# Patient Record
Sex: FEMALE | Race: WHITE | NOT HISPANIC OR LATINO | Employment: OTHER | URBAN - METROPOLITAN AREA
[De-identification: names, ages, dates, MRNs, and addresses within clinical notes are randomized per-mention and may not be internally consistent; named-entity substitution may affect disease eponyms.]

---

## 2018-06-25 ENCOUNTER — TRANSCRIBE ORDERS (OUTPATIENT)
Dept: ADMINISTRATIVE | Facility: HOSPITAL | Age: 83
End: 2018-06-25

## 2018-06-25 DIAGNOSIS — Z12.39 SCREENING BREAST EXAMINATION: Primary | ICD-10-CM

## 2018-07-05 ENCOUNTER — HOSPITAL ENCOUNTER (OUTPATIENT)
Dept: MAMMOGRAPHY | Facility: HOSPITAL | Age: 83
Discharge: HOME | End: 2018-07-05
Attending: FAMILY MEDICINE

## 2018-07-05 DIAGNOSIS — Z12.39 SCREENING BREAST EXAMINATION: ICD-10-CM

## 2018-12-17 ENCOUNTER — HOSPITAL ENCOUNTER (OUTPATIENT)
Dept: RADIOLOGY | Facility: HOSPITAL | Age: 83
Discharge: HOME | End: 2018-12-17
Payer: MEDICARE

## 2018-12-17 ENCOUNTER — TRANSCRIBE ORDERS (OUTPATIENT)
Dept: ADMINISTRATIVE | Facility: HOSPITAL | Age: 83
End: 2018-12-17

## 2018-12-17 DIAGNOSIS — J45.909 ASTHMATIC BRONCHITIS WITHOUT COMPLICATION, UNSPECIFIED ASTHMA SEVERITY, UNSPECIFIED WHETHER PERSISTENT: Primary | ICD-10-CM

## 2018-12-17 DIAGNOSIS — J45.909 ASTHMATIC BRONCHITIS WITHOUT COMPLICATION, UNSPECIFIED ASTHMA SEVERITY, UNSPECIFIED WHETHER PERSISTENT: ICD-10-CM

## 2018-12-17 PROCEDURE — 71046 X-RAY EXAM CHEST 2 VIEWS: CPT

## 2019-05-17 ENCOUNTER — HOSPITAL ENCOUNTER (INPATIENT)
Facility: HOSPITAL | Age: 84
LOS: 4 days | Discharge: HOME | DRG: 871 | End: 2019-05-21
Attending: EMERGENCY MEDICINE | Admitting: INTERNAL MEDICINE
Payer: MEDICARE

## 2019-05-17 ENCOUNTER — APPOINTMENT (EMERGENCY)
Dept: RADIOLOGY | Facility: HOSPITAL | Age: 84
DRG: 871 | End: 2019-05-17
Payer: MEDICARE

## 2019-05-17 ENCOUNTER — APPOINTMENT (EMERGENCY)
Dept: CT IMAGING | Facility: HOSPITAL | Age: 84
DRG: 871 | End: 2019-05-17
Payer: MEDICARE

## 2019-05-17 DIAGNOSIS — R79.0 LOW MAGNESIUM LEVEL: ICD-10-CM

## 2019-05-17 DIAGNOSIS — E87.1 HYPONATREMIA: ICD-10-CM

## 2019-05-17 DIAGNOSIS — E87.6 HYPOKALEMIA: ICD-10-CM

## 2019-05-17 DIAGNOSIS — I34.0 MITRAL VALVE INSUFFICIENCY: ICD-10-CM

## 2019-05-17 DIAGNOSIS — I47.1 PAROXYSMAL SVT (SUPRAVENTRICULAR TACHYCARDIA) (HCC): ICD-10-CM

## 2019-05-17 DIAGNOSIS — I10 ESSENTIAL HYPERTENSION: ICD-10-CM

## 2019-05-17 DIAGNOSIS — I27.20 PULMONARY HYPERTENSION (HCC): ICD-10-CM

## 2019-05-17 DIAGNOSIS — I21.A1 MYOCARDIAL INFARCTION TYPE 2 (HCC): ICD-10-CM

## 2019-05-17 DIAGNOSIS — I35.0 MODERATE AORTIC STENOSIS: ICD-10-CM

## 2019-05-17 DIAGNOSIS — J18.9 COMMUNITY ACQUIRED PNEUMONIA OF LEFT UPPER LOBE OF LUNG: ICD-10-CM

## 2019-05-17 DIAGNOSIS — I47.1 SUPRAVENTRICULAR TACHYCARDIA (HCC): ICD-10-CM

## 2019-05-17 DIAGNOSIS — J18.9 COMMUNITY ACQUIRED PNEUMONIA: Primary | ICD-10-CM

## 2019-05-17 PROBLEM — E83.42 HYPOMAGNESEMIA: Status: ACTIVE | Noted: 2019-05-17

## 2019-05-17 PROBLEM — R79.89 ELEVATED TROPONIN: Status: ACTIVE | Noted: 2019-05-17

## 2019-05-17 PROBLEM — R77.8 ELEVATED TROPONIN: Status: ACTIVE | Noted: 2019-05-17

## 2019-05-17 LAB
ALBUMIN SERPL BCP-MCNC: 3.2 G/DL (ref 3.5–5)
ALP SERPL-CCNC: 63 U/L (ref 46–116)
ALT SERPL W P-5'-P-CCNC: 25 U/L (ref 12–78)
ANION GAP SERPL CALCULATED.3IONS-SCNC: 10 MMOL/L (ref 4–13)
APTT PPP: 43 SECONDS (ref 26–38)
AST SERPL W P-5'-P-CCNC: 24 U/L (ref 5–45)
BACTERIA UR QL AUTO: ABNORMAL /HPF
BASOPHILS # BLD AUTO: 0.01 THOUSANDS/ΜL (ref 0–0.1)
BASOPHILS NFR BLD AUTO: 0 % (ref 0–1)
BILIRUB SERPL-MCNC: 0.6 MG/DL (ref 0.2–1)
BILIRUB UR QL STRIP: NEGATIVE
BUN SERPL-MCNC: 16 MG/DL (ref 5–25)
CALCIUM SERPL-MCNC: 8.6 MG/DL (ref 8.3–10.1)
CHLORIDE SERPL-SCNC: 88 MMOL/L (ref 100–108)
CLARITY UR: CLEAR
CO2 SERPL-SCNC: 24 MMOL/L (ref 21–32)
COLOR UR: ABNORMAL
CREAT SERPL-MCNC: 0.97 MG/DL (ref 0.6–1.3)
EOSINOPHIL # BLD AUTO: 0 THOUSAND/ΜL (ref 0–0.61)
EOSINOPHIL NFR BLD AUTO: 0 % (ref 0–6)
ERYTHROCYTE [DISTWIDTH] IN BLOOD BY AUTOMATED COUNT: 12.6 % (ref 11.6–15.1)
GFR SERPL CREATININE-BSD FRML MDRD: 54 ML/MIN/1.73SQ M
GLUCOSE SERPL-MCNC: 139 MG/DL (ref 65–140)
GLUCOSE UR STRIP-MCNC: NEGATIVE MG/DL
HCT VFR BLD AUTO: 33.7 % (ref 34.8–46.1)
HGB BLD-MCNC: 11.5 G/DL (ref 11.5–15.4)
HGB UR QL STRIP.AUTO: ABNORMAL
IMM GRANULOCYTES # BLD AUTO: 0.03 THOUSAND/UL (ref 0–0.2)
IMM GRANULOCYTES NFR BLD AUTO: 1 % (ref 0–2)
INR PPP: 1.21 (ref 0.86–1.17)
KETONES UR STRIP-MCNC: NEGATIVE MG/DL
LEUKOCYTE ESTERASE UR QL STRIP: NEGATIVE
LYMPHOCYTES # BLD AUTO: 0.46 THOUSANDS/ΜL (ref 0.6–4.47)
LYMPHOCYTES NFR BLD AUTO: 8 % (ref 14–44)
MAGNESIUM SERPL-MCNC: 1.2 MG/DL (ref 1.6–2.6)
MCH RBC QN AUTO: 31.3 PG (ref 26.8–34.3)
MCHC RBC AUTO-ENTMCNC: 34.1 G/DL (ref 31.4–37.4)
MCV RBC AUTO: 92 FL (ref 82–98)
MONOCYTES # BLD AUTO: 0.3 THOUSAND/ΜL (ref 0.17–1.22)
MONOCYTES NFR BLD AUTO: 5 % (ref 4–12)
NEUTROPHILS # BLD AUTO: 5.08 THOUSANDS/ΜL (ref 1.85–7.62)
NEUTS SEG NFR BLD AUTO: 86 % (ref 43–75)
NITRITE UR QL STRIP: NEGATIVE
NON-SQ EPI CELLS URNS QL MICRO: ABNORMAL /HPF
NRBC BLD AUTO-RTO: 0 /100 WBCS
NT-PROBNP SERPL-MCNC: 5062 PG/ML
PH UR STRIP.AUTO: 7 [PH]
PLATELET # BLD AUTO: 104 THOUSANDS/UL (ref 149–390)
PMV BLD AUTO: 9.4 FL (ref 8.9–12.7)
POTASSIUM SERPL-SCNC: 2.9 MMOL/L (ref 3.5–5.3)
PROT SERPL-MCNC: 6.9 G/DL (ref 6.4–8.2)
PROT UR STRIP-MCNC: NEGATIVE MG/DL
PROTHROMBIN TIME: 14.7 SECONDS (ref 11.8–14.2)
RBC # BLD AUTO: 3.67 MILLION/UL (ref 3.81–5.12)
RBC #/AREA URNS AUTO: ABNORMAL /HPF
SODIUM SERPL-SCNC: 122 MMOL/L (ref 136–145)
SP GR UR STRIP.AUTO: <=1.005 (ref 1–1.03)
TROPONIN I SERPL-MCNC: 0.3 NG/ML
TSH SERPL DL<=0.05 MIU/L-ACNC: 0.84 UIU/ML (ref 0.36–3.74)
UROBILINOGEN UR QL STRIP.AUTO: 0.2 E.U./DL
WBC # BLD AUTO: 5.88 THOUSAND/UL (ref 4.31–10.16)
WBC #/AREA URNS AUTO: ABNORMAL /HPF

## 2019-05-17 PROCEDURE — 99285 EMERGENCY DEPT VISIT HI MDM: CPT

## 2019-05-17 PROCEDURE — 84443 ASSAY THYROID STIM HORMONE: CPT | Performed by: EMERGENCY MEDICINE

## 2019-05-17 PROCEDURE — 99285 EMERGENCY DEPT VISIT HI MDM: CPT | Performed by: EMERGENCY MEDICINE

## 2019-05-17 PROCEDURE — 1123F ACP DISCUSS/DSCN MKR DOCD: CPT | Performed by: INTERNAL MEDICINE

## 2019-05-17 PROCEDURE — 93005 ELECTROCARDIOGRAM TRACING: CPT

## 2019-05-17 PROCEDURE — 96375 TX/PRO/DX INJ NEW DRUG ADDON: CPT

## 2019-05-17 PROCEDURE — 87040 BLOOD CULTURE FOR BACTERIA: CPT | Performed by: EMERGENCY MEDICINE

## 2019-05-17 PROCEDURE — 84484 ASSAY OF TROPONIN QUANT: CPT | Performed by: EMERGENCY MEDICINE

## 2019-05-17 PROCEDURE — 71046 X-RAY EXAM CHEST 2 VIEWS: CPT

## 2019-05-17 PROCEDURE — 96361 HYDRATE IV INFUSION ADD-ON: CPT

## 2019-05-17 PROCEDURE — 74177 CT ABD & PELVIS W/CONTRAST: CPT

## 2019-05-17 PROCEDURE — 85610 PROTHROMBIN TIME: CPT | Performed by: EMERGENCY MEDICINE

## 2019-05-17 PROCEDURE — 81001 URINALYSIS AUTO W/SCOPE: CPT | Performed by: EMERGENCY MEDICINE

## 2019-05-17 PROCEDURE — 36415 COLL VENOUS BLD VENIPUNCTURE: CPT | Performed by: EMERGENCY MEDICINE

## 2019-05-17 PROCEDURE — 83880 ASSAY OF NATRIURETIC PEPTIDE: CPT | Performed by: EMERGENCY MEDICINE

## 2019-05-17 PROCEDURE — 85730 THROMBOPLASTIN TIME PARTIAL: CPT | Performed by: EMERGENCY MEDICINE

## 2019-05-17 PROCEDURE — 83735 ASSAY OF MAGNESIUM: CPT | Performed by: EMERGENCY MEDICINE

## 2019-05-17 PROCEDURE — 96365 THER/PROPH/DIAG IV INF INIT: CPT

## 2019-05-17 PROCEDURE — 71275 CT ANGIOGRAPHY CHEST: CPT

## 2019-05-17 PROCEDURE — 80053 COMPREHEN METABOLIC PANEL: CPT | Performed by: EMERGENCY MEDICINE

## 2019-05-17 PROCEDURE — 85025 COMPLETE CBC W/AUTO DIFF WBC: CPT | Performed by: EMERGENCY MEDICINE

## 2019-05-17 RX ORDER — MAGNESIUM SULFATE HEPTAHYDRATE 40 MG/ML
2 INJECTION, SOLUTION INTRAVENOUS ONCE
Status: COMPLETED | OUTPATIENT
Start: 2019-05-17 | End: 2019-05-17

## 2019-05-17 RX ORDER — ASPIRIN 81 MG/1
81 TABLET ORAL DAILY
COMMUNITY
End: 2020-07-09 | Stop reason: DRUGHIGH

## 2019-05-17 RX ORDER — ATENOLOL AND CHLORTHALIDONE TABLET 100; 25 MG/1; MG/1
1 TABLET ORAL DAILY
COMMUNITY
End: 2019-05-21 | Stop reason: HOSPADM

## 2019-05-17 RX ORDER — CEFPODOXIME PROXETIL 200 MG/1
200 TABLET, FILM COATED ORAL 2 TIMES DAILY
Status: ON HOLD | COMMUNITY
End: 2019-05-21 | Stop reason: SDUPTHER

## 2019-05-17 RX ORDER — ASPIRIN 81 MG/1
324 TABLET, CHEWABLE ORAL ONCE
Status: COMPLETED | OUTPATIENT
Start: 2019-05-17 | End: 2019-05-17

## 2019-05-17 RX ORDER — ALPRAZOLAM 0.25 MG/1
TABLET ORAL 4 TIMES DAILY PRN
Status: ON HOLD | COMMUNITY
End: 2019-05-21 | Stop reason: SDUPTHER

## 2019-05-17 RX ORDER — BENZONATATE 100 MG/1
100 CAPSULE ORAL 3 TIMES DAILY PRN
COMMUNITY
End: 2019-05-21 | Stop reason: HOSPADM

## 2019-05-17 RX ORDER — ACETAMINOPHEN 325 MG/1
650 TABLET ORAL ONCE
Status: DISCONTINUED | OUTPATIENT
Start: 2019-05-17 | End: 2019-05-17

## 2019-05-17 RX ORDER — AMLODIPINE BESYLATE 5 MG/1
5 TABLET ORAL DAILY
COMMUNITY
End: 2019-06-11 | Stop reason: SDUPTHER

## 2019-05-17 RX ORDER — AZITHROMYCIN 250 MG/1
500 TABLET, FILM COATED ORAL ONCE
Status: COMPLETED | OUTPATIENT
Start: 2019-05-17 | End: 2019-05-17

## 2019-05-17 RX ORDER — AMOXICILLIN 500 MG
1200 CAPSULE ORAL 2 TIMES DAILY
COMMUNITY

## 2019-05-17 RX ORDER — POTASSIUM CHLORIDE 20 MEQ/1
60 TABLET, EXTENDED RELEASE ORAL ONCE
Status: COMPLETED | OUTPATIENT
Start: 2019-05-17 | End: 2019-05-17

## 2019-05-17 RX ORDER — POTASSIUM CHLORIDE 1.5 G/1.77G
20 POWDER, FOR SOLUTION ORAL 2 TIMES DAILY
COMMUNITY
End: 2019-05-21 | Stop reason: HOSPADM

## 2019-05-17 RX ORDER — CEFTRIAXONE 1 G/50ML
1000 INJECTION, SOLUTION INTRAVENOUS ONCE
Status: COMPLETED | OUTPATIENT
Start: 2019-05-17 | End: 2019-05-17

## 2019-05-17 RX ADMIN — SODIUM CHLORIDE 1000 ML: 0.9 INJECTION, SOLUTION INTRAVENOUS at 20:10

## 2019-05-17 RX ADMIN — CEFTRIAXONE 1000 MG: 1 INJECTION, SOLUTION INTRAVENOUS at 22:47

## 2019-05-17 RX ADMIN — IOHEXOL 100 ML: 350 INJECTION, SOLUTION INTRAVENOUS at 21:22

## 2019-05-17 RX ADMIN — ASPIRIN 81 MG 324 MG: 81 TABLET ORAL at 21:34

## 2019-05-17 RX ADMIN — POTASSIUM CHLORIDE 60 MEQ: 1500 TABLET, EXTENDED RELEASE ORAL at 21:35

## 2019-05-17 RX ADMIN — AZITHROMYCIN 500 MG: 250 TABLET, FILM COATED ORAL at 22:45

## 2019-05-17 RX ADMIN — MAGNESIUM SULFATE HEPTAHYDRATE 2 G: 40 INJECTION, SOLUTION INTRAVENOUS at 21:38

## 2019-05-18 PROBLEM — I10 ESSENTIAL HYPERTENSION: Status: ACTIVE | Noted: 2019-05-18

## 2019-05-18 LAB
ALBUMIN SERPL BCP-MCNC: 2.7 G/DL (ref 3.5–5)
ALP SERPL-CCNC: 57 U/L (ref 46–116)
ALT SERPL W P-5'-P-CCNC: 23 U/L (ref 12–78)
ANION GAP SERPL CALCULATED.3IONS-SCNC: 10 MMOL/L (ref 4–13)
AST SERPL W P-5'-P-CCNC: 24 U/L (ref 5–45)
BILIRUB SERPL-MCNC: 0.4 MG/DL (ref 0.2–1)
BUN SERPL-MCNC: 13 MG/DL (ref 5–25)
CALCIUM SERPL-MCNC: 8 MG/DL (ref 8.3–10.1)
CHLORIDE SERPL-SCNC: 92 MMOL/L (ref 100–108)
CO2 SERPL-SCNC: 24 MMOL/L (ref 21–32)
CREAT SERPL-MCNC: 0.91 MG/DL (ref 0.6–1.3)
ERYTHROCYTE [DISTWIDTH] IN BLOOD BY AUTOMATED COUNT: 12.8 % (ref 11.6–15.1)
GFR SERPL CREATININE-BSD FRML MDRD: 59 ML/MIN/1.73SQ M
GLUCOSE SERPL-MCNC: 110 MG/DL (ref 65–140)
HCT VFR BLD AUTO: 32.3 % (ref 34.8–46.1)
HGB BLD-MCNC: 11 G/DL (ref 11.5–15.4)
L PNEUMO1 AG UR QL IA.RAPID: NEGATIVE
MAGNESIUM SERPL-MCNC: 1.7 MG/DL (ref 1.6–2.6)
MCH RBC QN AUTO: 31.7 PG (ref 26.8–34.3)
MCHC RBC AUTO-ENTMCNC: 34.1 G/DL (ref 31.4–37.4)
MCV RBC AUTO: 93 FL (ref 82–98)
PHOSPHATE SERPL-MCNC: 2.5 MG/DL (ref 2.3–4.1)
PLATELET # BLD AUTO: 84 THOUSANDS/UL (ref 149–390)
PLATELET # BLD AUTO: 86 THOUSANDS/UL (ref 149–390)
PMV BLD AUTO: 9 FL (ref 8.9–12.7)
PMV BLD AUTO: 9.4 FL (ref 8.9–12.7)
POTASSIUM SERPL-SCNC: 3.3 MMOL/L (ref 3.5–5.3)
PROCALCITONIN SERPL-MCNC: 1.4 NG/ML
PROCALCITONIN SERPL-MCNC: 1.71 NG/ML
PROT SERPL-MCNC: 6.1 G/DL (ref 6.4–8.2)
RBC # BLD AUTO: 3.47 MILLION/UL (ref 3.81–5.12)
S PNEUM AG UR QL: NEGATIVE
SODIUM SERPL-SCNC: 126 MMOL/L (ref 136–145)
TROPONIN I SERPL-MCNC: 0.32 NG/ML
TROPONIN I SERPL-MCNC: 0.34 NG/ML
WBC # BLD AUTO: 4.91 THOUSAND/UL (ref 4.31–10.16)

## 2019-05-18 PROCEDURE — 87449 NOS EACH ORGANISM AG IA: CPT | Performed by: PHYSICIAN ASSISTANT

## 2019-05-18 PROCEDURE — 84484 ASSAY OF TROPONIN QUANT: CPT | Performed by: PHYSICIAN ASSISTANT

## 2019-05-18 PROCEDURE — 84100 ASSAY OF PHOSPHORUS: CPT | Performed by: PHYSICIAN ASSISTANT

## 2019-05-18 PROCEDURE — 85027 COMPLETE CBC AUTOMATED: CPT | Performed by: PHYSICIAN ASSISTANT

## 2019-05-18 PROCEDURE — 99222 1ST HOSP IP/OBS MODERATE 55: CPT | Performed by: INTERNAL MEDICINE

## 2019-05-18 PROCEDURE — 97162 PT EVAL MOD COMPLEX 30 MIN: CPT

## 2019-05-18 PROCEDURE — 97166 OT EVAL MOD COMPLEX 45 MIN: CPT | Performed by: DEVELOPMENTAL THERAPIST

## 2019-05-18 PROCEDURE — G8987 SELF CARE CURRENT STATUS: HCPCS | Performed by: DEVELOPMENTAL THERAPIST

## 2019-05-18 PROCEDURE — 93005 ELECTROCARDIOGRAM TRACING: CPT

## 2019-05-18 PROCEDURE — 80053 COMPREHEN METABOLIC PANEL: CPT | Performed by: PHYSICIAN ASSISTANT

## 2019-05-18 PROCEDURE — 84145 PROCALCITONIN (PCT): CPT | Performed by: PHYSICIAN ASSISTANT

## 2019-05-18 PROCEDURE — G8988 SELF CARE GOAL STATUS: HCPCS | Performed by: DEVELOPMENTAL THERAPIST

## 2019-05-18 PROCEDURE — 36415 COLL VENOUS BLD VENIPUNCTURE: CPT | Performed by: PHYSICIAN ASSISTANT

## 2019-05-18 PROCEDURE — 94760 N-INVAS EAR/PLS OXIMETRY 1: CPT

## 2019-05-18 PROCEDURE — 94664 DEMO&/EVAL PT USE INHALER: CPT

## 2019-05-18 PROCEDURE — 83735 ASSAY OF MAGNESIUM: CPT | Performed by: PHYSICIAN ASSISTANT

## 2019-05-18 PROCEDURE — 85049 AUTOMATED PLATELET COUNT: CPT | Performed by: PHYSICIAN ASSISTANT

## 2019-05-18 PROCEDURE — G8979 MOBILITY GOAL STATUS: HCPCS

## 2019-05-18 PROCEDURE — G8978 MOBILITY CURRENT STATUS: HCPCS

## 2019-05-18 RX ORDER — ASPIRIN 81 MG/1
81 TABLET, CHEWABLE ORAL DAILY
Status: DISCONTINUED | OUTPATIENT
Start: 2019-05-18 | End: 2019-05-21 | Stop reason: HOSPADM

## 2019-05-18 RX ORDER — CALCIUM CARBONATE 500(1250)
1 TABLET ORAL
Status: DISCONTINUED | OUTPATIENT
Start: 2019-05-18 | End: 2019-05-21 | Stop reason: HOSPADM

## 2019-05-18 RX ORDER — POTASSIUM CHLORIDE 20MEQ/15ML
20 LIQUID (ML) ORAL 2 TIMES DAILY
Status: DISCONTINUED | OUTPATIENT
Start: 2019-05-18 | End: 2019-05-21 | Stop reason: HOSPADM

## 2019-05-18 RX ORDER — BENZONATATE 100 MG/1
100 CAPSULE ORAL 3 TIMES DAILY PRN
Status: DISCONTINUED | OUTPATIENT
Start: 2019-05-18 | End: 2019-05-21 | Stop reason: HOSPADM

## 2019-05-18 RX ORDER — AMLODIPINE BESYLATE 5 MG/1
5 TABLET ORAL DAILY
Status: DISCONTINUED | OUTPATIENT
Start: 2019-05-18 | End: 2019-05-21 | Stop reason: HOSPADM

## 2019-05-18 RX ORDER — ONDANSETRON 2 MG/ML
4 INJECTION INTRAMUSCULAR; INTRAVENOUS EVERY 6 HOURS PRN
Status: DISCONTINUED | OUTPATIENT
Start: 2019-05-18 | End: 2019-05-21 | Stop reason: HOSPADM

## 2019-05-18 RX ORDER — ACETAMINOPHEN 325 MG/1
650 TABLET ORAL ONCE
Status: COMPLETED | OUTPATIENT
Start: 2019-05-18 | End: 2019-05-18

## 2019-05-18 RX ORDER — CHLORTHALIDONE 25 MG/1
25 TABLET ORAL DAILY
Status: DISCONTINUED | OUTPATIENT
Start: 2019-05-18 | End: 2019-05-18

## 2019-05-18 RX ORDER — ATENOLOL 50 MG/1
100 TABLET ORAL DAILY
Status: DISCONTINUED | OUTPATIENT
Start: 2019-05-18 | End: 2019-05-21 | Stop reason: HOSPADM

## 2019-05-18 RX ORDER — ASPIRIN 81 MG/1
81 TABLET ORAL DAILY
Status: DISCONTINUED | OUTPATIENT
Start: 2019-05-18 | End: 2019-05-18

## 2019-05-18 RX ORDER — GUAIFENESIN/DEXTROMETHORPHAN 100-10MG/5
10 SYRUP ORAL ONCE
Status: COMPLETED | OUTPATIENT
Start: 2019-05-18 | End: 2019-05-18

## 2019-05-18 RX ORDER — ALPRAZOLAM 0.25 MG/1
0.25 TABLET ORAL 4 TIMES DAILY PRN
Status: DISCONTINUED | OUTPATIENT
Start: 2019-05-18 | End: 2019-05-21 | Stop reason: HOSPADM

## 2019-05-18 RX ORDER — ALBUTEROL SULFATE 2.5 MG/3ML
2.5 SOLUTION RESPIRATORY (INHALATION) EVERY 4 HOURS PRN
Status: DISCONTINUED | OUTPATIENT
Start: 2019-05-18 | End: 2019-05-21 | Stop reason: HOSPADM

## 2019-05-18 RX ORDER — ACETAMINOPHEN 325 MG/1
TABLET ORAL
Status: COMPLETED
Start: 2019-05-18 | End: 2019-05-18

## 2019-05-18 RX ADMIN — CHLORTHALIDONE 25 MG: 25 TABLET ORAL at 09:28

## 2019-05-18 RX ADMIN — ACETAMINOPHEN 650 MG: 325 TABLET ORAL at 09:05

## 2019-05-18 RX ADMIN — ATENOLOL 100 MG: 50 TABLET ORAL at 09:27

## 2019-05-18 RX ADMIN — ACETAMINOPHEN 650 MG: 325 TABLET, FILM COATED ORAL at 09:05

## 2019-05-18 RX ADMIN — Medication 1 TABLET: at 07:30

## 2019-05-18 RX ADMIN — ASPIRIN 81 MG 81 MG: 81 TABLET ORAL at 09:28

## 2019-05-18 RX ADMIN — GUAIFENESIN AND DEXTROMETHORPHAN 10 ML: 100; 10 SYRUP ORAL at 05:06

## 2019-05-18 RX ADMIN — POTASSIUM CHLORIDE 20 MEQ: 20 SOLUTION ORAL at 19:16

## 2019-05-18 RX ADMIN — ALPRAZOLAM 0.25 MG: 0.25 TABLET ORAL at 01:51

## 2019-05-18 RX ADMIN — BENZONATATE 100 MG: 100 CAPSULE ORAL at 19:18

## 2019-05-18 RX ADMIN — PSYLLIUM HUSK 1 PACKET: 3.4 POWDER ORAL at 09:26

## 2019-05-18 RX ADMIN — ENOXAPARIN SODIUM 40 MG: 40 INJECTION SUBCUTANEOUS at 09:01

## 2019-05-18 RX ADMIN — AMLODIPINE BESYLATE 5 MG: 5 TABLET ORAL at 09:03

## 2019-05-18 RX ADMIN — POTASSIUM CHLORIDE 20 MEQ: 20 SOLUTION ORAL at 09:05

## 2019-05-18 RX ADMIN — BENZONATATE 100 MG: 100 CAPSULE ORAL at 01:51

## 2019-05-19 LAB
ALBUMIN SERPL BCP-MCNC: 3 G/DL (ref 3.5–5)
ALP SERPL-CCNC: 76 U/L (ref 46–116)
ALT SERPL W P-5'-P-CCNC: 26 U/L (ref 12–78)
ANION GAP SERPL CALCULATED.3IONS-SCNC: 11 MMOL/L (ref 4–13)
AST SERPL W P-5'-P-CCNC: 31 U/L (ref 5–45)
BASOPHILS # BLD AUTO: 0.01 THOUSANDS/ΜL (ref 0–0.1)
BASOPHILS NFR BLD AUTO: 0 % (ref 0–1)
BILIRUB SERPL-MCNC: 0.4 MG/DL (ref 0.2–1)
BUN SERPL-MCNC: 19 MG/DL (ref 5–25)
CALCIUM SERPL-MCNC: 8.2 MG/DL (ref 8.3–10.1)
CHLORIDE SERPL-SCNC: 90 MMOL/L (ref 100–108)
CO2 SERPL-SCNC: 23 MMOL/L (ref 21–32)
CREAT SERPL-MCNC: 1.12 MG/DL (ref 0.6–1.3)
EOSINOPHIL # BLD AUTO: 0 THOUSAND/ΜL (ref 0–0.61)
EOSINOPHIL NFR BLD AUTO: 0 % (ref 0–6)
ERYTHROCYTE [DISTWIDTH] IN BLOOD BY AUTOMATED COUNT: 12.9 % (ref 11.6–15.1)
GFR SERPL CREATININE-BSD FRML MDRD: 46 ML/MIN/1.73SQ M
GLUCOSE SERPL-MCNC: 105 MG/DL (ref 65–140)
HCT VFR BLD AUTO: 33.9 % (ref 34.8–46.1)
HGB BLD-MCNC: 11.5 G/DL (ref 11.5–15.4)
IMM GRANULOCYTES # BLD AUTO: 0.02 THOUSAND/UL (ref 0–0.2)
IMM GRANULOCYTES NFR BLD AUTO: 0 % (ref 0–2)
INR PPP: 1.09 (ref 0.86–1.17)
LYMPHOCYTES # BLD AUTO: 1.58 THOUSANDS/ΜL (ref 0.6–4.47)
LYMPHOCYTES NFR BLD AUTO: 27 % (ref 14–44)
MAGNESIUM SERPL-MCNC: 1.8 MG/DL (ref 1.6–2.6)
MCH RBC QN AUTO: 31.5 PG (ref 26.8–34.3)
MCHC RBC AUTO-ENTMCNC: 33.9 G/DL (ref 31.4–37.4)
MCV RBC AUTO: 93 FL (ref 82–98)
MONOCYTES # BLD AUTO: 0.38 THOUSAND/ΜL (ref 0.17–1.22)
MONOCYTES NFR BLD AUTO: 7 % (ref 4–12)
NEUTROPHILS # BLD AUTO: 3.78 THOUSANDS/ΜL (ref 1.85–7.62)
NEUTS SEG NFR BLD AUTO: 66 % (ref 43–75)
NRBC BLD AUTO-RTO: 0 /100 WBCS
PHOSPHATE SERPL-MCNC: 2.4 MG/DL (ref 2.3–4.1)
PLATELET # BLD AUTO: 111 THOUSANDS/UL (ref 149–390)
PMV BLD AUTO: 9.6 FL (ref 8.9–12.7)
POTASSIUM SERPL-SCNC: 3.4 MMOL/L (ref 3.5–5.3)
PROCALCITONIN SERPL-MCNC: 2.28 NG/ML
PROT SERPL-MCNC: 6.9 G/DL (ref 6.4–8.2)
PROTHROMBIN TIME: 13.6 SECONDS (ref 11.8–14.2)
RBC # BLD AUTO: 3.65 MILLION/UL (ref 3.81–5.12)
SODIUM SERPL-SCNC: 124 MMOL/L (ref 136–145)
WBC # BLD AUTO: 5.77 THOUSAND/UL (ref 4.31–10.16)

## 2019-05-19 PROCEDURE — 84100 ASSAY OF PHOSPHORUS: CPT | Performed by: INTERNAL MEDICINE

## 2019-05-19 PROCEDURE — 84145 PROCALCITONIN (PCT): CPT | Performed by: INTERNAL MEDICINE

## 2019-05-19 PROCEDURE — 85025 COMPLETE CBC W/AUTO DIFF WBC: CPT | Performed by: INTERNAL MEDICINE

## 2019-05-19 PROCEDURE — 99232 SBSQ HOSP IP/OBS MODERATE 35: CPT | Performed by: INTERNAL MEDICINE

## 2019-05-19 PROCEDURE — 85610 PROTHROMBIN TIME: CPT | Performed by: INTERNAL MEDICINE

## 2019-05-19 PROCEDURE — 80053 COMPREHEN METABOLIC PANEL: CPT | Performed by: INTERNAL MEDICINE

## 2019-05-19 PROCEDURE — 83735 ASSAY OF MAGNESIUM: CPT | Performed by: INTERNAL MEDICINE

## 2019-05-19 RX ORDER — LOPERAMIDE HYDROCHLORIDE 2 MG/1
2 CAPSULE ORAL 4 TIMES DAILY PRN
Status: DISCONTINUED | OUTPATIENT
Start: 2019-05-19 | End: 2019-05-21 | Stop reason: HOSPADM

## 2019-05-19 RX ORDER — CEFTRIAXONE 1 G/50ML
1000 INJECTION, SOLUTION INTRAVENOUS EVERY 24 HOURS
Status: DISCONTINUED | OUTPATIENT
Start: 2019-05-19 | End: 2019-05-21 | Stop reason: HOSPADM

## 2019-05-19 RX ADMIN — POTASSIUM CHLORIDE 20 MEQ: 20 SOLUTION ORAL at 18:35

## 2019-05-19 RX ADMIN — POTASSIUM CHLORIDE 20 MEQ: 20 SOLUTION ORAL at 08:45

## 2019-05-19 RX ADMIN — AZITHROMYCIN MONOHYDRATE 500 MG: 500 INJECTION, POWDER, LYOPHILIZED, FOR SOLUTION INTRAVENOUS at 09:51

## 2019-05-19 RX ADMIN — ASPIRIN 81 MG 81 MG: 81 TABLET ORAL at 08:46

## 2019-05-19 RX ADMIN — Medication 1 TABLET: at 08:48

## 2019-05-19 RX ADMIN — ENOXAPARIN SODIUM 40 MG: 40 INJECTION SUBCUTANEOUS at 08:48

## 2019-05-19 RX ADMIN — ALBUTEROL SULFATE 2.5 MG: 2.5 SOLUTION RESPIRATORY (INHALATION) at 09:54

## 2019-05-19 RX ADMIN — CEFTRIAXONE 1000 MG: 1 INJECTION, SOLUTION INTRAVENOUS at 09:09

## 2019-05-20 ENCOUNTER — APPOINTMENT (INPATIENT)
Dept: NON INVASIVE DIAGNOSTICS | Facility: HOSPITAL | Age: 84
DRG: 871 | End: 2019-05-20
Payer: MEDICARE

## 2019-05-20 LAB
ALBUMIN SERPL BCP-MCNC: 3 G/DL (ref 3.5–5)
ALP SERPL-CCNC: 76 U/L (ref 46–116)
ALT SERPL W P-5'-P-CCNC: 24 U/L (ref 12–78)
ANION GAP SERPL CALCULATED.3IONS-SCNC: 8 MMOL/L (ref 4–13)
AST SERPL W P-5'-P-CCNC: 33 U/L (ref 5–45)
BASOPHILS # BLD AUTO: 0.01 THOUSANDS/ΜL (ref 0–0.1)
BASOPHILS NFR BLD AUTO: 0 % (ref 0–1)
BILIRUB SERPL-MCNC: 0.4 MG/DL (ref 0.2–1)
BUN SERPL-MCNC: 19 MG/DL (ref 5–25)
CALCIUM SERPL-MCNC: 8.7 MG/DL (ref 8.3–10.1)
CHLORIDE SERPL-SCNC: 95 MMOL/L (ref 100–108)
CO2 SERPL-SCNC: 24 MMOL/L (ref 21–32)
CREAT SERPL-MCNC: 0.97 MG/DL (ref 0.6–1.3)
EOSINOPHIL # BLD AUTO: 0.06 THOUSAND/ΜL (ref 0–0.61)
EOSINOPHIL NFR BLD AUTO: 1 % (ref 0–6)
ERYTHROCYTE [DISTWIDTH] IN BLOOD BY AUTOMATED COUNT: 12.7 % (ref 11.6–15.1)
GFR SERPL CREATININE-BSD FRML MDRD: 54 ML/MIN/1.73SQ M
GLUCOSE SERPL-MCNC: 98 MG/DL (ref 65–140)
HCT VFR BLD AUTO: 34.6 % (ref 34.8–46.1)
HGB BLD-MCNC: 11.7 G/DL (ref 11.5–15.4)
IMM GRANULOCYTES # BLD AUTO: 0.01 THOUSAND/UL (ref 0–0.2)
IMM GRANULOCYTES NFR BLD AUTO: 0 % (ref 0–2)
LYMPHOCYTES # BLD AUTO: 1.3 THOUSANDS/ΜL (ref 0.6–4.47)
LYMPHOCYTES NFR BLD AUTO: 28 % (ref 14–44)
MAGNESIUM SERPL-MCNC: 1.7 MG/DL (ref 1.6–2.6)
MCH RBC QN AUTO: 31.1 PG (ref 26.8–34.3)
MCHC RBC AUTO-ENTMCNC: 33.8 G/DL (ref 31.4–37.4)
MCV RBC AUTO: 92 FL (ref 82–98)
MONOCYTES # BLD AUTO: 0.34 THOUSAND/ΜL (ref 0.17–1.22)
MONOCYTES NFR BLD AUTO: 7 % (ref 4–12)
NEUTROPHILS # BLD AUTO: 2.85 THOUSANDS/ΜL (ref 1.85–7.62)
NEUTS SEG NFR BLD AUTO: 64 % (ref 43–75)
NRBC BLD AUTO-RTO: 0 /100 WBCS
PHOSPHATE SERPL-MCNC: 2.4 MG/DL (ref 2.3–4.1)
PLATELET # BLD AUTO: 122 THOUSANDS/UL (ref 149–390)
PMV BLD AUTO: 9.8 FL (ref 8.9–12.7)
POTASSIUM SERPL-SCNC: 3.6 MMOL/L (ref 3.5–5.3)
PROCALCITONIN SERPL-MCNC: 1.27 NG/ML
PROT SERPL-MCNC: 7.1 G/DL (ref 6.4–8.2)
RBC # BLD AUTO: 3.76 MILLION/UL (ref 3.81–5.12)
SODIUM SERPL-SCNC: 127 MMOL/L (ref 136–145)
WBC # BLD AUTO: 4.57 THOUSAND/UL (ref 4.31–10.16)

## 2019-05-20 PROCEDURE — 93306 TTE W/DOPPLER COMPLETE: CPT | Performed by: INTERNAL MEDICINE

## 2019-05-20 PROCEDURE — 99232 SBSQ HOSP IP/OBS MODERATE 35: CPT | Performed by: INTERNAL MEDICINE

## 2019-05-20 PROCEDURE — 93306 TTE W/DOPPLER COMPLETE: CPT

## 2019-05-20 PROCEDURE — 85025 COMPLETE CBC W/AUTO DIFF WBC: CPT | Performed by: INTERNAL MEDICINE

## 2019-05-20 PROCEDURE — 97110 THERAPEUTIC EXERCISES: CPT

## 2019-05-20 PROCEDURE — 97116 GAIT TRAINING THERAPY: CPT

## 2019-05-20 PROCEDURE — 83735 ASSAY OF MAGNESIUM: CPT | Performed by: INTERNAL MEDICINE

## 2019-05-20 PROCEDURE — 84145 PROCALCITONIN (PCT): CPT | Performed by: INTERNAL MEDICINE

## 2019-05-20 PROCEDURE — 84100 ASSAY OF PHOSPHORUS: CPT | Performed by: INTERNAL MEDICINE

## 2019-05-20 PROCEDURE — 80053 COMPREHEN METABOLIC PANEL: CPT | Performed by: INTERNAL MEDICINE

## 2019-05-20 RX ADMIN — POTASSIUM CHLORIDE 20 MEQ: 20 SOLUTION ORAL at 18:09

## 2019-05-20 RX ADMIN — LOPERAMIDE HYDROCHLORIDE 2 MG: 2 CAPSULE ORAL at 04:28

## 2019-05-20 RX ADMIN — AMLODIPINE BESYLATE 5 MG: 5 TABLET ORAL at 10:58

## 2019-05-20 RX ADMIN — AZITHROMYCIN MONOHYDRATE 500 MG: 500 INJECTION, POWDER, LYOPHILIZED, FOR SOLUTION INTRAVENOUS at 11:02

## 2019-05-20 RX ADMIN — ATENOLOL 100 MG: 50 TABLET ORAL at 10:58

## 2019-05-20 RX ADMIN — ENOXAPARIN SODIUM 40 MG: 40 INJECTION SUBCUTANEOUS at 10:59

## 2019-05-20 RX ADMIN — ASPIRIN 81 MG 81 MG: 81 TABLET ORAL at 10:58

## 2019-05-20 RX ADMIN — CEFTRIAXONE 1000 MG: 1 INJECTION, SOLUTION INTRAVENOUS at 13:03

## 2019-05-20 RX ADMIN — POTASSIUM CHLORIDE 20 MEQ: 20 SOLUTION ORAL at 10:59

## 2019-05-20 RX ADMIN — Medication 1 TABLET: at 08:08

## 2019-05-21 VITALS
OXYGEN SATURATION: 98 % | BODY MASS INDEX: 27.86 KG/M2 | TEMPERATURE: 97.8 F | HEART RATE: 79 BPM | HEIGHT: 58 IN | DIASTOLIC BLOOD PRESSURE: 64 MMHG | SYSTOLIC BLOOD PRESSURE: 148 MMHG | RESPIRATION RATE: 18 BRPM | WEIGHT: 132.72 LBS

## 2019-05-21 PROBLEM — K57.90 DIVERTICULOSIS: Status: ACTIVE | Noted: 2019-05-21

## 2019-05-21 PROBLEM — I35.0 AORTIC VALVE STENOSIS: Status: ACTIVE | Noted: 2019-05-21

## 2019-05-21 PROBLEM — I27.20 PULMONARY HYPERTENSION (HCC): Status: ACTIVE | Noted: 2019-05-21

## 2019-05-21 PROBLEM — K44.9 HIATAL HERNIA: Status: ACTIVE | Noted: 2019-05-21

## 2019-05-21 PROBLEM — R59.1 LYMPHADENOPATHY: Status: ACTIVE | Noted: 2019-05-21

## 2019-05-21 PROBLEM — I44.0 FIRST DEGREE AV BLOCK: Status: ACTIVE | Noted: 2019-05-21

## 2019-05-21 PROBLEM — I47.10 SUPRAVENTRICULAR TACHYCARDIA: Status: ACTIVE | Noted: 2019-05-21

## 2019-05-21 PROBLEM — I34.0 MITRAL VALVE INSUFFICIENCY: Status: ACTIVE | Noted: 2019-05-21

## 2019-05-21 PROBLEM — I47.1 SUPRAVENTRICULAR TACHYCARDIA (HCC): Status: ACTIVE | Noted: 2019-05-21

## 2019-05-21 PROBLEM — D69.6 THROMBOCYTOPENIA (HCC): Status: ACTIVE | Noted: 2019-05-21

## 2019-05-21 LAB
ALBUMIN SERPL BCP-MCNC: 2.9 G/DL (ref 3.5–5)
ALP SERPL-CCNC: 63 U/L (ref 46–116)
ALT SERPL W P-5'-P-CCNC: 23 U/L (ref 12–78)
ANION GAP SERPL CALCULATED.3IONS-SCNC: 11 MMOL/L (ref 4–13)
AST SERPL W P-5'-P-CCNC: 33 U/L (ref 5–45)
ATRIAL RATE: 115 BPM
ATRIAL RATE: 312 BPM
ATRIAL RATE: 64 BPM
ATRIAL RATE: 83 BPM
ATRIAL RATE: 89 BPM
BASOPHILS # BLD MANUAL: 0 THOUSAND/UL (ref 0–0.1)
BASOPHILS NFR MAR MANUAL: 0 % (ref 0–1)
BILIRUB SERPL-MCNC: 0.4 MG/DL (ref 0.2–1)
BUN SERPL-MCNC: 18 MG/DL (ref 5–25)
CALCIUM SERPL-MCNC: 8.7 MG/DL (ref 8.3–10.1)
CHLORIDE SERPL-SCNC: 98 MMOL/L (ref 100–108)
CO2 SERPL-SCNC: 23 MMOL/L (ref 21–32)
CREAT SERPL-MCNC: 0.92 MG/DL (ref 0.6–1.3)
EOSINOPHIL # BLD MANUAL: 0 THOUSAND/UL (ref 0–0.4)
EOSINOPHIL NFR BLD MANUAL: 0 % (ref 0–6)
ERYTHROCYTE [DISTWIDTH] IN BLOOD BY AUTOMATED COUNT: 12.8 % (ref 11.6–15.1)
GFR SERPL CREATININE-BSD FRML MDRD: 58 ML/MIN/1.73SQ M
GLUCOSE SERPL-MCNC: 95 MG/DL (ref 65–140)
HCT VFR BLD AUTO: 29.8 % (ref 34.8–46.1)
HGB BLD-MCNC: 9.6 G/DL (ref 11.5–15.4)
INR PPP: 1.1 (ref 0.86–1.17)
LYMPHOCYTES # BLD AUTO: 1.47 THOUSAND/UL (ref 0.6–4.47)
LYMPHOCYTES # BLD AUTO: 34 % (ref 14–44)
MAGNESIUM SERPL-MCNC: 1.7 MG/DL (ref 1.6–2.6)
MCH RBC QN AUTO: 30.3 PG (ref 26.8–34.3)
MCHC RBC AUTO-ENTMCNC: 32.2 G/DL (ref 31.4–37.4)
MCV RBC AUTO: 94 FL (ref 82–98)
MONOCYTES # BLD AUTO: 0.39 THOUSAND/UL (ref 0–1.22)
MONOCYTES NFR BLD: 9 % (ref 4–12)
NEUTROPHILS # BLD MANUAL: 2.29 THOUSAND/UL (ref 1.85–7.62)
NEUTS BAND NFR BLD MANUAL: 1 % (ref 0–8)
NEUTS SEG NFR BLD AUTO: 52 % (ref 43–75)
NRBC BLD AUTO-RTO: 0 /100 WBCS
P AXIS: 74 DEGREES
P AXIS: 74 DEGREES
P AXIS: 80 DEGREES
PHOSPHATE SERPL-MCNC: 3 MG/DL (ref 2.3–4.1)
PLATELET # BLD AUTO: 135 THOUSANDS/UL (ref 149–390)
PLATELET BLD QL SMEAR: ABNORMAL
PMV BLD AUTO: 9.7 FL (ref 8.9–12.7)
POTASSIUM SERPL-SCNC: 3.7 MMOL/L (ref 3.5–5.3)
PR INTERVAL: 178 MS
PR INTERVAL: 258 MS
PR INTERVAL: 266 MS
PROCALCITONIN SERPL-MCNC: 0.73 NG/ML
PROT SERPL-MCNC: 6.5 G/DL (ref 6.4–8.2)
PROTHROMBIN TIME: 13.7 SECONDS (ref 11.8–14.2)
QRS AXIS: -23 DEGREES
QRS AXIS: -41 DEGREES
QRS AXIS: -56 DEGREES
QRS AXIS: -6 DEGREES
QRS AXIS: 1 DEGREES
QRSD INTERVAL: 80 MS
QRSD INTERVAL: 84 MS
QRSD INTERVAL: 90 MS
QT INTERVAL: 328 MS
QT INTERVAL: 354 MS
QT INTERVAL: 374 MS
QT INTERVAL: 404 MS
QT INTERVAL: 462 MS
QTC INTERVAL: 455 MS
QTC INTERVAL: 474 MS
QTC INTERVAL: 476 MS
QTC INTERVAL: 476 MS
QTC INTERVAL: 492 MS
RBC # BLD AUTO: 3.17 MILLION/UL (ref 3.81–5.12)
RBC MORPH BLD: NORMAL
SODIUM SERPL-SCNC: 132 MMOL/L (ref 136–145)
T WAVE AXIS: 158 DEGREES
T WAVE AXIS: 37 DEGREES
T WAVE AXIS: 47 DEGREES
T WAVE AXIS: 73 DEGREES
T WAVE AXIS: 8 DEGREES
TOTAL CELLS COUNTED SPEC: 100
VARIANT LYMPHS # BLD AUTO: 4 %
VENTRICULAR RATE: 109 BPM
VENTRICULAR RATE: 135 BPM
VENTRICULAR RATE: 64 BPM
VENTRICULAR RATE: 83 BPM
VENTRICULAR RATE: 89 BPM
WBC # BLD AUTO: 4.32 THOUSAND/UL (ref 4.31–10.16)

## 2019-05-21 PROCEDURE — 99222 1ST HOSP IP/OBS MODERATE 55: CPT | Performed by: INTERNAL MEDICINE

## 2019-05-21 PROCEDURE — 83735 ASSAY OF MAGNESIUM: CPT | Performed by: INTERNAL MEDICINE

## 2019-05-21 PROCEDURE — 80053 COMPREHEN METABOLIC PANEL: CPT | Performed by: INTERNAL MEDICINE

## 2019-05-21 PROCEDURE — 93010 ELECTROCARDIOGRAM REPORT: CPT | Performed by: INTERNAL MEDICINE

## 2019-05-21 PROCEDURE — 85027 COMPLETE CBC AUTOMATED: CPT | Performed by: INTERNAL MEDICINE

## 2019-05-21 PROCEDURE — 93005 ELECTROCARDIOGRAM TRACING: CPT

## 2019-05-21 PROCEDURE — 85610 PROTHROMBIN TIME: CPT | Performed by: INTERNAL MEDICINE

## 2019-05-21 PROCEDURE — 97116 GAIT TRAINING THERAPY: CPT

## 2019-05-21 PROCEDURE — 84100 ASSAY OF PHOSPHORUS: CPT | Performed by: INTERNAL MEDICINE

## 2019-05-21 PROCEDURE — 99239 HOSP IP/OBS DSCHRG MGMT >30: CPT | Performed by: INTERNAL MEDICINE

## 2019-05-21 PROCEDURE — 84145 PROCALCITONIN (PCT): CPT | Performed by: INTERNAL MEDICINE

## 2019-05-21 PROCEDURE — 85007 BL SMEAR W/DIFF WBC COUNT: CPT | Performed by: INTERNAL MEDICINE

## 2019-05-21 PROCEDURE — 97530 THERAPEUTIC ACTIVITIES: CPT

## 2019-05-21 RX ORDER — AZITHROMYCIN 250 MG/1
250 TABLET, FILM COATED ORAL EVERY 24 HOURS
Qty: 2 TABLET | Refills: 0 | Status: SHIPPED | OUTPATIENT
Start: 2019-05-21 | End: 2019-05-23

## 2019-05-21 RX ORDER — ALPRAZOLAM 0.25 MG/1
0.25 TABLET ORAL 3 TIMES DAILY PRN
Refills: 0
Start: 2019-05-21 | End: 2019-08-14 | Stop reason: SDUPTHER

## 2019-05-21 RX ORDER — GUAIFENESIN/DEXTROMETHORPHAN 100-10MG/5
5 SYRUP ORAL 3 TIMES DAILY PRN
Qty: 118 ML | Refills: 0 | Status: SHIPPED | OUTPATIENT
Start: 2019-05-21 | End: 2019-06-04 | Stop reason: ALTCHOICE

## 2019-05-21 RX ORDER — ATENOLOL 100 MG/1
100 TABLET ORAL DAILY
Qty: 30 TABLET | Refills: 0 | Status: SHIPPED | OUTPATIENT
Start: 2019-05-22 | End: 2019-06-11 | Stop reason: SDUPTHER

## 2019-05-21 RX ORDER — CEFPODOXIME PROXETIL 200 MG/1
200 TABLET, FILM COATED ORAL 2 TIMES DAILY
Qty: 4 TABLET | Refills: 0 | Status: SHIPPED | OUTPATIENT
Start: 2019-05-22 | End: 2019-05-24

## 2019-05-21 RX ADMIN — POTASSIUM CHLORIDE 20 MEQ: 20 SOLUTION ORAL at 09:04

## 2019-05-21 RX ADMIN — ATENOLOL 100 MG: 50 TABLET ORAL at 09:05

## 2019-05-21 RX ADMIN — AZITHROMYCIN MONOHYDRATE 500 MG: 500 INJECTION, POWDER, LYOPHILIZED, FOR SOLUTION INTRAVENOUS at 09:07

## 2019-05-21 RX ADMIN — AMLODIPINE BESYLATE 5 MG: 5 TABLET ORAL at 09:05

## 2019-05-21 RX ADMIN — ASPIRIN 81 MG 81 MG: 81 TABLET ORAL at 09:05

## 2019-05-21 RX ADMIN — PSYLLIUM HUSK 1 PACKET: 3.4 POWDER ORAL at 09:03

## 2019-05-21 RX ADMIN — CEFTRIAXONE 1000 MG: 1 INJECTION, SOLUTION INTRAVENOUS at 09:07

## 2019-05-21 RX ADMIN — Medication 1 TABLET: at 09:05

## 2019-05-21 RX ADMIN — ENOXAPARIN SODIUM 40 MG: 40 INJECTION SUBCUTANEOUS at 09:05

## 2019-05-21 RX ADMIN — MAGNESIUM OXIDE TAB 400 MG (241.3 MG ELEMENTAL MG) 400 MG: 400 (241.3 MG) TAB at 14:12

## 2019-05-23 LAB
BACTERIA BLD CULT: NORMAL
BACTERIA BLD CULT: NORMAL

## 2019-05-24 ENCOUNTER — PATIENT OUTREACH (OUTPATIENT)
Dept: CASE MANAGEMENT | Facility: OTHER | Age: 84
End: 2019-05-24

## 2019-06-04 ENCOUNTER — OFFICE VISIT (OUTPATIENT)
Dept: FAMILY MEDICINE CLINIC | Facility: CLINIC | Age: 84
End: 2019-06-04
Payer: MEDICARE

## 2019-06-04 VITALS
WEIGHT: 133.7 LBS | TEMPERATURE: 99.3 F | SYSTOLIC BLOOD PRESSURE: 130 MMHG | DIASTOLIC BLOOD PRESSURE: 78 MMHG | HEIGHT: 58 IN | BODY MASS INDEX: 28.07 KG/M2

## 2019-06-04 DIAGNOSIS — I34.0 NON-RHEUMATIC MITRAL REGURGITATION: ICD-10-CM

## 2019-06-04 DIAGNOSIS — J18.9 UNRESOLVED PNEUMONIA: Primary | ICD-10-CM

## 2019-06-04 DIAGNOSIS — I35.0 NONRHEUMATIC AORTIC VALVE STENOSIS: ICD-10-CM

## 2019-06-04 DIAGNOSIS — J18.9 COMMUNITY ACQUIRED PNEUMONIA OF LEFT UPPER LOBE OF LUNG: ICD-10-CM

## 2019-06-04 PROBLEM — I36.1 NON-RHEUMATIC TRICUSPID VALVE INSUFFICIENCY: Chronic | Status: ACTIVE | Noted: 2019-06-04

## 2019-06-04 PROCEDURE — 99213 OFFICE O/P EST LOW 20 MIN: CPT | Performed by: FAMILY MEDICINE

## 2019-06-05 ENCOUNTER — HOSPITAL ENCOUNTER (OUTPATIENT)
Dept: RADIOLOGY | Facility: HOSPITAL | Age: 84
Discharge: HOME | End: 2019-06-05
Payer: MEDICARE

## 2019-06-05 ENCOUNTER — OFFICE VISIT (OUTPATIENT)
Dept: CARDIOLOGY CLINIC | Facility: HOSPITAL | Age: 84
End: 2019-06-05
Payer: MEDICARE

## 2019-06-05 VITALS
SYSTOLIC BLOOD PRESSURE: 160 MMHG | HEART RATE: 60 BPM | WEIGHT: 132 LBS | HEIGHT: 58 IN | DIASTOLIC BLOOD PRESSURE: 90 MMHG | BODY MASS INDEX: 27.71 KG/M2

## 2019-06-05 DIAGNOSIS — J18.9 UNRESOLVED PNEUMONIA: ICD-10-CM

## 2019-06-05 DIAGNOSIS — I34.0 MITRAL VALVE INSUFFICIENCY, UNSPECIFIED ETIOLOGY: ICD-10-CM

## 2019-06-05 DIAGNOSIS — I27.20 PULMONARY HYPERTENSION (HCC): ICD-10-CM

## 2019-06-05 DIAGNOSIS — I35.0 AORTIC VALVE STENOSIS, ETIOLOGY OF CARDIAC VALVE DISEASE UNSPECIFIED: Primary | ICD-10-CM

## 2019-06-05 DIAGNOSIS — I10 ESSENTIAL HYPERTENSION: ICD-10-CM

## 2019-06-05 DIAGNOSIS — I47.1 SUPRAVENTRICULAR TACHYCARDIA (HCC): ICD-10-CM

## 2019-06-05 PROCEDURE — 99214 OFFICE O/P EST MOD 30 MIN: CPT | Performed by: PHYSICIAN ASSISTANT

## 2019-06-05 PROCEDURE — 71046 X-RAY EXAM CHEST 2 VIEWS: CPT

## 2019-06-05 PROCEDURE — 93000 ELECTROCARDIOGRAM COMPLETE: CPT | Performed by: PHYSICIAN ASSISTANT

## 2019-06-11 DIAGNOSIS — I10 ESSENTIAL HYPERTENSION: ICD-10-CM

## 2019-06-11 DIAGNOSIS — I47.1 SUPRAVENTRICULAR TACHYCARDIA (HCC): ICD-10-CM

## 2019-06-11 RX ORDER — AMLODIPINE BESYLATE 5 MG/1
5 TABLET ORAL DAILY
Qty: 90 TABLET | Refills: 3 | Status: SHIPPED | OUTPATIENT
Start: 2019-06-11 | End: 2019-09-05 | Stop reason: HOSPADM

## 2019-06-11 RX ORDER — ATENOLOL 100 MG/1
100 TABLET ORAL DAILY
Qty: 30 TABLET | Refills: 0 | Status: SHIPPED | OUTPATIENT
Start: 2019-06-11 | End: 2019-07-12 | Stop reason: SDUPTHER

## 2019-06-13 ENCOUNTER — PATIENT OUTREACH (OUTPATIENT)
Dept: CASE MANAGEMENT | Facility: OTHER | Age: 84
End: 2019-06-13

## 2019-07-12 ENCOUNTER — PATIENT OUTREACH (OUTPATIENT)
Dept: CASE MANAGEMENT | Facility: OTHER | Age: 84
End: 2019-07-12

## 2019-07-12 DIAGNOSIS — I10 ESSENTIAL HYPERTENSION: ICD-10-CM

## 2019-07-12 RX ORDER — ATENOLOL 100 MG/1
TABLET ORAL
Qty: 30 TABLET | Refills: 5 | Status: SHIPPED | OUTPATIENT
Start: 2019-07-12 | End: 2019-09-05 | Stop reason: HOSPADM

## 2019-07-26 ENCOUNTER — TELEPHONE (OUTPATIENT)
Dept: NON INVASIVE DIAGNOSTICS | Facility: HOSPITAL | Age: 84
End: 2019-07-26

## 2019-07-26 NOTE — TELEPHONE ENCOUNTER
Patient called me with concerns over BP readings  Patient has been getting elevated blood pressure readings the past three days - 946-779 systolic  These readings were at least one hour after she took her morning medications  She is asymptomatic otherwise  A month ago her BP readings were averaging 120's-142  I advised her to abide by a strict low sodium diet and to take her blood pressure after she has been relaxing for a period of time  I advised continued monitoring over the weekend to see if BP normalizes on its own  If BP continues to be elevated over the weekend, we discussed increasing amlodipine to 7 5 mg daily  Patient aware that if BP continues to trend upward, especially >180, she should present to the ER for further evaluation as this is concerning for possible stroke  I will call patient on Monday to re-evaluate  Patient verbalized understanding to the above

## 2019-07-29 ENCOUNTER — TELEPHONE (OUTPATIENT)
Dept: CARDIOLOGY CLINIC | Facility: HOSPITAL | Age: 84
End: 2019-07-29

## 2019-07-29 NOTE — TELEPHONE ENCOUNTER
Called patient for updated BP readings  She stated today her BP was 922 systolic and it did not go over 150 this weekend  I advised no changes at this time  She has an appointment with her family physician in approximately one month  Patient aware to call for persistently elevated BP readings or any other concerns

## 2019-08-02 ENCOUNTER — PATIENT OUTREACH (OUTPATIENT)
Dept: CASE MANAGEMENT | Facility: OTHER | Age: 84
End: 2019-08-02

## 2019-08-02 NOTE — PROGRESS NOTES
Kiara Williamson is managing well at home and denies needing additional assistance  She monitors BP and is stable at present  She does sometimes get elevated readings but feels it due to " getting worked up"  If she relaxes her BP goes back to normal   She denies any respiratory difficulties  She is taking all medications as prescribed  Nutritional intake adequate, she is following a low sodium diet  Follow up appointments scheduled  She denies pain  She is managing well at this time  I provided her with my contact information and encouraged to call with any questions or concerns

## 2019-08-14 DIAGNOSIS — J18.9 COMMUNITY ACQUIRED PNEUMONIA OF LEFT UPPER LOBE OF LUNG: ICD-10-CM

## 2019-08-14 RX ORDER — ALPRAZOLAM 0.25 MG/1
TABLET ORAL
Qty: 120 TABLET | Refills: 5 | Status: SHIPPED | OUTPATIENT
Start: 2019-08-14 | End: 2020-04-13

## 2019-08-14 NOTE — PROGRESS NOTES
The [place] prescription drug monitoring program was queried  There were no red flags  Safe to proceed

## 2019-08-19 ENCOUNTER — PATIENT OUTREACH (OUTPATIENT)
Dept: CASE MANAGEMENT | Facility: OTHER | Age: 84
End: 2019-08-19

## 2019-09-05 ENCOUNTER — OFFICE VISIT (OUTPATIENT)
Dept: FAMILY MEDICINE CLINIC | Facility: CLINIC | Age: 84
End: 2019-09-05
Payer: MEDICARE

## 2019-09-05 VITALS
HEART RATE: 63 BPM | WEIGHT: 136.4 LBS | OXYGEN SATURATION: 97 % | DIASTOLIC BLOOD PRESSURE: 78 MMHG | BODY MASS INDEX: 28.63 KG/M2 | HEIGHT: 58 IN | SYSTOLIC BLOOD PRESSURE: 160 MMHG

## 2019-09-05 DIAGNOSIS — D69.6 THROMBOCYTOPENIA (HCC): ICD-10-CM

## 2019-09-05 DIAGNOSIS — F41.1 GENERALIZED ANXIETY DISORDER: ICD-10-CM

## 2019-09-05 DIAGNOSIS — E87.6 HYPOKALEMIA: ICD-10-CM

## 2019-09-05 DIAGNOSIS — Z11.59 ENCOUNTER FOR HEPATITIS C SCREENING TEST FOR LOW RISK PATIENT: ICD-10-CM

## 2019-09-05 DIAGNOSIS — I10 ESSENTIAL HYPERTENSION: Primary | ICD-10-CM

## 2019-09-05 PROCEDURE — 99213 OFFICE O/P EST LOW 20 MIN: CPT | Performed by: FAMILY MEDICINE

## 2019-09-05 PROCEDURE — 1123F ACP DISCUSS/DSCN MKR DOCD: CPT | Performed by: FAMILY MEDICINE

## 2019-09-05 RX ORDER — ATENOLOL AND CHLORTHALIDONE TABLET 100; 25 MG/1; MG/1
1 TABLET ORAL DAILY
Qty: 90 TABLET | Refills: 2 | Status: SHIPPED | OUTPATIENT
Start: 2019-09-05 | End: 2020-08-14

## 2019-09-05 RX ORDER — POTASSIUM CHLORIDE 20 MEQ/1
20 TABLET, EXTENDED RELEASE ORAL DAILY
Qty: 90 TABLET | Refills: 2 | Status: SHIPPED | OUTPATIENT
Start: 2019-09-05 | End: 2019-09-30

## 2019-09-05 RX ORDER — AMLODIPINE BESYLATE 5 MG/1
5 TABLET ORAL DAILY
COMMUNITY
End: 2020-05-28

## 2019-09-05 NOTE — ASSESSMENT & PLAN NOTE
I am going to have the patient discontinue taking atenolol  I will start her back on atenolol-chlorthalidone 100/25 daily  I will restart her on potassium chloride 20 mEq daily  Prior to her hospital admission, I was monitoring her electrolytes regularly and they had always been normal or near normal   I reviewed the patient's blood pressure readings that she had at home  They were somewhat high  They are high today at the office  I will have her go ahead and change her medication in have repeat blood work done few days prior to her follow-up visit with me  I will simply have to monitor her electrolytes closely  If we see any problems, she is going to have to stop taking the chlorthalidone again  Patient will continue amlodipine 5 mg daily in addition to the atenolol-chlorthalidone

## 2019-09-05 NOTE — PROGRESS NOTES
Assessment/Plan:    Essential hypertension  I am going to have the patient discontinue taking atenolol  I will start her back on atenolol-chlorthalidone 100/25 daily  I will restart her on potassium chloride 20 mEq daily  Prior to her hospital admission, I was monitoring her electrolytes regularly and they had always been normal or near normal   I reviewed the patient's blood pressure readings that she had at home  They were somewhat high  They are high today at the office  I will have her go ahead and change her medication in have repeat blood work done few days prior to her follow-up visit with me  I will simply have to monitor her electrolytes closely  If we see any problems, she is going to have to stop taking the chlorthalidone again  Patient will continue amlodipine 5 mg daily in addition to the atenolol-chlorthalidone  Thrombocytopenia ([address] )  I ordered a repeat CBC to follow her platelet count    Generalized anxiety disorder  This is currently stable on alprazolam 0 25 mg  Diagnoses and all orders for this visit:    Essential hypertension  -     atenolol-chlorthalidone (TENORETIC) 100-25 mg per tablet; Take 1 tablet by mouth daily  -     Comprehensive metabolic panel; Future    Hypokalemia  -     potassium chloride (K-DUR,KLOR-CON) 20 mEq tablet; Take 1 tablet (20 mEq total) by mouth daily  -     Comprehensive metabolic panel; Future    Encounter for hepatitis C screening test for low risk patient  -     Hepatitis C antibody; Future    Thrombocytopenia (HCC)  -     CBC and differential; Future    Generalized anxiety disorder          Subjective:      Patient ID: Yanna Peter is a 80 y o  female  This patient is an 63-year-old white female who presents to the office today for her routine checkup    The patient complains that her blood pressure has been running high and she does not like the medication that she was started on at the hospital   She feels that her blood pressure was much better controlled but she took the regiment she was on prior to her admission  I reviewed my records as well as the hospital records  Apparently, chlorthalidone was discontinued because she had hypokalemia and hyponatremia  I had done a blood test on her just 3 months prior to her admission  Her sodium at that time was 1:34 a m  And her potassium was normal       The following portions of the patient's history were reviewed and updated as appropriate: allergies, current medications, past family history, past medical history, past social history, past surgical history and problem list     Review of Systems   Constitutional: Negative for fever  Respiratory: Negative for cough, shortness of breath and wheezing  Cardiovascular: Negative for chest pain, palpitations and leg swelling  Gastrointestinal: Negative for abdominal distention, abdominal pain, blood in stool, constipation, diarrhea, nausea and vomiting  Objective:      /78 (BP Location: Left arm, Patient Position: Sitting, Cuff Size: Adult)   Pulse 63   Ht 4' 10" (1 473 m)   Wt 61 9 kg (136 lb 6 4 oz)   SpO2 97%   BMI 28 51 kg/m²          Physical Exam   Constitutional:   This patient is a pleasant 42-year-old white female appears her stated age  She is pleasant, cooperative, and in no distress  HENT:   Head: Normocephalic and atraumatic  Right Ear: External ear normal    Left Ear: External ear normal    Mouth/Throat: Oropharynx is clear and moist  No oropharyngeal exudate  Eyes: Conjunctivae are normal  No scleral icterus  Neck: Neck supple  No tracheal deviation present  No thyromegaly present  Cardiovascular: Normal rate, regular rhythm and normal heart sounds  Exam reveals no gallop and no friction rub  No murmur heard  Pulmonary/Chest: Effort normal and breath sounds normal  No stridor  No respiratory distress  She has no wheezes  She has no rales  Abdominal: Soft   Bowel sounds are normal  She exhibits no distension and no mass  There is no tenderness  There is no rebound and no guarding  Lymphadenopathy:     She has no cervical adenopathy  Vitals reviewed  Extremities:  Without cyanosis, clubbing, or edema

## 2019-09-23 ENCOUNTER — APPOINTMENT (OUTPATIENT)
Dept: LAB | Facility: HOSPITAL | Age: 84
End: 2019-09-23
Attending: FAMILY MEDICINE
Payer: MEDICARE

## 2019-09-23 DIAGNOSIS — E87.6 HYPOKALEMIA: ICD-10-CM

## 2019-09-23 DIAGNOSIS — I10 ESSENTIAL HYPERTENSION: ICD-10-CM

## 2019-09-23 DIAGNOSIS — Z11.59 ENCOUNTER FOR HEPATITIS C SCREENING TEST FOR LOW RISK PATIENT: ICD-10-CM

## 2019-09-23 DIAGNOSIS — D69.6 THROMBOCYTOPENIA (HCC): ICD-10-CM

## 2019-09-23 LAB
ALBUMIN SERPL BCP-MCNC: 3.8 G/DL (ref 3.5–5)
ALP SERPL-CCNC: 64 U/L (ref 46–116)
ALT SERPL W P-5'-P-CCNC: 17 U/L (ref 12–78)
ANION GAP SERPL CALCULATED.3IONS-SCNC: 10 MMOL/L (ref 4–13)
AST SERPL W P-5'-P-CCNC: 22 U/L (ref 5–45)
BASOPHILS # BLD AUTO: 0.02 THOUSANDS/ΜL (ref 0–0.1)
BASOPHILS NFR BLD AUTO: 1 % (ref 0–1)
BILIRUB SERPL-MCNC: 0.7 MG/DL (ref 0.2–1)
BUN SERPL-MCNC: 22 MG/DL (ref 5–25)
CALCIUM SERPL-MCNC: 9.3 MG/DL (ref 8.3–10.1)
CHLORIDE SERPL-SCNC: 91 MMOL/L (ref 100–108)
CO2 SERPL-SCNC: 26 MMOL/L (ref 21–32)
CREAT SERPL-MCNC: 1.03 MG/DL (ref 0.6–1.3)
EOSINOPHIL # BLD AUTO: 0.08 THOUSAND/ΜL (ref 0–0.61)
EOSINOPHIL NFR BLD AUTO: 2 % (ref 0–6)
ERYTHROCYTE [DISTWIDTH] IN BLOOD BY AUTOMATED COUNT: 12.9 % (ref 11.6–15.1)
GFR SERPL CREATININE-BSD FRML MDRD: 50 ML/MIN/1.73SQ M
GLUCOSE P FAST SERPL-MCNC: 104 MG/DL (ref 65–99)
HCT VFR BLD AUTO: 37.9 % (ref 34.8–46.1)
HCV AB SER QL: NORMAL
HGB BLD-MCNC: 12.7 G/DL (ref 11.5–15.4)
IMM GRANULOCYTES # BLD AUTO: 0.01 THOUSAND/UL (ref 0–0.2)
IMM GRANULOCYTES NFR BLD AUTO: 0 % (ref 0–2)
LYMPHOCYTES # BLD AUTO: 1.63 THOUSANDS/ΜL (ref 0.6–4.47)
LYMPHOCYTES NFR BLD AUTO: 43 % (ref 14–44)
MCH RBC QN AUTO: 31.4 PG (ref 26.8–34.3)
MCHC RBC AUTO-ENTMCNC: 33.5 G/DL (ref 31.4–37.4)
MCV RBC AUTO: 94 FL (ref 82–98)
MONOCYTES # BLD AUTO: 0.51 THOUSAND/ΜL (ref 0.17–1.22)
MONOCYTES NFR BLD AUTO: 13 % (ref 4–12)
NEUTROPHILS # BLD AUTO: 1.58 THOUSANDS/ΜL (ref 1.85–7.62)
NEUTS SEG NFR BLD AUTO: 41 % (ref 43–75)
NRBC BLD AUTO-RTO: 0 /100 WBCS
PLATELET # BLD AUTO: 180 THOUSANDS/UL (ref 149–390)
PMV BLD AUTO: 8.9 FL (ref 8.9–12.7)
POTASSIUM SERPL-SCNC: 3.4 MMOL/L (ref 3.5–5.3)
PROT SERPL-MCNC: 7.4 G/DL (ref 6.4–8.2)
RBC # BLD AUTO: 4.05 MILLION/UL (ref 3.81–5.12)
SODIUM SERPL-SCNC: 127 MMOL/L (ref 136–145)
WBC # BLD AUTO: 3.83 THOUSAND/UL (ref 4.31–10.16)

## 2019-09-23 PROCEDURE — 85025 COMPLETE CBC W/AUTO DIFF WBC: CPT

## 2019-09-23 PROCEDURE — 80053 COMPREHEN METABOLIC PANEL: CPT

## 2019-09-23 PROCEDURE — 86803 HEPATITIS C AB TEST: CPT

## 2019-09-23 PROCEDURE — 36415 COLL VENOUS BLD VENIPUNCTURE: CPT

## 2019-09-30 ENCOUNTER — HOSPITAL ENCOUNTER (OUTPATIENT)
Dept: RADIOLOGY | Facility: HOSPITAL | Age: 84
Discharge: HOME | End: 2019-09-30
Attending: FAMILY MEDICINE
Payer: MEDICARE

## 2019-09-30 ENCOUNTER — OFFICE VISIT (OUTPATIENT)
Dept: FAMILY MEDICINE CLINIC | Facility: CLINIC | Age: 84
End: 2019-09-30
Payer: MEDICARE

## 2019-09-30 VITALS
BODY MASS INDEX: 27.71 KG/M2 | SYSTOLIC BLOOD PRESSURE: 142 MMHG | HEART RATE: 63 BPM | DIASTOLIC BLOOD PRESSURE: 82 MMHG | WEIGHT: 132 LBS | HEIGHT: 58 IN | OXYGEN SATURATION: 100 %

## 2019-09-30 DIAGNOSIS — M54.9 MID BACK PAIN: ICD-10-CM

## 2019-09-30 DIAGNOSIS — Z23 IMMUNIZATION DUE: Primary | ICD-10-CM

## 2019-09-30 DIAGNOSIS — E87.6 HYPOKALEMIA: ICD-10-CM

## 2019-09-30 DIAGNOSIS — E87.1 HYPONATREMIA: ICD-10-CM

## 2019-09-30 DIAGNOSIS — I10 ESSENTIAL HYPERTENSION: ICD-10-CM

## 2019-09-30 PROCEDURE — 72072 X-RAY EXAM THORAC SPINE 3VWS: CPT

## 2019-09-30 PROCEDURE — 99213 OFFICE O/P EST LOW 20 MIN: CPT | Performed by: FAMILY MEDICINE

## 2019-09-30 PROCEDURE — 90662 IIV NO PRSV INCREASED AG IM: CPT | Performed by: FAMILY MEDICINE

## 2019-09-30 PROCEDURE — G0008 ADMIN INFLUENZA VIRUS VAC: HCPCS | Performed by: FAMILY MEDICINE

## 2019-09-30 RX ORDER — POTASSIUM CHLORIDE 20 MEQ/1
20 TABLET, EXTENDED RELEASE ORAL 2 TIMES DAILY
Qty: 90 TABLET | Refills: 2
Start: 2019-09-30 | End: 2020-05-08

## 2019-09-30 NOTE — ASSESSMENT & PLAN NOTE
Blood pressure is improved  She will continue atenolol-chlorthalidone  We will need to continue to monitor her electrolytes

## 2019-09-30 NOTE — PROGRESS NOTES
Assessment/Plan:    Essential hypertension  Blood pressure is improved  She will continue atenolol-chlorthalidone  We will need to continue to monitor her electrolytes  Hyponatremia  I reviewed the BMP  Her sodium went down to 127  I ordered a repeat BMP to be done in 2 weeks    Hypokalemia  Today, I increased the patient's potassium chloride to 20 mEq twice a day  A repeat BMP was ordered for 2 weeks  Mid back pain  I am concerned about the possibility of an osteoporotic compression fracture  I ordered x-rays of the thoracic spine  I will make further recommendations to the patient when I get these results  Diagnoses and all orders for this visit:    Immunization due  -     influenza vaccine, [id number], high-dose, PF 0 5 mL (FLUZONE HIGH-DOSE)    Hypokalemia  -     potassium chloride (K-DUR,KLOR-CON) 20 mEq tablet; Take 1 tablet (20 mEq total) by mouth 2 (two) times a day  -     Basic metabolic panel; Future    Mid back pain  -     XR spine thoracic 3 vw; Future    Essential hypertension    Hyponatremia          Subjective:      Patient ID: Sera Bartlett is a 80 y o  female  This patient is an 22-year-old white female who presents to the office today for her follow-up visit  Patient states that her blood pressures have been much better  Her main complaint today is mid back pain  She reports no trauma  Pain began about 3 weeks ago  She complains that her  will not let her do anything  However, she definitely notes that if she tries to do anything, her back pain increases  The following portions of the patient's history were reviewed and updated as appropriate: allergies, current medications, past family history, past medical history, past social history, past surgical history and problem list     Review of Systems   Respiratory: Negative for cough, shortness of breath and wheezing  Cardiovascular: Negative for chest pain, palpitations and leg swelling     Musculoskeletal: Positive for back pain  Negative for arthralgias and gait problem  Psychiatric/Behavioral: Negative for dysphoric mood  The patient is not nervous/anxious  Objective:      /82 (BP Location: Left arm, Patient Position: Sitting, Cuff Size: Adult)   Pulse 63   Ht 4' 10" (1 473 m)   Wt 59 9 kg (132 lb)   SpO2 100%   BMI 27 59 kg/m²          Physical Exam   Constitutional:   The patient is an 63-year-old white female who appears her stated age  She was somewhat agitated today  She did not appear to be in any distress   HENT:   Head: Normocephalic and atraumatic  Right Ear: External ear normal    Left Ear: External ear normal    Mouth/Throat: Oropharynx is clear and moist  No oropharyngeal exudate  Tympanic membranes are clear   Eyes: Pupils are equal, round, and reactive to light  Conjunctivae are normal  No scleral icterus  Neck: Neck supple  No tracheal deviation present  No thyromegaly present  Cardiovascular: Normal rate, regular rhythm and normal heart sounds  Exam reveals no gallop and no friction rub  No murmur heard  Pulmonary/Chest: Effort normal and breath sounds normal  No stridor  No respiratory distress  She has no wheezes  She has no rales  Abdominal: Soft  Bowel sounds are normal  She exhibits no distension and no mass  There is no tenderness  There is no rebound and no guarding  Musculoskeletal:   There is an increased thoracic kyphosis noted  There is no swelling, erythema, or tenderness noted   Lymphadenopathy:     She has no cervical adenopathy  Vitals reviewed

## 2019-09-30 NOTE — ASSESSMENT & PLAN NOTE
I am concerned about the possibility of an osteoporotic compression fracture  I ordered x-rays of the thoracic spine  I will make further recommendations to the patient when I get these results

## 2019-09-30 NOTE — ASSESSMENT & PLAN NOTE
Today, I increased the patient's potassium chloride to 20 mEq twice a day  A repeat BMP was ordered for 2 weeks

## 2019-09-30 NOTE — PATIENT INSTRUCTIONS
Low-Sodium Diet   AMBULATORY CARE:   A low-sodium diet  limits foods that are high in sodium (salt)  You will need to follow a low-sodium diet if you have high blood pressure, kidney disease, or heart failure  You may also need to follow this diet if you have a condition that is causing your body to retain (hold) extra fluid  You may need to limit the amount of sodium you eat to 1,500 mg  Ask your healthcare provider how much sodium you can have each day  How to use food labels to choose foods that are low in sodium:  Read food labels to find the amount of sodium they contain  The amount of sodium is listed in milligrams (mg)  The % Daily Value (DV) column tells you how much of your daily needs are met by 1 serving of the food for each nutrient listed  Choose foods that have less than 5% of the DV of sodium  These foods are considered low in sodium  Foods that have 20% or more of the DV of sodium are considered high in sodium  Some food labels may also list any of the following terms that tell you about the sodium content in the food:  · Sodium-free:  Less than 5 mg in each serving    · Very low sodium:  35 mg of sodium or less in each serving    · Low sodium:  140 mg of sodium or less in each serving    · Reduced sodium: At least 25% less sodium in each serving than the regular type    · Light in sodium:  50% less sodium in each serving    · Unsalted or no added salt:  No extra salt is added during processing (the food may still contain sodium)  Foods to avoid:  Salty foods are high in sodium   You should avoid the following:  · Processed foods:      ¨ Mixes for cornbread, biscuits, cake, and pudding     ¨ Instant foods, such as potatoes, cereals, noodles, and rice     ¨ Packaged foods, such as bread stuffing, rice and pasta mixes, snack dip mixes, and macaroni and cheese     ¨ Canned foods, such as canned vegetables, soups, broths, sauces, and vegetable or tomato juice    ¨ Snack foods, such as salted chips, popcorn, pretzels, pork rinds, salted crackers, and salted nuts    ¨ Frozen foods, such as dinners, entrees, vegetables with sauces, and breaded meats    ¨ Sauerkraut, pickled vegetables, and other foods prepared in brine    · Meats and cheeses:      ¨ Smoked or cured meat, such as corned beef, henao, ham, hot dogs, and sausage    ¨ Canned meats or spreads, such as potted meats, sardines, anchovies, and imitation seafood    ¨ Deli or lunch meats, such as bologna, ham, turkey, and roast beef    ¨ Processed cheese, such as American cheese and cheese spreads    · Condiments, sauces, and seasonings:      ¨ Salt (¼ teaspoon of salt contains 575 mg of sodium)    ¨ Seasonings made with salt, such as garlic salt, celery salt, onion salt, and seasoned salt    ¨ Regular soy sauce, barbecue sauce, teriyaki sauce, steak sauce, Worcestershire sauce, and most flavored vinegars    ¨ Canned gravy and mixes     ¨ Regular condiments, such as mustard, ketchup, and salad dressings    ¨ Pickles and olives    ¨ Meat tenderizers and monosodium glutamate (MSG)  Foods to include:  Read the food label to find the exact amount of sodium in each serving  · Bread and cereal:  Try to choose breads with less than 80 mg of sodium per serving  ¨ Bread, roll, karina, tortilla, or unsalted crackers  ¨ Ready-to-eat cereals with less than 5% DV of sodium (examples include shredded wheat and puffed rice)    ¨ Pasta    · Vegetables and fruits:      ¨ Unsalted fresh, frozen, or canned vegetables    ¨ Fresh, frozen, or canned fruits    ¨ Fruit juice    · Dairy:  One serving has about 150 mg of sodium  ¨ Milk, all types    ¨ Yogurt    ¨ Hard cheese, such as cheddar, Swiss, [employer], or mozzarella    · Meat and other protein foods:  Some raw meats may have added sodium       ¨ Plain meats, fish, and poultry     ¨ Eggs    · Other foods:      ¨ Homemade pudding    ¨ Unsalted nuts, popcorn, or pretzels    ¨ Unsalted butter or margarine  Ways to decrease sodium:   · Add spices and herbs to foods instead of salt during cooking  Use salt-free seasonings to add flavor to foods  Examples include onion powder, garlic powder, basil, alba powder, paprika, and parsley  Try lemon or lime juice or vinegar to give foods a tart flavor  Use hot peppers, pepper, or cayenne pepper to add a spicy flavor to foods  · Do not keep a salt shaker at your kitchen table  This may help keep you from adding salt to food at the table  It may take time to get used to enjoying the natural flavor of food instead of adding salt  Talk to your healthcare provider before you use salt substitutes  Some salt substitutes have a high amount of potassium and need to be avoided if you have kidney disease  · Choose low-sodium foods at restaurants  Meals from restaurants are often high in sodium  Some restaurants have nutrition information on the menu that tells you the amount of sodium in their foods  If possible, ask for your food to be prepared with less, or no salt  · Shop for unsalted or low-sodium foods and snacks at the grocery store  Examples include unsalted or low-sodium broths, soups, and canned vegetables  Choose fresh or frozen vegetables instead  Choose unsalted nuts or seeds or fresh fruits or vegetables as snacks  Read food labels and choose salt-free, very low-sodium, or low-sodium foods  © 2017 [address] [employer] Information is for End User's use only and may not be sold, redistributed or otherwise used for commercial purposes  All illustrations and images included in CareNotes® are the copyrighted property of A D A M [employer] Inc  or Fabio Govea  The above information is an  only  It is not intended as medical advice for individual conditions or treatments  Talk to your doctor, nurse or pharmacist before following any medical regimen to see if it is safe and effective for you

## 2019-10-14 ENCOUNTER — APPOINTMENT (OUTPATIENT)
Dept: LAB | Facility: HOSPITAL | Age: 84
End: 2019-10-14
Attending: FAMILY MEDICINE
Payer: MEDICARE

## 2019-10-14 DIAGNOSIS — E87.6 HYPOKALEMIA: ICD-10-CM

## 2019-10-14 LAB
ANION GAP SERPL CALCULATED.3IONS-SCNC: 9 MMOL/L (ref 4–13)
BUN SERPL-MCNC: 22 MG/DL (ref 5–25)
CALCIUM SERPL-MCNC: 9 MG/DL (ref 8.3–10.1)
CHLORIDE SERPL-SCNC: 96 MMOL/L (ref 100–108)
CO2 SERPL-SCNC: 25 MMOL/L (ref 21–32)
CREAT SERPL-MCNC: 1.03 MG/DL (ref 0.6–1.3)
GFR SERPL CREATININE-BSD FRML MDRD: 50 ML/MIN/1.73SQ M
GLUCOSE P FAST SERPL-MCNC: 100 MG/DL (ref 65–99)
POTASSIUM SERPL-SCNC: 4 MMOL/L (ref 3.5–5.3)
SODIUM SERPL-SCNC: 130 MMOL/L (ref 136–145)

## 2019-10-14 PROCEDURE — 36415 COLL VENOUS BLD VENIPUNCTURE: CPT

## 2019-10-14 PROCEDURE — 80048 BASIC METABOLIC PNL TOTAL CA: CPT

## 2019-10-31 ENCOUNTER — HOSPITAL ENCOUNTER (OUTPATIENT)
Dept: NON INVASIVE DIAGNOSTICS | Facility: HOSPITAL | Age: 84
Discharge: HOME | End: 2019-10-31
Payer: MEDICARE

## 2019-10-31 DIAGNOSIS — I35.0 AORTIC VALVE STENOSIS, ETIOLOGY OF CARDIAC VALVE DISEASE UNSPECIFIED: ICD-10-CM

## 2019-10-31 DIAGNOSIS — I34.0 MITRAL VALVE INSUFFICIENCY, UNSPECIFIED ETIOLOGY: ICD-10-CM

## 2019-10-31 PROCEDURE — 93306 TTE W/DOPPLER COMPLETE: CPT

## 2019-11-01 PROCEDURE — 93306 TTE W/DOPPLER COMPLETE: CPT | Performed by: INTERNAL MEDICINE

## 2019-12-16 ENCOUNTER — OFFICE VISIT (OUTPATIENT)
Dept: CARDIOLOGY CLINIC | Facility: HOSPITAL | Age: 84
End: 2019-12-16
Payer: MEDICARE

## 2019-12-16 VITALS
WEIGHT: 131.39 LBS | SYSTOLIC BLOOD PRESSURE: 140 MMHG | BODY MASS INDEX: 27.58 KG/M2 | HEIGHT: 58 IN | DIASTOLIC BLOOD PRESSURE: 82 MMHG | HEART RATE: 52 BPM

## 2019-12-16 DIAGNOSIS — I47.1 SUPRAVENTRICULAR TACHYCARDIA (HCC): ICD-10-CM

## 2019-12-16 DIAGNOSIS — I35.0 NONRHEUMATIC AORTIC VALVE STENOSIS: Primary | ICD-10-CM

## 2019-12-16 DIAGNOSIS — I10 ESSENTIAL HYPERTENSION: ICD-10-CM

## 2019-12-16 PROCEDURE — 99214 OFFICE O/P EST MOD 30 MIN: CPT | Performed by: INTERNAL MEDICINE

## 2019-12-16 NOTE — PROGRESS NOTES
Cardiology Follow Up    Lucita Bernal  1935  [id number]  [facility]  [address] [id number] [phone]    No diagnosis found  There are no diagnoses linked to this encounter  I had the pleasure of seeing Lucita Bernal for a follow up visit  INTERVAL HISTORY: none    History of the presenting illness, Discussion/Summary and My Plan are as follows:::    She is a pleasant 70-year-old lady with hypertension, no prior cardiac history, who was diagnosed with moderate aortic stenosis, in the setting of a pneumonia-in May 2019  Initial ECG also showed supraventricular tachycardia +    She remains completely asymptomatic from a cardiac standpoint with no recent change in physical capacity  She does housework and cleaning  Her  also agrees that she has been asymptomatic  Exam demonstrates a murmur of aortic stenosis but no evidence of heart failure  Plan: Aortic stenosis:  Remains moderate-May 2019-October 2019  Almost no change  No symptoms  Recheck an echo in 6 months but otherwise no other testing necessary at this time  She was educated on symptoms to watch out for  Hypertension:  She shows me a blood pressure log from home that shows good control  Supraventricular tachycardia:  Suspected on an initial ECG in the hospital   No further symptoms  Already on a beta-blocker  This occurred in the setting of a pneumonia  No change to management  Follow-up in 6 months  Results for Nesha Bird (MRN [id number]) as of 12/16/2019 11:43   Ref   Range 9/23/2019 07:24   Hemoglobin Latest Ref Range: 11 5 - 15 4 g/dL 12 7   HCT Latest Ref Range: 34 8 - 46 1 % 37 9     Patient Active Problem List   Diagnosis    Hypomagnesemia    Hypokalemia    Elevated troponin    Community acquired pneumonia of left upper lobe of lung ([address] )    Hyponatremia    Essential hypertension    Lymphadenopathy    Hiatal hernia    Thrombocytopenia (HCC)    Aortic valve stenosis    Mitral valve insufficiency    Supraventricular tachycardia (HCC)    Pulmonary hypertension (HCC)    Diverticulosis    First degree AV block    Non-rheumatic tricuspid valve insufficiency    Generalized anxiety disorder    Mid back pain     Past Medical History:   Diagnosis Date    Fatigue     Generalized anxiety disorder     Hyperlipidemia     Hypertension     Impaired fasting glucose     Pneumonia      Social History     Socioeconomic History    Marital status: /Civil Union     Spouse name: Not on file    Number of children: 2    Years of education: Not on file    Highest education level: Not on file   Occupational History    Not on file   Social Needs    Financial resource strain: Not hard at all   [employer] insecurity:     Worry: Never true     Inability: Never true   [employer] needs:     Medical: No     Non-medical: No   Tobacco Use    Smoking status: Never Smoker    Smokeless tobacco: Never Used   Substance and Sexual Activity    Alcohol use: Never     Frequency: Never    Drug use: Never    Sexual activity: Not on file   Lifestyle    Physical activity:     Days per week: Not on file     Minutes per session: Not on file    Stress: Only a little   Relationships    Social connections:     Talks on phone: Twice a week     Gets together:  Three times a week     Attends [employer]oration[employer] service: Never     Active member of club or organization: No     Attends meetings of clubs or organizations: Never     Relationship status:     Intimate partner violence:     Fear of current or ex partner: No     Emotionally abused: No     Physically abused: No     Forced sexual activity: No   Other Topics Concern    Not on file   Social History Narrative    Not on file      Family History   Problem Relation Age of Onset    Hypertension Mother     Asthma Father         's asthma    Alzheimer's disease Sister     Rectal cancer Son     Uterine cancer Daughter     Heart disease Sister     Heart disease Brother      Past Surgical History:   Procedure Laterality Date    KNEE SURGERY      left knee replacement       Current Outpatient Medications:     ALPRAZolam (XANAX) 0 25 mg tablet, TAKE 1 TABLET BY MOUTH EVERY 6 HOURS AS NEEDED FOR ANXIETY, Disp: 120 tablet, Rfl: 5    amLODIPine (NORVASC) 5 mg tablet, Take 5 mg by mouth daily, Disp: , Rfl:     aspirin (ECOTRIN LOW STRENGTH) 81 mg EC tablet, Take 81 mg by mouth daily, Disp: , Rfl:     atenolol-chlorthalidone (TENORETIC) 100-25 mg per tablet, Take 1 tablet by mouth daily, Disp: 90 tablet, Rfl: 2    Calcium Carb-Cholecalciferol (CALTRATE 600+D3 PO), Take 1 tablet by mouth 2 (two) times a day, Disp: , Rfl:     Omega-3 Fatty Acids (FISH OIL) 1200 MG CAPS, Take 1,200 mg by mouth 2 (two) times a day, Disp: , Rfl:     potassium chloride (K-DUR,KLOR-CON) 20 mEq tablet, Take 1 tablet (20 mEq total) by mouth 2 (two) times a day, Disp: 90 tablet, Rfl: 2    Psyllium (METAMUCIL FIBER) 51 7 % PACK, Take 1 Package by mouth daily, Disp: , Rfl: 0  No Known Allergies    Imaging: No results found  Review of Systems:  Review of Systems   Constitutional: Negative  HENT: Negative  Eyes: Negative  Respiratory: Negative  Endocrine: Negative  Musculoskeletal: Negative  Neurological: Negative  Physical Exam:  /82 (BP Location: Left arm, Patient Position: Sitting, Cuff Size: Standard)   Pulse (!) 52   Ht 4' 10" (1 473 m)   Wt 59 6 kg (131 lb 6 3 oz)   BMI 27 46 kg/m²   Physical Exam   Constitutional: She appears well-developed  No distress  HENT:   Head: Normocephalic and atraumatic  Eyes: Pupils are equal, round, and reactive to light  Conjunctivae are normal  Right eye exhibits no discharge  Left eye exhibits no discharge  No scleral icterus  Neck: Normal range of motion  No tracheal deviation present   No thyromegaly present  Cardiovascular: Normal rate and regular rhythm  Murmur heard  Pulmonary/Chest: Effort normal and breath sounds normal  No stridor  No respiratory distress  She has no wheezes  Abdominal: Soft  Bowel sounds are normal  She exhibits no distension  There is no tenderness  Musculoskeletal: Normal range of motion  She exhibits no edema or deformity  Neurological: She is alert  Skin: Skin is warm  She is not diaphoretic

## 2019-12-16 NOTE — PATIENT INSTRUCTIONS
Please call us if you experience any chest pains, increasing shortness of breath with exertion or lightheadedness/dizziness/passing out  These might be signs of your aortic stenosis becoming severe

## 2020-01-02 ENCOUNTER — OFFICE VISIT (OUTPATIENT)
Dept: FAMILY MEDICINE CLINIC | Facility: CLINIC | Age: 85
End: 2020-01-02
Payer: MEDICARE

## 2020-01-02 VITALS
WEIGHT: 131.3 LBS | HEIGHT: 59 IN | HEART RATE: 51 BPM | OXYGEN SATURATION: 99 % | DIASTOLIC BLOOD PRESSURE: 76 MMHG | BODY MASS INDEX: 26.47 KG/M2 | SYSTOLIC BLOOD PRESSURE: 124 MMHG

## 2020-01-02 DIAGNOSIS — I35.0 NONRHEUMATIC AORTIC VALVE STENOSIS: ICD-10-CM

## 2020-01-02 DIAGNOSIS — D69.6 THROMBOCYTOPENIA (HCC): ICD-10-CM

## 2020-01-02 DIAGNOSIS — Z13.820 OSTEOPOROSIS SCREENING: ICD-10-CM

## 2020-01-02 DIAGNOSIS — I10 ESSENTIAL HYPERTENSION: Primary | ICD-10-CM

## 2020-01-02 DIAGNOSIS — Z00.00 ENCOUNTER FOR MEDICARE ANNUAL WELLNESS EXAM: ICD-10-CM

## 2020-01-02 DIAGNOSIS — I47.1 SUPRAVENTRICULAR TACHYCARDIA (HCC): ICD-10-CM

## 2020-01-02 DIAGNOSIS — F41.1 GENERALIZED ANXIETY DISORDER: ICD-10-CM

## 2020-01-02 PROCEDURE — 1123F ACP DISCUSS/DSCN MKR DOCD: CPT | Performed by: FAMILY MEDICINE

## 2020-01-02 PROCEDURE — 99213 OFFICE O/P EST LOW 20 MIN: CPT | Performed by: FAMILY MEDICINE

## 2020-01-02 PROCEDURE — G0438 PPPS, INITIAL VISIT: HCPCS | Performed by: FAMILY MEDICINE

## 2020-01-02 NOTE — ASSESSMENT & PLAN NOTE
I reviewed the patient's recent Cardiology consultation in the patient and I discussed it  Apparently, her older sister had aortic valve replacement  Patient is to have a repeat echocardiogram in 6 months

## 2020-01-02 NOTE — PROGRESS NOTES
Assessment and Plan:     Problem List Items Addressed This Visit     None           Preventive health issues were discussed with patient, and age appropriate screening tests were ordered as noted in patient's After Visit Summary  Personalized health advice and appropriate referrals for health education or preventive services given if needed, as noted in patient's After Visit Summary       History of Present Illness:     Patient presents for Medicare Annual Wellness visit    Patient Care Team:  Caleb Mccauley DO as PCP - General (Family Medicine)     Problem List:     Patient Active Problem List   Diagnosis    Hypomagnesemia    Hypokalemia    Elevated troponin    Community acquired pneumonia of left upper lobe of lung ([address] )    Hyponatremia    Essential hypertension    Lymphadenopathy    Hiatal hernia    Thrombocytopenia (HCC)    Aortic valve stenosis    Mitral valve insufficiency    Supraventricular tachycardia ([address]    Pulmonary hypertension ([address] )    Diverticulosis    First degree AV block    Non-rheumatic tricuspid valve insufficiency    Generalized anxiety disorder    Mid back pain      Past Medical and Surgical History:     Past Medical History:   Diagnosis Date    Fatigue     Generalized anxiety disorder     Hyperlipidemia     Hypertension     Impaired fasting glucose     Pneumonia      Past Surgical History:   Procedure Laterality Date    KNEE SURGERY      left knee replacement      Family History:     Family History   Problem Relation Age of Onset    Hypertension Mother    Rubia Sang Asthma Father         Britton's asthma    Alzheimer's disease Sister     Rectal cancer Son     Uterine cancer Daughter     Heart disease Sister     Heart disease Brother       Social History:     Social History     Socioeconomic History    Marital status: /Civil Union     Spouse name: None    Number of children: 2    Years of education: None    Highest education level: None   Occupational History    None   Social Needs    Financial resource strain: Not hard at all   [employer] insecurity:     Worry: Never true     Inability: Never true   [employer] needs:     Medical: No     Non-medical: No   Tobacco Use    Smoking status: Never Smoker    Smokeless tobacco: Never Used   Substance and Sexual Activity    Alcohol use: Never     Frequency: Never    Drug use: Never    Sexual activity: None   Lifestyle    Physical activity:     Days per week: None     Minutes per session: None    Stress: Only a little   Relationships    Social connections:     Talks on phone: Twice a week     Gets together:  Three times a week     Attends Spiritism service: Never     Active member of club or organization: No     Attends meetings of clubs or organizations: Never     Relationship status:     Intimate partner violence:     Fear of current or ex partner: No     Emotionally abused: No     Physically abused: No     Forced sexual activity: No   Other Topics Concern    None   Social History Narrative    None       Medications and Allergies:     Current Outpatient Medications   Medication Sig Dispense Refill    ALPRAZolam (XANAX) 0 25 mg tablet TAKE 1 TABLET BY MOUTH EVERY 6 HOURS AS NEEDED FOR ANXIETY 120 tablet 5    amLODIPine (NORVASC) 5 mg tablet Take 5 mg by mouth daily      aspirin (ECOTRIN LOW STRENGTH) 81 mg EC tablet Take 81 mg by mouth daily      atenolol-chlorthalidone (TENORETIC) 100-25 mg per tablet Take 1 tablet by mouth daily 90 tablet 2    Calcium Carb-Cholecalciferol (CALTRATE 600+D3 PO) Take 1 tablet by mouth 2 (two) times a day      Omega-3 Fatty Acids (FISH OIL) 1200 MG CAPS Take 1,200 mg by mouth 2 (two) times a day      potassium chloride (K-DUR,KLOR-CON) 20 mEq tablet Take 1 tablet (20 mEq total) by mouth 2 (two) times a day (Patient taking differently: Take 20 mEq by mouth 2 (two) times a day ) 90 tablet 2    Psyllium (METAMUCIL FIBER) 51 7 % PACK Take 1 Package by mouth daily  0     No current facility-administered medications for this visit  No Known Allergies   Immunizations:     Immunization History   Administered Date(s) Administered    Influenza, high dose seasonal 0 5 mL 09/30/2019      Health Maintenance:         Topic Date Due    Hepatitis C Screening  Completed         Topic Date Due    DTaP,Tdap,and Td Vaccines (1 - Tdap) 07/29/1946    Pneumococcal Vaccine: 65+ Years (1 of 2 - PCV13) 07/29/2000      Medicare Health Risk Assessment:     /82 (BP Location: Left arm, Patient Position: Sitting, Cuff Size: Adult)   Ht 4' 11" (1 499 m)   Wt 59 6 kg (131 lb 4 8 oz)   BMI 26 52 kg/m²      Princess Singh is here for her Initial Wellness visit  Health Risk Assessment:   Patient rates overall health as good  Patient feels that their physical health rating is same  Eyesight was rated as slightly worse  Hearing was rated as slightly worse  Patient feels that their emotional and mental health rating is same  Pain experienced in the last 7 days has been none  Patient states that she has experienced no weight loss or gain in last 6 months  Depression Screening:   PHQ-2 Score: 0      Fall Risk Screening: In the past year, patient has experienced: no history of falling in past year      Urinary Incontinence Screening:   Patient has not leaked urine accidently in the last six months  Home Safety:  Patient does not have trouble with stairs inside or outside of their home  Patient has working smoke alarms and has working carbon monoxide detector  Home safety hazards include: none  Nutrition:   Current diet is Regular  Medications:   Patient is not currently taking any over-the-counter supplements  Patient is able to manage medications  Activities of Daily Living (ADLs)/Instrumental Activities of Daily Living (IADLs):   Walk and transfer into and out of bed and chair?: Yes  Dress and groom yourself?: Yes    Bathe or shower yourself?: Yes    Feed yourself?  Yes  Do your laundry/housekeeping?: Yes  Manage your money, pay your bills and track your expenses?: Yes  Make your own meals?: Yes    Do your own shopping?: Yes    Previous Hospitalizations:   Any hospitalizations or ED visits within the last 12 months?: Yes    How many hospitalizations have you had in the last year?: 1-2    Advance Care Planning:   Living will: Yes    Durable POA for healthcare:  Yes    Advanced directive: Yes      Cognitive Screening:   Provider or family/friend/caregiver concerned regarding cognition?: No    PREVENTIVE SCREENINGS      Cardiovascular Screening:    General: Screening Current      Diabetes Screening:     General: Screening Current      Colorectal Cancer Screening:     General: Screening Not Indicated      Breast Cancer Screening:     General: Screening Current      Cervical Cancer Screening:    General: Screening Not Indicated      Osteoporosis Screening:    General: Risks and Benefits Discussed    Due for: DXA Axial      Abdominal Aortic Aneurysm (AAA) Screening:        General: Screening Not Indicated      Lung Cancer Screening:     General: Screening Not Indicated      Hepatitis C Screening:    General: Screening Current      Terence Cardoza DO

## 2020-01-02 NOTE — PROGRESS NOTES
Assessment/Plan:    Essential hypertension  Patient has hypertension  Her blood pressure is well controlled  Patient will continue her current regiment  I asked the patient to continue to monitor her blood pressure at home as well  She brought with her readings of her home blood pressure recordings  There was satisfactory  Generalized anxiety disorder  Patient has generalized anxiety disorder  She will continue alprazolam    Aortic valve stenosis  I reviewed the patient's recent Cardiology consultation in the patient and I discussed it  Apparently, her older sister had aortic valve replacement  Patient is to have a repeat echocardiogram in 6 months  Diagnoses and all orders for this visit:    Osteoporosis screening  -     DXA bone density spine hip and pelvis; Future    Supraventricular tachycardia (HCC)    Thrombocytopenia (HCC)    Essential hypertension    Generalized anxiety disorder    Nonrheumatic aortic valve stenosis          Subjective:      Patient ID: Katharina Byers is a 80 y o  female  This patient is an 24-year-old white female who presents to the office today for her routine checkup  The patient is doing well and has no complaints  She is also here today for her annual Medicare wellness exam   Patient reports that she has been checking her blood pressures regularly at home and finds them to be controlled  She tells me they go up which she gets herself worked up  The following portions of the patient's history were reviewed and updated as appropriate: allergies, current medications, past family history, past medical history, past social history, past surgical history and problem list     Review of Systems   Respiratory: Negative for cough, shortness of breath and wheezing  Cardiovascular: Negative for chest pain, palpitations and leg swelling     Gastrointestinal: Negative for abdominal distention, abdominal pain, blood in stool, constipation, diarrhea, nausea and vomiting  Psychiatric/Behavioral: Negative for sleep disturbance  The patient is nervous/anxious  Objective:      /76   Pulse (!) 51   Ht 4' 11" (1 499 m)   Wt 59 6 kg (131 lb 4 8 oz)   SpO2 99%   BMI 26 52 kg/m²          Physical Exam   Constitutional: She appears well-developed and well-nourished  No distress  HENT:   Head: Normocephalic and atraumatic  Right Ear: External ear normal    Left Ear: External ear normal    Mouth/Throat: Oropharynx is clear and moist  No oropharyngeal exudate  Tympanic membranes are clear   Eyes: Pupils are equal, round, and reactive to light  Conjunctivae are normal  No scleral icterus  Neck: Neck supple  No tracheal deviation present  No thyromegaly present  Cardiovascular: Normal rate and regular rhythm  Exam reveals no gallop and no friction rub  Murmur ( there is a grade 2/6 systolic murmur noted) heard  Carotid upstroke was normal   Pulmonary/Chest: Effort normal and breath sounds normal  No stridor  No respiratory distress  She has no wheezes  She has no rales  Lymphadenopathy:     She has no cervical adenopathy  Psychiatric: She has a normal mood and affect  Her behavior is normal  Judgment and thought content normal    Vitals reviewed      extremities:  Without cyanosis, clubbing, or edema

## 2020-01-02 NOTE — ASSESSMENT & PLAN NOTE
Patient has hypertension  Her blood pressure is well controlled  Patient will continue her current regiment  I asked the patient to continue to monitor her blood pressure at home as well  She brought with her readings of her home blood pressure recordings  There was satisfactory

## 2020-01-02 NOTE — PATIENT INSTRUCTIONS
Medicare Preventive Visit Patient Instructions  Thank you for completing your Welcome to Medicare Visit or Medicare Annual Wellness Visit today  Your next wellness visit will be due in one year (1/2/2021)  The screening/preventive services that you may require over the next 5-10 years are detailed below  Some tests may not apply to you based off risk factors and/or age  Screening tests ordered at today's visit but not completed yet may show as past due  Also, please note that scanned in results may not display below  Preventive Screenings:  Service Recommendations Previous Testing/Comments   Colorectal Cancer Screening  * Colonoscopy    * Fecal Occult Blood Test (FOBT)/Fecal Immunochemical Test (FIT)  * Fecal DNA/Cologuard Test  * Flexible Sigmoidoscopy Age: 54-65 years old   Colonoscopy: every 10 years (may be performed more frequently if at higher risk)  OR  FOBT/FIT: every 1 year  OR  Cologuard: every 3 years  OR  Sigmoidoscopy: every 5 years  Screening may be recommended earlier than age 48 if at higher risk for colorectal cancer  Also, an individualized decision between you and your healthcare provider will decide whether screening between the ages of 74-80 would be appropriate  Colonoscopy: Not on file  FOBT/FIT: Not on file  Cologuard: Not on file  Sigmoidoscopy: Not on file         Breast Cancer Screening Age: 36 years old  Frequency: every 1-2 years  Not required if history of left and right mastectomy Mammogram: 07/05/2018    Screening Current   Cervical Cancer Screening Between the ages of 21-29, pap smear recommended once every 3 years  Between the ages of 33-67, can perform pap smear with HPV co-testing every 5 years     Recommendations may differ for women with a history of total hysterectomy, cervical cancer, or abnormal pap smears in past  Pap Smear: Not on file    Screening Not Indicated   Hepatitis C Screening Once for adults born between 1945 and 1965  More frequently in patients at high risk for Hepatitis C Hep C Antibody: 09/23/2019    Screening Current   Diabetes Screening 1-2 times per year if you're at risk for diabetes or have pre-diabetes Fasting glucose: 100 mg/dL   A1C: No results in last 5 years    Screening Current   Cholesterol Screening Once every 5 years if you don't have a lipid disorder  May order more often based on risk factors  Lipid panel: Not on file         Other Preventive Screenings Covered by Medicare:  1  Abdominal Aortic Aneurysm (AAA) Screening: covered once if your at risk  You're considered to be at risk if you have a family history of AAA  2  Lung Cancer Screening: covers low dose CT scan once per year if you meet all of the following conditions: (1) Age 50-69; (2) No signs or symptoms of lung cancer; (3) Current smoker or have quit smoking within the last 15 years; (4) You have a tobacco smoking history of at least 30 pack years (packs per day multiplied by number of years you smoked); (5) You get a written order from a healthcare provider  3  Glaucoma Screening: covered annually if you're considered high risk: (1) You have diabetes OR (2) Family history of glaucoma OR (3)  aged 48 and older OR (3)  American aged 72 and older  3  Osteoporosis Screening: covered every 2 years if you meet one of the following conditions: (1) You're estrogen deficient and at risk for osteoporosis based off medical history and other findings; (2) Have a vertebral abnormality; (3) On glucocorticoid therapy for more than 3 months; (4) Have primary hyperparathyroidism; (5) On osteoporosis medications and need to assess response to drug therapy  · Last bone density test (DXA Scan): Not on file  5  HIV Screening: covered annually if you're between the age of 12-76  Also covered annually if you are younger than 13 and older than 72 with risk factors for HIV infection  For pregnant patients, it is covered up to 3 times per pregnancy      Immunizations:  Immunization Recommendations   Influenza Vaccine Annual influenza vaccination during flu season is recommended for all persons aged >= 6 months who do not have contraindications   Pneumococcal Vaccine (Prevnar and Pneumovax)  * Prevnar = PCV13  * Pneumovax = PPSV23   Adults 25-60 years old: 1-3 doses may be recommended based on certain risk factors  Adults 72 years old: Prevnar (PCV13) vaccine recommended followed by Pneumovax (PPSV23) vaccine  If already received PPSV23 since turning 65, then PCV13 recommended at least one year after PPSV23 dose  Hepatitis B Vaccine 3 dose series if at intermediate or high risk (ex: diabetes, end stage renal disease, liver disease)   Tetanus (Td) Vaccine - COST NOT COVERED BY MEDICARE PART B Following completion of primary series, a booster dose should be given every 10 years to maintain immunity against tetanus  Td may also be given as tetanus wound prophylaxis  Tdap Vaccine - COST NOT COVERED BY MEDICARE PART B Recommended at least once for all adults  For pregnant patients, recommended with each pregnancy  Shingles Vaccine (Shingrix) - COST NOT COVERED BY MEDICARE PART B  2 shot series recommended in those aged 48 and above     Health Maintenance Due:      Topic Date Due    Hepatitis C Screening  Completed     Immunizations Due:      Topic Date Due    DTaP,Tdap,and Td Vaccines (1 - Tdap) 07/29/1946    Pneumococcal Vaccine: 65+ Years (1 of 2 - PCV13) 07/29/2000     Advance Directives   What are advance directives? Advance directives are legal documents that state your wishes and plans for medical care  These plans are made ahead of time in case you lose your ability to make decisions for yourself  Advance directives can apply to any medical decision, such as the treatments you want, and if you want to donate organs  What are the types of advance directives? There are many types of advance directives, and each state has rules about how to use them   You may choose a combination of any of the following:  · Living will: This is a written record of the treatment you want  You can also choose which treatments you do not want, which to limit, and which to stop at a certain time  This includes surgery, medicine, IV fluid, and tube feedings  · Durable power of  for healthcare [facility]): This is a written record that states who you want to make healthcare choices for you when you are unable to make them for yourself  This person, called a proxy, is usually a family member or a friend  You may choose more than 1 proxy  · Do not resuscitate (DNR) order:  A DNR order is used in case your heart stops beating or you stop breathing  It is a request not to have certain forms of treatment, such as CPR  A DNR order may be included in other types of advance directives  · Medical directive: This covers the care that you want if you are in a coma, near death, or unable to make decisions for yourself  You can list the treatments you want for each condition  Treatment may include pain medicine, surgery, blood transfusions, dialysis, IV or tube feedings, and a ventilator (breathing machine)  · Values history: This document has questions about your views, beliefs, and how you feel and think about life  This information can help others choose the care that you would choose  Why are advance directives important? An advance directive helps you control your care  Although spoken wishes may be used, it is better to have your wishes written down  Spoken wishes can be misunderstood, or not followed  Treatments may be given even if you do not want them  An advance directive may make it easier for your family to make difficult choices about your care  Weight Management   Why it is important to manage your weight:  Being overweight increases your risk of health conditions such as heart disease, high blood pressure, type 2 diabetes, and certain types of cancer   It can also increase your risk for osteoarthritis, sleep apnea, and other respiratory problems  Aim for a slow, steady weight loss  Even a small amount of weight loss can lower your risk of health problems  How to lose weight safely:  A safe and healthy way to lose weight is to eat fewer calories and get regular exercise  You can lose up about 1 pound a week by decreasing the number of calories you eat by 500 calories each day  Healthy meal plan for weight management:  A healthy meal plan includes a variety of foods, contains fewer calories, and helps you stay healthy  A healthy meal plan includes the following:  · Eat whole-grain foods more often  A healthy meal plan should contain fiber  Fiber is the part of grains, fruits, and vegetables that is not broken down by your body  Whole-grain foods are healthy and provide extra fiber in your diet  Some examples of whole-grain foods are whole-wheat breads and pastas, oatmeal, brown rice, and bulgur  · Eat a variety of vegetables every day  Include dark, leafy greens such as spinach, kale, tay greens, and mustard greens  Eat yellow and orange vegetables such as carrots, sweet potatoes, and winter squash  · Eat a variety of fruits every day  Choose fresh or canned fruit (canned in its own juice or light syrup) instead of juice  Fruit juice has very little or no fiber  · Eat low-fat dairy foods  Drink fat-free (skim) milk or 1% milk  Eat fat-free yogurt and low-fat cottage cheese  Try low-fat cheeses such as mozzarella and other reduced-fat cheeses  · Choose meat and other protein foods that are low in fat  Choose beans or other legumes such as split peas or lentils  Choose fish, skinless poultry (chicken or turkey), or lean cuts of red meat (beef or pork)  Before you cook meat or poultry, cut off any visible fat  · Use less fat and oil  Try baking foods instead of frying them  Add less fat, such as margarine, sour cream, regular salad dressing and mayonnaise to foods   Eat fewer high-fat foods  Some examples of high-fat foods include french fries, doughnuts, ice cream, and cakes  · Eat fewer sweets  Limit foods and drinks that are high in sugar  This includes candy, cookies, regular soda, and sweetened drinks  Exercise:  Exercise at least 30 minutes per day on most days of the week  Some examples of exercise include walking, biking, dancing, and swimming  You can also fit in more physical activity by taking the stairs instead of the elevator or parking farther away from stores  Ask your healthcare provider about the best exercise plan for you  © Copyright [employer] 2018 Information is for End User's use only and may not be sold, redistributed or otherwise used for commercial purposes   All illustrations and images included in CareNotes® are the copyrighted property of [employer]  or [address] [place] [address]

## 2020-01-09 ENCOUNTER — HOSPITAL ENCOUNTER (OUTPATIENT)
Dept: BONE DENSITY | Facility: HOSPITAL | Age: 85
Discharge: HOME | End: 2020-01-09
Attending: FAMILY MEDICINE
Payer: MEDICARE

## 2020-01-09 DIAGNOSIS — Z13.820 OSTEOPOROSIS SCREENING: ICD-10-CM

## 2020-01-09 PROCEDURE — 77080 DXA BONE DENSITY AXIAL: CPT

## 2020-04-02 ENCOUNTER — OFFICE VISIT (OUTPATIENT)
Dept: FAMILY MEDICINE CLINIC | Facility: CLINIC | Age: 85
End: 2020-04-02
Payer: MEDICARE

## 2020-04-02 VITALS
DIASTOLIC BLOOD PRESSURE: 82 MMHG | WEIGHT: 129.8 LBS | HEART RATE: 55 BPM | OXYGEN SATURATION: 99 % | BODY MASS INDEX: 26.17 KG/M2 | HEIGHT: 59 IN | SYSTOLIC BLOOD PRESSURE: 138 MMHG | TEMPERATURE: 97 F

## 2020-04-02 DIAGNOSIS — E78.5 DYSLIPIDEMIA: ICD-10-CM

## 2020-04-02 DIAGNOSIS — I35.0 NONRHEUMATIC AORTIC VALVE STENOSIS: ICD-10-CM

## 2020-04-02 DIAGNOSIS — F41.1 GENERALIZED ANXIETY DISORDER: ICD-10-CM

## 2020-04-02 DIAGNOSIS — I34.0 NONRHEUMATIC MITRAL VALVE REGURGITATION: ICD-10-CM

## 2020-04-02 DIAGNOSIS — I36.1 NON-RHEUMATIC TRICUSPID VALVE INSUFFICIENCY: Chronic | ICD-10-CM

## 2020-04-02 DIAGNOSIS — Z79.899 ENCOUNTER FOR LONG-TERM (CURRENT) USE OF MEDICATIONS: ICD-10-CM

## 2020-04-02 DIAGNOSIS — I10 ESSENTIAL HYPERTENSION: Primary | ICD-10-CM

## 2020-04-02 PROCEDURE — 3075F SYST BP GE 130 - 139MM HG: CPT | Performed by: FAMILY MEDICINE

## 2020-04-02 PROCEDURE — 1036F TOBACCO NON-USER: CPT | Performed by: FAMILY MEDICINE

## 2020-04-02 PROCEDURE — 4040F PNEUMOC VAC/ADMIN/RCVD: CPT | Performed by: FAMILY MEDICINE

## 2020-04-02 PROCEDURE — 3008F BODY MASS INDEX DOCD: CPT | Performed by: FAMILY MEDICINE

## 2020-04-02 PROCEDURE — 1160F RVW MEDS BY RX/DR IN RCRD: CPT | Performed by: FAMILY MEDICINE

## 2020-04-02 PROCEDURE — 3079F DIAST BP 80-89 MM HG: CPT | Performed by: FAMILY MEDICINE

## 2020-04-02 PROCEDURE — 99213 OFFICE O/P EST LOW 20 MIN: CPT | Performed by: FAMILY MEDICINE

## 2020-04-11 DIAGNOSIS — J18.9 COMMUNITY ACQUIRED PNEUMONIA OF LEFT UPPER LOBE OF LUNG: ICD-10-CM

## 2020-04-13 RX ORDER — ALPRAZOLAM 0.25 MG/1
TABLET ORAL
Qty: 120 TABLET | Refills: 2 | Status: SHIPPED | OUTPATIENT
Start: 2020-04-13 | End: 2020-08-05

## 2020-04-14 DIAGNOSIS — J18.9 COMMUNITY ACQUIRED PNEUMONIA OF LEFT UPPER LOBE OF LUNG: ICD-10-CM

## 2020-04-14 RX ORDER — ALPRAZOLAM 0.25 MG/1
TABLET ORAL
Qty: 120 TABLET | Refills: 0 | OUTPATIENT
Start: 2020-04-14

## 2020-05-08 DIAGNOSIS — E87.6 HYPOKALEMIA: ICD-10-CM

## 2020-05-08 RX ORDER — POTASSIUM CHLORIDE 20 MEQ/1
20 TABLET, EXTENDED RELEASE ORAL 2 TIMES DAILY
Qty: 180 TABLET | Refills: 1 | Status: SHIPPED | OUTPATIENT
Start: 2020-05-08 | End: 2020-05-10

## 2020-05-09 DIAGNOSIS — E87.6 HYPOKALEMIA: ICD-10-CM

## 2020-05-10 RX ORDER — POTASSIUM CHLORIDE 20 MEQ/1
TABLET, EXTENDED RELEASE ORAL
Qty: 90 TABLET | Refills: 0 | Status: SHIPPED | OUTPATIENT
Start: 2020-05-10 | End: 2020-08-05

## 2020-05-28 DIAGNOSIS — I10 ESSENTIAL HYPERTENSION: Primary | ICD-10-CM

## 2020-05-28 RX ORDER — AMLODIPINE BESYLATE 5 MG/1
TABLET ORAL
Qty: 90 TABLET | Refills: 1 | Status: SHIPPED | OUTPATIENT
Start: 2020-05-28 | End: 2020-11-04

## 2020-06-17 ENCOUNTER — APPOINTMENT (OUTPATIENT)
Dept: LAB | Facility: HOSPITAL | Age: 85
End: 2020-06-17
Attending: FAMILY MEDICINE
Payer: MEDICARE

## 2020-06-17 ENCOUNTER — HOSPITAL ENCOUNTER (OUTPATIENT)
Dept: NON INVASIVE DIAGNOSTICS | Facility: HOSPITAL | Age: 85
Discharge: HOME | End: 2020-06-17
Attending: INTERNAL MEDICINE
Payer: MEDICARE

## 2020-06-17 DIAGNOSIS — I10 ESSENTIAL HYPERTENSION: ICD-10-CM

## 2020-06-17 DIAGNOSIS — I35.0 NONRHEUMATIC AORTIC VALVE STENOSIS: ICD-10-CM

## 2020-06-17 DIAGNOSIS — Z79.899 ENCOUNTER FOR LONG-TERM (CURRENT) USE OF MEDICATIONS: ICD-10-CM

## 2020-06-17 LAB
ALBUMIN SERPL BCP-MCNC: 3.8 G/DL (ref 3.5–5)
ALP SERPL-CCNC: 74 U/L (ref 46–116)
ALT SERPL W P-5'-P-CCNC: 23 U/L (ref 12–78)
ANION GAP SERPL CALCULATED.3IONS-SCNC: 6 MMOL/L (ref 4–13)
AST SERPL W P-5'-P-CCNC: 22 U/L (ref 5–45)
BASOPHILS # BLD AUTO: 0.02 THOUSANDS/ΜL (ref 0–0.1)
BASOPHILS NFR BLD AUTO: 0 % (ref 0–1)
BILIRUB SERPL-MCNC: 0.5 MG/DL (ref 0.2–1)
BUN SERPL-MCNC: 21 MG/DL (ref 5–25)
CALCIUM SERPL-MCNC: 9.5 MG/DL (ref 8.3–10.1)
CHLORIDE SERPL-SCNC: 95 MMOL/L (ref 100–108)
CHOLEST SERPL-MCNC: 173 MG/DL (ref 50–200)
CO2 SERPL-SCNC: 29 MMOL/L (ref 21–32)
CREAT SERPL-MCNC: 0.98 MG/DL (ref 0.6–1.3)
EOSINOPHIL # BLD AUTO: 0.04 THOUSAND/ΜL (ref 0–0.61)
EOSINOPHIL NFR BLD AUTO: 1 % (ref 0–6)
ERYTHROCYTE [DISTWIDTH] IN BLOOD BY AUTOMATED COUNT: 12.1 % (ref 11.6–15.1)
GFR SERPL CREATININE-BSD FRML MDRD: 53 ML/MIN/1.73SQ M
GLUCOSE P FAST SERPL-MCNC: 106 MG/DL (ref 65–99)
HCT VFR BLD AUTO: 37.7 % (ref 34.8–46.1)
HDLC SERPL-MCNC: 68 MG/DL
HGB BLD-MCNC: 12.5 G/DL (ref 11.5–15.4)
IMM GRANULOCYTES # BLD AUTO: 0.01 THOUSAND/UL (ref 0–0.2)
IMM GRANULOCYTES NFR BLD AUTO: 0 % (ref 0–2)
LDLC SERPL CALC-MCNC: 86 MG/DL (ref 0–100)
LYMPHOCYTES # BLD AUTO: 1.41 THOUSANDS/ΜL (ref 0.6–4.47)
LYMPHOCYTES NFR BLD AUTO: 31 % (ref 14–44)
MCH RBC QN AUTO: 32 PG (ref 26.8–34.3)
MCHC RBC AUTO-ENTMCNC: 33.2 G/DL (ref 31.4–37.4)
MCV RBC AUTO: 96 FL (ref 82–98)
MONOCYTES # BLD AUTO: 0.45 THOUSAND/ΜL (ref 0.17–1.22)
MONOCYTES NFR BLD AUTO: 10 % (ref 4–12)
NEUTROPHILS # BLD AUTO: 2.62 THOUSANDS/ΜL (ref 1.85–7.62)
NEUTS SEG NFR BLD AUTO: 58 % (ref 43–75)
NONHDLC SERPL-MCNC: 105 MG/DL
NRBC BLD AUTO-RTO: 0 /100 WBCS
PLATELET # BLD AUTO: 215 THOUSANDS/UL (ref 149–390)
PMV BLD AUTO: 8.3 FL (ref 8.9–12.7)
POTASSIUM SERPL-SCNC: 3.5 MMOL/L (ref 3.5–5.3)
PROT SERPL-MCNC: 7.7 G/DL (ref 6.4–8.2)
RBC # BLD AUTO: 3.91 MILLION/UL (ref 3.81–5.12)
SODIUM SERPL-SCNC: 130 MMOL/L (ref 136–145)
TRIGL SERPL-MCNC: 97 MG/DL
WBC # BLD AUTO: 4.55 THOUSAND/UL (ref 4.31–10.16)

## 2020-06-17 PROCEDURE — 93308 TTE F-UP OR LMTD: CPT

## 2020-06-17 PROCEDURE — 80061 LIPID PANEL: CPT

## 2020-06-17 PROCEDURE — 85025 COMPLETE CBC W/AUTO DIFF WBC: CPT

## 2020-06-17 PROCEDURE — 93306 TTE W/DOPPLER COMPLETE: CPT | Performed by: INTERNAL MEDICINE

## 2020-06-17 PROCEDURE — 80053 COMPREHEN METABOLIC PANEL: CPT

## 2020-06-17 PROCEDURE — 36415 COLL VENOUS BLD VENIPUNCTURE: CPT

## 2020-06-19 ENCOUNTER — OFFICE VISIT (OUTPATIENT)
Dept: CARDIOLOGY CLINIC | Facility: HOSPITAL | Age: 85
End: 2020-06-19
Payer: MEDICARE

## 2020-06-19 VITALS
HEART RATE: 57 BPM | BODY MASS INDEX: 25.4 KG/M2 | HEIGHT: 59 IN | WEIGHT: 126 LBS | SYSTOLIC BLOOD PRESSURE: 142 MMHG | DIASTOLIC BLOOD PRESSURE: 80 MMHG

## 2020-06-19 DIAGNOSIS — I35.0 AORTIC VALVE STENOSIS, ETIOLOGY OF CARDIAC VALVE DISEASE UNSPECIFIED: Primary | ICD-10-CM

## 2020-06-19 DIAGNOSIS — I47.1 SUPRAVENTRICULAR TACHYCARDIA (HCC): ICD-10-CM

## 2020-06-19 DIAGNOSIS — I10 ESSENTIAL HYPERTENSION: ICD-10-CM

## 2020-06-19 DIAGNOSIS — I27.20 PULMONARY HYPERTENSION (HCC): ICD-10-CM

## 2020-06-19 PROCEDURE — 1036F TOBACCO NON-USER: CPT | Performed by: INTERNAL MEDICINE

## 2020-06-19 PROCEDURE — 3077F SYST BP >= 140 MM HG: CPT | Performed by: INTERNAL MEDICINE

## 2020-06-19 PROCEDURE — 4040F PNEUMOC VAC/ADMIN/RCVD: CPT | Performed by: INTERNAL MEDICINE

## 2020-06-19 PROCEDURE — 3079F DIAST BP 80-89 MM HG: CPT | Performed by: INTERNAL MEDICINE

## 2020-06-19 PROCEDURE — 1160F RVW MEDS BY RX/DR IN RCRD: CPT | Performed by: INTERNAL MEDICINE

## 2020-06-19 PROCEDURE — 99214 OFFICE O/P EST MOD 30 MIN: CPT | Performed by: INTERNAL MEDICINE

## 2020-06-19 PROCEDURE — 3008F BODY MASS INDEX DOCD: CPT | Performed by: INTERNAL MEDICINE

## 2020-07-02 RX ORDER — AMMONIUM LACTATE 12 G/100G
LOTION TOPICAL
COMMUNITY
Start: 2020-05-11

## 2020-07-09 ENCOUNTER — OFFICE VISIT (OUTPATIENT)
Dept: CARDIOLOGY CLINIC | Facility: CLINIC | Age: 85
End: 2020-07-09
Payer: MEDICARE

## 2020-07-09 VITALS
HEART RATE: 60 BPM | HEIGHT: 59 IN | WEIGHT: 125 LBS | BODY MASS INDEX: 25.2 KG/M2 | SYSTOLIC BLOOD PRESSURE: 160 MMHG | DIASTOLIC BLOOD PRESSURE: 80 MMHG

## 2020-07-09 DIAGNOSIS — I47.1 SUPRAVENTRICULAR TACHYCARDIA (HCC): Primary | ICD-10-CM

## 2020-07-09 DIAGNOSIS — I10 ESSENTIAL HYPERTENSION: ICD-10-CM

## 2020-07-09 DIAGNOSIS — I35.0 NONRHEUMATIC AORTIC VALVE STENOSIS: ICD-10-CM

## 2020-07-09 PROCEDURE — 93000 ELECTROCARDIOGRAM COMPLETE: CPT | Performed by: INTERNAL MEDICINE

## 2020-07-09 PROCEDURE — 1160F RVW MEDS BY RX/DR IN RCRD: CPT | Performed by: INTERNAL MEDICINE

## 2020-07-09 PROCEDURE — 3008F BODY MASS INDEX DOCD: CPT | Performed by: INTERNAL MEDICINE

## 2020-07-09 PROCEDURE — 3079F DIAST BP 80-89 MM HG: CPT | Performed by: INTERNAL MEDICINE

## 2020-07-09 PROCEDURE — 1036F TOBACCO NON-USER: CPT | Performed by: INTERNAL MEDICINE

## 2020-07-09 PROCEDURE — 99214 OFFICE O/P EST MOD 30 MIN: CPT | Performed by: INTERNAL MEDICINE

## 2020-07-09 PROCEDURE — 4040F PNEUMOC VAC/ADMIN/RCVD: CPT | Performed by: INTERNAL MEDICINE

## 2020-07-09 PROCEDURE — 3077F SYST BP >= 140 MM HG: CPT | Performed by: INTERNAL MEDICINE

## 2020-07-09 NOTE — ASSESSMENT & PLAN NOTE
Blood pressure a bit high today but has been well controlled at home  She is a bit uptight today  Continue home monitoring

## 2020-07-09 NOTE — PROGRESS NOTES
Patient ID: Raphael Davis is a 80 y o  female  Plan: Aortic valve stenosis  This has been read as moderate to severe but sounds mild on exam and by velocity across the valve  I do not believe this is going to be problematic in the next year or 2  Mainly I reassured her in this regard  Supraventricular tachycardia (HCC)  No recurrence clinically  Essential hypertension  Blood pressure a bit high today but has been well controlled at home  She is a bit uptight today  Continue home monitoring  Follow up Plan:  One year EKG, echo, and follow-up visit  HPI:  Patient is seen today to transfer cardiac care  I take care of her brother  He brought her today  The patient has a history of a heart murmur and was felt to have moderate severe aortic stenosis  She has had no recent chest pain or chest pressure  Ambulation is limited by arthralgias and her age  No syncope or near syncope  Results for orders placed or performed in visit on 07/09/20   POCT ECG    Impression    Normal sinus rhythm  APCs  Otherwise Within normal limits  Most recent or relevant cardiac/vascular testing:    Echocardiogram 06/17/2020:  Normal LV function  2 75 m/sec flow across the aortic valve  Past Surgical History:   Procedure Laterality Date    KNEE SURGERY      left knee replacement     CMP:   Lab Results   Component Value Date    K 3 5 06/17/2020    CL 95 (L) 06/17/2020    CO2 29 06/17/2020    BUN 21 06/17/2020    CREATININE 0 98 06/17/2020    EGFR 53 06/17/2020       Lipid Profile:   Lab Results   Component Value Date    TRIG 97 06/17/2020    HDL 68 06/17/2020         Review of Systems   10  point ROS  was otherwise non pertinent or negative except as per HPI or as below  Gait:  Slow  Hard of hearing  Objective:     /80   Pulse 60   Ht 4' 11" (1 499 m)   Wt 56 7 kg (125 lb)   BMI 25 25 kg/m²     PHYSICAL EXAM:    General:  Normal appearance in no distress    Eyes: Anicteric  Oral mucosa:  Moist   Neck:  No JVD  Carotid upstrokes are brisk without bruits  No masses  Chest:  Clear to auscultation and percussion  Cardiac:  Normal PMI  Normal S1 and S2   Grade 2 systolic murmur loudest at the base  Abdomen:  Soft and nontender  No palpable organomegaly or aortic enlargement  Extremities:  No peripheral edema  Musculoskeletal:  Symmetric  Vascular:  Femoral pulses are brisk without bruits  Popliteal pulses are intact bilaterally  Pedal pulses are intact  Neuro:  Grossly symmetric  Psych:  Alert and oriented x3          Current Outpatient Medications:     ALPRAZolam (XANAX) 0 25 mg tablet, TAKE 1 TABLET BY MOUTH EVERY 6 HOURS AS NEEDED FOR ANXIETY, Disp: 120 tablet, Rfl: 2    amLODIPine (NORVASC) 5 mg tablet, Take 1 tablet by mouth once daily, Disp: 90 tablet, Rfl: 1    ammonium lactate (LAC-HYDRIN) 12 % lotion, , Disp: , Rfl:     aspirin (ECOTRIN LOW STRENGTH) 81 mg EC tablet, Take 81 mg by mouth daily, Disp: , Rfl:     atenolol-chlorthalidone (TENORETIC) 100-25 mg per tablet, Take 1 tablet by mouth daily, Disp: 90 tablet, Rfl: 2    Calcium Carb-Cholecalciferol (CALTRATE 600+D3 PO), Take 1 tablet by mouth 2 (two) times a day, Disp: , Rfl:     Omega-3 Fatty Acids (FISH OIL) 1200 MG CAPS, Take 1,200 mg by mouth 2 (two) times a day, Disp: , Rfl:     potassium chloride (K-DUR,KLOR-CON) 20 mEq tablet, Take 1 tablet by mouth once daily, Disp: 90 tablet, Rfl: 0    Psyllium (METAMUCIL FIBER) 51 7 % PACK, Take 1 Package by mouth daily, Disp: , Rfl: 0  No Known Allergies  Past Medical History:   Diagnosis Date    Fatigue     Generalized anxiety disorder     Hyperlipidemia     Hypertension     Impaired fasting glucose     Pneumonia            Social History     Tobacco Use   Smoking Status Never Smoker   Smokeless Tobacco Never Used

## 2020-07-09 NOTE — ASSESSMENT & PLAN NOTE
This has been read as moderate to severe but sounds mild on exam and by velocity across the valve  I do not believe this is going to be problematic in the next year or 2  Mainly I reassured her in this regard

## 2020-07-10 ENCOUNTER — OFFICE VISIT (OUTPATIENT)
Dept: FAMILY MEDICINE CLINIC | Facility: CLINIC | Age: 85
End: 2020-07-10
Payer: MEDICARE

## 2020-07-10 VITALS
DIASTOLIC BLOOD PRESSURE: 70 MMHG | SYSTOLIC BLOOD PRESSURE: 144 MMHG | WEIGHT: 125.7 LBS | BODY MASS INDEX: 29.09 KG/M2 | HEIGHT: 55 IN | HEART RATE: 60 BPM | OXYGEN SATURATION: 98 % | TEMPERATURE: 97.6 F

## 2020-07-10 DIAGNOSIS — I35.0 NONRHEUMATIC AORTIC VALVE STENOSIS: ICD-10-CM

## 2020-07-10 DIAGNOSIS — F41.1 GENERALIZED ANXIETY DISORDER: ICD-10-CM

## 2020-07-10 DIAGNOSIS — I36.1 NON-RHEUMATIC TRICUSPID VALVE INSUFFICIENCY: Chronic | ICD-10-CM

## 2020-07-10 DIAGNOSIS — I10 ESSENTIAL HYPERTENSION: Primary | ICD-10-CM

## 2020-07-10 DIAGNOSIS — I34.0 NONRHEUMATIC MITRAL VALVE REGURGITATION: ICD-10-CM

## 2020-07-10 PROCEDURE — 4040F PNEUMOC VAC/ADMIN/RCVD: CPT | Performed by: FAMILY MEDICINE

## 2020-07-10 PROCEDURE — 1036F TOBACCO NON-USER: CPT | Performed by: FAMILY MEDICINE

## 2020-07-10 PROCEDURE — 1160F RVW MEDS BY RX/DR IN RCRD: CPT | Performed by: FAMILY MEDICINE

## 2020-07-10 PROCEDURE — 99213 OFFICE O/P EST LOW 20 MIN: CPT | Performed by: FAMILY MEDICINE

## 2020-07-10 PROCEDURE — 3078F DIAST BP <80 MM HG: CPT | Performed by: FAMILY MEDICINE

## 2020-07-10 PROCEDURE — 3077F SYST BP >= 140 MM HG: CPT | Performed by: FAMILY MEDICINE

## 2020-07-10 NOTE — PATIENT INSTRUCTIONS
Low-Sodium Diet   AMBULATORY CARE:   A low-sodium diet  limits foods that are high in sodium (salt)  You will need to follow a low-sodium diet if you have high blood pressure, kidney disease, or heart failure  You may also need to follow this diet if you have a condition that is causing your body to retain (hold) extra fluid  You may need to limit the amount of sodium you eat to 1,500 mg  Ask your healthcare provider how much sodium you can have each day  How to use food labels to choose foods that are low in sodium:  Read food labels to find the amount of sodium they contain  The amount of sodium is listed in milligrams (mg)  The % Daily Value (DV) column tells you how much of your daily needs are met by 1 serving of the food for each nutrient listed  Choose foods that have less than 5% of the DV of sodium  These foods are considered low in sodium  Foods that have 20% or more of the DV of sodium are considered high in sodium  Some food labels may also list any of the following terms that tell you about the sodium content in the food:  · Sodium-free:  Less than 5 mg in each serving    · Very low sodium:  35 mg of sodium or less in each serving    · Low sodium:  140 mg of sodium or less in each serving    · Reduced sodium: At least 25% less sodium in each serving than the regular type    · Light in sodium:  50% less sodium in each serving    · Unsalted or no added salt:  No extra salt is added during processing (the food may still contain sodium)  Foods to avoid:  Salty foods are high in sodium   You should avoid the following:  · Processed foods:      ¨ Mixes for cornbread, biscuits, cake, and pudding     ¨ Instant foods, such as potatoes, cereals, noodles, and rice     ¨ Packaged foods, such as bread stuffing, rice and pasta mixes, snack dip mixes, and macaroni and cheese     ¨ Canned foods, such as canned vegetables, soups, broths, sauces, and vegetable or tomato juice    ¨ Snack foods, such as salted chips, popcorn, pretzels, pork rinds, salted crackers, and salted nuts    ¨ Frozen foods, such as dinners, entrees, vegetables with sauces, and breaded meats    ¨ Sauerkraut, pickled vegetables, and other foods prepared in brine    · Meats and cheeses:      ¨ Smoked or cured meat, such as corned beef, henao, ham, hot dogs, and sausage    ¨ Canned meats or spreads, such as potted meats, sardines, anchovies, and imitation seafood    ¨ Deli or lunch meats, such as bologna, ham, turkey, and roast beef    ¨ Processed cheese, such as American cheese and cheese spreads    · Condiments, sauces, and seasonings:      ¨ Salt (¼ teaspoon of salt contains 575 mg of sodium)    ¨ Seasonings made with salt, such as garlic salt, celery salt, onion salt, and seasoned salt    ¨ Regular soy sauce, barbecue sauce, teriyaki sauce, steak sauce, Worcestershire sauce, and most flavored vinegars    ¨ Canned gravy and mixes     ¨ Regular condiments, such as mustard, ketchup, and salad dressings    ¨ Pickles and olives    ¨ Meat tenderizers and monosodium glutamate (MSG)  Foods to include:  Read the food label to find the exact amount of sodium in each serving  · Bread and cereal:  Try to choose breads with less than 80 mg of sodium per serving  ¨ Bread, roll, karina, tortilla, or unsalted crackers  ¨ Ready-to-eat cereals with less than 5% DV of sodium (examples include shredded wheat and puffed rice)    ¨ Pasta    · Vegetables and fruits:      ¨ Unsalted fresh, frozen, or canned vegetables    ¨ Fresh, frozen, or canned fruits    ¨ Fruit juice    · Dairy:  One serving has about 150 mg of sodium  ¨ Milk, all types    ¨ Yogurt    ¨ Hard cheese, such as cheddar, Swiss, [employer], or mozzarella    · Meat and other protein foods:  Some raw meats may have added sodium       ¨ Plain meats, fish, and poultry     ¨ Eggs    · Other foods:      ¨ Homemade pudding    ¨ Unsalted nuts, popcorn, or pretzels    ¨ Unsalted butter or margarine  Ways to decrease sodium:   · Add spices and herbs to foods instead of salt during cooking  Use salt-free seasonings to add flavor to foods  Examples include onion powder, garlic powder, basil, alba powder, paprika, and parsley  Try lemon or lime juice or vinegar to give foods a tart flavor  Use hot peppers, pepper, or cayenne pepper to add a spicy flavor to foods  · Do not keep a salt shaker at your kitchen table  This may help keep you from adding salt to food at the table  It may take time to get used to enjoying the natural flavor of food instead of adding salt  Talk to your healthcare provider before you use salt substitutes  Some salt substitutes have a high amount of potassium and need to be avoided if you have kidney disease  · Choose low-sodium foods at restaurants  Meals from restaurants are often high in sodium  Some restaurants have nutrition information on the menu that tells you the amount of sodium in their foods  If possible, ask for your food to be prepared with less, or no salt  · Shop for unsalted or low-sodium foods and snacks at the grocery store  Examples include unsalted or low-sodium broths, soups, and canned vegetables  Choose fresh or frozen vegetables instead  Choose unsalted nuts or seeds or fresh fruits or vegetables as snacks  Read food labels and choose salt-free, very low-sodium, or low-sodium foods  © 2017 [employer] Information is for End User's use only and may not be sold, redistributed or otherwise used for commercial purposes  All illustrations and images included in CareNotes® are the copyrighted property of A [employer]  or [employer]ove[employer]  The above information is an  only  It is not intended as medical advice for individual conditions or treatments  Talk to your doctor, nurse or pharmacist before following any medical regimen to see if it is safe and effective for you

## 2020-07-10 NOTE — PROGRESS NOTES
Assessment/Plan:    Essential hypertension  Patient has hypertension  This patient has a strong component of white coat hypertension as well  She checks her blood pressures regularly at home  She brought with her a list of her blood pressure readings  I reviewed them with the patient  They are satisfactory  Blood pressures are well controlled at home  Today in the office, her blood pressure was 144/70  I did not make any change with her medications  She will continue amlodipine as well as atenolol-chlorthalidone  I reviewed her labs  Potassium was noted to be normal   Her fasting blood sugar was 106  We will follow this  We discussed her fasting lipid panel which was quite good  Her LDL cholesterol is 86  Her HDL cholesterol is 68  Aortic valve stenosis  Patient has aortic stenosis  I reviewed Dr Marva Kanner consultation  The patient and I discussed it  She has moderate aortic stenosis by echocardiogram   I also reassured the patient that she does not require surgery  Mitral valve insufficiency  Patient has moderate mitral regurgitation as well as moderate tricuspid regurgitation by echocardiogram     Generalized anxiety disorder  Patient has generalized anxiety disorder which is stable  She will continue alprazolam        Diagnoses and all orders for this visit:    Essential hypertension    Nonrheumatic aortic valve stenosis    Nonrheumatic mitral valve regurgitation    Non-rheumatic tricuspid valve insufficiency    Generalized anxiety disorder        BMI Counseling: Body mass index is 38 36 kg/m²  The BMI is above normal  Nutrition recommendations include reducing portion sizes, decreasing overall calorie intake, 3-5 servings of fruits/vegetables daily, reducing fast food intake, consuming healthier snacks, decreasing soda and/or juice intake and moderation in carbohydrate intake  Exercise recommendations include exercising 3-5 times per week       Subjective:      Patient ID: Lori Severs Nirmala Pepe is a 80 y o  female  This is an 61-year-old white female who presents to the office today for a routine checkup  The patient is doing well  She has her blood pressure taken by her  regularly  Her blood pressure, in general, has been well controlled  She tells me she gets worked up very easily  She reports compliance with her medication  She tries to watch her sodium intake, though finds it difficult to do  She saw Dr Lori Burk recently in consultation  She all tells me she liked him very much  The following portions of the patient's history were reviewed and updated as appropriate: allergies, current medications, past family history, past medical history, past social history, past surgical history and problem list     Review of Systems   Respiratory: Negative for cough, shortness of breath and wheezing  Cardiovascular: Negative for chest pain, palpitations and leg swelling  Gastrointestinal: Negative for abdominal distention, abdominal pain, blood in stool, constipation, diarrhea, nausea and vomiting  Musculoskeletal: Positive for arthralgias  Objective:      /70   Pulse 60   Temp 97 6 °F (36 4 °C) (Temporal)   Ht 4' (1 219 m)   Wt 57 kg (125 lb 11 2 oz)   SpO2 98%   BMI 38 36 kg/m²          Physical Exam   Constitutional: She appears well-developed and well-nourished  No distress  HENT:   Head: Normocephalic and atraumatic  Right Ear: External ear normal    Left Ear: External ear normal    Mouth/Throat: Oropharynx is clear and moist  No oropharyngeal exudate  Eyes: Pupils are equal, round, and reactive to light  Conjunctivae are normal  Right eye exhibits no discharge  Left eye exhibits no discharge  No scleral icterus  Neck: Neck supple  No tracheal deviation present  No thyromegaly present  Cardiovascular: Normal rate and regular rhythm  Exam reveals no gallop and no friction rub     Murmur (There is a grade 2/6 systolic murmur noted) heard   Pulmonary/Chest: Effort normal and breath sounds normal  No stridor  No respiratory distress  She has no wheezes  She has no rales  Abdominal: Soft  Bowel sounds are normal  She exhibits no distension and no mass  There is no tenderness  There is no rebound and no guarding  Lymphadenopathy:     She has no cervical adenopathy  Vitals reviewed      extremities:  Without cyanosis, clubbing, or edema

## 2020-07-11 NOTE — ASSESSMENT & PLAN NOTE
Patient has aortic stenosis  I reviewed Dr Prabhakar Handy consultation  The patient and I discussed it  She has moderate aortic stenosis by echocardiogram   I also reassured the patient that she does not require surgery

## 2020-07-11 NOTE — ASSESSMENT & PLAN NOTE
Patient has moderate mitral regurgitation as well as moderate tricuspid regurgitation by echocardiogram

## 2020-07-11 NOTE — ASSESSMENT & PLAN NOTE
Patient has hypertension  This patient has a strong component of white coat hypertension as well  She checks her blood pressures regularly at home  She brought with her a list of her blood pressure readings  I reviewed them with the patient  They are satisfactory  Blood pressures are well controlled at home  Today in the office, her blood pressure was 144/70  I did not make any change with her medications  She will continue amlodipine as well as atenolol-chlorthalidone  I reviewed her labs  Potassium was noted to be normal   Her fasting blood sugar was 106  We will follow this  We discussed her fasting lipid panel which was quite good  Her LDL cholesterol is 86  Her HDL cholesterol is 68

## 2020-08-05 DIAGNOSIS — E87.6 HYPOKALEMIA: ICD-10-CM

## 2020-08-05 DIAGNOSIS — J18.9 COMMUNITY ACQUIRED PNEUMONIA OF LEFT UPPER LOBE OF LUNG: ICD-10-CM

## 2020-08-05 RX ORDER — POTASSIUM CHLORIDE 20 MEQ/1
TABLET, EXTENDED RELEASE ORAL
Qty: 90 TABLET | Refills: 2 | Status: SHIPPED | OUTPATIENT
Start: 2020-08-05 | End: 2021-04-12

## 2020-08-05 RX ORDER — ALPRAZOLAM 0.25 MG/1
TABLET ORAL
Qty: 120 TABLET | Refills: 2 | Status: SHIPPED | OUTPATIENT
Start: 2020-08-05 | End: 2020-12-11

## 2020-08-05 NOTE — PROGRESS NOTES
The [address] [facility] prescription drug monitoring program was queried  There were no red flags  Safe to proceed

## 2020-08-14 DIAGNOSIS — I10 ESSENTIAL HYPERTENSION: ICD-10-CM

## 2020-08-14 RX ORDER — ATENOLOL AND CHLORTHALIDONE TABLET 100; 25 MG/1; MG/1
TABLET ORAL
Qty: 90 TABLET | Refills: 2 | Status: SHIPPED | OUTPATIENT
Start: 2020-08-14 | End: 2021-04-12

## 2020-10-22 ENCOUNTER — OFFICE VISIT (OUTPATIENT)
Dept: FAMILY MEDICINE CLINIC | Facility: CLINIC | Age: 85
End: 2020-10-22
Payer: MEDICARE

## 2020-10-22 VITALS
BODY MASS INDEX: 27.41 KG/M2 | WEIGHT: 130.6 LBS | TEMPERATURE: 97.2 F | OXYGEN SATURATION: 99 % | DIASTOLIC BLOOD PRESSURE: 86 MMHG | HEIGHT: 58 IN | SYSTOLIC BLOOD PRESSURE: 132 MMHG | HEART RATE: 62 BPM

## 2020-10-22 DIAGNOSIS — F41.1 GENERALIZED ANXIETY DISORDER: ICD-10-CM

## 2020-10-22 DIAGNOSIS — M75.51 ACUTE BURSITIS OF RIGHT SHOULDER: ICD-10-CM

## 2020-10-22 DIAGNOSIS — Z79.899 ENCOUNTER FOR LONG-TERM (CURRENT) USE OF MEDICATIONS: ICD-10-CM

## 2020-10-22 DIAGNOSIS — Z23 IMMUNIZATION DUE: ICD-10-CM

## 2020-10-22 DIAGNOSIS — I10 ESSENTIAL HYPERTENSION: Primary | ICD-10-CM

## 2020-10-22 DIAGNOSIS — E87.6 HYPOKALEMIA: ICD-10-CM

## 2020-10-22 PROCEDURE — 99213 OFFICE O/P EST LOW 20 MIN: CPT | Performed by: FAMILY MEDICINE

## 2020-10-22 PROCEDURE — 20610 DRAIN/INJ JOINT/BURSA W/O US: CPT | Performed by: FAMILY MEDICINE

## 2020-10-22 PROCEDURE — 90662 IIV NO PRSV INCREASED AG IM: CPT

## 2020-10-22 PROCEDURE — G0008 ADMIN INFLUENZA VIRUS VAC: HCPCS

## 2020-10-22 RX ADMIN — METHYLPREDNISOLONE ACETATE 1 ML: 80 INJECTION, SUSPENSION INTRA-ARTICULAR; INTRALESIONAL; INTRAMUSCULAR; SOFT TISSUE at 17:51

## 2020-10-22 RX ADMIN — LIDOCAINE HYDROCHLORIDE 1 ML: 20 INJECTION, SOLUTION EPIDURAL; INFILTRATION; INTRACAUDAL; PERINEURAL at 17:51

## 2020-10-31 PROBLEM — Z23 IMMUNIZATION DUE: Status: ACTIVE | Noted: 2020-10-31

## 2020-10-31 PROBLEM — Z79.899 ENCOUNTER FOR LONG-TERM (CURRENT) USE OF MEDICATIONS: Status: ACTIVE | Noted: 2020-10-31

## 2020-10-31 PROBLEM — M75.51 ACUTE BURSITIS OF RIGHT SHOULDER: Status: ACTIVE | Noted: 2020-10-31

## 2020-10-31 RX ORDER — METHYLPREDNISOLONE ACETATE 80 MG/ML
1 INJECTION, SUSPENSION INTRA-ARTICULAR; INTRALESIONAL; INTRAMUSCULAR; SOFT TISSUE
Status: COMPLETED | OUTPATIENT
Start: 2020-10-22 | End: 2020-10-22

## 2020-10-31 RX ORDER — LIDOCAINE HYDROCHLORIDE 20 MG/ML
1 INJECTION, SOLUTION EPIDURAL; INFILTRATION; INTRACAUDAL; PERINEURAL
Status: COMPLETED | OUTPATIENT
Start: 2020-10-22 | End: 2020-10-22

## 2020-11-04 DIAGNOSIS — I10 ESSENTIAL HYPERTENSION: ICD-10-CM

## 2020-11-04 RX ORDER — AMLODIPINE BESYLATE 5 MG/1
TABLET ORAL
Qty: 90 TABLET | Refills: 0 | Status: SHIPPED | OUTPATIENT
Start: 2020-11-04 | End: 2021-01-21

## 2020-12-11 DIAGNOSIS — J18.9 COMMUNITY ACQUIRED PNEUMONIA OF LEFT UPPER LOBE OF LUNG: ICD-10-CM

## 2020-12-11 RX ORDER — ALPRAZOLAM 0.25 MG/1
TABLET ORAL
Qty: 120 TABLET | Refills: 5 | Status: SHIPPED | OUTPATIENT
Start: 2020-12-11 | End: 2021-06-27

## 2021-01-13 ENCOUNTER — APPOINTMENT (OUTPATIENT)
Dept: LAB | Facility: HOSPITAL | Age: 86
End: 2021-01-13
Attending: FAMILY MEDICINE
Payer: MEDICARE

## 2021-01-13 DIAGNOSIS — E87.6 HYPOKALEMIA: ICD-10-CM

## 2021-01-13 DIAGNOSIS — I10 ESSENTIAL HYPERTENSION: ICD-10-CM

## 2021-01-13 DIAGNOSIS — Z79.899 ENCOUNTER FOR LONG-TERM (CURRENT) USE OF MEDICATIONS: ICD-10-CM

## 2021-01-13 LAB
ALBUMIN SERPL BCP-MCNC: 3.8 G/DL (ref 3.5–5)
ALP SERPL-CCNC: 66 U/L (ref 46–116)
ALT SERPL W P-5'-P-CCNC: 24 U/L (ref 12–78)
ANION GAP SERPL CALCULATED.3IONS-SCNC: 11 MMOL/L (ref 4–13)
ANISOCYTOSIS BLD QL SMEAR: PRESENT
AST SERPL W P-5'-P-CCNC: 20 U/L (ref 5–45)
BASOPHILS # BLD MANUAL: 0 THOUSAND/UL (ref 0–0.1)
BASOPHILS NFR MAR MANUAL: 0 % (ref 0–1)
BILIRUB SERPL-MCNC: 0.6 MG/DL (ref 0.2–1)
BUN SERPL-MCNC: 20 MG/DL (ref 5–25)
CALCIUM SERPL-MCNC: 9.4 MG/DL (ref 8.3–10.1)
CHLORIDE SERPL-SCNC: 95 MMOL/L (ref 100–108)
CO2 SERPL-SCNC: 24 MMOL/L (ref 21–32)
CREAT SERPL-MCNC: 0.92 MG/DL (ref 0.6–1.3)
EOSINOPHIL # BLD MANUAL: 0.04 THOUSAND/UL (ref 0–0.4)
EOSINOPHIL NFR BLD MANUAL: 1 % (ref 0–6)
ERYTHROCYTE [DISTWIDTH] IN BLOOD BY AUTOMATED COUNT: 12.4 % (ref 11.6–15.1)
GFR SERPL CREATININE-BSD FRML MDRD: 57 ML/MIN/1.73SQ M
GLUCOSE P FAST SERPL-MCNC: 113 MG/DL (ref 65–99)
HCT VFR BLD AUTO: 38 % (ref 34.8–46.1)
HGB BLD-MCNC: 12.5 G/DL (ref 11.5–15.4)
LYMPHOCYTES # BLD AUTO: 1.89 THOUSAND/UL (ref 0.6–4.47)
LYMPHOCYTES # BLD AUTO: 43 % (ref 14–44)
MCH RBC QN AUTO: 32.2 PG (ref 26.8–34.3)
MCHC RBC AUTO-ENTMCNC: 32.9 G/DL (ref 31.4–37.4)
MCV RBC AUTO: 98 FL (ref 82–98)
MONOCYTES # BLD AUTO: 0.31 THOUSAND/UL (ref 0–1.22)
MONOCYTES NFR BLD: 7 % (ref 4–12)
NEUTROPHILS # BLD MANUAL: 2.15 THOUSAND/UL (ref 1.85–7.62)
NEUTS SEG NFR BLD AUTO: 49 % (ref 43–75)
NRBC BLD AUTO-RTO: 0 /100 WBCS
PLATELET # BLD AUTO: 175 THOUSANDS/UL (ref 149–390)
PLATELET BLD QL SMEAR: ADEQUATE
PMV BLD AUTO: 8.5 FL (ref 8.9–12.7)
POTASSIUM SERPL-SCNC: 3.4 MMOL/L (ref 3.5–5.3)
PROT SERPL-MCNC: 7.6 G/DL (ref 6.4–8.2)
RBC # BLD AUTO: 3.88 MILLION/UL (ref 3.81–5.12)
SODIUM SERPL-SCNC: 130 MMOL/L (ref 136–145)
TOTAL CELLS COUNTED SPEC: 100
WBC # BLD AUTO: 4.39 THOUSAND/UL (ref 4.31–10.16)

## 2021-01-13 PROCEDURE — 36415 COLL VENOUS BLD VENIPUNCTURE: CPT

## 2021-01-13 PROCEDURE — 80053 COMPREHEN METABOLIC PANEL: CPT

## 2021-01-13 PROCEDURE — 85007 BL SMEAR W/DIFF WBC COUNT: CPT

## 2021-01-13 PROCEDURE — 85027 COMPLETE CBC AUTOMATED: CPT

## 2021-01-21 ENCOUNTER — OFFICE VISIT (OUTPATIENT)
Dept: FAMILY MEDICINE CLINIC | Facility: CLINIC | Age: 86
End: 2021-01-21
Payer: MEDICARE

## 2021-01-21 VITALS
DIASTOLIC BLOOD PRESSURE: 84 MMHG | TEMPERATURE: 96.6 F | HEART RATE: 90 BPM | WEIGHT: 129.5 LBS | SYSTOLIC BLOOD PRESSURE: 150 MMHG | HEIGHT: 58 IN | BODY MASS INDEX: 27.18 KG/M2 | OXYGEN SATURATION: 91 %

## 2021-01-21 DIAGNOSIS — Z00.00 ENCOUNTER FOR MEDICARE ANNUAL WELLNESS EXAM: ICD-10-CM

## 2021-01-21 DIAGNOSIS — I27.20 PULMONARY HYPERTENSION (HCC): ICD-10-CM

## 2021-01-21 DIAGNOSIS — R73.01 IMPAIRED FASTING GLUCOSE: ICD-10-CM

## 2021-01-21 DIAGNOSIS — I10 ESSENTIAL HYPERTENSION: Primary | ICD-10-CM

## 2021-01-21 DIAGNOSIS — I10 ESSENTIAL HYPERTENSION: ICD-10-CM

## 2021-01-21 DIAGNOSIS — I47.1 SUPRAVENTRICULAR TACHYCARDIA (HCC): ICD-10-CM

## 2021-01-21 DIAGNOSIS — E87.6 HYPOKALEMIA: ICD-10-CM

## 2021-01-21 DIAGNOSIS — D69.6 THROMBOCYTOPENIA (HCC): ICD-10-CM

## 2021-01-21 DIAGNOSIS — M85.859 OSTEOPENIA OF NECK OF FEMUR, UNSPECIFIED LATERALITY: ICD-10-CM

## 2021-01-21 PROCEDURE — 99214 OFFICE O/P EST MOD 30 MIN: CPT | Performed by: FAMILY MEDICINE

## 2021-01-21 PROCEDURE — 1123F ACP DISCUSS/DSCN MKR DOCD: CPT | Performed by: FAMILY MEDICINE

## 2021-01-21 PROCEDURE — G0439 PPPS, SUBSEQ VISIT: HCPCS | Performed by: FAMILY MEDICINE

## 2021-01-21 RX ORDER — AMLODIPINE BESYLATE 5 MG/1
TABLET ORAL
Qty: 90 TABLET | Refills: 0 | Status: SHIPPED | OUTPATIENT
Start: 2021-01-21 | End: 2021-04-12

## 2021-01-21 NOTE — ASSESSMENT & PLAN NOTE
Patient has osteopenia  We reviewed her bone density  T-score in the lumbar spine was actually only -0 6  T-score in the left femoral neck was-2 2  I recommend she take calcium and vitamin-D supplementation

## 2021-01-21 NOTE — ASSESSMENT & PLAN NOTE
Patient has hypertension  Her blood pressure was up a bit today  I recheck her blood pressure myself and found to be 150/84  I reviewed her medications  She is currently on atenolol-chlorthalidone 100/25 and amlodipine 5 mg daily  I considered increasing the amlodipine to 10 mg per day but decided against it  I reviewed her blood pressure readings from her last several visits  I reviewed her ambulatory blood pressure readings  I elected to continue to follow her blood pressure closely

## 2021-01-21 NOTE — ASSESSMENT & PLAN NOTE
Patient has hypokalemia  Most recent potassium was 3 4  I had called in orders for the patient  We bumped up her potassium dose  She is now following a high potassium diet  I ordered a repeat BMP to be done for her next visit  I reviewed her other labs with her as well

## 2021-01-21 NOTE — PROGRESS NOTES
Assessment/Plan:    Essential hypertension  Patient has hypertension  Her blood pressure was up a bit today  I recheck her blood pressure myself and found to be 150/84  I reviewed her medications  She is currently on atenolol-chlorthalidone 100/25 and amlodipine 5 mg daily  I considered increasing the amlodipine to 10 mg per day but decided against it  I reviewed her blood pressure readings from her last several visits  I reviewed her ambulatory blood pressure readings  I elected to continue to follow her blood pressure closely  Hypokalemia  Patient has hypokalemia  Most recent potassium was 3 4  I had called in orders for the patient  We bumped up her potassium dose  She is now following a high potassium diet  I ordered a repeat BMP to be done for her next visit  I reviewed her other labs with her as well  Impaired fasting glucose  Patient has impaired fasting glucose  This is not the 1st time that her blood sugars been elevated  Her fasting blood sugar was 113  We did discuss diet  At her age, I simply recommended no concentrated sweets diet  Will continue to follow it  Osteopenia of neck of femur  Patient has osteopenia  We reviewed her bone density  T-score in the lumbar spine was actually only -0 6  T-score in the left femoral neck was-2 2  I recommend she take calcium and vitamin-D supplementation  Diagnoses and all orders for this visit:    Essential hypertension    Pulmonary hypertension ([address] )    Supraventricular tachycardia (HCC)    Thrombocytopenia (HCC)    Hypokalemia  -     Basic metabolic panel; Future    Impaired fasting glucose    Osteopenia of neck of femur, unspecified laterality    Encounter for Medicare annual wellness exam          Subjective:      Patient ID: Celsa Lynch is a 80 y o  female      This is an 51-year-old white female who presents to the office today for her routine checkup as well as for her annual Medicare wellness exam   Patient reports that she is doing well but she has had a lot of anxiety recently  She attributes this to watching the news and getting worked up about current events  She tells me she probably should not watch it but she wants to keep up-to-date and keep her mind active  She still cooks and cleans  Her  tells me she is actually a very good cook  The following portions of the patient's history were reviewed and updated as appropriate: allergies, current medications, past family history, past medical history, past social history, past surgical history and problem list     Review of Systems   Constitutional: Negative for activity change, appetite change and unexpected weight change  HENT: Positive for hearing loss  Respiratory: Negative for cough, shortness of breath and wheezing  Cardiovascular: Negative for chest pain, palpitations and leg swelling  Gastrointestinal: Negative for abdominal distention, abdominal pain, blood in stool, constipation, diarrhea and nausea  Psychiatric/Behavioral: Negative for dysphoric mood and sleep disturbance  The patient is nervous/anxious  Objective:      /84   Pulse 90   Temp (!) 96 6 °F (35 9 °C) (Tympanic)   Ht 4' 10" (1 473 m)   Wt 58 7 kg (129 lb 8 oz)   SpO2 91%   BMI 27 07 kg/m²          Physical Exam  Vitals signs reviewed  Constitutional:       Comments: This is an 80-year-old white female who appears her stated age  She is pleasant, cooperative, and in no distress   HENT:      Head: Normocephalic and atraumatic  Right Ear: Tympanic membrane, ear canal and external ear normal  There is no impacted cerumen  Left Ear: Tympanic membrane, ear canal and external ear normal  There is no impacted cerumen  Mouth/Throat:      Mouth: Mucous membranes are moist       Pharynx: Oropharynx is clear  No oropharyngeal exudate or posterior oropharyngeal erythema  Eyes:      General: No scleral icterus  Right eye: No discharge  Left eye: No discharge  Conjunctiva/sclera: Conjunctivae normal       Pupils: Pupils are equal, round, and reactive to light  Neck:      Musculoskeletal: Neck supple  Comments: No thyromegaly was noted  Cardiovascular:      Rate and Rhythm: Normal rate and regular rhythm  Heart sounds: Normal heart sounds  No murmur  No friction rub  No gallop  Pulmonary:      Effort: Pulmonary effort is normal  No respiratory distress  Breath sounds: Normal breath sounds  No stridor  No wheezing, rhonchi or rales  Abdominal:      General: Bowel sounds are normal  There is no distension  Palpations: Abdomen is soft  There is no mass  Tenderness: There is no abdominal tenderness  There is no guarding  Lymphadenopathy:      Cervical: No cervical adenopathy  Psychiatric:         Mood and Affect: Mood normal          Behavior: Behavior normal          Thought Content:  Thought content normal          Judgment: Judgment normal        extremities:  Without cyanosis, clubbing, or edema

## 2021-01-21 NOTE — ASSESSMENT & PLAN NOTE
Patient has impaired fasting glucose  This is not the 1st time that her blood sugars been elevated  Her fasting blood sugar was 113  We did discuss diet  At her age, I simply recommended no concentrated sweets diet  Will continue to follow it

## 2021-01-21 NOTE — PROGRESS NOTES
Assessment and Plan:     Problem List Items Addressed This Visit     None           Preventive health issues were discussed with patient, and age appropriate screening tests were ordered as noted in patient's After Visit Summary  Personalized health advice and appropriate referrals for health education or preventive services given if needed, as noted in patient's After Visit Summary       History of Present Illness:     Patient presents for Medicare Annual Wellness visit    Patient Care Team:  Mara Mora DO as PCP - General (Family Medicine)     Problem List:     Patient Active Problem List   Diagnosis    Hypomagnesemia    Hypokalemia    Elevated troponin    Community acquired pneumonia of left upper lobe of lung    Hyponatremia    Essential hypertension    Lymphadenopathy    Hiatal hernia    Thrombocytopenia ([address]    Aortic valve stenosis    Mitral valve insufficiency    Supraventricular tachycardia ([address]    Pulmonary hypertension ([address]    Diverticulosis    First degree AV block    Non-rheumatic tricuspid valve insufficiency    Generalized anxiety disorder    Mid back pain    Encounter for Medicare annual wellness exam    Encounter for long-term (current) use of medications    Immunization due    Acute bursitis of right shoulder      Past Medical and Surgical History:     Past Medical History:   Diagnosis Date    Fatigue     Full dentures     Generalized anxiety disorder     Hyperlipidemia     Hypertension     Impaired fasting glucose     Pneumonia      Past Surgical History:   Procedure Laterality Date    DENTAL SURGERY Bilateral     ALL TEETH REMOVED    KNEE SURGERY      left knee replacement      Family History:     Family History   Problem Relation Age of Onset    Hypertension Mother     Asthma Father         's asthma    Alzheimer's disease Sister     Rectal cancer Son     Uterine cancer Daughter     Heart disease Sister     Heart disease Brother       Social History:     E-Cigarette/Vaping    E-Cigarette Use Never User      E-Cigarette/Vaping Substances    Nicotine No     THC No     CBD No     Flavoring No     Other No     Unknown No      Social History     Socioeconomic History    Marital status: /Civil Union     Spouse name: None    Number of children: 2    Years of education: None    Highest education level: None   Occupational History    None   Social Needs    Financial resource strain: Not hard at all   [place] insecurity     Worry: Never true     Inability: Never true    Transportation needs     Medical: No     Non-medical: No   Tobacco Use    Smoking status: Never Smoker    Smokeless tobacco: Never Used   Substance and Sexual Activity    Alcohol use: Never     Frequency: Never    Drug use: Never    Sexual activity: None   Lifestyle    Physical activity     Days per week: None     Minutes per session: None    Stress: Only a little   Relationships    Social connections     Talks on phone: Twice a week     Gets together:  Three times a week     Attends Gnosticist service: Never     Active member of club or organization: No     Attends meetings of clubs or organizations: Never     Relationship status:     Intimate partner violence     Fear of current or ex partner: No     Emotionally abused: No     Physically abused: No     Forced sexual activity: No   Other Topics Concern    None   Social History Narrative    None      Medications and Allergies:     Current Outpatient Medications   Medication Sig Dispense Refill    ALPRAZolam (XANAX) 0 25 mg tablet TAKE 1 TABLET BY MOUTH EVERY 6 HOURS AS NEEDED FOR ANXIETY 120 tablet 5    amLODIPine (NORVASC) 5 mg tablet Take 1 tablet by mouth once daily 90 tablet 0    ammonium lactate (LAC-HYDRIN) 12 % lotion       atenolol-chlorthalidone (TENORETIC) 100-25 mg per tablet Take 1 tablet by mouth once daily 90 tablet 2    Calcium Carb-Cholecalciferol (CALTRATE 600+D3 PO) Take 1 tablet by mouth 2 (two) times a day      Omega-3 Fatty Acids (FISH OIL) 1200 MG CAPS Take 1,200 mg by mouth 2 (two) times a day      potassium chloride (K-DUR,KLOR-CON) 20 mEq tablet Take 1 tablet by mouth once daily 90 tablet 2    Psyllium (METAMUCIL FIBER) 51 7 % PACK Take 1 Package by mouth daily  0     No current facility-administered medications for this visit  No Known Allergies   Immunizations:     Immunization History   Administered Date(s) Administered    Influenza, high dose seasonal 0 7 mL 09/30/2019, 10/22/2020    Pneumococcal Conjugate 13-Valent 12/15/2016    Pneumococcal Polysaccharide PPV23 02/01/2018      Health Maintenance:         Topic Date Due    Hepatitis C Screening  Completed         Topic Date Due    DTaP,Tdap,and Td Vaccines (1 - Tdap) 07/29/1956      Medicare Health Risk Assessment:     /90 (BP Location: Left arm, Patient Position: Sitting, Cuff Size: Adult)   Pulse 90   Temp (!) 96 6 °F (35 9 °C) (Tympanic)   Ht 4' 10" (1 473 m)   Wt 58 7 kg (129 lb 8 oz)   SpO2 91%   BMI 27 07 kg/m²      Charito Garduno is here for her Subsequent Wellness visit  Last Medicare Wellness visit information reviewed, patient interviewed and updates made to the record today  Health Risk Assessment:   Patient rates overall health as good  Patient feels that their physical health rating is slightly worse  Eyesight was rated as same  Hearing was rated as slightly worse  Patient feels that their emotional and mental health rating is same  Pain experienced in the last 7 days has been none  Patient states that she has experienced no weight loss or gain in last 6 months  Depression Screening:   PHQ-2 Score: 0      Fall Risk Screening: In the past year, patient has experienced: no history of falling in past year      Urinary Incontinence Screening:   Patient has not leaked urine accidently in the last six months  Home Safety:  Patient does not have trouble with stairs inside or outside of their home  Patient has working smoke alarms and has working carbon monoxide detector  Home safety hazards include: none  Nutrition:   Current diet is Regular and No Added Salt  Medications:   Patient is currently taking over-the-counter supplements  OTC medications include: see medication list  Patient is able to manage medications  Activities of Daily Living (ADLs)/Instrumental Activities of Daily Living (IADLs):   Walk and transfer into and out of bed and chair?: Yes  Dress and groom yourself?: Yes    Bathe or shower yourself?: Yes    Feed yourself? Yes  Do your laundry/housekeeping?: Yes  Manage your money, pay your bills and track your expenses?: Yes  Make your own meals?: Yes    Do your own shopping?: Yes    Previous Hospitalizations:   Any hospitalizations or ED visits within the last 12 months?: No      Advance Care Planning:   Living will: Yes    Durable POA for healthcare:  Yes    Advanced directive: Yes      Cognitive Screening:   Provider or family/friend/caregiver concerned regarding cognition?: No    PREVENTIVE SCREENINGS      Cardiovascular Screening:    General: Screening Current      Diabetes Screening:     General: Screening Current      Colorectal Cancer Screening:     General: Screening Not Indicated      Breast Cancer Screening:     General: Risks and Benefits Discussed and Patient Declines      Cervical Cancer Screening:    General: Screening Not Indicated      Osteoporosis Screening:    General: Screening Current      Abdominal Aortic Aneurysm (AAA) Screening:        General: Screening Not Indicated      Lung Cancer Screening:     General: Screening Not Indicated      Hepatitis C Screening:    General: Screening Current      Jacqueline Jacinto DO

## 2021-01-21 NOTE — PATIENT INSTRUCTIONS
Medicare Preventive Visit Patient Instructions  Thank you for completing your Welcome to Medicare Visit or Medicare Annual Wellness Visit today  Your next wellness visit will be due in one year (1/21/2022)  The screening/preventive services that you may require over the next 5-10 years are detailed below  Some tests may not apply to you based off risk factors and/or age  Screening tests ordered at today's visit but not completed yet may show as past due  Also, please note that scanned in results may not display below  Preventive Screenings:  Service Recommendations Previous Testing/Comments   Colorectal Cancer Screening  * Colonoscopy    * Fecal Occult Blood Test (FOBT)/Fecal Immunochemical Test (FIT)  * Fecal DNA/Cologuard Test  * Flexible Sigmoidoscopy Age: 54-65 years old   Colonoscopy: every 10 years (may be performed more frequently if at higher risk)  OR  FOBT/FIT: every 1 year  OR  Cologuard: every 3 years  OR  Sigmoidoscopy: every 5 years  Screening may be recommended earlier than age 48 if at higher risk for colorectal cancer  Also, an individualized decision between you and your healthcare provider will decide whether screening between the ages of 74-80 would be appropriate  Colonoscopy: Not on file  FOBT/FIT: Not on file  Cologuard: Not on file  Sigmoidoscopy: Not on file    Screening Not Indicated     Breast Cancer Screening Age: 36 years old  Frequency: every 1-2 years  Not required if history of left and right mastectomy Mammogram: 07/05/2018       Cervical Cancer Screening Between the ages of 21-29, pap smear recommended once every 3 years  Between the ages of 33-67, can perform pap smear with HPV co-testing every 5 years     Recommendations may differ for women with a history of total hysterectomy, cervical cancer, or abnormal pap smears in past  Pap Smear: Not on file    Screening Not Indicated   Hepatitis C Screening Once for adults born between 1945 and 1965  More frequently in patients at high risk for Hepatitis C Hep C Antibody: 09/23/2019    Screening Current   Diabetes Screening 1-2 times per year if you're at risk for diabetes or have pre-diabetes Fasting glucose: 113 mg/dL   A1C: No results in last 5 years    Screening Current   Cholesterol Screening Once every 5 years if you don't have a lipid disorder  May order more often based on risk factors  Lipid panel: 06/17/2020    Screening Current     Other Preventive Screenings Covered by Medicare:  1  Abdominal Aortic Aneurysm (AAA) Screening: covered once if your at risk  You're considered to be at risk if you have a family history of AAA  2  Lung Cancer Screening: covers low dose CT scan once per year if you meet all of the following conditions: (1) Age 50-69; (2) No signs or symptoms of lung cancer; (3) Current smoker or have quit smoking within the last 15 years; (4) You have a tobacco smoking history of at least 30 pack years (packs per day multiplied by number of years you smoked); (5) You get a written order from a healthcare provider  3  Glaucoma Screening: covered annually if you're considered high risk: (1) You have diabetes OR (2) Family history of glaucoma OR (3)  aged 48 and older OR (3)  American aged 72 and older  3  Osteoporosis Screening: covered every 2 years if you meet one of the following conditions: (1) You're estrogen deficient and at risk for osteoporosis based off medical history and other findings; (2) Have a vertebral abnormality; (3) On glucocorticoid therapy for more than 3 months; (4) Have primary hyperparathyroidism; (5) On osteoporosis medications and need to assess response to drug therapy  · Last bone density test (DXA Scan): 01/09/2020   5  HIV Screening: covered annually if you're between the age of 15-65  Also covered annually if you are younger than 13 and older than 72 with risk factors for HIV infection   For pregnant patients, it is covered up to 3 times per pregnancy  Immunizations:  Immunization Recommendations   Influenza Vaccine Annual influenza vaccination during flu season is recommended for all persons aged >= 6 months who do not have contraindications   Pneumococcal Vaccine (Prevnar and Pneumovax)  * Prevnar = PCV13  * Pneumovax = PPSV23   Adults 25-60 years old: 1-3 doses may be recommended based on certain risk factors  Adults 72 years old: Prevnar (PCV13) vaccine recommended followed by Pneumovax (PPSV23) vaccine  If already received PPSV23 since turning 65, then PCV13 recommended at least one year after PPSV23 dose  Hepatitis B Vaccine 3 dose series if at intermediate or high risk (ex: diabetes, end stage renal disease, liver disease)   Tetanus (Td) Vaccine - COST NOT COVERED BY MEDICARE PART B Following completion of primary series, a booster dose should be given every 10 years to maintain immunity against tetanus  Td may also be given as tetanus wound prophylaxis  Tdap Vaccine - COST NOT COVERED BY MEDICARE PART B Recommended at least once for all adults  For pregnant patients, recommended with each pregnancy  Shingles Vaccine (Shingrix) - COST NOT COVERED BY MEDICARE PART B  2 shot series recommended in those aged 48 and above     Health Maintenance Due:      Topic Date Due    Hepatitis C Screening  Completed     Immunizations Due:      Topic Date Due    DTaP,Tdap,and Td Vaccines (1 - Tdap) 07/29/1956     Advance Directives   What are advance directives? Advance directives are legal documents that state your wishes and plans for medical care  These plans are made ahead of time in case you lose your ability to make decisions for yourself  Advance directives can apply to any medical decision, such as the treatments you want, and if you want to donate organs  What are the types of advance directives? There are many types of advance directives, and each state has rules about how to use them   You may choose a combination of any of the following:  · Living will: This is a written record of the treatment you want  You can also choose which treatments you do not want, which to limit, and which to stop at a certain time  This includes surgery, medicine, IV fluid, and tube feedings  · Durable power of  for healthcare [facility]): This is a written record that states who you want to make healthcare choices for you when you are unable to make them for yourself  This person, called a proxy, is usually a family member or a friend  You may choose more than 1 proxy  · Do not resuscitate (DNR) order:  A DNR order is used in case your heart stops beating or you stop breathing  It is a request not to have certain forms of treatment, such as CPR  A DNR order may be included in other types of advance directives  · Medical directive: This covers the care that you want if you are in a coma, near death, or unable to make decisions for yourself  You can list the treatments you want for each condition  Treatment may include pain medicine, surgery, blood transfusions, dialysis, IV or tube feedings, and a ventilator (breathing machine)  · Values history: This document has questions about your views, beliefs, and how you feel and think about life  This information can help others choose the care that you would choose  Why are advance directives important? An advance directive helps you control your care  Although spoken wishes may be used, it is better to have your wishes written down  Spoken wishes can be misunderstood, or not followed  Treatments may be given even if you do not want them  An advance directive may make it easier for your family to make difficult choices about your care  Weight Management   Why it is important to manage your weight:  Being overweight increases your risk of health conditions such as heart disease, high blood pressure, type 2 diabetes, and certain types of cancer   It can also increase your risk for osteoarthritis, sleep apnea, and other respiratory problems  Aim for a slow, steady weight loss  Even a small amount of weight loss can lower your risk of health problems  How to lose weight safely:  A safe and healthy way to lose weight is to eat fewer calories and get regular exercise  You can lose up about 1 pound a week by decreasing the number of calories you eat by 500 calories each day  Healthy meal plan for weight management:  A healthy meal plan includes a variety of foods, contains fewer calories, and helps you stay healthy  A healthy meal plan includes the following:  · Eat whole-grain foods more often  A healthy meal plan should contain fiber  Fiber is the part of grains, fruits, and vegetables that is not broken down by your body  Whole-grain foods are healthy and provide extra fiber in your diet  Some examples of whole-grain foods are whole-wheat breads and pastas, oatmeal, brown rice, and bulgur  · Eat a variety of vegetables every day  Include dark, leafy greens such as spinach, kale, tay greens, and mustard greens  Eat yellow and orange vegetables such as carrots, sweet potatoes, and winter squash  · Eat a variety of fruits every day  Choose fresh or canned fruit (canned in its own juice or light syrup) instead of juice  Fruit juice has very little or no fiber  · Eat low-fat dairy foods  Drink fat-free (skim) milk or 1% milk  Eat fat-free yogurt and low-fat cottage cheese  Try low-fat cheeses such as mozzarella and other reduced-fat cheeses  · Choose meat and other protein foods that are low in fat  Choose beans or other legumes such as split peas or lentils  Choose fish, skinless poultry (chicken or turkey), or lean cuts of red meat (beef or pork)  Before you cook meat or poultry, cut off any visible fat  · Use less fat and oil  Try baking foods instead of frying them  Add less fat, such as margarine, sour cream, regular salad dressing and mayonnaise to foods  Eat fewer high-fat foods   Some examples of high-fat foods include french fries, doughnuts, ice cream, and cakes  · Eat fewer sweets  Limit foods and drinks that are high in sugar  This includes candy, cookies, regular soda, and sweetened drinks  Exercise:  Exercise at least 30 minutes per day on most days of the week  Some examples of exercise include walking, biking, dancing, and swimming  You can also fit in more physical activity by taking the stairs instead of the elevator or parking farther away from stores  Ask your healthcare provider about the best exercise plan for you  © Copyright [employer] 2018 Information is for End User's use only and may not be sold, redistributed or otherwise used for commercial purposes   All illustrations and images included in CareNotes® are the copyrighted property of [employer]  or [address] [place], [place] [address]diego clare

## 2021-01-22 ENCOUNTER — IMMUNIZATIONS (OUTPATIENT)
Dept: FAMILY MEDICINE CLINIC | Facility: HOSPITAL | Age: 86
End: 2021-01-22

## 2021-01-22 DIAGNOSIS — Z23 ENCOUNTER FOR IMMUNIZATION: Primary | ICD-10-CM

## 2021-02-19 ENCOUNTER — IMMUNIZATIONS (OUTPATIENT)
Dept: FAMILY MEDICINE CLINIC | Facility: HOSPITAL | Age: 86
End: 2021-02-19

## 2021-02-19 DIAGNOSIS — Z23 ENCOUNTER FOR IMMUNIZATION: Primary | ICD-10-CM

## 2021-04-09 ENCOUNTER — HOSPITAL ENCOUNTER (EMERGENCY)
Facility: HOSPITAL | Age: 86
Discharge: HOME | End: 2021-04-09
Attending: EMERGENCY MEDICINE | Admitting: EMERGENCY MEDICINE
Payer: MEDICARE

## 2021-04-09 VITALS
HEART RATE: 67 BPM | HEIGHT: 58 IN | BODY MASS INDEX: 27.58 KG/M2 | WEIGHT: 131.39 LBS | SYSTOLIC BLOOD PRESSURE: 160 MMHG | RESPIRATION RATE: 16 BRPM | OXYGEN SATURATION: 99 % | TEMPERATURE: 98.2 F | DIASTOLIC BLOOD PRESSURE: 98 MMHG

## 2021-04-09 DIAGNOSIS — I10 HYPERTENSION: Primary | ICD-10-CM

## 2021-04-09 PROCEDURE — 99283 EMERGENCY DEPT VISIT LOW MDM: CPT

## 2021-04-09 PROCEDURE — 99284 EMERGENCY DEPT VISIT MOD MDM: CPT | Performed by: PHYSICIAN ASSISTANT

## 2021-04-09 NOTE — DISCHARGE INSTRUCTIONS
I believe it is a reasonable decision to take the Norvasc 5 mg twice daily over the next 3 days, monitor blood pressure readings at home once or twice a day follow-up with primary care  Return to the emergency department if you note any symptoms of high blood pressure that would include but are not limited to headache blurred vision chest pain shortness of breath

## 2021-04-09 NOTE — ED PROVIDER NOTES
History  Chief Complaint   Patient presents with    Hypertension     patient reports that her blood pressure has been 555'M systolic for several days  she took her bp medications prior to arrival  denies any symptoms     Patient presents to the emergency department today for evaluation of elevated blood pressure readings that have been ongoing for 2 weeks at home  She states she is at times noticing blood pressure readings of 891 mmHg systolic  She denies any readings above this level  She presents with her   She is very well-appearing  She actually has absolutely no physical complaints whatsoever  She specifically upon review of systems questioning denies headache, blurred vision, chest pain, shortness of breath, changes in urine habitus  No vomiting  They did call primary care this morning for advice and they unfortunately directed them here to the emergency department  Patient has no red flag symptoms and is nontoxic appearing, reasonable to increase blood pressure medications over the next few days have the recheck follow-up with primary care  Prior to Admission Medications   Prescriptions Last Dose Informant Patient Reported? Taking?    ALPRAZolam (XANAX) 0 25 mg tablet  Self No No   Sig: TAKE 1 TABLET BY MOUTH EVERY 6 HOURS AS NEEDED FOR ANXIETY   Calcium Carb-Cholecalciferol (CALTRATE 600+D3 PO)  Self Yes No   Sig: Take 1 tablet by mouth 2 (two) times a day   Omega-3 Fatty Acids (FISH OIL) 1200 MG CAPS  Self Yes No   Sig: Take 1,200 mg by mouth 2 (two) times a day   Psyllium (METAMUCIL FIBER) 51 7 % PACK  Self No No   Sig: Take 1 Package by mouth daily   amLODIPine (NORVASC) 5 mg tablet   No No   Sig: Take 1 tablet by mouth once daily   ammonium lactate (LAC-HYDRIN) 12 % lotion  Self Yes No   atenolol-chlorthalidone (TENORETIC) 100-25 mg per tablet  Self No No   Sig: Take 1 tablet by mouth once daily   potassium chloride (K-DUR,KLOR-CON) 20 mEq tablet  Self No No   Sig: Take 1 tablet by mouth once daily      Facility-Administered Medications: None       Past Medical History:   Diagnosis Date    Fatigue     Full dentures     Generalized anxiety disorder     Hyperlipidemia     Hypertension     Impaired fasting glucose     Pneumonia        Past Surgical History:   Procedure Laterality Date    DENTAL SURGERY Bilateral     ALL TEETH REMOVED    KNEE SURGERY      left knee replacement       Family History   Problem Relation Age of Onset    Hypertension Mother     Asthma Father         's asthma    Alzheimer's disease Sister     Rectal cancer Son     Uterine cancer Daughter     Heart disease Sister     Heart disease Brother      I have reviewed and agree with the history as documented  E-Cigarette/Vaping    E-Cigarette Use Never User      E-Cigarette/Vaping Substances    Nicotine No     THC No     CBD No     Flavoring No     Other No     Unknown No      Social History     Tobacco Use    Smoking status: Never Smoker    Smokeless tobacco: Never Used   Substance Use Topics    Alcohol use: Never     Frequency: Never    Drug use: Never       Review of Systems   Constitutional: Negative for chills and fever  HENT: Negative for ear pain and sore throat  Eyes: Negative for pain and visual disturbance  Respiratory: Negative for cough and shortness of breath  Cardiovascular: Negative for chest pain and palpitations  Gastrointestinal: Negative for abdominal pain and vomiting  Genitourinary: Negative for dysuria and hematuria  Musculoskeletal: Negative for arthralgias and back pain  Skin: Negative for color change and rash  Neurological: Negative for dizziness, tremors, seizures, syncope, facial asymmetry, speech difficulty, weakness, light-headedness, numbness and headaches  Hematological: Negative  Psychiatric/Behavioral: Negative  All other systems reviewed and are negative        Physical Exam  Physical Exam  Vitals signs and nursing note reviewed  Constitutional:       General: She is not in acute distress  Appearance: She is well-developed and normal weight  She is not ill-appearing, toxic-appearing or diaphoretic  HENT:      Head: Normocephalic and atraumatic  Eyes:      Conjunctiva/sclera: Conjunctivae normal    Neck:      Musculoskeletal: Neck supple  Cardiovascular:      Rate and Rhythm: Normal rate and regular rhythm  Heart sounds: Murmur present  Pulmonary:      Effort: Pulmonary effort is normal  No respiratory distress  Breath sounds: Normal breath sounds  Abdominal:      Palpations: Abdomen is soft  Tenderness: There is no abdominal tenderness  Musculoskeletal: Normal range of motion  General: No swelling, tenderness, deformity or signs of injury  Right lower leg: No edema  Left lower leg: No edema  Skin:     General: Skin is warm and dry  Capillary Refill: Capillary refill takes less than 2 seconds  Neurological:      General: No focal deficit present  Mental Status: She is alert and oriented to person, place, and time     Psychiatric:         Mood and Affect: Mood normal          Vital Signs  ED Triage Vitals [04/09/21 1017]   Temperature Pulse Respirations Blood Pressure SpO2   98 2 °F (36 8 °C) 67 16 160/98 99 %      Temp Source Heart Rate Source Patient Position - Orthostatic VS BP Location FiO2 (%)   Temporal Monitor Sitting Left arm --      Pain Score       --           Vitals:    04/09/21 1017   BP: 160/98   Pulse: 67   Patient Position - Orthostatic VS: Sitting         Visual Acuity      ED Medications  Medications - No data to display    Diagnostic Studies  Results Reviewed     None                 No orders to display              Procedures  Procedures         ED Course  ED Course as of Apr 09 1036   Fri Apr 09, 2021   1021 Blood Pressure: 160/98   1021 Temperature: 98 2 °F (36 8 °C)   1021 Pulse: 67   1021 Respirations: 16   1021 SpO2: 99 % MDM    Disposition  Final diagnoses:   Hypertension     Time reflects when diagnosis was documented in both MDM as applicable and the Disposition within this note     Time User Action Codes Description Comment    4/9/2021 10:33 AM [place] Jeans Add [address] Hypertension       ED Disposition     ED Disposition Condition Date/Time Comment    Discharge Stable Fri Apr 9, 2021 10:33 AM Adriane Wood discharge to home/self care  Follow-up Information     Follow up With Specialties Details Why DO [facility] Call   [address] [place] [address]  [address]  [address] [address]  [phone]            Patient's Medications   Discharge Prescriptions    No medications on file     No discharge procedures on file      PDMP Review       Value Time User    PDMP Reviewed  Yes 12/11/2020 12:58 PM Irena Borges DO          ED Provider  Electronically Signed by           Florian Rowe PA-C  04/09/21 1037

## 2021-04-12 DIAGNOSIS — E87.6 HYPOKALEMIA: ICD-10-CM

## 2021-04-12 DIAGNOSIS — I10 ESSENTIAL HYPERTENSION: ICD-10-CM

## 2021-04-12 RX ORDER — ATENOLOL AND CHLORTHALIDONE TABLET 100; 25 MG/1; MG/1
TABLET ORAL
Qty: 90 TABLET | Refills: 1 | Status: SHIPPED | OUTPATIENT
Start: 2021-04-12 | End: 2021-06-28

## 2021-04-12 RX ORDER — POTASSIUM CHLORIDE 20 MEQ/1
TABLET, EXTENDED RELEASE ORAL
Qty: 90 TABLET | Refills: 1 | Status: SHIPPED | OUTPATIENT
Start: 2021-04-12 | End: 2021-10-13

## 2021-04-12 RX ORDER — AMLODIPINE BESYLATE 5 MG/1
TABLET ORAL
Qty: 90 TABLET | Refills: 1 | Status: SHIPPED | OUTPATIENT
Start: 2021-04-12 | End: 2021-04-30

## 2021-04-21 ENCOUNTER — APPOINTMENT (OUTPATIENT)
Dept: LAB | Facility: HOSPITAL | Age: 86
End: 2021-04-21
Attending: FAMILY MEDICINE
Payer: MEDICARE

## 2021-04-21 DIAGNOSIS — E87.6 HYPOKALEMIA: ICD-10-CM

## 2021-04-21 LAB
ANION GAP SERPL CALCULATED.3IONS-SCNC: 11 MMOL/L (ref 4–13)
BUN SERPL-MCNC: 17 MG/DL (ref 5–25)
CALCIUM SERPL-MCNC: 9.8 MG/DL (ref 8.3–10.1)
CHLORIDE SERPL-SCNC: 91 MMOL/L (ref 100–108)
CO2 SERPL-SCNC: 25 MMOL/L (ref 21–32)
CREAT SERPL-MCNC: 1 MG/DL (ref 0.6–1.3)
GFR SERPL CREATININE-BSD FRML MDRD: 51 ML/MIN/1.73SQ M
GLUCOSE P FAST SERPL-MCNC: 116 MG/DL (ref 65–99)
POTASSIUM SERPL-SCNC: 3.8 MMOL/L (ref 3.5–5.3)
SODIUM SERPL-SCNC: 127 MMOL/L (ref 136–145)

## 2021-04-21 PROCEDURE — 36415 COLL VENOUS BLD VENIPUNCTURE: CPT

## 2021-04-21 PROCEDURE — 80048 BASIC METABOLIC PNL TOTAL CA: CPT

## 2021-04-30 ENCOUNTER — OFFICE VISIT (OUTPATIENT)
Dept: FAMILY MEDICINE CLINIC | Facility: CLINIC | Age: 86
End: 2021-04-30
Payer: MEDICARE

## 2021-04-30 VITALS
DIASTOLIC BLOOD PRESSURE: 86 MMHG | OXYGEN SATURATION: 99 % | SYSTOLIC BLOOD PRESSURE: 142 MMHG | BODY MASS INDEX: 25.78 KG/M2 | HEART RATE: 59 BPM | HEIGHT: 58 IN | TEMPERATURE: 97.4 F | WEIGHT: 122.8 LBS

## 2021-04-30 DIAGNOSIS — I10 ESSENTIAL HYPERTENSION: Primary | ICD-10-CM

## 2021-04-30 DIAGNOSIS — E87.1 HYPONATREMIA: ICD-10-CM

## 2021-04-30 DIAGNOSIS — G89.29 CHRONIC BILATERAL LOW BACK PAIN WITHOUT SCIATICA: ICD-10-CM

## 2021-04-30 DIAGNOSIS — M54.50 CHRONIC BILATERAL LOW BACK PAIN WITHOUT SCIATICA: ICD-10-CM

## 2021-04-30 DIAGNOSIS — F41.1 GENERALIZED ANXIETY DISORDER: ICD-10-CM

## 2021-04-30 DIAGNOSIS — E87.6 HYPOKALEMIA: ICD-10-CM

## 2021-04-30 PROCEDURE — 99214 OFFICE O/P EST MOD 30 MIN: CPT | Performed by: FAMILY MEDICINE

## 2021-04-30 RX ORDER — AMLODIPINE BESYLATE 5 MG/1
5 TABLET ORAL 2 TIMES DAILY
Qty: 90 TABLET | Refills: 1
Start: 2021-04-30 | End: 2021-06-01 | Stop reason: DRUGHIGH

## 2021-04-30 NOTE — PROGRESS NOTES
Assessment/Plan:    Essential hypertension   Patient has hypertension  Her blood pressure is elevated today  I recommend we increase her amlodipine to 5 mg twice a day and keep it there  I reviewed the notes from the ER  It should be noted that I did not refer the patient to the ER for evaluation of her blood pressure  I would have given the patient recommendations over the phone  They are mistaken  I told the patient's  that if he finds are blood pressures are going too low, he should call me  Perhaps I can discontinue her diuretic then  I did warn him that increased dose of amlodipine can cause lower extremity edema  Chronic bilateral low back pain without sciatica    Patient has chronic lower back pain  She had an acute exacerbation about 3 weeks ago for no apparent reason  I am going to get x-rays of the lumbar and thoracic spines  We need to rule out a compression fracture  I suspect to see a lot of degenerative changes  I discussed treatment options with the patient  I recommend a course of physical therapy for the patient  I told her this would help with her strength and endurance and help her walk better  She notes problems in all of these areas  However, she refuses physical therapy  We talked about pain management  She would prefer to go to pain management but tells me she will not go for an MRI  I did place a referral for her to see Dr Yadiel Power  Hyponatremia    I reviewed the patient's labs  Her serum  potassium is improved at 3 8  she will remain on potassium chloride 20 mEq daily  However,  Serum sodium is low at 127  I plan to continue to follow this  I scheduled her a follow-up visit for 4 weeks  I will get a BMP at that time  Again, if her blood pressure is doing well, perhaps I can discontinue the chlorthalidone component of her medication  Generalized anxiety disorder   Patient will continue alprazolam 0 25 mg 3 times per day    Given her claustrophobia, we are going to avoid MRI  Diagnoses and all orders for this visit:    Essential hypertension  -     amLODIPine (NORVASC) 5 mg tablet; Take 1 tablet (5 mg total) by mouth 2 (two) times a day    Chronic bilateral low back pain without sciatica  -     XR spine lumbar minimum 4 views non injury; Future  -     Ambulatory referral to Pain Management; Future  -     XR spine thoracic 3 vw; Future    Hyponatremia    Hypokalemia    Generalized anxiety disorder          Subjective:      Patient ID: Ulysess Gottron is a 80 y o  female  This is an 26-year-old white female who presents to the office today as a follow-up from the emergency department  Patient had gone to the emergency department when she was frightened, seeing that her systolic blood pressure was running around 160  Her  tells me at 1 point, it was higher than that  She was advised in the ER to increase her amlodipine to 5 mg b i d  for 3 days and then follow up with me  Her  tells me that her blood pressure was much improved after taking the b i d  dose  The patient was having trouble hearing me today  She did not wear her hearing aids sore  a lot of the speaking today  The following portions of the patient's history were reviewed and updated as appropriate: allergies, current medications, past family history, past medical history, past social history, past surgical history and problem list     Review of Systems   Constitutional: Positive for appetite change  Negative for activity change and unexpected weight change  Cardiovascular: Negative for chest pain, palpitations and leg swelling  Gastrointestinal: Negative for abdominal distention, abdominal pain, constipation, diarrhea and nausea  Musculoskeletal: Positive for back pain and gait problem (  Complains of severe low back pain x3 weeks  History of chronic low back pain)  Reports no radiation of her back pain into her legs     Neurological: Negative for dizziness, light-headedness and headaches  Psychiatric/Behavioral: Negative for sleep disturbance  The patient is not nervous/anxious  Patient reports claustrophobia         Objective:      /86 (BP Location: Left arm, Patient Position: Sitting, Cuff Size: Adult)   Pulse 59   Temp (!) 97 4 °F (36 3 °C) (Temporal)   Ht 4' 10" (1 473 m)   Wt 55 7 kg (122 lb 12 8 oz)   SpO2 99%   BMI 25 67 kg/m²          Physical Exam  Vitals signs reviewed  Constitutional:       Comments: This is an 27-year-old white female who appears her stated age  She was seated in the exam room chair  She Appears to be in no distress   HENT:      Head: Normocephalic and atraumatic  Right Ear: Tympanic membrane, ear canal and external ear normal       Left Ear: Tympanic membrane, ear canal and external ear normal       Mouth/Throat:      Mouth: Mucous membranes are moist       Pharynx: Oropharynx is clear  No oropharyngeal exudate or posterior oropharyngeal erythema  Eyes:      General: No scleral icterus  Right eye: No discharge  Left eye: No discharge  Conjunctiva/sclera: Conjunctivae normal       Pupils: Pupils are equal, round, and reactive to light  Neck:      Musculoskeletal: Neck supple  Cardiovascular:      Rate and Rhythm: Normal rate and regular rhythm  Heart sounds: Normal heart sounds  No murmur  No friction rub  No gallop  Pulmonary:      Effort: Pulmonary effort is normal  No respiratory distress  Breath sounds: Normal breath sounds  No stridor  No wheezing, rhonchi or rales  Abdominal:      General: Bowel sounds are normal  There is no distension  Palpations: Abdomen is soft  There is no mass  Tenderness: There is no abdominal tenderness  There is no guarding  Comments: There is no hepatosplenomegaly   Musculoskeletal:      Right lower leg: No edema  Left lower leg: No edema  Comments: There is an increased thoracic kyphosis noted   There is no tenderness or muscle spasm present in the bilateral paraspinal lumbar musculature  There is decreased range of motion  Neurological:      Gait: Gait abnormal ( slow unsteady gait noted, despite using cane)

## 2021-04-30 NOTE — ASSESSMENT & PLAN NOTE
Patient has chronic lower back pain  She had an acute exacerbation about 3 weeks ago for no apparent reason  I am going to get x-rays of the lumbar and thoracic spines  We need to rule out a compression fracture  I suspect to see a lot of degenerative changes  I discussed treatment options with the patient  I recommend a course of physical therapy for the patient  I told her this would help with her strength and endurance and help her walk better  She notes problems in all of these areas  However, she refuses physical therapy  We talked about pain management  She would prefer to go to pain management but tells me she will not go for an MRI  I did place a referral for her to see Dr Brie Steven

## 2021-04-30 NOTE — PATIENT INSTRUCTIONS
Low-Sodium Diet   AMBULATORY CARE:   A low-sodium diet  limits foods that are high in sodium (salt)  You will need to follow a low-sodium diet if you have high blood pressure, kidney disease, or heart failure  You may also need to follow this diet if you have a condition that is causing your body to retain (hold) extra fluid  You may need to limit the amount of sodium you eat in a day to 1,500 to 2,000 mg  Ask your healthcare provider how much sodium you can have each day  How to use food labels to choose foods that are low in sodium:  Read food labels to find the amount of sodium they contain  The amount of sodium is listed in milligrams (mg)  The % Daily Value (DV) column tells you how much of your daily needs are met by 1 serving of the food for each nutrient listed  Choose foods that have less than 5% of the DV of sodium  These foods are considered low in sodium  Foods that have 20% or more of the DV of sodium are considered high in sodium  Some food labels may also list any of the following terms that tell you about the sodium content in the food:  · Sodium-free:  Less than 5 mg in each serving    · Very low sodium:  35 mg of sodium or less in each serving    · Low sodium:  140 mg of sodium or less in each serving    · Reduced sodium: At least 25% less sodium in each serving than the regular type    · Light in sodium:  50% less sodium in each serving    · Unsalted or no added salt:  No extra salt is added during processing (the food may still contain sodium)     Foods to avoid:  Salty foods are high in sodium  You should avoid the following:  · Processed foods:      ? Mixes for cornbread, biscuits, cake, and pudding     ? Instant foods, such as potatoes, cereals, noodles, and rice     ? Packaged foods, such as bread stuffing, rice and pasta mixes, snack dip mixes, and macaroni and cheese     ? Canned foods, such as canned vegetables, soups, broths, sauces, and vegetable or tomato juice    ?  Snack foods, such as salted chips, popcorn, pretzels, pork rinds, salted crackers, and salted nuts    ? Frozen foods, such as dinners, entrees, vegetables with sauces, and breaded meats    ? Sauerkraut, pickled vegetables, and other foods prepared in brine    · Meats and cheeses:      ? Smoked or cured meat, such as corned beef, henao, ham, hot dogs, and sausage    ? Canned meats or spreads, such as potted meats, sardines, anchovies, and imitation seafood    ? Deli or lunch meats, such as bologna, ham, turkey, and roast beef    ? Processed cheese, such as American cheese and cheese spreads    · Condiments, sauces, and seasonings:      ? Salt (¼ teaspoon of salt contains 575 mg of sodium)    ? Seasonings made with salt, such as garlic salt, celery salt, onion salt, and seasoned salt    ? Regular soy sauce, barbecue sauce, teriyaki sauce, steak sauce, Worcestershire sauce, and most flavored vinegars    ? Canned gravy and mixes     ? Regular condiments, such as mustard, ketchup, and salad dressings    ? Pickles and olives    ? Meat tenderizers and monosodium glutamate (MSG)    Foods to include:  Read the food label to find the exact amount of sodium in each serving  · Bread and cereal:  Try to choose breads with less than 80 mg of sodium per serving  ? Bread, roll, karina, tortilla, or unsalted crackers  ? Ready-to-eat cereals with less than 5% DV of sodium (examples include shredded wheat and puffed rice)    ? Pasta    · Vegetables and fruits:      ? Unsalted fresh, frozen, or canned vegetables    ? Fresh, frozen, or canned fruits    ? Fruit juice    · Dairy:  One serving has about 150 mg of sodium  ? Milk, all types    ? Yogurt    ? Hard cheese, such as cheddar, Swiss, [employer], or mozzarella    · Meat and other protein foods:  Some raw meats may have added sodium  ? Plain meats, fish, and poultry     ? Eggs    · Other foods:      ? Homemade pudding    ? Unsalted nuts, popcorn, or pretzels    ?  Unsalted butter or margarine    Ways to decrease sodium:   · Add spices and herbs to foods instead of salt during cooking  Use salt-free seasonings to add flavor to foods  Examples include onion powder, garlic powder, basil, alba powder, paprika, and parsley  Try lemon or lime juice or vinegar to give foods a tart flavor  Use hot peppers, pepper, or cayenne pepper to add a spicy flavor  · Do not keep a salt shaker at your kitchen table  This may help keep you from adding salt to food at the table  A teaspoon of salt has 2,300 mg of sodium  It may take time to get used to enjoying the natural flavor of food instead of adding salt  Talk to your healthcare provider before you use salt substitutes  Some salt substitutes have a high amount of potassium and need to be avoided if you have kidney disease  · Choose low-sodium foods at restaurants  Meals from restaurants are often high in sodium  Some restaurants have nutrition information on the menu that tells you the amount of sodium in their foods  If possible, ask for your food to be prepared with less, or no salt  · Shop for unsalted or low-sodium foods and snacks at the grocery store  Examples include unsalted or low-sodium broths, soups, and canned vegetables  Choose fresh or frozen vegetables instead  Choose unsalted nuts or seeds or fresh fruits or vegetables as snacks  Read food labels and choose salt-free, very low-sodium, or low-sodium foods  © Copyright [address] Information is for End User's use only and may not be sold, redistributed or otherwise used for commercial purposes  All illustrations and images included in CareNotes® are the copyrighted property of [employer]  or [facility]   The above information is an  only  It is not intended as medical advice for individual conditions or treatments  Talk to your doctor, nurse or pharmacist before following any medical regimen to see if it is safe and effective for you

## 2021-04-30 NOTE — ASSESSMENT & PLAN NOTE
Patient has hypertension  Her blood pressure is elevated today  I recommend we increase her amlodipine to 5 mg twice a day and keep it there  I reviewed the notes from the ER  It should be noted that I did not refer the patient to the ER for evaluation of her blood pressure  I would have given the patient recommendations over the phone  They are mistaken  I told the patient's  that if he finds are blood pressures are going too low, he should call me  Perhaps I can discontinue her diuretic then  I did warn him that increased dose of amlodipine can cause lower extremity edema

## 2021-04-30 NOTE — ASSESSMENT & PLAN NOTE
I reviewed the patient's labs  Her serum  potassium is improved at 3 8  she will remain on potassium chloride 20 mEq daily  However,  Serum sodium is low at 127  I plan to continue to follow this  I scheduled her a follow-up visit for 4 weeks  I will get a BMP at that time  Again, if her blood pressure is doing well, perhaps I can discontinue the chlorthalidone component of her medication

## 2021-05-05 ENCOUNTER — PREP FOR PROCEDURE (OUTPATIENT)
Dept: PAIN MEDICINE | Facility: CLINIC | Age: 86
End: 2021-05-05

## 2021-05-05 ENCOUNTER — CONSULT (OUTPATIENT)
Dept: PAIN MEDICINE | Facility: CLINIC | Age: 86
End: 2021-05-05
Payer: MEDICARE

## 2021-05-05 VITALS
SYSTOLIC BLOOD PRESSURE: 171 MMHG | HEIGHT: 58 IN | WEIGHT: 127 LBS | BODY MASS INDEX: 26.66 KG/M2 | DIASTOLIC BLOOD PRESSURE: 63 MMHG | TEMPERATURE: 96.1 F | HEART RATE: 61 BPM

## 2021-05-05 DIAGNOSIS — M53.3 SACROILIAC JOINT DYSFUNCTION OF BOTH SIDES: Primary | ICD-10-CM

## 2021-05-05 PROCEDURE — 99214 OFFICE O/P EST MOD 30 MIN: CPT | Performed by: ANESTHESIOLOGY

## 2021-05-05 RX ORDER — METHYLPREDNISOLONE ACETATE 80 MG/ML
80 INJECTION, SUSPENSION INTRA-ARTICULAR; INTRALESIONAL; INTRAMUSCULAR; PARENTERAL; SOFT TISSUE ONCE
Status: CANCELLED | OUTPATIENT
Start: 2021-05-05 | End: 2021-05-05

## 2021-05-05 RX ORDER — BUPIVACAINE HCL/PF 2.5 MG/ML
10 VIAL (ML) INJECTION ONCE
Status: CANCELLED | OUTPATIENT
Start: 2021-05-05 | End: 2021-05-05

## 2021-05-05 RX ORDER — IBUPROFEN 400 MG/1
400 TABLET ORAL EVERY 8 HOURS PRN
Qty: 90 TABLET | Refills: 0 | Status: SHIPPED | OUTPATIENT
Start: 2021-05-05 | End: 2021-12-23

## 2021-05-05 NOTE — PATIENT INSTRUCTIONS
Sacroiliac Joint Injection   WHAT YOU NEED TO KNOW:   What do I need to know about a sacroiliac joint injection? A sacroiliac (SI) joint injection is done to diagnose or treat pain from sacroiliac joint syndrome  The pain caused by this syndrome may be felt in your lower back, buttocks, groin, and your thigh  How do I prepare for an SI injection? Your healthcare provider will ask you to not take any pain medicine the day of the injection  Ask him if there are any other medicines you should not take  You will need to find someone to drive you home after your procedure  What will happen during the SI injection? You will be awake for your injection  You may be given calming medicine if you are anxious  · You will lie on your stomach with a pillow under your abdomen  Your healthcare provider will give you an injection of medicine to numb the area  He may use an x-ray, ultrasound, or CT scan to find the area to inject  You may also be given an injection of contrast material to help your SI joint show up better  Then, your healthcare provider will inject local anesthesia, antiinflammatory medicine, or both into your SI joint  · Healthcare providers will watch you closely for any problems for up to 30 minutes after your injection  Your healthcare provider will check to see if your pain decreases after the injection  What are the risks of an SI injection? You may have some weakness for a short time after your injection  The SI injection can cause bleeding, infection, and pain  It can also cause temporary weakness in your leg and problems urinating  You may have an allergic reaction to the medicine that is injected into your SI joint  Your pain may return and you may need more treatment  CARE AGREEMENT:   You have the right to help plan your care  Learn about your health condition and how it may be treated  Discuss treatment options with your healthcare providers to decide what care you want to receive   You always have the right to refuse treatment  The above information is an  only  It is not intended as medical advice for individual conditions or treatments  Talk to your doctor, nurse or pharmacist before following any medical regimen to see if it is safe and effective for you  © Copyright [address] Information is for End User's use only and may not be sold, redistributed or otherwise used for commercial purposes  All illustrations and images included in CareNotes® are the copyrighted property of A D A M , [employer]  or [address] [place] [address]    Core Strengthening Exercises   WHAT YOU NEED TO KNOW:   Your core includes the muscles of your lower back, hip, pelvis, and abdomen  Core strengthening exercises help heal and strengthen these muscles  This helps prevent another injury, and keeps your pelvis, spine, and hips in the correct position  DISCHARGE INSTRUCTIONS:   Contact your healthcare provider if:   · You have sharp or worsening pain during exercise or at rest     · You have questions or concerns about your shoulder exercises  Safety tips:  Talk to your healthcare provider before you start an exercise program  A physical therapist can teach you how to do core strengthening exercises safely  · Do the exercises on a mat or firm surface  A firm surface will support your spine and prevent low back pain  Do not do these exercises on a bed  · Move slowly and smoothly  Avoid fast or jerky motions  · Stop if you feel pain  Core exercises should not be painful  Stop if you feel pain  · Breathe normally during core exercises  Do not hold your breath  This may cause an increase in blood pressure and prevent muscle strengthening  Your healthcare provider will tell you when to inhale and exhale during the exercise  · Begin all of your exercises with abdominal bracing  Abdominal bracing helps warm up your core muscles  You can also practice abdominal bracing throughout the day   Deana Toure on your back with your knees bent and feet flat on the floor  Place your arms in a relaxed position beside your body  Tighten your abdominal muscles  Pull your belly button in and up toward your spine  Hold for 5 seconds  Relax your muscles  Repeat 10 times  Core strengthening exercises: Your healthcare provider will tell you how often to do these exercises  The provider will also tell you how many repetitions of each exercise you should do  Hold each exercise for 5 seconds or as directed  As you get stronger, increase your hold to 10 to 15 seconds  You can do some of these exercises on a stability ball, or with a weight  Ask your healthcare provider how to use a stability ball or weight for these exercises:  · Bridging:  Lie on your back with your knees bent and feet flat on the floor  Rest your arms at your side  Tighten your buttocks, and then lift your hips 1 inch off the floor  Hold for 5 seconds  When you can do this exercise without pain for 10 seconds, increase the distance you lift your hips  A good goal is to be able to lift your hips so that your shoulders, hips, and knees are in a straight line  · Dead bug:  Lie on your back with your knees bent and feet flat on the floor  Place your arms in a relaxed position beside your body  Begin with abdominal bracing  Next, raise one leg, keeping your knee bent  Hold for 5 seconds  Repeat with the other leg  When you can do this exercise without pain for 10 to 15 seconds, you may raise one straight leg and hold  Repeat with the other leg  · Quadruped:  Place your hands and knees on the floor  Keep your wrists directly below your shoulders and your knees directly below your hips  Pull your belly button in toward your spine  Do not flatten or arch your back  Tighten your abdominal muscles below your belly button  Hold for 5 seconds  When you can do this exercise without pain for 10 to 15 seconds, you may extend one arm and hold   Repeat on the other side  · Side bridge exercises:      ? Standing side bridge:  Stand next to a wall and extend one arm toward the wall  Place your palm flat on the wall with your fingers pointing upward  Begin with abdominal bracing  Next, without moving your feet, slowly bend your arm to 90 degrees  Hold for 5 seconds  Repeat on the other side  When you can do this exercise without pain for 10 to 15 seconds, you may do the bent leg side bridge on the floor  ? Bent leg side bridge:  Lie on one side with your legs, hips, and shoulders in a straight line  Prop yourself up onto your forearm so your elbow is directly below your shoulder  Bend your knees back to 90 degrees  Begin with abdominal bracing  Next, lift your hips and balance yourself on your forearm and knees  Hold for 5 seconds  Repeat on the other side  When you can do this exercise without pain for 10 to 15 seconds, you may do the straight leg side bridge on the floor  ? Straight leg side bridge:  Lie on one side with your legs, hips, and shoulders in a straight line  Prop yourself up onto your forearm so your elbow is directly below your shoulder  Begin with abdominal bracing  Lift your hips off the floor and balance yourself on your forearm and the outside of your flexed foot  Do not let your ankle bend sideways  Hold for 5 seconds  Repeat on the other side  When you can do this exercise without pain for 10 to 15 seconds, ask your healthcare provider for more advanced exercises  · Superman:  Lie on your stomach  Extend your arms forward on the floor  Tighten your abdominal muscles and lift your right hand and left leg off the floor  Hold this position  Slowly return to the starting position  Tighten your abdominal muscles and lift your left hand and right leg off the floor  Hold this position  Slowly return to the starting position  · Clam:  Lie on your side with your knees bent   Put your bottom arm under your head to keep your neck in line  Put your top hand on your hip to keep your pelvis from moving  Put your heels together, and keep them together during this exercise  Slowly raise your top knee toward the ceiling  Then lower your leg so your knees are together  Repeat this exercise 10 times  Then switch sides and do the exercise 10 times with the other leg  · Curl up:  Lie on your back with your knees bent and feet flat on the floor  Place your hands, palms down, underneath your lower back  Next, with your elbows on the floor, lift your shoulders and chest 2 to 3 inches off the floor  Keep your head in line with your shoulders  Hold this position  Slowly return to the starting position  · Straight leg raises:  Lie on your back with one leg straight  Bend the other knee and place your foot flat on the floor  Tighten your abdominal muscles  Keep your leg straight and slowly lift it straight up 6 to 12 inches off the floor  Hold this position  Lower your leg slowly  Do as many repetitions as directed on this side  Repeat with the other leg  · Plank:  Lie on your stomach  Bend your elbows and place your forearms flat on the floor  Lift your chest, stomach, and knees off the floor  Make sure your elbows are below your shoulders  Your body should be in a straight line  Do not let your hips or lower back sink to the ground  Squeeze your abdominal muscles together and hold for 15 seconds  To make this exercise harder, hold for 30 seconds or lift 1 leg at a time  · Bicycles:  Lie on your back  Bend both knees and bring them toward your chest  Your calves should be parallel to the floor  Place the palms of your hands on the back of your head  Straighten your right leg and keep it lifted 2 inches off the floor  Raise your head and shoulders off the floor and twist towards your left  Keep your head and shoulders lifted  Bend your right knee while you straighten your left leg   Keep your left leg 2 inches off the floor  Twist your head and chest towards the left leg  Continue to straighten 1 leg at a time and twist        Follow up with your healthcare provider as directed:  Write down your questions so you remember to ask them during your visits  © Copyright [address] Information is for End User's use only and may not be sold, redistributed or otherwise used for commercial purposes  All illustrations and images included in CareNotes® are the copyrighted property of [employer] , [employer]  or [address] [place], [place] [address]diego clare   The above information is an  only  It is not intended as medical advice for individual conditions or treatments  Talk to your doctor, nurse or pharmacist before following any medical regimen to see if it is safe and effective for you

## 2021-05-05 NOTE — PROGRESS NOTES
Assessment  1  Sacroiliac joint dysfunction of both sides  -     ibuprofen (MOTRIN) 400 mg tablet; Take 1 tablet (400 mg total) by mouth every 8 (eight) hours as needed for moderate pain  -     Case request operating room: BLOCK / INJECTION SACROILIAC; Standing  -     Case request operating room: BLOCK / INJECTION SACROILIAC    Chronic axial low back over left and right sacroiliac joints  Debilitating pain without radicular features  +BARBARA's, +SIJ loading and TTP over bilateral SIJ; reasonable to proceed with multimodal pain therapy plan  Plan  - bilateral sacroiliac joint injections   -ibuprofen 400mg t i d  Prescribed  Patient educated regarding bleeding risk of taking this medication not taking any other nonsteroidal anti-inflammatory medications while taking this medication  - handouts provided for  Physician directed home physical therapy for  Bilateral sacroiliac joint dysfunction    There are risks associated with opioid medications, including dependence, addiction and tolerance  The patient understands and agrees to use these medications only as prescribed  Potential side effects of the medications include, but are not limited to, constipation, drowsiness, addiction, impaired judgment and risk of fatal overdose if not taken as prescribed  The patient was warned against driving while taking sedation medications  Sharing medications is a felony  At this point in time, the patient is showing no signs of addiction, abuse, diversion or suicidal ideation  [facility] Prescription Drug Monitoring Program report was reviewed and was appropriate     Complete risks and benefits including bleeding, infection, tissue reaction, nerve injury and allergic reaction were discussed  The approach was demonstrated using models and literature was provided  Verbal and written consent was obtained      My impressions and treatment recommendations were discussed in detail with the patient who verbalized understanding and had no further questions  Discharge instructions were provided  I personally saw and examined the patient and I agree with the above discussed plan of care  New Medications Ordered This Visit   Medications    ibuprofen (MOTRIN) 400 mg tablet     Sig: Take 1 tablet (400 mg total) by mouth every 8 (eight) hours as needed for moderate pain     Dispense:  90 tablet     Refill:  0       History of Present Illness    Sanford Lee is a 80 y o  female with a past medical history of HTN presenting with chronic low back pain described primarily as arthritic in nature  She describes 8/10 low back pain that is worse in the mornings and worse at the end of the day  The pain is centered around her sacroiliac joints bilaterally  The pain is characterized by achy, nagging, indolent, crampy, stabbing pain in her axial low back  The patient describes that the pain is worse with standing for long periods of time on hard surfaces as well as with walking  The patient is a very active individual and feels as though this pain compromises her participation with independent activities of daily living  The pain can be debilitating at times and contribute to significant disability, compromising overall activity and independent activities of daily living  She has not been to physical therapy more recently  Medications the patient has tried in the past include  Tylenol  She describes no radicular symptoms and has good strength  She denies any weakness numbness or paresthesias  The patient denies any bowel or bladder dysfunction as well  I have personally reviewed and/or updated the patient's past medical history, past surgical history, family history, social history, current medications, allergies, and vital signs today  Review of Systems   Constitutional: Positive for activity change  HENT: Negative  Eyes: Negative  Respiratory: Negative  Cardiovascular: Negative  Gastrointestinal: Negative  Endocrine: Negative  Genitourinary: Negative  Musculoskeletal: Positive for arthralgias, back pain, gait problem and myalgias  Skin: Negative  Allergic/Immunologic: Negative  Neurological: Negative for weakness and numbness  Hematological: Negative  Psychiatric/Behavioral: Negative  All other systems reviewed and are negative        Patient Active Problem List   Diagnosis    Hypomagnesemia    Hypokalemia    Elevated troponin    Community acquired pneumonia of left upper lobe of lung    Hyponatremia    Essential hypertension    Lymphadenopathy    Hiatal hernia    Thrombocytopenia (HCC)    Aortic valve stenosis    Mitral valve insufficiency    Supraventricular tachycardia (HCC)    Pulmonary hypertension (HCC)    Diverticulosis    First degree AV block    Non-rheumatic tricuspid valve insufficiency    Generalized anxiety disorder    Mid back pain    Encounter for Medicare annual wellness exam    Encounter for long-term (current) use of medications    Immunization due    Acute bursitis of right shoulder    Impaired fasting glucose    Osteopenia of neck of femur    Chronic bilateral low back pain without sciatica       Past Medical History:   Diagnosis Date    Fatigue     Full dentures     Generalized anxiety disorder     Hyperlipidemia     Hypertension     Impaired fasting glucose     Pneumonia        Past Surgical History:   Procedure Laterality Date    DENTAL SURGERY Bilateral     ALL TEETH REMOVED    KNEE SURGERY      left knee replacement       Family History   Problem Relation Age of Onset    Hypertension Mother    Bryant Venersborg Asthma Father         's asthma    Alzheimer's disease Sister     Rectal cancer Son     Uterine cancer Daughter     Heart disease Sister     Heart disease Brother        Social History     Occupational History    Not on file   Tobacco Use    Smoking status: Never Smoker    Smokeless tobacco: Never Used   Substance and Sexual Activity    Alcohol use: Never     Frequency: Never    Drug use: Never    Sexual activity: Not on file       Current Outpatient Medications on File Prior to Visit   Medication Sig    ALPRAZolam (XANAX) 0 25 mg tablet TAKE 1 TABLET BY MOUTH EVERY 6 HOURS AS NEEDED FOR ANXIETY    amLODIPine (NORVASC) 5 mg tablet Take 1 tablet (5 mg total) by mouth 2 (two) times a day    ammonium lactate (LAC-HYDRIN) 12 % lotion     atenolol-chlorthalidone (TENORETIC) 100-25 mg per tablet Take 1 tablet by mouth once daily    Calcium Carb-Cholecalciferol (CALTRATE 600+D3 PO) Take 1 tablet by mouth 2 (two) times a day    Omega-3 Fatty Acids (FISH OIL) 1200 MG CAPS Take 1,200 mg by mouth 2 (two) times a day    potassium chloride (K-DUR,KLOR-CON) 20 mEq tablet Take 1 tablet by mouth once daily    Psyllium (METAMUCIL FIBER) 51 7 % PACK Take 1 Package by mouth daily     No current facility-administered medications on file prior to visit  No Known Allergies      Physical Exam    BP (!) 171/63 (BP Location: Left arm, Patient Position: Sitting, Cuff Size: Standard)   Pulse 61   Temp (!) 96 1 °F (35 6 °C)   Ht 4' 10" (1 473 m)   Wt 57 6 kg (127 lb)   BMI 26 54 kg/m²     Constitutional: normal, well developed, well nourished, alert, in no distress and non-toxic and no overt pain behavior  Eyes: anicteric  HEENT: grossly intact  Neck: supple, symmetric, trachea midline and no masses   Pulmonary:even and unlabored  Cardiovascular:No edema or pitting edema present  Skin:Normal without rashes or lesions and well hydrated  Psychiatric:Mood and affect appropriate  Neurologic:Cranial Nerves II-XII grossly intact Sensation grossly intact; no clonus negative sharpe's  Reflexes 2+ and brisk  SLR negative bilaterally  Musculoskeletal: antalgic gait  5/5 strength with active range of motion movements all extremities bilaterally  mild pain with lumbar facet loading bilaterally and with lateral spine rotation   ttp over lumbar paraspinal muscles  Positive leno's test,  positive gaenslen's  positive SIJ loading bilaterally  Imaging     x-ray lumbar spine and thoracic spine  studies pending;  X-ray thoracic spine from 2019 shows no previous wedge fracture or compression deformity

## 2021-05-08 NOTE — ASSESSMENT & PLAN NOTE
Patient will continue alprazolam 0 25 mg 3 times per day  Given her claustrophobia, we are going to avoid MRI

## 2021-05-26 ENCOUNTER — APPOINTMENT (OUTPATIENT)
Dept: RADIOLOGY | Facility: HOSPITAL | Age: 86
End: 2021-05-26
Payer: MEDICARE

## 2021-05-26 ENCOUNTER — HOSPITAL ENCOUNTER (OUTPATIENT)
Facility: HOSPITAL | Age: 86
Setting detail: OUTPATIENT SURGERY
Discharge: HOME | End: 2021-05-26
Attending: ANESTHESIOLOGY | Admitting: ANESTHESIOLOGY
Payer: MEDICARE

## 2021-05-26 VITALS
TEMPERATURE: 95.9 F | HEART RATE: 63 BPM | DIASTOLIC BLOOD PRESSURE: 65 MMHG | OXYGEN SATURATION: 96 % | SYSTOLIC BLOOD PRESSURE: 134 MMHG | RESPIRATION RATE: 18 BRPM

## 2021-05-26 PROCEDURE — 27096 INJECT SACROILIAC JOINT: CPT | Performed by: ANESTHESIOLOGY

## 2021-05-26 RX ORDER — METHYLPREDNISOLONE ACETATE 80 MG/ML
INJECTION, SUSPENSION INTRA-ARTICULAR; INTRALESIONAL; INTRAMUSCULAR; SOFT TISSUE AS NEEDED
Status: DISCONTINUED | OUTPATIENT
Start: 2021-05-26 | End: 2021-05-26 | Stop reason: HOSPADM

## 2021-05-26 RX ORDER — BUPIVACAINE HYDROCHLORIDE 2.5 MG/ML
INJECTION, SOLUTION EPIDURAL; INFILTRATION; INTRACAUDAL AS NEEDED
Status: DISCONTINUED | OUTPATIENT
Start: 2021-05-26 | End: 2021-05-26 | Stop reason: HOSPADM

## 2021-05-26 RX ORDER — SENNOSIDES 8.6 MG
650 CAPSULE ORAL EVERY 8 HOURS PRN
COMMUNITY
End: 2021-12-23

## 2021-05-26 RX ORDER — LIDOCAINE HYDROCHLORIDE 10 MG/ML
INJECTION, SOLUTION EPIDURAL; INFILTRATION; INTRACAUDAL; PERINEURAL AS NEEDED
Status: DISCONTINUED | OUTPATIENT
Start: 2021-05-26 | End: 2021-05-26 | Stop reason: HOSPADM

## 2021-05-26 NOTE — PROCEDURES
Pre-procedure Diagnosis: Sacroiliitis/Sacroiliac joint dysfunction  Post-procedure Diagnosis: Sacroiliitis/Sacroiliac joint dysfunction  Operation Title(s):  1  [BILATERAL] Sacroiliac joint injection      2  Intraoperative fluoroscopy  Attending Surgeon:   Jena Thomas MD  Anesthesia:   Local    Indications: The patient is a 80y o  year-old female with a diagnosis of sacroiliitis/Sacroiliac joint dysfunction  The patient's history and physical exam were reviewed  The risks, benefits and alternatives to the procedure were discussed, and all questions were answered to the patient's satisfaction  The patient agreed to proceed, and written informed consent was obtained  Procedure in Detail: The patient was brought into the procedure room and placed in the prone position on the fluoroscopy table  The low back and upper buttock were prepped with chloraprep and draped in a sterile manner  AP fluoroscopy was used to visualize the [LEFT] sacroiliac joint  The fluoroscopic beam was then obliqued until the anterior and posterior margins of the joint were aligned  The inferior margin of the joint was identified and marked  The skin and subcutaneous tissues in the area were anesthetized with 1% lidocaine  A 22-gauge, 3½-inch spinal needle was advanced toward the identified point under fluoroscopic guidance  Once the targeted point was reached and the joint space was entered, negative aspiration was confirmed, and 1ml of Omnipaque 240 contrast was injected  The joint space was appropriately outlined  Then, after negative aspiration, a solution consisting of 3 5mL 0 25% bupivacaine and 0 5mL Depo-Medrol (80mg/ml) were easily injected  The needle was removed with a 1% lidocaine flush  (This same procedure was repeated on the opposite side )    The patient's back was cleaned and a bandage was placed over the sites of needle insertion      Disposition: The patient tolerated the procedure well and there were no apparent complications  Vital signs remained stable throughout the procedure  The patient was taken to the recovery area where written discharge instructions for the procedure were given      Estimated Blood Loss: None  Specimens Obtained: N/A

## 2021-05-26 NOTE — H&P
Assessment  1  Sacroiliac joint dysfunction of both sides  -     ibuprofen (MOTRIN) 400 mg tablet; Take 1 tablet (400 mg total) by mouth every 8 (eight) hours as needed for moderate pain  -     Case request operating room: BLOCK / INJECTION SACROILIAC; Standing  -     Case request operating room: BLOCK / INJECTION SACROILIAC    Chronic axial low back over left and right sacroiliac joints  Debilitating pain without radicular features  +BARBARA's, +SIJ loading and TTP over bilateral SIJ; reasonable to proceed with multimodal pain therapy plan  Plan  - bilateral sacroiliac joint injections   -ibuprofen 400mg t i d  Prescribed  Patient educated regarding bleeding risk of taking this medication not taking any other nonsteroidal anti-inflammatory medications while taking this medication  - handouts provided for  Physician directed home physical therapy for  Bilateral sacroiliac joint dysfunction    There are risks associated with opioid medications, including dependence, addiction and tolerance  The patient understands and agrees to use these medications only as prescribed  Potential side effects of the medications include, but are not limited to, constipation, drowsiness, addiction, impaired judgment and risk of fatal overdose if not taken as prescribed  The patient was warned against driving while taking sedation medications  Sharing medications is a felony  At this point in time, the patient is showing no signs of addiction, abuse, diversion or suicidal ideation  [place] Prescription Drug Monitoring Program report was reviewed and was appropriate     Complete risks and benefits including bleeding, infection, tissue reaction, nerve injury and allergic reaction were discussed  The approach was demonstrated using models and literature was provided  Verbal and written consent was obtained      My impressions and treatment recommendations were discussed in detail with the patient who verbalized understanding and had no further questions  Discharge instructions were provided  I personally saw and examined the patient and I agree with the above discussed plan of care  New Medications Ordered This Visit   Medications    acetaminophen (Tylenol 8 Hour Arthritis Pain) 650 mg CR tablet     Sig: Take 650 mg by mouth every 8 (eight) hours as needed for mild pain       History of Present Illness    Zuleyma Carvalho is a 80 y o  female with a past medical history of HTN presenting with chronic low back pain described primarily as arthritic in nature  She describes 8/10 low back pain that is worse in the mornings and worse at the end of the day  The pain is centered around her sacroiliac joints bilaterally  The pain is characterized by achy, nagging, indolent, crampy, stabbing pain in her axial low back  The patient describes that the pain is worse with standing for long periods of time on hard surfaces as well as with walking  The patient is a very active individual and feels as though this pain compromises her participation with independent activities of daily living  The pain can be debilitating at times and contribute to significant disability, compromising overall activity and independent activities of daily living  She has not been to physical therapy more recently  Medications the patient has tried in the past include  Tylenol  She describes no radicular symptoms and has good strength  She denies any weakness numbness or paresthesias  The patient denies any bowel or bladder dysfunction as well  I have personally reviewed and/or updated the patient's past medical history, past surgical history, family history, social history, current medications, allergies, and vital signs today  Review of Systems   Constitutional: Positive for activity change  HENT: Negative  Eyes: Negative  Respiratory: Negative  Cardiovascular: Negative  Gastrointestinal: Negative  Endocrine: Negative      Genitourinary: Negative  Musculoskeletal: Positive for arthralgias, back pain, gait problem and myalgias  Skin: Negative  Allergic/Immunologic: Negative  Neurological: Negative for weakness and numbness  Hematological: Negative  Psychiatric/Behavioral: Negative  All other systems reviewed and are negative        Patient Active Problem List   Diagnosis    Hypomagnesemia    Hypokalemia    Elevated troponin    Community acquired pneumonia of left upper lobe of lung    Hyponatremia    Essential hypertension    Lymphadenopathy    Hiatal hernia    Thrombocytopenia (HCC)    Aortic valve stenosis    Mitral valve insufficiency    Supraventricular tachycardia (HCC)    Pulmonary hypertension ([address] )    Diverticulosis    First degree AV block    Non-rheumatic tricuspid valve insufficiency    Generalized anxiety disorder    Mid back pain    Encounter for Medicare annual wellness exam    Encounter for long-term (current) use of medications    Immunization due    Acute bursitis of right shoulder    Impaired fasting glucose    Osteopenia of neck of femur    Chronic bilateral low back pain without sciatica       Past Medical History:   Diagnosis Date    Fatigue     Full dentures     Generalized anxiety disorder     Hyperlipidemia     Hypertension     Impaired fasting glucose     Pneumonia        Past Surgical History:   Procedure Laterality Date    DENTAL SURGERY Bilateral     ALL TEETH REMOVED    JOINT REPLACEMENT Left     KNEE SURGERY      left knee replacement       Family History   Problem Relation Age of Onset    Hypertension Mother     Asthma Father         Pendleton's asthma    Alzheimer's disease Sister     Rectal cancer Son     Uterine cancer Daughter     Heart disease Sister     Heart disease Brother        Social History     Occupational History    Not on file   Tobacco Use    Smoking status: Never Smoker    Smokeless tobacco: Never Used   Substance and Sexual Activity    Alcohol use: Never     Frequency: Never    Drug use: Never    Sexual activity: Not on file       No current facility-administered medications on file prior to encounter  Current Outpatient Medications on File Prior to Encounter   Medication Sig    acetaminophen (Tylenol 8 Hour Arthritis Pain) 650 mg CR tablet Take 650 mg by mouth every 8 (eight) hours as needed for mild pain    ALPRAZolam (XANAX) 0 25 mg tablet TAKE 1 TABLET BY MOUTH EVERY 6 HOURS AS NEEDED FOR ANXIETY    amLODIPine (NORVASC) 5 mg tablet Take 1 tablet (5 mg total) by mouth 2 (two) times a day    ammonium lactate (LAC-HYDRIN) 12 % lotion     atenolol-chlorthalidone (TENORETIC) 100-25 mg per tablet Take 1 tablet by mouth once daily    Calcium Carb-Cholecalciferol (CALTRATE 600+D3 PO) Take 1 tablet by mouth 2 (two) times a day    Omega-3 Fatty Acids (FISH OIL) 1200 MG CAPS Take 1,200 mg by mouth 2 (two) times a day    potassium chloride (K-DUR,KLOR-CON) 20 mEq tablet Take 1 tablet by mouth once daily    Psyllium (METAMUCIL FIBER) 51 7 % PACK Take 1 Package by mouth daily    ibuprofen (MOTRIN) 400 mg tablet Take 1 tablet (400 mg total) by mouth every 8 (eight) hours as needed for moderate pain       No Known Allergies      Physical Exam    /64   Pulse 64   Temp (!) 95 9 °F (35 5 °C) (Tympanic)   Resp 18   SpO2 100%     Constitutional: normal, well developed, well nourished, alert, in no distress and non-toxic and no overt pain behavior  Eyes: anicteric  HEENT: grossly intact  Neck: supple, symmetric, trachea midline and no masses   Pulmonary:even and unlabored  Cardiovascular:No edema or pitting edema present  Skin:Normal without rashes or lesions and well hydrated  Psychiatric:Mood and affect appropriate  Neurologic:Cranial Nerves II-XII grossly intact Sensation grossly intact; no clonus negative sharpe's  Reflexes 2+ and brisk  SLR negative bilaterally  Musculoskeletal: antalgic gait    5/5 strength with active range of motion movements all extremities bilaterally  mild pain with lumbar facet loading bilaterally and with lateral spine rotation  ttp over lumbar paraspinal muscles  Positive leno's test,  positive gaenslen's  positive SIJ loading bilaterally  Imaging     x-ray lumbar spine and thoracic spine  studies pending;  X-ray thoracic spine from 2019 shows no previous wedge fracture or compression deformity

## 2021-05-26 NOTE — DISCHARGE INSTRUCTIONS
PLEASE SCHEDULE 2 WEEK FOLLOW UP BY CALLING THE [facility] AT [facility]: [phone]      Sacroiliac Joint Injection   WHAT YOU NEED TO KNOW:   A sacroiliac (SI) joint injection is done to diagnose or treat pain from sacroiliac joint syndrome  The pain caused by this syndrome may be felt in your lower back, buttocks, groin, and your thigh  DISCHARGE INSTRUCTIONS:   Seek care immediately if:   · You have a fever  · You have increased redness, or swelling around the injection site  · You have drainage from the injection site  · You are not able to walk or move your leg  Contact your healthcare provider if:   · Your pain does not get better within 5 days  · You have new symptoms  · You have questions or concerns about your condition or care  Self-care:   · Drink liquids as directed  Liquids will help flush the contrast material out of your body  Ask how much liquid to drink and which liquids are best for you  · Apply ice to your injection site  Ice helps decrease swelling and pain  Use an ice pack, or put crushed ice in a plastic bag  Cover it with a towel and place it on your low back for 15 to 20 minutes every hour or as directed  · Do not take a bath or get into a hot tub after your procedure  Take a shower instead  Soaking your puncture site in a bath or hot tub increases your risk for infection  · Limit physical activity that causes pain  Rest as needed  Ask your healthcare provider how long you should limit activity  · Keep a pain diary  Write down when your pain happens, how severe it is, and any other symptoms you have with your pain  A diary will help you keep track of your pain  It may also help your healthcare provider find out what is causing your pain  Follow up with your healthcare provider as directed: You may need more injections or other treatments  Bring your pain diary to your visits   Write down your questions so you remember to ask them during your visits  © Copyright [address] Information is for End User's use only and may not be sold, redistributed or otherwise used for commercial purposes  All illustrations and images included in CareNotes® are the copyrighted property of A D A M [employer] Inc  or [employer]  The above information is an  only  It is not intended as medical advice for individual conditions or treatments  Talk to your doctor, nurse or pharmacist before following any medical regimen to see if it is safe and effective for you

## 2021-05-26 NOTE — OP NOTE
OPERATIVE REPORT  PATIENT NAME: Kike Snider    :  1935  MRN: [id number]  Pt Location: [facility] OR ROOM 01    SURGERY DATE: 2021    Surgeon(s) and Role:      Liliane Martins MD - Primary    Preop Diagnosis:  Sacroiliac joint dysfunction of both sides [M53 3]    Post-Op Diagnosis Codes:     * Sacroiliac joint dysfunction of both sides [M53 3]    Procedure(s) (LRB):  SACROILIAC JOINT INJECTION (Bilateral)    Specimen(s):  * No specimens in log *    Estimated Blood Loss:   Minimal    Drains:  * No LDAs found *    Anesthesia Type:   Local    Operative Indications:  Sacroiliac joint dysfunction of both sides [M53 3]    Operative Findings:  Contrast spread into bilateral Sacroiliac joints    Complications:   None    Procedure and Technique:  Please see detailed procedure note    I was present for the entire procedure    Patient Disposition:  PACU     SIGNATURE: Miguel Ángel Dennis MD  DATE: May 26, 2021  TIME: 8:39 AM

## 2021-06-01 ENCOUNTER — OFFICE VISIT (OUTPATIENT)
Dept: FAMILY MEDICINE CLINIC | Facility: CLINIC | Age: 86
End: 2021-06-01
Payer: MEDICARE

## 2021-06-01 VITALS
BODY MASS INDEX: 25.82 KG/M2 | DIASTOLIC BLOOD PRESSURE: 82 MMHG | TEMPERATURE: 97.6 F | OXYGEN SATURATION: 99 % | SYSTOLIC BLOOD PRESSURE: 144 MMHG | HEART RATE: 66 BPM | WEIGHT: 123 LBS | HEIGHT: 58 IN

## 2021-06-01 DIAGNOSIS — M54.50 CHRONIC BILATERAL LOW BACK PAIN WITHOUT SCIATICA: ICD-10-CM

## 2021-06-01 DIAGNOSIS — I10 ESSENTIAL HYPERTENSION: Primary | ICD-10-CM

## 2021-06-01 DIAGNOSIS — G89.29 CHRONIC BILATERAL LOW BACK PAIN WITHOUT SCIATICA: ICD-10-CM

## 2021-06-01 DIAGNOSIS — R26.9 ABNORMALITY OF GAIT: ICD-10-CM

## 2021-06-01 PROCEDURE — 99213 OFFICE O/P EST LOW 20 MIN: CPT | Performed by: FAMILY MEDICINE

## 2021-06-01 RX ORDER — AMLODIPINE BESYLATE 10 MG/1
10 TABLET ORAL DAILY
Qty: 90 TABLET | Refills: 1 | Status: SHIPPED | OUTPATIENT
Start: 2021-06-01 | End: 2021-11-18

## 2021-06-01 NOTE — PROGRESS NOTES
Assessment/Plan:    Essential hypertension    Patient has hypertension  She is tolerating amlodipine 10 mg daily well  I reviewed her ambulatory blood pressure readings which are better  Today, her blood pressure, when checked by me, was 144/82  I refilled her prescription for amlodipine 10 mg daily  I plan to continue to follow her blood pressures  I stressed to the patient the importance of following a low-salt diet  Chronic bilateral low back pain without sciatica    Patient has lower back pain has resolved  Radicular symptoms have resolved  She does have follow-up appointment to see pain management  Abnormality of gait    I recommended formal physical therapy for the patient to help with her ambulatory dysfunction and her balance  The patient declined  She is going to follow-up with pain management tells me she will discuss it with pain management  Hopefully, pain management will recommend physical therapy and perhaps, help me persuade the patient  Diagnoses and all orders for this visit:    Essential hypertension  -     amLODIPine (NORVASC) 10 mg tablet; Take 1 tablet (10 mg total) by mouth daily    Chronic bilateral low back pain without sciatica    Abnormality of gait          Subjective:      Patient ID: Chino Cordova is a 80 y o  female  This patient is an 51-year-old white female who presents to the office today with her   The patient is here for her follow-up visit  She has been tolerating amlodipine 10 mg daily without any side effects  She specifically denies lower extremity edema and headache  Regarding her lower back pain, she did see pain management  She had an injection in her sacroiliac in his now pain free        The following portions of the patient's history were reviewed and updated as appropriate: allergies, current medications, past family history, past medical history, past social history, past surgical history and problem list     Review of Systems Eyes: Negative for visual disturbance  Cardiovascular: Negative for chest pain, palpitations and leg swelling  Musculoskeletal: Positive for gait problem  Negative for arthralgias and back pain  Neurological: Positive for light-headedness  Negative for headaches  Objective:      /82   Pulse 66   Temp 97 6 °F (36 4 °C) (Temporal)   Ht 4' 10" (1 473 m)   Wt 55 8 kg (123 lb)   SpO2 99%   BMI 25 71 kg/m²          Physical Exam  Vitals signs reviewed  Constitutional:       Comments: This patient is an 80 year old white female who appears her stated age  She was seated on the exam room chair when I entered the room  She was in no apparent distress  HENT:      Head: Normocephalic and atraumatic  Right Ear: Tympanic membrane, ear canal and external ear normal  There is no impacted cerumen  Left Ear: Tympanic membrane, ear canal and external ear normal  There is no impacted cerumen  Mouth/Throat:      Mouth: Mucous membranes are moist       Pharynx: Oropharynx is clear  No oropharyngeal exudate or posterior oropharyngeal erythema  Eyes:      General: No scleral icterus  Right eye: No discharge  Left eye: No discharge  Conjunctiva/sclera: Conjunctivae normal       Pupils: Pupils are equal, round, and reactive to light  Neck:      Musculoskeletal: Neck supple  Cardiovascular:      Rate and Rhythm: Normal rate and regular rhythm  Pulses: Normal pulses  Heart sounds: Normal heart sounds  No murmur  No friction rub  No gallop  Pulmonary:      Effort: Pulmonary effort is normal  No respiratory distress  Breath sounds: Normal breath sounds  No stridor  No wheezing or rales  Musculoskeletal:      Comments:   I observe the patient's gait which was quite unsteady  She used a cane in her right hand  Her  also helped by holding her right arm  She steadied herself by holding on to the wall with her left hand       Lymphadenopathy: Cervical: No cervical adenopathy  Psychiatric:         Mood and Affect: Mood normal          Behavior: Behavior normal          Thought Content:  Thought content normal          Judgment: Judgment normal  Abdominal Pain, N/V/D

## 2021-06-01 NOTE — ASSESSMENT & PLAN NOTE
Patient has hypertension  She is tolerating amlodipine 10 mg daily well  I reviewed her ambulatory blood pressure readings which are better  Today, her blood pressure, when checked by me, was 144/82  I refilled her prescription for amlodipine 10 mg daily  I plan to continue to follow her blood pressures  I stressed to the patient the importance of following a low-salt diet

## 2021-06-01 NOTE — ASSESSMENT & PLAN NOTE
Patient has lower back pain has resolved  Radicular symptoms have resolved  She does have follow-up appointment to see pain management

## 2021-06-01 NOTE — PATIENT INSTRUCTIONS
Low-Sodium Diet   AMBULATORY CARE:   A low-sodium diet  limits foods that are high in sodium (salt)  You will need to follow a low-sodium diet if you have high blood pressure, kidney disease, or heart failure  You may also need to follow this diet if you have a condition that is causing your body to retain (hold) extra fluid  You may need to limit the amount of sodium you eat in a day to 1,500 to 2,000 mg  Ask your healthcare provider how much sodium you can have each day  How to use food labels to choose foods that are low in sodium:  Read food labels to find the amount of sodium they contain  The amount of sodium is listed in milligrams (mg)  The % Daily Value (DV) column tells you how much of your daily needs are met by 1 serving of the food for each nutrient listed  Choose foods that have less than 5% of the DV of sodium  These foods are considered low in sodium  Foods that have 20% or more of the DV of sodium are considered high in sodium  Some food labels may also list any of the following terms that tell you about the sodium content in the food:  · Sodium-free:  Less than 5 mg in each serving    · Very low sodium:  35 mg of sodium or less in each serving    · Low sodium:  140 mg of sodium or less in each serving    · Reduced sodium: At least 25% less sodium in each serving than the regular type    · Light in sodium:  50% less sodium in each serving    · Unsalted or no added salt:  No extra salt is added during processing (the food may still contain sodium)     Foods to avoid:  Salty foods are high in sodium  You should avoid the following:  · Processed foods:      ? Mixes for cornbread, biscuits, cake, and pudding     ? Instant foods, such as potatoes, cereals, noodles, and rice     ? Packaged foods, such as bread stuffing, rice and pasta mixes, snack dip mixes, and macaroni and cheese     ? Canned foods, such as canned vegetables, soups, broths, sauces, and vegetable or tomato juice    ?  Snack foods, such as salted chips, popcorn, pretzels, pork rinds, salted crackers, and salted nuts    ? Frozen foods, such as dinners, entrees, vegetables with sauces, and breaded meats    ? Sauerkraut, pickled vegetables, and other foods prepared in brine    · Meats and cheeses:      ? Smoked or cured meat, such as corned beef, henao, ham, hot dogs, and sausage    ? Canned meats or spreads, such as potted meats, sardines, anchovies, and imitation seafood    ? Deli or lunch meats, such as bologna, ham, turkey, and roast beef    ? Processed cheese, such as American cheese and cheese spreads    · Condiments, sauces, and seasonings:      ? Salt (¼ teaspoon of salt contains 575 mg of sodium)    ? Seasonings made with salt, such as garlic salt, celery salt, onion salt, and seasoned salt    ? Regular soy sauce, barbecue sauce, teriyaki sauce, steak sauce, Worcestershire sauce, and most flavored vinegars    ? Canned gravy and mixes     ? Regular condiments, such as mustard, ketchup, and salad dressings    ? Pickles and olives    ? Meat tenderizers and monosodium glutamate (MSG)    Foods to include:  Read the food label to find the exact amount of sodium in each serving  · Bread and cereal:  Try to choose breads with less than 80 mg of sodium per serving  ? Bread, roll, karina, tortilla, or unsalted crackers  ? Ready-to-eat cereals with less than 5% DV of sodium (examples include shredded wheat and puffed rice)    ? Pasta    · Vegetables and fruits:      ? Unsalted fresh, frozen, or canned vegetables    ? Fresh, frozen, or canned fruits    ? Fruit juice    · Dairy:  One serving has about 150 mg of sodium  ? Milk, all types    ? Yogurt    ? Hard cheese, such as cheddar, Swiss, [employer], or mozzarella    · Meat and other protein foods:  Some raw meats may have added sodium  ? Plain meats, fish, and poultry     ? Eggs    · Other foods:      ? Homemade pudding    ? Unsalted nuts, popcorn, or pretzels    ?  Unsalted butter or margarine    Ways to decrease sodium:   · Add spices and herbs to foods instead of salt during cooking  Use salt-free seasonings to add flavor to foods  Examples include onion powder, garlic powder, basil, alba powder, paprika, and parsley  Try lemon or lime juice or vinegar to give foods a tart flavor  Use hot peppers, pepper, or cayenne pepper to add a spicy flavor  · Do not keep a salt shaker at your kitchen table  This may help keep you from adding salt to food at the table  A teaspoon of salt has 2,300 mg of sodium  It may take time to get used to enjoying the natural flavor of food instead of adding salt  Talk to your healthcare provider before you use salt substitutes  Some salt substitutes have a high amount of potassium and need to be avoided if you have kidney disease  · Choose low-sodium foods at restaurants  Meals from restaurants are often high in sodium  Some restaurants have nutrition information on the menu that tells you the amount of sodium in their foods  If possible, ask for your food to be prepared with less, or no salt  · Shop for unsalted or low-sodium foods and snacks at the grocery store  Examples include unsalted or low-sodium broths, soups, and canned vegetables  Choose fresh or frozen vegetables instead  Choose unsalted nuts or seeds or fresh fruits or vegetables as snacks  Read food labels and choose salt-free, very low-sodium, or low-sodium foods  © Copyright [address] Information is for End User's use only and may not be sold, redistributed or otherwise used for commercial purposes  All illustrations and images included in CareNotes® are the copyrighted property of A D A M  Inc  or [facility] Ammon Snowden   The above information is an  only  It is not intended as medical advice for individual conditions or treatments  Talk to your doctor, nurse or pharmacist before following any medical regimen to see if it is safe and effective for you

## 2021-06-01 NOTE — ASSESSMENT & PLAN NOTE
I recommended formal physical therapy for the patient to help with her ambulatory dysfunction and her balance  The patient declined  She is going to follow-up with pain management tells me she will discuss it with pain management  Hopefully, pain management will recommend physical therapy and perhaps, help me persuade the patient

## 2021-06-02 ENCOUNTER — TELEPHONE (OUTPATIENT)
Dept: PAIN MEDICINE | Facility: CLINIC | Age: 86
End: 2021-06-02

## 2021-06-22 ENCOUNTER — OFFICE VISIT (OUTPATIENT)
Dept: PAIN MEDICINE | Facility: CLINIC | Age: 86
End: 2021-06-22
Payer: MEDICARE

## 2021-06-22 VITALS
HEART RATE: 52 BPM | HEIGHT: 58 IN | BODY MASS INDEX: 26.24 KG/M2 | DIASTOLIC BLOOD PRESSURE: 73 MMHG | WEIGHT: 125 LBS | SYSTOLIC BLOOD PRESSURE: 116 MMHG

## 2021-06-22 DIAGNOSIS — M54.50 CHRONIC BILATERAL LOW BACK PAIN WITHOUT SCIATICA: ICD-10-CM

## 2021-06-22 DIAGNOSIS — G89.4 CHRONIC PAIN SYNDROME: Primary | ICD-10-CM

## 2021-06-22 DIAGNOSIS — M46.1 SACROILIITIS (HCC): ICD-10-CM

## 2021-06-22 DIAGNOSIS — R26.9 GAIT ABNORMALITY: ICD-10-CM

## 2021-06-22 DIAGNOSIS — G89.29 CHRONIC BILATERAL LOW BACK PAIN WITHOUT SCIATICA: ICD-10-CM

## 2021-06-22 PROCEDURE — 99214 OFFICE O/P EST MOD 30 MIN: CPT | Performed by: NURSE PRACTITIONER

## 2021-06-22 NOTE — PROGRESS NOTES
Assessment:  1  Chronic pain syndrome    2  Chronic bilateral low back pain without sciatica    3  Sacroiliitis ([address] )    4  Gait abnormality        Plan:    While the patient was in the office today, I did have a thorough conversation regarding their chronic pain syndrome, medication management, and treatment plan options  Patient is being seen for follow-up visit  She underwent bilateral sacroiliac joint injections on 05/26/2021  She reports that her pain is 100% improved  Her biggest complaint today is gait disturbance  She reports that recently she has been feeling more unsteady on her feet  Her  mention to me that 1 of her blood pressure medicines was recently changed and she has been a little more unsteady since the change  Also, her pulse is low in the office at 52  He tells me that he checks her blood pressure at home and that her pulse at home is running in the high 40s to low 50s         From a pain management standpoint we will follow-up with her if needed she will call us if and when the back pain starts bothering her again  I provided her with a prescription for physical therapy for gait training and lower extremity strengthening  I advised them to follow up with the PCP with regards to the recent change in blood pressure medicine and low pulse  I will also send a message to her PCP to make them aware of the situation  History of Present Illness: The patient is a 80 y o  female who presents for a follow up office visit in regards to Back Pain  The patients current symptoms include   Improved low back pain with sacroiliac joint injections  Patient reports that her low back pain is  100% improved  She is denying any complaints of pain in the office  Current pain medications includes:  None        I have personally reviewed and/or updated the patient's past medical history, past surgical history, family history, social history, current medications, allergies, and vital signs today          Review of Systems  Review of Systems   Constitutional: Negative for fatigue  HENT: Negative for ear pain and hearing loss  Eyes: Negative for redness  Respiratory: Negative for cough  Cardiovascular: Negative for leg swelling  Gastrointestinal: Negative for anal bleeding and rectal pain  Endocrine: Negative for polydipsia  Genitourinary: Negative for hematuria  Musculoskeletal: Positive for arthralgias, back pain and gait problem  Negative for joint swelling  Skin: Negative for rash  Neurological: Positive for dizziness  Negative for headaches  Hematological: Does not bruise/bleed easily  Psychiatric/Behavioral: Positive for confusion (memory loss)  Negative for behavioral problems and hallucinations           Past Medical History:   Diagnosis Date    Fatigue     Full dentures     Generalized anxiety disorder     Hyperlipidemia     Hypertension     Impaired fasting glucose     Pneumonia        Past Surgical History:   Procedure Laterality Date    DENTAL SURGERY Bilateral     ALL TEETH REMOVED    FL GUIDED NEEDLE PLAC BX/ASP/INJ  5/26/2021    JOINT REPLACEMENT Left     KNEE SURGERY      left knee replacement    IN INJECTION,SACROILIAC JOINT Bilateral 5/26/2021    Procedure: SACROILIAC JOINT INJECTION;  Surgeon: Garry Cuba MD;  Location: [facility];  Service: Pain Management        Family History   Problem Relation Age of Onset   [facility] Hypertension Mother     Asthma Father         's asthma    Alzheimer's disease Sister     Rectal cancer Son     Uterine cancer Daughter     Heart disease Sister     Heart disease Brother        Social History     Occupational History    Not on file   Tobacco Use    Smoking status: Never Smoker    Smokeless tobacco: Never Used   Vaping Use    Vaping Use: Never used   Substance and Sexual Activity    Alcohol use: Never    Drug use: Never    Sexual activity: Not on file         Current Outpatient Medications:    acetaminophen (Tylenol 8 Hour Arthritis Pain) 650 mg CR tablet, Take 650 mg by mouth every 8 (eight) hours as needed for mild pain, Disp: , Rfl:     ALPRAZolam (XANAX) 0 25 mg tablet, TAKE 1 TABLET BY MOUTH EVERY 6 HOURS AS NEEDED FOR ANXIETY, Disp: 120 tablet, Rfl: 5    amLODIPine (NORVASC) 10 mg tablet, Take 1 tablet (10 mg total) by mouth daily, Disp: 90 tablet, Rfl: 1    ammonium lactate (LAC-HYDRIN) 12 % lotion, , Disp: , Rfl:     atenolol-chlorthalidone (TENORETIC) 100-25 mg per tablet, Take 1 tablet by mouth once daily, Disp: 90 tablet, Rfl: 1    Calcium Carb-Cholecalciferol (CALTRATE 600+D3 PO), Take 1 tablet by mouth 2 (two) times a day, Disp: , Rfl:     Omega-3 Fatty Acids (FISH OIL) 1200 MG CAPS, Take 1,200 mg by mouth 2 (two) times a day, Disp: , Rfl:     potassium chloride (K-DUR,KLOR-CON) 20 mEq tablet, Take 1 tablet by mouth once daily, Disp: 90 tablet, Rfl: 1    Psyllium (METAMUCIL FIBER) 51 7 % PACK, Take 1 Package by mouth daily, Disp: , Rfl: 0    ibuprofen (MOTRIN) 400 mg tablet, Take 1 tablet (400 mg total) by mouth every 8 (eight) hours as needed for moderate pain (Patient not taking: Reported on 6/1/2021), Disp: 90 tablet, Rfl: 0    No Known Allergies    Physical Exam:    /73 (BP Location: Left arm, Patient Position: Sitting, Cuff Size: Standard)   Pulse (!) 52   Ht 4' 10" (1 473 m)   Wt 56 7 kg (125 lb)   BMI 26 13 kg/m²     Constitutional:normal, well developed, well nourished, alert, in no distress and non-toxic and no overt pain behavior    Eyes:anicteric  HEENT:grossly intact  Neck:supple, symmetric, trachea midline and no masses   Pulmonary:even and unlabored  Cardiovascular:No edema or pitting edema present  Skin:Normal without rashes or lesions and well hydrated  Psychiatric:Mood and affect appropriate  Neurologic:Cranial Nerves II-XII grossly intact  Musculoskeletal:antalgic and ambulates with cane    Imaging  No orders to display       Orders Placed This Encounter Procedures    Ambulatory referral to Physical Therapy

## 2021-06-23 ENCOUNTER — TELEPHONE (OUTPATIENT)
Dept: FAMILY MEDICINE CLINIC | Facility: CLINIC | Age: 86
End: 2021-06-23

## 2021-06-23 DIAGNOSIS — I35.0 NONRHEUMATIC AORTIC VALVE STENOSIS: Primary | ICD-10-CM

## 2021-06-23 NOTE — TELEPHONE ENCOUNTER
PT'S SON IS AWARE OF THE CHANGE IN DOSAGE FOR THE MEDICATION AND ALSO SCHEDULED THE APPT TO CHECK UP ON THIS ISSUE

## 2021-06-23 NOTE — TELEPHONE ENCOUNTER
PT'S SON CALLED STATING HIS MOTHER'S PULSE IS EXTREMELY LOW AT 40-45 BEATS PER MINUTE  STATES SHE IS VERY FATIGUED AND CONFUSED  HE IS VERY CONCERNED ABOUT THIS ISSUE  PLEASE ADVISE

## 2021-06-23 NOTE — TELEPHONE ENCOUNTER
She is going to have to cut the atenolol-chlorthalidone in half  Take just one half tablet daily  Schedule a follow up appointment

## 2021-06-24 ENCOUNTER — HOSPITAL ENCOUNTER (OUTPATIENT)
Dept: NON INVASIVE DIAGNOSTICS | Facility: HOSPITAL | Age: 86
Discharge: HOME | End: 2021-06-24
Payer: MEDICARE

## 2021-06-24 DIAGNOSIS — I35.0 NONRHEUMATIC AORTIC VALVE STENOSIS: ICD-10-CM

## 2021-06-24 PROCEDURE — 93306 TTE W/DOPPLER COMPLETE: CPT

## 2021-06-24 PROCEDURE — 93306 TTE W/DOPPLER COMPLETE: CPT | Performed by: INTERNAL MEDICINE

## 2021-06-26 DIAGNOSIS — J18.9 COMMUNITY ACQUIRED PNEUMONIA OF LEFT UPPER LOBE OF LUNG: ICD-10-CM

## 2021-06-27 RX ORDER — ALPRAZOLAM 0.25 MG/1
TABLET ORAL
Qty: 120 TABLET | Refills: 2 | Status: SHIPPED | OUTPATIENT
Start: 2021-06-27 | End: 2021-10-13

## 2021-06-28 ENCOUNTER — OFFICE VISIT (OUTPATIENT)
Dept: FAMILY MEDICINE CLINIC | Facility: CLINIC | Age: 86
End: 2021-06-28
Payer: MEDICARE

## 2021-06-28 VITALS
HEIGHT: 58 IN | HEART RATE: 61 BPM | TEMPERATURE: 97.4 F | WEIGHT: 125.2 LBS | DIASTOLIC BLOOD PRESSURE: 82 MMHG | SYSTOLIC BLOOD PRESSURE: 140 MMHG | OXYGEN SATURATION: 99 % | BODY MASS INDEX: 26.28 KG/M2

## 2021-06-28 DIAGNOSIS — I35.0 NONRHEUMATIC AORTIC VALVE STENOSIS: ICD-10-CM

## 2021-06-28 DIAGNOSIS — I10 ESSENTIAL HYPERTENSION: Primary | ICD-10-CM

## 2021-06-28 PROCEDURE — 99213 OFFICE O/P EST LOW 20 MIN: CPT | Performed by: FAMILY MEDICINE

## 2021-06-28 RX ORDER — ATENOLOL AND CHLORTHALIDONE TABLET 100; 25 MG/1; MG/1
0.5 TABLET ORAL DAILY
Qty: 90 TABLET | Refills: 1
Start: 2021-06-28 | End: 2021-10-13

## 2021-06-28 NOTE — PATIENT INSTRUCTIONS
Low-Sodium Diet   AMBULATORY CARE:   A low-sodium diet  limits foods that are high in sodium (salt)  You will need to follow a low-sodium diet if you have high blood pressure, kidney disease, or heart failure  You may also need to follow this diet if you have a condition that is causing your body to retain (hold) extra fluid  You may need to limit the amount of sodium you eat in a day to 1,500 to 2,000 mg  Ask your healthcare provider how much sodium you can have each day  How to use food labels to choose foods that are low in sodium:  Read food labels to find the amount of sodium they contain  The amount of sodium is listed in milligrams (mg)  The % Daily Value (DV) column tells you how much of your daily needs are met by 1 serving of the food for each nutrient listed  Choose foods that have less than 5% of the DV of sodium  These foods are considered low in sodium  Foods that have 20% or more of the DV of sodium are considered high in sodium  Some food labels may also list any of the following terms that tell you about the sodium content in the food:  · Sodium-free:  Less than 5 mg in each serving    · Very low sodium:  35 mg of sodium or less in each serving    · Low sodium:  140 mg of sodium or less in each serving    · Reduced sodium: At least 25% less sodium in each serving than the regular type    · Light in sodium:  50% less sodium in each serving    · Unsalted or no added salt:  No extra salt is added during processing (the food may still contain sodium)     Foods to avoid:  Salty foods are high in sodium  You should avoid the following:  · Processed foods:      ? Mixes for cornbread, biscuits, cake, and pudding     ? Instant foods, such as potatoes, cereals, noodles, and rice     ? Packaged foods, such as bread stuffing, rice and pasta mixes, snack dip mixes, and macaroni and cheese     ? Canned foods, such as canned vegetables, soups, broths, sauces, and vegetable or tomato juice    ?  Snack foods, such as salted chips, popcorn, pretzels, pork rinds, salted crackers, and salted nuts    ? Frozen foods, such as dinners, entrees, vegetables with sauces, and breaded meats    ? Sauerkraut, pickled vegetables, and other foods prepared in brine    · Meats and cheeses:      ? Smoked or cured meat, such as corned beef, henao, ham, hot dogs, and sausage    ? Canned meats or spreads, such as potted meats, sardines, anchovies, and imitation seafood    ? Deli or lunch meats, such as bologna, ham, turkey, and roast beef    ? Processed cheese, such as American cheese and cheese spreads    · Condiments, sauces, and seasonings:      ? Salt (¼ teaspoon of salt contains 575 mg of sodium)    ? Seasonings made with salt, such as garlic salt, celery salt, onion salt, and seasoned salt    ? Regular soy sauce, barbecue sauce, teriyaki sauce, steak sauce, Worcestershire sauce, and most flavored vinegars    ? Canned gravy and mixes     ? Regular condiments, such as mustard, ketchup, and salad dressings    ? Pickles and olives    ? Meat tenderizers and monosodium glutamate (MSG)    Foods to include:  Read the food label to find the exact amount of sodium in each serving  · Bread and cereal:  Try to choose breads with less than 80 mg of sodium per serving  ? Bread, roll, karina, tortilla, or unsalted crackers  ? Ready-to-eat cereals with less than 5% DV of sodium (examples include shredded wheat and puffed rice)    ? Pasta    · Vegetables and fruits:      ? Unsalted fresh, frozen, or canned vegetables    ? Fresh, frozen, or canned fruits    ? Fruit juice    · Dairy:  One serving has about 150 mg of sodium  ? Milk, all types    ? Yogurt    ? Hard cheese, such as cheddar, Swiss, [employer], or mozzarella    · Meat and other protein foods:  Some raw meats may have added sodium  ? Plain meats, fish, and poultry     ? Eggs    · Other foods:      ? Homemade pudding    ? Unsalted nuts, popcorn, or pretzels    ?  Unsalted butter or margarine    Ways to decrease sodium:   · Add spices and herbs to foods instead of salt during cooking  Use salt-free seasonings to add flavor to foods  Examples include onion powder, garlic powder, basil, alba powder, paprika, and parsley  Try lemon or lime juice or vinegar to give foods a tart flavor  Use hot peppers, pepper, or cayenne pepper to add a spicy flavor  · Do not keep a salt shaker at your kitchen table  This may help keep you from adding salt to food at the table  A teaspoon of salt has 2,300 mg of sodium  It may take time to get used to enjoying the natural flavor of food instead of adding salt  Talk to your healthcare provider before you use salt substitutes  Some salt substitutes have a high amount of potassium and need to be avoided if you have kidney disease  · Choose low-sodium foods at restaurants  Meals from restaurants are often high in sodium  Some restaurants have nutrition information on the menu that tells you the amount of sodium in their foods  If possible, ask for your food to be prepared with less, or no salt  · Shop for unsalted or low-sodium foods and snacks at the grocery store  Examples include unsalted or low-sodium broths, soups, and canned vegetables  Choose fresh or frozen vegetables instead  Choose unsalted nuts or seeds or fresh fruits or vegetables as snacks  Read food labels and choose salt-free, very low-sodium, or low-sodium foods  © Copyright [address] Information is for End User's use only and may not be sold, redistributed or otherwise used for commercial purposes  All illustrations and images included in CareNotes® are the copyrighted property of [employer] D [employer] M  Inc  or [facility] Ammon Snowden   The above information is an  only  It is not intended as medical advice for individual conditions or treatments  Talk to your doctor, nurse or pharmacist before following any medical regimen to see if it is safe and effective for you

## 2021-06-28 NOTE — PROGRESS NOTES
Assessment/Plan:    Essential hypertension    Patient has hypertension  I am not quite certain what the patient is doing with her medication  She needs to decrease the atenolol-chlorthalidone to 1/2 tablet daily  It appears that she decrease the amlodipine to from 10 mg to 5 mg daily  I do not know if they also cut back on the atenolol - chlorthalidone or not  I gave the patient's son a written copy of her medications  He is going to make sure she is taking the medicines correctly  I reminded the patient to follow a low-sodium diet  Aortic valve stenosis    Patient has aortic stenosis  I reviewed her echo which shows severe aortic stenosis  I also reviewed Dr Lucille Liu consultation from his last office visit  I explained to the patient's son that he believes that the aortic stenosis was overestimated on the echocardiogram   She has an upcoming appointment to see him in July  I look forward to his opinion  It should be noted that the patient is asymptomatic       Diagnoses and all orders for this visit:    Essential hypertension  -     atenolol-chlorthalidone (TENORETIC) 100-25 mg per tablet; Take 0 5 tablets by mouth daily    Nonrheumatic aortic valve stenosis          Subjective:      Patient ID: Maulik Zhou is a 80 y o  female  This is an 70-year-old white female who presents to the office today for follow-up of her hypertension  She was here today with her son  Her  did not come for the visit  Patient was having problems with low pulse rates  Her pulse rates were dipping into the 40s  I advised the patient to decrease her atenolol- chlorthalidone 100/25 to 1/2 tablet daily  Apparently, the patient was having some mild episodes of confusion as well  Her family attributed this to the heart rate  The patient does check her blood pressures at home on her own  She found her blood pressures to be much better than what we get in the office    However, she has written there 10 mg in the line where she dropped the dose to 5 mg  I am concerned that her  may have decreased the amlodipine to 5 mg per day rather than the atenolol, which was but was slowing her heart rate down  The patient explains that her  takes 5 mg and it works well for him  I asked the son who was here today and he is not quite sure what   His parents are doing  The following portions of the patient's history were reviewed and updated as appropriate: allergies, current medications, past family history, past medical history, past social history, past surgical history and problem list     Review of Systems   Cardiovascular: Negative for chest pain, palpitations and leg swelling  Gastrointestinal: Negative for abdominal distention, abdominal pain, blood in stool, constipation, diarrhea, nausea and rectal pain  Musculoskeletal: Positive for back pain and gait problem  Neurological:          Reports poor balance         Objective:      /82 (BP Location: Left arm, Patient Position: Sitting, Cuff Size: Adult)   Pulse 61   Temp (!) 97 4 °F (36 3 °C) (Temporal)   Ht 4' 10" (1 473 m)   Wt 56 8 kg (125 lb 3 2 oz)   SpO2 99%   BMI 26 17 kg/m²          Physical Exam  Vitals reviewed  Constitutional:       Comments: This patient is an 80-year-old white female who appears her stated age  She is cooperative and in no apparent distress   HENT:      Head: Normocephalic and atraumatic  Right Ear: Tympanic membrane, ear canal and external ear normal  There is no impacted cerumen  Left Ear: Tympanic membrane, ear canal and external ear normal  There is no impacted cerumen  Mouth/Throat:      Mouth: Mucous membranes are moist       Pharynx: Oropharynx is clear  No oropharyngeal exudate or posterior oropharyngeal erythema  Eyes:      General: No scleral icterus  Right eye: No discharge  Left eye: No discharge        Conjunctiva/sclera: Conjunctivae normal       Pupils: Pupils are equal, round, and reactive to light  Cardiovascular:      Rate and Rhythm: Normal rate and regular rhythm  Heart sounds: Murmur ( there is a grade 2/6 systolic murmur present in the aortic area  A2  Is normal   carotid upstroke was normal ) heard  Pulmonary:      Effort: Pulmonary effort is normal  No respiratory distress  Breath sounds: Normal breath sounds  No stridor  No wheezing, rhonchi or rales  Musculoskeletal:      Cervical back: Neck supple  Lymphadenopathy:      Cervical: No cervical adenopathy  Psychiatric:         Mood and Affect: Mood normal          Behavior: Behavior normal          Thought Content: Thought content normal          Judgment: Judgment normal         Extremities: Without cyanosis, clubbing, or edema

## 2021-06-28 NOTE — ASSESSMENT & PLAN NOTE
Patient has aortic stenosis  I reviewed her echo which shows severe aortic stenosis  I also reviewed Dr Sabino Galarza consultation from his last office visit  I explained to the patient's son that he believes that the aortic stenosis was overestimated on the echocardiogram   She has an upcoming appointment to see him in July  I look forward to his opinion    It should be noted that the patient is asymptomatic

## 2021-06-28 NOTE — ASSESSMENT & PLAN NOTE
Patient has hypertension  I am not quite certain what the patient is doing with her medication  She needs to decrease the atenolol-chlorthalidone to 1/2 tablet daily  It appears that she decrease the amlodipine to from 10 mg to 5 mg daily  I do not know if they also cut back on the atenolol - chlorthalidone or not  I gave the patient's son a written copy of her medications  He is going to make sure she is taking the medicines correctly  I reminded the patient to follow a low-sodium diet

## 2021-07-01 ENCOUNTER — EVALUATION (OUTPATIENT)
Dept: PHYSICAL THERAPY | Facility: HOME HEALTHCARE | Age: 86
End: 2021-07-01
Payer: MEDICARE

## 2021-07-01 ENCOUNTER — APPOINTMENT (OUTPATIENT)
Dept: PHYSICAL THERAPY | Facility: HOME HEALTHCARE | Age: 86
End: 2021-07-01
Payer: MEDICARE

## 2021-07-01 DIAGNOSIS — G89.4 CHRONIC PAIN SYNDROME: ICD-10-CM

## 2021-07-01 DIAGNOSIS — R26.9 GAIT ABNORMALITY: ICD-10-CM

## 2021-07-01 PROCEDURE — 97112 NEUROMUSCULAR REEDUCATION: CPT | Performed by: PHYSICAL THERAPIST

## 2021-07-01 PROCEDURE — 97163 PT EVAL HIGH COMPLEX 45 MIN: CPT | Performed by: PHYSICAL THERAPIST

## 2021-07-01 NOTE — PROGRESS NOTES
PT Evaluation     Today's date: 2021  Patient name: Carlos Bardales  : 1935  MRN: [id number]  Referring provider: LYNN Krishnamurthy  Dx:   Encounter Diagnosis     ICD-10-CM    1  Chronic pain syndrome  G89 4 Ambulatory referral to Physical Therapy   2  Gait abnormality  R26 9 Ambulatory referral to Physical Therapy                  Assessment  Assessment details: Pt Helen Finn is a 80 y o  who presents to [facility] with gait/balance dysfunction  PT notes that patient continues to demonstrate B LE mobility WFL, however significant strength deficits are evident  She is currently using a SPC for mobility with no reported falls at home  She completes stairs with non-reciprocal gait pattern for safety  Pt notes that she can do light cooking and cleaning at home but her  has been helping with the more strenuous activities  She did score 21/56 on GUILLERMO which is indicative of high falls risk at this time  Pt would benefit from skilled therapy services to address outlined impairments, work towards goals, and restore pts PLOF  Thank you! Impairments: abnormal gait, abnormal or restricted ROM, abnormal movement, activity intolerance, difficulty understanding, impaired balance, impaired physical strength, lacks appropriate home exercise program, pain with function, safety issue, weight-bearing intolerance and poor posture   Understanding of Dx/Px/POC: good   Prognosis: good    Goals  STGs to be achieved in 4 weeks:  -Pt to improve GUILLERMO score > 4 points for improved safety  -TUG score decrease by at least 5" for improved safety   -Pt to demonstrate increased MMT of BLE by at least 1/2 grade in order to improve safety and stability with ADLs and functional mobility       LTGs to be achieved upon discharge:   -Pt will be I with HEP in order to continue to improve quality of life and independence and reduce risk for re-injury    -Pt to demonstrate return to activities of daily living without limiations or restrictions    -Pt will return to ambulation with use of SPC > 30 minutes to help facilitate return to community activities independently   -GUILLERMO improved > 30 points for improved safety dynamics with daily household activities     Plan  Patient would benefit from: skilled physical therapy  Planned modality interventions: thermotherapy: hydrocollator packs  Planned therapy interventions: abdominal trunk stabilization, balance, manual therapy, neuromuscular re-education, patient education, postural training, therapeutic exercise, therapeutic activities, home exercise program, functional ROM exercises and gait training  Frequency: 2x week  Duration in weeks: 4  Plan of Care beginning date: 2021  Plan of Care expiration date: 2021  Treatment plan discussed with: patient and family        Subjective Evaluation    History of Present Illness  Mechanism of injury: Pt reporting that she is not steady walking lately and needs to use her cane  She states that the lower back was also bothering her but recent injection from pain management has helped and she no longer has the back pain  She saw family physician who feels she should work with therapy to improve her gait/balance     Quality of life: fair    Pain  At best pain rating: 3  At worst pain ratin  Quality: dull ache  Relieving factors: rest  Aggravating factors: standing and walking    Social Support  Lives with: spouse    Employment status: not working  Treatments  Current treatment: physical therapy  Patient Goals  Patient goals for therapy: increased strength, independence with ADLs/IADLs, increased motion, improved balance and decreased pain          Objective     Strength/Myotome Testing     Left Hip   Planes of Motion   Flexion: 3+  Abduction: 3+    Right Hip   Planes of Motion   Flexion: 3+  Abduction: 3+    Left Knee   Flexion: 4-  Extension: 4-    Right Knee   Flexion: 4-  Extension: 4-    Left Ankle/Foot   Dorsiflexion: 4-  Plantar flexion: 4-    Right Ankle/Foot   Dorsiflexion: 4-  Plantar flexion: 4-    Ambulation     Ambulation: Stairs   Able to perform: yes  Ascending: step-to  Ascend stairs: independent  Railings: 1  Descending: step-to  Descend stairs: independent  Railings: 1  Neuro Exam:     Transfers Sit to stand: independent     Functional outcomes   5x sit to stand: 23 (seconds)  TU (seconds)  Functional outcome gait comment: Griggs Balance Scale  1  Sitting to standing  3) able to stand independently using hands      2  Standing Unsupported  3) able to stand for 30 seconds     3  Sitting with back unsupported but feet supported on floor or on a stool  4)  able to sit safely and securely for 2 minutes      4  Standing to sitting  2)  uses back of legs against chair to control descent    5  Transfers  3) able to transfer safely definite need of hands    6  Standing unsupported with eyes closed  1) Unable to stand > 3 seconds     7  Standing unsupported with feet together  0) needs help to attain position and unable to hold for 15 seconds    8  Reaching forward with outstretched arm while standing  1) can reach forward, but needs supervision    9   object from the floor from a standing position  1) unable to  and needs supervision while trying    10  Turning to look behind over left and right shoulders while standing  2) turns sideways only but maintains balance     11  Turn 360 degrees  1) needs close supervision or verbal cues    12  Place alternate foot on step or stool while standing unsupported  0) needs assistance to keep from falling/unable to try    13  Standing unsupported one foot in front  0) loses balance while stepping or standing    14    Standing on one leg  0)  Unable to try, needs assist to prevent fall    TOTAL SCORE: 21 of maximum of 56  Score of < 45 = individuals may be at greater risk of falling and < 40 = associated with almost 100% fall risk  41-56 - Independent  21-40 - Walking with assistance               Re-eval Date: 8/1/21    Date 7/1       Visit Count 1/10       FOTO              Precautions: falls risk; chronic pain in L/S and R shoulder        Manuals 7/1                                       Neuro Re-Ed         Romberg  Firm EO   10"        WBOS with cone reaching         Side stepping         Big steps         Stepping over (low)         Obstacle course  Cone neg  - step overs                       Ther Ex        NuStep         LAQ        Hip add seated         Standing marches         Standing hip flex/abd        Step up B        QS        SAQ        SLR                        Ther Activity                        Gait Training        Without use of AD                 Modalities

## 2021-07-07 ENCOUNTER — OFFICE VISIT (OUTPATIENT)
Dept: PHYSICAL THERAPY | Facility: HOME HEALTHCARE | Age: 86
End: 2021-07-07
Payer: MEDICARE

## 2021-07-07 DIAGNOSIS — G89.4 CHRONIC PAIN SYNDROME: Primary | ICD-10-CM

## 2021-07-07 PROCEDURE — 97110 THERAPEUTIC EXERCISES: CPT

## 2021-07-07 PROCEDURE — 97112 NEUROMUSCULAR REEDUCATION: CPT

## 2021-07-07 NOTE — PROGRESS NOTES
Daily Note     Today's date: 2021  Patient name: Cinthya Hallman  : 1935  MRN: [id number]  Referring provider: LYNN Cummings  Dx: No diagnosis found  Start Time: 1050          Subjective: I have trouble with my R sh but my movement is getting better  Objective: See treatment diary below    Assessment: Tolerated treatment well  Pt requires constant S and vc's to remain focused and on task  CG and gait belt used for safety  Pt does report performing HEP daily  Pt required freq rests t/o ex 2* fatigue and did exhibit slight SOB at end of ambulation in clinic  SOB quickly resolved with rest   Patient would benefit from continued PT    Plan: Continue per plan of care          Re-eval Date: 21    Date 21      Visit Count 1/10 2/10      FOTO              Precautions: falls risk; chronic pain in L/S and R shoulder        Manuals 21                                      Neuro Re-Ed   21      Romberg  Firm EO   10"  Firm EO  10'      WBOS with cone reaching         Side stepping   P bars <> x 2      Big steps   P bars <> x2      Stepping over (low)   NV      Obstacle course  Cone neg  - step overs Cone fwd step overs <> x 3                      Ther Ex  21      NuStep   L 1  5'      LAQ  1 x 10      Hip add seated   1 x 10      Standing marches         Standing hip flex/abd        Step up B        QS        SAQ        SLR                        Ther Activity                        Gait Training  21      Without use of AD   1 lap  CG              Modalities

## 2021-07-13 ENCOUNTER — OFFICE VISIT (OUTPATIENT)
Dept: PHYSICAL THERAPY | Facility: HOME HEALTHCARE | Age: 86
End: 2021-07-13
Payer: MEDICARE

## 2021-07-13 DIAGNOSIS — R26.9 GAIT ABNORMALITY: ICD-10-CM

## 2021-07-13 DIAGNOSIS — G89.4 CHRONIC PAIN SYNDROME: Primary | ICD-10-CM

## 2021-07-13 PROCEDURE — 97110 THERAPEUTIC EXERCISES: CPT

## 2021-07-13 PROCEDURE — 97112 NEUROMUSCULAR REEDUCATION: CPT

## 2021-07-13 NOTE — PROGRESS NOTES
Daily Note     Today's date: 2021  Patient name: Jeremy Soto  : 1935  MRN: [id number]  Referring provider: LYNN Schultz  Dx:   Encounter Diagnosis     ICD-10-CM    1  Chronic pain syndrome  G89 4    2  Gait abnormality  R26 9                   Subjective: Pt reports she is doing ok today  Objective: See treatment diary below      Assessment: Tolerated treatment fairly well  Verbal cues and manual cues needed to perform exercises correctly and for upright posture  Progressed to standing marches and standing flexion and abduction today  Increased time on Nustep to 10 minutes and increased reps with some exercises  Fatigue noted with exercise with some seated rest breaks needed  Verbal cues needed for correct gait sequence, posture and safety with gait training without assistive device  Patient would benefit from continued PT      Plan: Continue per plan of care          Re-eval Date: 21    Date 21     Visit Count 1/10 2/10 3/10     FOTO              Precautions: falls risk; chronic pain in L/S and R shoulder        Manuals 21                                     Neuro Re-Ed   21      Romberg  Firm EO   10"  Firm EO  10' Firm EO    20" x 2       WBOS with cone reaching         Side stepping   P bars <> x 2 Pbars <> x 2     Big steps   P bars <> x2 Pbars <> x 2     Stepping over (low)   NV      Obstacle course  Cone neg  - step overs Cone fwd step overs <> x 3 Cone fwd step overs <> x 3                      Ther Ex  21      NuStep   L 1  5' L1  10 min     LAQ  1 x 10 1 x 15 Sarbjit     Hip add seated   1 x 10 1 x 15 5"     Standing marches    1x 15 Sarbjit      Standing hip flex/abd   1 x 10 ea Sarbjit      Step up B        QS        SAQ        SLR                        Ther Activity                        Gait Training  21      Without use of AD   1 lap  CG CG A x 1   1 1/2 lap              Modalities

## 2021-07-15 ENCOUNTER — OFFICE VISIT (OUTPATIENT)
Dept: PHYSICAL THERAPY | Facility: HOME HEALTHCARE | Age: 86
End: 2021-07-15
Payer: MEDICARE

## 2021-07-15 DIAGNOSIS — G89.4 CHRONIC PAIN SYNDROME: Primary | ICD-10-CM

## 2021-07-15 DIAGNOSIS — R26.9 GAIT ABNORMALITY: ICD-10-CM

## 2021-07-15 PROCEDURE — 97112 NEUROMUSCULAR REEDUCATION: CPT

## 2021-07-15 PROCEDURE — 97110 THERAPEUTIC EXERCISES: CPT

## 2021-07-15 NOTE — PROGRESS NOTES
Daily Note     Today's date: 7/15/2021  Patient name: Sera Bartlett  : 1935  MRN: [id number]  Referring provider: LYNN Galaviz  Dx: No diagnosis found  Start Time: 918          Subjective: I am tired today  I think the heat is wearing me out  I have no pain  Objective: See treatment diary below    Assessment: Tolerated treatment well  Added ambulation and balance activities as per flow sheet with good juvencio  Balance deficits noted but able to self correct without incident    Pt requires short rests t/o program 2* fatigue  VC's and examples needed for correct form with ex  Patient would benefit from continued PT    Plan: Continue per plan of care          Re-eval Date: 21    Date 7/1 7-7-21 7/13/21 7-15-21    Visit Count 1/10 2/10 3/10 4/10    FOTO              Precautions: falls risk; chronic pain in L/S and R shoulder        Manuals 7/1 7-7-21 7/13/21 7-15-21                                    Neuro Re-Ed   7-7-21  7-15-21    Romberg  Firm EO   10"  Firm EO  10' Firm EO    20" x 2   Firm EO  20" x 2    WBOS with cone reaching     1' x 2    Side stepping   P bars <> x 2 Pbars <> x 2 Pbars <> x 2    Big steps   P bars <> x2 Pbars <> x 2 NP    Tandem gait on t-band   NV  Pbars <> x 3    Obstacle course  Cone neg  - step overs Cone fwd step overs <> x 3 Cone fwd step overs <> x 3  Cone fwd step overs <> x 3                    Ther Ex  7-7-21  7-15-21    NuStep   L 1  5' L1  10 min L 1  10'    LAQ  1 x 10 1 x 15 Sarbjit 1 x 15 Sarbjit    Hip add seated   1 x 10 1 x 15 5" 1 x 15 5"      Standing marches    1x 15 Sarbjit  Sarbjit 1 x 15    Standing hip flex/abd   1 x 10 ea Sarbjit  1 x 10 ea Sarbjit     Step up B        QS        SAQ        SLR                        Ther Activity                        Gait Training  7-7-21  7-15-21    Without use of AD   1 lap  CG CG A x 1   1 1/2 lap      Amb w/ obstacle course    2 Round and 2 square foam  1/2/lap    CG A x 1    Modalities

## 2021-07-19 ENCOUNTER — OFFICE VISIT (OUTPATIENT)
Dept: PHYSICAL THERAPY | Facility: HOME HEALTHCARE | Age: 86
End: 2021-07-19
Payer: MEDICARE

## 2021-07-19 DIAGNOSIS — R26.9 GAIT ABNORMALITY: ICD-10-CM

## 2021-07-19 DIAGNOSIS — G89.4 CHRONIC PAIN SYNDROME: Primary | ICD-10-CM

## 2021-07-19 PROCEDURE — 97116 GAIT TRAINING THERAPY: CPT

## 2021-07-19 PROCEDURE — 97112 NEUROMUSCULAR REEDUCATION: CPT

## 2021-07-19 PROCEDURE — 97110 THERAPEUTIC EXERCISES: CPT

## 2021-07-19 NOTE — PROGRESS NOTES
Daily Note     Today's date: 2021  Patient name: Blessing Vasquez  : 1935  MRN: [id number]  Referring provider: LYNN Hayward  Dx:   Encounter Diagnosis     ICD-10-CM    1  Chronic pain syndrome  G89 4    2  Gait abnormality  R26 9                   Subjective: Pt reports she is ok today  Objective: See treatment diary below      Assessment: Tolerated treatment fairly well  Verbal cues and manual cues needed t/o exercise to perform correctly and also for upright posture  Fatigue noted with exercise with a few seated rest breaks needed  Pt challenged with gait training with obstacle course with unsteadiness noted stepping onto and off of foam   Verbal cues needed for correct gait sequence, posture and safety with gait training  Patient would benefit from continued PT      Plan: Continue per plan of care          Re-eval Date: 21    Date 7/1 7-7-21 7/13/21 7-15-21 7/19/21   Visit Count 1/10 2/10 310 4/10 5/10   FOTO              Precautions: falls risk; chronic pain in L/S and R shoulder        Manuals 7/1 7-7-21 7/13/21 7-15-21 7/19/21                                   Neuro Re-Ed   7-7-21  7-15-21    Romberg  Firm EO   10"  Firm EO  10' Firm EO    20" x 2   Firm EO  20" x 2 Firm EO  20" x 2   WBOS with cone reaching     1' x 2    Side stepping   P bars <> x 2 Pbars <> x 2 Pbars <> x 2 Pbars <> x 2    Big steps   P bars <> x2 Pbars <> x 2 NP    Tandem gait on t-band   NV  Pbars <> x 3 Pbars <> x 3   Obstacle course  Cone neg  - step overs Cone fwd step overs <> x 3 Cone fwd step overs <> x 3  Cone fwd step overs <> x 3 Cone Fwd step overs <> x 3                    Ther Ex  7-7-21  7-15-21    NuStep   L 1  5' L1  10 min L 1  10' L1  10 min    LAQ  1 x 10 1 x 15 Sarbjit 1 x 15 Sarbjit 1 x 15 Sarbjit   Hip add seated   1 x 10 1 x 15 5" 1 x 15 5"   1 x 15 5"   Standing marches    1x 15 Sarbjit  Sarbjit 1 x 15 Sarbjit 1 x 15      Standing hip flex/abd   1 x 10 ea Sarbjit  1 x 10 ea Sarbjit  1 x 10 ea Sarbjit    Step up B QS        SAQ        SLR                        Ther Activity                        Gait Training  7-7-21  7-15-21    Without use of AD   1 lap  CG CG A x 1   1 1/2 lap      Amb w/ obstacle course    2 Round and 2 square foam  1/2/lap    CG A x 1 2 round and 2 square foam  1 1/2 lap   CG A x 1     Modalities

## 2021-07-20 ENCOUNTER — OFFICE VISIT (OUTPATIENT)
Dept: CARDIOLOGY CLINIC | Facility: CLINIC | Age: 86
End: 2021-07-20
Payer: MEDICARE

## 2021-07-20 VITALS
HEIGHT: 58 IN | DIASTOLIC BLOOD PRESSURE: 75 MMHG | HEART RATE: 59 BPM | WEIGHT: 126 LBS | BODY MASS INDEX: 26.45 KG/M2 | SYSTOLIC BLOOD PRESSURE: 140 MMHG

## 2021-07-20 DIAGNOSIS — I35.0 NONRHEUMATIC AORTIC VALVE STENOSIS: ICD-10-CM

## 2021-07-20 DIAGNOSIS — I47.1 SUPRAVENTRICULAR TACHYCARDIA (HCC): Primary | ICD-10-CM

## 2021-07-20 DIAGNOSIS — I10 ESSENTIAL HYPERTENSION: ICD-10-CM

## 2021-07-20 DIAGNOSIS — R60.0 BILATERAL LEG EDEMA: ICD-10-CM

## 2021-07-20 PROBLEM — R77.8 ELEVATED TROPONIN: Status: RESOLVED | Noted: 2019-05-17 | Resolved: 2021-07-20

## 2021-07-20 PROBLEM — R79.89 ELEVATED TROPONIN: Status: RESOLVED | Noted: 2019-05-17 | Resolved: 2021-07-20

## 2021-07-20 PROCEDURE — 99214 OFFICE O/P EST MOD 30 MIN: CPT | Performed by: INTERNAL MEDICINE

## 2021-07-20 PROCEDURE — 93000 ELECTROCARDIOGRAM COMPLETE: CPT | Performed by: INTERNAL MEDICINE

## 2021-07-20 NOTE — ASSESSMENT & PLAN NOTE
Recent echo was read as severe but peak velocity was less than 3 m/sec  Exam is mild-to-moderate and I believe that is the true degree of aortic stenosis at this point

## 2021-07-20 NOTE — PROGRESS NOTES
Patient ID: Dwayne Ceron is a 80 y o  female  Plan:      Bilateral leg edema  Mild and likely related to amlodipine  Aortic valve stenosis  Recent echo was read as severe but peak velocity was less than 3 m/sec  Exam is mild-to-moderate and I believe that is the true degree of aortic stenosis at this point  Essential hypertension  Adequately controlled  Follow up Plan/Other summary comments:  One year EKG, echo, and follow-up visit  HPI:   Patient is seen in follow-up today regarding the above  Since the last visit she has felt reasonably well  She has mild leg edema  No chest pain or chest pressure  No recent change in exertional capacity  Her hearing remains limited  Results for orders placed or performed in visit on 07/20/21   POCT ECG    Impression    Sinus rhythm with PACs  Nonspecific ST segment changes  Most recent or relevant cardiac/vascular testing:    TTE 06/17/2020:  Normal LV function  2 75 m/sec flow across the aortic valve  TTE 06/24/2021:  Normal LV systolic function  Peak velocity across the aortic valve was 2 85 m/sec c/w at most moderate aortic stenosis  Mild to moderate pulmonary hypertension        Past Surgical History:   Procedure Laterality Date    DENTAL SURGERY Bilateral     ALL TEETH REMOVED    FL GUIDED NEEDLE PLAC BX/ASP/INJ  5/26/2021    JOINT REPLACEMENT Left     KNEE SURGERY      left knee replacement    PA INJECTION,SACROILIAC JOINT Bilateral 5/26/2021    Procedure: SACROILIAC JOINT INJECTION;  Surgeon: Christina Anne MD;  Location: [facility];  Service: Pain Management      CMP:   Lab Results   Component Value Date    K 3 8 04/21/2021    CL 91 (L) 04/21/2021    CO2 25 04/21/2021    BUN 17 04/21/2021    CREATININE 1 00 04/21/2021    EGFR 51 04/21/2021       Lipid Profile:   Lab Results   Component Value Date    TRIG 97 06/17/2020    HDL 68 06/17/2020         Review of Systems   10  point ROS  was otherwise non pertinent or negative except as per HPI or as below  Gait: Normal   Slow        Objective:     /75   Pulse 59   Ht 4' 10" (1 473 m)   Wt 57 2 kg (126 lb)   BMI 26 33 kg/m²     PHYSICAL EXAM:      General:  Normal appearance in no distress  Eyes:  Anicteric  Oral mucosa:  Moist   Neck:  No JVD  Carotid upstrokes are brisk without bruits  No masses  Chest:  Clear to auscultation and percussion  Cardiac:  Normal PMI  Normal S1 and S2   Grade 2 systolic murmur loudest at the base  Abdomen:  Soft and nontender  No palpable organomegaly or aortic enlargement  Extremities:  No peripheral edema  Musculoskeletal:  Symmetric  Vascular:  Femoral pulses are brisk without bruits  Popliteal pulses are intact bilaterally  Pedal pulses are intact  Neuro:  Grossly symmetric  Psych:  Alert and oriented x3            Current Outpatient Medications:     acetaminophen (Tylenol 8 Hour Arthritis Pain) 650 mg CR tablet, Take 650 mg by mouth every 8 (eight) hours as needed for mild pain , Disp: , Rfl:     ALPRAZolam (XANAX) 0 25 mg tablet, TAKE 1 TABLET BY MOUTH EVERY 6 HOURS AS NEEDED FOR ANXIETY, Disp: 120 tablet, Rfl: 2    amLODIPine (NORVASC) 10 mg tablet, Take 1 tablet (10 mg total) by mouth daily, Disp: 90 tablet, Rfl: 1    ammonium lactate (LAC-HYDRIN) 12 % lotion, , Disp: , Rfl:     atenolol-chlorthalidone (TENORETIC) 100-25 mg per tablet, Take 0 5 tablets by mouth daily, Disp: 90 tablet, Rfl: 1    Calcium Carb-Cholecalciferol (CALTRATE 600+D3 PO), Take 1 tablet by mouth 2 (two) times a day, Disp: , Rfl:     Omega-3 Fatty Acids (FISH OIL) 1200 MG CAPS, Take 1,200 mg by mouth 2 (two) times a day, Disp: , Rfl:     potassium chloride (K-DUR,KLOR-CON) 20 mEq tablet, Take 1 tablet by mouth once daily, Disp: 90 tablet, Rfl: 1    Psyllium (METAMUCIL FIBER) 51 7 % PACK, Take 1 Package by mouth daily, Disp: , Rfl: 0    ibuprofen (MOTRIN) 400 mg tablet, Take 1 tablet (400 mg total) by mouth every 8 (eight) hours as needed for moderate pain (Patient not taking: Reported on 6/1/2021), Disp: 90 tablet, Rfl: 0  No Known Allergies  Past Medical History:   Diagnosis Date    Aortic stenosis     Fatigue     Full dentures     Generalized anxiety disorder     Hyperlipidemia     Hypertension     Impaired fasting glucose     Mitral regurgitation     Pneumonia     SVT (supraventricular tachycardia)     Tricuspid regurgitation            Social History     Tobacco Use   Smoking Status Never Smoker   Smokeless Tobacco Never Used

## 2021-07-22 ENCOUNTER — OFFICE VISIT (OUTPATIENT)
Dept: PHYSICAL THERAPY | Facility: HOME HEALTHCARE | Age: 86
End: 2021-07-22
Payer: MEDICARE

## 2021-07-22 DIAGNOSIS — R26.9 GAIT ABNORMALITY: ICD-10-CM

## 2021-07-22 DIAGNOSIS — G89.4 CHRONIC PAIN SYNDROME: Primary | ICD-10-CM

## 2021-07-22 PROCEDURE — 97110 THERAPEUTIC EXERCISES: CPT | Performed by: PHYSICAL THERAPIST

## 2021-07-22 NOTE — PROGRESS NOTES
Daily Note     Today's date: 2021  Patient name: Holly Blair  : 1935  MRN: [id number]  Referring provider: LYNN Haque  Dx:   Encounter Diagnosis     ICD-10-CM    1  Chronic pain syndrome  G89 4    2  Gait abnormality  R26 9        Start Time: 923  Stop Time: 100  Total time in clinic (min): 38 minutes    Subjective: Pt reports that she had pain in her knee and her ankles are swollen  Notes that she would like to do seated exercises today  She did state that she went to her cardiologist yesterday and he felt that the swelling was not related to her heart that it was probably the medication  Objective: See treatment diary below      Assessment: Pt had continued reports of pain in her RLE  As per patient requested focused on seated exercises this visit, which patient was able to perform  When ambulating t/o the clinic pt does reach for external support to maintain balance  If patient continues to reach for external support consider changing AD from [facility] to rolling walker  Plan: Continue per plan of care          Re-eval Date: 21    Date 7/22 7-7-21 7/13/21 7-15-21 7/19/21   Visit Count 6/10 210 3/10 4/10 5/10   FOTO              Precautions: falls risk; chronic pain in L/S and R shoulder, difficulty hearing        Manuals 7/22  7/13/21 7-15-21 7/19/21                                   Neuro Re-Ed     7-15-21    Romberg    Firm EO    20" x 2   Firm EO  20" x 2 Firm EO  20" x 2   WBOS with cone reaching     1' x 2    Side stepping    Pbars <> x 2 Pbars <> x 2 Pbars <> x 2    Big steps    Pbars <> x 2 NP    Tandem gait on t-band     Pbars <> x 3 Pbars <> x 3   Obstacle course    Cone fwd step overs <> x 3  Cone fwd step overs <> x 3 Cone Fwd step overs <> x 3                    Ther Ex    7-15-21    NuStep  L1  10 min  L1  10 min L 1  10' L1  10 min    LAQ 1 x 20 Sarbjit  1 x 15 Sarbjit 1 x 15 Sarbjit 1 x 15 Sarbjit   Hip add seated  1 x 20 5"  1 x 15 5" 1 x 15 5"   1 x 15 5"   Standing marches  Seated x30  1x 15 Sarbjit  Sarbjit 1 x 15 Sarbjit 1 x 15      Standing hip flex/abd   1 x 10 ea Sarbjit  1 x 10 ea Sarbjit  1 x 10 ea Sarbjit    Step up B        QS        SAQ        SLR        Seated clamshells  RTB 5" x20       Seated HR/TR x20 ea       Ther Activity                        Gait Training    7-15-21    Without use of AD    CG A x 1   1 1/2 lap      Amb w/ obstacle course    2 Round and 2 square foam  1/2/lap    CG A x 1 2 round and 2 square foam  1 1/2 lap   CG A x 1     Modalities

## 2021-07-27 ENCOUNTER — OFFICE VISIT (OUTPATIENT)
Dept: PHYSICAL THERAPY | Facility: HOME HEALTHCARE | Age: 86
End: 2021-07-27
Payer: MEDICARE

## 2021-07-27 ENCOUNTER — TELEPHONE (OUTPATIENT)
Dept: FAMILY MEDICINE CLINIC | Facility: CLINIC | Age: 86
End: 2021-07-27

## 2021-07-27 DIAGNOSIS — G89.4 CHRONIC PAIN SYNDROME: Primary | ICD-10-CM

## 2021-07-27 DIAGNOSIS — M75.01 ADHESIVE CAPSULITIS OF RIGHT SHOULDER: Primary | ICD-10-CM

## 2021-07-27 DIAGNOSIS — R26.9 GAIT ABNORMALITY: ICD-10-CM

## 2021-07-27 PROCEDURE — 97110 THERAPEUTIC EXERCISES: CPT

## 2021-07-27 PROCEDURE — 97112 NEUROMUSCULAR REEDUCATION: CPT

## 2021-07-27 NOTE — PROGRESS NOTES
Daily Note     Today's date: 2021  Patient name: Yanna Peter  : 1935  MRN: [id number]  Referring provider: LYNN Raines  Dx:   Encounter Diagnosis     ICD-10-CM    1  Chronic pain syndrome  G89 4    2  Gait abnormality  R26 9                   Subjective: Pt reports she is doing a little better today and doesn't t have the swelling in her ankles like last session  Objective: See treatment diary below      Assessment: Tolerated treatment fairly well  Verbal cues and manual cues needed t/o exercise to perform correctly  Fatigue noted with exercise with a few seated rest breaks needed  Progressed to step ups today  Balance deficits noted with exercises  Patient would benefit from continued PT      Plan: Continue per plan of care        Re-eval Date: 21    Date 7/22 7/27/21 7/13/21 7-15-21 7/19/21   Visit Count 6/10 7/10 3/10 4/10 5/10   FOTO              Precautions: falls risk; chronic pain in L/S and R shoulder, difficulty hearing        Manuals 7/22 7/27/21 7/13/21 7-15-21 7/19/21                                   Neuro Re-Ed     7-15-21    Romberg   Firm EO  20" x 2  Firm EO    20" x 2   Firm EO  20" x 2 Firm EO  20" x 2   WBOS with cone reaching     1' x 2    Side stepping   Pbars <> x 3  Pbars <> x 2 Pbars <> x 2 Pbars <> x 2    Big steps    Pbars <> x 2 NP    Tandem gait on t-band   Pbars <> x 3   Pbars <> x 3 Pbars <> x 3   Obstacle course   Cone fwd step overs <>x 3  Cone fwd step overs <> x 3  Cone fwd step overs <> x 3 Cone Fwd step overs <> x 3                    Ther Ex    7-15-21    NuStep  L1  10 min L1  10 min L1  10 min L 1  10' L1  10 min    LAQ 1 x 20 Sarbjit 1 x 20 Sarbjit 1 x 15 Sarbjit 1 x 15 Sarbjit 1 x 15 Sarbjit   Hip add seated  1 x 20 5" 1 x 20 5" 1 x 15 5" 1 x 15 5"   1 x 15 5"   Standing marches  Seated x30 Seated x 20  1x 15 Sarbjit  Sarbjit 1 x 15 Sarbjit 1 x 15      Standing hip flex/abd  1 x 10 ea Sarbjit 1 x 10 ea Sarbjit  1 x 10 ea Sarbjit  1 x 10 ea Sarbjit    Step up B  B 1 x 10 Sarbjit      QS SAQ        SLR        Seated clamshells  RTB 5" x20 RTB 5" x 20       Seated HR/TR x20 ea X 20 ea       Ther Activity                        Gait Training    7-15-21    Without use of AD    CG A x 1   1 1/2 lap      Amb w/ obstacle course    2 Round and 2 square foam  1/2/lap    CG A x 1 2 round and 2 square foam  1 1/2 lap   CG A x 1     Modalities

## 2021-07-29 ENCOUNTER — OFFICE VISIT (OUTPATIENT)
Dept: PHYSICAL THERAPY | Facility: HOME HEALTHCARE | Age: 86
End: 2021-07-29
Payer: MEDICARE

## 2021-07-29 DIAGNOSIS — R26.9 GAIT ABNORMALITY: ICD-10-CM

## 2021-07-29 DIAGNOSIS — G89.4 CHRONIC PAIN SYNDROME: Primary | ICD-10-CM

## 2021-07-29 PROCEDURE — 97112 NEUROMUSCULAR REEDUCATION: CPT

## 2021-07-29 PROCEDURE — 97110 THERAPEUTIC EXERCISES: CPT

## 2021-08-03 ENCOUNTER — OFFICE VISIT (OUTPATIENT)
Dept: PHYSICAL THERAPY | Facility: HOME HEALTHCARE | Age: 86
End: 2021-08-03
Payer: MEDICARE

## 2021-08-03 DIAGNOSIS — G89.4 CHRONIC PAIN SYNDROME: Primary | ICD-10-CM

## 2021-08-03 PROCEDURE — 97112 NEUROMUSCULAR REEDUCATION: CPT

## 2021-08-03 PROCEDURE — 97110 THERAPEUTIC EXERCISES: CPT

## 2021-08-03 NOTE — PROGRESS NOTES
Daily Note     Today's date: 8/3/2021  Patient name: Blessing Vasquez  : 1935  MRN: [id number]  Referring provider: LYNN Hayward  Dx: No diagnosis found  Start Time: 745          Subjective: I will be getting a brace or wrap for my R ankle  I don't know exactly what it will be or when I will get it  The weather really did a number on me yesterday  Objective: See treatment diary below    Assessment: Tolerated treatment well  Edema noted B ankles R > L  Pt remains unsteady with ambulation and requires use of cane, p-bars and occasional CG for safety  Multiple vc's needed for correct use of arms during sit<>stand  SOB noted t/o session and pt was provided with occasional seated rests  Patient would benefit from continued PT    Plan: Continue per plan of care        Re-eval Date: 21    Date 21-3--21   Visit Count 6/10 710 810 9/10 5/10   FOTO              Precautions: falls risk; chronic pain in L/S and R shoulder, difficulty hearing        Manuals 7/22 7/27/21 7/29/21 8-3-21 7/19/21                                   Neuro Re-Ed     8-3-21    Romberg   Firm EO  20" x 2  Foam EO    20" x 2    Firm EO  20" x 2   WBOS with cone reaching         Side stepping   Pbars <> x 3  Pbars <> x 2 foam  Pbars <> x 3  foam Pbars <> x 2    Big steps     NP    Tandem gait on t-band   Pbars <> x 3  Pbars <> x 3  Foam tandem walk Pbars <> x 3  Foam tandem walk Pbars <> x 3   Obstacle course   Cone fwd step overs <>x 3  Cone fwd step overs <> x 3  Cone fwd step overs <> x 3 Cone Fwd step overs <> x 3                    Ther Ex    8-3-21    NuStep  L1  10 min L1  10 min L1  10 min L 1  10' L1  10 min    LAQ 1 x 20 Sarbjit 1 x 20 Sarbjit 1 x 20  Sarbjit 1 x 20  Sarbjit 1 x 15 Sarbjit   Hip add seated  1 x 20 5" 1 x 20 5" 1 x 20 5" 1 x 15 5"   1 x 15 5"   Standing marches  Seated x30 Seated x 20  1x 20 Sarbjit  Seated  Sarbjit 1 x 15 Sarbjit 1 x 15      Standing hip flex/abd 1 x 10 ea Sarbjit 1 x 10 ea Sarbjit 1 x 20 ea Sarbjit  1 x 10 ea Sarbjit  1 x 10 ea Sarbjit    Step up B  B 1 x 10 Sarbjit B 1 x 10 Sarbjit  B 1 x 10 Sarbjit    QS        SAQ        SLR        Seated clamshells  RTB 5" x20 RTB 5" x 20  RTB 5" x 20  Red  5" x 20    Seated HR/TR x20 ea X 20 ea  X 20 ea      Ther Activity                        Gait Training    8-3-21    Without use of AD         Amb w/ obstacle course     2 round and 2 square foam  1 1/2 lap   CG A x 1     Modalities

## 2021-08-06 ENCOUNTER — OFFICE VISIT (OUTPATIENT)
Dept: PHYSICAL THERAPY | Facility: HOME HEALTHCARE | Age: 86
End: 2021-08-06
Payer: MEDICARE

## 2021-08-06 DIAGNOSIS — G89.4 CHRONIC PAIN SYNDROME: Primary | ICD-10-CM

## 2021-08-06 DIAGNOSIS — R26.9 GAIT ABNORMALITY: ICD-10-CM

## 2021-08-06 PROCEDURE — 97164 PT RE-EVAL EST PLAN CARE: CPT | Performed by: PHYSICAL THERAPIST

## 2021-08-06 PROCEDURE — 97112 NEUROMUSCULAR REEDUCATION: CPT | Performed by: PHYSICAL THERAPIST

## 2021-08-06 NOTE — PROGRESS NOTES
PT Discharge    Today's date: 2021  Patient name: Cinthya Hallman  : 1935  MRN: [id number]  Referring provider: LYNN Cummings  Dx:   Encounter Diagnosis     ICD-10-CM    1  Chronic pain syndrome  G89 4    2  Gait abnormality  R26 9                   Assessment  Assessment details: Pt Micha Jc is a 80 y o  who presents to OPPT with gait/balance dysfunction  PT notes that patient continues to demonstrate B LE mobility WFL, however significant strength deficits are evident  She is currently using a SPC for mobility with no reported falls at home  She completes stairs with non-reciprocal gait pattern for safety  Pt notes that she can do light cooking and cleaning at home but her  has been helping with the more strenuous activities  She did score 21/56 on GUILLERMO which is indicative of high falls risk at this time  Pt would benefit from skilled therapy services to address outlined impairments, work towards goals, and restore pts PLOF  Thank you! UPDATE: Pt has shown good progression throughout therapy  She continues to have limited GUILLERMO balance score which is consistent with falls risk  She did show significant improvement with TUG and STSx5 scores  Pt also reporting that she can see a big difference in her mobility at home, and has been doing more cooking and walk around the house lately  She will be DC from OPPT in order to transition care focus to current R shoulder impairments   Thank you for this referral    Impairments: abnormal gait, abnormal or restricted ROM, activity intolerance, difficulty understanding, impaired balance, impaired physical strength, pain with function, safety issue and poor posture   Understanding of Dx/Px/POC: good   Prognosis: good    Goals  STGs to be achieved in 4 weeks:  -Pt to improve GUILLERMO score > 4 points for improved safety - partially met; improved 3 points   -TUG score decrease by at least 5" for improved safety - MET  -Pt to demonstrate increased MMT of BLE by at least 1/2 grade in order to improve safety and stability with ADLs and functional mobility  - MET    LTGs to be achieved upon discharge:   -Pt will be I with HEP in order to continue to improve quality of life and independence and reduce risk for re-injury  - MET  -Pt to demonstrate return to activities of daily living without limiations or restrictions  - MET  -Pt will return to ambulation with use of SPC > 30 minutes to help facilitate return to community activities independently - MET  -GUILLERMO improved > 30 points for improved safety dynamics with daily household activities - NOT MET     Plan  Plan details: DC from OPPT   Treatment plan discussed with: patient and family        Subjective Evaluation    History of Present Illness  Mechanism of injury: Pt reporting that she is not steady walking lately and needs to use her cane  She states that the lower back was also bothering her but recent injection from pain management has helped and she no longer has the back pain  She saw family physician who feels she should work with therapy to improve her gait/balance  UPDATE: Pt reporting that her back has been feeling better since getting an injection from pain management  She notes that she has not had any falls but does still feel unbalanced at home at times  She notes that she wants to be done with therapy for her back and balance so that she can start therapy for her R shoulder     Quality of life: fair    Pain  At best pain rating: 3  At worst pain ratin  Quality: dull ache  Relieving factors: rest  Aggravating factors: standing and walking    Social Support  Lives with: spouse    Employment status: not working  Treatments  Current treatment: physical therapy  Patient Goals  Patient goals for therapy: increased strength, independence with ADLs/IADLs, increased motion, improved balance and decreased pain          Objective     Strength/Myotome Testing     Left Hip   Planes of Motion   Flexion: 3+  Abduction: 3+    Right Hip   Planes of Motion   Flexion: 3+  Abduction: 3+    Left Knee   Flexion: 4-  Extension: 4-    Right Knee   Flexion: 4-  Extension: 4-    Left Ankle/Foot   Dorsiflexion: 4-  Plantar flexion: 4-    Right Ankle/Foot   Dorsiflexion: 4-  Plantar flexion: 4-    Ambulation     Ambulation: Stairs   Able to perform: yes  Ascending: step-to  Ascend stairs: independent  Railings: 1  Descending: step-to  Descend stairs: independent  Railings: 1  Neuro Exam:     Transfers Sit to stand: independent     Functional outcomes   5x sit to stand: 19 (seconds)  TU (seconds)  Functional outcome gait comment: Griggs Balance Scale  1  Sitting to standing  4) able to stand without use of hands     2  Standing Unsupported  3) able to stand for 30 seconds     3  Sitting with back unsupported but feet supported on floor or on a stool  4)  able to sit safely and securely for 2 minutes      4  Standing to sitting  3) able to control descent with use of hands     5  Transfers  3) able to transfer safely definite need of hands    6  Standing unsupported with eyes closed  1) Unable to stand > 3 seconds     7  Standing unsupported with feet together  1) able to stand > 15 seconds with close supervision     8  Reaching forward with outstretched arm while standing  1) can reach forward, but needs supervision    9   object from the floor from a standing position  1) unable to  and needs supervision while trying    10  Turning to look behind over left and right shoulders while standing  2) turns sideways only but maintains balance     11  Turn 360 degrees  1) needs close supervision or verbal cues    12  Place alternate foot on step or stool while standing unsupported  0) needs assistance to keep from falling/unable to try    13  Standing unsupported one foot in front  0) loses balance while stepping or standing    14    Standing on one leg  0)  Unable to try, needs assist to prevent fall    TOTAL SCORE: 24 of maximum of 56  Score of < 45 = individuals may be at greater risk of falling and < 40 = associated with almost 100% fall risk  41-56 - Independent  21-40 - Walking with assistance

## 2021-08-11 ENCOUNTER — EVALUATION (OUTPATIENT)
Dept: PHYSICAL THERAPY | Facility: HOME HEALTHCARE | Age: 86
End: 2021-08-11
Payer: MEDICARE

## 2021-08-11 DIAGNOSIS — M75.01 ADHESIVE CAPSULITIS OF RIGHT SHOULDER: ICD-10-CM

## 2021-08-11 PROCEDURE — 97162 PT EVAL MOD COMPLEX 30 MIN: CPT | Performed by: PHYSICAL THERAPIST

## 2021-08-11 PROCEDURE — 97110 THERAPEUTIC EXERCISES: CPT | Performed by: PHYSICAL THERAPIST

## 2021-08-11 PROCEDURE — 97140 MANUAL THERAPY 1/> REGIONS: CPT | Performed by: PHYSICAL THERAPIST

## 2021-08-11 NOTE — PROGRESS NOTES
PT Evaluation     Today's date: 2021  Patient name: Sera Bartlett  : 1935  MRN: [id number]  Referring provider: Lizzie Johnson DO  Dx:   Encounter Diagnosis     ICD-10-CM    1  Adhesive capsulitis of right shoulder  M75 01 Ambulatory referral to Physical Therapy                  Assessment  Assessment details: Pt Janette Nolan is a 80 y o  who presents to OPPT with R shoulder mobility impairments with possible RTC involvement  Pt presents with limited R shoulder AROM, decreased R UE strength, postural dysfunction, and impaired soft tissue mobility  Pt reporting difficulty with reaching OH to do her hair but otherwise notes that she still does all her usual household chores with modifications  She denies any pain in the shoulder both actively or passively  PROM is WFL at this time, suggestive that mm weakness is primary limiting factor  Pt would benefit from skilled therapy services to address outlined impairments, work towards goals, and restore pts PLOF  Thank you! Impairments: abnormal or restricted ROM, abnormal movement, activity intolerance, impaired physical strength, lacks appropriate home exercise program, pain with function, scapular dyskinesis, weight-bearing intolerance and poor posture   Understanding of Dx/Px/POC: good   Prognosis: good    Goals  STGs to be achieved in 4 weeks:  -Pt to demonstrate AROM R shoulder improved > 10* in order to maximize joint mobility and function and allow for progression of exercise program and achievement of goals    -Pt to demonstrate increased MMT of RUE by at least 1/2 grade in order to improve safety and stability with ADLs and functional mobility       LTGs to be achieved upon discharge:   -Pt will be I with HEP in order to continue to improve quality of life and independence and reduce risk for re-injury    -Pt to demonstrate return to activities of daily living without limiations or restrictions    -Pt will return to washing/styling her hair without difficulty or limitations   -Pt to demonstrate improved function as noted by achieving or exceeding predicted score on FOTO outcomes assessment tool  Plan  Plan details: PT provided pt with detailed HEP program; pt verbalized understanding of program    Patient would benefit from: skilled physical therapy  Planned modality interventions: thermotherapy: hydrocollator packs  Planned therapy interventions: manual therapy, neuromuscular re-education, patient education, postural training, therapeutic exercise, therapeutic activities, home exercise program, flexibility and functional ROM exercises  Frequency: 2x week  Duration in weeks: 4  Plan of Care beginning date: 8/11/2021  Plan of Care expiration date: 9/10/2021  Treatment plan discussed with: patient        Subjective Evaluation    History of Present Illness  Mechanism of injury: Pt reporting that she has trouble with the R shoulder for the past several months and thinks it was from reaching up too far overhead  She notes that she can't lift the arm up high enough to be able to do her hair; denies any pain with movement  She states that she saw Dr Sherry Ramirez and had a steroid injection in the R shoulder which did not help at all  She requested to come to therapy to address her continued pain  She will go back to see the Dr Lou Hraris on 9/14/21     Quality of life: fair    Pain  No pain reported  Aggravating factors: overhead activity and lifting    Social Support  Lives with: spouse    Employment status: not working  Hand dominance: right    Treatments  Previous treatment: injection treatment  Current treatment: physical therapy  Patient Goals  Patient goals for therapy: increased strength, independence with ADLs/IADLs and increased motion          Objective     Postural Observations  Seated posture: poor  Standing posture: poor    Additional Postural Observation Details  Bilateral rounded shoulders   Forward head posture with increased thoracic kyphosis Tenderness     Additional Tenderness Details  Denies any TTP t/o the R shoulder     Neurological Testing     Sensation     Shoulder   Left Shoulder   Intact: light touch    Right Shoulder   Intact: light touch    Active Range of Motion   Left Shoulder   Normal active range of motion    Right Shoulder   Flexion: 120 degrees   Abduction: 90 degrees   External rotation 0°: 30 degrees   Internal rotation 0°: Right shoulder active internal rotation at 0 degrees: to stomach  Left Elbow   Normal active range of motion    Right Elbow   Normal active range of motion    Passive Range of Motion     Right Shoulder   Flexion: 165 degrees   Abduction: 160 degrees     Strength/Myotome Testing     Left Shoulder     Planes of Motion   Flexion: 4-   Abduction: 4-   External rotation at 90°: 4-   Internal rotation at 90°: 4-     Right Shoulder     Planes of Motion   Flexion: 3-   Abduction: 3-   External rotation at 90°: 3-   Internal rotation at 90°: 3-     Left Elbow   Flexion: 4  Extension: 4    Right Elbow   Flexion: 3+  Extension: 3+    Tests     Right Shoulder   Negative drop arm, empty can and painful arc                  Re-eval Date: 9/11/21 - every 10 visits     Date 8/11       Visit Count 1/10       FOTO              Precautions: falls risks, c/o R ankle pain, cardiac history      Manuals 8/11       R shoulder  Seated   HZ                               Neuro Re-Ed         MTP/LTP        Sarbjit tband ER         Ball on wall         Wall angels                                 Ther Ex        UBE         Pulleys: Flex/abd        Finger ladder        Wall slides         Shrugs  HEP        Scapular retractions  HEP        Cane flex/abd Flex - HEP        Cane ER         Cane ext/IR        SA punches         Supine ABC        S/L Abd/ER        Doorway stretch                         Ther Activity                        Gait Training                        Modalities

## 2021-08-17 ENCOUNTER — OFFICE VISIT (OUTPATIENT)
Dept: PHYSICAL THERAPY | Facility: HOME HEALTHCARE | Age: 86
End: 2021-08-17
Payer: MEDICARE

## 2021-08-17 DIAGNOSIS — G89.4 CHRONIC PAIN SYNDROME: ICD-10-CM

## 2021-08-17 DIAGNOSIS — M75.01 ADHESIVE CAPSULITIS OF RIGHT SHOULDER: Primary | ICD-10-CM

## 2021-08-17 PROCEDURE — 97110 THERAPEUTIC EXERCISES: CPT | Performed by: PHYSICAL THERAPIST

## 2021-08-17 NOTE — PROGRESS NOTES
Daily Note     Today's date: 2021  Patient name: Ney Mckay  : 1935  MRN: [id number]  Referring provider: Miguel Kiser DO  Dx:   Encounter Diagnosis     ICD-10-CM    1  Adhesive capsulitis of right shoulder  M75 01    2  Chronic pain syndrome  G89 4                   Subjective: Pt reporting that she has been working on the exercises at home  Objective: See treatment diary below      Assessment: PT observed pt with ability to reach overhead activity without assistance today  She fatigues very quickly with minimal reps due to marked strength deficits  Plan: Continue per plan of care          Re-eval Date: 21 - every 10 visits     Date       Visit Count 1/10 2/10      FOTO              Precautions: falls risks, c/o R ankle pain, cardiac history      Manuals       R shoulder  Seated   HZ NP - WFL AROM                               Neuro Re-Ed         MTP/LTP        Sarbjit tband ER         Ball on wall         Wall angels                                 Ther Ex        UBE         Pulleys: Flex/abd  2 minutes   flex      Finger ladder        Wall slides   Flex x 10   Abd x 7   CGA       Elbow curls   2# 2 x 10       Shrugs  HEP        Scapular retractions  HEP  1 x 10 with tactile cues       Cane flex/abd Flex - HEP  X 15 ea seated       Seated isometrics   Manually   Flex/ext/abd   5" x 5       Seated FF/Abd  Flex 1# x 10   Abd 1# x 5                                                       Ther Activity                        Gait Training                        Modalities

## 2021-08-19 ENCOUNTER — OFFICE VISIT (OUTPATIENT)
Dept: PHYSICAL THERAPY | Facility: HOME HEALTHCARE | Age: 86
End: 2021-08-19
Payer: MEDICARE

## 2021-08-19 DIAGNOSIS — R26.9 GAIT ABNORMALITY: ICD-10-CM

## 2021-08-19 DIAGNOSIS — G89.4 CHRONIC PAIN SYNDROME: ICD-10-CM

## 2021-08-19 DIAGNOSIS — M75.01 ADHESIVE CAPSULITIS OF RIGHT SHOULDER: Primary | ICD-10-CM

## 2021-08-19 PROCEDURE — 97110 THERAPEUTIC EXERCISES: CPT

## 2021-08-19 NOTE — PROGRESS NOTES
Daily Note     Today's date: 2021  Patient name: Chalo Santana  : 1935  MRN: [id number]  Referring provider: Darrion Paz DO  Dx:   Encounter Diagnosis     ICD-10-CM    1  Adhesive capsulitis of right shoulder  M75 01    2  Chronic pain syndrome  G89 4    3  Gait abnormality  R26 9                   Subjective: Pt reports she is ok this morning  Objective: See treatment diary below      Assessment: Tolerated treatment fair  Verbal cues and manual cues needed t/o exercise to perform correctly  Progressed to MTP/LTP and bilateral theraband ER  Strength deficits noted RUE with exercise  Patient would benefit from continued PT      Plan: Continue per plan of care          Re-eval Date: 21 - every 10 visits     Date 21     Visit Count 1/10 2/10 3/10     FOTO              Precautions: falls risks, c/o R ankle pain, cardiac history      Manuals 21     R shoulder  Seated   HZ NP - WFL AROM  NP                             Neuro Re-Ed         MTP/LTP   Yellow 1 x 10 ea      Sarbjit tband ER    Yellow 1 x 10      Ball on wall         Wall angels                                 Ther Ex        UBE         Pulleys: Flex/abd  2 minutes   flex 2 min flex     Finger ladder        Wall slides   Flex x 10   Abd x 7   CGA  Flex x   abd x   CGA     Elbow curls   2# 2 x 10  2# 2 x 10      Shrugs  HEP   1 x 15     Scapular retractions  HEP  1 x 10 with tactile cues  1x 10 with VC's and MC's     Cane flex/abd Flex - HEP  X 15 ea seated  1 x 15 ea seated      Seated isometrics   Manually   Flex/ext/abd   5" x 5  5" x 5 ea      Seated FF/Abd  Flex 1# x 10   Abd 1# x 5  Flex 1# 1 x 10   abd 1# x                                                      Ther Activity                        Gait Training                        Modalities

## 2021-08-24 ENCOUNTER — OFFICE VISIT (OUTPATIENT)
Dept: PHYSICAL THERAPY | Facility: HOME HEALTHCARE | Age: 86
End: 2021-08-24
Payer: MEDICARE

## 2021-08-24 DIAGNOSIS — M75.01 ADHESIVE CAPSULITIS OF RIGHT SHOULDER: Primary | ICD-10-CM

## 2021-08-24 PROCEDURE — 97110 THERAPEUTIC EXERCISES: CPT

## 2021-08-24 NOTE — PROGRESS NOTES
Daily Note     Today's date: 2021  Patient name: Carlos Alberto Ramirez  : 1935  MRN: [id number]  Referring provider: Khushboo Pak DO  Dx:   Encounter Diagnosis     ICD-10-CM    1  Adhesive capsulitis of right shoulder  M75 01        Start Time: 910          Subjective: I did a lot this morning already  I stripped the bed and I made our salad for lunch  Objective: See treatment diary below    Assessment: Pt progressing slowly with strength and ROM, but continues to require VC, MV and visual cues for correct form and posture with most ex  Pt denied increased pain at end but does report muscle fatigue  Patient would benefit from continued PT    Plan: Continue per plan of care  Re-eval Date: 21 - every 10 visits     Date 21    Visit Count 1/10 2/10 3/10 4/10    FOTO              Precautions: falls risks, c/o R ankle pain, cardiac history      Manuals 21    R shoulder  Seated   HZ NP - WFL AROM  NP NP                            Neuro Re-Ed     21    MTP/LTP   Yellow 1 x 10 ea  Yellow  1 x 10 ea    Sarbjit tband ER    Yellow 1 x 10  Yellow 1 x 10    Ball on wall         Wall angels                                 Ther Ex        UBE     21    Pulleys: Flex/abd  2 minutes   flex 2 min flex 2' flex    Finger ladder        Wall slides   Flex x 10   Abd x 7   CGA  Flex x   abd x   CGA Flex/abd CGA  1 x 10 ea    Elbow curls   2# 2 x 10  2# 2 x 10  2# 2 x 10  Standing CG w/MC's    Shrugs  HEP   1 x 15 1# 1 x 15  Standing CG w/MC    Scapular retractions  HEP  1 x 10 with tactile cues  1x 10 with VC's and MC's 1 x 10    Cane flex/abd Flex - HEP  X 15 ea seated  1 x 15 ea seated  1 x 15 ea  seated    Seated isometrics   Manually   Flex/ext/abd   5" x 5  5" x 5 ea  5" x 5    Seated FF/Abd  Flex 1# x 10   Abd 1# x 5  Flex 1# 1 x 10   abd 1# x  Standing CG  MC Flex 1#  1 x10 ea                                                    Ther Activity Gait Training                        Modalities

## 2021-08-26 ENCOUNTER — OFFICE VISIT (OUTPATIENT)
Dept: PHYSICAL THERAPY | Facility: HOME HEALTHCARE | Age: 86
End: 2021-08-26
Payer: MEDICARE

## 2021-08-26 DIAGNOSIS — M75.01 ADHESIVE CAPSULITIS OF RIGHT SHOULDER: Primary | ICD-10-CM

## 2021-08-26 PROCEDURE — 97110 THERAPEUTIC EXERCISES: CPT

## 2021-08-26 NOTE — PROGRESS NOTES
Daily Note     Today's date: 2021  Patient name: Carlos Bardales  : 1935  MRN: [id number]  Referring provider: Anna Dias DO  Dx: No diagnosis found  Start Time: 913          Subjective: I have been awake since 3AM  Not because of my sh, just because I couldn't sleep  I don't have any pain right now  Objective: See treatment diary below    Assessment: Tolerated treatment well  Pt requires constant S, VC's and MC's as well as CG t/o session for safety and correct form  Pt with SOB more the usual today which requires frequent, short rests  Pt progressing slowly with pain free ROM of R sh  Patient would benefit from continued PT    Plan: Continue per plan of care          Re-eval Date: 21 - every 10 visits     Date 21   Visit Count 1/10 2/10 3/10 4/10 5/10   FOTO              Precautions: falls risks, c/o R ankle pain, cardiac history      Manuals 21   R shoulder  Seated   HZ NP - [place] AROM  NP NP NP                           Neuro Re-Ed     21   MTP/LTP   Yellow 1 x 10 ea  Yellow  1 x 10 ea Yellow  1 x 15 ea   Sarbjit tband ER    Yellow 1 x 10  Yellow 1 x 10 Yellow 1 x 10   Ball on wall         Wall angels                                 Ther Ex    21   UBE         Pulleys: Flex/abd  2 minutes   flex 2 min flex 2' flex 2" flex   Finger ladder        Wall slides   Flex x 10   Abd x 7   CGA  Flex x   abd x   CGA Flex/abd CGA  1 x 10 ea Flex/abd  1 x 10 ea   Elbow curls   2# 2 x 10  2# 2 x 10  2# 2 x 10  Standing CG w/MC's 2# 2 x 10  Standing CG  w/MC   Shrugs  HEP   1 x 15 1# 1 x 15  Standing CG w/MC 2# 1  x15  Standing CG w/MC   Scapular retractions  HEP  1 x 10 with tactile cues  1x 10 with VC's and MC's 1 x 10 2# 1 x10   Cane flex/abd Flex - HEP  X 15 ea seated  1 x 15 ea seated  1 x 15 ea  seated 1 x 15 ea  seated   Seated isometrics   Manually   Flex/ext/abd   5" x 5  5" x 5 ea  5" x 5 5" x 5   Seated FF/Abd  Flex 1# x 10   Abd 1# x 5  Flex 1# 1 x 10   abd 1# x  Standing CG  [facility] Flex 1#  1 x10 ea Standing  CG  Flex  1# 1 x 10 ea                                                   Ther Activity                        Gait Training                        Modalities

## 2021-08-31 ENCOUNTER — OFFICE VISIT (OUTPATIENT)
Dept: PHYSICAL THERAPY | Facility: HOME HEALTHCARE | Age: 86
End: 2021-08-31
Payer: MEDICARE

## 2021-08-31 DIAGNOSIS — G89.4 CHRONIC PAIN SYNDROME: ICD-10-CM

## 2021-08-31 DIAGNOSIS — M75.01 ADHESIVE CAPSULITIS OF RIGHT SHOULDER: Primary | ICD-10-CM

## 2021-08-31 DIAGNOSIS — R26.9 GAIT ABNORMALITY: ICD-10-CM

## 2021-08-31 PROCEDURE — 97110 THERAPEUTIC EXERCISES: CPT

## 2021-08-31 NOTE — PROGRESS NOTES
Daily Note     Today's date: 2021  Patient name: Eleonora Osorio  : 1935  MRN: [id number]  Referring provider: Radha Pimentel DO  Dx:   Encounter Diagnosis     ICD-10-CM    1  Adhesive capsulitis of right shoulder  M75 01    2  Chronic pain syndrome  G89 4    3  Gait abnormality  R26 9               Subjective: Pt reports she has no pain in her R shoulder this morning  Objective: See treatment diary below      Assessment: Tolerated treatment fair  Verbal cues and manual cues needed t/o exercise to perform correctly  Fatigue noted with exercise  Strength deficits still noted R UE  Patient would benefit from continued PT      Plan: Continue per plan of care          Re-eval Date: 21 - every 10 visits     Date 21   Visit Count 6/10 2/10 3/10 4/10 5/10   FOTO              Precautions: falls risks, c/o R ankle pain, cardiac history      Manuals 21   R shoulder  NP NP - [place]  NP NP NP                           Neuro Re-Ed     21   MTP/LTP Yellow   1 x 15 ea   Yellow 1 x 10 ea  Yellow  1 x 10 ea Yellow  1 x 15 ea   Sarbjit tband ER  Yellow 1 x 10   Yellow 1 x 10  Yellow 1 x 10 Yellow 1 x 10   Ball on wall         Wall angels                                 Ther Ex    21   UBE         Pulleys: Flex/abd 2 min flex 2 minutes   flex 2 min flex 2' flex 2" flex   Finger ladder        Wall slides  Flex/abd  1 x 10 ea  Flex x 10   Abd x 7   CGA  Flex x   abd x   CGA Flex/abd CGA  1 x 10 ea Flex/abd  1 x 10 ea   Elbow curls  2# 2 x 10  2# 2 x 10  2# 2 x 10  2# 2 x 10  Standing CG w/MC's 2# 2 x 10  Standing CG  w/MC   Shrugs  2# 1 x 15   1 x 15 1# 1 x 15  Standing CG w/MC 2# 1  x15  Standing CG w/MC   Scapular retractions  2# 1 x 10  1 x 10 with tactile cues  1x 10 with VC's and MC's 1 x 10 2# 1 x10   Cane flex/abd 1 x 15 ea seated  X 15 ea seated  1 x 15 ea seated  1 x 15 ea  seated 1 x 15 ea  seated   Seated isometrics  5" x 5 ea  Manually   Flex/ext/abd   5" x 5  5" x 5 ea  5" x 5 5" x 5   Seated FF/Abd 1#   1 x 10 flex   Flex 1# x 10   Abd 1# x 5  Flex 1# 1 x 10   abd 1# x  Standing CG  [facility] Flex 1#  1 x10 ea Standing  CG  Flex  1# 1 x 10 ea                                                   Ther Activity                        Gait Training                        Modalities

## 2021-09-02 ENCOUNTER — OFFICE VISIT (OUTPATIENT)
Dept: PHYSICAL THERAPY | Facility: HOME HEALTHCARE | Age: 86
End: 2021-09-02
Payer: MEDICARE

## 2021-09-02 DIAGNOSIS — M75.01 ADHESIVE CAPSULITIS OF RIGHT SHOULDER: Primary | ICD-10-CM

## 2021-09-02 PROCEDURE — 97110 THERAPEUTIC EXERCISES: CPT

## 2021-09-02 NOTE — PROGRESS NOTES
Daily Note     Today's date: 2021  Patient name: Erna Ojeda  : 1935  MRN: [id number]  Referring provider: Lee Sanchez DO  Dx: No diagnosis found  Start Time: 915          Subjective:  I am not good at the exercise  Objective: See treatment diary below    Assessment: Tolerated treatment well but did require frequent rests 2* muscle fatigue  She also requires vc's and gentle guidance for correct form with most ex as pt tends to compensate  Pt with strength and ROM deficits evident and would benefit from continued PT    Plan: Continue per plan of care          Re-eval Date: 21 - every 10 visits     Date 21   Visit Count 6/10 7/10 3/10 4/10 5/10   FOTO              Precautions: falls risks, c/o R ankle pain, cardiac history      Manuals 21   R shoulder  NP NP  NP NP NP                           Neuro Re-Ed     21   MTP/LTP Yellow   1 x 15 ea  Yellow  1 x 15 ea Yellow 1 x 10 ea  Yellow  1 x 10 ea Yellow  1 x 15 ea   Sarbjit tband ER  Yellow 1 x 10  Yellow 1 x 10 Yellow 1 x 10  Yellow 1 x 10 Yellow 1 x 10   Ball on wall         Wall angels                                 Ther Ex  21   UBE         Pulleys: Flex/abd 2 min flex 2 minutes   flex 2 min flex 2' flex 2" flex   Finger ladder        Wall slides  Flex/abd  1 x 10 ea  Flex/abd  1 x 10 ea Flex x   abd x   CGA Flex/abd CGA  1 x 10 ea Flex/abd  1 x 10 ea   Elbow curls  2# 2 x 10  2# 2 x 10  2# 2 x 10  2# 2 x 10  Standing CG w/MC's 2# 2 x 10  Standing CG  w/MC   Shrugs  2# 1 x 15  2# 1  x15 1 x 15 1# 1 x 15  Standing CG w/MC 2# 1  x15  Standing CG w/MC   Scapular retractions  2# 1 x 10  2# 1 x 10  1x 10 with VC's and MC's 1 x 10 2# 1 x10   Cane flex/abd 1 x 15 ea seated  X 15 ea seated  1 x 15 ea seated  1 x 15 ea  seated 1 x 15 ea  seated   Seated isometrics  5" x 5 ea  Manually   Flex/ext/abd   5" x 5  5" x 5 ea  5" x 5 5" x 5   Seated FF/Abd 1#   1 x 10 flex   Standing  1# 1 x 10 Flex 1# 1 x 10   abd 1# x  Standing CG  [facility] Flex 1#  1 x10 ea Standing  CG  Flex  1# 1 x 10 ea                                                   Ther Activity                        Gait Training                        Modalities

## 2021-09-09 ENCOUNTER — OFFICE VISIT (OUTPATIENT)
Dept: PHYSICAL THERAPY | Facility: HOME HEALTHCARE | Age: 86
End: 2021-09-09
Payer: MEDICARE

## 2021-09-09 DIAGNOSIS — G89.4 CHRONIC PAIN SYNDROME: ICD-10-CM

## 2021-09-09 DIAGNOSIS — M75.01 ADHESIVE CAPSULITIS OF RIGHT SHOULDER: Primary | ICD-10-CM

## 2021-09-09 DIAGNOSIS — R26.9 GAIT ABNORMALITY: ICD-10-CM

## 2021-09-09 PROCEDURE — 97112 NEUROMUSCULAR REEDUCATION: CPT

## 2021-09-09 PROCEDURE — 97110 THERAPEUTIC EXERCISES: CPT

## 2021-09-09 NOTE — PROGRESS NOTES
Daily Note     Today's date: 2021  Patient name: Sheila Aiken  : 1935  MRN: [id number]  Referring provider: Marta Barrientos DO  Dx:   Encounter Diagnosis     ICD-10-CM    1  Adhesive capsulitis of right shoulder  M75 01    2  Chronic pain syndrome  G89 4    3  Gait abnormality  R26 9                   Subjective: Pt reports that she is "not good today"      Objective: See treatment diary below      Assessment: Tolerated treatment well  Patient is given VC and tactile cues for proper ex technique and pacing for there ex  Pt with pain in shoulder today  Pt with weakness in R shoulder with decreased ABD  Pt to continue with therapy for strengthening for improving functional mobility  Plan: Continue per plan of care          Re-eval Date: 21 - every 10 visits     Date 21   Visit Count 6/10 7/10 8/10 4/10 5/10   FOTO              Precautions: falls risks, c/o R ankle pain, cardiac history      Manuals 21   R shoulder  NP NP  NP NP NP                           Neuro Re-Ed     21   MTP/LTP Yellow   1 x 15 ea  Yellow  1 x 15 ea Yellow  1 x 15 ea Yellow  1 x 10 ea Yellow  1 x 15 ea   Sarbjit tband ER  Yellow 1 x 10  Yellow 1 x 10 Yellow 1 x 10 Yellow 1 x 10 Yellow 1 x 10   Ball on wall         Wall angels                                 Ther Ex  21   UBE         Pulleys: Flex/abd 2 min flex 2 minutes   flex 2' flex 2' flex 2" flex   Finger ladder        Wall slides  Flex/abd  1 x 10 ea  Flex/abd  1 x 10 ea Flex/abd  1 x 10 ea Flex/abd CGA  1 x 10 ea Flex/abd  1 x 10 ea   Elbow curls  2# 2 x 10  2# 2 x 10  2# 2 x 10  2# 2 x 10  Standing CG w/MC's 2# 2 x 10  Standing CG  w/MC   Shrugs  2# 1 x 15  2# 1  x15 2# 2 x 10  1# 1 x 15  Standing CG w/MC 2# 1  x15  Standing CG w/MC   Scapular retractions  2# 1 x 10  2# 1 x 10  2# 2 x 10  1 x 10 2# 1 x10   Cane flex/abd 1 x 15 ea seated  X 15 ea seated  1 x 15 ea  seated 1 x 15 ea  seated 1 x 15 ea  seated   Seated isometrics  5" x 5 ea  Manually   Flex/ext/abd   5" x 5  Manually   Flex/ext/abd   5" x 5  5" x 5 5" x 5   Seated FF/Abd 1#   1 x 10 flex   Standing  1# 1 x 10 Standing  1# 1 x 10 ea Standing CG  [address] Flex 1#  1 x10 ea Standing  CG MC Flex  1# 1 x 10 ea                                                   Ther Activity                        Gait Training                        Modalities

## 2021-09-14 ENCOUNTER — OFFICE VISIT (OUTPATIENT)
Dept: PHYSICAL THERAPY | Facility: HOME HEALTHCARE | Age: 86
End: 2021-09-14
Payer: MEDICARE

## 2021-09-14 ENCOUNTER — LAB (OUTPATIENT)
Dept: LAB | Facility: CLINIC | Age: 86
End: 2021-09-14
Payer: MEDICARE

## 2021-09-14 ENCOUNTER — OFFICE VISIT (OUTPATIENT)
Dept: FAMILY MEDICINE CLINIC | Facility: CLINIC | Age: 86
End: 2021-09-14
Payer: MEDICARE

## 2021-09-14 VITALS
HEIGHT: 58 IN | OXYGEN SATURATION: 99 % | BODY MASS INDEX: 26.85 KG/M2 | DIASTOLIC BLOOD PRESSURE: 64 MMHG | WEIGHT: 127.9 LBS | TEMPERATURE: 97.6 F | SYSTOLIC BLOOD PRESSURE: 118 MMHG | HEART RATE: 64 BPM

## 2021-09-14 DIAGNOSIS — Z23 ENCOUNTER FOR IMMUNIZATION: ICD-10-CM

## 2021-09-14 DIAGNOSIS — M75.01 ADHESIVE CAPSULITIS OF RIGHT SHOULDER: Primary | ICD-10-CM

## 2021-09-14 DIAGNOSIS — Z79.899 ENCOUNTER FOR LONG-TERM (CURRENT) USE OF MEDICATIONS: ICD-10-CM

## 2021-09-14 DIAGNOSIS — I10 ESSENTIAL HYPERTENSION: ICD-10-CM

## 2021-09-14 DIAGNOSIS — M75.01 ADHESIVE CAPSULITIS OF RIGHT SHOULDER: ICD-10-CM

## 2021-09-14 DIAGNOSIS — I10 ESSENTIAL HYPERTENSION: Primary | ICD-10-CM

## 2021-09-14 DIAGNOSIS — M67.40 GANGLION CYST: ICD-10-CM

## 2021-09-14 LAB
ALBUMIN SERPL BCP-MCNC: 3.8 G/DL (ref 3.5–5)
ALP SERPL-CCNC: 61 U/L (ref 46–116)
ALT SERPL W P-5'-P-CCNC: 19 U/L (ref 12–78)
ANION GAP SERPL CALCULATED.3IONS-SCNC: 5 MMOL/L (ref 4–13)
AST SERPL W P-5'-P-CCNC: 19 U/L (ref 5–45)
BASOPHILS # BLD AUTO: 0.01 THOUSANDS/ΜL (ref 0–0.1)
BASOPHILS NFR BLD AUTO: 0 % (ref 0–1)
BILIRUB SERPL-MCNC: 0.61 MG/DL (ref 0.2–1)
BUN SERPL-MCNC: 16 MG/DL (ref 5–25)
CALCIUM SERPL-MCNC: 9.1 MG/DL (ref 8.3–10.1)
CHLORIDE SERPL-SCNC: 97 MMOL/L (ref 100–108)
CO2 SERPL-SCNC: 26 MMOL/L (ref 21–32)
CREAT SERPL-MCNC: 0.97 MG/DL (ref 0.6–1.3)
EOSINOPHIL # BLD AUTO: 0.08 THOUSAND/ΜL (ref 0–0.61)
EOSINOPHIL NFR BLD AUTO: 2 % (ref 0–6)
ERYTHROCYTE [DISTWIDTH] IN BLOOD BY AUTOMATED COUNT: 12.6 % (ref 11.6–15.1)
GFR SERPL CREATININE-BSD FRML MDRD: 53 ML/MIN/1.73SQ M
GLUCOSE SERPL-MCNC: 103 MG/DL (ref 65–140)
HCT VFR BLD AUTO: 37.7 % (ref 34.8–46.1)
HGB BLD-MCNC: 12.4 G/DL (ref 11.5–15.4)
IMM GRANULOCYTES # BLD AUTO: 0.01 THOUSAND/UL (ref 0–0.2)
IMM GRANULOCYTES NFR BLD AUTO: 0 % (ref 0–2)
LYMPHOCYTES # BLD AUTO: 0.95 THOUSANDS/ΜL (ref 0.6–4.47)
LYMPHOCYTES NFR BLD AUTO: 24 % (ref 14–44)
MCH RBC QN AUTO: 32.7 PG (ref 26.8–34.3)
MCHC RBC AUTO-ENTMCNC: 32.9 G/DL (ref 31.4–37.4)
MCV RBC AUTO: 100 FL (ref 82–98)
MONOCYTES # BLD AUTO: 0.59 THOUSAND/ΜL (ref 0.17–1.22)
MONOCYTES NFR BLD AUTO: 15 % (ref 4–12)
NEUTROPHILS # BLD AUTO: 2.38 THOUSANDS/ΜL (ref 1.85–7.62)
NEUTS SEG NFR BLD AUTO: 59 % (ref 43–75)
NRBC BLD AUTO-RTO: 0 /100 WBCS
PLATELET # BLD AUTO: 183 THOUSANDS/UL (ref 149–390)
PMV BLD AUTO: 9 FL (ref 8.9–12.7)
POTASSIUM SERPL-SCNC: 3.9 MMOL/L (ref 3.5–5.3)
PROT SERPL-MCNC: 7.3 G/DL (ref 6.4–8.2)
RBC # BLD AUTO: 3.79 MILLION/UL (ref 3.81–5.12)
SODIUM SERPL-SCNC: 128 MMOL/L (ref 136–145)
WBC # BLD AUTO: 4.02 THOUSAND/UL (ref 4.31–10.16)

## 2021-09-14 PROCEDURE — 97110 THERAPEUTIC EXERCISES: CPT

## 2021-09-14 PROCEDURE — 99213 OFFICE O/P EST LOW 20 MIN: CPT | Performed by: FAMILY MEDICINE

## 2021-09-14 PROCEDURE — 36415 COLL VENOUS BLD VENIPUNCTURE: CPT

## 2021-09-14 PROCEDURE — 90662 IIV NO PRSV INCREASED AG IM: CPT

## 2021-09-14 PROCEDURE — 97112 NEUROMUSCULAR REEDUCATION: CPT

## 2021-09-14 PROCEDURE — G0008 ADMIN INFLUENZA VIRUS VAC: HCPCS

## 2021-09-14 PROCEDURE — 80053 COMPREHEN METABOLIC PANEL: CPT

## 2021-09-14 PROCEDURE — 85025 COMPLETE CBC W/AUTO DIFF WBC: CPT

## 2021-09-14 NOTE — PROGRESS NOTES
Daily Note     Today's date: 2021  Patient name: Sheila Aiken  : 1935  MRN: [id number]  Referring provider: Marta Barrientos DO  Dx: No diagnosis found  Start Time: 1300          Subjective: I got my flu shot and bloodwork done today  Both were done at my [facility]  Objective: See treatment diary below    Assessment: Tolerated treatment well  Pt with slow progression to PT but has shown improvement in strength and pain free ROM since SOC  Increased strength noted today during manual resisted isometrics  VC's needed for correct form and to avoid compensation  Patient would benefit from continued PT    Plan: Continue per plan of care          Re-eval Date: 21 - every 10 visits     Date 21   Visit Count 610 7/10 8/10 9/10 5/10   FOTO              Precautions: falls risks, c/o R ankle pain, cardiac history      Manuals 21   R shoulder  NP NP  NP NP NP                           Neuro Re-Ed     21   MTP/LTP Yellow   1 x 15 ea  Yellow  1 x 15 ea Yellow  1 x 15 ea Yellow  1 x 15 ea Yellow  1 x 15 ea   Sarbjit tband ER  Yellow 1 x 10  Yellow 1 x 10 Yellow 1 x 10 Yellow 1 x 15 Yellow 1 x 10   Ball on wall         Wall angels                                 Ther Ex  21   UBE         Pulleys: Flex/abd 2 min flex 2 minutes   flex 2' flex 2' flex 2" flex   Finger ladder        Wall slides  Flex/abd  1 x 10 ea  Flex/abd  1 x 10 ea Flex/abd  1 x 10 ea Flex/abd CGA  1 x 10 ea Flex/abd  1 x 10 ea   Elbow curls  2# 2 x 10  2# 2 x 10  2# 2 x 10  2# 2 x 10   2# 2 x 10  Standing CG  w/MC   Shrugs  2# 1 x 15  2# 1  x15 2# 2 x 10  2# 2  x10 2# 1  x15  Standing CG w/MC   Scapular retractions  2# 1 x 10  2# 1 x 10  2# 2 x 10  2# 2 x 10 2# 1 x10   Cane flex/abd 1 x 15 ea seated  X 15 ea seated  1 x 15 ea  seated 1 x 15 ea  seated 1 x 15 ea  seated   Seated isometrics  5" x 5 ea  Manually   Flex/ext/abd   5" x 5 Manually   Flex/ext/abd   5" x 5  Manually  Flex/ext/abd   5" x 10 5" x 5   Seated FF/Abd 1#   1 x 10 flex   Standing  1# 1 x 10 Standing  1# 1 x 10 ea Standing   1# 2  x10 Standing  CG MC Flex  1# 1 x 10 ea                                                   Ther Activity                        Gait Training                        Modalities

## 2021-09-14 NOTE — ASSESSMENT & PLAN NOTE
Patient's range of motion has significantly improved  Pain level has decreased  I stressed to the patient the importance of maintaining home exercise program when she is discharged from physical therapy

## 2021-09-14 NOTE — ASSESSMENT & PLAN NOTE
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

## 2021-09-14 NOTE — ASSESSMENT & PLAN NOTE
Blood pressure is now under excellent control on her current regiment  I recheck her blood pressure myself today in the office and found to be as stated in chart  I reviewed her ambulatory blood pressure readings which were also excellent  I am going to continue her on amlodipine 10 mg daily and on a half tablet of the atenolol - chlorthalidone  I did order a CBC and a CMP  She can have that done today before she leaves

## 2021-09-14 NOTE — PROGRESS NOTES
Assessment/Plan:    Essential hypertension   Blood pressure is now under excellent control on her current regiment  I recheck her blood pressure myself today in the office and found to be as stated in chart  I reviewed her ambulatory blood pressure readings which were also excellent  I am going to continue her on amlodipine 10 mg daily and on a half tablet of the atenolol - chlorthalidone  I did order a CBC and a CMP  She can have that done today before she leaves  Ganglion cyst    I explained to the patient that she has a ganglion cyst of the left wrist   I explained what this is  At this time, I would not recommend treatment for her  I think the discomfort she experienced in her wrist is 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  Adhesive capsulitis of right shoulder    Patient's range of motion has significantly improved  Pain level has decreased  I stressed to the patient the importance of maintaining home exercise program when she is discharged from physical therapy  Diagnoses and all orders for this visit:    Essential hypertension  -     Comprehensive metabolic panel; Future    Encounter for long-term (current) use of medications  -     CBC and differential; Future    Ganglion cyst    Adhesive capsulitis of right shoulder    Encounter for immunization  -     influenza vaccine, high-dose, PF 0 7 mL (FLUZONE HIGH-DOSE)          Subjective:      Patient ID: Bryan Carnes is a 80 y o  female  This is an 79-year-old white female who presents to the office today for her routine checkup  She is accompanied to the office today by her   The patient is doing well and has no complaints  She has been checking her blood pressures regularly at home and has found them to be well controlled  She reports no side effects from her medication  She has been attending physical therapy for her shoulder    She tells me that physical therapy is difficult but she believes it is helping  The following portions of the patient's history were reviewed and updated as appropriate: allergies, current medications, past family history, past medical history, past social history, past surgical history and problem list     Review of Systems   Respiratory: Negative for cough and shortness of breath  Cardiovascular: Negative for chest pain, palpitations and leg swelling  Gastrointestinal: Negative for abdominal distention, abdominal pain, blood in stool, constipation, diarrhea and nausea  Musculoskeletal: Positive for arthralgias  Positive for shoulder pain   Neurological:          Denies any falls         Objective:      /64 (BP Location: Left arm, Patient Position: Sitting, Cuff Size: Adult)   Pulse 64   Temp 97 6 °F (36 4 °C) (Tympanic)   Ht 4' 10" (1 473 m)   Wt 58 kg (127 lb 14 4 oz)   SpO2 99%   BMI 26 73 kg/m²          Physical Exam  Vitals reviewed  Constitutional:       Comments: This is a pleasant 51-year-old white female who appears her stated age  She is in no apparent distress   HENT:      Head: Normocephalic and atraumatic  Right Ear: Tympanic membrane, ear canal and external ear normal  There is no impacted cerumen  Left Ear: Tympanic membrane, ear canal and external ear normal  There is no impacted cerumen  Mouth/Throat:      Mouth: Mucous membranes are moist       Pharynx: Oropharynx is clear  No oropharyngeal exudate or posterior oropharyngeal erythema  Eyes:      General: No scleral icterus  Right eye: No discharge  Left eye: No discharge  Conjunctiva/sclera: Conjunctivae normal       Pupils: Pupils are equal, round, and reactive to light  Neck:      Comments:  No thyromegaly noted  Cardiovascular:      Rate and Rhythm: Normal rate and regular rhythm  Heart sounds: Normal heart sounds  No murmur heard  No friction rub  No gallop      Pulmonary:      Effort: Pulmonary effort is normal  No respiratory distress  Breath sounds: Normal breath sounds  No stridor  No wheezing, rhonchi or rales  Abdominal:      General: Bowel sounds are normal  There is no distension  Palpations: Abdomen is soft  There is no mass  Tenderness: There is no abdominal tenderness  There is no guarding  Musculoskeletal:      Cervical back: Neck supple  Comments: There is somewhat decreased range of motion of the right shoulder but overall, range of motion has significantly improved from when I last examined her  Lymphadenopathy:      Cervical: No cervical adenopathy  Psychiatric:         Mood and Affect: Mood normal          Behavior: Behavior normal          Thought Content:  Thought content normal          Judgment: Judgment normal         Extremities: Without cyanosis, clubbing, or edema

## 2021-09-16 ENCOUNTER — OFFICE VISIT (OUTPATIENT)
Dept: PHYSICAL THERAPY | Facility: HOME HEALTHCARE | Age: 86
End: 2021-09-16
Payer: MEDICARE

## 2021-09-16 DIAGNOSIS — G89.4 CHRONIC PAIN SYNDROME: ICD-10-CM

## 2021-09-16 DIAGNOSIS — R26.9 GAIT ABNORMALITY: ICD-10-CM

## 2021-09-16 DIAGNOSIS — M75.01 ADHESIVE CAPSULITIS OF RIGHT SHOULDER: Primary | ICD-10-CM

## 2021-09-16 PROCEDURE — 97112 NEUROMUSCULAR REEDUCATION: CPT

## 2021-09-16 PROCEDURE — 97110 THERAPEUTIC EXERCISES: CPT

## 2021-09-16 NOTE — PROGRESS NOTES
Daily Note     Today's date: 2021  Patient name: Erna Ojeda  : 1935  MRN: [id number]  Referring provider: Lee Sanchez DO  Dx:   Encounter Diagnosis     ICD-10-CM    1  Adhesive capsulitis of right shoulder  M75 01    2  Chronic pain syndrome  G89 4    3  Gait abnormality  R26 9               Subjective: Pt reports she has no pain R shoulder  Objective: See treatment diary below      Assessment: Tolerated treatment fairly well  Verbal cues and manual cues needed t/o exercise to perform correctly  Some fatigue noted with exercise  Strength deficits noted R UE  Patient would benefit from continued PT      Plan: Continue per plan of care  Re-eval next session          Re-eval Date: 21 - every 10 visits     Date 21   Visit Count 6/10 7/10 8/10 9/10 10/10   FOTO              Precautions: falls risks, c/o R ankle pain, cardiac history      Manuals 21   R shoulder  NP NP  NP NP NP                           Neuro Re-Ed     21    MTP/LTP Yellow   1 x 15 ea  Yellow  1 x 15 ea Yellow  1 x 15 ea Yellow  1 x 15 ea Yellow  1 x 15 ea   Sarbjit tband ER  Yellow 1 x 10  Yellow 1 x 10 Yellow 1 x 10 Yellow 1 x 15 Yellow 1 x 15   Ball on wall         Wall angels                                 Ther Ex  21    UBE         Pulleys: Flex/abd 2 min flex 2 minutes   flex 2' flex 2' flex 2" flex   Finger ladder        Wall slides  Flex/abd  1 x 10 ea  Flex/abd  1 x 10 ea Flex/abd  1 x 10 ea Flex/abd CGA  1 x 10 ea Flex/abd  1 x 10 ea   Elbow curls  2# 2 x 10  2# 2 x 10  2# 2 x 10  2# 2 x 10   2# 2 x 10     Shrugs  2# 1 x 15  2# 1  x15 2# 2 x 10  2# 2  x10 2# 2 x 10    Scapular retractions  2# 1 x 10  2# 1 x 10  2# 2 x 10  2# 2 x 10 2# 1 x10   Cane flex/abd 1 x 15 ea seated  X 15 ea seated  1 x 15 ea  seated 1 x 15 ea  seated 1 x 20 ea  seated   Seated isometrics  5" x 5 ea  Manually   Flex/ext/abd   5" x 5  Manually Flex/ext/abd   5" x 5  Manually  Flex/ext/abd   5" x 10 5" x 10 manually Flex/abd/ext   Seated FF/Abd 1#   1 x 10 flex   Standing  1# 1 x 10 Standing  1# 1 x 10 ea Standing   1# 2  x10 Seated   1# 2 x 10 ea                                                   Ther Activity                        Gait Training                        Modalities

## 2021-09-21 ENCOUNTER — APPOINTMENT (OUTPATIENT)
Dept: PHYSICAL THERAPY | Facility: HOME HEALTHCARE | Age: 86
End: 2021-09-21
Payer: MEDICARE

## 2021-09-23 ENCOUNTER — EVALUATION (OUTPATIENT)
Dept: PHYSICAL THERAPY | Facility: HOME HEALTHCARE | Age: 86
End: 2021-09-23
Payer: MEDICARE

## 2021-09-23 DIAGNOSIS — M25.531 RIGHT WRIST PAIN: ICD-10-CM

## 2021-09-23 DIAGNOSIS — M75.01 ADHESIVE CAPSULITIS OF RIGHT SHOULDER: Primary | ICD-10-CM

## 2021-09-23 PROCEDURE — 97110 THERAPEUTIC EXERCISES: CPT

## 2021-09-23 PROCEDURE — 97112 NEUROMUSCULAR REEDUCATION: CPT

## 2021-09-23 NOTE — PROGRESS NOTES
Daily Note     Today's date: 2021  Patient name: Kervin Tierney  : 1935  MRN: [id number]  Referring provider: Karthik Urrutia DO  Dx:   Encounter Diagnosis     ICD-10-CM    1  Adhesive capsulitis of right shoulder  M75 01    2  Right wrist pain  M25 531        Start Time: 915          Subjective: I fell asleep on my couch the other day and woke up with a kink in my sh and LB  I use my heating pad and am feeling better today  I don't really have any pain this morning  I still have a hard time reaching my head to do my hair  Objective: See treatment diary below    Assessment: Tolerated treatment well  VC's and examples need for correct form and to avoid compensation  Pt with strength and ROM deficits in all planes  Pt with muscle fatigue and weakness evident with most ex today  Modified wt and reps accordingly  Patient would benefit from continued PT    Plan: Continue per plan of care    See RE for full findings       Re-eval Date: 21 - every 10 visits     Date 21   Visit Count 11/10 7/10 8/10 9/10 10/10   FOTO              Precautions: falls risks, c/o R ankle pain, cardiac history      Manuals 21   R shoulder  NP NP  NP NP NP                           Neuro Re-Ed  21    MTP/LTP Yellow   1 x 20 ea  Yellow  1 x 15 ea Yellow  1 x 15 ea Yellow  1 x 15 ea Yellow  1 x 15 ea   Sarbjit tband ER  Unable 2*   improper form Yellow 1 x 10 Yellow 1 x 10 Yellow 1 x 15 Yellow 1 x 15   Ball on wall         Wall angels                                 Ther Ex 21    UBE         Pulleys: Flex/abd 2 min flex 2 minutes   flex 2' flex 2' flex 2" flex   Finger ladder        Wall slides  Flex/abd  1 x 10 ea  Flex/abd  1 x 10 ea Flex/abd  1 x 10 ea Flex/abd CGA  1 x 10 ea Flex/abd  1 x 10 ea   Elbow curls  1# 1 x 15 2# 2 x 10  2# 2 x 10  2# 2 x 10   2# 2 x 10     Shrugs  1# 2 x 10  2# 1  x15 2# 2 x 10  2# 2  x10 2# 2 x 10    Scapular retractions  1# 2 x 10  2# 1 x 10  2# 2 x 10  2# 2 x 10 2# 1 x10   Cane flex/abd 1 x 15 ea seated  X 15 ea seated  1 x 15 ea  seated 1 x 15 ea  seated 1 x 20 ea  seated   Seated isometrics  5" x 10 manually  Flex/abd/ext ea  Manually   Flex/ext/abd   5" x 5  Manually   Flex/ext/abd   5" x 5  Manually  Flex/ext/abd   5" x 10 5" x 10 manually Flex/abd/ext   Seated FF/Abd 0#   1 x 10 flex   Standing  1# 1 x 10 Standing  1# 1 x 10 ea Standing   1# 2  x10 Seated   1# 2 x 10 ea                                                   Ther Activity                        Gait Training                        Modalities

## 2021-09-23 NOTE — PROGRESS NOTES
PT Re-Evaluation     Today's date: 2021  Patient name: Claudean Bullock  : 1935  MRN: [id number]  Referring provider: Danielle Kilpatrick DO  Dx:   Encounter Diagnosis     ICD-10-CM    1  Adhesive capsulitis of right shoulder  M75 01    2  Right wrist pain  M25 531        Start Time: 915  Stop Time: 1005  Total time in clinic (min): 50 minutes    Assessment  Assessment details: Pt Tripp Vanegas is a 80 y o  who presents to OPPT with R shoulder mobility impairments with possible RTC involvement  Pt presents with limited R shoulder AROM, decreased R UE strength, postural dysfunction, and impaired soft tissue mobility  Pt reporting difficulty with reaching OH to do her hair but otherwise notes that she still does all her usual household chores with modifications  She denies any pain in the shoulder both actively or passively  PROM is WFL at this time, suggestive that mm weakness is primary limiting factor  Pt would benefit from skilled therapy services to address outlined impairments, work towards goals, and restore pts PLOF  Thank you! UPDATE: Pt has attended 5 weeks of OPPT since [facility]  PT observes pt demonstrating R shoulder AROM WFL at this time, however significant strength deficits remain evident  She notes that she is trying to help with the cooking and laundry at home but still has difficulty reaching [facility]  She also relies on her  to help for lifting/carrying items  She will continue with OPPT with a focus on regaining functional strength in the R shoulder   Thank you for this referral    Impairments: abnormal movement, activity intolerance, impaired physical strength, pain with function, scapular dyskinesis, weight-bearing intolerance and poor posture   Understanding of Dx/Px/POC: good   Prognosis: good    Goals  STGs to be achieved in 4 weeks:  -Pt to demonstrate AROM R shoulder improved > 10* in order to maximize joint mobility and function and allow for progression of exercise program and achievement of goals  - MET  -Pt to demonstrate increased MMT of RUE by at least 1/2 grade in order to improve safety and stability with ADLs and functional mobility  - ONGOING     LTGs to be achieved upon discharge:   -Pt will be I with HEP in order to continue to improve quality of life and independence and reduce risk for re-injury    -Pt to demonstrate return to activities of daily living without limiations or restrictions    -Pt will return to washing/styling her hair without difficulty or limitations   -Pt to demonstrate improved function as noted by achieving or exceeding predicted score on FOTO outcomes assessment tool  Plan  Plan details: PT provided pt with detailed HEP program; pt verbalized understanding of program    Patient would benefit from: skilled physical therapy  Planned modality interventions: thermotherapy: hydrocollator packs  Planned therapy interventions: manual therapy, neuromuscular re-education, patient education, postural training, therapeutic exercise, therapeutic activities, home exercise program, flexibility and functional ROM exercises  Frequency: 2x week  Duration in weeks: 4  Plan of Care beginning date: 9/23/2021  Plan of Care expiration date: 10/22/2021  Treatment plan discussed with: patient        Subjective Evaluation    History of Present Illness  Mechanism of injury: Pt reporting that she has trouble with the R shoulder for the past several months and thinks it was from reaching up too far overhead  She notes that she can't lift the arm up high enough to be able to do her hair; denies any pain with movement  She states that she saw Dr Ghazala Macedo and had a steroid injection in the R shoulder which did not help at all  She requested to come to therapy to address her continued pain  She will go back to see the Dr Virgen Tatum on 9/14/21     Quality of life: fair    Pain  No pain reported  Aggravating factors: overhead activity and lifting    Social Support  Lives with: spouse    Employment status: not working  Hand dominance: right    Treatments  Previous treatment: injection treatment  Current treatment: physical therapy  Patient Goals  Patient goals for therapy: increased strength, independence with ADLs/IADLs and increased motion          Objective     Postural Observations  Seated posture: poor  Standing posture: poor    Additional Postural Observation Details  Bilateral rounded shoulders   Forward head posture with increased thoracic kyphosis     Tenderness     Additional Tenderness Details  Denies any TTP t/o the R shoulder     Neurological Testing     Sensation     Shoulder   Left Shoulder   Intact: light touch    Right Shoulder   Intact: light touch    Active Range of Motion   Left Shoulder   Normal active range of motion    Right Shoulder   Flexion: 160 degrees   Abduction: 150 degrees   External rotation 0°: 30 degrees   Internal rotation 0°: Right shoulder active internal rotation at 0 degrees: to stomach  Left Elbow   Normal active range of motion    Right Elbow   Normal active range of motion    Passive Range of Motion     Right Shoulder   Flexion: 170 degrees   Abduction: 170 degrees     Strength/Myotome Testing     Left Shoulder     Planes of Motion   Flexion: 4-   Abduction: 4-   External rotation at 90°: 4-   Internal rotation at 90°: 4-     Right Shoulder     Planes of Motion   Flexion: 3   Abduction: 3   External rotation at 90°: 3   Internal rotation at 90°: 3     Left Elbow   Flexion: 4  Extension: 4    Right Elbow   Flexion: 3+  Extension: 3+    Tests     Right Shoulder   Negative drop arm, empty can and painful arc

## 2021-09-30 ENCOUNTER — OFFICE VISIT (OUTPATIENT)
Dept: PHYSICAL THERAPY | Facility: HOME HEALTHCARE | Age: 86
End: 2021-09-30
Payer: MEDICARE

## 2021-09-30 DIAGNOSIS — M75.01 ADHESIVE CAPSULITIS OF RIGHT SHOULDER: Primary | ICD-10-CM

## 2021-09-30 PROCEDURE — 97112 NEUROMUSCULAR REEDUCATION: CPT

## 2021-09-30 PROCEDURE — 97110 THERAPEUTIC EXERCISES: CPT

## 2021-09-30 PROCEDURE — 97535 SELF CARE MNGMENT TRAINING: CPT

## 2021-09-30 NOTE — PROGRESS NOTES
Daily Note     Today's date: 2021  Patient name: Nemo Khan  : 1935  MRN: [id number]  Referring provider: Lexii Rosales DO  Dx:   Encounter Diagnosis     ICD-10-CM    1  Adhesive capsulitis of right shoulder  M75 01        Start Time: 915          Subjective: My sh is ok today but I took 2 Tylenol because my back was hurting  Objective: See treatment diary below    Assessment: Tolerated treatment well  Pt continues with poor form and requires consistent vc's, examples and reminders t/o TE session  Provided pt with printout of HEP and reviewed with pt and   Advised pt to contact clinic with any questions/concerns  Patient would benefit from continued PT    Plan: Continue per plan of care            Re-eval Date: 10/11/21 - every 10 visits      Date 21   Visit Count 11/10 12/10 8/10 9/10 10/10   FOTO                   Precautions: falls risks, c/o R ankle pain, cardiac history       Manuals 21   R shoulder  NP NP  NP NP NP                                             Neuro Re-Ed  21     MTP/LTP Yellow   1 x 20 ea  Yellow  1 x 20 ea Yellow  1 x 15 ea Yellow  1 x 15 ea Yellow  1 x 15 ea   Sarbjit tband ER  Unable 2*   improper form Yellow 1 x 15 Yellow 1 x 10 Yellow 1 x 15 Yellow 1 x 15   Ball on wall              Wall angels                                                        Ther Ex 21     UBE              Pulleys: Flex/abd 2 min flex 2'  flex 2' flex 2' flex 2" flex   Finger ladder             Wall slides  Flex/abd  1 x 10 ea  Flex/abd  1 x 10 ea Flex/abd  1 x 10 ea Flex/abd CGA  1 x 10 ea Flex/abd  1 x 10 ea   Elbow curls  1# 1 x 15 1# 2 x 10  2# 2 x 10  2# 2 x 10    2# 2 x 10      Shrugs  1# 2 x 10  1# 2 x 10 2# 2 x 10  2# 2  x10 2# 2 x 10    Scapular retractions  1# 2 x 10  1# 2 x 10  2# 2 x 10  2# 2 x 10 2# 1 x10   Cane flex/abd 1 x 15 ea seated  X 15 ea seated  1 x 15 ea  seated 1 x 15 ea  seated 1 x 20 ea  seated   Seated isometrics  5" x 10 manually  Flex/abd/ext ea  Manually   Flex/ext/abd   5" x 5  Manually   Flex/ext/abd   5" x 5  Manually  Flex/ext/abd   5" x 10 5" x 10 manually Flex/abd/ext   Seated FF/Abd 0#   1 x 10 flex   Standing w/cane  0# 1 x 10 Standing  1# 1 x 10 ea Standing   1# 2  x10 Seated   1# 2 x 10 ea                                                                                       Ther Activity                                         Gait Training                                         Modalities

## 2021-10-05 ENCOUNTER — OFFICE VISIT (OUTPATIENT)
Dept: PHYSICAL THERAPY | Facility: HOME HEALTHCARE | Age: 86
End: 2021-10-05
Payer: MEDICARE

## 2021-10-05 DIAGNOSIS — M75.01 ADHESIVE CAPSULITIS OF RIGHT SHOULDER: Primary | ICD-10-CM

## 2021-10-05 PROCEDURE — 97110 THERAPEUTIC EXERCISES: CPT

## 2021-10-05 PROCEDURE — 97112 NEUROMUSCULAR REEDUCATION: CPT

## 2021-10-07 ENCOUNTER — OFFICE VISIT (OUTPATIENT)
Dept: PHYSICAL THERAPY | Facility: HOME HEALTHCARE | Age: 86
End: 2021-10-07
Payer: MEDICARE

## 2021-10-07 DIAGNOSIS — M75.01 ADHESIVE CAPSULITIS OF RIGHT SHOULDER: Primary | ICD-10-CM

## 2021-10-07 PROCEDURE — 97112 NEUROMUSCULAR REEDUCATION: CPT

## 2021-10-07 PROCEDURE — 97110 THERAPEUTIC EXERCISES: CPT

## 2021-10-12 ENCOUNTER — OFFICE VISIT (OUTPATIENT)
Dept: PHYSICAL THERAPY | Facility: HOME HEALTHCARE | Age: 86
End: 2021-10-12
Payer: MEDICARE

## 2021-10-12 DIAGNOSIS — M75.01 ADHESIVE CAPSULITIS OF RIGHT SHOULDER: Primary | ICD-10-CM

## 2021-10-12 PROCEDURE — 97112 NEUROMUSCULAR REEDUCATION: CPT

## 2021-10-12 PROCEDURE — 97110 THERAPEUTIC EXERCISES: CPT

## 2021-10-13 DIAGNOSIS — I10 ESSENTIAL HYPERTENSION: ICD-10-CM

## 2021-10-13 DIAGNOSIS — J18.9 COMMUNITY ACQUIRED PNEUMONIA OF LEFT UPPER LOBE OF LUNG: ICD-10-CM

## 2021-10-13 DIAGNOSIS — E87.6 HYPOKALEMIA: ICD-10-CM

## 2021-10-13 RX ORDER — ALPRAZOLAM 0.25 MG/1
TABLET ORAL
Qty: 120 TABLET | Refills: 5 | Status: SHIPPED | OUTPATIENT
Start: 2021-10-13 | End: 2022-05-03

## 2021-10-13 RX ORDER — POTASSIUM CHLORIDE 20 MEQ/1
TABLET, EXTENDED RELEASE ORAL
Qty: 90 TABLET | Refills: 1 | Status: SHIPPED | OUTPATIENT
Start: 2021-10-13 | End: 2022-04-11

## 2021-10-13 RX ORDER — ATENOLOL AND CHLORTHALIDONE TABLET 100; 25 MG/1; MG/1
TABLET ORAL
Qty: 90 TABLET | Refills: 1 | Status: SHIPPED | OUTPATIENT
Start: 2021-10-13 | End: 2021-12-23

## 2021-10-14 ENCOUNTER — OFFICE VISIT (OUTPATIENT)
Dept: PHYSICAL THERAPY | Facility: HOME HEALTHCARE | Age: 86
End: 2021-10-14
Payer: MEDICARE

## 2021-10-14 DIAGNOSIS — M75.01 ADHESIVE CAPSULITIS OF RIGHT SHOULDER: Primary | ICD-10-CM

## 2021-10-14 PROCEDURE — 97110 THERAPEUTIC EXERCISES: CPT

## 2021-10-14 PROCEDURE — 97112 NEUROMUSCULAR REEDUCATION: CPT

## 2021-10-19 ENCOUNTER — OFFICE VISIT (OUTPATIENT)
Dept: PHYSICAL THERAPY | Facility: HOME HEALTHCARE | Age: 86
End: 2021-10-19
Payer: MEDICARE

## 2021-10-19 DIAGNOSIS — M75.01 ADHESIVE CAPSULITIS OF RIGHT SHOULDER: Primary | ICD-10-CM

## 2021-10-19 PROCEDURE — 97110 THERAPEUTIC EXERCISES: CPT

## 2021-10-19 PROCEDURE — 97112 NEUROMUSCULAR REEDUCATION: CPT

## 2021-10-21 ENCOUNTER — OFFICE VISIT (OUTPATIENT)
Dept: PHYSICAL THERAPY | Facility: HOME HEALTHCARE | Age: 86
End: 2021-10-21
Payer: MEDICARE

## 2021-10-21 DIAGNOSIS — M75.01 ADHESIVE CAPSULITIS OF RIGHT SHOULDER: Primary | ICD-10-CM

## 2021-10-21 PROCEDURE — 97112 NEUROMUSCULAR REEDUCATION: CPT

## 2021-10-21 PROCEDURE — 97110 THERAPEUTIC EXERCISES: CPT

## 2021-12-17 ENCOUNTER — RA CDI HCC (OUTPATIENT)
Dept: OTHER | Facility: HOSPITAL | Age: 86
End: 2021-12-17

## 2021-12-23 ENCOUNTER — OFFICE VISIT (OUTPATIENT)
Dept: FAMILY MEDICINE CLINIC | Facility: CLINIC | Age: 86
End: 2021-12-23
Payer: MEDICARE

## 2021-12-23 VITALS
BODY MASS INDEX: 24.43 KG/M2 | SYSTOLIC BLOOD PRESSURE: 126 MMHG | TEMPERATURE: 96.9 F | HEIGHT: 58 IN | DIASTOLIC BLOOD PRESSURE: 70 MMHG | OXYGEN SATURATION: 99 % | WEIGHT: 116.4 LBS | HEART RATE: 56 BPM

## 2021-12-23 DIAGNOSIS — M75.01 ADHESIVE CAPSULITIS OF RIGHT SHOULDER: ICD-10-CM

## 2021-12-23 DIAGNOSIS — N18.30 STAGE 3 CHRONIC KIDNEY DISEASE, UNSPECIFIED WHETHER STAGE 3A OR 3B CKD (HCC): ICD-10-CM

## 2021-12-23 DIAGNOSIS — R26.9 GAIT ABNORMALITY: ICD-10-CM

## 2021-12-23 DIAGNOSIS — R53.83 OTHER FATIGUE: ICD-10-CM

## 2021-12-23 DIAGNOSIS — I10 ESSENTIAL HYPERTENSION: Primary | ICD-10-CM

## 2021-12-23 PROCEDURE — 99214 OFFICE O/P EST MOD 30 MIN: CPT | Performed by: FAMILY MEDICINE

## 2021-12-23 RX ORDER — ATENOLOL AND CHLORTHALIDONE TABLET 100; 25 MG/1; MG/1
0.5 TABLET ORAL DAILY
Qty: 90 TABLET | Refills: 1
Start: 2021-12-23 | End: 2022-03-28 | Stop reason: SDUPTHER

## 2022-03-17 ENCOUNTER — RA CDI HCC (OUTPATIENT)
Dept: OTHER | Facility: HOSPITAL | Age: 87
End: 2022-03-17

## 2022-03-17 NOTE — PROGRESS NOTES
[address]  coding opportunities       Chart reviewed, no opportunity found: CHART REVIEWED, NO OPPORTUNITY FOUND        Patients Insurance     Medicare Insurance: Medicare

## 2022-03-22 ENCOUNTER — APPOINTMENT (OUTPATIENT)
Dept: LAB | Facility: HOSPITAL | Age: 87
End: 2022-03-22
Attending: FAMILY MEDICINE
Payer: MEDICARE

## 2022-03-22 DIAGNOSIS — I10 ESSENTIAL HYPERTENSION: ICD-10-CM

## 2022-03-22 DIAGNOSIS — R53.83 OTHER FATIGUE: ICD-10-CM

## 2022-03-22 LAB
ALBUMIN SERPL BCP-MCNC: 3.8 G/DL (ref 3.5–5)
ALP SERPL-CCNC: 59 U/L (ref 46–116)
ALT SERPL W P-5'-P-CCNC: 22 U/L (ref 12–78)
ANION GAP SERPL CALCULATED.3IONS-SCNC: 12 MMOL/L (ref 4–13)
AST SERPL W P-5'-P-CCNC: 23 U/L (ref 5–45)
BASOPHILS # BLD AUTO: 0.01 THOUSANDS/ΜL (ref 0–0.1)
BASOPHILS NFR BLD AUTO: 0 % (ref 0–1)
BILIRUB SERPL-MCNC: 0.63 MG/DL (ref 0.2–1)
BUN SERPL-MCNC: 21 MG/DL (ref 5–25)
CALCIUM SERPL-MCNC: 9.1 MG/DL (ref 8.3–10.1)
CHLORIDE SERPL-SCNC: 97 MMOL/L (ref 100–108)
CO2 SERPL-SCNC: 23 MMOL/L (ref 21–32)
CREAT SERPL-MCNC: 1.1 MG/DL (ref 0.6–1.3)
EOSINOPHIL # BLD AUTO: 0.05 THOUSAND/ΜL (ref 0–0.61)
EOSINOPHIL NFR BLD AUTO: 1 % (ref 0–6)
ERYTHROCYTE [DISTWIDTH] IN BLOOD BY AUTOMATED COUNT: 12.4 % (ref 11.6–15.1)
GFR SERPL CREATININE-BSD FRML MDRD: 45 ML/MIN/1.73SQ M
GLUCOSE P FAST SERPL-MCNC: 106 MG/DL (ref 65–99)
HCT VFR BLD AUTO: 34.9 % (ref 34.8–46.1)
HGB BLD-MCNC: 12.2 G/DL (ref 11.5–15.4)
IMM GRANULOCYTES # BLD AUTO: 0.01 THOUSAND/UL (ref 0–0.2)
IMM GRANULOCYTES NFR BLD AUTO: 0 % (ref 0–2)
LYMPHOCYTES # BLD AUTO: 1.43 THOUSANDS/ΜL (ref 0.6–4.47)
LYMPHOCYTES NFR BLD AUTO: 36 % (ref 14–44)
MCH RBC QN AUTO: 31.7 PG (ref 26.8–34.3)
MCHC RBC AUTO-ENTMCNC: 35 G/DL (ref 31.4–37.4)
MCV RBC AUTO: 91 FL (ref 82–98)
MONOCYTES # BLD AUTO: 0.51 THOUSAND/ΜL (ref 0.17–1.22)
MONOCYTES NFR BLD AUTO: 13 % (ref 4–12)
NEUTROPHILS # BLD AUTO: 1.98 THOUSANDS/ΜL (ref 1.85–7.62)
NEUTS SEG NFR BLD AUTO: 50 % (ref 43–75)
NRBC BLD AUTO-RTO: 0 /100 WBCS
PLATELET # BLD AUTO: 179 THOUSANDS/UL (ref 149–390)
PMV BLD AUTO: 9.1 FL (ref 8.9–12.7)
POTASSIUM SERPL-SCNC: 3.5 MMOL/L (ref 3.5–5.3)
PROT SERPL-MCNC: 7.3 G/DL (ref 6.4–8.2)
RBC # BLD AUTO: 3.85 MILLION/UL (ref 3.81–5.12)
SODIUM SERPL-SCNC: 132 MMOL/L (ref 136–145)
T4 FREE SERPL-MCNC: 1.33 NG/DL (ref 0.76–1.46)
TSH SERPL DL<=0.05 MIU/L-ACNC: 0.12 UIU/ML (ref 0.36–3.74)
WBC # BLD AUTO: 3.99 THOUSAND/UL (ref 4.31–10.16)

## 2022-03-22 PROCEDURE — 85025 COMPLETE CBC W/AUTO DIFF WBC: CPT

## 2022-03-22 PROCEDURE — 84439 ASSAY OF FREE THYROXINE: CPT

## 2022-03-22 PROCEDURE — 36415 COLL VENOUS BLD VENIPUNCTURE: CPT

## 2022-03-22 PROCEDURE — 84443 ASSAY THYROID STIM HORMONE: CPT

## 2022-03-22 PROCEDURE — 80053 COMPREHEN METABOLIC PANEL: CPT

## 2022-03-28 ENCOUNTER — OFFICE VISIT (OUTPATIENT)
Dept: FAMILY MEDICINE CLINIC | Facility: CLINIC | Age: 87
End: 2022-03-28
Payer: MEDICARE

## 2022-03-28 VITALS
DIASTOLIC BLOOD PRESSURE: 82 MMHG | SYSTOLIC BLOOD PRESSURE: 128 MMHG | WEIGHT: 113.7 LBS | TEMPERATURE: 97.1 F | BODY MASS INDEX: 23.87 KG/M2 | OXYGEN SATURATION: 100 % | HEART RATE: 65 BPM | HEIGHT: 58 IN

## 2022-03-28 DIAGNOSIS — I10 ESSENTIAL HYPERTENSION: Primary | ICD-10-CM

## 2022-03-28 DIAGNOSIS — N18.31 STAGE 3A CHRONIC KIDNEY DISEASE (HCC): ICD-10-CM

## 2022-03-28 DIAGNOSIS — E78.5 DYSLIPIDEMIA: ICD-10-CM

## 2022-03-28 DIAGNOSIS — Z78.0 POST-MENOPAUSE: ICD-10-CM

## 2022-03-28 DIAGNOSIS — D69.6 THROMBOCYTOPENIA (HCC): ICD-10-CM

## 2022-03-28 DIAGNOSIS — Z00.00 ENCOUNTER FOR MEDICARE ANNUAL WELLNESS EXAM: ICD-10-CM

## 2022-03-28 DIAGNOSIS — E87.1 HYPONATREMIA: ICD-10-CM

## 2022-03-28 DIAGNOSIS — R73.01 IMPAIRED FASTING GLUCOSE: ICD-10-CM

## 2022-03-28 DIAGNOSIS — I27.20 PULMONARY HYPERTENSION (HCC): ICD-10-CM

## 2022-03-28 DIAGNOSIS — E05.90 SUBCLINICAL HYPERTHYROIDISM: ICD-10-CM

## 2022-03-28 DIAGNOSIS — M46.1 SACROILIITIS (HCC): ICD-10-CM

## 2022-03-28 DIAGNOSIS — I47.1 SUPRAVENTRICULAR TACHYCARDIA (HCC): ICD-10-CM

## 2022-03-28 PROCEDURE — G0439 PPPS, SUBSEQ VISIT: HCPCS | Performed by: FAMILY MEDICINE

## 2022-03-28 PROCEDURE — 99214 OFFICE O/P EST MOD 30 MIN: CPT | Performed by: FAMILY MEDICINE

## 2022-03-28 RX ORDER — ATENOLOL AND CHLORTHALIDONE TABLET 100; 25 MG/1; MG/1
0.5 TABLET ORAL DAILY
Qty: 90 TABLET | Refills: 1 | Status: SHIPPED | OUTPATIENT
Start: 2022-03-28

## 2022-03-28 NOTE — PATIENT INSTRUCTIONS
Medicare Preventive Visit Patient Instructions  Thank you for completing your Welcome to Medicare Visit or Medicare Annual Wellness Visit today  Your next wellness visit will be due in one year (3/29/2023)  The screening/preventive services that you may require over the next 5-10 years are detailed below  Some tests may not apply to you based off risk factors and/or age  Screening tests ordered at today's visit but not completed yet may show as past due  Also, please note that scanned in results may not display below  Preventive Screenings:  Service Recommendations Previous Testing/Comments   Colorectal Cancer Screening  * Colonoscopy    * Fecal Occult Blood Test (FOBT)/Fecal Immunochemical Test (FIT)  * Fecal DNA/Cologuard Test  * Flexible Sigmoidoscopy Age: 54-65 years old   Colonoscopy: every 10 years (may be performed more frequently if at higher risk)  OR  FOBT/FIT: every 1 year  OR  Cologuard: every 3 years  OR  Sigmoidoscopy: every 5 years  Screening may be recommended earlier than age 48 if at higher risk for colorectal cancer  Also, an individualized decision between you and your healthcare provider will decide whether screening between the ages of 74-80 would be appropriate  Colonoscopy: Not on file  FOBT/FIT: Not on file  Cologuard: Not on file  Sigmoidoscopy: Not on file    Screening Not Indicated     Breast Cancer Screening Age: 36 years old  Frequency: every 1-2 years  Not required if history of left and right mastectomy Mammogram: 07/05/2018        Cervical Cancer Screening Between the ages of 21-29, pap smear recommended once every 3 years  Between the ages of 33-67, can perform pap smear with HPV co-testing every 5 years     Recommendations may differ for women with a history of total hysterectomy, cervical cancer, or abnormal pap smears in past  Pap Smear: Not on file    Screening Not Indicated   Hepatitis C Screening Once for adults born between 1945 and 1965  More frequently in patients at high risk for Hepatitis C Hep C Antibody: 09/23/2019    Screening Current   Diabetes Screening 1-2 times per year if you're at risk for diabetes or have pre-diabetes Fasting glucose: 106 mg/dL   A1C: No results in last 5 years    Screening Current   Cholesterol Screening Once every 5 years if you don't have a lipid disorder  May order more often based on risk factors  Lipid panel: 06/17/2020    Screening Current     Other Preventive Screenings Covered by Medicare:  1  Abdominal Aortic Aneurysm (AAA) Screening: covered once if your at risk  You're considered to be at risk if you have a family history of AAA  2  Lung Cancer Screening: covers low dose CT scan once per year if you meet all of the following conditions: (1) Age 50-69; (2) No signs or symptoms of lung cancer; (3) Current smoker or have quit smoking within the last 15 years; (4) You have a tobacco smoking history of at least 30 pack years (packs per day multiplied by number of years you smoked); (5) You get a written order from a healthcare provider  3  Glaucoma Screening: covered annually if you're considered high risk: (1) You have diabetes OR (2) Family history of glaucoma OR (3)  aged 48 and older OR (3)  American aged 72 and older  3  Osteoporosis Screening: covered every 2 years if you meet one of the following conditions: (1) You're estrogen deficient and at risk for osteoporosis based off medical history and other findings; (2) Have a vertebral abnormality; (3) On glucocorticoid therapy for more than 3 months; (4) Have primary hyperparathyroidism; (5) On osteoporosis medications and need to assess response to drug therapy  · Last bone density test (DXA Scan): 01/09/2020   5  HIV Screening: covered annually if you're between the age of 15-65  Also covered annually if you are younger than 13 and older than 72 with risk factors for HIV infection   For pregnant patients, it is covered up to 3 times per pregnancy  Immunizations:  Immunization Recommendations   Influenza Vaccine Annual influenza vaccination during flu season is recommended for all persons aged >= 6 months who do not have contraindications   Pneumococcal Vaccine (Prevnar and Pneumovax)  * Prevnar = PCV13  * Pneumovax = PPSV23   Adults 25-60 years old: 1-3 doses may be recommended based on certain risk factors  Adults 72 years old: Prevnar (PCV13) vaccine recommended followed by Pneumovax (PPSV23) vaccine  If already received PPSV23 since turning 65, then PCV13 recommended at least one year after PPSV23 dose  Hepatitis B Vaccine 3 dose series if at intermediate or high risk (ex: diabetes, end stage renal disease, liver disease)   Tetanus (Td) Vaccine - COST NOT COVERED BY MEDICARE PART B Following completion of primary series, a booster dose should be given every 10 years to maintain immunity against tetanus  Td may also be given as tetanus wound prophylaxis  Tdap Vaccine - COST NOT COVERED BY MEDICARE PART B Recommended at least once for all adults  For pregnant patients, recommended with each pregnancy  Shingles Vaccine (Shingrix) - COST NOT COVERED BY MEDICARE PART B  2 shot series recommended in those aged 48 and above     Health Maintenance Due:      Topic Date Due    Hepatitis C Screening  Completed     Immunizations Due:      Topic Date Due    DTaP,Tdap,and Td Vaccines (1 - Tdap) Never done     Advance Directives   What are advance directives? Advance directives are legal documents that state your wishes and plans for medical care  These plans are made ahead of time in case you lose your ability to make decisions for yourself  Advance directives can apply to any medical decision, such as the treatments you want, and if you want to donate organs  What are the types of advance directives? There are many types of advance directives, and each state has rules about how to use them   You may choose a combination of any of the following:  · Living will: This is a written record of the treatment you want  You can also choose which treatments you do not want, which to limit, and which to stop at a certain time  This includes surgery, medicine, IV fluid, and tube feedings  · Durable power of  for healthcare [facility]): This is a written record that states who you want to make healthcare choices for you when you are unable to make them for yourself  This person, called a proxy, is usually a family member or a friend  You may choose more than 1 proxy  · Do not resuscitate (DNR) order:  A DNR order is used in case your heart stops beating or you stop breathing  It is a request not to have certain forms of treatment, such as CPR  A DNR order may be included in other types of advance directives  · Medical directive: This covers the care that you want if you are in a coma, near death, or unable to make decisions for yourself  You can list the treatments you want for each condition  Treatment may include pain medicine, surgery, blood transfusions, dialysis, IV or tube feedings, and a ventilator (breathing machine)  · Values history: This document has questions about your views, beliefs, and how you feel and think about life  This information can help others choose the care that you would choose  Why are advance directives important? An advance directive helps you control your care  Although spoken wishes may be used, it is better to have your wishes written down  Spoken wishes can be misunderstood, or not followed  Treatments may be given even if you do not want them  An advance directive may make it easier for your family to make difficult choices about your care  Urinary Incontinence   Urinary incontinence (UI)  is when you lose control of your bladder  UI develops because your bladder cannot store or empty urine properly  The 3 most common types of UI are stress incontinence, urge incontinence, or both    Medicines:   · May be given to help strengthen your bladder control  Report any side effects of medication to your healthcare provider  Do pelvic muscle exercises often:  Your pelvic muscles help you stop urinating  Squeeze these muscles tight for 5 seconds, then relax for 5 seconds  Gradually work up to squeezing for 10 seconds  Do 3 sets of 15 repetitions a day, or as directed  This will help strengthen your pelvic muscles and improve bladder control  Train your bladder:  Go to the bathroom at set times, such as every 2 hours, even if you do not feel the urge to go  You can also try to hold your urine when you feel the urge to go  For example, hold your urine for 5 minutes when you feel the urge to go  As that becomes easier, hold your urine for 10 minutes  Self-care:   · Keep a UI record  Write down how often you leak urine and how much you leak  Make a note of what you were doing when you leaked urine  · Drink liquids as directed  You may need to limit the amount of liquid you drink to help control your urine leakage  Do not drink any liquid right before you go to bed  Limit or do not have drinks that contain caffeine or alcohol  · Prevent constipation  Eat a variety of high-fiber foods  Good examples are high-fiber cereals, beans, vegetables, and whole-grain breads  Walking is the best way to trigger your intestines to have a bowel movement  · Exercise regularly and maintain a healthy weight  Weight loss and exercise will decrease pressure on your bladder and help you control your leakage  · Use a catheter as directed  to help empty your bladder  A catheter is a tiny, plastic tube that is put into your bladder to drain your urine  · Go to behavior therapy as directed  Behavior therapy may be used to help you learn to control your urge to urinate  © Copyright [employer] 2018 Information is for End User's use only and may not be sold, redistributed or otherwise used for commercial purposes   All illustrations and images included in CareNotes® are the copyrighted property of A D [employer] M [employer]  or [employer]

## 2022-03-28 NOTE — PROGRESS NOTES
Assessment/Plan:    Essential hypertension  Patient has hypertension  Her blood pressure is currently well controlled on her regiment  I renewed her atenolol-chlorthalidone  She will continue 1/2 tablet daily, in addition to the amlodipine  Subclinical hyperthyroidism  I reviewed the patient's labs  Her TSH was low at 0 122  T4 was normal at 1 33  Patient is asymptomatic  I explained that she has subclinical testing in 3 months  This is likely secondary to the new super sensitive TSH that is being used  Impaired fasting glucose  Patient's fasting blood sugar was noted to be 106  We will continue to follow the patient  I discussed her labs with her  Hyponatremia  The patient's serum sodium was low at 132  I explained that this is secondary to her diuretic  She has been lower in the past   I would not change anything with her medication  It is working well to control her blood pressure  We will simply continue to follow her serum sodiums  Stage 3a chronic kidney disease [facility])  Lab Results   Component Value Date    EGFR 45 03/22/2022    EGFR 53 09/14/2021    EGFR 51 04/21/2021    CREATININE 1 10 03/22/2022    CREATININE 0 97 09/14/2021    CREATININE 1 00 04/21/2021   Current creatinine was noted to be 1 10  Her estimated GFR is 45  Renal function is stable  We will continue to follow her  Diagnoses and all orders for this visit:    Essential hypertension  -     atenolol-chlorthalidone (TENORETIC) 100-25 mg per tablet; Take 0 5 tablets by mouth daily    Post-menopause  -     DXA bone density spine hip and pelvis; Future    Subclinical hyperthyroidism  -     TSH, 3rd generation; Future  -     T4, free; Future    Dyslipidemia  -     Lipid panel;  Future    Sacroiliitis (HCC)    Pulmonary hypertension (HCC)    Supraventricular tachycardia (HCC)    Thrombocytopenia (HCC)    Stage 3a chronic kidney disease (HCC)    Impaired fasting glucose    Hyponatremia    Encounter for Medicare annual wellness exam          Subjective:      Patient ID: Howard Sosa is a 80 y o  female  This is an 63-year-old white female who presents to the office today for her routine checkup  The patient is accompanied today by her   The patient has been checking her blood pressures at home and has found them to be well controlled  She reports compliance with her medication  She did have blood work done in anticipation of the visit  Patient is also here today for her annual Medicare wellness exam       The following portions of the patient's history were reviewed and updated as appropriate: allergies, current medications, past family history, past medical history, past social history, past surgical history and problem list     Review of Systems   Constitutional: Negative for activity change, appetite change and unexpected weight change  Respiratory: Negative for cough and shortness of breath  Cardiovascular: Negative for chest pain, palpitations and leg swelling  Gastrointestinal: Negative for abdominal distention, abdominal pain, blood in stool, constipation, diarrhea and nausea  Objective:      /82 (BP Location: Left arm, Patient Position: Sitting, Cuff Size: Adult)   Pulse 65   Temp (!) 97 1 °F (36 2 °C) (Tympanic)   Ht 4' 10" (1 473 m)   Wt 51 6 kg (113 lb 11 2 oz)   SpO2 100%   BMI 23 76 kg/m²          Physical Exam  Vitals reviewed  Constitutional:       Comments: Patient is an 63-year-old white female who appears her stated age  She is pleasant, cooperative, and in no distress  HENT:      Head: Normocephalic and atraumatic  Right Ear: Tympanic membrane, ear canal and external ear normal  There is no impacted cerumen  Left Ear: Tympanic membrane, ear canal and external ear normal  There is no impacted cerumen  Mouth/Throat:      Mouth: Mucous membranes are moist       Pharynx: Oropharynx is clear  No oropharyngeal exudate     Eyes:      General: No scleral icterus  Right eye: No discharge  Left eye: No discharge  Conjunctiva/sclera: Conjunctivae normal       Pupils: Pupils are equal, round, and reactive to light  Neck:      Vascular: No carotid bruit  Comments: No thyromegaly  Cardiovascular:      Rate and Rhythm: Normal rate and regular rhythm  Heart sounds: Normal heart sounds  No murmur heard  No friction rub  No gallop  Pulmonary:      Effort: Pulmonary effort is normal  No respiratory distress  Breath sounds: Normal breath sounds  No stridor  No wheezing, rhonchi or rales  Abdominal:      General: Bowel sounds are normal  There is no distension  Palpations: Abdomen is soft  There is no mass  Tenderness: There is no abdominal tenderness  There is no guarding  Musculoskeletal:      Cervical back: Neck supple  Lymphadenopathy:      Cervical: No cervical adenopathy  Psychiatric:         Mood and Affect: Mood normal          Behavior: Behavior normal          Thought Content:  Thought content normal          Judgment: Judgment normal        extremities:  Without cyanosis, clubbing, or edema

## 2022-03-28 NOTE — PROGRESS NOTES
Assessment and Plan:     Problem List Items Addressed This Visit     None           Preventive health issues were discussed with patient, and age appropriate screening tests were ordered as noted in patient's After Visit Summary  Personalized health advice and appropriate referrals for health education or preventive services given if needed, as noted in patient's After Visit Summary       History of Present Illness:     Patient presents for Medicare Annual Wellness visit    Patient Care Team:  Daun Spatz, DO as PCP - General (Family Medicine)     Problem List:     Patient Active Problem List   Diagnosis    Hypomagnesemia    Hypokalemia    Community acquired pneumonia of left upper lobe of lung    Hyponatremia    Essential hypertension    Lymphadenopathy    Hiatal hernia    Thrombocytopenia ([address]    Aortic valve stenosis    Mitral valve insufficiency    Supraventricular tachycardia ([address]    Pulmonary hypertension ([address]    Diverticulosis    First degree AV block    Non-rheumatic tricuspid valve insufficiency    Generalized anxiety disorder    Mid back pain    Encounter for Medicare annual wellness exam    Encounter for long-term (current) use of medications    Immunization due    Acute bursitis of right shoulder    Impaired fasting glucose    Osteopenia of neck of femur    Chronic bilateral low back pain without sciatica    Gait abnormality    Chronic pain syndrome    Sacroiliitis (HCC)    Bilateral leg edema    Ganglion cyst    Adhesive capsulitis of right shoulder    Stage 3 chronic kidney disease, unspecified whether stage 3a or 3b CKD ([address] )    Other fatigue      Past Medical and Surgical History:     Past Medical History:   Diagnosis Date    Aortic stenosis     Fatigue     Full dentures     Generalized anxiety disorder     Hyperlipidemia     Hypertension     Impaired fasting glucose     Mitral regurgitation     Pneumonia     SVT (supraventricular tachycardia)     Tricuspid regurgitation      Past Surgical History:   Procedure Laterality Date    DENTAL SURGERY Bilateral     ALL TEETH REMOVED    FL GUIDED NEEDLE PLAC BX/ASP/INJ  5/26/2021    JOINT REPLACEMENT Left     KNEE SURGERY      left knee replacement    DC INJECTION,SACROILIAC JOINT Bilateral 5/26/2021    Procedure: SACROILIAC JOINT INJECTION;  Surgeon: Victoriano Scott MD;  Location: [facility];  Service: Pain Management       Family History:     Family History   Problem Relation Age of Onset   Mary Ro Hypertension Mother    Mary Ro Asthma Father         [place]'s asthma    Alzheimer's disease Sister     Rectal cancer Son     Uterine cancer Daughter     Heart disease Sister     Heart disease Brother       Social History:     Social History     Socioeconomic History    Marital status: /Civil Union     Spouse name: None    Number of children: 2    Years of education: None    Highest education level: None   Occupational History    None   Tobacco Use    Smoking status: Never Smoker    Smokeless tobacco: Never Used   Vaping Use    Vaping Use: Never used   Substance and Sexual Activity    Alcohol use: Never    Drug use: Never    Sexual activity: None   Other Topics Concern    None   Social History Narrative    None     Social Determinants of Health     Financial Resource Strain: Not on file   Food Insecurity: Not on file   Transportation Needs: Not on file   Physical Activity: Not on file   Stress: Not on file   Social Connections: Not on file   Intimate Partner Violence: Not on file   Housing Stability: Not on file      Medications and Allergies:     Current Outpatient Medications   Medication Sig Dispense Refill    ALPRAZolam (XANAX) 0 25 mg tablet TAKE 1 TABLET BY MOUTH EVERY 6 HOURS AS NEEDED FOR ANXIETY 120 tablet 5    amLODIPine (NORVASC) 10 mg tablet Take 1 tablet by mouth once daily 90 tablet 1    ammonium lactate (LAC-HYDRIN) 12 % lotion       atenolol-chlorthalidone (TENORETIC) 100-25 mg per tablet Take 0 5 tablets by mouth daily 90 tablet 1    Calcium Carb-Cholecalciferol (CALTRATE 600+D3 PO) Take 1 tablet by mouth 2 (two) times a day      Omega-3 Fatty Acids (FISH OIL) 1200 MG CAPS Take 1,200 mg by mouth 2 (two) times a day      potassium chloride (K-DUR,KLOR-CON) 20 mEq tablet Take 1 tablet by mouth once daily 90 tablet 1    Psyllium (METAMUCIL FIBER) 51 7 % PACK Take 1 Package by mouth daily  0     No current facility-administered medications for this visit  No Known Allergies   Immunizations:     Immunization History   Administered Date(s) Administered    COVID-19 MODERNA VACC 0 5 ML IM 01/22/2021, 02/19/2021, 11/11/2021    Influenza, high dose seasonal 0 7 mL 09/30/2019, 10/22/2020, 09/14/2021    Pneumococcal Conjugate 13-Valent 12/15/2016    Pneumococcal Polysaccharide PPV23 02/01/2018      Health Maintenance:         Topic Date Due    Hepatitis C Screening  Completed         Topic Date Due    DTaP,Tdap,and Td Vaccines (1 - Tdap) Never done      Medicare Health Risk Assessment:     /82 (BP Location: Left arm, Patient Position: Sitting, Cuff Size: Adult)   Pulse 65   Temp (!) 97 1 °F (36 2 °C) (Tympanic)   Ht 4' 10" (1 473 m)   Wt 51 6 kg (113 lb 11 2 oz)   SpO2 100%   BMI 23 76 kg/m²      Lennox Ellis is here for her Subsequent Wellness visit  Last Medicare Wellness visit information reviewed, patient interviewed and updates made to the record today  Health Risk Assessment:   Patient rates overall health as good  Patient feels that their physical health rating is slightly worse  Patient is dissatisfied with their life  Eyesight was rated as same  Hearing was rated as same  Patient feels that their emotional and mental health rating is same  Patients states they are sometimes angry  Patient states they are never, rarely unusually tired/fatigued  Pain experienced in the last 7 days has been none  Patient states that she has experienced no weight loss or gain in last 6 months  Depression Screening:   PHQ-2 Score: 0      Fall Risk Screening: In the past year, patient has experienced: no history of falling in past year      Urinary Incontinence Screening:   Patient has leaked urine accidently in the last six months  Home Safety:  Patient does not have trouble with stairs inside or outside of their home  Patient has working smoke alarms and has working carbon monoxide detector  Home safety hazards include: none  Nutrition:   Current diet is Regular and No Added Salt  Medications:   Patient is currently taking over-the-counter supplements  OTC medications include: see medication list  Patient is able to manage medications  Activities of Daily Living (ADLs)/Instrumental Activities of Daily Living (IADLs):   Walk and transfer into and out of bed and chair?: Yes  Dress and groom yourself?: Yes    Bathe or shower yourself?: Yes    Feed yourself? Yes  Do your laundry/housekeeping?: Yes  Manage your money, pay your bills and track your expenses?: No  Make your own meals?: Yes    Do your own shopping?: Yes    Previous Hospitalizations:   Any hospitalizations or ED visits within the last 12 months?: No      Advance Care Planning:   Living will: Yes    Durable POA for healthcare:  Yes    Advanced directive: Yes      Cognitive Screening:   Provider or family/friend/caregiver concerned regarding cognition?: No    PREVENTIVE SCREENINGS      Cardiovascular Screening:    General: Screening Current    Due for: Lipid Panel      Diabetes Screening:     General: Screening Current      Colorectal Cancer Screening:     General: Screening Not Indicated      Breast Cancer Screening:     General: Risks and Benefits Discussed and Patient Declines      Cervical Cancer Screening:    General: Screening Not Indicated      Osteoporosis Screening:    General: Risks and Benefits Discussed    Due for: DXA Axial      Abdominal Aortic Aneurysm (AAA) Screening:        General: Screening Not Indicated Lung Cancer Screening:     General: Screening Not Indicated      Hepatitis C Screening:    General: Screening Current    Screening, Brief Intervention, and Referral to Treatment (SBIRT)    Screening  Typical number of drinks in a day: 0  Typical number of drinks in a week: 0  Interpretation: Low risk drinking behavior      AUDIT-C Screenin) How often did you have a drink containing alcohol in the past year? never  2) How many drinks did you have on a typical day when you were drinking in the past year? 0  3) How often did you have 6 or more drinks on one occasion in the past year? never    AUDIT-C Score: 0  Interpretation: Score 0-2 (female): Negative screen for alcohol misuse    Single Item Drug Screening:  How often have you used an illegal drug (including marijuana) or a prescription medication for non-medical reasons in the past year? never    Single Item Drug Screen Score: 0  Interpretation: Negative screen for possible drug use disorder      Santi Bueno, DO

## 2022-03-29 NOTE — ASSESSMENT & PLAN NOTE
Patient has hypertension  Her blood pressure is currently well controlled on her regiment  I renewed her atenolol-chlorthalidone  She will continue 1/2 tablet daily, in addition to the amlodipine

## 2022-03-29 NOTE — ASSESSMENT & PLAN NOTE
I reviewed the patient's labs  Her TSH was low at 0 122  T4 was normal at 1 33  Patient is asymptomatic  I explained that she has subclinical testing in 3 months  This is likely secondary to the new super sensitive TSH that is being used

## 2022-03-29 NOTE — ASSESSMENT & PLAN NOTE
The patient's serum sodium was low at 132  I explained that this is secondary to her diuretic  She has been lower in the past   I would not change anything with her medication  It is working well to control her blood pressure  We will simply continue to follow her serum sodiums

## 2022-03-29 NOTE — ASSESSMENT & PLAN NOTE
Patient's fasting blood sugar was noted to be 106  We will continue to follow the patient  I discussed her labs with her

## 2022-03-29 NOTE — ASSESSMENT & PLAN NOTE
Lab Results   Component Value Date    EGFR 45 03/22/2022    EGFR 53 09/14/2021    EGFR 51 04/21/2021    CREATININE 1 10 03/22/2022    CREATININE 0 97 09/14/2021    CREATININE 1 00 04/21/2021   Current creatinine was noted to be 1 10  Her estimated GFR is 45  Renal function is stable  We will continue to follow her

## 2022-04-06 ENCOUNTER — HOSPITAL ENCOUNTER (OUTPATIENT)
Dept: BONE DENSITY | Facility: HOSPITAL | Age: 87
Discharge: HOME | End: 2022-04-06
Attending: FAMILY MEDICINE
Payer: MEDICARE

## 2022-04-06 DIAGNOSIS — Z78.0 POST-MENOPAUSE: ICD-10-CM

## 2022-04-06 PROCEDURE — 77080 DXA BONE DENSITY AXIAL: CPT

## 2022-04-10 DIAGNOSIS — E87.6 HYPOKALEMIA: ICD-10-CM

## 2022-04-11 RX ORDER — POTASSIUM CHLORIDE 20 MEQ/1
TABLET, EXTENDED RELEASE ORAL
Qty: 90 TABLET | Refills: 0 | Status: SHIPPED | OUTPATIENT
Start: 2022-04-11 | End: 2022-07-05

## 2022-05-02 DIAGNOSIS — J18.9 COMMUNITY ACQUIRED PNEUMONIA OF LEFT UPPER LOBE OF LUNG: ICD-10-CM

## 2022-05-03 RX ORDER — ALPRAZOLAM 0.25 MG/1
TABLET ORAL
Qty: 120 TABLET | Refills: 2 | Status: SHIPPED | OUTPATIENT
Start: 2022-05-03

## 2022-05-23 DIAGNOSIS — I10 ESSENTIAL HYPERTENSION: ICD-10-CM

## 2022-05-23 RX ORDER — AMLODIPINE BESYLATE 10 MG/1
TABLET ORAL
Qty: 42 TABLET | Refills: 0 | Status: SHIPPED | OUTPATIENT
Start: 2022-05-23

## 2022-05-23 RX ORDER — AMLODIPINE BESYLATE 10 MG/1
TABLET ORAL
Qty: 90 TABLET | Refills: 0 | Status: SHIPPED | OUTPATIENT
Start: 2022-05-23 | End: 2022-05-23

## 2022-06-16 ENCOUNTER — RA CDI HCC (OUTPATIENT)
Dept: OTHER | Facility: HOSPITAL | Age: 87
End: 2022-06-16

## 2022-07-05 ENCOUNTER — APPOINTMENT (OUTPATIENT)
Dept: LAB | Facility: HOSPITAL | Age: 87
End: 2022-07-05
Attending: FAMILY MEDICINE
Payer: MEDICARE

## 2022-07-05 DIAGNOSIS — E87.6 HYPOKALEMIA: ICD-10-CM

## 2022-07-05 DIAGNOSIS — E05.90 SUBCLINICAL HYPERTHYROIDISM: ICD-10-CM

## 2022-07-05 DIAGNOSIS — E78.5 DYSLIPIDEMIA: ICD-10-CM

## 2022-07-05 LAB
CHOLEST SERPL-MCNC: 145 MG/DL
HDLC SERPL-MCNC: 74 MG/DL
LDLC SERPL CALC-MCNC: 58 MG/DL (ref 0–100)
NONHDLC SERPL-MCNC: 71 MG/DL
T4 FREE SERPL-MCNC: 1.73 NG/DL (ref 0.76–1.46)
TRIGL SERPL-MCNC: 67 MG/DL
TSH SERPL DL<=0.05 MIU/L-ACNC: <0.007 UIU/ML (ref 0.45–4.5)

## 2022-07-05 PROCEDURE — 80061 LIPID PANEL: CPT

## 2022-07-05 PROCEDURE — 84439 ASSAY OF FREE THYROXINE: CPT

## 2022-07-05 PROCEDURE — 36415 COLL VENOUS BLD VENIPUNCTURE: CPT

## 2022-07-05 PROCEDURE — 84443 ASSAY THYROID STIM HORMONE: CPT

## 2022-07-05 RX ORDER — POTASSIUM CHLORIDE 20 MEQ/1
TABLET, EXTENDED RELEASE ORAL
Qty: 90 TABLET | Refills: 0 | Status: SHIPPED | OUTPATIENT
Start: 2022-07-05 | End: 2022-10-06

## 2022-07-11 ENCOUNTER — OFFICE VISIT (OUTPATIENT)
Dept: FAMILY MEDICINE CLINIC | Facility: CLINIC | Age: 87
End: 2022-07-11
Payer: MEDICARE

## 2022-07-11 VITALS
OXYGEN SATURATION: 99 % | DIASTOLIC BLOOD PRESSURE: 62 MMHG | HEIGHT: 58 IN | BODY MASS INDEX: 24.71 KG/M2 | TEMPERATURE: 97.4 F | HEART RATE: 74 BPM | WEIGHT: 117.7 LBS | SYSTOLIC BLOOD PRESSURE: 130 MMHG

## 2022-07-11 DIAGNOSIS — I35.0 NONRHEUMATIC AORTIC VALVE STENOSIS: ICD-10-CM

## 2022-07-11 DIAGNOSIS — I10 ESSENTIAL HYPERTENSION: Primary | ICD-10-CM

## 2022-07-11 DIAGNOSIS — E05.90 HYPERTHYROIDISM: ICD-10-CM

## 2022-07-11 DIAGNOSIS — M15.0 PRIMARY GENERALIZED (OSTEO)ARTHRITIS: ICD-10-CM

## 2022-07-11 DIAGNOSIS — F41.1 GENERALIZED ANXIETY DISORDER: ICD-10-CM

## 2022-07-11 DIAGNOSIS — E78.5 DYSLIPIDEMIA: ICD-10-CM

## 2022-07-11 PROBLEM — J18.9 COMMUNITY ACQUIRED PNEUMONIA OF LEFT UPPER LOBE OF LUNG: Status: RESOLVED | Noted: 2019-05-17 | Resolved: 2022-07-11

## 2022-07-11 PROCEDURE — 99214 OFFICE O/P EST MOD 30 MIN: CPT | Performed by: FAMILY MEDICINE

## 2022-07-11 NOTE — ASSESSMENT & PLAN NOTE
TSH is less than 0 007  T4 was elevated at 1 73  I explained that patient has hyperthyroidism  She is currently without symptoms  Etiology is to be determined  I am going to order a thyroid uptake scan  I advised the patient she may require referral to endocrinology and may require a medication such as Tapazole

## 2022-07-11 NOTE — ASSESSMENT & PLAN NOTE
Patient has hypertension  Blood pressure is well controlled  I checked the patient's blood pressure myself today and found to be 120/60  I also reviewed the patient's ambulatory blood pressure readings  I did not make any change with her current medication

## 2022-07-11 NOTE — PROGRESS NOTES
Assessment/Plan:    Essential hypertension  Patient has hypertension  Blood pressure is well controlled  I checked the patient's blood pressure myself today and found to be 120/60  I also reviewed the patient's ambulatory blood pressure readings  I did not make any change with her current medication  Hyperthyroidism  TSH is less than 0 007  T4 was elevated at 1 73  I explained that patient has hyperthyroidism  She is currently without symptoms  Etiology is to be determined  I am going to order a thyroid uptake scan  I advised the patient she may require referral to endocrinology and may require a medication such as Tapazole  Dyslipidemia  I reviewed the patient's lipid panel with her  Her lipid panel was completely normal    Aortic valve stenosis  Patient is currently asymptomatic  She is scheduled for a repeat echocardiogram next week    Generalized anxiety disorder  Anxiety disorder is currently stable  I am going to have the patient continue alprazolam 0 25 mg every 6 hours as needed for anxiety         Diagnoses and all orders for this visit:    Essential hypertension    Hyperthyroidism  -     NM thyroid imaging w uptake(s); Future    Dyslipidemia    Nonrheumatic aortic valve stenosis    Generalized anxiety disorder          Subjective:      Patient ID: Darwin Grace is a 80 y o  female  This is an 51-year-old white female who presents to the office today for her routine checkup  The patient's main complaint today is poor balance  She is doing home exercise program as was given to her previously by a physical therapist   She has not fallen  She tells me she does ambulate with a cane  She is not very active physically  She tells me mentally she tries to keep active by following politics        The following portions of the patient's history were reviewed and updated as appropriate: allergies, current medications, past family history, past medical history, past social history, past surgical history and problem list     Review of Systems   Constitutional: Negative for activity change and appetite change  HENT: Positive for hearing loss  Cardiovascular: Negative for chest pain, palpitations and leg swelling  Gastrointestinal: Negative for abdominal distention, abdominal pain, blood in stool, constipation and diarrhea  Musculoskeletal: Positive for gait problem  Neurological: Negative for dizziness, light-headedness and headaches  Reports poor balance         Objective:      /62 (BP Location: Left arm, Patient Position: Sitting, Cuff Size: Adult)   Pulse 74   Temp (!) 97 4 °F (36 3 °C) (Temporal)   Ht 4' 10" (1 473 m)   Wt 53 4 kg (117 lb 11 2 oz)   SpO2 99%   BMI 24 60 kg/m²          Physical Exam  Vitals reviewed  Constitutional:       Comments: This is an 51-year-old white female who appears her stated age  She is pleasant, cooperative, and in no distress   HENT:      Head: Normocephalic and atraumatic  Right Ear: Tympanic membrane, ear canal and external ear normal  There is no impacted cerumen  Left Ear: Tympanic membrane, ear canal and external ear normal  There is no impacted cerumen  Mouth/Throat:      Mouth: Mucous membranes are moist       Pharynx: Oropharynx is clear  No oropharyngeal exudate or posterior oropharyngeal erythema  Eyes:      General: No scleral icterus  Right eye: No discharge  Left eye: No discharge  Conjunctiva/sclera: Conjunctivae normal       Pupils: Pupils are equal, round, and reactive to light  Comments: There is no exophthalmos   Neck:      Comments: There is no thyromegaly  There was no tenderness noted over the thyroid  There was no bruit over the thyroid  No thyroid nodules detected  Cardiovascular:      Rate and Rhythm: Normal rate and regular rhythm  Heart sounds: Normal heart sounds  No murmur heard  No gallop     Pulmonary:      Effort: Pulmonary effort is normal  No respiratory distress  Breath sounds: Normal breath sounds  No stridor  No wheezing, rhonchi or rales  Lymphadenopathy:      Cervical: No cervical adenopathy  Psychiatric:         Mood and Affect: Mood normal          Behavior: Behavior normal          Thought Content: Thought content normal          Judgment: Judgment normal        extremities:  Without cyanosis, clubbing    There is 1/4 lower extremity edema noted bilaterally

## 2022-07-11 NOTE — ASSESSMENT & PLAN NOTE
Anxiety disorder is currently stable    I am going to have the patient continue alprazolam 0 25 mg every 6 hours as needed for anxiety

## 2022-07-19 ENCOUNTER — HOSPITAL ENCOUNTER (OUTPATIENT)
Dept: NON INVASIVE DIAGNOSTICS | Facility: CLINIC | Age: 87
Discharge: HOME | End: 2022-07-19
Payer: MEDICARE

## 2022-07-19 VITALS
WEIGHT: 117 LBS | SYSTOLIC BLOOD PRESSURE: 130 MMHG | BODY MASS INDEX: 24.56 KG/M2 | DIASTOLIC BLOOD PRESSURE: 62 MMHG | HEIGHT: 58 IN | HEART RATE: 99 BPM

## 2022-07-19 DIAGNOSIS — I35.0 NONRHEUMATIC AORTIC VALVE STENOSIS: ICD-10-CM

## 2022-07-19 LAB
AORTIC ROOT: 2.6 CM
AORTIC VALVE MEAN VELOCITY: 22 M/S
APICAL FOUR CHAMBER EJECTION FRACTION: 50 %
ASCENDING AORTA: 2.8 CM
AV AREA BY CONTINUOUS VTI: 0.9 CM2
AV AREA PEAK VELOCITY: 1 CM2
AV LVOT MEAN GRADIENT: 2 MMHG
AV LVOT PEAK GRADIENT: 3 MMHG
AV MEAN GRADIENT: 21 MMHG
AV PEAK GRADIENT: 31 MMHG
AV REGURGITATION PRESSURE HALF TIME: 288 MS
AV VALVE AREA: 0.95 CM2
AV VELOCITY RATIO: 0.33
DOP CALC AO PEAK VEL: 2.79 M/S
DOP CALC AO VTI: 66.52 CM
DOP CALC LVOT AREA: 3.14 CM2
DOP CALC LVOT DIAMETER: 2 CM
DOP CALC LVOT PEAK VEL VTI: 20.03 CM
DOP CALC LVOT PEAK VEL: 0.93 M/S
DOP CALC LVOT STROKE INDEX: 41.4 ML/M2
DOP CALC LVOT STROKE VOLUME: 62.89
FRACTIONAL SHORTENING: 28 (ref 28–44)
INTERVENTRICULAR SEPTUM IN DIASTOLE (PARASTERNAL SHORT AXIS VIEW): 1 CM
INTERVENTRICULAR SEPTUM: 1 CM (ref 0.6–1.1)
LAAS-AP2: 25.3 CM2
LAAS-AP4: 18.7 CM2
LEFT ATRIUM AREA SYSTOLE SINGLE PLANE A4C: 18.6 CM2
LEFT ATRIUM SIZE: 4.5 CM
LEFT INTERNAL DIMENSION IN SYSTOLE: 3.3 CM (ref 2.1–4)
LEFT VENTRICULAR INTERNAL DIMENSION IN DIASTOLE: 4.6 CM (ref 3.5–6)
LEFT VENTRICULAR POSTERIOR WALL IN END DIASTOLE: 1 CM
LEFT VENTRICULAR STROKE VOLUME: 52 ML
LVSV (TEICH): 52 ML
MITRAL REGURGITATION PEAK VELOCITY: 4.61 M/S
MITRAL VALVE MEAN INFLOW VELOCITY: 3.94 M/S
MITRAL VALVE REGURGITANT PEAK GRADIENT: 85 MMHG
PA SYSTOLIC PRESSURE: 75 MMHG
RIGHT ATRIUM AREA SYSTOLE A4C: 13.3 CM2
RIGHT VENTRICLE ID DIMENSION: 3.9 CM
SL CV AV DECELERATION TIME RETROGRADE: 992 MS
SL CV AV PEAK GRADIENT RETROGRADE: 53 MMHG
SL CV DOP CALC MV VTI RETROGRADE: 142.5 CM
SL CV LEFT ATRIUM LENGTH A2C: 5.8 CM
SL CV LV EF: 50
SL CV MV MEAN GRADIENT RETROGRADE: 66 MMHG
SL CV PED ECHO LEFT VENTRICLE DIASTOLIC VOLUME (MOD BIPLANE) 2D: 96 ML
SL CV PED ECHO LEFT VENTRICLE SYSTOLIC VOLUME (MOD BIPLANE) 2D: 45 ML
TR MAX PG: 65 MMHG
TR PEAK VELOCITY: 4.1 M/S
TRICUSPID VALVE PEAK REGURGITATION VELOCITY: 4.05 M/S

## 2022-07-19 PROCEDURE — 93306 TTE W/DOPPLER COMPLETE: CPT

## 2022-07-19 PROCEDURE — 93306 TTE W/DOPPLER COMPLETE: CPT | Performed by: INTERNAL MEDICINE

## 2022-08-02 ENCOUNTER — HOSPITAL ENCOUNTER (EMERGENCY)
Facility: HOSPITAL | Age: 87
Discharge: HOME | End: 2022-08-02
Attending: EMERGENCY MEDICINE | Admitting: EMERGENCY MEDICINE
Payer: MEDICARE

## 2022-08-02 ENCOUNTER — HOSPITAL ENCOUNTER (OUTPATIENT)
Dept: NUCLEAR MEDICINE | Facility: HOSPITAL | Age: 87
Discharge: HOME | End: 2022-08-02
Attending: FAMILY MEDICINE
Payer: MEDICARE

## 2022-08-02 VITALS
WEIGHT: 101.85 LBS | HEART RATE: 63 BPM | TEMPERATURE: 98.3 F | RESPIRATION RATE: 18 BRPM | BODY MASS INDEX: 21.29 KG/M2 | OXYGEN SATURATION: 96 % | DIASTOLIC BLOOD PRESSURE: 81 MMHG | SYSTOLIC BLOOD PRESSURE: 157 MMHG

## 2022-08-02 DIAGNOSIS — E05.90 HYPERTHYROIDISM: ICD-10-CM

## 2022-08-02 DIAGNOSIS — R21 RASH AND NONSPECIFIC SKIN ERUPTION: Primary | ICD-10-CM

## 2022-08-02 PROCEDURE — A9516 IODINE I-123 SOD IODIDE MIC: HCPCS

## 2022-08-02 PROCEDURE — G1004 CDSM NDSC: HCPCS

## 2022-08-02 PROCEDURE — 78014 THYROID IMAGING W/BLOOD FLOW: CPT

## 2022-08-02 PROCEDURE — 99282 EMERGENCY DEPT VISIT SF MDM: CPT

## 2022-08-02 PROCEDURE — 36415 COLL VENOUS BLD VENIPUNCTURE: CPT | Performed by: EMERGENCY MEDICINE

## 2022-08-02 PROCEDURE — 86618 LYME DISEASE ANTIBODY: CPT | Performed by: EMERGENCY MEDICINE

## 2022-08-02 PROCEDURE — 99284 EMERGENCY DEPT VISIT MOD MDM: CPT | Performed by: EMERGENCY MEDICINE

## 2022-08-02 RX ORDER — BETAMETHASONE DIPROPIONATE 0.05 %
OINTMENT (GRAM) TOPICAL 2 TIMES DAILY
Qty: 15 G | Refills: 0 | Status: SHIPPED | OUTPATIENT
Start: 2022-08-02 | End: 2022-08-16

## 2022-08-02 NOTE — ED PROVIDER NOTES
History  Chief Complaint   Patient presents with    Rash     Pt stated she has a rash on the left ankle that she found about 4 days ago  Denies itching  81 y/o woman with noted PMH presents to ED with rash on medial L ankle for past 4d  No pruritus or pain at the site  She became aware of it because she saw the rash about four days ago  It seemed to have increased slightly in size since yesterday  Her  applied ketoconazole cream that he had had from a previous prescription but he feels that the rash seems to have increased in size despite this  Patient does not have rashes anywhere else on her body  She has had no exposure to anyone with similar rash  There have been no insect bites or the local trauma to the area  She has not used any new topical creams lotions or other medications in the area  She wears low cut socks and athletic shoes most of the day with the line of the socks sitting approximately at the level of the rash  No fevers or chills  The rash does not have any characteristic appearance  It may be due to irritation from her socks  It may be due to compression of the area from the sock elastic  Lyme disease possible but no hx of tick bite to support this: check Lyme IgG/IgM regardless  She sees her podiatrist on 8 August: advised that she have the rash re-evaluated at that point  Will Rx topical steroid for likely dermatitis of unclear origin  Advised that she wear socks that do not impinge directly on the rash itself  All questions were answered to patient's satisfaction prior to discharge  She expressed understanding and agreed to plan  History provided by:  Medical records, spouse and patient  Rash  Associated symptoms: no fatigue, no fever and no joint pain        Prior to Admission Medications   Prescriptions Last Dose Informant Patient Reported? Taking?    ALPRAZolam (XANAX) 0 25 mg tablet  Self No No   Sig: TAKE 1 TABLET BY MOUTH EVERY 6 HOURS AS NEEDED FOR ANXIETY Calcium Carb-Cholecalciferol (CALTRATE 600+D3 PO)  Self Yes No   Sig: Take 1 tablet by mouth 2 (two) times a day   Omega-3 Fatty Acids (FISH OIL) 1200 MG CAPS  Self Yes No   Sig: Take 1,200 mg by mouth 2 (two) times a day   Psyllium (METAMUCIL FIBER) 51 7 % PACK  Self No No   Sig: Take 1 Package by mouth daily   amLODIPine (NORVASC) 10 mg tablet  Self No No   Sig: Take 1 tablet by mouth once daily   ammonium lactate (LAC-HYDRIN) 12 % lotion  Self Yes No   atenolol-chlorthalidone (TENORETIC) 100-25 mg per tablet  Self No No   Sig: Take 0 5 tablets by mouth daily   potassium chloride (K-DUR,KLOR-CON) 20 mEq tablet  Self No No   Sig: TAKE 1  BY MOUTH ONCE DAILY      Facility-Administered Medications: None       Past Medical History:   Diagnosis Date    Aortic stenosis     Community acquired pneumonia of left upper lobe of lung 5/17/2019    Fatigue     Full dentures     Generalized anxiety disorder     Hyperlipidemia     Hypertension     Impaired fasting glucose     Mitral regurgitation     Pneumonia     SVT (supraventricular tachycardia)     Tricuspid regurgitation        Past Surgical History:   Procedure Laterality Date    DENTAL SURGERY Bilateral     ALL TEETH REMOVED    FL GUIDED NEEDLE PLAC BX/ASP/INJ  5/26/2021    JOINT REPLACEMENT Left     KNEE SURGERY      left knee replacement    MO INJECTION,SACROILIAC JOINT Bilateral 5/26/2021    Procedure: SACROILIAC JOINT INJECTION;  Surgeon: Inna Christopher MD;  Location: [facility];  Service: Pain Management        Family History   Problem Relation Age of Onset    Hypertension Mother     Asthma Father         [place]'s asthma    Alzheimer's disease Sister     Rectal cancer Son     Uterine cancer Daughter     Heart disease Sister     Heart disease Brother      I have reviewed and agree with the history as documented      E-Cigarette/Vaping    E-Cigarette Use Never User      E-Cigarette/Vaping Substances    Nicotine No     THC No     CBD No  Flavoring No     Other No     Unknown No      Social History     Tobacco Use    Smoking status: Never Smoker    Smokeless tobacco: Never Used   Vaping Use    Vaping Use: Never used   Substance Use Topics    Alcohol use: Never    Drug use: Never       Review of Systems   Constitutional: Negative for chills, fatigue and fever  Cardiovascular: Negative  Negative for leg swelling  Musculoskeletal: Negative for arthralgias, gait problem and joint swelling  Skin: Positive for rash  Negative for color change and pallor  Neurological: Negative for weakness and numbness  Hematological: Negative  Physical Exam  Physical Exam  Vitals and nursing note reviewed  Constitutional:       General: She is awake  She is not in acute distress  Appearance: Normal appearance  She is well-developed and well-groomed  She is not ill-appearing  HENT:      Head: Normocephalic and atraumatic  Right Ear: Hearing and external ear normal       Left Ear: Hearing and external ear normal    Neck:      Trachea: Trachea and phonation normal    Cardiovascular:      Rate and Rhythm: Normal rate and regular rhythm  Pulses: Normal pulses  Popliteal pulses are 2+ on the right side and 2+ on the left side  Dorsalis pedis pulses are 2+ on the right side and 2+ on the left side  Posterior tibial pulses are 2+ on the right side and 2+ on the left side  Pulmonary:      Effort: Pulmonary effort is normal  No tachypnea, accessory muscle usage or respiratory distress  Skin:     General: Skin is warm and dry  Capillary Refill: Capillary refill takes less than 2 seconds  <2s in all digits of both feet     Comments: Medial L leg proximal to ankle: patchy rash approx 9 cm x 7 cm consisting of erythematous macules irregularly bordered and sized between 0 5-1 cm in diameter each  No confluent erythema  No induration  This sits just above the line made by patient's socks    No ttp along the deep venous system and no congestion of the superficial venous system in the LLE  Neurological:      Mental Status: She is alert and oriented to person, place, and time  GCS: GCS eye subscore is 4  GCS verbal subscore is 5  GCS motor subscore is 6  Sensory: Sensation is intact  Motor: Motor function is intact  Comments: Intact LE sensation in legs/feet to light touch  Strength 5/5 in LE bilaterally at hip/knee/ankle         Vital Signs  ED Triage Vitals [08/02/22 1438]   Temperature Pulse Respirations Blood Pressure SpO2   98 3 °F (36 8 °C) 70 18 (!) 171/73 97 %      Temp Source Heart Rate Source Patient Position - Orthostatic VS BP Location FiO2 (%)   Temporal Monitor Sitting Right arm --      Pain Score       --           Vitals:    08/02/22 1438 08/02/22 1500   BP: (!) 171/73 157/81   Pulse: 70 63   Patient Position - Orthostatic VS: Sitting Sitting         Visual Acuity      ED Medications  Medications - No data to display    Diagnostic Studies  Results Reviewed     Procedure Component Value Units Date/Time    Lyme Antibody Profile with reflex to [facility] [[id number]] Collected: 08/02/22 1517    Lab Status: In process Specimen: Blood from Arm, Left Updated: 08/02/22 1520                 No orders to display              Procedures  Procedures         ED Course           MDM    Disposition  Final diagnoses:   Rash and nonspecific skin eruption of left medial ankle     Time reflects when diagnosis was documented in both MDM as applicable and the Disposition within this note     Time User Action Codes Description Comment    8/2/2022  3:08 PM Connor Dixon Add [R21] Rash and nonspecific skin eruption     8/2/2022  3:08 PM Connor Dixon Modify [R21] Rash and nonspecific skin eruption of left medial ankle       ED Disposition     ED Disposition   Discharge    Condition   Stable    Date/Time   Tue Aug 2, 2022  3:08 PM    [address] discharge to home/self care  Follow-up Information    None         Discharge Medication List as of 8/2/2022  3:10 PM      START taking these medications    Details   betamethasone dipropionate (DIPROSONE) 0 05 % ointment Apply topically 2 (two) times a day for 14 days, Starting Tue 8/2/2022, Until Tue 8/16/2022, Normal         CONTINUE these medications which have NOT CHANGED    Details   ALPRAZolam (XANAX) 0 25 mg tablet TAKE 1 TABLET BY MOUTH EVERY 6 HOURS AS NEEDED FOR ANXIETY, Normal      amLODIPine (NORVASC) 10 mg tablet Take 1 tablet by mouth once daily, Normal      ammonium lactate (LAC-HYDRIN) 12 % lotion Starting Mon 5/11/2020, Historical Med      atenolol-chlorthalidone (TENORETIC) 100-25 mg per tablet Take 0 5 tablets by mouth daily, Starting Mon 3/28/2022, Normal      Calcium Carb-Cholecalciferol (CALTRATE 600+D3 PO) Take 1 tablet by mouth 2 (two) times a day, Historical Med      Omega-3 Fatty Acids (FISH OIL) 1200 MG CAPS Take 1,200 mg by mouth 2 (two) times a day, Historical Med      potassium chloride (K-DUR,KLOR-CON) 20 mEq tablet TAKE 1  BY MOUTH ONCE DAILY, Normal      Psyllium (METAMUCIL FIBER) 51 7 % PACK Take 1 Package by mouth daily, Starting Tue 5/21/2019, No Print             No discharge procedures on file      PDMP Review       Value Time User    PDMP Reviewed  Yes 5/3/2022  8:05 AM Daren Valderrama DO          ED Provider  Electronically Signed by           Parth Lovett DO  08/02/22 3410

## 2022-08-02 NOTE — DISCHARGE INSTRUCTIONS
You have been prescribed a topical steroid that you should apply to the area of the rash according to the prescription instructions  Please keep the appointment with your podiatrist next week  Please go to the ER if you develop a fever, lightheadedness, swelling of your entire leg, or any episodes of passing out

## 2022-08-03 ENCOUNTER — HOSPITAL ENCOUNTER (OUTPATIENT)
Dept: NUCLEAR MEDICINE | Facility: HOSPITAL | Age: 87
Discharge: HOME | End: 2022-08-03
Attending: FAMILY MEDICINE
Payer: MEDICARE

## 2022-08-03 LAB — B BURGDOR IGG+IGM SER-ACNC: 0.2 AI

## 2022-08-08 DIAGNOSIS — E05.90 HYPERTHYROIDISM: Primary | ICD-10-CM

## 2022-08-09 ENCOUNTER — TELEPHONE (OUTPATIENT)
Dept: FAMILY MEDICINE CLINIC | Facility: CLINIC | Age: 87
End: 2022-08-09

## 2022-08-09 NOTE — TELEPHONE ENCOUNTER
PATIENT'S  CALLED REQUESTING A WHEELCHAIR FOR HIS WIFE AND FAXED TO [facility]  HE ALSO STATED A NOTE MUST BE INCLUDED TO WHY SHE NEEDS A WHEELCHAIR

## 2022-08-10 NOTE — PROGRESS NOTES
New Patient Progress Note       Chief Complaint   Patient presents with    Hyperthyroidism        History of Present Illness:     Joseph Garrido is a very pleasant 80 y o  female with hyperthyroidism presenting to the office today to establish care  Past medical history significant for impaired fasting glucose, hypertension, stage IIIA chronic kidney disease, and dyslipidemia  Suppressed TSH with elevated free T4 was discovered in July of 2022  Patient completed a nuclear medicine thyroid imaging with uptake on 08/03/2022  Component      Latest Ref Rng & Units 5/17/2019 3/22/2022 7/5/2022           8:07 PM  7:29 AM  7:11 AM   TSH 3RD GENERATON      0 450 - 4 500 uIU/mL 0 839 0 122 (L) <0 007 (L)     Component      Latest Ref Rng & Units 3/22/2022 7/5/2022           7:29 AM  7:11 AM   Free T4      0 76 - 1 46 ng/dL 1 33 1 73 (H)       Onset: 3/2022  Etiology: Unclear; potentially thyroiditis  Symptoms: cold intolerance, fatigue  Denies palpitations, weight loss, heat intolerance, tremor, increased anxiety, and hyper defecation    Prior treatment: None to date  Family history: Denies family history of thyroid dysfunction, thyroid nodules, and thyroid CA    Denies any compressive symptoms  Denies radiation exposure to head or neck  THYROID SCAN AND UPTAKE     INDICATION:  E05 90: Thyrotoxicosis, unspecified without thyrotoxic crisis or storm  Hyperthyroidism       COMPARISON:  None     TECHNIQUE:   The study was performed following the oral administration of 268 uCi I-123      FINDINGS:      The thyroid scan demonstrates fairly homogeneous distribution of the radiopharmaceutical throughout both thyroid lobes without evidence of a discrete cold or hot nodule      Thyroid uptake values are:     6 hours:   6 3% (N =  5 -15%)     24 hours: 14 5% (N =15 - 35%)     IMPRESSION:     1  Six-hour uptake is at the lower limits of normal while the 24 hour uptake is noted to be just below normal range    Findings would suggest a borderline hypofunctioning thyroid gland  If the patient is hyperthyroid, sequelae of thyroiditis would be a   consideration  Chronic subclinical hyperthyroidism can sometimes have these findings as well    Patient Active Problem List   Diagnosis    Hypomagnesemia    Hypokalemia    Hyponatremia    Essential hypertension    Lymphadenopathy    Hiatal hernia    Thrombocytopenia (HCC)    Aortic valve stenosis    Mitral valve insufficiency    Supraventricular tachycardia (HCC)    Pulmonary hypertension (HCC)    Diverticulosis    First degree AV block    Non-rheumatic tricuspid valve insufficiency    Generalized anxiety disorder    Mid back pain    Encounter for Medicare annual wellness exam    Encounter for long-term (current) use of medications    Immunization due    Acute bursitis of right shoulder    Impaired fasting glucose    Osteopenia of neck of femur    Chronic bilateral low back pain without sciatica    Gait abnormality    Chronic pain syndrome    Sacroiliitis (HCC)    Bilateral leg edema    Ganglion cyst    Adhesive capsulitis of right shoulder    Stage 3a chronic kidney disease (HCC)    Other fatigue    Dyslipidemia    Post-menopause    Hyperthyroidism      Past Medical History:   Diagnosis Date    Aortic stenosis     Community acquired pneumonia of left upper lobe of lung 5/17/2019    Fatigue     Full dentures     Generalized anxiety disorder     Hyperlipidemia     Hypertension     Impaired fasting glucose     Mitral regurgitation     Pneumonia     SVT (supraventricular tachycardia)     Tricuspid regurgitation       Past Surgical History:   Procedure Laterality Date    DENTAL SURGERY Bilateral     ALL TEETH REMOVED    FL GUIDED NEEDLE PLAC BX/ASP/INJ  5/26/2021    JOINT REPLACEMENT Left     KNEE SURGERY      left knee replacement    UT INJECTION,SACROILIAC JOINT Bilateral 5/26/2021    Procedure: SACROILIAC JOINT INJECTION;  Surgeon: Gautam Peters Omayra Guillaume MD;  Location: [facility];  Service: Pain Management       Family History   Problem Relation Age of Onset    Hypertension Mother    Erwin Moustapha Asthma Father         's asthma    Alzheimer's disease Sister     Rectal cancer Son     Uterine cancer Daughter     Heart disease Sister     Heart disease Brother      Social History     Tobacco Use    Smoking status: Never Smoker    Smokeless tobacco: Never Used   Substance Use Topics    Alcohol use: Never     No Known Allergies    Current Outpatient Medications:     ALPRAZolam (XANAX) 0 25 mg tablet, TAKE 1 TABLET BY MOUTH EVERY 6 HOURS AS NEEDED FOR ANXIETY, Disp: 120 tablet, Rfl: 2    amLODIPine (NORVASC) 10 mg tablet, Take 1 tablet by mouth once daily, Disp: 42 tablet, Rfl: 0    ammonium lactate (LAC-HYDRIN) 12 % lotion, , Disp: , Rfl:     atenolol-chlorthalidone (TENORETIC) 100-25 mg per tablet, Take 0 5 tablets by mouth daily, Disp: 90 tablet, Rfl: 1    betamethasone dipropionate (DIPROSONE) 0 05 % ointment, Apply topically 2 (two) times a day for 14 days, Disp: 15 g, Rfl: 0    Calcium Carb-Cholecalciferol (CALTRATE 600+D3 PO), Take 1 tablet by mouth 2 (two) times a day, Disp: , Rfl:     Omega-3 Fatty Acids (FISH OIL) 1200 MG CAPS, Take 1,200 mg by mouth 2 (two) times a day, Disp: , Rfl:     potassium chloride (K-DUR,KLOR-CON) 20 mEq tablet, TAKE 1  BY MOUTH ONCE DAILY, Disp: 90 tablet, Rfl: 0    Psyllium (METAMUCIL FIBER) 51 7 % PACK, Take 1 Package by mouth daily, Disp: , Rfl: 0    Review of Systems   Constitutional: Positive for fatigue  Negative for activity change, appetite change and unexpected weight change  HENT: Negative for dental problem, sore throat, trouble swallowing and voice change  Eyes: Negative for visual disturbance  Respiratory: Negative for cough, chest tightness and shortness of breath  Cardiovascular: Negative for chest pain, palpitations and leg swelling     Gastrointestinal: Negative for constipation, diarrhea, nausea and vomiting  Endocrine: Positive for cold intolerance  Negative for heat intolerance  Genitourinary: Negative for frequency  Musculoskeletal: Positive for arthralgias and gait problem  Negative for back pain, joint swelling and myalgias  Skin: Negative for wound  Allergic/Immunologic: Negative for environmental allergies and food allergies  Neurological: Negative for dizziness, tremors, weakness, light-headedness, numbness and headaches  Hematological: Does not bruise/bleed easily  Psychiatric/Behavioral: Positive for decreased concentration  Negative for dysphoric mood and sleep disturbance  The patient is not nervous/anxious  Physical Exam:  Body mass index is 21 11 kg/m²  /88   Pulse 64   Ht 4' 10" (1 473 m)   Wt 45 8 kg (101 lb)   SpO2 96%   BMI 21 11 kg/m²    Wt Readings from Last 3 Encounters:   08/12/22 45 8 kg (101 lb)   08/02/22 46 2 kg (101 lb 13 6 oz)   07/19/22 53 1 kg (117 lb)       Physical Exam  Vitals reviewed  Constitutional:       General: She is not in acute distress  Appearance: She is well-developed  She is not ill-appearing  Comments: Hard of hearing   HENT:      Head: Normocephalic and atraumatic  Eyes:      Pupils: Pupils are equal, round, and reactive to light  Neck:      Thyroid: No thyromegaly  Cardiovascular:      Rate and Rhythm: Normal rate and regular rhythm  Pulses: Normal pulses  Heart sounds: Normal heart sounds  Pulmonary:      Effort: Pulmonary effort is normal       Breath sounds: Normal breath sounds  Abdominal:      General: Bowel sounds are normal  There is no distension  Palpations: Abdomen is soft  Tenderness: There is no abdominal tenderness  Musculoskeletal:      Cervical back: Normal range of motion and neck supple  Right lower leg: No edema  Left lower leg: No edema  Lymphadenopathy:      Cervical: No cervical adenopathy  Skin:     General: Skin is warm and dry  Capillary Refill: Capillary refill takes less than 2 seconds  Neurological:      Mental Status: She is alert and oriented to person, place, and time  Gait: Gait (Patient in wheelchair) normal    Psychiatric:         Mood and Affect: Mood normal          Behavior: Behavior normal          Labs:       Lab Results   Component Value Date    CREATININE 1 10 03/22/2022    CREATININE 0 97 09/14/2021    CREATININE 1 00 04/21/2021    BUN 21 03/22/2022    K 3 5 03/22/2022    CL 97 (L) 03/22/2022    CO2 23 03/22/2022     eGFR   Date Value Ref Range Status   03/22/2022 45 ml/min/1 73sq m Final       Lab Results   Component Value Date    HDL 74 07/05/2022    TRIG 67 07/05/2022       Lab Results   Component Value Date    ALT 22 03/22/2022    AST 23 03/22/2022    ALKPHOS 59 03/22/2022       Lab Results   Component Value Date    FREET4 1 73 (H) 07/05/2022         Impression & Plan:    Problem List Items Addressed This Visit        Endocrine    Hyperthyroidism - Primary     Counseled on basic pathophysiology of hyperthyroidism  Repeat TSH, free T4  Check T3 and TrAb  Counseled on treatment options for hyperthyroidism including methimazole, radioactive iodine ablation, and thyroidectomy  Patient would prefer to pursue methimazole therapy if required  Counseled on mechanism of action as well as potential side effects, namely GI upset, rash, paresthesias, and elevated liver enzymes  Counseled on the need for frequent monitoring  Patient and her spouse know to contact me with any questions or concerns  Will be in touch with them after labs have resulted  Relevant Orders    TSH, 3rd generation Lab Collect    T4, free Lab Collect    T3 Lab Collect    TRAb (TSH Receptor Binding Antibody)          Orders Placed This Encounter   Procedures    TSH, 3rd generation Lab Collect     This is a patient instruction: This test is non-fasting  Please drink two glasses of water morning of bloodwork          Standing Status: Future     Standing Expiration Date:   8/10/2023    T4, free Lab Collect     Standing Status:   Future     Standing Expiration Date:   8/10/2023    T3 Lab Collect     Standing Status:   Future     Standing Expiration Date:   8/10/2023    TRAb (TSH Receptor Binding Antibody)     Standing Status:   Future     Standing Expiration Date:   8/10/2023       There are no Patient Instructions on file for this visit  Discussed with the patient and all questioned fully answered  She will call me if any problems arise  Follow-up appointment in 6 months       Counseled patient on diagnostic results, prognosis, risk and benefit of treatment options, instruction for management, importance of treatment compliance, Risk  factor reduction and impressions      LYNN Abarca

## 2022-08-12 ENCOUNTER — OFFICE VISIT (OUTPATIENT)
Dept: ENDOCRINOLOGY | Facility: CLINIC | Age: 87
End: 2022-08-12
Payer: MEDICARE

## 2022-08-12 VITALS
DIASTOLIC BLOOD PRESSURE: 88 MMHG | HEART RATE: 64 BPM | HEIGHT: 58 IN | WEIGHT: 101 LBS | OXYGEN SATURATION: 96 % | SYSTOLIC BLOOD PRESSURE: 140 MMHG | BODY MASS INDEX: 21.2 KG/M2

## 2022-08-12 DIAGNOSIS — E05.90 HYPERTHYROIDISM: Primary | ICD-10-CM

## 2022-08-12 PROCEDURE — 99203 OFFICE O/P NEW LOW 30 MIN: CPT | Performed by: NURSE PRACTITIONER

## 2022-08-12 NOTE — ASSESSMENT & PLAN NOTE
Counseled on basic pathophysiology of hyperthyroidism  Repeat TSH, free T4  Check T3 and TrAb  Counseled on treatment options for hyperthyroidism including methimazole, radioactive iodine ablation, and thyroidectomy  Patient would prefer to pursue methimazole therapy if required  Counseled on mechanism of action as well as potential side effects, namely GI upset, rash, paresthesias, and elevated liver enzymes  Counseled on the need for frequent monitoring  Patient and her spouse know to contact me with any questions or concerns  Will be in touch with them after labs have resulted

## 2022-08-22 ENCOUNTER — APPOINTMENT (OUTPATIENT)
Dept: LAB | Facility: HOSPITAL | Age: 87
End: 2022-08-22
Payer: MEDICARE

## 2022-08-22 DIAGNOSIS — E05.90 HYPERTHYROIDISM: ICD-10-CM

## 2022-08-22 DIAGNOSIS — J18.9 COMMUNITY ACQUIRED PNEUMONIA OF LEFT UPPER LOBE OF LUNG: ICD-10-CM

## 2022-08-22 LAB
T3 SERPL-MCNC: 0.9 NG/ML (ref 0.6–1.8)
T4 FREE SERPL-MCNC: 1.35 NG/DL (ref 0.76–1.46)
TSH SERPL DL<=0.05 MIU/L-ACNC: <0.007 UIU/ML (ref 0.45–4.5)

## 2022-08-22 PROCEDURE — 83520 IMMUNOASSAY QUANT NOS NONAB: CPT

## 2022-08-22 PROCEDURE — 36415 COLL VENOUS BLD VENIPUNCTURE: CPT

## 2022-08-22 PROCEDURE — 84480 ASSAY TRIIODOTHYRONINE (T3): CPT

## 2022-08-22 PROCEDURE — 84443 ASSAY THYROID STIM HORMONE: CPT

## 2022-08-22 PROCEDURE — 84439 ASSAY OF FREE THYROXINE: CPT

## 2022-08-22 RX ORDER — ALPRAZOLAM 0.25 MG/1
0.25 TABLET ORAL EVERY 6 HOURS PRN
Qty: 120 TABLET | Refills: 2 | Status: SHIPPED | OUTPATIENT
Start: 2022-08-22

## 2022-08-23 ENCOUNTER — TELEPHONE (OUTPATIENT)
Dept: FAMILY MEDICINE CLINIC | Facility: CLINIC | Age: 87
End: 2022-08-23

## 2022-08-23 LAB — TSH RECEP AB SER-ACNC: 3.7 IU/L (ref 0–1.75)

## 2022-08-23 NOTE — TELEPHONE ENCOUNTER
KATELYNN FROM [facility] STATING THEY NEED AN ADDENDUM TO YOUR LAST OFFICE NOTE IF YOU ARE PRESCRIBING A WHEELCHAIR FOR THE PATIENT  ALL OTHER INFORMATION WAS FAXED TO [facility]

## 2022-08-24 DIAGNOSIS — E05.90 HYPERTHYROIDISM: Primary | ICD-10-CM

## 2022-08-24 RX ORDER — METHIMAZOLE 5 MG/1
TABLET ORAL
Qty: 30 TABLET | Refills: 1 | Status: SHIPPED | OUTPATIENT
Start: 2022-08-24 | End: 2022-10-11

## 2022-08-26 ENCOUNTER — TELEPHONE (OUTPATIENT)
Dept: FAMILY MEDICINE CLINIC | Facility: CLINIC | Age: 87
End: 2022-08-26

## 2022-08-26 NOTE — TELEPHONE ENCOUNTER
[facility] STATING THEY CAN NOT PROCESS THE WHEELCHAIR REQUEST WITHOUT A CHART NOTE STATING AS TO WHY SHE NEEDS THE WHEELCHAIR   THEY HAVE REQUESTED AN ADDENDUM TO THE LAST OFFICE NOTE TO BE FAXED TO THEM SO THEY CAN PROCESS THE REQUEST FOR THE PT

## 2022-09-06 ENCOUNTER — OFFICE VISIT (OUTPATIENT)
Dept: CARDIOLOGY CLINIC | Facility: CLINIC | Age: 87
End: 2022-09-06
Payer: MEDICARE

## 2022-09-06 VITALS
WEIGHT: 109 LBS | SYSTOLIC BLOOD PRESSURE: 126 MMHG | DIASTOLIC BLOOD PRESSURE: 60 MMHG | BODY MASS INDEX: 22.88 KG/M2 | HEART RATE: 56 BPM | HEIGHT: 58 IN

## 2022-09-06 DIAGNOSIS — I27.20 PULMONARY HYPERTENSION (HCC): Chronic | ICD-10-CM

## 2022-09-06 DIAGNOSIS — I35.0 NONRHEUMATIC AORTIC VALVE STENOSIS: ICD-10-CM

## 2022-09-06 DIAGNOSIS — I10 ESSENTIAL HYPERTENSION: Primary | ICD-10-CM

## 2022-09-06 DIAGNOSIS — R60.0 BILATERAL LEG EDEMA: Chronic | ICD-10-CM

## 2022-09-06 PROCEDURE — 99214 OFFICE O/P EST MOD 30 MIN: CPT | Performed by: INTERNAL MEDICINE

## 2022-09-06 PROCEDURE — 93000 ELECTROCARDIOGRAM COMPLETE: CPT | Performed by: INTERNAL MEDICINE

## 2022-09-06 NOTE — PROGRESS NOTES
Patient ID: Anna Whaley is a 80 y o  female  Plan: Aortic valve stenosis  Moderate by velocity and exam despite report of worse  Will reassess in 1 year  Bilateral leg edema  Quite minimal at present  Pulmonary hypertension (HCC)  No specific symptoms at this point  Follow up Plan/Other summary comments:  Return in about 1 year (around 9/6/2023)  Echo just prior to return  HPI:  Patient is seen in follow-up today regarding the above  Since the last visit she has done reasonably okay  Walking is difficult  No chest pain, syncope, near syncope, or worsening dyspnea  Results for orders placed or performed in visit on 09/06/22   POCT ECG    Impression    NSR  First Degree AV block  Left axis  NSSTs  Most recent or relevant cardiac/vascular testing:    TTE 06/24/2021:  Normal LV systolic function  Peak velocity across the aortic valve was 2 85 m/sec c/w at most moderate aortic stenosis  Mild to moderate pulmonary hypertension  TTE 07/19/2022:  LVEF 50%  2 85 m/sec flow across the aortic valve  PA pressure 75  Past Surgical History:   Procedure Laterality Date    DENTAL SURGERY Bilateral     ALL TEETH REMOVED    FL GUIDED NEEDLE PLAC BX/ASP/INJ  5/26/2021    JOINT REPLACEMENT Left     KNEE SURGERY      left knee replacement    AR INJECTION,SACROILIAC JOINT Bilateral 5/26/2021    Procedure: SACROILIAC JOINT INJECTION;  Surgeon: Leticia Mcduffie MD;  Location: [facility];  Service: Pain Management      CMP:   Lab Results   Component Value Date    K 3 5 03/22/2022    CL 97 (L) 03/22/2022    CO2 23 03/22/2022    BUN 21 03/22/2022    CREATININE 1 10 03/22/2022    EGFR 45 03/22/2022       Lipid Profile:   Lab Results   Component Value Date    TRIG 67 07/05/2022    HDL 74 07/05/2022         Review of Systems   10  point ROS  was otherwise non pertinent or negative except as per HPI or as below  Gait:  Has a cane          Objective:     /60   Pulse 56    4' 10" (1 473 m)   Wt 49 4 kg (109 lb)   BMI 22 78 kg/m²     PHYSICAL EXAM:    General:  Normal appearance in no distress  Eyes:  Anicteric  Oral mucosa:  Moist   Neck:  No JVD  Carotid upstrokes are brisk without bruits  No masses  Chest:  Clear to auscultation  Cardiac:  No palpable PMI  Normal S1 and S2   Grade 2-3 systolic murmur loudest at the base radiating towards the neck  No gallop or rub  Abdomen:  Soft and nontender  No palpable organomegaly or aortic enlargement  Extremities:  No peripheral edema  Musculoskeletal:  Symmetric  Vascular:  Femoral pulses are brisk without bruits  Popliteal pulses are intact bilaterally  Pedal pulses are intact  Neuro:  Grossly symmetric  Psych:  Alert and oriented x3  Current Outpatient Medications:     ALPRAZolam (XANAX) 0 25 mg tablet, Take 1 tablet (0 25 mg total) by mouth every 6 (six) hours as needed for anxiety, Disp: 120 tablet, Rfl: 2    amLODIPine (NORVASC) 10 mg tablet, Take 1 tablet by mouth once daily, Disp: 42 tablet, Rfl: 0    ammonium lactate (LAC-HYDRIN) 12 % lotion, , Disp: , Rfl:     atenolol-chlorthalidone (TENORETIC) 100-25 mg per tablet, Take 0 5 tablets by mouth daily, Disp: 90 tablet, Rfl: 1    Calcium Carb-Cholecalciferol (CALTRATE 600+D3 PO), Take 1 tablet by mouth 2 (two) times a day, Disp: , Rfl:     methimazole (TAPAZOLE) 5 mg tablet, Take 1 tablet (5mg) daily  , Disp: 30 tablet, Rfl: 1    Omega-3 Fatty Acids (FISH OIL) 1200 MG CAPS, Take 1,200 mg by mouth 2 (two) times a day, Disp: , Rfl:     potassium chloride (K-DUR,KLOR-CON) 20 mEq tablet, TAKE 1  BY MOUTH ONCE DAILY, Disp: 90 tablet, Rfl: 0    betamethasone dipropionate (DIPROSONE) 0 05 % ointment, Apply topically 2 (two) times a day for 14 days, Disp: 15 g, Rfl: 0    Psyllium (METAMUCIL FIBER) 51 7 % PACK, Take 1 Package by mouth daily, Disp: , Rfl: 0  No Known Allergies  Past Medical History:   Diagnosis Date    [facility]    [facility] left upper lobe of lung 5/17/2019    Fatigue     Full dentures     Generalized anxiety disorder     Hyperlipidemia     Hypertension     Impaired fasting glucose     Mitral regurgitation     Pneumonia     SVT (supraventricular tachycardia)     Tricuspid regurgitation            Social History     Tobacco Use   Smoking Status Never Smoker   Smokeless Tobacco Never Used

## 2022-09-07 ENCOUNTER — TELEPHONE (OUTPATIENT)
Dept: FAMILY MEDICINE CLINIC | Facility: CLINIC | Age: 87
End: 2022-09-07

## 2022-09-07 PROBLEM — M15.0 PRIMARY GENERALIZED (OSTEO)ARTHRITIS: Status: ACTIVE | Noted: 2022-09-07

## 2022-09-07 NOTE — ASSESSMENT & PLAN NOTE
Patient has osteoarthritis with poor balance and ambulatory dysfunction  I ordered a wheelchair for the patient

## 2022-09-07 NOTE — TELEPHONE ENCOUNTER
Yaz Muñoz called from [facility] they are waiting for the ov note addendum  for the wheelchair you had ordered for the patient  please advise?

## 2022-09-08 NOTE — TELEPHONE ENCOUNTER
Notes faxed to [facility] AT [facility]3]

## 2022-09-21 ENCOUNTER — APPOINTMENT (OUTPATIENT)
Dept: LAB | Facility: HOSPITAL | Age: 87
End: 2022-09-21
Payer: MEDICARE

## 2022-09-21 DIAGNOSIS — E05.90 HYPERTHYROIDISM: ICD-10-CM

## 2022-09-21 DIAGNOSIS — R74.8 ELEVATED LIVER ENZYMES: Primary | ICD-10-CM

## 2022-09-21 LAB
ALBUMIN SERPL BCP-MCNC: 3.6 G/DL (ref 3.5–5)
ALP SERPL-CCNC: 192 U/L (ref 46–116)
ALT SERPL W P-5'-P-CCNC: 111 U/L (ref 12–78)
AST SERPL W P-5'-P-CCNC: 51 U/L (ref 5–45)
BILIRUB DIRECT SERPL-MCNC: 0.18 MG/DL (ref 0–0.2)
BILIRUB SERPL-MCNC: 0.51 MG/DL (ref 0.2–1)
PROT SERPL-MCNC: 7 G/DL (ref 6.4–8.4)
T4 FREE SERPL-MCNC: 1.02 NG/DL (ref 0.76–1.46)
TSH SERPL DL<=0.05 MIU/L-ACNC: 0.01 UIU/ML (ref 0.45–4.5)

## 2022-09-21 PROCEDURE — 84439 ASSAY OF FREE THYROXINE: CPT

## 2022-09-21 PROCEDURE — 36415 COLL VENOUS BLD VENIPUNCTURE: CPT

## 2022-09-21 PROCEDURE — 84443 ASSAY THYROID STIM HORMONE: CPT

## 2022-09-21 PROCEDURE — 80076 HEPATIC FUNCTION PANEL: CPT

## 2022-09-28 DIAGNOSIS — I10 ESSENTIAL HYPERTENSION: ICD-10-CM

## 2022-09-28 RX ORDER — AMLODIPINE BESYLATE 10 MG/1
TABLET ORAL
Qty: 42 TABLET | Refills: 0 | Status: SHIPPED | OUTPATIENT
Start: 2022-09-28

## 2022-10-04 ENCOUNTER — RA CDI HCC (OUTPATIENT)
Dept: OTHER | Facility: HOSPITAL | Age: 87
End: 2022-10-04

## 2022-10-05 ENCOUNTER — APPOINTMENT (OUTPATIENT)
Dept: LAB | Facility: HOSPITAL | Age: 87
End: 2022-10-05
Payer: MEDICARE

## 2022-10-05 DIAGNOSIS — R74.8 ELEVATED LIVER ENZYMES: ICD-10-CM

## 2022-10-05 LAB
ALBUMIN SERPL BCP-MCNC: 3.5 G/DL (ref 3.5–5)
ALP SERPL-CCNC: 107 U/L (ref 46–116)
ALT SERPL W P-5'-P-CCNC: 29 U/L (ref 12–78)
ANION GAP SERPL CALCULATED.3IONS-SCNC: 9 MMOL/L (ref 4–13)
AST SERPL W P-5'-P-CCNC: 22 U/L (ref 5–45)
BILIRUB SERPL-MCNC: 0.5 MG/DL (ref 0.2–1)
BUN SERPL-MCNC: 28 MG/DL (ref 5–25)
CALCIUM SERPL-MCNC: 9.4 MG/DL (ref 8.3–10.1)
CHLORIDE SERPL-SCNC: 96 MMOL/L (ref 96–108)
CO2 SERPL-SCNC: 25 MMOL/L (ref 21–32)
CREAT SERPL-MCNC: 1.16 MG/DL (ref 0.6–1.3)
GFR SERPL CREATININE-BSD FRML MDRD: 42 ML/MIN/1.73SQ M
GLUCOSE P FAST SERPL-MCNC: 98 MG/DL (ref 65–99)
POTASSIUM SERPL-SCNC: 3.5 MMOL/L (ref 3.5–5.3)
PROT SERPL-MCNC: 7 G/DL (ref 6.4–8.4)
SODIUM SERPL-SCNC: 130 MMOL/L (ref 135–147)

## 2022-10-05 PROCEDURE — 80053 COMPREHEN METABOLIC PANEL: CPT

## 2022-10-05 PROCEDURE — 36415 COLL VENOUS BLD VENIPUNCTURE: CPT

## 2022-10-06 DIAGNOSIS — E87.6 HYPOKALEMIA: ICD-10-CM

## 2022-10-06 RX ORDER — POTASSIUM CHLORIDE 20 MEQ/1
TABLET, EXTENDED RELEASE ORAL
Qty: 90 TABLET | Refills: 0 | Status: SHIPPED | OUTPATIENT
Start: 2022-10-06

## 2022-10-08 DIAGNOSIS — E05.90 HYPERTHYROIDISM: ICD-10-CM

## 2022-10-11 ENCOUNTER — OFFICE VISIT (OUTPATIENT)
Dept: FAMILY MEDICINE CLINIC | Facility: CLINIC | Age: 87
End: 2022-10-11
Payer: MEDICARE

## 2022-10-11 VITALS
OXYGEN SATURATION: 100 % | HEART RATE: 57 BPM | DIASTOLIC BLOOD PRESSURE: 64 MMHG | TEMPERATURE: 97.9 F | WEIGHT: 109.5 LBS | SYSTOLIC BLOOD PRESSURE: 130 MMHG | HEIGHT: 58 IN | BODY MASS INDEX: 22.99 KG/M2

## 2022-10-11 DIAGNOSIS — Z23 ENCOUNTER FOR IMMUNIZATION: ICD-10-CM

## 2022-10-11 DIAGNOSIS — E05.90 HYPERTHYROIDISM: ICD-10-CM

## 2022-10-11 DIAGNOSIS — I10 ESSENTIAL HYPERTENSION: Primary | ICD-10-CM

## 2022-10-11 DIAGNOSIS — I35.0 NONRHEUMATIC AORTIC VALVE STENOSIS: ICD-10-CM

## 2022-10-11 PROCEDURE — 99214 OFFICE O/P EST MOD 30 MIN: CPT | Performed by: FAMILY MEDICINE

## 2022-10-11 PROCEDURE — 90662 IIV NO PRSV INCREASED AG IM: CPT | Performed by: FAMILY MEDICINE

## 2022-10-11 PROCEDURE — G0008 ADMIN INFLUENZA VIRUS VAC: HCPCS | Performed by: FAMILY MEDICINE

## 2022-10-11 RX ORDER — METHIMAZOLE 5 MG/1
TABLET ORAL
Qty: 30 TABLET | Refills: 0 | Status: SHIPPED | OUTPATIENT
Start: 2022-10-11 | End: 2022-10-12 | Stop reason: SDUPTHER

## 2022-10-11 NOTE — PROGRESS NOTES
Name: Paloma Garcia      : 1935      MRN: [id number]  Encounter Provider: Karl Prieto DO  Encounter Date: 10/11/2022   Encounter department: [facility]    Assessment & Plan     1  Essential hypertension  Assessment & Plan:  Patient has hypertension  I reviewed her ambulatory blood pressure readings which were quite good  I also check her blood pressure in this office myself today  Blood pressure is under satisfactory control  We will continue her on her current regiment  2  Encounter for immunization  -     influenza vaccine, high-dose, PF 0 7 mL (FLUZONE HIGH-DOSE)    3  Nonrheumatic aortic valve stenosis  Assessment & Plan:  Patient has aortic stenosis  Since I last saw her, she did have echocardiogram done  Echocardiogram suggested severe aortic stenosis  I reviewed Dr Bowling Faster consultation, as I felt that her aortic stenosis was likely more moderate  He apparently feels the same  Patient remains asymptomatic  We will continue to monitor the patient  4  Hyperthyroidism  Assessment & Plan:  I reviewed endocrinology consultation  I also reviewed the patient's last thyroid function tests as well as her thyroid uptake scan  There was no comment by Endocrinology regarding the uptake scan  Patient will continue routine follow-up with endocrinology  She reports that she is tolerating the Tapazole well without any side effects  Subjective      This is an 15-year-old white female who presents to the office today accompanied by her   The patient is doing well and has no complaints  She has been checking her blood pressures regularly at home  Her blood pressures have been under good control on her current regiment  She is following with endocrinology now for her hyperthyroidism  She reports having been started on Tapazole 5 mg daily  Review of Systems   Constitutional: Negative for activity change and appetite change     Respiratory: Negative for cough and shortness of breath  Cardiovascular: Negative for chest pain, palpitations and leg swelling  Gastrointestinal: Negative for abdominal distention, abdominal pain, blood in stool, constipation, diarrhea and nausea  Endocrine: Positive for cold intolerance  Negative for heat intolerance  Skin:        Denies hair loss   Neurological: Positive for tremors (She denied tremors but her  notices them as do I)  Psychiatric/Behavioral: Negative for dysphoric mood and sleep disturbance  The patient is nervous/anxious  Current Outpatient Medications on File Prior to Visit   Medication Sig   • ALPRAZolam (XANAX) 0 25 mg tablet Take 1 tablet (0 25 mg total) by mouth every 6 (six) hours as needed for anxiety   • amLODIPine (NORVASC) 10 mg tablet Take 1 tablet by mouth once daily   • ammonium lactate (LAC-HYDRIN) 12 % lotion    • atenolol-chlorthalidone (TENORETIC) 100-25 mg per tablet Take 0 5 tablets by mouth daily   • Calcium Carb-Cholecalciferol (CALTRATE 600+D3 PO) Take 1 tablet by mouth 2 (two) times a day   • Omega-3 Fatty Acids (FISH OIL) 1200 MG CAPS Take 1,200 mg by mouth 2 (two) times a day   • potassium chloride (K-DUR,KLOR-CON) 20 mEq tablet Take 1 tablet by mouth once daily   • Psyllium (METAMUCIL FIBER) 51 7 % PACK Take 1 Package by mouth daily   • [DISCONTINUED] methimazole (TAPAZOLE) 5 mg tablet Take 1 tablet (5mg) daily  • betamethasone dipropionate (DIPROSONE) 0 05 % ointment Apply topically 2 (two) times a day for 14 days   • methimazole (TAPAZOLE) 5 mg tablet Take 1 tablet by mouth once daily       Objective     /64   Pulse 57   Temp 97 9 °F (36 6 °C) (Temporal)   Ht 4' 10" (1 473 m)   Wt 49 7 kg (109 lb 8 oz)   SpO2 100%   BMI 22 89 kg/m²     Physical Exam  Vitals reviewed  Constitutional:       Comments: This is an 71-year-old white female who appears her stated age    The patient is pleasant, cooperative, and in no distress   HENT:      Head: Normocephalic and atraumatic  Right Ear: Tympanic membrane, ear canal and external ear normal  There is no impacted cerumen  Left Ear: Tympanic membrane, ear canal and external ear normal  There is no impacted cerumen  Mouth/Throat:      Mouth: Mucous membranes are moist       Pharynx: Oropharynx is clear  No oropharyngeal exudate or posterior oropharyngeal erythema  Eyes:      General: No scleral icterus  Right eye: No discharge  Left eye: No discharge  Conjunctiva/sclera: Conjunctivae normal       Pupils: Pupils are equal, round, and reactive to light  Cardiovascular:      Rate and Rhythm: Normal rate and regular rhythm  Heart sounds: Murmur (Was a grade 2/6 systolic murmur noted) heard  No friction rub  No gallop  Pulmonary:      Effort: No respiratory distress  Breath sounds: Normal breath sounds  No stridor  No wheezing, rhonchi or rales  Abdominal:      General: Bowel sounds are normal  There is no distension  Palpations: Abdomen is soft  There is no mass  Tenderness: There is no abdominal tenderness  There is no guarding  Hernia: No hernia is present  Musculoskeletal:      Cervical back: Neck supple  Lymphadenopathy:      Cervical: No cervical adenopathy       Extremities:  Without cyanosis, clubbing, or edema    Beck Harris, DO

## 2022-10-12 ENCOUNTER — TELEPHONE (OUTPATIENT)
Dept: ENDOCRINOLOGY | Facility: CLINIC | Age: 87
End: 2022-10-12

## 2022-10-12 DIAGNOSIS — E05.90 HYPERTHYROIDISM: ICD-10-CM

## 2022-10-12 RX ORDER — METHIMAZOLE 5 MG/1
TABLET ORAL
Qty: 30 TABLET | Refills: 0 | Status: SHIPPED | OUTPATIENT
Start: 2022-10-12 | End: 2022-10-13

## 2022-10-12 NOTE — ASSESSMENT & PLAN NOTE
I reviewed endocrinology consultation  I also reviewed the patient's last thyroid function tests as well as her thyroid uptake scan  There was no comment by Endocrinology regarding the uptake scan  Patient will continue routine follow-up with endocrinology  She reports that she is tolerating the Tapazole well without any side effects

## 2022-10-12 NOTE — ASSESSMENT & PLAN NOTE
Patient has aortic stenosis  Since I last saw her, she did have echocardiogram done  Echocardiogram suggested severe aortic stenosis  I reviewed Dr Leatha Prather consultation, as I felt that her aortic stenosis was likely more moderate  He apparently feels the same  Patient remains asymptomatic  We will continue to monitor the patient

## 2022-10-12 NOTE — ASSESSMENT & PLAN NOTE
Patient has hypertension  I reviewed her ambulatory blood pressure readings which were quite good  I also check her blood pressure in this office myself today  Blood pressure is under satisfactory control  We will continue her on her current regiment

## 2022-10-13 RX ORDER — METHIMAZOLE 5 MG/1
TABLET ORAL
Qty: 30 TABLET | Refills: 0 | Status: SHIPPED | OUTPATIENT
Start: 2022-10-13

## 2022-11-07 DIAGNOSIS — I10 ESSENTIAL HYPERTENSION: ICD-10-CM

## 2022-11-07 RX ORDER — AMLODIPINE BESYLATE 10 MG/1
TABLET ORAL
Qty: 42 TABLET | Refills: 0 | Status: SHIPPED | OUTPATIENT
Start: 2022-11-07

## 2022-12-04 DIAGNOSIS — E05.90 HYPERTHYROIDISM: ICD-10-CM

## 2022-12-05 RX ORDER — METHIMAZOLE 5 MG/1
TABLET ORAL
Qty: 30 TABLET | Refills: 0 | Status: SHIPPED | OUTPATIENT
Start: 2022-12-05

## 2022-12-18 DIAGNOSIS — I10 ESSENTIAL HYPERTENSION: ICD-10-CM

## 2022-12-18 RX ORDER — AMLODIPINE BESYLATE 10 MG/1
TABLET ORAL
Qty: 42 TABLET | Refills: 0 | Status: SHIPPED | OUTPATIENT
Start: 2022-12-18 | End: 2022-12-25

## 2022-12-20 ENCOUNTER — APPOINTMENT (EMERGENCY)
Dept: CT IMAGING | Facility: HOSPITAL | Age: 87
End: 2022-12-20

## 2022-12-20 ENCOUNTER — APPOINTMENT (INPATIENT)
Dept: INTERVENTIONAL RADIOLOGY/VASCULAR | Facility: HOSPITAL | Age: 87
End: 2022-12-20

## 2022-12-20 ENCOUNTER — HOSPITAL ENCOUNTER (INPATIENT)
Facility: HOSPITAL | Age: 87
LOS: 1 days | End: 2022-12-21
Attending: EMERGENCY MEDICINE | Admitting: INTERNAL MEDICINE

## 2022-12-20 ENCOUNTER — APPOINTMENT (EMERGENCY)
Dept: RADIOLOGY | Facility: HOSPITAL | Age: 87
End: 2022-12-20

## 2022-12-20 DIAGNOSIS — R09.02 HYPOXIA: ICD-10-CM

## 2022-12-20 DIAGNOSIS — J90 PLEURAL EFFUSION: ICD-10-CM

## 2022-12-20 DIAGNOSIS — E87.1 HYPONATREMIA: Primary | ICD-10-CM

## 2022-12-20 DIAGNOSIS — I50.33 ACUTE ON CHRONIC DIASTOLIC CHF (CONGESTIVE HEART FAILURE) (HCC): ICD-10-CM

## 2022-12-20 DIAGNOSIS — E05.90 HYPERTHYROIDISM: ICD-10-CM

## 2022-12-20 PROBLEM — I36.1 NON-RHEUMATIC TRICUSPID VALVE INSUFFICIENCY: Status: ACTIVE | Noted: 2019-06-04

## 2022-12-20 LAB
ANION GAP SERPL CALCULATED.3IONS-SCNC: 11 MMOL/L (ref 4–13)
APPEARANCE FLD: ABNORMAL
BASOPHILS # BLD AUTO: 0.01 THOUSANDS/ÂΜL (ref 0–0.1)
BASOPHILS NFR BLD AUTO: 0 % (ref 0–1)
BILIRUB UR QL STRIP: NEGATIVE
BUN SERPL-MCNC: 27 MG/DL (ref 5–25)
CALCIUM SERPL-MCNC: 9 MG/DL (ref 8.3–10.1)
CARDIAC TROPONIN I PNL SERPL HS: 12 NG/L (ref 8–18)
CHLORIDE SERPL-SCNC: 94 MMOL/L (ref 96–108)
CLARITY UR: CLEAR
CO2 SERPL-SCNC: 23 MMOL/L (ref 21–32)
COLOR FLD: YELLOW
COLOR UR: YELLOW
CREAT SERPL-MCNC: 1.1 MG/DL (ref 0.6–1.3)
EOSINOPHIL # BLD AUTO: 0.06 THOUSAND/ÂΜL (ref 0–0.61)
EOSINOPHIL NFR BLD AUTO: 1 % (ref 0–6)
EOSINOPHIL NFR FLD MANUAL: 25 %
ERYTHROCYTE [DISTWIDTH] IN BLOOD BY AUTOMATED COUNT: 13.9 % (ref 11.6–15.1)
FLUAV RNA RESP QL NAA+PROBE: NEGATIVE
FLUBV RNA RESP QL NAA+PROBE: NEGATIVE
GFR SERPL CREATININE-BSD FRML MDRD: 45 ML/MIN/1.73SQ M
GLUCOSE FLD-MCNC: 123 MG/DL
GLUCOSE SERPL-MCNC: 132 MG/DL (ref 65–140)
GLUCOSE UR STRIP-MCNC: NEGATIVE MG/DL
HCT VFR BLD AUTO: 35.1 % (ref 34.8–46.1)
HGB BLD-MCNC: 11.8 G/DL (ref 11.5–15.4)
HGB UR QL STRIP.AUTO: NEGATIVE
IMM GRANULOCYTES # BLD AUTO: 0.01 THOUSAND/UL (ref 0–0.2)
IMM GRANULOCYTES NFR BLD AUTO: 0 % (ref 0–2)
KETONES UR STRIP-MCNC: NEGATIVE MG/DL
LDH FLD L TO P-CCNC: 107 U/L
LEUKOCYTE ESTERASE UR QL STRIP: NEGATIVE
LYMPHOCYTES # BLD AUTO: 0.71 THOUSANDS/ÂΜL (ref 0.6–4.47)
LYMPHOCYTES NFR BLD AUTO: 12 % (ref 14–44)
LYMPHOCYTES NFR BLD AUTO: 5 %
MCH RBC QN AUTO: 32.2 PG (ref 26.8–34.3)
MCHC RBC AUTO-ENTMCNC: 33.6 G/DL (ref 31.4–37.4)
MCV RBC AUTO: 96 FL (ref 82–98)
MONO+MESO NFR FLD MANUAL: 22 %
MONOCYTES # BLD AUTO: 0.56 THOUSAND/ÂΜL (ref 0.17–1.22)
MONOCYTES NFR BLD AUTO: 19 %
MONOCYTES NFR BLD AUTO: 9 % (ref 4–12)
NEUTROPHILS # BLD AUTO: 4.61 THOUSANDS/ÂΜL (ref 1.85–7.62)
NEUTS SEG NFR BLD AUTO: 29 %
NEUTS SEG NFR BLD AUTO: 78 % (ref 43–75)
NITRITE UR QL STRIP: NEGATIVE
NRBC BLD AUTO-RTO: 0 /100 WBCS
NT-PROBNP SERPL-MCNC: 6278 PG/ML
PH BODY FLUID: 7.6
PH UR STRIP.AUTO: 6 [PH]
PLATELET # BLD AUTO: 150 THOUSANDS/UL (ref 149–390)
PMV BLD AUTO: 8.4 FL (ref 8.9–12.7)
POTASSIUM SERPL-SCNC: 3.6 MMOL/L (ref 3.5–5.3)
PROT FLD-MCNC: 3.5 G/DL
PROT UR STRIP-MCNC: NEGATIVE MG/DL
RBC # BLD AUTO: 3.67 MILLION/UL (ref 3.81–5.12)
RSV RNA RESP QL NAA+PROBE: NEGATIVE
SARS-COV-2 RNA RESP QL NAA+PROBE: NEGATIVE
SITE: ABNORMAL
SODIUM SERPL-SCNC: 128 MMOL/L (ref 135–147)
SP GR UR STRIP.AUTO: 1.01 (ref 1–1.03)
TOTAL CELLS COUNTED SPEC: 100
TSH SERPL DL<=0.05 MIU/L-ACNC: 10.1 UIU/ML (ref 0.45–4.5)
UROBILINOGEN UR QL STRIP.AUTO: 0.2 E.U./DL
WBC # BLD AUTO: 5.96 THOUSAND/UL (ref 4.31–10.16)
WBC # FLD MANUAL: 1564 /UL

## 2022-12-20 PROCEDURE — 0W993ZZ DRAINAGE OF RIGHT PLEURAL CAVITY, PERCUTANEOUS APPROACH: ICD-10-PCS | Performed by: RADIOLOGY

## 2022-12-20 RX ORDER — CHLORTHALIDONE 25 MG/1
12.5 TABLET ORAL DAILY
Status: DISCONTINUED | OUTPATIENT
Start: 2022-12-21 | End: 2022-12-21

## 2022-12-20 RX ORDER — ATENOLOL 50 MG/1
50 TABLET ORAL DAILY
Status: DISCONTINUED | OUTPATIENT
Start: 2022-12-21 | End: 2022-12-21

## 2022-12-20 RX ORDER — ACETAMINOPHEN 325 MG/1
650 TABLET ORAL EVERY 4 HOURS PRN
Status: DISCONTINUED | OUTPATIENT
Start: 2022-12-20 | End: 2022-12-21 | Stop reason: HOSPADM

## 2022-12-20 RX ORDER — FUROSEMIDE 10 MG/ML
40 INJECTION INTRAMUSCULAR; INTRAVENOUS
Status: DISCONTINUED | OUTPATIENT
Start: 2022-12-21 | End: 2022-12-21 | Stop reason: HOSPADM

## 2022-12-20 RX ORDER — ALBUTEROL SULFATE 2.5 MG/3ML
2.5 SOLUTION RESPIRATORY (INHALATION) EVERY 6 HOURS PRN
Status: DISCONTINUED | OUTPATIENT
Start: 2022-12-20 | End: 2022-12-21 | Stop reason: HOSPADM

## 2022-12-20 RX ORDER — ENOXAPARIN SODIUM 100 MG/ML
40 INJECTION SUBCUTANEOUS DAILY
Status: DISCONTINUED | OUTPATIENT
Start: 2022-12-21 | End: 2022-12-21

## 2022-12-20 RX ORDER — METHIMAZOLE 5 MG/1
5 TABLET ORAL DAILY
Status: DISCONTINUED | OUTPATIENT
Start: 2022-12-21 | End: 2022-12-21 | Stop reason: HOSPADM

## 2022-12-20 RX ORDER — LIDOCAINE HYDROCHLORIDE 10 MG/ML
INJECTION, SOLUTION EPIDURAL; INFILTRATION; INTRACAUDAL; PERINEURAL AS NEEDED
Status: COMPLETED | OUTPATIENT
Start: 2022-12-20 | End: 2022-12-20

## 2022-12-20 RX ORDER — ALPRAZOLAM 0.25 MG/1
0.25 TABLET ORAL EVERY 6 HOURS PRN
Status: DISCONTINUED | OUTPATIENT
Start: 2022-12-20 | End: 2022-12-21 | Stop reason: HOSPADM

## 2022-12-20 RX ADMIN — LIDOCAINE HYDROCHLORIDE 10 ML: 10 INJECTION, SOLUTION EPIDURAL; INFILTRATION; INTRACAUDAL; PERINEURAL at 14:30

## 2022-12-20 NOTE — ED NOTES
Patient's oxygen dropped to 87% on room air, placed patient on 2L of oxygen at this time        Yaya Webster RN  12/20/22 [id number]

## 2022-12-20 NOTE — ASSESSMENT & PLAN NOTE
Wt Readings from Last 3 Encounters:   12/20/22 54 7 kg (120 lb 9 5 oz)   11/02/22 49 4 kg (109 lb)   10/11/22 49 7 kg (109 lb 8 oz)       Patient's weight appears to be significant elevated, significant lower extremity edema bilaterally, chest x-ray with bilateral pleural effusions, status postthoracentesis performed today by IR  Start Lasix 40 mg IV twice daily in a m , post thoracentesis, patient examines volume overloaded with lower extremity edema, crackles bilaterally on chest exam     Cardiology consult requested, previous echo reviewed, will defer decision on need for repeat echo to cardiology team    Acute respiratory failure with hypoxia was present on admission, as evidenced by pulse ox of 85% on room air at rest, pulse ox improved with 2 L of nasal cannula oxygen

## 2022-12-20 NOTE — RESPIRATORY THERAPY NOTE
RT Protocol Note  Mansiha Fox 80 y o  female MRN: [id number]  Unit/Bed#: 401-01 Encounter: [id number]    Assessment    Principal Problem:    Acute on chronic diastolic CHF (congestive heart failure) ([facility] [address] )  Active Problems:    Hyponatremia    Essential hypertension    Aortic valve stenosis    Pulmonary hypertension (HCC)    Generalized anxiety disorder    Stage 3a chronic kidney disease (HCC)    Hyperthyroidism      Home Pulmonary Medications:  none  Home Devices/Therapy:  (NONE)    Past Medical History:   Diagnosis Date   • Aortic stenosis    • Community acquired pneumonia of left upper lobe of lung 5/17/2019   • Fatigue    • Full dentures    • Generalized anxiety disorder    • Hyperlipidemia    • Hypertension    • Impaired fasting glucose    • Mitral regurgitation    • Pneumonia    • SVT (supraventricular tachycardia)    • Tricuspid regurgitation      Social History     Socioeconomic History   • Marital status: /Civil Union     Spouse name: None   • Number of children: 2   • Years of education: None   • Highest education level: None   Occupational History   • None   Tobacco Use   • Smoking status: Never   • Smokeless tobacco: Never   Vaping Use   • Vaping Use: Never used   Substance and Sexual Activity   • Alcohol use: Never   • Drug use: Never   • Sexual activity: None   Other Topics Concern   • None   Social History Narrative   • None     Social Determinants of Health     Financial Resource Strain: Not on file   Food Insecurity: Not on file   Transportation Needs: Not on file   Physical Activity: Not on file   Stress: Not on file   Social Connections: Not on file   Intimate Partner Violence: Not on file   Housing Stability: Not on file       Subjective         Objective    Physical Exam:   Assessment Type: Assess only  General Appearance: Alert, Awake  Respiratory Pattern: Tachypneic, Symmetrical, Spontaneous, Labored  Chest Assessment: Chest expansion symmetrical, Trachea midline  Bilateral Breath Sounds: Diminished, Rales (INSPIRATORY RALES IN BASES)  Cough: None  O2 Device: 4 l/m humidified    Vitals:  Blood pressure 135/68, pulse (!) 114, temperature (!) 96 8 °F (36 °C), resp  rate (!) 24, height 4' 10" (1 473 m), weight 54 7 kg (120 lb 9 5 oz), SpO2 91 %  Imaging and other studies: I have personally reviewed pertinent reports  O2 Device: 4 l/m humidified     Plan  Bronchodilator therapy with Albuterol 0 083%  Q6prn for SOB and wheezing, ooxygen therapy 4 l/m nc with titration to room air  As tolerated           Resp Comments: pt pulse ox on 3 l/m 89% increased to 4 l/m humidified oxygen, pt does not use oxygen at home, no nebs or inhalers   nurses informed at increase of 4 l/m

## 2022-12-20 NOTE — SEDATION DOCUMENTATION
Patient had a right sided thoracentesis performed by Dr Cas Chatterjee without complications  A total of 900ml of clear yellowish fluid was removed, labs sent  Patient offers no complaints at this time

## 2022-12-20 NOTE — H&P
[address] [facility] [address] [place]  H&P- Sawant Austin 1935, 80 y o  female MRN: [id number]  Unit/Bed#: 401-01 Encounter: [id number]  Primary Care Provider: Marybel Kidd DO   Date and time admitted to hospital: 12/20/2022  9:44 AM    * Acute on chronic diastolic CHF (congestive heart failure) ([address] )  Assessment & Plan  Wt Readings from Last 3 Encounters:   12/20/22 54 7 kg (120 lb 9 5 oz)   11/02/22 49 4 kg (109 lb)   10/11/22 49 7 kg (109 lb 8 oz)       Patient's weight appears to be significant elevated, significant lower extremity edema bilaterally, chest x-ray with bilateral pleural effusions, status postthoracentesis performed today by IR  Start Lasix 40 mg IV twice daily in a m , post thoracentesis, patient examines volume overloaded with lower extremity edema, crackles bilaterally on chest exam     Cardiology consult requested, previous echo reviewed, will defer decision on need for repeat echo to cardiology team    Acute respiratory failure with hypoxia was present on admission, as evidenced by pulse ox of 85% on room air at rest, pulse ox improved with 2 L of nasal cannula oxygen  Essential hypertension  Assessment & Plan  Continue atenolol, chlorthalidone    Will hold Norvasc and monitor BP    Hyperthyroidism  Assessment & Plan  History of hyperthyroidism, maintained on methimazole 5 mg daily, outpatient endocrine notes reviewed        TSH elevated, consult endocrine      Aortic valve stenosis  Assessment & Plan  Severe aortic stenosis, likely contributing to current episode of CHF    Pulmonary hypertension (HCC)  Assessment & Plan  History of pulmonary hypertension    Hyponatremia  Assessment & Plan  Suspected hypervolemic hyponatremia, trend daily    Stage 3a chronic kidney disease [facility])  Assessment & Plan  Lab Results   Component Value Date    EGFR 45 12/20/2022    EGFR 42 10/05/2022    EGFR 45 03/22/2022    CREATININE 1 10 12/20/2022    CREATININE 1 16 10/05/2022    CREATININE 1 10 03/22/2022   Renal function at baseline, avoid nephrotoxins    Generalized anxiety disorder  Assessment & Plan  Continue as needed Xanax    VTE Pharmacologic Prophylaxis: VTE Score: 7 High Risk (Score >/= 5) - Pharmacological DVT Prophylaxis Ordered: enoxaparin (Lovenox)  Sequential Compression Devices Ordered  Code Status: Level 1 - Full Code   Discussion with family: Updated  () at bedside  Anticipated Length of Stay: Patient will be admitted on an inpatient basis with an anticipated length of stay of greater than 2 midnights secondary to Acute on chronic diastolic CHF  Total Time for Visit, including Counseling / Coordination of Care: 90 minutes Greater than 50% of this total time spent on direct patient counseling and coordination of care  Chief Complaint: Dyspnea, hypoxia noted at home with home pulse ox    History of Present Illness:  Kaykay Herrera is a 80 y o  female with a PMH of diastolic CHF, mildly reduced EF, significant valvular heart disease who presents with intermittent hypoxia per home O2 monitor, also with reported dyspnea, worse with activity  Patient and her  report that patient is active at baseline, patient has not experienced any chest pain, no fever no chills  Shortness of breath/dyspnea began intermittently about 2 weeks ago, acutely worsening for the last 24 to 48 hours, pulse ox at home noted to be 85% on room air, patient is not on chronic home O2 therapy      Review of Systems:  Review of Systems    Past Medical and Surgical History:   Past Medical History:   Diagnosis Date   • Aortic stenosis    • Community acquired pneumonia of left upper lobe of lung 5/17/2019   • Fatigue    • Full dentures    • Generalized anxiety disorder    • Hyperlipidemia    • Hypertension    • Impaired fasting glucose    • Mitral regurgitation    • Pneumonia    • SVT (supraventricular tachycardia)    • Tricuspid regurgitation        Past Surgical History:   Procedure Laterality Date   • DENTAL SURGERY Bilateral     ALL TEETH REMOVED   • FL GUIDED NEEDLE PLAC BX/ASP/INJ  5/26/2021   • IR THORACENTESIS  12/20/2022   • JOINT REPLACEMENT Left    • KNEE SURGERY      left knee replacement   • NM INJECTION,SACROILIAC JOINT Bilateral 5/26/2021    Procedure: SACROILIAC JOINT INJECTION;  Surgeon: Jo Ann Ochoa MD;  Location: [facility];  Service: Pain Management        Meds/Allergies:  Prior to Admission medications    Medication Sig Start Date End Date Taking? Authorizing Provider   ALPRAZolam Anthony Guthrie) 0 25 mg tablet Take 1 tablet (0 25 mg total) by mouth every 6 (six) hours as needed for anxiety 8/22/22  Yes Noah Schaumann, DO   amLODIPine (NORVASC) 10 mg tablet Take 1 tablet by mouth once daily 12/18/22  Yes Noah Schaumann, DO   ammonium lactate (LAC-HYDRIN) 12 % lotion  5/11/20  Yes Historical Provider, MD   atenolol-chlorthalidone (TENORETIC) 100-25 mg per tablet Take 0 5 tablets by mouth daily 3/28/22  Yes Papa Zabala,    Calcium Carb-Cholecalciferol (CALTRATE 600+D3 PO) Take 1 tablet by mouth 2 (two) times a day   Yes Historical Provider, MD   methimazole (TAPAZOLE) 5 mg tablet Take 1 tablet by mouth once daily 12/5/22  Yes LYNN Thibodeaux   Omega-3 Fatty Acids (FISH OIL) 1200 MG CAPS Take 1,200 mg by mouth 2 (two) times a day   Yes Historical Provider, MD   potassium chloride (K-DUR,KLOR-CON) 20 mEq tablet Take 1 tablet by mouth once daily 10/6/22  Yes Noah Schaumann, DO   Psyllium (METAMUCIL FIBER) 51 7 % PACK Take 1 Package by mouth daily 5/21/19  Yes Velvet Bee Kaela, DO   betamethasone dipropionate (DIPROSONE) 0 05 % ointment Apply topically 2 (two) times a day for 14 days 8/2/22 8/16/22  Hobert Fillers, DO     I have reviewed home medications with patient personally      Allergies: No Known Allergies    Social History:  Marital Status: /Civil Union   Substance Use History:   Social History     Substance and Sexual Activity   Alcohol Use Never     Social History Tobacco Use   Smoking Status Never   Smokeless Tobacco Never     Social History     Substance and Sexual Activity   Drug Use Never       Family History:  Family History   Problem Relation Age of Onset   • Hypertension Mother    • Asthma Father         Gregory's asthma   • Alzheimer's disease Sister    • Rectal cancer Son    • Uterine cancer Daughter    • Heart disease Sister    • Heart disease Brother        Physical Exam:     Vitals:   Blood Pressure: 135/68 (12/20/22 1508)  Pulse: 74 (12/20/22 1508)  Temperature: (!) 96 8 °F (36 °C) (12/20/22 1508)  Temp Source: Temporal (12/20/22 1330)  Respirations: 17 (12/20/22 1508)  Height: 4' 10" (147 3 cm) (12/20/22 1508)  Weight - Scale: 54 7 kg (120 lb 9 5 oz) (12/20/22 1508)  SpO2: 93 % (12/20/22 1508)    Physical Exam     Additional Data:     Lab Results:  Results from last 7 days   Lab Units 12/20/22  1022   WBC Thousand/uL 5 96   HEMOGLOBIN g/dL 11 8   HEMATOCRIT % 35 1   PLATELETS Thousands/uL 150   NEUTROS PCT % 78*   LYMPHS PCT % 12*   MONOS PCT % 9   EOS PCT % 1     Results from last 7 days   Lab Units 12/20/22  1022   SODIUM mmol/L 128*   POTASSIUM mmol/L 3 6   CHLORIDE mmol/L 94*   CO2 mmol/L 23   BUN mg/dL 27*   CREATININE mg/dL 1 10   ANION GAP mmol/L 11   CALCIUM mg/dL 9 0   GLUCOSE RANDOM mg/dL 132                       Lines/Drains:  Invasive Devices     Peripheral Intravenous Line  Duration           Peripheral IV 12/20/22 Left;Proximal;Ventral (anterior) Forearm <1 day                    Imaging: Reviewed radiology reports from this admission including: chest CT scan  IR Thoracentesis   Final Result by Vicky Campos MD (12/20 1617)   Technically successful ultrasound-guided thoracentesis  This is the end of the clinically relevant portion of this report  Subsequent information is for compliance, documentation, and coding purposes        In the process of informed consent, information was communicated, verbally, to the patient regarding the procedure  The patient was informed of; the name of the procedure, nature of the procedure, nature of the condition, risks, benefits, alternatives,    and potential complications, as well as the consequences of not having the procedure  All the patient's questions were answered  Informed consent was signed  Quoted risks include bleeding, and pneumothorax, including axial vacuo pneumothorax  Chlorhexidine and alcohol was used for cleansing and sterile preparation of the skin  This was allowed to dry prior to the application of sterile draping  Timeout was performed, with all team members present, and in agreement  Confirmation of patient, procedure, laterally, allergies, and all needed equipment was performed  PROCEDURE: Thoracentesis   PREPROCEDURE DIAGNOSIS: Please see "history ", above   POSTPROCEDURE DIAGNOSIS: Same   ANTIBIOTICS: None   SPECIMEN: Pleural fluid   ESTIMATED BLOOD LOSS: None   ANESTHESIA: Local   FINAL DISPOSITION OF CATHETER: Out of patient's body      Ultrasound was used to evaluate the pleural fluid as a potential access site  Static and real-time images of needle entry were obtained, and archived  Workstation performed: [id number]         CT chest without contrast   Final Result by Marquise Candelaria MD (12/20 6069)   1  Moderate right and small left-sided pleural effusions with associated compressive atelectasis  Subsegmental lingular and right middle lobe atelectasis  2  Cardiomegaly  3  Calcified mediastinal and bilateral hilar nodes are again noted  Workstation performed: [id number]         XR chest 1 view portable   Final Result by Brian Yuan MD (12/20 1866)      Interval development of moderate-sized right pleural effusion with consolidation at the right lung base  Findings could represent pneumonia, atelectasis or mass  Interval follow recommended  Up      The study was marked in EPIC for immediate notification  Workstation performed: [id number]             EKG and Other Studies Reviewed on Admission:   · EKG: Accelerated junctional rhythm at 109 bpm     ** Please Note: This note has been constructed using a voice recognition system   **

## 2022-12-20 NOTE — ED NOTES
Patient assisted to bathroom at this time via wheelchair       Doni Florentino, ANDREE  12/20/22 [id number]

## 2022-12-20 NOTE — ASSESSMENT & PLAN NOTE
History of hyperthyroidism, maintained on methimazole 5 mg daily, outpatient endocrine notes reviewed        TSH elevated, consult endocrine

## 2022-12-20 NOTE — ASSESSMENT & PLAN NOTE
Lab Results   Component Value Date    EGFR 45 12/20/2022    EGFR 42 10/05/2022    EGFR 45 03/22/2022    CREATININE 1 10 12/20/2022    CREATININE 1 16 10/05/2022    CREATININE 1 10 03/22/2022   Renal function at baseline, avoid nephrotoxins

## 2022-12-20 NOTE — PROCEDURES
Interventional Radiology Procedure Note    PATIENT NAME: Sebas Parks  : 1935  MRN: [id number]     Pre-op Diagnosis:   1  Hyponatremia    2  Pleural effusion    3  Hypoxia      2  Dyspnea    Post-op Diagnosis:   1  Hyponatremia    2  Pleural effusion    3  Hypoxia      2  Same    Procedure: Thoracentesis on the right    Surgeon:   Kalie Banuelos MD  Assistants:     No qualified resident was available, Resident is only observing    Estimated Blood Loss: Minimal    Findings: None 100 cc of clear yellow fluid removed  Patient complained of some chest pain  Procedure was halted  There is still probably 3 to 400 cc in there      Specimens: None    Complications:  None     Anesthesia: local    Kalie Banuelos MD     Date: 2022  Time: 2:40 PM  I

## 2022-12-20 NOTE — DISCHARGE INSTRUCTIONS
[address]  Interventional Radiology  [phone]         Thoracentesis   WHAT YOU NEED TO KNOW:   A thoracentesis is a procedure to remove extra fluid or air from between your lungs and your inner chest wall  Air or fluid buildup may make it hard for you to breathe  A thoracentesis allows your lungs to expand fully so you can breathe more easily  DISCHARGE INSTRUCTIONS:     Small amount of shoulder pain and bloody sputum is normal after a Thoracentesis  Rest:  Rest when you feel it is needed  Slowly start to do more each day  Return to your daily activities as directed  Resume your normal diet  Small sips of flat soda will help mild nausea  Do not smoke: If you smoke, it is never too late to quit  Ask for information about how to stop smoking if you need help  Contact Interventional Radiology at [phone] Gerhard PATIENTS: Contact Interventional Radiology at [phone]) Jeff Byrd PATIENTS: Contact Interventional Radiology at [phone]) if:   You have a fever  Your puncture site is red, warm, swollen, or draining pus  You have questions or concerns about your procedure, medicine, or care  Seek care immediately or call 911 if:   Severe chest pain with inspiration and shortness of breath    Large amounts of blood in your sputum    Follow up with your healthcare provider as directed

## 2022-12-20 NOTE — ED PROVIDER NOTES
History  Chief Complaint   Patient presents with   • Medical Problem     Pt brought in by EMS, stating she took her oxygen level at home and it was 85 on RA  Patient presents to the emergency department today with her   Patient brought via EMS  Patient overall states she is feeling well however she does feel tired  She was found to have oxygen saturations according to the  at 85% on a home pulse ox  Here her oximetry was difficult to obtain secondary to poor waveform however once waveform was established she was approximately 95% on room air  She is in no respiratory distress she does admit to an occasional cough but just generally weak  No other subjective symptoms she denies any pain  Prior to Admission Medications   Prescriptions Last Dose Informant Patient Reported? Taking?    ALPRAZolam (XANAX) 0 25 mg tablet   No No   Sig: Take 1 tablet (0 25 mg total) by mouth every 6 (six) hours as needed for anxiety   Calcium Carb-Cholecalciferol (CALTRATE 600+D3 PO)   Yes No   Sig: Take 1 tablet by mouth 2 (two) times a day   Omega-3 Fatty Acids (FISH OIL) 1200 MG CAPS   Yes No   Sig: Take 1,200 mg by mouth 2 (two) times a day   Psyllium (METAMUCIL FIBER) 51 7 % PACK   No No   Sig: Take 1 Package by mouth daily   amLODIPine (NORVASC) 10 mg tablet   No No   Sig: Take 1 tablet by mouth once daily   ammonium lactate (LAC-HYDRIN) 12 % lotion   Yes No   atenolol-chlorthalidone (TENORETIC) 100-25 mg per tablet   No No   Sig: Take 0 5 tablets by mouth daily   betamethasone dipropionate (DIPROSONE) 0 05 % ointment   No No   Sig: Apply topically 2 (two) times a day for 14 days   methimazole (TAPAZOLE) 5 mg tablet   No No   Sig: Take 1 tablet by mouth once daily   potassium chloride (K-DUR,KLOR-CON) 20 mEq tablet   No No   Sig: Take 1 tablet by mouth once daily      Facility-Administered Medications: None       Past Medical History:   Diagnosis Date   • Aortic stenosis    • Community acquired pneumonia of left upper lobe of lung 5/17/2019   • Fatigue    • Full dentures    • Generalized anxiety disorder    • Hyperlipidemia    • Hypertension    • Impaired fasting glucose    • Mitral regurgitation    • Pneumonia    • SVT (supraventricular tachycardia)    • Tricuspid regurgitation        Past Surgical History:   Procedure Laterality Date   • DENTAL SURGERY Bilateral     ALL TEETH REMOVED   • FL GUIDED NEEDLE PLAC BX/ASP/INJ  5/26/2021   • JOINT REPLACEMENT Left    • KNEE SURGERY      left knee replacement   • TX INJECTION,SACROILIAC JOINT Bilateral 5/26/2021    Procedure: SACROILIAC JOINT INJECTION;  Surgeon: Ag Mccain MD;  Location: [facility];  Service: Pain Management        Family History   Problem Relation Age of Onset   • Hypertension Mother    • Asthma Father         's asthma   • Alzheimer's disease Sister    • Rectal cancer Son    • Uterine cancer Daughter    • Heart disease Sister    • Heart disease Brother      I have reviewed and agree with the history as documented  E-Cigarette/Vaping   • E-Cigarette Use Never User      E-Cigarette/Vaping Substances   • Nicotine No    • THC No    • CBD No    • Flavoring No    • Other No    • Unknown No      Social History     Tobacco Use   • Smoking status: Never   • Smokeless tobacco: Never   Vaping Use   • Vaping Use: Never used   Substance Use Topics   • Alcohol use: Never   • Drug use: Never       Review of Systems   Constitutional: Positive for fatigue  Negative for chills and fever  HENT: Negative for ear pain and sore throat  Eyes: Negative for pain and visual disturbance  Respiratory: Positive for cough  Negative for shortness of breath  Cardiovascular: Negative for chest pain and palpitations  Gastrointestinal: Negative for abdominal pain and vomiting  Genitourinary: Negative for dysuria and hematuria  Musculoskeletal: Negative for arthralgias and back pain  Skin: Negative for color change and rash     Neurological: Positive for weakness  Negative for seizures and syncope  All other systems reviewed and are negative  Physical Exam  Physical Exam  Vitals reviewed  Constitutional:       General: She is not in acute distress  Appearance: She is well-developed  She is not ill-appearing, toxic-appearing or diaphoretic  HENT:      Right Ear: External ear normal  No swelling  Tympanic membrane is not bulging  Left Ear: External ear normal  No swelling  Tympanic membrane is not bulging  Nose: Nose normal       Mouth/Throat:      Pharynx: No oropharyngeal exudate  Eyes:      General: Lids are normal       Conjunctiva/sclera: Conjunctivae normal       Pupils: Pupils are equal, round, and reactive to light  Neck:      Thyroid: No thyromegaly  Vascular: No JVD  Trachea: No tracheal deviation  Cardiovascular:      Rate and Rhythm: Normal rate and regular rhythm  Pulses: Normal pulses  Heart sounds: Normal heart sounds  No murmur heard  No friction rub  No gallop  Pulmonary:      Effort: Pulmonary effort is normal  No respiratory distress  Breath sounds: Normal breath sounds  No stridor  No wheezing, rhonchi or rales  Chest:      Chest wall: No tenderness  Abdominal:      General: Bowel sounds are normal  There is no distension  Palpations: Abdomen is soft  There is no mass  Tenderness: There is no abdominal tenderness  There is no guarding or rebound  Negative signs include Osborne's sign  Hernia: No hernia is present  Musculoskeletal:         General: No tenderness  Normal range of motion  Cervical back: Normal range of motion and neck supple  No edema  Normal range of motion  Lymphadenopathy:      Cervical: No cervical adenopathy  Skin:     General: Skin is warm and dry  Capillary Refill: Capillary refill takes less than 2 seconds  Coloration: Skin is not pale  Findings: No erythema or rash     Neurological:      Mental Status: She is alert and oriented to person, place, and time  GCS: GCS eye subscore is 4  GCS verbal subscore is 5  GCS motor subscore is 6  Cranial Nerves: No cranial nerve deficit  Sensory: No sensory deficit  Deep Tendon Reflexes: Reflexes are normal and symmetric  Psychiatric:         Mood and Affect: Mood normal          Speech: Speech normal          Behavior: Behavior normal          Thought Content:  Thought content normal          Judgment: Judgment normal          Vital Signs  ED Triage Vitals   Temperature Pulse Respirations Blood Pressure SpO2   12/20/22 0952 12/20/22 0950 12/20/22 0950 12/20/22 0950 12/20/22 0952   (!) 96 8 °F (36 °C) (!) 111 20 115/65 91 %      Temp Source Heart Rate Source Patient Position - Orthostatic VS BP Location FiO2 (%)   12/20/22 0952 12/20/22 0950 12/20/22 0950 12/20/22 0950 --   Temporal Monitor Sitting Right arm       Pain Score       12/20/22 0950       No Pain           Vitals:    12/20/22 0950 12/20/22 1130   BP: 115/65 124/60   Pulse: (!) 111 69   Patient Position - Orthostatic VS: Sitting          Visual Acuity      ED Medications  Medications - No data to display    Diagnostic Studies  Results Reviewed     Procedure Component Value Units Date/Time    UA (URINE) with reflex to Scope [[id number]] Collected: 12/20/22 1150    Lab Status: Final result Specimen: Urine, Clean Catch Updated: 12/20/22 1205     Color, UA Yellow     Clarity, UA Clear     Specific Gravity, UA 1 010     pH, UA 6 0     Leukocytes, UA Negative     Nitrite, UA Negative     Protein, UA Negative mg/dl      Glucose, UA Negative mg/dl      Ketones, UA Negative mg/dl      Urobilinogen, UA 0 2 E U /dl      Bilirubin, UA Negative     Occult Blood, UA Negative    FLU/RSV/COVID - if FLU/RSV clinically relevant [[id number]]  (Normal) Collected: 12/20/22 1022    Lab Status: Final result Specimen: Nares from Nose Updated: 12/20/22 1105     SARS-CoV-2 Negative     INFLUENZA A PCR Negative     INFLUENZA B PCR Negative     RSV PCR Negative    Narrative:      FOR PEDIATRIC PATIENTS - copy/paste COVID Guidelines URL to browser: [email or web address]    SARS-CoV-2 assay is a Nucleic Acid Amplification assay intended for the  qualitative detection of nucleic acid from SARS-CoV-2 in nasopharyngeal  swabs  Results are for the presumptive identification of SARS-CoV-2 RNA  Positive results are indicative of infection with SARS-CoV-2, the virus  causing COVID-19, but do not rule out bacterial infection or co-infection  with other viruses  Laboratories within the [place] and its  territories are required to report all positive results to the appropriate  public health authorities  Negative results do not preclude SARS-CoV-2  infection and should not be used as the sole basis for treatment or other  patient management decisions  Negative results must be combined with  clinical observations, patient history, and epidemiological information  This test has not been FDA cleared or approved  This test has been authorized by FDA under an Emergency Use Authorization  (EUA)  This test is only authorized for the duration of time the  declaration that circumstances exist justifying the authorization of the  emergency use of an in vitro diagnostic tests for detection of SARS-CoV-2  virus and/or diagnosis of COVID-19 infection under section 564(b)(1) of  the Act, 21 U  S C  313SXM-7(P)(4), unless the authorization is terminated  or revoked sooner  The test has been validated but independent review by FDA  and CLIA is pending  Test performed using [employer] Gene[employer]pert: This RT-PCR assay targets N2,  a region unique to SARS-CoV-2  A conserved region in the E-gene was chosen  for pan-Sarbecovirus detection which includes SARS-CoV-2  According to CMS-2020-01-R, this platform meets the definition of high-PE INTERNATIONAL technology      Basic metabolic panel [[id number]]  (Abnormal) Collected: 12/20/22 1022    Lab Status: Final result Specimen: Blood from Arm, Right Updated: 12/20/22 1058     Sodium 128 mmol/L      Potassium 3 6 mmol/L      Chloride 94 mmol/L      CO2 23 mmol/L      ANION GAP 11 mmol/L      BUN 27 mg/dL      Creatinine 1 10 mg/dL      Glucose 132 mg/dL      Calcium 9 0 mg/dL      eGFR 45 ml/min/1 73sq m     Narrative:      [place] guidelines for Chronic Kidney Disease (CKD):   •  Stage 1 with normal or high GFR (GFR > 90 mL/min/1 73 square meters)  •  Stage 2 Mild CKD (GFR = 60-89 mL/min/1 73 square meters)  •  Stage 3A Moderate CKD (GFR = 45-59 mL/min/1 73 square meters)  •  Stage 3B Moderate CKD (GFR = 30-44 mL/min/1 73 square meters)  •  Stage 4 Severe CKD (GFR = 15-29 mL/min/1 73 square meters)  •  Stage 5 End Stage CKD (GFR <15 mL/min/1 73 square meters)  Note: GFR calculation is accurate only with a steady state creatinine    TSH [[id number]]  (Abnormal) Collected: 12/20/22 1022    Lab Status: Final result Specimen: Blood from Arm, Right Updated: 12/20/22 1058     TSH 3RD GENERATON 10 095 uIU/mL     Narrative:      Patients undergoing fluorescein dye angiography may retain small amounts of fluorescein in the body for 48-72 hours post procedure  Samples containing fluorescein can produce falsely depressed TSH values  If the patient had this procedure,a specimen should be resubmitted post fluorescein clearance        NT-BNP PRO [[id number]]  (Abnormal) Collected: 12/20/22 1022    Lab Status: Final result Specimen: Blood from Arm, Right Updated: 12/20/22 1058     NT-proBNP 6,278 pg/mL     High Sensitivity Troponin I Random [[id number]]  (Normal) Collected: 12/20/22 1022    Lab Status: Final result Specimen: Blood from Arm, Right Updated: 12/20/22 1049     HS TnI random 12 ng/L     CBC and differential [[id number]]  (Abnormal) Collected: 12/20/22 1022    Lab Status: Final result Specimen: Blood from Arm, Right Updated: 12/20/22 1027     WBC 5 96 Thousand/uL      RBC 3 67 Million/uL      Hemoglobin 11 8 g/dL      Hematocrit 35 1 %      MCV 96 fL      MCH 32 2 pg      MCHC 33 6 g/dL      RDW 13 9 %      MPV 8 4 fL      Platelets 710 Thousands/uL      nRBC 0 /100 WBCs      Neutrophils Relative 78 %      Immat GRANS % 0 %      Lymphocytes Relative 12 %      Monocytes Relative 9 %      Eosinophils Relative 1 %      Basophils Relative 0 %      Neutrophils Absolute 4 61 Thousands/µL      Immature Grans Absolute 0 01 Thousand/uL      Lymphocytes Absolute 0 71 Thousands/µL      Monocytes Absolute 0 56 Thousand/µL      Eosinophils Absolute 0 06 Thousand/µL      Basophils Absolute 0 01 Thousands/µL                  CT chest without contrast   Final Result by Richard Anthony MD (12/20 1149)   1  Moderate right and small left-sided pleural effusions with associated compressive atelectasis  Subsegmental lingular and right middle lobe atelectasis  2  Cardiomegaly  3  Calcified mediastinal and bilateral hilar nodes are again noted        Workstation performed: [id number]         XR chest 1 view portable    (Results Pending)   IR Thoracentesis    (Results Pending)              Procedures  ECG 12 Lead Documentation Only    Date/Time: 12/20/2022 10:36 AM  Performed by: Shila El PA-C  Authorized by: Shila El PA-C     Indications / Diagnosis:  Weakness/sob  ECG reviewed by me, the ED Provider: yes    Patient location:  ED  Previous ECG:     Comparison to cardiac monitor: Yes    Interpretation:     Interpretation: normal    Rate:     ECG rate:  109    ECG rate assessment: tachycardic    Rhythm:     Rhythm: sinus tachycardia    Ectopy:     Ectopy: none    QRS:     QRS axis:  Normal    QRS intervals:  Normal  Conduction:     Conduction: normal    ST segments:     ST segments:  Normal  T waves:     T waves: normal               ED Course  ED Course as of 12/20/22 1216   Tue Dec 20, 2022   1010 Blood Pressure: 115/65   1010 Temperature(!): 96 8 °F (36 °C)   1010 Pulse(!): 111   1010 Respirations: 20   1010 SpO2: 91 %   1011 As patient was being changed, she did desaturate into the mid 80s became slightly tachypneic however tachypnea resolved after 2 L nasal cannula oxygenation was applied  1038 WBC: 5 96   1038 Hemoglobin: 11 8   1038 Platelet Count: 282   1059 NT-proBNP(!): 6,278   1059 Sodium(!): 128   1059 eGFR: 45   1123 Some hyponatremia slightly underactive thyroid function, abnormal CT of the chest which appears to have a large effusion secondary to unknown issue at this point however awaiting official interpretation   1154 IMPRESSION:  1  Moderate right and small left-sided pleural effusions with associated compressive atelectasis  Subsegmental lingular and right middle lobe atelectasis  2  Cardiomegaly  3  Calcified mediastinal and bilateral hilar nodes are again noted       1155 Patient has had oxygen desaturation with ambulation down to the low 80s               HEART Risk Score    Flowsheet Row Most Recent Value   Heart Score Risk Calculator    History 0 Filed at: 12/20/2022 1037   ECG 0 Filed at: 12/20/2022 1037   Age 2 Filed at: 12/20/2022 1037   Risk Factors 1 Filed at: 12/20/2022 1037   Troponin 0 Filed at: 12/20/2022 1037   HEART Score 3 Filed at: 12/20/2022 1037                                      MDM    Disposition  Final diagnoses:   Hyponatremia   Pleural effusion   Hypoxia     Time reflects when diagnosis was documented in both MDM as applicable and the Disposition within this note     Time User Action Codes Description Comment    12/20/2022 11:12 AM Clara Wray Add [E87 1] Hyponatremia     12/20/2022 11:55 AM Abdirizak KOVACS Add [J90] Pleural effusion     12/20/2022 11:55 AM Abdirizak KOVACS Add [R09 02] Hypoxia       ED Disposition     ED Disposition   Admit    Condition   Stable    Date/Time   Tue Dec 20, 2022 12:15 PM    Comment   Case was discussed with Dr Shilo Maurice and the patient's admission status was agreed to be Admission Status: inpatient status to the service of Dr Nabeel Fu   Follow-up Information    None         Patient's Medications   Discharge Prescriptions    No medications on file       No discharge procedures on file      PDMP Review       Value Time User    PDMP Reviewed  Yes 8/22/2022  9:04 AM Frannie Daniel DO          ED Provider  Electronically Signed by           Jarrod Laurent PA-C  12/20/22 8240

## 2022-12-21 ENCOUNTER — TRANSITIONAL CARE MANAGEMENT (OUTPATIENT)
Dept: FAMILY MEDICINE CLINIC | Facility: CLINIC | Age: 87
End: 2022-12-21

## 2022-12-21 ENCOUNTER — HOSPITAL ENCOUNTER (INPATIENT)
Facility: HOSPITAL | Age: 87
LOS: 4 days | Discharge: HOME HEALTH | End: 2022-12-25
Attending: INTERNAL MEDICINE | Admitting: INTERNAL MEDICINE

## 2022-12-21 ENCOUNTER — HOME HEALTH ADMISSION (OUTPATIENT)
Dept: HOME HEALTH SERVICES | Facility: HOME HEALTHCARE | Age: 87
End: 2022-12-21

## 2022-12-21 ENCOUNTER — EPISODE CHANGES (OUTPATIENT)
Dept: CASE MANAGEMENT | Facility: OTHER | Age: 87
End: 2022-12-21

## 2022-12-21 VITALS
HEART RATE: 122 BPM | DIASTOLIC BLOOD PRESSURE: 77 MMHG | HEIGHT: 58 IN | RESPIRATION RATE: 18 BRPM | OXYGEN SATURATION: 97 % | WEIGHT: 119.93 LBS | SYSTOLIC BLOOD PRESSURE: 120 MMHG | TEMPERATURE: 97.1 F | BODY MASS INDEX: 25.17 KG/M2

## 2022-12-21 DIAGNOSIS — I35.0 SEVERE AORTIC STENOSIS: Primary | ICD-10-CM

## 2022-12-21 DIAGNOSIS — E05.90 HYPERTHYROIDISM: ICD-10-CM

## 2022-12-21 DIAGNOSIS — Z01.818 PRE-OP EXAM: ICD-10-CM

## 2022-12-21 DIAGNOSIS — I47.1 ATRIAL TACHYCARDIA (HCC): ICD-10-CM

## 2022-12-21 DIAGNOSIS — I50.33 ACUTE ON CHRONIC DIASTOLIC CHF (CONGESTIVE HEART FAILURE) (HCC): ICD-10-CM

## 2022-12-21 LAB
ALBUMIN SERPL BCP-MCNC: 3.2 G/DL (ref 3.5–5)
ALP SERPL-CCNC: 73 U/L (ref 46–116)
ALT SERPL W P-5'-P-CCNC: 28 U/L (ref 12–78)
ANION GAP SERPL CALCULATED.3IONS-SCNC: 9 MMOL/L (ref 4–13)
AST SERPL W P-5'-P-CCNC: 31 U/L (ref 5–45)
ATRIAL RATE: 105 BPM
BASOPHILS # BLD AUTO: 0.01 THOUSANDS/ÂΜL (ref 0–0.1)
BASOPHILS NFR BLD AUTO: 0 % (ref 0–1)
BILIRUB SERPL-MCNC: 0.78 MG/DL (ref 0.2–1)
BUN SERPL-MCNC: 19 MG/DL (ref 5–25)
CALCIUM ALBUM COR SERPL-MCNC: 9.7 MG/DL (ref 8.3–10.1)
CALCIUM SERPL-MCNC: 9.1 MG/DL (ref 8.3–10.1)
CHLORIDE SERPL-SCNC: 94 MMOL/L (ref 96–108)
CHOLEST SERPL-MCNC: 164 MG/DL
CO2 SERPL-SCNC: 24 MMOL/L (ref 21–32)
CREAT SERPL-MCNC: 1.03 MG/DL (ref 0.6–1.3)
EOSINOPHIL # BLD AUTO: 0.14 THOUSAND/ÂΜL (ref 0–0.61)
EOSINOPHIL NFR BLD AUTO: 2 % (ref 0–6)
ERYTHROCYTE [DISTWIDTH] IN BLOOD BY AUTOMATED COUNT: 13.9 % (ref 11.6–15.1)
GFR SERPL CREATININE-BSD FRML MDRD: 48 ML/MIN/1.73SQ M
GLUCOSE SERPL-MCNC: 107 MG/DL (ref 65–140)
HCT VFR BLD AUTO: 35.6 % (ref 34.8–46.1)
HDLC SERPL-MCNC: 94 MG/DL
HGB BLD-MCNC: 11.9 G/DL (ref 11.5–15.4)
IMM GRANULOCYTES # BLD AUTO: 0.01 THOUSAND/UL (ref 0–0.2)
IMM GRANULOCYTES NFR BLD AUTO: 0 % (ref 0–2)
LDLC SERPL CALC-MCNC: 60 MG/DL (ref 0–100)
LYMPHOCYTES # BLD AUTO: 1.07 THOUSANDS/ÂΜL (ref 0.6–4.47)
LYMPHOCYTES NFR BLD AUTO: 18 % (ref 14–44)
MAGNESIUM SERPL-MCNC: 1.3 MG/DL (ref 1.6–2.6)
MCH RBC QN AUTO: 31.8 PG (ref 26.8–34.3)
MCHC RBC AUTO-ENTMCNC: 33.4 G/DL (ref 31.4–37.4)
MCV RBC AUTO: 95 FL (ref 82–98)
MONOCYTES # BLD AUTO: 0.63 THOUSAND/ÂΜL (ref 0.17–1.22)
MONOCYTES NFR BLD AUTO: 11 % (ref 4–12)
NEUTROPHILS # BLD AUTO: 4.14 THOUSANDS/ÂΜL (ref 1.85–7.62)
NEUTS SEG NFR BLD AUTO: 69 % (ref 43–75)
NONHDLC SERPL-MCNC: 70 MG/DL
NRBC BLD AUTO-RTO: 0 /100 WBCS
PLATELET # BLD AUTO: 155 THOUSANDS/UL (ref 149–390)
PMV BLD AUTO: 8.6 FL (ref 8.9–12.7)
POTASSIUM SERPL-SCNC: 3.6 MMOL/L (ref 3.5–5.3)
PROT SERPL-MCNC: 6.6 G/DL (ref 6.4–8.4)
QRS AXIS: 269 DEGREES
QRSD INTERVAL: 90 MS
QT INTERVAL: 356 MS
QTC INTERVAL: 479 MS
RBC # BLD AUTO: 3.74 MILLION/UL (ref 3.81–5.12)
SODIUM SERPL-SCNC: 127 MMOL/L (ref 135–147)
T WAVE AXIS: -5 DEGREES
T4 FREE SERPL-MCNC: 0.94 NG/DL (ref 0.76–1.46)
TRIGL SERPL-MCNC: 50 MG/DL
VENTRICULAR RATE: 109 BPM
WBC # BLD AUTO: 6 THOUSAND/UL (ref 4.31–10.16)

## 2022-12-21 RX ORDER — ENOXAPARIN SODIUM 100 MG/ML
30 INJECTION SUBCUTANEOUS DAILY
Status: DISCONTINUED | OUTPATIENT
Start: 2022-12-22 | End: 2022-12-21 | Stop reason: HOSPADM

## 2022-12-21 RX ORDER — ACETAMINOPHEN 325 MG/1
650 TABLET ORAL EVERY 4 HOURS PRN
Status: CANCELLED | OUTPATIENT
Start: 2022-12-21

## 2022-12-21 RX ORDER — ALPRAZOLAM 0.25 MG/1
0.25 TABLET ORAL EVERY 6 HOURS PRN
Status: DISCONTINUED | OUTPATIENT
Start: 2022-12-21 | End: 2022-12-25 | Stop reason: HOSPADM

## 2022-12-21 RX ORDER — ALBUTEROL SULFATE 2.5 MG/3ML
2.5 SOLUTION RESPIRATORY (INHALATION) EVERY 6 HOURS PRN
Status: DISCONTINUED | OUTPATIENT
Start: 2022-12-21 | End: 2022-12-21

## 2022-12-21 RX ORDER — POTASSIUM CHLORIDE 20 MEQ/1
40 TABLET, EXTENDED RELEASE ORAL DAILY
Status: DISCONTINUED | OUTPATIENT
Start: 2022-12-22 | End: 2022-12-24

## 2022-12-21 RX ORDER — MAGNESIUM SULFATE HEPTAHYDRATE 40 MG/ML
2 INJECTION, SOLUTION INTRAVENOUS ONCE
Status: COMPLETED | OUTPATIENT
Start: 2022-12-21 | End: 2022-12-21

## 2022-12-21 RX ORDER — ENOXAPARIN SODIUM 100 MG/ML
30 INJECTION SUBCUTANEOUS DAILY
Status: CANCELLED | OUTPATIENT
Start: 2022-12-22

## 2022-12-21 RX ORDER — FUROSEMIDE 10 MG/ML
40 INJECTION INTRAMUSCULAR; INTRAVENOUS
Status: CANCELLED | OUTPATIENT
Start: 2022-12-21

## 2022-12-21 RX ORDER — POTASSIUM CHLORIDE 20 MEQ/1
40 TABLET, EXTENDED RELEASE ORAL DAILY
Status: CANCELLED | OUTPATIENT
Start: 2022-12-22

## 2022-12-21 RX ORDER — METHIMAZOLE 5 MG/1
5 TABLET ORAL DAILY
Status: DISCONTINUED | OUTPATIENT
Start: 2022-12-22 | End: 2022-12-22

## 2022-12-21 RX ORDER — POTASSIUM CHLORIDE 20 MEQ/1
40 TABLET, EXTENDED RELEASE ORAL DAILY
Status: DISCONTINUED | OUTPATIENT
Start: 2022-12-21 | End: 2022-12-21 | Stop reason: HOSPADM

## 2022-12-21 RX ORDER — ALPRAZOLAM 0.25 MG/1
0.25 TABLET ORAL EVERY 6 HOURS PRN
Status: CANCELLED | OUTPATIENT
Start: 2022-12-21

## 2022-12-21 RX ORDER — ENOXAPARIN SODIUM 100 MG/ML
30 INJECTION SUBCUTANEOUS DAILY
Status: DISCONTINUED | OUTPATIENT
Start: 2022-12-22 | End: 2022-12-23

## 2022-12-21 RX ORDER — ALBUTEROL SULFATE 2.5 MG/3ML
2.5 SOLUTION RESPIRATORY (INHALATION) EVERY 6 HOURS PRN
Status: CANCELLED | OUTPATIENT
Start: 2022-12-21

## 2022-12-21 RX ORDER — METHIMAZOLE 5 MG/1
5 TABLET ORAL DAILY
Status: CANCELLED | OUTPATIENT
Start: 2022-12-22

## 2022-12-21 RX ORDER — FUROSEMIDE 10 MG/ML
40 INJECTION INTRAMUSCULAR; INTRAVENOUS
Status: DISCONTINUED | OUTPATIENT
Start: 2022-12-22 | End: 2022-12-25

## 2022-12-21 RX ORDER — ACETAMINOPHEN 325 MG/1
650 TABLET ORAL EVERY 4 HOURS PRN
Status: DISCONTINUED | OUTPATIENT
Start: 2022-12-21 | End: 2022-12-25 | Stop reason: HOSPADM

## 2022-12-21 RX ADMIN — POTASSIUM CHLORIDE 40 MEQ: 1500 TABLET, EXTENDED RELEASE ORAL at 08:29

## 2022-12-21 RX ADMIN — ATENOLOL 50 MG: 50 TABLET ORAL at 08:29

## 2022-12-21 RX ADMIN — ENOXAPARIN SODIUM 40 MG: 40 INJECTION SUBCUTANEOUS at 08:28

## 2022-12-21 RX ADMIN — METOPROLOL TARTRATE 25 MG: 25 TABLET, FILM COATED ORAL at 21:17

## 2022-12-21 RX ADMIN — METHIMAZOLE 5 MG: 5 TABLET ORAL at 08:29

## 2022-12-21 RX ADMIN — FUROSEMIDE 40 MG: 10 INJECTION, SOLUTION INTRAMUSCULAR; INTRAVENOUS at 08:28

## 2022-12-21 RX ADMIN — MAGNESIUM SULFATE HEPTAHYDRATE 2 G: 40 INJECTION, SOLUTION INTRAVENOUS at 08:36

## 2022-12-21 RX ADMIN — CHLORTHALIDONE 12.5 MG: 25 TABLET ORAL at 08:34

## 2022-12-21 NOTE — PROGRESS NOTES
I spoke to the patient along with her  and son  I explained to them that she may be a candidate for TAVR and that the prognosis without TAVR is not good in the next 1 - 2 years without AVR  I explained that she would need testing / evaluation prior to decide whether she is a candidate for TAVR  She wishes to proceed  Transfer to [facility] under [facility] with cardiology consult

## 2022-12-21 NOTE — ASSESSMENT & PLAN NOTE
Blood pressure controlled, hold atenolol and chlorthalidone  Added Lopressor 25 mg every 8 for tachycardia

## 2022-12-21 NOTE — ASSESSMENT & PLAN NOTE
Suspected hypervolemic hyponatremia  Continue monitor while patient be diuresed  Chlorthalidone discontinued

## 2022-12-21 NOTE — CASE MANAGEMENT
Case Management Assessment & Discharge Planning Note    Patient name Garfield Bishop  Location /071-51 MRN [id number]  : 1935 Date 2022       Current Admission Date: 2022  Current Admission Diagnosis:Acute on chronic diastolic CHF (congestive heart failure) [facility])   Patient Active Problem List    Diagnosis Date Noted   • Acute on chronic diastolic CHF (congestive heart failure) ([address] ) 2022   • Primary generalized (osteo)arthritis 2022   • Hyperthyroidism 2022   • Dyslipidemia 2022   • Post-menopause 2022   • Stage 3a chronic kidney disease ([facility] ) 2021   • Other fatigue 2021   • Ganglion cyst 2021   • Adhesive capsulitis of right shoulder 2021   • Bilateral leg edema 2021   • Chronic pain syndrome 2021   • Sacroiliitis ([facility] ) 2021   • Gait abnormality 2021   • Chronic bilateral low back pain without sciatica 2021   • Osteopenia of neck of femur 2021   • Impaired fasting glucose    • Encounter for long-term (current) use of medications 10/31/2020   • Immunization due 10/31/2020   • Acute bursitis of right shoulder 10/31/2020   • Encounter for Medicare annual wellness exam 2020   • Mid back pain 2019   • Generalized anxiety disorder 2019   • Non-rheumatic tricuspid valve insufficiency 2019   • Lymphadenopathy 2019   • Hiatal hernia 2019   • Thrombocytopenia ([address] ) 2019   • Aortic valve stenosis 2019   • Mitral valve insufficiency 2019   • Supraventricular tachycardia ([address] ) 2019   • Pulmonary hypertension ([address] ) 2019   • Diverticulosis 2019   • First degree AV block 2019   • Essential hypertension 2019   • Hypomagnesemia 2019   • Hypokalemia 2019   • Hyponatremia 2019      LOS (days): 1  Geometric Mean LOS (GMLOS) (days): 3 90  Days to GMLOS:3     OBJECTIVE:    Risk of Unplanned Readmission Score: 11 14 Current admission status: Inpatient       Preferred Pharmacy:   [facility] [address], [address] [address] [address] [address]  Phone: [phone] Fax: [phone]    Primary Care Provider: Brian Person DO    Primary Insurance: MEDICARE  Secondary Insurance: BLUE CROSS    ASSESSMENT:  [address]isha [address] Proxies    There are no active Health Care Proxies on file  Advance Directives  Does patient have a [address]?: No  Was patient offered paperwork?: Yes (declines)  Does patient currently have a Health Care decision maker?: Yes, please see Health Care Proxy section  Does patient have Advance Directives?: Yes  Advance Directives: Living will  Primary Contact: Regina Santiago (Spouse)   [address] [place] [address] [phone] (H)   [phone] (M)         Readmission Root Cause  30 Day Readmission: No    Patient Information  Admitted from[de-identified] Home  Mental Status: Alert  During Assessment patient was accompanied by: Not accompanied during assessment  Assessment information provided by[de-identified] Patient  Primary Caregiver: Self  Support Systems: Spouse/significant other, Son  [place] of Residence: [facility] [address] do you live in?: [employer] entry access options   Select all that apply : Stairs  Number of steps to enter home : 1  Type of Current Residence: [address] home (1/2 double)  Upon entering residence, is there a bedroom on the main floor (no further steps)?: No  A bedroom is located on the following floor levels of residence (select all that apply):: 2nd Floor  Upon entering residence, is there a bathroom on the main floor (no further steps)?: No  Indicate which floors of current residence have a bathroom (select all the apply):: 2nd Floor  Number of steps to 2nd floor from main floor: One Flight (11)  In the last 12 months, was there a time when you were not able to pay the mortgage or rent on time?: No  In the last 12 months, how many places have you lived?: 1  In the last 12 months, was there a time when you did not have a steady place to sleep or slept in a shelter (including now)?: No  Homeless/housing insecurity resource given?: N/A  Living Arrangements: Lives w/ Spouse/significant other  Is patient a ?: No    Activities of Daily Living Prior to Admission  Functional Status: Independent  Completes ADLs independently?: Yes  Ambulates independently?: Yes  Does patient use assisted devices?: Yes  Assisted Devices (DME) used: Straight Cane  Does patient currently own DME?: Yes  What DME does the patient currently own?: Denisha Moreno Wheelchair  Does patient have a history of Outpatient Therapy (PT/OT)?: Yes (hx OP PT Chris)  Does the patient have a history of Short-Term Rehab?: No  Does patient have a history of HHC?: No  Does patient currently have [facility]?: No         Patient Information Continued  Income Source: Pension/snf  Does patient have prescription coverage?: Yes  Within the past 12 months, you worried that your food would run out before you got the money to buy more : Never true  Within the past 12 months, the food you bought just didn't last and you didn't have money to get more : Never true  Food insecurity resource given?: N/A  Does patient receive dialysis treatments?: No  Does patient have a history of substance abuse?: No  Does patient have a history of Mental Health Diagnosis?: Yes (anxiety)  Is patient receiving treatment for mental health?: No  Patient declined treatment information    Has patient received inpatient treatment related to mental health in the last 2 years?: No         Means of Transportation  Means of Transport to Appts[de-identified] Family transport  In the past 12 months, has lack of transportation kept you from medical appointments or from getting medications?: No  In the past 12 months, has lack of transportation kept you from meetings, work, or from getting things needed for daily living?: No  Was application for public transport provided?: N/A        DISCHARGE DETAILS:    Discharge planning discussed with[de-identified] patient        CM contacted family/caregiver?: No- see comments (declines)  Were Treatment Team discharge recommendations reviewed with patient/caregiver?: Yes  Did patient/caregiver verbalize understanding of patient care needs?: Yes  Were patient/caregiver advised of the risks associated with not following Treatment Team discharge recommendations?: Yes         [address]         Is the patient interested in [facility] at discharge?: Yes  [address] requested[de-identified] Nursing, Occupational Therapy, Physical [address] Name[de-identified] Other  [facility] Provider[de-identified] PCP  Home Health Services Needed[de-identified] Heart Failure Management, Gait/ADL Training, Evaluate Functional Status and Safety, Strengthening/Theraputic Exercises to Improve Function  Homebound Criteria Met[de-identified] Uses an Assist Device (i e  cane, walker, etc)  Supporting Clincal Findings[de-identified] Fatigues Easliy in Short Distances    DME Referral Provided  Referral made for DME?: No         Would you like to participate in our [address] service program?  : No - Declined       Discharge Destination Plan[de-identified] Home with [place] at Discharge : Family      Plans at this time are home on dc with OP follow up and [facility] arranged  Referral [facility]:pt choice, waiting to hear back if they can accept pt

## 2022-12-21 NOTE — CONSULTS
Consultation - Cardiothoracic Surgery   Del Cunningham 80 y o  female MRN: [id number]  Unit/Bed#: [facility] 402-01 Encounter: [id number]    Physician Requesting Consult: Kristin Orozco MD    Reason for Consult / Principal Problem: Severe AS    Inpatient consult to Cardiothoracic Surgery  Consult performed by: Anthony Euceda PA-C  Consult ordered by: Krsitin Orozco MD        History of Present Illness: Del Cunningham is a 80y o  year old female with a known history of aortic stenosis followed by Dr Uri Mario as an outpatient  Her last echo was on 22  She presented to [facility] on 22 due to shortness of breath  Per chart review, the patient had been experiencing shortness of breath for the past 2 weeks, which became worse over the last 48 hrs prior to presentation  Her pulse oximetry at home was reportedly 85%, so her  brought her to the hospital  She was found to be volume overloaded  She was treated with IV diuretic therapy, underwent a right thoracentesis with 900ml fluid removed  She was transferred to [facility] for cardiac surgical evaluation  She denies chest pain, syncope, LE edema, PND, orthopnea, fevers, weakness or lightheadedness  She reports she has had a known murmur for approximately 1 year  She has a PMH of chronic diastolic CHF, known severe AS (felt to be only moderate per Cardiology outpatient documentation), CKD 3, hyperparathyroidism, HTN, HLD, pulmonary HTN, MINNIE, chronic hyponatremia and accelerated junctional rhythm on EKG  She denies tobacco, alcohol or drug use  She lives with her  and reports she was active until about 6 months ago, when she stumbled  She has full dentures  She reports her brother is 80 and is going to be having a valve procedure in the future  Her sister  in her [de-identified] from valvular disease and her daughter  (patient unable to recall at what age or why her daughter passed away)    The patient is a poor historian       Past Medical History:  Past Medical History:   Diagnosis Date   • Aortic stenosis    • Community acquired pneumonia of left upper lobe of lung 5/17/2019   • Fatigue    • Full dentures    • Generalized anxiety disorder    • Hyperlipidemia    • Hypertension    • Impaired fasting glucose    • Mitral regurgitation    • Pneumonia    • SVT (supraventricular tachycardia)    • Tricuspid regurgitation          Past Surgical History:   Past Surgical History:   Procedure Laterality Date   • DENTAL SURGERY Bilateral     ALL TEETH REMOVED   • FL GUIDED NEEDLE PLAC BX/ASP/INJ  5/26/2021   • IR THORACENTESIS  12/20/2022   • JOINT REPLACEMENT Left    • KNEE SURGERY      left knee replacement   • SD INJECTION,SACROILIAC JOINT Bilateral 5/26/2021    Procedure: SACROILIAC JOINT INJECTION;  Surgeon: Amanda High MD;  Location: [facility];  Service: Pain Management          Family History:  Family History   Problem Relation Age of Onset   • Hypertension Mother    • Asthma Father         Sutter's asthma   • Alzheimer's disease Sister    • Rectal cancer Son    • Uterine cancer Daughter    • Heart disease Sister    • Heart disease Brother          Social History:  Social History     Substance and Sexual Activity   Alcohol Use Never     Social History     Substance and Sexual Activity   Drug Use Never     Social History     Tobacco Use   Smoking Status Never   Smokeless Tobacco Never     Marital Status: /Civil Union      Home Medications:   Prior to Admission medications    Medication Sig Start Date End Date Taking?  Authorizing Provider   ALPRAZolam Isabel ) 0 25 mg tablet Take 1 tablet (0 25 mg total) by mouth every 6 (six) hours as needed for anxiety 8/22/22   Marybel Kidd,    amLODIPine (NORVASC) 10 mg tablet Take 1 tablet by mouth once daily 12/18/22   Marybel Kidd, DO   ammonium lactate (LAC-HYDRIN) 12 % lotion  5/11/20   Historical Provider, MD   atenolol-chlorthalidone (TENORETIC) 100-25 mg per tablet Take 0 5 tablets by mouth daily 3/28/22 Ailyn Foote DO   betamethasone dipropionate (DIPROSONE) 0 05 % ointment Apply topically 2 (two) times a day for 14 days 8/2/22 8/16/22  Neeraj Gonzalez DO   Calcium Carb-Cholecalciferol (CALTRATE 600+D3 PO) Take 1 tablet by mouth 2 (two) times a day    Historical Provider, MD   methimazole (TAPAZOLE) 5 mg tablet Take 1 tablet by mouth once daily 12/5/22   Sung All, CRNP   Omega-3 Fatty Acids (FISH OIL) 1200 MG CAPS Take 1,200 mg by mouth 2 (two) times a day    Historical Provider, MD   potassium chloride (K-DUR,KLOR-CON) 20 mEq tablet Take 1 tablet by mouth once daily 10/6/22   Ailyn Foote DO   Psyllium (METAMUCIL FIBER) 51 7 % PACK Take 1 Package by mouth daily 5/21/19   Sabino Ruelas DO       Inpatient Medications:  Scheduled Meds:   Current Facility-Administered Medications   Medication Dose Route Frequency Provider Last Rate   • acetaminophen  650 mg Oral Q4H PRN Gloria Jordan MD     • albuterol  2 5 mg Nebulization Q6H PRN Gloria Jordan MD     • ALPRAZolam  0 25 mg Oral Q6H PRN Gloria Jordan MD     • [START ON 12/22/2022] enoxaparin  30 mg Subcutaneous Daily Gloria Jordan MD     • [START ON 12/22/2022] furosemide  40 mg Intravenous BID (diuretic) Gloria Jordan MD     • [START ON 12/22/2022] methimazole  5 mg Oral Daily Gloria Jordan MD     • metoprolol tartrate  25 mg Oral [facility] Gloria Jordan MD     • [START ON 12/22/2022] potassium chloride  40 mEq Oral Daily Gloria Jordan MD       Continuous Infusions:    PRN Meds:  acetaminophen, 650 mg, Q4H PRN  albuterol, 2 5 mg, Q6H PRN  ALPRAZolam, 0 25 mg, Q6H PRN        Allergies:  No Known Allergies    Review of Systems:  Review of Systems   Constitutional: Positive for fatigue  HENT: Negative for dental problem, facial swelling, sinus pressure, sneezing and trouble swallowing  Eyes: Negative  Respiratory: Positive for shortness of breath  Negative for chest tightness  Cardiovascular: Negative    Negative for chest pain, palpitations and leg swelling  Gastrointestinal: Negative  Negative for abdominal pain  Endocrine: Negative  Genitourinary: Negative  Musculoskeletal: Negative  Allergic/Immunologic: Negative  Neurological: Negative  Negative for syncope  Hematological: Negative for adenopathy  Does not bruise/bleed easily  Psychiatric/Behavioral: Negative  All other systems reviewed and are negative  Vital Signs:     Vitals:    12/21/22 1548   BP: 141/69   Pulse: 60   SpO2: 95%     Invasive Devices     Peripheral Intravenous Line  Duration           Peripheral IV 12/21/22 Dorsal (posterior); Right Hand <1 day                Physical Exam:  Physical Exam  Vitals and nursing note reviewed  Constitutional:       General: She is awake  She is not in acute distress  Appearance: Normal appearance  She is well-developed  She is not diaphoretic  HENT:      Head: Normocephalic and atraumatic  Right Ear: External ear normal       Left Ear: External ear normal       Nose: Nose normal       Mouth/Throat:      Pharynx: No oropharyngeal exudate  Eyes:      General: No scleral icterus  Right eye: No discharge  Left eye: No discharge  Conjunctiva/sclera: Conjunctivae normal       Pupils: Pupils are equal, round, and reactive to light  Neck:      Vascular: No JVD  Trachea: No tracheal deviation  Cardiovascular:      Rate and Rhythm: Normal rate and regular rhythm  Heart sounds: Murmur heard  Systolic murmur is present with a grade of 2/6  No friction rub  No gallop  Pulmonary:      Effort: Pulmonary effort is normal  No respiratory distress  Breath sounds: Normal breath sounds  No stridor  No wheezing or rales  Abdominal:      General: Bowel sounds are normal  There is no distension  Palpations: Abdomen is soft  Tenderness: There is no abdominal tenderness  There is no guarding or rebound     Musculoskeletal:         General: No tenderness or deformity  Normal range of motion  Cervical back: Normal range of motion and neck supple  Right lower leg: No edema  Left lower leg: No edema  Skin:     General: Skin is warm and dry  Coloration: Skin is not pale  Findings: No erythema or rash  Neurological:      Mental Status: She is alert  Cranial Nerves: No cranial nerve deficit  Sensory: No sensory deficit  Motor: No abnormal muscle tone  Coordination: Coordination normal       Deep Tendon Reflexes: Reflexes normal    Psychiatric:         Behavior: Behavior normal  Behavior is cooperative  Thought Content: Thought content normal          Cognition and Memory: Memory is impaired  Judgment: Judgment normal          Lab Results:     Results from last 7 days   Lab Units 12/21/22  0451 12/20/22  1022   WBC Thousand/uL 6 00 5 96   HEMOGLOBIN g/dL 11 9 11 8   HEMATOCRIT % 35 6 35 1   PLATELETS Thousands/uL 155 150     Results from last 7 days   Lab Units 12/21/22  0451 12/20/22  1022   POTASSIUM mmol/L 3 6 3 6   CHLORIDE mmol/L 94* 94*   CO2 mmol/L 24 23   BUN mg/dL 19 27*   CREATININE mg/dL 1 03 1 10   CALCIUM mg/dL 9 1 9 0         No results found for: HGBA1C  Lab Results   Component Value Date    TROPONINI 0 34 ([facility]) 05/18/2019       Imaging Studies:     Echocardiogram 7/19/22:   Left Ventricle Left ventricular cavity size is normal  Wall thickness is increased  The left ventricular ejection fraction is 50%  Systolic function is low normal  Global longitudinal strain is reduced -13 3% with apical sparing  Consider possible infiltrative process such as cardiac amyloid in proper clinical context  Although no diagnostic regional wall motion abnormality was identified, this possibility cannot be completely excluded on the basis of this study  Diastolic function is abnormal    Right Ventricle Right ventricular cavity size is mildly dilated  Systolic function is mildly reduced   Abnormal tricuspid annular plane systolic excursion (TAPSE) < 1 7 cm  Left Atrium The atrium is dilated  Right Atrium The atrium is dilated  Atrial Septum There is a small patent foramen ovale  The septum bows into the right atrium, suggesting increased left atrial pressure  Aortic Valve The aortic valve probable trileaflet  The leaflets are thickened  The leaflets are calcified  There is reduced mobility  There is mild to moderate regurgitation  There is severe stenosis  Mitral Valve There is annular calcification  There is moderate regurgitation  There is no evidence of stenosis  Tricuspid Valve Tricuspid valve structure is normal  There is moderate regurgitation  There is no evidence of stenosis  Pulmonic Valve There is mild to moderate regurgitation  There is no evidence of stenosis  Ascending Aorta The aortic root is normal in size  Pulmonary Artery The estimated pulmonary artery systolic pressure is 03 9 mmHg  The pulmonary artery systolic pressure is severely increased       Left Ventricle Measurements    Function/Volumes   A4C EF 50 %         LVOT stroke volume 62 89          LVOT stroke volume index 41 4 ml/m2         Dimensions   LVIDd 4 6 cm         LVIDS 3 3 cm         IVSd 1 cm         LVPWd 1 cm         LVOT area 3 14 cm2         FS 28           Report Measurements   AV LVOT peak gradient 3 mmHg              Interventricular Septum Measurements    Shunt Ratio   LVOT peak VTI 20 03 cm         LVOT peak rach 0 93 m/s              Right Ventricle Measurements    Dimensions   RVID d 3 9 cm               Left Atrium Measurements    Dimensions   LA size 4 5 cm         LA length (A2C) 5 8 cm         NEERAJ A4C 18 6 cm2               Right Atrium Measurements    Dimensions   RAA A4C 13 3 cm2               Atrial Septum Measurements    Shunt Ratio   LVOT peak VTI 20 03 cm         LVOT peak rach 0 93 m/s               Aortic Valve Measurements    Stenosis   Aortic valve peak velocity 2 79 m/s         LVOT peak alphonso 0 93 m/s         Ao VTI 66 52 cm         LVOT peak VTI 20 03 cm         AV mean gradient 21 mmHg         LVOT mn grad 2 mmHg         AV peak gradient 31 mmHg         AV LVOT peak gradient 3 mmHg         Dimensionless velociy index 0 33          Regurgitation   AV peak gradient 53 mmHg         AV Deceleration Time 992 ms         AV regurgitation pressure 1/2 time 288 ms         Area/Dimensions   AV valve area 0 95 cm2         AV area by cont VTI 0 9 cm2         AV area peak alphonso 1 cm2         LVOT diameter 2 cm         LVOT area 3 14 cm2               Mitral Valve Measurements    Stenosis   MV VTI RETROGRADE 142 5 cm         MV mean gradient retrograde 66 mmHg         MR PG 85 mmHg               Tricuspid Valve Measurements    RVSP Parameters   TR Peak Alphonso 4 1 m/s         Triscuspid Valve Regurgitation Peak Gradient 65 mmHg               Aorta Measurements    Aortic Dimensions   Ao root 2 6 cm         Asc Ao 2 8 cm               Pulmonary Artery Measurements    PA Pressure   PASP 75 mmHg                     CT chest: 1  Moderate right and small left-sided pleural effusions with associated compressive atelectasis  Subsegmental lingular and right middle lobe atelectasis  2  Cardiomegaly  3  Calcified mediastinal and bilateral hilar nodes are again noted  I have personally reviewed pertinent reports  and I have personally reviewed pertinent films in PACS    Assessment:  Active Problems:    * No active hospital problems  *    Severe aortic stenosis; Ongoing TAVR workup    Plan:  80year old female with a history of aortic stenosis  We will repeat an echocardiogram this admission for better evaluation of her aortic valve disease  Further recommendations to be provided once echo is obtained  Vania Coffey was comfortable with our recommendations, and their questions were answered to their satisfaction    We will continue to evaluate the patient daily with further recommendations as work up is completed  Thank you for allowing us to participate in the care of this patient  SIGNATURE: Jos Colon [place]  DATE: December 21, 2022  TIME: 4:12 PM    * This note was completed in part utilizing m-modal fluency direct voice recognition software  Grammatical errors, random word insertion, spelling mistakes, and incomplete sentences may be an occasional consequence of the system secondary to software limitations, ambient noise and hardware issues  At the time of dictation, efforts were made to edit, clarify and /or correct errors  Please read the chart carefully and recognize, using context, where substitutions have occurred  If you have any questions or concerns about the context, text or information contained within the body of this dictation, please contact myself, the provider, for further clarification

## 2022-12-21 NOTE — ASSESSMENT & PLAN NOTE
Severe aortic stenosis, likely contributing to current episode of CHF  Discussed with cardiology, plan for transfer to [facility] for TAVR evaluation

## 2022-12-21 NOTE — RESPIRATORY THERAPY NOTE
RT Protocol Note  Karley Freed 80 y o  female MRN: [id number]  Unit/Bed#: [facility] 402-01 Encounter: [id number]    Assessment    Active Problems:    * No active hospital problems  *      Home Pulmonary Medications:         Past Medical History:   Diagnosis Date    Aortic stenosis     Community acquired pneumonia of left upper lobe of lung 5/17/2019    Fatigue     Full dentures     Generalized anxiety disorder     Hyperlipidemia     Hypertension     Impaired fasting glucose     Mitral regurgitation     Pneumonia     SVT (supraventricular tachycardia)     Tricuspid regurgitation      Social History     Socioeconomic History    Marital status: /Civil Union     Spouse name: None    Number of children: 2    Years of education: None    Highest education level: None   Occupational History    None   Tobacco Use    Smoking status: Never    Smokeless tobacco: Never   Vaping Use    Vaping Use: Never used   Substance and Sexual Activity    Alcohol use: Never    Drug use: Never    Sexual activity: None   Other Topics Concern    None   Social History Narrative    None     Social Determinants of Health     Financial Resource Strain: Not on file   Food Insecurity: No Food Insecurity    Worried About Running Out of Food in the Last Year: Never true    Ran Out of Food in the Last Year: Never true   Transportation Needs: No Transportation Needs    Lack of Transportation (Medical): No    Lack of Transportation (Non-Medical):  No   Physical Activity: Not on file   Stress: Not on file   Social Connections: Not on file   Intimate Partner Violence: Not on file   Housing Stability: Low Risk     Unable to Pay for Housing in the Last Year: No    Number of Places Lived in the Last Year: 1    Unstable Housing in the Last Year: No       Subjective         Objective    Physical Exam:   Assessment Type: Assess only  General Appearance: Alert, Awake  Respiratory Pattern: Tachypneic  Chest Assessment: Chest expansion symmetrical  Bilateral Breath Sounds: Clear  Cough: None    Vitals:  Blood pressure 141/69, pulse 60, SpO2 95 %  Imaging and other studies: I have personally reviewed pertinent reports  Plan    Respiratory Plan: Discontinue Protocol        Resp Comments: (P) pt admitted wiht CHF no pulmonary Hx or home resp meds   BS clear, SpO2 95 on 2l NC resp protocol as well as albuterol PRN will be D/C'd per resp protocol

## 2022-12-21 NOTE — OCCUPATIONAL THERAPY NOTE
Occupational Therapy         Patient Name: Angelique Araya  ICZYM'I Date: 12/21/2022 12/21/22 1412   OT Last Visit   OT Visit Date 12/21/22   Note Type   Note type Cancelled Session   Cancel Reasons Other   Additional Comments pt to be transferred to [address] [place] [address]

## 2022-12-21 NOTE — ASSESSMENT & PLAN NOTE
Lab Results   Component Value Date    EGFR 48 12/21/2022    EGFR 45 12/20/2022    EGFR 42 10/05/2022    CREATININE 1 03 12/21/2022    CREATININE 1 10 12/20/2022    CREATININE 1 16 10/05/2022   Renal function at baseline, monitor while patient being diuresed

## 2022-12-21 NOTE — ASSESSMENT & PLAN NOTE
Wt Readings from Last 3 Encounters:   12/21/22 54 4 kg (119 lb 14 9 oz)   11/02/22 49 4 kg (109 lb)   10/11/22 49 7 kg (109 lb 8 oz)     Patient presented with volume overload  X-ray imaging showing bilateral pleural effusion  Improving with IV Lasix 40 twice daily  Status post IR thoracentesis 900 cc removed on 12/20  Continue IV Lasix 40 mg twice daily  Cardiology consulted, suspect likely severe aortic stenosis contributing  Plan for transfer to [facility] for TAVR evaluation

## 2022-12-21 NOTE — CONSULTS
Consultation - Cardiology   Altagracia Leung 80 y o  female MRN: [id number]  Unit/Bed#: 401-01 Encounter: [id number]  Physician Requesting Consult: Emely Montero MD  Reason for Consult / Principal Problem: CHF, Severe AS, Cardiomyopathy    Assessment:  Principal Problem:    Acute on chronic diastolic CHF (congestive heart failure) ([facility] )  Active Problems:    Hyponatremia    Essential hypertension    Aortic valve stenosis    Pulmonary hypertension ([place] [address] )    Generalized anxiety disorder    Stage 3a chronic kidney disease ([place] [address] )    Hyperthyroidism      Plan:  She has severe symptomatic Aortic Stenosis with pulmonary congestion, LE edema, pleural effusions  I reviewed her echos from 2021 and 7/2022  EF has dropped from normal to around 50%  PASP in now up to 75 mmHg  She needs diuresis to relieve the congestion  I agree with thoracentesis  D/C Tenormin / Chlorthalidone  Agree with Lasix 40 mg IV BID  Replace Mag - done    She needs TAVR evaluation ASAP if she is agreeable  I will discuss with her  History of Present Illness     HPI: Altagracia Leung is a 80y o  year old female  She was admitted with hypoxia, SOB, LE edema, weakness  She was found yo have b/l pleural effusion R > L  She has known Aortic Stenosis with her last ECHO in 7/2022 continuing to show severe AS but EF had dropped to 50 % from normal  PASP 75 mmHg  She has chronic hyponatremia  BNP 6278    Troponin negative    Magnesium 1 3  Na+ 127  K+ 3 6  BUN 19    She was not taking a Lasix at home        Creatinine 1 03    /76   regular, ECG junctional tachycardia          Review of Systems:    Alert awake oriented, comfortable, denies any complaints  No fevers chills nausea vomiting  No weakness, dizziness, seizures  positive for - shortness of breath  Denies any palpitations, chest pain, diaphoresis  Denies leg edema, pain or paresthesias  Denies any skin rashes  Denies abdominal pain, bloody stools, masses  Denies any depression or suicidal ideations      Historical Information   Past Medical History:   Diagnosis Date   • Aortic stenosis    • Community acquired pneumonia of left upper lobe of lung 5/17/2019   • Fatigue    • Full dentures    • Generalized anxiety disorder    • Hyperlipidemia    • Hypertension    • Impaired fasting glucose    • Mitral regurgitation    • Pneumonia    • SVT (supraventricular tachycardia)    • Tricuspid regurgitation      Past Surgical History:   Procedure Laterality Date   • DENTAL SURGERY Bilateral     ALL TEETH REMOVED   • FL GUIDED NEEDLE PLAC BX/ASP/INJ  5/26/2021   • IR THORACENTESIS  12/20/2022   • JOINT REPLACEMENT Left    • KNEE SURGERY      left knee replacement   • MD INJECTION,SACROILIAC JOINT Bilateral 5/26/2021    Procedure: SACROILIAC JOINT INJECTION;  Surgeon: Ag Mccain MD;  Location: [facility];  Service: Pain Management      Social History     Substance and Sexual Activity   Alcohol Use Never     Social History     Substance and Sexual Activity   Drug Use Never     Social History     Tobacco Use   Smoking Status Never   Smokeless Tobacco Never     Family History: non-contributory    Meds/Allergies   all current active meds have been reviewed  No Known Allergies    Objective   Vitals: Blood pressure 120/77, pulse (!) 122, temperature (!) 97 1 °F (36 2 °C), resp  rate 18, height 4' 10" (1 473 m), weight 54 4 kg (119 lb 14 9 oz), SpO2 97 %  , Body mass index is 25 07 kg/m² ,   Orthostatic Blood Pressures    Flowsheet Row Most Recent Value   Blood Pressure 120/77 filed at 12/21/2022 9618   Patient Position - Orthostatic VS Sitting filed at 12/20/2022 0950            Intake/Output Summary (Last 24 hours) at 12/21/2022 0946  Last data filed at 12/21/2022 2276  Gross per 24 hour   Intake 180 ml   Output --   Net 180 ml               Physical Exam:  GEN: Vania Coffey appears well, alert and oriented x 3, pleasant and cooperative   HEENT: pupils equal, round, and reactive to light; extraocular muscles intact  NECK: supple, no carotid bruits   HEART: Tachycardic, normal S1 and S2, 5/6 systolic murmur, clicks, gallops or rubs   LUNGS: clear to auscultation bilaterally; no wheezes, rales, or rhonchi   ABDOMEN: normal bowel sounds, soft, no tenderness, no distention  EXTREMITIES: peripheral pulses normal; no clubbing, cyanosis, 1+ b/l LE edema  NEURO: no focal findings   SKIN: normal without suspicious lesions on exposed skin    Lab Results:   Admission on 12/20/2022   Component Date Value Ref Range Status   • WBC 12/20/2022 5 96  4 31 - 10 16 Thousand/uL Final   • RBC 12/20/2022 3 67 (L)  3 81 - 5 12 Million/uL Final   • Hemoglobin 12/20/2022 11 8  11 5 - 15 4 g/dL Final   • Hematocrit 12/20/2022 35 1  34 8 - 46 1 % Final   • MCV 12/20/2022 96  82 - 98 fL Final   • MCH 12/20/2022 32 2  26 8 - 34 3 pg Final   • MCHC 12/20/2022 33 6  31 4 - 37 4 g/dL Final   • RDW 12/20/2022 13 9  11 6 - 15 1 % Final   • MPV 12/20/2022 8 4 (L)  8 9 - 12 7 fL Final   • Platelets 12/72/9629 150  149 - 390 Thousands/uL Final   • nRBC 12/20/2022 0  /100 WBCs Final   • Neutrophils Relative 12/20/2022 78 (H)  43 - 75 % Final   • Immat GRANS % 12/20/2022 0  0 - 2 % Final   • Lymphocytes Relative 12/20/2022 12 (L)  14 - 44 % Final   • Monocytes Relative 12/20/2022 9  4 - 12 % Final   • Eosinophils Relative 12/20/2022 1  0 - 6 % Final   • Basophils Relative 12/20/2022 0  0 - 1 % Final   • Neutrophils Absolute 12/20/2022 4 61  1 85 - 7 62 Thousands/µL Final   • Immature Grans Absolute 12/20/2022 0 01  0 00 - 0 20 Thousand/uL Final   • Lymphocytes Absolute 12/20/2022 0 71  0 60 - 4 47 Thousands/µL Final   • Monocytes Absolute 12/20/2022 0 56  0 17 - 1 22 Thousand/µL Final   • Eosinophils Absolute 12/20/2022 0 06  0 00 - 0 61 Thousand/µL Final   • Basophils Absolute 12/20/2022 0 01  0 00 - 0 10 Thousands/µL Final   • Sodium 12/20/2022 128 (L)  135 - 147 mmol/L Final   • Potassium 12/20/2022 3 6  3 5 - 5 3 mmol/L Final   • Chloride 12/20/2022 94 (L)  96 - 108 mmol/L Final   • CO2 12/20/2022 23  21 - 32 mmol/L Final   • ANION GAP 12/20/2022 11  4 - 13 mmol/L Final   • BUN 12/20/2022 27 (H)  5 - 25 mg/dL Final   • Creatinine 12/20/2022 1 10  0 60 - 1 30 mg/dL Final    Standardized to IDMS reference method   • Glucose 12/20/2022 132  65 - 140 mg/dL Final    If the patient is fasting, the ADA then defines impaired fasting glucose as > 100 mg/dL and diabetes as > or equal to 123 mg/dL  Specimen collection should occur prior to Sulfasalazine administration due to the potential for falsely depressed results  Specimen collection should occur prior to Sulfapyridine administration due to the potential for falsely elevated results  • Calcium 12/20/2022 9 0  8 3 - 10 1 mg/dL Final   • eGFR 12/20/2022 45  ml/min/1 73sq m Final   • SARS-CoV-2 12/20/2022 Negative  Negative Final   • INFLUENZA A PCR 12/20/2022 Negative  Negative Final   • INFLUENZA B PCR 12/20/2022 Negative  Negative Final   • RSV PCR 12/20/2022 Negative  Negative Final   • TSH 3RD GENERATON 12/20/2022 10 095 (H)  0 450 - 4 500 uIU/mL Final    The recommended reference ranges for TSH during pregnancy are as follows:   First trimester 0 1 to 2 5 uIU/mL   Second trimester  0 2 to 3 0 uIU/mL   Third trimester 0 3 to 3 0 uIU/m    Note: Normal ranges may not apply to patients who are transgender, non-binary, or whose legal sex, sex at birth, and gender identity differ  Adult TSH (3rd generation) reference range follows the recommended guidelines of the [employer] Association, January, 2020     • Color, UA 12/20/2022 Yellow   Final   • Clarity, UA 12/20/2022 Clear   Final   • Specific Gravity, UA 12/20/2022 1 010  1 003 - 1 030 Final   • pH, UA 12/20/2022 6 0  4 5, 5 0, 5 5, 6 0, 6 5, 7 0, 7 5, 8 0 Final   • Leukocytes, UA 12/20/2022 Negative  Negative Final   • Nitrite, UA 12/20/2022 Negative  Negative Final   • Protein, UA 12/20/2022 Negative  Negative mg/dl Final   • Glucose, UA 12/20/2022 Negative  Negative mg/dl Final   • Ketones, UA 12/20/2022 Negative  Negative mg/dl Final   • Urobilinogen, UA 12/20/2022 0 2  0 2, 1 0 E U /dl E U /dl Final   • Bilirubin, UA 12/20/2022 Negative  Negative Final   • Occult Blood, UA 12/20/2022 Negative  Negative Final   • HS TnI random 12/20/2022 12  8 - 18 ng/L Final    Comment:                                              Initial (time 0) result  If >=50 ng/L, Myocardial injury suggested ;  Type of myocardial injury and treatment strategy  to be determined  If 5-49 ng/L, a delta result at 2 hours and or 4 hours will be needed to further evaluate  If <4 ng/L, and chest pain has been >3 hours since onset, patient may qualify for discharge based on the HEART score in the ED  If <5 ng/L and <3hours since onset of chest pain, a delta result at 2 hours will be needed to further evaluate  HS Troponin 99th Percentile URL of a Health Population=12 ng/L with a 95% Confidence Interval of 8-18 ng/L  Second Troponin (time 2 hours)  If calculated delta >= 20 ng/L,  Myocardial injury suggested ; Type of myocardial injury and treatment strategy to be determined  If 5-49 ng/L and the calculated delta is 5-19 ng/L, consult medical service for evaluation  Continue evaluation for ischemia on ecg and other possible etiology and repeat hs troponin at 4 hours  If delta                            is <5 ng/L at 2 hours, consider discharge based on risk stratification via the HEART score (if in ED), or ALBERTO risk score in IP/Observation      HS Troponin 99th Percentile URL of a Health Population=12 ng/L with a 95% Confidence Interval of 8-18 ng/L    • NT-proBNP 12/20/2022 6,278 (H)  <450 pg/mL Final   • Ventricular Rate 12/20/2022 109  BPM Final   • Atrial Rate 12/20/2022 105  BPM Final   • QRSD Interval 12/20/2022 90  ms Final   • QT Interval 12/20/2022 356  ms Final   • QTC Interval 12/20/2022 479  ms Final   • QRS Axis 12/20/2022 269  degrees Final   • T Wave Axis 12/20/2022 -5  degrees Final   • Site 12/20/2022 pleural fluid, right   Final   • Color, Fluid 12/20/2022 Yellow  Clear, Colorless,Yellow Final   • Clarity, Fluid 12/20/2022 Hazy (A)  Clear Final   • WBC, Fluid 12/20/2022 1,564  /ul Final    The reference range and other method performance specifications have not been established for this body fluid  The test result must be integrated into the clinical context for interpretation  • Glucose, Fluid 12/20/2022 123  mg/dL Final    The reference range and other method performance specifications have not been established for this body fluid  The test result must be integrated into the clinical context for interpretation  • LD, Fluid 12/20/2022 107  U/L Final    The reference range and other method performance specifications have not been established for this body fluid  The test result must be integrated into the clinical context for interpretation  • [facility] BODY FLUID 12/20/2022 7 6   Final    The reference range and other method performance specifications have not been established for this body fluid  The test result must be integrated into the clinical context for interpretation  • Protein, Fluid 12/20/2022 3 5  g/dL Final    The reference range and other method performance specifications have not been established for this body fluid  The test result must be integrated into the clinical context for interpretation     • Total Counted 12/20/2022 100   Final   • Neutrophils % (Fluid) 12/20/2022 29  % Final   • Lymphs % (Fluid) 12/20/2022 5  % Final   • Eosinophils % (Fluid) 12/20/2022 25  % Final   • Mesothelial % (Fluid) 12/20/2022 22  % Final   • Monocytes % (Fluid) 12/20/2022 19  % Final   • Sodium 12/21/2022 127 (L)  135 - 147 mmol/L Final   • Potassium 12/21/2022 3 6  3 5 - 5 3 mmol/L Final   • Chloride 12/21/2022 94 (L)  96 - 108 mmol/L Final   • CO2 12/21/2022 24  21 - 32 mmol/L Final   • ANION GAP 12/21/2022 9  4 - 13 mmol/L Final   • BUN 12/21/2022 19  5 - 25 mg/dL Final   • Creatinine 12/21/2022 1 03  0 60 - 1 30 mg/dL Final    Standardized to IDMS reference method   • Glucose 12/21/2022 107  65 - 140 mg/dL Final    If the patient is fasting, the ADA then defines impaired fasting glucose as > 100 mg/dL and diabetes as > or equal to 123 mg/dL  Specimen collection should occur prior to Sulfasalazine administration due to the potential for falsely depressed results  Specimen collection should occur prior to Sulfapyridine administration due to the potential for falsely elevated results  • Calcium 12/21/2022 9 1  8 3 - 10 1 mg/dL Final   • Corrected Calcium 12/21/2022 9 7  8 3 - 10 1 mg/dL Final   • AST 12/21/2022 31  5 - 45 U/L Final    Specimen collection should occur prior to Sulfasalazine administration due to the potential for falsely depressed results  • ALT 12/21/2022 28  12 - 78 U/L Final    Specimen collection should occur prior to Sulfasalazine administration due to the potential for falsely depressed results  • Alkaline Phosphatase 12/21/2022 73  46 - 116 U/L Final   • Total Protein 12/21/2022 6 6  6 4 - 8 4 g/dL Final   • Albumin 12/21/2022 3 2 (L)  3 5 - 5 0 g/dL Final   • Total Bilirubin 12/21/2022 0 78  0 20 - 1 00 mg/dL Final    Use of this assay is not recommended for patients undergoing treatment with eltrombopag due to the potential for falsely elevated results     • eGFR 12/21/2022 48  ml/min/1 73sq m Final   • WBC 12/21/2022 6 00  4 31 - 10 16 Thousand/uL Final   • RBC 12/21/2022 3 74 (L)  3 81 - 5 12 Million/uL Final   • Hemoglobin 12/21/2022 11 9  11 5 - 15 4 g/dL Final   • Hematocrit 12/21/2022 35 6  34 8 - 46 1 % Final   • MCV 12/21/2022 95  82 - 98 fL Final   • MCH 12/21/2022 31 8  26 8 - 34 3 pg Final   • MCHC 12/21/2022 33 4  31 4 - 37 4 g/dL Final   • RDW 12/21/2022 13 9  11 6 - 15 1 % Final   • MPV 12/21/2022 8 6 (L)  8 9 - 12 7 fL Final   • Platelets 95/67/3749 155  149 - 390 Thousands/uL Final   • nRBC 12/21/2022 0  /100 WBCs Final   • Neutrophils Relative 12/21/2022 69  43 - 75 % Final   • Immat GRANS % 12/21/2022 0  0 - 2 % Final   • Lymphocytes Relative 12/21/2022 18  14 - 44 % Final   • Monocytes Relative 12/21/2022 11  4 - 12 % Final   • Eosinophils Relative 12/21/2022 2  0 - 6 % Final   • Basophils Relative 12/21/2022 0  0 - 1 % Final   • Neutrophils Absolute 12/21/2022 4 14  1 85 - 7 62 Thousands/µL Final   • Immature Grans Absolute 12/21/2022 0 01  0 00 - 0 20 Thousand/uL Final   • Lymphocytes Absolute 12/21/2022 1 07  0 60 - 4 47 Thousands/µL Final   • Monocytes Absolute 12/21/2022 0 63  0 17 - 1 22 Thousand/µL Final   • Eosinophils Absolute 12/21/2022 0 14  0 00 - 0 61 Thousand/µL Final   • Basophils Absolute 12/21/2022 0 01  0 00 - 0 10 Thousands/µL Final   • Cholesterol 12/21/2022 164  See Comment mg/dL Final    Cholesterol:         Pediatric <18 Years        Desirable          <170 mg/dL      Borderline High    170-199 mg/dL      High               >=200 mg/dL        Adult >=18 Years            Desirable         <200 mg/dL      Borderline High   200-239 mg/dL      High              >239 mg/dL     • Triglycerides 12/21/2022 50  See Comment mg/dL Final    Triglyceride:     0-9Y            <75mg/dL     10Y-17Y         <90 mg/dL       >=18Y     Normal          <150 mg/dL     Borderline High 150-199 mg/dL     High            200-499 mg/dL        Very High       >499 mg/dL    Specimen collection should occur prior to N-Acetylcysteine or Metamizole administration due to the potential for falsely depressed results  • HDL, Direct 12/21/2022 94  >=50 mg/dL Final    Specimen collection should occur prior to Metamizole administration due to the potential for falsley depressed results     • LDL Calculated 12/21/2022 60  0 - 100 mg/dL Final    LDL Cholesterol:     Optimal           <100 mg/dl     Near Optimal      100-129 mg/dl     Above Optimal       Borderline High 130-159 mg/dl       High            160-189 mg/dl       Very High       >189 mg/dl         This screening LDL is a calculated result  It does not have the accuracy of the Direct Measured LDL in the monitoring of patients with hyperlipidemia and/or statin therapy  Direct Measure LDL (USY406) must be ordered separately in these patients  • Non-HDL-Chol (CHOL-HDL) 12/21/2022 70  mg/dl Final   • Magnesium 12/21/2022 1 3 (L)  1 6 - 2 6 mg/dL Final           Results from last 7 days   Lab Units 12/21/22  0451 12/20/22  1022   WBC Thousand/uL 6 00 5 96   HEMOGLOBIN g/dL 11 9 11 8   HEMATOCRIT % 35 6 35 1   PLATELETS Thousands/uL 155 150         Results from last 7 days   Lab Units 12/21/22  0451 12/20/22  1022   POTASSIUM mmol/L 3 6 3 6   CHLORIDE mmol/L 94* 94*   CO2 mmol/L 24 23   BUN mg/dL 19 27*   CREATININE mg/dL 1 03 1 10   CALCIUM mg/dL 9 1 9 0   ALK PHOS U/L 73  --    ALT U/L 28  --    AST U/L 31  --              Imaging: I have personally reviewed pertinent reports  Counseling / Coordination of Care  Total floor / unit time spent today 60 minutes  Greater than 50% of total time was spent with the patient and / or family counseling and / or coordination of care

## 2022-12-21 NOTE — DISCHARGE SUMMARY
[address] [facility] [address]  Discharge- [place] 1935, 80 y o  female MRN: [id number]  Unit/Bed#: 401-01 Encounter: [id number]  Primary Care Provider: Frannie Daniel DO   Date and time admitted to hospital: 12/20/2022  9:44 AM    * Acute on chronic diastolic CHF (congestive heart failure) ([place] [address] )  Assessment & Plan  Wt Readings from Last 3 Encounters:   12/21/22 54 4 kg (119 lb 14 9 oz)   11/02/22 49 4 kg (109 lb)   10/11/22 49 7 kg (109 lb 8 oz)     Patient presented with volume overload  X-ray imaging showing bilateral pleural effusion  Improving with IV Lasix 40 twice daily  Status post IR thoracentesis 900 cc removed on 12/20  Continue IV Lasix 40 mg twice daily  Cardiology consulted, suspect likely severe aortic stenosis contributing  Plan for transfer to [facility] for TAVR evaluation          Severe aortic stenosis  Assessment & Plan  Severe aortic stenosis, likely contributing to current episode of CHF  Discussed with cardiology, plan for transfer to [facility] for TAVR evaluation    Hyperthyroidism  Assessment & Plan  History of hyperthyroidism, maintained on methimazole 5 mg daily  Check T4  Endocrine consulted    Stage 3a chronic kidney disease ([place] [address] )  Assessment & Plan  Lab Results   Component Value Date    EGFR 48 12/21/2022    EGFR 45 12/20/2022    EGFR 42 10/05/2022    CREATININE 1 03 12/21/2022    CREATININE 1 10 12/20/2022    CREATININE 1 16 10/05/2022   Renal function at baseline, monitor while patient being diuresed    Generalized anxiety disorder  Assessment & Plan  Continue as needed Xanax    Pulmonary hypertension ([place] [address] )  Assessment & Plan  History of pulmonary hypertension    Essential hypertension  Assessment & Plan  Blood pressure controlled, hold atenolol and chlorthalidone  Added Lopressor 25 mg every 8 for tachycardia    Hyponatremia  Assessment & Plan  Suspected hypervolemic hyponatremia  Continue monitor while patient be diuresed  Chlorthalidone discontinued        Discharging Physician / Practitioner: Stacey Sears MD  PCP: Devorah Ochoa DO  Admission Date:   Admission Orders (From admission, onward)     Ordered        12/20/22 1215  [facility] [facility] [place]  Once                      Discharge Date: 12/21/22    Medical Problems     Resolved Problems  Date Reviewed: 12/21/2022   None         Consultations During Hospital Stay:  · Cardiology    Procedures Performed:   · none    Significant Findings / Test Results:   XR chest 1 view portable    Result Date: 12/20/2022  Impression: Interval development of moderate-sized right pleural effusion with consolidation at the right lung base  Findings could represent pneumonia, atelectasis or mass  Interval follow recommended  Up The study was marked in EPIC for immediate notification  Workstation performed: [id number]     CT chest without contrast    Result Date: 12/20/2022  Impression: 1  Moderate right and small left-sided pleural effusions with associated compressive atelectasis  Subsegmental lingular and right middle lobe atelectasis  2  Cardiomegaly  3  Calcified mediastinal and bilateral hilar nodes are again noted  Workstation performed: [id number]     IR Thoracentesis    Result Date: 12/20/2022  · Impression: Technically successful ultrasound-guided thoracentesis  This is the end of the clinically relevant portion of this report  Subsequent information is for compliance, documentation, and coding purposes  In the process of informed consent, information was communicated, verbally, to the patient regarding the procedure  The patient was informed of; the name of the procedure, nature of the procedure, nature of the condition, risks, benefits, alternatives, and potential complications, as well as the consequences of not having the procedure  All the patient's questions were answered  Informed consent was signed  Quoted risks include bleeding, and pneumothorax, including axial vacuo pneumothorax   Chlorhexidine and alcohol was used for cleansing and sterile preparation of the skin  This was allowed to dry prior to the application of sterile draping  Timeout was performed, with all team members present, and in agreement  Confirmation of patient, procedure, laterally, allergies, and all needed equipment was performed  PROCEDURE: Thoracentesis PREPROCEDURE DIAGNOSIS: Please see "history ", above POSTPROCEDURE DIAGNOSIS: Same ANTIBIOTICS: None SPECIMEN: Pleural fluid ESTIMATED BLOOD LOSS: None ANESTHESIA: Local FINAL DISPOSITION OF CATHETER: Out of patient's body Ultrasound was used to evaluate the pleural fluid as a potential access site  Static and real-time images of needle entry were obtained, and archived  Workstation performed: [id number]   ·     Incidental Findings:   · none     Test Results Pending at Discharge (will require follow up):   · none     Outpatient Tests Requested:  · none    Complications:  none    Reason for Admission: SOB    Hospital Course:     Garfield Bishop is a 80 y o  female patient who originally presented to the hospital on 12/20/2022 due to shortness of breath  Patient was found to be volume overloaded, treated with IV diuretics  X-ray imaging shows pleural effusions, IR with thoracentesis status post 900 cc removed  Cardiology evaluated, given history of severe aortic stenosis  Cardiology suspected that aortic stenosis may be contributing to patient's heart failure  Recommended that patient is transferred to [facility]  Patient did tolerate diuretics well, with improvement in her swelling  She is tachycardic, discontinue atenolol and started on Lopressor 25 mg every 8 as per cardiology recommendations  Patient was excepted at [facility], to be transferred today  Will need cardiology consultation  Recommend monitoring sodium levels, consider nephrology input if sodium trending down      Please see above list of diagnoses and related plan for additional information  Condition at Discharge: fair      Discharge Day Visit / Exam:     Subjective: Patient symptomatically improving in terms of breathing  She is currently tachycardic, but denies any shortness of breath  Vitals: Blood Pressure: 120/77 (12/21/22 0721)  Pulse: (!) 122 (12/21/22 0721)  Temperature: (!) 97 1 °F (36 2 °C) (12/21/22 0721)  Temp Source: Temporal (12/20/22 1330)  Respirations: 18 (12/21/22 0721)  Height: 4' 10" (147 3 cm) (12/20/22 1508)  Weight - Scale: 54 4 kg (119 lb 14 9 oz) (12/21/22 0600)  SpO2: 97 % (12/21/22 0830)  Exam:   Physical Exam  Vitals and nursing note reviewed  Constitutional:       Appearance: Normal appearance  She is ill-appearing  HENT:      Head: Normocephalic and atraumatic  Eyes:      Conjunctiva/sclera: Conjunctivae normal    Cardiovascular:      Rate and Rhythm: Regular rhythm  Tachycardia present  Heart sounds: Normal heart sounds  Pulmonary:      Effort: Tachypnea present  Breath sounds: Rales present  No wheezing or rhonchi  Abdominal:      General: Bowel sounds are normal  There is no distension  Palpations: Abdomen is soft  Tenderness: There is no abdominal tenderness  Musculoskeletal:         General: No swelling  Right lower leg: No edema  Left lower leg: No edema  Skin:     General: Skin is warm and dry  Neurological:      General: No focal deficit present  Mental Status: She is alert  Mental status is at baseline  Psychiatric:         Mood and Affect: Mood normal            Discussion with Family: none    Discharge instructions/Information to patient and family:   See after visit summary for information provided to patient and family  Provisions for Follow-Up Care:  See after visit summary for information related to follow-up care and any pertinent home health orders        Disposition:     Home    Planned Readmission: yes at [facility] on 12/21     Discharge Statement:  I spent 35 minutes discharging the patient  This time was spent on the day of discharge  I had direct contact with the patient on the day of discharge  Greater than 50% of the total time was spent examining patient, answering all patient questions, arranging and discussing plan of care with patient as well as directly providing post-discharge instructions  Additional time then spent on discharge activities  Discharge Medications:  See after visit summary for reconciled discharge medications provided to patient and family        ** Please Note: This note has been constructed using a voice recognition system **

## 2022-12-21 NOTE — CASE MANAGEMENT
Case Management Discharge Planning Note    Patient name Maritza Hawkins  Location [address] [id number] MRN [id number]  : 1935 Date 2022       Current Admission Date: 2022  Current Admission Diagnosis:Acute on chronic diastolic CHF (congestive heart failure) [facility])   Patient Active Problem List    Diagnosis Date Noted   • Acute on chronic diastolic CHF (congestive heart failure) ([address] ) 2022   • Primary generalized (osteo)arthritis 2022   • Hyperthyroidism 2022   • Dyslipidemia 2022   • Post-menopause 2022   • Stage 3a chronic kidney disease ([facility] [address] ) 2021   • Other fatigue 2021   • Ganglion cyst 2021   • Adhesive capsulitis of right shoulder 2021   • Bilateral leg edema 2021   • Chronic pain syndrome 2021   • Sacroiliitis ([facility] [address] ) 2021   • Gait abnormality 2021   • Chronic bilateral low back pain without sciatica 2021   • Osteopenia of neck of femur 2021   • Impaired fasting glucose    • Encounter for long-term (current) use of medications 10/31/2020   • Immunization due 10/31/2020   • Acute bursitis of right shoulder 10/31/2020   • Encounter for Medicare annual wellness exam 2020   • Mid back pain 2019   • Generalized anxiety disorder 2019   • Non-rheumatic tricuspid valve insufficiency 2019   • Lymphadenopathy 2019   • Hiatal hernia 2019   • Thrombocytopenia ([address] ) 2019   • Aortic valve stenosis 2019   • Mitral valve insufficiency 2019   • Supraventricular tachycardia ([address] ) 2019   • Pulmonary hypertension ([facility] ) 2019   • Diverticulosis 2019   • First degree AV block 2019   • Essential hypertension 2019   • Hypomagnesemia 2019   • Hypokalemia 2019   • Hyponatremia 2019      LOS (days): 1  Geometric Mean LOS (GMLOS) (days): 3 90  Days to GMLOS:2 9     OBJECTIVE:  Risk of Unplanned Readmission Score: 10 93         Current admission status: Inpatient   Preferred Pharmacy:   [facility] [address] DONYA SOTELO [address] [place] [address]  Phone: [phone] Fax: [phone]    Primary Care Provider: Ge Li DO    Primary Insurance: MEDICARE  Secondary Insurance: [employer] De La Torre DETAILS:Pt to be transferred to [facility]

## 2022-12-21 NOTE — PLAN OF CARE
Problem: Nutrition/Hydration-ADULT  Goal: Nutrient/Hydration intake appropriate for improving, restoring or maintaining nutritional needs  Description: Monitor and assess patient's nutrition/hydration status for malnutrition  Collaborate with interdisciplinary team and initiate plan and interventions as ordered  Monitor patient's weight and dietary intake as ordered or per policy  Utilize nutrition screening tool and intervene as necessary  Determine patient's food preferences and provide high-protein, high-caloric foods as appropriate       INTERVENTIONS:  - Monitor oral intake, urinary output, labs, and treatment plans  - Assess nutrition and hydration status and recommend course of action  - Evaluate amount of meals eaten  - Assist patient with eating if necessary   - Allow adequate time for meals  - Recommend/ encourage appropriate diets, oral nutritional supplements, and vitamin/mineral supplements  - Order, calculate, and assess calorie counts as needed  - Recommend, monitor, and adjust tube feedings and TPN/PPN based on assessed needs  - Assess need for intravenous fluids  - Provide specific nutrition/hydration education as appropriate  - Include patient/family/caregiver in decisions related to nutrition  Outcome: Progressing     Problem: Potential for Falls  Goal: Patient will remain free of falls  Description: INTERVENTIONS:  - Educate patient/family on patient safety including physical limitations  - Instruct patient to call for assistance with activity   - Consult OT/PT to assist with strengthening/mobility   - Keep Call bell within reach  - Keep bed low and locked with side rails adjusted as appropriate  - Keep care items and personal belongings within reach  - Initiate and maintain comfort rounds  - Make Fall Risk Sign visible to staff  - Offer Toileting every 2 Hours, in advance of need  - Initiate/Maintain bed/chair alarm  - Obtain necessary fall risk management equipment: fall risk bracelet, fall cushion, fall risk sign visible   - Apply yellow socks and bracelet for high fall risk patients  - Consider moving patient to room near nurses station  Outcome: Progressing     Problem: MOBILITY - ADULT  Goal: Maintain or return to baseline ADL function  Description: INTERVENTIONS:  -  Assess patient's ability to carry out ADLs; assess patient's baseline for ADL function and identify physical deficits which impact ability to perform ADLs (bathing, care of mouth/teeth, toileting, grooming, dressing, etc )  - Assess/evaluate cause of self-care deficits   - Assess range of motion  - Assess patient's mobility; develop plan if impaired  - Assess patient's need for assistive devices and provide as appropriate  - Encourage maximum independence but intervene and supervise when necessary  - Involve family in performance of ADLs  - Assess for home care needs following discharge   - Consider OT consult to assist with ADL evaluation and planning for discharge  - Provide patient education as appropriate  Outcome: Progressing  Goal: Maintains/Returns to pre admission functional level  Description: INTERVENTIONS:  - Perform BMAT or MOVE assessment daily    - Set and communicate daily mobility goal to care team and patient/family/caregiver  - Collaborate with rehabilitation services on mobility goals if consulted  - Perform Range of Motion 3 times a day  - Reposition patient every 2 hours    - Dangle patient 3 times a day  - Stand patient 3 times a day  - Ambulate patient 3 times a day  - Out of bed to chair 3 times a day   - Out of bed for meals 3 times a day  - Out of bed for toileting  - Record patient progress and toleration of activity level   Outcome: Progressing

## 2022-12-22 ENCOUNTER — APPOINTMENT (OUTPATIENT)
Dept: RADIOLOGY | Facility: HOSPITAL | Age: 87
End: 2022-12-22

## 2022-12-22 ENCOUNTER — APPOINTMENT (INPATIENT)
Dept: NON INVASIVE DIAGNOSTICS | Facility: HOSPITAL | Age: 87
End: 2022-12-22

## 2022-12-22 PROBLEM — J96.01 ACUTE RESPIRATORY FAILURE WITH HYPOXIA (HCC): Status: ACTIVE | Noted: 2022-12-22

## 2022-12-22 PROBLEM — J90 PLEURAL EFFUSION: Status: ACTIVE | Noted: 2022-12-22

## 2022-12-22 LAB
ANION GAP SERPL CALCULATED.3IONS-SCNC: 9 MMOL/L (ref 4–13)
AORTIC ROOT: 2.3 CM
AORTIC VALVE MEAN VELOCITY: 23.3 M/S
APICAL FOUR CHAMBER EJECTION FRACTION: 44 %
ATRIAL RATE: 81 BPM
AV AREA BY CONTINUOUS VTI: 0.6 CM2
AV AREA PEAK VELOCITY: 0.6 CM2
AV LVOT MEAN GRADIENT: 1 MMHG
AV LVOT PEAK GRADIENT: 2 MMHG
AV MEAN GRADIENT: 24 MMHG
AV PEAK GRADIENT: 35 MMHG
AV VALVE AREA: 0.5 CM2
AV VELOCITY RATIO: 0.22
BUN SERPL-MCNC: 19 MG/DL (ref 5–25)
CALCIUM SERPL-MCNC: 9 MG/DL (ref 8.3–10.1)
CHLORIDE SERPL-SCNC: 98 MMOL/L (ref 96–108)
CO2 SERPL-SCNC: 22 MMOL/L (ref 21–32)
CREAT SERPL-MCNC: 0.92 MG/DL (ref 0.6–1.3)
DOP CALC AO PEAK VEL: 2.94 M/S
DOP CALC AO VTI: 72.95 CM
DOP CALC LVOT AREA: 2.54 CM2
DOP CALC LVOT DIAMETER: 1.8 CM
DOP CALC LVOT PEAK VEL VTI: 14.4 CM
DOP CALC LVOT PEAK VEL: 0.65 M/S
DOP CALC LVOT STROKE INDEX: 28 ML/M2
DOP CALC LVOT STROKE VOLUME: 36.62 CM3
E WAVE DECELERATION TIME: 119 MS
FRACTIONAL SHORTENING: 20 (ref 28–44)
GFR SERPL CREATININE-BSD FRML MDRD: 56 ML/MIN/1.73SQ M
GLUCOSE SERPL-MCNC: 95 MG/DL (ref 65–140)
INTERVENTRICULAR SEPTUM IN DIASTOLE (PARASTERNAL SHORT AXIS VIEW): 0.9 CM
INTERVENTRICULAR SEPTUM: 0.9 CM (ref 0.6–1.1)
LAAS-AP2: 21.1 CM2
LAAS-AP4: 31.3 CM2
LEFT ATRIUM AREA SYSTOLE SINGLE PLANE A4C: 28.4 CM2
LEFT ATRIUM SIZE: 4.3 CM
LEFT INTERNAL DIMENSION IN SYSTOLE: 3.5 CM (ref 2.1–4)
LEFT VENTRICLE DIASTOLIC VOLUME (MOD BIPLANE): 74 ML
LEFT VENTRICLE SYSTOLIC VOLUME (MOD BIPLANE): 42 ML
LEFT VENTRICULAR INTERNAL DIMENSION IN DIASTOLE: 4.4 CM (ref 3.5–6)
LEFT VENTRICULAR POSTERIOR WALL IN END DIASTOLE: 1.1 CM
LEFT VENTRICULAR STROKE VOLUME: 38 ML
LV EF: 43 %
LVSV (TEICH): 38 ML
MAGNESIUM SERPL-MCNC: 1.9 MG/DL (ref 1.6–2.6)
MV PEAK A VEL: 0.65 M/S
MV PEAK E VEL: 119 CM/S
MV STENOSIS PRESSURE HALF TIME: 35 MS
MV VALVE AREA P 1/2 METHOD: 6.29
P AXIS: 83 DEGREES
POTASSIUM SERPL-SCNC: 3 MMOL/L (ref 3.5–5.3)
PR INTERVAL: 248 MS
QRS AXIS: 114 DEGREES
QRSD INTERVAL: 84 MS
QT INTERVAL: 298 MS
QTC INTERVAL: 346 MS
RA PRESSURE ESTIMATED: 8 MMHG
RIGHT ATRIUM AREA SYSTOLE A4C: 18 CM2
RIGHT VENTRICLE ID DIMENSION: 4.5 CM
RV PSP: 81 MMHG
SL CV LEFT ATRIUM LENGTH A2C: 5.4 CM
SL CV LV EF: 50
SL CV PED ECHO LEFT VENTRICLE DIASTOLIC VOLUME (MOD BIPLANE) 2D: 88 ML
SL CV PED ECHO LEFT VENTRICLE SYSTOLIC VOLUME (MOD BIPLANE) 2D: 50 ML
SODIUM SERPL-SCNC: 129 MMOL/L (ref 135–147)
T WAVE AXIS: 243 DEGREES
TR MAX PG: 73 MMHG
TR PEAK VELOCITY: 4.3 M/S
TRICUSPID VALVE PEAK REGURGITATION VELOCITY: 4.26 M/S
VENTRICULAR RATE: 81 BPM

## 2022-12-22 RX ORDER — POTASSIUM CHLORIDE 14.9 MG/ML
20 INJECTION INTRAVENOUS ONCE
Status: COMPLETED | OUTPATIENT
Start: 2022-12-22 | End: 2022-12-22

## 2022-12-22 RX ORDER — METOPROLOL TARTRATE 50 MG/1
50 TABLET, FILM COATED ORAL ONCE
Status: COMPLETED | OUTPATIENT
Start: 2022-12-22 | End: 2022-12-22

## 2022-12-22 RX ORDER — METOPROLOL TARTRATE 50 MG/1
50 TABLET, FILM COATED ORAL EVERY 12 HOURS SCHEDULED
Status: DISCONTINUED | OUTPATIENT
Start: 2022-12-22 | End: 2022-12-24

## 2022-12-22 RX ADMIN — METOPROLOL TARTRATE 50 MG: 50 TABLET ORAL at 23:42

## 2022-12-22 RX ADMIN — POTASSIUM CHLORIDE 20 MEQ: 14.9 INJECTION, SOLUTION INTRAVENOUS at 16:31

## 2022-12-22 RX ADMIN — POTASSIUM CHLORIDE 40 MEQ: 1500 TABLET, EXTENDED RELEASE ORAL at 08:30

## 2022-12-22 RX ADMIN — METOPROLOL TARTRATE 50 MG: 50 TABLET, FILM COATED ORAL at 17:43

## 2022-12-22 RX ADMIN — METHIMAZOLE 5 MG: 5 TABLET ORAL at 08:29

## 2022-12-22 RX ADMIN — FUROSEMIDE 40 MG: 10 INJECTION, SOLUTION INTRAMUSCULAR; INTRAVENOUS at 16:32

## 2022-12-22 RX ADMIN — METOPROLOL TARTRATE 25 MG: 25 TABLET, FILM COATED ORAL at 06:31

## 2022-12-22 RX ADMIN — ALPRAZOLAM 0.25 MG: 0.25 TABLET ORAL at 23:43

## 2022-12-22 RX ADMIN — METOPROLOL TARTRATE 25 MG: 25 TABLET, FILM COATED ORAL at 13:32

## 2022-12-22 RX ADMIN — ENOXAPARIN SODIUM 30 MG: 30 INJECTION SUBCUTANEOUS at 08:30

## 2022-12-22 RX ADMIN — FUROSEMIDE 40 MG: 10 INJECTION, SOLUTION INTRAMUSCULAR; INTRAVENOUS at 08:30

## 2022-12-22 NOTE — RESTORATIVE TECHNICIAN NOTE
Restorative Technician Note      Patient Name: Agata Lau     Restorative Tech Visit Date: 12/22/22  Note Type: Mobility  Patient Position Upon Consult: Other (Comment) (in the BR)  Activity Performed: Ambulated  Assistive Device: Roller walker  Patient Position at End of Consult: All needs within reach;  Bedside chair; Bed/Chair alarm activated

## 2022-12-22 NOTE — ASSESSMENT & PLAN NOTE
· Home medications - Atenolol/Chlorthalidone (100/25 mg) 0 5 tablet daily, Amlodipine 10 mg daily - held  · BP acceptable on Metoprolol tartrate 25 mg every 8 hours, Furosemide 40 mg IV twice daily  · Monitor BP

## 2022-12-22 NOTE — ASSESSMENT & PLAN NOTE
· CT chest (12/20/22) - Moderate right and small left-sided pleural effusions with associated compressive atelectasis    · In the setting of acute on chronic diastolic heart failure  · S/p 900 cc thoracentesis by IR on 12/20  · Diuresis as above

## 2022-12-22 NOTE — ASSESSMENT & PLAN NOTE
· K+ 3 0  · Likely in setting of IV diuresis, currently on KDUR 40 mEq daily supplementation   · Place additional dose of IV potassium x 1 at 20 mEq  · Recheck BMP in the AM

## 2022-12-22 NOTE — ASSESSMENT & PLAN NOTE
Lab Results   Component Value Date    EGFR 48 12/21/2022    EGFR 45 12/20/2022    EGFR 42 10/05/2022    CREATININE 1 03 12/21/2022    CREATININE 1 10 12/20/2022    CREATININE 1 16 10/05/2022   Baseline creatinine 1-1 1  Creatinine at baseline  Monitor

## 2022-12-22 NOTE — ASSESSMENT & PLAN NOTE
· POA, likely multifactorial in setting of acute CHF, pleural effusions, severe AS  · Secondary to patient with hypoxia noted outpatient with spO2 in the 80s, requiring supplemental O2 at 2 L NC saturating low 90s   · Continue IV diuresis with lasix 40 mg BID per cardiology   · Ongoing CT surgery recommendations for TAVR  · S/p IR thoracentesis on 12/20  · Pre-crenshaw cultures negative thus far

## 2022-12-22 NOTE — ASSESSMENT & PLAN NOTE
Wt Readings from Last 3 Encounters:   12/22/22 50 8 kg (112 lb)   12/21/22 54 4 kg (119 lb 14 9 oz)   11/02/22 49 4 kg (109 lb)     · Presented to [facility] on 12/20/22 with shortness of breath on exertion and hypoxia noted at home      · She was noted to be in acute on chronic diastolic heart failure and was begun on IV Lasix 40 mg twice daily  · Echo (7/19/22) - EF 50%(drop from 55% on 6/24/21), abnormal diastolic function, severe aortic stenosis, mild to moderate aortic and pulmonary regurgitation, moderate mitral and tricuspid regurgitation, severely increased pulmonary artery systolic pressure at 75 mmHg  · She was transferred to [facility] for cardiac surgery evaluation to see if she would be a candidate for TAVR  · Repeat ECHO performed here with severe AS with paradoxical low flow low gradient   · Continue Lasix 40 mg IV BID  · Monitor I/O, daily weights  · Cardiology/cardiac surgery following, appreciate ongoing recommendations

## 2022-12-22 NOTE — ASSESSMENT & PLAN NOTE
· CT chest (12/20/22) - Moderate right and small left-sided pleural effusions with associated compressive atelectasis    · In the setting of acute on chronic diastolic heart failure  · S/p 900 cc thoracentesis by IR on 12/20  · IV diuresis as above  · Respiratory status improving

## 2022-12-22 NOTE — PHYSICAL THERAPY NOTE
Physical Therapy Evaluation    Patient's Name: Marques Urias    Admitting Diagnosis  Acute on chronic diastolic CHF (congestive heart failure) ([facility] ) [I50 33]    Problem List  Patient Active Problem List   Diagnosis    Hypomagnesemia    Hypokalemia    Hyponatremia    Essential hypertension    Lymphadenopathy    Hiatal hernia    Thrombocytopenia (HCC)    Severe aortic stenosis    Mitral valve insufficiency    Supraventricular tachycardia ([facility] )    Pulmonary hypertension ([facility] )    Diverticulosis    First degree AV block    Non-rheumatic tricuspid valve insufficiency    Generalized anxiety disorder    Mid back pain    Encounter for Medicare annual wellness exam    Encounter for long-term (current) use of medications    Immunization due    Acute bursitis of right shoulder    Impaired fasting glucose    Osteopenia of neck of femur    Chronic bilateral low back pain without sciatica    Gait abnormality    Chronic pain syndrome    Sacroiliitis (HCC)    Bilateral leg edema    Ganglion cyst    Adhesive capsulitis of right shoulder    Stage 3a chronic kidney disease ([facility] )    Other fatigue    Dyslipidemia    Post-menopause    Hyperthyroidism    Primary generalized (osteo)arthritis    Acute on chronic diastolic CHF (congestive heart failure) (HCC)    Pleural effusion       Past Medical History  Past Medical History:   Diagnosis Date    Aortic stenosis     Community acquired pneumonia of left upper lobe of lung 5/17/2019    Fatigue     Full dentures     Generalized anxiety disorder     Hyperlipidemia     Hypertension     Impaired fasting glucose     Mitral regurgitation     Pneumonia     SVT (supraventricular tachycardia)     Tricuspid regurgitation        Past Surgical History  Past Surgical History:   Procedure Laterality Date    DENTAL SURGERY Bilateral     ALL TEETH REMOVED    FL GUIDED NEEDLE PLAC BX/ASP/INJ  5/26/2021    IR THORACENTESIS  12/20/2022    JOINT REPLACEMENT Left     KNEE SURGERY      left knee replacement AK INJECTION,SACROILIAC JOINT Bilateral 5/26/2021    Procedure: SACROILIAC JOINT INJECTION;  Surgeon: Robert Castillo MD;  Location: [facility];  Service: Pain Management         12/22/22 0859   PT Last Visit   PT Visit Date 12/22/22   Note Type   Note type Evaluation   Pain Assessment   Pain Assessment Tool 0-10   Pain Score No Pain   Restrictions/Precautions   Weight Bearing Precautions Per Order No   Other Precautions Chair Alarm; Bed Alarm;O2;Telemetry;Multiple lines  (2L O2)   Home Living   Type of Home House   Home Layout Two level;Bed/bath upstairs;1/2 bath on main level  (1 CASANDRA)   Home Equipment Walker;Cane   Additional Comments pt was ambulating with SPC at baseline   Prior Function   Level of [place] Independent with ADLs; Independent with IADLS; Independent with functional mobility   Lives With Spouse   Receives Help From [facility] in the last 6 months 0   Vocational Retired   Cognition   Overall Cognitive Status WFL   Attention Within functional limits   Orientation Level Oriented to person;Oriented to place   Following Commands Follows one step commands with increased time or repetition   Comments pt is very pleasant and cooperative   RLE Assessment   RLE Assessment WFL   LLE Assessment   LLE Assessment WFL   Bed Mobility   Additional Comments pt seated OOB upon arrival   Transfers   Sit to Stand 5  Supervision   Additional items Increased time required;Verbal cues;Armrests   Stand to Sit 5  Supervision   Additional items Armrests; Increased time required;Verbal cues   Additional Comments transfers with RW, VCs for proper hand placement and safety   Ambulation/Elevation   Gait pattern Excessively slow; Short stride; Foward flexed   Gait Assistance 5  Supervision   Assistive Device Rolling walker   Distance 200ft   Balance   Static Sitting Fair +   Dynamic Sitting Fair +   Static Standing Fair   Dynamic Standing Fair   Ambulatory Fair   Activity Tolerance   Activity Tolerance Patient tolerated treatment well   Medical Staff Made Aware Seen with OT 2* pt's medical complexity and multiple comorbidities  PT focus on functional transfers, gait, and endurance   Nurse Made Aware ok to see per RN   Assessment   Prognosis Good   Assessment Pt is a 80 y o  female seen for PT evaluation s/p admit to [facility] on 12/21/2022  Pt was admitted with a primary dx of: aortic stenosis  Pt undergoing workup for TAVR  PT now consulted for assessment of mobility and d/c needs  Pt with Up as tolerated, PT evaluation orders  Pts current comorbidities effecting treatment include: fatigue, generalized anxiety disorder, HTN, mitral regurgitation, tricuspid regurgitation  Pts current clinical presentation is Evolving (medium complexity) due to Ongoing medical management for primary dx, Increased reliance on more restrictive AD compared to baseline, Ongoing telemetry monitoring, Trending lab values, Diagnostic imaging pending  Prior to admission, pt was living with her  in a [facility] with 1 CASANDRA  Pt is normally I with ADLs and ambulates with SPC  Upon evaluation, pt currently is requiring supervision for transfers and supervision for ambulation 200 ft w/ RW  Pt presents at PT eval functioning near her baseline mobility level  Pt with no acute PT needs, PT signing off  Pt would benefit from continued ambulation with nursing and restorative staff as able  At conclusion of PT session pt returned back in chair with phone and call bell within reach  Pt denies any further questions at this time  The patient's AM-PAC Basic Mobility Inpatient Short Form Raw Score is 18  A Raw score of greater than 16 suggests the patient may benefit from discharge to home  Please also refer to the recommendation of the Physical Therapist for safe discharge planning  Recommend home with HHPT upon hospital D/C     Goals   Patient Goals to go home   Plan   PT Frequency Other (Comment)  (eval only, D/C PT)   Recommendation   PT Discharge Recommendation Home with home health rehabilitation   [address]  (pt already owns)   Micha 8 in Bed Without Bedrails 3   Lying on Back to Sitting on Edge of Flat Bed 3   Moving Bed to Chair 3   Standing Up From Chair 3   Walk in Room 3   Climb 3-5 Stairs 3   Basic Mobility Inpatient Raw Score 18   Basic Mobility Standardized Score 41 05   Highest Level Of Mobility   JH-HLM Goal 6: Walk 10 steps or more   JH-HLM Achieved 7: Walk 25 feet or more   Modified [place] Scale   Modified [place] Scale 3   End of Consult   Patient Position at End of Consult Bedside chair; All needs within reach         Purvi Miner, PT, DPT

## 2022-12-22 NOTE — ASSESSMENT & PLAN NOTE
· EKG 12/22 with sinus rhythm, marked sinus arrhythmia with 1st degree AV block with PVCs   · Continue to monitor telemetry   · Cardiology following,  · BB uptitrated to lopressor 50 mg BID, monitor for response

## 2022-12-22 NOTE — PROGRESS NOTES
Progress Note - Cardiology Team 1  Natalie Isaacs 80 y o  female MRN: [id number]  Unit/Bed#: [facility] 402-01 Encounter: [id number]        Active Problems:    Hyponatremia    Essential hypertension    Severe aortic stenosis    Generalized anxiety disorder    Stage 3a chronic kidney disease (HCC)    Hyperthyroidism    Acute on chronic diastolic CHF (congestive heart failure) (HCC)    Pleural effusion      Assessment    1  Aortic stenosis  Transferred from [facility] for CT surgery evaluation for TAVR   She is followed by Dr Mihir Murphy- last seen 9/2022  No symptoms reported - reassess in 1 year  TTE 7/2022-LVEF 50%  Severe AS  Peak velocity 2 79 m/s    MG 21 mmHg, PG 31 mmHg  ANAM 0 95 cm²  Felt moderate by velocity and exam    BB-Lopressor 25 mg every 8 hours  On IV diuretics- 40mg IV lasix BID  S/P Rt thoracentesis  Patient is frail  Poor understanding of medical condition  CT surgical evaluation-based on TTE July 2020 she does not meet indication for surgery with moderate AS  Recommend repeat TTE to determine if there was progression of disease    2  S/P thoracentesis  CT of the chest 12/20 and moderate right and small left pleural effusion with associated compressive atelectasis  900cc Rt transudative fluid   Remains on 2 liters n/c    3  Acute on chronic diastolic heart failure  IV Lasix 40 mg twice daily  I/O appears incomplete  Remains on 2 L  Symptomatic improvement  PTA-chlorthalidone    4  CKD 3-creatinine at baseline 1 1  Stable with diuresis    5  Severe pulmonary hypertension    6  Moderate MR    7  Chronic hyponatremia- Na 129    8  Narrow complex tachycardia-asymptomatic  Pt presented with accelerated junctional rhythm ? Plan  1  Repeat CXR today  2  Continue IV lasix today  3  Strict I/O , daily weights  4  F/U with TTE  5  Continue telemetry  6  EKG with tachycardia  7   Increase lopressor 50mg every 12 hours      Subjective/Objective   Chief Complaint/subjective  Patient still with BARTHOLOMEW but improved  Remains on 2 L  No chest pain or pressure no lightheadedness dizziness  Vitals: /79 (BP Location: Right arm)   Pulse 72   Temp 97 7 °F (36 5 °C) (Oral)   Resp 16   Wt 51 2 kg (112 lb 14 4 oz)   SpO2 94%   BMI 23 60 kg/m²     Vitals:    12/21/22 1545 12/22/22 0600   Weight: 52 3 kg (115 lb 6 4 oz) 51 2 kg (112 lb 14 4 oz)     Orthostatic Blood Pressures    Flowsheet Row Most Recent Value   Blood Pressure 140/79 filed at 12/22/2022 3894   Patient Position - Orthostatic VS Sitting filed at 12/22/2022 0721            Intake/Output Summary (Last 24 hours) at 12/22/2022 0857  Last data filed at 12/22/2022 7518  Gross per 24 hour   Intake 200 ml   Output 450 ml   Net -250 ml       Invasive Devices     Peripheral Intravenous Line  Duration           Peripheral IV 12/21/22 Dorsal (posterior); Right Hand <1 day                Current Facility-Administered Medications   Medication Dose Route Frequency   • acetaminophen (TYLENOL) tablet 650 mg  650 mg Oral Q4H PRN   • ALPRAZolam (XANAX) tablet 0 25 mg  0 25 mg Oral Q6H PRN   • enoxaparin (LOVENOX) subcutaneous injection 30 mg  30 mg Subcutaneous Daily   • furosemide (LASIX) injection 40 mg  40 mg Intravenous BID (diuretic)   • methimazole (TAPAZOLE) tablet 5 mg  5 mg Oral Daily   • metoprolol tartrate (LOPRESSOR) tablet 25 mg  25 mg Oral Q8H [facility]   • potassium chloride (K-DUR,KLOR-CON) CR tablet 40 mEq  40 mEq Oral Daily         Physical Exam: /79 (BP Location: Right arm)   Pulse 72   Temp 97 7 °F (36 5 °C) (Oral)   Resp 16   Wt 51 2 kg (112 lb 14 4 oz)   SpO2 94%   BMI 23 60 kg/m²     General Appearance:    Alert, cooperative, no distress, appears stated age   Head:    Normocephalic, no scleral icterus   Eyes:    PERRL   Nose:   Nares normal, septum midline, no drainage    Throat:   Lips, mucosa, and tongue normal   Neck:   Supple, symmetrical, trachea midline,          Back:     Symmetric, no CVA tenderness   Lungs:     Clear to auscultation bilaterally, respirations unlabored   Chest Wall:    No tenderness or deformity    Heart:    Regular rate and rhythm, S1 and S2 normal, KERLINE   Abdomen:     Soft, non-tender, bowel sounds active all four quadrants,     no masses, no organomegaly   Extremities:   Extremities normal, atraumatic, mild edema   Pulses:   2+ and symmetric all extremities   Skin:   Skin color, texture, turgor normal, no rashes or lesions   Neurologic:   Alert and oriented to person place and time, no focal deficits                 Lab Results:   Recent Results (from the past 72 hour(s))   ECG 12 lead    Collection Time: 12/20/22 10:18 AM   Result Value Ref Range    Ventricular Rate 109 BPM    Atrial Rate 105 BPM    MI Interval  ms    QRSD Interval 90 ms    QT Interval 356 ms    QTC Interval 479 ms    P Axis  degrees    QRS Axis 269 degrees    T Wave Axis -5 degrees   CBC and differential    Collection Time: 12/20/22 10:22 AM   Result Value Ref Range    WBC 5 96 4 31 - 10 16 Thousand/uL    RBC 3 67 (L) 3 81 - 5 12 Million/uL    Hemoglobin 11 8 11 5 - 15 4 g/dL    Hematocrit 35 1 34 8 - 46 1 %    MCV 96 82 - 98 fL    MCH 32 2 26 8 - 34 3 pg    MCHC 33 6 31 4 - 37 4 g/dL    RDW 13 9 11 6 - 15 1 %    MPV 8 4 (L) 8 9 - 12 7 fL    Platelets 368 805 - 836 Thousands/uL    nRBC 0 /100 WBCs    Neutrophils Relative 78 (H) 43 - 75 %    Immat GRANS % 0 0 - 2 %    Lymphocytes Relative 12 (L) 14 - 44 %    Monocytes Relative 9 4 - 12 %    Eosinophils Relative 1 0 - 6 %    Basophils Relative 0 0 - 1 %    Neutrophils Absolute 4 61 1 85 - 7 62 Thousands/µL    Immature Grans Absolute 0 01 0 00 - 0 20 Thousand/uL    Lymphocytes Absolute 0 71 0 60 - 4 47 Thousands/µL    Monocytes Absolute 0 56 0 17 - 1 22 Thousand/µL    Eosinophils Absolute 0 06 0 00 - 0 61 Thousand/µL    Basophils Absolute 0 01 0 00 - 0 10 Thousands/µL   Basic metabolic panel    Collection Time: 12/20/22 10:22 AM   Result Value Ref Range    Sodium 128 (L) 135 - 147 mmol/L Potassium 3 6 3 5 - 5 3 mmol/L    Chloride 94 (L) 96 - 108 mmol/L    CO2 23 21 - 32 mmol/L    ANION GAP 11 4 - 13 mmol/L    BUN 27 (H) 5 - 25 mg/dL    Creatinine 1 10 0 60 - 1 30 mg/dL    Glucose 132 65 - 140 mg/dL    Calcium 9 0 8 3 - 10 1 mg/dL    eGFR 45 ml/min/1 73sq m   FLU/RSV/COVID - if FLU/RSV clinically relevant    Collection Time: 12/20/22 10:22 AM    Specimen: Nose; Nares   Result Value Ref Range    SARS-CoV-2 Negative Negative    INFLUENZA A PCR Negative Negative    INFLUENZA B PCR Negative Negative    RSV PCR Negative Negative   TSH    Collection Time: 12/20/22 10:22 AM   Result Value Ref Range    TSH 3RD GENERATON 10 095 (H) 0 450 - 4 500 uIU/mL   High Sensitivity Troponin I Random    Collection Time: 12/20/22 10:22 AM   Result Value Ref Range    HS TnI random 12 8 - 18 ng/L   NT-BNP PRO    Collection Time: 12/20/22 10:22 AM   Result Value Ref Range    NT-proBNP 6,278 (H) <450 pg/mL   UA (URINE) with reflex to Scope    Collection Time: 12/20/22 11:50 AM   Result Value Ref Range    Color, UA Yellow     Clarity, UA Clear     Specific [place], UA 1 010 1 003 - 1 030    pH, UA 6 0 4 5, 5 0, 5 5, 6 0, 6 5, 7 0, 7 5, 8 0    Leukocytes, UA Negative Negative    Nitrite, UA Negative Negative    Protein, UA Negative Negative mg/dl    Glucose, UA Negative Negative mg/dl    Ketones, UA Negative Negative mg/dl    Urobilinogen, UA 0 2 0 2, 1 0 E U /dl E U /dl    Bilirubin, UA Negative Negative    Occult Blood, UA Negative Negative   Body fluid white cell count with differential    Collection Time: 12/20/22  2:38 PM   Result Value Ref Range    Site pleural fluid, right     Color, Fluid Yellow Clear, Colorless,Yellow    Clarity, Fluid Hazy (A) Clear    WBC, Fluid 1,564 /ul   Body fluid culture (Pleural Fluid Culture) and Gram stain    Collection Time: 12/20/22  2:38 PM    Specimen: Pleural, Right;  Body Fluid   Result Value Ref Range    Body Fluid Culture, Sterile No growth     Gram Stain Result No Polys or Bacteria seen    Glucose, body fluid    Collection Time: 12/20/22  2:38 PM   Result Value Ref Range    Glucose, Fluid 123 mg/dL   LD (LDH), Body Fluid    Collection Time: 12/20/22  2:38 PM   Result Value Ref Range    LD, Fluid 107 U/L   pH, body fluid    Collection Time: 12/20/22  2:38 PM   Result Value Ref Range    PH BODY FLUID 7 6    Total Protein, body fluid    Collection Time: 12/20/22  2:38 PM   Result Value Ref Range    Protein, Fluid 3 5 g/dL   Body Fluid Diff    Collection Time: 12/20/22  2:38 PM   Result Value Ref Range    Total Counted 100     Neutrophils % (Fluid) 29 %    Lymphs % (Fluid) 5 %    Eosinophils % (Fluid) 25 %    Mesothelial % (Fluid) 22 %    Monocytes % (Fluid) 19 %   Comprehensive metabolic panel    Collection Time: 12/21/22  4:51 AM   Result Value Ref Range    Sodium 127 (L) 135 - 147 mmol/L    Potassium 3 6 3 5 - 5 3 mmol/L    Chloride 94 (L) 96 - 108 mmol/L    CO2 24 21 - 32 mmol/L    ANION GAP 9 4 - 13 mmol/L    BUN 19 5 - 25 mg/dL    Creatinine 1 03 0 60 - 1 30 mg/dL    Glucose 107 65 - 140 mg/dL    Calcium 9 1 8 3 - 10 1 mg/dL    Corrected Calcium 9 7 8 3 - 10 1 mg/dL    AST 31 5 - 45 U/L    ALT 28 12 - 78 U/L    Alkaline Phosphatase 73 46 - 116 U/L    Total Protein 6 6 6 4 - 8 4 g/dL    Albumin 3 2 (L) 3 5 - 5 0 g/dL    Total Bilirubin 0 78 0 20 - 1 00 mg/dL    eGFR 48 ml/min/1 73sq m   CBC and differential    Collection Time: 12/21/22  4:51 AM   Result Value Ref Range    WBC 6 00 4 31 - 10 16 Thousand/uL    RBC 3 74 (L) 3 81 - 5 12 Million/uL    Hemoglobin 11 9 11 5 - 15 4 g/dL    Hematocrit 35 6 34 8 - 46 1 %    MCV 95 82 - 98 fL    MCH 31 8 26 8 - 34 3 pg    MCHC 33 4 31 4 - 37 4 g/dL    RDW 13 9 11 6 - 15 1 %    MPV 8 6 (L) 8 9 - 12 7 fL    Platelets 170 653 - 774 Thousands/uL    nRBC 0 /100 WBCs    Neutrophils Relative 69 43 - 75 %    Immat GRANS % 0 0 - 2 %    Lymphocytes Relative 18 14 - 44 %    Monocytes Relative 11 4 - 12 %    Eosinophils Relative 2 0 - 6 %    Basophils Relative 0 0 - 1 %    Neutrophils Absolute 4 14 1 85 - 7 62 Thousands/µL    Immature Grans Absolute 0 01 0 00 - 0 20 Thousand/uL    Lymphocytes Absolute 1 07 0 60 - 4 47 Thousands/µL    Monocytes Absolute 0 63 0 17 - 1 22 Thousand/µL    Eosinophils Absolute 0 14 0 00 - 0 61 Thousand/µL    Basophils Absolute 0 01 0 00 - 0 10 Thousands/µL   Lipid panel    Collection Time: 12/21/22  4:51 AM   Result Value Ref Range    Cholesterol 164 See Comment mg/dL    Triglycerides 50 See Comment mg/dL    HDL, Direct 94 >=50 mg/dL    LDL Calculated 60 0 - 100 mg/dL    Non-HDL-Chol (CHOL-HDL) 70 mg/dl   Magnesium    Collection Time: 12/21/22  4:51 AM   Result Value Ref Range    Magnesium 1 3 (L) 1 6 - 2 6 mg/dL   T4, free    Collection Time: 12/21/22  4:51 AM   Result Value Ref Range    Free T4 0 94 0 76 - 1 46 ng/dL   Basic metabolic panel    Collection Time: 12/22/22  4:41 AM   Result Value Ref Range    Sodium 129 (L) 135 - 147 mmol/L    Potassium 3 0 (L) 3 5 - 5 3 mmol/L    Chloride 98 96 - 108 mmol/L    CO2 22 21 - 32 mmol/L    ANION GAP 9 4 - 13 mmol/L    BUN 19 5 - 25 mg/dL    Creatinine 0 92 0 60 - 1 30 mg/dL    Glucose 95 65 - 140 mg/dL    Calcium 9 0 8 3 - 10 1 mg/dL    eGFR 56 ml/min/1 73sq m   Magnesium    Collection Time: 12/22/22  4:41 AM   Result Value Ref Range    Magnesium 1 9 1 6 - 2 6 mg/dL     Imaging: I have personally reviewed pertinent reports  EKG: accelerated junctional      Counseling / Coordination of Care  Total time spent today 45 minutes  Greater than 50% of total time was spent with the patient and / or family counseling and / or coordination of care

## 2022-12-22 NOTE — ASSESSMENT & PLAN NOTE
· Pt initially presented to [address] [facility] [facility] for volume overload, shortness of breath  · Transferred to [facility] for evaluation for TAVR,  · ECHO 12/22 with severely reduced mobility, aortic valve has paradoxical low flow low gradient severe AS  · Appreciate CT surgery recommendations on possible TAVR while inpatient   · Additional recommendations as noted below

## 2022-12-22 NOTE — ASSESSMENT & PLAN NOTE
· Hx of hyperthyroidism maintained on Methimazole 5 mg daily as an outpatient   · TSH 10 on admission, free T4 WNL  · Continue Methimazole 5 mg daily for now   · Appreciate endocrinology consultation

## 2022-12-22 NOTE — H&P
[address] [facility]  H&P- Iliana Dinero 1935, 80 y o  female MRN: [id number]  Unit/Bed#: [facility] 402-01 Encounter: [id number]  Primary Care Provider: Kannan Modi DO   Date and time admitted to hospital: 12/21/2022  3:25 PM    Acute on chronic diastolic CHF (congestive heart failure) [facility])  Assessment & Plan  Wt Readings from Last 3 Encounters:   12/21/22 52 3 kg (115 lb 6 4 oz)   12/21/22 54 4 kg (119 lb 14 9 oz)   11/02/22 49 4 kg (109 lb)     · Presented to  [facility] on 12/20/22 with shortness of breath with exertion and hypoxia noted at home  Evaluation in the ED revealed no hypoxia  · She was noted to be in acute on chronic diastolic heart failure and was begun on IV Lasix 40 mg twice daily  · Echo (7/19/22) - EF 50%(drop from 55% on 6/24/21), abnormal diastolic function, severe aortic stenosis, mild to moderate aortic and pulmonary regurgitation, moderate mitral and tricuspid regurgitation, severely increased pulmonary artery systolic pressure at 75 mmHg  · She was transferred to [facility] for cardiac surgery evaluation to see if she would be a candidate for TAVR  · Continue Lasix 40 mg IV twice daily  · Monitor I/O, daily weights  · Cardiology/cardiac surgery consulted   · Discussed with cardiac surgery - repeat echo ordered to evaluate aortic valve disease      Severe aortic stenosis  Assessment & Plan  · Plan as above    Pleural effusion  Assessment & Plan  · CT chest (12/20/22) - Moderate right and small left-sided pleural effusions with associated compressive atelectasis    · In the setting of acute on chronic diastolic heart failure  · S/p 900 cc thoracentesis by IR on 12/20  · Diuresis as above    Hyperthyroidism  Assessment & Plan  · Continue Methimazole 5 mg daily    Essential hypertension  Assessment & Plan  · Home medications - Atenolol/Chlorthalidone (100/25 mg) 0 5 tablet daily, Amlodipine 10 mg daily - held  · BP acceptable on Metoprolol tartrate 25 mg every 8 hours, Furosemide 40 mg IV twice daily  · Monitor BP    Hyponatremia  Assessment & Plan  · Chronic with sodium levels of 127-130 as an outpatient  · Currently with acute on chronic diastolic heart failure  · Monitor with diuresis    Stage 3a chronic kidney disease [facility])  Assessment & Plan  Lab Results   Component Value Date    EGFR 48 12/21/2022    EGFR 45 12/20/2022    EGFR 42 10/05/2022    CREATININE 1 03 12/21/2022    CREATININE 1 10 12/20/2022    CREATININE 1 16 10/05/2022   Baseline creatinine 1-1 1  Creatinine at baseline  Monitor    Generalized anxiety disorder  Assessment & Plan  · Continue home medication Alprazolam 0 25 mg as needed    VTE Pharmacologic Prophylaxis: VTE Score: 4 Moderate Risk (Score 3-4) - Pharmacological DVT Prophylaxis Ordered: enoxaparin (Lovenox)  Code Status: Level 1 - Full Code   Discussion with family: Patient declined call to   Anticipated Length of Stay: Patient will be admitted on an inpatient basis with an anticipated length of stay of greater than 2 midnights secondary to Acute on chronic diastolic heart failure  Total Time for Visit, including Counseling / Coordination of Care: 60 minutes Greater than 50% of this total time spent on direct patient counseling and coordination of care  Chief Complaint: Shortness of breath on exertion    History of Present Illness:  Alessia Mi is a 80 y o  female with a PMH of chronic diastolic heart failure, severe aortic stenosis, hypothyroidism, hypertension, pulmonary hypertension, hyponatremia, CKD 3 who presents as a transfer from [address] [place] [address] for cardiac surgery evaluation for TAVR  She presented there on 12/20/2022 with shortness of breath with exertion  Her  had noted hypoxia at home  No hypoxia was noted in the ED  No chest pain  No cough, fever  She had lower extremity edema  Review of Systems:  Review of Systems   Respiratory: Positive for shortness of breath      Cardiovascular: Positive for leg swelling  All other systems reviewed and are negative  Past Medical and Surgical History:   Past Medical History:   Diagnosis Date   • Aortic stenosis    • Community acquired pneumonia of left upper lobe of lung 5/17/2019   • Fatigue    • Full dentures    • Generalized anxiety disorder    • Hyperlipidemia    • Hypertension    • Impaired fasting glucose    • Mitral regurgitation    • Pneumonia    • SVT (supraventricular tachycardia)    • Tricuspid regurgitation        Past Surgical History:   Procedure Laterality Date   • DENTAL SURGERY Bilateral     ALL TEETH REMOVED   • FL GUIDED NEEDLE PLAC BX/ASP/INJ  5/26/2021   • IR THORACENTESIS  12/20/2022   • JOINT REPLACEMENT Left    • KNEE SURGERY      left knee replacement   • NE INJECTION,SACROILIAC JOINT Bilateral 5/26/2021    Procedure: SACROILIAC JOINT INJECTION;  Surgeon: Dontrell Manriquez MD;  Location: [facility];  Service: Pain Management        Meds/Allergies:  Prior to Admission medications    Medication Sig Start Date End Date Taking?  Authorizing Provider   ALPRAZolam Loman Rai) 0 25 mg tablet Take 1 tablet (0 25 mg total) by mouth every 6 (six) hours as needed for anxiety 8/22/22   Deangelo Wallace DO   amLODIPine (NORVASC) 10 mg tablet Take 1 tablet by mouth once daily 12/18/22   Deangelo Wallace DO   ammonium lactate (LAC-HYDRIN) 12 % lotion  5/11/20   Historical Provider, MD   atenolol-chlorthalidone (TENORETIC) 100-25 mg per tablet Take 0 5 tablets by mouth daily 3/28/22   Deangelo Wallace DO   betamethasone dipropionate (DIPROSONE) 0 05 % ointment Apply topically 2 (two) times a day for 14 days 8/2/22 8/16/22  Dangelo Gurrola DO   Calcium Carb-Cholecalciferol (CALTRATE 600+D3 PO) Take 1 tablet by mouth 2 (two) times a day    Historical Provider, MD   methimazole (TAPAZOLE) 5 mg tablet Take 1 tablet by mouth once daily 12/5/22   LYNN Longoria   Omega-3 Fatty Acids (FISH OIL) 1200 MG CAPS Take 1,200 mg by mouth 2 (two) times a day Historical Provider, MD   potassium chloride (K-DUR,KLOR-CON) 20 mEq tablet Take 1 tablet by mouth once daily 10/6/22   Anna He DO   Psyllium (METAMUCIL FIBER) 51 7 % PACK Take 1 Package by mouth daily 5/21/19   Emory Solano DO     I have reviewed home medications with patient personally  Allergies: No Known Allergies    Social History:  Marital Status: /Civil Union   Substance Use History:   Social History     Substance and Sexual Activity   Alcohol Use Never     Social History     Tobacco Use   Smoking Status Never   Smokeless Tobacco Never     Social History     Substance and Sexual Activity   Drug Use Never       Family History:  Family History   Problem Relation Age of Onset   • Hypertension Mother    • Asthma Father         's asthma   • Alzheimer's disease Sister    • Rectal cancer Son    • Uterine cancer Daughter    • Heart disease Sister    • Heart disease Brother        Physical Exam:     Vitals:   Blood Pressure: 126/70 (12/21/22 2248)  Pulse: 72 (12/21/22 2248)  Temperature: 99 2 °F (37 3 °C) (12/21/22 2248)  Temp Source: Oral (12/21/22 2248)  Respirations: 18 (12/21/22 2248)  Weight - Scale: 52 3 kg (115 lb 6 4 oz) (12/21/22 1545)  SpO2: 91 % (12/21/22 2248)    Physical Exam  Vitals reviewed  HENT:      Head: Normocephalic  Nose: Nose normal    Eyes:      Extraocular Movements: Extraocular movements intact  Cardiovascular:      Rate and Rhythm: Normal rate and regular rhythm  Pulmonary:      Effort: Pulmonary effort is normal       Comments: Decreased breath sounds at the bases  Abdominal:      General: Bowel sounds are normal  There is no distension  Palpations: Abdomen is soft  Tenderness: There is no abdominal tenderness  Musculoskeletal:         General: No swelling  Cervical back: Neck supple  Skin:     General: Skin is warm and dry  Neurological:      General: No focal deficit present        Mental Status: She is alert and oriented to person, place, and time  Psychiatric:         Mood and Affect: Mood normal          Behavior: Behavior normal          Additional Data:     Lab Results:  Results from last 7 days   Lab Units 12/21/22  0451   WBC Thousand/uL 6 00   HEMOGLOBIN g/dL 11 9   HEMATOCRIT % 35 6   PLATELETS Thousands/uL 155   NEUTROS PCT % 69   LYMPHS PCT % 18   MONOS PCT % 11   EOS PCT % 2     Results from last 7 days   Lab Units 12/21/22  0451   SODIUM mmol/L 127*   POTASSIUM mmol/L 3 6   CHLORIDE mmol/L 94*   CO2 mmol/L 24   BUN mg/dL 19   CREATININE mg/dL 1 03   ANION GAP mmol/L 9   CALCIUM mg/dL 9 1   ALBUMIN g/dL 3 2*   TOTAL BILIRUBIN mg/dL 0 78   ALK PHOS U/L 73   ALT U/L 28   AST U/L 31   GLUCOSE RANDOM mg/dL 107                       Lines/Drains:  Invasive Devices     Peripheral Intravenous Line  Duration           Peripheral IV 12/21/22 Dorsal (posterior); Right Hand <1 day                ** Please Note: This note has been constructed using a voice recognition system   **

## 2022-12-22 NOTE — ASSESSMENT & PLAN NOTE
· Chronic with sodium levels of 127-130 as an outpatient  · Currently with acute on chronic diastolic heart failure on IV diuresis, continue   · Sodium 129 today, improving, continue to monitor   · If worsening or no additional improvement consider nephrology consultation   · Trend BMP

## 2022-12-22 NOTE — ASSESSMENT & PLAN NOTE
· BP currently stable   · PTA regimen of Atenolol/Chlorthalidone (100/25 mg) 0 5 tablet daily, Amlodipine 10 mg daily  · Home medications on hold   · Currently on IV lasix 40 mg BID and Lopressor increased to 50 mg BID per cardiology   · Monitor BP

## 2022-12-22 NOTE — CONSULTS
Consultation - Alejandra Kwan 80 y o  female MRN: [id number]    Unit/Bed#: [facility] 402-01 Encounter: [id number]      Assessment/Plan     Assessment: This is a 80y o -year-old female with hyperthyroidism secondary to Graves' disease admitted with acute CHF exacerbation, aortic stenosis awaiting TAVR evaluation  Plan:  #Elevated TSH  #History of hyperthyroidism secondary to Graves' disease  Recommend to hold off methimazole  Recheck TFTs- TSH, free T4 and T3 on 12/24/2022  Cautious iodine exposure (contrast exposure) given history of Grave's disease  Inpatient consult to Endocrinology  Consult performed by: Sergio Ernandez MD  Consult ordered by: Amisha Martinez MD          CC: Elevated TSH    History of Present Illness     HPI: Alejandra Kwan is a 80y o  year old female with hyperthyroidism secondary to [place] [address] disease admitted with acute CHF exacerbation, severe aortic stenosis awaiting TAVR evaluation  Bloodwork on admission showed elevated TSH 10, free T4 0 8  Endocrinology service consulted for evaluation of elevated TSH on Methimazole  Patient follows with [place] [address] endocrinology, was diagnosed with hyperthyroidism secondary to Graves' disease in August 2022 (suppressed TSH, elevated free T4, TrAb positive) after which she was started on methimazole 5 mg daily and beta blocker  Repeat blood work done a month after starting treatment showed improvement in thyroid function  Patient was met with  and her son at bedside today   reports recent onset of fatigue, daytime naps of 2 weeks duration which is unusual   Denies any constipation, weight changes  ROS  Constitutional: Negative for appetite change  Negative for activity change  Respiratory: Negative for shortness of breath, wheezing, cough  Cardiovascular: Negative for chest pain and palpitations  Gastrointestinal: Negative for abdominal pain, nausea and vomiting     Musculoskeletal: Negative for arthralgias  Neurological: Negative for dizziness, light-headedness and headaches     All other ROS reviewed and negative    Historical Information   Past Medical History:   Diagnosis Date   • Aortic stenosis    • Community acquired pneumonia of left upper lobe of lung 5/17/2019   • Fatigue    • Full dentures    • Generalized anxiety disorder    • Hyperlipidemia    • Hypertension    • Impaired fasting glucose    • Mitral regurgitation    • Pneumonia    • SVT (supraventricular tachycardia)    • Tricuspid regurgitation      Past Surgical History:   Procedure Laterality Date   • DENTAL SURGERY Bilateral     ALL TEETH REMOVED   • FL GUIDED NEEDLE PLAC BX/ASP/INJ  5/26/2021   • IR THORACENTESIS  12/20/2022   • JOINT REPLACEMENT Left    • KNEE SURGERY      left knee replacement   • [place] INJECTION,SACROILIAC JOINT Bilateral 5/26/2021    Procedure: SACROILIAC JOINT INJECTION;  Surgeon: Anton Rosales MD;  Location: [facility];  Service: Pain Management      Social History   Social History     Substance and Sexual Activity   Alcohol Use Never     Social History     Substance and Sexual Activity   Drug Use Never     Social History     Tobacco Use   Smoking Status Never   Smokeless Tobacco Never     Family History:   Family History   Problem Relation Age of Onset   • Hypertension Mother    • Asthma Father         [place]'s asthma   • Alzheimer's disease Sister    • Rectal cancer Son    • Uterine cancer Daughter    • Heart disease Sister    • Heart disease Brother        Meds/Allergies   Current Facility-Administered Medications   Medication Dose Route Frequency Provider Last Rate Last Admin   • acetaminophen (TYLENOL) tablet 650 mg  650 mg Oral Q4H PRN Vin Farmer MD       • ALPRAZolam Kaitlyn Kacey) tablet 0 25 mg  0 25 mg Oral Q6H PRN Vin Farmer MD       • enoxaparin (LOVENOX) subcutaneous injection 30 mg  30 mg Subcutaneous Daily Vin Farmer MD   30 mg at 12/22/22 0830   • furosemide (LASIX) injection 40 mg  40 mg Intravenous BID (diuretic) Steffanie Wells MD   40 mg at 12/22/22 0830   • methimazole (TAPAZOLE) tablet 5 mg  5 mg Oral Daily Steffanie Wells MD   5 mg at 12/22/22 5893   • metoprolol tartrate (LOPRESSOR) tablet 25 mg  25 mg Oral [facility] Steffanie Wells MD   25 mg at 12/22/22 0631   • potassium chloride (K-DUR,KLOR-CON) CR tablet 40 mEq  40 mEq Oral Daily Steffanie Wells MD   40 mEq at 12/22/22 0830     No Known Allergies    Objective   Vitals: Blood pressure 140/79, pulse 82, temperature 98 3 °F (36 8 °C), resp  rate 16, height 4' 10" (1 473 m), weight 50 8 kg (112 lb), SpO2 90 %  Intake/Output Summary (Last 24 hours) at 12/22/2022 1209  Last data filed at 12/22/2022 1200  Gross per 24 hour   Intake 560 ml   Output 450 ml   Net 110 ml     Invasive Devices     Peripheral Intravenous Line  Duration           Peripheral IV 12/21/22 Dorsal (posterior); Right Hand 1 day                Physical Exam   Physical exam:   Constitutional:Oriented to person, place, and time  Appears well-developed and well-nourished  Not in any acute distress  HENT:   Head: Normocephalic and atraumatic  Neck: Normal range of motion  Supple, No thyromegaly  Pulmonary/Chest: Effort normal/ breathing comfortably on room air  CTAB   CVS:  Regular rate and rhythm, S1-S2 +  Abdomen: soft, nondistended, nontender  Musculoskeletal: Normal range of motion  Skin:  Warm, no rash  Extremities:  No pedal edema  Psychiatric: Normal mood and affect  Behavior is normal        The history was obtained from the review of the chart, patient and family       Lab Results:       Lab Results   Component Value Date    WBC 6 00 12/21/2022    HGB 11 9 12/21/2022    HCT 35 6 12/21/2022    MCV 95 12/21/2022     12/21/2022     Lab Results   Component Value Date/Time    BUN 19 12/22/2022 04:41 AM    K 3 0 (L) 12/22/2022 04:41 AM    CL 98 12/22/2022 04:41 AM    CO2 22 12/22/2022 04:41 AM    CREATININE 0 92 12/22/2022 04:41 AM    AST 31 12/21/2022 04:51 AM    ALT 28 12/21/2022 04:51 AM    ALB 3 2 (L) 12/21/2022 04:51 AM     Recent Labs     12/21/22  0451   HDL 94   TRIG 50     No results found for: Jorge Delacruz  No results found for: POCGLU    Imaging Studies: I have personally reviewed pertinent reports  Portions of the record may have been created with voice recognition software

## 2022-12-22 NOTE — OCCUPATIONAL THERAPY NOTE
Occupational Therapy Evaluation     Patient Name: Jennie Bermudez  PXTVP'G Date: 12/22/2022  Problem List  Active Problems:    Hyponatremia    Essential hypertension    Severe aortic stenosis    Generalized anxiety disorder    Stage 3a chronic kidney disease (HCC)    Hyperthyroidism    Acute on chronic diastolic CHF (congestive heart failure) (HCC)    Pleural effusion    Past Medical History  Past Medical History:   Diagnosis Date    Aortic stenosis     Community acquired pneumonia of left upper lobe of lung 5/17/2019    Fatigue     Full dentures     Generalized anxiety disorder     Hyperlipidemia     Hypertension     Impaired fasting glucose     Mitral regurgitation     Pneumonia     SVT (supraventricular tachycardia)     Tricuspid regurgitation      Past Surgical History  Past Surgical History:   Procedure Laterality Date    DENTAL SURGERY Bilateral     ALL TEETH REMOVED    FL GUIDED NEEDLE PLAC BX/ASP/INJ  5/26/2021    IR THORACENTESIS  12/20/2022    JOINT REPLACEMENT Left     KNEE SURGERY      left knee replacement    IN INJECTION,SACROILIAC JOINT Bilateral 5/26/2021    Procedure: SACROILIAC JOINT INJECTION;  Surgeon: Robert Castillo MD;  Location: [facility];  Service: Pain Management            12/22/22 0858   OT Last Visit   OT Visit Date 12/22/22   Note Type   Note type Evaluation   Pain Assessment   Pain Assessment Tool 0-10   Pain Score No Pain   Restrictions/Precautions   Weight Bearing Precautions Per Order No   Other Precautions Chair Alarm; Bed Alarm;O2;Fall Risk  (2L O2)   Home Living   Type of Home House   Home Layout Two level;Bed/bath upstairs;1/2 bath on main level  (1 [address]A, FF of steps inside)   Bathroom Shower/Tub Tub/shower unit   Bathroom Toilet Standard   Bathroom Equipment Grab bars in shower   [address] [place];Cane  (uses cane at baseline)   Additional Comments Pt lives in a [facility] with 1 [address]A and FF of steps inside  Pt uses a cane at baseline  Prior Function   Level of [place] Independent with ADLs; Independent with functional mobility; Independent with IADLS  (uses SPC at baseline)   Lives With Spouse   Receives Help From Family; Neighbor   IADLs Independent with meal prep; Independent with medication management   Falls in the last 6 months 0   Vocational Retired   Lifestyle   Autonomy Pt states she was I with ADLs, IADLs, and uses a SPC at baseline  (-) drive  Reciprocal Relationships Pt lives with her  and states she has neighors who can A if needed  Service to Others Pt is retired  Intrinsic Gratification Pt enjoys relaxing  General   Family/Caregiver Present No   Subjective   Subjective "I want to go home"   ADL   Where Assessed Chair   Eating Assistance 5  Supervision/Setup   Grooming Assistance 5  Supervision/Setup   UB Bathing Assistance 5  Supervision/Setup   LB Bathing Assistance 4  Minimal Assistance   [address] 5  Supervision/Setup   LB Dressing Assistance 4  [address]   5  [address] [facility] 5  Supervision/Setup   Bed Mobility   Additional Comments Pt was seated in chair at the start of session  Transfers   Sit to Stand 5  Supervision   Additional items Increased time required;Verbal cues   Stand to Sit 5  Supervision   Additional items Increased time required;Verbal cues   Additional Comments Pt used RW for transfers  Functional Mobility   Functional Mobility 5  Supervision   Additional Comments Pt was able to demonstrate household mobility with RW  Balance   Static Sitting Fair +   Dynamic Sitting Fair +   Static Standing Fair   Dynamic Standing Fair   Ambulatory Fair   Activity Tolerance   Activity Tolerance Patient tolerated treatment well   Medical Staff Made Aware PTMarly   Nurse Made Aware RN made aware   Cognition   Overall Cognitive Status [facility]   Arousal/Participation Alert; Responsive;Arousable; Cooperative   Attention Within functional limits   Orientation Level Oriented to person;Oriented to situation;Disoriented to time   Memory Decreased recall of recent events   Following Commands Follows one step commands with increased time or repetition   Comments Pt was pleasant and cooperative t/o session  Assessment   Assessment Pt is 80 y o  female admitted [facility] on [facility]22 w/ acute on chronic diastolic CHF  Pt received a thoracentesis on 12/202/22  Pt  has a past medical history of Aortic stenosis, Community acquired pneumonia of left upper lobe of lung (5/17/2019), Fatigue, Full dentures, Generalized anxiety disorder, Hyperlipidemia, Hypertension, Impaired fasting glucose, Mitral regurgitation, Pneumonia, SVT (supraventricular tachycardia), and Tricuspid regurgitation  Pt with active OT orders and activity orders  Pt seen with PT 2* medical complexity  Pt resides in a 2 story Home, [address] [facility], with spouse  Pt was I w  ADLs, IADLs, (-) drove  Pt used a SPC at baseline  Pt is currently functioning at S for transfers, ambulation, and UB ADLs  Pt is min A for LB ADLs  Based off of an OT evaulation, assessment, and performance functioning, pt is identified as a moderate complexity  OT recommendation for d/c includes home with increased social support  The patient's raw score on the AM-PAC Daily Activity inpatient short form is 23, standardized score is 51 12, greater than 39 4  Patients at this level are likely to benefit from discharge to home with increased social support  Please refer to the recommendation of the Occupational Therapist for safe discharge planning  Pt would benefit from continued participation in ADLs and functional mobility with staff until d/c    Plan   OT Frequency Eval only   Recommendation   OT Discharge Recommendation No rehabilitation needs  (home with increased social support)   AM-PAC Daily Activity Inpatient   Lower Body Dressing 3   Bathing 4   Toileting 4   Upper Body Dressing 4   Grooming 4   Eating 4   Daily Activity Raw Score 23   Daily Activity Standardized Score (Calc for Raw Score >=11) 51 12   AM-PAC Applied Cognition Inpatient   Following a Speech/Presentation 3   Understanding Ordinary Conversation 4   Taking Medications 4   Remembering Where Things Are Placed or Put Away 3   Remembering List of 4-5 Errands 3   Taking Care of Complicated Tasks 3   Applied Cognition Raw Score 20   Applied Cognition Standardized Score 41 76   Modified Michael Scale   Modified Dickey Scale 3        Cuca Buitrago, OTR/L

## 2022-12-22 NOTE — ASSESSMENT & PLAN NOTE
· Chronic with sodium levels of 127-130 as an outpatient  · Currently with acute on chronic diastolic heart failure  · Monitor with diuresis

## 2022-12-22 NOTE — PROGRESS NOTES
[address] [facility]  Progress Note - Altagracia Leung 1935, 80 y o  female MRN: [id number]  Unit/Bed#: [facility] 402-01 Encounter: [id number]  Primary Care Provider: Lashawn Georges DO   Date and time admitted to hospital: 12/21/2022  3:25 PM      DOS: 12/22/2022  * Severe aortic stenosis  Assessment & Plan  · Pt initially presented to [address] [place] [address],[address] for volume overload, shortness of breath  · Transferred to [facility] for evaluation for TAVR,  · ECHO 12/22 with severely reduced mobility, aortic valve has paradoxical low flow low gradient severe AS  · Appreciate CT surgery recommendations on possible TAVR while inpatient   · Additional recommendations as noted below     Acute respiratory failure with hypoxia (HCC)  Assessment & Plan  · POA, likely multifactorial in setting of acute CHF, pleural effusions, severe AS  · Secondary to patient with hypoxia noted outpatient with spO2 in the 80s, requiring supplemental O2 at 2 L NC saturating low 90s   · Continue IV diuresis with lasix 40 mg BID per cardiology   · Ongoing CT surgery recommendations for TAVR  · S/p IR thoracentesis on 12/20  · Pre-crenshaw cultures negative thus far    Acute on chronic diastolic CHF (congestive heart failure) ([facility] [address] )  Assessment & Plan  Wt Readings from Last 3 Encounters:   12/22/22 50 8 kg (112 lb)   12/21/22 54 4 kg (119 lb 14 9 oz)   11/02/22 49 4 kg (109 lb)     · Presented to [email or web address] on 12/20/22 with shortness of breath on exertion and hypoxia noted at home      · She was noted to be in acute on chronic diastolic heart failure and was begun on IV Lasix 40 mg twice daily  · Echo (7/19/22) - EF 50%(drop from 55% on 6/24/21), abnormal diastolic function, severe aortic stenosis, mild to moderate aortic and pulmonary regurgitation, moderate mitral and tricuspid regurgitation, severely increased pulmonary artery systolic pressure at 75 mmHg  · She was transferred to [facility] for cardiac surgery evaluation to see if she would be a candidate for TAVR  · Repeat ECHO performed here with severe AS with paradoxical low flow low gradient   · Continue Lasix 40 mg IV BID  · Monitor I/O, daily weights  · Cardiology/cardiac surgery following, appreciate ongoing recommendations      Pleural effusion  Assessment & Plan  · CT chest (12/20/22) - Moderate right and small left-sided pleural effusions with associated compressive atelectasis    · In the setting of acute on chronic diastolic heart failure  · S/p 900 cc thoracentesis by IR on 12/20  · IV diuresis as above  · Respiratory status improving    Hyperthyroidism  Assessment & Plan  · Hx of hyperthyroidism maintained on Methimazole 5 mg daily as an outpatient   · TSH 10 on admission, free T4 WNL  · Continue Methimazole 5 mg daily for now   · Appreciate endocrinology consultation     Stage 3a chronic kidney disease [facility])  Assessment & Plan  Lab Results   Component Value Date    EGFR 56 12/22/2022    EGFR 48 12/21/2022    EGFR 45 12/20/2022    CREATININE 0 92 12/22/2022    CREATININE 1 03 12/21/2022    CREATININE 1 10 12/20/2022   · Baseline creatinine 1-1 1  · Creatinine at baseline  · Monitor    Generalized anxiety disorder  Assessment & Plan  · Continue PRN Alprazolam 0 25 mg Q6H  · Mentation stable     First degree AV block  Assessment & Plan  · EKG 12/22 with sinus rhythm, marked sinus arrhythmia with 1st degree AV block with PVCs   · Continue to monitor telemetry   · Cardiology following,  · BB uptitrated to lopressor 50 mg BID, monitor for response     Essential hypertension  Assessment & Plan  · BP currently stable   · PTA regimen of Atenolol/Chlorthalidone (100/25 mg) 0 5 tablet daily, Amlodipine 10 mg daily  · Home medications on hold   · Currently on IV lasix 40 mg BID and Lopressor increased to 50 mg BID per cardiology   · Monitor BP    Hyponatremia  Assessment & Plan  · Chronic with sodium levels of 127-130 as an outpatient  · Currently with acute on chronic diastolic heart failure on IV diuresis, continue   · Sodium 129 today, improving, continue to monitor   · If worsening or no additional improvement consider nephrology consultation   · Trend BMP    Hypokalemia  Assessment & Plan  · K+ 3 0  · Likely in setting of IV diuresis, currently on KDUR 40 mEq daily supplementation   · Place additional dose of IV potassium x 1 at 20 mEq  · Recheck BMP in the AM    VTE Pharmacologic Prophylaxis: VTE Score: 4 Moderate Risk (Score 3-4) - Pharmacological DVT Prophylaxis Ordered: enoxaparin (Lovenox)  Patient Centered Rounds: I evaluated the patient without nursing staff present due to speaking to nurse outside patient's room   Discussions with Specialists or Other Care Team Provider: Discussed with cardiology, RN, CM and reviewed previous notes     Education and Discussions with Family / Patient: Updated  ( and family member) at bedside  Time Spent for Care: 30 minutes  More than 50% of total time spent on counseling and coordination of care as described above  Current Length of Stay: 1 day(s)  Current Patient Status: Inpatient   Certification Statement: The patient will continue to require additional inpatient hospital stay due to IV diuresis, acute CHF exacerbation, ongoing CT surgery work up  Discharge Plan: Anticipate discharge in >72 hrs to discharge location to be determined pending rehab evaluations  Code Status: Level 1 - Full Code    Subjective:   Pt reports that she feels okay today, does have some shortness of breath but improving  Is not typically maintained on oxygen at home  Appetite is stable but states she typically does not eat much at baseline  Hard of hearing, reports she does not have her hearing aide      Objective:     Vitals:   Temp (24hrs), Av 3 °F (36 8 °C), Min:97 7 °F (36 5 °C), Max:99 2 °F (37 3 °C)    Temp:  [97 7 °F (36 5 °C)-99 2 °F (37 3 °C)] 98 3 °F (36 8 °C)  HR:  [] 82  Resp:  [16-20] 16  BP: (114-141)/(69-79) 140/79  SpO2:  [90 %-96 %] 90 %  Body mass index is 23 41 kg/m²  Input and Output Summary (last 24 hours): Intake/Output Summary (Last 24 hours) at 12/22/2022 1538  Last data filed at 12/22/2022 1508  Gross per 24 hour   Intake 560 ml   Output 750 ml   Net -190 ml       Physical Exam:   Physical Exam  Vitals reviewed  Constitutional:       General: She is not in acute distress  Appearance: She is not toxic-appearing  Comments: Pt is in no acute distress lying in her hospital chair resting comfortably  On 2 L NC saturating mid 90s   Hard of hearing   HENT:      Head: Normocephalic and atraumatic  Cardiovascular:      Rate and Rhythm: Normal rate and regular rhythm  Pulmonary:      Effort: Pulmonary effort is normal  No respiratory distress  Breath sounds: No wheezing  Abdominal:      General: There is no distension  Palpations: Abdomen is soft  Musculoskeletal:      Right lower leg: No edema  Left lower leg: No edema  Skin:     General: Skin is warm and dry  Findings: No erythema  Neurological:      Mental Status: She is alert     Psychiatric:         Mood and Affect: Mood normal           Additional Data:     Labs:  Results from last 7 days   Lab Units 12/21/22  0451   WBC Thousand/uL 6 00   HEMOGLOBIN g/dL 11 9   HEMATOCRIT % 35 6   PLATELETS Thousands/uL 155   NEUTROS PCT % 69   LYMPHS PCT % 18   MONOS PCT % 11   EOS PCT % 2     Results from last 7 days   Lab Units 12/22/22  0441 12/21/22  0451   SODIUM mmol/L 129* 127*   POTASSIUM mmol/L 3 0* 3 6   CHLORIDE mmol/L 98 94*   CO2 mmol/L 22 24   BUN mg/dL 19 19   CREATININE mg/dL 0 92 1 03   ANION GAP mmol/L 9 9   CALCIUM mg/dL 9 0 9 1   ALBUMIN g/dL  --  3 2*   TOTAL BILIRUBIN mg/dL  --  0 78   ALK PHOS U/L  --  73   ALT U/L  --  28   AST U/L  --  31   GLUCOSE RANDOM mg/dL 95 107                       Lines/Drains:  Invasive Devices     Peripheral Intravenous Line  Duration           Peripheral IV 12/21/22 Dorsal (posterior); Right Hand 1 day          Drain  Duration           External Urinary Catheter <1 day                  Telemetry:  Telemetry Orders (From admission, onward)             48 Hour Telemetry Monitoring  Continuous x 48 hours        References:    Telemetry Guidelines   Question:  Reason for 48 Hour Telemetry  Answer:  Acute Decompensated CHF (continuous diuretic infusion or total diuretic dose > 200 mg daily, associated electrolyte derangement, ionotropic drip, history of ventricular arrhythmia, or new EF <35%)                 Telemetry Reviewed: Normal Sinus Rhythm, intermittent episode of tachycardia  Indication for Continued Telemetry Use: Arrthymias requiring medical therapy             Imaging: Reviewed radiology reports from this admission including: ECHO    Recent Cultures (last 7 days):   Results from last 7 days   Lab Units 12/20/22  1438   GRAM STAIN RESULT  No Polys or Bacteria seen   BODY FLUID CULTURE, STERILE  No growth       Last 24 Hours Medication List:   Current Facility-Administered Medications   Medication Dose Route Frequency Provider Last Rate   • acetaminophen  650 mg Oral Q4H PRN Nayeli Gurrola MD     • ALPRAZolam  0 25 mg Oral Q6H PRN Nayeli Gurrola MD     • enoxaparin  30 mg Subcutaneous Daily Nayeli Gurrola MD     • furosemide  40 mg Intravenous BID (diuretic) Nayeli Gurrola MD     • methimazole  5 mg Oral Daily Nayeli Gurrola MD     • metoprolol tartrate  50 mg Oral Q12H [address] [place]     • potassium chloride  40 mEq Oral Daily Nayeli Gurrola MD     • potassium chloride  20 mEq Intravenous Once Krishna Esqueda PA-C          Today, Patient Was Seen By: Wendi Goldsmith PA-C    **Please Note: This note may have been constructed using a voice recognition system  **

## 2022-12-22 NOTE — CASE MANAGEMENT
Case Management Assessment & Discharge Planning Note    Patient name Altagracia Leung  Location [address] [place] [address] [facility] [id number]  : 1935 Date 2022       Current Admission Date: 2022  Current Admission Diagnosis:Acute on chronic diastolic CHF (congestive heart failure) [facility])   Patient Active Problem List    Diagnosis Date Noted   • Pleural effusion 2022   • Acute on chronic diastolic CHF (congestive heart failure) ([address] ) 2022   • Primary generalized (osteo)arthritis 2022   • Hyperthyroidism 2022   • Dyslipidemia 2022   • Post-menopause 2022   • Stage 3a chronic kidney disease ([address] ) 2021   • Other fatigue 2021   • Ganglion cyst 2021   • Adhesive capsulitis of right shoulder 2021   • Bilateral leg edema 2021   • Chronic pain syndrome 2021   • Sacroiliitis ([address] ) 2021   • Gait abnormality 2021   • Chronic bilateral low back pain without sciatica 2021   • Osteopenia of neck of femur 2021   • Impaired fasting glucose    • Encounter for long-term (current) use of medications 10/31/2020   • Immunization due 10/31/2020   • Acute bursitis of right shoulder 10/31/2020   • Encounter for Medicare annual wellness exam 2020   • Mid back pain 2019   • Generalized anxiety disorder 2019   • Non-rheumatic tricuspid valve insufficiency 2019   • Lymphadenopathy 2019   • Hiatal hernia 2019   • Thrombocytopenia ([address] ) 2019   • Severe aortic stenosis 2019   • Mitral valve insufficiency 2019   • Supraventricular tachycardia ([address] ) 2019   • Pulmonary hypertension ([address] ) 2019   • Diverticulosis 2019   • First degree AV block 2019   • Essential hypertension 2019   • Hypomagnesemia 2019   • Hypokalemia 2019   • Hyponatremia 2019      LOS (days): 1  Geometric Mean LOS (GMLOS) (days): 3 90  Days to GMLOS:3     OBJECTIVE:    Risk of Unplanned Readmission Score: 11 66         Current admission status: Inpatient       Preferred Pharmacy:   [facility] [address] [place] [address] [address] [address]  Phone: [phone] Fax: [phone]    Primary Care Provider: Aba Thomas DO    Primary Insurance: MEDICARE  Secondary Insurance: BLUE CROSS    ASSESSMENT:  Latisha 26 Proxies    There are no active Health Care Proxies on file  Advance Directives  Does patient have a [address]?: Yes  Does patient currently have a [facility] decision maker?: Yes, please see Health Care Proxy section  Does patient have Advance Directives?: Yes  Advance Directives: Living will, Power of  for health care  Primary Contact:  Natanael Birght         Readmission Root Cause  30 Day Readmission: No    Patient Information  Admitted from[de-identified] Home  Mental Status: Alert  During Assessment patient was accompanied by: Not accompanied during assessment  Assessment information provided by[de-identified] Patient  Primary Caregiver: Self  Support Systems: Spouse/significant other, Son  [place] of Residence: [address] do you live in?: [address] [facility] entry access options  Select all that apply : Stairs  Number of steps to enter home  : 1  Do the steps have railings?: Yes  Type of Current Residence: [address] [place] home  Upon entering residence, is there a bedroom on the main floor (no further steps)?: No  A bedroom is located on the following floor levels of residence (select all that apply):: 2nd Floor  Upon entering residence, is there a bathroom on the main floor (no further steps)?: No (has BSC on first floor)  Indicate which floors of current residence have a bathroom (select all the apply):: 2nd Floor  Number of steps to 2nd floor from main floor: One Flight  In the last 12 months, was there a time when you were not able to pay the mortgage or rent on time?: No  In the last 12 months, how many places have you lived?: 1  In the last 12 months, was there a time when you did not have a steady place to sleep or slept in a shelter (including now)?: No  Homeless/housing insecurity resource given?: N/A  Living Arrangements: Lives w/ Spouse/significant other  Is patient a ?: No    Activities of Daily Living Prior to Admission  Functional Status: Independent  Completes ADLs independently?: Yes  Ambulates independently?: Yes  Does patient use assisted devices?: Yes  Assisted Devices (DME) used: Straight Si Ousmane  Does patient currently own DME?: Yes  What DME does the patient currently own?: Usama Reeder  Does patient have a history of Outpatient Therapy (PT/OT)?: No  Does the patient have a history of Short-Term Rehab?: No  Does patient have a history of HHC?: No  Does patient currently have [facility]?: No         Patient Information Continued  Income Source: Pension/assisted  Does patient have prescription coverage?: Yes  Within the past 12 months, you worried that your food would run out before you got the money to buy more : Never true  Within the past 12 months, the food you bought just didn't last and you didn't have money to get more : Never true  Food insecurity resource given?: N/A  Does patient receive dialysis treatments?: No  Does patient have a history of substance abuse?: No  Does patient have a history of Mental Health Diagnosis?: No  Has patient received inpatient treatment related to mental health in the last 2 years?: No         Means of Transportation  Means of Transport to Appts[de-identified] Family transport  In the past 12 months, has lack of transportation kept you from medical appointments or from getting medications?: No  In the past 12 months, has lack of transportation kept you from meetings, work, or from getting things needed for daily living?: No  Was application for public transport provided?: N/A        DISCHARGE DETAILS:    Discharge planning discussed with[de-identified] sent another Jonhna Millan referral for post OHS to [facility], they had accepted at [facility] of Choice: Yes  Comments - Freedom of Choice: Pt was accepted at [facility] for [facility], resubmitted for [facility] postop care  CM contacted family/caregiver?: No- see comments (pt alert)             Contacts  Patient Contacts:  Santos Calderón  Relationship to Patient[de-identified] Family  Contact Method: Phone  Phone Number: [phone] or cell [phone]  Reason/Outcome: Continuity of Care, Emergency Contact, Discharge [address]         Is the patient interested in Norma Blas at discharge?: Yes  [address] requested[de-identified] Nursing, Physical [address] Name[de-identified] [facility] Provider[de-identified] PCP  Home Health Services Needed[de-identified] Heart Failure Management, Evaluate Functional Status and Safety, Gait/ADL Training, Strengthening/Theraputic Exercises to Improve Function, Post-Op Care and Assessment  Homebound Criteria Met[de-identified] Requires the Assistance of Another Person for Safe Ambulation or to Leave the Home, Uses an Assist Device (i e  cane, walker, etc)  Supporting Clincal Findings[de-identified] Limited Endurance, Fatigues Easliy in [employer]    DME Referral Provided  Referral made for DME?: No (has cane and RW)    Other Referral/Resources/Interventions Provided:  Interventions: HHC  Referral Comments: Referral to [facility] as pt wants  Programs[de-identified] CHF               Transport at Discharge : Family                                      Additional Comments: 79[facility] stenosis and mitral regurg  Seen by CTS and is being evaluated for surgery  Reports she is independent, uses cane or RW, lives with  and has son nearby  Requested referral to [facility], was accepted in referral from [facility]

## 2022-12-22 NOTE — ASSESSMENT & PLAN NOTE
Lab Results   Component Value Date    EGFR 56 12/22/2022    EGFR 48 12/21/2022    EGFR 45 12/20/2022    CREATININE 0 92 12/22/2022    CREATININE 1 03 12/21/2022    CREATININE 1 10 12/20/2022   · Baseline creatinine 1-1 1  · Creatinine at baseline  · Monitor

## 2022-12-22 NOTE — ASSESSMENT & PLAN NOTE
Wt Readings from Last 3 Encounters:   12/21/22 52 3 kg (115 lb 6 4 oz)   12/21/22 54 4 kg (119 lb 14 9 oz)   11/02/22 49 4 kg (109 lb)     · Presented to [facility] on 12/20/22 with shortness of breath with exertion and hypoxia noted at home    Evaluation in the ED revealed no hypoxia  · She was noted to be in acute on chronic diastolic heart failure and was begun on IV Lasix 40 mg twice daily  · Echo (7/19/22) - EF 50%(drop from 55% on 6/24/21), abnormal diastolic function, severe aortic stenosis, mild to moderate aortic and pulmonary regurgitation, moderate mitral and tricuspid regurgitation, severely increased pulmonary artery systolic pressure at 75 mmHg  · She was transferred to \[facility]\"" for cardiac surgery evaluation to see if she would be a candidate for TAVR  · Continue Lasix 40 mg IV twice daily  · Monitor I/O, daily weights  · Cardiology/cardiac surgery consulted   · Discussed with cardiac surgery - repeat echo ordered to evaluate aortic valve disease

## 2022-12-23 ENCOUNTER — APPOINTMENT (INPATIENT)
Dept: RADIOLOGY | Facility: HOSPITAL | Age: 87
End: 2022-12-23

## 2022-12-23 PROBLEM — I47.19 ATRIAL TACHYCARDIA: Status: ACTIVE | Noted: 2019-05-21

## 2022-12-23 PROBLEM — I47.1 ATRIAL TACHYCARDIA (HCC): Status: ACTIVE | Noted: 2019-05-21

## 2022-12-23 LAB
ANION GAP SERPL CALCULATED.3IONS-SCNC: 11 MMOL/L (ref 4–13)
ATRIAL RATE: 227 BPM
BACTERIA SPEC BFLD CULT: NO GROWTH
BUN SERPL-MCNC: 22 MG/DL (ref 5–25)
CALCIUM SERPL-MCNC: 9.2 MG/DL (ref 8.3–10.1)
CHLORIDE SERPL-SCNC: 93 MMOL/L (ref 96–108)
CO2 SERPL-SCNC: 25 MMOL/L (ref 21–32)
CREAT SERPL-MCNC: 1.02 MG/DL (ref 0.6–1.3)
ERYTHROCYTE [DISTWIDTH] IN BLOOD BY AUTOMATED COUNT: 14 % (ref 11.6–15.1)
GFR SERPL CREATININE-BSD FRML MDRD: 49 ML/MIN/1.73SQ M
GLUCOSE SERPL-MCNC: 103 MG/DL (ref 65–140)
GRAM STN SPEC: NORMAL
HCT VFR BLD AUTO: 38.1 % (ref 34.8–46.1)
HGB BLD-MCNC: 12.9 G/DL (ref 11.5–15.4)
MCH RBC QN AUTO: 32.7 PG (ref 26.8–34.3)
MCHC RBC AUTO-ENTMCNC: 33.9 G/DL (ref 31.4–37.4)
MCV RBC AUTO: 97 FL (ref 82–98)
PLATELET # BLD AUTO: 179 THOUSANDS/UL (ref 149–390)
PMV BLD AUTO: 8.6 FL (ref 8.9–12.7)
POTASSIUM SERPL-SCNC: 2.8 MMOL/L (ref 3.5–5.3)
POTASSIUM SERPL-SCNC: 3.8 MMOL/L (ref 3.5–5.3)
QRS AXIS: -47 DEGREES
QRSD INTERVAL: 92 MS
QT INTERVAL: 392 MS
QTC INTERVAL: 505 MS
RBC # BLD AUTO: 3.94 MILLION/UL (ref 3.81–5.12)
SODIUM SERPL-SCNC: 129 MMOL/L (ref 135–147)
T WAVE AXIS: 108 DEGREES
VENTRICULAR RATE: 100 BPM
WBC # BLD AUTO: 7.02 THOUSAND/UL (ref 4.31–10.16)

## 2022-12-23 RX ORDER — POTASSIUM CHLORIDE 20 MEQ/1
20 TABLET, EXTENDED RELEASE ORAL ONCE
Status: COMPLETED | OUTPATIENT
Start: 2022-12-23 | End: 2022-12-23

## 2022-12-23 RX ORDER — IODIXANOL 320 MG/ML
100 INJECTION, SOLUTION INTRAVASCULAR
Status: COMPLETED | OUTPATIENT
Start: 2022-12-23 | End: 2022-12-23

## 2022-12-23 RX ORDER — POTASSIUM CHLORIDE 14.9 MG/ML
20 INJECTION INTRAVENOUS ONCE
Status: DISCONTINUED | OUTPATIENT
Start: 2022-12-23 | End: 2022-12-23

## 2022-12-23 RX ORDER — POTASSIUM CHLORIDE 20 MEQ/1
40 TABLET, EXTENDED RELEASE ORAL ONCE
Status: COMPLETED | OUTPATIENT
Start: 2022-12-23 | End: 2022-12-23

## 2022-12-23 RX ADMIN — ACETAMINOPHEN 650 MG: 325 TABLET, FILM COATED ORAL at 20:29

## 2022-12-23 RX ADMIN — IODIXANOL 100 ML: 320 INJECTION, SOLUTION INTRAVASCULAR at 13:22

## 2022-12-23 RX ADMIN — POTASSIUM CHLORIDE 20 MEQ: 1500 TABLET, EXTENDED RELEASE ORAL at 11:36

## 2022-12-23 RX ADMIN — METOPROLOL TARTRATE 50 MG: 50 TABLET, FILM COATED ORAL at 08:38

## 2022-12-23 RX ADMIN — POTASSIUM CHLORIDE 40 MEQ: 1500 TABLET, EXTENDED RELEASE ORAL at 09:00

## 2022-12-23 RX ADMIN — ENOXAPARIN SODIUM 30 MG: 30 INJECTION SUBCUTANEOUS at 08:38

## 2022-12-23 RX ADMIN — METOPROLOL TARTRATE 50 MG: 50 TABLET, FILM COATED ORAL at 20:29

## 2022-12-23 RX ADMIN — POTASSIUM CHLORIDE 20 MEQ: 14.9 INJECTION, SOLUTION INTRAVENOUS at 09:00

## 2022-12-23 RX ADMIN — POTASSIUM CHLORIDE 40 MEQ: 1500 TABLET, EXTENDED RELEASE ORAL at 08:38

## 2022-12-23 RX ADMIN — FUROSEMIDE 40 MG: 10 INJECTION, SOLUTION INTRAMUSCULAR; INTRAVENOUS at 08:35

## 2022-12-23 RX ADMIN — ALPRAZOLAM 0.25 MG: 0.25 TABLET ORAL at 20:29

## 2022-12-23 RX ADMIN — APIXABAN 2.5 MG: 2.5 TABLET, FILM COATED ORAL at 18:07

## 2022-12-23 RX ADMIN — FUROSEMIDE 40 MG: 10 INJECTION, SOLUTION INTRAMUSCULAR; INTRAVENOUS at 18:09

## 2022-12-23 RX ADMIN — ALPRAZOLAM 0.25 MG: 0.25 TABLET ORAL at 08:38

## 2022-12-23 NOTE — ASSESSMENT & PLAN NOTE
Lab Results   Component Value Date    EGFR 49 12/23/2022    EGFR 56 12/22/2022    EGFR 48 12/21/2022    CREATININE 1 02 12/23/2022    CREATININE 0 92 12/22/2022    CREATININE 1 03 12/21/2022   · Baseline creatinine 1-1 1  · Creatinine at baseline  · Monitor

## 2022-12-23 NOTE — ASSESSMENT & PLAN NOTE
· EKG 12/22 with sinus rhythm, marked sinus arrhythmia with 1st degree AV block with PVCs   · Continue to monitor telemetry, some episodes of possible atrial tachycardia   · Heart rhythm irregular this AM, possibility of paroxysmal a  Fib/flutter    · Cardiology following,  · BB uptitrated to lopressor 50 mg BID on 12/22, appreciate ongoing cardiology recommendations  · Replete electrolytes

## 2022-12-23 NOTE — ASSESSMENT & PLAN NOTE
· CT chest (12/20/22) - Moderate right and small left-sided pleural effusions with associated compressive atelectasis    · In the setting of acute on chronic diastolic heart failure  · S/p 900 cc thoracentesis by IR on 12/20  · IV diuresis as above  · Respiratory status stable

## 2022-12-23 NOTE — ASSESSMENT & PLAN NOTE
· POA, likely multifactorial in setting of acute CHF, pleural effusions, severe AS  · Secondary to patient with hypoxia noted outpatient with spO2 in the 80s, requiring supplemental O2 at 2 L NC saturating mid 90s   · Continue IV diuresis with lasix 40 mg BID per cardiology   · Ongoing CT surgery recommendations for TAVR  · S/p IR thoracentesis on 12/20  · Pre-crenshaw cultures negative thus far  · Continue to wean supplemental O2 as able

## 2022-12-23 NOTE — ASSESSMENT & PLAN NOTE
· Chronic with sodium levels of 127-130 as an outpatient  · Currently with acute on chronic diastolic heart failure on IV diuresis, continue   · Sodium 129 today, improving and stable, continue to monitor   · If worsening or no additional improvement consider nephrology consultation   · Trend BMP

## 2022-12-23 NOTE — ASSESSMENT & PLAN NOTE
· Pt initially presented to [address] [facility] [facility] for volume overload, shortness of breath  · Transferred to [facility] for evaluation for TAVR,  · ECHO 12/22 with severely reduced mobility, aortic valve has paradoxical low flow low gradient severe AS  · CT surg following,  · Pre-operative work up ongoing, carotid US with < 50% stenosis bilaterally  · Obtain CTA c/a/p  · Will likely need ongoing TAVR workup with cardiac cath outpatient once medically optimized   · Additional recommendations as noted below

## 2022-12-23 NOTE — ASSESSMENT & PLAN NOTE
· K+ 2 8 today   · Likely in setting of IV diuresis, currently on KDUR 40 mEq daily supplementation   · Give additional total 60 mEq PO KDUR today, pt unable to tolerate IV due to burning sensation  · Check K+ later this afternoon  · Telemetry monitoring  · Recheck BMP and mag in the AM

## 2022-12-23 NOTE — ASSESSMENT & PLAN NOTE
Wt Readings from Last 3 Encounters:   12/23/22 50 kg (110 lb 3 7 oz)   12/21/22 54 4 kg (119 lb 14 9 oz)   11/02/22 49 4 kg (109 lb)     · Presented to [facility] on 12/20/22 with shortness of breath on exertion and hypoxia noted at home      · She was noted to be in acute on chronic diastolic heart failure and was begun on IV Lasix 40 mg twice daily  · Echo (7/19/22) - EF 50%(drop from 55% on 6/24/21), abnormal diastolic function, severe aortic stenosis, mild to moderate aortic and pulmonary regurgitation, moderate mitral and tricuspid regurgitation, severely increased pulmonary artery systolic pressure at 75 mmHg  · She was transferred to [facility] for cardiac surgery evaluation to see if she would be a candidate for TAVR  · Repeat ECHO performed here with severe AS with paradoxical low flow low gradient   · Cardiology following,  · Continue Lasix 40 mg IV BID  · Monitor I/O, daily weights (down 2 lbs today)  · Cardiology/cardiac surgery following, appreciate ongoing recommendations

## 2022-12-23 NOTE — PROGRESS NOTES
[facility]  Progress Note - Marni Torrez 1935, 80 y o  female MRN: [id number]  Unit/Bed#: [facility] 402-01 Encounter: [id number]  Primary Care Provider: Kareem Sanchez DO   Date and time admitted to hospital: 12/21/2022  3:25 PM      DOS: 12/23/2022  * Severe aortic stenosis  Assessment & Plan  · Pt initially presented to [address] [facility] for volume overload, shortness of breath  · Transferred to [facility] for evaluation for TAVR,  · ECHO 12/22 with severely reduced mobility, aortic valve has paradoxical low flow low gradient severe AS  · CT surg following,  · Pre-operative work up ongoing, carotid US with < 50% stenosis bilaterally  · Obtain CTA c/a/p  · Will likely need ongoing TAVR workup with cardiac cath outpatient once medically optimized   · Additional recommendations as noted below     Acute respiratory failure with hypoxia ([facility] [address] )  Assessment & Plan  · POA, likely multifactorial in setting of acute CHF, pleural effusions, severe AS  · Secondary to patient with hypoxia noted outpatient with spO2 in the 80s, requiring supplemental O2 at 2 L NC saturating mid 90s   · Continue IV diuresis with lasix 40 mg BID per cardiology   · Ongoing CT surgery recommendations for TAVR  · S/p IR thoracentesis on 12/20  · Pre-crenshaw cultures negative thus far  · Continue to wean supplemental O2 as able    Acute on chronic diastolic CHF (congestive heart failure) ([facility] [address] )  Assessment & Plan  Wt Readings from Last 3 Encounters:   12/23/22 50 kg (110 lb 3 7 oz)   12/21/22 54 4 kg (119 lb 14 9 oz)   11/02/22 49 4 kg (109 lb)     · Presented to [facility] on 12/20/22 with shortness of breath on exertion and hypoxia noted at home      · She was noted to be in acute on chronic diastolic heart failure and was begun on IV Lasix 40 mg twice daily  · Echo (7/19/22) - EF 50%(drop from 55% on 6/24/21), abnormal diastolic function, severe aortic stenosis, mild to moderate aortic and pulmonary regurgitation, moderate mitral and tricuspid regurgitation, severely increased pulmonary artery systolic pressure at 75 mmHg  · She was transferred to [facility] for cardiac surgery evaluation to see if she would be a candidate for TAVR  · Repeat ECHO performed here with severe AS with paradoxical low flow low gradient   · Cardiology following,  · Continue Lasix 40 mg IV BID  · Monitor I/O, daily weights (down 2 lbs today)  · Cardiology/cardiac surgery following, appreciate ongoing recommendations      Pleural effusion  Assessment & Plan  · CT chest (12/20/22) - Moderate right and small left-sided pleural effusions with associated compressive atelectasis    · In the setting of acute on chronic diastolic heart failure  · S/p 900 cc thoracentesis by IR on 12/20  · IV diuresis as above  · Respiratory status stable    Hyperthyroidism  Assessment & Plan  · Hx of hyperthyroidism maintained on Methimazole 5 mg daily as an outpatient   · TSH 10 on admission, free T4 WNL  · Endocrinology following,  · Plan to hold methimazole and repeat TFTs in 2 days  · PTA regimen of Methimazole 5 mg daily as an outpatient     Stage 3a chronic kidney disease [facility])  Assessment & Plan  Lab Results   Component Value Date    EGFR 49 12/23/2022    EGFR 56 12/22/2022    EGFR 48 12/21/2022    CREATININE 1 02 12/23/2022    CREATININE 0 92 12/22/2022    CREATININE 1 03 12/21/2022   · Baseline creatinine 1-1 1  · Creatinine at baseline  · Monitor    Generalized anxiety disorder  Assessment & Plan  · Continue PRN Alprazolam 0 25 mg Q6H, confirmed on PDMP  · Mentation stable     Atrial tachycardia (HCC)  Assessment & Plan  · EKG 12/22 with sinus rhythm, marked sinus arrhythmia with 1st degree AV block with PVCs   · Continue to monitor telemetry, some episodes of possible atrial tachycardia   · Heart rhythm irregular this AM, possibility of paroxysmal a  Fib/flutter    · Cardiology following,  · BB uptitrated to lopressor 50 mg BID on 12/22, appreciate ongoing cardiology recommendations  · Replete electrolytes     Essential hypertension  Assessment & Plan  · BP currently stable   · PTA regimen of Atenolol/Chlorthalidone (100/25 mg) 0 5 tablet daily, Amlodipine 10 mg daily  · Home medications on hold   · Currently on IV lasix 40 mg BID and Lopressor 50 mg BID per cardiology   · Monitor BP    Hyponatremia  Assessment & Plan  · Chronic with sodium levels of 127-130 as an outpatient  · Currently with acute on chronic diastolic heart failure on IV diuresis, continue   · Sodium 129 today, improving and stable, continue to monitor   · If worsening or no additional improvement consider nephrology consultation   · Trend BMP    Hypokalemia  Assessment & Plan  · K+ 2 8 today   · Likely in setting of IV diuresis, currently on KDUR 40 mEq daily supplementation   · Give additional total 60 mEq PO KDUR today, pt unable to tolerate IV due to burning sensation  · Check K+ later this afternoon  · Telemetry monitoring  · Recheck BMP and mag in the AM    VTE Pharmacologic Prophylaxis: VTE Score: 4 Moderate Risk (Score 3-4) - Pharmacological DVT Prophylaxis Ordered: enoxaparin (Lovenox)  Patient Centered Rounds: I evaluated the patient without nursing staff present due to speaking to nurse outside patient's room   Discussions with Specialists or Other Care Team Provider: Discussed with RN, CM, cardiology and reviewed previous notes     Education and Discussions with Family / Patient: Will update family members per patient request       Time Spent for Care: 30 minutes  More than 50% of total time spent on counseling and coordination of care as described above      Current Length of Stay: 2 day(s)  Current Patient Status: Inpatient   Certification Statement: The patient will continue to require additional inpatient hospital stay due to electrolyte disturbances, IV diuretics in setting of acute CHF, arrythmia cardiac  Discharge Plan: Anticipate discharge in 48-72 hrs to home with home services  Code Status: Level 1 - Full Code    Subjective:   Pt reports that she is having burning pain with the IV potassium and was wondering if it could be stopped  Reports it is making her stomach upset  Denies any chest pain or shortness of breath  Objective:     Vitals:   Temp (24hrs), Av 5 °F (36 9 °C), Min:97 7 °F (36 5 °C), Max:98 8 °F (37 1 °C)    Temp:  [97 7 °F (36 5 °C)-98 8 °F (37 1 °C)] 97 7 °F (36 5 °C)  HR:  [] 80  Resp:  [16-18] 16  BP: ()/(64-93) 115/64  SpO2:  [92 %-97 %] 97 %  Body mass index is 23 04 kg/m²  Input and Output Summary (last 24 hours): Intake/Output Summary (Last 24 hours) at 2022 1110  Last data filed at 2022 0801  Gross per 24 hour   Intake 1340 ml   Output 1800 ml   Net -460 ml       Physical Exam:   Physical Exam  Vitals reviewed  Constitutional:       General: She is not in acute distress  Appearance: She is not toxic-appearing  Comments: Pt is in no acute distress lying in her hospital chair resting comfortably  On 2 L NC saturating mid 90s    HENT:      Head: Normocephalic and atraumatic  Cardiovascular:      Rate and Rhythm: Normal rate  Rhythm irregular  Pulmonary:      Effort: Pulmonary effort is normal  No respiratory distress  Abdominal:      General: Bowel sounds are normal  There is no distension  Palpations: Abdomen is soft  Skin:     General: Skin is warm and dry  Findings: No erythema  Neurological:      Mental Status: She is alert            Additional Data:     Labs:  Results from last 7 days   Lab Units 22  04522  0451   WBC Thousand/uL 7 02 6 00   HEMOGLOBIN g/dL 12 9 11 9   HEMATOCRIT % 38 1 35 6   PLATELETS Thousands/uL 179 155   NEUTROS PCT %  --  69   LYMPHS PCT %  --  18   MONOS PCT %  --  11   EOS PCT %  --  2     Results from last 7 days   Lab Units 22  0451 22  0441 22  0451   SODIUM mmol/L 129*   < > 127*   POTASSIUM mmol/L 2 8*   < > 3 6   CHLORIDE mmol/L 93*   < > 94*   CO2 mmol/L 25   < > 24   BUN mg/dL 22   < > 19   CREATININE mg/dL 1 02   < > 1 03   ANION GAP mmol/L 11   < > 9   CALCIUM mg/dL 9 2   < > 9 1   ALBUMIN g/dL  --   --  3 2*   TOTAL BILIRUBIN mg/dL  --   --  0 78   ALK PHOS U/L  --   --  73   ALT U/L  --   --  28   AST U/L  --   --  31   GLUCOSE RANDOM mg/dL 103   < > 107    < > = values in this interval not displayed  Lines/Drains:  Invasive Devices     Peripheral Intravenous Line  Duration           Peripheral IV 12/21/22 Dorsal (posterior); Right Hand 2 days    Peripheral IV 12/23/22 Left;Proximal;Ventral (anterior) Forearm <1 day          Drain  Duration           External Urinary Catheter <1 day                  Telemetry:  Telemetry Orders (From admission, onward)             48 Hour Telemetry Monitoring  Continuous x 48 hours        References:    Telemetry Guidelines   Question:  Reason for 48 Hour Telemetry  Answer:  Arrhythmias Requiring Medical Therapy (eg  SVT, Vtach/fib, Bradycardia, Uncontrolled A-fib)                 Telemetry Reviewed: NSR with paroxysmal evidence of possible a  fib/flutter this AM  Indication for Continued Telemetry Use: Arrthymias requiring medical therapy             Imaging: Reviewed radiology reports from this admission including: carotid US    Recent Cultures (last 7 days):   Results from last 7 days   Lab Units 12/20/22  1438   GRAM STAIN RESULT  No Polys or Bacteria seen   BODY FLUID CULTURE, STERILE  No growth       Last 24 Hours Medication List:   Current Facility-Administered Medications   Medication Dose Route Frequency Provider Last Rate   • acetaminophen  650 mg Oral Q4H PRN Alvina Santa MD     • ALPRAZolam  0 25 mg Oral Q6H PRN Alvina Santa MD     • enoxaparin  30 mg Subcutaneous Daily Alvina Santa MD     • furosemide  40 mg Intravenous BID (diuretic) Alvina Santa MD     • metoprolol tartrate  50 mg Oral Q12H [address], CRNP     • potassium chloride  20 mEq Oral Once Kaila Rodarte PA-C     • potassium chloride  40 mEq Oral Daily Amisha Martinez MD          Today, Patient Was Seen By: Crow Isidro PA-C    **Please Note: This note may have been constructed using a voice recognition system  **

## 2022-12-23 NOTE — ASSESSMENT & PLAN NOTE
· Hx of hyperthyroidism maintained on Methimazole 5 mg daily as an outpatient   · TSH 10 on admission, free T4 WNL  · Endocrinology following,  · Plan to hold methimazole and repeat TFTs in 2 days  · PTA regimen of Methimazole 5 mg daily as an outpatient

## 2022-12-23 NOTE — PROGRESS NOTES
Progress Note - Cardiology   Garfield Bishop 80 y o  female MRN: [id number]  Unit/Bed#: [facility] 402-01 Encounter: [id number]    Assessment and Plan:    #Severe Aortic stenosis - Paradoxical low flow low gradient  #Acute hypoxic respiratory failure on 2 L nasal cannula  #Acute on chronic HFpEF  #Atrial fibrillation/flutter  #CKD 3  #Severe pulmonary HTN  #Moderate mitral regurgitation  #Hypokalemia  This is a frail 80year old F presenting with decompensated diastolic HF in the setting of paradoxical severe LFLG aortic stenosis by ECHO today  Admitted for TAVR evaluation, with on-going TAVR workup pending  Plan for outpatient follow up with CT surgery, at which time a cardiac catheterization will also be scheduled  Also noted to have atrial fibrillation and flutter with variable block noted on telemetry overnight into today, although poor rhythm baseline makes assessment difficult by telemetry  An EKG has been ordered and is pending to document this rhythm  RN was notified regarding the need for an EKG now  In this setting, I recommend initiating dose reduced Eliquis 2 5mg BID for primary stroke prevention  Hypokalemia, potassium repletion is ongoing  She remains on 2 L nasal cannula, and is not on oxygen at home  Recommendations:  - Needs continued IV diuresis, although I recommend holding one dose to allow for potassium supplementation   - BID BMP until her potassium is repleted  Goal potassium >4   - Start Eliquis 2 5mg BID for primary stroke prevention in the setting of atrial fibrillation/flutter noted on telemetry  - On-going TAVR workup per CT-surgery  Outpatient cardiac cath  Subjective: Patient seen and examined  No acute cardiopulmonary complaints, denies chest pain, denies shortness of breath  States that her breathing is improved today  Her main complaint at the time of my evaluation is upset stomach from oral potassium  Remains on supplemental O2       Objective:     Vitals: /66   Pulse 79   Temp 97 5 °F (36 4 °C)   Resp 15   Ht 4' 10" (1 473 m)   Wt 50 kg (110 lb 3 7 oz)   SpO2 97%   BMI 23 04 kg/m²   Vitals:    12/22/22 1100 12/23/22 0537   Weight: 50 8 kg (112 lb) 50 kg (110 lb 3 7 oz)     Orthostatic Blood Pressures    Flowsheet Row Most Recent Value   Blood Pressure 110/66 filed at 12/23/2022 1121   Patient Position - Orthostatic VS Lying filed at 12/23/2022 0232            Intake/Output Summary (Last 24 hours) at 12/23/2022 1222  Last data filed at 12/23/2022 0801  Gross per 24 hour   Intake 1160 ml   Output 1750 ml   Net -590 ml       Invasive Devices     Peripheral Intravenous Line  Duration           Peripheral IV 12/21/22 Dorsal (posterior); Right Hand 2 days    Peripheral IV 12/23/22 Left;Proximal;Ventral (anterior) Forearm <1 day          Drain  Duration           External Urinary Catheter <1 day              Physical Examination:  Gen: A&Ox3  Very hard of hearing  HEENT: NC/AT,  no JVD  CVS: Irregularly irregular, mild tachycardia  S1 normal   S2 soft  Soft systolic murmur with no distinct radiation, 2/6  No audible peak  Resp: Bilateral soft rales in the bases  No wheezing  Abd: Soft, non-tender, non-distended  MSK: Trace edema  Lab Results: I have personally reviewed pertinent lab results  Imaging: I have personally reviewed pertinent reports  Telemetry: Atrial flutter with variable block      Hakeem Hoffman MD

## 2022-12-23 NOTE — PROGRESS NOTES
Progress Note - Cardiothoracic Surgery   Екатерина Sotelo 80 y o  female MRN: [id number]  Unit/Bed#: [facility] 402-01 Encounter: [id number]      24 Hour Events: ECHO done yesterday shows low flow low gradient AS  Patient remains on NC  No complaints    Medications:   Scheduled Meds:  Current Facility-Administered Medications   Medication Dose Route Frequency Provider Last Rate   • acetaminophen  650 mg Oral Q4H PRN Lizzy Painter MD     • ALPRAZolam  0 25 mg Oral Q6H PRN Lizzy Painter MD     • enoxaparin  30 mg Subcutaneous Daily Lizzy Painter MD     • furosemide  40 mg Intravenous BID (diuretic) Lizzy Painter MD     • metoprolol tartrate  50 mg Oral Q12H [address] LYNN Meza     • potassium chloride  40 mEq Oral Daily Lizzy Painter MD       Continuous Infusions:     Results:   Results from last 7 days   Lab Units 12/23/22  0451 12/21/22  0451 12/20/22  1022   WBC Thousand/uL 7 02 6 00 5 96   HEMOGLOBIN g/dL 12 9 11 9 11 8   HEMATOCRIT % 38 1 35 6 35 1   PLATELETS Thousands/uL 179 155 150     Results from last 7 days   Lab Units 12/23/22  0451 12/22/22  0441 12/21/22  0451   POTASSIUM mmol/L 2 8* 3 0* 3 6   CHLORIDE mmol/L 93* 98 94*   CO2 mmol/L 25 22 24   BUN mg/dL 22 19 19   CREATININE mg/dL 1 02 0 92 1 03   CALCIUM mg/dL 9 2 9 0 9 1           Studies:     Cardiac Catheterization: will need    Echocardiogram 12/22:   •  Left Ventricle: Left ventricular cavity size is normal  Wall thickness is mildly increased  There is mild concentric hypertrophy  LVMI (LV Mass Indexed to Body Surface Area) = 104 11 g/m2 The left ventricular ejection fraction is 50%  Systolic function is low normal  Wall motion is normal   •  Right Ventricle: Right ventricular cavity size is mildly dilated  Systolic function is mildly reduced  •  Left Atrium: The atrium is severely dilated (>48 mL/m2)  •  Right Atrium: The atrium is mildly dilated    •  Atrial Septum: There is a small patent foramen ovale confirmed at rest with predominant left to right shunting using color Doppler  The septum bows into the right atrium, suggesting increased left atrial pressure  •  Aortic Valve: The aortic valve is trileaflet  The leaflets are moderately thickened  The leaflets are moderately calcified  There is severely reduced mobility  There is mild regurgitation  The pressure half time is 520 when averaged over 3 beats  The aortic valve peak velocity is 2 94 m/s  The aortic valve mean gradient is 24 mmHg  The dimensionless velocity index is 0 22  The aortic valve area is 0 50 cm2  The aortic valve has paradoxical low flow low gradient severe AS  •  Mitral Valve: There is mild calcification with moderate chordal involvement  There is moderate annular calcification  There is moderate regurgitation  •  Tricuspid Valve: There is moderate regurgitation  The right ventricular systolic pressure is severely elevated  The estimated right ventricular systolic pressure is 05 82 mmHg  •  Pulmonic Valve: There is mild regurgitation  •  Pericardium: There is a moderately sized left pleural effusion        CT chest: 1  Moderate right and small left-sided pleural effusions with associated compressive atelectasis   Subsegmental lingular and right middle lobe atelectasis  2  Cardiomegaly  3  Calcified mediastinal and bilateral hilar nodes are again noted  Carotids:  <50% b/l, Vertebral artery flow is antegrade  There is no significant subclavian artery  disease    Vitals:   Vitals:    12/22/22 2209 12/22/22 2209 12/23/22 0232 12/23/22 0537   BP: 115/81 115/81 136/74    BP Location:  Right arm Right arm    Pulse: (!) 130 (!) 129 82    Resp:  18 16    Temp: 98 5 °F (36 9 °C) 98 5 °F (36 9 °C) 97 7 °F (36 5 °C)    TempSrc:  Oral Oral    SpO2: 92% 92% 95%    Weight:    50 kg (110 lb 3 7 oz)   Height:           Physical Exam:    HEENT/NECK:  Normocephalic  Atraumatic   no jugular venous distention      Cardiac: Regular rate and rhythm  Pulmonary:  Breath sounds clear bilaterally and No rales/rhonchi/wheezes  Abdomen:  Non-tender, Non-distended and Normal bowel sounds  Extremities: Extremities warm/dry and No edema B/L  Neuro: Alert and oriented X 3, Sensation is grossly intact and No focal deficits  Skin: Warm/Dry, without rashes or lesions  Assessment:    Severe aortic stenosis; Ongoing TAVR workup    Plan:  ECHO shows low flow low gradient severe AS  Patient agreeable to proceed with surgical workup, carotids ok, will obtain CTA today  Will continue remaning TAVR workup (including cardiac cath) as outpatient once medically optimized      SIGNATURE: Jesica Seth PA-C  DATE: December 23, 2022  TIME: 7:44 AM    * This note was completed in part utilizing [employer] direct voice recognition software  Grammatical errors, random word insertion, spelling mistakes, and incomplete sentences may be an occasional consequence of the system secondary to software limitations, ambient noise and hardware issues  At the time of dictation, efforts were made to edit, clarify and /or correct errors  Please read the chart carefully and recognize, using context, where substitutions have occurred  If you have any questions or concerns about the context, text or information contained within the body of this dictation, please contact myself, the provider, for further clarification

## 2022-12-23 NOTE — ASSESSMENT & PLAN NOTE
· BP currently stable   · PTA regimen of Atenolol/Chlorthalidone (100/25 mg) 0 5 tablet daily, Amlodipine 10 mg daily  · Home medications on hold   · Currently on IV lasix 40 mg BID and Lopressor 50 mg BID per cardiology   · Monitor BP

## 2022-12-24 LAB
ANION GAP SERPL CALCULATED.3IONS-SCNC: 12 MMOL/L (ref 4–13)
BUN SERPL-MCNC: 26 MG/DL (ref 5–25)
CALCIUM SERPL-MCNC: 9 MG/DL (ref 8.3–10.1)
CHLORIDE SERPL-SCNC: 94 MMOL/L (ref 96–108)
CO2 SERPL-SCNC: 27 MMOL/L (ref 21–32)
CREAT SERPL-MCNC: 1.18 MG/DL (ref 0.6–1.3)
GFR SERPL CREATININE-BSD FRML MDRD: 41 ML/MIN/1.73SQ M
GLUCOSE SERPL-MCNC: 98 MG/DL (ref 65–140)
MAGNESIUM SERPL-MCNC: 1.5 MG/DL (ref 1.6–2.6)
POTASSIUM SERPL-SCNC: 2.9 MMOL/L (ref 3.5–5.3)
SODIUM SERPL-SCNC: 133 MMOL/L (ref 135–147)
T3 SERPL-MCNC: 0.7 NG/ML (ref 0.6–1.8)
T4 FREE SERPL-MCNC: 1.05 NG/DL (ref 0.76–1.46)
TSH SERPL DL<=0.05 MIU/L-ACNC: 7.51 UIU/ML (ref 0.45–4.5)

## 2022-12-24 RX ORDER — MAGNESIUM SULFATE HEPTAHYDRATE 40 MG/ML
2 INJECTION, SOLUTION INTRAVENOUS ONCE
Status: COMPLETED | OUTPATIENT
Start: 2022-12-24 | End: 2022-12-24

## 2022-12-24 RX ORDER — POTASSIUM CHLORIDE 20 MEQ/1
40 TABLET, EXTENDED RELEASE ORAL 2 TIMES DAILY
Status: DISCONTINUED | OUTPATIENT
Start: 2022-12-24 | End: 2022-12-24

## 2022-12-24 RX ORDER — POTASSIUM CHLORIDE 20 MEQ/1
40 TABLET, EXTENDED RELEASE ORAL
Status: COMPLETED | OUTPATIENT
Start: 2022-12-24 | End: 2022-12-24

## 2022-12-24 RX ORDER — POTASSIUM CHLORIDE 14.9 MG/ML
20 INJECTION INTRAVENOUS
Status: DISCONTINUED | OUTPATIENT
Start: 2022-12-24 | End: 2022-12-24

## 2022-12-24 RX ORDER — SPIRONOLACTONE 25 MG/1
25 TABLET ORAL DAILY
Status: DISCONTINUED | OUTPATIENT
Start: 2022-12-24 | End: 2022-12-25 | Stop reason: HOSPADM

## 2022-12-24 RX ADMIN — METOPROLOL TARTRATE 75 MG: 50 TABLET, FILM COATED ORAL at 20:28

## 2022-12-24 RX ADMIN — METOPROLOL TARTRATE 50 MG: 50 TABLET, FILM COATED ORAL at 08:56

## 2022-12-24 RX ADMIN — FUROSEMIDE 40 MG: 10 INJECTION, SOLUTION INTRAMUSCULAR; INTRAVENOUS at 08:56

## 2022-12-24 RX ADMIN — ALPRAZOLAM 0.25 MG: 0.25 TABLET ORAL at 20:28

## 2022-12-24 RX ADMIN — APIXABAN 2.5 MG: 2.5 TABLET, FILM COATED ORAL at 17:42

## 2022-12-24 RX ADMIN — APIXABAN 2.5 MG: 2.5 TABLET, FILM COATED ORAL at 08:56

## 2022-12-24 RX ADMIN — POTASSIUM CHLORIDE 40 MEQ: 1500 TABLET, EXTENDED RELEASE ORAL at 12:15

## 2022-12-24 RX ADMIN — FUROSEMIDE 40 MG: 10 INJECTION, SOLUTION INTRAMUSCULAR; INTRAVENOUS at 17:41

## 2022-12-24 RX ADMIN — SPIRONOLACTONE 25 MG: 25 TABLET ORAL at 12:15

## 2022-12-24 RX ADMIN — POTASSIUM CHLORIDE 40 MEQ: 1500 TABLET, EXTENDED RELEASE ORAL at 08:56

## 2022-12-24 RX ADMIN — ACETAMINOPHEN 650 MG: 325 TABLET, FILM COATED ORAL at 20:28

## 2022-12-24 RX ADMIN — POTASSIUM CHLORIDE 40 MEQ: 1500 TABLET, EXTENDED RELEASE ORAL at 17:41

## 2022-12-24 RX ADMIN — MAGNESIUM SULFATE HEPTAHYDRATE 2 G: 40 INJECTION, SOLUTION INTRAVENOUS at 08:56

## 2022-12-24 NOTE — ASSESSMENT & PLAN NOTE
· K+ 2 9 today   · Likely in setting of IV diuresis,  · pt unable to tolerate IV due to burning sensation  · K supplementation po, increase to TID today  · Keep Mg close to 2   · Spironolactone added    · Telemetry monitoring  · Recheck BMP and mag in the AM

## 2022-12-24 NOTE — QUICK NOTE
Chart reviewed  Repeat TFTs noted  TSH improved to 7 51 (from 10 prior) with free T4 1 05  Plan: Continue to hold methimazole, with close monitoring of TSH and free T4 levels

## 2022-12-24 NOTE — ASSESSMENT & PLAN NOTE
Lab Results   Component Value Date    EGFR 41 12/24/2022    EGFR 49 12/23/2022    EGFR 56 12/22/2022    CREATININE 1 18 12/24/2022    CREATININE 1 02 12/23/2022    CREATININE 0 92 12/22/2022   · Baseline creatinine 1-1 1  · Creatinine at baseline  · Monitor

## 2022-12-24 NOTE — ASSESSMENT & PLAN NOTE
· EKG 12/22 with sinus rhythm, marked sinus arrhythmia with 1st degree AV block with PVCs   · Continue to monitor telemetry, some episodes of possible atrial tachycardia   · Heart rhythm irregular this AM, possibility of paroxysmal a  Fib/flutter    · Cardiology following,  · BB uptitrated to lopressor 75 mg BID on 12/24, appreciate ongoing cardiology recommendations  · Replete electrolytes

## 2022-12-24 NOTE — PROGRESS NOTES
[facility]  Progress Note - Jese Rayo 1935, 80 y o  female MRN: [id number]  Unit/Bed#: [facility] 402-01 Encounter: [id number]  Primary Care Provider: Marybel Kidd DO   Date and time admitted to hospital: 12/21/2022  3:25 PM    Acute on chronic diastolic CHF (congestive heart failure) [facility])  Assessment & Plan  Wt Readings from Last 3 Encounters:   12/24/22 47 1 kg (103 lb 13 4 oz)   12/21/22 54 4 kg (119 lb 14 9 oz)   11/02/22 49 4 kg (109 lb)     · Presented to [address] [place] [address] on 12/20/22 with shortness of breath on exertion and hypoxia noted at home  · She was noted to be in acute on chronic diastolic heart failure and was begun on IV Lasix 40 mg twice daily  · Echo (7/19/22) - EF 50%(drop from 55% on 6/24/21), abnormal diastolic function, severe aortic stenosis, mild to moderate aortic and pulmonary regurgitation, moderate mitral and tricuspid regurgitation, severely increased pulmonary artery systolic pressure at 75 mmHg  · She was transferred to [facility] for cardiac surgery evaluation to see if she would be a candidate for TAVR  · Repeat ECHO performed here with severe AS with paradoxical low flow low gradient   · Cardiology following,  · Continue Lasix 40 mg IV BID, helpfully will transition to PO tomorrow  · Monitor I/O, daily weights (down 7 lbs), negative more than 1 L  · Spironolactone added by cardiology on 12/24  · Keep K close to 4 and Mg close to 2  · Cardiology/cardiac surgery following, appreciate ongoing recommendations      Hypokalemia  Assessment & Plan  · K+ 2 9 today   · Likely in setting of IV diuresis,  · pt unable to tolerate IV due to burning sensation  · K supplementation po, increase to TID today  · Keep Mg close to 2   · Spironolactone added    · Telemetry monitoring  · Recheck BMP and mag in the AM    * Severe aortic stenosis  Assessment & Plan  · Pt initially presented to [address] [facility] for volume overload, shortness of breath  · Transferred to hospitals for evaluation for TAVR,  · ECHO 12/22 with severely reduced mobility, aortic valve has paradoxical low flow low gradient severe AS  · CT surg following,  · Pre-operative work up ongoing, carotid US with < 50% stenosis bilaterally  · Obtain CTA c/a/p  · Will likely need ongoing TAVR workup with cardiac cath outpatient once medically optimized       Acute respiratory failure with hypoxia ([address] )  Assessment & Plan  · POA, likely multifactorial in setting of acute CHF, pleural effusions, severe AS  · Secondary to patient with hypoxia noted outpatient with spO2 in the 80s, requiring supplemental O2 at 2 L NC saturating mid 90s   · Continue IV diuresis with lasix 40 mg BID per cardiology   · Ongoing CT surgery recommendations for TAVR  · S/p IR thoracentesis on 12/20  · Pre-crenshaw cultures negative thus far  · Continue to wean supplemental O2 as able, will check pulse ox while ambulating  Might need to check for home O2 eval prior to dc  Pleural effusion  Assessment & Plan  · CT chest (12/20/22) - Moderate right and small left-sided pleural effusions with associated compressive atelectasis    · In the setting of acute on chronic diastolic heart failure  · S/p 900 cc thoracentesis by IR on 12/20  · IV diuresis as above  · Respiratory status stable    Hyperthyroidism  Assessment & Plan  · Hx of hyperthyroidism maintained on Methimazole 5 mg daily as an outpatient   · TSH 10 on admission, free T4 WNL  · Endocrinology following,  · Plan to hold methimazole and repeat TFTs in 2 days  · PTA regimen of Methimazole 5 mg daily as an outpatient     Stage 3a chronic kidney disease [facility])  Assessment & Plan  Lab Results   Component Value Date    EGFR 41 12/24/2022    EGFR 49 12/23/2022    EGFR 56 12/22/2022    CREATININE 1 18 12/24/2022    CREATININE 1 02 12/23/2022    CREATININE 0 92 12/22/2022   · Baseline creatinine 1-1 1  · Creatinine at baseline  · Monitor    Generalized anxiety disorder  Assessment & Plan  · Continue PRN Alprazolam 0 25 mg Q6H, confirmed on PDMP  · Mentation stable     Atrial tachycardia (HCC)  Assessment & Plan  · EKG 12/22 with sinus rhythm, marked sinus arrhythmia with 1st degree AV block with PVCs   · Continue to monitor telemetry, some episodes of possible atrial tachycardia   · Heart rhythm irregular this AM, possibility of paroxysmal a  Fib/flutter    · Cardiology following,  · BB uptitrated to lopressor 75 mg BID on 12/24, appreciate ongoing cardiology recommendations  · Replete electrolytes     Essential hypertension  Assessment & Plan  · BP currently stable   · PTA regimen of Atenolol/Chlorthalidone (100/25 mg) 0 5 tablet daily, Amlodipine 10 mg daily  · Home medications on hold   · Currently on IV lasix 40 mg BID and Lopressor 75 mg BID per cardiology  Aldactone added per cardiology  · Monitor BP    Hyponatremia  Assessment & Plan  · Chronic with sodium levels of 127-130 as an outpatient  · Currently with acute on chronic diastolic heart failure on IV diuresis, continue   · Sodium 133 today, improving and stable, continue to monitor   · Trend BMP        VTE Pharmacologic Prophylaxis:   Pharmacologic: Apixaban (Eliquis)  Mechanical VTE Prophylaxis in Place: Yes    Patient Centered Rounds: I have performed bedside rounds with nursing staff today  Discussions with Specialists or Other Care Team Provider: cardiology    Education and Discussions with Family / Patient: plan of crae, patient and family at bedside  Time Spent for Care: 30 minutes  More than 50% of total time spent on counseling and coordination of care as described above      Current Length of Stay: 3 day(s)    Current Patient Status: Inpatient   Certification Statement: The patient will continue to require additional inpatient hospital stay due to not medically stable given need for iv diuresis    Discharge Plan: likely dc home in next 24-48 hours    Code Status: Level 1 - Full Code      Subjective:   No overngiht events  Remains on 2 L O2  Shortness of breath is improving  No chest pain  Objective:     Vitals:   Temp (24hrs), Av °F (36 7 °C), Min:97 3 °F (36 3 °C), Max:99 2 °F (37 3 °C)    Temp:  [97 3 °F (36 3 °C)-99 2 °F (37 3 °C)] 97 5 °F (36 4 °C)  HR:  [] 108  Resp:  [16-20] 17  BP: (101-122)/(69-93) 122/93  SpO2:  [93 %-97 %] 93 %  Body mass index is 21 7 kg/m²  Input and Output Summary (last 24 hours): Intake/Output Summary (Last 24 hours) at 2022 1546  Last data filed at 2022 1117  Gross per 24 hour   Intake 582 ml   Output 1900 ml   Net -1318 ml       Physical Exam:     Physical Exam  Constitutional:       General: She is not in acute distress  Cardiovascular:      Rate and Rhythm: Normal rate and regular rhythm  Heart sounds: Murmur heard  Pulmonary:      Effort: No respiratory distress  Breath sounds: Normal breath sounds  No wheezing or rales  Abdominal:      General: Bowel sounds are normal  There is no distension  Palpations: Abdomen is soft  Tenderness: There is no abdominal tenderness  Skin:     General: Skin is warm  Neurological:      Mental Status: She is alert        Comments: Awake alert and communicative           Additional Data:     Labs:    Results from last 7 days   Lab Units 22  0451 22  0451   WBC Thousand/uL 7 02 6 00   HEMOGLOBIN g/dL 12 9 11 9   HEMATOCRIT % 38 1 35 6   PLATELETS Thousands/uL 179 155   NEUTROS PCT %  --  69   LYMPHS PCT %  --  18   MONOS PCT %  --  11   EOS PCT %  --  2     Results from last 7 days   Lab Units 22  0457 22  0441 22  0451   SODIUM mmol/L 133*   < > 127*   POTASSIUM mmol/L 2 9*   < > 3 6   CHLORIDE mmol/L 94*   < > 94*   CO2 mmol/L 27   < > 24   BUN mg/dL 26*   < > 19   CREATININE mg/dL 1 18   < > 1 03   ANION GAP mmol/L 12   < > 9   CALCIUM mg/dL 9 0   < > 9 1   ALBUMIN g/dL  --   --  3 2*   TOTAL BILIRUBIN mg/dL  --   --  0 78   ALK PHOS U/L  --   --  73   ALT U/L  -- --  28   AST U/L  --   --  31   GLUCOSE RANDOM mg/dL 98   < > 107    < > = values in this interval not displayed  * I Have Reviewed All Lab Data Listed Above  * Additional Pertinent Lab Tests Reviewed: All Labs Within Last 24 Hours Reviewed    Imaging:    VAS carotid complete study   Final Result by Franklin Griffin DO (12/22 1613)      XR chest portable   Final Result by Leona Skelton MD (12/22 1546)      Slightly diminished right pleural effusion  Stable left pleural effusion  Probable bibasilar atelectasis  Workstation performed: [id number]         CTA chest abdomen pelvis w wo contrast TAVR    (Results Pending)       Recent Cultures (last 7 days):     Results from last 7 days   Lab Units 12/20/22  1438   GRAM STAIN RESULT  No Polys or Bacteria seen   BODY FLUID CULTURE, STERILE  No growth       Last 24 Hours Medication List:   Current Facility-Administered Medications   Medication Dose Route Frequency Provider Last Rate   • acetaminophen  650 mg Oral Q4H PRN Bhargavi Lares MD     • ALPRAZolam  0 25 mg Oral Q6H PRN Bhargavi Lares MD     • apixaban  2 5 mg Oral BID Perla Alston PA-C     • furosemide  40 mg Intravenous BID (diuretic) Bhargavi Lares MD     • metoprolol tartrate  75 mg Oral Q12H Brenton Cortez MD     • potassium chloride  40 mEq Oral TID With Meals Ryne Toney MD     • spironolactone  25 mg Oral Daily Ryne Toney MD          Today, Patient Was Seen By: Moose Diaz MD    ** Please Note: Dictation voice to text software may have been used in the creation of this document   **

## 2022-12-24 NOTE — ASSESSMENT & PLAN NOTE
· Chronic with sodium levels of 127-130 as an outpatient  · Currently with acute on chronic diastolic heart failure on IV diuresis, continue   · Sodium 133 today, improving and stable, continue to monitor   · Trend BMP

## 2022-12-24 NOTE — ASSESSMENT & PLAN NOTE
Wt Readings from Last 3 Encounters:   12/24/22 47 1 kg (103 lb 13 4 oz)   12/21/22 54 4 kg (119 lb 14 9 oz)   11/02/22 49 4 kg (109 lb)     · Presented to [facility] on 12/20/22 with shortness of breath on exertion and hypoxia noted at home  · She was noted to be in acute on chronic diastolic heart failure and was begun on IV Lasix 40 mg twice daily  · Echo (7/19/22) - EF 50%(drop from 55% on 6/24/21), abnormal diastolic function, severe aortic stenosis, mild to moderate aortic and pulmonary regurgitation, moderate mitral and tricuspid regurgitation, severely increased pulmonary artery systolic pressure at 75 mmHg  · She was transferred to [facility] for cardiac surgery evaluation to see if she would be a candidate for TAVR  · Repeat ECHO performed here with severe AS with paradoxical low flow low gradient   · Cardiology following,  · Continue Lasix 40 mg IV BID, helpfully will transition to PO tomorrow  · Monitor I/O, daily weights (down 7 lbs), negative more than 1 L  · Spironolactone added by cardiology on 12/24    · Keep K close to 4 and Mg close to 2  · Cardiology/cardiac surgery following, appreciate ongoing recommendations

## 2022-12-24 NOTE — ASSESSMENT & PLAN NOTE
· BP currently stable   · PTA regimen of Atenolol/Chlorthalidone (100/25 mg) 0 5 tablet daily, Amlodipine 10 mg daily  · Home medications on hold   · Currently on IV lasix 40 mg BID and Lopressor 75 mg BID per cardiology  Aldactone added per cardiology    · Monitor BP

## 2022-12-24 NOTE — PROGRESS NOTES
[facility]    Cardiology Progress Note  Yomaira Chris 80 y o  female   YOB: 1935 MRN: [id number]  Unit/Bed#: [facility] 402-01 Encounter: [id number]      Subjective:   No significant events overnight  She feels overall much better, but continues to be mildly short of breath    Assessments  Principal Problem:    Severe aortic stenosis  Active Problems:    Hypokalemia    Hyponatremia    Essential hypertension    Atrial tachycardia (HCC)    Generalized anxiety disorder    Stage 3a chronic kidney disease (HCC)    Hyperthyroidism    Acute on chronic diastolic CHF (congestive heart failure) (HCC)    Pleural effusion    Acute respiratory failure with hypoxia (HCC)        Plan  Acute on chronic diastolic heart failure   · Likely related to severe aortic stenosis + persistent atrial flutter  · Personally checked oxygen on room air, SPO2 88 to 92% at rest  · Mildly volume overloaded  · Continue IV Lasix 40 mg twice daily for today  · Potassium 2 9, and she has been intolerant to IV potassium  · Add spironolactone 25 mg daily  · Hopefully transition to p o  diuresis tomorrow  · Ambulate and reevaluate symptoms tomorrow morning to determine need for home oxygen    Persistent atrial flutter with RVR  · Heart rate remains reasonably controlled, but continues to have multiple episodes of elevated rates in the 130s  · Will increase metoprolol to 75 mg twice daily  · Continue Eliquis 2 5 mg    Severe aortic stenosis  · Noted to have severe low-flow, low-gradient aortic stenosis this admission  · EF 45-50%, ANAM 0 5, SVI 28 ml/m2  · Will need outpatient follow up for TAVR evaluation    Review of Systems   All other systems reviewed and are negative  Telemetry Review: Atrial flutter - HR   Objective:   Vitals: Blood pressure 117/69, pulse 88, temperature 97 5 °F (36 4 °C), temperature source Oral, resp  rate 20, height 4' 10" (1 473 m), weight 47 1 kg (103 lb 13 4 oz), SpO2 97 %  , Body mass index is 21 7 kg/m² ,   Orthostatic Blood Pressures    Flowsheet Row Most Recent Value   Blood Pressure 117/69 filed at 12/24/2022 7463   Patient Position - Orthostatic VS Lying filed at 12/24/2022 9754         Systolic (37NQQ), SZK:931 , Min:101 , OJS:704     Diastolic (03DDD), VMX:87, Min:66, Max:74    Wt Readings from Last 5 Encounters:   12/24/22 47 1 kg (103 lb 13 4 oz)   12/21/22 54 4 kg (119 lb 14 9 oz)   11/02/22 49 4 kg (109 lb)   10/11/22 49 7 kg (109 lb 8 oz)   09/06/22 49 4 kg (109 lb)     I/O       12/22 0701  12/23 0700 12/23 0701  12/24 0700 12/24 0701  12/25 0700    P  O  1300 560 180    Total Intake(mL/kg) 1300 (26) 560 (11 9) 180 (3 8)    Urine (mL/kg/hr) 1500 (1 3) 1600 (1 4)     Stool 0 0     Total Output 1500 1600     Net -200 -1040 +180           Unmeasured Urine Occurrence 4 x      Unmeasured Stool Occurrence 2 x 1 x               Physical Exam  Vitals and nursing note reviewed  Constitutional:       General: She is not in acute distress  Appearance: Normal appearance  She is well-developed  She is not ill-appearing or diaphoretic  HENT:      Head: Normocephalic and atraumatic  Nose: No congestion  Eyes:      General: No scleral icterus  Conjunctiva/sclera: Conjunctivae normal    Neck:      Vascular: No carotid bruit or JVD  Cardiovascular:      Rate and Rhythm: Normal rate and regular rhythm  Pulses: Normal pulses  Heart sounds: Murmur heard  No friction rub  No gallop  Pulmonary:      Effort: Pulmonary effort is normal  No respiratory distress  Breath sounds: Normal breath sounds  No rales  Abdominal:      General: There is no distension  Palpations: Abdomen is soft  Tenderness: There is no abdominal tenderness  Musculoskeletal:         General: No swelling or tenderness  Cervical back: Neck supple  Right lower leg: Edema present  Left lower leg: Edema present  Skin:     General: Skin is warm     Neurological: General: No focal deficit present  Mental Status: She is alert and oriented to person, place, and time  Mental status is at baseline  Psychiatric:         Mood and Affect: Mood normal          Behavior: Behavior normal          Thought Content: Thought content normal            Laboratory Results: personally reviewed        CBC with diff:   Results from last 7 days   Lab Units 12/23/22  0451 12/21/22  0451 12/20/22  1022   WBC Thousand/uL 7 02 6 00 5 96   HEMOGLOBIN g/dL 12 9 11 9 11 8   HEMATOCRIT % 38 1 35 6 35 1   MCV fL 97 95 96   PLATELETS Thousands/uL 179 155 150   MCH pg 32 7 31 8 32 2   MCHC g/dL 33 9 33 4 33 6   RDW % 14 0 13 9 13 9   MPV fL 8 6* 8 6* 8 4*   NRBC AUTO /100 WBCs  --  0 0         CMP:  Results from last 7 days   Lab Units 12/24/22 0457 12/23/22  1452 12/23/22  0451 12/22/22 0441 12/21/22 0451 12/20/22  1022   POTASSIUM mmol/L 2 9* 3 8 2 8* 3 0* 3 6 3 6   CHLORIDE mmol/L 94*  --  93* 98 94* 94*   CO2 mmol/L 27  --  25 22 24 23   BUN mg/dL 26*  --  22 19 19 27*   CREATININE mg/dL 1 18  --  1 02 0 92 1 03 1 10   CALCIUM mg/dL 9 0  --  9 2 9 0 9 1 9 0   AST U/L  --   --   --   --  31  --    ALT U/L  --   --   --   --  28  --    ALK PHOS U/L  --   --   --   --  73  --    EGFR ml/min/1 73sq m 41  --  49 56 48 45         BMP:  Results from last 7 days   Lab Units 12/24/22  0457 12/23/22  1452 12/23/22  0451 12/22/22  0441 12/21/22  0451 12/20/22  1022   POTASSIUM mmol/L 2 9* 3 8 2 8* 3 0* 3 6 3 6   CHLORIDE mmol/L 94*  --  93* 98 94* 94*   CO2 mmol/L 27  --  25 22 24 23   BUN mg/dL 26*  --  22 19 19 27*   CREATININE mg/dL 1 18  --  1 02 0 92 1 03 1 10   CALCIUM mg/dL 9 0  --  9 2 9 0 9 1 9 0       BNP: No results for input(s): BNP in the last 72 hours      Magnesium:   Results from last 7 days   Lab Units 12/24/22  0457 12/22/22  0441 12/21/22  0451   MAGNESIUM mg/dL 1 5* 1 9 1 3*       Coags:       TSH:        Hemoglobin A1C       Lipid Profile:   Results from last 7 days   Lab Units 22  0451   TRIGLYCERIDES mg/dL 50   HDL mg/dL 94       Meds/Allergies   all current active meds have been reviewed and current meds:   Current Facility-Administered Medications   Medication Dose Route Frequency   • acetaminophen (TYLENOL) tablet 650 mg  650 mg Oral Q4H PRN   • ALPRAZolam (XANAX) tablet 0 25 mg  0 25 mg Oral Q6H PRN   • apixaban (ELIQUIS) tablet 2 5 mg  2 5 mg Oral BID   • furosemide (LASIX) injection 40 mg  40 mg Intravenous BID (diuretic)   • metoprolol tartrate (LOPRESSOR) tablet 50 mg  50 mg Oral Q12H NII   • potassium chloride (K-DUR,KLOR-CON) CR tablet 40 mEq  40 mEq Oral TID With Meals     Medications Prior to Admission   Medication   • ALPRAZolam (XANAX) 0 25 mg tablet   • amLODIPine (NORVASC) 10 mg tablet   • ammonium lactate (LAC-HYDRIN) 12 % lotion   • atenolol-chlorthalidone (TENORETIC) 100-25 mg per tablet   • betamethasone dipropionate (DIPROSONE) 0 05 % ointment   • Calcium Carb-Cholecalciferol (CALTRATE 600+D3 PO)   • methimazole (TAPAZOLE) 5 mg tablet   • Omega-3 Fatty Acids (FISH OIL) 1200 MG CAPS   • potassium chloride (K-DUR,KLOR-CON) 20 mEq tablet   • Psyllium (METAMUCIL FIBER) 51 7 % PACK            Cardiac testing: reviewed  Results for orders placed during the hospital encounter of 21    Echo complete with contrast if indicated    Narrative  [address] [facility] [address] [facility] [address], [address] [facility] [id number] [phone]    Transthoracic Echocardiogram  2D, M-mode, Doppler, and Color Doppler    Study date:  2021    Patient: Mini Oden  MR number: [id number]  Account number: [de-identified]  : 1935  Age: 80 years  Gender: Female  Status: Outpatient  Location: Echo lab  Height: 58 in  Weight: 124 7 lb  BP: 152/ 70 mmHg    Indications: aortic stenosis    Sonographer:  Patience Whittington RDCS  Primary Physician:  Raleigh Webber DO  Referring Physician:  Kvng Hagne MD  Group:  [facility]  Interpreting Physician: Harrison Sands, DO FREED    LEFT VENTRICLE:  Systolic function was normal  Ejection fraction was estimated to be 55 %  There were no regional wall motion abnormalities  Wall thickness was mildly increased  Features were consistent with a pseudonormal left ventricular filling pattern, with concomitant abnormal relaxation and increased filling pressure (grade 2 diastolic dysfunction)  LEFT ATRIUM:  The atrium was moderately dilated  MITRAL VALVE:  There was moderate annular calcification  There was mild to moderate regurgitation  AORTIC VALVE:  The valve was trileaflet  Leaflets exhibited moderately to markedly increased thickness and moderately reduced cuspal separation  There was severe stenosis  There was mild regurgitation  Valve mean gradient was 19 mmHg  The aortic valve obstructive index (by VTI) was 0 25  Estimated aortic valve area (by VTI) was 0 71 cmï¾²  TRICUSPID VALVE:  There was moderate regurgitation  Estimated peak PA pressure was 53 mmHg  PULMONIC VALVE:  There was mild regurgitation  HISTORY: PRIOR HISTORY: hypertension, history of pulmonary hypertension, first degree AV block, supraventricular tachycardia    PROCEDURE: The procedure was performed in the echo lab  This was a routine study  The transthoracic approach was used  The study included complete 2D imaging, M-mode, complete spectral Doppler, and color Doppler  Images were obtained from  the parasternal, apical, subcostal, and suprasternal notch acoustic windows  Image quality was adequate  LEFT VENTRICLE: Size was normal  Systolic function was normal  Ejection fraction was estimated to be 55 %  There were no regional wall motion abnormalities  Wall thickness was mildly increased  DOPPLER: Features were consistent with a  pseudonormal left ventricular filling pattern, with concomitant abnormal relaxation and increased filling pressure (grade 2 diastolic dysfunction)      RIGHT VENTRICLE: The size was normal  Systolic function was normal  Wall thickness was normal     LEFT ATRIUM: The atrium was moderately dilated  RIGHT ATRIUM: Size was normal     MITRAL VALVE: There was moderate annular calcification  Valve structure was normal  There was normal leaflet separation  DOPPLER: The transmitral velocity was within the normal range  There was no evidence for stenosis  There was mild to  moderate regurgitation  AORTIC VALVE: The valve was trileaflet  Leaflets exhibited moderately to markedly increased thickness and moderately reduced cuspal separation  DOPPLER: Transaortic velocity was within the normal range  There was severe stenosis  There was  mild regurgitation  TRICUSPID VALVE: The valve structure was normal  There was normal leaflet separation  DOPPLER: The transtricuspid velocity was within the normal range  There was no evidence for stenosis  There was moderate regurgitation  Estimated peak PA  pressure was 53 mmHg  PULMONIC VALVE: Leaflets exhibited normal thickness, no calcification, and normal cuspal separation  DOPPLER: The transpulmonic velocity was within the normal range  There was mild regurgitation  PERICARDIUM: There was no pericardial effusion  The pericardium was normal in appearance  AORTA: The root exhibited normal size  SYSTEMIC VEINS: IVC: The inferior vena cava was normal in size and course   Respirophasic changes were normal     MEASUREMENT TABLES    2D MEASUREMENTS  LVOT   (Reference normals)  Diam   19 mm   (--)    DOPPLER MEASUREMENTS  LVOT   (Reference normals)  Peak rach   75 cm/s   (--)  VTI   21 cm   (--)  Stroke vol   59 54 ml   (--)  Aortic valve   (Reference normals)  Peak rach   289 cm/s   (--)  VTI   84 cm   (--)  Mean gradient   19 mmHg   (--)  Obstr index, VTI   0 25    (--)  Valve area, VTI   0 71 cmï¾²   (--)  Obstr index, Vmax   0 26    (--)  Valve area, Vmax   0 74 cmï¾²   (--)    SYSTEM MEASUREMENT TABLES    2D  %FS: 40 62 %  Ao Diam: 2 38 cm  EDV(Teich): 100 87 ml  EF(Teich): 71 38 %  ESV(Teich): 28 87 ml  IVSd: 0 88 cm  LA Area: 31 08 cm2  LA Diam: 4 94 cm  LVEDV MOD A4C: 82 12 ml  LVEF MOD A4C: 60 06 %  LVESV MOD A4C: 32 8 ml  LVIDd: 4 67 cm  LVIDs: 2 77 cm  LVLd A4C: 6 15 cm  LVLs A4C: 4 66 cm  LVOT Diam: 1 58 cm  LVPWd: 0 7 cm  RA Area: 13 58 cm2  RVIDd: 3 81 cm  RWT: 0 3  SV MOD A4C: 49 32 ml  SV(Teich): 72 01 ml    CW  AR Dec Stonewall: 2 01 m/s2  AR Dec Time: 2169 3 ms  AR PHT: 629 1 ms  AR Vmax: 4 3 m/s  AR maxP 06 mmHg  AV Env  Ti: 403 43 ms  AV VTI: 81 17 cm  AV Vmax: 2 85 m/s  AV Vmean: 2 01 m/s  AV maxP 44 mmHg  AV meanP 53 mmHg  MR Vmax: 5 38 m/s  MR maxP 9 mmHg  PV Vmax: 1 64 m/s  PV maxPG: 10 72 mmHg  TR Vmax: 3 28 m/s  TR maxP 03 mmHg    MM  TAPSE: 1 84 cm    PW  ANAM (VTI): 0 52 cm2  ANAM Vmax: 0 5 cm2  ANAM Vmax, Pt: 0 52 cm2  AVAI (VTI): 0 cm2/m2  AVAI Vmax: 0 cm2/m2  AVAI Vmax, Pt: 0 cm2/m2  E' Lat: 0 08 m/s  E' Sept: 0 06 m/s  E/E' Lat: 13 93  E/E' Sept: 19 08  LVOT Env  Ti: 407 23 ms  LVOT VTI: 21 56 cm  LVOT Vmax: 0 74 m/s  LVOT Vmean: 0 53 m/s  LVOT maxP 2 mmHg  LVOT meanP 22 mmHg  LVSI Dopp: 28 34 ml/m2  LVSV Dopp: 42 23 ml  MV A Alphonso: 0 75 m/s  MV Dec Stonewall: 5 44 m/s2  MV DecT: 203 54 ms  MV E Alphonso: 1 11 m/s  MV E/A Ratio: 1 48  RVOT Vmax: 0 43 m/s  RVOT maxP 74 mmHg    [employer] Accredited Echocardiography Laboratory    Prepared and electronically signed by    Giovana Gomez DO  Signed 2021 16:14:39    No results found for this or any previous visit  No results found for this or any previous visit  No results found for this or any previous visit

## 2022-12-24 NOTE — ASSESSMENT & PLAN NOTE
· Pt initially presented to [address] [facility] for volume overload, shortness of breath  · Transferred to [facility] for evaluation for TAVR,  · ECHO 12/22 with severely reduced mobility, aortic valve has paradoxical low flow low gradient severe AS  · CT surg following,  · Pre-operative work up ongoing, carotid US with < 50% stenosis bilaterally  · Obtain CTA c/a/p  · Will likely need ongoing TAVR workup with cardiac cath outpatient once medically optimized

## 2022-12-24 NOTE — ASSESSMENT & PLAN NOTE
· POA, likely multifactorial in setting of acute CHF, pleural effusions, severe AS  · Secondary to patient with hypoxia noted outpatient with spO2 in the 80s, requiring supplemental O2 at 2 L NC saturating mid 90s   · Continue IV diuresis with lasix 40 mg BID per cardiology   · Ongoing CT surgery recommendations for TAVR  · S/p IR thoracentesis on 12/20  · Pre-crenshaw cultures negative thus far  · Continue to wean supplemental O2 as able, will check pulse ox while ambulating  Might need to check for home O2 eval prior to dc

## 2022-12-25 VITALS
WEIGHT: 104.5 LBS | HEART RATE: 71 BPM | DIASTOLIC BLOOD PRESSURE: 57 MMHG | SYSTOLIC BLOOD PRESSURE: 109 MMHG | TEMPERATURE: 98.8 F | BODY MASS INDEX: 21.94 KG/M2 | RESPIRATION RATE: 18 BRPM | OXYGEN SATURATION: 95 % | HEIGHT: 58 IN

## 2022-12-25 PROBLEM — E87.6 HYPOKALEMIA: Status: RESOLVED | Noted: 2019-05-17 | Resolved: 2022-12-25

## 2022-12-25 LAB
ANION GAP SERPL CALCULATED.3IONS-SCNC: 8 MMOL/L (ref 4–13)
ATRIAL RATE: 117 BPM
BUN SERPL-MCNC: 34 MG/DL (ref 5–25)
CALCIUM SERPL-MCNC: 9.3 MG/DL (ref 8.3–10.1)
CHLORIDE SERPL-SCNC: 98 MMOL/L (ref 96–108)
CO2 SERPL-SCNC: 28 MMOL/L (ref 21–32)
CREAT SERPL-MCNC: 1.31 MG/DL (ref 0.6–1.3)
DME PARACHUTE DELIVERY DATE ACTUAL: NORMAL
DME PARACHUTE DELIVERY DATE EXPECTED: NORMAL
DME PARACHUTE DELIVERY DATE REQUESTED: NORMAL
DME PARACHUTE ITEM DESCRIPTION: NORMAL
DME PARACHUTE ORDER STATUS: NORMAL
DME PARACHUTE SUPPLIER NAME: NORMAL
DME PARACHUTE SUPPLIER PHONE: NORMAL
GFR SERPL CREATININE-BSD FRML MDRD: 36 ML/MIN/1.73SQ M
GLUCOSE SERPL-MCNC: 97 MG/DL (ref 65–140)
MAGNESIUM SERPL-MCNC: 2.4 MG/DL (ref 1.6–2.6)
POTASSIUM SERPL-SCNC: 4 MMOL/L (ref 3.5–5.3)
QRS AXIS: -79 DEGREES
QRSD INTERVAL: 118 MS
QT INTERVAL: 362 MS
QTC INTERVAL: 526 MS
SODIUM SERPL-SCNC: 134 MMOL/L (ref 135–147)
T WAVE AXIS: 184 DEGREES
VENTRICULAR RATE: 127 BPM

## 2022-12-25 RX ORDER — METOPROLOL TARTRATE 75 MG/1
75 TABLET, FILM COATED ORAL EVERY 12 HOURS SCHEDULED
Qty: 60 TABLET | Refills: 1 | Status: SHIPPED | OUTPATIENT
Start: 2022-12-25 | End: 2022-12-29

## 2022-12-25 RX ORDER — FUROSEMIDE 40 MG/1
40 TABLET ORAL DAILY
Qty: 30 TABLET | Refills: 1 | Status: SHIPPED | OUTPATIENT
Start: 2022-12-26

## 2022-12-25 RX ORDER — DILTIAZEM HYDROCHLORIDE 120 MG/1
120 CAPSULE, COATED, EXTENDED RELEASE ORAL DAILY
Qty: 30 CAPSULE | Refills: 1 | Status: SHIPPED | OUTPATIENT
Start: 2022-12-26

## 2022-12-25 RX ORDER — FUROSEMIDE 40 MG/1
40 TABLET ORAL DAILY
Status: DISCONTINUED | OUTPATIENT
Start: 2022-12-26 | End: 2022-12-25 | Stop reason: HOSPADM

## 2022-12-25 RX ORDER — SPIRONOLACTONE 25 MG/1
25 TABLET ORAL DAILY
Qty: 30 TABLET | Refills: 1 | Status: SHIPPED | OUTPATIENT
Start: 2022-12-26

## 2022-12-25 RX ORDER — DILTIAZEM HYDROCHLORIDE 120 MG/1
120 CAPSULE, COATED, EXTENDED RELEASE ORAL DAILY
Status: DISCONTINUED | OUTPATIENT
Start: 2022-12-25 | End: 2022-12-25 | Stop reason: HOSPADM

## 2022-12-25 RX ADMIN — SPIRONOLACTONE 25 MG: 25 TABLET ORAL at 08:53

## 2022-12-25 RX ADMIN — APIXABAN 2.5 MG: 2.5 TABLET, FILM COATED ORAL at 08:53

## 2022-12-25 RX ADMIN — METOPROLOL TARTRATE 75 MG: 50 TABLET, FILM COATED ORAL at 08:53

## 2022-12-25 RX ADMIN — FUROSEMIDE 40 MG: 10 INJECTION, SOLUTION INTRAMUSCULAR; INTRAVENOUS at 08:53

## 2022-12-25 RX ADMIN — DILTIAZEM HYDROCHLORIDE 120 MG: 120 CAPSULE, COATED, EXTENDED RELEASE ORAL at 09:42

## 2022-12-25 RX ADMIN — METOPROLOL TARTRATE 75 MG: 50 TABLET, FILM COATED ORAL at 17:08

## 2022-12-25 RX ADMIN — ALPRAZOLAM 0.25 MG: 0.25 TABLET ORAL at 17:14

## 2022-12-25 RX ADMIN — APIXABAN 2.5 MG: 2.5 TABLET, FILM COATED ORAL at 17:08

## 2022-12-25 NOTE — ASSESSMENT & PLAN NOTE
· Pt initially presented to [address] [facility] for volume overload, shortness of breath  · Transferred to [facility] for evaluation for TAVR,  · ECHO 12/22 with severely reduced mobility, aortic valve has paradoxical low flow low gradient severe AS  · CT surg following,  · Pre-operative work up ongoing, carotid US with < 50% stenosis bilaterally  · Obtain CTA c/a/p>> done, results pending  OP follow up with CTSx    · Will likely need ongoing TAVR workup with cardiac cath outpatient once medically optimized

## 2022-12-25 NOTE — ASSESSMENT & PLAN NOTE
Wt Readings from Last 3 Encounters:   12/25/22 47 4 kg (104 lb 8 oz)   12/21/22 54 4 kg (119 lb 14 9 oz)   11/02/22 49 4 kg (109 lb)     · Presented to [facility] on 12/20/22 with shortness of breath on exertion and hypoxia noted at home  · She was noted to be in acute on chronic diastolic heart failure and was begun on IV Lasix 40 mg twice daily  · Echo (7/19/22) - EF 50%(drop from 55% on 6/24/21), abnormal diastolic function, severe aortic stenosis, mild to moderate aortic and pulmonary regurgitation, moderate mitral and tricuspid regurgitation, severely increased pulmonary artery systolic pressure at 75 mmHg  · She was transferred to [facility] for cardiac surgery evaluation to see if she would be a candidate for TAVR  · Repeat ECHO performed here with severe AS with paradoxical low flow low gradient   · Cardiology following,  · received iv diuresis  · start po lasix furosemide 40 mg daily from tomorrow  · Monitor I/O, daily weights   · Weight down by 11 pounds since admission   Weight on discharge is 104 pounds  · Creatinine slightly up, potassium normalized  ·  spironolactone added by cardiology on 12/24  · Beta Blocker per cardiology    · Keep K close to 4 and Mg close to 2  · Cardiology/cardiac surgery following, appreciate ongoing recommendations

## 2022-12-25 NOTE — PROGRESS NOTES
[facility]    Cardiology Progress Note  Angelique Araya 80 y o  female   YOB: 1935 MRN: [id number]  Unit/Bed#: [facility] 402-01 Encounter: [id number]      Subjective:   She continues to feel better  no acute complaints of chest pain or shortness of breath  Assessments  Principal Problem:    Severe aortic stenosis  Active Problems:    Hypokalemia    Hyponatremia    Essential hypertension    Atrial tachycardia (HCC)    Generalized anxiety disorder    Stage 3a chronic kidney disease (HCC)    Hyperthyroidism    Acute on chronic diastolic CHF (congestive heart failure) ([facility])    Pleural effusion    Acute respiratory failure with hypoxia [facility])    Outpatient cardiologist is Dr Gissell Shepherd on chronic diastolic heart failure   · Likely related to severe aortic stenosis + persistent atrial flutter  · Diuresed much better with IV Lasix + Aldactone yesterday  · Creatinine slightly up from 1 18->1 31 today  · Potassium now normalized to 4   · SpO2 % on 2 L --> check ambulatory oxygen  · Personally check SpO2 on room air at rest - 94% today  · Already received this morning's dose of IV Lasix 40 --> hold off further IV diuresis  · Will transition to p o   Lasix 40 mg daily from tomorrow  · Continue Xarelto 20 mg daily  · Check for ambulatory oxygen needs  · If no hypoxia with ambulation, then stable for discharge from cardiac standpoint with outpatient BMP in 5 days and follow-up in the office in 1 week    Persistent atrial flutter with RVR  · Heart rate better controlled, but continues to be intermittently elevated to 1 10-1 20, and remains in atrial flutter  · Continue metoprolol 75 mg twice daily  · Add Cardizem  mg daily   · Continue Eliquis 2 5 mg    Severe aortic stenosis  · Noted to have severe low-flow, low-gradient aortic stenosis this admission  · EF 45-50%, ANAM 0 5, SVI 28 ml/m2  · Will need outpatient follow up for TAVR evaluation    Discussed with primary medical service Kirk    Review of Systems   All other systems reviewed and are negative  Telemetry Review: Atrial flutter - HR  most of the time, still jumps up to 110-120 periodically  Objective:   Vitals: Blood pressure 115/64, pulse 65, temperature (!) 97 4 °F (36 3 °C), resp  rate 16, height 4' 10" (1 473 m), weight 47 4 kg (104 lb 8 oz), SpO2 99 %  , Body mass index is 21 84 kg/m² ,   Orthostatic Blood Pressures    Flowsheet Row Most Recent Value   Blood Pressure 115/64 filed at 12/25/2022 0758   Patient Position - Orthostatic VS Lying filed at 12/25/2022 7700         Systolic (50RYR), VDU:031 , Min:110 , FQA:172     Diastolic (74PPC), NZZ:44, Min:64, Max:93    Wt Readings from Last 5 Encounters:   12/25/22 47 4 kg (104 lb 8 oz)   12/21/22 54 4 kg (119 lb 14 9 oz)   11/02/22 49 4 kg (109 lb)   10/11/22 49 7 kg (109 lb 8 oz)   09/06/22 49 4 kg (109 lb)     I/O       12/22 0701  12/23 0700 12/23 0701  12/24 0700 12/24 0701  12/25 0700    P  O  1300 560 180    Total Intake(mL/kg) 1300 (26) 560 (11 9) 180 (3 8)    Urine (mL/kg/hr) 1500 (1 3) 1600 (1 4)     Stool 0 0     Total Output 1500 1600     Net -200 -1040 +180           Unmeasured Urine Occurrence 4 x      Unmeasured Stool Occurrence 2 x 1 x               Physical Exam  Vitals and nursing note reviewed  Constitutional:       General: She is not in acute distress  Appearance: Normal appearance  She is well-developed  She is not ill-appearing or diaphoretic  HENT:      Head: Normocephalic and atraumatic  Nose: No congestion  Eyes:      General: No scleral icterus  Conjunctiva/sclera: Conjunctivae normal    Neck:      Vascular: No carotid bruit or JVD  Cardiovascular:      Rate and Rhythm: Normal rate  Rhythm irregular  Pulses: Normal pulses  Heart sounds: Murmur heard  No friction rub  No gallop  Pulmonary:      Effort: Pulmonary effort is normal  No respiratory distress        Breath sounds: Normal breath sounds  No rales  Abdominal:      General: There is no distension  Palpations: Abdomen is soft  Tenderness: There is no abdominal tenderness  Musculoskeletal:         General: No swelling or tenderness  Cervical back: Neck supple  Right lower leg: No edema  Left lower leg: No edema  Skin:     General: Skin is warm  Neurological:      General: No focal deficit present  Mental Status: She is alert and oriented to person, place, and time  Mental status is at baseline  Psychiatric:         Mood and Affect: Mood normal          Behavior: Behavior normal          Thought Content:  Thought content normal            Laboratory Results: personally reviewed        CBC with diff:   Results from last 7 days   Lab Units 12/23/22  0451 12/21/22  0451 12/20/22  1022   WBC Thousand/uL 7 02 6 00 5 96   HEMOGLOBIN g/dL 12 9 11 9 11 8   HEMATOCRIT % 38 1 35 6 35 1   MCV fL 97 95 96   PLATELETS Thousands/uL 179 155 150   MCH pg 32 7 31 8 32 2   MCHC g/dL 33 9 33 4 33 6   RDW % 14 0 13 9 13 9   MPV fL 8 6* 8 6* 8 4*   NRBC AUTO /100 WBCs  --  0 0         CMP:  Results from last 7 days   Lab Units 12/25/22  0508 12/24/22  0457 12/23/22  1452 12/23/22  0451 12/22/22 0441 12/21/22 0451 12/20/22  1022   POTASSIUM mmol/L 4 0 2 9* 3 8 2 8* 3 0* 3 6 3 6   CHLORIDE mmol/L 98 94*  --  93* 98 94* 94*   CO2 mmol/L 28 27  --  25 22 24 23   BUN mg/dL 34* 26*  --  22 19 19 27*   CREATININE mg/dL 1 31* 1 18  --  1 02 0 92 1 03 1 10   CALCIUM mg/dL 9 3 9 0  --  9 2 9 0 9 1 9 0   AST U/L  --   --   --   --   --  31  --    ALT U/L  --   --   --   --   --  28  --    ALK PHOS U/L  --   --   --   --   --  73  --    EGFR ml/min/1 73sq m 36 41  --  49 56 48 45         BMP:  Results from last 7 days   Lab Units 12/25/22  0508 12/24/22  0457 12/23/22  1452 12/23/22  0451 12/22/22  0441 12/21/22  0451 12/20/22  1022   POTASSIUM mmol/L 4 0 2 9* 3 8 2 8* 3 0* 3 6 3 6   CHLORIDE mmol/L 98 94*  --  93* 98 94* 94*   CO2 mmol/L 28 27  --  25 22 24 23   BUN mg/dL 34* 26*  --  22 19 19 27*   CREATININE mg/dL 1 31* 1 18  --  1 02 0 92 1 03 1 10   CALCIUM mg/dL 9 3 9 0  --  9 2 9 0 9 1 9 0       BNP: No results for input(s): BNP in the last 72 hours      Magnesium:   Results from last 7 days   Lab Units 12/25/22  0508 12/24/22  0457 12/22/22  0441 12/21/22  0451   MAGNESIUM mg/dL 2 4 1 5* 1 9 1 3*       Coags:       TSH:   Results from last 7 days   Lab Units 12/24/22  0457   T3 TOTAL ng/mL 0 70        Hemoglobin A1C       Lipid Profile:   Results from last 7 days   Lab Units 12/21/22  0451   TRIGLYCERIDES mg/dL 50   HDL mg/dL 94       Meds/Allergies   all current active meds have been reviewed and current meds:   Current Facility-Administered Medications   Medication Dose Route Frequency   • acetaminophen (TYLENOL) tablet 650 mg  650 mg Oral Q4H PRN   • ALPRAZolam (XANAX) tablet 0 25 mg  0 25 mg Oral Q6H PRN   • apixaban (ELIQUIS) tablet 2 5 mg  2 5 mg Oral BID   • furosemide (LASIX) injection 40 mg  40 mg Intravenous BID (diuretic)   • metoprolol tartrate (LOPRESSOR) tablet 75 mg  75 mg Oral Q12H [facility] HOME   • spironolactone (ALDACTONE) tablet 25 mg  25 mg Oral Daily     Medications Prior to Admission   Medication   • ALPRAZolam (XANAX) 0 25 mg tablet   • amLODIPine (NORVASC) 10 mg tablet   • ammonium lactate (LAC-HYDRIN) 12 % lotion   • atenolol-chlorthalidone (TENORETIC) 100-25 mg per tablet   • betamethasone dipropionate (DIPROSONE) 0 05 % ointment   • Calcium Carb-Cholecalciferol (CALTRATE 600+D3 PO)   • methimazole (TAPAZOLE) 5 mg tablet   • Omega-3 Fatty Acids (FISH OIL) 1200 MG CAPS   • potassium chloride (K-DUR,KLOR-CON) 20 mEq tablet   • Psyllium (METAMUCIL FIBER) 51 7 % PACK            Cardiac testing: reviewed  Results for orders placed during the hospital encounter of 06/24/21    Echo complete with contrast if indicated    Narrative  [address] [address]  [phone]    Transthoracic Echocardiogram  2D, M-mode, Doppler, and Color Doppler    Study date:  2021    Patient: Pelon Hanson  MR number: [id number]  Account number: [de-identified]  : 1935  Age: 80 years  Gender: Female  Status: Outpatient  Location: Echo lab  Height: 58 in  Weight: 124 7 lb  BP: 152/ 70 mmHg    Indications: aortic stenosis    Sonographer:  Bethany Paul RDCS  Primary Physician:  Maria Elena Kramer DO  Referring Physician:  Denise Zhang MD  Group:  [facility]  Interpreting Physician:  Paul Agarwal DO    SUMMARY    LEFT VENTRICLE:  Systolic function was normal  Ejection fraction was estimated to be 55 %  There were no regional wall motion abnormalities  Wall thickness was mildly increased  Features were consistent with a pseudonormal left ventricular filling pattern, with concomitant abnormal relaxation and increased filling pressure (grade 2 diastolic dysfunction)  LEFT ATRIUM:  The atrium was moderately dilated  MITRAL VALVE:  There was moderate annular calcification  There was mild to moderate regurgitation  AORTIC VALVE:  The valve was trileaflet  Leaflets exhibited moderately to markedly increased thickness and moderately reduced cuspal separation  There was severe stenosis  There was mild regurgitation  Valve mean gradient was 19 mmHg  The aortic valve obstructive index (by VTI) was 0 25  Estimated aortic valve area (by VTI) was 0 71 cmï¾²  TRICUSPID VALVE:  There was moderate regurgitation  Estimated peak PA pressure was 53 mmHg  PULMONIC VALVE:  There was mild regurgitation  HISTORY: PRIOR HISTORY: hypertension, history of pulmonary hypertension, first degree AV block, supraventricular tachycardia    PROCEDURE: The procedure was performed in the echo lab  This was a routine study  The transthoracic approach was used   The study included complete 2D imaging, M-mode, complete spectral Doppler, and color Doppler  Images were obtained from  the parasternal, apical, subcostal, and suprasternal notch acoustic windows  Image quality was adequate  LEFT VENTRICLE: Size was normal  Systolic function was normal  Ejection fraction was estimated to be 55 %  There were no regional wall motion abnormalities  Wall thickness was mildly increased  DOPPLER: Features were consistent with a  pseudonormal left ventricular filling pattern, with concomitant abnormal relaxation and increased filling pressure (grade 2 diastolic dysfunction)  RIGHT VENTRICLE: The size was normal  Systolic function was normal  Wall thickness was normal     LEFT ATRIUM: The atrium was moderately dilated  RIGHT ATRIUM: Size was normal     MITRAL VALVE: There was moderate annular calcification  Valve structure was normal  There was normal leaflet separation  DOPPLER: The transmitral velocity was within the normal range  There was no evidence for stenosis  There was mild to  moderate regurgitation  AORTIC VALVE: The valve was trileaflet  Leaflets exhibited moderately to markedly increased thickness and moderately reduced cuspal separation  DOPPLER: Transaortic velocity was within the normal range  There was severe stenosis  There was  mild regurgitation  TRICUSPID VALVE: The valve structure was normal  There was normal leaflet separation  DOPPLER: The transtricuspid velocity was within the normal range  There was no evidence for stenosis  There was moderate regurgitation  Estimated peak PA  pressure was 53 mmHg  PULMONIC VALVE: Leaflets exhibited normal thickness, no calcification, and normal cuspal separation  DOPPLER: The transpulmonic velocity was within the normal range  There was mild regurgitation  PERICARDIUM: There was no pericardial effusion  The pericardium was normal in appearance  AORTA: The root exhibited normal size  SYSTEMIC VEINS: IVC: The inferior vena cava was normal in size and course   Respirophasic changes were normal     MEASUREMENT TABLES    2D MEASUREMENTS  LVOT   (Reference normals)  Diam   19 mm   (--)    DOPPLER MEASUREMENTS  LVOT   (Reference normals)  Peak alphonso   75 cm/s   (--)  VTI   21 cm   (--)  Stroke vol   59 54 ml   (--)  Aortic valve   (Reference normals)  Peak alphonso   289 cm/s   (--)  VTI   84 cm   (--)  Mean gradient   19 mmHg   (--)  Obstr index, VTI   0 25    (--)  Valve area, VTI   0 71 cmï¾²   (--)  Obstr index, Vmax   0 26    (--)  Valve area, Vmax   0 74 cmï¾²   (--)    SYSTEM MEASUREMENT TABLES    2D  %FS: 40 62 %  Ao Diam: 2 38 cm  EDV(Teich): 100 87 ml  EF(Teich): 71 38 %  ESV(Teich): 28 87 ml  IVSd: 0 88 cm  LA Area: 31 08 cm2  LA Diam: 4 94 cm  LVEDV MOD A4C: 82 12 ml  LVEF MOD A4C: 60 06 %  LVESV MOD A4C: 32 8 ml  LVIDd: 4 67 cm  LVIDs: 2 77 cm  LVLd A4C: 6 15 cm  LVLs A4C: 4 66 cm  LVOT Diam: 1 58 cm  LVPWd: 0 7 cm  RA Area: 13 58 cm2  RVIDd: 3 81 cm  RWT: 0 3  SV MOD A4C: 49 32 ml  SV(Teich): 72 01 ml    CW  AR Dec Walton: 2 01 m/s2  AR Dec Time: 2169 3 ms  AR PHT: 629 1 ms  AR Vmax: 4 3 m/s  AR maxP 06 mmHg  AV Env  Ti: 403 43 ms  AV VTI: 81 17 cm  AV Vmax: 2 85 m/s  AV Vmean: 2 01 m/s  AV maxP 44 mmHg  AV meanP 53 mmHg  MR Vmax: 5 38 m/s  MR maxP 9 mmHg  PV Vmax: 1 64 m/s  PV maxPG: 10 72 mmHg  TR Vmax: 3 28 m/s  TR maxP 03 mmHg    MM  TAPSE: 1 84 cm    PW  ANAM (VTI): 0 52 cm2  ANAM Vmax: 0 5 cm2  ANAM Vmax, Pt: 0 52 cm2  AVAI (VTI): 0 cm2/m2  AVAI Vmax: 0 cm2/m2  AVAI Vmax, Pt: 0 cm2/m2  E' Lat: 0 08 m/s  E' Sept: 0 06 m/s  E/E' Lat: 13 93  E/E' Sept: 19 08  LVOT Env  Ti: 407 23 ms  LVOT VTI: 21 56 cm  LVOT Vmax: 0 74 m/s  LVOT Vmean: 0 53 m/s  LVOT maxP 2 mmHg  LVOT meanP 22 mmHg  LVSI Dopp: 28 34 ml/m2  LVSV Dopp: 42 23 ml  MV A Alphonso: 0 75 m/s  MV Dec Walton: 5 44 m/s2  MV DecT: 203 54 ms  MV E Alphonso: 1 11 m/s  MV E/A Ratio: 1 48  RVOT Vmax: 0 43 m/s  RVOT maxP 74 mmHg    [facility] Accredited Echocardiography Laboratory    Prepared and electronically signed by    Kalpana Melgoza DO  Signed 24-Jun-2021 16:14:39    No results found for this or any previous visit  No results found for this or any previous visit  No results found for this or any previous visit

## 2022-12-25 NOTE — ASSESSMENT & PLAN NOTE
· Chronic with sodium levels of 127-130 as an outpatient  · Currently with acute on chronic diastolic heart failure on IV diuresis, continue   · Sodium 134 today, improving and stable, continue to monitor

## 2022-12-25 NOTE — ASSESSMENT & PLAN NOTE
· POA, likely multifactorial in setting of acute CHF, pleural effusions, severe AS  · Secondary to patient with hypoxia noted outpatient with spO2 in the 80s, requiring supplemental O2 at 2 L NC saturating mid 90s   · Continue IV diuresis with lasix 40 mg BID per cardiology   · Ongoing CT surgery recommendations for TAVR  · S/p IR thoracentesis on 12/20  · Pre-crenshaw cultures negative thus far  · Home O2 eval done, patient qualified for home oxygen ambulation   arranged for this

## 2022-12-25 NOTE — ASSESSMENT & PLAN NOTE
· EKG 12/22 with sinus rhythm, marked sinus arrhythmia with 1st degree AV block with PVCs   · Continue to monitor telemetry, some episodes of possible atrial tachycardia   · Cardiology following,  · BB uptitrated to lopressor 75 mg BID on 12/24, appreciate ongoing cardiology recommendations  · Disseminated per cardiology prior to discharge  · Eliquis 2 5 mg twice daily for anticoagulation  30-day coupon provided to patient discharge  We could not price check pharmacy is closed on Christmas)  if Eliquis expensive for patient, told her to reach out to her cardiologist office discuss regarding switch of anticoagulation 30 days    Discussed this with cardiologist   · Replete electrolytes

## 2022-12-25 NOTE — ASSESSMENT & PLAN NOTE
· Hx of hyperthyroidism maintained on Methimazole 5 mg daily as an outpatient   · TSH 10 on admission, free T4 WNL  · Endocrinology following,  · Plan to hold methimazole and repeat TFTs in 2 days  · Continue to hold methimazole on discharge per endocrinology  · OutPatient follow-up with endocrinology

## 2022-12-25 NOTE — CASE MANAGEMENT
Case Management Discharge Planning Note    Patient name Garfield Bishop  Location [address] [place] [address] [address] 529[address]22 MRN [id number]  : 1935 Date 2022       Current Admission Date: 2022  Current Admission Diagnosis:Severe aortic stenosis   Patient Active Problem List    Diagnosis Date Noted   • Pleural effusion 2022   • Acute respiratory failure with hypoxia ([address] ) 2022   • Acute on chronic diastolic CHF (congestive heart failure) ([address] ) 2022   • Primary generalized (osteo)arthritis 2022   • Hyperthyroidism 2022   • Dyslipidemia 2022   • Post-menopause 2022   • Stage 3a chronic kidney disease ([address] ) 2021   • Other fatigue 2021   • Ganglion cyst 2021   • Adhesive capsulitis of right shoulder 2021   • Bilateral leg edema 2021   • Chronic pain syndrome 2021   • Sacroiliitis ([address] ) 2021   • Gait abnormality 2021   • Chronic bilateral low back pain without sciatica 2021   • Osteopenia of neck of femur 2021   • Impaired fasting glucose    • Encounter for long-term (current) use of medications 10/31/2020   • Immunization due 10/31/2020   • Acute bursitis of right shoulder 10/31/2020   • Encounter for Medicare annual wellness exam 2020   • Mid back pain 2019   • Generalized anxiety disorder 2019   • Non-rheumatic tricuspid valve insufficiency 2019   • Lymphadenopathy 2019   • Hiatal hernia 2019   • Thrombocytopenia ([address] ) 2019   • Severe aortic stenosis 2019   • Mitral valve insufficiency 2019   • Supraventricular tachycardia ([address] ) 2019   • Pulmonary hypertension ([address] ) 2019   • Diverticulosis 2019   • Atrial tachycardia ([address] ) 2019   • Essential hypertension 2019   • Hypomagnesemia 2019   • Hypokalemia 2019   • Hyponatremia 2019      LOS (days): 4  Geometric Mean LOS (GMLOS) (days): 3 90  Days to GMLOS:-0 1 OBJECTIVE:  Risk of Unplanned Readmission Score: 14 79         Current admission status: Inpatient   Preferred Pharmacy:   [facility] [address]OTELO [address] [address]  Phone: [phone] Fax: [phone]    Primary Care Provider: Aba Thomas DO    Primary Insurance: MEDICARE  Secondary Insurance: BLUE CROSS    DISCHARGE DETAILS:                                     DME Referral Provided  Referral made for DME?: Yes  DME referral completed for the following items[de-identified] Home Oxygen concentrator  DME Supplier Name[de-identified] [employer]                                                           Additional Comments: CM placed home O2 order via [employer]  Spouse states he will await a call from the supplier re: the delivery -- Spouse can be reached at [phone], or [phone]  [facility] [employer] advised of same  Spouse states he resides 1 5 hours from the hospital                  Addendum 4pm:    Confirmation received that son will be at pt's home to accept the O2 delivery this evening  Spouse calling [employer] now to schedule the delivery -- Per [facility] representative via [place], the delivery will definitely be "this evening " Pt may subsequently discharge home with spouse  Portable tank given at bedside, as well as a 30-day free trial (coupon) for Eliquis (CM unable to pricecheck rx due to holiday closures)  No further CM needs identified  CM will continue to follow

## 2022-12-25 NOTE — ASSESSMENT & PLAN NOTE
· BP currently stable   · PTA regimen of Atenolol/Chlorthalidone (100/25 mg) 0 5 tablet daily, Amlodipine 10 mg daily  · Home medications on hold >> continue to hold on discharge  · Now on Lasix, Aldactone, Cardizem and lopressor    · Monitor BP

## 2022-12-25 NOTE — DISCHARGE SUMMARY
[facility]  Discharge- Екатерина Sotelo 1935, 80 y o  female MRN: [id number]  Unit/Bed#: [facility] 402-01 Encounter: [id number]  Primary Care Provider: Jessica Brown,    Date and time admitted to hospital: 12/21/2022  3:25 PM    Acute on chronic diastolic CHF (congestive heart failure) [facility])  Assessment & Plan  Wt Readings from Last 3 Encounters:   12/25/22 47 4 kg (104 lb 8 oz)   12/21/22 54 4 kg (119 lb 14 9 oz)   11/02/22 49 4 kg (109 lb)     · Presented to [facility] on 12/20/22 with shortness of breath on exertion and hypoxia noted at home  · She was noted to be in acute on chronic diastolic heart failure and was begun on IV Lasix 40 mg twice daily  · Echo (7/19/22) - EF 50%(drop from 55% on 6/24/21), abnormal diastolic function, severe aortic stenosis, mild to moderate aortic and pulmonary regurgitation, moderate mitral and tricuspid regurgitation, severely increased pulmonary artery systolic pressure at 75 mmHg  · She was transferred to [facility] for cardiac surgery evaluation to see if she would be a candidate for TAVR  · Repeat ECHO performed here with severe AS with paradoxical low flow low gradient   · Cardiology following,  · received iv diuresis  · start po lasix furosemide 40 mg daily from tomorrow  · Monitor I/O, daily weights   · Weight down by 11 pounds since admission   Weight on discharge is 104 pounds  · Creatinine slightly up, potassium normalized  ·  spironolactone added by cardiology on 12/24  · Beta Blocker per cardiology    · Keep K close to 4 and Mg close to 2  · Cardiology/cardiac surgery following, appreciate ongoing recommendations      * Severe aortic stenosis  Assessment & Plan  · Pt initially presented to [address] for volume overload, shortness of breath  · Transferred to [facility] for evaluation for TAVR,  · ECHO 12/22 with severely reduced mobility, aortic valve has paradoxical low flow low gradient severe AS  · CT surg following,  · Pre-operative work up ongoing, carotid US with < 50% stenosis bilaterally  · Obtain CTA c/a/p>> done, results pending  OP follow up with CTSx  · Will likely need ongoing TAVR workup with cardiac cath outpatient once medically optimized       Acute respiratory failure with hypoxia ([address] )  Assessment & Plan  · POA, likely multifactorial in setting of acute CHF, pleural effusions, severe AS  · Secondary to patient with hypoxia noted outpatient with spO2 in the 80s, requiring supplemental O2 at 2 L NC saturating mid 90s   · Continue IV diuresis with lasix 40 mg BID per cardiology   · Ongoing CT surgery recommendations for TAVR  · S/p IR thoracentesis on 12/20  · Pre-crenshaw cultures negative thus far  · Home O2 eval done, patient qualified for home oxygen ambulation   arranged for this  Pleural effusion  Assessment & Plan  · CT chest (12/20/22) - Moderate right and small left-sided pleural effusions with associated compressive atelectasis  · In the setting of acute on chronic diastolic heart failure  · S/p 900 cc thoracentesis by IR on 12/20  · IV diuresis as above  · Respiratory status stable    Hyperthyroidism  Assessment & Plan  · Hx of hyperthyroidism maintained on Methimazole 5 mg daily as an outpatient   · TSH 10 on admission, free T4 WNL  · Endocrinology following,  · Plan to hold methimazole and repeat TFTs in 2 days  · Continue to hold methimazole on discharge per endocrinology  · OutPatient follow-up with endocrinology  Stage 3a chronic kidney disease [facility])  Assessment & Plan  Lab Results   Component Value Date    EGFR 36 12/25/2022    EGFR 41 12/24/2022    EGFR 49 12/23/2022    CREATININE 1 31 (H) 12/25/2022    CREATININE 1 18 12/24/2022    CREATININE 1 02 12/23/2022   · Baseline creatinine 1-1 1  · Mild elevation noted  · Consider repeat BMP in 1 week of dc   · On furosemide and spironolactone on dc      Generalized anxiety disorder  Assessment & Plan  · Continue PRN Alprazolam 0 25 mg Q6H, confirmed on PDMP  · Mentation stable     Atrial tachycardia (HCC)  Assessment & Plan  · EKG 12/22 with sinus rhythm, marked sinus arrhythmia with 1st degree AV block with PVCs   · Continue to monitor telemetry, some episodes of possible atrial tachycardia   · Cardiology following,  · BB uptitrated to lopressor 75 mg BID on 12/24, appreciate ongoing cardiology recommendations  · Disseminated per cardiology prior to discharge  · Eliquis 2 5 mg twice daily for anticoagulation  30-day coupon provided to patient discharge  We could not price check pharmacy is closed on Christmas)  if Eliquis expensive for patient, told her to reach out to her cardiologist office discuss regarding switch of anticoagulation 30 days  Discussed this with cardiologist   · Replete electrolytes     Essential hypertension  Assessment & Plan  · BP currently stable   · PTA regimen of Atenolol/Chlorthalidone (100/25 mg) 0 5 tablet daily, Amlodipine 10 mg daily  · Home medications on hold >> continue to hold on discharge  · Now on Lasix, Aldactone, Cardizem and lopressor    · Monitor BP    Hyponatremia  Assessment & Plan  · Chronic with sodium levels of 127-130 as an outpatient  · Currently with acute on chronic diastolic heart failure on IV diuresis, continue   · Sodium 134 today, improving and stable, continue to monitor       Discharge Summary - [facility]    Patient Information: Marques Urias 80 y o  female MRN: [id number]  Unit/Bed#: [address] 402-01 Encounter: [id number]    Discharging Physician / Practitioner: Rodrick Guerra MD  PCP: Man Mojica DO  Admission Date: 12/21/2022  Discharge Date: 12/25/22    Reason for Admission: Shortness of breath    Discharge Diagnoses:     Principal Problem:    Severe aortic stenosis  Active Problems:    Acute on chronic diastolic CHF (congestive heart failure) (HCC)    Hyponatremia    Essential hypertension    Atrial tachycardia ([address] )    Generalized anxiety disorder    Stage 3a chronic kidney disease (HCC)    Hyperthyroidism    Pleural effusion    Acute respiratory failure with hypoxia (HCC)  Resolved Problems:    Hypokalemia      Consultations During Hospital Stay:  · Cardiology  · CT surgery  · Endocrinology    Significant Findings / Test Results:     VAS carotid complete study   Final Result by Mary Skinner DO (12/22 1613)      XR chest portable   Final Result by Antonia West MD (12/22 9536)      Slightly diminished right pleural effusion  Stable left pleural effusion  Probable bibasilar atelectasis  Workstation performed: [id number]         CTA chest abdomen pelvis w wo contrast TAVR    (Results Pending)         Hospital Course:     Marques Urias is a 80 y o  female patient who originally presented to the hospital on 12/21/2022 due to shortness of breath on exertion  She was noted to have acute on chronic diastolic heart failure  Aortic stenosis  She was evaluated by cardiology and CT surgery  She received IV diuresis  Cardiology and CT surgery will continue to follow outpatient regarding diastolic heart failure and severe aortic stenosis  Also noted to have atrial tachycardia for which she was started on anticoagulation and rate control agents  She is now on metoprolol, furosemide, Cardizem, spironolactone and Eliquis  Home care was arranged by   She qualified for home O2 on ambulation which was arranged by   Please refer to detailed assessment and plan above for further details  Condition at Discharge: stable     Discharge Day Visit / Exam:     Subjective:  No overnight events       Vitals: Blood Pressure: 109/57 (12/25/22 1439)  Pulse: 71 (12/25/22 1439)  Temperature: 98 8 °F (37 1 °C) (12/25/22 1439)  Temp Source: Oral (12/25/22 0325)  Respirations: 18 (12/25/22 1439)  Height: 4' 10" (147 3 cm) (12/22/22 1100)  Weight - Scale: 47 4 kg (104 lb 8 oz) (12/25/22 0600)  SpO2: 95 % (12/25/22 1439)  Exam:   Physical Exam  Constitutional:       General: She is not in acute distress  Cardiovascular:      Rate and Rhythm: Normal rate and regular rhythm  Heart sounds: Murmur heard  Pulmonary:      Effort: No respiratory distress  Breath sounds: Normal breath sounds  No wheezing or rales  Abdominal:      General: Bowel sounds are normal  There is no distension  Palpations: Abdomen is soft  Tenderness: There is no abdominal tenderness  Skin:     General: Skin is warm  Neurological:      Mental Status: She is alert  Comments: Awake alert and communicative       Discussion with Family: d/w  at bedside  Discharge instructions/Information to patient and family:   See after visit summary for information provided to patient and family  Provisions for Follow-Up Care:  See after visit summary for information related to follow-up care and any pertinent home health orders  Disposition:     Home with VNA Services (Reminder: Complete face to face encounter)      Discharge Statement:  I spent 45 minutes discharging the patient  This time was spent on the day of discharge  I had direct contact with the patient on the day of discharge  Greater than 50% of the total time was spent examining patient, answering all patient questions, arranging and discussing plan of care with patient as well as directly providing post-discharge instructions  Additional time then spent on discharge activities  Discharge Medications:  See after visit summary for reconciled discharge medications provided to patient and family        ** Please Note: This note has been constructed using a voice recognition system **

## 2022-12-25 NOTE — RESPIRATORY THERAPY NOTE
Home Oxygen Qualifying Test     Patient name: Natalya Eaton        : 1935   Date of Test:  2022  Diagnosis:    Home Oxygen Test:    **Medicare Guidelines require item(s) 1-5 on all ambulatory patients or 1 and 2 on non-ambulatory patients  1  Baseline SPO2 on Room Air at rest 95 %   a  If <= 88% on Room Air add O2 via NC to obtain SpO2 >=88%  If LPM needed, document 0 LPM  needed to reach =>88%    2  SPO2 during exertion on Room Air 87  %  a  During exertion monitor SPO2  If SPO2 increases >=89%, do not add supplemental oxygen    3  SPO2 on Oxygen at Rest 95 % at 0 LPM    4  SPO2 during exertion on Oxygen 92 % at 4 LPM    5  Test performed during exertion activity  [x]  Supplemental Home Oxygen is indicated  []  Client does not qualify for home oxygen  Respiratory Additional Notes- Pt ambulated with walker for 6 minutes without incident  Pt does not require oxygen at rest  SpO2 dropped to 87% during exertion on room air and required 4L      Tee Barillas, RT

## 2022-12-25 NOTE — ASSESSMENT & PLAN NOTE
Lab Results   Component Value Date    EGFR 36 12/25/2022    EGFR 41 12/24/2022    EGFR 49 12/23/2022    CREATININE 1 31 (H) 12/25/2022    CREATININE 1 18 12/24/2022    CREATININE 1 02 12/23/2022   · Baseline creatinine 1-1 1  · Mild elevation noted  · Consider repeat BMP in 1 week of dc   · On furosemide and spironolactone on dc

## 2022-12-27 ENCOUNTER — TRANSITIONAL CARE MANAGEMENT (OUTPATIENT)
Dept: FAMILY MEDICINE CLINIC | Facility: CLINIC | Age: 87
End: 2022-12-27

## 2022-12-27 ENCOUNTER — PATIENT OUTREACH (OUTPATIENT)
Dept: FAMILY MEDICINE CLINIC | Facility: CLINIC | Age: 87
End: 2022-12-27

## 2022-12-27 ENCOUNTER — HOME CARE VISIT (OUTPATIENT)
Dept: HOME HEALTH SERVICES | Facility: HOME HEALTHCARE | Age: 87
End: 2022-12-27

## 2022-12-27 NOTE — PROGRESS NOTES
Outpatient Care Management Note:  In basket BPCI referral received, -CHF  Chart review completed  Outreach call scheduled

## 2022-12-27 NOTE — CASE COMMUNICATION
Spoke with  who confirmed the following information  Agreeable to [facility] services and no other SN agencies in home: xx    Communication to the physician regarding delay: na    Insurance, copays, HB status reviewed: xx    Verified address and phone number: xx     no pets    Requested that patient secure pets: xx    Medication bottles and DC instructions in home: xx    Wound care/IV supplies in home: na    CG present to learn: na     Other concerns patient/family would like to address at visit:

## 2022-12-28 ENCOUNTER — HOME CARE VISIT (OUTPATIENT)
Dept: HOME HEALTH SERVICES | Facility: HOME HEALTHCARE | Age: 87
End: 2022-12-28

## 2022-12-28 ENCOUNTER — PATIENT OUTREACH (OUTPATIENT)
Dept: FAMILY MEDICINE CLINIC | Facility: CLINIC | Age: 87
End: 2022-12-28

## 2022-12-28 VITALS
DIASTOLIC BLOOD PRESSURE: 60 MMHG | BODY MASS INDEX: 22.67 KG/M2 | HEART RATE: 54 BPM | WEIGHT: 108 LBS | TEMPERATURE: 97.7 F | RESPIRATION RATE: 16 BRPM | HEIGHT: 58 IN | OXYGEN SATURATION: 100 % | SYSTOLIC BLOOD PRESSURE: 114 MMHG

## 2022-12-28 NOTE — PROGRESS NOTES
Outpatient Care Management Note:  Outreach call attempted to Mrs Lonnie Acevedo  Message left for patient to please return call  Contact information left on message  WILLIE is scheduled to begin services today

## 2022-12-28 NOTE — PROGRESS NOTES
Outpatient Care Management Note:  Received return call from [address] , Marguerite Jon  Introduced myself and my role  They are agreeable to outreach  Jean Ho has been doing well since discharge  She has not been weighing herself daily but plans to start tomorrow  Mr Mendy Bashir is getting new batteries for the scale  KRYSTALMARU began services today  At that visit her weight was stable and her oxygen saturation level was 95%  Jean Ho has no utilized oxygen since yesterday  She was not utilizing prior to her admission  Jean Ho is normally independent in her ADL's  Marguerite Webb is assisting as needed currently  They do not feel any additional assistance is needed at this time  Follow up appointments have been scheduled  All medications have been obtained  No issue with cost   No questions regarding administration  Denies any needs at this time  Advised Mr Mendy Bashir he may call with any questions or concerns

## 2022-12-29 ENCOUNTER — OFFICE VISIT (OUTPATIENT)
Dept: CARDIOLOGY CLINIC | Facility: CLINIC | Age: 87
End: 2022-12-29

## 2022-12-29 VITALS
DIASTOLIC BLOOD PRESSURE: 90 MMHG | OXYGEN SATURATION: 100 % | BODY MASS INDEX: 22.25 KG/M2 | WEIGHT: 106 LBS | SYSTOLIC BLOOD PRESSURE: 120 MMHG | HEART RATE: 67 BPM | HEIGHT: 58 IN

## 2022-12-29 DIAGNOSIS — I50.42 CHRONIC COMBINED SYSTOLIC AND DIASTOLIC CONGESTIVE HEART FAILURE (HCC): ICD-10-CM

## 2022-12-29 DIAGNOSIS — I35.0 SEVERE AORTIC STENOSIS: Primary | ICD-10-CM

## 2022-12-29 DIAGNOSIS — I47.1 ATRIAL TACHYCARDIA (HCC): ICD-10-CM

## 2022-12-29 PROBLEM — I50.32 CHRONIC DIASTOLIC CONGESTIVE HEART FAILURE (HCC): Status: ACTIVE | Noted: 2022-12-20

## 2022-12-29 RX ORDER — METOPROLOL SUCCINATE 50 MG/1
75 TABLET, EXTENDED RELEASE ORAL DAILY
Qty: 135 TABLET | Refills: 3 | Status: SHIPPED | OUTPATIENT
Start: 2022-12-29

## 2022-12-29 NOTE — H&P (VIEW-ONLY)
[facility]   Outpatient Note  Gary Arellano  1935  [id number]  [facility] [place]  [address] [place] [id number] [phone]    Gary Arellano is a 80 y o  female    Assessment and Plan:   Atrial tachycardia ([address] )  Afib/aflutter   Now controlled on metoprolol 75 mg daily and diltiazem 120 mg daily   On Eliquis 5 mg BID for stoke risk prevention         Mitral valve insufficiency  Moderate MR and Moderate MAC per echo 12/22/22  Asymptomatic     Non-rheumatic tricuspid valve insufficiency  Moderate TR per echo 12/22/22 PASP 81 mmHg  Asymptomatic     Severe aortic stenosis  Noted on Echo 12/22/22 with paradoxical low-flow low-gradient severe AS  EF 45-50% ANAM 0 5 cm2,  Peak velocity 2 94 m/s, velocity index 0 22  Cardiac cath will be ordered  Patient will need evaluation by the TAVR clinic  Chronic combined systolic and diastolic congestive heart failure (HCC)  Wt Readings from Last 3 Encounters:   12/29/22 48 1 kg (106 lb)   12/28/22 49 kg (108 lb)   12/25/22 47 4 kg (104 lb 8 oz)     Volume stable   EF 45-50% 12/22/22  Continue current medical management  On lasix and metoprolol            Additional Plan:   Medications as detailed above  Pertinent testing orders as outlined  Available lab and test results are reviewed with the patient and any additional required labs are ordered as noted  Return visit will be in three months or earlier if there are problems  The patient is encouraged to call in the meantime if there are questions or concerns  Subjective: The patient was recently hospitalized for respiratory failure with rapid atrial flutter with subsequent HF  She was found to have severe AS with paradoxical low-flow, low gradient and was recommended for catheterization and TAVR evaluation as an OP  She has MR, TR, severe AS, AF, and HFrEF  She is in the office for follow up   She is doing well without any symptoms  Unfortunately she is not able to get metoprolol 75 mg from the pharmacy as this is not covered by her insurance  , she will have to get 50 mg and take 1 5 tabs  Otherwise , From a cardiac perspective, she is without complaints of chest pain or exertional dyspnea  She has no palpitations syncope or near syncope, and denies edema orthopnea or PND  She does not complain of TIA or CVA symptoms  She denies any exertional neck, jaw, arm or back pain           Social History  Social History     Tobacco Use   Smoking Status Never   Smokeless Tobacco Never   ,   Social History     Substance and Sexual Activity   Alcohol Use Never   ,   Social History     Substance and Sexual Activity   Drug Use Never     Family History   Problem Relation Age of Onset   • Hypertension Mother    • Asthma Father         Clarendon's asthma   • Alzheimer's disease Sister    • Rectal cancer Son    • Uterine cancer Daughter    • Heart disease Sister    • Heart disease Brother        Medical and Surgical History  Past Medical History:   Diagnosis Date   • Aortic stenosis    • Community acquired pneumonia of left upper lobe of lung 5/17/2019   • Fatigue    • Full dentures    • Generalized anxiety disorder    • Hyperlipidemia    • Hypertension    • Impaired fasting glucose    • Mitral regurgitation    • Pneumonia    • SVT (supraventricular tachycardia)    • Tricuspid regurgitation      Past Surgical History:   Procedure Laterality Date   • DENTAL SURGERY Bilateral     ALL TEETH REMOVED   • FL GUIDED NEEDLE PLAC BX/ASP/INJ  5/26/2021   • IR THORACENTESIS  12/20/2022   • JOINT REPLACEMENT Left    • KNEE SURGERY      left knee replacement   • VA INJECT SI JOINT ARTHRGRPHY&/ANES/STEROID W/JOHNATHON Bilateral 5/26/2021    Procedure: SACROILIAC JOINT INJECTION;  Surgeon: Leticia Mcduffie MD;  Location: [facility];  Service: Pain Management          Current Outpatient Medications:   •  apixaban (ELIQUIS) 2 5 mg, Take 1 tablet (2 5 mg total) by mouth 2 (two) times a day, Disp: 60 tablet, Rfl: 1  •  Calcium Carb-Cholecalciferol (CALTRATE 600+D3 PO), Take 1 tablet by mouth 2 (two) times a day TAKES ONCE DAILY, Disp: , Rfl:   •  diltiazem (CARDIZEM CD) 120 mg 24 hr capsule, Take 1 capsule (120 mg total) by mouth daily Do not start before December 26, 2022 , Disp: 30 capsule, Rfl: 1  •  furosemide (LASIX) 40 mg tablet, Take 1 tablet (40 mg total) by mouth daily Do not start before December 26, 2022 , Disp: 30 tablet, Rfl: 1  •  metoprolol succinate (TOPROL-XL) 50 mg 24 hr tablet, Take 1 5 tablets (75 mg total) by mouth daily, Disp: 135 tablet, Rfl: 3  •  Omega-3 Fatty Acids (FISH OIL) 1200 MG CAPS, Take 1,200 mg by mouth daily, Disp: , Rfl:   •  oxygen gas, Inhale 2 L/min daily as needed (low oxygen levels)  Indications: Oxygen Desaturation, Disp: , Rfl:   •  Psyllium (METAMUCIL FIBER) 51 7 % PACK, Take 1 Package by mouth daily, Disp: , Rfl: 0  •  spironolactone (ALDACTONE) 25 mg tablet, Take 1 tablet (25 mg total) by mouth daily Do not start before December 26, 2022 , Disp: 30 tablet, Rfl: 1  No Known Allergies    Review of Systems   Constitutional: Negative  HENT: Negative  Eyes: Negative  Cardiovascular: Negative  Negative for chest pain, claudication, cyanosis, dyspnea on exertion, irregular heartbeat, leg swelling, near-syncope, orthopnea, palpitations, paroxysmal nocturnal dyspnea and syncope  Respiratory: Negative  Negative for cough, hemoptysis, shortness of breath, sleep disturbances due to breathing, snoring, sputum production and wheezing  Endocrine: Negative  Hematologic/Lymphatic: Negative  Skin: Negative  Musculoskeletal: Negative  Uses cane to ambulate   Gastrointestinal: Negative  Genitourinary: Negative  Neurological: Negative  Psychiatric/Behavioral: Negative  Allergic/Immunologic: Negative          Objective:   /90   Pulse 67   Ht 4' 10" (1 473 m)   Wt 48 1 kg (106 lb)   SpO2 100%   BMI 22 15 kg/m²   Physical Exam  Vitals and nursing note reviewed  Constitutional:       Appearance: She is underweight  HENT:      Head: Normocephalic and atraumatic  Mouth/Throat:      Mouth: Mucous membranes are moist    Eyes:      General: No scleral icterus  Conjunctiva/sclera: Conjunctivae normal    Neck:      Vascular: No JVD  Trachea: No tracheal deviation  Cardiovascular:      Rate and Rhythm: Normal rate  Rhythm irregularly irregular  Pulses: Intact distal pulses  Heart sounds: S1 normal and S2 normal  Murmur heard  Systolic murmur is present with a grade of 3/6  No friction rub  No gallop  Pulmonary:      Effort: Pulmonary effort is normal  No respiratory distress  Breath sounds: Normal breath sounds  No wheezing or rales  Chest:      Chest wall: No tenderness  Abdominal:      General: Bowel sounds are normal  There is no distension  Palpations: Abdomen is soft  Tenderness: There is no abdominal tenderness  Comments: Aorta not palpable    Musculoskeletal:         General: No tenderness  Normal range of motion  Cervical back: Normal range of motion and neck supple  Right lower leg: No edema  Left lower leg: No edema  Comments: Uses the assistance of a cane to ambulate   Skin:     General: Skin is warm and dry  Coloration: Skin is not pale  Findings: No erythema or rash  Neurological:      General: No focal deficit present  Mental Status: She is alert and oriented to person, place, and time     Psychiatric:         Mood and Affect: Mood normal          Behavior: Behavior normal          Judgment: Judgment normal          Lab Review:   No results found for: CHOL  Lab Results   Component Value Date    HDL 94 12/21/2022     Lab Results   Component Value Date    LDLCALC 60 12/21/2022     Lab Results   Component Value Date    TRIG 50 12/21/2022     Results Reviewed     None        Results Reviewed     None Results Reviewed     None          Recent Cardiovascular Testing:   Echo 12/22/22: Left Ventricle: Left ventricular cavity size is normal  Wall thickness is mildly increased  There is mild concentric hypertrophy  LVMI (LV Mass Indexed to Body Surface Area) = 104 11 g/m2 The left ventricular ejection fraction is 50%  Systolic function is low normal  Wall motion is normal   •  Right Ventricle: Right ventricular cavity size is mildly dilated  Systolic function is mildly reduced  •  Left Atrium: The atrium is severely dilated (>48 mL/m2)  •  Right Atrium: The atrium is mildly dilated  •  Atrial Septum: There is a small patent foramen ovale confirmed at rest with predominant left to right shunting using color Doppler  The septum bows into the right atrium, suggesting increased left atrial pressure  •  Aortic Valve: The aortic valve is trileaflet  The leaflets are moderately thickened  The leaflets are moderately calcified  There is severely reduced mobility  There is mild regurgitation  The pressure half time is 520 when averaged over 3 beats  The aortic valve peak velocity is 2 94 m/s  The aortic valve mean gradient is 24 mmHg  The dimensionless velocity index is 0 22  The aortic valve area is 0 50 cm2  The aortic valve has paradoxical low flow low gradient severe AS  •  Mitral Valve: There is mild calcification with moderate chordal involvement  There is moderate annular calcification  There is moderate regurgitation  •  Tricuspid Valve: There is moderate regurgitation  The right ventricular systolic pressure is severely elevated  The estimated right ventricular systolic pressure is 05 74 mmHg  •  Pulmonic Valve: There is mild regurgitation  •  Pericardium: There is a moderately sized left pleural effusion        ECG Review:   12/23/22:Atrial flutter Aberrant conduction  Left axis deviation  Septal infarct (cited on or before 20-DEC-2022)

## 2022-12-29 NOTE — ASSESSMENT & PLAN NOTE
Noted on Echo 12/22/22 with paradoxical low-flow low-gradient severe AS  EF 45-50% ANAM 0 5 cm2,  Peak velocity 2 94 m/s, velocity index 0 22  Cardiac cath will be ordered  Patient will need evaluation by the TAVR clinic

## 2022-12-29 NOTE — ASSESSMENT & PLAN NOTE
Afib/aflutter   Now controlled on metoprolol 75 mg daily and diltiazem 120 mg daily   On Eliquis 5 mg BID for stoke risk prevention

## 2022-12-29 NOTE — ASSESSMENT & PLAN NOTE
Wt Readings from Last 3 Encounters:   12/29/22 48 1 kg (106 lb)   12/28/22 49 kg (108 lb)   12/25/22 47 4 kg (104 lb 8 oz)     Volume stable   EF 45-50% 12/22/22  Continue current medical management  On lasix and metoprolol

## 2022-12-29 NOTE — PROGRESS NOTES
[facility]   Outpatient Note  Sebas Parks  1935  [id number]  [facility] [address] [address] [phone]    Sebas Parks is a 80 y o  female    Assessment and Plan:   Atrial tachycardia ([address] )  Afib/aflutter   Now controlled on metoprolol 75 mg daily and diltiazem 120 mg daily   On Eliquis 5 mg BID for stoke risk prevention         Mitral valve insufficiency  Moderate MR and Moderate MAC per echo 12/22/22  Asymptomatic     Non-rheumatic tricuspid valve insufficiency  Moderate TR per echo 12/22/22 PASP 81 mmHg  Asymptomatic     Severe aortic stenosis  Noted on Echo 12/22/22 with paradoxical low-flow low-gradient severe AS  EF 45-50% ANAM 0 5 cm2,  Peak velocity 2 94 m/s, velocity index 0 22  Cardiac cath will be ordered  Patient will need evaluation by the TAVR clinic  Chronic combined systolic and diastolic congestive heart failure (HCC)  Wt Readings from Last 3 Encounters:   12/29/22 48 1 kg (106 lb)   12/28/22 49 kg (108 lb)   12/25/22 47 4 kg (104 lb 8 oz)     Volume stable   EF 45-50% 12/22/22  Continue current medical management  On lasix and metoprolol            Additional Plan:   Medications as detailed above  Pertinent testing orders as outlined  Available lab and test results are reviewed with the patient and any additional required labs are ordered as noted  Return visit will be in three months or earlier if there are problems  The patient is encouraged to call in the meantime if there are questions or concerns  Subjective: The patient was recently hospitalized for respiratory failure with rapid atrial flutter with subsequent HF  She was found to have severe AS with paradoxical low-flow, low gradient and was recommended for catheterization and TAVR evaluation as an OP  She has MR, TR, severe AS, AF, and HFrEF  She is in the office for follow up   She is doing well without any symptoms  Unfortunately she is not able to get metoprolol 75 mg from the pharmacy as this is not covered by her insurance  , she will have to get 50 mg and take 1 5 tabs  Otherwise , From a cardiac perspective, she is without complaints of chest pain or exertional dyspnea  She has no palpitations syncope or near syncope, and denies edema orthopnea or PND  She does not complain of TIA or CVA symptoms  She denies any exertional neck, jaw, arm or back pain           Social History  Social History     Tobacco Use   Smoking Status Never   Smokeless Tobacco Never   ,   Social History     Substance and Sexual Activity   Alcohol Use Never   ,   Social History     Substance and Sexual Activity   Drug Use Never     Family History   Problem Relation Age of Onset   • Hypertension Mother    • Asthma Father         's asthma   • Alzheimer's disease Sister    • Rectal cancer Son    • Uterine cancer Daughter    • Heart disease Sister    • Heart disease Brother        Medical and Surgical History  Past Medical History:   Diagnosis Date   • Aortic stenosis    • Community acquired pneumonia of left upper lobe of lung 5/17/2019   • Fatigue    • Full dentures    • Generalized anxiety disorder    • Hyperlipidemia    • Hypertension    • Impaired fasting glucose    • Mitral regurgitation    • Pneumonia    • SVT (supraventricular tachycardia)    • Tricuspid regurgitation      Past Surgical History:   Procedure Laterality Date   • DENTAL SURGERY Bilateral     ALL TEETH REMOVED   • FL GUIDED NEEDLE PLAC BX/ASP/INJ  5/26/2021   • IR THORACENTESIS  12/20/2022   • JOINT REPLACEMENT Left    • KNEE SURGERY      left knee replacement   • MT INJECT SI JOINT ARTHRGRPHY&/ANES/STEROID W/JOHNATHON Bilateral 5/26/2021    Procedure: SACROILIAC JOINT INJECTION;  Surgeon: Kayla Tierney MD;  Location: [facility];  Service: Pain Management          Current Outpatient Medications:   •  apixaban (ELIQUIS) 2 5 mg, Take 1 tablet (2 5 mg total) by mouth 2 (two) times a day, Disp: 60 tablet, Rfl: 1  •  Calcium Carb-Cholecalciferol (CALTRATE 600+D3 PO), Take 1 tablet by mouth 2 (two) times a day TAKES ONCE DAILY, Disp: , Rfl:   •  diltiazem (CARDIZEM CD) 120 mg 24 hr capsule, Take 1 capsule (120 mg total) by mouth daily Do not start before December 26, 2022 , Disp: 30 capsule, Rfl: 1  •  furosemide (LASIX) 40 mg tablet, Take 1 tablet (40 mg total) by mouth daily Do not start before December 26, 2022 , Disp: 30 tablet, Rfl: 1  •  metoprolol succinate (TOPROL-XL) 50 mg 24 hr tablet, Take 1 5 tablets (75 mg total) by mouth daily, Disp: 135 tablet, Rfl: 3  •  Omega-3 Fatty Acids (FISH OIL) 1200 MG CAPS, Take 1,200 mg by mouth daily, Disp: , Rfl:   •  oxygen gas, Inhale 2 L/min daily as needed (low oxygen levels)  Indications: Oxygen Desaturation, Disp: , Rfl:   •  Psyllium (METAMUCIL FIBER) 51 7 % PACK, Take 1 Package by mouth daily, Disp: , Rfl: 0  •  spironolactone (ALDACTONE) 25 mg tablet, Take 1 tablet (25 mg total) by mouth daily Do not start before December 26, 2022 , Disp: 30 tablet, Rfl: 1  No Known Allergies    Review of Systems   Constitutional: Negative  HENT: Negative  Eyes: Negative  Cardiovascular: Negative  Negative for chest pain, claudication, cyanosis, dyspnea on exertion, irregular heartbeat, leg swelling, near-syncope, orthopnea, palpitations, paroxysmal nocturnal dyspnea and syncope  Respiratory: Negative  Negative for cough, hemoptysis, shortness of breath, sleep disturbances due to breathing, snoring, sputum production and wheezing  Endocrine: Negative  Hematologic/Lymphatic: Negative  Skin: Negative  Musculoskeletal: Negative  Uses cane to ambulate   Gastrointestinal: Negative  Genitourinary: Negative  Neurological: Negative  Psychiatric/Behavioral: Negative  Allergic/Immunologic: Negative          Objective:   /90   Pulse 67   Ht 4' 10" (1 473 m)   Wt 48 1 kg (106 lb)   SpO2 100%   BMI 22 15 kg/m²   Physical Exam  Vitals and nursing note reviewed  Constitutional:       Appearance: She is underweight  HENT:      Head: Normocephalic and atraumatic  Mouth/Throat:      Mouth: Mucous membranes are moist    Eyes:      General: No scleral icterus  Conjunctiva/sclera: Conjunctivae normal    Neck:      Vascular: No JVD  Trachea: No tracheal deviation  Cardiovascular:      Rate and Rhythm: Normal rate  Rhythm irregularly irregular  Pulses: Intact distal pulses  Heart sounds: S1 normal and S2 normal  Murmur heard  Systolic murmur is present with a grade of 3/6  No friction rub  No gallop  Pulmonary:      Effort: Pulmonary effort is normal  No respiratory distress  Breath sounds: Normal breath sounds  No wheezing or rales  Chest:      Chest wall: No tenderness  Abdominal:      General: Bowel sounds are normal  There is no distension  Palpations: Abdomen is soft  Tenderness: There is no abdominal tenderness  Comments: Aorta not palpable    Musculoskeletal:         General: No tenderness  Normal range of motion  Cervical back: Normal range of motion and neck supple  Right lower leg: No edema  Left lower leg: No edema  Comments: Uses the assistance of a cane to ambulate   Skin:     General: Skin is warm and dry  Coloration: Skin is not pale  Findings: No erythema or rash  Neurological:      General: No focal deficit present  Mental Status: She is alert and oriented to person, place, and time     Psychiatric:         Mood and Affect: Mood normal          Behavior: Behavior normal          Judgment: Judgment normal          Lab Review:   No results found for: CHOL  Lab Results   Component Value Date    HDL 94 12/21/2022     Lab Results   Component Value Date    LDLCALC 60 12/21/2022     Lab Results   Component Value Date    TRIG 50 12/21/2022     Results Reviewed     None        Results Reviewed     None Results Reviewed     None          Recent Cardiovascular Testing:   Echo 12/22/22: Left Ventricle: Left ventricular cavity size is normal  Wall thickness is mildly increased  There is mild concentric hypertrophy  LVMI (LV Mass Indexed to Body Surface Area) = 104 11 g/m2 The left ventricular ejection fraction is 50%  Systolic function is low normal  Wall motion is normal   •  Right Ventricle: Right ventricular cavity size is mildly dilated  Systolic function is mildly reduced  •  Left Atrium: The atrium is severely dilated (>48 mL/m2)  •  Right Atrium: The atrium is mildly dilated  •  Atrial Septum: There is a small patent foramen ovale confirmed at rest with predominant left to right shunting using color Doppler  The septum bows into the right atrium, suggesting increased left atrial pressure  •  Aortic Valve: The aortic valve is trileaflet  The leaflets are moderately thickened  The leaflets are moderately calcified  There is severely reduced mobility  There is mild regurgitation  The pressure half time is 520 when averaged over 3 beats  The aortic valve peak velocity is 2 94 m/s  The aortic valve mean gradient is 24 mmHg  The dimensionless velocity index is 0 22  The aortic valve area is 0 50 cm2  The aortic valve has paradoxical low flow low gradient severe AS  •  Mitral Valve: There is mild calcification with moderate chordal involvement  There is moderate annular calcification  There is moderate regurgitation  •  Tricuspid Valve: There is moderate regurgitation  The right ventricular systolic pressure is severely elevated  The estimated right ventricular systolic pressure is 37 69 mmHg  •  Pulmonic Valve: There is mild regurgitation  •  Pericardium: There is a moderately sized left pleural effusion        ECG Review:   12/23/22:Atrial flutter Aberrant conduction  Left axis deviation  Septal infarct (cited on or before 20-DEC-2022)

## 2022-12-29 NOTE — PATIENT INSTRUCTIONS
No changes in medication at this time    Will set up cardiac catheterization to assess the tight valve   And set up appointment with the valve clinic  They will contact you to do so    Return visit will be in three months    Call in the meantime if there are any concerns

## 2022-12-30 ENCOUNTER — HOME CARE VISIT (OUTPATIENT)
Dept: HOME HEALTH SERVICES | Facility: HOME HEALTHCARE | Age: 87
End: 2022-12-30

## 2022-12-30 ENCOUNTER — TELEPHONE (OUTPATIENT)
Dept: CARDIOLOGY CLINIC | Facility: CLINIC | Age: 87
End: 2022-12-30

## 2022-12-30 VITALS — DIASTOLIC BLOOD PRESSURE: 82 MMHG | SYSTOLIC BLOOD PRESSURE: 140 MMHG | OXYGEN SATURATION: 99 % | HEART RATE: 56 BPM

## 2022-12-30 NOTE — TELEPHONE ENCOUNTER
Patient scheduled for LHC at [facility] on 01/11/2023 with  Dr Jose Antonio Winchester  Patient aware of general instructions and labs required    Eliquis to hold 2 days prior  Lasix and Spironolactone to hold the AM of    Can we please have insurance check for service

## 2022-12-31 NOTE — CASE COMMUNICATION
PT eval on 12/30/22  PT POC for 1wk1, 2wk3  for gait/transfers/balance training, LE ther ex, HEP  edu       Thank you, Mine Fulton Arm PT

## 2023-01-03 ENCOUNTER — OFFICE VISIT (OUTPATIENT)
Dept: FAMILY MEDICINE CLINIC | Facility: CLINIC | Age: 88
End: 2023-01-03

## 2023-01-03 ENCOUNTER — HOME CARE VISIT (OUTPATIENT)
Dept: HOME HEALTH SERVICES | Facility: HOME HEALTHCARE | Age: 88
End: 2023-01-03

## 2023-01-03 VITALS
SYSTOLIC BLOOD PRESSURE: 128 MMHG | OXYGEN SATURATION: 98 % | HEART RATE: 60 BPM | TEMPERATURE: 97.1 F | DIASTOLIC BLOOD PRESSURE: 66 MMHG

## 2023-01-03 VITALS
BODY MASS INDEX: 22.56 KG/M2 | WEIGHT: 107.5 LBS | DIASTOLIC BLOOD PRESSURE: 76 MMHG | TEMPERATURE: 97 F | OXYGEN SATURATION: 98 % | HEIGHT: 58 IN | SYSTOLIC BLOOD PRESSURE: 122 MMHG | HEART RATE: 71 BPM

## 2023-01-03 DIAGNOSIS — K59.00 CONSTIPATION, UNSPECIFIED CONSTIPATION TYPE: ICD-10-CM

## 2023-01-03 DIAGNOSIS — E05.90 HYPERTHYROIDISM: ICD-10-CM

## 2023-01-03 DIAGNOSIS — I50.42 CHRONIC COMBINED SYSTOLIC AND DIASTOLIC CONGESTIVE HEART FAILURE (HCC): Primary | ICD-10-CM

## 2023-01-03 DIAGNOSIS — F41.1 GENERALIZED ANXIETY DISORDER: ICD-10-CM

## 2023-01-03 DIAGNOSIS — I47.1 ATRIAL TACHYCARDIA (HCC): ICD-10-CM

## 2023-01-03 DIAGNOSIS — J90 PLEURAL EFFUSION: ICD-10-CM

## 2023-01-03 DIAGNOSIS — I10 ESSENTIAL HYPERTENSION: ICD-10-CM

## 2023-01-03 DIAGNOSIS — I35.0 SEVERE AORTIC STENOSIS: ICD-10-CM

## 2023-01-03 NOTE — PROGRESS NOTES
Assessment & Plan     1  Chronic combined systolic and diastolic congestive heart failure [facility])  Assessment & Plan:  Wt Readings from Last 3 Encounters:   01/11/23 47 2 kg (104 lb)   01/03/23 48 8 kg (107 lb 8 oz)   12/29/22 48 1 kg (106 lb)       Patient examines to be euvolemic  I was able to review her records from her hospitalization at [address] [place] [address] as well as her records from her admission at [facility]  This included discharge summary, labs, and diagnostic studies  2  Severe aortic stenosis  Assessment & Plan:  Echocardiogram shows severe aortic stenosis  Her cardiologist, Dr Pancho Mcdonald, feels she has moderate aortic stenosis  She will undergo catheterization and consideration for TAVR  3  Hyperthyroidism  Assessment & Plan:  Patient's TSH was noted to be 10 during her hospital stay  Her Tapazole was held  She will need repeat thyroid function testing in 6 to 8 weeks  The patient is followed by endocrinology  4  Essential hypertension  Assessment & Plan:  Blood pressures currently under excellent control  I recommended no changes      5  Atrial tachycardia [facility])  Assessment & Plan:  Patient had episode of atrial tachycardia during her hospital stay  Clinically today she is in a sinus rhythm  She will continue diltiazem      6  Generalized anxiety disorder  Assessment & Plan:  Patient has generalized anxiety disorder  The patient may continue alprazolam 0 25 mg as needed for anxiety  She has taken this medication many years  It was not on her discharge list of medications  I added it to her chart  7  Pleural effusion  Assessment & Plan:  Patient underwent thoracentesis with 900 mL of pleural fluid evacuated  This was felt to be secondary to her congestive heart failure  On exam today, there was no pleural effusion  Her pulse ox was noted to be 98% on room air      8  Constipation, unspecified constipation type  Assessment & Plan:  Patient has constipation    I recommended she use Senokot-S as needed for her constipation         Subjective     Transitional Care Management Review:   Howard Sosa is a 80 y o  female here for TCM follow up  During the TCM phone call patient stated:  TCM Call     Date and time call was made  12/27/2022  2:31 PM    Hospital care reviewed  Records reviewed    Patient was hospitialized at  [facility]    Date of Admission  12/21/22    Date of discharge  12/25/22    Diagnosis  SEVERE AORTIC STENOSIS    Disposition  Home; Home health services    Were the patients medications reviewed and updated  No    Current Symptoms  None      TCM Call     Post hospital issues  Reduced activity    Should patient be enrolled in anticoag monitoring? No    Scheduled for follow up? Yes    Patients specialists  Cardiologist    Did you obtain your prescribed medications  Yes    Do you need help managing your prescriptions or medications  No    Is transportation to your appointment needed  Yes    Specify why  PATIENT DOES NOT DRIVE    I have advised the patient to call PCP with any new or worsening symptoms  [facility] SENIOR  This is an 27-year-old white female who presents to the office today for a transition of care management evaluation  The patient was admitted to [place] [address] on December 20 with acute on chronic congestive heart failure  Patient apparently was in her normal state of health  Her  checks his own pulse oximetry regularly  Although the patient was not having any symptoms, he checked his wife's pulse ox and it was only 85%  This prompted the visit to the ER  The patient was in congestive heart failure  The patient was then transferred to [facility] where she was admitted from December 21 to December 25  She was found to have respiratory failure with hypoxia    She qualified for home oxygen but tells me her oxygen has been normal   She underwent a thoracentesis which produced 900 mL of pleural fluid   She was also noted to have atrial tachycardia  Echocardiogram did show severe aortic stenosis  This was previously thought to be moderate  The patient is now scheduled for cardiac catheterization  She tells me she is feeling well now  She denies any shortness of breath  She denies orthopnea  She denies palpitations  She denies paroxysmal nocturnal dyspnea  Denies lower extremity edema    Review of Systems   Constitutional: Negative for activity change and appetite change  Respiratory: Negative for cough and shortness of breath  Cardiovascular: Negative for chest pain, palpitations and leg swelling  Negative for orthopnea and paroxysmal nocturnal dyspnea   Gastrointestinal: Positive for constipation  Negative for abdominal distention, abdominal pain, blood in stool, diarrhea and nausea  Genitourinary: Negative for dysuria and frequency  Psychiatric/Behavioral: Negative for sleep disturbance  The patient is nervous/anxious  Objective     /76   Pulse 71   Temp (!) 97 °F (36 1 °C) (Tympanic)   Ht 4' 10" (1 473 m)   Wt 48 8 kg (107 lb 8 oz)   SpO2 98%   BMI 22 47 kg/m²      Physical Exam  Vitals reviewed  Constitutional:       Comments: This is a frail-appearing 44-year-old white female  The patient appears her stated age  The patient is nonseptic looking and in no distress   HENT:      Head: Normocephalic and atraumatic  Right Ear: Tympanic membrane, ear canal and external ear normal  There is no impacted cerumen  Left Ear: Tympanic membrane, ear canal and external ear normal  There is no impacted cerumen  Mouth/Throat:      Mouth: Mucous membranes are moist       Pharynx: Oropharynx is clear  No oropharyngeal exudate or posterior oropharyngeal erythema  Eyes:      General: No scleral icterus  Right eye: No discharge  Left eye: No discharge        Conjunctiva/sclera: Conjunctivae normal       Pupils: Pupils are equal, round, and reactive to light  Neck:      Comments: No thyromegaly  Cardiovascular:      Rate and Rhythm: Normal rate and regular rhythm  Heart sounds: Murmur heard  No friction rub  No gallop  Comments: There is a grade 2/6 systolic murmur noted  Pulmonary:      Effort: Pulmonary effort is normal  No respiratory distress  Breath sounds: Normal breath sounds  No stridor  No wheezing, rhonchi or rales  Comments: Lungs were resonant to percussion  Abdominal:      General: Bowel sounds are normal  There is no distension  Palpations: Abdomen is soft  There is no mass  Tenderness: There is no abdominal tenderness  There is no guarding  Musculoskeletal:      Cervical back: Neck supple  Lymphadenopathy:      Cervical: No cervical adenopathy  Psychiatric:         Mood and Affect: Mood normal          Behavior: Behavior normal          Thought Content:  Thought content normal          Judgment: Judgment normal      Extremities: Without cyanosis, clubbing, or edema    Medications have been reviewed by provider in current encounter    Benitez Chaves DO

## 2023-01-04 ENCOUNTER — HOME CARE VISIT (OUTPATIENT)
Dept: HOME HEALTH SERVICES | Facility: HOME HEALTHCARE | Age: 88
End: 2023-01-04

## 2023-01-04 VITALS
SYSTOLIC BLOOD PRESSURE: 118 MMHG | DIASTOLIC BLOOD PRESSURE: 64 MMHG | OXYGEN SATURATION: 97 % | TEMPERATURE: 97.7 F | RESPIRATION RATE: 16 BRPM | HEART RATE: 58 BPM

## 2023-01-06 ENCOUNTER — HOME CARE VISIT (OUTPATIENT)
Dept: HOME HEALTH SERVICES | Facility: HOME HEALTHCARE | Age: 88
End: 2023-01-06

## 2023-01-06 VITALS — HEART RATE: 60 BPM | DIASTOLIC BLOOD PRESSURE: 78 MMHG | SYSTOLIC BLOOD PRESSURE: 140 MMHG | OXYGEN SATURATION: 97 %

## 2023-01-09 ENCOUNTER — HOME CARE VISIT (OUTPATIENT)
Dept: HOME HEALTH SERVICES | Facility: HOME HEALTHCARE | Age: 88
End: 2023-01-09

## 2023-01-09 VITALS
TEMPERATURE: 98.4 F | RESPIRATION RATE: 18 BRPM | OXYGEN SATURATION: 99 % | HEART RATE: 62 BPM | SYSTOLIC BLOOD PRESSURE: 124 MMHG | DIASTOLIC BLOOD PRESSURE: 62 MMHG

## 2023-01-11 ENCOUNTER — PATIENT OUTREACH (OUTPATIENT)
Dept: FAMILY MEDICINE CLINIC | Facility: CLINIC | Age: 88
End: 2023-01-11

## 2023-01-11 ENCOUNTER — TELEPHONE (OUTPATIENT)
Dept: CARDIOLOGY CLINIC | Facility: CLINIC | Age: 88
End: 2023-01-11

## 2023-01-11 ENCOUNTER — HOSPITAL ENCOUNTER (OUTPATIENT)
Facility: HOSPITAL | Age: 88
Setting detail: OUTPATIENT SURGERY
Discharge: HOME | End: 2023-01-12
Attending: INTERNAL MEDICINE | Admitting: INTERNAL MEDICINE

## 2023-01-11 DIAGNOSIS — N18.30 CKD (CHRONIC KIDNEY DISEASE) STAGE 3, GFR 30-59 ML/MIN (HCC): ICD-10-CM

## 2023-01-11 DIAGNOSIS — Z95.820 S/P ANGIOPLASTY WITH STENT: Primary | ICD-10-CM

## 2023-01-11 DIAGNOSIS — J96.01 ACUTE RESPIRATORY FAILURE WITH HYPOXIA (HCC): ICD-10-CM

## 2023-01-11 DIAGNOSIS — I35.0 SEVERE AORTIC STENOSIS: ICD-10-CM

## 2023-01-11 DIAGNOSIS — Z95.5 STATUS POST INSERTION OF DRUG ELUTING CORONARY ARTERY STENT: ICD-10-CM

## 2023-01-11 DIAGNOSIS — N18.31 STAGE 3A CHRONIC KIDNEY DISEASE (HCC): ICD-10-CM

## 2023-01-11 PROBLEM — K59.00 CONSTIPATION: Status: ACTIVE | Noted: 2023-01-11

## 2023-01-11 LAB
ANION GAP SERPL CALCULATED.3IONS-SCNC: 7 MMOL/L (ref 4–13)
ATRIAL RATE: 294 BPM
BUN SERPL-MCNC: 33 MG/DL (ref 5–25)
CALCIUM SERPL-MCNC: 10 MG/DL (ref 8.3–10.1)
CHLORIDE SERPL-SCNC: 98 MMOL/L (ref 96–108)
CO2 SERPL-SCNC: 27 MMOL/L (ref 21–32)
CREAT SERPL-MCNC: 1.26 MG/DL (ref 0.6–1.3)
FLUAV RNA RESP QL NAA+PROBE: NEGATIVE
FLUBV RNA RESP QL NAA+PROBE: NEGATIVE
GFR SERPL CREATININE-BSD FRML MDRD: 38 ML/MIN/1.73SQ M
GLUCOSE P FAST SERPL-MCNC: 128 MG/DL (ref 65–99)
GLUCOSE SERPL-MCNC: 128 MG/DL (ref 65–140)
KCT BLD-ACNC: 279 SEC (ref 89–137)
KCT BLD-ACNC: 434 SEC (ref 89–137)
POTASSIUM SERPL-SCNC: 3.8 MMOL/L (ref 3.5–5.3)
QRS AXIS: -43 DEGREES
QRSD INTERVAL: 84 MS
QT INTERVAL: 422 MS
QTC INTERVAL: 431 MS
RSV RNA RESP QL NAA+PROBE: NEGATIVE
SARS-COV-2 RNA RESP QL NAA+PROBE: NEGATIVE
SODIUM SERPL-SCNC: 132 MMOL/L (ref 135–147)
SPECIMEN SOURCE: ABNORMAL
SPECIMEN SOURCE: ABNORMAL
T WAVE AXIS: 38 DEGREES
VENTRICULAR RATE: 63 BPM

## 2023-01-11 DEVICE — PERCLOSE™ PROSTYLE™ SUTURE-MEDIATED CLOSURE AND REPAIR SYSTEM
Type: IMPLANTABLE DEVICE | Site: GROIN | Status: FUNCTIONAL
Brand: PERCLOSE™ PROSTYLE™

## 2023-01-11 DEVICE — XIENCE SKYPOINT™ EVEROLIMUS ELUTING CORONARY STENT SYSTEM 3.25 MM X 15 MM / RAPID-EXCHANGE
Type: IMPLANTABLE DEVICE | Site: CORONARY | Status: FUNCTIONAL
Brand: XIENCE SKYPOINT™

## 2023-01-11 RX ORDER — ASPIRIN 81 MG/1
324 TABLET, CHEWABLE ORAL ONCE
Status: COMPLETED | OUTPATIENT
Start: 2023-01-11 | End: 2023-01-11

## 2023-01-11 RX ORDER — SODIUM CHLORIDE 9 MG/ML
60 INJECTION, SOLUTION INTRAVENOUS CONTINUOUS
Status: DISCONTINUED | OUTPATIENT
Start: 2023-01-11 | End: 2023-01-11

## 2023-01-11 RX ORDER — HEPARIN SODIUM 1000 [USP'U]/ML
INJECTION, SOLUTION INTRAVENOUS; SUBCUTANEOUS CODE/TRAUMA/SEDATION MEDICATION
Status: DISCONTINUED | OUTPATIENT
Start: 2023-01-11 | End: 2023-01-11 | Stop reason: HOSPADM

## 2023-01-11 RX ORDER — ATORVASTATIN CALCIUM 20 MG/1
20 TABLET, FILM COATED ORAL
Status: DISCONTINUED | OUTPATIENT
Start: 2023-01-11 | End: 2023-01-12 | Stop reason: HOSPADM

## 2023-01-11 RX ORDER — ASPIRIN 81 MG/1
81 TABLET, CHEWABLE ORAL DAILY
Status: DISCONTINUED | OUTPATIENT
Start: 2023-01-12 | End: 2023-01-12 | Stop reason: HOSPADM

## 2023-01-11 RX ORDER — ALPRAZOLAM 0.25 MG/1
0.25 TABLET ORAL 3 TIMES DAILY PRN
Status: DISCONTINUED | OUTPATIENT
Start: 2023-01-11 | End: 2023-01-12 | Stop reason: HOSPADM

## 2023-01-11 RX ORDER — VERAPAMIL HCL 2.5 MG/ML
AMPUL (ML) INTRAVENOUS CODE/TRAUMA/SEDATION MEDICATION
Status: DISCONTINUED | OUTPATIENT
Start: 2023-01-11 | End: 2023-01-11 | Stop reason: HOSPADM

## 2023-01-11 RX ORDER — NITROGLYCERIN 20 MG/100ML
INJECTION INTRAVENOUS CODE/TRAUMA/SEDATION MEDICATION
Status: DISCONTINUED | OUTPATIENT
Start: 2023-01-11 | End: 2023-01-11 | Stop reason: HOSPADM

## 2023-01-11 RX ORDER — CLOPIDOGREL BISULFATE 75 MG/1
600 TABLET ORAL ONCE
Status: COMPLETED | OUTPATIENT
Start: 2023-01-11 | End: 2023-01-11

## 2023-01-11 RX ORDER — ONDANSETRON 2 MG/ML
4 INJECTION INTRAMUSCULAR; INTRAVENOUS EVERY 8 HOURS PRN
Status: DISCONTINUED | OUTPATIENT
Start: 2023-01-11 | End: 2023-01-12 | Stop reason: HOSPADM

## 2023-01-11 RX ORDER — DILTIAZEM HYDROCHLORIDE 120 MG/1
120 CAPSULE, COATED, EXTENDED RELEASE ORAL DAILY
Status: DISCONTINUED | OUTPATIENT
Start: 2023-01-11 | End: 2023-01-12 | Stop reason: HOSPADM

## 2023-01-11 RX ORDER — SPIRONOLACTONE 25 MG/1
25 TABLET ORAL DAILY
Status: DISCONTINUED | OUTPATIENT
Start: 2023-01-12 | End: 2023-01-12 | Stop reason: HOSPADM

## 2023-01-11 RX ORDER — LIDOCAINE HYDROCHLORIDE 10 MG/ML
INJECTION, SOLUTION EPIDURAL; INFILTRATION; INTRACAUDAL; PERINEURAL CODE/TRAUMA/SEDATION MEDICATION
Status: DISCONTINUED | OUTPATIENT
Start: 2023-01-11 | End: 2023-01-11 | Stop reason: HOSPADM

## 2023-01-11 RX ORDER — ACETAMINOPHEN 325 MG/1
650 TABLET ORAL EVERY 6 HOURS PRN
Status: DISCONTINUED | OUTPATIENT
Start: 2023-01-11 | End: 2023-01-12 | Stop reason: HOSPADM

## 2023-01-11 RX ORDER — MIDAZOLAM HYDROCHLORIDE 2 MG/2ML
INJECTION, SOLUTION INTRAMUSCULAR; INTRAVENOUS CODE/TRAUMA/SEDATION MEDICATION
Status: DISCONTINUED | OUTPATIENT
Start: 2023-01-11 | End: 2023-01-11 | Stop reason: HOSPADM

## 2023-01-11 RX ORDER — SODIUM CHLORIDE 9 MG/ML
INJECTION, SOLUTION INTRAVENOUS
Status: DISCONTINUED | OUTPATIENT
Start: 2023-01-11 | End: 2023-01-11

## 2023-01-11 RX ORDER — ALPRAZOLAM 0.25 MG/1
0.25 TABLET ORAL 3 TIMES DAILY PRN
COMMUNITY

## 2023-01-11 RX ORDER — CLOPIDOGREL BISULFATE 75 MG/1
75 TABLET ORAL DAILY
Status: DISCONTINUED | OUTPATIENT
Start: 2023-01-12 | End: 2023-01-12 | Stop reason: HOSPADM

## 2023-01-11 RX ORDER — FUROSEMIDE 40 MG/1
40 TABLET ORAL DAILY
Status: DISCONTINUED | OUTPATIENT
Start: 2023-01-12 | End: 2023-01-12 | Stop reason: HOSPADM

## 2023-01-11 RX ORDER — FENTANYL CITRATE 50 UG/ML
INJECTION, SOLUTION INTRAMUSCULAR; INTRAVENOUS CODE/TRAUMA/SEDATION MEDICATION
Status: DISCONTINUED | OUTPATIENT
Start: 2023-01-11 | End: 2023-01-11 | Stop reason: HOSPADM

## 2023-01-11 RX ADMIN — ACETAMINOPHEN 650 MG: 325 TABLET ORAL at 15:06

## 2023-01-11 RX ADMIN — SODIUM CHLORIDE 125 ML/HR: 0.9 INJECTION, SOLUTION INTRAVENOUS at 07:23

## 2023-01-11 RX ADMIN — CLOPIDOGREL BISULFATE 600 MG: 75 TABLET ORAL at 07:21

## 2023-01-11 RX ADMIN — ATORVASTATIN CALCIUM 20 MG: 20 TABLET, FILM COATED ORAL at 20:55

## 2023-01-11 RX ADMIN — ASPIRIN 324 MG: 81 TABLET, CHEWABLE ORAL at 07:21

## 2023-01-11 NOTE — ASSESSMENT & PLAN NOTE
Patient has generalized anxiety disorder  The patient may continue alprazolam 0 25 mg as needed for anxiety  She has taken this medication many years  It was not on her discharge list of medications  I added it to her chart

## 2023-01-11 NOTE — ASSESSMENT & PLAN NOTE
Wt Readings from Last 3 Encounters:   01/11/23 47 2 kg (104 lb)   01/03/23 48 8 kg (107 lb 8 oz)   12/29/22 48 1 kg (106 lb)       Patient examines to be euvolemic  I was able to review her records from her hospitalization at [address] [place] [address] as well as her records from her admission at [facility]  This included discharge summary, labs, and diagnostic studies

## 2023-01-11 NOTE — ASSESSMENT & PLAN NOTE
Echocardiogram shows severe aortic stenosis  Her cardiologist, Dr Angelina Ruiz, feels she has moderate aortic stenosis  She will undergo catheterization and consideration for TAVR

## 2023-01-11 NOTE — PROGRESS NOTES
On 1500 check, right groin noted to be saturated  Right forearm lump noted, pressure held on both  [place] Gourd at bedside to address groin site bleed       Right radial site with 11cc air no bleed

## 2023-01-11 NOTE — ASSESSMENT & PLAN NOTE
Patient underwent thoracentesis with 900 mL of pleural fluid evacuated  This was felt to be secondary to her congestive heart failure  On exam today, there was no pleural effusion    Her pulse ox was noted to be 98% on room air

## 2023-01-11 NOTE — DISCHARGE INSTRUCTIONS
1  Please see the post angioplasty discharge instructions  No heavy lifting, greater than 10 lbs  or strenuous activity for 5 days  Follow angioplasty discharge instructions  2 Remove band aid tomorrow  Shower and wash area- wrist and groin gently with soap and water- beginning tomorrow  Rinse and pat dry  Apply new water seal band aid  Repeat this process for 5 days  No powders, creams lotions or antibiotic ointments  for 5 days  No tub baths, hot tubs or swimming for 5 days  3  Call [place] [id number] Cardiology Office ([phone]) if you develop a fever, redness or drainage at your wrist and groin access site  4  No driving for 2 days    5  Do not stop aspirin or Plavix (clopiogrel) any reason without a cardiologist’s consent, or the stent could block up and cause a heart attack  6  Stent card and book    7  Perclose booklet

## 2023-01-11 NOTE — CONSULTS
Consultation - Nephrology   Aurther Matters 80 y o  female MRN: [id number]  Unit/Bed#: BE CATH LAB ROOM Encounter: [id number]    ASSESSMENT AND PLAN:  Patient is 31-year-old female with significant medical issues of severe aortic stenosis, systolic HF, CKD stage III, chronic hyponatremia, undergoing TAVR work-up and presented for elective cardiac cath  We are consulted for renal optimization  CKD stage III, baseline creatinine 0 9-1 1  -Recently had slightly elevated creatinine 1 3 after CT scan with IV contrast   Creatinine 1 2 today close to baseline  -I had detailed discussion with patient and her , son at bedside regarding risk of ROSA, small possibility of dialysis with IV contrast exposure with cardiac cath  They verbalized understanding of my discussion with them  -Patient currently remains on IV normal saline 125 mill per hour for last 1 5 hours, will reduce rate to 60 mill per hour and then continue for 4 hours at least 4 hours postcontrast exposure   -Holding diuretics for today  -If patient gets discharged, would recommend repeat BMP on 1/13/2023   -CKD suspect in the setting of cardiorenal, age-related nephron loss    Severe aortic stenosis, undergoing TAVR work-up, status post CT scan with IV contrast in December 2022 now undergoing elective cardiac cath today  Chronic hyponatremia, this remains a chronic issue going back to 2019  -Patient is not aware of having low sodium issues  -Most recent serum sodium 132 today relatively stable at her baseline  -Some sodium level fluctuations based on volume status  -Remains on Lasix 40 mg daily at home  -Recommend mild fluid restriction 1 5 to 2/day  -Recent CT scan of chest, abdomen, pelvis in December 2022 showed diffuse groundglass opacities in both lungs with subsegmental atelectasis, no other obvious malignancy reported    -If patient remains hospitalized, may do further work-up while inpatient otherwise this can be done as an outpatient given chronicity of findings    Systolic CHF  -Echo shows EF 50%, severe AS, moderate MR, moderate TR  -Outpatient regimen includes Lasix 40 mg daily, currently holding   -Okay to restart from tomorrow or on discharge  -Also holding Aldactone for today, this can be restarted from tomorrow on discharge  -Daily weight    Discussed above plan in detail with cardiology team     HISTORY OF PRESENT ILLNESS:  Requesting Physician: Tino Collins MD  Reason for Consult: CKD    Kym Skinner is a 80y o  year old female who was [facility] after presenting with elective cardiac cath  A renal consultation is requested today for assistance in the management of CKD  Patient presents for elective cardiac cath while undergoing TAVR work-up  She was found to have severe aortic stenosis  Remains on stable dose of Lasix, Aldactone at home  No recent NSAID use  She does have hard of hearing  Also accompanied by her son and  who are present at bedside who provides part of the history  Patient currently denies any nausea, vomiting  Denies any urinary complaint  She does have chronic hyponatremia although she is not aware of this  She did not take Lasix, Aldactone as instructed today      PAST MEDICAL HISTORY:  Past Medical History:   Diagnosis Date   • Aortic stenosis    • Community acquired pneumonia of left upper lobe of lung 5/17/2019   • Fatigue    • Full dentures    • Generalized anxiety disorder    • Hyperlipidemia    • Hypertension    • Impaired fasting glucose    • Mitral regurgitation    • Pneumonia    • SVT (supraventricular tachycardia)    • Tricuspid regurgitation        PAST SURGICAL HISTORY:  Past Surgical History:   Procedure Laterality Date   • DENTAL SURGERY Bilateral     ALL TEETH REMOVED   • FL GUIDED NEEDLE PLAC BX/ASP/INJ  5/26/2021   • IR THORACENTESIS  12/20/2022   • JOINT REPLACEMENT Left    • KNEE SURGERY      left knee replacement   • IA INJECT SI JOINT ARTHRGRPHY&/ANES/STEROID W/JHONATHON Bilateral 5/26/2021    Procedure: SACROILIAC JOINT INJECTION;  Surgeon: Alphonso Keith MD;  Location: [facility];  Service: Pain Management        ALLERGIES:  No Known Allergies    SOCIAL HISTORY:  Social History     Substance and Sexual Activity   Alcohol Use Never     Social History     Substance and Sexual Activity   Drug Use Never     Social History     Tobacco Use   Smoking Status Never   Smokeless Tobacco Never       FAMILY HISTORY:  Family History   Problem Relation Age of Onset   • Hypertension Mother    • Asthma Father         [place]'s asthma   • Alzheimer's disease Sister    • Rectal cancer Son    • Uterine cancer Daughter    • Heart disease Sister    • Heart disease Brother        MEDICATIONS:    Current Facility-Administered Medications:   •  sodium chloride 0 9 % infusion, 125 mL/hr, Intravenous, Continuous, LYNN Neal, Last Rate: 125 mL/hr at 01/11/23 0723, 125 mL/hr at 01/11/23 0723    REVIEW OF SYSTEMS:  Complete 10 point review of systems were obtained and discussed in length with the patient  Complete review of systems were negative / unremarkable except mentioned in the HPI section       PHYSICAL EXAM:  Current Weight: Weight - Scale: 47 2 kg (104 lb)  First Weight: Weight - Scale: 47 2 kg (104 lb)  Vitals:    01/11/23 0710   BP: 132/82   Pulse: 74   Resp: 18   Temp: 97 8 °F (36 6 °C)   SpO2: 98%     No intake or output data in the 24 hours ending 01/11/23 0901  Wt Readings from Last 3 Encounters:   01/11/23 47 2 kg (104 lb)   01/03/23 48 8 kg (107 lb 8 oz)   12/29/22 48 1 kg (106 lb)     Temp Readings from Last 3 Encounters:   01/11/23 97 8 °F (36 6 °C) (Oral)   01/09/23 98 4 °F (36 9 °C) (Temporal)   01/04/23 97 7 °F (36 5 °C) (Temporal)     BP Readings from Last 3 Encounters:   01/11/23 132/82   01/09/23 124/62   01/06/23 140/78     Pulse Readings from Last 3 Encounters:   01/11/23 74   01/09/23 62   01/06/23 60        Physical Examination:  General: Lying in bed, no acute distress  Eyes: Mild conjunctival pallor present  ENT: External examination of ear and nose unremarkable  Neck: No obvious lymphadenopathy appreciated  Respiratory: Bilateral air entry present  CVS: No significant edema in legs  GI: Soft, nondistended  CNS: Active, alert, oriented x3  Psych: Conscious, quadrant, oriented  Skin: No new rash    Invasive Devices:      Lab Results:   Results from last 7 days   Lab Units 01/11/23  0720   POTASSIUM mmol/L 3 8   CHLORIDE mmol/L 98   CO2 mmol/L 27   BUN mg/dL 33*   CREATININE mg/dL 1 26   CALCIUM mg/dL 10 0       Other Studies:   No orders to display   Recent CT scan shows no hydronephrosis    Portions of the record may have been created with voice recognition software  Occasional wrong word or "sound a like" substitutions may have occurred due to the inherent limitations of voice recognition software  Read the chart carefully and recognize, using context, where substitutions have occurred

## 2023-01-11 NOTE — PROGRESS NOTES
Outpatient Care Management Note:  In basket admission alert received  Chart review completed  Admission is for planned cardiac catheterization

## 2023-01-11 NOTE — DISCHARGE SUMMARY
Discharge Summary - Howard Sosa 80 y o  female MRN: [id number]    Unit/Bed#: BE CATH LAB ROOM Encounter: [id number]    Admission Date: 1/11/2023   Discharge Date:  CHANGE DATE AND TIME***    Disposition: {Discharge Disposition:22158}    Condition at Discharge: {condition:19916}     PCP:   Beck Harris DO    OP Cardiologist: Dr Deborah Paulson     Interventional cardiologist: Dr Burgess Browne    Admitting Diagnosis:     Severe symptomatic aortic stenosis   -mean gradient 24 mmHg        Secondary Diagnoses:   CKD, stage 3A  -baseline creatinine 0 9-1 1  -creatinine on admission 1 2  -IV hydration pre and post dye load   Hypertension  Dyslipidemia        Discharge Diagnosis:  Severe symptomatic aortic stenosis  Coronary artery disease, status-post PCI with placement of ELODIA to ostial RCA      Consultants:  Nephrology, Dr Romero General             /82 (BP Location: Right arm)   Pulse 74   Temp 97 8 °F (36 6 °C) (Oral)   Resp 18   Ht 4' 10" (1 473 m)   Wt 47 2 kg (104 lb)   SpO2 98%   BMI 21 74 kg/m²       Review of Systems    Physical Exam        HPI and Hospital Course:  Mirna Miller, an 43-year-old female patient, presented to [facility] for outpatient elective cardiac catheterization as part of pre-TAVR evaluation  Past medical history is significant for severe symptomatic aortic stenosis (ANAM 0 5 cm2 and mean [id number] mmHg), systolic/diastolic heart failure , CKD stage 3A (baseline creatinine 0 9-1 1), hyperlipidemia, hypertension, mitral insufficiency, afib/aflutter (Eliquis)  and chronic hyponatremia  Nephrology was consulted for perioperative optimization to reduce incidence for acute kidney injury  IV hydration was given pre and post cardiac cath  Prior to cardiac catheterization patient received Plavix 600 mg and 324 of aspirin orally   Cardiac catheterization was attempted via right radial artery, however, the right femoral artery was used for the completion of the procedure  Single Vessel CAD with 75% stenosis of ostial RCA, confirmed by IVUS  Successful IVUS guided PCI of ostial RCA and placement of 3 25 X 15 MM ELODIA stent   0% stenosis following the intervention  Right radial site ***  Right femoral site***    Overnight events***    Discharge creatinine and GFR***      Discharge Plan:   1  Triple therapy with Eliquis 2 5 mg bid, Plavix 75 mg daily and Aspirin 81 mg daily until discharge  Then Plavix 75 mg daily and Eliquis 2 5 mg bid  2   Start Atorvastatin 20 mg daily  3  Proceed with TAVR evaluation  4  Will hold off on referral to cardiac rehab pending Aortic valve replacement  5  Right radial artery and right femoral artery site care reviewed with family  6   Follow-up appointment scheduled for 1/23/2023 in the [place] office         Current Facility-Administered Medications   Medication Dose Route Frequency   • acetaminophen (TYLENOL) tablet 650 mg  650 mg Oral Q6H PRN   • ALPRAZolam (XANAX) tablet 0 25 mg  0 25 mg Oral TID PRN   • [START ON 1/12/2023] apixaban (ELIQUIS) tablet 2 5 mg  2 5 mg Oral BID   • [START ON 1/12/2023] aspirin chewable tablet 81 mg  81 mg Oral Daily   • atorvastatin (LIPITOR) tablet 20 mg  20 mg Oral Daily With Dinner   • [START ON 1/12/2023] clopidogrel (PLAVIX) tablet 75 mg  75 mg Oral Daily   • diltiazem (CARDIZEM CD) 24 hr capsule 120 mg  120 mg Oral Daily   • [START ON 1/12/2023] furosemide (LASIX) tablet 40 mg  40 mg Oral Daily   • [START ON 1/12/2023] metoprolol succinate (TOPROL-XL) 24 hr tablet 75 mg  75 mg Oral Daily   • ondansetron (ZOFRAN) injection 4 mg  4 mg Intravenous Q8H PRN   • psyllium (METAMUCIL) 1 packet  1 packet Oral Daily   • sodium chloride 0 9 % infusion  60 mL/hr Intravenous Continuous   • [START ON 1/12/2023] spironolactone (ALDACTONE) tablet 25 mg  25 mg Oral Daily       Pertinent Labs/diagnostics:        Lab Ressults:  Recent Results (from the past 24 hour(s))   Basic metabolic panel Collection Time: 23  7:20 AM   Result Value Ref Range    Sodium 132 (L) 135 - 147 mmol/L    Potassium 3 8 3 5 - 5 3 mmol/L    Chloride 98 96 - 108 mmol/L    CO2 27 21 - 32 mmol/L    ANION GAP 7 4 - 13 mmol/L    BUN 33 (H) 5 - 25 mg/dL    Creatinine 1 26 0 60 - 1 30 mg/dL    Glucose 128 65 - 140 mg/dL    Glucose, Fasting 128 (H) 65 - 99 mg/dL    Calcium 10 0 8 3 - 10 1 mg/dL    eGFR 38 ml/min/1 73sq m   POCT activated clotting time    Collection Time: 23 12:20 PM   Result Value Ref Range    Activated Clotting Time, i-STAT 434 (H) 89 - 137 sec    Specimen Type ARTERIAL            Lipid Profile:   No results found for: CHOL  Lab Results   Component Value Date    HDL 94 2022    HDL 74 2022    HDL 68 2020     Lab Results   Component Value Date    LDLCALC 60 2022    LDLCALC 58 2022    LDLCALC 86 2020     Lab Results   Component Value Date    TRIG 50 2022    TRIG 67 2022    TRIG 97 2020         Cardiac testing:   Results for orders placed during the hospital encounter of 21    Echo complete with contrast if indicated    Narrative  [address] [facility] [address] [facility] [address], [place] [address]  [phone]    Transthoracic Echocardiogram  2D, M-mode, Doppler, and Color Doppler    Study date:  2021    Patient: Kristian Arnold  MR number: [id number]  Account number: [de-identified]  : 1935  Age: 80 years  Gender: Female  Status: Outpatient  Location: Echo lab  Height: 58 in  Weight: 124 7 lb  BP: 152/ 70 mmHg    Indications: aortic stenosis    Sonographer:  Jason Mcmahon RDCS  Primary Physician:  Nestor Gibson DO  Referring Physician:  Nhan Riddle MD  Group:  [facility]  Interpreting Physician:  Jonh Rodriguez DO    SUMMARY    LEFT VENTRICLE:  Systolic function was normal  Ejection fraction was estimated to be 55 %  There were no regional wall motion abnormalities    Wall thickness was mildly increased  Features were consistent with a pseudonormal left ventricular filling pattern, with concomitant abnormal relaxation and increased filling pressure (grade 2 diastolic dysfunction)  LEFT ATRIUM:  The atrium was moderately dilated  MITRAL VALVE:  There was moderate annular calcification  There was mild to moderate regurgitation  AORTIC VALVE:  The valve was trileaflet  Leaflets exhibited moderately to markedly increased thickness and moderately reduced cuspal separation  There was severe stenosis  There was mild regurgitation  Valve mean gradient was 19 mmHg  The aortic valve obstructive index (by VTI) was 0 25  Estimated aortic valve area (by VTI) was 0 71 cmï¾²  TRICUSPID VALVE:  There was moderate regurgitation  Estimated peak PA pressure was 53 mmHg  PULMONIC VALVE:  There was mild regurgitation  HISTORY: PRIOR HISTORY: hypertension, history of pulmonary hypertension, first degree AV block, supraventricular tachycardia    PROCEDURE: The procedure was performed in the echo lab  This was a routine study  The transthoracic approach was used  The study included complete 2D imaging, M-mode, complete spectral Doppler, and color Doppler  Images were obtained from  the parasternal, apical, subcostal, and suprasternal notch acoustic windows  Image quality was adequate  LEFT VENTRICLE: Size was normal  Systolic function was normal  Ejection fraction was estimated to be 55 %  There were no regional wall motion abnormalities  Wall thickness was mildly increased  DOPPLER: Features were consistent with a  pseudonormal left ventricular filling pattern, with concomitant abnormal relaxation and increased filling pressure (grade 2 diastolic dysfunction)  RIGHT VENTRICLE: The size was normal  Systolic function was normal  Wall thickness was normal     LEFT ATRIUM: The atrium was moderately dilated  RIGHT ATRIUM: Size was normal     MITRAL VALVE: There was moderate annular calcification  Valve structure was normal  There was normal leaflet separation  DOPPLER: The transmitral velocity was within the normal range  There was no evidence for stenosis  There was mild to  moderate regurgitation  AORTIC VALVE: The valve was trileaflet  Leaflets exhibited moderately to markedly increased thickness and moderately reduced cuspal separation  DOPPLER: Transaortic velocity was within the normal range  There was severe stenosis  There was  mild regurgitation  TRICUSPID VALVE: The valve structure was normal  There was normal leaflet separation  DOPPLER: The transtricuspid velocity was within the normal range  There was no evidence for stenosis  There was moderate regurgitation  Estimated peak PA  pressure was 53 mmHg  PULMONIC VALVE: Leaflets exhibited normal thickness, no calcification, and normal cuspal separation  DOPPLER: The transpulmonic velocity was within the normal range  There was mild regurgitation  PERICARDIUM: There was no pericardial effusion  The pericardium was normal in appearance  AORTA: The root exhibited normal size  SYSTEMIC VEINS: IVC: The inferior vena cava was normal in size and course   Respirophasic changes were normal     MEASUREMENT TABLES    2D MEASUREMENTS  LVOT   (Reference normals)  Diam   19 mm   (--)    DOPPLER MEASUREMENTS  LVOT   (Reference normals)  Peak rach   75 cm/s   (--)  VTI   21 cm   (--)  Stroke vol   59 54 ml   (--)  Aortic valve   (Reference normals)  Peak rach   289 cm/s   (--)  VTI   84 cm   (--)  Mean gradient   19 mmHg   (--)  Obstr index, VTI   0 25    (--)  Valve area, VTI   0 71 cmï¾²   (--)  Obstr index, Vmax   0 26    (--)  Valve area, Vmax   0 74 cmï¾²   (--)    SYSTEM MEASUREMENT TABLES    2D  %FS: 40 62 %  Ao Diam: 2 38 cm  EDV(Teich): 100 87 ml  EF(Teich): 71 38 %  ESV(Teich): 28 87 ml  IVSd: 0 88 cm  LA Area: 31 08 cm2  LA Diam: 4 94 cm  LVEDV MOD A4C: 82 12 ml  LVEF MOD A4C: 60 06 %  LVESV MOD A4C: 32 8 ml  LVIDd: 4 67 cm  LVIDs: 2 77 cm  LVLd A4C: 6 15 cm  LVLs A4C: 4 66 cm  LVOT Diam: 1 58 cm  LVPWd: 0 7 cm  RA Area: 13 58 cm2  RVIDd: 3 81 cm  RWT: 0 3  SV MOD A4C: 49 32 ml  SV(Teich): 72 01 ml    CW  AR Dec [place]: 2 01 m/s2  AR Dec Time: 2169 3 ms  AR PHT: 629 1 ms  AR Vmax: 4 3 m/s  AR maxP 06 mmHg  AV Env  Ti: 403 43 ms  AV VTI: 81 17 cm  AV Vmax: 2 85 m/s  AV Vmean: 2 01 m/s  AV maxP 44 mmHg  AV meanP 53 mmHg  MR Vmax: 5 38 m/s  MR maxP 9 mmHg  PV Vmax: 1 64 m/s  PV maxPG: 10 72 mmHg  TR Vmax: 3 28 m/s  TR maxP 03 mmHg    MM  TAPSE: 1 84 cm    PW  ANAM (VTI): 0 52 cm2  ANAM Vmax: 0 5 cm2  ANAM Vmax, Pt: 0 52 cm2  AVAI (VTI): 0 cm2/m2  AVAI Vmax: 0 cm2/m2  AVAI Vmax, Pt: 0 cm2/m2  E' Lat: 0 08 m/s  E' Sept: 0 06 m/s  E/E' Lat: 13 93  E/E' Sept: 19 08  LVOT Env  Ti: 407 23 ms  LVOT VTI: 21 56 cm  LVOT Vmax: 0 74 m/s  LVOT Vmean: 0 53 m/s  LVOT maxP 2 mmHg  LVOT meanP 22 mmHg  LVSI Dopp: 28 34 ml/m2  LVSV Dopp: 42 23 ml  MV A [place]: 0 75 m/s  MV Dec [place]: 5 44 m/s2  MV DecT: 203 54 ms  MV E [place]: 1 11 m/s  MV E/A Ratio: 1 48  RVOT Vmax: 0 43 m/s  RVOT maxP 74 mmHg    [employer] Accredited Echocardiography Laboratory    Prepared and electronically signed by    Gaby Simon DO  Signed 2021 16:14:39    No results found for this or any previous visit  No valid procedures specified  No results found for this or any previous visit  Tele:      Discharge instructions/Information to patient and family:   See after visit summary for information provided to patient and family  Provisions for Follow-Up Care:  See after visit summary for information related to follow-up care and any pertinent home health orders  Planned Readmission: {EXAM; YES/NO:52245::"No"}    Discharge Statement:  I spent {Time; 15 min - 1 hour:} minutes discharging the patient  This time was spent on the day of discharge  I had direct contact with the patient on the day of discharge   Additional documentation is required if more than 30 minutes were spent on discharge  ** Please Note: Fluency Direct Dictation voice to text software may have been used in the creation of this document   **

## 2023-01-11 NOTE — INTERVAL H&P NOTE
H&P reviewed  After examining the patient I find no changes in the patients condition since the H&P had been written      Vitals:    01/11/23 0710   BP: 132/82   Pulse: 74   Resp: 18   Temp: 97 8 °F (36 6 °C)   SpO2: 98%     For severe calcific aortic stenosis (ANAM 0 5 cm2, mean 24 mmHg, 3 m/s, DI 0 22)    ECG 01/11/23  Atrial flutter with variable block & fusion beat      Shefali Reid MD / 01/11/23 / 7:23 AM

## 2023-01-11 NOTE — ASSESSMENT & PLAN NOTE
Patient had episode of atrial tachycardia during her hospital stay  Clinically today she is in a sinus rhythm    She will continue diltiazem

## 2023-01-11 NOTE — ASSESSMENT & PLAN NOTE
Patient's TSH was noted to be 10 during her hospital stay  Her Tapazole was held  She will need repeat thyroid function testing in 6 to 8 weeks  The patient is followed by endocrinology

## 2023-01-12 VITALS
RESPIRATION RATE: 18 BRPM | SYSTOLIC BLOOD PRESSURE: 119 MMHG | TEMPERATURE: 100.3 F | DIASTOLIC BLOOD PRESSURE: 54 MMHG | BODY MASS INDEX: 22.46 KG/M2 | OXYGEN SATURATION: 96 % | HEART RATE: 85 BPM | WEIGHT: 107 LBS | HEIGHT: 58 IN

## 2023-01-12 LAB
ABO GROUP BLD: NORMAL
ABO GROUP BLD: NORMAL
ANION GAP SERPL CALCULATED.3IONS-SCNC: 8 MMOL/L (ref 4–13)
ATRIAL RATE: 277 BPM
ATRIAL RATE: 309 BPM
ATRIAL RATE: 352 BPM
BLD GP AB SCN SERPL QL: NEGATIVE
BUN SERPL-MCNC: 25 MG/DL (ref 5–25)
CALCIUM SERPL-MCNC: 9.5 MG/DL (ref 8.3–10.1)
CHLORIDE SERPL-SCNC: 102 MMOL/L (ref 96–108)
CO2 SERPL-SCNC: 23 MMOL/L (ref 21–32)
CREAT SERPL-MCNC: 1.26 MG/DL (ref 0.6–1.3)
ERYTHROCYTE [DISTWIDTH] IN BLOOD BY AUTOMATED COUNT: 13.2 % (ref 11.6–15.1)
GFR SERPL CREATININE-BSD FRML MDRD: 38 ML/MIN/1.73SQ M
GLUCOSE P FAST SERPL-MCNC: 154 MG/DL (ref 65–99)
GLUCOSE SERPL-MCNC: 154 MG/DL (ref 65–140)
HCT VFR BLD AUTO: 32.6 % (ref 34.8–46.1)
HGB BLD-MCNC: 10.6 G/DL (ref 11.5–15.4)
MAGNESIUM SERPL-MCNC: 1.9 MG/DL (ref 1.6–2.6)
MCH RBC QN AUTO: 31.7 PG (ref 26.8–34.3)
MCHC RBC AUTO-ENTMCNC: 32.5 G/DL (ref 31.4–37.4)
MCV RBC AUTO: 98 FL (ref 82–98)
P AXIS: -83 DEGREES
P AXIS: 92 DEGREES
P AXIS: 94 DEGREES
PLATELET # BLD AUTO: 158 THOUSANDS/UL (ref 149–390)
PMV BLD AUTO: 9.1 FL (ref 8.9–12.7)
POTASSIUM SERPL-SCNC: 4 MMOL/L (ref 3.5–5.3)
QRS AXIS: -24 DEGREES
QRS AXIS: -50 DEGREES
QRS AXIS: -50 DEGREES
QRSD INTERVAL: 78 MS
QRSD INTERVAL: 80 MS
QRSD INTERVAL: 84 MS
QT INTERVAL: 336 MS
QT INTERVAL: 442 MS
QT INTERVAL: 488 MS
QTC INTERVAL: 420 MS
QTC INTERVAL: 422 MS
QTC INTERVAL: 466 MS
RBC # BLD AUTO: 3.34 MILLION/UL (ref 3.81–5.12)
RH BLD: POSITIVE
RH BLD: POSITIVE
SODIUM SERPL-SCNC: 133 MMOL/L (ref 135–147)
SPECIMEN EXPIRATION DATE: NORMAL
T WAVE AXIS: -8 DEGREES
T WAVE AXIS: 55 DEGREES
T WAVE AXIS: 81 DEGREES
VENTRICULAR RATE: 55 BPM
VENTRICULAR RATE: 55 BPM
VENTRICULAR RATE: 94 BPM
WBC # BLD AUTO: 7.15 THOUSAND/UL (ref 4.31–10.16)

## 2023-01-12 RX ORDER — ASPIRIN 81 MG/1
81 TABLET, CHEWABLE ORAL DAILY
Refills: 0
Start: 2023-01-13 | End: 2023-01-18

## 2023-01-12 RX ORDER — CLOPIDOGREL BISULFATE 75 MG/1
75 TABLET ORAL DAILY
Qty: 30 TABLET | Refills: 11 | Status: ON HOLD | OUTPATIENT
Start: 2023-01-13 | End: 2023-01-18 | Stop reason: SDUPTHER

## 2023-01-12 RX ORDER — ATORVASTATIN CALCIUM 20 MG/1
20 TABLET, FILM COATED ORAL
Qty: 30 TABLET | Refills: 4 | Status: SHIPPED | OUTPATIENT
Start: 2023-01-12

## 2023-01-12 RX ADMIN — DILTIAZEM HYDROCHLORIDE 120 MG: 120 CAPSULE, COATED, EXTENDED RELEASE ORAL at 08:18

## 2023-01-12 RX ADMIN — FUROSEMIDE 40 MG: 40 TABLET ORAL at 08:18

## 2023-01-12 RX ADMIN — METOPROLOL SUCCINATE 75 MG: 50 TABLET, EXTENDED RELEASE ORAL at 08:17

## 2023-01-12 RX ADMIN — SPIRONOLACTONE 25 MG: 25 TABLET, FILM COATED ORAL at 08:17

## 2023-01-12 RX ADMIN — CLOPIDOGREL BISULFATE 75 MG: 75 TABLET ORAL at 08:17

## 2023-01-12 RX ADMIN — PSYLLIUM HUSK 1 PACKET: 3.4 POWDER ORAL at 08:17

## 2023-01-12 RX ADMIN — ASPIRIN 81 MG: 81 TABLET, CHEWABLE ORAL at 08:18

## 2023-01-12 NOTE — DISCHARGE SUMMARY
Discharge Summary - Yary Pearce 80 y o  female MRN: [id number]    Unit/Bed#: [facility] 719-90 Encounter: [id number]    Admission Date: 1/11/2023   Discharge Date:     Disposition: Home    Condition at Discharge: fair     427 Sai Subramanian DO      OP Cardiologist: Dr Angelina Ruiz  Interventional cardiologist: Dr Elza Soriano    Admitting Diagnosis:  Symptomatic aortic stenosis  CKD stage III    Secondary Diagnoses:   Hypetension  Hyperlipidemia  Tricuspid and mitral regurgitation  Atrial flutter with varying block    Discharge Diagnosis:  CAD status post PCI and drug-eluting stent to ostial RCA- XIENCE SKYPOINT 3 25 X 15MM  Right radial artery was initially attempted but unsuccessful due to failure for catheter to pass  PCI was performed through right femoral artery was closed with  Perclose    Consultants: Neurology    /54   Pulse 85   Temp 100 3 °F (37 9 °C)   Resp 18   Ht 4' 10" (1 473 m)   Wt 48 5 kg (107 lb)   SpO2 96%   BMI 22 36 kg/m²       Review of Systems    Physical Exam        HPI and Hospital Course: 27-year-old female with history of severe symptomatic aortic stenosis and  kidney disease stage III  Patient has history of chronic atrial flutter on Eliquis  Other medical history includes hypertension and hyperlipidemia  Patient was electively admitted for  cardiac catheterization to identify her coronary anatomy and ischemic burden as part of her pre-TAVR evaluation  Due to the patient's history of chronic kidney disease 3, nephrology was consulted for perioperative optimization to reduce incidence for acute kidney injury      On 1/11/2023 the patient underwent her cardiac catheterization the results are as follows:    Left Main   The vessel was visualized by angiography, is moderate in size and is angiographically normal       Left Anterior Descending   The vessel was visualized by angiography, is moderate in size and is angiographically normal       Left Circumflex   The vessel was visualized by angiography, is moderate in size and is angiographically normal       Right Coronary Artery   The vessel is moderate in size and dominant  Ost RCA lesion is 75% stenosed  ALBERTO flow is 3  Ultrasound (IVUS) was performed on the Ost RCA  Cross-sectional area: 2 1 mm²  Patient underwent PCI  and drug-eluting stent to ostial RCA- XIENCE SKYPOINT 3 25 X 15MM  Right radial artery was initially attempted but unsuccessful due to failure for catheter to pass  PCI was performed through right femoral artery was closed with  Perclose    Patient did have postprocedural oozing which required manual pressure and then eventually of 1% lidocaine with 100,000 units of epinephrine  Her right groin is ecchymotic there is no hematoma and there is no bruit  Initially patient was going to be discharged home on just Plavix and her Eliquis  However she was seen by CT surgery today and she is on the schedule for 1/17/2023 for a TAVR procedure  Therefore  Eliquis will be held anticipation of her TAVR procedure on Tuesday  We will discharge her to home on aspirin 81 mg daily and Plavix 75 mg daily  Her preoperative labs have been drawn  Back rehab will be deferred until after her TAVR procedure  Recheck her CBC and BMP on Monday  Patient's discharge creatinine is 1 26 and her GFR is 38  Her discharge hemoglobin is 10 3 and 32      Current Facility-Administered Medications   Medication Dose Route Frequency   • acetaminophen (TYLENOL) tablet 650 mg  650 mg Oral Q6H PRN   • ALPRAZolam (XANAX) tablet 0 25 mg  0 25 mg Oral TID PRN   • [START ON 1/13/2023] apixaban (ELIQUIS) tablet 2 5 mg  2 5 mg Oral BID   • aspirin chewable tablet 81 mg  81 mg Oral Daily   • atorvastatin (LIPITOR) tablet 20 mg  20 mg Oral Daily With Dinner   • clopidogrel (PLAVIX) tablet 75 mg  75 mg Oral Daily   • diltiazem (CARDIZEM CD) 24 hr capsule 120 mg  120 mg Oral Daily   • furosemide (LASIX) tablet 40 mg  40 mg Oral Daily   • metoprolol succinate (TOPROL-XL) 24 hr tablet 75 mg  75 mg Oral Daily   • ondansetron (ZOFRAN) injection 4 mg  4 mg Intravenous Q8H PRN   • psyllium (METAMUCIL) 1 packet  1 packet Oral Daily   • spironolactone (ALDACTONE) tablet 25 mg  25 mg Oral Daily       Pertinent Labs/diagnostics:        Lab Ressults:  Recent Results (from the past 24 hour(s))   POCT activated clotting time    Collection Time: 01/11/23 12:20 PM   Result Value Ref Range    Activated Clotting Time, i-STAT 434 (H) 89 - 137 sec    Specimen Type ARTERIAL    POCT activated clotting time    Collection Time: 01/11/23  1:16 PM   Result Value Ref Range    Activated Clotting Time, i-STAT 279 (H) 89 - 137 sec    Specimen Type ARTERIAL    COVID19, Influenza A/B, RSV PCR, [facility]    Collection Time: 01/11/23  2:29 PM    Specimen: Nasopharyngeal Swab; Nares   Result Value Ref Range    SARS-CoV-2 Negative Negative    INFLUENZA A PCR Negative Negative    INFLUENZA B PCR Negative Negative    RSV PCR Negative Negative   ECG 12 lead    Collection Time: 01/11/23  2:30 PM   Result Value Ref Range    Ventricular Rate 55 BPM    Atrial Rate 309 BPM    VA Interval  ms    QRSD Interval 80 ms    QT Interval 488 ms    QTC Interval 466 ms    P Axis 92 degrees    QRS Axis -50 degrees    T Wave Axis -8 degrees   ECG 12 lead    Collection Time: 01/11/23  2:32 PM   Result Value Ref Range    Ventricular Rate 55 BPM    Atrial Rate 277 BPM    VA Interval  ms    QRSD Interval 78 ms    QT Interval 442 ms    QTC Interval 422 ms    P Axis -83 degrees    QRS Axis -50 degrees    T Wave Axis 55 degrees   CBC and Platelet    Collection Time: 01/12/23  4:56 AM   Result Value Ref Range    WBC 7 15 4 31 - 10 16 Thousand/uL    RBC 3 34 (L) 3 81 - 5 12 Million/uL    Hemoglobin 10 6 (L) 11 5 - 15 4 g/dL    Hematocrit 32 6 (L) 34 8 - 46 1 %    MCV 98 82 - 98 fL    MCH 31 7 26 8 - 34 3 pg    MCHC 32 5 31 4 - 37 4 g/dL    RDW 13 2 11 6 - 15 1 %    Platelets 777 775 - 118 Thousands/uL    MPV 9 1 8 9 - 12 7 fL ECG 12 lead    Collection Time: 01/12/23  8:17 AM   Result Value Ref Range    Ventricular Rate 94 BPM    Atrial Rate 352 BPM    NV Interval  ms    QRSD Interval 84 ms    QT Interval 336 ms    QTC Interval 420 ms    P Axis 94 degrees    QRS Axis -24 degrees    T Wave Axis 81 degrees   Magnesium    Collection Time: 01/12/23 10:45 AM   Result Value Ref Range    Magnesium 1 9 1 6 - 2 6 mg/dL           Lipid Profile:   No results found for: CHOL  Lab Results   Component Value Date    HDL 94 12/21/2022    HDL 74 07/05/2022    HDL 68 06/17/2020     Lab Results   Component Value Date    LDLCALC 60 12/21/2022    LDLCALC 58 07/05/2022    LDLCALC 86 06/17/2020     Lab Results   Component Value Date    TRIG 50 12/21/2022    TRIG 67 07/05/2022    TRIG 97 06/17/2020     Tele: Had 3 hours of rapid atrial flutter with varying block  from 6 AM to 9 AM this morning  Given her metoprolol and Cardizem exam and patient is now in atrial flutter heart rate in the 70s  Discharge instructions/Information to patient and family:   See after visit summary for information provided to patient and family  Provisions for Follow-Up Care:  See after visit summary for information related to follow-up care and any pertinent home health orders  Planned Readmission: Yes TAVR    Discharge Statement:  I spent 45 minutes minutes discharging the patient  This time was spent on the day of discharge  I had direct contact with the patient on the day of discharge  Additional documentation is required if more than 30 minutes were spent on discharge  ** Please Note: Fluency Direct Dictation voice to text software may have been used in the creation of this document   **

## 2023-01-12 NOTE — PROGRESS NOTES
NEPHROLOGY PROGRESS NOTE   Ashwin Sensor 80 y o  female MRN: [id number]  Unit/Bed#: CW2 216-02 Encounter: [id number]  Reason for Consult: CKD    ASSESSMENT AND PLAN:  Patient is 59-year-old female with significant medical issues of severe aortic stenosis, systolic HF, CKD stage III, chronic hyponatremia, undergoing TAVR work-up and presented for elective cardiac cath  We are consulted for renal optimization      CKD stage III, baseline creatinine 0 9-1 1  -Creatinine 1 2 yesterday  BMP results to follow today  -S/p cardiac cath yesterday, closely monitor for ABDIRASHID  -Home dose Lasix and spironolactone are restarted by cardiology  -CKD suspect in the setting of cardiorenal, age-related nephron loss     Severe aortic stenosis, undergoing TAVR work-up, status post CT scan with IV contrast in December 2022, status post elective cardiac cath yesterday    -Status post PCI, stent placement     Chronic hyponatremia, this remains a chronic issue going back to 2019  -Patient is not aware of having low sodium issues  -Most recent serum sodium 132 yesterday, BMP results to follow today   -Some sodium level fluctuations based on volume status  -Now restarted back on Lasix 40 mg daily  -Recommend mild fluid restriction 1 5L/day  -Recent CT scan of chest, abdomen, pelvis in December 2022 showed diffuse groundglass opacities in both lungs with subsegmental atelectasis, no other obvious malignancy reported  -Patient likely being discharged today as per discussion with cardiology, if she remains hospitalized, will do further work-up      Systolic CHF  -Echo shows EF 50%, severe AS, moderate MR, moderate TR  -Restarted home dose Lasix 40 mg daily, now restarted back on spironolactone   -Daily weight     Discussed above plan in detail with cardiology team   Will need outpatient nephrology follow-up for further evaluation of hyponatremia and ongoing CKD management  SUBJECTIVE:  Patient seen and examined at bedside    Denies chest pain, nausea, vomiting    OBJECTIVE:  Current Weight: Weight - Scale: 48 5 kg (107 lb)  Vitals:    01/12/23 0725   BP: 119/54   Pulse: 85   Resp:    Temp: 100 3 °F (37 9 °C)   SpO2: 96%       Intake/Output Summary (Last 24 hours) at 1/12/2023 0848  Last data filed at 1/12/2023 0449  Gross per 24 hour   Intake 326 22 ml   Output 167 ml   Net 159 22 ml     Wt Readings from Last 3 Encounters:   01/12/23 48 5 kg (107 lb)   01/03/23 48 8 kg (107 lb 8 oz)   12/29/22 48 1 kg (106 lb)     Temp Readings from Last 3 Encounters:   01/12/23 100 3 °F (37 9 °C)   01/09/23 98 4 °F (36 9 °C) (Temporal)   01/04/23 97 7 °F (36 5 °C) (Temporal)     BP Readings from Last 3 Encounters:   01/12/23 119/54   01/09/23 124/62   01/06/23 140/78     Pulse Readings from Last 3 Encounters:   01/12/23 85   01/09/23 62   01/06/23 60        Physical Examination:  General: Lying in bed, no acute distress   Eyes: Mild conjunctival pallor present  ENT: External examination of ear and nose unremarkable  Neck: No obvious lymphadenopathy appreciated  Respiratory: Bilateral air entry present  CVS: S1, S2 present  GI:, Nondistended  CNS: Active alert oriented  Skin: No new rash  Musculoskeletal: No obvious new gross deformity noted    Medications:    Current Facility-Administered Medications:   •  acetaminophen (TYLENOL) tablet 650 mg, 650 mg, Oral, Q6H PRN, LYNN Alcazar, 650 mg at 01/11/23 1506  •  ALPRAZolam (XANAX) tablet 0 25 mg, 0 25 mg, Oral, TID PRN, LYNN Alcazar  •  [START ON 1/13/2023] apixaban (ELIQUIS) tablet 2 5 mg, 2 5 mg, Oral, BID, LYNN Aldana  •  aspirin chewable tablet 81 mg, 81 mg, Oral, Daily, LYNN Aldana, 81 mg at 01/12/23 0818  •  atorvastatin (LIPITOR) tablet 20 mg, 20 mg, Oral, Daily With Dinner, LYNN Alcazar, 20 mg at 01/11/23 2055  •  clopidogrel (PLAVIX) tablet 75 mg, 75 mg, Oral, Daily, LYNN Aldana, 75 mg at 01/12/23 0817  •  diltiazem (CARDIZEM CD) 24 hr capsule 120 mg, 120 mg, Oral, Daily, Alyx Kellycsek, CRNP, 120 mg at 01/12/23 0818  •  furosemide (LASIX) tablet 40 mg, 40 mg, Oral, Daily, Alyx Grecsek, CRNP, 40 mg at 01/12/23 0818  •  metoprolol succinate (TOPROL-XL) 24 hr tablet 75 mg, 75 mg, Oral, Daily, Alyx Grecsek, CRNP, 75 mg at 01/12/23 0817  •  ondansetron (ZOFRAN) injection 4 mg, 4 mg, Intravenous, Q8H PRN, LYNN Alcazar  •  psyllium (METAMUCIL) 1 packet, 1 packet, Oral, Daily, LYNN Alcazar, 1 packet at 01/12/23 0034  •  spironolactone (ALDACTONE) tablet 25 mg, 25 mg, Oral, Daily, Alyx Grecsek, CRNP, 25 mg at 01/12/23 1355    Laboratory Results:  Results from last 7 days   Lab Units 01/12/23  0456 01/11/23  0720   WBC Thousand/uL 7 15  --    HEMOGLOBIN g/dL 10 6*  --    HEMATOCRIT % 32 6*  --    PLATELETS Thousands/uL 158  --    SODIUM mmol/L  --  132*   POTASSIUM mmol/L  --  3 8   CHLORIDE mmol/L  --  98   CO2 mmol/L  --  27   BUN mg/dL  --  33*   CREATININE mg/dL  --  1 26   CALCIUM mg/dL  --  10 0       No orders to display       Portions of the record may have been created with voice recognition software  Occasional wrong word or "sound a like" substitutions may have occurred due to the inherent limitations of voice recognition software  Read the chart carefully and recognize, using context, where substitutions have occurred

## 2023-01-12 NOTE — CASE MANAGEMENT
Case Management Discharge Planning Note    Patient name Chino Cordova  Location [facility] 671-42 MRN [id number]  : 1935 Date 2023       Current Admission Date: 2023  Current Admission Diagnosis:Status post insertion of drug eluting coronary artery stent   Patient Active Problem List    Diagnosis Date Noted   • Constipation 2023   • Status post insertion of drug eluting coronary artery stent 2023   • Pleural effusion 2022   • Acute respiratory failure with hypoxia ([address] ) 2022   • Chronic combined systolic and diastolic congestive heart failure ([address] ) 2022   • Primary generalized (osteo)arthritis 2022   • Hyperthyroidism 2022   • Dyslipidemia 2022   • Post-menopause 2022   • Stage 3a chronic kidney disease ([address] ) 2021   • Other fatigue 2021   • Ganglion cyst 2021   • Adhesive capsulitis of right shoulder 2021   • Bilateral leg edema 2021   • Chronic pain syndrome 2021   • Sacroiliitis ([address] ) 2021   • Gait abnormality 2021   • Chronic bilateral low back pain without sciatica 2021   • Osteopenia of neck of femur 2021   • Impaired fasting glucose    • Encounter for long-term (current) use of medications 10/31/2020   • Immunization due 10/31/2020   • Acute bursitis of right shoulder 10/31/2020   • Encounter for Medicare annual wellness exam 2020   • Mid back pain 2019   • Generalized anxiety disorder 2019   • Non-rheumatic tricuspid valve insufficiency 2019   • Lymphadenopathy 2019   • Hiatal hernia 2019   • Thrombocytopenia ([address] ) 2019   • Severe aortic stenosis 2019   • Mitral valve insufficiency 2019   • Supraventricular tachycardia ([address] ) 2019   • Pulmonary hypertension ([address] ) 2019   • Diverticulosis 2019   • Atrial tachycardia ([address] ) 2019   • Essential hypertension 2019   • Hypomagnesemia 2019   • Hyponatremia 05/17/2019      LOS (days): 0  Geometric Mean LOS (GMLOS) (days):   Days to GMLOS:     OBJECTIVE:            Current admission status: Outpatient Surgery   Preferred Pharmacy:   [facility] [id number], [address] [address] [address] [address]  Phone: [phone] Fax: [phone]    Primary Care Provider: Beck Harris DO    Primary Insurance: MEDICARE  Secondary Insurance: BLUE CROSS    DISCHARGE DETAILS:                                [address]         Is the patient interested in [place] [facility] at discharge?: Yes  [address] requested[de-identified] Nursing, Physical [address] Name[de-identified] [facility] Provider[de-identified] PCP  Home Health Services Needed[de-identified] Heart Failure Management, Strengthening/Theraputic Exercises to Improve Function, Evaluate Functional Status and Safety  Homebound Criteria Met[de-identified] Requires the Assistance of Another Person for Safe Ambulation or to Leave the Home, Uses an Assist Device (i e  cane, walker, etc)  Supporting Clincal Findings[de-identified] Limited Endurance, Fatigues Easliy in Short Distances                   Treatment Team Recommendation: Home with [address]  Discharge Destination Plan[de-identified] Home with Maude at Discharge : Family                                      Additional Comments: Patient cleared for d/c today (outpatient surgery admit) and will return for TAVR next week  Return referral placed to [facility] to provide d/c transportation

## 2023-01-12 NOTE — H&P (VIEW-ONLY)
Consultation - Cardiothoracic Surgery   Alfredo Segura 80 y o  female MRN: [id number]      Reason for Consult / Principal Problem: Aortic stenosis, Non-Rheumatic    History of Present Illness: Alfredo Segura is a 80y o  year old female who was previously evaluated in our office by DALE Ayala  for transcatheter aortic valve replacement  During this initial consultation, arrangements were made for the following studies to be completed: gated CTA of the chest, abdomen, and pelvis and carotid ultrasound  She had her cardiac catheterization yesterday is an overnight stay for her hx of CKD3  She has known aortic stenosis followed by Dr Frank Cardenas outpatient  She had presented to [facility] on 12/20/22 for SOB  She was volume overloaded, had IV diuretic therapy and thoracentesis on the right  She had workup for aortic stenosis over that time  She has now completed her testing  She denies tobacco use, alcohol or drug use  She lives with her  and was active up until this past summer  She has full dentures       Past Medical History:  Past Medical History:   Diagnosis Date   • Aortic stenosis    • Community acquired pneumonia of left upper lobe of lung 5/17/2019   • Fatigue    • Full dentures    • Generalized anxiety disorder    • Hyperlipidemia    • Hypertension    • Impaired fasting glucose    • Mitral regurgitation    • Pneumonia    • SVT (supraventricular tachycardia)    • Tricuspid regurgitation      Past Surgical History:   Past Surgical History:   Procedure Laterality Date   • DENTAL SURGERY Bilateral     ALL TEETH REMOVED   • FL GUIDED NEEDLE PLAC BX/ASP/INJ  5/26/2021   • IR THORACENTESIS  12/20/2022   • JOINT REPLACEMENT Left    • KNEE SURGERY      left knee replacement   • ME INJECT SI JOINT ARTHRGRPHY&/ANES/STEROID W/JOHNATHON Bilateral 5/26/2021    Procedure: SACROILIAC JOINT INJECTION;  Surgeon: Celestine Chu MD;  Location: [facility];  Service: Pain Management      Family History:  Family History   Problem Relation Age of Onset   • Hypertension Mother    • Asthma Father         Escanaba's asthma   • Alzheimer's disease Sister    • Rectal cancer Son    • Uterine cancer Daughter    • Heart disease Sister    • Heart disease Brother      Social History:    Social History     Substance and Sexual Activity   Alcohol Use Never     Social History     Substance and Sexual Activity   Drug Use Never     Social History     Tobacco Use   Smoking Status Never   Smokeless Tobacco Never       Home Medications:   Prior to Admission medications    Medication Sig Start Date End Date Taking? Authorizing Provider   apixaban (ELIQUIS) 2 5 mg Take 1 tablet (2 5 mg total) by mouth 2 (two) times a day 12/25/22  Yes Junie Lopez MD   Calcium Carb-Cholecalciferol (CALTRATE 600+D3 PO) Take 1 tablet by mouth 2 (two) times a day TAKES ONCE DAILY   Yes Historical Provider, MD   diltiazem (CARDIZEM CD) 120 mg 24 hr capsule Take 1 capsule (120 mg total) by mouth daily Do not start before December 26, 2022 12/26/22  Yes Junie Lopez MD   furosemide (LASIX) 40 mg tablet Take 1 tablet (40 mg total) by mouth daily Do not start before December 26, 2022 12/26/22  Yes Junie Lopez MD   metoprolol succinate (TOPROL-XL) 50 mg 24 hr tablet Take 1 5 tablets (75 mg total) by mouth daily 12/29/22  Yes Antonio Alejandre PA-C   Omega-3 Fatty Acids (FISH OIL) 1200 MG CAPS Take 1,200 mg by mouth daily   Yes Historical Provider, MD   oxygen gas Inhale 2 L/min daily as needed (low oxygen levels)   Indications: Oxygen Desaturation   Yes Junie Lopez MD   Psyllium (METAMUCIL FIBER) 51 7 % PACK Take 1 Package by mouth daily 5/21/19  Yes Elsie Sherwood DO   spironolactone (ALDACTONE) 25 mg tablet Take 1 tablet (25 mg total) by mouth daily Do not start before December 26, 2022 12/26/22  Yes Junie Lopez MD   ALPRAZolam Mickeal Beards) 0 25 mg tablet Take 0 25 mg by mouth 3 (three) times a day as needed    Historical Provider, MD Allergies:  No Known Allergies    Review of Systems:     Review of Systems   Constitutional: Positive for activity change and fatigue  HENT: Negative  Eyes: Negative  Respiratory: Positive for shortness of breath  Cardiovascular: Positive for palpitations and leg swelling  Gastrointestinal: Negative  Endocrine: Negative  Genitourinary: Negative  Musculoskeletal: Negative  Skin: Negative  Allergic/Immunologic: Negative  Neurological: Negative  Hematological: Negative  Psychiatric/Behavioral: Negative  Vital Signs:     Vitals:    01/11/23 2331 01/12/23 0417 01/12/23 0600 01/12/23 0725   BP: 122/54 119/53  119/54   BP Location: Right arm      Pulse: 77 90  85   Resp: 18 18     Temp: 99 4 °F (37 4 °C) 98 7 °F (37 1 °C)  100 3 °F (37 9 °C)   TempSrc: Oral      SpO2: 97% 97%  96%   Weight:   48 5 kg (107 lb)    Height:           Physical Exam:     Physical Exam  Constitutional:       General: She is not in acute distress  Appearance: She is normal weight  She is not toxic-appearing  HENT:      Head: Normocephalic and atraumatic  Right Ear: External ear normal       Left Ear: External ear normal       Nose: Nose normal       Mouth/Throat:      Mouth: Mucous membranes are moist       Pharynx: Oropharynx is clear  Eyes:      General: No scleral icterus  Right eye: No discharge  Left eye: No discharge  Extraocular Movements: Extraocular movements intact  Conjunctiva/sclera: Conjunctivae normal       Pupils: Pupils are equal, round, and reactive to light  Neck:      Vascular: Carotid bruit present  Cardiovascular:      Rate and Rhythm: Rhythm irregular  Heart sounds: Murmur heard  Pulmonary:      Effort: Pulmonary effort is normal  No respiratory distress  Breath sounds: Normal breath sounds  No wheezing  Abdominal:      General: Abdomen is flat  Bowel sounds are normal  There is no distension        Palpations: Abdomen is soft       Tenderness: There is no abdominal tenderness  There is no guarding  Musculoskeletal:         General: Normal range of motion  Cervical back: Normal range of motion  Right lower leg: No edema  Left lower leg: No edema  Skin:     General: Skin is warm and dry  Coloration: Skin is not pale  Findings: No erythema or rash  Neurological:      General: No focal deficit present  Mental Status: She is alert and oriented to person, place, and time  Cranial Nerves: No cranial nerve deficit  Sensory: No sensory deficit  Psychiatric:         Mood and Affect: Mood normal          Behavior: Behavior normal          Thought Content: Thought content normal          Judgment: Judgment normal          Lab Results:     Results from last 7 days   Lab Units 01/12/23  0456   WBC Thousand/uL 7 15   HEMOGLOBIN g/dL 10 6*   HEMATOCRIT % 32 6*   PLATELETS Thousands/uL 158     Results from last 7 days   Lab Units 01/11/23  0720   POTASSIUM mmol/L 3 8   CHLORIDE mmol/L 98   CO2 mmol/L 27   BUN mg/dL 33*   CREATININE mg/dL 1 26   CALCIUM mg/dL 10 0         No results found for: HGBA1C  Lab Results   Component Value Date    TROPONINI 0 34 ([facility]) 05/18/2019       Imaging Studies:     Echocardiogram:   Findings    Left Ventricle Left ventricular cavity size is normal  Wall thickness is mildly increased  There is mild concentric hypertrophy  LVMI (LV Mass Indexed to Body Surface Area) = 104 11 g/m2 The left ventricular ejection fraction is 50%  Systolic function is low normal   Wall motion is normal  Unable to assess diastolic function due to the absense of normal sinus rhythm  Right Ventricle Right ventricular cavity size is mildly dilated  Systolic function is mildly reduced  Wall thickness is normal    Left Atrium The atrium is severely dilated (>48 mL/m2)  Right Atrium The atrium is mildly dilated     Atrial Septum There is a small patent foramen ovale confirmed at rest with predominant left to right shunting using color Doppler  The septum bows into the right atrium, suggesting increased left atrial pressure  Aortic Valve The aortic valve is trileaflet  The leaflets are moderately thickened  The leaflets are moderately calcified  There is severely reduced mobility  There is mild regurgitation  The pressure half time is 520 when averaged over 3 beats  The aortic valve peak velocity is 2 94 m/s  The aortic valve mean gradient is 24 mmHg  The dimensionless velocity index is 0 22  The aortic valve area is 0 50 cm2  The aortic valve has paradoxical low flow low gradient severe AS  Mitral Valve There is mild calcification with moderate chordal involvement  There is moderate annular calcification  There is moderate regurgitation  There is no evidence of stenosis  Tricuspid Valve Tricuspid valve structure is normal  There is moderate regurgitation  There is no evidence of stenosis  The right ventricular systolic pressure is severely elevated  The estimated right ventricular systolic pressure is 15 60 mmHg  Pulmonic Valve Pulmonic valve structure is normal  There is mild regurgitation  There is no evidence of stenosis  Ascending Aorta The aortic root is normal in size  IVC/SVC The right atrial pressure is estimated at 8 0 mmHg  The inferior vena cava is not well visualized  Pericardium There is no pericardial effusion  There is a moderately sized left pleural effusion  Gated CTA of the chest/abdomen/pelvis:   FINDINGS:     VASCULAR STRUCTURES:  Unable to measure valve plane on gated images due to gating misregistration on all cardiac phases  Measurements obtained from non-gated series        Annulus: diameter 28 6 x 18 3 mm      area: 419 5 sq mm    Annulus to LCA: 15 7 mm    Annulus to RCA: 16 1 mm    Minimal diameter right iliofemoral segment: 6 6 mm    Minimal diameter left iliofemoral segment: 6 5 mm     Cardiac findings:   There is mild calcification of the aortic valve leaflets  There are asymmetric calcifications of the mitral valve annulus    No prior valvular surgery  No prior bypass surgery  No pericardial effusion  No cardiac mass or thrombus identified      The ascending aorta is nonaneurysmal measuring 29 mm with mild atherosclerosis  There is a type 3 aortic arch with classic branching anatomy and no stenosis in the visualized great vessels  The aortic arch is nonaneurysmal with mild  atherosclerosis  The   descending thoracic aorta is nonaneurysmal with mild  atherosclerosis      The abdominal aorta is normal in caliber with moderate diffuse atherosclerotic calcification  There is no significant aortoiliac occlusive disease  No significant visceral artery stenosis is identified         OTHER FINDINGS:      CHEST:      LUNGS:  Diffuse groundglass opacity in both lungs with areas of subsegmental atelectasis and compressive atelectasis in the bases      PLEURA: Moderate right and small left pleural effusions      MEDIASTINUM AND KEIRY:  No mass or significant lymphadenopathy      CHEST WALL AND LOWER NECK: Unremarkable      ABDOMEN     LIVER/BILIARY TREE:  Unremarkable      GALLBLADDER:  No calcified gallstones  No pericholecystic inflammatory change      SPLEEN:  Unremarkable  Normal size      PANCREAS:  Unremarkable      ADRENAL GLANDS:  Unremarkable      KIDNEYS/URETERS:  No solid renal mass  No hydronephrosis  No urinary tract calculi      PELVIS     REPRODUCTIVE ORGANS:  Unremarkable for patient's age      URINARY BLADDER:  Unremarkable         ADDITIONAL ABDOMINAL AND PELVIC STRUCTURES:      STOMACH AND BOWEL:  Unremarkable      ABDOMINOPELVIC CAVITY:  No pathologically enlarged mesenteric or retroperitoneal lymph nodes  No ascites or free intraperitoneal air      ABDOMINAL WALL/INGUINAL REGIONS: Unremarkable      OSSEOUS STRUCTURES:  No acute fracture or destructive osseous lesion      IMPRESSION:        1    TAVR measurements:  Unable to measure valve plane on gated images due to gating misregistration on all cardiac phases  Measurements obtained from non-gated series        Annulus: diameter 28 6 x 18 3 mm      area: 419 5 sq mm    Annulus to LCA: 15 7 mm    Annulus to RCA: 16 1 mm    Minimal diameter right iliofemoral segment: 6 6 mm    Minimal diameter left iliofemoral segment: 6 5 mm  2  No significant aortoiliac occlusive disease  3  Moderate right and small left pleural effusions with interstitial edema    cardiac catheterization: summary  •  Ost RCA lesion is 75% stenosed      Single vessel CAD, with a 75% RCA ostial stenosis, confirmed by IVUS  Successful IVUS-guided PCI, ostial RCA, with a reduction in stenosis from 75% to 0% following placement of a 3 [id number] ELODIA and post-dilation with a 3 5mm NC balloon  Plan: clopidogrel and DOAC after discharge, statin; TAVR evaluation  Carotid artery ultrasound:   CONCLUSION:  Impression  RIGHT:  There is <50% stenosis noted in the internal carotid artery  Plaque is  heterogenous and irregular  Vertebral artery flow is antegrade  There is no significant subclavian artery  disease  LEFT:  There is <50% stenosis noted in the internal carotid artery  Plaque is  heterogenous and irregular  Vertebral artery flow is antegrade  There is no significant subclavian artery  disease  I have personally reviewed pertinent reports        TAVR evaluation Assessment:     5 Meter Walk Test:      Attempt 1: 8   Attempt 2: 9   Attempt 3: 8    STS Risk Score: 11 %, mortality risk    Aortic Stenosis Stage: D1    [facility]: III    [facility]Q-12 was completed    Assessment:  Patient Active Problem List    Diagnosis Date Noted   • Constipation 01/11/2023   • Status post insertion of drug eluting coronary artery stent 01/11/2023   • Pleural effusion 12/22/2022   • Acute respiratory failure with hypoxia ([address] ) 12/22/2022   • Chronic combined systolic and diastolic congestive heart failure ([address] ) 12/20/2022   • Primary generalized (osteo)arthritis 09/07/2022 • Hyperthyroidism 07/11/2022   • Dyslipidemia 03/28/2022   • Post-menopause 03/28/2022   • Stage 3a chronic kidney disease ([address] ) 12/23/2021   • Other fatigue 12/23/2021   • Ganglion cyst 09/14/2021   • Adhesive capsulitis of right shoulder 09/14/2021   • Bilateral leg edema 07/20/2021   • Chronic pain syndrome 06/22/2021   • Sacroiliitis ([address] ) 06/22/2021   • Gait abnormality 06/01/2021   • Chronic bilateral low back pain without sciatica 04/30/2021   • Osteopenia of neck of femur 01/21/2021   • Impaired fasting glucose    • Encounter for long-term (current) use of medications 10/31/2020   • Immunization due 10/31/2020   • Acute bursitis of right shoulder 10/31/2020   • Encounter for Medicare annual wellness exam 01/02/2020   • Mid back pain 09/30/2019   • Generalized anxiety disorder 09/05/2019   • Non-rheumatic tricuspid valve insufficiency 06/04/2019   • Lymphadenopathy 05/21/2019   • Hiatal hernia 05/21/2019   • Thrombocytopenia ([address] ) 05/21/2019   • Severe aortic stenosis 05/21/2019   • Mitral valve insufficiency 05/21/2019   • Supraventricular tachycardia ([address] ) 05/21/2019   • Pulmonary hypertension ([address] ) 05/21/2019   • Diverticulosis 05/21/2019   • Atrial tachycardia ([facility] [facility] ) 05/21/2019   • Essential hypertension 05/18/2019   • Hypomagnesemia 05/17/2019   • Hyponatremia 05/17/2019     Severe aortic stenosis; Ongoing TAVR workup    Plan:    Alessia Mi has severe symptomatic aortic stenosis  Based on their STS risk assessment, they have undergone testing for transcatheter aortic valve replacement  The results of these studies have been interpreted by FERMÍN Conde  who has determined the patient to be an appropriate candidate for transcatheter aortic valve replacement  The risks, benefits, and alternatives to TAVR were discussed in detail with the patient today  They understand and wish to proceed with transfemoral transcatheter aortic valve replacement    Informed consent was obtained and a date for the intervention has been set  Karley Freed was comfortable with our recommendations, and their questions were answered to their satisfaction  Shared decision-making encounter occurred during this visit  Additionally, heart team evaluation of suitability for surgical replacement has been completed  The following preoperative instructions were provided at the conclusion of their consultation:     1  You will receive a phone call from the hospital between 2:00 PM and 8:00 PM the day prior to surgery to confirm arrival time and location  For surgery on Mondays, you will receive a call on Friday  2  Do not drink or eat anything after midnight the night before surgery  That includes no water, candy, gum, lozenges, Lifesavers, etc  We recommend you not eat any "junk" food, consume alcohol or smoke the night before surgery  3  Continue taking aspirin but only 81 mg daily  4  If you take Coumadin and/or Plavix, discontinue it 5 days before surgery  5  If you are diabetic, do not take any of your diabetic pills the morning of surgery  If you take Lantus insulin, you may take it at your regularly scheduled time the day before surgery  Do not take any other insulins the morning of surgery  6  The 2 nights before surgery, take a shower each night using the special antiseptic soap or soap sponges you received from the office or [facility] your hair with regular shampoo and rinse completely before using the antiseptic sponges  Use the sponge to wash from your neck down, with special attention to the armpits and groin area  Do not use any other soap or cleanser on your skin  Do not use lotions, powder, deodorant or perfume of any kind on your skin after you shower  Use clean bed linens and wear clean pajamas after your shower  7  You will be prescribed Mupirocin nasal ointment  Apply to both nostrils twice a day for 5 days prior to surgery    8  Do not take a shower the morning of her surgery; you'll be given a special" bath" at the hospital   9  Notify the CT surgery office if you develop a cold, sore throat, cough, fever or other health issues before your surgery  10  Other medication changes included the following: Hold Eliquis and Omega 3 now, Continue Plavix  Ordered her bactroban to  and start today  SIGNATURE: Fco [place], [place]  DATE: January 12, 2023  TIME: 9:15 AM    * This note was completed in part utilizing m-modal fluency direct voice recognition software  Grammatical errors, random word insertion, spelling mistakes, and incomplete sentences may be an occasional consequence of the system secondary to software limitations, ambient noise and hardware issues  At the time of dictation, efforts were made to edit, clarify and /or correct errors  Please read the chart carefully and recognize, using context, where substitutions have occurred  If you have any questions or concerns about the context, text or information contained within the body of this dictation, please contact myself, the provider, for further clarification

## 2023-01-12 NOTE — CONSULTS
Consultation - Cardiothoracic Surgery   Martha Rodriguez 80 y o  female MRN: [id number]      Reason for Consult / Principal Problem: Aortic stenosis, Non-Rheumatic    History of Present Illness: Martha Rodriguez is a 80y o  year old female who was previously evaluated in our office by DALE Timmons  for transcatheter aortic valve replacement  During this initial consultation, arrangements were made for the following studies to be completed: gated CTA of the chest, abdomen, and pelvis and carotid ultrasound  She had her cardiac catheterization yesterday is an overnight stay for her hx of CKD3  She has known aortic stenosis followed by Dr Iesha Thurston outpatient  She had presented to [facility] on 12/20/22 for SOB  She was volume overloaded, had IV diuretic therapy and thoracentesis on the right  She had workup for aortic stenosis over that time  She has now completed her testing  She denies tobacco use, alcohol or drug use  She lives with her  and was active up until this past summer  She has full dentures       Past Medical History:  Past Medical History:   Diagnosis Date   • Aortic stenosis    • Community acquired pneumonia of left upper lobe of lung 5/17/2019   • Fatigue    • Full dentures    • Generalized anxiety disorder    • Hyperlipidemia    • Hypertension    • Impaired fasting glucose    • Mitral regurgitation    • Pneumonia    • SVT (supraventricular tachycardia)    • Tricuspid regurgitation      Past Surgical History:   Past Surgical History:   Procedure Laterality Date   • DENTAL SURGERY Bilateral     ALL TEETH REMOVED   • FL GUIDED NEEDLE PLAC BX/ASP/INJ  5/26/2021   • IR THORACENTESIS  12/20/2022   • JOINT REPLACEMENT Left    • KNEE SURGERY      left knee replacement   • VA INJECT SI JOINT ARTHRGRPHY&/ANES/STEROID W/JOHNATHON Bilateral 5/26/2021    Procedure: SACROILIAC JOINT INJECTION;  Surgeon: Huntley Boas, MD;  Location: [facility];  Service: Pain Management      Family History:  Family History   Problem Relation Age of Onset   • Hypertension Mother    • Asthma Father         [place]'s asthma   • Alzheimer's disease Sister    • Rectal cancer Son    • Uterine cancer Daughter    • Heart disease Sister    • Heart disease Brother      Social History:    Social History     Substance and Sexual Activity   Alcohol Use Never     Social History     Substance and Sexual Activity   Drug Use Never     Social History     Tobacco Use   Smoking Status Never   Smokeless Tobacco Never       Home Medications:   Prior to Admission medications    Medication Sig Start Date End Date Taking? Authorizing Provider   apixaban (ELIQUIS) 2 5 mg Take 1 tablet (2 5 mg total) by mouth 2 (two) times a day 12/25/22  Yes Antonio Flores MD   Calcium Carb-Cholecalciferol (CALTRATE 600+D3 PO) Take 1 tablet by mouth 2 (two) times a day TAKES ONCE DAILY   Yes Historical Provider, MD   diltiazem (CARDIZEM CD) 120 mg 24 hr capsule Take 1 capsule (120 mg total) by mouth daily Do not start before December 26, 2022 12/26/22  Yes Antonio Flores MD   furosemide (LASIX) 40 mg tablet Take 1 tablet (40 mg total) by mouth daily Do not start before December 26, 2022 12/26/22  Yes Antonio Flores MD   metoprolol succinate (TOPROL-XL) 50 mg 24 hr tablet Take 1 5 tablets (75 mg total) by mouth daily 12/29/22  Yes Sudha Galeano PA-C   Omega-3 Fatty Acids (FISH OIL) 1200 MG CAPS Take 1,200 mg by mouth daily   Yes Historical Provider, MD   oxygen gas Inhale 2 L/min daily as needed (low oxygen levels)   Indications: Oxygen Desaturation   Yes Antonio Flores MD   Psyllium (METAMUCIL FIBER) 51 7 % PACK Take 1 Package by mouth daily 5/21/19  Yes Chaparrita Sherwood DO   spironolactone (ALDACTONE) 25 mg tablet Take 1 tablet (25 mg total) by mouth daily Do not start before December 26, 2022 12/26/22  Yes Antonio Flores MD   ALPRAZolam Gaurang Distance) 0 25 mg tablet Take 0 25 mg by mouth 3 (three) times a day as needed    Historical Provider, MD Allergies:  No Known Allergies    Review of Systems:     Review of Systems   Constitutional: Positive for activity change and fatigue  HENT: Negative  Eyes: Negative  Respiratory: Positive for shortness of breath  Cardiovascular: Positive for palpitations and leg swelling  Gastrointestinal: Negative  Endocrine: Negative  Genitourinary: Negative  Musculoskeletal: Negative  Skin: Negative  Allergic/Immunologic: Negative  Neurological: Negative  Hematological: Negative  Psychiatric/Behavioral: Negative  Vital Signs:     Vitals:    01/11/23 2331 01/12/23 0417 01/12/23 0600 01/12/23 0725   BP: 122/54 119/53  119/54   BP Location: Right arm      Pulse: 77 90  85   Resp: 18 18     Temp: 99 4 °F (37 4 °C) 98 7 °F (37 1 °C)  100 3 °F (37 9 °C)   TempSrc: Oral      SpO2: 97% 97%  96%   Weight:   48 5 kg (107 lb)    Height:           Physical Exam:     Physical Exam  Constitutional:       General: She is not in acute distress  Appearance: She is normal weight  She is not toxic-appearing  HENT:      Head: Normocephalic and atraumatic  Right Ear: External ear normal       Left Ear: External ear normal       Nose: Nose normal       Mouth/Throat:      Mouth: Mucous membranes are moist       Pharynx: Oropharynx is clear  Eyes:      General: No scleral icterus  Right eye: No discharge  Left eye: No discharge  Extraocular Movements: Extraocular movements intact  Conjunctiva/sclera: Conjunctivae normal       Pupils: Pupils are equal, round, and reactive to light  Neck:      Vascular: Carotid bruit present  Cardiovascular:      Rate and Rhythm: Rhythm irregular  Heart sounds: Murmur heard  Pulmonary:      Effort: Pulmonary effort is normal  No respiratory distress  Breath sounds: Normal breath sounds  No wheezing  Abdominal:      General: Abdomen is flat  Bowel sounds are normal  There is no distension        Palpations: Abdomen is soft       Tenderness: There is no abdominal tenderness  There is no guarding  Musculoskeletal:         General: Normal range of motion  Cervical back: Normal range of motion  Right lower leg: No edema  Left lower leg: No edema  Skin:     General: Skin is warm and dry  Coloration: Skin is not pale  Findings: No erythema or rash  Neurological:      General: No focal deficit present  Mental Status: She is alert and oriented to person, place, and time  Cranial Nerves: No cranial nerve deficit  Sensory: No sensory deficit  Psychiatric:         Mood and Affect: Mood normal          Behavior: Behavior normal          Thought Content: Thought content normal          Judgment: Judgment normal          Lab Results:     Results from last 7 days   Lab Units 01/12/23  0456   WBC Thousand/uL 7 15   HEMOGLOBIN g/dL 10 6*   HEMATOCRIT % 32 6*   PLATELETS Thousands/uL 158     Results from last 7 days   Lab Units 01/11/23  0720   POTASSIUM mmol/L 3 8   CHLORIDE mmol/L 98   CO2 mmol/L 27   BUN mg/dL 33*   CREATININE mg/dL 1 26   CALCIUM mg/dL 10 0         No results found for: HGBA1C  Lab Results   Component Value Date    TROPONINI 0 34 ([facility]) 05/18/2019       Imaging Studies:     Echocardiogram:   Findings    Left Ventricle Left ventricular cavity size is normal  Wall thickness is mildly increased  There is mild concentric hypertrophy  LVMI (LV Mass Indexed to Body Surface Area) = 104 11 g/m2 The left ventricular ejection fraction is 50%  Systolic function is low normal   Wall motion is normal  Unable to assess diastolic function due to the absense of normal sinus rhythm  Right Ventricle Right ventricular cavity size is mildly dilated  Systolic function is mildly reduced  Wall thickness is normal    Left Atrium The atrium is severely dilated (>48 mL/m2)  Right Atrium The atrium is mildly dilated     Atrial Septum There is a small patent foramen ovale confirmed at rest with predominant left to right shunting using color Doppler  The septum bows into the right atrium, suggesting increased left atrial pressure  Aortic Valve The aortic valve is trileaflet  The leaflets are moderately thickened  The leaflets are moderately calcified  There is severely reduced mobility  There is mild regurgitation  The pressure half time is 520 when averaged over 3 beats  The aortic valve peak velocity is 2 94 m/s  The aortic valve mean gradient is 24 mmHg  The dimensionless velocity index is 0 22  The aortic valve area is 0 50 cm2  The aortic valve has paradoxical low flow low gradient severe AS  Mitral Valve There is mild calcification with moderate chordal involvement  There is moderate annular calcification  There is moderate regurgitation  There is no evidence of stenosis  Tricuspid Valve Tricuspid valve structure is normal  There is moderate regurgitation  There is no evidence of stenosis  The right ventricular systolic pressure is severely elevated  The estimated right ventricular systolic pressure is 05 71 mmHg  Pulmonic Valve Pulmonic valve structure is normal  There is mild regurgitation  There is no evidence of stenosis  Ascending Aorta The aortic root is normal in size  IVC/SVC The right atrial pressure is estimated at 8 0 mmHg  The inferior vena cava is not well visualized  Pericardium There is no pericardial effusion  There is a moderately sized left pleural effusion  Gated CTA of the chest/abdomen/pelvis:   FINDINGS:     VASCULAR STRUCTURES:  Unable to measure valve plane on gated images due to gating misregistration on all cardiac phases  Measurements obtained from non-gated series        Annulus: diameter 28 6 x 18 3 mm      area: 419 5 sq mm    Annulus to LCA: 15 7 mm    Annulus to RCA: 16 1 mm    Minimal diameter right iliofemoral segment: 6 6 mm    Minimal diameter left iliofemoral segment: 6 5 mm     Cardiac findings:   There is mild calcification of the aortic valve leaflets  There are asymmetric calcifications of the mitral valve annulus    No prior valvular surgery  No prior bypass surgery  No pericardial effusion  No cardiac mass or thrombus identified      The ascending aorta is nonaneurysmal measuring 29 mm with mild atherosclerosis  There is a type 3 aortic arch with classic branching anatomy and no stenosis in the visualized great vessels  The aortic arch is nonaneurysmal with mild  atherosclerosis  The   descending thoracic aorta is nonaneurysmal with mild  atherosclerosis      The abdominal aorta is normal in caliber with moderate diffuse atherosclerotic calcification  There is no significant aortoiliac occlusive disease  No significant visceral artery stenosis is identified         OTHER FINDINGS:      CHEST:      LUNGS:  Diffuse groundglass opacity in both lungs with areas of subsegmental atelectasis and compressive atelectasis in the bases      PLEURA: Moderate right and small left pleural effusions      MEDIASTINUM AND KEIRY:  No mass or significant lymphadenopathy      CHEST WALL AND LOWER NECK: Unremarkable      ABDOMEN     LIVER/BILIARY TREE:  Unremarkable      GALLBLADDER:  No calcified gallstones  No pericholecystic inflammatory change      SPLEEN:  Unremarkable  Normal size      PANCREAS:  Unremarkable      ADRENAL GLANDS:  Unremarkable      KIDNEYS/URETERS:  No solid renal mass  No hydronephrosis  No urinary tract calculi      PELVIS     REPRODUCTIVE ORGANS:  Unremarkable for patient's age      URINARY BLADDER:  Unremarkable         ADDITIONAL ABDOMINAL AND PELVIC STRUCTURES:      STOMACH AND BOWEL:  Unremarkable      ABDOMINOPELVIC CAVITY:  No pathologically enlarged mesenteric or retroperitoneal lymph nodes  No ascites or free intraperitoneal air      ABDOMINAL WALL/INGUINAL REGIONS: Unremarkable      OSSEOUS STRUCTURES:  No acute fracture or destructive osseous lesion      IMPRESSION:        1    TAVR measurements:  Unable to measure valve plane on gated images due to gating misregistration on all cardiac phases  Measurements obtained from non-gated series        Annulus: diameter 28 6 x 18 3 mm      area: 419 5 sq mm    Annulus to LCA: 15 7 mm    Annulus to RCA: 16 1 mm    Minimal diameter right iliofemoral segment: 6 6 mm    Minimal diameter left iliofemoral segment: 6 5 mm  2  No significant aortoiliac occlusive disease  3  Moderate right and small left pleural effusions with interstitial edema    cardiac catheterization: summary  •  Ost RCA lesion is 75% stenosed      Single vessel CAD, with a 75% RCA ostial stenosis, confirmed by IVUS  Successful IVUS-guided PCI, ostial RCA, with a reduction in stenosis from 75% to 0% following placement of a 3 [id number] ELODIA and post-dilation with a 3 5mm NC balloon  Plan: clopidogrel and DOAC after discharge, statin; TAVR evaluation  Carotid artery ultrasound:   CONCLUSION:  Impression  RIGHT:  There is <50% stenosis noted in the internal carotid artery  Plaque is  heterogenous and irregular  Vertebral artery flow is antegrade  There is no significant subclavian artery  disease  LEFT:  There is <50% stenosis noted in the internal carotid artery  Plaque is  heterogenous and irregular  Vertebral artery flow is antegrade  There is no significant subclavian artery  disease  I have personally reviewed pertinent reports        TAVR evaluation Assessment:     5 Meter Walk Test:      Attempt 1: 8   Attempt 2: 9   Attempt 3: 8    STS Risk Score: 11 %, mortality risk    Aortic Stenosis Stage: D1    [facility]: III    KCCQ-12 was completed    Assessment:  Patient Active Problem List    Diagnosis Date Noted   • Constipation 01/11/2023   • Status post insertion of drug eluting coronary artery stent 01/11/2023   • Pleural effusion 12/22/2022   • Acute respiratory failure with hypoxia ([address] ) 12/22/2022   • Chronic combined systolic and diastolic congestive heart failure ([address] ) 12/20/2022   • Primary generalized (osteo)arthritis 09/07/2022 • Hyperthyroidism 07/11/2022   • Dyslipidemia 03/28/2022   • Post-menopause 03/28/2022   • Stage 3a chronic kidney disease ([address] ) 12/23/2021   • Other fatigue 12/23/2021   • Ganglion cyst 09/14/2021   • Adhesive capsulitis of right shoulder 09/14/2021   • Bilateral leg edema 07/20/2021   • Chronic pain syndrome 06/22/2021   • Sacroiliitis ([address] ) 06/22/2021   • Gait abnormality 06/01/2021   • Chronic bilateral low back pain without sciatica 04/30/2021   • Osteopenia of neck of femur 01/21/2021   • Impaired fasting glucose    • Encounter for long-term (current) use of medications 10/31/2020   • Immunization due 10/31/2020   • Acute bursitis of right shoulder 10/31/2020   • Encounter for Medicare annual wellness exam 01/02/2020   • Mid back pain 09/30/2019   • Generalized anxiety disorder 09/05/2019   • Non-rheumatic tricuspid valve insufficiency 06/04/2019   • Lymphadenopathy 05/21/2019   • Hiatal hernia 05/21/2019   • Thrombocytopenia ([address] ) 05/21/2019   • Severe aortic stenosis 05/21/2019   • Mitral valve insufficiency 05/21/2019   • Supraventricular tachycardia ([address] ) 05/21/2019   • Pulmonary hypertension ([address] ) 05/21/2019   • Diverticulosis 05/21/2019   • Atrial tachycardia ([facility] [address] ) 05/21/2019   • Essential hypertension 05/18/2019   • Hypomagnesemia 05/17/2019   • Hyponatremia 05/17/2019     Severe aortic stenosis; Ongoing TAVR workup    Plan:    Marge Chavira has severe symptomatic aortic stenosis  Based on their STS risk assessment, they have undergone testing for transcatheter aortic valve replacement  The results of these studies have been interpreted by FERMÍN Delvalle  who has determined the patient to be an appropriate candidate for transcatheter aortic valve replacement  The risks, benefits, and alternatives to TAVR were discussed in detail with the patient today  They understand and wish to proceed with transfemoral transcatheter aortic valve replacement    Informed consent was obtained and a date for the intervention has been set  Colin Cm was comfortable with our recommendations, and their questions were answered to their satisfaction  Shared decision-making encounter occurred during this visit  Additionally, heart team evaluation of suitability for surgical replacement has been completed  The following preoperative instructions were provided at the conclusion of their consultation:     1  You will receive a phone call from the hospital between 2:00 PM and 8:00 PM the day prior to surgery to confirm arrival time and location  For surgery on Mondays, you will receive a call on Friday  2  Do not drink or eat anything after midnight the night before surgery  That includes no water, candy, gum, lozenges, Lifesavers, etc  We recommend you not eat any "junk" food, consume alcohol or smoke the night before surgery  3  Continue taking aspirin but only 81 mg daily  4  If you take Coumadin and/or Plavix, discontinue it 5 days before surgery  5  If you are diabetic, do not take any of your diabetic pills the morning of surgery  If you take Lantus insulin, you may take it at your regularly scheduled time the day before surgery  Do not take any other insulins the morning of surgery  6  The 2 nights before surgery, take a shower each night using the special antiseptic soap or soap sponges you received from the office or [facility] Sera your hair with regular shampoo and rinse completely before using the antiseptic sponges  Use the sponge to wash from your neck down, with special attention to the armpits and groin area  Do not use any other soap or cleanser on your skin  Do not use lotions, powder, deodorant or perfume of any kind on your skin after you shower  Use clean bed linens and wear clean pajamas after your shower  7  You will be prescribed Mupirocin nasal ointment  Apply to both nostrils twice a day for 5 days prior to surgery    8  Do not take a shower the morning of her surgery; you'll be given a special" bath" at the hospital   9  Notify the CT surgery office if you develop a cold, sore throat, cough, fever or other health issues before your surgery  10  Other medication changes included the following: Hold Eliquis and Omega 3 now, Continue Plavix  Ordered her bactroban to  and start today  SIGNATURE: Cipriano Saravia [place]  DATE: January 12, 2023  TIME: 9:15 AM    * This note was completed in part utilizing m-modal fluency direct voice recognition software  Grammatical errors, random word insertion, spelling mistakes, and incomplete sentences may be an occasional consequence of the system secondary to software limitations, ambient noise and hardware issues  At the time of dictation, efforts were made to edit, clarify and /or correct errors  Please read the chart carefully and recognize, using context, where substitutions have occurred  If you have any questions or concerns about the context, text or information contained within the body of this dictation, please contact myself, the provider, for further clarification

## 2023-01-12 NOTE — PROGRESS NOTES
Called nursing staff in atrial flutter with a rate of 100-120  Patient's vital signs stable  Stat EKG done in atrial flutter with a variable A-V block rate of 94  Patient missed her a m  meds yesterday  We will give her Cardizem and Toprol along orally now and reevaluate

## 2023-01-13 ENCOUNTER — HOME CARE VISIT (OUTPATIENT)
Dept: HOME HEALTH SERVICES | Facility: HOME HEALTHCARE | Age: 88
End: 2023-01-13

## 2023-01-13 ENCOUNTER — LAB REQUISITION (OUTPATIENT)
Dept: LAB | Facility: HOSPITAL | Age: 88
End: 2023-01-13

## 2023-01-13 ENCOUNTER — TELEPHONE (OUTPATIENT)
Dept: NEPHROLOGY | Facility: CLINIC | Age: 88
End: 2023-01-13

## 2023-01-13 ENCOUNTER — PATIENT OUTREACH (OUTPATIENT)
Dept: FAMILY MEDICINE CLINIC | Facility: CLINIC | Age: 88
End: 2023-01-13

## 2023-01-13 VITALS
RESPIRATION RATE: 16 BRPM | DIASTOLIC BLOOD PRESSURE: 62 MMHG | SYSTOLIC BLOOD PRESSURE: 106 MMHG | OXYGEN SATURATION: 97 % | TEMPERATURE: 97.6 F | HEART RATE: 54 BPM

## 2023-01-13 DIAGNOSIS — Z95.820 PERIPHERAL VASCULAR ANGIOPLASTY STATUS WITH IMPLANTS AND GRAFTS: ICD-10-CM

## 2023-01-13 DIAGNOSIS — N18.30 CHRONIC KIDNEY DISEASE, STAGE 3 UNSPECIFIED (HCC): ICD-10-CM

## 2023-01-13 LAB
ANION GAP SERPL CALCULATED.3IONS-SCNC: 12 MMOL/L (ref 4–13)
BASOPHILS # BLD AUTO: 0.01 THOUSANDS/ÂΜL (ref 0–0.1)
BASOPHILS NFR BLD AUTO: 0 % (ref 0–1)
BUN SERPL-MCNC: 33 MG/DL (ref 5–25)
CALCIUM SERPL-MCNC: 9.2 MG/DL (ref 8.3–10.1)
CHLORIDE SERPL-SCNC: 95 MMOL/L (ref 96–108)
CO2 SERPL-SCNC: 23 MMOL/L (ref 21–32)
CREAT SERPL-MCNC: 1.32 MG/DL (ref 0.6–1.3)
EOSINOPHIL # BLD AUTO: 0.11 THOUSAND/ÂΜL (ref 0–0.61)
EOSINOPHIL NFR BLD AUTO: 2 % (ref 0–6)
ERYTHROCYTE [DISTWIDTH] IN BLOOD BY AUTOMATED COUNT: 13.2 % (ref 11.6–15.1)
GFR SERPL CREATININE-BSD FRML MDRD: 36 ML/MIN/1.73SQ M
GLUCOSE SERPL-MCNC: 107 MG/DL (ref 65–140)
HCT VFR BLD AUTO: 30.7 % (ref 34.8–46.1)
HGB BLD-MCNC: 10.2 G/DL (ref 11.5–15.4)
IMM GRANULOCYTES # BLD AUTO: 0.02 THOUSAND/UL (ref 0–0.2)
IMM GRANULOCYTES NFR BLD AUTO: 0 % (ref 0–2)
LYMPHOCYTES # BLD AUTO: 0.54 THOUSANDS/ÂΜL (ref 0.6–4.47)
LYMPHOCYTES NFR BLD AUTO: 10 % (ref 14–44)
MCH RBC QN AUTO: 32.7 PG (ref 26.8–34.3)
MCHC RBC AUTO-ENTMCNC: 33.2 G/DL (ref 31.4–37.4)
MCV RBC AUTO: 98 FL (ref 82–98)
MONOCYTES # BLD AUTO: 0.69 THOUSAND/ÂΜL (ref 0.17–1.22)
MONOCYTES NFR BLD AUTO: 13 % (ref 4–12)
MRSA NOSE QL CULT: NORMAL
NEUTROPHILS # BLD AUTO: 4.12 THOUSANDS/ÂΜL (ref 1.85–7.62)
NEUTS SEG NFR BLD AUTO: 75 % (ref 43–75)
NRBC BLD AUTO-RTO: 0 /100 WBCS
PLATELET # BLD AUTO: 151 THOUSANDS/UL (ref 149–390)
PMV BLD AUTO: 9.1 FL (ref 8.9–12.7)
POTASSIUM SERPL-SCNC: 4.3 MMOL/L (ref 3.5–5.3)
RBC # BLD AUTO: 3.12 MILLION/UL (ref 3.81–5.12)
SODIUM SERPL-SCNC: 130 MMOL/L (ref 135–147)
WBC # BLD AUTO: 5.49 THOUSAND/UL (ref 4.31–10.16)

## 2023-01-13 NOTE — TELEPHONE ENCOUNTER
----- Message from Brenda Trimble MD sent at 1/13/2023  7:36 AM EST -----  She got discharged from [address] [place] [address] yesterday    She needs repeat BMP early next week and routine office follow-up in 3-4 months with AP

## 2023-01-13 NOTE — PROGRESS NOTES
Outpatient Care Management Note:  Follow up call attempted to [facility] after recent cardiac catheterization  Message left for patient to please return call  Contact information left on message

## 2023-01-13 NOTE — CASE COMMUNICATION
Pt declined visit for 1/13 due to recent cardiac cath  Change in PT POC for wk of 1/9 to 1 x       Thank you, Charis Turpin PT

## 2023-01-13 NOTE — PROGRESS NOTES
Outpatient Care Management Note:  Return call received from [id number] , [facility]  Vernon Aguilar is doing well post cardiac cath  No bleeding from insertion site  Denies any chest pain or shortness of breath  Home health was in today and found no issues  Vernon Aguilar is scheduled for a TAVR on 1/17/2023  No needs at this time  Encouraged to call with any questions or concerns

## 2023-01-16 ENCOUNTER — HOME CARE VISIT (OUTPATIENT)
Dept: HOME HEALTH SERVICES | Facility: HOME HEALTHCARE | Age: 88
End: 2023-01-16

## 2023-01-16 ENCOUNTER — ANESTHESIA EVENT (OUTPATIENT)
Dept: PERIOP | Facility: HOSPITAL | Age: 88
End: 2023-01-16

## 2023-01-16 VITALS
OXYGEN SATURATION: 99 % | TEMPERATURE: 98.5 F | HEART RATE: 52 BPM | DIASTOLIC BLOOD PRESSURE: 64 MMHG | RESPIRATION RATE: 18 BRPM | SYSTOLIC BLOOD PRESSURE: 126 MMHG

## 2023-01-16 RX ORDER — HEPARIN SODIUM 1000 [USP'U]/ML
400 INJECTION, SOLUTION INTRAVENOUS; SUBCUTANEOUS ONCE
Status: CANCELLED | OUTPATIENT
Start: 2023-01-17

## 2023-01-17 ENCOUNTER — HOME CARE VISIT (OUTPATIENT)
Dept: HOME HEALTH SERVICES | Facility: HOME HEALTHCARE | Age: 88
End: 2023-01-17

## 2023-01-17 ENCOUNTER — APPOINTMENT (OUTPATIENT)
Dept: NON INVASIVE DIAGNOSTICS | Facility: HOSPITAL | Age: 88
End: 2023-01-17
Attending: THORACIC SURGERY (CARDIOTHORACIC VASCULAR SURGERY)

## 2023-01-17 ENCOUNTER — HOSPITAL ENCOUNTER (INPATIENT)
Facility: HOSPITAL | Age: 88
LOS: 1 days | Discharge: HOME HEALTH | End: 2023-01-18
Attending: THORACIC SURGERY (CARDIOTHORACIC VASCULAR SURGERY) | Admitting: THORACIC SURGERY (CARDIOTHORACIC VASCULAR SURGERY)

## 2023-01-17 ENCOUNTER — APPOINTMENT (OUTPATIENT)
Dept: RADIOLOGY | Facility: HOSPITAL | Age: 88
End: 2023-01-17

## 2023-01-17 ENCOUNTER — ANESTHESIA (OUTPATIENT)
Dept: PERIOP | Facility: HOSPITAL | Age: 88
End: 2023-01-17

## 2023-01-17 ENCOUNTER — PATIENT OUTREACH (OUTPATIENT)
Dept: FAMILY MEDICINE CLINIC | Facility: CLINIC | Age: 88
End: 2023-01-17

## 2023-01-17 DIAGNOSIS — I35.9 NONRHEUMATIC AORTIC VALVE DISORDER: Primary | ICD-10-CM

## 2023-01-17 DIAGNOSIS — I48.92 ATRIAL FLUTTER, UNSPECIFIED TYPE (HCC): ICD-10-CM

## 2023-01-17 DIAGNOSIS — I35.0 SEVERE AORTIC STENOSIS: Primary | ICD-10-CM

## 2023-01-17 DIAGNOSIS — I35.0 SEVERE AORTIC STENOSIS: ICD-10-CM

## 2023-01-17 DIAGNOSIS — Z95.2 S/P TAVR (TRANSCATHETER AORTIC VALVE REPLACEMENT): ICD-10-CM

## 2023-01-17 DIAGNOSIS — I47.1 ATRIAL TACHYCARDIA (HCC): ICD-10-CM

## 2023-01-17 DIAGNOSIS — Z95.820 S/P ANGIOPLASTY WITH STENT: ICD-10-CM

## 2023-01-17 PROBLEM — I45.10 INCOMPLETE RIGHT BUNDLE BRANCH BLOCK: Status: ACTIVE | Noted: 2023-01-17

## 2023-01-17 LAB
ABO GROUP BLD BPU: NORMAL
ABO GROUP BLD: NORMAL
ANION GAP SERPL CALCULATED.3IONS-SCNC: 7 MMOL/L (ref 4–13)
AORTIC VALVE MEAN VELOCITY: 19.4 M/S
ATRIAL RATE: 51 BPM
ATRIAL RATE: 64 BPM
AV AREA BY CONTINUOUS VTI: 0.3 CM2
AV AREA PEAK VELOCITY: 0.2 CM2
AV LVOT MEAN GRADIENT: 0 MMHG
AV LVOT PEAK GRADIENT: 1 MMHG
AV MEAN GRADIENT: 18 MMHG
AV PEAK GRADIENT: 34 MMHG
BASE EXCESS BLDA CALC-SCNC: -1 MMOL/L (ref -2–3)
BASE EXCESS BLDA CALC-SCNC: -2 MMOL/L (ref -2–3)
BASE EXCESS BLDA CALC-SCNC: -4 MMOL/L (ref -2–3)
BLD GP AB SCN SERPL QL: NEGATIVE
BPU ID: NORMAL
BUN SERPL-MCNC: 35 MG/DL (ref 5–25)
CA-I BLD-SCNC: 1.19 MMOL/L (ref 1.12–1.32)
CA-I BLD-SCNC: 1.21 MMOL/L (ref 1.12–1.32)
CA-I BLD-SCNC: 1.27 MMOL/L (ref 1.12–1.32)
CALCIUM SERPL-MCNC: 8.7 MG/DL (ref 8.3–10.1)
CHLORIDE SERPL-SCNC: 105 MMOL/L (ref 96–108)
CO2 SERPL-SCNC: 22 MMOL/L (ref 21–32)
CREAT SERPL-MCNC: 1.23 MG/DL (ref 0.6–1.3)
CROSSMATCH: NORMAL
DOP CALC AO VTI: 65.7 CM
DOP CALC LVOT AREA: 2 CM2
DOP CALC LVOT DIAMETER: 1.6 CM
DOP CALC LVOT PEAK VEL VTI: 8.73 CM
DOP CALC LVOT PEAK VEL: 0.36 M/S
DOP CALC LVOT STROKE INDEX: 13.1 ML/M2
DOP CALC LVOT STROKE VOLUME: 18 CM3
GFR SERPL CREATININE-BSD FRML MDRD: 39 ML/MIN/1.73SQ M
GLUCOSE SERPL-MCNC: 101 MG/DL (ref 65–140)
GLUCOSE SERPL-MCNC: 107 MG/DL (ref 65–140)
GLUCOSE SERPL-MCNC: 112 MG/DL (ref 65–140)
GLUCOSE SERPL-MCNC: 118 MG/DL (ref 65–140)
GLUCOSE SERPL-MCNC: 137 MG/DL (ref 65–140)
GLUCOSE SERPL-MCNC: 180 MG/DL (ref 65–140)
GLUCOSE SERPL-MCNC: 99 MG/DL (ref 65–140)
HCO3 BLDA-SCNC: 21.1 MMOL/L (ref 22–28)
HCO3 BLDA-SCNC: 23.5 MMOL/L (ref 22–28)
HCO3 BLDA-SCNC: 24.5 MMOL/L (ref 22–28)
HCT VFR BLD AUTO: 30.9 % (ref 34.8–46.1)
HCT VFR BLD CALC: 25 % (ref 34.8–46.1)
HCT VFR BLD CALC: 27 % (ref 34.8–46.1)
HCT VFR BLD CALC: 28 % (ref 34.8–46.1)
HGB BLD-MCNC: 10.3 G/DL (ref 11.5–15.4)
HGB BLDA-MCNC: 8.5 G/DL (ref 11.5–15.4)
HGB BLDA-MCNC: 9.2 G/DL (ref 11.5–15.4)
HGB BLDA-MCNC: 9.5 G/DL (ref 11.5–15.4)
KCT BLD-ACNC: 138 SEC (ref 89–137)
KCT BLD-ACNC: 139 SEC (ref 89–137)
KCT BLD-ACNC: 292 SEC (ref 89–137)
P AXIS: -76 DEGREES
P AXIS: 87 DEGREES
PCO2 BLD: 22 MMOL/L (ref 21–32)
PCO2 BLD: 25 MMOL/L (ref 21–32)
PCO2 BLD: 26 MMOL/L (ref 21–32)
PCO2 BLD: 37.7 MM HG (ref 36–44)
PCO2 BLD: 41 MM HG (ref 36–44)
PCO2 BLD: 44.4 MM HG (ref 36–44)
PH BLD: 7.33 [PH] (ref 7.35–7.45)
PH BLD: 7.36 [PH] (ref 7.35–7.45)
PH BLD: 7.38 [PH] (ref 7.35–7.45)
PLATELET # BLD AUTO: 139 THOUSANDS/UL (ref 149–390)
PMV BLD AUTO: 8.6 FL (ref 8.9–12.7)
PO2 BLD: 240 MM HG (ref 75–129)
PO2 BLD: 301 MM HG (ref 75–129)
PO2 BLD: >400 MM HG (ref 75–129)
POTASSIUM BLD-SCNC: 3.2 MMOL/L (ref 3.5–5.3)
POTASSIUM BLD-SCNC: 3.4 MMOL/L (ref 3.5–5.3)
POTASSIUM BLD-SCNC: 3.6 MMOL/L (ref 3.5–5.3)
POTASSIUM SERPL-SCNC: 3.7 MMOL/L (ref 3.5–5.3)
PR INTERVAL: 183 MS
PR INTERVAL: 229 MS
QRS AXIS: -66 DEGREES
QRS AXIS: -72 DEGREES
QRSD INTERVAL: 83 MS
QRSD INTERVAL: 88 MS
QT INTERVAL: 413 MS
QT INTERVAL: 575 MS
QTC INTERVAL: 427 MS
QTC INTERVAL: 530 MS
RH BLD: POSITIVE
SAO2 % BLD FROM PO2: 100 % (ref 60–85)
SAO2 % BLD FROM PO2: 100 % (ref 60–85)
SODIUM BLD-SCNC: 134 MMOL/L (ref 136–145)
SODIUM BLD-SCNC: 136 MMOL/L (ref 136–145)
SODIUM BLD-SCNC: 139 MMOL/L (ref 136–145)
SODIUM SERPL-SCNC: 134 MMOL/L (ref 135–147)
SPECIMEN EXPIRATION DATE: NORMAL
SPECIMEN SOURCE: ABNORMAL
T WAVE AXIS: 191 DEGREES
T WAVE AXIS: 270 DEGREES
UNIT DISPENSE STATUS: NORMAL
UNIT PRODUCT CODE: NORMAL
UNIT PRODUCT VOLUME: 350 ML
UNIT RH: NORMAL
VENTRICULAR RATE: 51 BPM
VENTRICULAR RATE: 64 BPM

## 2023-01-17 PROCEDURE — 02HV33Z INSERTION OF INFUSION DEVICE INTO SUPERIOR VENA CAVA, PERCUTANEOUS APPROACH: ICD-10-PCS | Performed by: ANESTHESIOLOGY

## 2023-01-17 PROCEDURE — 02RF38Z REPLACEMENT OF AORTIC VALVE WITH ZOOPLASTIC TISSUE, PERCUTANEOUS APPROACH: ICD-10-PCS | Performed by: THORACIC SURGERY (CARDIOTHORACIC VASCULAR SURGERY)

## 2023-01-17 DEVICE — VALVE TAVR SAPIEN 3 ULTRA W/ CMNDR DLV SYS 23MM: Type: IMPLANTABLE DEVICE | Site: HEART | Status: FUNCTIONAL

## 2023-01-17 DEVICE — PERCLOSE™ PROSTYLE™ SUTURE-MEDIATED CLOSURE AND REPAIR SYSTEM
Type: IMPLANTABLE DEVICE | Site: GROIN | Status: FUNCTIONAL
Brand: PERCLOSE™ PROSTYLE™

## 2023-01-17 RX ORDER — LIDOCAINE HYDROCHLORIDE 20 MG/ML
INJECTION, SOLUTION EPIDURAL; INFILTRATION; INTRACAUDAL; PERINEURAL
Status: DISPENSED
Start: 2023-01-17 | End: 2023-01-17

## 2023-01-17 RX ORDER — CEFAZOLIN SODIUM 2 G/50ML
SOLUTION INTRAVENOUS AS NEEDED
Status: DISCONTINUED | OUTPATIENT
Start: 2023-01-17 | End: 2023-01-17

## 2023-01-17 RX ORDER — PROMETHAZINE HYDROCHLORIDE 25 MG/ML
12.5 INJECTION, SOLUTION INTRAMUSCULAR; INTRAVENOUS ONCE AS NEEDED
Status: DISCONTINUED | OUTPATIENT
Start: 2023-01-17 | End: 2023-01-17 | Stop reason: HOSPADM

## 2023-01-17 RX ORDER — ALBUTEROL SULFATE 2.5 MG/3ML
2.5 SOLUTION RESPIRATORY (INHALATION) ONCE AS NEEDED
Status: DISCONTINUED | OUTPATIENT
Start: 2023-01-17 | End: 2023-01-17 | Stop reason: HOSPADM

## 2023-01-17 RX ORDER — FUROSEMIDE 40 MG/1
40 TABLET ORAL DAILY
Status: DISCONTINUED | OUTPATIENT
Start: 2023-01-17 | End: 2023-01-18

## 2023-01-17 RX ORDER — SODIUM CHLORIDE, SODIUM LACTATE, POTASSIUM CHLORIDE, CALCIUM CHLORIDE 600; 310; 30; 20 MG/100ML; MG/100ML; MG/100ML; MG/100ML
INJECTION, SOLUTION INTRAVENOUS CONTINUOUS PRN
Status: DISCONTINUED | OUTPATIENT
Start: 2023-01-17 | End: 2023-01-17

## 2023-01-17 RX ORDER — CHLORHEXIDINE GLUCONATE 0.12 MG/ML
15 RINSE ORAL ONCE
Status: COMPLETED | OUTPATIENT
Start: 2023-01-17 | End: 2023-01-17

## 2023-01-17 RX ORDER — HEPARIN SODIUM 1000 [USP'U]/ML
INJECTION, SOLUTION INTRAVENOUS; SUBCUTANEOUS AS NEEDED
Status: DISCONTINUED | OUTPATIENT
Start: 2023-01-17 | End: 2023-01-17

## 2023-01-17 RX ORDER — PANTOPRAZOLE SODIUM 40 MG/1
40 TABLET, DELAYED RELEASE ORAL
Status: DISCONTINUED | OUTPATIENT
Start: 2023-01-18 | End: 2023-01-18 | Stop reason: HOSPADM

## 2023-01-17 RX ORDER — METOCLOPRAMIDE HYDROCHLORIDE 5 MG/ML
10 INJECTION INTRAMUSCULAR; INTRAVENOUS ONCE AS NEEDED
Status: DISCONTINUED | OUTPATIENT
Start: 2023-01-17 | End: 2023-01-17 | Stop reason: HOSPADM

## 2023-01-17 RX ORDER — SODIUM CHLORIDE, SODIUM LACTATE, POTASSIUM CHLORIDE, CALCIUM CHLORIDE 600; 310; 30; 20 MG/100ML; MG/100ML; MG/100ML; MG/100ML
125 INJECTION, SOLUTION INTRAVENOUS CONTINUOUS
Status: DISCONTINUED | OUTPATIENT
Start: 2023-01-17 | End: 2023-01-18

## 2023-01-17 RX ORDER — ONDANSETRON 2 MG/ML
4 INJECTION INTRAMUSCULAR; INTRAVENOUS EVERY 6 HOURS PRN
Status: DISCONTINUED | OUTPATIENT
Start: 2023-01-17 | End: 2023-01-18 | Stop reason: HOSPADM

## 2023-01-17 RX ORDER — SODIUM CHLORIDE, SODIUM GLUCONATE, SODIUM ACETATE, POTASSIUM CHLORIDE, MAGNESIUM CHLORIDE, SODIUM PHOSPHATE, DIBASIC, AND POTASSIUM PHOSPHATE .53; .5; .37; .037; .03; .012; .00082 G/100ML; G/100ML; G/100ML; G/100ML; G/100ML; G/100ML; G/100ML
50 INJECTION, SOLUTION INTRAVENOUS CONTINUOUS
Status: DISPENSED | OUTPATIENT
Start: 2023-01-17 | End: 2023-01-18

## 2023-01-17 RX ORDER — LABETALOL HYDROCHLORIDE 5 MG/ML
10 INJECTION, SOLUTION INTRAVENOUS
Status: DISCONTINUED | OUTPATIENT
Start: 2023-01-17 | End: 2023-01-17 | Stop reason: HOSPADM

## 2023-01-17 RX ORDER — CHLORHEXIDINE GLUCONATE 0.12 MG/ML
15 RINSE ORAL 2 TIMES DAILY
Status: DISCONTINUED | OUTPATIENT
Start: 2023-01-17 | End: 2023-01-18 | Stop reason: HOSPADM

## 2023-01-17 RX ORDER — EPHEDRINE SULFATE 50 MG/ML
INJECTION INTRAVENOUS AS NEEDED
Status: DISCONTINUED | OUTPATIENT
Start: 2023-01-17 | End: 2023-01-17

## 2023-01-17 RX ORDER — DEXAMETHASONE SODIUM PHOSPHATE 10 MG/ML
INJECTION, SOLUTION INTRAMUSCULAR; INTRAVENOUS AS NEEDED
Status: DISCONTINUED | OUTPATIENT
Start: 2023-01-17 | End: 2023-01-17

## 2023-01-17 RX ORDER — DILTIAZEM HYDROCHLORIDE 120 MG/1
120 CAPSULE, COATED, EXTENDED RELEASE ORAL DAILY
Status: DISCONTINUED | OUTPATIENT
Start: 2023-01-17 | End: 2023-01-18 | Stop reason: HOSPADM

## 2023-01-17 RX ORDER — ATORVASTATIN CALCIUM 20 MG/1
20 TABLET, FILM COATED ORAL
Status: DISCONTINUED | OUTPATIENT
Start: 2023-01-17 | End: 2023-01-18 | Stop reason: HOSPADM

## 2023-01-17 RX ORDER — POLYETHYLENE GLYCOL 3350 17 G/17G
17 POWDER, FOR SOLUTION ORAL DAILY
Status: DISCONTINUED | OUTPATIENT
Start: 2023-01-17 | End: 2023-01-18 | Stop reason: HOSPADM

## 2023-01-17 RX ORDER — ONDANSETRON 2 MG/ML
4 INJECTION INTRAMUSCULAR; INTRAVENOUS ONCE AS NEEDED
Status: DISCONTINUED | OUTPATIENT
Start: 2023-01-17 | End: 2023-01-17 | Stop reason: HOSPADM

## 2023-01-17 RX ORDER — ASPIRIN 81 MG/1
81 TABLET, CHEWABLE ORAL DAILY
Status: DISCONTINUED | OUTPATIENT
Start: 2023-01-17 | End: 2023-01-17

## 2023-01-17 RX ORDER — FENTANYL CITRATE/PF 50 MCG/ML
25 SYRINGE (ML) INJECTION
Status: DISCONTINUED | OUTPATIENT
Start: 2023-01-17 | End: 2023-01-17 | Stop reason: HOSPADM

## 2023-01-17 RX ORDER — LIDOCAINE HYDROCHLORIDE 10 MG/ML
INJECTION, SOLUTION INFILTRATION; PERINEURAL AS NEEDED
Status: DISCONTINUED | OUTPATIENT
Start: 2023-01-17 | End: 2023-01-17

## 2023-01-17 RX ORDER — PROTAMINE SULFATE 10 MG/ML
INJECTION, SOLUTION INTRAVENOUS AS NEEDED
Status: DISCONTINUED | OUTPATIENT
Start: 2023-01-17 | End: 2023-01-17

## 2023-01-17 RX ORDER — SPIRONOLACTONE 25 MG/1
25 TABLET ORAL DAILY
Status: DISCONTINUED | OUTPATIENT
Start: 2023-01-17 | End: 2023-01-18 | Stop reason: HOSPADM

## 2023-01-17 RX ORDER — HEPARIN SODIUM 5000 [USP'U]/ML
5000 INJECTION, SOLUTION INTRAVENOUS; SUBCUTANEOUS EVERY 8 HOURS SCHEDULED
Status: DISCONTINUED | OUTPATIENT
Start: 2023-01-18 | End: 2023-01-17

## 2023-01-17 RX ORDER — LIDOCAINE HYDROCHLORIDE 10 MG/ML
INJECTION, SOLUTION EPIDURAL; INFILTRATION; INTRACAUDAL; PERINEURAL
Status: DISPENSED
Start: 2023-01-17 | End: 2023-01-17

## 2023-01-17 RX ORDER — HYDRALAZINE HYDROCHLORIDE 20 MG/ML
5 INJECTION INTRAMUSCULAR; INTRAVENOUS
Status: DISCONTINUED | OUTPATIENT
Start: 2023-01-17 | End: 2023-01-17 | Stop reason: HOSPADM

## 2023-01-17 RX ORDER — INSULIN LISPRO 100 [IU]/ML
1-5 INJECTION, SOLUTION INTRAVENOUS; SUBCUTANEOUS
Status: DISCONTINUED | OUTPATIENT
Start: 2023-01-17 | End: 2023-01-18 | Stop reason: HOSPADM

## 2023-01-17 RX ORDER — CLOPIDOGREL BISULFATE 75 MG/1
75 TABLET ORAL DAILY
Status: DISCONTINUED | OUTPATIENT
Start: 2023-01-17 | End: 2023-01-18 | Stop reason: HOSPADM

## 2023-01-17 RX ORDER — ONDANSETRON 2 MG/ML
INJECTION INTRAMUSCULAR; INTRAVENOUS AS NEEDED
Status: DISCONTINUED | OUTPATIENT
Start: 2023-01-17 | End: 2023-01-17

## 2023-01-17 RX ORDER — CEFAZOLIN SODIUM 2 G/50ML
2000 SOLUTION INTRAVENOUS EVERY 8 HOURS
Status: COMPLETED | OUTPATIENT
Start: 2023-01-17 | End: 2023-01-18

## 2023-01-17 RX ORDER — HYDROMORPHONE HCL IN WATER/PF 6 MG/30 ML
0.2 PATIENT CONTROLLED ANALGESIA SYRINGE INTRAVENOUS
Status: DISCONTINUED | OUTPATIENT
Start: 2023-01-17 | End: 2023-01-17 | Stop reason: HOSPADM

## 2023-01-17 RX ORDER — CEFAZOLIN SODIUM 2 G/50ML
2000 SOLUTION INTRAVENOUS ONCE
Status: DISCONTINUED | OUTPATIENT
Start: 2023-01-17 | End: 2023-01-17

## 2023-01-17 RX ORDER — FENTANYL CITRATE 50 UG/ML
INJECTION, SOLUTION INTRAMUSCULAR; INTRAVENOUS AS NEEDED
Status: DISCONTINUED | OUTPATIENT
Start: 2023-01-17 | End: 2023-01-17

## 2023-01-17 RX ORDER — BISACODYL 10 MG
10 SUPPOSITORY, RECTAL RECTAL DAILY PRN
Status: DISCONTINUED | OUTPATIENT
Start: 2023-01-17 | End: 2023-01-18 | Stop reason: HOSPADM

## 2023-01-17 RX ORDER — SODIUM CHLORIDE 9 MG/ML
INJECTION, SOLUTION INTRAVENOUS CONTINUOUS PRN
Status: DISCONTINUED | OUTPATIENT
Start: 2023-01-17 | End: 2023-01-17

## 2023-01-17 RX ORDER — ACETAMINOPHEN 325 MG/1
650 TABLET ORAL EVERY 4 HOURS PRN
Status: DISCONTINUED | OUTPATIENT
Start: 2023-01-17 | End: 2023-01-18 | Stop reason: HOSPADM

## 2023-01-17 RX ORDER — HYDROMORPHONE HCL/PF 1 MG/ML
0.5 SYRINGE (ML) INJECTION
Status: DISCONTINUED | OUTPATIENT
Start: 2023-01-17 | End: 2023-01-17 | Stop reason: HOSPADM

## 2023-01-17 RX ORDER — ROCURONIUM BROMIDE 10 MG/ML
INJECTION, SOLUTION INTRAVENOUS AS NEEDED
Status: DISCONTINUED | OUTPATIENT
Start: 2023-01-17 | End: 2023-01-17

## 2023-01-17 RX ORDER — ALPRAZOLAM 0.25 MG/1
0.25 TABLET ORAL 3 TIMES DAILY PRN
Status: DISCONTINUED | OUTPATIENT
Start: 2023-01-17 | End: 2023-01-18 | Stop reason: HOSPADM

## 2023-01-17 RX ADMIN — NICARDIPINE HYDROCHLORIDE 5 MG/HR: 2.5 INJECTION, SOLUTION INTRAVENOUS at 12:02

## 2023-01-17 RX ADMIN — FUROSEMIDE 40 MG: 40 TABLET ORAL at 13:15

## 2023-01-17 RX ADMIN — ATORVASTATIN CALCIUM 20 MG: 20 TABLET, FILM COATED ORAL at 17:28

## 2023-01-17 RX ADMIN — CEFAZOLIN SODIUM 2000 MG: 2 SOLUTION INTRAVENOUS at 18:08

## 2023-01-17 RX ADMIN — CEFAZOLIN SODIUM 2000 MG: 2 SOLUTION INTRAVENOUS at 11:03

## 2023-01-17 RX ADMIN — LIDOCAINE HYDROCHLORIDE 5 ML: 10 INJECTION, SOLUTION INFILTRATION; PERINEURAL at 11:00

## 2023-01-17 RX ADMIN — MUPIROCIN 1 APPLICATION: 20 OINTMENT TOPICAL at 13:15

## 2023-01-17 RX ADMIN — MUPIROCIN 1 APPLICATION: 20 OINTMENT TOPICAL at 09:08

## 2023-01-17 RX ADMIN — SUGAMMADEX 200 MG: 100 INJECTION, SOLUTION INTRAVENOUS at 11:59

## 2023-01-17 RX ADMIN — METOPROLOL SUCCINATE 75 MG: 50 TABLET, EXTENDED RELEASE ORAL at 13:15

## 2023-01-17 RX ADMIN — DILTIAZEM HYDROCHLORIDE 120 MG: 120 CAPSULE, COATED, EXTENDED RELEASE ORAL at 13:15

## 2023-01-17 RX ADMIN — HEPARIN SODIUM 7000 UNITS: 1000 INJECTION INTRAVENOUS; SUBCUTANEOUS at 11:39

## 2023-01-17 RX ADMIN — PROTAMINE SULFATE 50 MG: 10 INJECTION, SOLUTION INTRAVENOUS at 11:51

## 2023-01-17 RX ADMIN — CLOPIDOGREL BISULFATE 75 MG: 75 TABLET ORAL at 13:15

## 2023-01-17 RX ADMIN — ROCURONIUM BROMIDE 50 MG: 50 INJECTION INTRAVENOUS at 11:00

## 2023-01-17 RX ADMIN — SODIUM CHLORIDE: 9 INJECTION, SOLUTION INTRAVENOUS at 11:15

## 2023-01-17 RX ADMIN — MUPIROCIN 1 APPLICATION: 20 OINTMENT TOPICAL at 21:34

## 2023-01-17 RX ADMIN — EPHEDRINE SULFATE 70 MG: 50 INJECTION INTRAVENOUS at 11:00

## 2023-01-17 RX ADMIN — ONDANSETRON 4 MG: 2 INJECTION INTRAMUSCULAR; INTRAVENOUS at 11:38

## 2023-01-17 RX ADMIN — NICARDIPINE HYDROCHLORIDE 10 MG/HR: 2.5 INJECTION, SOLUTION INTRAVENOUS at 11:55

## 2023-01-17 RX ADMIN — FENTANYL CITRATE 100 MCG: 50 INJECTION INTRAMUSCULAR; INTRAVENOUS at 11:00

## 2023-01-17 RX ADMIN — CHLORHEXIDINE GLUCONATE 15 ML: 1.2 SOLUTION ORAL at 09:08

## 2023-01-17 RX ADMIN — POTASSIUM CHLORIDE 30 MEQ: 2 INJECTION, SOLUTION, CONCENTRATE INTRAVENOUS at 15:50

## 2023-01-17 RX ADMIN — INSULIN LISPRO 1 UNITS: 100 INJECTION, SOLUTION INTRAVENOUS; SUBCUTANEOUS at 21:35

## 2023-01-17 RX ADMIN — SODIUM CHLORIDE, SODIUM LACTATE, POTASSIUM CHLORIDE, AND CALCIUM CHLORIDE: .6; .31; .03; .02 INJECTION, SOLUTION INTRAVENOUS at 10:30

## 2023-01-17 RX ADMIN — DEXAMETHASONE SODIUM PHOSPHATE 10 MG: 10 INJECTION, SOLUTION INTRAMUSCULAR; INTRAVENOUS at 11:38

## 2023-01-17 RX ADMIN — SPIRONOLACTONE 25 MG: 25 TABLET, FILM COATED ORAL at 13:15

## 2023-01-17 RX ADMIN — APIXABAN 2.5 MG: 2.5 TABLET, FILM COATED ORAL at 18:08

## 2023-01-17 RX ADMIN — PHENYLEPHRINE HYDROCHLORIDE 20 MCG/MIN: 10 INJECTION INTRAVENOUS at 11:30

## 2023-01-17 RX ADMIN — SODIUM CHLORIDE, SODIUM GLUCONATE, SODIUM ACETATE, POTASSIUM CHLORIDE, MAGNESIUM CHLORIDE, SODIUM PHOSPHATE, DIBASIC, AND POTASSIUM PHOSPHATE 50 ML/HR: .53; .5; .37; .037; .03; .012; .00082 INJECTION, SOLUTION INTRAVENOUS at 12:15

## 2023-01-17 NOTE — OR NURSING
Pt's bilateral hearing aids take out by Anesthesia  Put those into denture cup with pt's label on and secure into pt's chart  Pass on to PACU nurse after the procedure

## 2023-01-17 NOTE — ANESTHESIA POSTPROCEDURE EVALUATION
Post-Op Assessment Note    CV Status:  Stable  Pain Score: 0    Pain management: adequate     Mental Status:  Alert and awake   Hydration Status:  Euvolemic   PONV Controlled:  Controlled   Airway Patency:  Patent      Post Op Vitals Reviewed: Yes      Staff: CRNA, Anesthesiologist   Comments: Cedricard gtt at 8, VSS        No notable events documented      BP   141/54   Temp (!) 97 °F (36 1 °C) (01/17/23 1208)    Pulse  62   Resp   12   SpO2   100

## 2023-01-17 NOTE — ANESTHESIA PROCEDURE NOTES
Arterial Line Insertion  Performed by: Rylie Humphrey MD  Authorized by: Rylie Humphrey MD   Consent: Verbal consent obtained  Written consent obtained  Risks and benefits: risks, benefits and alternatives were discussed  Consent given by: patient  Patient understanding: patient states understanding of the procedure being performed  Patient consent: the patient's understanding of the procedure matches consent given  Required items: required blood products, implants, devices, and special equipment available  Patient identity confirmed: arm band and verbally with patient  Time out: Immediately prior to procedure a "time out" was called to verify the correct patient, procedure, equipment, support staff and site/side marked as required  Preparation: Patient was prepped and draped in the usual sterile fashion    Indications: multiple ABGs and hemodynamic monitoring  Orientation:  Right  Location: radial artery  Sedation:  Patient sedated: no    Procedure Details:  Needle gauge: 20  Seldinger technique: Seldinger technique used  Number of attempts: 1    Post-procedure:  Post-procedure: dressing applied  Waveform: good waveform  Patient tolerance: Patient tolerated the procedure well with no immediate complications

## 2023-01-17 NOTE — PROGRESS NOTES
Outpatient Care Management Note:  In basket admission alert received  Chart review completed  Admistted for planned TAVR

## 2023-01-17 NOTE — CONSULTS
Consultation - Geriatrics   Karley Freed 80 y o  female MRN: [id number]  Unit/Bed#: [facility] 409-01 Encounter: [id number]      Assessment/Plan  Aortic stenosis  S/p TAVR  CT surgery managing    Anxiety  Chronic use of xanax 0 5 mg po TID  Although benzodiazepines not recommended in the older adult, abrupt withdrawal not recommended, agree with continuation of xanax to prevent withdrawal  Follow up with PCP as outpatient for alternative medications and taper of xanax    Frailty   Clinical Frail Scale: 5- Mildly Frail  PT/OT  Albumin 3 2, recommend protein supplementation  Keep physically, mentally and socially active    Cognitive screening  At risk secondary to hearing loss, anxiety  TSH 7 510 (12/24/22), follows with endocrine as outpatient  Recommend check Vitamin B12  maintain neuro protective lifestyle :   Keep physically, mentally and socially active   Lower BMI   Increase physical exercise   Recommend Mediterranean diet   Monitor good control of blood pressure, blood sugar and cholestrerol      Fall prevention  At risk of falls secondary to age, medications, gait instability  Fall precautions  PT/OT  Cardiac rehab post hospitalization  Recommend fall prevention/ balance training such as Matter of Balance or [place] [employer] or yoga    Insomnia  Chronic  First line is behavioral therapy  Avoid sedative hypnotics such as benzodiazepines and benadryl  Encourage staying awake during the day  Encourage daytime activity, morning exercise  Decrease or eliminate day time naps  Avoid caffiene, alcohol especially during late afternoon and evening hours  Establish a night time routine    Delirium precautions  Alert and oriented x 3  Avoid deliriogenic medications such as tramadol, benzodiazepines, anticholinergics,  Benadryl  Treat pain, See geriatric pain protocol  Monitor for constipation and urinary retention  Encourage early and frequent moblization, OOB  Encourage Hydration/ Nutrition  Implement sleep hygiene, limit night time interuptions, group activities    Advanced care planning  Full code           12/02/2022 08/22/2022   1  Alprazolam 0 25 Mg Tablet 120 00  30  Mary Cline  [id number]   Wal ([id number])   2 00 E  [place]     10/18/2022  08/22/2022   1  Alprazolam 0 25 Mg Tablet 120 00  30  Mary Cline  [id number]   Wal ([id number])   2 00 E  [place]              History of Present Illness   Physician Requesting Consult: Marin Mello DO  Reason for Consult / Principal Problem: aortic stenosis  Hx and PE limited by: NA  HPI: Iliana Dinero is a 80y o  year old female who presents with aortic stenosis  She is admitted for transcatheter aortic valve replacement  She has HTN, hyperlipidemia, CKD3  Heart failure, hyperthyroidism    Prior to arrival she lives at home with her   She is independent with IADLs and ADLs She denies prior falls  She ambulates with a roller walker  She uses a wheelchair for longer distances    She is hard of hearing  She has bilateral hearing aids  She has full upper and lower dentures  She wears depends for urinary incontinence  She reports she likes to go shopping at [employer] in [place]  She also likes to follow politic and foot ball on TV  Upon exam pt is oob in recliner chair  She is alert and oriented x 3  Inpatient consult to Gerontology  Consult performed by: LYNN Avila  Consult ordered by: Arias Quintero PA-C          Review of Systems   Constitutional: Negative for unexpected weight change  HENT: Positive for hearing loss  Eyes: Negative for visual disturbance  Respiratory: Negative for cough  Cardiovascular: Negative for chest pain  Gastrointestinal: Negative for constipation  Genitourinary: Negative for difficulty urinating  Musculoskeletal: Positive for gait problem  Neurological: Negative for weakness  Psychiatric/Behavioral: Positive for sleep disturbance         Historical Information   Past Medical History:   Diagnosis Date   • Aortic stenosis    • Community acquired pneumonia of left upper lobe of lung 5/17/2019   • Fatigue    • Full dentures    • Generalized anxiety disorder    • Hyperlipidemia    • Hypertension    • Impaired fasting glucose    • Mitral regurgitation    • Pneumonia    • SVT (supraventricular tachycardia)    • Tricuspid regurgitation      Past Surgical History:   Procedure Laterality Date   • CARDIAC CATHETERIZATION N/A 1/11/2023    Procedure: LHC-Cardiac catheterization;  Surgeon: Onesimo Dent MD;  Location: [facility]; Service: Cardiology   • CARDIAC CATHETERIZATION N/A 1/11/2023    Procedure: Cardiac pci;  Surgeon: Onesimo Dent MD;  Location: [facility];   Service: Cardiology   • DENTAL SURGERY Bilateral     ALL TEETH REMOVED   • FL GUIDED NEEDLE PLAC BX/ASP/INJ  5/26/2021   • IR THORACENTESIS  12/20/2022   • JOINT REPLACEMENT Left    • KNEE SURGERY      left knee replacement   • HI INJECT SI JOINT ARTHRGRPHY&/ANES/STEROID W/JOHNATHON Bilateral 5/26/2021    Procedure: SACROILIAC JOINT INJECTION;  Surgeon: Cuco Rosado MD;  Location: [facility];  Service: Pain Management      Social History   Social History     Substance and Sexual Activity   Alcohol Use Never     Social History     Substance and Sexual Activity   Drug Use Never     Social History     Tobacco Use   Smoking Status Never   Smokeless Tobacco Never         Family History:   Family History   Problem Relation Age of Onset   • Hypertension Mother    • Asthma Father         's asthma   • Alzheimer's disease Sister    • Rectal cancer Son    • Uterine cancer Daughter    • Heart disease Sister    • Heart disease Brother        Meds/Allergies   Current meds:   Current Facility-Administered Medications   Medication Dose Route Frequency   • acetaminophen (TYLENOL) rectal suppository 650 mg  650 mg Rectal Q4H PRN   • acetaminophen (TYLENOL) tablet 650 mg  650 mg Oral Q4H PRN   • ALPRAZolam (XANAX) tablet 0 25 mg  0 25 mg Oral TID PRN   • apixaban (ELIQUIS) tablet 2 5 mg  2 5 mg Oral BID   • atorvastatin (LIPITOR) tablet 20 mg  20 mg Oral Daily With Dinner   • bisacodyl (DULCOLAX) rectal suppository 10 mg  10 mg Rectal Daily PRN   • ceFAZolin (ANCEF) IVPB (premix in dextrose) 2,000 mg 50 mL  2,000 mg Intravenous Q8H   • chlorhexidine (PERIDEX) 0 12 % oral rinse 15 mL  15 mL Mouth/Throat BID   • clopidogrel (PLAVIX) tablet 75 mg  75 mg Oral Daily   • diltiazem (CARDIZEM CD) 24 hr capsule 120 mg  120 mg Oral Daily   • furosemide (LASIX) tablet 40 mg  40 mg Oral Daily   • insulin lispro (HumaLOG) 100 units/mL subcutaneous injection 1-5 Units  1-5 Units Subcutaneous TID AC   • insulin lispro (HumaLOG) 100 units/mL subcutaneous injection 1-5 Units  1-5 Units Subcutaneous HS   • lactated ringers infusion  125 mL/hr Intravenous Continuous   • lidocaine (PF) (XYLOCAINE-MPF) 1 % injection **ADS Override Pull**       • lidocaine (PF) (XYLOCAINE-MPF) 2 % injection **ADS Override Pull**       • metoprolol succinate (TOPROL-XL) 24 hr tablet 75 mg  75 mg Oral Daily   • multi-electrolyte (PLASMALYTE-A/ISOLYTE-S PH 7 4) IV solution  50 mL/hr Intravenous Continuous   • mupirocin (BACTROBAN) 2 % nasal ointment 1 application  1 application Nasal [address]   • niCARdipine (CARDENE) 25 mg (STANDARD CONCENTRATION) in sodium chloride 0 9% 250 mL  1-15 mg/hr Intravenous Titrated   • ondansetron (ZOFRAN) injection 4 mg  4 mg Intravenous Q6H PRN   • oxygen gas  2 L/min Inhalation Daily PRN   • [START ON 1/18/2023] pantoprazole (PROTONIX) EC tablet 40 mg  40 mg Oral Early Morning   • polyethylene glycol (MIRALAX) packet 17 g  17 g Oral Daily   • potassium chloride 30 mEq in sodium chloride 0 9 % 100 mL IVPB  30 mEq Intravenous Once   • spironolactone (ALDACTONE) tablet 25 mg  25 mg Oral Daily      Current PTA meds:  Medications Prior to Admission   Medication   • ALPRAZolam (XANAX) 0 25 mg tablet   • atorvastatin (LIPITOR) 20 mg tablet   • diltiazem (CARDIZEM CD) 120 mg 24 hr capsule • furosemide (LASIX) 40 mg tablet   • metoprolol succinate (TOPROL-XL) 50 mg 24 hr tablet   • mupirocin (BACTROBAN) 2 % nasal ointment   • spironolactone (ALDACTONE) 25 mg tablet   • aspirin 81 mg chewable tablet   • clopidogrel (PLAVIX) 75 mg tablet   • oxygen gas   • Psyllium (METAMUCIL FIBER) 51 7 % PACK        No Known Allergies    Objective   Vitals: Blood pressure 118/59, pulse 74, temperature 97 9 °F (36 6 °C), resp  rate 19, height 4' 10" (1 473 m), weight 46 7 kg (103 lb), SpO2 97 %  ,Body mass index is 21 53 kg/m²  Physical Exam  Vitals and nursing note reviewed  HENT:      Head: Normocephalic  Mouth/Throat:      Mouth: Mucous membranes are moist    Eyes:      General:         Right eye: No discharge  Left eye: No discharge  Cardiovascular:      Rate and Rhythm: Normal rate and regular rhythm  Pulses: Normal pulses  Pulmonary:      Effort: Pulmonary effort is normal       Breath sounds: Normal breath sounds  Abdominal:      General: Bowel sounds are normal       Palpations: Abdomen is soft  Musculoskeletal:         General: Normal range of motion  Cervical back: Normal range of motion  Skin:     General: Skin is warm and dry  Neurological:      Mental Status: She is alert and oriented to person, place, and time  Mental status is at baseline  Psychiatric:         Mood and Affect: Mood normal          Lab Results:   Results from last 7 days   Lab Units 01/17/23  1223 01/17/23  1123 01/13/23  0900   WBC Thousand/uL  --   --  5 49   HEMOGLOBIN g/dL 10 3*  --  10 2*   I STAT HEMOGLOBIN   --    < >  --    HEMATOCRIT % 30 9*  --  30 7*   HEMATOCRIT, ISTAT   --    < >  --    PLATELETS Thousands/uL 139*  --  151    < > = values in this interval not displayed          Results from last 7 days   Lab Units 01/17/23  1223 01/17/23  1204   POTASSIUM mmol/L 3 7  --    CHLORIDE mmol/L 105  --    CO2 mmol/L 22  --    CO2, I-STAT mmol/L  --  25   BUN mg/dL 35*  --    CREATININE mg/dL 1 23  --    CALCIUM mg/dL 8 7  --    GLUCOSE, ISTAT mg/dl  --  107       Imaging Studies: I have personally reviewed pertinent reports  EKG, Pathology, and Other Studies: I have personally reviewed pertinent reports      VTE Prophylaxis: Sequential compression device (Venodyne)     Code Status: Level 1 - Full Code

## 2023-01-17 NOTE — RESPIRATORY THERAPY NOTE
RT Protocol Note  Garfield Bishop 80 y o  female MRN: [id number]  Unit/Bed#: [facility] 409-01 Encounter: [id number]    Assessment    Principal Problem:    S/P TAVR (transcatheter aortic valve replacement)  Active Problems:    Hypomagnesemia    Essential hypertension    Hiatal hernia    Thrombocytopenia (HCC)    Severe aortic stenosis    Mitral valve insufficiency    Supraventricular tachycardia (HCC)    Pulmonary hypertension (HCC)    Diverticulosis    Atrial tachycardia (HCC)    Generalized anxiety disorder    Chronic bilateral low back pain without sciatica    Gait abnormality    Chronic pain syndrome    Sacroiliitis (HCC)    Bilateral leg edema    Stage 3a chronic kidney disease (HCC)    Dyslipidemia    Hyperthyroidism    Primary generalized (osteo)arthritis    Chronic combined systolic and diastolic congestive heart failure (HCC)    Status post insertion of drug eluting coronary artery stent    Atrial flutter (HCC)    Incomplete right bundle branch block      Home Pulmonary Medications:  none       Past Medical History:   Diagnosis Date    Aortic stenosis     Community acquired pneumonia of left upper lobe of lung 5/17/2019    Fatigue     Full dentures     Generalized anxiety disorder     Hyperlipidemia     Hypertension     Impaired fasting glucose     Mitral regurgitation     Pneumonia     SVT (supraventricular tachycardia)     Tricuspid regurgitation      Social History     Socioeconomic History    Marital status: /Civil Union     Spouse name: None    Number of children: 2    Years of education: None    Highest education level: None   Occupational History    None   Tobacco Use    Smoking status: Never    Smokeless tobacco: Never   Vaping Use    Vaping Use: Never used   Substance and Sexual Activity    Alcohol use: Never    Drug use: Never    Sexual activity: None   Other Topics Concern    None   Social History Narrative    None     Social Determinants of Health     Financial Resource Strain: Not on file Food Insecurity: No Food Insecurity    Worried About Running Out of Food in the Last Year: Never true    Ran Out of Food in the Last Year: Never true   Transportation Needs: No Transportation Needs    Lack of Transportation (Medical): No    Lack of Transportation (Non-Medical): No   Physical Activity: Not on file   Stress: Not on file   Social Connections: Not on file   Intimate Partner Violence: Not on file   Housing Stability: Low Risk     Unable to Pay for Housing in the Last Year: No    Number of Places Lived in the Last Year: 1    Unstable Housing in the Last Year: No       Subjective         Objective    Physical Exam:   Assessment Type: Assess only  General Appearance: Alert, Awake  Respiratory Pattern: Normal  Chest Assessment: Chest expansion symmetrical  Bilateral Breath Sounds: Clear, Diminished    Vitals:  Blood pressure 118/59, pulse 74, temperature 97 9 °F (36 6 °C), resp  rate 19, height 4' 10" (1 473 m), weight 46 7 kg (103 lb), SpO2 97 %  Imaging and other studies: I have personally reviewed pertinent reports  Plan    Respiratory Plan: Discontinue Protocol, No distress/Pulmonary history  Airway Clearance Plan: Incentive Spirometer     Resp Comments: pt assessed at this time for respiratory and airway clearance protocols  pt s/p TAVR  pt has no pulmonary history or medications listed  pt does not appear to be in respiratory distress at this time  pt instructed on use of IS   pt is able to properly demonstrate use and is aware to continue use 10x per hour while awake  will dc respiratory protocol at this time

## 2023-01-17 NOTE — CASE COMMUNICATION
Change in PT POC for 1x this wk due to pt in  surgery this date  will determine admission status to continue with home phys ther       Krissy Vogt Officer PT

## 2023-01-17 NOTE — OP NOTE
OPERATIVE REPORT  PATIENT NAME: Del Cunningham    :  1935  MRN: [id number]  Pt Location: [facility]BR[facility] OR ROOM 02    SURGERY DATE: 2023    SURGEON: Dash Terrell DO    CO-SURGEON: Masha Simons DO    ASSISTANT: Rosalia Hutchison PA-C    ADDITIONAL ASSISTANT: N/A    PREOPERATIVE DIAGNOSIS: Symptomatic severe aortic stenosis  POSTOPERATIVE DIAGNOSIS: Symptomatic severe aortic stenosis  NYHA Class: 3  CCS Class: 3    PROCEDURE:   Transcatheter aortic valve replacement with a 23 mm Conteh SHAVONNE 3 ULTRA bioprosthetic valve via left transfemoral approach  CARDIOPULMONARY BYPASS TIME: 0 minutes  ANESTHESIA Dr Aaron Kramer, general endotracheal anesthesia with transesophageal echocardiogram guidance  INDICATIONS:  The patient is a 80y o  year-old female with clinical and echocardiographic findings consistent with severe aortic stenosis  FINDINGS:  1  Intraoperative transesophageal echocardiogram revealed severe aortic stenosis  2  Final transesophageal echocardiogram demonstrated prosthetic valve with normal function trace perivalvular leak  OPERATIVE TECHNIQUE:    The patient was taken to the operating room and placed supine on the operating table  Following the satisfactory induction of general anesthesia and placement of monitoring lines, the patient was prepped and draped in the usual sterile fashion  A time-out procedure was performed  The left common femoral artery and left common femoral vein were accessed percutaneously using Seldinger technique and fluoroscopy  A pigtail catheter was inserted and advanced to the right coronary cusp, an aortogram was performed to determine proper angle and orientation for valve deployment   Through the left common femoral vein sheath, a balloon-tip temporary pacing catheter was inserted and advanced into the right ventricle and its capture was confirmed       The left common femoral artery was accessed percutaneously using Seldinger technique and fluoroscopy  Two (2) Perclose sutures were deployed in the standard fashion  The patient was systemically heparinized  The left common femoral artery was then accessed, a Here On BizerEnel OGK-5 extra-stiff wire was positioned in the ascending aorta over an exchange catheter  The sheath for the delivery system was inserted through the left common femoral artery and advanced into the aorta  A 6 [place] Josh right 4 coronary catheter was advanced through the delivery sheath to the aortic valve  The aortic valve was crossed with a 0 035 straight-tip wire, the right coronary catheter was advanced into the left ventricular cavity, this was then exchanged for a curved tip Amplatzer extra stiff wire  A 23 mm SHAVONNE 3 ULTRA valve delivery system was advanced through the delivery sheath into the aorta, the delivery system was configured for deployment  The valve on the delivery system was then advanced and the aortic valve was crossed  At this point, the catheter was desheathed in the standard fashion  The valve was positioned appropriately using a combination of fluoroscopy and transesophageal echocardiogram guidance  During an episode of rapid pacing, balloon deployment of the valve was performed  Following deployment, the position of the valve was confirmed by fluoroscopy and echocardiography and its position appeared appropriate with trace perivalvular leak  The valve delivery system was subsequently removed followed by removal of the sheath while the Perclose sutures were secured and direct pressure was held  Protamine was administered with normalization of the ACT  Pressure was released, without evidence of active bleeding  The left common femoral artery sheath was removed followed by deployment of a closure device  The left common femoral vein sheath was removed and pressure was held  COMPLICATIONS: None    PACKS/TUBES/DRAINS: None  EBL: Minimal     TRANSFUSION: None       SPECIMENS: None      As the attending surgeon, I was present and scrubbed for all critical portions of this procedure  There was no qualified surgical resident available  Sponge, needle and instrument counts were reported as correct by the nursing staff       SIGNATURE: Edel Boyle DO  DATE: January 17, 2023  TIME: 12:34 PM

## 2023-01-17 NOTE — ANESTHESIA PROCEDURE NOTES
Central Line Insertion  Performed by: José Miguel Boyer MD  Authorized by: José Miguel Boyer MD     Date/Time: 1/17/2023 11:15 AM  Catheter Type:  triple lumen  Consent: Verbal consent obtained  Written consent obtained  Risks and benefits: risks, benefits and alternatives were discussed  Consent given by: patient  Patient understanding: patient states understanding of the procedure being performed  Patient consent: the patient's understanding of the procedure matches consent given  Required items: required blood products, implants, devices, and special equipment available  Patient identity confirmed: arm band, provided demographic data and hospital-assigned identification number  Time out: Immediately prior to procedure a "time out" was called to verify the correct patient, procedure, equipment, support staff and site/side marked as required    Indications: vascular access and central pressure monitoring  Catheter size: 7 Fr  Patient position: Trendelenburg  Assessment: blood return through all ports and free fluid flow  Preparation: skin prepped with 2% chlorhexidine  Skin prep agent dried: skin prep agent completely dried prior to procedure  Sterile barriers: all five maximum sterile barriers used - cap, mask, sterile gown, sterile gloves, and large sterile sheet  Hand hygiene: hand hygiene performed prior to central venous catheter insertion  sterile gel and probe cover used in ultrasound-guided central venous catheter insertionultrasound permanent image saved  Vessel of Catheter Tip End: svc  Number of attempts: 1  Successful placement: yes  Post-procedure: line sutured, dressing applied and chlorhexidine patch applied  Patient tolerance: Patient tolerated the procedure well with no immediate complications and patient tolerated the procedure well with no immediate complications

## 2023-01-17 NOTE — ANESTHESIA PREPROCEDURE EVALUATION
Procedure:  REPLACEMENT AORTIC VALVE TRANSCATHETER (TAVR) TRANSFEMORAL W/ 26MM CALVERT SHAVONNE S3 ULTRA VALVE(ACCESS ON LEFT) ALENA (Chest)  CARDIAC TAVR (Chest)    Relevant Problems   CARDIO   (+) Atrial tachycardia (HCC)   (+) Chronic combined systolic and diastolic congestive heart failure (HCC)   (+) Essential hypertension   (+) Mitral valve insufficiency   (+) Pulmonary hypertension (HCC)   (+) Severe aortic stenosis   (+) Supraventricular tachycardia (HCC)      ENDO   (+) Hyperthyroidism      GI/HEPATIC   (+) Hiatal hernia      /RENAL   (+) Stage 3a chronic kidney disease (HCC)      HEMATOLOGY   (+) Thrombocytopenia (HCC)      MUSCULOSKELETAL   (+) Adhesive capsulitis of right shoulder   (+) Chronic bilateral low back pain without sciatica   (+) Mid back pain   (+) Primary generalized (osteo)arthritis   (+) Sacroiliitis (HCC)      NEURO/PSYCH   (+) Chronic bilateral low back pain without sciatica   (+) Chronic pain syndrome   (+) Generalized anxiety disorder      PULMONARY   (+) Pleural effusion      Other   (+) Status post insertion of drug eluting coronary artery stent      Imaging Studies:      Echocardiogram:   Findings     Left Ventricle Left ventricular cavity size is normal  Wall thickness is mildly increased  There is mild concentric hypertrophy   LVMI (LV Mass Indexed to Body Surface Area) = 104 11 g/m2 The left ventricular ejection fraction is 50%  Systolic function is low normal   Wall motion is normal  Unable to assess diastolic function due to the absense of normal sinus rhythm  Right Ventricle Right ventricular cavity size is mildly dilated  Systolic function is mildly reduced  Wall thickness is normal    Left Atrium The atrium is severely dilated (>48 mL/m2)  Right Atrium The atrium is mildly dilated  Atrial Septum There is a small patent foramen ovale confirmed at rest with predominant left to right shunting using color Doppler   The septum bows into the right atrium, suggesting increased left atrial pressure  Aortic Valve The aortic valve is trileaflet  The leaflets are moderately thickened  The leaflets are moderately calcified  There is severely reduced mobility  There is mild regurgitation  The pressure half time is 520 when averaged over 3 beats  The aortic valve peak velocity is 2 94 m/s  The aortic valve mean gradient is 24 mmHg  The dimensionless velocity index is 0 22  The aortic valve area is 0 50 cm2  The aortic valve has paradoxical low flow low gradient severe AS  Mitral Valve There is mild calcification with moderate chordal involvement  There is moderate annular calcification  There is moderate regurgitation  There is no evidence of stenosis  Tricuspid Valve Tricuspid valve structure is normal  There is moderate regurgitation  There is no evidence of stenosis  The right ventricular systolic pressure is severely elevated  The estimated right ventricular systolic pressure is 26 07 mmHg  Pulmonic Valve Pulmonic valve structure is normal  There is mild regurgitation  There is no evidence of stenosis  Ascending Aorta The aortic root is normal in size  IVC/SVC The right atrial pressure is estimated at 8 0 mmHg  The inferior vena cava is not well visualized  Pericardium There is no pericardial effusion  There is a moderately sized left pleural effusion  Physical Exam    Airway    Mallampati score: II  TM Distance: >3 FB  Neck ROM: full     Dental   upper dentures and lower dentures,     Cardiovascular  Rhythm: regular, Rate: normal, Murmur,     Pulmonary  Pulmonary exam normal Breath sounds clear to auscultation,     Other Findings        Anesthesia Plan  ASA Score- 4     Anesthesia Type- general with ASA Monitors  Additional Monitors: arterial line and central venous line  Airway Plan: ETT  Comment: ALENA  Plan Factors-Exercise tolerance (METS): <4 METS  Chart reviewed  EKG reviewed  Existing labs reviewed  Patient summary reviewed      Patient is not a current smoker  Induction- intravenous  Postoperative Plan- Plan for postoperative opioid use  Planned trial extubation    Informed Consent- Anesthetic plan and risks discussed with patient  I personally reviewed this patient with the CRNA  Discussed and agreed on the Anesthesia Plan with the VILMA Rene

## 2023-01-17 NOTE — ANESTHESIA PROCEDURE NOTES
Procedure Performed: ALENA Anesthesia  Start Time:  1/17/2023 11:21 AM        Preanesthesia Checklist    Patient identified, IV assessed, risks and benefits discussed, monitors and equipment assessed, procedure being performed at surgeon's request and anesthesia consent obtained  Procedure    Diagnostic Indications for ALENA:  assessment of ascending aorta, assessment of surgical repair and defect repair evaluation  Type of ALENA: interventional ALENA with real time guidance of intracardiac procedure  Images Saved: ultrasound permanent image saved  Physician Requesting Echo: Charles Khan DO  Location performed: OR  Intubated  Heart visualized  Insertion of ALENA Probe:  Easy  Probe Type:  Epiaortic and multiplane  Modalities:  Color flow mapping, 3D, continuous wave Doppler and pulse wave Doppler  Echocardiographic and Doppler Measurements    PREPROCEDURE    LEFT VENTRICLE:  Systolic Function: normal  Ejection Fraction: 50-55%  Cavity size: normal    Regional Wall Motion Abnormalities: none  RIGHT VENTRICLE:  Systolic Function: normal   Cavity size normal  Hypertrophy (LVH) present  AORTIC VALVE:  Leaflets: trileaflet  Leaflet motions restricted  Stenosis: severe  Mean Gradient: 18 mmHg  Peak Gradient: 34 mmHg  Area: 0 27 cm²  Regurgitation: mild  MITRAL VALVE:  Leaflets: normal  Leaflet Motions: normal  Regurgitation: mild  Stenosis: none  TRICUSPID VALVE:  Leaflets: normal  Leaflet Motions: normal  Stenosis: none  Regurgitation: moderate  PULMONIC VALVE:  Leaflets: normal  Regurgitation: mild  Stenosis: none  ASCENDING AORTA:  Size:  normal   Dissection not present  AORTIC ARCH:  Size:  normal   dissection not present  Grade 3: atheroma protruding < 0 5 cm into lumen  DESCENDING AORTA:  Size: normal   Dissection not present  Grade 3: atheroma protruding < 0 5 cm into lumen          RIGHT ATRIUM:  Size:  normal  No spontaneous echo contrast     LEFT ATRIUM:  Size: dilated  No spontaneous echo contrast     LEFT ATRIAL APPENDAGE:  Size: dilated  No spontaneous echo contrast         ATRIAL SEPTUM:  Intra-atrial septal morphology: normal           VENTRICULAR SEPTUM:  Intra-ventricular septum morphology: normal              OTHER FINDINGS:  Pericardium:  pericardial effusion and Small  Pleural Effusion:  none  POSTPROCEDURE    LEFT VENTRICLE: Unchanged   RIGHT VENTRICLE: Unchanged   AORTIC VALVE:   Leaflets: bioprosthetic  Mean Gradient: 2 mmHg  Regurgitation: 2 trace PVLs seen and trace  Valve Size: 23 mm  MITRAL VALVE: Unchanged   TRICUSPID VALVE: Unchanged   PULMONIC VALVE: Unchanged             ATRIA: Unchanged   AORTA: Unchanged   REMOVAL:  Probe Removal: atraumatic

## 2023-01-17 NOTE — INTERVAL H&P NOTE
H&P reviewed  After examining the patient I find no changes in the patients condition since the H&P had been written  Vitals:    01/17/23 0858   BP: 155/85   Pulse: 85   Resp: 16   Temp: (!) 96 4 °F (35 8 °C)   SpO2: 99%   Anticipated Length of Stay:  Patient will be admitted on an Inpatient basis with an anticipated length of stay of  greater than 2 midnights  Justification for Hospital Stay:  Post surgical recovery following open heart surgery

## 2023-01-18 ENCOUNTER — HOME CARE VISIT (OUTPATIENT)
Dept: HOME HEALTH SERVICES | Facility: HOME HEALTHCARE | Age: 88
End: 2023-01-18

## 2023-01-18 ENCOUNTER — APPOINTMENT (INPATIENT)
Dept: NON INVASIVE DIAGNOSTICS | Facility: HOSPITAL | Age: 88
End: 2023-01-18

## 2023-01-18 ENCOUNTER — APPOINTMENT (OUTPATIENT)
Dept: RADIOLOGY | Facility: HOSPITAL | Age: 88
End: 2023-01-18

## 2023-01-18 VITALS
SYSTOLIC BLOOD PRESSURE: 135 MMHG | HEART RATE: 55 BPM | DIASTOLIC BLOOD PRESSURE: 68 MMHG | OXYGEN SATURATION: 100 % | TEMPERATURE: 98 F | WEIGHT: 102 LBS | HEIGHT: 58 IN | BODY MASS INDEX: 21.41 KG/M2 | RESPIRATION RATE: 23 BRPM

## 2023-01-18 LAB
ANION GAP SERPL CALCULATED.3IONS-SCNC: 9 MMOL/L (ref 4–13)
AORTIC ROOT: 2.3 CM
AORTIC VALVE MEAN VELOCITY: 17.4 M/S
APICAL FOUR CHAMBER EJECTION FRACTION: 58 %
ASCENDING AORTA: 2.7 CM
ATRIAL RATE: 300 BPM
ATRIAL RATE: 64 BPM
AV LVOT MEAN GRADIENT: 1 MMHG
AV LVOT PEAK GRADIENT: 2 MMHG
AV MEAN GRADIENT: 14 MMHG
AV PEAK GRADIENT: 26 MMHG
AV VALVE AREA: 1.14 CM2
AV VELOCITY RATIO: 0.31
BUN SERPL-MCNC: 36 MG/DL (ref 5–25)
CALCIUM SERPL-MCNC: 8.7 MG/DL (ref 8.3–10.1)
CHLORIDE SERPL-SCNC: 99 MMOL/L (ref 96–108)
CO2 SERPL-SCNC: 23 MMOL/L (ref 21–32)
CREAT SERPL-MCNC: 1.28 MG/DL (ref 0.6–1.3)
DOP CALC AO PEAK VEL: 2.6 M/S
DOP CALC AO VTI: 55 CM
DOP CALC LVOT AREA: 3.14 CM2
DOP CALC LVOT DIAMETER: 2 CM
DOP CALC LVOT PEAK VEL VTI: 20 CM
DOP CALC LVOT PEAK VEL: 0.8 M/S
DOP CALC LVOT STROKE VOLUME: 62.8 CM3
E WAVE DECELERATION TIME: 222 MS
ERYTHROCYTE [DISTWIDTH] IN BLOOD BY AUTOMATED COUNT: 13.1 % (ref 11.6–15.1)
FRACTIONAL SHORTENING: 34 (ref 28–44)
GFR SERPL CREATININE-BSD FRML MDRD: 37 ML/MIN/1.73SQ M
GLUCOSE SERPL-MCNC: 114 MG/DL (ref 65–140)
GLUCOSE SERPL-MCNC: 147 MG/DL (ref 65–140)
GLUCOSE SERPL-MCNC: 298 MG/DL (ref 65–140)
HCT VFR BLD AUTO: 30.7 % (ref 34.8–46.1)
HGB BLD-MCNC: 10.4 G/DL (ref 11.5–15.4)
INTERVENTRICULAR SEPTUM IN DIASTOLE (PARASTERNAL SHORT AXIS VIEW): 0.8 CM
INTERVENTRICULAR SEPTUM: 0.8 CM (ref 0.6–1.1)
LAAS-AP2: 27.7 CM2
LAAS-AP4: 22.8 CM2
LEFT ATRIUM SIZE: 4.4 CM
LEFT INTERNAL DIMENSION IN SYSTOLE: 3.1 CM (ref 2.1–4)
LEFT VENTRICULAR INTERNAL DIMENSION IN DIASTOLE: 4.7 CM (ref 3.5–6)
LEFT VENTRICULAR POSTERIOR WALL IN END DIASTOLE: 0.9 CM
LEFT VENTRICULAR STROKE VOLUME: 65 ML
LVSV (TEICH): 65 ML
MAGNESIUM SERPL-MCNC: 1.7 MG/DL (ref 1.6–2.6)
MCH RBC QN AUTO: 31.8 PG (ref 26.8–34.3)
MCHC RBC AUTO-ENTMCNC: 33.9 G/DL (ref 31.4–37.4)
MCV RBC AUTO: 94 FL (ref 82–98)
MV E'TISSUE VEL-SEP: 10 CM/S
MV PEAK A VEL: 0.3 M/S
MV PEAK E VEL: 100 CM/S
MV STENOSIS PRESSURE HALF TIME: 64 MS
MV VALVE AREA P 1/2 METHOD: 3.44
P AXIS: 72 DEGREES
P AXIS: 84 DEGREES
PLATELET # BLD AUTO: 156 THOUSANDS/UL (ref 149–390)
PMV BLD AUTO: 8.8 FL (ref 8.9–12.7)
POTASSIUM SERPL-SCNC: 4 MMOL/L (ref 3.5–5.3)
PR INTERVAL: 214 MS
QRS AXIS: -39 DEGREES
QRS AXIS: 22 DEGREES
QRSD INTERVAL: 118 MS
QRSD INTERVAL: 84 MS
QT INTERVAL: 456 MS
QT INTERVAL: 498 MS
QTC INTERVAL: 462 MS
QTC INTERVAL: 513 MS
RBC # BLD AUTO: 3.27 MILLION/UL (ref 3.81–5.12)
RIGHT ATRIUM AREA SYSTOLE A4C: 16.3 CM2
RIGHT VENTRICLE ID DIMENSION: 4.2 CM
SL CV LEFT ATRIUM LENGTH A2C: 6.8 CM
SL CV LV EF: 60
SL CV PED ECHO LEFT VENTRICLE DIASTOLIC VOLUME (MOD BIPLANE) 2D: 103 ML
SL CV PED ECHO LEFT VENTRICLE SYSTOLIC VOLUME (MOD BIPLANE) 2D: 38 ML
SODIUM SERPL-SCNC: 131 MMOL/L (ref 135–147)
T WAVE AXIS: 114 DEGREES
T WAVE AXIS: 74 DEGREES
TR MAX PG: 49 MMHG
TR PEAK VELOCITY: 3.5 M/S
TRICUSPID ANNULAR PLANE SYSTOLIC EXCURSION: 1.8 CM
TRICUSPID VALVE PEAK REGURGITATION VELOCITY: 3.5 M/S
VENTRICULAR RATE: 62 BPM
VENTRICULAR RATE: 64 BPM
WBC # BLD AUTO: 5.85 THOUSAND/UL (ref 4.31–10.16)

## 2023-01-18 RX ORDER — PANTOPRAZOLE SODIUM 40 MG/1
40 TABLET, DELAYED RELEASE ORAL
Qty: 30 TABLET | Refills: 0 | Status: SHIPPED | OUTPATIENT
Start: 2023-01-19 | End: 2023-02-18

## 2023-01-18 RX ORDER — POTASSIUM CHLORIDE 20 MEQ/1
20 TABLET, EXTENDED RELEASE ORAL DAILY
Qty: 30 TABLET | Refills: 1 | Status: SHIPPED | OUTPATIENT
Start: 2023-01-19

## 2023-01-18 RX ORDER — POTASSIUM CHLORIDE 20 MEQ/1
20 TABLET, EXTENDED RELEASE ORAL DAILY
Status: DISCONTINUED | OUTPATIENT
Start: 2023-01-18 | End: 2023-01-18 | Stop reason: HOSPADM

## 2023-01-18 RX ORDER — DOCUSATE SODIUM 100 MG/1
100 CAPSULE, LIQUID FILLED ORAL 2 TIMES DAILY
Qty: 60 CAPSULE | Refills: 0 | Status: SHIPPED | OUTPATIENT
Start: 2023-01-18 | End: 2023-02-17

## 2023-01-18 RX ORDER — FUROSEMIDE 40 MG/1
40 TABLET ORAL DAILY
Status: DISCONTINUED | OUTPATIENT
Start: 2023-01-18 | End: 2023-01-18 | Stop reason: HOSPADM

## 2023-01-18 RX ORDER — DOCUSATE SODIUM 100 MG/1
100 CAPSULE, LIQUID FILLED ORAL 2 TIMES DAILY
Status: DISCONTINUED | OUTPATIENT
Start: 2023-01-18 | End: 2023-01-18 | Stop reason: HOSPADM

## 2023-01-18 RX ORDER — ACETAMINOPHEN 325 MG/1
650 TABLET ORAL EVERY 6 HOURS PRN
Qty: 30 TABLET | Refills: 0 | COMMUNITY
Start: 2023-01-18 | End: 2023-01-26

## 2023-01-18 RX ORDER — CLOPIDOGREL BISULFATE 75 MG/1
75 TABLET ORAL DAILY
Qty: 90 TABLET | Refills: 0 | Status: SHIPPED | OUTPATIENT
Start: 2023-01-18

## 2023-01-18 RX ADMIN — CEFAZOLIN SODIUM 2000 MG: 2 SOLUTION INTRAVENOUS at 04:14

## 2023-01-18 RX ADMIN — CEFAZOLIN SODIUM 2000 MG: 2 SOLUTION INTRAVENOUS at 11:44

## 2023-01-18 RX ADMIN — INSULIN LISPRO 2 UNITS: 100 INJECTION, SOLUTION INTRAVENOUS; SUBCUTANEOUS at 11:43

## 2023-01-18 RX ADMIN — POTASSIUM CHLORIDE 20 MEQ: 1500 TABLET, EXTENDED RELEASE ORAL at 09:33

## 2023-01-18 RX ADMIN — CHLORHEXIDINE GLUCONATE 15 ML: 1.2 SOLUTION ORAL at 08:45

## 2023-01-18 RX ADMIN — MUPIROCIN 1 APPLICATION: 20 OINTMENT TOPICAL at 08:50

## 2023-01-18 RX ADMIN — DILTIAZEM HYDROCHLORIDE 120 MG: 120 CAPSULE, COATED, EXTENDED RELEASE ORAL at 08:47

## 2023-01-18 RX ADMIN — FUROSEMIDE 40 MG: 40 TABLET ORAL at 08:47

## 2023-01-18 RX ADMIN — APIXABAN 2.5 MG: 2.5 TABLET, FILM COATED ORAL at 08:45

## 2023-01-18 RX ADMIN — CLOPIDOGREL BISULFATE 75 MG: 75 TABLET ORAL at 08:47

## 2023-01-18 RX ADMIN — SPIRONOLACTONE 25 MG: 25 TABLET, FILM COATED ORAL at 08:48

## 2023-01-18 RX ADMIN — PANTOPRAZOLE SODIUM 40 MG: 40 TABLET, DELAYED RELEASE ORAL at 05:42

## 2023-01-18 RX ADMIN — METOPROLOL SUCCINATE 75 MG: 50 TABLET, EXTENDED RELEASE ORAL at 08:46

## 2023-01-18 NOTE — RESTORATIVE TECHNICIAN NOTE
Restorative Technician Note      Patient Name: Iliana Dinero     Restorative Tech Visit Date: 01/18/23  Note Type: Mobility  Patient Position Upon Consult: Supine  Activity Performed: Ambulated  Assistive Device: Roller walker  Patient Position at End of Consult: All needs within reach;  Bedside chair

## 2023-01-18 NOTE — CONSULTS
Cardiology   Karley Freed 80 y o  female MRN: [id number]  Unit/Bed#: [facility] 409-01 Encounter: [id number]      Reason for Consult / Principal Problem: Postop management (TAVR)    Physician Requesting Consult:  Ankita Hudson DO    Outpatient Cardiologist: Dr Joy Michael  1  Symptomatic severe aortic stenosis post TF TAVR POD # 1  -Doing well postoperatively   -On DAPT with aspirin/clopidogrel, statin and BB  2, Chronic diastolic CHF  -Compensated  -TTE 1/18/2023 -LVEF 60%, wall motion normal, LA severely dilated/RA moderately dilated, bioprosthetic aortic valve well-seated functioning normally, trace AI/trace perivalvular regurgitation, to moderate MR, moderate TR, and mild to moderate PVI  -On oral furosemide 40 mg daily + spironolactone 25 mg daily  3  CAD s/p PCI / ELODIA x 1 ostial RCA - 1/11/2023  -LHC 1/11/2023 - single vessel CAD w/ a 75% ostial RCA stenosis  -On DAPT w/asa/clopidogrel,  statin, and BB  4  Paroxysmal atrial fibrillation/flutter   -Postop 12-lead ECG 1/17-NSR, first-degree AVB, LVH with QRS widening, nonspecific ST-T wave abnormalities  -ECG 1/18/2023 - atrial flutter with variable AV block, left axis deviation   -Telemetry review -atrial flutter, heart rates predominantly in the upper 50s to 60s  -Eliquis 2 5 mg twice daily   -On Cardizem  mg daily  5  Hypertension  -BP last recorded 135/68   -On Cardizem  mg daily, furosemide 40 mg daily, and spironolactone 25 mg daily  Plan  -Patient is doing well postop   -Continue DAPT with aspirin/clopidogrel, statin and BB   -Continue oral furosemide 40 mg daily plus Aldactone 25 mg daily   -Telemetry reviewed -in atrial flutter with variable AVB this a m , heart rates predominately in the upper 50s to 60s  Continue Cardizem  mg daily   -Continue Eliquis 2 5 mg twice daily for systemic AC  Dose is therapeutic for age/renal function   -Stable for DC from a cardiac perspective  Follow-up arranged        HPI: Dallas Level 80y o  year old female with a medical history of symptomatic severe aortic stenosis, chronic diastolic CHF, hypertension, dyslipidemia, CKD stage III  Non-smoker denies excessive alcohol or recreational drug use  Follows routinely with Dr Td arellano/[facility]  She was recently referred to the  CT surgery service in reference to her severe aortic valve stenosis and progressive symptomatology  She presented to the [facility] campus on 1/17/2023 for an elective TAVR procedure performed by Dr Naresh Mata  Allergy was consulted in the postoperative setting for further treatment recommendations/management  Family History:   Family History   Problem Relation Age of Onset   • Hypertension Mother    • Asthma Father         [place]'s asthma   • Alzheimer's disease Sister    • Rectal cancer Son    • Uterine cancer Daughter    • Heart disease Sister    • Heart disease Brother      Historical Information   Past Medical History:   Diagnosis Date   • Aortic stenosis    • Community acquired pneumonia of left upper lobe of lung 5/17/2019   • Fatigue    • Full dentures    • Generalized anxiety disorder    • Hyperlipidemia    • Hypertension    • Impaired fasting glucose    • Mitral regurgitation    • Pneumonia    • SVT (supraventricular tachycardia)    • Tricuspid regurgitation      Past Surgical History:   Procedure Laterality Date   • CARDIAC CATHETERIZATION N/A 1/11/2023    Procedure: LHC-Cardiac catheterization;  Surgeon: Mal Cardona MD;  [facility]: [facility]; Service: Cardiology   • CARDIAC CATHETERIZATION N/A 1/11/2023    Procedure: Cardiac pci;  Surgeon: Mal Cardona MD;  [facility];   Service: Cardiology   • CARDIAC CATHETERIZATION N/A 1/17/2023    Procedure: CARDIAC TAVR;  Surgeon: Marquis Thurston DO;  Location: [facility] MA[facility];  Service: Cardiology   • DENTAL SURGERY Bilateral     ALL TEETH REMOVED   • FL GUIDED NEEDLE PLAC BX/ASP/INJ  5/26/2021   • IR THORACENTESIS 12/20/2022   • JOINT REPLACEMENT Left    • KNEE SURGERY      left knee replacement   • GA INJECT SI JOINT ARTHRGRPHY&/ANES/STEROID W/JOHNATHON Bilateral 5/26/2021    Procedure: SACROILIAC JOINT INJECTION;  Surgeon: Kayla Tierney MD;  Location: [facility];  Service: Pain Management    • GA REPLACE AORTIC VALVE OPENFEMORAL ARTERY APPROACH N/A 1/17/2023    Procedure: REPLACEMENT AORTIC VALVE TRANSCATHETER (TAVR) TRANSFEMORAL W/ 23MM CALVERT SHAVONNE S3 ULTRA VALVE(ACCESS ON LEFT) ALENA;  Surgeon: Beth Goldstein DO;  Location:  MAIN OR;  Service: Cardiac Surgery     Social History   Social History     Substance and Sexual Activity   Alcohol Use Never     Social History     Substance and Sexual Activity   Drug Use Never     Social History     Tobacco Use   Smoking Status Never   Smokeless Tobacco Never     Family History:   Family History   Problem Relation Age of Onset   • Hypertension Mother    • Asthma Father         's asthma   • Alzheimer's disease Sister    • Rectal cancer Son    • Uterine cancer Daughter    • Heart disease Sister    • Heart disease Brother        Review of Systems:  Review of Systems   Constitutional: Negative for chills, fatigue and fever  Respiratory: Negative for cough, chest tightness and shortness of breath  Cardiovascular: Negative for chest pain, palpitations and leg swelling  Neurological: Negative for dizziness, light-headedness and headaches  All other systems reviewed and are negative            Scheduled Meds:  Current Facility-Administered Medications   Medication Dose Route Frequency Provider Last Rate   • acetaminophen  650 mg Rectal Q4H PRN Suhas Ramirez PA-C     • acetaminophen  650 mg Oral Q4H PRN Suhas Ramirez PA-C     • ALPRAZolam  0 25 mg Oral TID PRN Suhas Ramirez PA-C     • apixaban  2 5 mg Oral BID Suhas Ramirez PA-C     • atorvastatin  20 mg Oral Daily With Dinner Luz Maria Juares PA-C     • bisacodyl  10 mg Rectal Daily PRN Luke Hidalgo Fermin Anthony PA-C     • chlorhexidine  15 mL Mouth/Throat BID [facility] Armando Montoya [place]     • clopidogrel  75 mg Oral Daily [place]     • diltiazem  120 mg Oral Daily Hieu Montoya PA-C     • docusate sodium  100 mg Oral BID Natalie Pastrana PA-C     • furosemide  40 mg Oral Daily Natalie Pastrana PA-C     • insulin lispro  1-5 Units Subcutaneous TID AC Milagros Marinelli PA-C     • insulin lispro  1-5 Units Subcutaneous HS Hieu Montoya PA-C     • metoprolol succinate  75 mg Oral Daily Natalie Pastrana PA-C     • mupirocin  1 application Nasal Y10U [facility] Hieu Montoya PA-C     • ondansetron  4 mg Intravenous Q6H PRN Milagros Marinelli PA-C     • oxygen  2 L/min Inhalation Daily PRN Hieu Montoya PA-C     • pantoprazole  40 mg Oral Early Morning Hieu Montoya PA-C     • polyethylene glycol  17 g Oral Daily Hieu Montoya PA-C     • potassium chloride  20 mEq Oral Daily Natalie Pastrana PA-C     • spironolactone  25 mg Oral Daily Hieu Juares PA-C       Continuous Infusions:   PRN Meds: •  acetaminophen  •  acetaminophen  •  ALPRAZolam  •  bisacodyl  •  ondansetron  •  oxygen  all current active meds have been reviewed and current meds:   Current Facility-Administered Medications   Medication Dose Route Frequency   • acetaminophen (TYLENOL) rectal suppository 650 mg  650 mg Rectal Q4H PRN   • acetaminophen (TYLENOL) tablet 650 mg  650 mg Oral Q4H PRN   • ALPRAZolam (XANAX) tablet 0 25 mg  0 25 mg Oral TID PRN   • apixaban (ELIQUIS) tablet 2 5 mg  2 5 mg Oral BID   • atorvastatin (LIPITOR) tablet 20 mg  20 mg Oral Daily With Dinner   • bisacodyl (DULCOLAX) rectal suppository 10 mg  10 mg Rectal Daily PRN   • chlorhexidine (PERIDEX) 0 12 % oral rinse 15 mL  15 mL Mouth/Throat BID   • clopidogrel (PLAVIX) tablet 75 mg  75 mg Oral Daily   • diltiazem (CARDIZEM CD) 24 hr capsule 120 mg  120 mg Oral Daily   • docusate sodium (COLACE) capsule 100 mg  100 mg Oral BID   • furosemide (LASIX) tablet 40 mg  40 mg Oral Daily   • insulin lispro (HumaLOG) 100 units/mL subcutaneous injection 1-5 Units  1-5 Units Subcutaneous TID AC   • insulin lispro (HumaLOG) 100 units/mL subcutaneous injection 1-5 Units  1-5 Units Subcutaneous HS   • metoprolol succinate (TOPROL-XL) 24 hr tablet 75 mg  75 mg Oral Daily   • mupirocin (BACTROBAN) 2 % nasal ointment 1 application  1 application Nasal H13I [facility]   • ondansetron (ZOFRAN) injection 4 mg  4 mg Intravenous Q6H PRN   • oxygen gas  2 L/min Inhalation Daily PRN   • pantoprazole (PROTONIX) EC tablet 40 mg  40 mg Oral Early Morning   • polyethylene glycol (MIRALAX) packet 17 g  17 g Oral Daily   • potassium chloride (K-DUR,KLOR-CON) CR tablet 20 mEq  20 mEq Oral Daily   • spironolactone (ALDACTONE) tablet 25 mg  25 mg Oral Daily       No Known Allergies    Objective   Vitals: Blood pressure 135/68, pulse 55, temperature 98 °F (36 7 °C), temperature source Oral, resp  rate (!) 23, height 4' 10" (1 473 m), weight 46 3 kg (102 lb), SpO2 100 %  , Body mass index is 21 32 kg/m² ,   Orthostatic Blood Pressures    Flowsheet Row Most Recent Value   Blood Pressure 135/68 filed at 01/18/2023 1123   Patient Position - Orthostatic VS Sitting filed at 01/18/2023 1123            Intake/Output Summary (Last 24 hours) at 1/18/2023 1223  Last data filed at 1/18/2023 1155  Gross per 24 hour   Intake 1126 25 ml   Output 2500 ml   Net -1373 75 ml       Invasive Devices     Central Venous Catheter Line  Duration           CVC Central Lines 01/17/23 Triple 1 day          Peripheral Intravenous Line  Duration           Peripheral IV 01/17/23 Left;Proximal;Ventral (anterior) Forearm 1 day    Peripheral IV 01/17/23 Right Hand 1 day          Drain  Duration           External Urinary Catheter <1 day              Physical Exam:  Physical Exam  Vitals and nursing note reviewed  Constitutional:       General: She is not in acute distress  Appearance: She is not diaphoretic  HENT:      Head: Normocephalic and atraumatic  Cardiovascular:      Rate and Rhythm: Bradycardia present  Rhythm irregular  Pulses: Normal pulses  Heart sounds: Normal heart sounds  No murmur heard  Pulmonary:      Effort: Pulmonary effort is normal       Breath sounds: Normal breath sounds  No wheezing or rales  Abdominal:      Palpations: Abdomen is soft  Musculoskeletal:      Right lower leg: No edema  Left lower leg: No edema  Skin:     General: Skin is warm and dry  Capillary Refill: Capillary refill takes less than 2 seconds  Neurological:      General: No focal deficit present  Mental Status: She is alert and oriented to person, place, and time     Psychiatric:         Mood and Affect: Mood normal          Lab Results:   Recent Results (from the past 24 hour(s))   ECG 12 lead    Collection Time: 01/17/23 12:54 PM   Result Value Ref Range    Ventricular Rate 64 BPM    Atrial Rate 64 BPM    MD Interval 183 ms    QRSD Interval 83 ms    QT Interval 413 ms    QTC Interval 427 ms    P Axis -76 degrees    QRS Axis -66 degrees    T Wave Axis 191 degrees   ALENA intraop interventional w/realtime guidance of cardiac procedures    Collection Time: 01/17/23  2:12 PM   Result Value Ref Range    AV area peak rach 0 2 cm2    LVOT area 2 00 cm2    Aortic valve mean velocity 19 40 m/s    LVOT mn grad 0 0 mmHg    AV area by cont VTI 0 3 cm2    AV mean gradient 18 mmHg    AV LVOT peak gradient 1 mmHg    LVOT diameter 1 6 cm    LVOT peak rach 0 36 m/s    LVOT peak VTI 8 73 cm    Ao VTI 65 7 cm    LVOT stroke volume 18 00 cm3    AV peak gradient 34 mmHg    LVOT stroke volume index 13 10 ml/m2   Fingerstick Glucose (POCT)    Collection Time: 01/17/23  4:01 PM   Result Value Ref Range    POC Glucose 137 65 - 140 mg/dl   Fingerstick Glucose (POCT)    Collection Time: 01/17/23  8:49 PM   Result Value Ref Range    POC Glucose 180 (H) 65 - 140 mg/dl   Basic Metabolic Panel -AM POD #1    Collection Time: 01/18/23 12:23 AM   Result Value Ref Range    Sodium 131 (L) 135 - 147 mmol/L    Potassium 4 0 3 5 - 5 3 mmol/L    Chloride 99 96 - 108 mmol/L    CO2 23 21 - 32 mmol/L    ANION GAP 9 4 - 13 mmol/L    BUN 36 (H) 5 - 25 mg/dL    Creatinine 1 28 0 60 - 1 30 mg/dL    Glucose 114 65 - 140 mg/dL    Calcium 8 7 8 3 - 10 1 mg/dL    eGFR 37 ml/min/1 73sq m   Magnesium -AM POD #1    Collection Time: 01/18/23 12:23 AM   Result Value Ref Range    Magnesium 1 7 1 6 - 2 6 mg/dL   CBC-AM POD #1    Collection Time: 01/18/23 12:23 AM   Result Value Ref Range    WBC 5 85 4 31 - 10 16 Thousand/uL    RBC 3 27 (L) 3 81 - 5 12 Million/uL    Hemoglobin 10 4 (L) 11 5 - 15 4 g/dL    Hematocrit 30 7 (L) 34 8 - 46 1 %    MCV 94 82 - 98 fL    MCH 31 8 26 8 - 34 3 pg    MCHC 33 9 31 4 - 37 4 g/dL    RDW 13 1 11 6 - 15 1 %    Platelets 311 407 - 617 Thousands/uL    MPV 8 8 (L) 8 9 - 12 7 fL   ECG 12 lead    Collection Time: 01/18/23  3:31 AM   Result Value Ref Range    Ventricular Rate 64 BPM    Atrial Rate 64 BPM    MI Interval 214 ms    QRSD Interval 118 ms    QT Interval 498 ms    QTC Interval 513 ms    P [place] 72 degrees    QRS Axis 22 degrees    T Wave Axis 74 degrees   ECG 12 lead    Collection Time: 01/18/23  4:10 AM   Result Value Ref Range    Ventricular Rate 62 BPM    Atrial Rate 300 BPM    MI Interval  ms    QRSD Interval 84 ms    QT Interval 456 ms    QTC Interval 462 ms    P Axis 84 degrees    QRS Axis -39 degrees    T Wave Axis 114 degrees   Fingerstick Glucose (POCT)    Collection Time: 01/18/23  5:31 AM   Result Value Ref Range    POC Glucose 147 (H) 65 - 140 mg/dl   Echo complete w/ contrast if indicated    Collection Time: 01/18/23  9:41 AM   Result Value Ref Range    A4C EF 58 %    LVIDd 4 70 cm    LVIDS 3 10 cm    IVSd 0 80 cm    LVPWd 0 90 cm    FS 34 28 - 44    MV E' Tissue Velocity Septal 10 cm/s    E wave deceleration time 222 ms    MV Peak E Alphonso 100 cm/s    MV Peak A Alphonso 0 3 m/s    AV LVOT peak gradient 2 mmHg    LVOT peak VTI 20 0 cm    LVOT peak alphonso 0 8 m/s    RVID d 4 2 cm    Tricuspid annular plane systolic excursion 9 10 cm    LA size 4 4 cm    LA length (A2C) 6 80 cm    RAA A4C 16 3 cm2    Aortic valve peak velocity 2 6 m/s    Ao VTI 55 0 cm    AV mean gradient 14 0 mmHg    LVOT mn grad 1 0 mmHg    AV peak gradient 26 0 mmHg    MV stenosis pressure 1/2 time 64 ms    MV valve area p 1/2 method 3 44     TR Peak Alphonso 3 5 m/s    Triscuspid Valve Regurgitation Peak Gradient 49 0 mmHg    Ao root 2 30 cm    Asc Ao 2 7 cm    Aortic valve mean velocity 17 40 m/s    Tricuspid valve peak regurgitation velocity 3 50 m/s    Left ventricular stroke volume (2D) 65 00 mL    IVS 0 8 cm    LEFT VENTRICLE SYSTOLIC VOLUME (MOD BIPLANE) 2D 38 mL    LV DIASTOLIC VOLUME (MOD BIPLANE) 2D 103 mL    Left Atrium Area-systolic Four Chamber 47 8 cm2    Left Atrium Area-systolic Apical Two Chamber 27 7 cm2    LVSV, 2D 65 mL    Dimensionless velociy index 0 31     LV EF 60     LVOT stroke volume 62 80 cm3    LVOT area 3 14 cm2    LVOT diameter 2 0 cm    AV valve area 1 14 cm2   Fingerstick Glucose (POCT)    Collection Time: 01/18/23 10:34 AM   Result Value Ref Range    POC Glucose 298 (H) 65 - 140 mg/dl     Imaging: I have personally reviewed pertinent reports  and I have personally reviewed pertinent films in PACS  Code Status: Level 1 full code  Epic/ Allscripts/Care Everywhere records reviewed: Yes    * Please Note: Fluency DirectDictation voice to text software may have been used in the creation of this document   **

## 2023-01-18 NOTE — PHYSICAL THERAPY NOTE
Physical Therapy Evaluation    Patient's Name: Iliana Dinero    Admitting Diagnosis  Severe aortic stenosis [I35 0]    Problem List  Patient Active Problem List   Diagnosis    Hypomagnesemia    Hyponatremia    Essential hypertension    Lymphadenopathy    Hiatal hernia    Thrombocytopenia (HCC)    Severe aortic stenosis    Mitral valve insufficiency    Supraventricular tachycardia (HCC)    Pulmonary hypertension (HCC)    Diverticulosis    Atrial tachycardia ([address] )    Non-rheumatic tricuspid valve insufficiency    Generalized anxiety disorder    Mid back pain    Encounter for Medicare annual wellness exam    Encounter for long-term (current) use of medications    Immunization due    Acute bursitis of right shoulder    Impaired fasting glucose    Osteopenia of neck of femur    Chronic bilateral low back pain without sciatica    Gait abnormality    Chronic pain syndrome    Sacroiliitis (HCC)    Bilateral leg edema    Ganglion cyst    Adhesive capsulitis of right shoulder    Stage 3a chronic kidney disease (HCC)    Other fatigue    Dyslipidemia    Post-menopause    Hyperthyroidism    Primary generalized (osteo)arthritis    Chronic combined systolic and diastolic congestive heart failure (HCC)    Pleural effusion    Acute respiratory failure with hypoxia (HCC)    Constipation    Status post insertion of drug eluting coronary artery stent    S/P TAVR (transcatheter aortic valve replacement)    Atrial flutter ([address] )    Incomplete right bundle branch block       Past Medical History  Past Medical History:   Diagnosis Date    Aortic stenosis     Community acquired pneumonia of left upper lobe of lung 5/17/2019    Fatigue     Full dentures     Generalized anxiety disorder     Hyperlipidemia     Hypertension     Impaired fasting glucose     Mitral regurgitation     Pneumonia     SVT (supraventricular tachycardia)     Tricuspid regurgitation        Past Surgical History  Past Surgical History:   Procedure Laterality Date CARDIAC CATHETERIZATION N/A 1/11/2023    Procedure: LHC-Cardiac catheterization;  Surgeon: Gabrielle Rodriguez MD;  [facility]: [facility]; Service: Cardiology    CARDIAC CATHETERIZATION N/A 1/11/2023    Procedure: Cardiac pci;  Surgeon: Gabrielle Rodriguez MD;  [facility]; Service: Cardiology    CARDIAC CATHETERIZATION N/A 1/17/2023    Procedure: CARDIAC TAVR;  Surgeon: Javier Meza DO;  Location: [facility] MA[facility];  Service: Cardiology    DENTAL SURGERY Bilateral     ALL TEETH REMOVED    FL GUIDED NEEDLE PLAC BX/ASP/INJ  5/26/2021    IR THORACENTESIS  12/20/2022    JOINT REPLACEMENT Left     KNEE SURGERY      left knee replacement    GA INJECT SI JOINT ARTHRGRPHY&/ANES/STEROID W/JOHNATHON Bilateral 5/26/2021    Procedure: SACROILIAC JOINT INJECTION;  Surgeon: Phoebe Khan MD;  Location: [facility];  Service: Pain Management     GA REPLACE AORTIC VALVE OPENFEMORAL ARTERY APPROACH N/A 1/17/2023    Procedure: REPLACEMENT AORTIC VALVE TRANSCATHETER (TAVR) TRANSFEMORAL W/ 23MM CALVERT SHAVONNE S3 ULTRA VALVE(ACCESS ON LEFT) ALENA;  Surgeon: Sunshine Yoon DO;  Location:  MAIN OR;  Service: Cardiac Surgery        01/18/23 0838   PT Last Visit   PT Visit Date 01/18/23   Note Type   Note type Evaluation   Pain Assessment   Pain Assessment Tool 0-10   Pain Score No Pain   Restrictions/Precautions   Other Precautions Multiple lines;Telemetry; Fall Risk   Home Living   Type of [address] Two level;1/2 bath on main level;Stairs to enter with rails  (1 [address], stair glide up to 2nd floor)   Home Equipment Walker;Cane;Stair glide   Additional Comments Pt lives in a [facility] with 1 [address], has stair glide up to 2nd floor  Pt reports ambulating with RW on 1st floor and using SPC on 2nd floor  Prior Function   Level of [place] Independent with ADLs; Independent with functional mobility   Lives With Spouse   Receives Help From [facility] in the last 6 months 0   Cognition   Overall Cognitive Status [facility] Orientation Level Oriented X4   Memory Within functional limits   Following Commands Follows one step commands without difficulty   Comments pt is very pleasant and cooperative, [place]   RLE Assessment   RLE Assessment WFL   LLE Assessment   LLE Assessment WFL   Bed Mobility   Sit to Supine 5  Supervision   Additional items Increased time required   Transfers   Sit to Stand 5  Supervision   Additional items Armrests; Increased time required   Stand to Sit 5  Supervision   Additional items Armrests; Increased time required   Additional Comments transfers with RW   Ambulation/Elevation   Gait pattern Excessively slow; Short stride;Decreased foot clearance; Foward flexed   Gait Assistance 5  Supervision   Additional items Assist x 1   Assistive Device Rolling walker   Distance 80ft   Balance   Static Sitting Good   Dynamic Sitting Good   Static Standing Fair   Dynamic Standing Fair -   Ambulatory Fair -   Activity Tolerance   Activity Tolerance Patient tolerated treatment well   Medical Staff Made Aware Seen with OT 2* pt's medical complexity and multiple comorbidities  PT foucs on functional transfers, gait, and endurance   Nurse Made Aware ok to see per RN   Assessment   Prognosis Good   Assessment Pt is a 80 y o  female seen for PT evaluation s/p admit [facility]note 2[facility] on 1/17/2023  Pt was admitted with a primary dx of: aortic stenosis  Pt is POD#1 s/p TAVR  PT now consulted for assessment of mobility and d/c needs  Pt with Up with assistance, Ambulate, PT evaluation orders  Pts current comorbidities effecting treatment include: generalized anxiety disorder, HTN, mitral regurgitation, tricuspid regurgitation, chronic pain, CKD  Pts current clinical presentation is Evolving (medium complexity) due to Ongoing medical management for primary dx, Increased assistance needed from caregiver at current time, Ongoing telemetry monitoring, Trending lab values, s/p surgical intervention           Prior to admission, pt was living with her  in a [facility] with stair glide inside  Pt is normally I with ADLs and ambulates with RW vs SPC  Upon evaluation, pt currently is requiring supervision for bed mobility; supervision for transfers; and supervision for ambulation 80 ft w/ RW  Pt presents at PT eval functioning at/near her baseline mobility level  No acute PT needs identified  PT signing off  At conclusion of PT session pt returned BTB with phone and call bell within reach  Pt denies any further questions at this time  The patient's AM-PAC Basic Mobility Inpatient Short Form Raw Score is 19  A Raw score of greater than 16 suggests the patient may benefit from discharge to home  Please also refer to the recommendation of the Physical Therapist for safe discharge planning  Recommend HHPT upon hospital D/C  Goals   Patient Goals to go home   Plan   PT Frequency Other (Comment)  (eval only, D/C PT)   Recommendation   PT Discharge Recommendation Home with home health rehabilitation   [address] [place] [address] Mobility Inpatient   Turning in Flat Bed Without Bedrails 4   Lying on Back to Sitting on Edge of Flat Bed Without Bedrails 3   Moving Bed to Chair 3   Standing Up From Chair Using Arms 3   Walk in Room 3   Climb 3-5 Stairs With Railing 3   Basic Mobility Inpatient Raw Score 19   Basic Mobility Standardized Score 42 48   Highest Level Of Mobility   JH-HLM Goal 6: Walk 10 steps or more   JH-HLM Achieved 7: Walk 25 feet or more   Modified Turner Scale   Modified Turner Scale 3   End of Consult   Patient Position at End of Consult Supine; All needs within reach         Nicole Self, PT, DPT

## 2023-01-18 NOTE — PROGRESS NOTES
Progress Note - Cardiothoracic Surgery   Maciej Lawrence 80 y o  female MRN: [id number]  Unit/Bed#: [facility] 409-01 Encounter: [id number]    Aortic stenosis, Non-Rheumatic  S/P transfemoral transcatheter aortic valve replacement; POD # 1    24 Hour Events: No events  Voided on herself this AM  No complaints  (-) flatus, (-) BM, last BM day of sx  Ambulating well, home at discharge  Pain controlled      Medications:   Scheduled Meds:  Current Facility-Administered Medications   Medication Dose Route Frequency Provider Last Rate   • acetaminophen  650 mg Rectal Q4H PRN Shell Contreras PA-C     • acetaminophen  650 mg Oral Q4H PRN Bharat He Anthony Bill PA-C     • ALPRAZolam  0 25 mg Oral TID PRN Shell Contreras PA-C     • apixaban  2 5 mg Oral BID Shell Contreras PA-C     • atorvastatin  20 mg Oral Daily With Dinner Shell Contreras PA-C     • bisacodyl  10 mg Rectal Daily PRN Shell Contreras PA-C     • cefazolin  2,000 mg Intravenous Q8H Leeen  Dayan Juarez Stopped (01/18/23 0601)   • chlorhexidine  15 mL Mouth/Throat BID Shell Contreras PA-C     • clopidogrel  75 mg Oral Daily Leellen  Anthony Bill PA-C     • diltiazem  120 mg Oral Daily Leellen  Anthony Bill PA-C     • furosemide  40 mg Oral Daily Leellen  TODD Juares     • insulin lispro  1-5 Units Subcutaneous TID AC Shell Contreras PA-C     • insulin lispro  1-5 Units Subcutaneous HS [facility]     • lactated ringers  125 mL/hr Intravenous Continuous Clint Soto MD Stopped (01/17/23 1400)   • metoprolol succinate  75 mg Oral Daily Shell Contreras PA-C     • mupirocin  1 application Nasal V93G [facility] Anthony Bill PA-C     • niCARdipine  1-15 mg/hr Intravenous Titrated [facility] Anthony Bill PA-C Stopped (01/17/23 1438)   • ondansetron  4 mg Intravenous Q6H PRN Shell Contreras PA-C     • oxygen  2 L/min Inhalation Daily PRN Shell Contreras PA-C     • pantoprazole  40 mg Oral Early Morning Shell Contreras TODD     • polyethylene glycol  17 g Oral Daily Morgan Montoya PA-C     • spironolactone  25 mg Oral Daily Morgan Juares PA-C       Continuous Infusions:lactated ringers, 125 mL/hr, Last Rate: Stopped (01/17/23 1400)  niCARdipine, 1-15 mg/hr, Last Rate: Stopped (01/17/23 1438)      PRN Meds: •  acetaminophen  •  acetaminophen  •  ALPRAZolam  •  bisacodyl  •  ondansetron  •  oxygen    Vitals:   Vitals:    01/18/23 0400 01/18/23 0500 01/18/23 0540 01/18/23 0600   BP: 131/69 121/56  107/57   BP Location: Right arm      Pulse: 70 67  58   Resp: 20      Temp:    98 2 °F (36 8 °C)   TempSrc:    Oral   SpO2: 98% 97%  92%   Weight:   46 5 kg (102 lb 8 2 oz)    Height:         Bp [id number] /40-60    Telemetry: Atrial Flutter; Heart Rate: 75; intermittent flipped complexes w/ short runs (0323, 1846, 1719, 1716, 1712, 1711, 1646, 1644, 1643, 1635, 1634, 1633)    Respiratory:   SpO2: SpO2: 92 %, SpO2 Activity: SpO2 Activity: At Rest, SpO2 Device: O2 Device: None (Room air);  Room Air    Intake/Output:     Intake/Output Summary (Last 24 hours) at 1/18/2023 0831  Last data filed at 1/18/2023 0809  Gross per 24 hour   Intake 1346 25 ml   Output 2300 ml   Net -953 75 ml      UOP - 1075cc/8hrs w/ 1 unmeasured occurrence; 2300cc/24hrs w/ 1 unmeasured occurance    Weights:   Weight (last 2 days)     Date/Time Weight    01/18/23 0540 46 5 (102 51)    01/17/23 0909 46 7 (103)        Admit weight: 46 7kg - down 0 2kg from admission weight    Results:   Results from last 7 days   Lab Units 01/18/23  0023 01/17/23  1223 01/17/23  1204 01/17/23  1123 01/13/23  0900 01/12/23  0456   WBC Thousand/uL 5 85  --   --   --  5 49 7 15   HEMOGLOBIN g/dL 10 4* 10 3*  --   --  10 2* 10 6*   I STAT HEMOGLOBIN g/dl  --   --  9 2*   < >  --   --    HEMATOCRIT % 30 7* 30 9*  --   --  30 7* 32 6*   HEMATOCRIT, ISTAT %  --   --  27*   < >  --   --    PLATELETS Thousands/uL 156 139*  --   --  151 158    < > = values in this interval not displayed  Results from last 7 days   Lab Units 01/18/23  0023 01/17/23  1223 01/17/23  1204 01/17/23  1123 01/13/23  0900   POTASSIUM mmol/L 4 0 3 7  --   --  4 3   CHLORIDE mmol/L 99 105  --   --  95*   CO2 mmol/L 23 22  --   --  23   CO2, I-STAT mmol/L  --   --  25   < >  --    BUN mg/dL 36* 35*  --   --  33*   CREATININE mg/dL 1 28 1 23  --   --  1 32*   GLUCOSE, ISTAT mg/dl  --   --  107   < >  --    CALCIUM mg/dL 8 7 8 7  --   --  9 2    < > = values in this interval not displayed  Point of care glucose: 147 - 180 - 137 - 101    Studies:    CXR 1/18: stable study, no gross PTX, mild central PVC, no pleural effusion, lines in place -- final report pending    EKG 1/18: a flutter, rate 64, QTc 426, QRS 83    I have personally reviewed pertinent reports  and I have personally reviewed pertinent films in PACS    Invasive Lines/Tubes:  Invasive Devices     Central Venous Catheter Line  Duration           CVC Central Lines 01/17/23 Triple <1 day          Peripheral Intravenous Line  Duration           Peripheral IV 01/17/23 Left;Proximal;Ventral (anterior) Forearm <1 day    Peripheral IV 01/17/23 Right Hand <1 day          Drain  Duration           External Urinary Catheter <1 day                Physical Exam:    HEENT/NECK:  Normocephalic  Atraumatic  No jugular venous distention  Cardiac: Regular rate and rhythm and No murmurs/rubs/gallops  Pulmonary:  Breath sounds clear bilaterally, No rales/rhonchi/wheezes and good inspiratory effort, equal expansion b/l  Abdomen:  Non-tender, Non-distended and Normal bowel sounds  Incisions: Groin puncture sites clean, dry, and intact without hematoma -- dressing removed during encounter  Extremities: Extremities warm/dry, DP pulses dopplerable bilaterally and Trace edema B/L  Neuro: Alert and oriented X 3  Skin: Warm/Dry, without rashes or lesions      Assessment:  Principal Problem:    S/P TAVR (transcatheter aortic valve replacement)  Active Problems: Hypomagnesemia    Essential hypertension    Hiatal hernia    Thrombocytopenia (HCC)    Severe aortic stenosis    Mitral valve insufficiency    Supraventricular tachycardia (HCC)    Pulmonary hypertension (HCC)    Diverticulosis    Atrial tachycardia (HCC)    Generalized anxiety disorder    Chronic bilateral low back pain without sciatica    Gait abnormality    Chronic pain syndrome    Sacroiliitis (HCC)    Bilateral leg edema    Stage 3a chronic kidney disease (HCC)    Dyslipidemia    Hyperthyroidism    Primary generalized (osteo)arthritis    Chronic combined systolic and diastolic congestive heart failure (HCC)    Status post insertion of drug eluting coronary artery stent    Atrial flutter (HCC)    Incomplete right bundle branch block       Aortic stenosis, Non-Rheumatic  S/P transfemoral transcatheter aortic valve replacement; POD # 1    Plan:    1  Cardiac:   Atrial Flutter; HR/BP well-controlled  Continue Toprol, 75mg PO Daily   Cardizem 120mg PO QDay  Spironolactone 25mg PO QDay  Central IV access no longer required; Remove central venous catheter today  Eliquis/Plavix  Consult cardiology for postoperative medical management  Follow up transthoracic echocardiogram today   Continue Statin therapy  Continue DVT prophylaxis    2  Pulmonary:   Extubated  CXR findings: as above  Good Room air oxygen saturation; Continue incentive spirometry/Coughing/Deep breathing exercises    PRN O2 at home    3  Renal:   Intake/Output net: (-)1133 8 mL/24 hours -- innacurate I/Os  Diuresis:   Lasix 40mg PO QDay   Start 20mg PO QDay  Post op Creatinine stable; Follow up labs prn    4  Neuro:  Neurologically intact; No active issues  Incisional pain well-controlled; Continue prn Tylenol  Continue Xanax PRN    5    GI:  Tolerating TLC 2 3 gm sodium diet  Maintain 1800 mL daily fluid restriction   Continue daily stool softeners and prn suppository  Continue GI prophylaxis    6  Endo:   Glucose well-controlled   Continue Insulin sliding scale coverage    7  Hematology:    Anemia, Hb 10 4 & stable   Hyponatremia, A&Ox3, likely dilutional    8     Disposition:   Gerontology consultation ordered for routine assessment of TAVR patient over 72years old  Transfer from step down to telemetry level of care today, Anticipate discharge to home, when medically cleared (todaay)    VTE Pharmacologic Prophylaxis: Heparin  VTE Mechanical Prophylaxis: sequential compression device    Bedside rounds completed with supervising physician  Plan of care discussed with bedside nurse    SIGNATURE: Mai Lopes PA-C  DATE: January 18, 2023  TIME: 8:31 AM

## 2023-01-18 NOTE — DISCHARGE SUMMARY
Discharge Summary - Cardiothoracic Surgery   Sebas Parks 80 y o  female MRN: [id number]  Unit/Bed#: [facility] 409-01 Encounter: [id number]    Admission Date: 1/17/2023     Discharge Date: 01/18/23    Admitting Diagnosis: Severe aortic stenosis [I35 0]    Primary Discharge Diagnosis:   Aortic stenosis, Non-Rheumatic  S/P transfemoral transcatheter aortic valve replacement;    Secondary Discharge Diagnosis:   severe AS, CAD/PCI/ELODIA 1/11/2023, CHF, HTN, HL, aflutter/IRBB, CKD III, pHTN, CAP, thrombocytopenia, diverticulosis, lymphadenopathy, hypothyroidism, hiatal hernia, [place], anxiety, Right thoracentesis 12/2022, home oxygen    Attending: FERMÍN Lopez  Consulting Physician(s):   Cardiology  Gerontology    Procedures Performed:   Procedure(s):  REPLACEMENT AORTIC VALVE TRANSCATHETER (TAVR) TRANSFEMORAL W/ 23MM CALVERT SHAVONNE S3 ULTRA VALVE(ACCESS ON LEFT) ALENA  CARDIAC TAVR     Hospital Course: The patient was seen in consultation prior to this admission for evaluation of Aortic stenosis, Non-Rheumatic  Risks and benefits of transfemoral transcatheter aortic valve replacement were discussed in detail, and patient was agreeable  Routine preoperative evaluation was completed and informed consent was obtained prior to admission  1/17: Elective admission for TF TAVR # 23mm via L approach  Extubated and transferred to PACU supported with Cardene 12  DP pulses Doppler b/l  Restarted on home Cardizem 120 mg CD daily, Lasix 40 mg daily, Toprol xl 75 mg daily, spironolactone 25 mg daily  ECG shows atrial flutter with PVC/PAC  Gerontology and cardiology consulted  PM: 9 run episode of Vtach, /56, HR 57- back to baseline slow JOY kerr repleted  1/18: No further episodes of arrhythmia/VT  Voided by herself this AM  Ambulating well  Pain controlled  Deemed stable for discharge home today       Condition at Discharge:   good     Discharge Physical Exam:    Please see the documented physical exam from this morning's progress note for details  Discharge Data:  Results from last 7 days   Lab Units 01/18/23  0023 01/17/23  1223 01/17/23  1204 01/17/23  1123 01/13/23  0900 01/12/23  0456   WBC Thousand/uL 5 85  --   --   --  5 49 7 15   HEMOGLOBIN g/dL 10 4* 10 3*  --   --  10 2* 10 6*   I STAT HEMOGLOBIN g/dl  --   --  9 2*   < >  --   --    HEMATOCRIT % 30 7* 30 9*  --   --  30 7* 32 6*   HEMATOCRIT, ISTAT %  --   --  27*   < >  --   --    PLATELETS Thousands/uL 156 139*  --   --  151 158    < > = values in this interval not displayed  Results from last 7 days   Lab Units 01/18/23  0023 01/17/23  1223 01/17/23  1204 01/17/23  1123 01/13/23  0900   POTASSIUM mmol/L 4 0 3 7  --   --  4 3   CHLORIDE mmol/L 99 105  --   --  95*   CO2 mmol/L 23 22  --   --  23   CO2, I-STAT mmol/L  --   --  25   < >  --    BUN mg/dL 36* 35*  --   --  33*   CREATININE mg/dL 1 28 1 23  --   --  1 32*   GLUCOSE, ISTAT mg/dl  --   --  107   < >  --    CALCIUM mg/dL 8 7 8 7  --   --  9 2    < > = values in this interval not displayed  Discharge instructions/Information to patient and family:   See after visit summary for information provided to patient and family  Geno Jones was educated on restrictions regarding driving and lifting, and techniques of proper incisional care  They were specifically counselled on signs and symptoms of an incisional infection, and advised to contact our service immediately should they develop fevers, sweats, chill, redness or drainage at the site of any incisions  Provisions for Follow-Up Care:  See after visit summary for information related to follow-up care and any pertinent home health orders  Disposition:  Home    Planned Readmission:   No    Discharge Medications:  See after visit summary for reconciled discharge medications provided to patient and family        Geno Jones was provided contact information and scheduled a follow up appointment with Cj Peraza Ysabel Osei  Additionally, follow up appointments have been scheduled for their primary care physician and primary cardiologist   Contact information was provided  Natalie Isaacs was counseled on the importance of avoiding tobacco products  As with all patients whom have undergone open heart surgery, tobacco cessation medication was contraindicated at the time of discharge  ACE/ARB was Contraindicated secondary to renal dysfunction    Beta Blocker was Prescribed at discharge    Aspirin was contraindicated due to anticoagulation with Eliquis and on Plavix therapy  Statin was Prescribed at discharge      The patient was discharged on ongoing diuretic therapy with Furosemide 40 mg, PO QD and Potassium Chloride 20 mEq, PO QD  They were advised to continue these medications, unless otherwise directed  The patient was informed that following their postoperative surgical evaluation, they will be referred to outpatient cardiac rehabilitation  They were counseled that this program is run by specialists who will help them safely strengthen their heart and prevent more heart disease  Cardiac rehabilitation will include exercise, relaxation, stress management, and heart-healthy nutrition  Caregivers will also check to make sure their medication regimen is working  During this admission, the patient was questioned on their use of tobacco, alcohol, and illicit/non-prescription drug use in the  previous 24 months  Nataliesaskia Isaacs states that they have not used any of these substances in this time frame  I spent 30 minutes discharging the patient  This time was spent on the day of discharge  I had direct contact with the patient on the day of discharge  Additional documentation is required if more than 30 minutes were spent on discharge       SIGNATURE: Hien Haile PA-C  DATE: January 18, 2023  TIME: 11:42 AM

## 2023-01-18 NOTE — OCCUPATIONAL THERAPY NOTE
Occupational Therapy Evaluation     Patient Name: Jesus ZAMAN Date: 1/18/2023  Problem List  Principal Problem:    S/P TAVR (transcatheter aortic valve replacement)  Active Problems:    Hypomagnesemia    Essential hypertension    Hiatal hernia    Thrombocytopenia (HCC)    Severe aortic stenosis    Mitral valve insufficiency    Supraventricular tachycardia (HCC)    Pulmonary hypertension (HCC)    Diverticulosis    Atrial tachycardia (HCC)    Generalized anxiety disorder    Chronic bilateral low back pain without sciatica    Gait abnormality    Chronic pain syndrome    Sacroiliitis (HCC)    Bilateral leg edema    Stage 3a chronic kidney disease (HCC)    Dyslipidemia    Hyperthyroidism    Primary generalized (osteo)arthritis    Chronic combined systolic and diastolic congestive heart failure (HCC)    Status post insertion of drug eluting coronary artery stent    Atrial flutter ([address] )    Incomplete right bundle branch block    Past Medical History  Past Medical History:   Diagnosis Date    Aortic stenosis     Community acquired pneumonia of left upper lobe of lung 5/17/2019    Fatigue     Full dentures     Generalized anxiety disorder     Hyperlipidemia     Hypertension     Impaired fasting glucose     Mitral regurgitation     Pneumonia     SVT (supraventricular tachycardia)     Tricuspid regurgitation      Past Surgical History  Past Surgical History:   Procedure Laterality Date    CARDIAC CATHETERIZATION N/A 1/11/2023    Procedure: LHC-Cardiac catheterization;  Surgeon: Khushboo Keen MD;  [facility]; Service: Cardiology    CARDIAC CATHETERIZATION N/A 1/11/2023    Procedure: Cardiac pci;  Surgeon: Khushboo Keen MD;  [facility];   Service: Cardiology    CARDIAC CATHETERIZATION N/A 1/17/2023    Procedure: CARDIAC TAVR;  Surgeon: Frank Kruger DO;  [facility];  Service: Cardiology    DENTAL SURGERY Bilateral     ALL TEETH REMOVED    FL GUIDED NEEDLE PLAC BX/ASP/INJ  5/26/2021    IR THORACENTESIS  12/20/2022    JOINT REPLACEMENT Left     KNEE SURGERY      left knee replacement    TN INJECT SI JOINT ARTHRGRPHY&/ANES/STEROID W/JOHNATHON Bilateral 5/26/2021    Procedure: SACROILIAC JOINT INJECTION;  Surgeon: Wei Jorge MD;  Location: [facility];  Service: Pain Management     TN REPLACE AORTIC VALVE OPENFEMORAL ARTERY APPROACH N/A 1/17/2023    Procedure: REPLACEMENT AORTIC VALVE TRANSCATHETER (TAVR) TRANSFEMORAL W/ 23MM CALVERT SHAVONNE S3 ULTRA VALVE(ACCESS ON LEFT) ALENA;  Surgeon: DO Vilma;  Location:  MAIN OR;  Service: Cardiac Surgery           01/18/23 0837   OT Last Visit   OT Visit Date 01/18/23   Note Type   Note type Evaluation   Pain Assessment   Pain Assessment Tool 0-10   Pain Score No Pain   Restrictions/Precautions   Other Precautions Multiple lines; Fall Risk;Telemetry   Home Living   Type of [address] Two level;1/2 bath on main level  (1STE, stair glide between floors)   Bathroom Shower/Tub Tub/shower unit   Bathroom Toilet Standard   Bathroom Equipment Grab bars in shower; Shower chair   Home Equipment Walker;Cane;Stair glide  (pt reports mostly using RW PTA, SPC used on 2nd floor)   Prior Function   Level of [place] Independent with ADLs   Lives With Spouse   Receives Help From Family;Home health   IADLs   (shares IADLs with spouse)   Falls in the last 6 months 0   Vocational Retired   Comments Pt was receiving HHPT PTA   Lifestyle   Autonomy PTA, pt reports being I with ADLs, IADLs, MI fnxl mobility, (-)    Reciprocal Relationships Spouse   Service to Others Retired   Intrinsic Gratification Shopping   ADL   Where Assessed Chair   Eating Assistance 7  Independent   Grooming Assistance 7  Independent   UB Bathing Assistance 5  [address] [place] [address] [address]  5  [address]  [address]  5  [address] Supervision/Setup   Bed Mobility   Supine to Sit Unable to assess   Sit to Supine 5  Supervision   Additional items Increased time required   Transfers   Sit to Stand 5  Supervision   Additional items Increased time required   Stand to Sit 5  Supervision   Additional items Increased time required   Additional Comments transfers with RW   Functional Mobility   Functional Mobility 5  Supervision   Additional items Rolling walker   Balance   Static Sitting Good   Dynamic Sitting Fair +   Static Standing Fair   Dynamic Standing Fair -   Ambulatory Fair -   Activity Tolerance   Activity Tolerance Patient tolerated treatment well   Medical Staff Made Aware PT Marly   Nurse Made Aware RN clearance for session   RUE Assessment   RUE Assessment WFL   LUE Assessment   LUE Assessment WFL   Vision-Basic Assessment   Current Vision Wears glasses all the time   Psychosocial   Psychosocial (WDL) WDL   Cognition   Overall Cognitive Status WFL   Arousal/Participation Responsive; Cooperative   Attention Within functional limits   Orientation Level Oriented X4   Memory Within functional limits   Following Commands Follows one step commands without difficulty   Comments Pt pleasant and cooperative t/o session, hard of hearing   Assessment   Assessment Pt is a 80 y o  female admitted to [facility] on 1/17/2023 w/ s/p TAVR  has a past medical history of Aortic stenosis, Community acquired pneumonia of left upper lobe of lung, Fatigue, Generalized anxiety disorder, Hyperlipidemia, Hypertension, Mitral regurgitation, Pneumonia, SVT, and Tricuspid regurgitation  Pt with active OT orders and ambulate  orders  Pt seen as a co-evaluation with PT due to the patient's co-morbidities, clinically unstable presentation/clinical complexity, and present impairments  As per pt report, pta, resides with her  in a 2STH, 1STE  Pt was I w/  ADLS and shares IADLS with spouse, (-) drove   Upon evaluation, pt currently requires S with RW for transfers and mobility  Pt currently requires I eating, I grooming, S UB ADLs, S LB ADLs, and S toileting  Pt reports spouse can assist as needed PTA  No questions or concerns at this time  From OT standpoint, recommendation would be home with social support  No further acute OT needs  D/C OT  Please re-consult if needed  Thank you  Pt was left after session with all current needs met  The patient's raw score on the AM-PAC Daily Activity inpatient short form is 20, standardized score is 42 03, greater than 39 4  Patients at this level are likely to benefit from discharge to home  Please refer to the recommendation of the Occupational Therapist for safe discharge planning     Plan   OT Frequency Eval only   Recommendation   OT Discharge Recommendation No rehabilitation needs   AM-PAC Daily Activity Inpatient   Lower Body Dressing 3   Bathing 3   Toileting 3   Upper Body Dressing 3   Grooming 4   Eating 4   Daily Activity Raw Score 20   Daily Activity Standardized Score (Calc for Raw Score >=11) 42 03   AM-PAC Applied Cognition Inpatient   Following a Speech/Presentation 4   Understanding Ordinary Conversation 4   Taking Medications 4   Remembering Where Things Are Placed or Put Away 4   Remembering List of 4-5 Errands 4   Taking Care of Complicated Tasks 4   Applied Cognition Raw Score 24   Applied Cognition Standardized Score 62 21       Marko Lucas MS, OTR/L

## 2023-01-19 ENCOUNTER — PATIENT OUTREACH (OUTPATIENT)
Dept: FAMILY MEDICINE CLINIC | Facility: CLINIC | Age: 88
End: 2023-01-19

## 2023-01-19 ENCOUNTER — TRANSITIONAL CARE MANAGEMENT (OUTPATIENT)
Dept: FAMILY MEDICINE CLINIC | Facility: CLINIC | Age: 88
End: 2023-01-19

## 2023-01-19 ENCOUNTER — HOME CARE VISIT (OUTPATIENT)
Dept: HOME HEALTH SERVICES | Facility: HOME HEALTHCARE | Age: 88
End: 2023-01-19

## 2023-01-19 ENCOUNTER — PATIENT OUTREACH (OUTPATIENT)
Dept: CASE MANAGEMENT | Facility: OTHER | Age: 88
End: 2023-01-19

## 2023-01-19 VITALS
OXYGEN SATURATION: 100 % | HEART RATE: 54 BPM | DIASTOLIC BLOOD PRESSURE: 68 MMHG | TEMPERATURE: 97.7 F | SYSTOLIC BLOOD PRESSURE: 114 MMHG | RESPIRATION RATE: 16 BRPM

## 2023-01-19 NOTE — PROGRESS NOTES
Spoke with Mrs Turpin Elizabeth  She says she is doing fine, no bleeding on bandages or pain  However, she woke at 3 AM because she was worried about changing the bandages  [facility] will be coming in the next 10 minutes to evaluate her  Agrees to phone call next week

## 2023-01-19 NOTE — PROGRESS NOTES
Outpatient Care Management Note:  In basket discharge alert received after planned TAVR  Chart review completed

## 2023-01-20 ENCOUNTER — HOME CARE VISIT (OUTPATIENT)
Dept: HOME HEALTH SERVICES | Facility: HOME HEALTHCARE | Age: 88
End: 2023-01-20

## 2023-01-20 VITALS — HEART RATE: 56 BPM | DIASTOLIC BLOOD PRESSURE: 90 MMHG | OXYGEN SATURATION: 94 % | SYSTOLIC BLOOD PRESSURE: 150 MMHG

## 2023-01-21 NOTE — CASE COMMUNICATION
PT reassessment 1-20-23 s/p hospital stay for TAVR  PT POC for 1wk3 for gait/transfer/balance training, HEP modification/progression, LE ther ex, edu      Thank you, [place] Drew Trudee Oppenheim PT

## 2023-01-23 ENCOUNTER — HOME CARE VISIT (OUTPATIENT)
Dept: HOME HEALTH SERVICES | Facility: HOME HEALTHCARE | Age: 88
End: 2023-01-23

## 2023-01-23 ENCOUNTER — RA CDI HCC (OUTPATIENT)
Dept: OTHER | Facility: HOSPITAL | Age: 88
End: 2023-01-23

## 2023-01-23 VITALS — SYSTOLIC BLOOD PRESSURE: 130 MMHG | HEART RATE: 56 BPM | DIASTOLIC BLOOD PRESSURE: 80 MMHG | OXYGEN SATURATION: 98 %

## 2023-01-24 ENCOUNTER — HOME CARE VISIT (OUTPATIENT)
Dept: HOME HEALTH SERVICES | Facility: HOME HEALTHCARE | Age: 88
End: 2023-01-24

## 2023-01-24 ENCOUNTER — TELEPHONE (OUTPATIENT)
Dept: CARDIAC SURGERY | Facility: CLINIC | Age: 88
End: 2023-01-24

## 2023-01-24 VITALS
DIASTOLIC BLOOD PRESSURE: 58 MMHG | TEMPERATURE: 97.9 F | RESPIRATION RATE: 16 BRPM | OXYGEN SATURATION: 99 % | SYSTOLIC BLOOD PRESSURE: 112 MMHG | HEART RATE: 54 BPM

## 2023-01-24 NOTE — TELEPHONE ENCOUNTER
POST OP CALL    1/17/23: Elective TAVR  1/18/23: Discharge home  1/24/23: Spoke to patient's ; patient taking a nap  He reports patient is doing well, actually better as compared to prior to her TAVR procedure  She is ambulating around the house and has no SOB, lightheadedness, chest pain or palpitations  Left groin site has some bruising and mild tenderness but states it is healing well  Reviewed meds, appts and answered questions

## 2023-01-26 ENCOUNTER — PATIENT OUTREACH (OUTPATIENT)
Dept: FAMILY MEDICINE CLINIC | Facility: CLINIC | Age: 88
End: 2023-01-26

## 2023-01-26 ENCOUNTER — OFFICE VISIT (OUTPATIENT)
Dept: CARDIOLOGY CLINIC | Facility: CLINIC | Age: 88
End: 2023-01-26

## 2023-01-26 VITALS
SYSTOLIC BLOOD PRESSURE: 96 MMHG | HEART RATE: 60 BPM | HEIGHT: 58 IN | WEIGHT: 101.7 LBS | DIASTOLIC BLOOD PRESSURE: 68 MMHG | BODY MASS INDEX: 21.35 KG/M2 | OXYGEN SATURATION: 100 %

## 2023-01-26 DIAGNOSIS — I50.42 CHRONIC COMBINED SYSTOLIC AND DIASTOLIC CONGESTIVE HEART FAILURE (HCC): ICD-10-CM

## 2023-01-26 DIAGNOSIS — I25.10 CORONARY ARTERY DISEASE INVOLVING NATIVE CORONARY ARTERY OF NATIVE HEART WITHOUT ANGINA PECTORIS: ICD-10-CM

## 2023-01-26 DIAGNOSIS — Z95.2 S/P TAVR (TRANSCATHETER AORTIC VALVE REPLACEMENT): Primary | ICD-10-CM

## 2023-01-26 DIAGNOSIS — I35.0 SEVERE AORTIC STENOSIS: ICD-10-CM

## 2023-01-26 NOTE — PROGRESS NOTES
[facility]   Outpatient Note  Jennie Bermudez  1935  [id number]  [facility] [address] [address] [phone]    Jennie Bermudez is a 80 y o  female    Assessment and Plan:   Atrial flutter ([address] )  Afib/aflutter   Now controlled on metoprolol 75 mg daily and diltiazem 120 mg daily   On Eliquis 2 5 mg BID for stoke risk prevention         Chronic combined systolic and diastolic congestive heart failure (HCC)  Wt Readings from Last 3 Encounters:   01/26/23 46 1 kg (101 lb 11 2 oz)   01/18/23 46 3 kg (102 lb)   01/12/23 48 5 kg (107 lb)   Volume stable   EF 45-50% 12/22/22  Continue current medical management  On lasix and metoprolol            Incomplete right bundle branch block  Chronic and stable    Mitral valve insufficiency  Moderate MR and Moderate MAC per echo 12/22/22  Asymptomatic     Severe aortic stenosis  S/p TAVR 1/17/2023    S/P TAVR (transcatheter aortic valve replacement)  S/P TAVR W/ 23MM CALVERT SHAVONNE S3 ULTRA VALVE 1/17/2023      Coronary artery disease involving native coronary artery of native heart without angina pectoris  Single-vessel CAD, cardiac cath 1/11/2023: 75% RCA (ELODIA)  Medical management  Clopidogrel  No aspirin as she is on apixaban  Continue metoprolol atorvastatin and spironolactone    Dyslipidemia  Tolerating statin therapy  continue atorvastatin 20 mg daily        Additional Plan:   No Medication changes made or testing ordered today  Available lab and test results are reviewed with the patient as noted  Return visit will be in six months or earlier if there are problems  The patient is encouraged to call in the meantime if there are questions or concerns  Subjective: The patient is seen in the office today in follow-up regarding recent TAVR (1/17/2023) and cardiac catheterization with subsequent stenting to the RCA (1/11/2023)    Prior to that she had a hospitalized for respiratory failure with rapid atrial flutter with subsequent HF  She was found to have severe AS with paradoxical low-flow, low gradient and was recommended for catheterization and TAVR evaluation as an OP  She has MR, TR, severe AS, AF, and HFrEF  She is doing well without any symptoms  Otherwise , From a cardiac perspective, she is without complaints of chest pain or exertional dyspnea  She has no palpitations syncope or near syncope, and denies edema orthopnea or PND  She does not complain of TIA or CVA symptoms  She denies any exertional neck, jaw, arm or back pain           Social History  Social History     Tobacco Use   Smoking Status Never   Smokeless Tobacco Never   ,   Social History     Substance and Sexual Activity   Alcohol Use Never   ,   Social History     Substance and Sexual Activity   Drug Use Never     Family History   Problem Relation Age of Onset   • Hypertension Mother    • Asthma Father         Marengo's asthma   • Alzheimer's disease Sister    • Rectal cancer Son    • Uterine cancer Daughter    • Heart disease Sister    • Heart disease Brother        Medical and Surgical History  Past Medical History:   Diagnosis Date   • Aortic stenosis    • Atrial flutter ([facility] )    • CAD (coronary artery disease) 01/11/2023    CAD/PCI/ELODIA   • CKD (chronic kidney disease), stage III ([facility] )    • Community acquired pneumonia of left upper lobe of lung 05/17/2019   • Fatigue    • Full dentures    • Generalized anxiety disorder    • Hyperlipidemia    • Hypertension    • Impaired fasting glucose    • Mitral regurgitation    • Pneumonia    • RBBB    • 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    TRANSFEMORAL W/ 23MM CALVERT SHAVONNE S3 ULTRA VALVE(ACCESS ON LEFT)   • SVT (supraventricular tachycardia)    • Tricuspid regurgitation      Past Surgical History:   Procedure Laterality Date   • CARDIAC CATHETERIZATION N/A 1/11/2023    Procedure: LHC-Cardiac catheterization;  Surgeon: Khushboo Keen MD;  [facility]; Service: Cardiology   • CARDIAC CATHETERIZATION N/A 1/11/2023    Procedure: Cardiac pci;  Surgeon: Khushboo Keen MD;  [facility];   Service: Cardiology   • CARDIAC CATHETERIZATION N/A 1/17/2023    Procedure: CARDIAC TAVR;  Surgeon: Frank Kruger DO;  [facility];  Service: Cardiology   • DENTAL SURGERY Bilateral     ALL TEETH REMOVED   • FL GUIDED NEEDLE PLAC BX/ASP/INJ  5/26/2021   • IR THORACENTESIS  12/20/2022   • JOINT REPLACEMENT Left    • KNEE SURGERY      left knee replacement   • FL INJECT SI JOINT ARTHRGRPHY&/ANES/STEROID W/JOHNATHON Bilateral 5/26/2021    Procedure: SACROILIAC JOINT INJECTION;  Surgeon: Rosalie Gotti MD;  [facility];  Service: Pain Management    • FL REPLACE AORTIC VALVE OPENFEMORAL ARTERY APPROACH N/A 1/17/2023    Procedure: REPLACEMENT AORTIC VALVE TRANSCATHETER (TAVR) TRANSFEMORAL W/ 23MM CALVERT SHAVONNE S3 ULTRA VALVE(ACCESS ON LEFT) ALENA;  Surgeon: Ketan Sepulveda DO;  [facility];  Service: Cardiac Surgery         Current Outpatient Medications:   •  ALPRAZolam (XANAX) 0 25 mg tablet, Take 0 25 mg by mouth 3 (three) times a day as needed, Disp: , Rfl:   •  apixaban (ELIQUIS) 2 5 mg, Take 1 tablet (2 5 mg total) by mouth 2 (two) times a day, Disp: 60 tablet, Rfl: 2  •  atorvastatin (LIPITOR) 20 mg tablet, Take 1 tablet (20 mg total) by mouth daily with dinner, Disp: 30 tablet, Rfl: 4  •  clopidogrel (PLAVIX) 75 mg tablet, Take 1 tablet (75 mg total) by mouth daily, Disp: 90 tablet, Rfl: 0  •  diltiazem (CARDIZEM CD) 120 mg 24 hr capsule, Take 1 capsule (120 mg total) by mouth daily Do not start before December 26, 2022 , Disp: 30 capsule, Rfl: 1  •  docusate sodium (COLACE) 100 mg capsule, Take 1 capsule (100 mg total) by mouth 2 (two) times a day, Disp: 60 capsule, Rfl: 0  •  furosemide (LASIX) 40 mg tablet, Take 1 tablet (40 mg total) by mouth daily Do not start before December 26, 2022 , Disp: 30 tablet, Rfl: 1  •  metoprolol succinate (TOPROL-XL) 50 mg 24 hr tablet, Take 1 5 tablets (75 mg total) by mouth daily, Disp: 135 tablet, Rfl: 3  •  oxygen gas, Inhale 2 L/min daily as needed (low oxygen levels)  Indications: Oxygen Desaturation, Disp: , Rfl:   •  pantoprazole (PROTONIX) 40 mg tablet, Take 1 tablet (40 mg total) by mouth daily in the early morning Do not start before January 19, 2023 , Disp: 30 tablet, Rfl: 0  •  potassium chloride (K-DUR,KLOR-CON) 20 mEq tablet, Take 1 tablet (20 mEq total) by mouth daily Do not start before January 19, 2023 , Disp: 30 tablet, Rfl: 1  •  Psyllium (METAMUCIL FIBER) 51 7 % PACK, Take 1 Package by mouth daily, Disp: , Rfl: 0  •  spironolactone (ALDACTONE) 25 mg tablet, Take 1 tablet (25 mg total) by mouth daily Do not start before December 26, 2022 , Disp: 30 tablet, Rfl: 1  No Known Allergies    Review of Systems   Constitutional: Negative  HENT: Negative  Eyes: Negative  Cardiovascular: Negative  Negative for chest pain, claudication, cyanosis, dyspnea on exertion, irregular heartbeat, leg swelling, near-syncope, orthopnea, palpitations, paroxysmal nocturnal dyspnea and syncope  Respiratory: Negative  Negative for cough, hemoptysis, shortness of breath, sleep disturbances due to breathing, snoring, sputum production and wheezing  Endocrine: Negative  Hematologic/Lymphatic: Negative  Skin: Negative  Musculoskeletal: Negative  Uses cane to ambulate   Gastrointestinal: Negative  Genitourinary: Negative  Neurological: Negative  Psychiatric/Behavioral: Negative  Allergic/Immunologic: Negative  Objective:   BP 96/68   Pulse 60   Ht 4' 10" (1 473 m)   Wt 46 1 kg (101 lb 11 2 oz)   SpO2 100%   BMI 21 26 kg/m²   Physical Exam  Vitals and nursing note reviewed  Constitutional:       Appearance: She is underweight  HENT:      Head: Normocephalic and atraumatic        Mouth/Throat:      Mouth: Mucous membranes are moist    Eyes:      General: No scleral icterus  Conjunctiva/sclera: Conjunctivae normal    Neck:      Vascular: No JVD  Trachea: No tracheal deviation  Cardiovascular:      Rate and Rhythm: Normal rate  Rhythm irregularly irregular  Pulses: Intact distal pulses  Heart sounds: S1 normal and S2 normal  Murmur heard  Systolic murmur is present with a grade of 2/6  No friction rub  No gallop  Pulmonary:      Effort: Pulmonary effort is normal  No respiratory distress  Breath sounds: Normal breath sounds  No wheezing or rales  Chest:      Chest wall: No tenderness  Abdominal:      General: Bowel sounds are normal  There is no distension  Palpations: Abdomen is soft  Tenderness: There is no abdominal tenderness  Comments: Aorta not palpable    Musculoskeletal:         General: No tenderness  Normal range of motion  Cervical back: Normal range of motion and neck supple  Right lower leg: No edema  Left lower leg: No edema  Comments: Uses the assistance of a cane to ambulate   Skin:     General: Skin is warm and dry  Coloration: Skin is not pale  Findings: No erythema or rash  Neurological:      General: No focal deficit present  Mental Status: She is alert and oriented to person, place, and time     Psychiatric:         Mood and Affect: Mood normal          Behavior: Behavior normal          Judgment: Judgment normal          Lab Review:   No results found for: CHOL  Lab Results   Component Value Date    HDL 94 12/21/2022     Lab Results   Component Value Date    LDLCALC 60 12/21/2022     Lab Results   Component Value Date    TRIG 50 12/21/2022     Results Reviewed     None        Results Reviewed     None        Results Reviewed     None          Recent Cardiovascular Testing:   Echo 1/18/2023: Normal LVEF 60% severely dilated left atrium moderately dilated right atrium,There is an Conteh SHAVONNE 3 Ultra 23 mm TAVR bioprosthetic valve  The prosthetic valve appears to be functioning normally  There is trace regurgitation  There is trace paravalvular regurgitation  The aortic valve has no significant stenosis  The aortic valve mean gradient is 14 0 mmHg  The aortic valve area is 1 14 cm2  Moderate MAC mild to moderate MR moderate TR mild to moderate NH    Echo 12/22/22: Left Ventricle: Left ventricular cavity size is normal  Wall thickness is mildly increased  There is mild concentric hypertrophy  LVMI (LV Mass Indexed to Body Surface Area) = 104 11 g/m2 The left ventricular ejection fraction is 50%  Systolic function is low normal  Wall motion is normal   •  Right Ventricle: Right ventricular cavity size is mildly dilated  Systolic function is mildly reduced  •  Left Atrium: The atrium is severely dilated (>48 mL/m2)  •  Right Atrium: The atrium is mildly dilated  •  Atrial Septum: There is a small patent foramen ovale confirmed at rest with predominant left to right shunting using color Doppler  The septum bows into the right atrium, suggesting increased left atrial pressure  •  Aortic Valve: The aortic valve is trileaflet  The leaflets are moderately thickened  The leaflets are moderately calcified  There is severely reduced mobility  There is mild regurgitation  The pressure half time is 520 when averaged over 3 beats  The aortic valve peak velocity is 2 94 m/s  The aortic valve mean gradient is 24 mmHg  The dimensionless velocity index is 0 22  The aortic valve area is 0 50 cm2  The aortic valve has paradoxical low flow low gradient severe AS  •  Mitral Valve: There is mild calcification with moderate chordal involvement  There is moderate annular calcification  There is moderate regurgitation  •  Tricuspid Valve: There is moderate regurgitation  The right ventricular systolic pressure is severely elevated  The estimated right ventricular systolic pressure is 88 94 mmHg  •  Pulmonic Valve:  There is mild regurgitation  •  Pericardium: There is a moderately sized left pleural effusion        ECG Review:   12/23/22:Atrial flutter Aberrant conduction  Left axis deviation  Septal infarct (cited on or before 20-DEC-2022)

## 2023-01-26 NOTE — ASSESSMENT & PLAN NOTE
Afib/aflutter   Now controlled on metoprolol 75 mg daily and diltiazem 120 mg daily   On Eliquis 2 5 mg BID for stoke risk prevention

## 2023-01-26 NOTE — ASSESSMENT & PLAN NOTE
Single-vessel CAD, cardiac cath 1/11/2023: 75% RCA (ELODIA)  Medical management  Clopidogrel  No aspirin as she is on apixaban  Continue metoprolol atorvastatin and spironolactone

## 2023-01-26 NOTE — PROGRESS NOTES
Outpatient Care Management Note:  Call placed to [facility] after recent TAVR  Spoke with both [facility] and her , Codie Mccain  Overall [facility] is doing well  Her only complaint is feeling weak  She is dong her exercises recommended by PT  Discussed that she has been through a lot in the last month and it will take some time to regain her strength  Verbalizes understanding  She is ambulating with assistance and has no shortness of breath, chest pain or palpitations  Denies any lower extremity or abdominal swelling  Left groin site is bruised but healing well  [facility] is being weighed daily and weight has been stable  Remains active with [facility]  Denies any needs at this time  Encouraged to call with any questions or concerns

## 2023-01-26 NOTE — ASSESSMENT & PLAN NOTE
Wt Readings from Last 3 Encounters:   01/26/23 46 1 kg (101 lb 11 2 oz)   01/18/23 46 3 kg (102 lb)   01/12/23 48 5 kg (107 lb)   Volume stable   EF 45-50% 12/22/22  Continue current medical management  On lasix and metoprolol

## 2023-01-27 ENCOUNTER — RA CDI HCC (OUTPATIENT)
Dept: OTHER | Facility: HOSPITAL | Age: 88
End: 2023-01-27

## 2023-01-27 ENCOUNTER — HOME CARE VISIT (OUTPATIENT)
Dept: HOME HEALTH SERVICES | Facility: HOME HEALTHCARE | Age: 88
End: 2023-01-27

## 2023-01-27 VITALS
TEMPERATURE: 97.7 F | SYSTOLIC BLOOD PRESSURE: 100 MMHG | DIASTOLIC BLOOD PRESSURE: 52 MMHG | OXYGEN SATURATION: 99 % | HEART RATE: 54 BPM | RESPIRATION RATE: 18 BRPM

## 2023-01-27 NOTE — PROGRESS NOTES
[id number]  [facility]  coding opportunities          Chart Reviewed number of suggestions sent to Provider: 1     Patients Insurance     Medicare Insurance: [employer]

## 2023-01-30 ENCOUNTER — OFFICE VISIT (OUTPATIENT)
Dept: FAMILY MEDICINE CLINIC | Facility: CLINIC | Age: 88
End: 2023-01-30

## 2023-01-30 VITALS
SYSTOLIC BLOOD PRESSURE: 134 MMHG | TEMPERATURE: 97 F | OXYGEN SATURATION: 98 % | WEIGHT: 104.3 LBS | DIASTOLIC BLOOD PRESSURE: 62 MMHG | HEIGHT: 58 IN | HEART RATE: 67 BPM | BODY MASS INDEX: 21.89 KG/M2

## 2023-01-30 DIAGNOSIS — D69.6 THROMBOCYTOPENIA (HCC): ICD-10-CM

## 2023-01-30 DIAGNOSIS — D64.9 ANEMIA, UNSPECIFIED TYPE: ICD-10-CM

## 2023-01-30 DIAGNOSIS — I48.92 ATRIAL FLUTTER, UNSPECIFIED TYPE (HCC): ICD-10-CM

## 2023-01-30 DIAGNOSIS — J96.01 ACUTE RESPIRATORY FAILURE WITH HYPOXIA (HCC): ICD-10-CM

## 2023-01-30 DIAGNOSIS — I50.33 ACUTE ON CHRONIC DIASTOLIC CHF (CONGESTIVE HEART FAILURE) (HCC): ICD-10-CM

## 2023-01-30 DIAGNOSIS — I50.32 CHRONIC DIASTOLIC CONGESTIVE HEART FAILURE (HCC): ICD-10-CM

## 2023-01-30 DIAGNOSIS — I47.1 ATRIAL TACHYCARDIA (HCC): ICD-10-CM

## 2023-01-30 DIAGNOSIS — F41.1 GENERALIZED ANXIETY DISORDER: ICD-10-CM

## 2023-01-30 DIAGNOSIS — I35.0 SEVERE AORTIC STENOSIS: Primary | ICD-10-CM

## 2023-01-30 DIAGNOSIS — Z95.2 S/P TAVR (TRANSCATHETER AORTIC VALVE REPLACEMENT): ICD-10-CM

## 2023-01-30 DIAGNOSIS — E87.6 HYPOKALEMIA: ICD-10-CM

## 2023-01-30 DIAGNOSIS — Z95.5 STATUS POST INSERTION OF DRUG ELUTING CORONARY ARTERY STENT: ICD-10-CM

## 2023-01-30 DIAGNOSIS — I25.10 CORONARY ARTERY DISEASE INVOLVING NATIVE CORONARY ARTERY OF NATIVE HEART WITHOUT ANGINA PECTORIS: ICD-10-CM

## 2023-01-30 RX ORDER — METOPROLOL SUCCINATE 50 MG/1
50 TABLET, EXTENDED RELEASE ORAL DAILY
Qty: 135 TABLET | Refills: 3
Start: 2023-01-30

## 2023-01-30 RX ORDER — AMMONIUM LACTATE 12 G/100G
LOTION TOPICAL
COMMUNITY
Start: 2023-01-24

## 2023-01-30 RX ORDER — FUROSEMIDE 40 MG/1
20 TABLET ORAL DAILY
Qty: 30 TABLET | Refills: 1
Start: 2023-01-30 | End: 2023-02-08 | Stop reason: SDUPTHER

## 2023-01-30 NOTE — ASSESSMENT & PLAN NOTE
Wt Readings from Last 3 Encounters:   01/30/23 47 3 kg (104 lb 4 8 oz)   01/26/23 46 1 kg (101 lb 11 2 oz)   01/18/23 46 3 kg (102 lb)         The patient has chronic congestive heart failure  On recent echocardiogram, she had normal left ventricular systolic function  She has absolutely no edema on exam today  As such, I decreased her Lasix to 20 mg once a day and discontinued her potassium chloride  I asked the patient to continue to check her weight daily and to follow a low-sodium diet  I told her not to worry about the low-sodium diet  She should just stay away from food that has too much salt in it already and do not add extra to her diet

## 2023-01-30 NOTE — ASSESSMENT & PLAN NOTE
I reviewed her discharge summary from her January 11 visit as well as her January 17 admission  I was able to review her labs from these admissions as well as diagnostic studies  Of note, I reviewed and discussed with the patient her echocardiogram from January 18  Pt notified of surgical consult appointment with requested surgeon, Dr MICHELLE Butt on 3.3.20 @ [id number]. Told to bring photo ID, list of prescription & OTC medications and insurance information. Pt given office contact information. Encouraged pt to call back or contact surgeon's office with further questions. Verbalized understanding.

## 2023-01-30 NOTE — ASSESSMENT & PLAN NOTE
Currently stable on alprazolam 0 25 mg as needed  She has some mild depression symptoms right now  She is just a bit overwhelmed with everything  We discussed it  She does not think she requires any treatment

## 2023-01-30 NOTE — ASSESSMENT & PLAN NOTE
Patient is status post PCI and ELODIA to the right coronary artery  I told her she will likely need to be on the Plavix for a year  Continue statin therapy with atorvastatin 20 mg daily  I do note that her LDL cholesterols have been consistently less than 70

## 2023-01-30 NOTE — ASSESSMENT & PLAN NOTE
Patient had atrial flutter during both hospital admissions  On exam today, she appears to be in a sinus rhythm  I am concerned because her blood pressure is low  I checked her blood pressure myself and found her blood pressure to be 92/60  She was low when she saw cardiology as well  I reviewed a visit from home health care  Blood pressure was also low then  As such, today, I decreased the patient's metoprolol to 50 mg once a day

## 2023-01-30 NOTE — ASSESSMENT & PLAN NOTE
I ordered a BMP from home health care which she can have done in 1 week  We need to follow her potassium as well as renal function

## 2023-01-30 NOTE — ASSESSMENT & PLAN NOTE
Pulse ox has been running normal   Patient has oxygen at home but has not been using it  Her significant other is checking her pulse ox regularly and finds it is averaging 100% on room air  I told her I think it would be okay to send the oxygen back

## 2023-01-30 NOTE — PROGRESS NOTES
Assessment & Plan     1  Severe aortic stenosis  Assessment & Plan:  I reviewed her discharge summary from her January 11 visit as well as her January 17 admission  I was able to review her labs from these admissions as well as diagnostic studies  Of note, I reviewed and discussed with the patient her echocardiogram from January 18       2  Atrial tachycardia (HCC)  -     metoprolol succinate (TOPROL-XL) 50 mg 24 hr tablet; Take 1 tablet (50 mg total) by mouth daily    3  Thrombocytopenia ([address] )    4  Acute on chronic diastolic CHF (congestive heart failure) (HCC)  -     furosemide (LASIX) 40 mg tablet; Take 0 5 tablets (20 mg total) by mouth daily    5  Hypokalemia  Assessment & Plan:  I ordered a BMP from home health care which she can have done in 1 week  We need to follow her potassium as well as renal function  Orders:  -     Basic metabolic panel; Future; Expected date: 02/06/2023    6  Anemia, unspecified type  Assessment & Plan:  I ordered a repeat CBC with through home health care as well  Orders:  -     CBC and differential; Future; Expected date: 02/06/2023    7  Atrial flutter, unspecified type [facility])  Assessment & Plan:  Patient had atrial flutter during both hospital admissions  On exam today, she appears to be in a sinus rhythm  I am concerned because her blood pressure is low  I checked her blood pressure myself and found her blood pressure to be 92/60  She was low when she saw cardiology as well  I reviewed a visit from home health care  Blood pressure was also low then  As such, today, I decreased the patient's metoprolol to 50 mg once a day  8  Acute respiratory failure with hypoxia (HCC)  Assessment & Plan:  Pulse ox has been running normal   Patient has oxygen at home but has not been using it  Her significant other is checking her pulse ox regularly and finds it is averaging 100% on room air  I told her I think it would be okay to send the oxygen back        9  Chronic diastolic congestive heart failure [facility])  Assessment & Plan:  Wt Readings from Last 3 Encounters:   01/30/23 47 3 kg (104 lb 4 8 oz)   01/26/23 46 1 kg (101 lb 11 2 oz)   01/18/23 46 3 kg (102 lb)         The patient has chronic congestive heart failure  On recent echocardiogram, she had normal left ventricular systolic function  She has absolutely no edema on exam today  As such, I decreased her Lasix to 20 mg once a day and discontinued her potassium chloride  I asked the patient to continue to check her weight daily and to follow a low-sodium diet  I told her not to worry about the low-sodium diet  She should just stay away from food that has too much salt in it already and do not add extra to her diet  10  Coronary artery disease involving native coronary artery of native heart without angina pectoris  Assessment & Plan:  Patient is status post PCI and ELODIA to the right coronary artery  I told her she will likely need to be on the Plavix for a year  Continue statin therapy with atorvastatin 20 mg daily  I do note that her LDL cholesterols have been consistently less than 70  11  Generalized anxiety disorder  Assessment & Plan:  Currently stable on alprazolam 0 25 mg as needed  She has some mild depression symptoms right now  She is just a bit overwhelmed with everything  We discussed it  She does not think she requires any treatment  12  Status post insertion of drug eluting coronary artery stent    13  S/P TAVR (transcatheter aortic valve replacement)       Subjective     Transitional Care Management Review:   Geno Jones is a 80 y o  female here for TCM follow up       During the TCM phone call patient stated:  TCM Call     Date and time call was made  1/19/2023 11:05 AM    Hospital care reviewed  Records reviewed    Patient was hospitialized at  [facility]    Date of Admission  01/17/23    Date of discharge  01/18/23    Diagnosis  S/P TAVR (transcatheter aortic valve replacement)    Disposition  Home    Were the patients medications reviewed and updated  No    Current Symptoms  None      TCM Call     Post hospital issues  Reduced activity    Should patient be enrolled in anticoag monitoring? No    Scheduled for follow up? Yes    Patients specialists  Cardiologist    Did you obtain your prescribed medications  Yes    Do you need help managing your prescriptions or medications  No    Is transportation to your appointment needed  Yes    Specify why  PT DOES NOT DRIVE    I have advised the patient to call PCP with any new or worsening symptoms  Kary SENIOR  This is a 44-year-old white female who presents to the office today as a transition of care management evaluation  The patient was admitted to the hospital January 11 and then discharged on January 12  She had coronary catheterization done at that time  She underwent PCI and drug-eluting stent to the right coronary artery  Afterwards, she had atrial flutter with a rapid ventricular response  She has been started on Plavix and Eliquis  She was readmitted to the hospital in January 17 and discharged on January 18  She underwent TAVR  A postop EKG again confirmed atrial flutter with PVCs and PACs  The patient is feeling well  She denies any shortness of breath  She is a little overwhelmed about what she is permitted to eat  She is trying to follow a low-sodium diet  Review of Systems   Constitutional: Negative for activity change and appetite change  Respiratory: Negative for cough and shortness of breath  Cardiovascular: Negative for chest pain, palpitations and leg swelling  Gastrointestinal: Negative for abdominal distention, abdominal pain, blood in stool, constipation, diarrhea and nausea  Hematological: Bruises/bleeds easily  Psychiatric/Behavioral: Positive for dysphoric mood  The patient is nervous/anxious          Objective     /62   Pulse 67   Temp (!) 97 °F (36 1 °C) (Temporal)   Ht 4' 10" (1 473 m)   Wt 47 3 kg (104 lb 4 8 oz)   SpO2 98%   BMI 21 80 kg/m²      Physical Exam  Vitals reviewed  Constitutional:       Comments: This is an 49-year-old white female who appears her stated age  She is pleasant, cooperative, and in no distress   HENT:      Head: Normocephalic and atraumatic  Right Ear: Tympanic membrane, ear canal and external ear normal  There is no impacted cerumen  Left Ear: Tympanic membrane, ear canal and external ear normal  There is no impacted cerumen  Mouth/Throat:      Mouth: Mucous membranes are moist       Pharynx: Oropharynx is clear  No oropharyngeal exudate or posterior oropharyngeal erythema  Eyes:      General: No scleral icterus  Right eye: No discharge  Left eye: No discharge  Conjunctiva/sclera: Conjunctivae normal       Pupils: Pupils are equal, round, and reactive to light  Neck:      Vascular: No carotid bruit  Comments: No thyromegaly  Cardiovascular:      Rate and Rhythm: Normal rate and regular rhythm  Heart sounds: Normal heart sounds  No murmur heard  No friction rub  No gallop  Comments: Carotid upstroke was brisk  Pulmonary:      Effort: Pulmonary effort is normal  No respiratory distress  Breath sounds: Normal breath sounds  No stridor  No wheezing, rhonchi or rales  Abdominal:      General: Bowel sounds are normal  There is no distension  Palpations: Abdomen is soft  There is no mass  Tenderness: There is no abdominal tenderness  There is no guarding  Comments: Examined seated in the exam room chair due to ambulatory dysfunction   Musculoskeletal:      Cervical back: Neck supple  Lymphadenopathy:      Cervical: No cervical adenopathy  Psychiatric:         Mood and Affect: Mood normal          Behavior: Behavior normal          Thought Content:  Thought content normal          Judgment: Judgment normal        Medications have been reviewed by provider in current encounter  Extremities: Without cyanosis, clubbing, or edema    Katie Nuñez, DO

## 2023-01-31 ENCOUNTER — HOME CARE VISIT (OUTPATIENT)
Dept: HOME HEALTH SERVICES | Facility: HOME HEALTHCARE | Age: 88
End: 2023-01-31

## 2023-01-31 ENCOUNTER — TELEPHONE (OUTPATIENT)
Dept: FAMILY MEDICINE CLINIC | Facility: CLINIC | Age: 88
End: 2023-01-31

## 2023-01-31 VITALS
OXYGEN SATURATION: 98 % | SYSTOLIC BLOOD PRESSURE: 104 MMHG | DIASTOLIC BLOOD PRESSURE: 58 MMHG | RESPIRATION RATE: 16 BRPM | HEART RATE: 64 BPM | TEMPERATURE: 97.8 F

## 2023-01-31 DIAGNOSIS — J96.01 ACUTE RESPIRATORY FAILURE WITH HYPOXIA (HCC): Primary | ICD-10-CM

## 2023-01-31 NOTE — TELEPHONE ENCOUNTER
Hi, this is Roslyn Camara with [facility] calling  In regard to Vertie Mcburney, a date of birth is [id number]  As per her visit with Doctor Wali Mckay yesterday, he said that she can discontinue her oxygen [facility]'s medical needs  An order stating that faxed to [phone] and if you could call me and let me know that you got this message and that was taken care of, my number is [phone]  Thank you  Goodbye

## 2023-02-01 ENCOUNTER — HOME CARE VISIT (OUTPATIENT)
Dept: HOME HEALTH SERVICES | Facility: HOME HEALTHCARE | Age: 88
End: 2023-02-01

## 2023-02-01 DIAGNOSIS — Z95.2 S/P TAVR (TRANSCATHETER AORTIC VALVE REPLACEMENT): ICD-10-CM

## 2023-02-01 DIAGNOSIS — I35.9 NONRHEUMATIC AORTIC VALVE DISORDER: ICD-10-CM

## 2023-02-01 DIAGNOSIS — I35.0 SEVERE AORTIC STENOSIS: ICD-10-CM

## 2023-02-01 RX ORDER — PANTOPRAZOLE SODIUM 40 MG/1
40 TABLET, DELAYED RELEASE ORAL
Qty: 30 TABLET | Refills: 2 | Status: SHIPPED | OUTPATIENT
Start: 2023-02-01 | End: 2023-05-16

## 2023-02-01 NOTE — CASE COMMUNICATION
D/c home PT on 2/1/23 per pt as she met her goals  Pt to go to cardiac rehab in near future  Pt questioning about bleeding precautions- reports had some blood on tissue - dry air  phys ther reviewed bleeding precautions with pt       Thank you, Lloyd Mcfarland PT

## 2023-02-02 ENCOUNTER — LAB REQUISITION (OUTPATIENT)
Dept: LAB | Facility: HOSPITAL | Age: 88
End: 2023-02-02

## 2023-02-02 ENCOUNTER — HOME CARE VISIT (OUTPATIENT)
Dept: HOME HEALTH SERVICES | Facility: HOME HEALTHCARE | Age: 88
End: 2023-02-02

## 2023-02-02 VITALS
SYSTOLIC BLOOD PRESSURE: 118 MMHG | TEMPERATURE: 97.3 F | OXYGEN SATURATION: 100 % | DIASTOLIC BLOOD PRESSURE: 62 MMHG | HEART RATE: 52 BPM | RESPIRATION RATE: 16 BRPM

## 2023-02-02 VITALS — HEART RATE: 62 BPM | SYSTOLIC BLOOD PRESSURE: 130 MMHG | DIASTOLIC BLOOD PRESSURE: 60 MMHG | OXYGEN SATURATION: 99 %

## 2023-02-02 DIAGNOSIS — E87.6 HYPOKALEMIA: ICD-10-CM

## 2023-02-02 DIAGNOSIS — D64.9 ANEMIA, UNSPECIFIED: ICD-10-CM

## 2023-02-02 LAB
ANION GAP SERPL CALCULATED.3IONS-SCNC: 8 MMOL/L (ref 4–13)
BASOPHILS # BLD AUTO: 0.02 THOUSANDS/ÂΜL (ref 0–0.1)
BASOPHILS NFR BLD AUTO: 0 % (ref 0–1)
BUN SERPL-MCNC: 36 MG/DL (ref 5–25)
CALCIUM SERPL-MCNC: 9.1 MG/DL (ref 8.4–10.2)
CHLORIDE SERPL-SCNC: 90 MMOL/L (ref 96–108)
CO2 SERPL-SCNC: 26 MMOL/L (ref 21–32)
CREAT SERPL-MCNC: 1.3 MG/DL (ref 0.6–1.3)
EOSINOPHIL # BLD AUTO: 0.11 THOUSAND/ÂΜL (ref 0–0.61)
EOSINOPHIL NFR BLD AUTO: 2 % (ref 0–6)
ERYTHROCYTE [DISTWIDTH] IN BLOOD BY AUTOMATED COUNT: 13.6 % (ref 11.6–15.1)
GFR SERPL CREATININE-BSD FRML MDRD: 36 ML/MIN/1.73SQ M
GLUCOSE SERPL-MCNC: 97 MG/DL (ref 65–140)
HCT VFR BLD AUTO: 35.3 % (ref 34.8–46.1)
HGB BLD-MCNC: 11.8 G/DL (ref 11.5–15.4)
IMM GRANULOCYTES # BLD AUTO: 0.02 THOUSAND/UL (ref 0–0.2)
IMM GRANULOCYTES NFR BLD AUTO: 0 % (ref 0–2)
LYMPHOCYTES # BLD AUTO: 0.99 THOUSANDS/ÂΜL (ref 0.6–4.47)
LYMPHOCYTES NFR BLD AUTO: 20 % (ref 14–44)
MCH RBC QN AUTO: 32.3 PG (ref 26.8–34.3)
MCHC RBC AUTO-ENTMCNC: 33.4 G/DL (ref 31.4–37.4)
MCV RBC AUTO: 97 FL (ref 82–98)
MONOCYTES # BLD AUTO: 0.57 THOUSAND/ÂΜL (ref 0.17–1.22)
MONOCYTES NFR BLD AUTO: 11 % (ref 4–12)
NEUTROPHILS # BLD AUTO: 3.28 THOUSANDS/ÂΜL (ref 1.85–7.62)
NEUTS SEG NFR BLD AUTO: 67 % (ref 43–75)
NRBC BLD AUTO-RTO: 0 /100 WBCS
PLATELET # BLD AUTO: 159 THOUSANDS/UL (ref 149–390)
PMV BLD AUTO: 8.8 FL (ref 8.9–12.7)
POTASSIUM SERPL-SCNC: 4 MMOL/L (ref 3.5–5.3)
RBC # BLD AUTO: 3.65 MILLION/UL (ref 3.81–5.12)
SODIUM SERPL-SCNC: 124 MMOL/L (ref 135–147)
WBC # BLD AUTO: 4.99 THOUSAND/UL (ref 4.31–10.16)

## 2023-02-06 ENCOUNTER — HOME CARE VISIT (OUTPATIENT)
Dept: HOME HEALTH SERVICES | Facility: HOME HEALTHCARE | Age: 88
End: 2023-02-06

## 2023-02-06 DIAGNOSIS — E87.1 HYPONATREMIA: Primary | ICD-10-CM

## 2023-02-07 ENCOUNTER — LAB REQUISITION (OUTPATIENT)
Dept: LAB | Facility: HOSPITAL | Age: 88
End: 2023-02-07

## 2023-02-07 ENCOUNTER — HOME CARE VISIT (OUTPATIENT)
Dept: HOME HEALTH SERVICES | Facility: HOME HEALTHCARE | Age: 88
End: 2023-02-07

## 2023-02-07 VITALS
DIASTOLIC BLOOD PRESSURE: 58 MMHG | RESPIRATION RATE: 18 BRPM | OXYGEN SATURATION: 100 % | HEART RATE: 52 BPM | SYSTOLIC BLOOD PRESSURE: 110 MMHG | TEMPERATURE: 97.8 F

## 2023-02-07 DIAGNOSIS — I47.1 ATRIAL TACHYCARDIA (HCC): ICD-10-CM

## 2023-02-07 DIAGNOSIS — E87.1 HYPO-OSMOLALITY AND HYPONATREMIA: ICD-10-CM

## 2023-02-07 DIAGNOSIS — F41.1 GENERALIZED ANXIETY DISORDER: Primary | ICD-10-CM

## 2023-02-07 DIAGNOSIS — Z95.820 S/P ANGIOPLASTY WITH STENT: ICD-10-CM

## 2023-02-07 DIAGNOSIS — E05.90 HYPERTHYROIDISM: Primary | ICD-10-CM

## 2023-02-07 LAB
ANION GAP SERPL CALCULATED.3IONS-SCNC: 9 MMOL/L (ref 4–13)
BUN SERPL-MCNC: 27 MG/DL (ref 5–25)
CALCIUM SERPL-MCNC: 8.9 MG/DL (ref 8.4–10.2)
CHLORIDE SERPL-SCNC: 92 MMOL/L (ref 96–108)
CO2 SERPL-SCNC: 24 MMOL/L (ref 21–32)
CREAT SERPL-MCNC: 1.19 MG/DL (ref 0.6–1.3)
GFR SERPL CREATININE-BSD FRML MDRD: 41 ML/MIN/1.73SQ M
GLUCOSE SERPL-MCNC: 125 MG/DL (ref 65–140)
POTASSIUM SERPL-SCNC: 4.3 MMOL/L (ref 3.5–5.3)
SODIUM SERPL-SCNC: 125 MMOL/L (ref 135–147)

## 2023-02-07 RX ORDER — ATORVASTATIN CALCIUM 20 MG/1
20 TABLET, FILM COATED ORAL
Qty: 30 TABLET | Refills: 4 | Status: SHIPPED | OUTPATIENT
Start: 2023-02-07

## 2023-02-07 RX ORDER — ALPRAZOLAM 0.25 MG/1
0.25 TABLET ORAL 3 TIMES DAILY PRN
Qty: 90 TABLET | Refills: 5 | Status: SHIPPED | OUTPATIENT
Start: 2023-02-07

## 2023-02-07 RX ORDER — DILTIAZEM HYDROCHLORIDE 120 MG/1
120 CAPSULE, COATED, EXTENDED RELEASE ORAL DAILY
Qty: 30 CAPSULE | Refills: 1 | Status: SHIPPED | OUTPATIENT
Start: 2023-02-07

## 2023-02-08 DIAGNOSIS — Z95.820 S/P ANGIOPLASTY WITH STENT: ICD-10-CM

## 2023-02-08 DIAGNOSIS — I50.33 ACUTE ON CHRONIC DIASTOLIC CHF (CONGESTIVE HEART FAILURE) (HCC): ICD-10-CM

## 2023-02-08 RX ORDER — CLOPIDOGREL BISULFATE 75 MG/1
75 TABLET ORAL DAILY
Qty: 90 TABLET | Refills: 2 | Status: SHIPPED | OUTPATIENT
Start: 2023-02-08

## 2023-02-08 RX ORDER — FUROSEMIDE 40 MG/1
20 TABLET ORAL DAILY
Qty: 30 TABLET | Refills: 1 | Status: SHIPPED | OUTPATIENT
Start: 2023-02-08

## 2023-02-08 RX ORDER — SPIRONOLACTONE 25 MG/1
25 TABLET ORAL DAILY
Qty: 30 TABLET | Refills: 1 | Status: SHIPPED | OUTPATIENT
Start: 2023-02-08

## 2023-02-09 ENCOUNTER — PATIENT OUTREACH (OUTPATIENT)
Dept: FAMILY MEDICINE CLINIC | Facility: CLINIC | Age: 88
End: 2023-02-09

## 2023-02-09 NOTE — PROGRESS NOTES
Outpatient Care Management Note:  Follow up call placed to [facility]  Spoke with her , Michelle Del Rosario, as [facility] was resting  She continues to do well  She is weighing herself daily and her weight has been stable at 100-102 pounds  No swelling or shortness of breath  Her Lasix, Potassium and oxygen were discontinued by her PCP  [facility] was discharged from home health on 2/7/2023, she will begin cardiac rehab on 3/1/2023  She is scheduled for a repeat echocardiogram on 2/15/2023  Michelle Del Rosario does not feel they have any needs at this time  Encouraged to call with any questions or concerns

## 2023-02-13 DIAGNOSIS — E87.1 HYPONATREMIA: Primary | ICD-10-CM

## 2023-02-14 ENCOUNTER — APPOINTMENT (OUTPATIENT)
Dept: LAB | Facility: HOSPITAL | Age: 88
End: 2023-02-14

## 2023-02-14 DIAGNOSIS — D64.9 ANEMIA, UNSPECIFIED TYPE: ICD-10-CM

## 2023-02-14 DIAGNOSIS — E05.90 HYPERTHYROIDISM: ICD-10-CM

## 2023-02-14 DIAGNOSIS — E87.1 HYPONATREMIA: ICD-10-CM

## 2023-02-14 DIAGNOSIS — Z95.2 S/P TAVR (TRANSCATHETER AORTIC VALVE REPLACEMENT): ICD-10-CM

## 2023-02-14 DIAGNOSIS — I35.0 SEVERE AORTIC STENOSIS: ICD-10-CM

## 2023-02-14 LAB
ANION GAP SERPL CALCULATED.3IONS-SCNC: 10 MMOL/L (ref 4–13)
BASOPHILS # BLD AUTO: 0.02 THOUSANDS/ÂΜL (ref 0–0.1)
BASOPHILS NFR BLD AUTO: 0 % (ref 0–1)
BUN SERPL-MCNC: 36 MG/DL (ref 5–25)
CALCIUM SERPL-MCNC: 9.5 MG/DL (ref 8.4–10.2)
CHLORIDE SERPL-SCNC: 95 MMOL/L (ref 96–108)
CO2 SERPL-SCNC: 24 MMOL/L (ref 21–32)
CREAT SERPL-MCNC: 1.44 MG/DL (ref 0.6–1.3)
EOSINOPHIL # BLD AUTO: 0.03 THOUSAND/ÂΜL (ref 0–0.61)
EOSINOPHIL NFR BLD AUTO: 1 % (ref 0–6)
ERYTHROCYTE [DISTWIDTH] IN BLOOD BY AUTOMATED COUNT: 13.7 % (ref 11.6–15.1)
GFR SERPL CREATININE-BSD FRML MDRD: 32 ML/MIN/1.73SQ M
GLUCOSE P FAST SERPL-MCNC: 99 MG/DL (ref 65–99)
HCT VFR BLD AUTO: 35.5 % (ref 34.8–46.1)
HGB BLD-MCNC: 11.6 G/DL (ref 11.5–15.4)
IMM GRANULOCYTES # BLD AUTO: 0.01 THOUSAND/UL (ref 0–0.2)
IMM GRANULOCYTES NFR BLD AUTO: 0 % (ref 0–2)
LYMPHOCYTES # BLD AUTO: 1.15 THOUSANDS/ÂΜL (ref 0.6–4.47)
LYMPHOCYTES NFR BLD AUTO: 26 % (ref 14–44)
MCH RBC QN AUTO: 32 PG (ref 26.8–34.3)
MCHC RBC AUTO-ENTMCNC: 32.7 G/DL (ref 31.4–37.4)
MCV RBC AUTO: 98 FL (ref 82–98)
MONOCYTES # BLD AUTO: 0.57 THOUSAND/ÂΜL (ref 0.17–1.22)
MONOCYTES NFR BLD AUTO: 13 % (ref 4–12)
NEUTROPHILS # BLD AUTO: 2.68 THOUSANDS/ÂΜL (ref 1.85–7.62)
NEUTS SEG NFR BLD AUTO: 60 % (ref 43–75)
NRBC BLD AUTO-RTO: 0 /100 WBCS
PLATELET # BLD AUTO: 150 THOUSANDS/UL (ref 149–390)
PMV BLD AUTO: 8.6 FL (ref 8.9–12.7)
POTASSIUM SERPL-SCNC: 3.9 MMOL/L (ref 3.5–5.3)
RBC # BLD AUTO: 3.63 MILLION/UL (ref 3.81–5.12)
SODIUM SERPL-SCNC: 129 MMOL/L (ref 135–147)
T3 SERPL-MCNC: 0.8 NG/ML (ref 0.6–1.8)
T4 FREE SERPL-MCNC: 1.03 NG/DL (ref 0.76–1.46)
TSH SERPL DL<=0.05 MIU/L-ACNC: 2.11 UIU/ML (ref 0.45–4.5)
WBC # BLD AUTO: 4.46 THOUSAND/UL (ref 4.31–10.16)

## 2023-02-15 ENCOUNTER — OFFICE VISIT (OUTPATIENT)
Dept: CARDIAC SURGERY | Facility: CLINIC | Age: 88
End: 2023-02-15

## 2023-02-15 ENCOUNTER — HOSPITAL ENCOUNTER (OUTPATIENT)
Dept: NON INVASIVE DIAGNOSTICS | Facility: HOSPITAL | Age: 88
Discharge: HOME | End: 2023-02-15

## 2023-02-15 VITALS
HEART RATE: 54 BPM | DIASTOLIC BLOOD PRESSURE: 58 MMHG | BODY MASS INDEX: 21.83 KG/M2 | HEIGHT: 58 IN | WEIGHT: 104 LBS | SYSTOLIC BLOOD PRESSURE: 110 MMHG

## 2023-02-15 VITALS
TEMPERATURE: 96.6 F | WEIGHT: 105.4 LBS | DIASTOLIC BLOOD PRESSURE: 70 MMHG | SYSTOLIC BLOOD PRESSURE: 115 MMHG | BODY MASS INDEX: 22.13 KG/M2 | OXYGEN SATURATION: 99 % | HEIGHT: 58 IN

## 2023-02-15 DIAGNOSIS — Z95.2 S/P TAVR (TRANSCATHETER AORTIC VALVE REPLACEMENT): ICD-10-CM

## 2023-02-15 DIAGNOSIS — Z95.2 S/P TAVR (TRANSCATHETER AORTIC VALVE REPLACEMENT): Primary | ICD-10-CM

## 2023-02-15 DIAGNOSIS — I35.0 SEVERE AORTIC STENOSIS: ICD-10-CM

## 2023-02-15 LAB
AORTIC ROOT: 2.8 CM
AORTIC VALVE MEAN VELOCITY: 15.4 M/S
APICAL FOUR CHAMBER EJECTION FRACTION: 54 %
ASCENDING AORTA: 2.6 CM
AV AREA BY CONTINUOUS VTI: 0.9 CM2
AV AREA PEAK VELOCITY: 1.1 CM2
AV LVOT MEAN GRADIENT: 1 MMHG
AV LVOT PEAK GRADIENT: 3 MMHG
AV MEAN GRADIENT: 10 MMHG
AV PEAK GRADIENT: 17 MMHG
AV VALVE AREA: 1.44 CM2
AV VELOCITY RATIO: 0.42
DOP CALC AO PEAK VEL: 2.07 M/S
DOP CALC AO VTI: 54.73 CM
DOP CALC LVOT AREA: 4.15 CM2
DOP CALC LVOT DIAMETER: 2.3 CM
DOP CALC LVOT PEAK VEL VTI: 19.03 CM
DOP CALC LVOT PEAK VEL: 0.86 M/S
DOP CALC LVOT STROKE INDEX: 36.2 ML/M2
DOP CALC LVOT STROKE VOLUME: 79.02 CM3
FRACTIONAL SHORTENING: 33 % (ref 28–44)
INTERVENTRICULAR SEPTUM IN DIASTOLE (PARASTERNAL SHORT AXIS VIEW): 1.1 CM
INTERVENTRICULAR SEPTUM: 1.1 CM (ref 0.6–1.1)
IVC: 15 MM
LAAS-AP2: 33 CM2
LAAS-AP4: 27.5 CM2
LEFT ATRIUM SIZE: 4.7 CM
LEFT INTERNAL DIMENSION IN SYSTOLE: 2.8 CM (ref 2.1–4)
LEFT VENTRICULAR INTERNAL DIMENSION IN DIASTOLE: 4.2 CM (ref 3.5–6)
LEFT VENTRICULAR POSTERIOR WALL IN END DIASTOLE: 1.1 CM
LEFT VENTRICULAR STROKE VOLUME: 48 ML
LVSV (TEICH): 48 ML
RA PRESSURE ESTIMATED: 8 MMHG
RIGHT ATRIUM AREA SYSTOLE A4C: 12 CM2
RIGHT VENTRICLE ID DIMENSION: 4.3 CM
RV PSP: 47 MMHG
SL CV LEFT ATRIUM LENGTH A2C: 7.1 CM
SL CV LV EF: 60
SL CV PED ECHO LEFT VENTRICLE DIASTOLIC VOLUME (MOD BIPLANE) 2D: 77 ML
SL CV PED ECHO LEFT VENTRICLE SYSTOLIC VOLUME (MOD BIPLANE) 2D: 29 ML
TR MAX PG: 39 MMHG
TR PEAK VELOCITY: 3.1 M/S
TRICUSPID ANNULAR PLANE SYSTOLIC EXCURSION: 2.1 CM
TRICUSPID VALVE PEAK REGURGITATION VELOCITY: 3.12 M/S

## 2023-02-15 NOTE — LETTER
February 15, 2023     [address]  [address]  [facility] [address]    Patient: Karley Freed   YOB: 1935   Date of Visit: 2/15/2023       Dear Dr Mariela Gomez:    Thank you for referring Chicho Eid to me for evaluation  Below are my notes for this consultation  If you have questions, please do not hesitate to call me  I look forward to following your patient along with you  Sincerely,        Ankita Hudson DO        CC: No Recipients  Gaurang Tobin PA-C  2/15/2023  3:10 PM  Attested   POST OP FOLLOW UP VISIT S/P TAVR    Procedure: S/P transfemoral transcatheter aortic valve replacement, performed on 1/17/23  History: Kraley Freed is a 80y o  year old female who presents to our office today for routine follow up care from transfemoral transcatheter aortic valve replacement  Postoperatively, patient had a 9 beat run of Vtach that did not recur  Patient was discharged on postop day #1 on Plavix and Eliquis  She received home VNA and physical therapy  She has followed up with her PCP and cardiology, and has an appointment to start cardiac rehab on 3/1  Overall, patient feels quite well  Denies shortness of breath, chest pain/pressure, orthopnea, LE edema  She has been doing exercises recommended to her by her PT, and walks frequently with a walker       Review of System:     History obtained from chart review and the patient  General ROS: negative  Psychological ROS: negative  Ophthalmic ROS: negative  ENT ROS: negative  Allergy and Immunology ROS: negative  Hematological and Lymphatic ROS: negative  Endocrine ROS: negative  Respiratory ROS: no cough, shortness of breath, or wheezing  Cardiovascular ROS: no chest pain or dyspnea on exertion  Gastrointestinal ROS: no abdominal pain, change in bowel habits, or black or bloody stools  Genito-Urinary ROS: no dysuria, trouble voiding, or hematuria  Musculoskeletal ROS: negative  Neurological ROS: no TIA or stroke symptoms  Dermatological ROS: negative    Vital Signs:     Vitals:    02/15/23 1446   BP: 115/70   BP Location: Left arm   Patient Position: Sitting   Cuff Size: Standard   Temp: (!) 96 6 °F (35 9 °C)   SpO2: 99%   Weight: 47 8 kg (105 lb 6 4 oz)   Height: 4' 10" (1 473 m)       Home Medications:     Prior to Admission medications    Medication Sig Start Date End Date Taking? Authorizing Provider   ALPRAZolam Camryn Toney) 0 25 mg tablet Take 1 tablet (0 25 mg total) by mouth 3 (three) times a day as needed for anxiety 2/7/23  Yes Arely Live, DO   ammonium lactate (LAC-HYDRIN) 12 % lotion USE AS DIRECTED TO BOTH FEET DAILY 1/24/23  Yes Historical Provider, MD   apixaban (ELIQUIS) 2 5 mg Take 1 tablet (2 5 mg total) by mouth 2 (two) times a day 1/18/23  Yes Gray Cole PA-C   atorvastatin (LIPITOR) 20 mg tablet Take 1 tablet (20 mg total) by mouth daily with dinner 2/7/23  Yes Arely Live, DO   Cholecalciferol (Vitamin D3) 50 MCG (2000 UT) TABS Take 2,000 Units by mouth in the morning  Indications: Osteoporosis   Yes Historical Provider, MD   clopidogrel (PLAVIX) 75 mg tablet Take 1 tablet (75 mg total) by mouth daily 2/8/23  Yes Arely Live, DO   diltiazem (CARDIZEM CD) 120 mg 24 hr capsule Take 1 capsule (120 mg total) by mouth daily 2/7/23  Yes Arely Live DO   furosemide (LASIX) 40 mg tablet Take 0 5 tablets (20 mg total) by mouth daily 2/8/23  Yes Papa Zabala DO   metoprolol succinate (TOPROL-XL) 50 mg 24 hr tablet Take 1 tablet (50 mg total) by mouth daily 1/30/23  Yes Arely Live DO   Multiple Vitamin (Multivitamin Adult) TABS Take 1 tablet by mouth in the morning   Indications: Nutritional Support   Yes Historical Provider, MD   pantoprazole (PROTONIX) 40 mg tablet Take 1 tablet (40 mg total) by mouth daily in the early morning 2/1/23 3/3/23 Yes Arely Live DO   Psyllium (METAMUCIL FIBER) 51 7 % PACK Take 1 Package by mouth daily 5/21/19  Yes Maximus Sherwood DO   spironolactone (ALDACTONE) 25 mg tablet Take 1 tablet (25 mg total) by mouth daily 2/8/23   Nallely Byers DO   docusate sodium (COLACE) 100 mg capsule Take 1 capsule (100 mg total) by mouth 2 (two) times a day  Patient not taking: Reported on 1/30/2023 1/18/23 2/17/23  Jos Colon PA-C   oxygen gas Inhale 2 L/min daily as needed (low oxygen levels)  Indications: Oxygen Desaturation  Patient not taking: Reported on 1/30/2023 2/7/23  Junie Lopez MD       Physical Exam:    General: alert, oriented, NAD   HEENT/NECK:  PERRL  No jugular venous distention  Cardiac:Regular rate and rhythm  Pulmonary:Breath sounds clear bilaterally  Abdomen:  Non-tender, Non-distended  Positive bowel sounds  Upper extremities: 2+ radial pulses; brisk capillary refill  Lower extremities: Extremities warm/dry  Bilateral femoral pulses 2+, no bruit; PT/DP pulses 2+ bilaterally  Trace edema B/L  Incisions: Inguinal incision is clean, dry, and intact  Musculoskeletal: HSU   Neuro: Alert and oriented X 3  Sensation is grossly intact  No focal deficits  Skin: Warm/Dry, without rashes or lesions      Lab Results:     Results from last 7 days   Lab Units 02/14/23  0714   WBC Thousand/uL 4 46   HEMOGLOBIN g/dL 11 6   HEMATOCRIT % 35 5   PLATELETS Thousands/uL 150     Results from last 7 days   Lab Units 02/14/23  0714   POTASSIUM mmol/L 3 9   CHLORIDE mmol/L 95*   CO2 mmol/L 24   BUN mg/dL 36*   CREATININE mg/dL 1 44*   CALCIUM mg/dL 9 5       Imaging Studies:     Transthoracic Echocardiogram: official results pending   Left Ventricle Measurements    Function/Volumes   A4C EF 54 %         LVOT stroke volume 48 4 cm3         LVOT stroke volume index 36 2 ml/m2         Dimensions   LVIDd 4 2 cm         LVIDS 2 8 cm         IVSd 1 1 cm         LVPWd 1 1 cm         LVOT area 2 54 cm2         FS 33 %          Report Measurements   AV LVOT peak gradient 3 mmHg              Interventricular Septum Measurements    Shunt Ratio   LVOT peak VTI 19 03 cm         LVOT peak rach 0 86 m/s Right Ventricle Measurements    Dimensions   RVID d 4 3 cm         Tricuspid annular plane systolic excursion 2 1 cm               Left Atrium Measurements    Dimensions   LA size 4 7 cm         LA length (A2C) 7 1 cm               Right Atrium Measurements    Dimensions   RAA A4C 12 cm2               Atrial Septum Measurements    Shunt Ratio   LVOT peak VTI 19 03 cm         LVOT peak alphonso 0 86 m/s               Aortic Valve Measurements    Stenosis   Aortic valve peak velocity 2 07 m/s         LVOT peak alphonso 0 86 m/s         Ao VTI 54 73 cm         LVOT peak VTI 19 03 cm         AV mean gradient 10 mmHg         LVOT mn grad 1 mmHg         AV peak gradient 17 mmHg         AV LVOT peak gradient 3 mmHg         Area/Dimensions   DVI 0 42          AV valve area 0 88 cm2         AV area by cont VTI 0 9 cm2         AV area peak alphonso 1 1 cm2         LVOT diameter 1 8 cm         LVOT area 2 54 cm2               Tricuspid Valve Measurements    RVSP Parameters   TR Peak Alphonso 3 1 m/s         Triscuspid Valve Regurgitation Peak Gradient 39 mmHg               Aorta Measurements    Aortic Dimensions   Ao root 2 8 cm         Asc Ao 2 6 cm                   EK/15/23  Pending     I have personally reviewed pertinent reports  TAVR evaluation Assessment:     Michelle Siu 122: I    Assessment:   Aortic stenosis, Non-Rheumatic  S/P transfemoral transcatheter aortic valve replacement    Jennie Bermudez is making good progress in their post-op recovery  They are at NYHA functional class I  Left Groin incision is well healed  Weight and VS are stable  Recent echocardiogram demonstrates well seated valve, no PVL  ECG pending, BMP & CBC stable   Plan:   Medications reviewed with patient  Recommended Plavix therapy after TAVR is for 90 days only  Continue Eliquis for Afib/Aflutter     Benefits of participating in cardiac rehabilitation discussed with patient and they are cleared to proceed with the program  May resume driving and all normal activities  Sebas Parks will return for one year follow-up in our office with repeat echocardiogram, ECG, CBC & BMP  Our office will contact patient to schedule appointment  They have been advised to maintain routine follow up with their cardiologist and PCP for ongoing medical care  Patient was comfortable with our recommendations and their questions were answered to their satisfaction  Routine referral to gastroenterology for colonoscopy screening was not indicated, as the patient is over 76years old    Morena Cristobal [place]  [unfilled]  2:49 PM  Attestation signed by Wendi Dawkins DO at 2/15/2023  3:13 PM:  I supervised the Advanced Practitioner  ? I performed, in its entirety, the assessment/plan component of the visit  I agree with the Advanced Practitioner's note with the following additions/exceptions:      Ms Parish Murray is recovering well from her TAVR  Her echocardiogram was performed today and reviewed by myself personally  This demonstrates no significant paravalvular regurgitation and a well-functioning TAVR  She has been referred to outpatient cardiac rehab  She has no further restrictions    She will return in 1 year with a follow-up echocardiogram     Wendi Dawkins DO 02/15/23

## 2023-02-15 NOTE — PROGRESS NOTES
POST OP FOLLOW UP VISIT S/P TAVR    Procedure: S/P transfemoral transcatheter aortic valve replacement, performed on 1/17/23  History: Marques Urias is a 80y o  year old female who presents to our office today for routine follow up care from transfemoral transcatheter aortic valve replacement  Postoperatively, patient had a 9 beat run of Vtach that did not recur  Patient was discharged on postop day #1 on Plavix and Eliquis  She received home VNA and physical therapy  She has followed up with her PCP and cardiology, and has an appointment to start cardiac rehab on 3/1  Overall, patient feels quite well  Denies shortness of breath, chest pain/pressure, orthopnea, LE edema  She has been doing exercises recommended to her by her PT, and walks frequently with a walker  Review of System:     History obtained from chart review and the patient  General ROS: negative  Psychological ROS: negative  Ophthalmic ROS: negative  ENT ROS: negative  Allergy and Immunology ROS: negative  Hematological and Lymphatic ROS: negative  Endocrine ROS: negative  Respiratory ROS: no cough, shortness of breath, or wheezing  Cardiovascular ROS: no chest pain or dyspnea on exertion  Gastrointestinal ROS: no abdominal pain, change in bowel habits, or black or bloody stools  Genito-Urinary ROS: no dysuria, trouble voiding, or hematuria  Musculoskeletal ROS: negative  Neurological ROS: no TIA or stroke symptoms  Dermatological ROS: negative    Vital Signs:     Vitals:    02/15/23 1446   BP: 115/70   BP Location: Left arm   Patient Position: Sitting   Cuff Size: Standard   Temp: (!) 96 6 °F (35 9 °C)   SpO2: 99%   Weight: 47 8 kg (105 lb 6 4 oz)   Height: 4' 10" (1 473 m)       Home Medications:     Prior to Admission medications    Medication Sig Start Date End Date Taking?  Authorizing Provider   ALPRAZoirma Leary) 0 25 mg tablet Take 1 tablet (0 25 mg total) by mouth 3 (three) times a day as needed for anxiety 2/7/23  Yes Marcus Keane DO [facility]   ammonium lactate (LAC-HYDRIN) 12 % lotion USE AS DIRECTED TO BOTH FEET DAILY 1/24/23  Yes Historical Provider, MD   apixaban (ELIQUIS) 2 5 mg Take 1 tablet (2 5 mg total) by mouth 2 (two) times a day 1/18/23  Yes Hilary Valero PA-C   atorvastatin (LIPITOR) 20 mg tablet Take 1 tablet (20 mg total) by mouth daily with dinner 2/7/23  Yes Anna He DO   Cholecalciferol (Vitamin D3) 50 MCG (2000 UT) TABS Take 2,000 Units by mouth in the morning  Indications: Osteoporosis   Yes Historical Provider, MD   clopidogrel (PLAVIX) 75 mg tablet Take 1 tablet (75 mg total) by mouth daily 2/8/23  Yes Anna He DO   diltiazem (CARDIZEM CD) 120 mg 24 hr capsule Take 1 capsule (120 mg total) by mouth daily 2/7/23  Yes Anna He DO   furosemide (LASIX) 40 mg tablet Take 0 5 tablets (20 mg total) by mouth daily 2/8/23  Yes Papa Zabala,    metoprolol succinate (TOPROL-XL) 50 mg 24 hr tablet Take 1 tablet (50 mg total) by mouth daily 1/30/23  Yes Anna He DO   Multiple Vitamin (Multivitamin Adult) TABS Take 1 tablet by mouth in the morning  Indications: Nutritional Support   Yes Historical Provider, MD   pantoprazole (PROTONIX) 40 mg tablet Take 1 tablet (40 mg total) by mouth daily in the early morning 2/1/23 3/3/23 Yes Anna He DO   Psyllium (METAMUCIL FIBER) 51 7 % PACK Take 1 Package by mouth daily 5/21/19  Yes Xin Sherwood,    spironolactone (ALDACTONE) 25 mg tablet Take 1 tablet (25 mg total) by mouth daily 2/8/23   Anna He DO   docusate sodium (COLACE) 100 mg capsule Take 1 capsule (100 mg total) by mouth 2 (two) times a day  Patient not taking: Reported on 1/30/2023 1/18/23 2/17/23  Hilary Valero PA-C   oxygen gas Inhale 2 L/min daily as needed (low oxygen levels)  Indications: Oxygen Desaturation  Patient not taking: Reported on 1/30/2023 2/7/23  Evelin Cabezas MD       Physical Exam:    General: alert, oriented, NAD   HEENT/NECK:  PERRL  No jugular venous distention      Cardiac:Regular rate and rhythm  Pulmonary:Breath sounds clear bilaterally  Abdomen:  Non-tender, Non-distended  Positive bowel sounds  Upper extremities: 2+ radial pulses; brisk capillary refill  Lower extremities: Extremities warm/dry  Bilateral femoral pulses 2+, no bruit; PT/DP pulses 2+ bilaterally  Trace edema B/L  Incisions: Inguinal incision is clean, dry, and intact  Musculoskeletal: HSU   Neuro: Alert and oriented X 3  Sensation is grossly intact  No focal deficits  Skin: Warm/Dry, without rashes or lesions      Lab Results:     Results from last 7 days   Lab Units 02/14/23  0714   WBC Thousand/uL 4 46   HEMOGLOBIN g/dL 11 6   HEMATOCRIT % 35 5   PLATELETS Thousands/uL 150     Results from last 7 days   Lab Units 02/14/23  0714   POTASSIUM mmol/L 3 9   CHLORIDE mmol/L 95*   CO2 mmol/L 24   BUN mg/dL 36*   CREATININE mg/dL 1 44*   CALCIUM mg/dL 9 5       Imaging Studies:     Transthoracic Echocardiogram: official results pending   Left Ventricle Measurements    Function/Volumes   A4C EF 54 %         LVOT stroke volume 48 4 cm3         LVOT stroke volume index 36 2 ml/m2         Dimensions   LVIDd 4 2 cm         LVIDS 2 8 cm         IVSd 1 1 cm         LVPWd 1 1 cm         LVOT area 2 54 cm2         FS 33 %          Report Measurements   AV LVOT peak gradient 3 mmHg              Interventricular Septum Measurements    Shunt Ratio   LVOT peak VTI 19 03 cm         LVOT peak rach 0 86 m/s              Right Ventricle Measurements    Dimensions   RVID d 4 3 cm         Tricuspid annular plane systolic excursion 2 1 cm               Left Atrium Measurements    Dimensions   LA size 4 7 cm         LA length (A2C) 7 1 cm               Right Atrium Measurements    Dimensions   RAA A4C 12 cm2               Atrial Septum Measurements    Shunt Ratio   LVOT peak VTI 19 03 cm         LVOT peak rach 0 86 m/s               Aortic Valve Measurements    Stenosis   Aortic valve peak velocity 2 07 m/s         LVOT peak rach 0 86 m/s         Ao VTI 54 73 cm         LVOT peak VTI 19 03 cm         AV mean gradient 10 mmHg         LVOT mn grad 1 mmHg         AV peak gradient 17 mmHg         AV LVOT peak gradient 3 mmHg         Area/Dimensions   DVI 0 42          AV valve area 0 88 cm2         AV area by cont VTI 0 9 cm2         AV area peak alphonso 1 1 cm2         LVOT diameter 1 8 cm         LVOT area 2 54 cm2               Tricuspid Valve Measurements    RVSP Parameters   TR Peak Alphonso 3 1 m/s         Triscuspid Valve Regurgitation Peak Gradient 39 mmHg               Aorta Measurements    Aortic Dimensions   Ao root 2 8 cm         Asc Ao 2 6 cm                   EK/15/23  Pending     I have personally reviewed pertinent reports  TAVR evaluation Assessment:     Michelle Siu 122: I    Assessment:   Aortic stenosis, Non-Rheumatic  S/P transfemoral transcatheter aortic valve replacement    Natalie Isaacs is making good progress in their post-op recovery  They are at NYHA functional class I  Left Groin incision is well healed  Weight and VS are stable  Recent echocardiogram demonstrates well seated valve, no PVL  ECG pending, BMP & CBC stable   Plan:   Medications reviewed with patient  Recommended Plavix therapy after TAVR is for 90 days only  Continue Eliquis for Afib/Aflutter  Benefits of participating in cardiac rehabilitation discussed with patient and they are cleared to proceed with the program  May resume driving and all normal activities  Natalie Isaacs will return for one year follow-up in our office with repeat echocardiogram, ECG, CBC & BMP  Our office will contact patient to schedule appointment  They have been advised to maintain routine follow up with their cardiologist and PCP for ongoing medical care  Patient was comfortable with our recommendations and their questions were answered to their satisfaction      Routine referral to gastroenterology for colonoscopy screening was not indicated, as the patient is over 75 years yang Ramey PA-C  [unfilled]  2:49 PM

## 2023-02-17 LAB
ATRIAL RATE: 300 BPM
P AXIS: 89 DEGREES
QRS AXIS: -45 DEGREES
QRSD INTERVAL: 88 MS
QT INTERVAL: 492 MS
QTC INTERVAL: 478 MS
T WAVE AXIS: 2 DEGREES
VENTRICULAR RATE: 57 BPM

## 2023-02-23 ENCOUNTER — RA CDI HCC (OUTPATIENT)
Dept: OTHER | Facility: HOSPITAL | Age: 88
End: 2023-02-23

## 2023-02-23 NOTE — PROGRESS NOTES
Established Patient Progress Note       Chief Complaint   Patient presents with   • Hyperthyroidism        History of Present Illness:     Camila Burns is a 80 y o  female with Graves' disease presenting to the office today for follow-up  To review:    Graves' disease was diagnosed in summer 2022  TSH was persistently low with elevated free T4 and high titers of TSH receptor antibodies  5 mg of methimazole once daily was initiated  As of September 21, 2022, free T4 was improving as was TSH  Liver enzymes were noted to be elevated  Patient repeated a comprehensive metabolic panel on 59/6/0430 demonstrated normalization of her liver enzymes  At that time, she was to continue taking 5 mg of methimazole daily  Unfortunately, she was hospitalized on 12/21/2022 for acute on chronic diastolic CHF  At this time, TSH was noted to be elevated at 10  Endocrinology was consulted  It was recommended that methimazole be held for 2 days and repeat thyroid function test   TSH did improve to 7 5  Methimazole was discontinued completely  Thyroid function test as of 2/14/2023 were stable  She reports feeling well  Denies symptoms of hyper/hypothyroidism     Component      Latest Ref Rng & Units 9/21/2022 12/20/2022 12/24/2022 2/14/2023           7:19 AM 10:22 AM  4:57 AM  7:14 AM   TSH 3RD GENERATON      0 450 - 4 500 uIU/mL 0 013 (L) 10 095 (H) 7 510 (H) 2 107       Patient Active Problem List   Diagnosis   • Hypomagnesemia   • Hypokalemia   • Hyponatremia   • Essential hypertension   • Lymphadenopathy   • Hiatal hernia   • Thrombocytopenia (HCC)   • Severe aortic stenosis   • Mitral valve insufficiency   • Supraventricular tachycardia (HCC)   • Pulmonary hypertension (HCC)   • Diverticulosis   • Atrial tachycardia (HCC)   • Non-rheumatic tricuspid valve insufficiency   • Generalized anxiety disorder   • Mid back pain   • Encounter for Medicare annual wellness exam   • Encounter for long-term (current) use of medications   • Immunization due   • Acute bursitis of right shoulder   • Impaired fasting glucose   • Osteopenia of neck of femur   • Chronic bilateral low back pain without sciatica   • Gait abnormality   • Chronic pain syndrome   • Sacroiliitis (HCC)   • Bilateral leg edema   • Ganglion cyst   • Adhesive capsulitis of right shoulder   • Stage 3a chronic kidney disease (HCC)   • Other fatigue   • Dyslipidemia   • Post-menopause   • Hyperthyroidism   • Primary generalized (osteo)arthritis   • Chronic diastolic congestive heart failure ([facility])   • Pleural effusion   • Acute respiratory failure with hypoxia ([facility])   • Constipation   • Status post insertion of drug eluting coronary artery stent   • S/P TAVR (transcatheter aortic valve replacement)   • Atrial flutter ([facility])   • Incomplete right bundle branch block   • Coronary artery disease involving native coronary artery of native heart without angina pectoris   • Anemia   • Graves disease      Past Medical History:   Diagnosis Date   • Acute on chronic diastolic CHF (congestive heart failure) ([facility]) 1/30/2023   • Aortic stenosis    • Atrial flutter ([facility])    • CAD (coronary artery disease) 01/11/2023    CAD/PCI/ELODIA   • CKD (chronic kidney disease), stage III ([facility] [address] )    • Community acquired pneumonia of left upper lobe of lung 05/17/2019   • Fatigue    • Full dentures    • Generalized anxiety disorder    • Hyperlipidemia    • Hypertension    • Impaired fasting glucose    • Mitral regurgitation    • Pneumonia    • RBBB    • 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    TRANSFEMORAL W/ 23MM CALVERT SHAVONNE S3 ULTRA VALVE(ACCESS ON LEFT)   • SVT (supraventricular tachycardia)    • Tricuspid regurgitation       Past Surgical History:   Procedure Laterality Date   • CARDIAC CATHETERIZATION N/A 1/11/2023    Procedure: [facility]-Cardiac catheterization;  Surgeon: Fareed Cruz MD;  Location: [facility] CARDIAC CATH LAB;   Service: Cardiology   • CARDIAC CATHETERIZATION N/A 1/11/2023    Procedure: Cardiac pci;  Surgeon: Zaria Damico MD;  Location: [facility] CARDIAC CATH [facility];   Service: Cardiology   • CARDIAC CATHETERIZATION N/A 1/17/2023    Procedure: CARDIAC TAVR;  Surgeon: Celi Fenton DO;  Location: [facility] MAIN [facility];  Service: Cardiology   • DENTAL SURGERY Bilateral     ALL TEETH REMOVED   • FL GUIDED NEEDLE PLAC BX/ASP/INJ  5/26/2021   • IR THORACENTESIS  12/20/2022   • JOINT REPLACEMENT Left    • KNEE SURGERY      left knee replacement   • AR INJECT SI JOINT ARTHRGRPHY&/ANES/STEROID W/JOHNATHON Bilateral 5/26/2021    Procedure: SACROILIAC JOINT INJECTION;  Surgeon: Kena Lawton MD;  Location: [facility] MAIN OR;  Service: Pain Management    • AR REPLACE AORTIC VALVE OPENFEMORAL ARTERY APPROACH N/A 1/17/2023    Procedure: REPLACEMENT AORTIC VALVE TRANSCATHETER (TAVR) TRANSFEMORAL W/ 23MM CALVERT SHAVONNE S3 ULTRA VALVE(ACCESS ON LEFT) ALENA;  Surgeon: Meek Hamm DO;  Location: [facility];  Service: Cardiac Surgery      Family History   Problem Relation Age of Onset   • Hypertension Mother    • Asthma Father         Andrews's asthma   • Alzheimer's disease Sister    • Rectal cancer Son    • Uterine cancer Daughter    • Heart disease Sister    • Heart disease Brother      Social History     Tobacco Use   • Smoking status: Never   • Smokeless tobacco: Never   Substance Use Topics   • Alcohol use: Never     No Known Allergies    Current Outpatient Medications:   •  ALPRAZolam (XANAX) 0 25 mg tablet, Take 1 tablet (0 25 mg total) by mouth 3 (three) times a day as needed for anxiety, Disp: 90 tablet, Rfl: 5  •  ammonium lactate (LAC-HYDRIN) 12 % lotion, USE AS DIRECTED TO BOTH FEET DAILY, Disp: , Rfl:   •  apixaban (ELIQUIS) 2 5 mg, Take 1 tablet (2 5 mg total) by mouth 2 (two) times a day, Disp: 60 tablet, Rfl: 2  •  atorvastatin (LIPITOR) 20 mg tablet, Take 1 tablet (20 mg total) by mouth daily with dinner, Disp: 30 tablet, Rfl: 4  •  Cholecalciferol (Vitamin D3) 50 MCG (2000 UT) TABS, Take 2,000 Units by mouth in the morning  Indications: Osteoporosis, Disp: , Rfl:   •  clopidogrel (PLAVIX) 75 mg tablet, Take 1 tablet (75 mg total) by mouth daily, Disp: 90 tablet, Rfl: 2  •  diltiazem (CARDIZEM CD) 120 mg 24 hr capsule, Take 1 capsule (120 mg total) by mouth daily, Disp: 30 capsule, Rfl: 1  •  furosemide (LASIX) 40 mg tablet, Take 0 5 tablets (20 mg total) by mouth daily, Disp: 30 tablet, Rfl: 1  •  metoprolol succinate (TOPROL-XL) 50 mg 24 hr tablet, Take 1 tablet (50 mg total) by mouth daily, Disp: 135 tablet, Rfl: 3  •  Multiple Vitamin (Multivitamin Adult) TABS, Take 1 tablet by mouth in the morning  Indications: Nutritional Support, Disp: , Rfl:   •  pantoprazole (PROTONIX) 40 mg tablet, Take 1 tablet (40 mg total) by mouth daily in the early morning, Disp: 30 tablet, Rfl: 2  •  Psyllium (METAMUCIL FIBER) 51 7 % PACK, Take 1 Package by mouth daily, Disp: , Rfl: 0  •  spironolactone (ALDACTONE) 25 mg tablet, Take 1 tablet (25 mg total) by mouth daily, Disp: 30 tablet, Rfl: 1  •  docusate sodium (COLACE) 100 mg capsule, Take 1 capsule (100 mg total) by mouth 2 (two) times a day (Patient not taking: Reported on 1/30/2023), Disp: 60 capsule, Rfl: 0    Review of Systems   Constitutional: Negative for activity change, appetite change, fatigue and unexpected weight change  HENT: Negative for dental problem, sore throat, trouble swallowing and voice change  Eyes: Negative for visual disturbance  Respiratory: Negative for cough, chest tightness and shortness of breath  Cardiovascular: Negative for chest pain, palpitations and leg swelling  Gastrointestinal: Negative for constipation, diarrhea, nausea and vomiting  Endocrine: Negative for cold intolerance and heat intolerance  Genitourinary: Negative for frequency  Musculoskeletal: Positive for arthralgias and gait problem  Negative for back pain and myalgias  Skin: Negative for wound     Allergic/Immunologic: Negative for environmental allergies and food allergies  Neurological: Positive for weakness  Negative for dizziness, tremors, light-headedness, numbness and headaches  Psychiatric/Behavioral: Positive for decreased concentration  Negative for dysphoric mood and sleep disturbance  The patient is not nervous/anxious  Physical Exam:  Body mass index is 21 44 kg/m²  /58   Pulse (!) 54   Resp 16   Ht 4' 10" (1 473 m)   Wt 46 5 kg (102 lb 9 6 oz)   SpO2 98%   BMI 21 44 kg/m²    Wt Readings from Last 3 Encounters:   02/24/23 46 5 kg (102 lb 9 6 oz)   02/15/23 47 2 kg (104 lb)   02/15/23 47 8 kg (105 lb 6 4 oz)       Physical Exam  Vitals reviewed  Constitutional:       General: She is not in acute distress  Appearance: She is well-developed  She is not ill-appearing  HENT:      Head: Normocephalic and atraumatic  Eyes:      Pupils: Pupils are equal, round, and reactive to light  Neck:      Thyroid: No thyromegaly  Cardiovascular:      Rate and Rhythm: Normal rate and regular rhythm  Pulses: Normal pulses  Heart sounds: Normal heart sounds  Pulmonary:      Effort: Pulmonary effort is normal       Breath sounds: Normal breath sounds  Abdominal:      General: Bowel sounds are normal  There is no distension  Palpations: Abdomen is soft  Tenderness: There is no abdominal tenderness  Musculoskeletal:      Cervical back: Normal range of motion and neck supple  Right lower leg: No edema  Left lower leg: No edema  Lymphadenopathy:      Cervical: No cervical adenopathy  Skin:     General: Skin is warm and dry  Capillary Refill: Capillary refill takes less than 2 seconds  Neurological:      Mental Status: She is alert and oriented to person, place, and time        Gait: Gait normal    Psychiatric:         Mood and Affect: Mood normal          Behavior: Behavior normal          Labs:       Lab Results   Component Value Date    CREATININE 1 44 (H) 02/14/2023    CREATININE 1 19 02/07/2023    CREATININE 1 30 02/02/2023    BUN 36 (H) 02/14/2023    K 3 9 02/14/2023    CL 95 (L) 02/14/2023    CO2 24 02/14/2023     eGFR   Date Value Ref Range Status   02/14/2023 32 ml/min/1 73sq m Final       Lab Results   Component Value Date    HDL 94 12/21/2022    TRIG 50 12/21/2022       Lab Results   Component Value Date    ALT 28 12/21/2022    AST 31 12/21/2022    ALKPHOS 73 12/21/2022       Lab Results   Component Value Date    FREET4 1 03 02/14/2023         Impression & Plan:    Problem List Items Addressed This Visit        Endocrine    Graves disease - Primary     Patient has not taken methimazole since the end of December  She reports feeling well and thyroid function is stable  Discussed the relapsing/remitting nature of Graves disease  Will continue to monitor closely  Recommend completing labs in approximately 8 weeks  Reviewed s/s of hyperthyroidism and provided educational handouts  Patient and her  know to notify me should she experience any of these  Relevant Orders    TSH, 3rd generation    Thyroid stimulating immunoglobulin Lab Collect    T4, free       Orders Placed This Encounter   Procedures   • TSH, 3rd generation     This is a patient instruction: This test is non-fasting  Please drink two glasses of water morning of bloodwork  Standing Status:   Future     Standing Expiration Date:   2/24/2024   • Thyroid stimulating immunoglobulin Lab Collect     Standing Status:   Future     Standing Expiration Date:   2/24/2024   • T4, free     Standing Status:   Future     Standing Expiration Date:   2/24/2024       Patient Instructions   1  Let me know if Constantine Mcghee starts to lose weight despite eating, starts having frequent bowl movements, feels hot all the time, feels more anxious/agitated, has a tremor  Discussed with the patient and all questioned fully answered  She will call me if any problems arise  Follow-up appointment in 5 months       Counseled patient on diagnostic results, prognosis, risk and benefit of treatment options, instruction for management, importance of treatment compliance, Risk  factor reduction and impressions      LYNN Mcdonough

## 2023-02-23 NOTE — PROGRESS NOTES
I13 [id number]  [facility]  coding opportunities          Chart Reviewed number of suggestions sent to Provider: 1     Patients Insurance     Medicare Insurance: [employer]

## 2023-02-24 ENCOUNTER — TELEPHONE (OUTPATIENT)
Dept: CARDIAC REHAB | Facility: HOSPITAL | Age: 88
End: 2023-02-24

## 2023-02-24 ENCOUNTER — OFFICE VISIT (OUTPATIENT)
Dept: ENDOCRINOLOGY | Facility: CLINIC | Age: 88
End: 2023-02-24

## 2023-02-24 VITALS
BODY MASS INDEX: 21.54 KG/M2 | HEART RATE: 54 BPM | WEIGHT: 102.6 LBS | RESPIRATION RATE: 16 BRPM | HEIGHT: 58 IN | OXYGEN SATURATION: 98 % | DIASTOLIC BLOOD PRESSURE: 58 MMHG | SYSTOLIC BLOOD PRESSURE: 110 MMHG

## 2023-02-24 DIAGNOSIS — E05.00 GRAVES DISEASE: Primary | ICD-10-CM

## 2023-02-24 NOTE — PATIENT INSTRUCTIONS
Let me know if Lo Dalton starts to lose weight despite eating, starts having frequent bowl movements, feels hot all the time, feels more anxious/agitated, has a tremor

## 2023-02-24 NOTE — ASSESSMENT & PLAN NOTE
Patient has not taken methimazole since the end of December  She reports feeling well and thyroid function is stable  Discussed the relapsing/remitting nature of Graves disease  Will continue to monitor closely  Recommend completing labs in approximately 8 weeks  Reviewed s/s of hyperthyroidism and provided educational handouts  Patient and her  know to notify me should she experience any of these

## 2023-03-01 ENCOUNTER — CLINICAL SUPPORT (OUTPATIENT)
Dept: CARDIAC REHAB | Facility: HOSPITAL | Age: 88
End: 2023-03-01

## 2023-03-01 DIAGNOSIS — Z95.2 S/P TAVR (TRANSCATHETER AORTIC VALVE REPLACEMENT): Primary | ICD-10-CM

## 2023-03-01 DIAGNOSIS — I35.9 NONRHEUMATIC AORTIC VALVE DISORDER: ICD-10-CM

## 2023-03-01 DIAGNOSIS — I35.0 SEVERE AORTIC STENOSIS: ICD-10-CM

## 2023-03-01 NOTE — PROGRESS NOTES
Yvonne Qiu        Dear Dr Janet Meade,    Emotional well-being and depression is addressed in the cardiac  rehab evaluation  To assess the severity of depression, patients are given the PHQ-9 Depression Questionnaire  This is to inform you that your patient Chuy Jorge scored a 5 which is interpreted as 5-9 = Mild Depression  Your patient was provided contact information for [facility] and has been encouraged to enroll in [facility] [employer]  A repeat PHQ -9 will be administered in 30 days to assess improvement  Thank you for your support of cardiopulmonary rehab      Sincerely,    Shawn Richardson 87

## 2023-03-01 NOTE — PROGRESS NOTES
Cardiac Rehabilitation Plan of Care   Initial Care Plan      Today's date: 3/1/2023   # of Exercise Sessions Completed: Evaluation   Patient name: Екатерина Sotelo      : 1935  Age: 80 y o  MRN: [id number]  Referring Physician: Tia Way DO  Cardiologist: Carmen Orozco MD  Provider: Nikita Wills  Clinician: Trevor Ramos MS    Dx:   Encounter Diagnoses   Name Primary? • Severe aortic stenosis    • Nonrheumatic aortic valve disorder    • S/P TAVR (transcatheter aortic valve replacement)      Date of onset: 23      SUMMARY OF PROGRESS:  Today is Mariela's initial evaluation to begin Cardiac Rehab  The patient does not currently follow a formal exercise program at home  She has not resumed light to moderate ADLs reporting weakness and fatigue  Depression screening using the PHQ-9 interprets the patient's score of  5  as  5-9 = Mild Depression  MINNIE-7 screening tool for anxiety suggests 7  5-9 = Mild anxiety  When addressed, the patient reports having depression/anxiety  Patient reports excellent social/emotional support from family  Information to utilize [employer] was provided as well as contact information for counseling through [employer]  PHQ-9 score will be reassessed in 30 days due to an initial score revealing concern for depression  They rate stress 5/10 with the following stressors: politics, current health condition  Stress management will be reviewed  The patient is a non-smoker  Patient admits to 100% medication compliance  They report the following physical limitations: patient is elderly and walks with assistive device, gate slightly unstable  The patient completed an initial submaximal NuStep ETT  The patient completed 8 minutes of stage III (METs) with test termination of pt request, leg fatigue  Resting  /60 with Normal response to exercise reaching 110/68    Blood Pressure will be monitored throughout the program and cardiologist will be notified of elevated trends  Patient reported no symptoms with exercise    Telemetry revealed Aflutter  Aracely Thomas was counseled on exercise guidelines to achieve a minimum of 150 mins/wk of moderate intensity (RPE 4-6) exercise and encouraged to add 1-2 days of exercise on opposite days of cardiac rehab as tolerated  We discussed current dietary habits and goals of heart healthy eating for lipid management  The patient has N/A  Patient's personal goals include: increase strength, improve balance, improve gate stability, complete ADLs, become more independent  The patient's CAD risk factors include:  inactivity, stress, hypertension and hyperlipidemia  Her education will focus on lifestyle modification/education specific to Her needs  Patient will attend group education classes on heart healthy eating, reading food labels, stress management, risk factor reduction, understanding heart disease and common heart medications  Patient will attend 35 monitored exercise sessions, 3x/wk for 12-18 weeks beginning 3/10/23  Patient stated she has no complaints  Patient was seen by surgeon a few days ago and stated everything looked great  Patient would like to improve her gate stability at rehab and become stronger to complete ADLs more independently  Patient does not drive   drives her  Patient came up to rehab in wheel chair and walks very slow and carefully to maintain appropriate balance  Patient will be well monitored at rehab       Medication compliance: Yes   Comments: Pt reports to be compliant with medications  Fall Risk: High   Comments: Patient uses walking assist device (walker/cane/rollator)    EKG Interpretation: Aflutter      EXERCISE ASSESSMENT and PLAN    Exercise Prescription:      Frequency: 2 days/week   Supplement with home exercise 2+ days/wk as tolerated       Minutes: 35         METS: 1 5-2 0          HR: 20-30 beats above RHR   RPE: 4-6         Modalities: Treadmill, UBE and NuStep      30 Day Goals for Exercise Progression:    Frequency: 3 days/week of cardiac rehab       Supplement with home exercise 2+ days/wk as tolerated    Minutes: 40                              >150 mins/wk of moderate intensity exercise   METS: 2 0-2 5   HR: 20-30 beats above RHR    RPE: 4-6   Modalities: Treadmill, UBE and NuStep    Strength trainin-3 days / week   Modalities: free weights    Home Exercise: none    Goals: 10% improvement in functional capacity - based on max METs achieved in fitness assessment, Resume ADLs with increased strength, Attend Rehab regularly and Start a walking program    Progression Toward Goals:  Reviewed Pt goals and determined plan of care, Patient will initiate walking at home for at least 5 minute bouts  in the next 30 days    Education: benefit of exercise for CAD risk factors, home exercise guidelines, AHA guidelines to achieve >150 mins/wk of moderate exercise and RPE scale   Plan:education on home exercise guidelines, home exercise 30+ mins 2 days opposite CR and Education class: Risk Factors for Heart Disease  Readiness to change: Preparation:  (Getting ready to change)       NUTRITION ASSESSMENT AND PLAN    Weight control:    Starting weight: 102   Current weight:   102    Diabetes: N/A  A1c:     last measured:     Lipid management: Last lipid profile 22  Chol 164  TRG 50  HDL 94  LDL 60    Goals:LDL <100, HDL >40, TRG <150, CHOL <200, eliminate processed meats, cook without added fat or use vegetable oil/spray, use low fat dairy, eat 3 or more servings of whole grains a day, Eat 4-5 cups of fruits and vegetables daily, use olive or canola oil in baking, choose low sodium processed foods and eliminate butter    Measurable goals were based Rate Your Plate Dietary Self-Assessment  These are the areas in which the patient could score higher on the assessment  Goals include recommendations for a heart healthy diet based on American Heart Association      Progression Toward Goals: Reviewed Pt goals and determined plan of care, Patient will make dietary changes to see improvement in future lipid panel  in the next 30 days    Education: heart healthy eating  low sodium diet  hydration  healthy choices while dining out  portion control  Plan: replace butter with soft spreads made with olive oil, canola or yogurt, replace refined grain bread with whole grain bread, replace unhealthy snacks with fruits & vegs, reduce portion sizes, eat fewer desserts and sweets, avoid processed foods and learn how to read food labels  Readiness to change: Preparation:  (Getting ready to change)       PSYCHOSOCIAL ASSESSMENT AND PLAN    Emotional:  Depression assessment:  PHQ-9 = 5  5-9 = Mild Depression            Anxiety measure:  MINNIE-7 = 7  5-9 = Mild anxiety  Self-reported stress level:  5  Social support: Very Good    Goals:  Reduce perceived stress to 1-3/10, Feelings in [place] Score < 3, Physical Fitness in [place] Score < 3, Social Support in [place] Score < 3 and Daily Activity in [place] Score < 3    Progression Toward Goals: Reviewed Pt goals and determined plan of care, Patient will turn off politics on TV, watch something more enjoyable, do crossword puzzles in the next 30 days    Education: signs/sxs of depression, benefits of a positive support system and stress management techniques  Plan: Exercise, Enjoy a hobby, Keep a positive mindset, Meet new people and Enjoy family  Readiness to change: Preparation:  (Getting ready to change)       OTHER CORE COMPONENTS     Tobacco:   Social History     Tobacco Use   Smoking Status Never   Smokeless Tobacco Never       Tobacco Use Intervention:   N/A:  Patient is a non-smoker     Anginal Symptoms:  None   NTG use: No prescription    Blood pressure:    Restin/60   Exercise: 110/68    Goals: consistent BP < 130/80, reduced dietary sodium <2300mg and medication compliance    Progression Toward Goals: Reviewed Pt goals and determined plan of care, Patient will monitor BP at home to maintain appropriate levels in the next 30 days    Education:  understanding high blood pressure and it's relationship to CAD, low sodium diet and HTN, Education class:  Common Heart Medications and Education class: Understanding Heart Disease  Plan: Class: Understanding Heart Disease, avoid places with second hand smoke, Avoid Processed foods, engage in regular exercise, eliminate salt shaker at the table and use salt substitutes  Readiness to change: Preparation:  (Getting ready to change)       CARDIAC REHAB ASSESSMENT    Today's date: 2023  Patient name: Eliseo Arcos     : 1935       MRN: [id number]  PCP: Leonardo Del Rosario DO  Referring Physician: Jadyn Garcia DO  Cardiologist: Eufemia Davalos MD  Surgeon: Liliane Johnson DO  Dx:   Encounter Diagnoses   Name Primary?    • Severe aortic stenosis    • Nonrheumatic aortic valve disorder    • S/P TAVR (transcatheter aortic valve replacement)        Date of onset: 23  Cultural needs: N/A    Weight    Wt Readings from Last 1 Encounters:   23 46 5 kg (102 lb 9 6 oz)      Height:   Ht Readings from Last 1 Encounters:   23 4' 10" (1 473 m)     Medical History:   Past Medical History:   Diagnosis Date   • Acute on chronic diastolic CHF (congestive heart failure) ([facility] [address] ) 2023   • Aortic stenosis    • Atrial flutter (HCC)    • CAD (coronary artery disease) 2023    CAD/PCI/ELODIA   • CKD (chronic kidney disease), stage III (HCC)    • Community acquired pneumonia of left upper lobe of lung 2019   • Fatigue    • Full dentures    • Generalized anxiety disorder    • Hyperlipidemia    • Hypertension    • Impaired fasting glucose    • Mitral regurgitation    • Pneumonia    • RBBB    • S/P TAVR (transcatheter aortic valve replacement) 2023    TRANSFEMORAL W/ 23MM CALVERT SHAVONNE S3 ULTRA VALVE(ACCESS ON LEFT)   • SVT (supraventricular tachycardia)    • Tricuspid regurgitation          Physical Limitations: patient is elderly and walks with assistive device, gate slightly unstable  Fall Risk: High   Comments: Patient uses walking assist device (walker/cane/rollator)    Anginal Equivalent: None/denies angina   NTG use: No prescription    Risk Factors   Cholesterol: Yes  Smoking: Never used  HTN: Yes  DM: No  Obesity: No   Inactivity: Yes  Stress:  perceived  stress: 5/10   Stressors:politics, TV, health condition    Goals for Stress Management:Read, Maintain a stress diary, Spend time outside, Enjoy a hobby and Spend time with family    Family History:  Family History   Problem Relation Age of Onset   • Hypertension Mother    • Asthma Father         [place]'s asthma   • Alzheimer's disease Sister    • Rectal cancer Son    • Uterine cancer Daughter    • Heart disease Sister    • Heart disease Brother        Allergies: Patient has no known allergies  ETOH:   Social History     Substance and Sexual Activity   Alcohol Use Never         Current Medications:   Current Outpatient Medications   Medication Sig Dispense Refill   • ALPRAZolam (XANAX) 0 25 mg tablet Take 1 tablet (0 25 mg total) by mouth 3 (three) times a day as needed for anxiety 90 tablet 5   • ammonium lactate (LAC-HYDRIN) 12 % lotion USE AS DIRECTED TO BOTH FEET DAILY     • apixaban (ELIQUIS) 2 5 mg Take 1 tablet (2 5 mg total) by mouth 2 (two) times a day 60 tablet 2   • atorvastatin (LIPITOR) 20 mg tablet Take 1 tablet (20 mg total) by mouth daily with dinner 30 tablet 4   • Cholecalciferol (Vitamin D3) 50 MCG (2000 UT) TABS Take 2,000 Units by mouth in the morning   Indications: Osteoporosis     • clopidogrel (PLAVIX) 75 mg tablet Take 1 tablet (75 mg total) by mouth daily 90 tablet 2   • diltiazem (CARDIZEM CD) 120 mg 24 hr capsule Take 1 capsule (120 mg total) by mouth daily 30 capsule 1   • docusate sodium (COLACE) 100 mg capsule Take 1 capsule (100 mg total) by mouth 2 (two) times a day (Patient not taking: Reported on 1/30/2023) 60 capsule 0   • furosemide (LASIX) 40 mg tablet Take 0 5 tablets (20 mg total) by mouth daily 30 tablet 1   • metoprolol succinate (TOPROL-XL) 50 mg 24 hr tablet Take 1 tablet (50 mg total) by mouth daily 135 tablet 3   • Multiple Vitamin (Multivitamin Adult) TABS Take 1 tablet by mouth in the morning  Indications: Nutritional Support     • pantoprazole (PROTONIX) 40 mg tablet Take 1 tablet (40 mg total) by mouth daily in the early morning 30 tablet 2   • Psyllium (METAMUCIL FIBER) 51 7 % PACK Take 1 Package by mouth daily  0   • spironolactone (ALDACTONE) 25 mg tablet Take 1 tablet (25 mg total) by mouth daily 30 tablet 1     No current facility-administered medications for this visit  Functional Status Prior to Diagnosis for Treatment   Occupation: retired  Recreation: office work   ADL’s: No limitations  Rains: No limitations  Exercise: light walking  Other: N/A  Current Functional Status  Occupation: retired  Recreation: see above  ADL’s:Capable of performing light ADLs only  Rains: Capable of performing light ADLs only  Exercise: light walking   Other: N/A    Patient Specific Goals:   increase strength, improve balance, improve gate stability, complete ADLs, become more independent  Short Term Program Goals: dietary modifications increased strength improved energy/stamina with ADLs    Long Term Goals: intial claudication time extended  increased maximal walking duration  Improved functional capacity    Ability to reach goals/rehabilitation potential:  Very Good     Projected return to function: 8-12 weeks  Objective tests: sub-max NuStep ETT      Nutritional   Reviewed details of Rate your Plate  Discussed key elements of heart healthy eating  Reviewed patient goals for dietary modifications and their clinical implications  Reviewed most recent lipid profile       Goals for dietary modification based on Rate Your Plate Dietary Assessment:  eliminate processed meats  increase whole grains  increase fruits and vegetables  eliminate butter  low sodium  improved snack choices      Emotional/Social  Patient reports he/she is coping well with good social support and denies depression or anxiety    Marital status:     Domestic Violence Screening: No    Comments:

## 2023-03-02 ENCOUNTER — PATIENT OUTREACH (OUTPATIENT)
Dept: FAMILY MEDICINE CLINIC | Facility: CLINIC | Age: 88
End: 2023-03-02

## 2023-03-02 ENCOUNTER — OFFICE VISIT (OUTPATIENT)
Dept: FAMILY MEDICINE CLINIC | Facility: CLINIC | Age: 88
End: 2023-03-02

## 2023-03-02 VITALS
TEMPERATURE: 97.3 F | SYSTOLIC BLOOD PRESSURE: 120 MMHG | OXYGEN SATURATION: 99 % | HEART RATE: 60 BPM | DIASTOLIC BLOOD PRESSURE: 58 MMHG | WEIGHT: 103.9 LBS | HEIGHT: 58 IN | BODY MASS INDEX: 21.81 KG/M2

## 2023-03-02 DIAGNOSIS — I25.10 CORONARY ARTERY DISEASE INVOLVING NATIVE CORONARY ARTERY OF NATIVE HEART WITHOUT ANGINA PECTORIS: ICD-10-CM

## 2023-03-02 DIAGNOSIS — I10 ESSENTIAL HYPERTENSION: ICD-10-CM

## 2023-03-02 DIAGNOSIS — I50.32 CHRONIC DIASTOLIC (CONGESTIVE) HEART FAILURE (HCC): Primary | ICD-10-CM

## 2023-03-02 DIAGNOSIS — E05.00 GRAVES DISEASE: ICD-10-CM

## 2023-03-02 DIAGNOSIS — E87.1 HYPONATREMIA: ICD-10-CM

## 2023-03-02 DIAGNOSIS — M46.1 SACROILIITIS (HCC): ICD-10-CM

## 2023-03-02 PROBLEM — J96.01 ACUTE RESPIRATORY FAILURE WITH HYPOXIA (HCC): Status: RESOLVED | Noted: 2022-12-22 | Resolved: 2023-03-02

## 2023-03-02 RX ORDER — FUROSEMIDE 40 MG/1
20 TABLET ORAL DAILY PRN
Qty: 30 TABLET | Refills: 1 | Status: SHIPPED | OUTPATIENT
Start: 2023-03-02

## 2023-03-02 NOTE — ASSESSMENT & PLAN NOTE
Wt Readings from Last 3 Encounters:   03/02/23 47 1 kg (103 lb 14 4 oz)   02/24/23 46 5 kg (102 lb 9 6 oz)   02/15/23 47 2 kg (104 lb)         Patient is currently doing well  She is status post TAVR  She is status post PTCI and intracoronary stent placement on ostial RCA lesion  She is noted to have a normal left ventricular ejection fraction on echocardiography  She has absolutely no lower extremity edema  I changed her Lasix to 20 mg daily as needed edema  She was also very worried about her diet  She wants to eat a hotdog once a week  I told her to go ahead  1 hotdogs not going to hurt her  She has a normal left ventricular ejection fraction  She also would like to eat 1 slice of pizza per week  Apparently someone told her she could not have pizza  I told her not to worry about it and go ahead and eat a slice of pizza once a week as well  She will be seeing Dr Mac Conroy in a few weeks  She can express her need for continuation of Aldactone

## 2023-03-02 NOTE — ASSESSMENT & PLAN NOTE
Patient's goal LDL cholesterol is less than 70  She will continue atorvastatin 20 mg daily  Cardiology had recommended she receive only a 90-day supply of Plavix following her TAVR  However, in light of the fact that she had stent placement on January 17, I am going to recommend we continue the Plavix for a full year, unless she has any significant side effects

## 2023-03-02 NOTE — ASSESSMENT & PLAN NOTE
I reviewed endocrinology's recent consultation  Patient will have repeat thyroid function testing done in approximately 8 weeks    She is currently off methimazole and has normal thyroid function test

## 2023-03-02 NOTE — ASSESSMENT & PLAN NOTE
Patient has hyponatremia  I attribute this to her diuretics  I ordered a repeat BMP for 1 week  Hopefully, with discontinuation of Lasix, we will see improvement in her serum sodium

## 2023-03-02 NOTE — PROGRESS NOTES
Name: Kaykay Herrera      : 1935      MRN: [id number]  Encounter Provider: Nallely Byers DO  Encounter Date: 3/2/2023   Encounter department: [facility]    Assessment & Plan     1  Chronic diastolic (congestive) heart failure [facility])  Assessment & Plan:  Wt Readings from Last 3 Encounters:   23 47 1 kg (103 lb 14 4 oz)   23 46 5 kg (102 lb 9 6 oz)   02/15/23 47 2 kg (104 lb)         Patient is currently doing well  She is status post TAVR  She is status post PTCI and intracoronary stent placement on ostial RCA lesion  She is noted to have a normal left ventricular ejection fraction on echocardiography  She has absolutely no lower extremity edema  I changed her Lasix to 20 mg daily as needed edema  She was also very worried about her diet  She wants to eat a hotdog once a week  I told her to go ahead  1 hotdogs not going to hurt her  She has a normal left ventricular ejection fraction  She also would like to eat 1 slice of pizza per week  Apparently someone told her she could not have pizza  I told her not to worry about it and go ahead and eat a slice of pizza once a week as well  She will be seeing Dr Haydee Mcdonald in a few weeks  She can express her need for continuation of Aldactone  Orders:  -     furosemide (LASIX) 40 mg tablet; Take 0 5 tablets (20 mg total) by mouth daily as needed (leg swelling)    2  Sacroiliitis ([address] )    3  Hyponatremia  Assessment & Plan:  Patient has hyponatremia  I attribute this to her diuretics  I ordered a repeat BMP for 1 week  Hopefully, with discontinuation of Lasix, we will see improvement in her serum sodium  Orders:  -     Basic metabolic panel; Future; Expected date: 2023    4  Graves disease  Assessment & Plan:  I reviewed endocrinology's recent consultation  Patient will have repeat thyroid function testing done in approximately 8 weeks    She is currently off methimazole and has normal thyroid function test       5  Essential hypertension  Assessment & Plan:  Patient has hypertension  Blood pressure has been very difficult to control over the past year or 2  Blood pressure is now under excellent control on her current regimen  I have not recommended any change with her current blood pressure medications at this time  6  Coronary artery disease involving native coronary artery of native heart without angina pectoris  Assessment & Plan:  Patient's goal LDL cholesterol is less than 70  She will continue atorvastatin 20 mg daily  Cardiology had recommended she receive only a 90-day supply of Plavix following her TAVR  However, in light of the fact that she had stent placement on January 17, I am going to recommend we continue the Plavix for a full year, unless she has any significant side effects  Depression Screening Follow-up Plan: Patient's depression screening was positive with a PHQ-2 score of   Their PHQ-9 score was   Clinically patient does not have depression  No treatment is required  Subjective      This patient is an 80-year-old white female who presents to the office today for her routine checkup  The patient is accompanied to the office today by her significant other  Patient is currently attending cardiac rehab  She feels well  She denies any lower extremity edema  She denies shortness of breath  She denies orthopnea and paroxysmal nocturnal dyspnea  She has been compliant with use of her medication  She still has some generalized weakness and gait instability  Review of Systems   HENT: Positive for hearing loss  Respiratory: Negative for shortness of breath  Cardiovascular: Negative for chest pain, palpitations and leg swelling  Denies orthopnea and paroxysmal nocturnal dyspnea   Gastrointestinal: Negative for abdominal distention, abdominal pain, blood in stool, constipation, diarrhea and nausea  Musculoskeletal: Positive for gait problem     Neurological: Positive for poor balance       Current Outpatient Medications on File Prior to Visit   Medication Sig   • ALPRAZolam (XANAX) 0 25 mg tablet Take 1 tablet (0 25 mg total) by mouth 3 (three) times a day as needed for anxiety   • ammonium lactate (LAC-HYDRIN) 12 % lotion USE AS DIRECTED TO BOTH FEET DAILY   • apixaban (ELIQUIS) 2 5 mg Take 1 tablet (2 5 mg total) by mouth 2 (two) times a day   • atorvastatin (LIPITOR) 20 mg tablet Take 1 tablet (20 mg total) by mouth daily with dinner   • Cholecalciferol (Vitamin D3) 50 MCG (2000 UT) TABS Take 2,000 Units by mouth in the morning  Indications: Osteoporosis   • clopidogrel (PLAVIX) 75 mg tablet Take 1 tablet (75 mg total) by mouth daily   • diltiazem (CARDIZEM CD) 120 mg 24 hr capsule Take 1 capsule (120 mg total) by mouth daily   • metoprolol succinate (TOPROL-XL) 50 mg 24 hr tablet Take 1 tablet (50 mg total) by mouth daily   • Multiple Vitamin (Multivitamin Adult) TABS Take 1 tablet by mouth in the morning  Indications: Nutritional Support   • pantoprazole (PROTONIX) 40 mg tablet Take 1 tablet (40 mg total) by mouth daily in the early morning   • Psyllium (METAMUCIL FIBER) 51 7 % PACK Take 1 Package by mouth daily   • spironolactone (ALDACTONE) 25 mg tablet Take 1 tablet (25 mg total) by mouth daily   • [DISCONTINUED] furosemide (LASIX) 40 mg tablet Take 0 5 tablets (20 mg total) by mouth daily   • [DISCONTINUED] docusate sodium (COLACE) 100 mg capsule Take 1 capsule (100 mg total) by mouth 2 (two) times a day (Patient not taking: Reported on 1/30/2023)   • [DISCONTINUED] oxygen gas Inhale 2 L/min daily as needed (low oxygen levels)  Indications: Oxygen Desaturation (Patient not taking: Reported on 1/30/2023)       Objective     /58   Pulse 60   Temp (!) 97 3 °F (36 3 °C) (Temporal)   Ht 4' 10" (1 473 m)   Wt 47 1 kg (103 lb 14 4 oz)   SpO2 99%   BMI 21 72 kg/m²     Physical Exam  Vitals reviewed  Constitutional:       Comments:  This is a pleasant 80-year-old white female who appears her stated age  The patient is nonseptic in appearance and in no apparent distress   HENT:      Head: Normocephalic and atraumatic  Right Ear: Tympanic membrane, ear canal and external ear normal       Left Ear: Tympanic membrane, ear canal and external ear normal       Mouth/Throat:      Mouth: Mucous membranes are moist       Pharynx: Oropharynx is clear  No oropharyngeal exudate or posterior oropharyngeal erythema  Eyes:      General: No scleral icterus  Right eye: No discharge  Left eye: No discharge  Conjunctiva/sclera: Conjunctivae normal       Pupils: Pupils are equal, round, and reactive to light  Cardiovascular:      Rate and Rhythm: Normal rate  Rhythm irregular  Heart sounds: Normal heart sounds  No murmur heard  No friction rub  No gallop  Comments: Heart is noted to be irregularly irregular  Ventricular rate is well controlled  Pulmonary:      Effort: Pulmonary effort is normal  No respiratory distress  Breath sounds: Normal breath sounds  No stridor  No wheezing, rhonchi or rales  Musculoskeletal:      Cervical back: Neck supple  Lymphadenopathy:      Cervical: No cervical adenopathy  Psychiatric:         Mood and Affect: Mood normal          Behavior: Behavior normal          Thought Content: Thought content normal          Judgment: Judgment normal      Extremities: Without cyanosis, clubbing, or edema    Deangelo Wallace DO

## 2023-03-02 NOTE — ASSESSMENT & PLAN NOTE
Patient has hypertension  Blood pressure has been very difficult to control over the past year or 2  Blood pressure is now under excellent control on her current regimen  I have not recommended any change with her current blood pressure medications at this time

## 2023-03-02 NOTE — PROGRESS NOTES
Outpatient Care Management Note:  Follow up call attempted to Linda Mcneil  Message left for patient to please return call  Contact information left on message  Office note from today reviewed

## 2023-03-10 ENCOUNTER — APPOINTMENT (OUTPATIENT)
Dept: CARDIAC REHAB | Facility: HOSPITAL | Age: 88
End: 2023-03-10

## 2023-03-13 ENCOUNTER — APPOINTMENT (OUTPATIENT)
Dept: CARDIAC REHAB | Facility: HOSPITAL | Age: 88
End: 2023-03-13

## 2023-03-17 ENCOUNTER — TELEPHONE (OUTPATIENT)
Dept: CARDIAC REHAB | Facility: HOSPITAL | Age: 88
End: 2023-03-17

## 2023-03-17 ENCOUNTER — APPOINTMENT (OUTPATIENT)
Dept: CARDIAC REHAB | Facility: HOSPITAL | Age: 88
End: 2023-03-17

## 2023-03-17 ENCOUNTER — PATIENT OUTREACH (OUTPATIENT)
Dept: FAMILY MEDICINE CLINIC | Facility: CLINIC | Age: 88
End: 2023-03-17

## 2023-03-17 NOTE — PROGRESS NOTES
Outpatient Care Management Note:  Call placed to Jean Ho  Spoke with her   Jean Ho was to start cardiac rehab but has been feeling under the weather  She does have an appointment with cardiology on 3/22/2023 and plans to begin rehab after that appointment  Jean Ho has a mild cough and is tired  No fevers  Test for COVID was negative  Oxygen saturations are 98-99%  No lower extremity swelling, no shortness of breath  Weight has remained stable  Reviewed symptoms that would require a call to provider or visit to urgent care/ER   verbalized understanding  Denies any needs   does not feel additional outreach will be needed after [facility]PCI closure as he is aware of signs/symptoms that would cause concern  Advised him he may reach out with future questions or concerns if needed

## 2023-03-20 ENCOUNTER — APPOINTMENT (OUTPATIENT)
Dept: CARDIAC REHAB | Facility: HOSPITAL | Age: 88
End: 2023-03-20

## 2023-03-21 ENCOUNTER — EPISODE CHANGES (OUTPATIENT)
Dept: CASE MANAGEMENT | Facility: OTHER | Age: 88
End: 2023-03-21

## 2023-03-22 ENCOUNTER — OFFICE VISIT (OUTPATIENT)
Dept: CARDIOLOGY CLINIC | Facility: CLINIC | Age: 88
End: 2023-03-22

## 2023-03-22 VITALS
HEIGHT: 58 IN | WEIGHT: 108 LBS | DIASTOLIC BLOOD PRESSURE: 58 MMHG | BODY MASS INDEX: 22.67 KG/M2 | HEART RATE: 56 BPM | SYSTOLIC BLOOD PRESSURE: 128 MMHG

## 2023-03-22 DIAGNOSIS — I48.20 CHRONIC ATRIAL FIBRILLATION (HCC): ICD-10-CM

## 2023-03-22 DIAGNOSIS — R60.0 BILATERAL LEG EDEMA: Primary | Chronic | ICD-10-CM

## 2023-03-22 PROBLEM — Z95.2 S/P TAVR (TRANSCATHETER AORTIC VALVE REPLACEMENT): Chronic | Status: ACTIVE | Noted: 2023-01-17

## 2023-03-22 NOTE — ASSESSMENT & PLAN NOTE
On Eliquis for stroke prevention  Heart rate slightly on the slow side  I am going to stop diltiazem and heart rate will be monitored further at cardiac rehab  Patient Education   call ENT, let them know you were cauterized and they will likely see you sooner  If you bleed at home- 2 sprays of afrin and nose clamp x 20 minutes  Use heating pad 20 minutes 2x/day to elbow  Nosebleed (Adult)    Bleeding from the nose most commonly occurs because of injury or drying and cracking of the inner lining of the nose. Most nosebleeds are because of dry air or nose-picking. They can occur during a common cold or an allergy attack. They can also occur on a very hot day, or from dry air in the winter.   If the bleeding site is found, it may be cauterized. This means it's treated to cause a blood clot to form. This may be done with a chemical, heat, or electricity. If the bleeding continues after the site is cauterized, or if the site can't be found, packing may be put in your nose. This is to apply pressure and stop the bleeding. The packing may be made of gauze or sponge. A small balloon catheter is sometimes used. These must be removed by your healthcare provider. Some types of packing dissolve on their own. In rare cases, surgery is needed to stop a nosebleed. If you are taking blood thinning (anticoagulant) medicine, you may have a blood test.   Home care  · If packing was put in your nose, unless told otherwise, don't pull on it or try to remove it yourself. You will be given an appointment to have it removed. You may also have been given antibiotics to prevent a sinus infection. If so, finish all of the medicine.  · Don't blow your nose for 12 hours after the bleeding stops. This will allow a strong blood clot to form. After that, if you have to blow your nose, do it very gently. Don't pick your nose. This may restart bleeding.  · Don't drink alcohol or hot liquids for the next 2 days. Alcohol or hot liquids in your mouth can dilate blood vessels in your nose. This can cause bleeding to start again.  · Don't take ibuprofen, naproxen, or medicines that contain aspirin. These thin  the blood and may cause your nose to bleed. You may take acetaminophen for pain, unless another pain medicine was prescribed.  · If the bleeding starts again, sit up and lean forward to prevent swallowing blood. Pinch your nose tightly on both sides, as shown above, for 10 to 15 minutes. Time yourself. Don’t release the pressure on your nose until 10 minutes is up. If bleeding doesn't stop, continue to pinch your nose and, if the bleeding is a small amount, call your healthcare provider. If bleeding is heavy, call 911 or return to this facility.  · If you have a cold, allergies, or dry nasal membranes, lubricate the nasal passages. Gently apply a small amount of petroleum jelly inside the nose with a cotton swab twice a day (morning and night).  · Don't overheat your home. This can dry the air and make your condition worse.  · Put a humidifier in the room where you sleep. This will add moisture to the air. Clean the humidifier as advised by the .  · Use a saline nasal spray to keep nasal passages moist.  · Don't pick your nose. Keep fingernails trimmed to decrease risk of bleeds.  · Don't smoke.    Follow-up care  Follow up with your healthcare provider, or as advised. Nasal packing should be rechecked or removed within 2 to 3 days.   When to get medical advice  Call your healthcare provider right away if any of these occur.   · You have intermittent, recurrent, small amounts of bleeding that you can control for a while.  · Fever of 100.4ºF (38ºC) or higher, or as directed by your healthcare provider  · Headache  · Sinus or facial pain  Call 911  Call 911 or get medical care right away if any of the following occur   · Dizziness, weakness, or fainting  · Shortness of breath or trouble breathing  · You have another nosebleed that you can't control, or with heavy bleeding  · You become abnormally tired or confused  Philip last reviewed this educational content on 2/1/2020  © 4586-2719 [employer]. All rights reserved. This information is not intended as a substitute for professional medical care. Always follow your healthcare professional's instructions.

## 2023-03-22 NOTE — PROGRESS NOTES
Patient ID: Paloma Garcia is a 80 y o  female  Plan:      S/P TAVR (transcatheter aortic valve replacement)  Valve sounds great  Bilateral leg edema  Reveal edema despite rare use of furosemide  Coronary artery disease involving native coronary artery of native heart without angina pectoris  RCA stenting 1/11/2023  We will continue Plavix until January 2024  Chronic atrial fibrillation (HCC)  On Eliquis for stroke prevention  Heart rate slightly on the slow side  I am going to stop diltiazem and heart rate will be monitored further at cardiac rehab  Follow up Plan/Other summary comments:  Return in about 4 months (around 7/22/2023)  HPI:   Patient is seen in follow-up today regarding the above  She was to start cardiac rehab but had an upper respiratory infection is now ready  She feels well since her procedures  Patient had TAVR on 1/17/2023 with a #23 valve  Preprocedure coronary angiography on 1/11/2023 revealed 75% ostial right coronary artery stenosis and this was stented  Most recent or relevant cardiac/vascular testing:    TTE 2/15/2023: Normal LVEF  Normal TAVR  Significant left atrial enlargement  Past Surgical History:   Procedure Laterality Date   • CARDIAC CATHETERIZATION N/A 1/11/2023    Procedure: LHC-Cardiac catheterization;  Surgeon: Kacey Briones MD;  Location: [facility]; Service: Cardiology   • CARDIAC CATHETERIZATION N/A 1/11/2023    Procedure: Cardiac pci;  Surgeon: Kacey Briones MD;  Location: [facility];   Service: Cardiology   • CARDIAC CATHETERIZATION N/A 1/17/2023    Procedure: CARDIAC TAVR;  Surgeon: Ayesha Sidhu DO;  Location: [facility];  Service: Cardiology   • DENTAL SURGERY Bilateral     ALL TEETH REMOVED   • FL GUIDED NEEDLE PLAC BX/ASP/INJ  5/26/2021   • IR THORACENTESIS  12/20/2022   • JOINT REPLACEMENT Left    • KNEE SURGERY      left knee replacement   • RI INJECT SI JOINT ARTHRGRPHY&/ANES/STEROID W/JOHNATHON Bilateral 5/26/2021    Procedure: SACROILIAC JOINT INJECTION;  Surgeon: Lisa Messina MD;  Location: [facility];  Service: Pain Management    • MI REPLACE AORTIC VALVE OPENFEMORAL ARTERY APPROACH N/A 1/17/2023    Procedure: REPLACEMENT AORTIC VALVE TRANSCATHETER (TAVR) TRANSFEMORAL W/ 23MM CALVERT SHAVONNE S3 ULTRA VALVE(ACCESS ON LEFT) ALENA;  Surgeon: Abbie Richard DO;  Location: [facility];  Service: Cardiac Surgery       Lipid Profile:   Lab Results   Component Value Date    TRIG 50 12/21/2022    HDL 94 12/21/2022         Review of Systems   10  point ROS  was otherwise non pertinent or negative except as per HPI or as below  Gait: Slow  Objective:     /58   Pulse 56   Ht 4' 10" (1 473 m)   Wt 49 kg (108 lb)   BMI 22 57 kg/m²     PHYSICAL EXAM:    General:  Normal appearance in no distress  Eyes:  Anicteric  Oral mucosa:  Moist   Neck:  No JVD  Carotid upstrokes are brisk without bruits  No masses  Chest:  Clear to auscultation  Cardiac: Irregularly irregular  No palpable PMI  Normal S1 and S2   Grade 1 systolic murmur at the base  No gallop or rub  Abdomen:  Soft and nontender  No palpable organomegaly or aortic enlargement  Extremities: Trace peripheral edema  Musculoskeletal:  Symmetric  Vascular:  Femoral pulses are brisk without bruits  Popliteal pulses are intact bilaterally  Pedal pulses are intact  Neuro:  Grossly symmetric  Psych:  Alert and oriented x3          Current Outpatient Medications:   •  ALPRAZolam (XANAX) 0 25 mg tablet, Take 1 tablet (0 25 mg total) by mouth 3 (three) times a day as needed for anxiety, Disp: 90 tablet, Rfl: 5  •  ammonium lactate (LAC-HYDRIN) 12 % lotion, USE AS DIRECTED TO BOTH FEET DAILY, Disp: , Rfl:   •  apixaban (ELIQUIS) 2 5 mg, Take 1 tablet (2 5 mg total) by mouth 2 (two) times a day, Disp: 60 tablet, Rfl: 2  •  atorvastatin (LIPITOR) 20 mg tablet, Take 1 tablet (20 mg total) by mouth daily with dinner, Disp: 30 tablet, Rfl: 4  •  Cholecalciferol (Vitamin D3) 50 MCG (2000 UT) TABS, Take 2,000 Units by mouth in the morning  Indications: Osteoporosis, Disp: , Rfl:   •  clopidogrel (PLAVIX) 75 mg tablet, Take 1 tablet (75 mg total) by mouth daily, Disp: 90 tablet, Rfl: 2  •  furosemide (LASIX) 40 mg tablet, Take 0 5 tablets (20 mg total) by mouth daily as needed (leg swelling), Disp: 30 tablet, Rfl: 1  •  metoprolol succinate (TOPROL-XL) 50 mg 24 hr tablet, Take 1 tablet (50 mg total) by mouth daily, Disp: 135 tablet, Rfl: 3  •  Multiple Vitamin (Multivitamin Adult) TABS, Take 1 tablet by mouth in the morning   Indications: Nutritional Support, Disp: , Rfl:   •  pantoprazole (PROTONIX) 40 mg tablet, Take 1 tablet (40 mg total) by mouth daily in the early morning, Disp: 30 tablet, Rfl: 2  •  Psyllium (METAMUCIL FIBER) 51 7 % PACK, Take 1 Package by mouth daily, Disp: , Rfl: 0  •  spironolactone (ALDACTONE) 25 mg tablet, Take 1 tablet (25 mg total) by mouth daily, Disp: 30 tablet, Rfl: 1  No Known Allergies  Past Medical History:   Diagnosis Date   • Acute on chronic diastolic CHF (congestive heart failure) ([place] [address] ) 1/30/2023   • Acute respiratory failure with hypoxia ([place] [address] ) 12/22/2022   • Aortic stenosis    • Atrial flutter (HCC)    • CAD (coronary artery disease) 01/11/2023    CAD/PCI/ELODIA   • CKD (chronic kidney disease), stage III ([facility] )    • Community acquired pneumonia of left upper lobe of lung 05/17/2019   • Fatigue    • Full dentures    • Generalized anxiety disorder    • Hyperlipidemia    • Hypertension    • Impaired fasting glucose    • Mitral regurgitation    • Pneumonia    • RBBB    • 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    TRANSFEMORAL W/ 23MM CALVERT SHAVONNE S3 ULTRA VALVE(ACCESS ON LEFT)   • SVT (supraventricular tachycardia)    • Tricuspid regurgitation            Social History     Tobacco Use   Smoking Status Never   Smokeless Tobacco Never

## 2023-03-24 ENCOUNTER — CLINICAL SUPPORT (OUTPATIENT)
Dept: CARDIAC REHAB | Facility: HOSPITAL | Age: 88
End: 2023-03-24

## 2023-03-24 ENCOUNTER — APPOINTMENT (OUTPATIENT)
Dept: LAB | Facility: HOSPITAL | Age: 88
End: 2023-03-24
Attending: FAMILY MEDICINE

## 2023-03-24 DIAGNOSIS — Z95.2 S/P TAVR (TRANSCATHETER AORTIC VALVE REPLACEMENT): Primary | Chronic | ICD-10-CM

## 2023-03-24 DIAGNOSIS — E87.1 HYPONATREMIA: Primary | ICD-10-CM

## 2023-03-24 DIAGNOSIS — E87.1 HYPONATREMIA: ICD-10-CM

## 2023-03-24 LAB
ANION GAP SERPL CALCULATED.3IONS-SCNC: 10 MMOL/L (ref 4–13)
BUN SERPL-MCNC: 17 MG/DL (ref 5–25)
CALCIUM SERPL-MCNC: 9.4 MG/DL (ref 8.4–10.2)
CHLORIDE SERPL-SCNC: 92 MMOL/L (ref 96–108)
CO2 SERPL-SCNC: 22 MMOL/L (ref 21–32)
CREAT SERPL-MCNC: 1.03 MG/DL (ref 0.6–1.3)
GFR SERPL CREATININE-BSD FRML MDRD: 48 ML/MIN/1.73SQ M
GLUCOSE P FAST SERPL-MCNC: 98 MG/DL (ref 65–99)
POTASSIUM SERPL-SCNC: 4.3 MMOL/L (ref 3.5–5.3)
SODIUM SERPL-SCNC: 124 MMOL/L (ref 135–147)

## 2023-03-24 NOTE — PROGRESS NOTES
Cardiac Rehabilitation Plan of Care   30 Day Reassessment      Today's date: 3/24/2023   # of Exercise Sessions Completed:   Patient name: Tasha Haywood      : 1935  Age: 80 y o  MRN: [id number]  Referring Physician: Alena Florentino DO  Cardiologist: Reggie Bill MD  Provider: Lorna Alexandra  Clinician: Cristal Henson MS    Dx:   Encounter Diagnosis   Name Primary? • S/P TAVR (transcatheter aortic valve replacement)      Date of onset: 23      SUMMARY OF PROGRESS:  Patient has attended 2/36 sessions of cardiac rehab  Patient had her first day of structured exercise today  Patient completed 23 minutes of aerobic exercise finishing with extreme fatigue and tiredness  Resting /62, HR 71 bpm  Exercise /80, HR 88-94 bpm  Patient comes up to rehab is wheel chair  Patient was sick upon starting rehab and finally recovered to gain her strength to make it to rehab  Patient stated she does dishes at home and cooking  She gets up and down from her chair, but no structured exercise  Patient was encouraged to use her walker and walk in between commercials at home  Patient does stretches that she learned from physical therapy   drives her to rehab  Patient will continue to have the goal of improving her balance      Medication compliance: Yes   Comments: Pt reports to be compliant with medications  Fall Risk: High   Comments: Patient uses walking assist device (walker/cane/rollator)    EKG Interpretation: A Fib       EXERCISE ASSESSMENT and PLAN    Exercise Prescription:      Frequency: 2 days/week   Supplement with home exercise 2+ days/wk as tolerated       Minutes: 25         METS: 1 00-2 43         HR: 88-94 bpm   RPE: 4-6         Modalities: UBE, NuStep and Room walking      30 Day Goals for Exercise Progression:    Frequency: 3 days/week of cardiac rehab       Supplement with home exercise 2+ days/wk as tolerated    Minutes: 35                              >150 mins/wk of moderate intensity exercise   METS: 1 5-2 5   HR: 20-30 beats above RHR    RPE: 4-6   Modalities: UBE, NuStep and Room walking    Strength trainin-3 days / week   Modalities: free weights    Home Exercise: none    Goals: 10% improvement in functional capacity - based on max METs achieved in fitness assessment, Resume ADLs with increased strength, Attend Rehab regularly and Start a walking program    Progression Toward Goals:  Reviewed Pt goals and determined plan of care, Patient will initiate walking at home for at least 5 minute bouts  in the next 30 days    Education: benefit of exercise for CAD risk factors, home exercise guidelines, AHA guidelines to achieve >150 mins/wk of moderate exercise and RPE scale   Plan:education on home exercise guidelines, home exercise 30+ mins 2 days opposite CR and Education class: Risk Factors for Heart Disease  Readiness to change: Preparation:  (Getting ready to change)       NUTRITION ASSESSMENT AND PLAN    Weight control:    Starting weight: 102   Current weight:   105    Diabetes: N/A  A1c:     last measured:     Lipid management: Last lipid profile 22  Chol 164  TRG 50  HDL 94  LDL 60    Goals:LDL <100, HDL >40, TRG <150, CHOL <200, eliminate processed meats, cook without added fat or use vegetable oil/spray, use low fat dairy, eat 3 or more servings of whole grains a day, Eat 4-5 cups of fruits and vegetables daily, use olive or canola oil in baking, choose low sodium processed foods and eliminate butter    Measurable goals were based Rate Your Plate Dietary Self-Assessment  These are the areas in which the patient could score higher on the assessment  Goals include recommendations for a heart healthy diet based on American Heart Association      Progression Toward Goals: Reviewed Pt goals and determined plan of care, Patient will make dietary changes to see improvement in future lipid panel  in the next 30 days    Education: heart healthy eating  low sodium diet  hydration  healthy choices while dining out  portion control  Plan: replace butter with soft spreads made with olive oil, canola or yogurt, replace refined grain bread with whole grain bread, replace unhealthy snacks with fruits & vegs, reduce portion sizes, eat fewer desserts and sweets, avoid processed foods and learn how to read food labels  Readiness to change: Preparation:  (Getting ready to change)       PSYCHOSOCIAL ASSESSMENT AND PLAN    Emotional:  Depression assessment:  PHQ-9 = 5  5-9 = Mild Depression            Anxiety measure:  MINNIE-7 = 7  5-9 = Mild anxiety  Self-reported stress level:  5  Social support: Very Good    Goals:  Reduce perceived stress to 1-3/10, Feelings in [place] Score < 3, Physical Fitness in [place] Score < 3, Social Support in [place] Score < 3 and Daily Activity in [place] Score < 3    Progression Toward Goals: Reviewed Pt goals and determined plan of care, Patient will turn off politics on TV, watch something more enjoyable, do crossword puzzles in the next 30 days    Education: signs/sxs of depression, benefits of a positive support system and stress management techniques  Plan: Exercise, Enjoy a hobby, Keep a positive mindset, Meet new people and Enjoy family  Readiness to change: Preparation:  (Getting ready to change)       OTHER CORE COMPONENTS     Tobacco:   Social History     Tobacco Use   Smoking Status Never   Smokeless Tobacco Never       Tobacco Use Intervention:   N/A:  Patient is a non-smoker     Anginal Symptoms:  None   NTG use: No prescription    Blood pressure:    Restin/62   Exercise: 120/80    Goals: consistent BP < 130/80, reduced dietary sodium <2300mg and medication compliance    Progression Toward Goals: Reviewed Pt goals and determined plan of care, Patient will monitor BP at home to maintain appropriate levels in the next 30 days    Education:  understanding high blood pressure and it's relationship to CAD, low sodium diet and HTN, Education class:  Common Heart Medications and Education class: Understanding Heart Disease  Plan: Class: Understanding Heart Disease, avoid places with second hand smoke, Avoid Processed foods, engage in regular exercise, eliminate salt shaker at the table and use salt substitutes  Readiness to change: Preparation:  (Getting ready to change)

## 2023-03-27 ENCOUNTER — CLINICAL SUPPORT (OUTPATIENT)
Dept: CARDIAC REHAB | Facility: HOSPITAL | Age: 88
End: 2023-03-27

## 2023-03-27 DIAGNOSIS — Z95.2 S/P TAVR (TRANSCATHETER AORTIC VALVE REPLACEMENT): Chronic | ICD-10-CM

## 2023-03-31 ENCOUNTER — CLINICAL SUPPORT (OUTPATIENT)
Dept: CARDIAC REHAB | Facility: HOSPITAL | Age: 88
End: 2023-03-31

## 2023-03-31 DIAGNOSIS — Z95.2 S/P TAVR (TRANSCATHETER AORTIC VALVE REPLACEMENT): Chronic | ICD-10-CM

## 2023-04-05 ENCOUNTER — TELEPHONE (OUTPATIENT)
Dept: FAMILY MEDICINE CLINIC | Facility: CLINIC | Age: 88
End: 2023-04-05

## 2023-04-05 DIAGNOSIS — M54.50 ACUTE LOW BACK PAIN WITHOUT SCIATICA, UNSPECIFIED BACK PAIN LATERALITY: Primary | ICD-10-CM

## 2023-04-05 DIAGNOSIS — F11.90 UNCOMPLICATED OPIOID USE: ICD-10-CM

## 2023-04-05 RX ORDER — NALOXONE HYDROCHLORIDE 4 MG/.1ML
SPRAY NASAL
Qty: 1 EACH | Refills: 1 | Status: SHIPPED | OUTPATIENT
Start: 2023-04-05

## 2023-04-05 RX ORDER — TRAMADOL HYDROCHLORIDE 50 MG/1
50 TABLET ORAL EVERY 8 HOURS PRN
Qty: 30 TABLET | Refills: 0 | Status: SHIPPED | OUTPATIENT
Start: 2023-04-05

## 2023-04-05 NOTE — TELEPHONE ENCOUNTER
My name's Kevan Harman  I'm calling from my wife, Melissa Mullen  Her date of birth is [id number]  She's having problems with her back and she would like to talk to her nurse  Our phone number is [phone]  Thank you  I returned the phone call and Misty Kimball said she has been in such terrible pain that she can barely move at all  She can hardly get up and out of bed and that it would be impossible for her to get here or even to the hospital for an xray  Can you prescribe her something for pain? Please advise!

## 2023-04-05 NOTE — PROGRESS NOTES
I spoke to the patient's significant other  The patient was at cardiac rehab  When she came home, she started developing severe lower back pain  Her significant other thinks that she overdid it at cardiac rehab  The patient's pain is such that she cannot even get up but she would not be able to leave the house  She she has tried Tylenol and Tylenol Extra Strength as well as Salonpas without relief  Patient is currently on Eliquis in addition to Plavix  I cannot use NSAID  In addition to this she has chronic kidney disease stage III  I can prescribe tramadol  The problem with that is she is taking alprazolam   I discussed this with her caregiver  I advised that she hold the alprazolam while taking tramadol  I am also going to give Narcan concomitantly and advised her caregiver to give it to her if she has excessive sedation or difficulty being aroused    I did queried the [place] prescription drug monitoring program

## 2023-04-06 ENCOUNTER — TELEPHONE (OUTPATIENT)
Dept: FAMILY MEDICINE CLINIC | Facility: CLINIC | Age: 88
End: 2023-04-06

## 2023-04-06 ENCOUNTER — HOSPITAL ENCOUNTER (OUTPATIENT)
Dept: RADIOLOGY | Facility: HOSPITAL | Age: 88
Discharge: HOME | End: 2023-04-06
Attending: FAMILY MEDICINE

## 2023-04-06 DIAGNOSIS — M54.50 ACUTE LOW BACK PAIN WITHOUT SCIATICA, UNSPECIFIED BACK PAIN LATERALITY: Primary | ICD-10-CM

## 2023-04-06 DIAGNOSIS — M54.50 ACUTE LOW BACK PAIN WITHOUT SCIATICA, UNSPECIFIED BACK PAIN LATERALITY: ICD-10-CM

## 2023-04-06 NOTE — TELEPHONE ENCOUNTER
My name's Evelia Almendarez  I'm calling for my wife, Jack Barahona  Her date of birth is [id number]  I talked to the doctor yesterday and he prescribed a pain pill which he took at 7:00 o'clock and she didn't have no side effects  She feels as though she can go to the [facility] and have an X-ray  If he would send the script I'll take her  Waiting for your reply  My phone number is [phone]  Thank you

## 2023-04-14 DIAGNOSIS — S22.080A CLOSED WEDGE COMPRESSION FRACTURE OF T11 VERTEBRA, INITIAL ENCOUNTER (HCC): Primary | ICD-10-CM

## 2023-04-25 ENCOUNTER — OFFICE VISIT (OUTPATIENT)
Dept: FAMILY MEDICINE CLINIC | Facility: CLINIC | Age: 88
End: 2023-04-25

## 2023-04-25 VITALS
OXYGEN SATURATION: 100 % | WEIGHT: 109.1 LBS | HEART RATE: 66 BPM | SYSTOLIC BLOOD PRESSURE: 122 MMHG | DIASTOLIC BLOOD PRESSURE: 74 MMHG | HEIGHT: 58 IN | BODY MASS INDEX: 22.9 KG/M2 | TEMPERATURE: 97.8 F

## 2023-04-25 DIAGNOSIS — M80.08XA AGE-RELATED OSTEOPOROSIS WITH CURRENT PATHOLOGICAL FRACTURE OF VERTEBRA, INITIAL ENCOUNTER (HCC): ICD-10-CM

## 2023-04-25 DIAGNOSIS — Z00.00 MEDICARE ANNUAL WELLNESS VISIT, SUBSEQUENT: ICD-10-CM

## 2023-04-25 DIAGNOSIS — I10 ESSENTIAL HYPERTENSION: ICD-10-CM

## 2023-04-25 DIAGNOSIS — S22.080A CLOSED WEDGE COMPRESSION FRACTURE OF T11 VERTEBRA, INITIAL ENCOUNTER (HCC): Primary | ICD-10-CM

## 2023-04-25 RX ORDER — ALENDRONATE SODIUM 70 MG/1
70 TABLET ORAL
Qty: 12 TABLET | Refills: 3 | Status: SHIPPED | OUTPATIENT
Start: 2023-04-25

## 2023-04-25 NOTE — ASSESSMENT & PLAN NOTE
Patient has a compression fracture of T11  This appears to be acute/subacute  I reviewed my records and it appears I was first called by the patient's significant other on April 5  Compression fracture was suspected at that time and x-rays were ordered  X-rays did show a T11 compression fracture  Since the patient was unable to undergo MRI, a whole-body bone scan was ordered  She was able to do this test and it did show acute/subacute compression fracture  I discussed treatment options with the patient  Her options are basically 1, take pain medication and let it get better on its own  Generally, the pain will subside after about 8 weeks or so  Her second option would be to see a specialist for a vertebroplasty  I did tell the patient that there was no one in this area who does the procedure  She would have to go to the [facility] area  Her third option would be pain management  She has seen pain management in the past, as well as her significant other  As such, the patient elected to go to pain management  A referral was placed for Dr Codie Enrique

## 2023-04-25 NOTE — PATIENT INSTRUCTIONS
Medicare Preventive Visit Patient Instructions  Thank you for completing your Welcome to Medicare Visit or Medicare Annual Wellness Visit today  Your next wellness visit will be due in one year (4/25/2024)  The screening/preventive services that you may require over the next 5-10 years are detailed below  Some tests may not apply to you based off risk factors and/or age  Screening tests ordered at today's visit but not completed yet may show as past due  Also, please note that scanned in results may not display below  Preventive Screenings:  Service Recommendations Previous Testing/Comments   Colorectal Cancer Screening  * Colonoscopy    * Fecal Occult Blood Test (FOBT)/Fecal Immunochemical Test (FIT)  * Fecal DNA/Cologuard Test  * Flexible Sigmoidoscopy Age: 39-70 years old   Colonoscopy: every 10 years (may be performed more frequently if at higher risk)  OR  FOBT/FIT: every 1 year  OR  Cologuard: every 3 years  OR  Sigmoidoscopy: every 5 years  Screening may be recommended earlier than age 39 if at higher risk for colorectal cancer  Also, an individualized decision between you and your healthcare provider will decide whether screening between the ages of 74-80 would be appropriate  Colonoscopy: Not on file  FOBT/FIT: Not on file  Cologuard: Not on file  Sigmoidoscopy: Not on file    Screening Not Indicated     Breast Cancer Screening Age: 36 years old  Frequency: every 1-2 years  Not required if history of left and right mastectomy Mammogram: 07/05/2018        Cervical Cancer Screening Between the ages of 21-29, pap smear recommended once every 3 years  Between the ages of 33-67, can perform pap smear with HPV co-testing every 5 years     Recommendations may differ for women with a history of total hysterectomy, cervical cancer, or abnormal pap smears in past  Pap Smear: Not on file    Screening Not Indicated   Hepatitis C Screening Once for adults born between 1945 and 1965  More frequently in patients at high risk for Hepatitis C Hep C Antibody: 09/23/2019    Screening Current   Diabetes Screening 1-2 times per year if you're at risk for diabetes or have pre-diabetes Fasting glucose: 98 mg/dL (3/24/2023)  A1C: No results in last 5 years (No results in last 5 years)  Screening Current   Cholesterol Screening Once every 5 years if you don't have a lipid disorder  May order more often based on risk factors  Lipid panel: 12/21/2022    Screening Current     Other Preventive Screenings Covered by Medicare:  1  Abdominal Aortic Aneurysm (AAA) Screening: covered once if your at risk  You're considered to be at risk if you have a family history of AAA  2  Lung Cancer Screening: covers low dose CT scan once per year if you meet all of the following conditions: (1) Age 50-69; (2) No signs or symptoms of lung cancer; (3) Current smoker or have quit smoking within the last 15 years; (4) You have a tobacco smoking history of at least 20 pack years (packs per day multiplied by number of years you smoked); (5) You get a written order from a healthcare provider  3  Glaucoma Screening: covered annually if you're considered high risk: (1) You have diabetes OR (2) Family history of glaucoma OR (3)  aged 48 and older OR (3)  American aged 72 and older  3  Osteoporosis Screening: covered every 2 years if you meet one of the following conditions: (1) You're estrogen deficient and at risk for osteoporosis based off medical history and other findings; (2) Have a vertebral abnormality; (3) On glucocorticoid therapy for more than 3 months; (4) Have primary hyperparathyroidism; (5) On osteoporosis medications and need to assess response to drug therapy  · Last bone density test (DXA Scan): 04/06/2022   5  HIV Screening: covered annually if you're between the age of 15-65  Also covered annually if you are younger than 13 and older than 72 with risk factors for HIV infection   For pregnant patients, it is covered up to 3 times per pregnancy  Immunizations:  Immunization Recommendations   Influenza Vaccine Annual influenza vaccination during flu season is recommended for all persons aged >= 6 months who do not have contraindications   Pneumococcal Vaccine   * Pneumococcal conjugate vaccine = PCV13 (Prevnar 13), PCV15 (Vaxneuvance), PCV20 (Prevnar 20)  * Pneumococcal polysaccharide vaccine = PPSV23 (Pneumovax) Adults 25-60 years old: 1-3 doses may be recommended based on certain risk factors  Adults 72 years old: 1-2 doses may be recommended based off what pneumonia vaccine you previously received   Hepatitis B Vaccine 3 dose series if at intermediate or high risk (ex: diabetes, end stage renal disease, liver disease)   Tetanus (Td) Vaccine - COST NOT COVERED BY MEDICARE PART B Following completion of primary series, a booster dose should be given every 10 years to maintain immunity against tetanus  Td may also be given as tetanus wound prophylaxis  Tdap Vaccine - COST NOT COVERED BY MEDICARE PART B Recommended at least once for all adults  For pregnant patients, recommended with each pregnancy  Shingles Vaccine (Shingrix) - COST NOT COVERED BY MEDICARE PART B  2 shot series recommended in those aged 48 and above     Health Maintenance Due:      Topic Date Due   • Hepatitis C Screening  Completed     Immunizations Due:      Topic Date Due   • COVID-19 Vaccine (4 - Booster for Delbra Merissa series) 01/06/2022     Advance Directives   What are advance directives? Advance directives are legal documents that state your wishes and plans for medical care  These plans are made ahead of time in case you lose your ability to make decisions for yourself  Advance directives can apply to any medical decision, such as the treatments you want, and if you want to donate organs  What are the types of advance directives? There are many types of advance directives, and each state has rules about how to use them   You may choose a combination of any of the following:  · Living will: This is a written record of the treatment you want  You can also choose which treatments you do not want, which to limit, and which to stop at a certain time  This includes surgery, medicine, IV fluid, and tube feedings  · Durable power of  for healthcare [facility]): This is a written record that states who you want to make healthcare choices for you when you are unable to make them for yourself  This person, called a proxy, is usually a family member or a friend  You may choose more than 1 proxy  · Do not resuscitate (DNR) order:  A DNR order is used in case your heart stops beating or you stop breathing  It is a request not to have certain forms of treatment, such as CPR  A DNR order may be included in other types of advance directives  · Medical directive: This covers the care that you want if you are in a coma, near death, or unable to make decisions for yourself  You can list the treatments you want for each condition  Treatment may include pain medicine, surgery, blood transfusions, dialysis, IV or tube feedings, and a ventilator (breathing machine)  · Values history: This document has questions about your views, beliefs, and how you feel and think about life  This information can help others choose the care that you would choose  Why are advance directives important? An advance directive helps you control your care  Although spoken wishes may be used, it is better to have your wishes written down  Spoken wishes can be misunderstood, or not followed  Treatments may be given even if you do not want them  An advance directive may make it easier for your family to make difficult choices about your care  Fall Prevention    Fall prevention  includes ways to make your home and other areas safer  It also includes ways you can move more carefully to prevent a fall   Health conditions that cause changes in your blood pressure, vision, or muscle strength and coordination may increase your risk for falls  Medicines may also increase your risk for falls if they make you dizzy, weak, or sleepy  Fall prevention tips:   · Stand or sit up slowly  · Use assistive devices as directed  · Wear shoes that fit well and have soles that   · Wear a personal alarm  · Stay active  · Manage your medical conditions  Home Safety Tips:  · Add items to prevent falls in the bathroom  · Keep paths clear  · Install bright lights in your home  · Keep items you use often on shelves within reach  · Paint or place reflective tape on the edges of your stairs  © Copyright [employer] 2018 Information is for End User's use only and may not be sold, redistributed or otherwise used for commercial purposes   All illustrations and images included in CareNotes® are the copyrighted property of [employer]  or [address] [place] [address] [employer]

## 2023-04-25 NOTE — ASSESSMENT & PLAN NOTE
Patient has osteoporosis  She is currently taking calcium and vitamin D  She needs better treatment  I discussed this with the patient  She was started on Fosamax 70 mg weekly  I explained to the patient proper use of the medication

## 2023-04-25 NOTE — PROGRESS NOTES
Assessment and Plan:     Problem List Items Addressed This Visit        Cardiovascular and Mediastinum    Essential hypertension     Patient is blood pressure was noted to be quite high when she first came into the office  However, she was having severe pain and difficulty walking into the office  I rechecked her blood pressure after she sat waiting  She has much less pain while sitting  When I rechecked her blood pressure I found it to be well controlled at 122/74  I recommended no change with her current medication  Musculoskeletal and Integument    Closed wedge compression fracture of T11 vertebra ([address] ) - Primary     Patient has a compression fracture of T11  This appears to be acute/subacute  I reviewed my records and it appears I was first called by the patient's significant other on April 5  Compression fracture was suspected at that time and x-rays were ordered  X-rays did show a T11 compression fracture  Since the patient was unable to undergo MRI, a whole-body bone scan was ordered  She was able to do this test and it did show acute/subacute compression fracture  I discussed treatment options with the patient  Her options are basically 1, take pain medication and let it get better on its own  Generally, the pain will subside after about 8 weeks or so  Her second option would be to see a specialist for a vertebroplasty  I did tell the patient that there was no one in this area who does the procedure  She would have to go to the [facility] area  Her third option would be pain management  She has seen pain management in the past, as well as her significant other  As such, the patient elected to go to pain management  A referral was placed for Dr Irene Gillette  Relevant Orders    Ambulatory Referral to Pain Management    Age-related osteoporosis with current pathological fracture of vertebra [facility])     Patient has osteoporosis    She is currently taking calcium and vitamin D  She needs better treatment  I discussed this with the patient  She was started on Fosamax 70 mg weekly  I explained to the patient proper use of the medication  Relevant Medications    alendronate (Fosamax) 70 mg tablet       Other    Medicare annual wellness visit, subsequent       Depression Screening and Follow-up Plan: Patient was screened for depression during today's encounter  They screened negative with a PHQ-2 score of 1  Preventive health issues were discussed with patient, and age appropriate screening tests were ordered as noted in patient's After Visit Summary  Personalized health advice and appropriate referrals for health education or preventive services given if needed, as noted in patient's After Visit Summary  History of Present Illness:     Patient presents for a Medicare Wellness Visit    Patient is an 80-year-old white female who presents to the office today for her annual Medicare wellness exam as well as for a follow-up visit  Patient complains of severe pain in her back  She denies any incidence of trauma  She admits that it is a little less severe than it was previously  She tried tramadol for her pain but she was unable to tolerate it  She is currently using Tylenol  She tells me she is unable to sleep in bed  She is sleeping in a recliner  She often needs her friend to help her up from a sitting position  Patient estimates that the pain has been going on now for about a month  She wonders if she did it in cardiac rehab  Her significant other argues that she did not  However, patient tells me she is not going back to cardiac rehab  Patient Care Team:  Desmond Cheatham DO as PCP - General (Family Medicine)  Deedee Dexter MD (Gastroenterology)  Kiana Schumacher as Nurse Practitioner (Endocrinology)     Review of Systems:     Review of Systems   Respiratory: Negative for cough, shortness of breath and wheezing      Cardiovascular: Negative for chest pain, palpitations and leg swelling  Gastrointestinal: Negative for abdominal distention, abdominal pain, blood in stool, constipation, diarrhea and nausea  Musculoskeletal: Positive for back pain and gait problem  Neurological: Negative for weakness and numbness          Problem List:     Patient Active Problem List   Diagnosis   • Hypomagnesemia   • Hypokalemia   • Hyponatremia   • Essential hypertension   • Lymphadenopathy   • Hiatal hernia   • Thrombocytopenia ([facility])   • Severe aortic stenosis   • Mitral valve insufficiency   • Supraventricular tachycardia ([facility])   • Pulmonary hypertension ([facility] )   • Diverticulosis   • Non-rheumatic tricuspid valve insufficiency   • Generalized anxiety disorder   • Mid back pain   • Encounter for Medicare annual wellness exam   • Encounter for long-term (current) use of medications   • Immunization due   • Acute bursitis of right shoulder   • Impaired fasting glucose   • Osteopenia of neck of femur   • Chronic bilateral low back pain without sciatica   • Gait abnormality   • Chronic pain syndrome   • Sacroiliitis ([facility])   • Bilateral leg edema   • Ganglion cyst   • Adhesive capsulitis of right shoulder   • Stage 3a chronic kidney disease ([facility])   • Other fatigue   • Dyslipidemia   • Post-menopause   • Primary generalized (osteo)arthritis   • Chronic diastolic (congestive) heart failure ([facility])   • Pleural effusion   • Constipation   • Status post insertion of drug eluting coronary artery stent   • S/P TAVR (transcatheter aortic valve replacement)   • Chronic atrial fibrillation ([facility])   • Incomplete right bundle branch block   • Coronary artery disease involving native coronary artery of native heart without angina pectoris   • Anemia   • Graves disease   • Closed wedge compression fracture of T11 vertebra ([facility])   • Age-related osteoporosis with current pathological fracture of vertebra ([facility] )   • Medicare annual wellness visit, subsequent      Past Medical and Surgical History:     Past Medical History:   Diagnosis Date   • Acute on chronic diastolic CHF (congestive heart failure) ([facility] ) 1/30/2023   • Acute respiratory failure with hypoxia (HCC) 12/22/2022   • Aortic stenosis    • Atrial flutter (HCC)    • CAD (coronary artery disease) 01/11/2023    CAD/PCI/ELODIA   • CKD (chronic kidney disease), stage III (HCC)    • Community acquired pneumonia of left upper lobe of lung 05/17/2019   • Fatigue    • Full dentures    • Generalized anxiety disorder    • Hyperlipidemia    • Hypertension    • Impaired fasting glucose    • Mitral regurgitation    • Pneumonia    • RBBB    • 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    TRANSFEMORAL W/ 23MM CALVERT SHAVONNE S3 ULTRA VALVE(ACCESS ON LEFT)   • SVT (supraventricular tachycardia)    • Tricuspid regurgitation      Past Surgical History:   Procedure Laterality Date   • CARDIAC CATHETERIZATION N/A 1/11/2023    Procedure: LHC-Cardiac catheterization;  Surgeon: Rachel Westfall MD;  Location: [facility]; Service: Cardiology   • CARDIAC CATHETERIZATION N/A 1/11/2023    Procedure: Cardiac pci;  Surgeon: Rachel Westfall MD;  [facility]: [facility];   Service: Cardiology   • CARDIAC CATHETERIZATION N/A 1/17/2023    Procedure: CARDIAC TAVR;  Surgeon: Frannie Regan DO;  Location:  MAIN OR;  Service: Cardiology   • DENTAL SURGERY Bilateral     ALL TEETH REMOVED   • FL GUIDED NEEDLE PLAC BX/ASP/INJ  5/26/2021   • IR THORACENTESIS  12/20/2022   • JOINT REPLACEMENT Left    • KNEE SURGERY      left knee replacement   • MN INJECT SI JOINT ARTHRGRPHY&/ANES/STEROID W/JOHNATHON Bilateral 5/26/2021    Procedure: SACROILIAC JOINT INJECTION;  Surgeon: Ivone Boyer MD;  Location: [facility] MAIN OR;  Service: Pain Management    • MN REPLACE AORTIC VALVE OPENFEMORAL ARTERY APPROACH N/A 1/17/2023    Procedure: REPLACEMENT AORTIC VALVE TRANSCATHETER (TAVR) TRANSFEMORAL W/ 23MM CALVERT SHAVONNE S3 ULTRA VALVE(ACCESS ON LEFT) ALENA;  Surgeon: Nesha De La Paz Milagros Robins DO;  Location: [facility] MAIN [facility];  Service: Cardiac Surgery      Family History:     Family History   Problem Relation Age of Onset   • Hypertension Mother    • Asthma Father         [place]'s asthma   • Alzheimer's disease Sister    • Rectal cancer Son    • Uterine cancer Daughter    • Heart disease Sister    • Heart disease Brother       Social History:     Social History     Socioeconomic History   • Marital status: /Civil Union     Spouse name: None   • Number of children: 2   • Years of education: None   • Highest education level: None   Occupational History   • None   Tobacco Use   • Smoking status: Never   • Smokeless tobacco: Never   Vaping Use   • Vaping Use: Never used   Substance and Sexual Activity   • Alcohol use: Never   • Drug use: Never   • Sexual activity: None   Other Topics Concern   • None   Social History Narrative   • None     Social Determinants of Health     Financial Resource Strain: Low Risk    • Difficulty of Paying Living Expenses: Not hard at all   Food Insecurity: No Food Insecurity   • Worried About Running Out of Food in the Last Year: Never true   • Ran Out of Food in the Last Year: Never true   Transportation Needs: No Transportation Needs   • Lack of Transportation (Medical): No   • Lack of Transportation (Non-Medical):  No   Physical Activity: Not on file   Stress: Not on file   Social Connections: Not on file   Intimate Partner Violence: Not on file   Housing Stability: Low Risk    • Unable to Pay for Housing in the Last Year: No   • Number of Places Lived in the Last Year: 1   • Unstable Housing in the Last Year: No      Medications and Allergies:     Current Outpatient Medications   Medication Sig Dispense Refill   • alendronate (Fosamax) 70 mg tablet Take 1 tablet (70 mg total) by mouth every 7 days 12 tablet 3   • ALPRAZolam (XANAX) 0 25 mg tablet Take 1 tablet (0 25 mg total) by mouth 3 (three) times a day as needed for anxiety 90 tablet 5   • ammonium lactate (LAC-HYDRIN) 12 % lotion USE AS DIRECTED TO BOTH FEET DAILY     • apixaban (ELIQUIS) 2 5 mg Take 1 tablet (2 5 mg total) by mouth 2 (two) times a day 60 tablet 2   • atorvastatin (LIPITOR) 20 mg tablet Take 1 tablet (20 mg total) by mouth daily with dinner 30 tablet 4   • Cholecalciferol (Vitamin D3) 50 MCG (2000 UT) TABS Take 2,000 Units by mouth in the morning  Indications: Osteoporosis     • clopidogrel (PLAVIX) 75 mg tablet Take 1 tablet (75 mg total) by mouth daily 90 tablet 2   • furosemide (LASIX) 40 mg tablet Take 0 5 tablets (20 mg total) by mouth daily as needed (leg swelling) 30 tablet 1   • metoprolol succinate (TOPROL-XL) 50 mg 24 hr tablet Take 1 tablet (50 mg total) by mouth daily 135 tablet 3   • Multiple Vitamin (Multivitamin Adult) TABS Take 1 tablet by mouth in the morning  Indications: Nutritional Support     • naloxone (NARCAN) 4 mg/0 1 mL nasal spray Administer 1 spray into a nostril  If no response after 2-3 minutes, give another dose in the other nostril using a new spray  1 each 1   • pantoprazole (PROTONIX) 40 mg tablet Take 1 tablet (40 mg total) by mouth daily in the early morning 30 tablet 2   • Psyllium (METAMUCIL FIBER) 51 7 % PACK Take 1 Package by mouth daily  0   • spironolactone (ALDACTONE) 25 mg tablet Take 1 tablet by mouth once daily 30 tablet 5     No current facility-administered medications for this visit       No Known Allergies   Immunizations:     Immunization History   Administered Date(s) Administered   • COVID-19 MODERNA VACC 0 5 ML IM 01/22/2021, 02/19/2021, 11/11/2021   • Influenza, high dose seasonal 0 7 mL 09/30/2019, 10/22/2020, 09/14/2021, 10/11/2022   • Pneumococcal Conjugate 13-Valent 12/15/2016   • Pneumococcal Polysaccharide PPV23 02/01/2018      Health Maintenance:         Topic Date Due   • Hepatitis C Screening  Completed         Topic Date Due   • COVID-19 Vaccine (4 - Booster for Moderna series) 01/06/2022      Medicare Screening Tests and Risk Assessments:     Russell Leroy is here for her Subsequent Wellness visit  Last Medicare Wellness visit information reviewed, patient interviewed and updates made to the record today  Health Risk Assessment:   Patient rates overall health as poor  Patient feels that their physical health rating is same  Patient is dissatisfied with their life  Eyesight was rated as slightly worse  Hearing was rated as slightly worse  Patient feels that their emotional and mental health rating is same  Patients states they are never, rarely angry  Patient states they are often unusually tired/fatigued  Pain experienced in the last 7 days has been a lot  Patient's pain rating has been 6/10  Patient states that she has experienced no weight loss or gain in last 6 months  Back pain, recent diagnosis of compression fracture    Depression Screening:   PHQ-2 Score: 1      Fall Risk Screening: In the past year, patient has experienced: history of falling in past year    Number of falls: 1  Injured during fall?: No    Feels unsteady when standing or walking?: Yes    Worried about falling?: Yes      Urinary Incontinence Screening:   Patient has not leaked urine accidently in the last six months  Home Safety:  Patient has trouble with stairs inside or outside of their home  Patient has working smoke alarms and has working carbon monoxide detector  Home safety hazards include: none  Nutrition:   Current diet is Regular  Advised to follow a low salt diet    Medications:   Patient is not currently taking any over-the-counter supplements  Patient is not able to manage medications   manages medication and she does not take the opioid, it makes her feel funny and her hands cramp up    Activities of Daily Living (ADLs)/Instrumental Activities of Daily Living (IADLs):   Walk and transfer into and out of bed and chair?: No  Dress and groom yourself?: Yes    Bathe or shower yourself?: Yes    Feed yourself?  Yes  Do your laundry/housekeeping?: No  Manage your money, pay your bills and track your expenses?: No  Make your own meals?: No    Do your own shopping?: No    Previous Hospitalizations:   Any hospitalizations or ED visits within the last 12 months?: No      Advance Care Planning:   Living will: Yes    Durable POA for healthcare: Yes    Advanced directive: Yes      Cognitive Screening:   Provider or family/friend/caregiver concerned regarding cognition?: No    PREVENTIVE SCREENINGS      Cardiovascular Screening:    General: Screening Current      Diabetes Screening:     General: Screening Current      Colorectal Cancer Screening:     General: Screening Not Indicated      Breast Cancer Screening:     General: Screening Not Indicated      Cervical Cancer Screening:    General: Screening Not Indicated      Osteoporosis Screening:    General: Screening Current      Abdominal Aortic Aneurysm (AAA) Screening:        General: Screening Not Indicated      Lung Cancer Screening:     General: Screening Not Indicated      Hepatitis C Screening:    General: Screening Current    Screening, Brief Intervention, and Referral to Treatment (SBIRT)    Screening  Typical number of drinks in a day: 0  Typical number of drinks in a week: 0  Interpretation: Low risk drinking behavior  AUDIT-C Screenin) How often did you have a drink containing alcohol in the past year? never  2) How many drinks did you have on a typical day when you were drinking in the past year? 0  3) How often did you have 6 or more drinks on one occasion in the past year? never    AUDIT-C Score: 0  Interpretation: Score 0-2 (female): Negative screen for alcohol misuse    Single Item Drug Screening:  How often have you used an illegal drug (including marijuana) or a prescription medication for non-medical reasons in the past year? never    Single Item Drug Screen Score: 0  Interpretation: Negative screen for possible drug use disorder    No results found       Physical Exam:     /74 "Pulse 66   Temp 97 8 °F (36 6 °C) (Tympanic)   Ht 4' 10\" (1 473 m)   Wt 49 5 kg (109 lb 1 6 oz)   SpO2 100%   BMI 22 80 kg/m²     Physical Exam  Vitals reviewed  Constitutional:       Comments: This is an 25-year-old white female who appears her stated age  She is frail but does not appear to be in any acute distress   HENT:      Head: Normocephalic and atraumatic  Right Ear: Tympanic membrane, ear canal and external ear normal  There is no impacted cerumen  Left Ear: Tympanic membrane, ear canal and external ear normal  There is no impacted cerumen  Mouth/Throat:      Mouth: Mucous membranes are moist       Pharynx: Oropharynx is clear  No oropharyngeal exudate or posterior oropharyngeal erythema  Eyes:      General: No scleral icterus  Right eye: No discharge  Left eye: No discharge  Conjunctiva/sclera: Conjunctivae normal       Pupils: Pupils are equal, round, and reactive to light  Cardiovascular:      Rate and Rhythm: Normal rate and regular rhythm  Heart sounds: Normal heart sounds  No murmur heard  No friction rub  No gallop  Pulmonary:      Effort: Pulmonary effort is normal  No respiratory distress  Breath sounds: Normal breath sounds  No stridor  No wheezing, rhonchi or rales  Abdominal:      General: Bowel sounds are normal  There is no distension  Palpations: Abdomen is soft  There is no mass  Tenderness: There is no abdominal tenderness  There is no guarding  Comments: Abdomen is examined with her seated due to her ambulatory dysfunction   Musculoskeletal:      Cervical back: Neck supple  Comments: Increased thoracic kyphosis noted   Lymphadenopathy:      Cervical: No cervical adenopathy  Neurological:      General: No focal deficit present  Motor: No weakness  Gait: Gait abnormal         Extremities: Without cyanosis, clubbing, or edema    Ramses Waite DO"

## 2023-04-25 NOTE — ASSESSMENT & PLAN NOTE
Patient is blood pressure was noted to be quite high when she first came into the office  However, she was having severe pain and difficulty walking into the office  I rechecked her blood pressure after she sat waiting  She has much less pain while sitting  When I rechecked her blood pressure I found it to be well controlled at 122/74  I recommended no change with her current medication

## 2023-04-26 DIAGNOSIS — I48.92 ATRIAL FLUTTER, UNSPECIFIED TYPE (HCC): ICD-10-CM

## 2023-04-26 DIAGNOSIS — I35.9 NONRHEUMATIC AORTIC VALVE DISORDER: ICD-10-CM

## 2023-04-26 DIAGNOSIS — Z95.2 S/P TAVR (TRANSCATHETER AORTIC VALVE REPLACEMENT): ICD-10-CM

## 2023-04-26 DIAGNOSIS — I35.0 SEVERE AORTIC STENOSIS: ICD-10-CM

## 2023-04-27 DIAGNOSIS — I48.92 ATRIAL FLUTTER, UNSPECIFIED TYPE (HCC): ICD-10-CM

## 2023-04-27 DIAGNOSIS — I35.9 NONRHEUMATIC AORTIC VALVE DISORDER: ICD-10-CM

## 2023-04-27 DIAGNOSIS — I35.0 SEVERE AORTIC STENOSIS: ICD-10-CM

## 2023-04-27 DIAGNOSIS — Z95.2 S/P TAVR (TRANSCATHETER AORTIC VALVE REPLACEMENT): ICD-10-CM

## 2023-05-01 ENCOUNTER — TELEPHONE (OUTPATIENT)
Dept: CARDIAC REHAB | Facility: HOSPITAL | Age: 88
End: 2023-05-01

## 2023-05-02 DIAGNOSIS — I35.0 SEVERE AORTIC STENOSIS: ICD-10-CM

## 2023-05-02 DIAGNOSIS — I48.92 ATRIAL FLUTTER, UNSPECIFIED TYPE (HCC): ICD-10-CM

## 2023-05-02 DIAGNOSIS — I35.9 NONRHEUMATIC AORTIC VALVE DISORDER: ICD-10-CM

## 2023-05-02 DIAGNOSIS — Z95.2 S/P TAVR (TRANSCATHETER AORTIC VALVE REPLACEMENT): ICD-10-CM

## 2023-05-10 ENCOUNTER — OFFICE VISIT (OUTPATIENT)
Dept: PAIN MEDICINE | Facility: CLINIC | Age: 88
End: 2023-05-10

## 2023-05-10 VITALS — SYSTOLIC BLOOD PRESSURE: 145 MMHG | RESPIRATION RATE: 18 BRPM | DIASTOLIC BLOOD PRESSURE: 76 MMHG | HEART RATE: 80 BPM

## 2023-05-10 DIAGNOSIS — M46.1 SACROILIITIS (HCC): Primary | ICD-10-CM

## 2023-05-10 DIAGNOSIS — S22.080A CLOSED WEDGE COMPRESSION FRACTURE OF T11 VERTEBRA, INITIAL ENCOUNTER (HCC): ICD-10-CM

## 2023-05-10 NOTE — PROGRESS NOTES
Assessment:  1  Sacroiliitis ([facility] 75 )    2  Closed wedge compression fracture of T11 vertebra, initial encounter ([place] [address] )        Plan:  Patient is a 51-year-old female with complaints of low back and bilateral hip pain with chronic pain syndrome secondary to sacroiliitis presents to office for follow-up visit  Patient has had a sacroiliac joint steroid injection the past provided with significant alleviation of symptoms  She continues to have sharp, stabbing pain when sitting for prolonged peers of time often having shift her weight from the left to the right to alleviate the symptoms or make it tolerable for her to sit  Patient experiences extreme pain when laying down in the bed rotating to the left to the right  1   We will schedule patient for a bilateral sacroiliac joint steroid injection  2  Follow-up 1 month after injection      Complete risks and benefits including bleeding, infection, tissue reaction, nerve injury and allergic reaction were discussed  The approach was demonstrated using models and literature was provided  Verbal and written consent was obtained  History of Present Illness: The patient is a 80 y o  female who presents for a follow up office visit in regards to Back Pain  The patient’s current symptoms include 8 out of 10 constant sharp pain which is worse at nighttime  Current pain medications includes: None  I have personally reviewed and/or updated the patient's past medical history, past surgical history, family history, social history, current medications, allergies, and vital signs today  Review of Systems  Review of Systems   Musculoskeletal: Positive for back pain and gait problem  Joint stiffness   All other systems reviewed and are negative            Past Medical History:   Diagnosis Date   • Acute on chronic diastolic CHF (congestive heart failure) ([place] [address] ) 1/30/2023   • Acute respiratory failure with hypoxia ([place] [address] ) 12/22/2022   • Aortic stenosis    • Atrial flutter (HCC)    • CAD (coronary artery disease) 01/11/2023    CAD/PCI/ELODIA   • CKD (chronic kidney disease), stage III (HCC)    • Community acquired pneumonia of left upper lobe of lung 05/17/2019   • Fatigue    • Full dentures    • Generalized anxiety disorder    • Hyperlipidemia    • Hypertension    • Impaired fasting glucose    • Mitral regurgitation    • Pneumonia    • RBBB    • 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    TRANSFEMORAL W/ 23MM CALVERT SHAVONNE S3 ULTRA VALVE(ACCESS ON LEFT)   • SVT (supraventricular tachycardia)    • Tricuspid regurgitation        Past Surgical History:   Procedure Laterality Date   • CARDIAC CATHETERIZATION N/A 1/11/2023    Procedure: LHC-Cardiac catheterization;  Surgeon: Tierra Hollingsworth MD;  [facility]; Service: Cardiology   • CARDIAC CATHETERIZATION N/A 1/11/2023    Procedure: Cardiac pci;  Surgeon: Tierra Hollingsworth MD;  [facility]: [facility] CARD[facility] C[facility] LAB;   Service: Cardiology   • CARDIAC CATHETERIZATION N/A 1/17/2023    Procedure: CARDIAC TAVR;  Surgeon: Nida Mattson DO;  Location:  MAIN OR;  Service: Cardiology   • DENTAL SURGERY Bilateral     ALL TEETH REMOVED   • FL GUIDED NEEDLE PLAC BX/ASP/INJ  5/26/2021   • IR THORACENTESIS  12/20/2022   • JOINT REPLACEMENT Left    • KNEE SURGERY      left knee replacement   • MD INJECT SI JOINT ARTHRGRPHY&/ANES/STEROID W/JOHNATHON Bilateral 5/26/2021    Procedure: SACROILIAC JOINT INJECTION;  Surgeon: Joyce Riggins MD;  Location: [facility] MAIN OR;  Service: Pain Management    • MD REPLACE AORTIC VALVE OPENFEMORAL ARTERY APPROACH N/A 1/17/2023    Procedure: REPLACEMENT AORTIC VALVE TRANSCATHETER (TAVR) TRANSFEMORAL W/ 23MM CALVERT SHAVONNE S3 ULTRA VALVE(ACCESS ON LEFT) ALENA;  Surgeon: Dameon Pepper DO;  Location:  MAIN OR;  Service: Cardiac Surgery       Family History   Problem Relation Age of Onset   • Hypertension Mother    • Asthma Father         Beadle's asthma   • Alzheimer's disease Sister • Rectal cancer Son    • Uterine cancer Daughter    • Heart disease Sister    • Heart disease Brother        Social History     Occupational History   • Not on file   Tobacco Use   • Smoking status: Never   • Smokeless tobacco: Never   Vaping Use   • Vaping Use: Never used   Substance and Sexual Activity   • Alcohol use: Never   • Drug use: Never   • Sexual activity: Not on file         Current Outpatient Medications:   •  alendronate (Fosamax) 70 mg tablet, Take 1 tablet (70 mg total) by mouth every 7 days, Disp: 12 tablet, Rfl: 3  •  ALPRAZolam (XANAX) 0 25 mg tablet, Take 1 tablet (0 25 mg total) by mouth 3 (three) times a day as needed for anxiety, Disp: 90 tablet, Rfl: 5  •  ammonium lactate (LAC-HYDRIN) 12 % lotion, USE AS DIRECTED TO BOTH FEET DAILY, Disp: , Rfl:   •  apixaban (ELIQUIS) 2 5 mg, Take 1 tablet (2 5 mg total) by mouth 2 (two) times a day, Disp: 60 tablet, Rfl: 2  •  atorvastatin (LIPITOR) 20 mg tablet, Take 1 tablet (20 mg total) by mouth daily with dinner, Disp: 30 tablet, Rfl: 4  •  Cholecalciferol (Vitamin D3) 50 MCG (2000 UT) TABS, Take 2,000 Units by mouth in the morning  Indications: Osteoporosis, Disp: , Rfl:   •  clopidogrel (PLAVIX) 75 mg tablet, Take 1 tablet (75 mg total) by mouth daily, Disp: 90 tablet, Rfl: 2  •  furosemide (LASIX) 40 mg tablet, Take 0 5 tablets (20 mg total) by mouth daily as needed (leg swelling), Disp: 30 tablet, Rfl: 1  •  metoprolol succinate (TOPROL-XL) 50 mg 24 hr tablet, Take 1 tablet (50 mg total) by mouth daily, Disp: 135 tablet, Rfl: 3  •  Multiple Vitamin (Multivitamin Adult) TABS, Take 1 tablet by mouth in the morning  Indications: Nutritional Support, Disp: , Rfl:   •  naloxone (NARCAN) 4 mg/0 1 mL nasal spray, Administer 1 spray into a nostril   If no response after 2-3 minutes, give another dose in the other nostril using a new spray , Disp: 1 each, Rfl: 1  •  pantoprazole (PROTONIX) 40 mg tablet, Take 1 tablet (40 mg total) by mouth daily in the early morning, Disp: 30 tablet, Rfl: 2  •  Psyllium (METAMUCIL FIBER) 51 7 % PACK, Take 1 Package by mouth daily, Disp: , Rfl: 0  •  spironolactone (ALDACTONE) 25 mg tablet, Take 1 tablet by mouth once daily, Disp: 30 tablet, Rfl: 5    No Known Allergies    Physical Exam:    /76 (BP Location: Right arm, Patient Position: Sitting, Cuff Size: Standard)   Pulse 80   Resp 18     Constitutional:normal, well developed, well nourished, alert, in no distress and non-toxic and no overt pain behavior  Eyes:anicteric  HEENT:grossly intact  Neck:supple, symmetric, trachea midline and no masses   Pulmonary:even and unlabored  Cardiovascular:No edema or pitting edema present  Skin:Normal without rashes or lesions and well hydrated  Psychiatric:Mood and affect appropriate  Neurologic:Cranial Nerves II-XII grossly intact  Musculoskeletal:normal and in wheelchair    Imaging    LUMBAR SPINE     INDICATION:   M54 50: Low back pain, unspecified        COMPARISON:  CT chest abdomen pelvis 12/23/2022     VIEWS:  XR SPINE LUMBAR MINIMUM 4 VIEWS NON INJURY  Images: 4     FINDINGS:     There are 5 non rib bearing lumbar vertebral bodies       Mild superior endplate compression deformity of T11      Grade 1 retrolisthesis of L1 on L2 and L2 on L3  Grade 1 anterolisthesis of L4 on L5       Intervertebral disc space narrowing at L1-L3 and L4-S1 with endplate osteophytes and facet arthropathy      The pedicles appear intact       Vascular calcifications      IMPRESSION:     Mild superior endplate compression deformity of T11 of unknown chronicity      Multilevel degenerative changes of lumbar spine      Workstation performed: [id number]      No orders to display       No orders of the defined types were placed in this encounter

## 2023-05-10 NOTE — H&P (VIEW-ONLY)
Assessment:  1  Sacroiliitis ([facility] 75 )    2  Closed wedge compression fracture of T11 vertebra, initial encounter ([place] [address] )        Plan:  Patient is a 19-year-old female with complaints of low back and bilateral hip pain with chronic pain syndrome secondary to sacroiliitis presents to office for follow-up visit  Patient has had a sacroiliac joint steroid injection the past provided with significant alleviation of symptoms  She continues to have sharp, stabbing pain when sitting for prolonged peers of time often having shift her weight from the left to the right to alleviate the symptoms or make it tolerable for her to sit  Patient experiences extreme pain when laying down in the bed rotating to the left to the right  1   We will schedule patient for a bilateral sacroiliac joint steroid injection  2  Follow-up 1 month after injection      Complete risks and benefits including bleeding, infection, tissue reaction, nerve injury and allergic reaction were discussed  The approach was demonstrated using models and literature was provided  Verbal and written consent was obtained  History of Present Illness: The patient is a 80 y o  female who presents for a follow up office visit in regards to Back Pain  The patient’s current symptoms include 8 out of 10 constant sharp pain which is worse at nighttime  Current pain medications includes: None  I have personally reviewed and/or updated the patient's past medical history, past surgical history, family history, social history, current medications, allergies, and vital signs today  Review of Systems  Review of Systems   Musculoskeletal: Positive for back pain and gait problem  Joint stiffness   All other systems reviewed and are negative            Past Medical History:   Diagnosis Date   • Acute on chronic diastolic CHF (congestive heart failure) ([place] [address] ) 1/30/2023   • Acute respiratory failure with hypoxia ([place] [address] ) 12/22/2022   • Aortic stenosis    • Atrial flutter (HCC)    • CAD (coronary artery disease) 01/11/2023    CAD/PCI/ELODIA   • CKD (chronic kidney disease), stage III (HCC)    • Community acquired pneumonia of left upper lobe of lung 05/17/2019   • Fatigue    • Full dentures    • Generalized anxiety disorder    • Hyperlipidemia    • Hypertension    • Impaired fasting glucose    • Mitral regurgitation    • Pneumonia    • RBBB    • 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    TRANSFEMORAL W/ 23MM CALVERT SHAVONNE S3 ULTRA VALVE(ACCESS ON LEFT)   • SVT (supraventricular tachycardia)    • Tricuspid regurgitation        Past Surgical History:   Procedure Laterality Date   • CARDIAC CATHETERIZATION N/A 1/11/2023    Procedure: LHC-Cardiac catheterization;  Surgeon: Merlyn Ng MD;  [facility]; Service: Cardiology   • CARDIAC CATHETERIZATION N/A 1/11/2023    Procedure: Cardiac pci;  Surgeon: Merlyn Ng MD;  [facility];   Service: Cardiology   • CARDIAC CATHETERIZATION N/A 1/17/2023    Procedure: CARDIAC TAVR;  Surgeon: Tino Pires DO;  Location:  MAIN OR;  Service: Cardiology   • DENTAL SURGERY Bilateral     ALL TEETH REMOVED   • FL GUIDED NEEDLE PLAC BX/ASP/INJ  5/26/2021   • IR THORACENTESIS  12/20/2022   • JOINT REPLACEMENT Left    • KNEE SURGERY      left knee replacement   • OK INJECT SI JOINT ARTHRGRPHY&/ANES/STEROID W/JOHNATHON Bilateral 5/26/2021    Procedure: SACROILIAC JOINT INJECTION;  Surgeon: Santosh Hernandez MD;  Location: [facility] MAIN [facility];  Service: Pain Management    • OK REPLACE AORTIC VALVE OPENFEMORAL ARTERY APPROACH N/A 1/17/2023    Procedure: REPLACEMENT AORTIC VALVE TRANSCATHETER (TAVR) TRANSFEMORAL W/ 23MM CALVERT SHAVONNE S3 ULTRA VALVE(ACCESS ON LEFT) ALENA;  Surgeon: Kevin Valadez DO;  Location: [facility] MAIN OR;  Service: Cardiac Surgery       Family History   Problem Relation Age of Onset   • Hypertension Mother    • Asthma Father         's asthma   • Alzheimer's disease Sister • Rectal cancer Son    • Uterine cancer Daughter    • Heart disease Sister    • Heart disease Brother        Social History     Occupational History   • Not on file   Tobacco Use   • Smoking status: Never   • Smokeless tobacco: Never   Vaping Use   • Vaping Use: Never used   Substance and Sexual Activity   • Alcohol use: Never   • Drug use: Never   • Sexual activity: Not on file         Current Outpatient Medications:   •  alendronate (Fosamax) 70 mg tablet, Take 1 tablet (70 mg total) by mouth every 7 days, Disp: 12 tablet, Rfl: 3  •  ALPRAZolam (XANAX) 0 25 mg tablet, Take 1 tablet (0 25 mg total) by mouth 3 (three) times a day as needed for anxiety, Disp: 90 tablet, Rfl: 5  •  ammonium lactate (LAC-HYDRIN) 12 % lotion, USE AS DIRECTED TO BOTH FEET DAILY, Disp: , Rfl:   •  apixaban (ELIQUIS) 2 5 mg, Take 1 tablet (2 5 mg total) by mouth 2 (two) times a day, Disp: 60 tablet, Rfl: 2  •  atorvastatin (LIPITOR) 20 mg tablet, Take 1 tablet (20 mg total) by mouth daily with dinner, Disp: 30 tablet, Rfl: 4  •  Cholecalciferol (Vitamin D3) 50 MCG (2000 UT) TABS, Take 2,000 Units by mouth in the morning  Indications: Osteoporosis, Disp: , Rfl:   •  clopidogrel (PLAVIX) 75 mg tablet, Take 1 tablet (75 mg total) by mouth daily, Disp: 90 tablet, Rfl: 2  •  furosemide (LASIX) 40 mg tablet, Take 0 5 tablets (20 mg total) by mouth daily as needed (leg swelling), Disp: 30 tablet, Rfl: 1  •  metoprolol succinate (TOPROL-XL) 50 mg 24 hr tablet, Take 1 tablet (50 mg total) by mouth daily, Disp: 135 tablet, Rfl: 3  •  Multiple Vitamin (Multivitamin Adult) TABS, Take 1 tablet by mouth in the morning  Indications: Nutritional Support, Disp: , Rfl:   •  naloxone (NARCAN) 4 mg/0 1 mL nasal spray, Administer 1 spray into a nostril   If no response after 2-3 minutes, give another dose in the other nostril using a new spray , Disp: 1 each, Rfl: 1  •  pantoprazole (PROTONIX) 40 mg tablet, Take 1 tablet (40 mg total) by mouth daily in the early morning, Disp: 30 tablet, Rfl: 2  •  Psyllium (METAMUCIL FIBER) 51 7 % PACK, Take 1 Package by mouth daily, Disp: , Rfl: 0  •  spironolactone (ALDACTONE) 25 mg tablet, Take 1 tablet by mouth once daily, Disp: 30 tablet, Rfl: 5    No Known Allergies    Physical Exam:    /76 (BP Location: Right arm, Patient Position: Sitting, Cuff Size: Standard)   Pulse 80   Resp 18     Constitutional:normal, well developed, well nourished, alert, in no distress and non-toxic and no overt pain behavior  Eyes:anicteric  HEENT:grossly intact  Neck:supple, symmetric, trachea midline and no masses   Pulmonary:even and unlabored  Cardiovascular:No edema or pitting edema present  Skin:Normal without rashes or lesions and well hydrated  Psychiatric:Mood and affect appropriate  Neurologic:Cranial Nerves II-XII grossly intact  Musculoskeletal:normal and in wheelchair    Imaging    LUMBAR SPINE     INDICATION:   M54 50: Low back pain, unspecified        COMPARISON:  CT chest abdomen pelvis 12/23/2022     VIEWS:  XR SPINE LUMBAR MINIMUM 4 VIEWS NON INJURY  Images: 4     FINDINGS:     There are 5 non rib bearing lumbar vertebral bodies       Mild superior endplate compression deformity of T11      Grade 1 retrolisthesis of L1 on L2 and L2 on L3  Grade 1 anterolisthesis of L4 on L5       Intervertebral disc space narrowing at L1-L3 and L4-S1 with endplate osteophytes and facet arthropathy      The pedicles appear intact       Vascular calcifications      IMPRESSION:     Mild superior endplate compression deformity of T11 of unknown chronicity      Multilevel degenerative changes of lumbar spine      Workstation performed: [id number]      No orders to display       No orders of the defined types were placed in this encounter

## 2023-05-16 DIAGNOSIS — I35.0 SEVERE AORTIC STENOSIS: ICD-10-CM

## 2023-05-16 DIAGNOSIS — I35.9 NONRHEUMATIC AORTIC VALVE DISORDER: ICD-10-CM

## 2023-05-16 DIAGNOSIS — Z95.2 S/P TAVR (TRANSCATHETER AORTIC VALVE REPLACEMENT): ICD-10-CM

## 2023-05-16 RX ORDER — PANTOPRAZOLE SODIUM 40 MG/1
TABLET, DELAYED RELEASE ORAL
Qty: 30 TABLET | Refills: 0 | Status: SHIPPED | OUTPATIENT
Start: 2023-05-16

## 2023-05-25 ENCOUNTER — APPOINTMENT (OUTPATIENT)
Dept: LAB | Facility: HOSPITAL | Age: 88
End: 2023-05-25

## 2023-05-25 DIAGNOSIS — E05.00 GRAVES DISEASE: ICD-10-CM

## 2023-05-25 LAB
T4 FREE SERPL-MCNC: 1.32 NG/DL (ref 0.61–1.12)
TSH SERPL DL<=0.05 MIU/L-ACNC: 1.32 UIU/ML (ref 0.45–4.5)

## 2023-05-26 ENCOUNTER — RA CDI HCC (OUTPATIENT)
Dept: OTHER | Facility: HOSPITAL | Age: 88
End: 2023-05-26

## 2023-05-26 DIAGNOSIS — E05.00 GRAVES DISEASE: Primary | ICD-10-CM

## 2023-05-26 NOTE — PROGRESS NOTES
I13 0  [facility] 75  coding opportunities          Chart Reviewed number of suggestions sent to Provider: 1     Patients Insurance     Medicare Insurance: [employer]

## 2023-05-27 LAB — TSI SER-ACNC: 0.28 IU/L (ref 0–0.55)

## 2023-06-06 ENCOUNTER — OFFICE VISIT (OUTPATIENT)
Dept: FAMILY MEDICINE CLINIC | Facility: CLINIC | Age: 88
End: 2023-06-06
Payer: MEDICARE

## 2023-06-06 VITALS
DIASTOLIC BLOOD PRESSURE: 80 MMHG | OXYGEN SATURATION: 98 % | BODY MASS INDEX: 21.62 KG/M2 | TEMPERATURE: 97 F | HEIGHT: 58 IN | SYSTOLIC BLOOD PRESSURE: 128 MMHG | HEART RATE: 71 BPM | WEIGHT: 103 LBS

## 2023-06-06 DIAGNOSIS — F32.0 MAJOR DEPRESSIVE DISORDER, SINGLE EPISODE, MILD (HCC): ICD-10-CM

## 2023-06-06 DIAGNOSIS — S22.080D CLOSED WEDGE COMPRESSION FRACTURE OF T11 VERTEBRA WITH ROUTINE HEALING, SUBSEQUENT ENCOUNTER: Primary | ICD-10-CM

## 2023-06-06 DIAGNOSIS — E78.5 DYSLIPIDEMIA: ICD-10-CM

## 2023-06-06 DIAGNOSIS — I47.1 ATRIAL TACHYCARDIA (HCC): ICD-10-CM

## 2023-06-06 DIAGNOSIS — M80.08XD AGE-RELATED OSTEOPOROSIS WITH CURRENT PATHOLOGICAL FRACTURE OF VERTEBRA WITH ROUTINE HEALING, SUBSEQUENT ENCOUNTER: ICD-10-CM

## 2023-06-06 DIAGNOSIS — I10 ESSENTIAL HYPERTENSION: ICD-10-CM

## 2023-06-06 PROBLEM — I47.19 ATRIAL TACHYCARDIA: Status: ACTIVE | Noted: 2019-05-21

## 2023-06-06 PROCEDURE — 99214 OFFICE O/P EST MOD 30 MIN: CPT | Performed by: FAMILY MEDICINE

## 2023-06-06 RX ORDER — ESCITALOPRAM OXALATE 10 MG/1
10 TABLET ORAL
Qty: 90 TABLET | Refills: 1 | Status: SHIPPED | OUTPATIENT
Start: 2023-06-06 | End: 2023-06-14

## 2023-06-06 RX ORDER — METOPROLOL SUCCINATE 50 MG/1
50 TABLET, EXTENDED RELEASE ORAL DAILY
Qty: 90 TABLET | Refills: 3 | Status: ON HOLD | OUTPATIENT
Start: 2023-06-06 | End: 2023-06-14 | Stop reason: SDUPTHER

## 2023-06-06 NOTE — PROGRESS NOTES
Name: Tanmay Arzola      : 1935      MRN: [id number]  Encounter Provider: Altagracia Gonzalez DO  Encounter Date: 2023   Encounter department: [facility] PRIMARY CARE    Assessment & Plan     1  Closed wedge compression fracture of T11 vertebra with routine healing, subsequent encounter  Assessment & Plan:  Patient has compression fracture of T11  Although she is still having pain, it does seem to be less severe than when I had seen her previously  I explained to the patient that the pain will gradually subside  Patient should continue follow-up with pain management  2  Atrial tachycardia [facility])  Assessment & Plan:  Patient has history of atrial tachycardia  Currently in sinus rhythm  I refilled her prescription for metoprolol  We will continue routine follow-up with cardiology  Orders:  -     metoprolol succinate (TOPROL-XL) 50 mg 24 hr tablet; Take 1 tablet (50 mg total) by mouth daily    3  Major depressive disorder, single episode, mild ([address] )  Assessment & Plan:  Discussed starting the patient on treatment for patient  The patient's significant other definitely think she needs treatment for this and persuaded the patient to begin treatment  I am going to start the patient on Lexapro 10 mg  She will take 1 daily at bedtime  The patient was advised that the medication has a slow onset of action  I told her she will not notice any difference in the beginning but she must give the medication time to help  I patient it takes a good 4 weeks to get the full effect of the medication  Hopefully, I will help her sleep better at night once the medication starts to work as well  a follow-up visit was scheduled to assess efficacy of the medication    Orders:  -     escitalopram (Lexapro) 10 mg tablet; Take 1 tablet (10 mg total) by mouth daily at bedtime    4  Dyslipidemia  -     Comprehensive metabolic panel; Future  -     LDL cholesterol, direct; Future    5   Age-related osteoporosis with current pathological fracture of vertebra with routine healing, subsequent encounter  Assessment & Plan:  Patient will continue Fosamax 70 mg once a week  Continue calcium and vitamin D supplementation as well  6  Essential hypertension  Assessment & Plan:  Patient has hypertension  Blood pressure was initially high when she presented to the office  However, she had difficulty walking into the office as well as a lot of pain  I rechecked her blood pressure after she was sitting for a bit waiting for me  Her blood pressure was controlled and I recommended no change with current medications           Subjective      Is an 26-year-old white female who presents to the office today for her routine checkup  She is accompanied to the office today by her significant other  Patient continues to experience pain in her back  She is scheduled to see pain management for injection tomorrow  She is apprehensive about this  Her other complaint today is her bowels  She reports that she experiences normal bowel movements although she thinks they occur too frequently  She will then experience 1 episode of loose stool  She denies any lower extremity edema  Denies shortness of breath  Denies orthopnea  She tells me she has been compliant with her medication  Review of Systems   Constitutional: Negative for activity change  Respiratory: Negative for cough and shortness of breath  Cardiovascular: Negative for chest pain, palpitations and leg swelling  Denies orthopnea   Gastrointestinal: Negative for abdominal distention, abdominal pain, blood in stool, constipation, diarrhea and nausea  Musculoskeletal: Positive for back pain and gait problem  Neurological: Negative for dizziness and headaches  Psychiatric/Behavioral: Positive for dysphoric mood and sleep disturbance  The patient is nervous/anxious          Current Outpatient Medications on File Prior to Visit   Medication Sig   • alendronate "(Fosamax) 70 mg tablet Take 1 tablet (70 mg total) by mouth every 7 days   • ALPRAZolam (XANAX) 0 25 mg tablet Take 1 tablet (0 25 mg total) by mouth 3 (three) times a day as needed for anxiety   • ammonium lactate (LAC-HYDRIN) 12 % lotion USE AS DIRECTED TO BOTH FEET DAILY   • apixaban (ELIQUIS) 2 5 mg Take 1 tablet (2 5 mg total) by mouth 2 (two) times a day   • atorvastatin (LIPITOR) 20 mg tablet Take 1 tablet (20 mg total) by mouth daily with dinner   • Cholecalciferol (Vitamin D3) 50 MCG (2000 UT) TABS Take 2,000 Units by mouth in the morning  Indications: Osteoporosis   • clopidogrel (PLAVIX) 75 mg tablet Take 1 tablet (75 mg total) by mouth daily   • furosemide (LASIX) 40 mg tablet Take 0 5 tablets (20 mg total) by mouth daily as needed (leg swelling)   • Multiple Vitamin (Multivitamin Adult) TABS Take 1 tablet by mouth in the morning  Indications: Nutritional Support   • naloxone (NARCAN) 4 mg/0 1 mL nasal spray Administer 1 spray into a nostril  If no response after 2-3 minutes, give another dose in the other nostril using a new spray  • pantoprazole (PROTONIX) 40 mg tablet TAKE 1 TABLET BY MOUTH ONCE DAILY IN THE MORNING   • Psyllium (METAMUCIL FIBER) 51 7 % PACK Take 1 Package by mouth daily   • spironolactone (ALDACTONE) 25 mg tablet Take 1 tablet by mouth once daily   • [DISCONTINUED] metoprolol succinate (TOPROL-XL) 50 mg 24 hr tablet Take 1 tablet (50 mg total) by mouth daily   • [DISCONTINUED] oxygen gas Inhale 2 L/min daily as needed (low oxygen levels)  Indications: Oxygen Desaturation (Patient not taking: Reported on 1/30/2023)       Objective     /80   Pulse 71   Temp (!) 97 °F (36 1 °C) (Temporal)   Ht 4' 10\" (1 473 m)   Wt 46 7 kg (103 lb)   SpO2 98%   BMI 21 53 kg/m²     Physical Exam  Vitals reviewed  Constitutional:       Comments: This is a frail 66-year-old white female who appears her stated age    She is in no apparent distress   HENT:      Head: Normocephalic and " atraumatic  Right Ear: Tympanic membrane, ear canal and external ear normal  There is no impacted cerumen  Left Ear: Tympanic membrane, ear canal and external ear normal  There is no impacted cerumen  Mouth/Throat:      Mouth: Mucous membranes are moist       Pharynx: Oropharynx is clear  No oropharyngeal exudate or posterior oropharyngeal erythema  Eyes:      General: No scleral icterus  Right eye: No discharge  Left eye: No discharge  Conjunctiva/sclera: Conjunctivae normal       Pupils: Pupils are equal, round, and reactive to light  Neck:      Comments: No thyromegaly is appreciated  Cardiovascular:      Rate and Rhythm: Normal rate and regular rhythm  Heart sounds: Normal heart sounds  No murmur heard  No friction rub  No gallop  Pulmonary:      Effort: Pulmonary effort is normal  No respiratory distress  Breath sounds: Normal breath sounds  No stridor  No wheezing, rhonchi or rales  Abdominal:      General: Bowel sounds are normal  There is no distension  Palpations: Abdomen is soft  There is no mass  Tenderness: There is no abdominal tenderness  There is no guarding  Musculoskeletal:      Cervical back: Neck supple  Comments: Increased thoracic kyphosis is noted   Lymphadenopathy:      Cervical: No cervical adenopathy  Psychiatric:         Mood and Affect: Mood normal          Behavior: Behavior normal          Thought Content:  Thought content normal          Judgment: Judgment normal        Extremities: Without cyanosis, clubbing, or edema  Blinda Pages, DO

## 2023-06-07 ENCOUNTER — APPOINTMENT (OUTPATIENT)
Dept: RADIOLOGY | Facility: HOSPITAL | Age: 88
End: 2023-06-07
Payer: MEDICARE

## 2023-06-07 ENCOUNTER — HOSPITAL ENCOUNTER (OUTPATIENT)
Facility: HOSPITAL | Age: 88
Setting detail: OUTPATIENT SURGERY
Discharge: HOME | End: 2023-06-07
Attending: ANESTHESIOLOGY | Admitting: ANESTHESIOLOGY
Payer: MEDICARE

## 2023-06-07 VITALS
HEIGHT: 58 IN | SYSTOLIC BLOOD PRESSURE: 171 MMHG | HEART RATE: 62 BPM | DIASTOLIC BLOOD PRESSURE: 75 MMHG | OXYGEN SATURATION: 92 % | WEIGHT: 103 LBS | RESPIRATION RATE: 16 BRPM | TEMPERATURE: 97.2 F | BODY MASS INDEX: 21.62 KG/M2

## 2023-06-07 DIAGNOSIS — M46.1 SACROILIITIS (HCC): ICD-10-CM

## 2023-06-07 PROCEDURE — 27096 INJECT SACROILIAC JOINT: CPT | Performed by: ANESTHESIOLOGY

## 2023-06-07 RX ORDER — LIDOCAINE HYDROCHLORIDE 10 MG/ML
INJECTION, SOLUTION EPIDURAL; INFILTRATION; INTRACAUDAL; PERINEURAL AS NEEDED
Status: DISCONTINUED | OUTPATIENT
Start: 2023-06-07 | End: 2023-06-07 | Stop reason: HOSPADM

## 2023-06-07 RX ORDER — METHYLPREDNISOLONE ACETATE 40 MG/ML
INJECTION, SUSPENSION INTRA-ARTICULAR; INTRALESIONAL; INTRAMUSCULAR; SOFT TISSUE AS NEEDED
Status: DISCONTINUED | OUTPATIENT
Start: 2023-06-07 | End: 2023-06-07 | Stop reason: HOSPADM

## 2023-06-07 RX ORDER — BUPIVACAINE HYDROCHLORIDE 2.5 MG/ML
INJECTION, SOLUTION EPIDURAL; INFILTRATION; INTRACAUDAL AS NEEDED
Status: DISCONTINUED | OUTPATIENT
Start: 2023-06-07 | End: 2023-06-07 | Stop reason: HOSPADM

## 2023-06-07 NOTE — OP NOTE
OPERATIVE REPORT  PATIENT NAME: Luis F Grullon    :  1935  MRN: [id number]  Pt Location: [facility] OR ROOM 01    SURGERY DATE: 2023    Surgeon(s) and Role:     * Julio Estrella MD - Primary    Preop Diagnosis:  Sacroiliitis ([address] ) [M46 1]    Post-Op Diagnosis Codes:     * Sacroiliitis ([address] ) [M46 1]    Procedure(s):  Bilateral - Bilateral sacroiliac joint steroid injection    Specimen(s):  * No specimens in log *    Estimated Blood Loss:   Minimal    Drains:  External Urinary Catheter (Active)   Number of days: 140       Anesthesia Type:   Local    Operative Indications:  Sacroiliitis ([address] ) [M46 1]      Operative Findings:  same    Complications:   None    Procedure and Technique:  Fluoroscopically-guided injection of the Bilateral sacroiliac joint(s)    After discussing the risks, benefits, and alternatives to the procedure, the patient expressed understanding and wished to proceed  The patient was brought to the fluoroscopy suite and placed in the prone position  Procedural pause conducted to verify:  correct patient identity, procedure to be performed and as applicable, correct side and site, correct patient position, and availability of implants, special equipment and special requirements  Using fluoroscopy, the inferior portion of the left sacroiliac joint was identified  The skin was sterilely prepped and draped in the usual fashion using Chloraprep skin prep  The skin and subcutaneous tissue were anesthetized with 0 5% lidocaine  Using fluoroscopic guidance, a 3 5 inch 22 gauge spinal needle was advanced into joint  Proper needle positioning was confirmed using multiple fluoroscopic views  After negative aspiration, 0 5 mL Omnipaque 300 contrast was injected, showing intraarticular spread of contrast without any evidence of intravascular uptake  A 2 5 mL solution consisting of 40 mg of Depo-Medrol in 0 25% bupivacaine was injected slowly and incrementally into the joint    Following the injection, the needle was withdrawn slightly and flushed with lidocaine as it was fully extracted  The procedure was repeated in the exact same way on the opposite side  The patient tolerated the procedure well and there were no apparent complications  After appropriate observation, the patient was dismissed from the clinic in good condition under their own power  COMMENTS  The patient received a total steroid dose of 80 mg  I was present for the entire procedure      Patient Disposition:  hemodynamically stable        SIGNATURE: Luther Maria MD  DATE: June 7, 2023  TIME: 10:00 AM

## 2023-06-07 NOTE — ASSESSMENT & PLAN NOTE
Discussed starting the patient on treatment for patient  The patient's significant other definitely think she needs treatment for this and persuaded the patient to begin treatment  I am going to start the patient on Lexapro 10 mg  She will take 1 daily at bedtime  The patient was advised that the medication has a slow onset of action  I told her she will not notice any difference in the beginning but she must give the medication time to help  I patient it takes a good 4 weeks to get the full effect of the medication    Hopefully, I will help her sleep better at night once the medication starts to work as well  a follow-up visit was scheduled to assess efficacy of the medication

## 2023-06-07 NOTE — ASSESSMENT & PLAN NOTE
Patient has hypertension  Blood pressure was initially high when she presented to the office  However, she had difficulty walking into the office as well as a lot of pain  I rechecked her blood pressure after she was sitting for a bit waiting for me    Her blood pressure was controlled and I recommended no change with current medications

## 2023-06-07 NOTE — INTERVAL H&P NOTE
H&P reviewed  After examining the patient I find no changes in the patients condition since the H&P had been written      Vitals:    06/07/23 0931   BP: 135/79   Pulse: 62   Resp: 18   Temp: (!) 97 °F (36 1 °C)   SpO2: 94%

## 2023-06-07 NOTE — ASSESSMENT & PLAN NOTE
Patient has history of atrial tachycardia  Currently in sinus rhythm  I refilled her prescription for metoprolol  We will continue routine follow-up with cardiology

## 2023-06-07 NOTE — DISCHARGE INSTR - AVS FIRST PAGE
Steroid Joint Injection   WHAT YOU NEED TO KNOW:   A steroid joint injection is a procedure to inject steroid medicine into a joint  Steroid medicine decreases pain and inflammation  The injection may also contain an anesthetic (numbing medicine) to decrease pain  It may be done to treat conditions such as arthritis, gout, or carpal tunnel syndrome  The injections may be given in your knee, ankle, shoulder, elbow, wrist, ankle or sacroiliac joint  Do not apply heat to any area that is numb  If you have discomfort or soreness at the injection site, you may apply ice today, 20 minutes on and 20 minutes off  Tomorrow you may use ice or warm, moist heat  Do not apply ice or heat directly to the skin  You may have an increase or change in the discomfort for 36-48 hours after your treatment  Apply ice and continue with any pain medicine you have been prescribed  Do not do anything strenuous today  You may shower, but no tub baths or hot tubs today  You may resume your normal activities tomorrow, but do not “overdo it”  Resume normal activities slowly when you are feeling better  If you experience redness, drainage or swelling at the injection site, or if you develop a fever above 100 degrees, please call [facility] at [phone] or go to the Emergency Room  Continue to take all routine medicines prescribed by your primary care physician unless otherwise instructed by our staff  Most blood thinners should be started again according to your regularly scheduled dosing  If you have any questions, please give our office a call  As no general anesthesia was used in today's procedure, you should not experience any side effects related to anesthesia  If you are diabetic, the steroids used in today's injection may temporarily increase your blood sugar levels after the first few days after your injection   Please keep a close eye on your sugars and alert the doctor who manages your diabetes if your sugars are significantly high from your baseline or you are symptomatic  If you have a problem specifically related to your procedure, please call our office at [phone]  Problems not related to your procedure should be directed to your primary care physician

## 2023-06-07 NOTE — ASSESSMENT & PLAN NOTE
Patient will continue Fosamax 70 mg once a week  Continue calcium and vitamin D supplementation as well

## 2023-06-07 NOTE — ASSESSMENT & PLAN NOTE
Patient has compression fracture of T11  Although she is still having pain, it does seem to be less severe than when I had seen her previously  I explained to the patient that the pain will gradually subside  Patient should continue follow-up with pain management

## 2023-06-11 ENCOUNTER — APPOINTMENT (EMERGENCY)
Dept: RADIOLOGY | Facility: HOSPITAL | Age: 88
End: 2023-06-11
Payer: MEDICARE

## 2023-06-11 ENCOUNTER — HOSPITAL ENCOUNTER (INPATIENT)
Facility: HOSPITAL | Age: 88
LOS: 3 days | Discharge: NURSING, REHAB OR LONG-TERM CARE | End: 2023-06-14
Attending: EMERGENCY MEDICINE | Admitting: HOSPITALIST
Payer: MEDICARE

## 2023-06-11 ENCOUNTER — APPOINTMENT (EMERGENCY)
Dept: CT IMAGING | Facility: HOSPITAL | Age: 88
End: 2023-06-11
Payer: MEDICARE

## 2023-06-11 DIAGNOSIS — I25.10 CORONARY ARTERY DISEASE INVOLVING NATIVE CORONARY ARTERY OF NATIVE HEART WITHOUT ANGINA PECTORIS: ICD-10-CM

## 2023-06-11 DIAGNOSIS — I48.92 ATRIAL FLUTTER (HCC): ICD-10-CM

## 2023-06-11 DIAGNOSIS — N39.0 UTI (URINARY TRACT INFECTION): ICD-10-CM

## 2023-06-11 DIAGNOSIS — S22.080A CLOSED WEDGE COMPRESSION FRACTURE OF T11 VERTEBRA (HCC): ICD-10-CM

## 2023-06-11 DIAGNOSIS — E87.1 HYPONATREMIA: ICD-10-CM

## 2023-06-11 DIAGNOSIS — N30.01 ACUTE CYSTITIS WITH HEMATURIA: ICD-10-CM

## 2023-06-11 DIAGNOSIS — S32.000A COMPRESSION FRACTURE OF LUMBAR VERTEBRA (HCC): ICD-10-CM

## 2023-06-11 DIAGNOSIS — I48.21 PERMANENT ATRIAL FIBRILLATION (HCC): ICD-10-CM

## 2023-06-11 DIAGNOSIS — R53.1 GENERALIZED WEAKNESS: ICD-10-CM

## 2023-06-11 DIAGNOSIS — J90 PLEURAL EFFUSION ON RIGHT: ICD-10-CM

## 2023-06-11 DIAGNOSIS — E44.0 MODERATE PROTEIN-CALORIE MALNUTRITION (HCC): ICD-10-CM

## 2023-06-11 DIAGNOSIS — J90 PLEURAL EFFUSION: Primary | ICD-10-CM

## 2023-06-11 DIAGNOSIS — I27.20 PULMONARY HYPERTENSION (HCC): ICD-10-CM

## 2023-06-11 DIAGNOSIS — I47.1 ATRIAL TACHYCARDIA (HCC): ICD-10-CM

## 2023-06-11 PROBLEM — I48.91 ATRIAL FIBRILLATION (HCC): Status: ACTIVE | Noted: 2023-01-17

## 2023-06-11 LAB
2HR DELTA HS TROPONIN: 3 NG/L
4HR DELTA HS TROPONIN: -2 NG/L
ALBUMIN SERPL BCP-MCNC: 4.3 G/DL (ref 3.5–5)
ALP SERPL-CCNC: 83 U/L (ref 34–104)
ALT SERPL W P-5'-P-CCNC: 30 U/L (ref 7–52)
ANION GAP SERPL CALCULATED.3IONS-SCNC: 12 MMOL/L (ref 4–13)
ANION GAP SERPL CALCULATED.3IONS-SCNC: 13 MMOL/L (ref 4–13)
ANION GAP SERPL CALCULATED.3IONS-SCNC: 13 MMOL/L (ref 4–13)
AST SERPL W P-5'-P-CCNC: 38 U/L (ref 13–39)
BACTERIA UR QL AUTO: ABNORMAL /HPF
BASOPHILS # BLD AUTO: 0.01 THOUSANDS/ÂΜL (ref 0–0.1)
BASOPHILS NFR BLD AUTO: 0 % (ref 0–1)
BILIRUB SERPL-MCNC: 1.2 MG/DL (ref 0.2–1)
BILIRUB UR QL STRIP: NEGATIVE
BNP SERPL-MCNC: 1319 PG/ML (ref 0–100)
BUN SERPL-MCNC: 21 MG/DL (ref 5–25)
BUN SERPL-MCNC: 22 MG/DL (ref 5–25)
BUN SERPL-MCNC: 27 MG/DL (ref 5–25)
CALCIUM SERPL-MCNC: 8.6 MG/DL (ref 8.4–10.2)
CALCIUM SERPL-MCNC: 8.8 MG/DL (ref 8.4–10.2)
CALCIUM SERPL-MCNC: 9.1 MG/DL (ref 8.4–10.2)
CARDIAC TROPONIN I PNL SERPL HS: 17 NG/L
CARDIAC TROPONIN I PNL SERPL HS: 19 NG/L
CARDIAC TROPONIN I PNL SERPL HS: 22 NG/L
CHLORIDE SERPL-SCNC: 85 MMOL/L (ref 96–108)
CHLORIDE SERPL-SCNC: 85 MMOL/L (ref 96–108)
CHLORIDE SERPL-SCNC: 86 MMOL/L (ref 96–108)
CLARITY UR: ABNORMAL
CO2 SERPL-SCNC: 18 MMOL/L (ref 21–32)
CO2 SERPL-SCNC: 20 MMOL/L (ref 21–32)
CO2 SERPL-SCNC: 21 MMOL/L (ref 21–32)
COLOR UR: YELLOW
CORTIS SERPL-MCNC: 22.5 UG/DL
CREAT SERPL-MCNC: 0.77 MG/DL (ref 0.6–1.3)
CREAT SERPL-MCNC: 0.8 MG/DL (ref 0.6–1.3)
CREAT SERPL-MCNC: 0.92 MG/DL (ref 0.6–1.3)
EOSINOPHIL # BLD AUTO: 0.01 THOUSAND/ÂΜL (ref 0–0.61)
EOSINOPHIL NFR BLD AUTO: 0 % (ref 0–6)
ERYTHROCYTE [DISTWIDTH] IN BLOOD BY AUTOMATED COUNT: 15 % (ref 11.6–15.1)
GFR SERPL CREATININE-BSD FRML MDRD: 56 ML/MIN/1.73SQ M
GFR SERPL CREATININE-BSD FRML MDRD: 66 ML/MIN/1.73SQ M
GFR SERPL CREATININE-BSD FRML MDRD: 69 ML/MIN/1.73SQ M
GLUCOSE SERPL-MCNC: 107 MG/DL (ref 65–140)
GLUCOSE SERPL-MCNC: 110 MG/DL (ref 65–140)
GLUCOSE SERPL-MCNC: 114 MG/DL (ref 65–140)
GLUCOSE UR STRIP-MCNC: NEGATIVE MG/DL
HCT VFR BLD AUTO: 38.2 % (ref 34.8–46.1)
HGB BLD-MCNC: 13.1 G/DL (ref 11.5–15.4)
HGB UR QL STRIP.AUTO: ABNORMAL
HOLD SPECIMEN: NORMAL
IMM GRANULOCYTES # BLD AUTO: 0.03 THOUSAND/UL (ref 0–0.2)
IMM GRANULOCYTES NFR BLD AUTO: 0 % (ref 0–2)
KETONES UR STRIP-MCNC: NEGATIVE MG/DL
LEUKOCYTE ESTERASE UR QL STRIP: ABNORMAL
LYMPHOCYTES # BLD AUTO: 0.97 THOUSANDS/ÂΜL (ref 0.6–4.47)
LYMPHOCYTES NFR BLD AUTO: 14 % (ref 14–44)
MCH RBC QN AUTO: 31.4 PG (ref 26.8–34.3)
MCHC RBC AUTO-ENTMCNC: 34.3 G/DL (ref 31.4–37.4)
MCV RBC AUTO: 92 FL (ref 82–98)
MONOCYTES # BLD AUTO: 0.83 THOUSAND/ÂΜL (ref 0.17–1.22)
MONOCYTES NFR BLD AUTO: 12 % (ref 4–12)
NEUTROPHILS # BLD AUTO: 5.17 THOUSANDS/ÂΜL (ref 1.85–7.62)
NEUTS SEG NFR BLD AUTO: 74 % (ref 43–75)
NITRITE UR QL STRIP: POSITIVE
NON-SQ EPI CELLS URNS QL MICRO: ABNORMAL /HPF
NRBC BLD AUTO-RTO: 0 /100 WBCS
OSMOLALITY UR/SERPL-RTO: 255 MMOL/KG (ref 282–298)
OSMOLALITY UR: 544 MMOL/KG
PH UR STRIP.AUTO: 7 [PH]
PLATELET # BLD AUTO: 219 THOUSANDS/UL (ref 149–390)
PMV BLD AUTO: 9.2 FL (ref 8.9–12.7)
POTASSIUM SERPL-SCNC: 4 MMOL/L (ref 3.5–5.3)
POTASSIUM SERPL-SCNC: 4 MMOL/L (ref 3.5–5.3)
POTASSIUM SERPL-SCNC: 4.1 MMOL/L (ref 3.5–5.3)
PROT SERPL-MCNC: 6.5 G/DL (ref 6.4–8.4)
PROT UR STRIP-MCNC: ABNORMAL MG/DL
RBC # BLD AUTO: 4.17 MILLION/UL (ref 3.81–5.12)
RBC #/AREA URNS AUTO: ABNORMAL /HPF
SODIUM 24H UR-SCNC: 112 MOL/L
SODIUM SERPL-SCNC: 116 MMOL/L (ref 135–147)
SODIUM SERPL-SCNC: 118 MMOL/L (ref 135–147)
SODIUM SERPL-SCNC: 119 MMOL/L (ref 135–147)
SP GR UR STRIP.AUTO: 1.02 (ref 1–1.03)
TSH SERPL DL<=0.05 MIU/L-ACNC: 1.45 UIU/ML (ref 0.45–4.5)
UROBILINOGEN UR QL STRIP.AUTO: 0.2 E.U./DL
WBC # BLD AUTO: 7.02 THOUSAND/UL (ref 4.31–10.16)
WBC #/AREA URNS AUTO: ABNORMAL /HPF

## 2023-06-11 PROCEDURE — 36415 COLL VENOUS BLD VENIPUNCTURE: CPT | Performed by: EMERGENCY MEDICINE

## 2023-06-11 PROCEDURE — 96365 THER/PROPH/DIAG IV INF INIT: CPT

## 2023-06-11 PROCEDURE — 71250 CT THORAX DX C-: CPT

## 2023-06-11 PROCEDURE — 84484 ASSAY OF TROPONIN QUANT: CPT | Performed by: EMERGENCY MEDICINE

## 2023-06-11 PROCEDURE — 83880 ASSAY OF NATRIURETIC PEPTIDE: CPT | Performed by: EMERGENCY MEDICINE

## 2023-06-11 PROCEDURE — 0W993ZZ DRAINAGE OF RIGHT PLEURAL CAVITY, PERCUTANEOUS APPROACH: ICD-10-PCS | Performed by: INTERNAL MEDICINE

## 2023-06-11 PROCEDURE — 82533 TOTAL CORTISOL: CPT

## 2023-06-11 PROCEDURE — 87077 CULTURE AEROBIC IDENTIFY: CPT | Performed by: EMERGENCY MEDICINE

## 2023-06-11 PROCEDURE — 84300 ASSAY OF URINE SODIUM: CPT

## 2023-06-11 PROCEDURE — 85025 COMPLETE CBC W/AUTO DIFF WBC: CPT | Performed by: EMERGENCY MEDICINE

## 2023-06-11 PROCEDURE — 32555 ASPIRATE PLEURA W/ IMAGING: CPT | Performed by: INTERNAL MEDICINE

## 2023-06-11 PROCEDURE — 84443 ASSAY THYROID STIM HORMONE: CPT | Performed by: EMERGENCY MEDICINE

## 2023-06-11 PROCEDURE — 99223 1ST HOSP IP/OBS HIGH 75: CPT | Performed by: HOSPITALIST

## 2023-06-11 PROCEDURE — 83935 ASSAY OF URINE OSMOLALITY: CPT

## 2023-06-11 PROCEDURE — 96361 HYDRATE IV INFUSION ADD-ON: CPT

## 2023-06-11 PROCEDURE — 80053 COMPREHEN METABOLIC PANEL: CPT | Performed by: EMERGENCY MEDICINE

## 2023-06-11 PROCEDURE — 99285 EMERGENCY DEPT VISIT HI MDM: CPT

## 2023-06-11 PROCEDURE — 71045 X-RAY EXAM CHEST 1 VIEW: CPT

## 2023-06-11 PROCEDURE — 81001 URINALYSIS AUTO W/SCOPE: CPT | Performed by: EMERGENCY MEDICINE

## 2023-06-11 PROCEDURE — 93005 ELECTROCARDIOGRAM TRACING: CPT

## 2023-06-11 PROCEDURE — 99285 EMERGENCY DEPT VISIT HI MDM: CPT | Performed by: EMERGENCY MEDICINE

## 2023-06-11 PROCEDURE — 84484 ASSAY OF TROPONIN QUANT: CPT

## 2023-06-11 PROCEDURE — 87186 SC STD MICRODIL/AGAR DIL: CPT | Performed by: EMERGENCY MEDICINE

## 2023-06-11 PROCEDURE — 87086 URINE CULTURE/COLONY COUNT: CPT | Performed by: EMERGENCY MEDICINE

## 2023-06-11 PROCEDURE — 83930 ASSAY OF BLOOD OSMOLALITY: CPT

## 2023-06-11 PROCEDURE — G1004 CDSM NDSC: HCPCS

## 2023-06-11 PROCEDURE — 80048 BASIC METABOLIC PNL TOTAL CA: CPT

## 2023-06-11 RX ORDER — ALPRAZOLAM 0.25 MG/1
0.25 TABLET ORAL 3 TIMES DAILY PRN
Status: DISCONTINUED | OUTPATIENT
Start: 2023-06-11 | End: 2023-06-14 | Stop reason: HOSPADM

## 2023-06-11 RX ORDER — ATORVASTATIN CALCIUM 10 MG/1
20 TABLET, FILM COATED ORAL
Status: DISCONTINUED | OUTPATIENT
Start: 2023-06-11 | End: 2023-06-14 | Stop reason: HOSPADM

## 2023-06-11 RX ORDER — PANTOPRAZOLE SODIUM 40 MG/1
40 TABLET, DELAYED RELEASE ORAL EVERY MORNING
Status: DISCONTINUED | OUTPATIENT
Start: 2023-06-11 | End: 2023-06-14 | Stop reason: HOSPADM

## 2023-06-11 RX ORDER — MELATONIN
2000 DAILY
Status: DISCONTINUED | OUTPATIENT
Start: 2023-06-11 | End: 2023-06-14 | Stop reason: HOSPADM

## 2023-06-11 RX ORDER — AMMONIUM LACTATE 12 G/100G
LOTION TOPICAL DAILY
Status: DISCONTINUED | OUTPATIENT
Start: 2023-06-11 | End: 2023-06-14 | Stop reason: HOSPADM

## 2023-06-11 RX ORDER — CEFTRIAXONE 1 G/50ML
1000 INJECTION, SOLUTION INTRAVENOUS EVERY 24 HOURS
Status: COMPLETED | OUTPATIENT
Start: 2023-06-12 | End: 2023-06-13

## 2023-06-11 RX ORDER — METOPROLOL SUCCINATE 50 MG/1
50 TABLET, EXTENDED RELEASE ORAL DAILY
Status: DISCONTINUED | OUTPATIENT
Start: 2023-06-11 | End: 2023-06-11

## 2023-06-11 RX ORDER — FUROSEMIDE 10 MG/ML
40 INJECTION INTRAMUSCULAR; INTRAVENOUS ONCE
Status: DISCONTINUED | OUTPATIENT
Start: 2023-06-11 | End: 2023-06-11

## 2023-06-11 RX ORDER — METOPROLOL SUCCINATE 50 MG/1
50 TABLET, EXTENDED RELEASE ORAL DAILY
Status: DISCONTINUED | OUTPATIENT
Start: 2023-06-11 | End: 2023-06-14 | Stop reason: HOSPADM

## 2023-06-11 RX ORDER — CLOPIDOGREL BISULFATE 75 MG/1
75 TABLET ORAL DAILY
Status: DISCONTINUED | OUTPATIENT
Start: 2023-06-11 | End: 2023-06-14 | Stop reason: HOSPADM

## 2023-06-11 RX ORDER — CEFTRIAXONE 1 G/50ML
1000 INJECTION, SOLUTION INTRAVENOUS ONCE
Status: COMPLETED | OUTPATIENT
Start: 2023-06-11 | End: 2023-06-11

## 2023-06-11 RX ADMIN — MULTIPLE VITAMINS W/ MINERALS TAB 1 TABLET: TAB at 12:46

## 2023-06-11 RX ADMIN — PANTOPRAZOLE SODIUM 40 MG: 40 TABLET, DELAYED RELEASE ORAL at 12:46

## 2023-06-11 RX ADMIN — SODIUM CHLORIDE 500 ML: 0.9 INJECTION, SOLUTION INTRAVENOUS at 08:30

## 2023-06-11 RX ADMIN — CEFTRIAXONE 1000 MG: 1 INJECTION, SOLUTION INTRAVENOUS at 11:01

## 2023-06-11 RX ADMIN — CHOLECALCIFEROL TAB 25 MCG (1000 UNIT) 2000 UNITS: 25 TAB at 12:46

## 2023-06-11 RX ADMIN — DILTIAZEM HYDROCHLORIDE 30 MG: 30 TABLET ORAL at 15:05

## 2023-06-11 RX ADMIN — APIXABAN 2.5 MG: 2.5 TABLET, FILM COATED ORAL at 12:46

## 2023-06-11 RX ADMIN — APIXABAN 2.5 MG: 2.5 TABLET, FILM COATED ORAL at 17:14

## 2023-06-11 RX ADMIN — METOPROLOL SUCCINATE 50 MG: 50 TABLET, EXTENDED RELEASE ORAL at 10:56

## 2023-06-11 RX ADMIN — CLOPIDOGREL BISULFATE 75 MG: 75 TABLET ORAL at 12:46

## 2023-06-11 RX ADMIN — ATORVASTATIN CALCIUM 20 MG: 10 TABLET, FILM COATED ORAL at 17:14

## 2023-06-11 RX ADMIN — DILTIAZEM HYDROCHLORIDE 30 MG: 30 TABLET ORAL at 22:54

## 2023-06-11 RX ADMIN — PSYLLIUM HUSK 1 PACKET: 3.4 POWDER ORAL at 12:50

## 2023-06-11 NOTE — ASSESSMENT & PLAN NOTE
Recently started on lexapro by PCP on 6-6-2023    -potential cause of progressing hyponatremia? ?   -Hold during hospitalization   -re evaluate accordingly

## 2023-06-11 NOTE — ASSESSMENT & PLAN NOTE
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

## 2023-06-11 NOTE — ASSESSMENT & PLAN NOTE
Wt Readings from Last 3 Encounters:   06/11/23 51 2 kg (112 lb 14 oz)   06/07/23 46 7 kg (103 lb)   06/06/23 46 7 kg (103 lb)     Not in acute exacerbation  At target dry weight  Following with  Cardiology    Home meds:   -lasix  20 mg PRN   -Spironolactone 25 mg OD    Plan:  Patient is euvolemic/hypovolemic hyponatremia and is not in AHF state  · Hold home diuretic therapy  · Restart diuretics when possible  · BMP Q 4  · Urine and serum osmo     · I/Os, daily weight

## 2023-06-11 NOTE — PLAN OF CARE
Problem: Potential for Falls  Goal: Patient will remain free of falls  Description: INTERVENTIONS:  - Educate patient/family on patient safety including physical limitations  - Instruct patient to call for assistance with activity   - Consult OT/PT to assist with strengthening/mobility   - Keep Call bell within reach  - Keep bed low and locked with side rails adjusted as appropriate  - Keep care items and personal belongings within reach  - Initiate and maintain comfort rounds  - Make Fall Risk Sign visible to staff  - Offer Toileting every 1-2 Hours, in advance of need  - Initiate/Maintain bed/chair alarm  - Obtain necessary fall risk management equipment: nonskid footwear  - Apply yellow socks and bracelet for high fall risk patients  Outcome: Progressing     Problem: PAIN - ADULT  Goal: Verbalizes/displays adequate comfort level or baseline comfort level  Description: Interventions:  - Encourage patient to monitor pain and request assistance  - Assess pain using appropriate pain scale (0-10 pain scale)  - Administer analgesics based on type and severity of pain and evaluate response  - Implement non-pharmacological measures as appropriate and evaluate response  - Consider cultural and social influences on pain and pain management  - Notify physician/advanced practitioner if interventions unsuccessful or patient reports new pain  Outcome: Progressing     Problem: INFECTION - ADULT  Goal: Absence or prevention of progression during hospitalization  Description: INTERVENTIONS:  - Assess and monitor for signs and symptoms of infection  - Monitor lab/diagnostic results  - Monitor all insertion sites, i e  indwelling lines  - Administer medications as ordered  - Instruct and encourage patient and family to use good hand hygiene technique  Outcome: Progressing     Problem: SAFETY ADULT  Goal: Patient will remain free of falls  Description: INTERVENTIONS:  - Educate patient/family on patient safety including physical limitations  - Instruct patient to call for assistance with activity   - Consult OT/PT to assist with strengthening/mobility   - Keep Call bell within reach  - Keep bed low and locked with side rails adjusted as appropriate  - Keep care items and personal belongings within reach  - Initiate and maintain comfort rounds  - Make Fall Risk Sign visible to staff  - Offer Toileting every 1-2 Hours, in advance of need  - Initiate/Maintain bed/chair alarm  - Obtain necessary fall risk management equipment: nonskid footwear  - Apply yellow socks and bracelet for high fall risk patients  Outcome: Progressing  Goal: Maintain or return to baseline ADL function  Description: INTERVENTIONS:  - Educate patient/family on patient safety including physical limitations  - Instruct patient to call for assistance with activity   - Consult OT/PT to assist with strengthening/mobility   - Keep Call bell within reach  - Keep bed low and locked with side rails adjusted as appropriate  - Keep care items and personal belongings within reach  - Initiate and maintain comfort rounds  - Make Fall Risk Sign visible to staff  - Offer Toileting every 1-2 Hours, in advance of need  - Initiate/Maintain bed/chair alarm  - Obtain necessary fall risk management equipment: nonskid footwear  - Apply yellow socks and bracelet for high fall risk patients  Outcome: Progressing  Goal: Maintains/Returns to pre admission functional level  Description: INTERVENTIONS:  -  Assess patient's ability to carry out ADLs; (mod assist)  - Assess/evaluate cause of self-care deficits (weakness, fatigue)  - Assess range of motion  - Assess patient's mobility; develop plan if impaired  - Assess patient's need for assistive devices and provide as appropriate  - Encourage maximum independence but intervene and supervise when necessary  - Involve family in performance of ADLs  - Assess for home care needs following discharge   - Consider OT consult to assist with ADL evaluation and planning for discharge  - Provide patient education as appropriate  Outcome: Progressing     Problem: DISCHARGE PLANNING  Goal: Discharge to home or other facility with appropriate resources  Description: INTERVENTIONS:  - Identify barriers to discharge w/patient and caregiver  - Arrange for needed discharge resources and transportation as appropriate  - Identify discharge learning needs (meds, wound care, etc )  - Refer to Case Management Department for coordinating discharge planning if the patient needs post-hospital services based on physician/advanced practitioner order or complex needs related to functional status, cognitive ability, or social support system  Outcome: Progressing     Problem: Knowledge Deficit  Goal: Patient/family/caregiver demonstrates understanding of disease process, treatment plan, medications, and discharge instructions  Description: Complete learning assessment and assess knowledge base    Interventions:  - Provide teaching at level of understanding  - Provide teaching via preferred learning methods  Outcome: Progressing     Problem: SKIN/TISSUE INTEGRITY - ADULT  Goal: Skin Integrity remains intact(Skin Breakdown Prevention)  Description: Assess:  -Perform Moustapha assessment every shift  -Clean and moisturize skin every shift  -Inspect skin when repositioning, toileting, and assisting with ADLS  -Assess under medical devices every shift  -Assess extremities for adequate circulation and sensation     Bed Management:  -Have minimal linens on bed & keep smooth, unwrinkled  -Change linens as needed when moist or perspiring  -Avoid sitting or lying in one position for more than 2 hours while in bed    Toileting:  -Offer bedside commode  -Assess for incontinence every 1-2 hours and prn  -Use incontinent care products after each incontinent episode    Activity:  -Mobilize patient times a day  -Encourage activity and walks on unit  -Encourage or provide ROM exercises   -Turn and reposition patient every 2 Hours  -Use appropriate equipment to lift or move patient in bed  -Instruct/ Assist with weight shifting every  minutes when out of bed in chair  -Consider limitation of chair time 4-6 hour intervals    Skin Care:  -Avoid use of baby powder, tape, friction and shearing, hot water or constrictive clothing  -Relieve pressure over bony prominences  -Do not massage red bony areas    Next Steps:  -Teach patient strategies to minimize risks    -Consider consults to  interdisciplinary teams   Outcome: Progressing     Problem: RESPIRATORY - ADULT  Goal: Achieves optimal ventilation and oxygenation  Description: INTERVENTIONS:  - Assess for changes in respiratory status  - Assess for changes in mentation and behavior  - Position to facilitate oxygenation and minimize respiratory effort  - Oxygen administered by appropriate delivery if ordered  - Encourage broncho-pulmonary hygiene including cough, deep breathe, Incentive Spirometry  - Assess and instruct to report SOB or any respiratory difficulty  - Respiratory Therapy support as indicated  Outcome: Progressing

## 2023-06-11 NOTE — ASSESSMENT & PLAN NOTE
Lab Results   Component Value Date    CREATININE 0 92 06/11/2023    CREATININE 1 03 03/24/2023    CREATININE 1 44 (H) 02/14/2023    EGFR 56 06/11/2023    EGFR 48 03/24/2023    EGFR 32 02/14/2023     Stable  Monitor I/Os   Daily weight  Avoid nephrotoxins as possible

## 2023-06-11 NOTE — ED PROVIDER NOTES
History  Chief Complaint   Patient presents with   • Weakness - Generalized     Pt had spine injection5 days ago now has general weakness and cant get out of bed     80-year-old female presents for evaluation of generalized weakness with her son and   Earlier this year patient underwent TAVR, son believes since this, patient has been gradually declining with her strength and stamina  Patient was to go to cardiac rehab however was unable to successfully complete this after suffering from a compression fracture  Patient recently underwent injection for this with pain management, she reports significant improvement in pain  Patient lives at home with her ,  states over the weekend she has had decreased oral intake, this morning she was unable to get out of her bed by herself and he was unable to help her  He does report that she lost her balance in the shower however fell into a shower chair did not strike her head or lose consciousness  They deny cold symptoms or other infectious symptoms  Patient does have urinary frequency however has been on diuretic after cardiac surgery this year  Patient herself has no complaints at this time, states her back pain has significantly improved  Family believes she may require more structured inpatient rehab, possible placement  Prior to Admission Medications   Prescriptions Last Dose Informant Patient Reported? Taking? ALPRAZolam (XANAX) 0 25 mg tablet  Spouse/Significant Other No No   Sig: Take 1 tablet (0 25 mg total) by mouth 3 (three) times a day as needed for anxiety   Cholecalciferol (Vitamin D3) 50 MCG (2000 UT) TABS  Spouse/Significant Other Yes No   Sig: Take 2,000 Units by mouth in the morning  Indications: Osteoporosis   Multiple Vitamin (Multivitamin Adult) TABS  Spouse/Significant Other Yes No   Sig: Take 1 tablet by mouth in the morning   Indications: Nutritional Support   Psyllium (METAMUCIL FIBER) 51 7 % PACK Spouse/Significant Other No No   Sig: Take 1 Package by mouth daily   alendronate (Fosamax) 70 mg tablet 6/9/2023 Spouse/Significant Other No Yes   Sig: Take 1 tablet (70 mg total) by mouth every 7 days   ammonium lactate (LAC-HYDRIN) 12 % lotion  Spouse/Significant Other Yes No   Sig: USE AS DIRECTED TO BOTH FEET DAILY   apixaban (ELIQUIS) 2 5 mg  Spouse/Significant Other No No   Sig: Take 1 tablet (2 5 mg total) by mouth 2 (two) times a day   atorvastatin (LIPITOR) 20 mg tablet  Spouse/Significant Other No No   Sig: Take 1 tablet (20 mg total) by mouth daily with dinner   clopidogrel (PLAVIX) 75 mg tablet  Spouse/Significant Other No No   Sig: Take 1 tablet (75 mg total) by mouth daily   escitalopram (Lexapro) 10 mg tablet   No No   Sig: Take 1 tablet (10 mg total) by mouth daily at bedtime   furosemide (LASIX) 40 mg tablet  Spouse/Significant Other No No   Sig: Take 0 5 tablets (20 mg total) by mouth daily as needed (leg swelling)   metoprolol succinate (TOPROL-XL) 50 mg 24 hr tablet   No No   Sig: Take 1 tablet (50 mg total) by mouth daily   naloxone (NARCAN) 4 mg/0 1 mL nasal spray  Spouse/Significant Other No No   Sig: Administer 1 spray into a nostril  If no response after 2-3 minutes, give another dose in the other nostril using a new spray     pantoprazole (PROTONIX) 40 mg tablet  Spouse/Significant Other No No   Sig: TAKE 1 TABLET BY MOUTH ONCE DAILY IN THE MORNING   spironolactone (ALDACTONE) 25 mg tablet  Spouse/Significant Other No No   Sig: Take 1 tablet by mouth once daily      Facility-Administered Medications: None       Past Medical History:   Diagnosis Date   • Acute on chronic diastolic CH[facility]congestive heart failure) [facility] ) 1/30/2023   • Acute respiratory failure with hypoxia ([facility] ) 12/22/2022   • Aortic stenosis    • Atrial flutter (HCC)    • CAD (coronary artery disease) 01/11/2023    CAD/PCI/ELODIA   • CKD (chronic kidney disease), stage III ([facility] )    • Community acquired pneumonia of left upper lobe of lung 05/17/2019   • Fatigue    • Full dentures    • Generalized anxiety disorder    • Hyperlipidemia    • Hypertension    • Impaired fasting glucose    • Mitral regurgitation    • Pneumonia    • RBBB    • 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    TRANSFEMORAL W/ 23MM CALVERT SHAVONNE S3 ULTRA VALVE(ACCESS ON LEFT)   • SVT (supraventricular tachycardia)    • Tricuspid regurgitation        Past Surgical History:   Procedure Laterality Date   • CARDIAC CATHETERIZATION N/A 1/11/2023    Procedure: LHC-Cardiac catheterization;  Surgeon: Albania Pedraza MD;  Location: [facility] CARDIAC C[facility] LAB; Service: Cardiology   • CARDIAC CATHETERIZATION N/A 1/11/2023    Procedure: Cardiac pci;  Surgeon: Albania Pedraza MD;  Location: [facility] CARDIAC CATH LAB;   Service: Cardiology   • CARDIAC CATHETERIZATION N/A 1/17/2023    Procedure: CARDIAC TAVR;  Surgeon: Gustabo Amezcua DO;  Location: [facility] MAIN OR;  Service: Cardiology   • DENTAL SURGERY Bilateral     ALL TEETH REMOVED   • FL GUIDED NEEDLE PLAC BX/ASP/INJ  5/26/2021   • IR THORACENTESIS  12/20/2022   • JOINT REPLACEMENT Left    • KNEE SURGERY      left knee replacement   • OK INJECT SI JOINT ARTHRGRPHY&/ANES/STEROID W/JOHNATHON Bilateral 5/26/2021    Procedure: SACROILIAC JOINT INJECTION;  Surgeon: Donna Greer MD;  Location: [facility] MAIN OR;  Service: Pain Management    • OK REPLACE AORTIC VALVE OPENFEMORAL ARTERY APPROACH N/A 1/17/2023    Procedure: REPLACEMENT AORTIC VALVE TRANSCATHETER (TAVR) TRANSFEMORAL W/ 23MM CALVERT SHAVONNE S3 ULTRA VALVE(ACCESS ON LEFT) ALENA;  Surgeon: Diana Bryson DO;  Location: [facility] MAIN [facility];  Service: Cardiac Surgery   • SACROILIAC JOINT INJECTION Bilateral 6/7/2023    Procedure: Bilateral sacroiliac joint steroid injection;  Surgeon: Kika Cote MD;  Location: [facility] MAIN OR;  Service: Pain Management        Family History   Problem Relation Age of Onset   • Hypertension Mother    • Asthma Father         's asthma   • Alzheimer's disease Sister    • Rectal cancer Son    • Uterine cancer Daughter    • Heart disease Sister    • Heart disease Brother      I have reviewed and agree with the history as documented  E-Cigarette/Vaping   • E-Cigarette Use Never User      E-Cigarette/Vaping Substances   • Nicotine No    • THC No    • CBD No    • Flavoring No    • Other No    • Unknown No      Social History     Tobacco Use   • Smoking status: Never   • Smokeless tobacco: Never   Vaping Use   • Vaping Use: Never used   Substance Use Topics   • Alcohol use: Never   • Drug use: Never       Review of Systems   Constitutional: Negative for appetite change, chills and fever  Respiratory: Negative for cough and shortness of breath  Cardiovascular: Negative for chest pain  Gastrointestinal: Negative for abdominal pain  Genitourinary: Positive for frequency  Musculoskeletal: Negative for back pain  Neurological: Positive for weakness (generalized)  All other systems reviewed and are negative  Physical Exam  Physical Exam  Vitals reviewed  Constitutional:       General: She is not in acute distress  Appearance: Normal appearance  She is not ill-appearing, toxic-appearing or diaphoretic  HENT:      Head: Normocephalic and atraumatic  Right Ear: External ear normal       Left Ear: External ear normal       Nose: Nose normal       Mouth/Throat:      Mouth: Mucous membranes are dry  Eyes:      General:         Right eye: No discharge  Left eye: No discharge  Extraocular Movements: Extraocular movements intact  Cardiovascular:      Rate and Rhythm: Normal rate  Rhythm irregular  Pulmonary:      Effort: Pulmonary effort is normal  No respiratory distress  Abdominal:      General: There is no distension  Palpations: Abdomen is soft  Tenderness: There is no abdominal tenderness  There is no guarding or rebound  Musculoskeletal:         General: No deformity or signs of injury        Right lower leg: No edema  Left lower leg: No edema  Skin:     General: Skin is warm  Coloration: Skin is not jaundiced or pale  Neurological:      General: No focal deficit present  Mental Status: She is alert  Mental status is at baseline           Vital Signs  ED Triage Vitals   Temperature Pulse Respirations Blood Pressure SpO2   06/11/23 0708 06/11/23 0734 06/11/23 0708 06/11/23 0708 06/11/23 0734   (!) 97 4 °F (36 3 °C) 68 20 (!) 182/89 95 %      Temp Source Heart Rate Source Patient Position - Orthostatic VS BP Location FiO2 (%)   06/11/23 0708 06/11/23 0734 06/11/23 0800 06/11/23 0800 --   Temporal Monitor Sitting Right arm       Pain Score       06/11/23 0708       No Pain           Vitals:    06/11/23 1159 06/11/23 1229 06/11/23 1259 06/11/23 1419   BP: 140/92 (!) 182/99 (!) 189/108 (!) 173/112   Pulse: 69 75 73 72   Patient Position - Orthostatic VS:    Lying         Visual Acuity      ED Medications  Medications   metoprolol succinate (TOPROL-XL) 24 hr tablet 50 mg (50 mg Oral Given 6/11/23 1056)   ALPRAZolam (XANAX) tablet 0 25 mg (has no administration in time range)   pantoprazole (PROTONIX) EC tablet 40 mg (40 mg Oral Given 6/11/23 1246)   psyllium (METAMUCIL) 1 packet (1 packet Oral Given 6/11/23 1250)   multivitamin-minerals (CENTRUM) tablet 1 tablet (1 tablet Oral Given 6/11/23 1246)   clopidogrel (PLAVIX) tablet 75 mg (75 mg Oral Given 6/11/23 1246)   cholecalciferol (VITAMIN D3) tablet 2,000 Units (2,000 Units Oral Given 6/11/23 1246)   atorvastatin (LIPITOR) tablet 20 mg (has no administration in time range)   apixaban (ELIQUIS) tablet 2 5 mg (2 5 mg Oral Given 6/11/23 1246)   ammonium lactate (LAC-HYDRIN) 12 % lotion ( Topical Not Given 6/11/23 1506)   cefTRIAXone (ROCEPHIN) IVPB (premix in dextrose) 1,000 mg 50 mL (has no administration in time range)   diltiazem (CARDIZEM) tablet 30 mg (30 mg Oral Given 6/11/23 2615)   sodium chloride 0 9 % bolus 500 mL (0 mL Intravenous Stopped 6/11/23 "4498)   cefTRIAXone (ROCEPHIN) IVPB (premix in dextrose) 1,000 mg 50 mL (0 mg Intravenous Stopped 6/11/23 1131)       Diagnostic Studies  Results Reviewed     Procedure Component Value Units Date/Time    Basic metabolic panel [[id number]]     Lab Status: No result Specimen: Blood     Basic metabolic panel [[id number]]  (Abnormal) Collected: 06/11/23 1433    Lab Status: Final result Specimen: Blood from Hand, Right Updated: 06/11/23 1516     Sodium 118 mmol/L      Potassium 4 0 mmol/L      Chloride 86 mmol/L      CO2 20 mmol/L      ANION GAP 12 mmol/L      BUN 22 mg/dL      Creatinine 0 80 mg/dL      Glucose 114 mg/dL      Calcium 8 8 mg/dL      eGFR 66 ml/min/1 73sq m     Narrative:      [place] guidelines for Chronic Kidney Disease (CKD):   •  Stage 1 with normal or high GFR (GFR > 90 mL/min/1 73 square meters)  •  Stage 2 Mild CKD (GFR = 60-89 mL/min/1 73 square meters)  •  Stage 3A Moderate CKD (GFR = 45-59 mL/min/1 73 square meters)  •  Stage 3B Moderate CKD (GFR = 30-44 mL/min/1 73 square meters)  •  Stage 4 Severe CKD (GFR = 15-29 mL/min/1 73 square meters)  •  Stage 5 End Stage CKD (GFR <15 mL/min/1 73 square meters)  Note: GFR calculation is accurate only with a steady state creatinine    HS Troponin I 4hr [[id number]]  (Normal) Collected: 06/11/23 1203    Lab Status: Final result Specimen: Blood from Arm, Right Updated: 06/11/23 1238     hs TnI 4hr 17 ng/L      Delta 4hr hsTnI -2 ng/L     Osmolality-\"If this is regarding a toxic alcohol, STOP  Test is not routinely indicated  Please consult medical  on call for further guidance  \" [[id number]] Collected: 06/11/23 1203    Lab Status: In process Specimen: Blood from Arm, Right Updated: 06/11/23 3818    Basic metabolic panel [[id number]]     Lab Status: No result Specimen: Blood     Body fluid white cell count with differential [[id number]]     Lab Status: No result Specimen:  Body Fluid from Pleural, Right     Glucose, body " fluid [[id number]]     Lab Status: No result Specimen: Body Fluid from Other     Lactate dehydrogenase, body fluid [[id number]]     Lab Status: No result Specimen: Body Fluid from Other     pH, body fluid [[id number]]     Lab Status: No result Specimen: Body Fluid from Other     Protein, body fluid [[id number]]     Lab Status: No result Specimen: Body Fluid from Other     Red Cell Count,Body Fluid [[id number]]     Lab Status: No result Specimen: Body Fluid from Pleural, Right     Triglycerides, body fluid [[id number]]     Lab Status: No result Specimen: Body Fluid from Other     Sodium, urine, random [[id number]] Collected: 06/11/23 0931    Lab Status: Final result Specimen: Urine Updated: 06/11/23 1152     Sodium, Ur 112    Osmolality, urine [[id number]] Collected: 06/11/23 0931    Lab Status: In process Specimen: Urine Updated: 06/11/23 1130    Cortisol [[id number]] Collected: 06/11/23 0931    Lab Status: In process Specimen: Blood Updated: 06/11/23 1130    HS Troponin I 2hr [[id number]]  (Normal) Collected: 06/11/23 1051    Lab Status: Final result Specimen: Blood from Arm, Right Updated: 06/11/23 1117     hs TnI 2hr 22 ng/L      Delta 2hr hsTnI 3 ng/L     HS Troponin 0hr (reflex protocol) [[id number]]  (Normal) Collected: 06/11/23 0751    Lab Status: Final result Specimen: Blood from Arm, Right Updated: 06/11/23 0959     hs TnI 0hr 19 ng/L     Urine Microscopic [[id number]]  (Abnormal) Collected: 06/11/23 0931    Lab Status: Final result Specimen: Urine, Clean Catch Updated: 06/11/23 0942     RBC, UA 2-4 /hpf      WBC, UA 20-30 /hpf      Epithelial Cells Occasional /hpf      Bacteria, UA Innumerable /hpf     Urine culture [[id number]] Collected: 06/11/23 0931    Lab Status:  In process Specimen: Urine, Clean Catch Updated: 06/11/23 0942    UA w Reflex to Microscopic w Reflex to Culture [[id number]]  (Abnormal) Collected: 06/11/23 0931    Lab Status: Final result Specimen: Urine, Clean Catch Updated: 06/11/23 0938     Color, UA Yellow     Clarity, UA Slightly Cloudy     Specific [place], UA 1 020     pH, UA 7 0     Leukocytes, UA Small     Nitrite, UA Positive     Protein,  (2+) mg/dl      Glucose, UA Negative mg/dl      Ketones, UA Negative mg/dl      Urobilinogen, UA 0 2 E U /dl      Bilirubin, UA Negative     Occult Blood, UA Moderate    B-Type Natriuretic Peptide(BNP) [[id number]]  (Abnormal) Collected: 06/11/23 0751    Lab Status: Final result Specimen: Blood Updated: 06/11/23 0937     BNP 1,319 pg/mL     TSH, 3rd generation with Free T4 reflex [[id number]]  (Normal) Collected: 06/11/23 0751    Lab Status: Final result Specimen: Blood Updated: 06/11/23 0906     TSH 3RD GENERATON 1 451 uIU/mL     [place] draw [[id number]] Collected: 06/11/23 0751    Lab Status: Final result Specimen: Blood from Arm, Right Updated: 06/11/23 0901    Narrative: The following orders were created for panel order [place] draw  Procedure                               Abnormality         Status                     ---------                               -----------         ------                     Lilliana Goodness Top on VZTM[[id number]]                           Final result               Green / Black tube on DMNG[[id number]]                       Final result                 Please view results for these tests on the individual orders      Comprehensive metabolic panel [[id number]]  (Abnormal) Collected: 06/11/23 0751    Lab Status: Final result Specimen: Blood from Arm, Right Updated: 06/11/23 0816     Sodium 119 mmol/L      Potassium 4 0 mmol/L      Chloride 85 mmol/L      CO2 21 mmol/L      ANION GAP 13 mmol/L      BUN 27 mg/dL      Creatinine 0 92 mg/dL      Glucose 110 mg/dL      Calcium 9 1 mg/dL      AST 38 U/L      ALT 30 U/L      Alkaline Phosphatase 83 U/L      Total Protein 6 5 g/dL      Albumin 4 3 g/dL      Total Bilirubin 1 20 mg/dL      eGFR 56 ml/min/1 73sq m     Narrative:      [place] guidelines for Chronic Kidney Disease (CKD):   •  Stage 1 with normal or high GFR (GFR > 90 mL/min/1 73 square meters)  •  Stage 2 Mild CKD (GFR = 60-89 mL/min/1 73 square meters)  •  Stage 3A Moderate CKD (GFR = 45-59 mL/min/1 73 square meters)  •  Stage 3B Moderate CKD (GFR = 30-44 mL/min/1 73 square meters)  •  Stage 4 Severe CKD (GFR = 15-29 mL/min/1 73 square meters)  •  Stage 5 End Stage CKD (GFR <15 mL/min/1 73 square meters)  Note: GFR calculation is accurate only with a steady state creatinine    CBC and differential [[id number]] Collected: 06/11/23 0751    Lab Status: Final result Specimen: Blood from Arm, Right Updated: 06/11/23 0759     WBC 7 02 Thousand/uL      RBC 4 17 Million/uL      Hemoglobin 13 1 g/dL      Hematocrit 38 2 %      MCV 92 fL      MCH 31 4 pg      MCHC 34 3 g/dL      RDW 15 0 %      MPV 9 2 fL      Platelets 816 Thousands/uL      nRBC 0 /100 WBCs      Neutrophils Relative 74 %      Immat GRANS % 0 %      Lymphocytes Relative 14 %      Monocytes Relative 12 %      Eosinophils Relative 0 %      Basophils Relative 0 %      Neutrophils Absolute 5 17 Thousands/µL      Immature Grans Absolute 0 03 Thousand/uL      Lymphocytes Absolute 0 97 Thousands/µL      Monocytes Absolute 0 83 Thousand/µL      Eosinophils Absolute 0 01 Thousand/µL      Basophils Absolute 0 01 Thousands/µL                  CT chest without contrast   Final Result by Huy Cristina MD (06/11 1009)      Mild interstitial edema  Large right pleural effusion with moderate compressive atelectasis of the right lower lobe  Trace left effusion  Mild new endplate compression deformities in the spine since December 2022           Workstation performed: [id number]         XR chest 1 view portable    (Results Pending)   IR IN-Patient Thoracentesis    (Results Pending)              Procedures  Procedures         ED Course  ED Course as of 06/11/23 1626   Sun Jun 11, 2023   0904 XR chest 1 view portable  My interpretation, moderate-sized right pleural effusion, uncertain if infectious in etiology  Will obtain CT chest to evaluate further  2153 Procedure Note: EKG  Date/Time: 06/11/23 9:14 AM   Interpreted by: Robert Tripathi  Indications / Diagnosis: weakness  ECG reviewed by me, the ED Provider: yes   The EKG demonstrates:  Rhythm: afib on readout, question af with variable block as seen on previous EKG  Intervals:   Axis: left axis  QRS/Blocks: non-specific QRS  ST Changes: No acute ST Changes, no STD/CASANDRA        3904 Urine Microscopic(!)  Occasional epithelial cells but favors UTI   1015 CT chest without contrast  IMPRESSION:     Mild interstitial edema      Large right pleural effusion with moderate compressive atelectasis of the right lower lobe      Trace left effusion      Mild new endplate compression deformities in the spine since December 2022  1016 Occassionally   1017 Pt HR intermittently >100, HTN, remains well appearing without oxygen needs or respiratory distress  Pt did not take her home meds this AM including metoprolol--will give dose  Will additionally give of lasix for effusions  1050 Bone scan performed in April 2023, showing T11 fracture; no mention of lumbar fracture however no tenderness, no gross neuro deficits   1056 Comprehensive metabolic panel(!)  Hyponatremia, appears dry on examination; husbands says does not drink much water, given diuretic yesterday  Historically sodium in low-mid 120s, she is neurologically intact   1057 CBC and differential  Within normal   1119 [facility] accepts full admission                               SBIRT 20yo+    Flowsheet Row Most Recent Value   Initial Alcohol Screen: US AUDIT-C     1  How often do you have a drink containing alcohol? 0 Filed at: 06/11/2023 0711   Audit-C Score 0 Filed at: 06/11/2023 2644   KAILEY: How many times in the past year have you    Used an illegal drug or used a prescription medication for non-medical reasons?  Never Filed at: 06/11/2023 0891 Medical Decision Making  51-year-old female presents with her family for possible rehab placement due to generalized weakness, safety concerns at home  Patient has no complaints at this time  Will evaluate for other source of weakness such as anemia, electrolyte abnormality, renal dysfunction, infection  Possible will have PT evaluate patient in the emergency department for formal recommendation  Amount and/or Complexity of Data Reviewed  Labs: ordered  Decision-making details documented in ED Course  Radiology: ordered  Decision-making details documented in ED Course  Risk  Prescription drug management  Decision regarding hospitalization  Disposition  Final diagnoses:   Pleural effusion   Compression fracture of lumbar vertebra (HCC)   Hyponatremia   Generalized weakness   UTI (urinary tract infection)     Time reflects when diagnosis was documented in both MDM as applicable and the Disposition within this note     Time User Action Codes Description Comment    6/11/2023 11:21 AM Monika Mathew Add [J90] Pleural effusion     6/11/2023 11:21 AM Donita Blanton Add [S32 000A] Compression fracture of lumbar vertebra ([address] )     6/11/2023 11:21 AM Monika Mathew Add [E87 1] Hyponatremia     6/11/2023 11:22 AM Donita Blanton Add [R53 1] Generalized weakness     6/11/2023 11:22 AM Monika Quincy Add [N39 0] UTI (urinary tract infection)       ED Disposition     ED Disposition   Admit    Condition   Stable    Date/Time   Sun Jun 11, 2023 11:21 AM    Comment   Case was discussed with LIVE and the patient's admission status was agreed to be Admission Status: inpatient status to the service of Dr Erik Osorio              Follow-up Information    None         Current Discharge Medication List      CONTINUE these medications which have NOT CHANGED    Details   alendronate (Fosamax) 70 mg tablet Take 1 tablet (70 mg total) by mouth every 7 days  Qty: 12 tablet, Refills: 3    Associated Diagnoses: Age-related osteoporosis with current pathological fracture of vertebra, initial encounter ([facility])      ALPRAZolam (XANAX) 0 25 mg tablet Take 1 tablet (0 25 mg total) by mouth 3 (three) times a day as needed for anxiety  Qty: 90 tablet, Refills: 5    Associated Diagnoses: Generalized anxiety disorder      ammonium lactate (LAC-HYDRIN) 12 % lotion USE AS DIRECTED TO BOTH FEET DAILY      apixaban (ELIQUIS) 2 5 mg Take 1 tablet (2 5 mg total) by mouth 2 (two) times a day  Qty: 60 tablet, Refills: 2    Associated Diagnoses: Severe aortic stenosis; Nonrheumatic aortic valve disorder; S/P TAVR (transcatheter aortic valve replacement); Atrial flutter, unspecified type ([facility])      atorvastatin (LIPITOR) 20 mg tablet Take 1 tablet (20 mg total) by mouth daily with dinner  Qty: 30 tablet, Refills: 4    Associated Diagnoses: S/P angioplasty with stent      Cholecalciferol (Vitamin D3) 50 MCG (2000 UT) TABS Take 2,000 Units by mouth in the morning  Indications: Osteoporosis      clopidogrel (PLAVIX) 75 mg tablet Take 1 tablet (75 mg total) by mouth daily  Qty: 90 tablet, Refills: 2    Associated Diagnoses: S/P angioplasty with stent      escitalopram (Lexapro) 10 mg tablet Take 1 tablet (10 mg total) by mouth daily at bedtime  Qty: 90 tablet, Refills: 1    Associated Diagnoses: Major depressive disorder, single episode, mild ([facility])      furosemide (LASIX) 40 mg tablet Take 0 5 tablets (20 mg total) by mouth daily as needed (leg swelling)  Qty: 30 tablet, Refills: 1    Associated Diagnoses: Chronic diastolic (congestive) heart failure ([facility])      metoprolol succinate (TOPROL-XL) 50 mg 24 hr tablet Take 1 tablet (50 mg total) by mouth daily  Qty: 90 tablet, Refills: 3    Associated Diagnoses: Atrial tachycardia ([facility])      Multiple Vitamin (Multivitamin Adult) TABS Take 1 tablet by mouth in the morning  Indications: Nutritional Support      naloxone (NARCAN) 4 mg/0 1 mL nasal spray Administer 1 spray into a nostril   If no response after 2-3 minutes, give another dose in the other nostril using a new spray  Qty: 1 each, Refills: 1    Associated Diagnoses: Uncomplicated opioid use      pantoprazole (PROTONIX) 40 mg tablet TAKE 1 TABLET BY MOUTH ONCE DAILY IN THE MORNING  Qty: 30 tablet, Refills: 0    Associated Diagnoses: Severe aortic stenosis; Nonrheumatic aortic valve disorder; S/P TAVR (transcatheter aortic valve replacement)      Psyllium (METAMUCIL FIBER) 51 7 % PACK Take 1 Package by mouth daily  Refills: 0    Comments: Hold for diarrhea or loose stools  Associated Diagnoses: Community acquired pneumonia      spironolactone (ALDACTONE) 25 mg tablet Take 1 tablet by mouth once daily  Qty: 30 tablet, Refills: 5    Associated Diagnoses: Acute on chronic diastolic CHF (congestive heart failure) ([facility]             No discharge procedures on file      PDMP Review       Value Time User    PDMP Reviewed  Yes 5/10/2023 12:56 PM Peg Mcqueen MD          ED Provider  Electronically Signed by           Nessa Valerio DO  06/11/23 5936

## 2023-06-11 NOTE — ASSESSMENT & PLAN NOTE
Multifactorial given moderate hyponatremia, UTI and recent surgical intervention    -refer to A and P for hyponatremia, UTI , and gait abnormality

## 2023-06-11 NOTE — H&P
[address]  H&P  Name: Shefali Huerta 80 y o  female I MRN: [id number]  Unit/Bed#: RM06 I Date of Admission: 6/11/2023   Date of Service: 6/11/2023 I Hospital Day: 0      Assessment/Plan   Hyponatremia  Assessment & Plan  As noted on presentation  Per chart review this is a chronic issue however progressing compared to previous lab tests  Moderate hyponatremia on presentation  · Based on my assessment euvolemic versus hypovolemic hyponatremia  · Consult nephrology team  · Fluid restriction  · Follow-up with repeat BMP every 4  · Follow-up with urine and serum osmolarity , urine sodium and manage accordingly  Could be due to recent initiation of Lexapro 5 days ago? ??    * Generalized weakness  Assessment & Plan  Multifactorial given moderate hyponatremia, UTI and recent surgical intervention    -refer to A and P for hyponatremia, UTI , and gait abnormality  CHF (congestive heart failure) ([facility])  Assessment & Plan  Wt Readings from Last 3 Encounters:   06/11/23 51 2 kg (112 lb 14 oz)   06/07/23 46 7 kg (103 lb)   06/06/23 46 7 kg (103 lb)     Not in acute exacerbation  At target dry weight  Following with  Cardiology    Home meds:   -lasix  20 mg PRN   -Spironolactone 25 mg OD    Plan:  Patient is euvolemic/hypovolemic hyponatremia and is not in AHF state  · Hold home diuretic therapy  · Restart diuretics when possible  · BMP Q 4  · Urine and serum osmo  · I/Os, daily weight     S/P TAVR (transcatheter aortic valve replacement)  Assessment & Plan  S/P TAVR W/ 23MM CALVERT SHAVONNE S3 ULTRA VALVE 1/17/2023     -Following with cardiology offices accordingly  Atrial flutter (HCC)  Assessment & Plan  Rate controlled   AC     -metoprolol 50 mg OD  -eliquis 2 5 BID    Major depressive disorder, single episode, mild ([facility] )  Assessment & Plan  Recently started on lexapro by PCP on 6-6-2023    -potential cause of progressing hyponatremia? ?   -Hold during hospitalization   -re evaluate accordingly     Closed wedge compression fracture of T11 vertebra [facility])  Assessment & Plan  Pain medicine PRN     S/p nerve block on 6-2023 with pain medicine     Coronary artery disease involving native coronary artery of native heart without angina pectoris  Assessment & Plan  RCA stenting 1/11/2023  We will continue Plavix until January 2024  Status post insertion of drug eluting coronary artery stent  Assessment & Plan  RCA stenting 1/11/2023  We will continue Plavix until January 2024  Dyslipidemia  Assessment & Plan  Continue home medicaiton    CKD (chronic kidney disease), stage III [facility])  Assessment & Plan  Lab Results   Component Value Date    CREATININE 0 92 06/11/2023    CREATININE 1 03 03/24/2023    CREATININE 1 44 (H) 02/14/2023    EGFR 56 06/11/2023    EGFR 48 03/24/2023    EGFR 32 02/14/2023     Stable  Monitor I/Os   Daily weight  Avoid nephrotoxins as possible    Gait abnormality  Assessment & Plan    Await further recommendations from PTOT             VTE Prophylaxis: Apixaban (Eliquis)  / sequential compression device   Code Status: full  POLST: POLST is not applicable to this patient  Discussion with family: patient and partner    Anticipated Length of Stay:  Patient will be admitted on an Inpatient basis with an anticipated length of stay of   2 midnights  Justification for Hospital Stay: generalized weakness, UTI , Hyponatremia    Total Time for Visit, including Counseling / Coordination of Care: 60 minutes  Greater than 50% of this total time spent on direct patient counseling and coordination of care  Chief Complaint:   Generalized weakness    History of Present Illness:    Mata Roman is a 80 y o  female who presents with a PMH of CAD ( RCA stenting 1/11/2023   We will continue Plavix until January 2024), S/P TAVR W/ 23MM CALVERT SHAVONNE S3 ULTRA VALVE 1/17/2023  Who presents to the hospital with her family with concerns regarding generalized weakness for the past few weeks  The patient reports that she does not feel right  Otherwise no chest pain or tightness, shortness of breath or cough, nausea or vomiting, diarrhea or constipation  No lightheadedness or headaches, palpitations or sweating  No active urinary symptoms  Patient reports being compliant with her medications  Family helps and taking medications on time  Further history given by family at bedside as following;   states over the weekend she has had decreased oral intake, this morning she was unable to get out of her bed by herself and he was unable to help her  He does report that she lost her balance in the shower however fell into a shower chair did not strike her head or lose consciousness  They deny cold symptoms or other infectious symptoms  Review of Systems:    Review of Systems   Eyes: Negative for visual disturbance  Respiratory: Negative for apnea, cough, choking, shortness of breath and wheezing  Cardiovascular: Negative for chest pain and palpitations  Gastrointestinal: Negative for abdominal pain, nausea and vomiting  Endocrine: Negative for polyuria  Genitourinary: Negative for frequency  Skin: Negative for wound  Neurological: Negative for dizziness, seizures, syncope, speech difficulty, weakness, numbness and headaches         Past Medical and Surgical History:     Past Medical History:   Diagnosis Date   • Acute on chronic diastolic CHF (congestive heart failure) ([facility] ) 1/30/2023   • Acute respiratory failure with hypoxia (HCC) 12/22/2022   • Aortic stenosis    • Atrial flutter (HCC)    • CAD (coronary artery disease) 01/11/2023    CAD/PCI/ELODIA   • CKD (chronic kidney disease), stage III ([facility] )    • Community acquired pneumonia of left upper lobe of lung 05/17/2019   • Fatigue    • Full dentures    • Generalized anxiety disorder    • Hyperlipidemia    • Hypertension    • Impaired fasting glucose    • Mitral regurgitation    • Pneumonia    • RBBB    • 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    TRANSFEMORAL W/ 23MM CALVERT SHAVONNE S3 ULTRA VALVE(ACCESS ON LEFT)   • SVT (supraventricular tachycardia)    • Tricuspid regurgitation        Past Surgical History:   Procedure Laterality Date   • CARDIAC CATHETERIZATION N/A 1/11/2023    Procedure: LHC-Cardiac catheterization;  Surgeon: Nereida Remy MD;  Location: [facility] CARD[facility]; Service: Cardiology   • CARDIAC CATHETERIZATION N/A 1/11/2023    Procedure: Cardiac pci;  Surgeon: Nereida Remy MD;  Location: [facility] CARD[facility]AC [facility]; Service: Cardiology   • CARDIAC CATHETERIZATION N/A 1/17/2023    Procedure: CARDIAC TAVR;  Surgeon: Lee Ann Hammer DO;  Location: [facility] MAIN [facility];  Service: Cardiology   • DENTAL SURGERY Bilateral     ALL TEETH REMOVED   • FL GUIDED NEEDLE PLAC BX/ASP/INJ  5/26/2021   • IR THORACENTESIS  12/20/2022   • JOINT REPLACEMENT Left    • KNEE SURGERY      left knee replacement   • SD INJECT SI JOINT ARTHRGRPHY&/ANES/STEROID W/JOHNATHON Bilateral 5/26/2021    Procedure: SACROILIAC JOINT INJECTION;  Surgeon: Haim Reddy MD;  Location: [facility] MAIN [facility];  Service: Pain Management    • SD REPLACE AORTIC VALVE OPENFEMORAL ARTERY APPROACH N/A 1/17/2023    Procedure: REPLACEMENT AORTIC VALVE TRANSCATHETER (TAVR) TRANSFEMORAL W/ 23MM CALVERT SHAVONNE S3 ULTRA VALVE(ACCESS ON LEFT) ALENA;  Surgeon: Terri Emerson DO;  Location: [facility];  Service: Cardiac Surgery   • SACROILIAC JOINT INJECTION Bilateral 6/7/2023    Procedure: Bilateral sacroiliac joint steroid injection;  Surgeon: Rian Jimenez MD;  Location: [facility];  Service: Pain Management        Meds/Allergies:    Prior to Admission medications    Medication Sig Start Date End Date Taking?  Authorizing Provider   alendronate (Fosamax) 70 mg tablet Take 1 tablet (70 mg total) by mouth every 7 days 4/25/23   Angelique Montejo DO   ALPRAZolam [place]) 0 25 mg tablet Take 1 tablet (0 25 mg total) by mouth 3 (three) times a day as needed for anxiety 2/7/23   Papa Zabala,    ammonium lactate (LAC-HYDRIN) 12 % lotion USE AS DIRECTED TO BOTH FEET DAILY 1/24/23   Historical Provider, MD   apixaban (ELIQUIS) 2 5 mg Take 1 tablet (2 5 mg total) by mouth 2 (two) times a day 5/2/23   Blinda Pages, DO   atorvastatin (LIPITOR) 20 mg tablet Take 1 tablet (20 mg total) by mouth daily with dinner 2/7/23   Blinda Pages, DO   Cholecalciferol (Vitamin D3) 50 MCG (2000 UT) TABS Take 2,000 Units by mouth in the morning  Indications: Osteoporosis    Historical Provider, MD   clopidogrel (PLAVIX) 75 mg tablet Take 1 tablet (75 mg total) by mouth daily 2/8/23   Blinda Pages, DO   escitalopram (Lexapro) 10 mg tablet Take 1 tablet (10 mg total) by mouth daily at bedtime 6/6/23   Blinda Pages, DO   furosemide (LASIX) 40 mg tablet Take 0 5 tablets (20 mg total) by mouth daily as needed (leg swelling) 3/2/23   Blinda Pages, DO   metoprolol succinate (TOPROL-XL) 50 mg 24 hr tablet Take 1 tablet (50 mg total) by mouth daily 6/6/23   Blinda Pages, DO   Multiple Vitamin (Multivitamin Adult) TABS Take 1 tablet by mouth in the morning  Indications: Nutritional Support    Historical Provider, MD   naloxone (NARCAN) 4 mg/0 1 mL nasal spray Administer 1 spray into a nostril  If no response after 2-3 minutes, give another dose in the other nostril using a new spray  4/5/23   Papa Zabala DO   pantoprazole (PROTONIX) 40 mg tablet TAKE 1 TABLET BY MOUTH ONCE DAILY IN THE MORNING 5/16/23   Papa Zabala DO   Psyllium (METAMUCIL FIBER) 51 7 % PACK Take 1 Package by mouth daily 5/21/19   Shonna Sherwood, DO   spironolactone (ALDACTONE) 25 mg tablet Take 1 tablet by mouth once daily 4/14/23   Blinda Pages, DO   oxygen gas Inhale 2 L/min daily as needed (low oxygen levels)  Indications: Oxygen Desaturation  Patient not taking: Reported on 1/30/2023 2/7/23  Mariela Mo MD     I have reviewed home medications with patient personally      Allergies: No Known Allergies    Social History:     Marital Status: /Civil Union   Occupation: retired  Patient Pre-hospital Living Situation: stable  Patient Pre-hospital Level of Mobility: with assistance  Patient Pre-hospital Diet Restrictions: cardiac  Substance Use History:   Social History     Substance and Sexual Activity   Alcohol Use Never     Social History     Tobacco Use   Smoking Status Never   Smokeless Tobacco Never     Social History     Substance and Sexual Activity   Drug Use Never       Family History:    non-contributory    Physical Exam:     Vitals:   Blood Pressure: (!) 182/81 (06/11/23 1030)  Pulse: 72 (06/11/23 1030)  Temperature: (!) 97 4 °F (36 3 °C) (06/11/23 0708)  Temp Source: Temporal (06/11/23 0708)  Respirations: 21 (06/11/23 1030)  Weight - Scale: 51 2 kg (112 lb 14 oz) (06/11/23 0708)  SpO2: 90 % (06/11/23 1030)    Physical Exam  Vitals reviewed  Constitutional:       General: She is not in acute distress  Appearance: Normal appearance  HENT:      Head: Normocephalic and atraumatic  Mouth/Throat:      Mouth: Mucous membranes are moist    Eyes:      Conjunctiva/sclera: Conjunctivae normal       Pupils: Pupils are equal, round, and reactive to light  Cardiovascular:      Rate and Rhythm: Normal rate and regular rhythm  Pulses: Normal pulses  Carotid pulses are 2+ on the right side and 2+ on the left side  Radial pulses are 2+ on the right side and 2+ on the left side  Dorsalis pedis pulses are 2+ on the right side and 2+ on the left side  Heart sounds: S1 normal and S2 normal  Murmur heard  Systolic murmur is present with a grade of 2/6  Pulmonary:      Effort: No tachypnea, bradypnea or accessory muscle usage  Breath sounds: Normal air entry  Examination of the right-middle field reveals decreased breath sounds  Examination of the right-lower field reveals decreased breath sounds  Examination of the left-lower field reveals decreased breath sounds  Decreased breath sounds present   No wheezing, rhonchi or rales  Abdominal:      General: Abdomen is flat  Bowel sounds are normal       Palpations: Abdomen is soft  Tenderness: There is no abdominal tenderness  Musculoskeletal:      Right lower leg: No edema  Left lower leg: No edema  Skin:     Capillary Refill: Capillary refill takes less than 2 seconds  Neurological:      Mental Status: She is alert and oriented to person, place, and time  Mental status is at baseline  Additional Data:     Lab Results: I have personally reviewed pertinent reports  Results from last 7 days   Lab Units 06/11/23  0751   EOS PCT % 0   HEMATOCRIT % 38 2   HEMOGLOBIN g/dL 13 1   LYMPHS PCT % 14   MONOS PCT % 12   NEUTROS PCT % 74   PLATELETS Thousands/uL 219   WBC Thousand/uL 7 02     Results from last 7 days   Lab Units 06/11/23  0751   ANION GAP mmol/L 13   ALBUMIN g/dL 4 3   ALK PHOS U/L 83   ALT U/L 30   AST U/L 38   BUN mg/dL 27*   CALCIUM mg/dL 9 1   CHLORIDE mmol/L 85*   CO2 mmol/L 21   CREATININE mg/dL 0 92   GLUCOSE RANDOM mg/dL 110   POTASSIUM mmol/L 4 0   SODIUM mmol/L 119*   TOTAL BILIRUBIN mg/dL 1 20*                       Imaging: I have personally reviewed pertinent reports  CT chest without contrast   Final Result by Daren Reyez MD (06/11 1009)      Mild interstitial edema  Large right pleural effusion with moderate compressive atelectasis of the right lower lobe  Trace left effusion  Mild new endplate compression deformities in the spine since December 2022  Workstation performed: [id number]         XR chest 1 view portable    (Results Pending)   IR IN-Patient Thoracentesis    (Results Pending)       EKG, Pathology, and Other Studies Reviewed on Admission:   EKG: Rhythm: afib on readout, question af with variable block as seen on previous EKG  Intervals:   Axis: left axis  QRS/Blocks: non-specific QRS  ST Changes: No acute ST Changes, no STD/CASANDRA      Allscripts / Epic Records Reviewed: Yes     ** Please Note: This note has been constructed using a voice recognition system   **

## 2023-06-11 NOTE — ASSESSMENT & PLAN NOTE
As noted on presentation  Per chart review this is a chronic issue however progressing compared to previous lab tests  Moderate hyponatremia on presentation  · Based on my assessment euvolemic versus hypovolemic hyponatremia  · Consult nephrology team  · Fluid restriction  · Follow-up with repeat BMP every 4  · Follow-up with urine and serum osmolarity , urine sodium and manage accordingly      Could be due to recent initiation of Lexapro 5 days ago???

## 2023-06-12 ENCOUNTER — APPOINTMENT (INPATIENT)
Dept: RADIOLOGY | Facility: HOSPITAL | Age: 88
End: 2023-06-12
Payer: MEDICARE

## 2023-06-12 PROBLEM — E44.0 MODERATE PROTEIN-CALORIE MALNUTRITION (HCC): Status: ACTIVE | Noted: 2023-06-12

## 2023-06-12 PROBLEM — N39.0 UTI (URINARY TRACT INFECTION): Status: ACTIVE | Noted: 2023-06-12

## 2023-06-12 LAB
ALBUMIN SERPL BCP-MCNC: 3.7 G/DL (ref 3.5–5)
ALP SERPL-CCNC: 66 U/L (ref 34–104)
ALT SERPL W P-5'-P-CCNC: 27 U/L (ref 7–52)
ANION GAP SERPL CALCULATED.3IONS-SCNC: 12 MMOL/L (ref 4–13)
ANION GAP SERPL CALCULATED.3IONS-SCNC: 8 MMOL/L (ref 4–13)
ANION GAP SERPL CALCULATED.3IONS-SCNC: 8 MMOL/L (ref 4–13)
ANION GAP SERPL CALCULATED.3IONS-SCNC: 9 MMOL/L (ref 4–13)
APPEARANCE FLD: CLEAR
AST SERPL W P-5'-P-CCNC: 26 U/L (ref 13–39)
ATRIAL RATE: 65 BPM
ATRIAL RATE: 69 BPM
BASOPHILS # BLD AUTO: 0 THOUSANDS/ÂΜL (ref 0–0.1)
BASOPHILS NFR BLD AUTO: 0 % (ref 0–1)
BILIRUB SERPL-MCNC: 0.95 MG/DL (ref 0.2–1)
BUN SERPL-MCNC: 22 MG/DL (ref 5–25)
BUN SERPL-MCNC: 23 MG/DL (ref 5–25)
CALCIUM SERPL-MCNC: 8.3 MG/DL (ref 8.4–10.2)
CALCIUM SERPL-MCNC: 8.3 MG/DL (ref 8.4–10.2)
CALCIUM SERPL-MCNC: 8.4 MG/DL (ref 8.4–10.2)
CALCIUM SERPL-MCNC: 8.6 MG/DL (ref 8.4–10.2)
CHLORIDE SERPL-SCNC: 87 MMOL/L (ref 96–108)
CO2 SERPL-SCNC: 20 MMOL/L (ref 21–32)
CO2 SERPL-SCNC: 21 MMOL/L (ref 21–32)
CO2 SERPL-SCNC: 22 MMOL/L (ref 21–32)
CO2 SERPL-SCNC: 22 MMOL/L (ref 21–32)
COLOR FLD: YELLOW
CREAT SERPL-MCNC: 0.82 MG/DL (ref 0.6–1.3)
CREAT SERPL-MCNC: 0.86 MG/DL (ref 0.6–1.3)
EOSINOPHIL # BLD AUTO: 0.01 THOUSAND/ÂΜL (ref 0–0.61)
EOSINOPHIL NFR BLD AUTO: 0 % (ref 0–6)
EOSINOPHIL NFR FLD MANUAL: 1 %
ERYTHROCYTE [DISTWIDTH] IN BLOOD BY AUTOMATED COUNT: 14.9 % (ref 11.6–15.1)
GFR SERPL CREATININE-BSD FRML MDRD: 60 ML/MIN/1.73SQ M
GFR SERPL CREATININE-BSD FRML MDRD: 64 ML/MIN/1.73SQ M
GLUCOSE FLD-MCNC: 128 MG/DL
GLUCOSE SERPL-MCNC: 109 MG/DL (ref 65–140)
GLUCOSE SERPL-MCNC: 89 MG/DL (ref 65–140)
GLUCOSE SERPL-MCNC: 90 MG/DL (ref 65–140)
GLUCOSE SERPL-MCNC: 91 MG/DL (ref 65–140)
HCT VFR BLD AUTO: 35.7 % (ref 34.8–46.1)
HGB BLD-MCNC: 12.4 G/DL (ref 11.5–15.4)
HISTIOCYTES NFR FLD: 64 %
IMM GRANULOCYTES # BLD AUTO: 0.02 THOUSAND/UL (ref 0–0.2)
IMM GRANULOCYTES NFR BLD AUTO: 0 % (ref 0–2)
LDH FLD L TO P-CCNC: 63 U/L
LDH SERPL-CCNC: 151 U/L (ref 140–271)
LYMPHOCYTES # BLD AUTO: 0.69 THOUSANDS/ÂΜL (ref 0.6–4.47)
LYMPHOCYTES NFR BLD AUTO: 10 % (ref 14–44)
LYMPHOCYTES NFR BLD AUTO: 25 %
MAGNESIUM SERPL-MCNC: 1.5 MG/DL (ref 1.9–2.7)
MCH RBC QN AUTO: 31.2 PG (ref 26.8–34.3)
MCHC RBC AUTO-ENTMCNC: 34.7 G/DL (ref 31.4–37.4)
MCV RBC AUTO: 90 FL (ref 82–98)
MONOCYTES # BLD AUTO: 0.7 THOUSAND/ÂΜL (ref 0.17–1.22)
MONOCYTES NFR BLD AUTO: 10 % (ref 4–12)
NEUTROPHILS # BLD AUTO: 5.42 THOUSANDS/ÂΜL (ref 1.85–7.62)
NEUTS SEG NFR BLD AUTO: 10 %
NEUTS SEG NFR BLD AUTO: 80 % (ref 43–75)
NRBC BLD AUTO-RTO: 0 /100 WBCS
PH BODY FLUID: 7.7
PHOSPHATE SERPL-MCNC: 2.6 MG/DL (ref 2.3–4.1)
PLATELET # BLD AUTO: 181 THOUSANDS/UL (ref 149–390)
PMV BLD AUTO: 9.1 FL (ref 8.9–12.7)
POTASSIUM SERPL-SCNC: 3.7 MMOL/L (ref 3.5–5.3)
POTASSIUM SERPL-SCNC: 3.8 MMOL/L (ref 3.5–5.3)
POTASSIUM SERPL-SCNC: 3.9 MMOL/L (ref 3.5–5.3)
POTASSIUM SERPL-SCNC: 3.9 MMOL/L (ref 3.5–5.3)
PROT FLD-MCNC: 3.3 G/DL
PROT SERPL-MCNC: 5.6 G/DL (ref 6.4–8.4)
PROT SERPL-MCNC: 6.3 G/DL (ref 6.4–8.4)
QRS AXIS: -67 DEGREES
QRS AXIS: 263 DEGREES
QRSD INTERVAL: 114 MS
QRSD INTERVAL: 118 MS
QT INTERVAL: 442 MS
QT INTERVAL: 444 MS
QTC INTERVAL: 446 MS
QTC INTERVAL: 500 MS
RBC # BLD AUTO: 3.97 MILLION/UL (ref 3.81–5.12)
SITE: NORMAL
SODIUM SERPL-SCNC: 117 MMOL/L (ref 135–147)
SODIUM SERPL-SCNC: 119 MMOL/L (ref 135–147)
T WAVE AXIS: 120 DEGREES
T WAVE AXIS: 63 DEGREES
TOTAL CELLS COUNTED SPEC: 100
TRIGL FLD-MCNC: <15 MG/DL
VENTRICULAR RATE: 61 BPM
VENTRICULAR RATE: 77 BPM
WBC # BLD AUTO: 6.84 THOUSAND/UL (ref 4.31–10.16)
WBC # FLD MANUAL: 69 /UL

## 2023-06-12 PROCEDURE — 80048 BASIC METABOLIC PNL TOTAL CA: CPT

## 2023-06-12 PROCEDURE — 87070 CULTURE OTHR SPECIMN AEROBIC: CPT | Performed by: PHYSICIAN ASSISTANT

## 2023-06-12 PROCEDURE — 32555 ASPIRATE PLEURA W/ IMAGING: CPT | Performed by: INTERNAL MEDICINE

## 2023-06-12 PROCEDURE — 97167 OT EVAL HIGH COMPLEX 60 MIN: CPT

## 2023-06-12 PROCEDURE — 83735 ASSAY OF MAGNESIUM: CPT

## 2023-06-12 PROCEDURE — 87205 SMEAR GRAM STAIN: CPT | Performed by: PHYSICIAN ASSISTANT

## 2023-06-12 PROCEDURE — 88305 TISSUE EXAM BY PATHOLOGIST: CPT | Performed by: PATHOLOGY

## 2023-06-12 PROCEDURE — 99223 1ST HOSP IP/OBS HIGH 75: CPT | Performed by: INTERNAL MEDICINE

## 2023-06-12 PROCEDURE — 89051 BODY FLUID CELL COUNT: CPT | Performed by: PHYSICIAN ASSISTANT

## 2023-06-12 PROCEDURE — 84157 ASSAY OF PROTEIN OTHER: CPT | Performed by: PHYSICIAN ASSISTANT

## 2023-06-12 PROCEDURE — 83615 LACTATE (LD) (LDH) ENZYME: CPT | Performed by: PHYSICIAN ASSISTANT

## 2023-06-12 PROCEDURE — 84100 ASSAY OF PHOSPHORUS: CPT

## 2023-06-12 PROCEDURE — 83615 LACTATE (LD) (LDH) ENZYME: CPT | Performed by: INTERNAL MEDICINE

## 2023-06-12 PROCEDURE — 80053 COMPREHEN METABOLIC PANEL: CPT

## 2023-06-12 PROCEDURE — 99232 SBSQ HOSP IP/OBS MODERATE 35: CPT | Performed by: INTERNAL MEDICINE

## 2023-06-12 PROCEDURE — 84155 ASSAY OF PROTEIN SERUM: CPT | Performed by: INTERNAL MEDICINE

## 2023-06-12 PROCEDURE — 93010 ELECTROCARDIOGRAM REPORT: CPT | Performed by: INTERNAL MEDICINE

## 2023-06-12 PROCEDURE — 88112 CYTOPATH CELL ENHANCE TECH: CPT | Performed by: PATHOLOGY

## 2023-06-12 PROCEDURE — 97163 PT EVAL HIGH COMPLEX 45 MIN: CPT

## 2023-06-12 PROCEDURE — 82945 GLUCOSE OTHER FLUID: CPT | Performed by: PHYSICIAN ASSISTANT

## 2023-06-12 PROCEDURE — 83986 ASSAY PH BODY FLUID NOS: CPT | Performed by: PHYSICIAN ASSISTANT

## 2023-06-12 PROCEDURE — 84478 ASSAY OF TRIGLYCERIDES: CPT | Performed by: INTERNAL MEDICINE

## 2023-06-12 PROCEDURE — 71045 X-RAY EXAM CHEST 1 VIEW: CPT

## 2023-06-12 PROCEDURE — 93005 ELECTROCARDIOGRAM TRACING: CPT

## 2023-06-12 PROCEDURE — 85025 COMPLETE CBC W/AUTO DIFF WBC: CPT

## 2023-06-12 RX ORDER — LIDOCAINE HYDROCHLORIDE 10 MG/ML
10 INJECTION, SOLUTION EPIDURAL; INFILTRATION; INTRACAUDAL; PERINEURAL ONCE
Status: COMPLETED | OUTPATIENT
Start: 2023-06-12 | End: 2023-06-12

## 2023-06-12 RX ORDER — MAGNESIUM SULFATE HEPTAHYDRATE 40 MG/ML
2 INJECTION, SOLUTION INTRAVENOUS ONCE
Status: COMPLETED | OUTPATIENT
Start: 2023-06-12 | End: 2023-06-12

## 2023-06-12 RX ORDER — MAGNESIUM HYDROXIDE/ALUMINUM HYDROXICE/SIMETHICONE 120; 1200; 1200 MG/30ML; MG/30ML; MG/30ML
30 SUSPENSION ORAL EVERY 4 HOURS PRN
Status: DISCONTINUED | OUTPATIENT
Start: 2023-06-12 | End: 2023-06-14 | Stop reason: HOSPADM

## 2023-06-12 RX ADMIN — DILTIAZEM HYDROCHLORIDE 30 MG: 30 TABLET ORAL at 06:50

## 2023-06-12 RX ADMIN — MAGNESIUM SULFATE HEPTAHYDRATE 2 G: 40 INJECTION, SOLUTION INTRAVENOUS at 08:41

## 2023-06-12 RX ADMIN — MULTIPLE VITAMINS W/ MINERALS TAB 1 TABLET: TAB at 08:40

## 2023-06-12 RX ADMIN — PSYLLIUM HUSK 1 PACKET: 3.4 POWDER ORAL at 08:41

## 2023-06-12 RX ADMIN — ATORVASTATIN CALCIUM 20 MG: 10 TABLET, FILM COATED ORAL at 16:45

## 2023-06-12 RX ADMIN — CHOLECALCIFEROL TAB 25 MCG (1000 UNIT) 2000 UNITS: 25 TAB at 08:41

## 2023-06-12 RX ADMIN — CEFTRIAXONE 1000 MG: 1 INJECTION, SOLUTION INTRAVENOUS at 10:56

## 2023-06-12 RX ADMIN — DILTIAZEM HYDROCHLORIDE 30 MG: 30 TABLET ORAL at 14:19

## 2023-06-12 RX ADMIN — APIXABAN 2.5 MG: 2.5 TABLET, FILM COATED ORAL at 08:41

## 2023-06-12 RX ADMIN — Medication: at 08:45

## 2023-06-12 RX ADMIN — LIDOCAINE HYDROCHLORIDE 10 ML: 10 INJECTION, SOLUTION EPIDURAL; INFILTRATION; INTRACAUDAL; PERINEURAL at 16:46

## 2023-06-12 RX ADMIN — ALUMINUM HYDROXIDE, MAGNESIUM HYDROXIDE, AND DIMETHICONE 30 ML: 200; 20; 200 SUSPENSION ORAL at 13:10

## 2023-06-12 RX ADMIN — CLOPIDOGREL BISULFATE 75 MG: 75 TABLET ORAL at 08:41

## 2023-06-12 RX ADMIN — PANTOPRAZOLE SODIUM 40 MG: 40 TABLET, DELAYED RELEASE ORAL at 08:41

## 2023-06-12 RX ADMIN — METOPROLOL SUCCINATE 50 MG: 50 TABLET, EXTENDED RELEASE ORAL at 08:40

## 2023-06-12 NOTE — OCCUPATIONAL THERAPY NOTE
Occupational Therapy Evaluation     Patient Name: Linda Alves  [id number] Date: 6/12/2023  Problem List  Principal Problem:    Generalized weakness  Active Problems:    Hypomagnesemia    Hyponatremia    Gait abnormality    CKD (chronic kidney disease), stage III (HCC)    Dyslipidemia    Status post insertion of drug eluting coronary artery stent    S/P TAVR (transcatheter aortic valve replacement)    Atrial fibrillation (HCC)    Coronary artery disease involving native coronary artery of native heart without angina pectoris    Closed wedge compression fracture of T11 vertebra (HCC)    Major depressive disorder, single episode, mild (HCC)    Atrial flutter (HCC)    CHF (congestive heart failure) (HCC)    UTI (urinary tract infection)    Past Medical History  Past Medical History:   Diagnosis Date    Acute on chronic diastolic CHF (congestive heart failure) ([facility] [address] ) 1/30/2023    Acute respiratory failure with hypoxia ([facility] [address] ) 12/22/2022    Aortic stenosis     Atrial flutter (HCC)     CAD (coronary artery disease) 01/11/2023    CAD/PCI/ELODIA    CKD (chronic kidney disease), stage III (HCC)     Community acquired pneumonia of left upper lobe of lung 05/17/2019    Fatigue     Full dentures     Generalized anxiety disorder     Hyperlipidemia     Hypertension     Impaired fasting glucose     Mitral regurgitation     Pneumonia     RBBB     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    TRANSFEMORAL W/ 23MM CALVERT SHAVONNE S3 ULTRA VALVE(ACCESS ON LEFT)    SVT (supraventricular tachycardia)     Tricuspid regurgitation      Past Surgical History  Past Surgical History:   Procedure Laterality Date    CARDIAC CATHETERIZATION N/A 1/11/2023    Procedure: LHC-Cardiac catheterization;  Surgeon: Bret Palacios MD;  [facility]; Service: Cardiology    CARDIAC CATHETERIZATION N/A 1/11/2023    Procedure: Cardiac pci;  Surgeon: Bret Palacios MD;  [facility];   Service: Cardiology CARDIAC CATHETERIZATION N/A 1/17/2023    Procedure: CARDIAC TAVR;  Surgeon: Patricio Choi DO;  [facility];  Service: Cardiology    DENTAL SURGERY Bilateral     ALL TEETH REMOVED    FL GUIDED NEEDLE PLAC BX/ASP/INJ  5/26/2021    IR THORACENTESIS  12/20/2022    JOINT REPLACEMENT Left     KNEE SURGERY      left knee replacement    AR INJECT SI JOINT ARTHRGRPHY&/ANES/STEROID W/JOHNATHON Bilateral 5/26/2021    Procedure: SACROILIAC JOINT INJECTION;  Surgeon: Ivan Casillas MD;  [facility];  Service: Pain Management     AR REPLACE AORTIC VALVE OPENFEMORAL ARTERY APPROACH N/A 1/17/2023    Procedure: REPLACEMENT AORTIC VALVE TRANSCATHETER (TAVR) TRANSFEMORAL W/ 23MM CALVERT SHAVONNE S3 ULTRA VALVE(ACCESS ON LEFT) ALENA;  Surgeon: Kenzie Rodrigues DO;  [facility];  Service: Cardiac Surgery    SACROILIAC JOINT INJECTION Bilateral 6/7/2023    Procedure: Bilateral sacroiliac joint steroid injection;  Surgeon: Juventino Givens MD;  [facility];  Service: Pain Management              06/12/23 0910   OT Last Visit   OT Visit Date 06/12/23   Note Type   Note type Evaluation   Pain Assessment   Pain Score No Pain   Restrictions/Precautions   Weight Bearing Precautions Per Order No   Home Living   Type of [address] Two level;1/2 bath on main level;Bed/bath upstairs;Stairs to enter with rails   Bathroom Shower/Tub Tub/shower unit   Bathroom Toilet Standard   Bathroom Equipment Grab bars in shower   [address] Walker;Cane;Grab bars   Additional Comments pt's  reports mobility has decreased and pt is more unbalanced in recent weeks; utilizing [address], however regressed to use of RW   Prior Function   Level of [place] Needs assistance with IADLS;Needs assistance with functional mobility; Needs assistance with ADLs   Lives With Spouse   Receives Help From Family   IADLs Family/Friend/Other provides meals; Family/Friend/Other provides "transportation; Family/Friend/Other provides medication management   Falls in the last 6 months 1 to 4   Vocational Retired   Comments pt's  is full caregiver currently   Subjective   Subjective \"those won't hold anything up\"   ADL   Where Assessed Edge of bed   Grooming Assistance 5  Supervision/Setup   Grooming Deficit Brushing hair   LB Dressing Assistance 3  Moderate Assistance   LB Dressing Deficit Thread RLE into underwear; Thread LLE into underwear; Increased time to complete;Pull up over hips   Toileting Assistance  2  Maximal Assistance   Toileting Deficit Clothing management up;Clothing management down;Perineal hygiene   Additional Comments pt able to don underwear seated EOB to knees, however requires max (A) for over hips due to required B UEs support on RW; pt incontinent of bowel and requires max (A) for agnes hygiene; purewick removed and nursing aware of transfer status for use of BSC   Bed Mobility   Supine to Sit 4  Minimal assistance   Additional items Assist x 2;Bedrails; Increased time required;Verbal cues   Sit to Supine   (seated in chair at end of session)   Additional Comments pt on RA duirng session; SPO2 WFL with no complaints of SOB   Transfers   Sit to Stand 4  Minimal assistance   Additional items Increased time required;Verbal cues; Assist x 2  (RW)   Stand to Sit 4  Minimal assistance   Additional items Assist x 2; Increased time required;Verbal cues  (RW)   Additional Comments pt performs functional transfers with min (A) x2 level; no significant LOB, however posterior lean in standing and requires cues both physically and verbally to correct   Functional Mobility   Functional Mobility 4  Minimal assistance   Additional Comments x1-2 and performs ~3ft to chair; limited by posterior lean, cognition, and poor endurance   Additional items Rolling walker   Balance   Static Sitting Fair +   Dynamic Sitting Fair +   Static Standing Fair -   Dynamic Standing Poor +   Ambulatory Poor + " Activity Tolerance   Activity Tolerance Patient limited by fatigue   RUE Assessment   RUE Assessment X  (4/5 grossly; WFL AROM)   LUE Assessment   LUE Assessment X  (4/5 grossly; WFL AROM)   Hand Function   Gross Motor Coordination Functional   Fine Motor Coordination Functional   Sensation   Light Touch No apparent deficits   Sharp/Dull No apparent deficits   Psychosocial   Psychosocial (WDL) X   Patient Behaviors/Mood Flat affect   Cognition   Overall Cognitive Status Impaired   Arousal/Participation Alert   Attention Difficulty attending to directions   Orientation Level Oriented to person;Oriented to place; Disoriented to time;Disoriented to situation   Memory Decreased long term memory;Decreased short term memory;Decreased recall of recent events;Decreased recall of biographical information   Following Commands Follows one step commands without difficulty   Comments pt is moderately [place];  to bring hearing aides   Assessment   Limitation Decreased ADL status; Decreased UE strength;Decreased Safe judgement during ADL;Decreased endurance;Decreased cognition;Decreased self-care trans;Decreased high-level ADLs   Assessment Pt is a 80 y o  female seen for OT evaluation s/p admit to [facility] on [facility]/11[facility]2023 w/ Generalized weakness  Comorbidities affecting pt's functional performance at time of assessment include: HTN, MINNIE, fatigue, HLD, CKD, TAVR, CHF, hypoxia  Personal factors affecting pt at time of IE include:steps to enter environment, behavioral pattern, difficulty performing ADLS, difficulty performing IADLS , limited insight into deficits, flat affect, decreased initiation and engagement , financial barriers and health management   Prior to admission, pt was (A) with ADLs and IADLs with use of RW during mobility   Upon evaluation: Pt requires min-max (A) x1-2 with use of RW during mobility 2* the following deficits impacting occupational performance: weakness, decreased strength, decreased balance, decreased tolerance, impaired initiation, impaired memory, impaired sequencing, impaired problem solving, decreased safety awareness and impaired interpersonal skills  Pt to benefit from continued skilled OT tx while in the hospital to address deficits as defined above and maximize level of functional independence w ADL's and functional mobility  Occupational Performance areas to address include: grooming, bathing/shower, toilet hygiene, dressing, functional mobility, community mobility and clothing management  The patient's raw score on the AM-PAC Daily Activity Inpatient Short Form is 17  A raw score of less than 19 suggests the patient may benefit from discharge to post-acute rehabilitation services  Please refer to the recommendation of the Occupational Therapist for safe discharge planning  Pt benefited from co-evaluation of skilled OT and PT therapists in order to most appropriately address functional deficits d/t extensive assistance required for safe functional mobility, decreased activity tolerance, and regression from functioning level prior to admission and/or onset of present illness  OT/PT objectives were addressed separately; please see PT note for specific goal areas targeted  Goals   Patient Goals to go home   Short Term Goal  pt will perform UE strengthening exercises   Long Term Goal #1 pt will demonstrate toilet transfers and hygiene at (S) level   Long Term Goal #2 pt will demonstrate functional mobility with RW at (S) level   Long Term Goal pt will perform UB/LB bathing and grooming tasks at min (A) level   Plan   Treatment Interventions ADL retraining;Functional transfer training;UE strengthening/ROM; Endurance training;Patient/family training;Cognitive reorientation; Activityengagement;Equipment evaluation/education   Goal Expiration Date 06/26/23   OT Frequency 3-5x/wk   Recommendation   OT Discharge Recommendation Post acute rehabilitation services   [facility] Daily Activity Inpatient   Lower Body Dressing 2   Bathing 2   Toileting 2   Upper Body Dressing 3   Grooming 4   Eating 4   Daily Activity Raw Score 17   Daily Activity Standardized Score (Calc for Raw Score >=11) 37 26   AM-PAC Applied Cognition Inpatient   Following a Speech/Presentation 3   Understanding Ordinary Conversation 3   Taking Medications 2   Remembering Where Things Are Placed or Put Away 2   Remembering List of 4-5 Errands 1   Taking Care of Complicated Tasks 1   Applied Cognition Raw Score 12   Applied Cognition Standardized Score 28 82

## 2023-06-12 NOTE — ASSESSMENT & PLAN NOTE
Lab Results   Component Value Date    CREATININE 0 82 06/12/2023    CREATININE 0 82 06/12/2023    CREATININE 0 82 06/12/2023    EGFR 64 06/12/2023    EGFR 64 06/12/2023    EGFR 64 06/12/2023     Stable  Monitor I/Os   Daily weight  Avoid nephrotoxins as possible

## 2023-06-12 NOTE — CONSULTS
Consultation - Pulmonary Medicine   Nicol Hathaway 80 y o  female MRN: [id number]  Unit/Bed#: 405-01 Encounter: [id number]      Assessment/Plan:    1  Right pleural effusion   2  Hyponatremia   3  UTI  4  Chronic diastolic CHF  5  S/p TAVR   6  Atrial flutter/fibrillation     Pulmonary recommendations:    · Maintain saturations greater than 88%  · Primary problem requiring hospitalization is hyponatremia- nephrology and primary team managing  · For her right pleural effusion, it is likely related to CHF however her last thoracentesis in dec 2022 was exudative  Recommend diagnostic and therapeutic thoracentesis with ultrasound at bedside today  · Will check serum LDH and total protein  · Will recheck fluid analysis including LDH, protein, pH, glucose, white blood cell count with differential, gram stain and culture, and cytology  · Discussed risks vs benefits of procedure with patient, , and son, risks include infection, pneumothorax, and bleeding especially considering she got Eliquis and Plavix both this morning  · Everyone agreed to proceed with bedside thoracentesis later this afternoon  Will bring consent form to patient at time of procedure with Dr Miranda Bright        History of Present Illness   Physician Requesting Consult: Suzanna Ortega DO  Reason for Consult / Principal Problem: pleural effusion  Hx and PE limited by: n/a  Chief Complaint: shortness of breath   HPI: Nicol Hathaway is a 80 y o   female who presented to [facility] with complaints of generalized weakness  Patient has a PMH positive for CAD s/p RCA stenting on plavix, afib/flutter on eliquis, aortic stenosis s/p TAVR January 2023, compression fx of T11, CKD  Presented to hospital with progressively worsening generalized weakness over the course of several weeks progressed to the point where she could not get out of bed yesterday morning    Found to have hyponatremia of 119 on admission is given mild fluid expansion on presentation but this worsened her serum sodium level down to 116  Urology consulted and now recommending fluid restriction  Incidentally, found to have large right pleural effusion that pulmonary was consulted for as well  Patient does not have a significant history of lung disease  She is a lifelong non-smoker  No occupational exposures  Does not use inhalers, nebulizers or supplemental oxygen  She did have previous right-sided pleural effusion that was tapped in December 2022  Fluid analysis was consistent with exudate, negative cytology or fluid culture  Inpatient consult to Pulmonology  Consult performed by: Mila Knowles PA-C  Consult ordered by: Damion Li DO          Review of Systems   All other systems reviewed and are negative  Historical Information   Past Medical History:   Diagnosis Date   • Acute on chronic diastolic CHF (congestive heart failure) ([facility] [address] ) 1/30/2023   • Acute respiratory failure with hypoxia (HCC) 12/22/2022   • Aortic stenosis    • Atrial flutter (HCC)    • CAD (coronary artery disease) 01/11/2023    CAD/PCI/ELODIA   • CKD (chronic kidney disease), stage III (HCC)    • Community acquired pneumonia of left upper lobe of lung 05/17/2019   • Fatigue    • Full dentures    • Generalized anxiety disorder    • Hyperlipidemia    • Hypertension    • Impaired fasting glucose    • Mitral regurgitation    • Pneumonia    • RBBB    • 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    TRANSFEMORAL W/ 23MM CALVERT SHAVONNE S3 ULTRA VALVE(ACCESS ON LEFT)   • SVT (supraventricular tachycardia)    • Tricuspid regurgitation      Past Surgical History:   Procedure Laterality Date   • CARDIAC CATHETERIZATION N/A 1/11/2023    Procedure: LHC-Cardiac catheterization;  Surgeon: Deepak Ortega MD;  [facility];   Service: Cardiology   • CARDIAC CATHETERIZATION N/A 1/11/2023    Procedure: Cardiac pci;  Surgeon: Deepak Ortega MD;  [facility];   Service: Cardiology   • CARDIAC CATHETERIZATION N/A 1/17/2023    Procedure: CARDIAC TAVR;  Surgeon: Walter Segovia DO;  [facility];  Service: Cardiology   • DENTAL SURGERY Bilateral     ALL TEETH REMOVED   • FL GUIDED NEEDLE PLAC BX/ASP/INJ  5/26/2021   • IR THORACENTESIS  12/20/2022   • JOINT REPLACEMENT Left    • KNEE SURGERY      left knee replacement   • IA INJECT SI JOINT ARTHRGRPHY&/ANES/STEROID W/JOHNATHON Bilateral 5/26/2021    Procedure: SACROILIAC JOINT INJECTION;  Surgeon: Stepan Stark MD;  [facility];  Service: Pain Management    • IA REPLACE AORTIC VALVE OPENFEMORAL ARTERY APPROACH N/A 1/17/2023    Procedure: REPLACEMENT AORTIC VALVE TRANSCATHETER (TAVR) TRANSFEMORAL W/ 23MM CALVERT SHAVONNE S3 ULTRA VALVE(ACCESS ON LEFT) ALENA;  Surgeon: Kristy Garcia DO;  [facility];  Service: Cardiac Surgery   • SACROILIAC JOINT INJECTION Bilateral 6/7/2023    Procedure: Bilateral sacroiliac joint steroid injection;  Surgeon: Tara Mccoy MD;  [facility];  Service: Pain Management      Social History   Social History     Substance and Sexual Activity   Alcohol Use Never     Social History     Substance and Sexual Activity   Drug Use Never     Social History     Tobacco Use   Smoking Status Never   Smokeless Tobacco Never     E-Cigarette/Vaping   • E-Cigarette Use Never User      E-Cigarette/Vaping Substances   • Nicotine No    • THC No    • CBD No    • Flavoring No    • Other No    • Unknown No      Occupational History: noncontributory     Family History:   Family History   Problem Relation Age of Onset   • Hypertension Mother    • Asthma Father         Lacey's asthma   • Alzheimer's disease Sister    • Rectal cancer Son    • Uterine cancer Daughter    • Heart disease Sister    • Heart disease Brother        Meds/Allergies   all current active meds have been reviewed, pertinent pulmonary meds have been reviewed and current meds:   Current "Facility-Administered Medications   Medication Dose Route Frequency   • ALPRAZolam (XANAX) tablet 0 25 mg  0 25 mg Oral TID PRN   • ammonium lactate (LAC-HYDRIN) 12 % lotion   Topical Daily   • atorvastatin (LIPITOR) tablet 20 mg  20 mg Oral Daily With Dinner   • cefTRIAXone (ROCEPHIN) IVPB (premix in dextrose) 1,000 mg 50 mL  1,000 mg Intravenous Q24H   • cholecalciferol (VITAMIN D3) tablet 2,000 Units  2,000 Units Oral Daily   • clopidogrel (PLAVIX) tablet 75 mg  75 mg Oral Daily   • diltiazem (CARDIZEM) tablet 30 mg  30 mg Oral Q8H [address]   • metoprolol succinate (TOPROL-XL) 24 hr tablet 50 mg  50 mg Oral Daily   • multivitamin-minerals (CENTRUM) tablet 1 tablet  1 tablet Oral Daily   • pantoprazole (PROTONIX) EC tablet 40 mg  40 mg Oral QAM   • psyllium (METAMUCIL) 1 packet  1 packet Oral Daily       No Known Allergies    Objective   Vitals: Blood pressure 156/86, pulse 65, temperature (!) 97 2 °F (36 2 °C), resp  rate 18, height 4' 10\" (1 473 m), weight 49 kg (108 lb 0 4 oz), SpO2 92 %  room air,Body mass index is 22 58 kg/m²  Intake/Output Summary (Last 24 hours) at 6/12/2023 1225  Last data filed at 6/12/2023 4315  Gross per 24 hour   Intake 90 ml   Output 200 ml   Net -110 ml     Invasive Devices     Peripheral Intravenous Line  Duration           Peripheral IV 06/11/23 Proximal;Right;Ventral (anterior) Forearm 1 day          Drain  Duration           External Urinary Catheter 1 day                Physical Exam  Vitals and nursing note reviewed  Constitutional:       General: She is not in acute distress  Appearance: Normal appearance  HENT:      Head: Normocephalic and atraumatic  Nose: Nose normal       Mouth/Throat:      Mouth: Mucous membranes are moist       Pharynx: Oropharynx is clear  Eyes:      Extraocular Movements: Extraocular movements intact  Cardiovascular:      Rate and Rhythm: Normal rate and regular rhythm     Pulmonary:      Comments: Decreased breath sounds in right " "lower/mid lung fields  Musculoskeletal:      Cervical back: Normal range of motion  No muscular tenderness  Right lower leg: No edema  Left lower leg: No edema  Skin:     General: Skin is warm and dry  Neurological:      General: No focal deficit present  Mental Status: She is alert  Mental status is at baseline  Psychiatric:         Mood and Affect: Mood normal          Behavior: Behavior normal          Lab Results:   I have personally reviewed pertinent lab results  , ABG: No results found for: \"BEART\", \"XCP4IHE\", \"R9YQKHVL\", \"ETR7GFI\", \"PHART\", \"PO2ART\", \"SOURCE\", BNP: No results found for: \"BNP\", CBC:   Lab Results   Component Value Date    HCT 35 7 06/12/2023    HGB 12 4 06/12/2023    MCH 31 2 06/12/2023    MCHC 34 7 06/12/2023    MCV 90 06/12/2023    MPV 9 1 06/12/2023    NRBC 0 06/12/2023     06/12/2023    RBC 3 97 06/12/2023    RDW 14 9 06/12/2023    WBC 6 84 06/12/2023   , CMP:   Lab Results   Component Value Date    ALKPHOS 66 06/12/2023    ALT 27 06/12/2023    AST 26 06/12/2023    BUN 22 06/12/2023    BUN 23 06/12/2023    CALCIUM 8 3 (L) 06/12/2023    CALCIUM 8 4 06/12/2023    CL 87 (L) 06/12/2023    CL 87 (L) 06/12/2023    CO2 22 06/12/2023    CO2 22 06/12/2023    CREATININE 0 82 06/12/2023    CREATININE 0 82 06/12/2023    EGFR 64 06/12/2023    EGFR 64 06/12/2023    K 3 9 06/12/2023    K 3 9 06/12/2023    SODIUM 117 (L) 06/12/2023    SODIUM 117 (L) 06/12/2023   , PT/INR: No results found for: \"INR\", \"PT\", Troponin: No results found for: \"TROPONINI\"    Imaging Studies: I have personally reviewed pertinent reports  and I have personally reviewed pertinent films in PACS     CT chest wo contrast 6/11/23   Mild interstitial edema  Large right pleural effusion with moderate compressive atelectasis of right lower lobe  Trace left effusion  Mild new endplate compression deformities in the spine since Dec 2022      EKG, Pathology, and Other Studies: I have personally reviewed " "pertinent reports  Echo 2/15/23  EF 60%   Systolic function normal  Right ventricular cavity size mildly dilated  Right systolic function normal  Left atrium severely dilated  Prosthetic valve well seated and appears to be functioning normally  Mild to moderate regurgitation  Pulmonary Results (PFTs, PSG): none to review      VTE Prophylaxis: Reason for no pharmacologic prophylaxis on low dose Eliquis and Plavix    Code Status: Level 1 - Full Code    Portions of the record may have been created with voice recognition software  Occasional wrong word or \"sound a like\" substitutions may have occurred due to the inherent limitations of voice recognition software  Read the chart carefully and recognize, using context, where substitutions have occurred        "

## 2023-06-12 NOTE — PLAN OF CARE
Problem: Potential for Falls  Goal: Patient will remain free of falls  Description: INTERVENTIONS:  - Educate patient/family on patient safety including physical limitations  - Instruct patient to call for assistance with activity   - Consult OT/PT to assist with strengthening/mobility   - Keep Call bell within reach  - Keep bed low and locked with side rails adjusted as appropriate  - Keep care items and personal belongings within reach  - Initiate and maintain comfort rounds  - Make Fall Risk Sign visible to staff  - Offer Toileting every 1-2 Hours, in advance of need  - Initiate/Maintain bed/chair alarm  - Obtain necessary fall risk management equipment: nonskid footwear  - Apply yellow socks and bracelet for high fall risk patients  Outcome: Progressing     Problem: PAIN - ADULT  Goal: Verbalizes/displays adequate comfort level or baseline comfort level  Description: Interventions:  - Encourage patient to monitor pain and request assistance  - Assess pain using appropriate pain scale (0-10 pain scale)  - Administer analgesics based on type and severity of pain and evaluate response  - Implement non-pharmacological measures as appropriate and evaluate response  - Consider cultural and social influences on pain and pain management  - Notify physician/advanced practitioner if interventions unsuccessful or patient reports new pain  Outcome: Progressing     Problem: INFECTION - ADULT  Goal: Absence or prevention of progression during hospitalization  Description: INTERVENTIONS:  - Assess and monitor for signs and symptoms of infection  - Monitor lab/diagnostic results  - Monitor all insertion sites, i e  indwelling lines  - Administer medications as ordered  - Instruct and encourage patient and family to use good hand hygiene technique  Outcome: Progressing     Problem: SAFETY ADULT  Goal: Patient will remain free of falls  Description: INTERVENTIONS:  - Educate patient/family on patient safety including physical limitations  - Instruct patient to call for assistance with activity   - Consult OT/PT to assist with strengthening/mobility   - Keep Call bell within reach  - Keep bed low and locked with side rails adjusted as appropriate  - Keep care items and personal belongings within reach  - Initiate and maintain comfort rounds  - Make Fall Risk Sign visible to staff  - Offer Toileting every 1-2 Hours, in advance of need  - Initiate/Maintain bed/chair alarm  - Obtain necessary fall risk management equipment: nonskid footwear  - Apply yellow socks and bracelet for high fall risk patients  Outcome: Progressing  Goal: Maintain or return to baseline ADL function  Description: INTERVENTIONS:  - Educate patient/family on patient safety including physical limitations  - Instruct patient to call for assistance with activity   - Consult OT/PT to assist with strengthening/mobility   - Keep Call bell within reach  - Keep bed low and locked with side rails adjusted as appropriate  - Keep care items and personal belongings within reach  - Initiate and maintain comfort rounds  - Make Fall Risk Sign visible to staff  - Offer Toileting every 1-2 Hours, in advance of need  - Initiate/Maintain bed/chair alarm  - Obtain necessary fall risk management equipment: nonskid footwear  - Apply yellow socks and bracelet for high fall risk patients  Outcome: Progressing  Goal: Maintains/Returns to pre admission functional level  Description: INTERVENTIONS:  -  Assess patient's ability to carry out ADLs; (mod assist)  - Assess/evaluate cause of self-care deficits (weakness, fatigue)  - Assess range of motion  - Assess patient's mobility; develop plan if impaired  - Assess patient's need for assistive devices and provide as appropriate  - Encourage maximum independence but intervene and supervise when necessary  - Involve family in performance of ADLs  - Assess for home care needs following discharge   - Consider OT consult to assist with ADL evaluation and planning for discharge  - Provide patient education as appropriate  Outcome: Progressing     Problem: DISCHARGE PLANNING  Goal: Discharge to home or other facility with appropriate resources  Description: INTERVENTIONS:  - Identify barriers to discharge w/patient and caregiver  - Arrange for needed discharge resources and transportation as appropriate  - Identify discharge learning needs (meds, wound care, etc )  - Refer to Case Management Department for coordinating discharge planning if the patient needs post-hospital services based on physician/advanced practitioner order or complex needs related to functional status, cognitive ability, or social support system  Outcome: Progressing     Problem: Knowledge Deficit  Goal: Patient/family/caregiver demonstrates understanding of disease process, treatment plan, medications, and discharge instructions  Description: Complete learning assessment and assess knowledge base    Interventions:  - Provide teaching at level of understanding  - Provide teaching via preferred learning methods  Outcome: Progressing     Problem: SKIN/TISSUE INTEGRITY - ADULT  Goal: Skin Integrity remains intact(Skin Breakdown Prevention)  Description: Assess:  -Perform Moustapha assessment every shift  -Clean and moisturize skin every shift  -Inspect skin when repositioning, toileting, and assisting with ADLS  -Assess under medical devices every shift  -Assess extremities for adequate circulation and sensation     Bed Management:  -Have minimal linens on bed & keep smooth, unwrinkled  -Change linens as needed when moist or perspiring  -Avoid sitting or lying in one position for more than 2 hours while in bed    Toileting:  -Offer bedside commode  -Assess for incontinence every 1-2 hours and prn  -Use incontinent care products after each incontinent episode    Activity:  -Mobilize patient times a day  -Encourage activity and walks on unit  -Encourage or provide ROM exercises   -Turn and reposition patient every 2 Hours  -Use appropriate equipment to lift or move patient in bed  -Instruct/ Assist with weight shifting every  minutes when out of bed in chair  -Consider limitation of chair time 4-6 hour intervals    Skin Care:  -Avoid use of baby powder, tape, friction and shearing, hot water or constrictive clothing  -Relieve pressure over bony prominences  -Do not massage red bony areas    Next Steps:  -Teach patient strategies to minimize risks    -Consider consults to  interdisciplinary teams   Outcome: Progressing     Problem: RESPIRATORY - ADULT  Goal: Achieves optimal ventilation and oxygenation  Description: INTERVENTIONS:  - Assess for changes in respiratory status  - Assess for changes in mentation and behavior  - Position to facilitate oxygenation and minimize respiratory effort  - Oxygen administered by appropriate delivery if ordered  - Encourage broncho-pulmonary hygiene including cough, deep breathe, Incentive Spirometry  - Assess and instruct to report SOB or any respiratory difficulty  - Respiratory Therapy support as indicated  Outcome: Progressing     Problem: MOBILITY - ADULT  Goal: Maintain or return to baseline ADL function  Description: INTERVENTIONS:  - Educate patient/family on patient safety including physical limitations  - Instruct patient to call for assistance with activity   - Consult OT/PT to assist with strengthening/mobility   - Keep Call bell within reach  - Keep bed low and locked with side rails adjusted as appropriate  - Keep care items and personal belongings within reach  - Initiate and maintain comfort rounds  - Make Fall Risk Sign visible to staff  - Offer Toileting every 1-2 Hours, in advance of need  - Initiate/Maintain bed/chair alarm  - Obtain necessary fall risk management equipment: nonskid footwear  - Apply yellow socks and bracelet for high fall risk patients  Outcome: Progressing  Goal: Maintains/Returns to pre admission functional level  Description: INTERVENTIONS:  -  Assess patient's ability to carry out ADLs; (mod assist)  - Assess/evaluate cause of self-care deficits (weakness, fatigue)  - Assess range of motion  - Assess patient's mobility; develop plan if impaired  - Assess patient's need for assistive devices and provide as appropriate  - Encourage maximum independence but intervene and supervise when necessary  - Involve family in performance of ADLs  - Assess for home care needs following discharge   - Consider OT consult to assist with ADL evaluation and planning for discharge  - Provide patient education as appropriate  Outcome: Progressing     Problem: METABOLIC, FLUID AND ELECTROLYTES - ADULT  Goal: Electrolytes maintained within normal limits  Description: INTERVENTIONS:  - Monitor labs and assess patient for signs and symptoms of electrolyte imbalances  - Administer electrolyte replacement as ordered  - Monitor response to electrolyte replacements, including repeat lab results as appropriate  - Instruct patient on fluid and nutrition as appropriate  Outcome: Progressing     Problem: Nutrition/Hydration-ADULT  Goal: Nutrient/Hydration intake appropriate for improving, restoring or maintaining nutritional needs  Description: Monitor and assess patient's nutrition/hydration status for malnutrition  Collaborate with interdisciplinary team and initiate plan and interventions as ordered  Monitor patient's weight and dietary intake as ordered or per policy  Utilize nutrition screening tool and intervene as necessary  Determine patient's food preferences and provide high-protein, high-caloric foods as appropriate       INTERVENTIONS:  - Monitor oral intake, urinary output, labs, and treatment plans  - Assess nutrition and hydration status and recommend course of action  - Evaluate amount of meals eaten  - Assist patient with eating if necessary   - Allow adequate time for meals  - Recommend/ encourage appropriate diets, oral nutritional supplements, and vitamin/mineral supplements  - Order, calculate, and assess calorie counts as needed  - Recommend, monitor, and adjust tube feedings and TPN/PPN based on assessed needs  - Assess need for intravenous fluids  - Provide specific nutrition/hydration education as appropriate  - Include patient/family/caregiver in decisions related to nutrition  Outcome: Progressing

## 2023-06-12 NOTE — CONSULTS
Consultation - Nephrology   Selvin Hamm 80 y o  female MRN: [id number]  Unit/Bed#: 405-01 Encounter: [id number]      A/P:  1  Acute on chronic hyponatremia   Patient has several potential underlying causes for hyponatremia, and unfortunately appears that with initiation of the SSRI, it likely tipped the scales further with respect to more aggressive ADH secretion and reduction of serum sodium levels  Patient has a small body habitus, will reduce total fluid restriction from 1 5 down to 1 L over 24-hour  I reviewed this with the patient and informed her that I will be very strict and I apologize but in the end should help improve her serum sodium levels appropriately  In the meantime continue to monitor potassium magnesium levels, and supplement as indicated  Would be extra vigilant with this patient regarding medications that can potentially cause hyponatremia as she appears to be quite sensitive  2   Chronic hyponatremia   Patient potentially has underlying SIADH, patient's urine sodium level was 112 mmol/L, urine osmolality was 544 mmol/KG, TSH level was within normal limits and serum cortisol level was also appropriate at 22 5 earlier yesterday morning  Given the patient's high urine osmolality, and likely poor oral intake, will be difficult to correct hyponatremia without very aggressive fluid restriction at this time  My hope is that as the patient's SSRI washes out this will help to reduce the patient's urine osmolality thus allowing for greater water removal    In addition to this, the patient most likely has very poor oral intake of protein in general which will continue to reduce her ability due to excrete water, thus currently also having a dilutional defect  I strongly encouraged the patient to increase protein intake in order to help compensate for this and allow greater water excretion    3   Chronic diastolic congestive heart failure   Patient is currently compensated from a volume standpoint  No salt tablets for hyponatremia as the etiology is not due to volume depletion, and may place the patient into congestive heart failure  We can restart spironolactone from the renal standpoint  Continue with low-sodium diet  4   Anorexia   Patient is diminished diet, unclear if this is due to depression recently diagnosed, versus other etiology  In this particular case, the patient may benefit from a direct appetite stimulant  We will need to continue to monitor for now  5   Hypomagnesemia   Patient will be receiving 2 g of IV magnesium sulfate this morning, continue to monitor and add supplementation as indicated  6   Atrial fibrillation   Rate control currently managed with diltiazem, she is on Eliquis 2 5 mg twice daily  Thank you for allowing us to participate in the care of your patient  Please feel free to contact us regarding the care of this patient, or any other questions/concerns that may be applicable      Patient Active Problem List   Diagnosis   • Hypomagnesemia   • Hypokalemia   • Hyponatremia   • Essential hypertension   • Lymphadenopathy   • Hiatal hernia   • Thrombocytopenia (HCC)   • Severe aortic stenosis   • Mitral valve insufficiency   • Atrial tachycardia ([facility])   • Pulmonary hypertension (HCC)   • Diverticulosis   • Non-rheumatic tricuspid valve insufficiency   • Generalized anxiety disorder   • Mid back pain   • Encounter for Medicare annual wellness exam   • Encounter for long-term (current) use of medications   • Immunization due   • Acute bursitis of right shoulder   • Impaired fasting glucose   • Osteopenia of neck of femur   • Chronic bilateral low back pain without sciatica   • Gait abnormality   • Chronic pain syndrome   • Sacroiliitis (HCC)   • Bilateral leg edema   • Ganglion cyst   • Adhesive capsulitis of right shoulder   • CKD (chronic kidney disease), stage III (HCC)   • Generalized weakness   • Dyslipidemia   • Post-menopause   • Primary generalized (osteo)arthritis   • Chronic diastolic (congestive) heart failure (HCC)   • Pleural effusion   • Constipation   • Status post insertion of drug eluting coronary artery stent   • S/P TAVR (transcatheter aortic valve replacement)   • Atrial fibrillation (HCC)   • Incomplete right bundle branch block   • Coronary artery disease involving native coronary artery of native heart without angina pectoris   • Anemia   • Graves disease   • Closed wedge compression fracture of T11 vertebra ([facility])   • Age-related osteoporosis with current pathological fracture of vertebra ([facility] [address] )   • Medicare annual wellness visit, subsequent   • Major depressive disorder, single episode, mild ([facility] [address] )   • Atrial flutter ([facility] [address] )   • CHF (congestive heart failure) [facility] )       History of Present Illness   Physician Requesting Consult: King Greene DO  Reason for Consult / Principal Problem: Hyponatremia  Hx and PE limited by:   HPI: Claire Garcia is a 80y o  year old female who presented to the emergency room yesterday, June 11 due to complaints of generalized weakness  Patient claims that she has not been eating well for the last several weeks, but has been keeping up with her fluids  Although she denies abdominal pain nausea vomiting diarrhea, she believes that she simply has what I would describe as tunde anorexia  She saw her primary care provider recently who assessed her overall mood and clinical condition as an episode of depression and initiated Lexapro 10 mg daily  Family who was present on admission but I did not directly discuss with, but I was informed by the admitting provider, noted that the patient's overall clinical presentation initiated the day after she started this medication and gradually worsened to the point of presentation to the hospital     From the renal standpoint, kidney function is appropriate, however, patient's presenting serum sodium level was 119 mmol/L    Patient was given mild volume expansion at presentation which slightly worsened serum sodium level down to 116 mmol/L, now stable this morning at 117 mmol/L  It appears the patient has a baseline serum sodium level around 130-132 mmol/L  As mentioned above she has poor intake in general of food, specifically of food that has protein in it  Patient was placed on a 1 5 L fluid restriction, however given her overall body weight of 49 kg, and water needs, she will need to have this reduced down to 1 L over 24-hour period  History obtained from chart review and the patient    Constitutional ROS- Denies fatigue, fever, chills, night sweats, weight changes  HEENT ROS- Denies history of eye surgeries, glaucoma, headaches or history of trauma, blurred vision     Endocrine ROS- No history diabetes mellitus or thyroid disease  Cardiovascular ROS- Denies chest pain, palpitation, dyspnea exertion, orthopnea, claudication  Pulmonary ROS- Denies history of COPD, asthma  Denies cough, hemoptysis, shortness of breath  GI ROS- Denies abdominal pain, diarrhea, nausea, swallowing problems, vomiting, constipation, blood in stools, fecal incontinence  Hematological ROS- Denies history of easy bruising, blood clots, bleeding or blood transfusions  Genitourinary ROS- Denies recent hematuria, pyuria, flank pain, change in urinary stream, decreased urinary output, increased urinary frequency, nocturia, foamy urine, or urinary incontinence  Lymphatic ROS- Denies lymphadenopathy  Musculoskeletal ROS- Denies history of muscle weakness, joint pain  Dermatological ROS- Denies rash, wounds, ulcers, itching, jaundice  Psychiatric ROS- Denies anxiety, hallucinations, disorientation  Neurological ROS- No stroke or TIA symptoms        Historical Information   Past Medical History:   Diagnosis Date   • Acute on chronic diastolic CHF (congestive heart failure) ([facility] ) 1/30/2023   • Acute respiratory failure with hypoxia ([facility] ) 12/22/2022   • Aortic stenosis    • Atrial flutter [facility])    • CAD (coronary artery disease) 01/11/2023    CAD/PCI/ELODIA   • CKD (chronic kidney disease), stage III (HCC)    • Community acquired pneumonia of left upper lobe of lung 05/17/2019   • Fatigue    • Full dentures    • Generalized anxiety disorder    • Hyperlipidemia    • Hypertension    • Impaired fasting glucose    • Mitral regurgitation    • Pneumonia    • RBBB    • 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    TRANSFEMORAL W/ 23MM CALVERT SHAVONNE S3 ULTRA VALVE(ACCESS ON LEFT)   • SVT (supraventricular tachycardia)    • Tricuspid regurgitation      Past Surgical History:   Procedure Laterality Date   • CARDIAC CATHETERIZATION N/A 1/11/2023    Procedure: LHC-Cardiac catheterization;  Surgeon: Milvia Braga MD;  Location: [facility] CARDIAC [facility]; Service: Cardiology   • CARDIAC CATHETERIZATION N/A 1/11/2023    Procedure: Cardiac pci;  Surgeon: Milvia Braga MD;  Location: [facility] CARD[facility];   Service: Cardiology   • CARDIAC CATHETERIZATION N/A 1/17/2023    Procedure: CARDIAC TAVR;  Surgeon: Madison Beebe DO;  Location: [facility] MAIN OR;  Service: Cardiology   • DENTAL SURGERY Bilateral     ALL TEETH REMOVED   • FL GUIDED NEEDLE PLAC BX/ASP/INJ  5/26/2021   • IR THORACENTESIS  12/20/2022   • JOINT REPLACEMENT Left    • KNEE SURGERY      left knee replacement   • OR INJECT SI JOINT ARTHRGRPHY&/ANES/STEROID W/JOHNATHON Bilateral 5/26/2021    Procedure: SACROILIAC JOINT INJECTION;  Surgeon: Sveta Byrd MD;  Location: [facility] MAIN OR;  Service: Pain Management    • OR REPLACE AORTIC VALVE OPENFEMORAL ARTERY APPROACH N/A 1/17/2023    Procedure: REPLACEMENT AORTIC VALVE TRANSCATHETER (TAVR) TRANSFEMORAL W/ 23MM CALVERT SHAVONNE S3 ULTRA VALVE(ACCESS ON LEFT) ALENA;  Surgeon: Safia Kohler DO;  Location: [facility] MA[facility];  Service: Cardiac Surgery   • SACROILIAC JOINT INJECTION Bilateral 6/7/2023    Procedure: Bilateral sacroiliac joint steroid injection;  Surgeon: Aneudy Smith MD;  Location: [facility] OR;  Service: Pain Management      Social History   Social History     Substance and Sexual Activity   Alcohol Use Never     Social History     Substance and Sexual Activity   Drug Use Never     Social History     Tobacco Use   Smoking Status Never   Smokeless Tobacco Never     Family History   Problem Relation Age of Onset   • Hypertension Mother    • Asthma Father         McComb's asthma   • Alzheimer's disease Sister    • Rectal cancer Son    • Uterine cancer Daughter    • Heart disease Sister    • Heart disease Brother        Meds/Allergies   all current active meds have been reviewed, current meds:   Current Facility-Administered Medications   Medication Dose Route Frequency   • ALPRAZolam (XANAX) tablet 0 25 mg  0 25 mg Oral TID PRN   • ammonium lactate (LAC-HYDRIN) 12 % lotion   Topical Daily   • apixaban (ELIQUIS) tablet 2 5 mg  2 5 mg Oral BID   • atorvastatin (LIPITOR) tablet 20 mg  20 mg Oral Daily With Dinner   • cefTRIAXone (ROCEPHIN) IVPB (premix in dextrose) 1,000 mg 50 mL  1,000 mg Intravenous Q24H   • cholecalciferol (VITAMIN D3) tablet 2,000 Units  2,000 Units Oral Daily   • clopidogrel (PLAVIX) tablet 75 mg  75 mg Oral Daily   • diltiazem (CARDIZEM) tablet 30 mg  30 mg Oral Q8H [address]   • magnesium sulfate 2 g/50 mL IVPB (premix) 2 g  2 g Intravenous Once   • metoprolol succinate (TOPROL-XL) 24 hr tablet 50 mg  50 mg Oral Daily   • multivitamin-minerals (CENTRUM) tablet 1 tablet  1 tablet Oral Daily   • pantoprazole (PROTONIX) EC tablet 40 mg  40 mg Oral QAM   • psyllium (METAMUCIL) 1 packet  1 packet Oral Daily    and PTA meds:    Medications Prior to Admission   Medication   • alendronate (Fosamax) 70 mg tablet   • ALPRAZolam (XANAX) 0 25 mg tablet   • ammonium lactate (LAC-HYDRIN) 12 % lotion   • apixaban (ELIQUIS) 2 5 mg   • atorvastatin (LIPITOR) 20 mg tablet   • Cholecalciferol (Vitamin D3) 50 MCG (2000 UT) TABS   • clopidogrel (PLAVIX) 75 mg tablet   • escitalopram (Lexapro) 10 mg tablet   • furosemide (LASIX) 40 mg tablet   • metoprolol succinate (TOPROL-XL) 50 mg 24 hr tablet   • Multiple Vitamin (Multivitamin Adult) TABS   • naloxone (NARCAN) 4 mg/0 1 mL nasal spray   • pantoprazole (PROTONIX) 40 mg tablet   • Psyllium (METAMUCIL FIBER) 51 7 % PACK   • spironolactone (ALDACTONE) 25 mg tablet         No Known Allergies    Objective     Intake/Output Summary (Last 24 hours) at 6/12/2023 0909  Last data filed at 6/12/2023 0828  Gross per 24 hour   Intake 140 ml   Output 200 ml   Net -60 ml       Invasive Devices:        Physical Exam      I/O last 3 completed shifts: In: 140 [P O :90; IV Piggyback:50]  Out: 200 [Urine:200]    Vitals:    06/12/23 0711   BP: 156/86   Pulse: 65   Resp:    Temp: (!) 97 2 °F (36 2 °C)   SpO2: 92%       General Appearance:    No acute distress  Cooperative  Appears stated age  Head:    Normocephalic  Atraumatic  Normal jaw occlusion  Eyes:    Lids, conjunctiva normal  No scleral icterus  Ears:    Normal external ears  Nose:   Nares normal  No drainage  Mouth:   Lips, tongue normal  Mucosa normal  Phonation normal    Neck:   Supple  Symmetrical    Back:     Symmetric  No CVA tenderness  Lungs:     Normal respiratory effort  Clear to auscultation bilaterally  Chest wall:    No tenderness or deformity  Heart:    Regular rate and rhythm  Normal S1 and S2  No murmur  No JVD  No edema  Abdomen:     Soft  Non-tender  Bowel sounds active  Genitourinary:   No Lindsay catheter present  Extremities:   Extremities normal  Atraumatic  No cyanosis  Skin:   Warm and dry  No pallor, jaundice, rash, ecchymoses  Neurologic:   Alert and oriented to person, place, time  No focal deficit  Current Weight: Weight - Scale: 49 kg (108 lb 0 4 oz)  First Weight: Weight - Scale: 51 2 kg (112 lb 14 oz)    Lab Results:  I have personally reviewed pertinent labs      CBC:   Lab Results   Component Value Date    HCT 35 7 06/12/2023    HGB 12 4 06/12/2023    MCH 31 2 "06/12/2023    MCHC 34 7 06/12/2023    MCV 90 06/12/2023    MPV 9 1 06/12/2023    NRBC 0 06/12/2023     06/12/2023    RBC 3 97 06/12/2023    RDW 14 9 06/12/2023    WBC 6 84 06/12/2023     CMP:   Lab Results   Component Value Date    ALKPHOS 66 06/12/2023    ALT 27 06/12/2023    AST 26 06/12/2023    BUN 22 06/12/2023    BUN 23 06/12/2023    CALCIUM 8 3 (L) 06/12/2023    CALCIUM 8 4 06/12/2023    CL 87 (L) 06/12/2023    CL 87 (L) 06/12/2023    CO2 22 06/12/2023    CO2 22 06/12/2023    CREATININE 0 82 06/12/2023    CREATININE 0 82 06/12/2023    EGFR 64 06/12/2023    EGFR 64 06/12/2023    K 3 9 06/12/2023    K 3 9 06/12/2023     Phosphorus:   Lab Results   Component Value Date    PHOS 2 6 06/12/2023     Magnesium:   Lab Results   Component Value Date    MG 1 5 (L) 06/12/2023     Urinalysis:   Lab Results   Component Value Date    BILIRUBINUR Negative 06/11/2023    BLOODU Moderate (A) 06/11/2023    CLARITYU Slightly Cloudy 06/11/2023    COLORU Yellow 06/11/2023    GLUCOSEU Negative 06/11/2023    KETONESU Negative 06/11/2023    LEUKOCYTESUR Small (A) 06/11/2023    NITRITE Positive (A) 06/11/2023    PHUR 7 0 06/11/2023    SPECGRAV 1 020 06/11/2023     Ionized Calcium: No results found for: \"CAION\"  Coagulation: No results found for: \"APTT\", \"INR\", \"PT\"  Troponin: No results found for: \"TROPONINI\"  ABG: No results found for: \"BEART\", \"OCW2RQP\", \"S7TODPOF\", \"PXN1ADA\", \"PHART\", \"PO2ART\", \"SOURCE\"    Results from last 7 days   Lab Units 06/12/23  0418 06/12/23  0008 06/11/23  1638 06/11/23  1433 06/11/23  0751   ALK PHOS U/L 66  --   --   --  83   ALT U/L 27  --   --   --  30   AST U/L 26  --   --   --  38   BUN mg/dL 22  23 23 21   < > 27*   CALCIUM mg/dL 8 3*  8 4 8 3* 8 6   < > 9 1   CHLORIDE mmol/L 87*  87* 87* 85*   < > 85*   CO2 mmol/L 22  22 21 18*   < > 21   CREATININE mg/dL 0 82  0 82 0 82 0 77   < > 0 92   POTASSIUM mmol/L 3 9  3 9 3 7 4 1   < > 4 0    < > = values in this interval not displayed   " Radiology review:  Procedure: XR chest 1 view portable    Result Date: 6/11/2023  Narrative: CHEST INDICATION:   weakness, r/o pna  COMPARISON: CXR 1/18/2023 and chest CT 6/11/2022  EXAM PERFORMED/VIEWS:  XR CHEST PORTABLE  FINDINGS: Cardiomediastinal silhouette normal  TAVR  Large right effusion and right base atelectasis  Upper abdomen normal  Bones normal for age  Impression: Large right effusion and right base atelectasis  Workstation performed: [id number]     Procedure: CT chest without contrast    Result Date: 6/11/2023  Narrative: CT CHEST WITHOUT IV CONTRAST INDICATION:   eval effusion, r/o pna, loculations  COMPARISON: Chest radiograph 6/11/2023 and chest CT 12/23/2022  TECHNIQUE: Chest CT without intravenous contrast   Axial, sagittal, coronal 2D reformats and coronal MIPS from source data  Radiation dose length product (DLP):  202 74 mGy-cm   Radiation dose exposure minimized using iterative reconstruction and automated exposure control  FINDINGS: LUNGS: Moderate compromise by respiratory motion  Mild septal thickening due to interstitial edema  Moderate compressive atelectasis of the right lower lobe  Stable benign bilateral atelectasis and right middle lobe scar  AIRWAYS: No significant filling defects  PLEURA: Large right pleural effusion with trace left pleural effusion  HEART/GREAT VESSELS: Severe cardiomegaly  Moderate coronary artery calcification indicating atherosclerotic heart disease  TAVR  MEDIASTINUM AND KEIRY:  Unremarkable  CHEST WALL AND LOWER NECK: Unremarkable  UPPER ABDOMEN: Trace chronic hyperdense ascites anterior to the liver  Left renal cyst  OSSEOUS STRUCTURES: Moderate degenerative disease in the spine  New superior endplate compression deformity of T11  New inferior endplate compression deformity of L2  Impression: Mild interstitial edema  Large right pleural effusion with moderate compressive atelectasis of the right lower lobe  Trace left effusion   Mild new endplate compression deformities in the spine since December 2022  Workstation performed: [id number]       Wilbert Kelly,       This consultation note was produced in part using a dictation device which may document imprecise wording from author's original intent

## 2023-06-12 NOTE — PLAN OF CARE
Problem: OCCUPATIONAL THERAPY ADULT  Goal: Performs self-care activities at highest level of function for planned discharge setting  See evaluation for individualized goals  Description: Treatment Interventions: ADL retraining, Functional transfer training, UE strengthening/ROM, Endurance training, Patient/family training, Cognitive reorientation, Activityengagement, Equipment evaluation/education          See flowsheet documentation for full assessment, interventions and recommendations  Note: Limitation: Decreased ADL status, Decreased UE strength, Decreased Safe judgement during ADL, Decreased endurance, Decreased cognition, Decreased self-care trans, Decreased high-level ADLs     Assessment: Pt is a 80 y o  female seen for OT evaluation s/p admit to [facility] on 6/11/2023 w/ Generalized weakness  Comorbidities affecting pt's functional performance at time of assessment include: HTN, MINNIE, fatigue, HLD, CKD, TAVR, CHF, hypoxia  Personal factors affecting pt at time of IE include:steps to enter environment, behavioral pattern, difficulty performing ADLS, difficulty performing IADLS , limited insight into deficits, flat affect, decreased initiation and engagement , financial barriers and health management   Prior to admission, pt was (A) with ADLs and IADLs with use of RW during mobility  Upon evaluation: Pt requires min-max (A) x1-2 with use of RW during mobility 2* the following deficits impacting occupational performance: weakness, decreased strength, decreased balance, decreased tolerance, impaired initiation, impaired memory, impaired sequencing, impaired problem solving, decreased safety awareness and impaired interpersonal skills  Pt to benefit from continued skilled OT tx while in the hospital to address deficits as defined above and maximize level of functional independence w ADL's and functional mobility   Occupational Performance areas to address include: grooming, bathing/shower, toilet hygiene, dressing, functional mobility, community mobility and clothing management  The patient's raw score on the AM-PAC Daily Activity Inpatient Short Form is 17  A raw score of less than 19 suggests the patient may benefit from discharge to post-acute rehabilitation services  Please refer to the recommendation of the Occupational Therapist for safe discharge planning  Pt benefited from co-evaluation of skilled OT and PT therapists in order to most appropriately address functional deficits d/t extensive assistance required for safe functional mobility, decreased activity tolerance, and regression from functioning level prior to admission and/or onset of present illness  OT/PT objectives were addressed separately; please see PT note for specific goal areas targeted       OT Discharge Recommendation: Post acute rehabilitation services

## 2023-06-12 NOTE — PHYSICAL THERAPY NOTE
Physical Therapy Evaluation    Patient Name: Nadia Grayson    EUYQA'D Date: 6/12/2023     Problem List  Principal Problem:    Generalized weakness  Active Problems:    Hypomagnesemia    Hyponatremia    Gait abnormality    CKD (chronic kidney disease), stage III (HCC)    Dyslipidemia    Status post insertion of drug eluting coronary artery stent    S/P TAVR (transcatheter aortic valve replacement)    Atrial fibrillation (HCC)    Coronary artery disease involving native coronary artery of native heart without angina pectoris    Closed wedge compression fracture of T11 vertebra (HCC)    Major depressive disorder, single episode, mild (HCC)    Atrial flutter (HCC)    CHF (congestive heart failure) (HCC)    UTI (urinary tract infection)       Past Medical History  Past Medical History:   Diagnosis Date    Acute on chronic diastolic CHF (congestive heart failure) ([facility] [address] ) 1/30/2023    Acute respiratory failure with hypoxia ([facility] [address] ) 12/22/2022    Aortic stenosis     Atrial flutter (HCC)     CAD (coronary artery disease) 01/11/2023    CAD/PCI/ELODIA    CKD (chronic kidney disease), stage III (HCC)     Community acquired pneumonia of left upper lobe of lung 05/17/2019    Fatigue     Full dentures     Generalized anxiety disorder     Hyperlipidemia     Hypertension     Impaired fasting glucose     Mitral regurgitation     Pneumonia     RBBB     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    TRANSFEMORAL W/ 23MM CALVERT SHAVONNE S3 ULTRA VALVE(ACCESS ON LEFT)    SVT (supraventricular tachycardia)     Tricuspid regurgitation         Past Surgical History  Past Surgical History:   Procedure Laterality Date    CARDIAC CATHETERIZATION N/A 1/11/2023    Procedure: LHC-Cardiac catheterization;  Surgeon: Albania Pedraza MD;  Location: [facility];   Service: Cardiology    CARDIAC CATHETERIZATION N/A 1/11/2023    Procedure: Cardiac pci;  Surgeon: Albania Pedraza MD; Location: [facility]; Service: Cardiology    CARDIAC CATHETERIZATION N/A 1/17/2023    Procedure: CARDIAC TAVR;  Surgeon: Osmel Montanez DO;  Location: [facility];  Service: Cardiology    DENTAL SURGERY Bilateral     ALL TEETH REMOVED    FL GUIDED NEEDLE PLAC BX/ASP/INJ  5/26/2021    IR THORACENTESIS  12/20/2022    JOINT REPLACEMENT Left     KNEE SURGERY      left knee replacement    NM INJECT SI JOINT ARTHRGRPHY&/ANES/STEROID W/JOHNATHON Bilateral 5/26/2021    Procedure: SACROILIAC JOINT INJECTION;  Surgeon: Yadiel Sanderson MD;  Location: [facility];  Service: Pain Management     NM REPLACE AORTIC VALVE OPENFEMORAL ARTERY APPROACH N/A 1/17/2023    Procedure: REPLACEMENT AORTIC VALVE TRANSCATHETER (TAVR) TRANSFEMORAL W/ 23MM CALVERT SHAVONNE S3 ULTRA VALVE(ACCESS ON LEFT) ALENA;  Surgeon: Malaika Adam DO;  Location: [facility];  Service: Cardiac Surgery    SACROILIAC JOINT INJECTION Bilateral 6/7/2023    Procedure: Bilateral sacroiliac joint steroid injection;  Surgeon: Elizabeth Hampton MD;  Location: [facility];  Service: Pain Management            06/12/23 0911   PT Last Visit   PT Visit Date 06/12/23   Note Type   Note type Evaluation   Pain Assessment   Pain Assessment Tool 0-10   Pain Score No Pain   Restrictions/Precautions   Weight Bearing Precautions Per Order No   Other Precautions Fall Risk;Multiple lines; Bed Alarm; Chair Alarm   Home Living   Type of [address] [place] [address] Two level;1/2 bath on main level;Bed/bath upstairs;Stairs to enter without rails  (1 CASANDRA)   Bathroom Shower/Tub Tub/shower unit   Bathroom Toilet Standard   Bathroom Equipment Grab bars in shower; Shower chair   Bathroom Accessibility Accessible   Home Equipment Walker;Cane;Stair glide   Additional Comments unsure of pt's DME use at baseline   Prior Function   Level of [place] Independent with functional mobility; Needs assistance with ADLs; Needs assistance with IADLS   Lives With Spouse   Rece[facility] "  IADLs Family/Friend/Other provides transportation   Falls in the last 6 months 1 to 4   Vocational Retired   Comments pt's spouse reports to  that she has been requiring increased level of assistance   General   Family/Caregiver Present No   Cognition   Overall Cognitive Status Impaired   Arousal/Participation Cooperative   Orientation Level Oriented to person;Oriented to place; Disoriented to time;Disoriented to situation   Following Commands Follows one step commands with increased time or repetition   Subjective   Subjective \"I'm a small and those are large\"   RLE Assessment   RLE Assessment X  (4-/5 grossly)   LLE Assessment   LLE Assessment X  (4-/5 grossly)   Bed Mobility   Supine to Sit 4  Minimal assistance   Additional items HOB elevated; Bedrails; Increased time required;Verbal cues; Assist x 1   Sit to Supine   (pt OOB at end of session)   Transfers   Sit to Stand 4  Minimal assistance   Additional items Assist x 2; Increased time required;Verbal cues   Stand to Sit 4  Minimal assistance   Additional items Assist x 2; Increased time required;Verbal cues   Additional Comments RW used   Ambulation/Elevation   Gait pattern Excessively slow; Short stride;Decreased foot clearance;Narrow DAVID;Retropulsion; Improper Weight shift   Gait Assistance 4  Minimal assist   Additional items Assist x 2;Verbal cues   Assistive Device Rolling walker   Distance 5'   Balance   Static Sitting Fair +   Dynamic Sitting Fair   Static Standing Fair -   Dynamic Standing Poor +   Ambulatory Poor +  (with RW)   Endurance Deficit   Endurance Deficit Yes   Endurance Deficit Description pt extremely fatigued with minimal ambulation   Activity Tolerance   Activity Tolerance Patient limited by fatigue   Assessment   Prognosis Good   Problem List Decreased strength;Decreased endurance; Impaired balance;Decreased mobility; Impaired judgement;Decreased safety awareness;Decreased cognition   Assessment Patient is a 80 y o  female evaluated by " Physical Therapy s/p admit to [facility] on 6/11/2023 with admitting diagnosis of: Hyponatremia, UTI (urinary tract infection), Weakness, Pleural effusion, Compression fracture of lumbar vertebra, Generalized weakness, and principal problem of: Generalized weakness  PT was consulted to assess patient's functional mobility and discharge needs  Ordered are PT Evaluation and treatment  Comorbidities affecting patient's physical performance at time of assessment include: HTN, HLD, aortic stenosis, mitral regurgitation, CAD, a-flutter, CKD, sp TAVR, CHF  Personal factors affecting the patient at time of IE include: lives in 2 story home, ambulating with assistive device, step(s) to enter home, inability to navigate community distances, history of fall(s), impaired safety awareness, limited insight into impairments, inability/difficulty performing IADLs and inability/difficulty performing ADLs  Please locate objective findings from PT assessment regarding body systems outlined above  Upon evaluation, pt able to perform all functional mobility with Janice x 1-2, RW, and increased time  Occasional verbal cuing provided for safety awareness and sequencing  Pt a very vague historian during session, so true PLOF unknown  D/t this, pt encouraged to use RW which she required for 5' of ambulation d/t posterior lean  Further ambulation not attempted d/t safety concerns and fatigue  HR and SpO2 remained WFL on RA throughout  The patient's AM-PAC Basic Mobility Inpatient Short Form Raw Score is 14  A Raw score of less than or equal to 16 suggests the patient may benefit from discharge to post-acute rehabilitation services  Please also refer to the recommendation of the Physical Therapist for safe discharge planning   Co treatment with OT secondary to complex medical condition of pt, possible A of 2 required to achieve and maintain transitional movements, requiring the need of skilled therapeutic intervention of 2 therapists to achieve delivery of services  Pt will benefit from continued PT intervention during LOS to address current deficits, increase LOF, and facilitate safe d/c to next level of care when medically appropriate  D/c recommendation at this time is post-acute rehabilitation services  Goals   Patient Goals to go home   LTG Expiration Date 06/26/23   Long Term Goal #1 Pt will participate in B LE strengthening exercises to facilitate improved functional activity tolerance  Pt will perform all functional transfers and bed mobility mod(I) with good safety awareness  Pt will ambulate 250' mod(I) with LRAD while maintaining good functional dynamic balance  Pt will ascend/descend 1 stair with HR and SUP to reflect the ability to safely navigate the home  Plan   Treatment/Interventions Functional transfer training;LE strengthening/ROM; Elevations; Therapeutic exercise; Endurance training;Gait training;Bed mobility   PT Frequency 3-5x/wk   Recommendation   PT Discharge Recommendation Post acute rehabilitation services   AM-PAC Basic Mobility Inpatient   Turning in Flat Bed Without Bedrails 3   Lying on Back to Sitting on Edge of Flat Bed Without Bedrails 3   Moving Bed to Chair 2   Standing Up From Chair Using Arms 3   Walk in Room 2   Climb 3-5 Stairs With Railing 1   Basic Mobility Inpatient Raw Score 14   Basic Mobility Standardized Score 35 55   Highest Level Of Mobility   JH-HLM Goal 4: Move to chair/commode   JH-HLM Achieved 6: Walk 10 steps or more   End of Consult   Patient Position at End of Consult Bedside chair;Bed/Chair alarm activated; All needs within reach

## 2023-06-12 NOTE — MALNUTRITION/BMI
This medical record reflects one or more clinical indicators suggestive of malnutrition  Malnutrition Findings:   Adult Malnutrition type: Chronic illness  Adult Degree of Malnutrition: Malnutrition of moderate degree  Malnutrition Characteristics: Weight loss, Inadequate energy, Muscle loss                360 Statement: Chronic, moderate protein-calorie malnutrition related to chronic illness with decreased PO intakes as evidenced by significant weight loss of 10% x 6 months, mild muscle wasting to clavicle region, and less than 75% of estimated energy needs for greater than 7 days  Treating with PO diet and nutrition supplements  BMI Findings: Body mass index is 22 58 kg/m²  See Nutrition note dated 6/12/2023 for additional details  Completed nutrition assessment is viewable in the nutrition documentation

## 2023-06-12 NOTE — ASSESSMENT & PLAN NOTE
As noted on presentation  Per chart review this is a chronic issue however progressing compared to previous lab tests  Moderate hyponatremia on presentation  6/11/2023  Serum osmolality; 254   Urine osmolality; 554  Urine sodium; 112    6/12/2023  Serum sodium: 117> 116> 119      Plan; case was discussed with nephrology team and we agreed on the following plan;    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 based on serum/urine osmolality and urine sodium  · Continue fluid restriction diet  · Continue to hold SSRI minutes potential etiology for hyponatremia

## 2023-06-12 NOTE — PLAN OF CARE
Problem: Potential for Falls  Goal: Patient will remain free of falls  Description: INTERVENTIONS:  - Educate patient/family on patient safety including physical limitations  - Instruct patient to call for assistance with activity   - Consult OT/PT to assist with strengthening/mobility   - Keep Call bell within reach  - Keep bed low and locked with side rails adjusted as appropriate  - Keep care items and personal belongings within reach  - Initiate and maintain comfort rounds  - Make Fall Risk Sign visible to staff  - Offer Toileting every 1-2 Hours, in advance of need  - Initiate/Maintain bed/chair alarm  - Obtain necessary fall risk management equipment: nonskid footwear  - Apply yellow socks and bracelet for high fall risk patients  Outcome: Progressing     Problem: PAIN - ADULT  Goal: Verbalizes/displays adequate comfort level or baseline comfort level  Description: Interventions:  - Encourage patient to monitor pain and request assistance  - Assess pain using appropriate pain scale (0-10 pain scale)  - Administer analgesics based on type and severity of pain and evaluate response  - Implement non-pharmacological measures as appropriate and evaluate response  - Consider cultural and social influences on pain and pain management  - Notify physician/advanced practitioner if interventions unsuccessful or patient reports new pain  Outcome: Progressing     Problem: INFECTION - ADULT  Goal: Absence or prevention of progression during hospitalization  Description: INTERVENTIONS:  - Assess and monitor for signs and symptoms of infection  - Monitor lab/diagnostic results  - Monitor all insertion sites, i e  indwelling lines  - Administer medications as ordered  - Instruct and encourage patient and family to use good hand hygiene technique  Outcome: Progressing     Problem: SAFETY ADULT  Goal: Patient will remain free of falls  Description: INTERVENTIONS:  - Educate patient/family on patient safety including physical limitations  - Instruct patient to call for assistance with activity   - Consult OT/PT to assist with strengthening/mobility   - Keep Call bell within reach  - Keep bed low and locked with side rails adjusted as appropriate  - Keep care items and personal belongings within reach  - Initiate and maintain comfort rounds  - Make Fall Risk Sign visible to staff  - Offer Toileting every 1-2 Hours, in advance of need  - Initiate/Maintain bed/chair alarm  - Obtain necessary fall risk management equipment: nonskid footwear  - Apply yellow socks and bracelet for high fall risk patients  Outcome: Progressing  Goal: Maintain or return to baseline ADL function  Description: INTERVENTIONS:  - Educate patient/family on patient safety including physical limitations  - Instruct patient to call for assistance with activity   - Consult OT/PT to assist with strengthening/mobility   - Keep Call bell within reach  - Keep bed low and locked with side rails adjusted as appropriate  - Keep care items and personal belongings within reach  - Initiate and maintain comfort rounds  - Make Fall Risk Sign visible to staff  - Offer Toileting every 1-2 Hours, in advance of need  - Initiate/Maintain bed/chair alarm  - Obtain necessary fall risk management equipment: nonskid footwear  - Apply yellow socks and bracelet for high fall risk patients  Outcome: Progressing  Goal: Maintains/Returns to pre admission functional level  Description: INTERVENTIONS:  -  Assess patient's ability to carry out ADLs; (mod assist)  - Assess/evaluate cause of self-care deficits (weakness, fatigue)  - Assess range of motion  - Assess patient's mobility; develop plan if impaired  - Assess patient's need for assistive devices and provide as appropriate  - Encourage maximum independence but intervene and supervise when necessary  - Involve family in performance of ADLs  - Assess for home care needs following discharge   - Consider OT consult to assist with ADL evaluation and planning for discharge  - Provide patient education as appropriate  Outcome: Progressing     Problem: DISCHARGE PLANNING  Goal: Discharge to home or other facility with appropriate resources  Description: INTERVENTIONS:  - Identify barriers to discharge w/patient and caregiver  - Arrange for needed discharge resources and transportation as appropriate  - Identify discharge learning needs (meds, wound care, etc )  - Refer to Case Management Department for coordinating discharge planning if the patient needs post-hospital services based on physician/advanced practitioner order or complex needs related to functional status, cognitive ability, or social support system  Outcome: Progressing     Problem: Knowledge Deficit  Goal: Patient/family/caregiver demonstrates understanding of disease process, treatment plan, medications, and discharge instructions  Description: Complete learning assessment and assess knowledge base    Interventions:  - Provide teaching at level of understanding  - Provide teaching via preferred learning methods  Outcome: Progressing     Problem: SKIN/TISSUE INTEGRITY - ADULT  Goal: Skin Integrity remains intact(Skin Breakdown Prevention)  Description: Assess:  -Perform Moustapha assessment every shift  -Clean and moisturize skin every shift  -Inspect skin when repositioning, toileting, and assisting with ADLS  -Assess under medical devices every shift  -Assess extremities for adequate circulation and sensation     Bed Management:  -Have minimal linens on bed & keep smooth, unwrinkled  -Change linens as needed when moist or perspiring  -Avoid sitting or lying in one position for more than 2 hours while in bed    Toileting:  -Offer bedside commode  -Assess for incontinence every 1-2 hours and prn  -Use incontinent care products after each incontinent episode    Activity:  -Mobilize patient times a day  -Encourage activity and walks on unit  -Encourage or provide ROM exercises   -Turn and reposition patient every 2 Hours  -Use appropriate equipment to lift or move patient in bed  -Instruct/ Assist with weight shifting every  minutes when out of bed in chair  -Consider limitation of chair time 4-6 hour intervals    Skin Care:  -Avoid use of baby powder, tape, friction and shearing, hot water or constrictive clothing  -Relieve pressure over bony prominences  -Do not massage red bony areas    Next Steps:  -Teach patient strategies to minimize risks    -Consider consults to  interdisciplinary teams   Outcome: Progressing     Problem: RESPIRATORY - ADULT  Goal: Achieves optimal ventilation and oxygenation  Description: INTERVENTIONS:  - Assess for changes in respiratory status  - Assess for changes in mentation and behavior  - Position to facilitate oxygenation and minimize respiratory effort  - Oxygen administered by appropriate delivery if ordered  - Encourage broncho-pulmonary hygiene including cough, deep breathe, Incentive Spirometry  - Assess and instruct to report SOB or any respiratory difficulty  - Respiratory Therapy support as indicated  Outcome: Progressing     Problem: METABOLIC, FLUID AND ELECTROLYTES - ADULT  Goal: Electrolytes maintained within normal limits  Description: INTERVENTIONS:  - Monitor labs and assess patient for signs and symptoms of electrolyte imbalances  - Administer electrolyte replacement as ordered  - Monitor response to electrolyte replacements, including repeat lab results as appropriate  - Instruct patient on fluid and nutrition as appropriate  Outcome: Progressing     Problem: MOBILITY - ADULT  Goal: Maintain or return to baseline ADL function  Description: INTERVENTIONS:  - Educate patient/family on patient safety including physical limitations  - Instruct patient to call for assistance with activity   - Consult OT/PT to assist with strengthening/mobility   - Keep Call bell within reach  - Keep bed low and locked with side rails adjusted as appropriate  - Keep care items and personal belongings within reach  - Initiate and maintain comfort rounds  - Make Fall Risk Sign visible to staff  - Offer Toileting every 1-2 Hours, in advance of need  - Initiate/Maintain bed/chair alarm  - Obtain necessary fall risk management equipment: nonskid footwear  - Apply yellow socks and bracelet for high fall risk patients  Outcome: Progressing  Goal: Maintains/Returns to pre admission functional level  Description: INTERVENTIONS:  -  Assess patient's ability to carry out ADLs; (mod assist)  - Assess/evaluate cause of self-care deficits (weakness, fatigue)  - Assess range of motion  - Assess patient's mobility; develop plan if impaired  - Assess patient's need for assistive devices and provide as appropriate  - Encourage maximum independence but intervene and supervise when necessary  - Involve family in performance of ADLs  - Assess for home care needs following discharge   - Consider OT consult to assist with ADL evaluation and planning for discharge  - Provide patient education as appropriate  Outcome: Progressing

## 2023-06-12 NOTE — CASE MANAGEMENT
Case Management Assessment & Discharge Planning Note    Patient name Ayesha Ashrafwer  Location [address] 450/101-22 MRN [id number]  : 1935 Date 2023       Current Admission Date: 2023  Current Admission Diagnosis:Generalized weakness   Patient Active Problem List    Diagnosis Date Noted   • UTI (urinary tract infection) 2023   • Atrial flutter ([facility] )    • CHF (congestive heart failure) ([facility] )    • Major depressive disorder, single episode, mild ([facility] ) 2023   • Closed wedge compression fracture of T11 vertebra ([facility] ) 2023   • Age-related osteoporosis with current pathological fracture of vertebra ([facility] ) 2023   • Medicare annual wellness visit, subsequent 2023   • Graves disease 2023   • Anemia 2023   • Coronary artery disease involving native coronary artery of native heart without angina pectoris 2023   • S/P TAVR (transcatheter aortic valve replacement) 2023   • Atrial fibrillation ([facility] ) 2023   • Incomplete right bundle branch block 2023   • Constipation 2023   • Status post insertion of drug eluting coronary artery stent 2023   • Pleural effusion 2022   • Chronic diastolic (congestive) heart failure ([facility] ) 2022   • Primary generalized (osteo)arthritis 2022   • Dyslipidemia 2022   • Post-menopause 2022   • CKD (chronic kidney disease), stage III ([facility] ) 2021   • Generalized weakness 2021   • Ganglion cyst 2021   • Adhesive capsulitis of right shoulder 2021   • Bilateral leg edema 2021   • Chronic pain syndrome 2021   • Sacroiliitis ([facility] ) 2021   • Gait abnormality 2021   • Chronic bilateral low back pain without sciatica 2021   • Osteopenia of neck of femur 2021   • Impaired fasting glucose    • Encounter for long-term (current) use of medications 10/31/2020   • Immunization due 10/31/2020   • Acute bursitis of right shoulder 10/31/2020   • Encounter for Medicare annual wellness exam 01/02/2020   • Mid back pain 09/30/2019   • Generalized anxiety disorder 09/05/2019   • Non-rheumatic tricuspid valve insufficiency 06/04/2019   • Lymphadenopathy 05/21/2019   • Hiatal hernia 05/21/2019   • Thrombocytopenia ([facility] ) 05/21/2019   • Severe aortic stenosis 05/21/2019   • Mitral valve insufficiency 05/21/2019   • Atrial tachycardia ([address] ) 05/21/2019   • Pulmonary hypertension ([facility] ) 05/21/2019   • Diverticulosis 05/21/2019   • Essential hypertension 05/18/2019   • Hypomagnesemia 05/17/2019   • Hypokalemia 05/17/2019   • Hyponatremia 05/17/2019      LOS (days): 1  Geometric Mean LOS (GMLOS) (days):   Days to GMLOS:     OBJECTIVE:    Risk of Unplanned Readmission Score: 18 56         Current admission status: Inpatient       Preferred Pharmacy:   [facility] [address] SOTELO [place] [address]  [address] [address]  Phone: [phone] Fax: [address] [facility] [address] [facility], [address] [facility] [address]  Phone: [phone] Fax: [phone]    Primary Care Provider: Letha Aguilar DO    Primary Insurance: MEDICARE  Secondary Insurance: BLUE CROSS    ASSESSMENT:  Latisha 26 Proxies    There are no active Health Care Proxies on file         Advance Directives  Does patient have a [address]?: Yes  Does patient have Advance Directives?: Yes  Advance Directives: Living will, Power of  for health care  Primary Contact: Lavelle Reno (Spouse)     [phone] [facility] ()         Readmission Root Cause  30 Day Readmission: No    Patient Information  Admitted from[de-identified] Home  Mental Status: Alert  During Assessment patient was accompanied by: Not accompanied during assessment  Assessment information provided by[de-identified] Patient  Primary Caregiver: Self  Support Systems: Spouse/significant other, Son  [place] of Residence: [address] [place] [address] Avenue do you live in?: [facility] entry access options   Select all that apply : Stairs  Number of steps to enter home : 1  Type of Current Residence: 2 story home  Upon entering residence, is there a bedroom on the main floor (no further steps)?: No  A bedroom is located on the following floor levels of residence (select all that apply):: 2nd Floor  Upon entering residence, is there a bathroom on the main floor (no further steps)?: Yes (1/2 bath 1st; full bath 2nd)  Number of steps to 2nd floor from main floor:  (stairglide)  In the last 12 months, was there a time when you were not able to pay the mortgage or rent on time?: No  In the last 12 months, how many places have you lived?: 1  In the last 12 months, was there a time when you did not have a steady place to sleep or slept in a shelter (including now)?: No  Homeless/housing insecurity resource given?: N/A  Living Arrangements: Lives w/ Spouse/significant other  Is patient a ?: No    Activities of Daily Living Prior to Admission  Functional Status: Assistance  Completes ADLs independently?: No  Level of ADL dependence: Assistance  Ambulates independently?: No  Level of ambulatory dependence: Assistance  Does patient use assisted devices?: Yes  Assisted Devices (DME) used: Scheryl Lock  Does patient currently own DME?: Yes  What DME does the patient currently own?: Maximo Damico  Does patient have a history of Outpatient Therapy (PT/OT)?: No  Does the patient have a history of Short-Term Rehab?: No  Does patient have a history of HHC?: Yes (hx [facility])  Does patient currently have [address]?: No         Patient Information Continued  Income Source: Pension/FPC  Does patient have prescription coverage?: Yes  Within the past 12 months, you worried that your food would run out before you got the money to buy more : Never true  Within the past 12 months, the food you bought just didn't last and you didn't have money to get more : Never true  Food insecurity resource given?: N/A  Does patient receive dialysis treatments?: No  Does patient have a history of substance abuse?: No  Does patient have a history of Mental Health Diagnosis?: No         Means of Transportation  Means of Transport to Appts[de-identified] Family transport (spouse)  In the past 12 months, has lack of transportation kept you from medical appointments or from getting medications?: No  In the past 12 months, has lack of transportation kept you from meetings, work, or from getting things needed for daily living?: No  Was application for public transport provided?: N/A        DISCHARGE DETAILS:    Discharge planning discussed with[de-identified] patient; spouse        CM contacted family/caregiver?: Yes [place] (Spouse))  Were Treatment Team discharge recommendations reviewed with patient/caregiver?: Yes  Did patient/caregiver verbalize understanding of patient care needs?: Yes  Were patient/caregiver advised of the risks associated with not following Treatment Team discharge recommendations?: Yes         [address]         Is the patient interested in [place] [address] at discharge?: No    DME Referral Provided  Referral made for DME?: No         Would you like to participate in our [address] service program?  : No - Declined       Discharge Destination Plan[de-identified] SNF, Short Term Rehab      [place] referrals to SNF's in a 20 mile radius

## 2023-06-12 NOTE — ASSESSMENT & PLAN NOTE
Multifactorial given moderate hyponatremia, UTI and recent surgical intervention    -refer to A and P for hyponatremia, UTI , and gait abnormality     -PT OT

## 2023-06-12 NOTE — ASSESSMENT & PLAN NOTE
Wt Readings from Last 3 Encounters:   06/11/23 49 kg (108 lb 0 4 oz)   06/07/23 46 7 kg (103 lb)   06/06/23 46 7 kg (103 lb)     Not in acute exacerbation  At target dry weight  Following with [facility]    Home meds:   -lasix  20 mg PRN   -Spironolactone 25 mg OD    Plan:    Patient is euvolemic hyponatremia and is not in AHF state  · Hold home diuretic therapy  · Restart diuretics when possible  · BMP as documented     · I/Os, daily weight

## 2023-06-12 NOTE — PLAN OF CARE
Problem: PHYSICAL THERAPY ADULT  Goal: Performs mobility at highest level of function for planned discharge setting  See evaluation for individualized goals  Description: Treatment/Interventions: Functional transfer training, LE strengthening/ROM, Elevations, Therapeutic exercise, Endurance training, Gait training, Bed mobility          See flowsheet documentation for full assessment, interventions and recommendations  Note: Prognosis: Good  Problem List: Decreased strength, Decreased endurance, Impaired balance, Decreased mobility, Impaired judgement, Decreased safety awareness, Decreased cognition  Assessment: Patient is a 80 y o  female evaluated by Physical Therapy s/p admit to [facility] on 6/11/2023 with admitting diagnosis of: Hyponatremia, UTI (urinary tract infection), Weakness, Pleural effusion, Compression fracture of lumbar vertebra, Generalized weakness, and principal problem of: Generalized weakness  PT was consulted to assess patient's functional mobility and discharge needs  Ordered are PT Evaluation and treatment  Comorbidities affecting patient's physical performance at time of assessment include: HTN, HLD, aortic stenosis, mitral regurgitation, CAD, a-flutter, CKD, sp TAVR, CHF  Personal factors affecting the patient at time of IE include: lives in 2 story home, ambulating with assistive device, step(s) to enter home, inability to navigate community distances, history of fall(s), impaired safety awareness, limited insight into impairments, inability/difficulty performing IADLs and inability/difficulty performing ADLs  Please locate objective findings from PT assessment regarding body systems outlined above  Upon evaluation, pt able to perform all functional mobility with Janice x 1-2, RW, and increased time  Occasional verbal cuing provided for safety awareness and sequencing  Pt a very vague historian during session, so true PLOF unknown   D/t this, pt encouraged to use RW which she required for 5' of ambulation d/t posterior lean  Further ambulation not attempted d/t safety concerns and fatigue  HR and SpO2 remained WFL on RA throughout  The patient's AM-PAC Basic Mobility Inpatient Short Form Raw Score is 14  A Raw score of less than or equal to 16 suggests the patient may benefit from discharge to post-acute rehabilitation services  Please also refer to the recommendation of the Physical Therapist for safe discharge planning  Co treatment with OT secondary to complex medical condition of pt, possible A of 2 required to achieve and maintain transitional movements, requiring the need of skilled therapeutic intervention of 2 therapists to achieve delivery of services  Pt will benefit from continued PT intervention during LOS to address current deficits, increase LOF, and facilitate safe d/c to next level of care when medically appropriate  D/c recommendation at this time is post-acute rehabilitation services  PT Discharge Recommendation: Post acute rehabilitation services    See flowsheet documentation for full assessment

## 2023-06-12 NOTE — CASE MANAGEMENT
Case Management Discharge Planning Note    Patient name Nestor Andres  Location [address] 055/151-66 MRN [id number]  : 1935 Date 2023       Current Admission Date: 2023  Current Admission Diagnosis:Generalized weakness   Patient Active Problem List    Diagnosis Date Noted   • UTI (urinary tract infection) 2023   • Atrial flutter ([facility] )    • CHF (congestive heart failure) ([facility] )    • Major depressive disorder, single episode, mild ([facility] ) 2023   • Closed wedge compression fracture of T11 vertebra ([facility] ) 2023   • Age-related osteoporosis with current pathological fracture of vertebra ([facility] ) 2023   • Medicare annual wellness visit, subsequent 2023   • Graves disease 2023   • Anemia 2023   • Coronary artery disease involving native coronary artery of native heart without angina pectoris 2023   • S/P TAVR (transcatheter aortic valve replacement) 2023   • Atrial fibrillation ([facility] ) 2023   • Incomplete right bundle branch block 2023   • Constipation 2023   • Status post insertion of drug eluting coronary artery stent 2023   • Pleural effusion 2022   • Chronic diastolic (congestive) heart failure ([facility] ) 2022   • Primary generalized (osteo)arthritis 2022   • Dyslipidemia 2022   • Post-menopause 2022   • CKD (chronic kidney disease), stage III ([facility] ) 2021   • Generalized weakness 2021   • Ganglion cyst 2021   • Adhesive capsulitis of right shoulder 2021   • Bilateral leg edema 2021   • Chronic pain syndrome 2021   • Sacroiliitis ([facility] ) 2021   • Gait abnormality 2021   • Chronic bilateral low back pain without sciatica 2021   • Osteopenia of neck of femur 2021   • Impaired fasting glucose    • Encounter for long-term (current) use of medications 10/31/2020   • Immunization due 10/31/2020   • Acute bursitis of right shoulder 10/31/2020   • Encounter for Medicare annual wellness exam 01/02/2020   • Mid back pain 09/30/2019   • Generalized anxiety disorder 09/05/2019   • Non-rheumatic tricuspid valve insufficiency 06/04/2019   • Lymphadenopathy 05/21/2019   • Hiatal hernia 05/21/2019   • Thrombocytopenia ([facility] ) 05/21/2019   • Severe aortic stenosis 05/21/2019   • Mitral valve insufficiency 05/21/2019   • Atrial tachycardia ([facility] ) 05/21/2019   • Pulmonary hypertension ([facility] ) 05/21/2019   • Diverticulosis 05/21/2019   • Essential hypertension 05/18/2019   • Hypomagnesemia 05/17/2019   • Hypokalemia 05/17/2019   • Hyponatremia 05/17/2019      LOS (days): 1  Geometric Mean LOS (GMLOS) (days):   Days to GMLOS:     OBJECTIVE:  Risk of Unplanned Readmission Score: 18 56         Current admission status: Inpatient   Preferred Pharmacy:   [facility] [address] [facility] [address] [place] [address]  Phone: [phone] Fax: [address] [facility] [address] [place], [place] - [address]  [facility]  [address]  [facility] [address]  Phone: [phone] Fax: [phone]    Primary Care Provider: Nesha Albrecht DO    Primary Insurance: MEDICARE  Secondary Insurance: BLUE CROSS    DISCHARGE DETAILS:    Discharge planning discussed with[de-identified] pt; spouse  Freedom of Choice: Yes  Comments - Freedom of Choice: blanket referrals to SNF's in a 20 mile radius  CM contacted family/caregiver?: Yes  Were Treatment Team discharge recommendations reviewed with patient/caregiver?: Yes  Did patient/caregiver verbalize understanding of patient care needs?: Yes  Were patient/caregiver advised of the risks associated with not following Treatment Team discharge recommendations?: Yes    Contacts  Patient Contacts: Lizzeth Payer (Spouse)  Relationship to Patient[de-identified] Family  Contact Method: Phone  Phone Number: [phone]  Reason/Outcome: Discharge [address]         Is the patient interested in [facility] at discharge?: No    DME Referral Provided  Referral made for DME?: No         Would you like to participate in our [address] service program?  : No - Declined       Discharge Destination Plan[de-identified] Short Term Rehab, SNF

## 2023-06-12 NOTE — ASSESSMENT & PLAN NOTE
Rate controlled   AC     -cardizem restarted during hospitalization due to accelerated hypertension    -metoprolol 50 mg OD  -eliquis 2 5 BID

## 2023-06-12 NOTE — PROCEDURES
Thoracentesis (Bedside)    Date/Time: 6/11/2023 7:05 AM    Performed by: Jone Ceballos DO  Authorized by: Jone Ceballos DO    Patient location:  Bedside  Other Assisting Provider: No    Consent:     Consent obtained:  Written    Consent given by:  Patient and spouse    Risks discussed:  Bleeding, infection, nerve damage and pneumothorax    Alternatives discussed:  No treatment and delayed treatment  Universal protocol:     Procedure explained and questions answered to patient or proxy's satisfaction: yes      Relevant documents present and verified: yes      Test results available and properly labeled: yes      Radiology Images displayed and confirmed  If images not available, report reviewed: yes      Required blood products, implants, devices and special equipment available: yes      Site/side marked: yes      Immediately prior to procedure a time out was called: yes      Patient identity confirmed:  Arm band  Indications:     Procedure Purpose: diagnostic and therapeutic      Indications: pleural effusion      Indications comment:  RIGHT-SIDED  Sedation:     Sedation type: none  Anesthesia (see MAR for exact dosages): Anesthesia method:  Local infiltration    Local anesthetic:  Lidocaine 1% w/o epi  Procedure details:     Preparation: Patient was prepped and draped in usual sterile fashion      Standard thoracentesis cath kit used: Yes      Patient position:  Sitting    Laterality:  Right    Location:  Midscapular line    Intercostal space:  9th    Puncture method:  Over-the-needle catheter    Ultrasound guidance: yes      Reason for ultrasound: Identify fluid collection and guide catheter placement        Ultrasound image availability:  Not saved    Indwelling catheter placed: no      Number of attempts:  1    Needle gauge:  18    Catheter size:  8 Fr    Drainage color:  Yellow    Drainage characteristics:  Clear    Fluid removed amount:  1 3 L  Post-procedure details:     Chest x-ray performed: yes      Chest x-ray findings:  Pleural effusion improved    Patient tolerance of procedure: Tolerated well, no immediate complications  Comments:      Procedure stopped when fluid stopped flowing

## 2023-06-12 NOTE — PROGRESS NOTES
[address] [facility] [address]  Progress Note  Name: Yrn Burns  MRN: [id number]  Unit/Bed#: 987-81 I Date of Admission: 6/11/2023   Date of Service: 6/12/2023 I Hospital Day: 1    Assessment/Plan   Hyponatremia  Assessment & Plan  As noted on presentation  Per chart review this is a chronic issue however progressing compared to previous lab tests  Moderate hyponatremia on presentation  6/11/2023  Serum osmolality; 254   Urine osmolality; 554  Urine sodium; 112    6/12/2023  Serum sodium: 117> 116> 119      Plan; case was discussed with nephrology team and we agreed on the following plan;    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 based on serum/urine osmolality and urine sodium  · Continue fluid restriction diet  · Continue to hold SSRI minutes potential etiology for hyponatremia  * Generalized weakness  Assessment & Plan  Multifactorial given moderate hyponatremia, UTI and recent surgical intervention    -refer to A and P for hyponatremia, UTI , and gait abnormality  -PT OT    UTI (urinary tract infection)  Assessment & Plan  UA; positive leukocytes/bacteria/nitrites  -Day 2# ceftriaxone  CHF (congestive heart failure) ([facility])  Assessment & Plan  Wt Readings from Last 3 Encounters:   06/11/23 49 kg (108 lb 0 4 oz)   06/07/23 46 7 kg (103 lb)   06/06/23 46 7 kg (103 lb)     Not in acute exacerbation  At target dry weight  Following with  Cardiology    Home meds:   -lasix  20 mg PRN   -Spironolactone 25 mg OD    Plan:    Patient is euvolemic hyponatremia and is not in AHF state  · Hold home diuretic therapy  · Restart diuretics when possible  · BMP as documented  · I/Os, daily weight     S/P TAVR (transcatheter aortic valve replacement)  Assessment & Plan  S/P TAVR W/ 23MM CALVERT SHAVONNE S3 ULTRA VALVE 1/17/2023     -Following with cardiology offices accordingly      Atrial flutter ([address] )  Assessment & Plan  Rate controlled   AC     -cardizem restarted during hospitalization due to accelerated hypertension  -metoprolol 50 mg OD  -eliquis 2 5 BID    Major depressive disorder, single episode, mild ([facility] [address] )  Assessment & Plan  Recently started on lexapro by PCP on 6-6-2023    -potential cause of progressing hyponatremia? ?   -Hold during hospitalization   -re evaluate accordingly     Closed wedge compression fracture of T11 vertebra (HCC)  Assessment & Plan  Pain medicine PRN     S/p nerve block on 6-2023 with pain medicine     Coronary artery disease involving native coronary artery of native heart without angina pectoris  Assessment & Plan  RCA stenting 1/11/2023  We will continue Plavix until January 2024  Atrial fibrillation ([facility] [address] )  Assessment & Plan  Rate controlled and anticoagulated  Status post insertion of drug eluting coronary artery stent  Assessment & Plan  RCA stenting 1/11/2023  We will continue Plavix until January 2024  Dyslipidemia  Assessment & Plan  Continue home medicaiton    CKD (chronic kidney disease), stage III [facility])  Assessment & Plan  Lab Results   Component Value Date    CREATININE 0 82 06/12/2023    CREATININE 0 82 06/12/2023    CREATININE 0 82 06/12/2023    EGFR 64 06/12/2023    EGFR 64 06/12/2023    EGFR 64 06/12/2023     Stable  Monitor I/Os   Daily weight  Avoid nephrotoxins as possible    Gait abnormality  Assessment & Plan    Await further recommendations from PTOT      Hypomagnesemia  Assessment & Plan  Replete per protocol  VTE Pharmacologic Prophylaxis:   Pharmacologic: Apixaban (Eliquis)  Mechanical VTE Prophylaxis in Place: Yes    Patient Centered Rounds: I have performed bedside rounds with nursing staff today  Discussions with Specialists or Other Care Team Provider: nephrology, PTOT, CM    Education and Discussions with Family / Patient: patient    Time Spent for Care: 30 minutes  More than 50% of total time spent on counseling and coordination of care as described above      Current Length of Stay: 1 day(s)    Current Patient Status: Inpatient   Certification Statement: The patient will continue to require additional inpatient hospital stay due to Acute on chronic hyponatremia and cystitis    Discharge Plan: when stable     Code Status: Level 1 - Full Code      Subjective:   Patient was seen today at bedside  Continues to complain of feeling tired  Unchanged compared to previous day  No chest pain or tightness, nausea or vomiting, diarrhea or constipation  Tolerating oral diet  Stooling and voiding accordingly    Alert and oriented x3  Nursing staff; no overnight incident    Objective:     Vitals:   Temp (24hrs), Av 7 °F (36 5 °C), Min:97 2 °F (36 2 °C), Max:98 3 °F (36 8 °C)    Temp:  [97 2 °F (36 2 °C)-98 3 °F (36 8 °C)] 97 2 °F (36 2 °C)  HR:  [52-79] 65  Resp:  [18-22] 18  BP: (140-192)/() 156/86  SpO2:  [86 %-93 %] 92 %  Body mass index is 22 58 kg/m²  Input and Output Summary (last 24 hours): Intake/Output Summary (Last 24 hours) at 2023 1011  Last data filed at 2023 0082  Gross per 24 hour   Intake 140 ml   Output 200 ml   Net -60 ml       Physical Exam:     Physical Exam  Vitals and nursing note reviewed  Constitutional:       Appearance: Normal appearance  HENT:      Head: Normocephalic  Mouth/Throat:      Mouth: Mucous membranes are moist    Eyes:      Conjunctiva/sclera: Conjunctivae normal       Pupils: Pupils are equal, round, and reactive to light  Cardiovascular:      Rate and Rhythm: Normal rate  Pulses:           Radial pulses are 2+ on the right side and 2+ on the left side  Heart sounds: Murmur heard  Systolic murmur is present with a grade of 2/6  Pulmonary:      Effort: Pulmonary effort is normal       Breath sounds: Examination of the right-middle field reveals decreased breath sounds  Examination of the right-lower field reveals decreased breath sounds  Examination of the left-lower field reveals decreased breath sounds  Decreased breath sounds present  Abdominal:      General: Abdomen is flat  Bowel sounds are normal  There is no distension  Tenderness: There is no abdominal tenderness  There is no guarding  Musculoskeletal:      Right lower leg: No edema  Left lower leg: No edema  Skin:     General: Skin is warm  Capillary Refill: Capillary refill takes less than 2 seconds  Neurological:      Mental Status: She is alert  Mental status is at baseline  GCS: GCS eye subscore is 4  GCS verbal subscore is 5  GCS motor subscore is 6  Cranial Nerves: Cranial nerves 2-12 are intact  Motor: Motor function is intact  Deep Tendon Reflexes:      Reflex Scores:       Patellar reflexes are 2+ on the right side and 2+ on the left side  Comments: Active and passive range of motion in the upper and lower extremities is intact  Unremarkable    Power; 5/5 all extremities           Additional Data:     Labs:    Results from last 7 days   Lab Units 06/12/23 0418   EOS PCT % 0   HEMATOCRIT % 35 7   HEMOGLOBIN g/dL 12 4   LYMPHS PCT % 10*   MONOS PCT % 10   NEUTROS PCT % 80*   PLATELETS Thousands/uL 181   WBC Thousand/uL 6 84     Results from last 7 days   Lab Units 06/12/23 0418   ANION GAP mmol/L 8  8   ALBUMIN g/dL 3 7   ALK PHOS U/L 66   ALT U/L 27   AST U/L 26   BUN mg/dL 22  23   CALCIUM mg/dL 8 3*  8 4   CHLORIDE mmol/L 87*  87*   CO2 mmol/L 22  22   CREATININE mg/dL 0 82  0 82   GLUCOSE RANDOM mg/dL 90  91   POTASSIUM mmol/L 3 9  3 9   SODIUM mmol/L 117*  117*   TOTAL BILIRUBIN mg/dL 0 95                           * I Have Reviewed All Lab Data Listed Above  * Additional Pertinent Lab Tests Reviewed: [address] Admission Reviewed    Imaging:    Imaging Reports Reviewed Today Include:   CT chest without contrast   Final Result      Mild interstitial edema  Large right pleural effusion with moderate compressive atelectasis of the right lower lobe        Trace left effusion  Mild new endplate compression deformities in the spine since December 2022  Workstation performed: [id number]         XR chest 1 view portable   Final Result      Large right effusion and right base atelectasis  Workstation performed: [id number]         IR IN-Patient Thoracentesis    (Results Pending)       Imaging Personally Reviewed by Myself Includes:    CT chest without contrast   Final Result      Mild interstitial edema  Large right pleural effusion with moderate compressive atelectasis of the right lower lobe  Trace left effusion  Mild new endplate compression deformities in the spine since December 2022  Workstation performed: [id number]         XR chest 1 view portable   Final Result      Large right effusion and right base atelectasis                 Workstation performed: [id number]         IR IN-Patient Thoracentesis    (Results Pending)         Recent Cultures (last 7 days):           Last 24 Hours Medication List:   Current Facility-Administered Medications   Medication Dose Route Frequency Provider Last Rate   • ALPRAZolam  0 25 mg Oral TID PRN Tosha Crow MD     • ammonium lactate   Topical Daily Tosha Crow MD     • atorvastatin  20 mg Oral Daily With Josephine Brizuela MD     • cefTRIAXone  1,000 mg Intravenous Q24H Tosha Crow MD     • cholecalciferol  2,000 Units Oral Daily Tosha Crow MD     • clopidogrel  75 mg Oral Daily Tosha Crow MD     • diltiazem  30 mg Oral Q8H [address] Tosha Crow MD     • magnesium sulfate  2 g Intravenous Once Tosha Crow MD 2 g (06/12/23 1489)   • metoprolol succinate  50 mg Oral Daily Tosha Crow MD     • multivitamin-minerals  1 tablet Oral Daily Tosha Crow MD     • pantoprazole  40 mg Oral QAM Tosha Crow MD     • psyllium  1 packet Oral Daily Tosha Crow MD          Today, Patient Was Seen By: Tosha Crow MD    ** Please Note: Dictation voice to text software may have been used in the creation of this document   **

## 2023-06-13 PROBLEM — N30.01 ACUTE CYSTITIS WITH HEMATURIA: Status: ACTIVE | Noted: 2023-06-13

## 2023-06-13 PROBLEM — I48.21 PERMANENT ATRIAL FIBRILLATION (HCC): Status: ACTIVE | Noted: 2023-01-17

## 2023-06-13 PROBLEM — J90 PLEURAL EFFUSION ON RIGHT: Status: ACTIVE | Noted: 2023-06-13

## 2023-06-13 LAB
ALBUMIN SERPL BCP-MCNC: 3.5 G/DL (ref 3.5–5)
ALP SERPL-CCNC: 68 U/L (ref 34–104)
ALT SERPL W P-5'-P-CCNC: 22 U/L (ref 7–52)
ANION GAP SERPL CALCULATED.3IONS-SCNC: 8 MMOL/L (ref 4–13)
AST SERPL W P-5'-P-CCNC: 23 U/L (ref 13–39)
BACTERIA UR CULT: ABNORMAL
BASOPHILS # BLD AUTO: 0.01 THOUSANDS/ÂΜL (ref 0–0.1)
BASOPHILS NFR BLD AUTO: 0 % (ref 0–1)
BILIRUB SERPL-MCNC: 0.72 MG/DL (ref 0.2–1)
BUN SERPL-MCNC: 26 MG/DL (ref 5–25)
CALCIUM SERPL-MCNC: 7.9 MG/DL (ref 8.4–10.2)
CHLORIDE SERPL-SCNC: 90 MMOL/L (ref 96–108)
CO2 SERPL-SCNC: 22 MMOL/L (ref 21–32)
CREAT SERPL-MCNC: 0.88 MG/DL (ref 0.6–1.3)
EOSINOPHIL # BLD AUTO: 0.02 THOUSAND/ÂΜL (ref 0–0.61)
EOSINOPHIL NFR BLD AUTO: 0 % (ref 0–6)
ERYTHROCYTE [DISTWIDTH] IN BLOOD BY AUTOMATED COUNT: 15.2 % (ref 11.6–15.1)
GFR SERPL CREATININE-BSD FRML MDRD: 59 ML/MIN/1.73SQ M
GLUCOSE SERPL-MCNC: 110 MG/DL (ref 65–140)
HCT VFR BLD AUTO: 38.3 % (ref 34.8–46.1)
HGB BLD-MCNC: 13.2 G/DL (ref 11.5–15.4)
IMM GRANULOCYTES # BLD AUTO: 0.03 THOUSAND/UL (ref 0–0.2)
IMM GRANULOCYTES NFR BLD AUTO: 0 % (ref 0–2)
LYMPHOCYTES # BLD AUTO: 0.86 THOUSANDS/ÂΜL (ref 0.6–4.47)
LYMPHOCYTES NFR BLD AUTO: 9 % (ref 14–44)
MAGNESIUM SERPL-MCNC: 2.2 MG/DL (ref 1.9–2.7)
MCH RBC QN AUTO: 31.2 PG (ref 26.8–34.3)
MCHC RBC AUTO-ENTMCNC: 34.5 G/DL (ref 31.4–37.4)
MCV RBC AUTO: 91 FL (ref 82–98)
MONOCYTES # BLD AUTO: 1.02 THOUSAND/ÂΜL (ref 0.17–1.22)
MONOCYTES NFR BLD AUTO: 11 % (ref 4–12)
NEUTROPHILS # BLD AUTO: 7.56 THOUSANDS/ÂΜL (ref 1.85–7.62)
NEUTS SEG NFR BLD AUTO: 80 % (ref 43–75)
NRBC BLD AUTO-RTO: 0 /100 WBCS
PHOSPHATE SERPL-MCNC: 2.4 MG/DL (ref 2.3–4.1)
PLATELET # BLD AUTO: 185 THOUSANDS/UL (ref 149–390)
PMV BLD AUTO: 9.3 FL (ref 8.9–12.7)
POTASSIUM SERPL-SCNC: 4.3 MMOL/L (ref 3.5–5.3)
PROCALCITONIN SERPL-MCNC: 0.12 NG/ML
PROT SERPL-MCNC: 5.5 G/DL (ref 6.4–8.4)
RBC # BLD AUTO: 4.23 MILLION/UL (ref 3.81–5.12)
SODIUM SERPL-SCNC: 120 MMOL/L (ref 135–147)
WBC # BLD AUTO: 9.5 THOUSAND/UL (ref 4.31–10.16)

## 2023-06-13 PROCEDURE — 97530 THERAPEUTIC ACTIVITIES: CPT

## 2023-06-13 PROCEDURE — 85025 COMPLETE CBC W/AUTO DIFF WBC: CPT

## 2023-06-13 PROCEDURE — 97535 SELF CARE MNGMENT TRAINING: CPT

## 2023-06-13 PROCEDURE — 83735 ASSAY OF MAGNESIUM: CPT

## 2023-06-13 PROCEDURE — 99232 SBSQ HOSP IP/OBS MODERATE 35: CPT | Performed by: INTERNAL MEDICINE

## 2023-06-13 PROCEDURE — 97110 THERAPEUTIC EXERCISES: CPT

## 2023-06-13 PROCEDURE — 80053 COMPREHEN METABOLIC PANEL: CPT | Performed by: INTERNAL MEDICINE

## 2023-06-13 PROCEDURE — 97116 GAIT TRAINING THERAPY: CPT

## 2023-06-13 PROCEDURE — 99232 SBSQ HOSP IP/OBS MODERATE 35: CPT | Performed by: PHYSICIAN ASSISTANT

## 2023-06-13 PROCEDURE — 84100 ASSAY OF PHOSPHORUS: CPT

## 2023-06-13 PROCEDURE — 84145 PROCALCITONIN (PCT): CPT | Performed by: INTERNAL MEDICINE

## 2023-06-13 RX ADMIN — APIXABAN 2.5 MG: 2.5 TABLET, FILM COATED ORAL at 17:12

## 2023-06-13 RX ADMIN — Medication: at 09:05

## 2023-06-13 RX ADMIN — PSYLLIUM HUSK 1 PACKET: 3.4 POWDER ORAL at 09:01

## 2023-06-13 RX ADMIN — PANTOPRAZOLE SODIUM 40 MG: 40 TABLET, DELAYED RELEASE ORAL at 09:02

## 2023-06-13 RX ADMIN — CHOLECALCIFEROL TAB 25 MCG (1000 UNIT) 2000 UNITS: 25 TAB at 09:02

## 2023-06-13 RX ADMIN — DILTIAZEM HYDROCHLORIDE 30 MG: 30 TABLET ORAL at 14:45

## 2023-06-13 RX ADMIN — ATORVASTATIN CALCIUM 20 MG: 10 TABLET, FILM COATED ORAL at 16:05

## 2023-06-13 RX ADMIN — DILTIAZEM HYDROCHLORIDE 30 MG: 30 TABLET ORAL at 21:34

## 2023-06-13 RX ADMIN — METOPROLOL SUCCINATE 50 MG: 50 TABLET, EXTENDED RELEASE ORAL at 09:02

## 2023-06-13 RX ADMIN — MULTIPLE VITAMINS W/ MINERALS TAB 1 TABLET: TAB at 09:02

## 2023-06-13 RX ADMIN — CEFTRIAXONE 1000 MG: 1 INJECTION, SOLUTION INTRAVENOUS at 11:32

## 2023-06-13 RX ADMIN — CLOPIDOGREL BISULFATE 75 MG: 75 TABLET ORAL at 09:02

## 2023-06-13 RX ADMIN — DILTIAZEM HYDROCHLORIDE 30 MG: 30 TABLET ORAL at 05:02

## 2023-06-13 NOTE — ASSESSMENT & PLAN NOTE
· Likely due to SIADH  · I appreciate Nephrology's input    · Continue to hold the SSRI indefinitely  · Continue the fluid restriction of 1000 mL/24 hours per Nephrology  · Follow the sodium level    Results from last 7 days   Lab Units 06/13/23  0449 06/12/23  1218 06/12/23  0418   BUN mg/dL 26* 23 22  23   CALCIUM mg/dL 7 9* 8 6 8 3*  8 4   CHLORIDE mmol/L 90* 87* 87*  87*   CO2 mmol/L 22 20* 22  22   CREATININE mg/dL 0 88 0 86 0 82  0 82   POTASSIUM mmol/L 4 3 3 8 3 9  3 9   SODIUM mmol/L 120* 119* 117*  117*

## 2023-06-13 NOTE — PHYSICAL THERAPY NOTE
PHYSICAL THERAPY NOTE          Patient Name: Shefali VARELA Date: 6/13/2023 06/13/23 0909   PT Last Visit   PT Visit Date 06/13/23   Note Type   Note Type Treatment   Pain Assessment   Pain Assessment Tool 0-10   Pain Score No Pain   Restrictions/Precautions   Weight Bearing Precautions Per Order No   Other Precautions   (Fall Risk; Multiple lines; Bed Alarm; Chair Alarm)   General   Family/Caregiver Present No   Cognition   Overall Cognitive Status Impaired   Arousal/Participation Alert   Following Commands Follows one step commands without difficulty   Subjective   Subjective c/o LE stiffness  Agreeable to therapy  Bed Mobility   Additional Comments OOB in chair start/end PT session   Transfers   Sit to Stand 4  Minimal assistance   Additional items Assist x 1; Armrests; Increased time required;Verbal cues  (increased A from low surface)   Stand to Sit 4  Minimal assistance   Additional items Assist x 1; Armrests; Increased time required;Verbal cues   Additional Comments RW used   Ambulation/Elevation   Gait pattern   (Excessively slow; Short stride; Decreased foot clearance; Narrow DAVID; Improper Weight shift)   Gait Assistance 5  Supervision  (CGA)   Additional items Verbal cues   Assistive Device Rolling walker   Distance 70' x 1 , 35' x 1   Balance   Static Sitting Fair +   Dynamic Sitting Fair +   Static Standing Fair -   Dynamic Standing Fair -   Ambulatory Fair -  (RW)   Endurance Deficit   Endurance Deficit Yes   Activity Tolerance   Activity Tolerance Patient limited by fatigue   Exercises   Hip Flexion Sitting;15 reps;AROM; Bilateral   Hip Abduction Sitting;15 reps;AROM; Bilateral   Hip Adduction Sitting;15 reps;AROM; Bilateral   Knee AROM Long Arc Quad Sitting;15 reps;AROM; Bilateral   Ankle Pumps Sitting;15 reps;AROM; Bilateral   Assessment   Prognosis Good   Problem List   (Decreased strength; Decreased endurance; Impaired balance; Decreased mobility; Impaired judgement; Decreased safety awareness)   Assessment Pt  seen for PT treatment session this date with interventions consisting of  therapeutic exercises,  transfers and  gait training w/ emphasis on improving pt's ability to ambulate  Pt  Requiring frequent  cues for sequence and safety  In comparison to previous session, Pt  With improvements in activity tolerance  Pt is in need of continued activity in PT to improve strength balance endurance mobility transfers and ambulation with return to maximize LOF  From PT/mobility standpoint, recommendation at time of d/c would be post acute rehab in order to promote return to PLOF and independence  The patient's [facility] Basic Mobility Inpatient Short Form Raw Score is 16  A Raw score of less than or equal to 16 suggests the patient may benefit from discharge to post-acute rehabilitation services  Please also refer to physical therapy recommendation for safe DC planning  Co-treat with LEMUEL this session due to complexity of pt and may  require A x 2 to provided skilled interventions  Barriers to Discharge Decreased caregiver support   Goals   LTG Expiration Date 06/26/23   Plan   Treatment/Interventions   (Functional transfer training; LE strengthening/ROM; Elevations; Therapeutic exercise;  Endurance training; Gait training; Bed mobility)   Progress Progressing toward goals   PT Frequency 3-5x/wk   Recommendation   PT Discharge Recommendation Post acute rehabilitation services   [facility] Basic Mobility Inpatient   Turning in Flat Bed Without Bedrails 3   Lying on Back to Sitting on Edge of Flat Bed Without Bedrails 3   Moving Bed to Chair 3   Standing Up From Chair Using Arms 3   Walk in Room 3   Climb 3-5 Stairs With Railing 1   Basic Mobility Inpatient Raw Score 16   Basic Mobility Standardized Score 38 32   Highest Level Of Mobility   JH-HLM Goal 5: Stand one or more mins   JH-HLM Achieved 7: Walk 25 feet or more Education   Education Provided Mobility training   Patient Demonstrates verbal understanding;Reinforcement needed   End of Consult   Patient Position at End of Consult Bedside chair;Bed/Chair alarm activated; All needs within reach

## 2023-06-13 NOTE — PROGRESS NOTES
Progress Note - Nephrology   Lauren Leon 80 y o  female MRN: [id number]  Unit/Bed#: 405-01 Encounter: [id number]    A/P:  1  Acute on chronic hyponatremia              Serum sodium level improved up to 120 mmol/L from 117 mmol/L  This is appropriate given that the patient's overall nutritional status is poor we will want to increase her serum sodium level by no more than 6 mmol/L over the course of 24 hours  Goal would be to increase serum sodium level up to about 124-125 mmol/L by tomorrow morning  Continue with fluid restriction of 1 L  Very strict fluid intake is due to the patient's small body habitus and weight of only 49 kg   2   Chronic hyponatremia              As mentioned yesterday, patient most likely went acute hyponatremia due to increased ADH secretion from addition of SSRI  My hope is that patient SSRI washout in next 24-48 hours and help to reduce the patient's urine osmolality this increasing free water excretion  In the meantime continue with severe fluid restriction at 1 L / 24 hours  Continue to encourage high-protein intake at this time, also to continue in the outpatient setting to assist with overall nutritional status  3   Chronic diastolic congestive heart failure              Patient remains compensated at this time, continue with low-sodium diet, add loop diuretic when clinically indicated  4   Anorexia              Patient continues to have reduced appetite, continue to encourage eating foods and supplements possible  Although not ideal, if the patient Will eat ice cream would strongly encourage this food given high fat and protein content  5   Hypomagnesemia              To monitor magnesium levels from time to time, add supplementation when clinically indicated  6   Atrial fibrillation   Continue with Eliquis and diltiazem for anticoagulation and rate management      Follow up reason for today's visit: Acute hyponatremia/chronic hyponatremia with possible SIADH/chronic congestive heart failure    Generalized weakness    Patient Active Problem List   Diagnosis   • Hypomagnesemia   • Hypokalemia   • Hyponatremia   • Essential hypertension   • Lymphadenopathy   • Hiatal hernia   • Thrombocytopenia ([facility])   • Severe aortic stenosis   • Mitral valve insufficiency   • Atrial tachycardia ([facility])   • Pulmonary hypertension ([facility])   • Diverticulosis   • Non-rheumatic tricuspid valve insufficiency   • Generalized anxiety disorder   • Mid back pain   • Encounter for Medicare annual wellness exam   • Encounter for long-term (current) use of medications   • Immunization due   • Acute bursitis of right shoulder   • Impaired fasting glucose   • Osteopenia of neck of femur   • Chronic bilateral low back pain without sciatica   • Gait abnormality   • Chronic pain syndrome   • Sacroiliitis ([facility])   • Bilateral leg edema   • Ganglion cyst   • Adhesive capsulitis of right shoulder   • CKD (chronic kidney disease), stage III ([facility])   • Generalized weakness   • Dyslipidemia   • Post-menopause   • Primary generalized (osteo)arthritis   • Chronic diastolic (congestive) heart failure ([facility])   • Pleural effusion   • Constipation   • Status post insertion of drug eluting coronary artery stent   • S/P TAVR (transcatheter aortic valve replacement)   • Atrial fibrillation ([facility])   • Incomplete right bundle branch block   • Coronary artery disease involving native coronary artery of native heart without angina pectoris   • Anemia   • Graves disease   • Closed wedge compression fracture of T11 vertebra ([facility])   • Age-related osteoporosis with current pathological fracture of vertebra ([facility] )   • Medicare annual wellness visit, subsequent   • Major depressive disorder, single episode, mild ([address] )   • Atrial flutter ([facility] [address] )   • CHF (congestive heart failure) ([facility])   • UTI (urinary tract infection)   • Moderate protein-calorie malnutrition ([facility])   • Acute cystitis with hematuria         Subjective:   No acute events, patient "continues to complain of poor appetite  Denies nausea vomiting or diarrhea  Objective:     Vitals: Blood pressure 149/72, pulse 59, temperature 98 2 °F (36 8 °C), resp  rate 18, height 4' 10\" (1 473 m), weight 49 kg (108 lb 0 4 oz), SpO2 95 %  ,Body mass index is 22 58 kg/m²  Weight (last 2 days)     Date/Time Weight    06/11/23 14:19:02 49 (108 03)    06/11/23 0708 51 2 (112 88)            Intake/Output Summary (Last 24 hours) at 6/13/2023 1046  Last data filed at 6/13/2023 0823  Gross per 24 hour   Intake 330 ml   Output 175 ml   Net 155 ml     I/O last 3 completed shifts: In: 180 [P O :180]  Out: 375 [Urine:375]         Physical Exam: /72   Pulse 59   Temp 98 2 °F (36 8 °C)   Resp 18   Ht 4' 10\" (1 473 m)   Wt 49 kg (108 lb 0 4 oz)   SpO2 95%   BMI 22 58 kg/m²     General Appearance:    Alert, cooperative, no distress, appears stated age   Head:    Normocephalic, without obvious abnormality, atraumatic   Eyes:    Conjunctiva/corneas clear   Ears:    Normal external ears   Nose:   Nares normal, septum midline, mucosa normal, no drainage    or sinus tenderness   Throat:   Lips, mucosa, and tongue normal; teeth and gums normal   Neck:   Supple   Back:     Symmetric, no curvature, ROM normal, no CVA tenderness   Lungs:     Clear to auscultation bilaterally, respirations unlabored   Chest wall:    No tenderness or deformity   Heart:    Regular rate and rhythm, S1 and S2 normal, no murmur, rub   or gallop   Abdomen:     Soft, non-tender, bowel sounds active   Extremities:   Extremities normal, atraumatic, no cyanosis or edema   Skin:   Skin color, texture, turgor normal, no rashes or lesions   Lymph nodes:   Cervical normal   Neurologic:   CNII-XII intact            Lab, Imaging and other studies: I have personally reviewed pertinent labs    CBC:   Lab Results   Component Value Date    HCT 38 3 06/13/2023    HGB 13 2 06/13/2023    MCH 31 2 06/13/2023    MCHC 34 5 06/13/2023    MCV 91 06/13/2023    " "MPV 9 3 06/13/2023    NRBC 0 06/13/2023     06/13/2023    RBC 4 23 06/13/2023    RDW 15 2 (H) 06/13/2023    WBC 9 50 06/13/2023     CMP:   Lab Results   Component Value Date    ALKPHOS 68 06/13/2023    ALT 22 06/13/2023    AST 23 06/13/2023    BUN 26 (H) 06/13/2023    CALCIUM 7 9 (L) 06/13/2023    CL 90 (L) 06/13/2023    CO2 22 06/13/2023    CREATININE 0 88 06/13/2023    EGFR 59 06/13/2023    K 4 3 06/13/2023         Results from last 7 days   Lab Units 06/13/23  0449 06/12/23  1218 06/12/23  0418 06/11/23  1433 06/11/23  0751   ALK PHOS U/L 68  --  66  --  83   ALT U/L 22  --  27  --  30   AST U/L 23  --  26  --  38   BUN mg/dL 26* 23 22  23   < > 27*   CALCIUM mg/dL 7 9* 8 6 8 3*  8 4   < > 9 1   CHLORIDE mmol/L 90* 87* 87*  87*   < > 85*   CO2 mmol/L 22 20* 22  22   < > 21   CREATININE mg/dL 0 88 0 86 0 82  0 82   < > 0 92   POTASSIUM mmol/L 4 3 3 8 3 9  3 9   < > 4 0    < > = values in this interval not displayed  Phosphorus:   Lab Results   Component Value Date    PHOS 2 4 06/13/2023     Magnesium:   Lab Results   Component Value Date    MG 2 2 06/13/2023     Urinalysis: No results found for: \"BILIRUBINUR\", \"BLOODU\", \"CLARITYU\", \"COLORU\", \"GLUCOSEU\", \"KETONESU\", \"LEUKOCYTESUR\", \"NITRITE\", \"PHUR\", \"PROTEINUA\", \"SPECGRAV\"  Ionized Calcium: No results found for: \"CAION\"  Coagulation: No results found for: \"APTT\", \"INR\", \"PT\"  Troponin: No results found for: \"TROPONINI\"  ABG: No results found for: \"BEART\", \"FEM2LUA\", \"G9SUDIUG\", \"SUL7CMJ\", \"PHART\", \"PO2ART\", \"SOURCE\"  Radiology review:     IMAGING  Procedure: XR chest 1 view portable    Result Date: 6/11/2023  Narrative: CHEST INDICATION:   weakness, r/o pna  COMPARISON: CXR 1/18/2023 and chest CT 6/11/2022  EXAM PERFORMED/VIEWS:  XR CHEST PORTABLE  FINDINGS: Cardiomediastinal silhouette normal  TAVR  Large right effusion and right base atelectasis  Upper abdomen normal  Bones normal for age       Impression: Large right effusion and right base " atelectasis  Workstation performed: [id number]     Procedure: CT chest without contrast    Result Date: 6/11/2023  Narrative: CT CHEST WITHOUT IV CONTRAST INDICATION:   eval effusion, r/o pna, loculations  COMPARISON: Chest radiograph 6/11/2023 and chest CT 12/23/2022  TECHNIQUE: Chest CT without intravenous contrast   Axial, sagittal, coronal 2D reformats and coronal MIPS from source data  Radiation dose length product (DLP):  202 74 mGy-cm   Radiation dose exposure minimized using iterative reconstruction and automated exposure control  FINDINGS: LUNGS: Moderate compromise by respiratory motion  Mild septal thickening due to interstitial edema  Moderate compressive atelectasis of the right lower lobe  Stable benign bilateral atelectasis and right middle lobe scar  AIRWAYS: No significant filling defects  PLEURA: Large right pleural effusion with trace left pleural effusion  HEART/GREAT VESSELS: Severe cardiomegaly  Moderate coronary artery calcification indicating atherosclerotic heart disease  TAVR  MEDIASTINUM AND KEIRY:  Unremarkable  CHEST WALL AND LOWER NECK: Unremarkable  UPPER ABDOMEN: Trace chronic hyperdense ascites anterior to the liver  Left renal cyst  OSSEOUS STRUCTURES: Moderate degenerative disease in the spine  New superior endplate compression deformity of T11  New inferior endplate compression deformity of L2  Impression: Mild interstitial edema  Large right pleural effusion with moderate compressive atelectasis of the right lower lobe  Trace left effusion  Mild new endplate compression deformities in the spine since December 2022   Workstation performed: [id number]     Procedure: Thoracentesis (Bedside)    Result Date: 6/11/2023  Narrative: Breanne Tracy DO     6/12/2023  4:17 PM Thoracentesis (Bedside) Date/Time: 6/11/2023 7:05 AM Performed by: Breanne Tracy DO Authorized by: Breanne Tracy DO  Patient location:  Bedside Other Assisting Provider: No  Consent:   Consent obtained: Written   Consent given by:  Patient and spouse   Risks discussed:  Bleeding, infection, nerve damage and pneumothorax   Alternatives discussed:  No treatment and delayed treatment Universal protocol:   Procedure explained and questions answered to patient or proxy's satisfaction: yes    Relevant documents present and verified: yes    Test results available and properly labeled: yes    Radiology Images displayed and confirmed  If images not available, report reviewed: yes    Required blood products, implants, devices and special equipment available: yes    Site/side marked: yes    Immediately prior to procedure a time out was called: yes    Patient identity confirmed:  Arm band Indications:   Procedure Purpose: diagnostic and therapeutic    Indications: pleural effusion    Indications comment:  RIGHT-SIDED Sedation:   Sedation type: none  Anesthesia (see MAR for exact dosages): Anesthesia method:  Local infiltration   Local anesthetic:  Lidocaine 1% w/o epi Procedure details:   Preparation: Patient was prepped and draped in usual sterile fashion    Standard thoracentesis cath kit used: Yes    Patient position:  Sitting   Laterality:  Right   Location:  Midscapular line   Intercostal space:  9th   Puncture method:  Over-the-needle catheter   Ultrasound guidance: yes    Reason for ultrasound: Identify fluid collection and guide catheter placement  Ultrasound image availability:  Not saved   Indwelling catheter placed: no    Number of attempts:  1   Needle gauge:  18   Catheter size:  8 Fr   Drainage color:  Yellow   Drainage characteristics:  Clear   Fluid removed amount:  1 3 L Post-procedure details:   Chest x-ray performed: yes    Chest x-ray findings:  Pleural effusion improved   Patient tolerance of procedure: Tolerated well, no immediate complications Comments:    Procedure stopped when fluid stopped flowing        Current Facility-Administered Medications   Medication Dose Route Frequency   • ALPRAZolam Jaime Potter) tablet 0 25 mg  0 25 mg Oral TID PRN   • aluminum-magnesium hydroxide-simethicone (MYLANTA) oral suspension 30 mL  30 mL Oral Q4H PRN   • ammonium lactate (LAC-HYDRIN) 12 % lotion   Topical Daily   • atorvastatin (LIPITOR) tablet 20 mg  20 mg Oral Daily With Dinner   • cefTRIAXone (ROCEPHIN) IVPB (premix in dextrose) 1,000 mg 50 mL  1,000 mg Intravenous Q24H   • cholecalciferol (VITAMIN D3) tablet 2,000 Units  2,000 Units Oral Daily   • clopidogrel (PLAVIX) tablet 75 mg  75 mg Oral Daily   • diltiazem (CARDIZEM) tablet 30 mg  30 mg Oral Q8H [address]   • metoprolol succinate (TOPROL-XL) 24 hr tablet 50 mg  50 mg Oral Daily   • multivitamin-minerals (CENTRUM) tablet 1 tablet  1 tablet Oral Daily   • pantoprazole (PROTONIX) EC tablet 40 mg  40 mg Oral QAM   • psyllium (METAMUCIL) 1 packet  1 packet Oral Daily     Medications Discontinued During This Encounter   Medication Reason   • furosemide (LASIX) injection 40 mg    • metoprolol succinate (TOPROL-XL) 24 hr tablet 50 mg    • apixaban (ELIQUIS) tablet 2 5 mg        Virgen Kezia, DO      This progress note was produced in part using a dictation device which may document imprecise wording from author's original intent

## 2023-06-13 NOTE — ASSESSMENT & PLAN NOTE
· I appreciate Pulmonology's input  · Underwent a thoracentesis of the right lung, which yielded 1300 mL of pleural fluid  · The pleural fluid analysis is consistent with an exudative pleural effusion per Light's criteria, but this may be due to malnutrition    · Await fluid cytology results  · Check a new transthoracic echocardiogram

## 2023-06-13 NOTE — ASSESSMENT & PLAN NOTE
· S/p nerve block on 6-2023 with Pain Management  · Continue cholecalciferol supplementation  · Needs an outpatient DEXA scan with PCP  · Outpatient Orthopedic Spine Surgery evaluation or Neurosurgery evaluation

## 2023-06-13 NOTE — PLAN OF CARE
Problem: OCCUPATIONAL THERAPY ADULT  Goal: Performs self-care activities at highest level of function for planned discharge setting  See evaluation for individualized goals  Description: Treatment Interventions: ADL retraining, Functional transfer training, UE strengthening/ROM, Endurance training, Patient/family training, Cognitive reorientation, Activityengagement, Equipment evaluation/education          See flowsheet documentation for full assessment, interventions and recommendations  Outcome: Progressing  Note: Limitation: Decreased ADL status, Decreased UE strength, Decreased Safe judgement during ADL, Decreased endurance, Decreased cognition, Decreased self-care trans, Decreased high-level ADLs     Assessment: Patient participated in Skilled OT session this date with interventions consisting of ADL re training with the use of correct body mechnaics and  therapeutic activities to: increase activity tolerance   Patient agreeable to OT treatment session, upon arrival patient was found seated OOB to Recliner  Sit to stand txfrs from low chair with Min Ax1, standing balance/functional mobility around room and hallway with use of RW and CGA, to increase posture, dynamic standing balance, balance during self cares, use of BUE  Pt completed LB dressing donning and doffing B  socks with Min A  Patient requiring verbal cues for safety and verbal cues for correct technique  Patient continues to be functioning below baseline level, occupational performance remains limited secondary to factors listed above and increased risk for falls and injury  From OT standpoint, recommendation at time of d/c would be Post acute rehabilitation services  The patient's raw score on the AM-PAC Daily Activity inpatient short form is 18, standardized score is 38 66, less than 39 4  Patients at this level are likely to benefit from discharge to post-acute rehabilitation services   Please refer to the recommendation of the Occupational Therapist for safe discharge planning       OT Discharge Recommendation: Post acute rehabilitation services

## 2023-06-13 NOTE — PROGRESS NOTES
"Progress Note - Pulmonary   Juvenal Shade 80 y o  female MRN: [id number]  Unit/Bed#: 405-01 Encounter: [id number]    Assessment/Plan:    1  Recurrent right pleural effusion   2  Hyponatremia   3  UTI  4  Chronic diastolic CHF  5  S/p TAVR   6  Atrial flutter/fibrillation      Pulmonary discussion/recommendations:     • Maintain saturations greater than 88%  • Primary problem requiring hospitalization is hyponatremia- nephrology and primary team managing  • S/p  right-sided thoracentesis at bedside which yielded 1300 cc clear yellow fluid  CXR post procedure without PTX and improved right pleural effusion  • For her right pleural effusion, it is likely related to fluid overload, possible CHF related vs hypoalbuminemia however her last thoracentesis in Dec 2022 was exudative  • Fluid analysis this admission is again just barely exudative by Light's criteria with protein ratio of 52%  Histocyte predominant  No bacterial growth seen  Cytology pending  • Hyponatremia improving  Continue fluid restriction per nephrology recommendations  • Encouraged increased p o  intake  • Will place order for chest x-ray to have done before outpatient follow-up  • Continue PT/OT  Plan to discharge to fifth floor for rehab  Stable from a pulmonary standpoint  We will sign off  Call with questions  Chief Complaint:    \"I did a lot today  \"    Subjective:    Was seen and examined today  No overnight events reported  Patient states that she is feeling okay  She has more energy compared to when she first came in  She worked with therapy and they report that she did well  She plans to go to the fifth floor tomorrow for rehab  Patient denies any difference in her breathing compared to prior to the thoracentesis but at the same time she did not notice any breathing issues before hand  Denies shortness of breath, cough, sputum production, hemoptysis or wheeze  Still on room air  No chest pain or palpitations    No " "fevers or chills  Objective:    Vitals: Blood pressure 145/74, pulse 69, temperature 98 2 °F (36 8 °C), resp  rate 18, height 4' 10\" (1 473 m), weight 49 kg (108 lb 0 4 oz), SpO2 97 %  room air,Body mass index is 22 58 kg/m²  Intake/Output Summary (Last 24 hours) at 6/13/2023 1605  Last data filed at 6/13/2023 1321  Gross per 24 hour   Intake 370 ml   Output 175 ml   Net 195 ml       Invasive Devices     Peripheral Intravenous Line  Duration           Peripheral IV 06/11/23 Proximal;Right;Ventral (anterior) Forearm 2 days          Drain  Duration           External Urinary Catheter 2 days                Physical Exam:     Physical Exam  Vitals and nursing note reviewed  Constitutional:       General: She is not in acute distress  Appearance: Normal appearance  HENT:      Head: Normocephalic and atraumatic  Nose: Nose normal       Mouth/Throat:      Mouth: Mucous membranes are moist       Pharynx: Oropharynx is clear  Eyes:      Extraocular Movements: Extraocular movements intact  Cardiovascular:      Rate and Rhythm: Normal rate and regular rhythm  Heart sounds: No murmur heard  Pulmonary:      Effort: Pulmonary effort is normal       Breath sounds: No wheezing or rhonchi  Musculoskeletal:      Cervical back: Normal range of motion  No muscular tenderness  Skin:     General: Skin is warm and dry  Neurological:      General: No focal deficit present  Mental Status: She is alert  Mental status is at baseline  Psychiatric:         Mood and Affect: Mood normal          Behavior: Behavior normal          Labs: I have personally reviewed pertinent lab results  , ABG: No results found for: \"BEART\", \"ATB7ZKO\", \"P1NAAMVO\", \"XKU2XUQ\", \"PHART\", \"PO2ART\", \"SOURCE\", BNP: No results found for: \"BNP\", CBC:   Lab Results   Component Value Date    HCT 38 3 06/13/2023    HGB 13 2 06/13/2023    MCH 31 2 06/13/2023    MCHC 34 5 06/13/2023    MCV 91 06/13/2023    MPV 9 3 06/13/2023    NRBC 0 " "06/13/2023     06/13/2023    RBC 4 23 06/13/2023    RDW 15 2 (H) 06/13/2023    WBC 9 50 06/13/2023   , CMP:   Lab Results   Component Value Date    ALKPHOS 68 06/13/2023    ALT 22 06/13/2023    AST 23 06/13/2023    BUN 26 (H) 06/13/2023    CALCIUM 7 9 (L) 06/13/2023    CL 90 (L) 06/13/2023    CO2 22 06/13/2023    CREATININE 0 88 06/13/2023    EGFR 59 06/13/2023    K 4 3 06/13/2023    SODIUM 120 (L) 06/13/2023   , PT/INR: No results found for: \"INR\", \"PT\", Troponin: No results found for: \"TROPONINI\"      Imaging and other studies: I have personally reviewed pertinent films in PACS     Chest x-ray yesterday per my interpretation shows improved right pleural effusion with no pneumothorax      "

## 2023-06-13 NOTE — CASE MANAGEMENT
Case Management Discharge Planning Note    Patient name Nadia Grayson  Location [address] [id number] MRN [id number]  : 1935 Date 2023       Current Admission Date: 2023  Current Admission Diagnosis:Generalized weakness   Patient Active Problem List    Diagnosis Date Noted   • Acute cystitis with hematuria 2023   • UTI (urinary tract infection) 2023   • Moderate protein-calorie malnutrition ([address] ) 2023   • Atrial flutter ([address] )    • CHF (congestive heart failure) ([address] )    • Major depressive disorder, single episode, mild ([address] ) 2023   • Closed wedge compression fracture of T11 vertebra ([address] ) 2023   • Age-related osteoporosis with current pathological fracture of vertebra ([facility] ) 2023   • Medicare annual wellness visit, subsequent 2023   • Graves disease 2023   • Anemia 2023   • Coronary artery disease involving native coronary artery of native heart without angina pectoris 2023   • S/P TAVR (transcatheter aortic valve replacement) 2023   • Atrial fibrillation ([address] ) 2023   • Incomplete right bundle branch block 2023   • Constipation 2023   • Status post insertion of drug eluting coronary artery stent 2023   • Pleural effusion 2022   • Chronic diastolic (congestive) heart failure ([address] ) 2022   • Primary generalized (osteo)arthritis 2022   • Dyslipidemia 2022   • Post-menopause 2022   • CKD (chronic kidney disease), stage III ([address] ) 2021   • Generalized weakness 2021   • Ganglion cyst 2021   • Adhesive capsulitis of right shoulder 2021   • Bilateral leg edema 2021   • Chronic pain syndrome 2021   • Sacroiliitis ([address] ) 2021   • Gait abnormality 2021   • Chronic bilateral low back pain without sciatica 2021   • Osteopenia of neck of femur 2021   • Impaired fasting glucose    • Encounter for long-term (current) use of medications 10/31/2020   • Immunization due 10/31/2020   • Acute bursitis of right shoulder 10/31/2020   • Encounter for Medicare annual wellness exam 01/02/2020   • Mid back pain 09/30/2019   • Generalized anxiety disorder 09/05/2019   • Non-rheumatic tricuspid valve insufficiency 06/04/2019   • Lymphadenopathy 05/21/2019   • Hiatal hernia 05/21/2019   • Thrombocytopenia ([facility] ) 05/21/2019   • Severe aortic stenosis 05/21/2019   • Mitral valve insufficiency 05/21/2019   • Atrial tachycardia ([facility] [address] ) 05/21/2019   • Pulmonary hypertension ([facility] [address] [facility] ) 05/21/2019   • Diverticulosis 05/21/2019   • Essential hypertension 05/18/2019   • Hypomagnesemia 05/17/2019   • Hypokalemia 05/17/2019   • Hyponatremia 05/17/2019      LOS (days): 2  Geometric Mean LOS (GMLOS) (days): 3 50  Days to GMLOS:1 6     OBJECTIVE:  Risk of Unplanned Readmission Score: 23 79         Current admission status: Inpatient   Preferred Pharmacy:   [address]  [address] [address]  Phone: [phone] Fax: [phone] [facility] [address] BREANNA/ Viktor Diaz  [facility] [address]  Phone: [phone] Fax: [phone]    Primary Care Provider: Louann Lomax DO    Primary Insurance: MEDICARE  Secondary Insurance: BLUE CROSS    DISCHARGE DETAILS:  List provided over the phone to pt's spouse of facilities who accepted pt-chose the 5th floor  Pt is a tentative dc on 6/14

## 2023-06-13 NOTE — PROGRESS NOTES
[facility] [address] [place]  Progress Note  Name: Leatha Yang  MRN: [id number]  Unit/Bed#: 419-02 I Date of Admission: 6/11/2023   Date of Service: 6/13/2023 I Hospital Day: 2    Assessment/Plan   * Hyponatremia  Assessment & Plan  · Likely due to SIADH  · I appreciate Nephrology's input  · Continue to hold the SSRI indefinitely  · Continue the fluid restriction of 1000 mL/24 hours per Nephrology  · Follow the sodium level    Results from last 7 days   Lab Units 06/13/23  0449 06/12/23  1218 06/12/23  0418   BUN mg/dL 26* 23 22  23   CALCIUM mg/dL 7 9* 8 6 8 3*  8 4   CHLORIDE mmol/L 90* 87* 87*  87*   CO2 mmol/L 22 20* 22  22   CREATININE mg/dL 0 88 0 86 0 82  0 82   POTASSIUM mmol/L 4 3 3 8 3 9  3 9   SODIUM mmol/L 120* 119* 117*  117*       Pleural effusion on right  Assessment & Plan  · I appreciate Pulmonology's input  · Underwent a thoracentesis of the right lung, which yielded 1300 mL of pleural fluid  · The pleural fluid analysis is consistent with an exudative pleural effusion per Light's criteria, but this may be due to malnutrition  · Await fluid cytology results  · Check a new transthoracic echocardiogram    Generalized weakness  Assessment & Plan  · PT/OT evaluations  · The patient requires post-acute care rehabilitation placement upon discharge      Acute cystitis with hematuria  Assessment & Plan  · Complete a 3-day course of ceftriaxone 1000 mg IV every 24 hours during the hospitalization    Moderate protein-calorie malnutrition (HCC)  Assessment & Plan  Malnutrition Findings:   Adult Malnutrition type: Chronic illness  Adult Degree of Malnutrition: Malnutrition of moderate degree  Malnutrition Characteristics: Weight loss, Inadequate energy, Muscle loss                  360 Statement: Chronic, moderate protein-calorie malnutrition related to chronic illness with decreased PO intakes as evidenced by significant weight loss of 10% x 6 months, mild muscle wasting to clavicle "region, and less than 75% of estimated energy needs for greater than 7 days  Treating with PO diet and nutrition supplements  BMI Findings: Body mass index is 22 58 kg/m²  · Continue nutritional supplements per the dietitian's recommendations       Closed wedge compression fracture of T11 vertebra (HCC)  Assessment & Plan  · S/p nerve block on 06/07/2023 with Pain Management  · Continue cholecalciferol supplementation  · Needs an outpatient DEXA scan with PCP  · Outpatient Orthopedic Spine Surgery evaluation or Neurosurgery evaluation    Permanent atrial fibrillation [facility])  Assessment & Plan  · Also with a history of atrial flutter  · Continue anticoagulation with Eliquis 2 5 mg PO BID  · Continue Cardizem 30 mg PO every 8 hours and Toprol XL 50 mg PO Qdaily for heart rate control    Hypomagnesemia  Assessment & Plan  · Resolved with magnesium sulfate 2 grams IV x 1 dose on 06/12/2023  · Follow the magnesium level    Results from last 7 days   Lab Units 06/13/23  0449 06/12/23  0418   MAGNESIUM mg/dL 2 2 1 5*               Subjective/Objective     Subjective: The patient was seen and examined  The patient is doing better  No chest pain  No shortness of breath  No abdominal pain  No nausea or vomiting  Objective:  Vitals: Blood pressure 145/74, pulse 69, temperature 98 2 °F (36 8 °C), resp  rate 18, height 4' 10\" (1 473 m), weight 49 kg (108 lb 0 4 oz), SpO2 97 %  ,Body mass index is 22 58 kg/m²        Intake/Output Summary (Last 24 hours) at 6/13/2023 1633  Last data filed at 6/13/2023 1321  Gross per 24 hour   Intake 370 ml   Output 175 ml   Net 195 ml       Invasive Devices     Peripheral Intravenous Line  Duration           Peripheral IV 06/11/23 Proximal;Right;Ventral (anterior) Forearm 2 days          Drain  Duration           External Urinary Catheter 2 days                Physical Exam:  General:  NAD, follows commands  HEENT:  NC/AT, mucous membranes moist  Neck:  Supple, No JVP " elevation  CV:  + S1, + S2, Irregularly irregular rhythm, Regular rate  Pulm:  Crackles at the right base  Abd:  Soft, Non-tender, Non-distended  Ext:  No clubbing/cyanosis/edema  Skin:  No rashes  Neuro:  Awake, alert, oriented  Psych:  Normal mood and affect      Results from last 7 days   Lab Units 06/13/23  0449 06/12/23  0418 06/11/23  0751   HEMATOCRIT % 38 3 35 7 38 2   HEMOGLOBIN g/dL 13 2 12 4 13 1   PLATELETS Thousands/uL 185 181 219   WBC Thousand/uL 9 50 6 84 7 02     Results from last 7 days   Lab Units 06/13/23  0449 06/12/23  1218 06/12/23  0418   BUN mg/dL 26* 23 22  23   CALCIUM mg/dL 7 9* 8 6 8 3*  8 4   CHLORIDE mmol/L 90* 87* 87*  87*   CO2 mmol/L 22 20* 22  22   CREATININE mg/dL 0 88 0 86 0 82  0 82   POTASSIUM mmol/L 4 3 3 8 3 9  3 9   SODIUM mmol/L 120* 119* 117*  117*     Results from last 7 days   Lab Units 06/13/23  0449 06/12/23  0418   MAGNESIUM mg/dL 2 2 1 5*     Results from last 7 days   Lab Units 06/13/23  0449 06/12/23  0418 06/11/23  0751   ALK PHOS U/L 68 66 83   ALT U/L 22 27 30   AST U/L 23 26 38         Lab, Imaging and other studies: I have personally reviewed pertinent reports      VTE Pharmacologic Prophylaxis: Eliquis 2 5 mg PO BID  VTE Mechanical Prophylaxis: sequential compression device

## 2023-06-13 NOTE — OCCUPATIONAL THERAPY NOTE
Occupational Therapy Progress Note     Patient Name: Hal Howard  LSDHA'L Date: 6/13/2023  Problem List  Principal Problem:    Generalized weakness  Active Problems:    Hypomagnesemia    Hyponatremia    Gait abnormality    CKD (chronic kidney disease), stage III (HCC)    Dyslipidemia    Status post insertion of drug eluting coronary artery stent    S/P TAVR (transcatheter aortic valve replacement)    Atrial fibrillation (HCC)    Coronary artery disease involving native coronary artery of native heart without angina pectoris    Closed wedge compression fracture of T11 vertebra (HCC)    Major depressive disorder, single episode, mild (HCC)    Atrial flutter (HCC)    CHF (congestive heart failure) (HCC)    UTI (urinary tract infection)    Moderate protein-calorie malnutrition ([facility] [address] )    Acute cystitis with hematuria            06/13/23 0903   OT Last Visit   OT Visit Date 06/13/23   Note Type   Note Type Treatment   Pain Assessment   Pain Assessment Tool 0-10   Pain Score No Pain   Restrictions/Precautions   Weight Bearing Precautions Per Order No   Other Precautions Fall Risk;Multiple lines; Bed Alarm; Chair Alarm   ADL   Where Assessed Chair  (Without armrests)   LB Dressing Assistance 4  Minimal Assistance   LB Dressing Deficit Setup;Verbal cueing;Supervision/safety; Increased time to complete   Transfers   Sit to Stand 4  Minimal assistance   Additional items Assist x 1; Increased time required;Verbal cues  (Increased A from low surface with no armrests)   Stand to Sit 4  Minimal assistance   Additional items Assist x 1; Increased time required;Verbal cues;Armrests   Additional Comments Use of RW   Functional Mobility   Functional Mobility 4  Minimal assistance   Additional Comments Pt completed standing balance/functional mobility around room and hallway with use of RW and (CGA) with patient distance limited by fatigue     Additional items Rolling walker   Cognition   Overall Cognitive Status Impaired Arousal/Participation Alert   Attention Difficulty attending to directions   Orientation Level Oriented to person;Oriented to place; Disoriented to time;Disoriented to situation   Memory Decreased long term memory;Decreased short term memory;Decreased recall of recent events;Decreased recall of biographical information   Following Commands Follows one step commands with increased time or repetition   Activity Tolerance   Activity Tolerance Patient limited by fatigue   Assessment   Assessment Patient participated in Skilled OT session this date with interventions consisting of ADL re training with the use of correct body mechnaics and  therapeutic activities to: increase activity tolerance   Patient agreeable to OT treatment session, upon arrival patient was found seated OOB to Recliner  Sit to stand txfrs from low chair with Min Ax1, standing balance/functional mobility around room and hallway with use of RW and CGA, to increase posture, dynamic standing balance, balance during self cares, use of BUE  Pt completed LB dressing donning and doffing B  socks with Min A  Patient requiring verbal cues for safety and verbal cues for correct technique  Patient continues to be functioning below baseline level, occupational performance remains limited secondary to factors listed above and increased risk for falls and injury  From OT standpoint, recommendation at time of d/c would be Post acute rehabilitation services  The patient's raw score on the AM-PAC Daily Activity inpatient short form is 18, standardized score is 38 66, less than 39 4  Patients at this level are likely to benefit from discharge to post-acute rehabilitation services  Please refer to the recommendation of the Occupational Therapist for safe discharge planning     Plan   Goal Expiration Date 06/26/23   OT Treatment Day 1   OT Frequency 3-5x/wk   Recommendation   OT Discharge Recommendation Post acute rehabilitation services   AM-[facility] Daily Activity Inpatient   Lower Body Dressing 3   Bathing 2   Toileting 2   Upper Body Dressing 3   Grooming 4   Eating 4   Daily Activity Raw Score 18   Daily Activity Standardized Score (Calc for Raw Score >=11) 38 66   AM-PAC Applied Cognition Inpatient   Following a Speech/Presentation 3   Understanding Ordinary Conversation 3   Taking Medications 2   Remembering Where Things Are Placed or Put Away 2   Remembering List of 4-5 Errands 1   Taking Care of Complicated Tasks 1   Applied Cognition Raw Score 12   Applied Cognition Standardized Score 28 82       Pt left seated in tona chair with chair alarm on and all needs in reach

## 2023-06-13 NOTE — ASSESSMENT & PLAN NOTE
· PT/OT evaluations  · The patient requires post-acute care rehabilitation placement upon discharge

## 2023-06-13 NOTE — ASSESSMENT & PLAN NOTE
· Resolved with magnesium sulfate 2 grams IV x 1 dose on 06/12/2023  · Follow the magnesium level    Results from last 7 days   Lab Units 06/13/23  0449 06/12/23  0418   MAGNESIUM mg/dL 2 2 1 5*

## 2023-06-13 NOTE — PLAN OF CARE
Problem: PHYSICAL THERAPY ADULT  Goal: Performs mobility at highest level of function for planned discharge setting  See evaluation for individualized goals  Description: Treatment/Interventions: Functional transfer training, LE strengthening/ROM, Elevations, Therapeutic exercise, Endurance training, Gait training, Bed mobility          See flowsheet documentation for full assessment, interventions and recommendations  Outcome: Progressing  Note: Prognosis: Good  Problem List:  (Decreased strength; Decreased endurance; Impaired balance; Decreased mobility; Impaired judgement; Decreased safety awareness)  Assessment: Pt  seen for PT treatment session this date with interventions consisting of  therapeutic exercises,  transfers and  gait training w/ emphasis on improving pt's ability to ambulate  Pt  Requiring frequent  cues for sequence and safety  In comparison to previous session, Pt  With improvements in activity tolerance  Pt is in need of continued activity in PT to improve strength balance endurance mobility transfers and ambulation with return to maximize LOF  From PT/mobility standpoint, recommendation at time of d/c would be post acute rehab in order to promote return to PLOF and independence  The patient's AM-PAC Basic Mobility Inpatient Short Form Raw Score is 16  A Raw score of less than or equal to 16 suggests the patient may benefit from discharge to post-acute rehabilitation services  Please also refer to physical therapy recommendation for safe DC planning  Co-treat with LEMUEL this session due to complexity of pt and may  require A x 2 to provided skilled interventions  Barriers to Discharge: Decreased caregiver support     PT Discharge Recommendation: Post acute rehabilitation services    See flowsheet documentation for full assessment

## 2023-06-13 NOTE — ASSESSMENT & PLAN NOTE
Malnutrition Findings:   Adult Malnutrition type: Chronic illness  Adult Degree of Malnutrition: Malnutrition of moderate degree  Malnutrition Characteristics: Weight loss, Inadequate energy, Muscle loss                  360 Statement: Chronic, moderate protein-calorie malnutrition related to chronic illness with decreased PO intakes as evidenced by significant weight loss of 10% x 6 months, mild muscle wasting to clavicle region, and less than 75% of estimated energy needs for greater than 7 days  Treating with PO diet and nutrition supplements  BMI Findings: Body mass index is 22 58 kg/m²       · Continue nutritional supplements per the dietitian's recommendations

## 2023-06-13 NOTE — ASSESSMENT & PLAN NOTE
· Also with a history of atrial flutter  · Continue anticoagulation with Eliquis 2 5 mg PO BID  · Continue Cardizem 30 mg PO every 8 hours and Toprol XL 50 mg PO Qdaily for heart rate control

## 2023-06-14 ENCOUNTER — APPOINTMENT (INPATIENT)
Dept: NON INVASIVE DIAGNOSTICS | Facility: HOSPITAL | Age: 88
End: 2023-06-14
Payer: MEDICARE

## 2023-06-14 ENCOUNTER — TRANSITIONAL CARE MANAGEMENT (OUTPATIENT)
Dept: FAMILY MEDICINE CLINIC | Facility: CLINIC | Age: 88
End: 2023-06-14

## 2023-06-14 ENCOUNTER — TELEPHONE (OUTPATIENT)
Dept: PAIN MEDICINE | Facility: CLINIC | Age: 88
End: 2023-06-14

## 2023-06-14 ENCOUNTER — TELEPHONE (OUTPATIENT)
Dept: PULMONOLOGY | Facility: CLINIC | Age: 88
End: 2023-06-14

## 2023-06-14 VITALS
RESPIRATION RATE: 15 BRPM | BODY MASS INDEX: 22.67 KG/M2 | HEART RATE: 63 BPM | SYSTOLIC BLOOD PRESSURE: 138 MMHG | WEIGHT: 108 LBS | HEIGHT: 58 IN | TEMPERATURE: 97.7 F | DIASTOLIC BLOOD PRESSURE: 66 MMHG | OXYGEN SATURATION: 97 %

## 2023-06-14 PROBLEM — I07.1 TRICUSPID VALVE INSUFFICIENCY: Status: ACTIVE | Noted: 2023-06-14

## 2023-06-14 PROBLEM — R94.31 PROLONGED QT INTERVAL: Status: ACTIVE | Noted: 2023-06-14

## 2023-06-14 LAB
ALBUMIN SERPL BCP-MCNC: 3.6 G/DL (ref 3.5–5)
ALP SERPL-CCNC: 71 U/L (ref 34–104)
ALT SERPL W P-5'-P-CCNC: 20 U/L (ref 7–52)
ANION GAP SERPL CALCULATED.3IONS-SCNC: 7 MMOL/L (ref 4–13)
AORTIC ROOT: 2.6 CM
AORTIC VALVE MEAN VELOCITY: 12.9 M/S
APICAL FOUR CHAMBER EJECTION FRACTION: 49 %
AST SERPL W P-5'-P-CCNC: 17 U/L (ref 13–39)
AV AREA BY CONTINUOUS VTI: 1.7 CM2
AV AREA PEAK VELOCITY: 1.8 CM2
AV LVOT MEAN GRADIENT: 3 MMHG
AV LVOT PEAK GRADIENT: 5 MMHG
AV MEAN GRADIENT: 8 MMHG
AV PEAK GRADIENT: 17 MMHG
AV VALVE AREA: 1.69 CM2
AV VELOCITY RATIO: 0.55
BASOPHILS # BLD AUTO: 0 THOUSANDS/ÂΜL (ref 0–0.1)
BASOPHILS NFR BLD AUTO: 0 % (ref 0–1)
BILIRUB SERPL-MCNC: 0.72 MG/DL (ref 0.2–1)
BUN SERPL-MCNC: 25 MG/DL (ref 5–25)
CA-I BLD-SCNC: 1.12 MMOL/L (ref 1.12–1.32)
CALCIUM SERPL-MCNC: 8.3 MG/DL (ref 8.4–10.2)
CHLORIDE SERPL-SCNC: 90 MMOL/L (ref 96–108)
CO2 SERPL-SCNC: 25 MMOL/L (ref 21–32)
CREAT SERPL-MCNC: 0.83 MG/DL (ref 0.6–1.3)
DOP CALC AO PEAK VEL: 2.06 M/S
DOP CALC AO VTI: 47.26 CM
DOP CALC LVOT AREA: 3.14 CM2
DOP CALC LVOT DIAMETER: 2 CM
DOP CALC LVOT PEAK VEL VTI: 25.49 CM
DOP CALC LVOT PEAK VEL: 1.13 M/S
DOP CALC LVOT STROKE INDEX: 56.4 ML/M2
DOP CALC LVOT STROKE VOLUME: 80.04 CM3
EOSINOPHIL # BLD AUTO: 0.05 THOUSAND/ÂΜL (ref 0–0.61)
EOSINOPHIL NFR BLD AUTO: 1 % (ref 0–6)
ERYTHROCYTE [DISTWIDTH] IN BLOOD BY AUTOMATED COUNT: 15.3 % (ref 11.6–15.1)
FRACTIONAL SHORTENING: 30 % (ref 28–44)
GFR SERPL CREATININE-BSD FRML MDRD: 63 ML/MIN/1.73SQ M
GLUCOSE SERPL-MCNC: 103 MG/DL (ref 65–140)
HCT VFR BLD AUTO: 35.9 % (ref 34.8–46.1)
HGB BLD-MCNC: 12.1 G/DL (ref 11.5–15.4)
IMM GRANULOCYTES # BLD AUTO: 0.05 THOUSAND/UL (ref 0–0.2)
IMM GRANULOCYTES NFR BLD AUTO: 1 % (ref 0–2)
INTERVENTRICULAR SEPTUM IN DIASTOLE (PARASTERNAL SHORT AXIS VIEW): 1.4 CM
INTERVENTRICULAR SEPTUM: 1.4 CM (ref 0.6–1.1)
LAAS-AP2: 32.4 CM2
LAAS-AP4: 33 CM2
LEFT ATRIUM SIZE: 4.7 CM
LEFT INTERNAL DIMENSION IN SYSTOLE: 2.8 CM (ref 2.1–4)
LEFT VENTRICULAR INTERNAL DIMENSION IN DIASTOLE: 4 CM (ref 3.5–6)
LEFT VENTRICULAR POSTERIOR WALL IN END DIASTOLE: 1.3 CM
LEFT VENTRICULAR STROKE VOLUME: 41 ML
LVSV (TEICH): 41 ML
LYMPHOCYTES # BLD AUTO: 0.63 THOUSANDS/ÂΜL (ref 0.6–4.47)
LYMPHOCYTES NFR BLD AUTO: 9 % (ref 14–44)
MAGNESIUM SERPL-MCNC: 2.1 MG/DL (ref 1.9–2.7)
MCH RBC QN AUTO: 30.8 PG (ref 26.8–34.3)
MCHC RBC AUTO-ENTMCNC: 33.7 G/DL (ref 31.4–37.4)
MCV RBC AUTO: 91 FL (ref 82–98)
MONOCYTES # BLD AUTO: 0.91 THOUSAND/ÂΜL (ref 0.17–1.22)
MONOCYTES NFR BLD AUTO: 12 % (ref 4–12)
NEUTROPHILS # BLD AUTO: 5.8 THOUSANDS/ÂΜL (ref 1.85–7.62)
NEUTS SEG NFR BLD AUTO: 77 % (ref 43–75)
NRBC BLD AUTO-RTO: 0 /100 WBCS
PHOSPHATE SERPL-MCNC: 2.9 MG/DL (ref 2.3–4.1)
PLATELET # BLD AUTO: 184 THOUSANDS/UL (ref 149–390)
PMV BLD AUTO: 8.9 FL (ref 8.9–12.7)
POTASSIUM SERPL-SCNC: 4.3 MMOL/L (ref 3.5–5.3)
PROCALCITONIN SERPL-MCNC: 0.09 NG/ML
PROT SERPL-MCNC: 5.8 G/DL (ref 6.4–8.4)
RA PRESSURE ESTIMATED: 8 MMHG
RBC # BLD AUTO: 3.93 MILLION/UL (ref 3.81–5.12)
RIGHT ATRIUM AREA SYSTOLE A4C: 16 CM2
RIGHT VENTRICLE ID DIMENSION: 2.6 CM
RV PSP: 52 MMHG
SARS-COV-2 RNA RESP QL NAA+PROBE: NEGATIVE
SL CV LEFT ATRIUM LENGTH A2C: 6.7 CM
SL CV LV EF: 50
SL CV PED ECHO LEFT VENTRICLE DIASTOLIC VOLUME (MOD BIPLANE) 2D: 71 ML
SL CV PED ECHO LEFT VENTRICLE SYSTOLIC VOLUME (MOD BIPLANE) 2D: 31 ML
SODIUM SERPL-SCNC: 122 MMOL/L (ref 135–147)
TR MAX PG: 44 MMHG
TR PEAK VELOCITY: 3.3 M/S
TRICUSPID VALVE PEAK REGURGITATION VELOCITY: 3.33 M/S
WBC # BLD AUTO: 7.44 THOUSAND/UL (ref 4.31–10.16)

## 2023-06-14 PROCEDURE — 93306 TTE W/DOPPLER COMPLETE: CPT | Performed by: INTERNAL MEDICINE

## 2023-06-14 PROCEDURE — 97530 THERAPEUTIC ACTIVITIES: CPT

## 2023-06-14 PROCEDURE — 88112 CYTOPATH CELL ENHANCE TECH: CPT | Performed by: PATHOLOGY

## 2023-06-14 PROCEDURE — 93306 TTE W/DOPPLER COMPLETE: CPT

## 2023-06-14 PROCEDURE — 97116 GAIT TRAINING THERAPY: CPT

## 2023-06-14 PROCEDURE — 99239 HOSP IP/OBS DSCHRG MGMT >30: CPT | Performed by: INTERNAL MEDICINE

## 2023-06-14 PROCEDURE — 80053 COMPREHEN METABOLIC PANEL: CPT | Performed by: INTERNAL MEDICINE

## 2023-06-14 PROCEDURE — 88305 TISSUE EXAM BY PATHOLOGIST: CPT | Performed by: PATHOLOGY

## 2023-06-14 PROCEDURE — 84100 ASSAY OF PHOSPHORUS: CPT | Performed by: INTERNAL MEDICINE

## 2023-06-14 PROCEDURE — 87635 SARS-COV-2 COVID-19 AMP PRB: CPT | Performed by: INTERNAL MEDICINE

## 2023-06-14 PROCEDURE — 82330 ASSAY OF CALCIUM: CPT | Performed by: INTERNAL MEDICINE

## 2023-06-14 PROCEDURE — 83735 ASSAY OF MAGNESIUM: CPT | Performed by: INTERNAL MEDICINE

## 2023-06-14 PROCEDURE — 85025 COMPLETE CBC W/AUTO DIFF WBC: CPT | Performed by: INTERNAL MEDICINE

## 2023-06-14 PROCEDURE — 84145 PROCALCITONIN (PCT): CPT | Performed by: INTERNAL MEDICINE

## 2023-06-14 PROCEDURE — 99232 SBSQ HOSP IP/OBS MODERATE 35: CPT | Performed by: INTERNAL MEDICINE

## 2023-06-14 PROCEDURE — 97535 SELF CARE MNGMENT TRAINING: CPT

## 2023-06-14 RX ORDER — METOPROLOL SUCCINATE 50 MG/1
50 TABLET, EXTENDED RELEASE ORAL DAILY
Qty: 90 TABLET | Refills: 0
Start: 2023-06-14

## 2023-06-14 RX ORDER — AMMONIUM LACTATE 12 G/100G
LOTION TOPICAL DAILY
Refills: 0
Start: 2023-06-15

## 2023-06-14 RX ORDER — ZINC OXIDE 216 MG/ML
1 LOTION TOPICAL 2 TIMES DAILY
Qty: 237 ML | Refills: 0
Start: 2023-06-14

## 2023-06-14 RX ORDER — MAGNESIUM HYDROXIDE/ALUMINUM HYDROXICE/SIMETHICONE 120; 1200; 1200 MG/30ML; MG/30ML; MG/30ML
30 SUSPENSION ORAL EVERY 4 HOURS PRN
Refills: 0
Start: 2023-06-14

## 2023-06-14 RX ADMIN — PSYLLIUM HUSK 1 PACKET: 3.4 POWDER ORAL at 08:00

## 2023-06-14 RX ADMIN — CHOLECALCIFEROL TAB 25 MCG (1000 UNIT) 2000 UNITS: 25 TAB at 08:00

## 2023-06-14 RX ADMIN — MULTIPLE VITAMINS W/ MINERALS TAB 1 TABLET: TAB at 08:00

## 2023-06-14 RX ADMIN — DILTIAZEM HYDROCHLORIDE 30 MG: 30 TABLET ORAL at 05:23

## 2023-06-14 RX ADMIN — DILTIAZEM HYDROCHLORIDE 30 MG: 30 TABLET ORAL at 13:05

## 2023-06-14 RX ADMIN — PANTOPRAZOLE SODIUM 40 MG: 40 TABLET, DELAYED RELEASE ORAL at 08:00

## 2023-06-14 RX ADMIN — METOPROLOL SUCCINATE 50 MG: 50 TABLET, EXTENDED RELEASE ORAL at 08:00

## 2023-06-14 RX ADMIN — Medication: at 08:00

## 2023-06-14 RX ADMIN — CLOPIDOGREL BISULFATE 75 MG: 75 TABLET ORAL at 08:00

## 2023-06-14 RX ADMIN — APIXABAN 2.5 MG: 2.5 TABLET, FILM COATED ORAL at 08:00

## 2023-06-14 NOTE — CASE MANAGEMENT
Case Management Discharge Planning Note    Patient name Jay Lowery  Location [address] [id number] MRN [id number]  : 1935 Date 2023       Current Admission Date: 2023  Current Admission Diagnosis:Hyponatremia   Patient Active Problem List    Diagnosis Date Noted   • Acute cystitis with hematuria 2023   • Pleural effusion on right 2023   • UTI (urinary tract infection) 2023   • Moderate protein-calorie malnutrition ([address] ) 2023   • Atrial flutter ([address] )    • CHF (congestive heart failure) ([address] )    • Major depressive disorder, single episode, mild ([address] ) 2023   • Closed wedge compression fracture of T11 vertebra ([address] ) 2023   • Age-related osteoporosis with current pathological fracture of vertebra ([address] ) 2023   • Medicare annual wellness visit, subsequent 2023   • Graves disease 2023   • Anemia 2023   • Coronary artery disease involving native coronary artery of native heart without angina pectoris 2023   • S/P TAVR (transcatheter aortic valve replacement) 2023   • Permanent atrial fibrillation ([address] ) 2023   • Incomplete right bundle branch block 2023   • Constipation 2023   • Status post insertion of drug eluting coronary artery stent 2023   • Pleural effusion 2022   • Chronic diastolic (congestive) heart failure ([address] ) 2022   • Primary generalized (osteo)arthritis 2022   • Dyslipidemia 2022   • Post-menopause 2022   • CKD (chronic kidney disease), stage III ([address] ) 2021   • Generalized weakness 2021   • Ganglion cyst 2021   • Adhesive capsulitis of right shoulder 2021   • Bilateral leg edema 2021   • Chronic pain syndrome 2021   • Sacroiliitis ([address] ) 2021   • Gait abnormality 2021   • Chronic bilateral low back pain without sciatica 2021   • Osteopenia of neck of femur 2021   • Impaired fasting glucose    • Encounter for long-term (current) use of medications 10/31/2020   • Immunization due 10/31/2020   • Acute bursitis of right shoulder 10/31/2020   • Encounter for Medicare annual wellness exam 01/02/2020   • Mid back pain 09/30/2019   • Generalized anxiety disorder 09/05/2019   • Non-rheumatic tricuspid valve insufficiency 06/04/2019   • Lymphadenopathy 05/21/2019   • Hiatal hernia 05/21/2019   • Thrombocytopenia ([facility] ) 05/21/2019   • Severe aortic stenosis 05/21/2019   • Mitral valve insufficiency 05/21/2019   • Atrial tachycardia ([address] ) 05/21/2019   • Pulmonary hypertension ([facility] [address] ) 05/21/2019   • Diverticulosis 05/21/2019   • Essential hypertension 05/18/2019   • Hypomagnesemia 05/17/2019   • Hypokalemia 05/17/2019   • Hyponatremia 05/17/2019      LOS (days): 3  Geometric Mean LOS (GMLOS) (days): 3 50  Days to GMLOS:0 6     OBJECTIVE:  Risk of Unplanned Readmission Score: 21 37         Current admission status: Inpatient   Preferred Pharmacy:   [facility] [address]  94 20 56 [address] [place] [address]  Phone: [phone] Fax: [address] [address]  Phone: [phone] Fax: [phone]    Primary Care Provider: Iván Thomas DO    Primary Insurance: MEDICARE  Secondary Insurance: BLUE CROSS    DISCHARGE DETAILS:  Pt being dc'd to the 5th floor on this date

## 2023-06-14 NOTE — PLAN OF CARE
Problem: PHYSICAL THERAPY ADULT  Goal: Performs mobility at highest level of function for planned discharge setting  See evaluation for individualized goals  Description: Treatment/Interventions: Functional transfer training, LE strengthening/ROM, Elevations, Therapeutic exercise, Endurance training, Gait training, Bed mobility          See flowsheet documentation for full assessment, interventions and recommendations  Outcome: Progressing  Note: Prognosis: Good  Problem List:  (Decreased strength; Decreased endurance; Impaired balance; Decreased mobility; Impaired judgement; Decreased safety awareness)  Assessment: Pt  seen for PT treatment session this date with interventions consisting of  therapeutic exercises,  transfers and  gait training w/ emphasis on improving pt's ability to ambulate  Pt  Requiring min  cues for sequence and safety  In comparison to previous session, Pt  With improvements in activity tolerance  Pt is in need of continued activity in PT to improve strength balance endurance mobility transfers and ambulation with return to maximize LOF  From PT/mobility standpoint, recommendation at time of d/c would be post acute rehab in order to promote return to PLOF and independence  The patient's AM-PAC Basic Mobility Inpatient Short Form Raw Score is 16  A Raw score of less than or equal to 16 suggests the patient may benefit from discharge to post-acute rehabilitation services  Please also refer to physical therapy recommendation for safe DC planning  Co-treat with LEMUEL this session due to complexity of pt and may  require A x 2 to provided skilled interventions  Barriers to Discharge: Decreased caregiver support     PT Discharge Recommendation: Post acute rehabilitation services    See flowsheet documentation for full assessment

## 2023-06-14 NOTE — ASSESSMENT & PLAN NOTE
· Resolved with magnesium sulfate 2 grams IV x 1 dose on 06/12/2023  · Follow the magnesium level after discharge with her PCP    Results from last 7 days   Lab Units 06/14/23  0532 06/13/23  0449 06/12/23  0418   MAGNESIUM mg/dL 2 1 2 2 1 5*

## 2023-06-14 NOTE — ASSESSMENT & PLAN NOTE
· RCA stenting 1/11/2023  We will continue Plavix until January 2024     • Outpatient follow-up with Cardiology

## 2023-06-14 NOTE — ASSESSMENT & PLAN NOTE
· RCA stenting 1/11/2023  We will continue Plavix until January 2024     · Continue PO Toprol XL and PO atorvastatin  · Defer to Cardiology if the patient needs high-intensity statin therapy dosing  • Outpatient follow-up with Cardiology

## 2023-06-14 NOTE — ASSESSMENT & PLAN NOTE
· PT/OT evaluations  · The patient required post-acute care rehabilitation placement upon discharge

## 2023-06-14 NOTE — ASSESSMENT & PLAN NOTE
· Likely due to SIADH  · I appreciate Nephrology's input  · Continue to hold the SSRI indefinitely  · Continue the fluid restriction of 1000 mL/24 hours per Nephrology  · Follow the sodium level after discharge with Nephrology  · The patient will be followed by Nephrology at the [address] [place] [address],[address] 5[address] floor [facility]    · Close outpatient follow-up with Nephrology    Results from last 7 days   Lab Units 06/14/23  0532 06/13/23  0449 06/12/23  1218   BUN mg/dL 25 26* 23   CALCIUM mg/dL 8 3* 7 9* 8 6   CHLORIDE mmol/L 90* 90* 87*   CO2 mmol/L 25 22 20*   CREATININE mg/dL 0 83 0 88 0 86   POTASSIUM mmol/L 4 3 4 3 3 8   SODIUM mmol/L 122* 120* 119*

## 2023-06-14 NOTE — PHYSICAL THERAPY NOTE
PHYSICAL THERAPY NOTE          Patient Name: Gisela Dubose  [id number] Date: 6/14/2023 06/14/23 0900   PT Last Visit   PT Visit Date 06/14/23   Note Type   Note Type Treatment   Pain Assessment   Pain Assessment Tool 0-10   Pain Score No Pain   Cognition   Overall Cognitive Status Impaired   Following Commands Follows one step commands with increased time or repetition   Subjective   Subjective Agreeable to therapy  Bed Mobility   Additional Comments OOB in chair start/end PT session   Transfers   Sit to Stand 4  Minimal assistance   Additional items Assist x 1; Armrests; Increased time required;Verbal cues   Stand to Sit 4  Minimal assistance   Additional items Assist x 1; Armrests; Increased time required;Verbal cues   Stand pivot 4  Minimal assistance   Additional items Verbal cues   Toilet transfer 4  Minimal assistance   Additional items Assist x 1; Increased time required;Verbal cues; Commode   Additional Comments RW used  fair-  balance for clothing managment and hygiene  when toileting   Ambulation/Elevation   Gait pattern   (Excessively slow; Short stride; Decreased foot clearance; Narrow DAVID; Retropulsion; Improper Weight shift)   Gait Assistance 5  Supervision  (CGA)   Additional items Verbal cues   Assistive Device Rolling walker   Distance 75'   Balance   Static Sitting Fair +   Dynamic Sitting Fair +   Static Standing Fair -   Dynamic Standing Fair -   Ambulatory Fair -  (RW)   Endurance Deficit   Endurance Deficit Yes   Activity Tolerance   Activity Tolerance Patient limited by fatigue   Assessment   Prognosis Good   Problem List   (Decreased strength; Decreased endurance; Impaired balance; Decreased mobility;  Impaired judgement; Decreased safety awareness)   Assessment Pt  seen for PT treatment session this date with interventions consisting of  therapeutic exercises,  transfers and  gait training w/ emphasis on improving pt's ability to ambulate  Pt  Requiring min  cues for sequence and safety  In comparison to previous session, Pt  With improvements in activity tolerance  Pt is in need of continued activity in PT to improve strength balance endurance mobility transfers and ambulation with return to maximize LOF  From PT/mobility standpoint, recommendation at time of d/c would be post acute rehab in order to promote return to PLOF and independence  The patient's AM-PAC Basic Mobility Inpatient Short Form Raw Score is 16  A Raw score of less than or equal to 16 suggests the patient may benefit from discharge to post-acute rehabilitation services  Please also refer to physical therapy recommendation for safe DC planning  Co-treat with LEMUEL this session due to complexity of pt and may  require A x 2 to provided skilled interventions   Barriers to Discharge Decreased caregiver support   Goals   LTG Expiration Date 06/26/23   Plan   Treatment/Interventions   (Functional transfer training; LE strengthening/ROM; Elevations; Therapeutic exercise; Endurance training; Gait training; Bed mobility)   Progress Progressing toward goals   PT Frequency 3-5x/wk   Recommendation   PT Discharge Recommendation Post acute rehabilitation services   AM-PAC Basic Mobility Inpatient   Turning in Flat Bed Without Bedrails 3   Lying on Back to Sitting on Edge of Flat Bed Without Bedrails 3   Moving Bed to Chair 3   Standing Up From Chair Using Arms 3   Walk in Room 3   Climb 3-5 Stairs With Railing 1   Basic Mobility Inpatient Raw Score 16   Basic Mobility Standardized Score 38 32   Highest Level Of Mobility   JH-HLM Goal 5: Stand one or more mins   JH-HLM Achieved 7: Walk 25 feet or more   Education   Education Provided Mobility training   Patient Demonstrates verbal understanding;Reinforcement needed   End of Consult   Patient Position at End of Consult Bedside chair;Bed/Chair alarm activated; All needs within reach   End of Consult Comments discussed POC with PT

## 2023-06-14 NOTE — TELEPHONE ENCOUNTER
Caller: Mariela   Doctor Stephany Hackett  % of improvement:  Pain Scale (1-10):    Mariela  said patient was admitted into hospital on 6/11 and still there. All Caleb knows is that he asked her if she has back pain before she went to the hospital and she said no    Sloan # [phone]

## 2023-06-14 NOTE — ASSESSMENT & PLAN NOTE
Malnutrition Findings:   Adult Malnutrition type: Chronic illness  Adult Degree of Malnutrition: Malnutrition of moderate degree  Malnutrition Characteristics: Weight loss, Inadequate energy, Muscle loss                  360 Statement: Chronic, moderate protein-calorie malnutrition related to chronic illness with decreased PO intakes as evidenced by significant weight loss of 10% x 6 months, mild muscle wasting to clavicle region, and less than 75% of estimated energy needs for greater than 7 days  Treating with PO diet and nutrition supplements  BMI Findings: Body mass index is 22 57 kg/m²       · Continue nutritional supplements per the dietitian's recommendations

## 2023-06-14 NOTE — ASSESSMENT & PLAN NOTE
· Outpatient sleep study to check for obstructive sleep apnea  · Outpatient follow-up with Cardiology and with Pulmonology    Transthoracic echocardiogram (06/14/2023): Tricuspid Valve Tricuspid valve structure is normal  There is moderate regurgitation  There is no evidence of stenosis  The estimated right ventricular systolic pressure is 75 26 mmHg

## 2023-06-14 NOTE — ASSESSMENT & PLAN NOTE
• Outpatient follow-up with Cardiology      Transthoracic echocardiogram (06/14/2023): Tricuspid Valve Tricuspid valve structure is normal  There is moderate regurgitation  There is no evidence of stenosis  The estimated right ventricular systolic pressure is 11 57 mmHg

## 2023-06-14 NOTE — DISCHARGE SUMMARY
[facility] [address]  Discharge- Hemanth Valerio 1935, 80 y o  female MRN: [id number]  Unit/Bed#: 405-01 Encounter: [id number]  Primary Care Provider: Monika Rome DO   Date and time admitted to hospital: 6/11/2023  7:05 AM    * Hyponatremia  Assessment & Plan  · Likely due to SIADH  · I appreciate Nephrology's input  · Continue to hold the SSRI indefinitely  · Continue the fluid restriction of 1000 mL/24 hours per Nephrology  · Follow the sodium level after discharge with Nephrology  · The patient will be followed by Nephrology at the [address] [place] [address],[address] 5th floor SNF  · Close outpatient follow-up with Nephrology    Results from last 7 days   Lab Units 06/14/23  0532 06/13/23  0449 06/12/23  1218   BUN mg/dL 25 26* 23   CALCIUM mg/dL 8 3* 7 9* 8 6   CHLORIDE mmol/L 90* 90* 87*   CO2 mmol/L 25 22 20*   CREATININE mg/dL 0 83 0 88 0 86   POTASSIUM mmol/L 4 3 4 3 3 8   SODIUM mmol/L 122* 120* 119*       Pleural effusion on right  Assessment & Plan  · I appreciate Pulmonology's input  · Underwent a thoracentesis of the right lung, which yielded 1300 mL of pleural fluid  · The pleural fluid analysis is consistent with an exudative pleural effusion per Light's criteria, but this may be due to malnutrition  · Await fluid cytology results  · Outpatient follow-up with Pulmonology    Generalized weakness  Assessment & Plan  · PT/OT evaluations  · The patient required post-acute care rehabilitation placement upon discharge  Acute cystitis with hematuria  Assessment & Plan  · Completed a 3-day course of ceftriaxone 1000 mg IV every 24 hours during the hospitalization    Prolonged QT interval  Assessment & Plan  · Avoid all QT-prolonging agents  · Follow the QT interval with Cardiology as an outpatient    Tricuspid valve insufficiency  Assessment & Plan  • Outpatient follow-up with Cardiology      Transthoracic echocardiogram (06/14/2023):     Tricuspid Valve Tricuspid valve structure is normal  There is moderate regurgitation  There is no evidence of stenosis  The estimated right ventricular systolic pressure is 22 18 mmHg  Moderate protein-calorie malnutrition ([address] )  Assessment & Plan  Malnutrition Findings:   Adult Malnutrition type: Chronic illness  Adult Degree of Malnutrition: Malnutrition of moderate degree  Malnutrition Characteristics: Weight loss, Inadequate energy, Muscle loss                  360 Statement: Chronic, moderate protein-calorie malnutrition related to chronic illness with decreased PO intakes as evidenced by significant weight loss of 10% x 6 months, mild muscle wasting to clavicle region, and less than 75% of estimated energy needs for greater than 7 days  Treating with PO diet and nutrition supplements  BMI Findings: Body mass index is 22 57 kg/m²  · Continue nutritional supplements per the dietitian's recommendations       Closed wedge compression fracture of T11 vertebra (HCC)  Assessment & Plan  · S/p nerve block on 06/07/2023 with Pain Management  · Continue cholecalciferol supplementation  · Needs an outpatient DEXA scan with PCP  · Outpatient Orthopedic Spine Surgery evaluation or Neurosurgery evaluation    Coronary artery disease involving native coronary artery of native heart without angina pectoris  Assessment & Plan  · RCA stenting 1/11/2023  We will continue Plavix until January 2024     · Continue PO Toprol XL and PO atorvastatin  · Defer to Cardiology if the patient needs high-intensity statin therapy dosing  • Outpatient follow-up with Cardiology      Permanent atrial fibrillation [facility])  Assessment & Plan  · Also with a history of atrial flutter  · Continue anticoagulation with Eliquis 2 5 mg PO BID  · Continue Cardizem 30 mg PO every 8 hours and Toprol XL 50 mg PO Qdaily for heart rate control  • Outpatient follow-up with Cardiology      S/P TAVR (transcatheter aortic valve replacement)  Assessment & Plan  S/P TAVR W/ 23MM CALVERT SHAVONNE S3 ULTRA VALVE 1/17/2023     • Outpatient follow-up with Cardiology    Status post insertion of drug eluting coronary artery stent  Assessment & Plan  · RCA stenting 1/11/2023  We will continue Plavix until January 2024  • Outpatient follow-up with Cardiology      Dyslipidemia  Assessment & Plan  · Continue statin therapy    Gait abnormality  Assessment & Plan  · PT/OT evaluations  · The patient required post-acute care rehabilitation placement upon discharge  Pulmonary hypertension ([address] )  Assessment & Plan  · Outpatient sleep study to check for obstructive sleep apnea  · Outpatient follow-up with Cardiology and with Pulmonology    Transthoracic echocardiogram (06/14/2023): Tricuspid Valve Tricuspid valve structure is normal  There is moderate regurgitation  There is no evidence of stenosis  The estimated right ventricular systolic pressure is 88 91 mmHg  Hypomagnesemia  Assessment & Plan  · Resolved with magnesium sulfate 2 grams IV x 1 dose on 06/12/2023  · Follow the magnesium level after discharge with her PCP    Results from last 7 days   Lab Units 06/14/23  0532 06/13/23  0449 06/12/23  0418   MAGNESIUM mg/dL 2 1 2 2 1 5*         Discharging Physician / Practitioner: Kwabena Roldan DO  PCP: Monika Rome DO  Admission Date:   Admission Orders (From admission, onward)     Ordered        06/11/23 1122  INPATIENT ADMISSION  Once                      Discharge Date: 06/14/23    Consultations During Hospital Stay:  · Nephrology  · Pulmonology    Procedures Performed:   · Thoracentesis by Dr Lucrecia Harada (Pulmonology) on 06/12/2023    Significant Findings / Test Results:   XR chest portable    Result Date: 6/14/2023  Impression: Significant decrease in right pleural effusion status post thoracentesis with trace residual and no pneumothorax  Workstation performed: [id number]     XR chest 1 view portable    Result Date: 6/11/2023  Impression: Large right effusion and right base atelectasis  "Workstation performed: [id number]     CT chest without contrast    Result Date: 2023  Impression: Mild interstitial edema  Large right pleural effusion with moderate compressive atelectasis of the right lower lobe  Trace left effusion  Mild new endplate compression deformities in the spine since 2022  Workstation performed: [id number]     -Suspect arm lead reversal, interpretation assumes no reversal  Atrial fibrillation with premature ventricular or aberrantly conducted complexes  Incomplete right bundle branch block  Anterolateral infarct (cited on or before 2023)  Prolonged QT  Abnormal ECG  When compared with ECG of 15-FEB-2023 13:06,  Atrial fibrillation has replaced Atrial flutter  Questionable change in QRS duration  Questionable change in initial forces of Anterolateral leads  Confirmed by Silvina Minor ([id number]) on 2023 12:53:28 PM      Specimen Collected: 23 08:24 Last Resulted: 23 12:53        [facility]   [place]   [phone]  Name: Juvenal Stokes                       : 1935  MRN: [id number]                       Age: 80 y o  Patient Status: Inpatient          Gender: female  Echo complete    Height:  4' 10\" (1 473 m)   Weight:  108 lb (49 kg)   BSA:  1 4 m²   Blood Pressure:  138/77    Date of Study:  23   Ordering Provider:  Genia Ceballos DO   Clinical Indications:  Heart Failure       Interpreting Physicians  Performing Staff    Gloria Maldonado DO Tech: Fermin Pat, RS          Vitals    Height Weight BSA (Calculated - m2) BP Pulse   4' 10\" (1 473 m) 49 kg (108 lb) 1 4 sq meters 138/77 65       PACS Images     Show images for Echo complete w/ contrast if indicated      Study Details    This transthoracic echocardiogram was performed at bedside  This was a routine, inpatient study  Study quality was adequate   A limited 2D, 2D, color flow Doppler and spectral Doppler " transthoracic echocardiogram was performed  The apical, parasternal, subcostal and suprasternal views were obtained  History    Heart failure  Pleural effusion  Afib  TAVR       Interpretation Summary       •  Left Ventricle: Left ventricular cavity size is normal  Wall thickness is mildly increased  The left ventricular ejection fraction is 50%  Systolic function is low normal  Wall motion is normal  Diastolic function is abnormal   •  Right Ventricle: Systolic function is normal   •  Left Atrium: The atrium is severely dilated  •  Right Atrium: The atrium is mildly dilated  •  Aortic Valve: There is an Conteh SHAVONNE 3 Ultra 23 mm TAVR bioprosthetic valve  The aortic valve has no significant stenosis  The aortic valve peak velocity is 2 06 m/s  The aortic valve mean gradient is 8 mmHg  The dimensionless velocity index is 0 55  The aortic valve area is 1 69 cm2   •  Mitral Valve: There is moderate regurgitation  •  Tricuspid Valve: There is moderate regurgitation  The estimated right ventricular systolic pressure is 01 15 mmHg      Results from last 7 days   Lab Units 06/14/23  0532 06/13/23 0449 06/12/23 0418   HEMATOCRIT % 35 9 38 3 35 7   HEMOGLOBIN g/dL 12 1 13 2 12 4   PLATELETS Thousands/uL 184 185 181   WBC Thousand/uL 7 44 9 50 6 84     Results from last 7 days   Lab Units 06/14/23  0532 06/13/23  0449 06/12/23  1218   BUN mg/dL 25 26* 23   CALCIUM mg/dL 8 3* 7 9* 8 6   CHLORIDE mmol/L 90* 90* 87*   CO2 mmol/L 25 22 20*   CREATININE mg/dL 0 83 0 88 0 86   POTASSIUM mmol/L 4 3 4 3 3 8   SODIUM mmol/L 122* 120* 119*     Results from last 7 days   Lab Units 06/14/23  0532 06/13/23  0449 06/12/23  0418   MAGNESIUM mg/dL 2 1 2 2 1 5*     Results from last 7 days   Lab Units 06/14/23  0532 06/13/23  0449 06/12/23  0418   ALK PHOS U/L 71 68 66   ALT U/L 20 22 27   AST U/L 17 23 26     Microbiology Results (last 21 days)    Collected Updated Procedure Result Status Patient Facility Result Comment 06/14/2023 0958 06/14/2023 1044 Novel Coronavirus (Covid-19),PCR [facility] [[id number]]    Nares from Nose    Final result [address] - copy/paste COVID Guidelines URL to browser: [email or web address]   SARS-CoV-2 assay is a Nucleic Acid Amplification assay intended for the   qualitative detection of nucleic acid from SARS-CoV-2 in nasopharyngeal   swabs  Results are for the presumptive identification of SARS-CoV-2 RNA  Positive results are indicative of infection with SARS-CoV-2, the virus   causing COVID-19, but do not rule out bacterial infection or co-infection   with other viruses  Laboratories within the [place] and its   territories are required to report all positive results to the appropriate   public health authorities  Negative results do not preclude SARS-CoV-2   infection and should not be used as the sole basis for treatment or other   patient management decisions  Negative results must be combined with   clinical observations, patient history, and epidemiological information  This test has not been FDA cleared or approved  This test has been authorized by FDA under an Emergency Use Authorization   (EUA)  This test is only authorized for the duration of time the   declaration that circumstances exist justifying the authorization of the   emergency use of an in vitro diagnostic tests for detection of SARS-CoV-2   virus and/or diagnosis of COVID-19 infection under section 564(b)(1) of   the Act, 21 U  S C  072LRL-8(N)(0), unless the authorization is terminated   or revoked sooner  The test has been validated but independent review by FDA   and CLIA is pending  Test performed using [employer] GeneXpert: This RT-PCR assay targets N2,   a region unique to SARS-CoV-2  A conserved region in the E-gene was chosen   for pan-Sarbecovirus detection which includes SARS-CoV-2     According to CMS-2020-01-R, this platform meets the definition of high-throughput technology  Component Value   SARS-CoV-2 Negative          06/12/2023 1616 06/14/2023 0857 Body fluid culture (Pleural Fluid Culture) and Gram stain [[id number]]   Body Fluid from Pleural, Right    Preliminary result [facility] [address]  Component Value   Body Fluid Culture, Sterile No growth P   Gram Stain Result No Polys or Bacteria seen P    This is an appended report  These results have been appended to a previously preliminary verified report              06/12/2023 1614 06/12/2023 1656 LD (LDH), Body Fluid [[id number]]   Body Fluid from Other    Final result [facility] [address]  Component Value Units   LD, Fluid 63  U/L          06/12/2023 1614 06/12/2023 1656 Total Protein, body fluid [[id number]]   Body Fluid from Other    Final result [facility] [address]  Component Value Units   Protein, Fluid 3 3  g/dL          06/12/2023 1614 06/12/2023 1944 Triglycerides, body fluid [[id number]]   Body Fluid from Pleural Fluid    Final result [facility] [address]  Component Value Units   Triglycerides, Fluid <15  mg/dL          06/11/2023 0931 06/13/2023 0757 Urine culture [[id number]]    (Abnormal)   Urine, Clean Catch    Final result [facility] [address]  Component Value   Urine Culture >100,000 cfu/ml Escherichia coli Abnormal     This organism has been edited  The previous result was Gram Negative Bal Enteric Like on 6/12/2023 at 1406 EDT         Susceptibility    Escherichia coli (1)    Antibiotic Interpretation Microscan  Method Status    ZID Performed  Yes  ANGELIA Final    Amoxicillin + Clavulanate Susceptible <=8/4 ug/ml ANGELIA Final    Ampicillin ($$) Resistant >16 00 ug/ml ANGELIA Final    Ampicillin + Sulbactam ($) Intermediate 16/8 ug/ml ANGELIA Final    Aztreonam ($$$)  Susceptible <=4 ug/ml ANGELIA Final    Cefazolin ($) Susceptible <=2 00 ug/ml ANGELIA Final    Ciprofloxacin ($)  Susceptible <=0 25 "ug/ml ANGELIA Final    Ertapenem ($$$) Susceptible <=0 5 ug/ml ANGELIA Final    Gentamicin ($$) Susceptible <=2 ug/ml ANGELIA Final    Levofloxacin ($) Susceptible <=0 50 ug/ml ANGELIA Final    Nitrofurantoin Susceptible <=32 ug/ml ANGELIA Final    Piperacillin + Tazobactam ($$$) Susceptible <=8 ug/ml ANGELIA Final    Tetracycline Susceptible <=4 ug/ml ANGELIA Final    Tobramycin ($) Susceptible <=2 ug/ml ANGELIA Final    Trimethoprim + Sulfamethoxazole ($$$) Susceptible <=0 5/9 5 ug/ml ANGELIA Final              Incidental Findings:   · As above    Test Results Pending at Discharge (will require follow-up): · Cytology results from thoracentesis on 06/12/2023     Outpatient Tests Requested:  · Daily BMP with Nephrology at the [facility] to monitor the hyponatremia  · CBC with differential, CMP, Magnesium level, Phosphorus level, Ionized Calcium level, and Procalcitonin level in 1 week    Complications:  None    Reason for Admission:  Hyponatremia and a right-sided pleural effusion    Hospital Course:   Nate Cochran is a 80 y o  female patient who originally presented to the hospital on 6/11/2023 due to generalized weakness  Please see above list of diagnoses and related plan for additional information  Condition at Discharge: good    Discharge Day Visit / Exam:   Subjective: The patient was seen and examined  The patient is doing better  No chest pain  No shortness of breath  No abdominal pain  No nausea or vomiting      Vitals: Blood Pressure: 138/66 (06/14/23 1305)  Pulse: 63 (06/14/23 1305)  Temperature: 97 7 °F (36 5 °C) (06/14/23 0741)  Temp Source: Oral (06/11/23 1419)  Respirations: 15 (06/14/23 0741)  Height: 4' 10\" (147 3 cm) (06/14/23 0709)  Weight - Scale: 49 kg (108 lb) (06/14/23 0709)  SpO2: 97 % (06/14/23 1305)  Exam:   Physical Exam   General:  NAD, follows commands  HEENT:  NC/AT, mucous membranes moist  Neck:  Supple, No JVP elevation  CV:  + S1, + S2, Irregularly irregular rhythm, Regular rate  Pulm:  Lung fields are CTA " bilaterally  Abd:  Soft, Non-tender, Non-distended  Ext:  No clubbing/cyanosis/edema  Skin:  No rashes  Neuro:  Awake, alert, oriented  Psych:  Normal mood and affect      Discharge instructions/Information to patient and family:  See after visit summary for information provided to patient and family  Provisions for Follow-Up Care:  See after visit summary for information related to follow-up care and any pertinent home health orders  Disposition:   Other [facility] 5th floor SNF for short-term rehabilitation    Planned Readmission:  No     Discharge Statement:  I spent 45 minutes discharging the patient  This time was spent on the day of discharge  I had direct contact with the patient on the day of discharge  Greater than 50% of the total time was spent examining patient, answering all patient questions, arranging and discussing plan of care with patient as well as directly providing post-discharge instructions  Additional time then spent on discharge activities  Discharge Medications:  See after visit summary for reconciled discharge medications provided to patient and/or family        **Please Note: This note may have been constructed using a voice recognition system**

## 2023-06-14 NOTE — PLAN OF CARE
Problem: OCCUPATIONAL THERAPY ADULT  Goal: Performs self-care activities at highest level of function for planned discharge setting  See evaluation for individualized goals  Description: Treatment Interventions: ADL retraining, Functional transfer training, UE strengthening/ROM, Endurance training, Patient/family training, Cognitive reorientation, Activityengagement, Equipment evaluation/education          See flowsheet documentation for full assessment, interventions and recommendations  Outcome: Progressing  Note: Limitation: Decreased ADL status, Decreased UE strength, Decreased Safe judgement during ADL, Decreased endurance, Decreased cognition, Decreased self-care trans, Decreased high-level ADLs     Assessment: Patient participated in Skilled OT session this date with interventions consisting of ADL re training with the use of correct body mechnaics, Energy Conservation techniques, Work simplification skills , safety awareness and fall prevention techniques,  therapeutic activities to: increase activity tolerance and increase dynamic sit/ stand balance during functional activity    Co treatment with PTA secondary to complex medical condition of pt, possible A of 2 required to achieve and maintain transitional movements, requiring the need of skilled therapeutic intervention of 2 therapists to achieve delivery of services  Patient agreeable to OT treatment session, upon arrival patient was found seated OOB to Chair  Patient requiring verbal cues for safety and cognitive assistance to anticipate next step  Patient continues to be functioning below baseline level, occupational performance remains limited secondary to factors listed above and increased risk for falls and injury  The patient's raw score on the AM-PAC Daily Activity Inpatient Short Form is 18  A raw score of less than 19 suggests the patient may benefit from discharge to post-acute rehabilitation services   Please refer to the recommendation of the Occupational Therapist for safe discharge planning  From OT standpoint, recommendation at time of d/c would be Post acute rehabilitation services       OT Discharge Recommendation: Post acute rehabilitation services

## 2023-06-14 NOTE — ASSESSMENT & PLAN NOTE
S/P TAVR W/ 23MM CALVERT SHAVONNE S3 ULTRA VALVE 1/17/2023     • Outpatient follow-up with Cardiology

## 2023-06-14 NOTE — TELEPHONE ENCOUNTER
Pt has SIJ injection 6/7/23  Currently admitted at [facility] and today is being transferred to the 5th floor -long term care unit

## 2023-06-14 NOTE — PLAN OF CARE
Problem: Potential for Falls  Goal: Patient will remain free of falls  Description: INTERVENTIONS:  - Educate patient/family on patient safety including physical limitations  - Instruct patient to call for assistance with activity   - Consult OT/PT to assist with strengthening/mobility   - Keep Call bell within reach  - Keep bed low and locked with side rails adjusted as appropriate  - Keep care items and personal belongings within reach  - Initiate and maintain comfort rounds  - Make Fall Risk Sign visible to staff  - Offer Toileting every 1-2 Hours, in advance of need  - Initiate/Maintain bed/chair alarm  - Obtain necessary fall risk management equipment: nonskid footwear  - Apply yellow socks and bracelet for high fall risk patients  Outcome: Progressing     Problem: PAIN - ADULT  Goal: Verbalizes/displays adequate comfort level or baseline comfort level  Description: Interventions:  - Encourage patient to monitor pain and request assistance  - Assess pain using appropriate pain scale (0-10 pain scale)  - Administer analgesics based on type and severity of pain and evaluate response  - Implement non-pharmacological measures as appropriate and evaluate response  - Consider cultural and social influences on pain and pain management  - Notify physician/advanced practitioner if interventions unsuccessful or patient reports new pain  Outcome: Progressing     Problem: INFECTION - ADULT  Goal: Absence or prevention of progression during hospitalization  Description: INTERVENTIONS:  - Assess and monitor for signs and symptoms of infection  - Monitor lab/diagnostic results  - Monitor all insertion sites, i e  indwelling lines  - Administer medications as ordered  - Instruct and encourage patient and family to use good hand hygiene technique  Outcome: Progressing     Problem: SAFETY ADULT  Goal: Patient will remain free of falls  Description: INTERVENTIONS:  - Educate patient/family on patient safety including physical limitations  - Instruct patient to call for assistance with activity   - Consult OT/PT to assist with strengthening/mobility   - Keep Call bell within reach  - Keep bed low and locked with side rails adjusted as appropriate  - Keep care items and personal belongings within reach  - Initiate and maintain comfort rounds  - Make Fall Risk Sign visible to staff  - Offer Toileting every 1-2 Hours, in advance of need  - Initiate/Maintain bed/chair alarm  - Obtain necessary fall risk management equipment: nonskid footwear  - Apply yellow socks and bracelet for high fall risk patients  Outcome: Progressing  Goal: Maintain or return to baseline ADL function  Description: INTERVENTIONS:  - Educate patient/family on patient safety including physical limitations  - Instruct patient to call for assistance with activity   - Consult OT/PT to assist with strengthening/mobility   - Keep Call bell within reach  - Keep bed low and locked with side rails adjusted as appropriate  - Keep care items and personal belongings within reach  - Initiate and maintain comfort rounds  - Make Fall Risk Sign visible to staff  - Offer Toileting every 1-2 Hours, in advance of need  - Initiate/Maintain bed/chair alarm  - Obtain necessary fall risk management equipment: nonskid footwear  - Apply yellow socks and bracelet for high fall risk patients  Outcome: Progressing  Goal: Maintains/Returns to pre admission functional level  Description: INTERVENTIONS:  -  Assess patient's ability to carry out ADLs; (mod assist)  - Assess/evaluate cause of self-care deficits (weakness, fatigue)  - Assess range of motion  - Assess patient's mobility; develop plan if impaired  - Assess patient's need for assistive devices and provide as appropriate  - Encourage maximum independence but intervene and supervise when necessary  - Involve family in performance of ADLs  - Assess for home care needs following discharge   - Consider OT consult to assist with ADL evaluation and planning for discharge  - Provide patient education as appropriate  Outcome: Progressing     Problem: DISCHARGE PLANNING  Goal: Discharge to home or other facility with appropriate resources  Description: INTERVENTIONS:  - Identify barriers to discharge w/patient and caregiver  - Arrange for needed discharge resources and transportation as appropriate  - Identify discharge learning needs (meds, wound care, etc )  - Refer to Case Management Department for coordinating discharge planning if the patient needs post-hospital services based on physician/advanced practitioner order or complex needs related to functional status, cognitive ability, or social support system  Outcome: Progressing     Problem: Knowledge Deficit  Goal: Patient/family/caregiver demonstrates understanding of disease process, treatment plan, medications, and discharge instructions  Description: Complete learning assessment and assess knowledge base    Interventions:  - Provide teaching at level of understanding  - Provide teaching via preferred learning methods  Outcome: Progressing     Problem: SKIN/TISSUE INTEGRITY - ADULT  Goal: Skin Integrity remains intact(Skin Breakdown Prevention)  Description: Assess:  -Perform Moustapha assessment every shift  -Clean and moisturize skin every shift  -Inspect skin when repositioning, toileting, and assisting with ADLS  -Assess under medical devices every shift  -Assess extremities for adequate circulation and sensation     Bed Management:  -Have minimal linens on bed & keep smooth, unwrinkled  -Change linens as needed when moist or perspiring  -Avoid sitting or lying in one position for more than 2 hours while in bed    Toileting:  -Offer bedside commode  -Assess for incontinence every 1-2 hours and prn  -Use incontinent care products after each incontinent episode    Activity:  -Mobilize patient times a day  -Encourage activity and walks on unit  -Encourage or provide ROM exercises   -Turn and reposition patient every 2 Hours  -Use appropriate equipment to lift or move patient in bed  -Instruct/ Assist with weight shifting every  minutes when out of bed in chair  -Consider limitation of chair time 4-6 hour intervals    Skin Care:  -Avoid use of baby powder, tape, friction and shearing, hot water or constrictive clothing  -Relieve pressure over bony prominences  -Do not massage red bony areas    Next Steps:  -Teach patient strategies to minimize risks    -Consider consults to  interdisciplinary teams   Outcome: Progressing     Problem: RESPIRATORY - ADULT  Goal: Achieves optimal ventilation and oxygenation  Description: INTERVENTIONS:  - Assess for changes in respiratory status  - Assess for changes in mentation and behavior  - Position to facilitate oxygenation and minimize respiratory effort  - Oxygen administered by appropriate delivery if ordered  - Encourage broncho-pulmonary hygiene including cough, deep breathe, Incentive Spirometry  - Assess and instruct to report SOB or any respiratory difficulty  - Respiratory Therapy support as indicated  Outcome: Progressing     Problem: MOBILITY - ADULT  Goal: Maintain or return to baseline ADL function  Description: INTERVENTIONS:  - Educate patient/family on patient safety including physical limitations  - Instruct patient to call for assistance with activity   - Consult OT/PT to assist with strengthening/mobility   - Keep Call bell within reach  - Keep bed low and locked with side rails adjusted as appropriate  - Keep care items and personal belongings within reach  - Initiate and maintain comfort rounds  - Make Fall Risk Sign visible to staff  - Offer Toileting every 1-2 Hours, in advance of need  - Initiate/Maintain bed/chair alarm  - Obtain necessary fall risk management equipment: nonskid footwear  - Apply yellow socks and bracelet for high fall risk patients  Outcome: Progressing  Goal: Maintains/Returns to pre admission functional level  Description: INTERVENTIONS:  -  Assess patient's ability to carry out ADLs; (mod assist)  - Assess/evaluate cause of self-care deficits (weakness, fatigue)  - Assess range of motion  - Assess patient's mobility; develop plan if impaired  - Assess patient's need for assistive devices and provide as appropriate  - Encourage maximum independence but intervene and supervise when necessary  - Involve family in performance of ADLs  - Assess for home care needs following discharge   - Consider OT consult to assist with ADL evaluation and planning for discharge  - Provide patient education as appropriate  Outcome: Progressing     Problem: METABOLIC, FLUID AND ELECTROLYTES - ADULT  Goal: Electrolytes maintained within normal limits  Description: INTERVENTIONS:  - Monitor labs and assess patient for signs and symptoms of electrolyte imbalances  - Administer electrolyte replacement as ordered  - Monitor response to electrolyte replacements, including repeat lab results as appropriate  - Instruct patient on fluid and nutrition as appropriate  Outcome: Progressing     Problem: Nutrition/Hydration-ADULT  Goal: Nutrient/Hydration intake appropriate for improving, restoring or maintaining nutritional needs  Description: Monitor and assess patient's nutrition/hydration status for malnutrition  Collaborate with interdisciplinary team and initiate plan and interventions as ordered  Monitor patient's weight and dietary intake as ordered or per policy  Utilize nutrition screening tool and intervene as necessary  Determine patient's food preferences and provide high-protein, high-caloric foods as appropriate       INTERVENTIONS:  - Monitor oral intake, urinary output, labs, and treatment plans  - Assess nutrition and hydration status and recommend course of action  - Evaluate amount of meals eaten  - Assist patient with eating if necessary   - Allow adequate time for meals  - Recommend/ encourage appropriate diets, oral nutritional supplements, and vitamin/mineral supplements  - Order, calculate, and assess calorie counts as needed  - Recommend, monitor, and adjust tube feedings and TPN/PPN based on assessed needs  - Assess need for intravenous fluids  - Provide specific nutrition/hydration education as appropriate  - Include patient/family/caregiver in decisions related to nutrition  Outcome: Progressing     Problem: Prexisting or High Potential for Compromised Skin Integrity  Goal: Skin integrity is maintained or improved  Description: INTERVENTIONS:  - Identify patients at risk for skin breakdown  - Assess and monitor skin integrity  - Assess and monitor nutrition and hydration status  - Monitor labs   - Assess for incontinence   - Turn and reposition patient  - Assist with mobility/ambulation  - Relieve pressure over bony prominences  - Avoid friction and shearing  - Provide appropriate hygiene as needed including keeping skin clean and dry  - Evaluate need for skin moisturizer/barrier cream  - Collaborate with interdisciplinary team   - Patient/family teaching  - Consider wound care consult   Outcome: Progressing

## 2023-06-14 NOTE — OCCUPATIONAL THERAPY NOTE
"          Occupational Therapy         Patient Name: Gio Lizarraga  PLCHK'X Date: 6/14/2023 06/14/23 0906   OT Last Visit   OT Visit Date 06/14/23   Note Type   Note Type Treatment   Pain Assessment   Pain Assessment Tool 0-10   Pain Score No Pain   Restrictions/Precautions   Weight Bearing Precautions Per Order No   Other Precautions Chair Alarm; Fall Risk   ADL   Where Assessed Commode   Grooming Assistance 5  Supervision/Setup   Grooming Deficit Wash/dry face   Toileting Assistance  4  Minimal Assistance   Toileting Deficit Increased time to complete; Bedside commode;Clothing management up;Clothing management down;Perineal hygiene;Steadying;Verbal cueing   Toileting Comments Pt completes toileting tasks using BSC, Min A overall for completion of hygiene following BM  Verbal cues for sequencing/thoroughness  Bed Mobility   Additional Comments OOB in chair at start/end of session, all needs within reach  Transfers   Sit to Stand 4  Minimal assistance   Additional items Assist x 1; Armrests; Increased time required;Verbal cues   Stand to Sit 4  Minimal assistance   Additional items Assist x 1; Armrests; Increased time required;Verbal cues   Stand pivot 4  Minimal assistance   Additional items Verbal cues   Toilet transfer 4  Minimal assistance   Additional items Assist x 1; Increased time required;Verbal cues; Commode   Additional Comments RW used   Functional Mobility   Functional Mobility 4  Minimal assistance   Additional Comments Pt completes functional mobility within hallway and room with RW, Min A x 1 for safety  Pt requires increased time due to slow pace  Pt limited by fatigue  Additional items Rolling walker   Subjective   Subjective \"Ok, what do you need me to do? \"   Cognition   Overall Cognitive Status Impaired   Arousal/Participation Alert   Attention Difficulty attending to directions   Orientation Level Oriented to person;Oriented to place; Disoriented to time;Disoriented to situation   Memory " Decreased long term memory;Decreased short term memory   Following Commands Follows one step commands with increased time or repetition   Activity Tolerance   Activity Tolerance Patient limited by fatigue   Assessment   Assessment Patient participated in Skilled OT session this date with interventions consisting of ADL re training with the use of correct body mechnaics, Energy Conservation techniques, Work simplification skills , safety awareness and fall prevention techniques,  therapeutic activities to: increase activity tolerance and increase dynamic sit/ stand balance during functional activity    Co treatment with PTA secondary to complex medical condition of pt, possible A of 2 required to achieve and maintain transitional movements, requiring the need of skilled therapeutic intervention of 2 therapists to achieve delivery of services  Patient agreeable to OT treatment session, upon arrival patient was found seated OOB to Chair  Patient requiring verbal cues for safety and cognitive assistance to anticipate next step  Patient continues to be functioning below baseline level, occupational performance remains limited secondary to factors listed above and increased risk for falls and injury  The patient's raw score on the AM-PAC Daily Activity Inpatient Short Form is 18  A raw score of less than 19 suggests the patient may benefit from discharge to post-acute rehabilitation services  Please refer to the recommendation of the Occupational Therapist for safe discharge planning  From OT standpoint, recommendation at time of d/c would be Post acute rehabilitation services  Plan   Treatment Interventions ADL retraining;Functional transfer training; Endurance training;Energy conservation   Goal Expiration Date 06/26/23   OT Treatment Day 2   OT Frequency 3-5x/wk   Recommendation   OT Discharge Recommendation Post acute rehabilitation services   [facility] Daily Activity Inpatient   Lower Body Dressing 3   Bathing 2 Toileting 2   Upper Body Dressing 3   Grooming 4   Eating 4   Daily Activity Raw Score 18   Daily Activity Standardized Score (Calc for Raw Score >=11) 38 66   AM-PAC Applied Cognition Inpatient   Following a Speech/Presentation 3   Understanding Ordinary Conversation 3   Taking Medications 2   Remembering Where Things Are Placed or Put Away 2   Remembering List of 4-5 Errands 1   Taking Care of Complicated Tasks 1   Applied Cognition Raw Score 12   Applied Cognition Standardized Score 28 82       Pt will benefit from continued OT services in order to maximize (I) c ADL performance, FM c RW, and improve overall endurance/strength required to complete functional tasks in preparation for d/c  Pt benefited from co-treatment of skilled OT and PTA in order to most appropriately address functional deficits d/t extensive assistance required for safe functional mobility, decreased activity tolerance, and regression from functioning level prior to admission and/or onset of present illness  OT/PT objectives were addressed separately; please see PT note for specific goal areas targeted  Pt left seated in chair at end of session; all needs within reach; all lines intact      Amelia Hernández

## 2023-06-14 NOTE — TELEPHONE ENCOUNTER
Called patient's , patient going to 5th Floor, scheduled an appointment for 07/12/2023 with [facility] and told them she needs a chest Xray                 ----- Message from Jessica [address], PA-C sent at 6/14/2023 12:20 PM EDT -----  Please arrange hospital follow-up in 4 weeks and instructed to have chest x-ray done before appointment  Thanks

## 2023-06-14 NOTE — PROGRESS NOTES
Progress Note - Nephrology   Nelli Carlton 80 y o  female MRN: [id number]  Unit/Bed#: 405-01 Encounter: [id number]    A/P:  1   Acute on chronic hyponatremia              Serum sodium continues to improve, up to 122 mmol/L  Continue with severe fluid restriction at 1 L due to patient's small body size of 49 kilos  Continue to encourage high-protein intake  Continue to monitor serum sodium levels  Overall I believe the patient's risk for decompensation is low at this time as she is in a medical environment and being monitored closely  With respect to disposition and potential discharge, the patient may be discharged to a facility where she can have fluid and serum sodium levels accounted for and manage on a regular basis  I expect for her to take an additional 4-5 days to have her serum sodium levels come up to around 130 mmol/L  Previously my hope was to allow the SSRI to washout which will allow her serum sodium levels to hopefully improve more quickly with reduction of ADH secretion, however this may either take longer than expected or may simply not happen the way physiologically we had proposed previously  2   Chronic hyponatremia              Patient most likely has underlying SIADH versus other potential issues  At this time with respect to chronic hyponatremia goal is to get her back up to around 130 mmol/L or above if possible  Continue with 1 L fluid restriction for now, look to relax this up to 1 2-1 5 L in 24 hours as the patient's serum sodium levels corrects to around 130 mmol/L  3   Chronic diastolic congestive heart failure              Patient is compensated, continue low-sodium diet  Provide furosemide as indicated  4   Anorexia              Continues to have a poor appetite but is trying to eat as best as possible    Consider appetite stimulant depending on how she progresses from a clinical standpoint  5   Hypomagnesemia              Continue to monitor, and add supplementation as indicated  6   Atrial fibrillation              Remains in adequate rate control with diltiazem and is anticoagulated with Eliquis      Follow up reason for today's visit: Acute on chronic hyponatremia    Hyponatremia    Patient Active Problem List   Diagnosis   • Hypomagnesemia   • Hypokalemia   • Hyponatremia   • Essential hypertension   • Lymphadenopathy   • Hiatal hernia   • Thrombocytopenia ([facility])   • Severe aortic stenosis   • Mitral valve insufficiency   • Atrial tachycardia ([facility])   • Pulmonary hypertension ([facility] )   • Diverticulosis   • Non-rheumatic tricuspid valve insufficiency   • Generalized anxiety disorder   • Mid back pain   • Encounter for Medicare annual wellness exam   • Encounter for long-term (current) use of medications   • Immunization due   • Acute bursitis of right shoulder   • Impaired fasting glucose   • Osteopenia of neck of femur   • Chronic bilateral low back pain without sciatica   • Gait abnormality   • Chronic pain syndrome   • Sacroiliitis ([facility])   • Bilateral leg edema   • Ganglion cyst   • Adhesive capsulitis of right shoulder   • CKD (chronic kidney disease), stage III ([facility])   • Generalized weakness   • Dyslipidemia   • Post-menopause   • Primary generalized (osteo)arthritis   • Chronic diastolic (congestive) heart failure ([facility])   • Pleural effusion   • Constipation   • Status post insertion of drug eluting coronary artery stent   • S/P TAVR (transcatheter aortic valve replacement)   • Permanent atrial fibrillation ([facility])   • Incomplete right bundle branch block   • Coronary artery disease involving native coronary artery of native heart without angina pectoris   • Anemia   • Graves disease   • Closed wedge compression fracture of T11 vertebra ([facility])   • Age-related osteoporosis with current pathological fracture of vertebra ([facility] )   • Medicare annual wellness visit, subsequent   • Major depressive disorder, single episode, mild ([facility] )   • Atrial flutter ([facility] )   • CHF (congestive heart "failure) ([facility] )   • UTI (urinary tract infection)   • Moderate protein-calorie malnutrition ([facility] )   • Acute cystitis with hematuria   • Pleural effusion on right         Subjective:   No acute issues, patient is eating and drinking although overall appetite remains poor  Objective:     Vitals: Blood pressure 128/65, pulse 61, temperature 97 7 °F (36 5 °C), resp  rate 15, height 4' 10\" (1 473 m), weight 49 kg (108 lb 0 4 oz), SpO2 97 %  ,Body mass index is 22 58 kg/m²  Weight (last 2 days)     None            Intake/Output Summary (Last 24 hours) at 6/14/2023 0807  Last data filed at 6/13/2023 1811  Gross per 24 hour   Intake 400 ml   Output --   Net 400 ml     I/O last 3 completed shifts: In: 200 [P O :430]  Out: 175 [Urine:175]         Physical Exam: /65   Pulse 61   Temp 97 7 °F (36 5 °C)   Resp 15   Ht 4' 10\" (1 473 m)   Wt 49 kg (108 lb 0 4 oz)   SpO2 97%   BMI 22 58 kg/m²     General Appearance:    Alert, cooperative, no distress, appears stated age   Head:    Normocephalic, without obvious abnormality, atraumatic   Eyes:    Conjunctiva/corneas clear   Ears:    Normal external ears   Nose:   Nares normal, septum midline, mucosa normal, no drainage    or sinus tenderness   Throat:   Lips, mucosa, and tongue normal; teeth and gums normal   Neck:   Supple   Back:     Symmetric, no curvature, ROM normal, no CVA tenderness   Lungs:     Clear to auscultation bilaterally, respirations unlabored   Chest wall:    No tenderness or deformity   Heart:    Regular rate and rhythm, S1 and S2 normal, no murmur, rub   or gallop   Abdomen:     Soft, non-tender, bowel sounds active   Extremities:   Extremities normal, atraumatic, no cyanosis or edema   Skin:   Skin color, texture, turgor normal, no rashes or lesions   Lymph nodes:   Cervical normal   Neurologic:   CNII-XII intact            Lab, Imaging and other studies: I have personally reviewed pertinent labs    CBC:   Lab Results   Component Value Date    " "HCT 35 9 06/14/2023    HGB 12 1 06/14/2023    MCH 30 8 06/14/2023    MCHC 33 7 06/14/2023    MCV 91 06/14/2023    MPV 8 9 06/14/2023    NRBC 0 06/14/2023     06/14/2023    RBC 3 93 06/14/2023    RDW 15 3 (H) 06/14/2023    WBC 7 44 06/14/2023     CMP:   Lab Results   Component Value Date    ALKPHOS 71 06/14/2023    ALT 20 06/14/2023    AST 17 06/14/2023    BUN 25 06/14/2023    CALCIUM 8 3 (L) 06/14/2023    CL 90 (L) 06/14/2023    CO2 25 06/14/2023    CREATININE 0 83 06/14/2023    EGFR 63 06/14/2023    K 4 3 06/14/2023         Results from last 7 days   Lab Units 06/14/23  0532 06/13/23  0449 06/12/23  1218 06/12/23  0418   ALK PHOS U/L 71 68  --  66   ALT U/L 20 22  --  27   AST U/L 17 23  --  26   BUN mg/dL 25 26* 23 22  23   CALCIUM mg/dL 8 3* 7 9* 8 6 8 3*  8 4   CHLORIDE mmol/L 90* 90* 87* 87*  87*   CO2 mmol/L 25 22 20* 22  22   CREATININE mg/dL 0 83 0 88 0 86 0 82  0 82   POTASSIUM mmol/L 4 3 4 3 3 8 3 9  3 9         Phosphorus:   Lab Results   Component Value Date    PHOS 2 9 06/14/2023     Magnesium:   Lab Results   Component Value Date    MG 2 1 06/14/2023     Urinalysis: No results found for: \"BILIRUBINUR\", \"BLOODU\", \"CLARITYU\", \"COLORU\", \"GLUCOSEU\", \"KETONESU\", \"LEUKOCYTESUR\", \"NITRITE\", \"PHUR\", \"PROTEINUA\", \"SPECGRAV\"  Ionized Calcium: No results found for: \"CAION\"  Coagulation: No results found for: \"APTT\", \"INR\", \"PT\"  Troponin: No results found for: \"TROPONINI\"  ABG: No results found for: \"BEART\", \"UIV3KHK\", \"R5JHJEHT\", \"BOK8ZRP\", \"PHART\", \"PO2ART\", \"SOURCE\"  Radiology review:     IMAGING  Procedure: XR chest 1 view portable    Result Date: 6/11/2023  Narrative: CHEST INDICATION:   weakness, r/o pna  COMPARISON: CXR 1/18/2023 and chest CT 6/11/2022  EXAM PERFORMED/VIEWS:  XR CHEST PORTABLE  FINDINGS: Cardiomediastinal silhouette normal  TAVR  Large right effusion and right base atelectasis  Upper abdomen normal  Bones normal for age       Impression: Large right effusion and right base " atelectasis  Workstation performed: [id number]     Procedure: CT chest without contrast    Result Date: 6/11/2023  Narrative: CT CHEST WITHOUT IV CONTRAST INDICATION:   eval effusion, r/o pna, loculations  COMPARISON: Chest radiograph 6/11/2023 and chest CT 12/23/2022  TECHNIQUE: Chest CT without intravenous contrast   Axial, sagittal, coronal 2D reformats and coronal MIPS from source data  Radiation dose length product (DLP):  202 74 mGy-cm   Radiation dose exposure minimized using iterative reconstruction and automated exposure control  FINDINGS: LUNGS: Moderate compromise by respiratory motion  Mild septal thickening due to interstitial edema  Moderate compressive atelectasis of the right lower lobe  Stable benign bilateral atelectasis and right middle lobe scar  AIRWAYS: No significant filling defects  PLEURA: Large right pleural effusion with trace left pleural effusion  HEART/GREAT VESSELS: Severe cardiomegaly  Moderate coronary artery calcification indicating atherosclerotic heart disease  TAVR  MEDIASTINUM AND KEIRY:  Unremarkable  CHEST WALL AND LOWER NECK: Unremarkable  UPPER ABDOMEN: Trace chronic hyperdense ascites anterior to the liver  Left renal cyst  OSSEOUS STRUCTURES: Moderate degenerative disease in the spine  New superior endplate compression deformity of T11  New inferior endplate compression deformity of L2  Impression: Mild interstitial edema  Large right pleural effusion with moderate compressive atelectasis of the right lower lobe  Trace left effusion  Mild new endplate compression deformities in the spine since December 2022   Workstation performed: [id number]       Current Facility-Administered Medications   Medication Dose Route Frequency   • ALPRAZolam (XANAX) tablet 0 25 mg  0 25 mg Oral TID PRN   • aluminum-magnesium hydroxide-simethicone (MYLANTA) oral suspension 30 mL  30 mL Oral Q4H PRN   • ammonium lactate (LAC-HYDRIN) 12 % lotion   Topical Daily   • apixaban (ELIQUIS) tablet 2 5 mg  2 5 mg Oral BID   • atorvastatin (LIPITOR) tablet 20 mg  20 mg Oral Daily With Dinner   • cholecalciferol (VITAMIN D3) tablet 2,000 Units  2,000 Units Oral Daily   • clopidogrel (PLAVIX) tablet 75 mg  75 mg Oral Daily   • diltiazem (CARDIZEM) tablet 30 mg  30 mg Oral Q8H [facility]   • metoprolol succinate (TOPROL-XL) 24 hr tablet 50 mg  50 mg Oral Daily   • multivitamin-minerals (CENTRUM) tablet 1 tablet  1 tablet Oral Daily   • pantoprazole (PROTONIX) EC tablet 40 mg  40 mg Oral QAM   • psyllium (METAMUCIL) 1 packet  1 packet Oral Daily     Medications Discontinued During This Encounter   Medication Reason   • furosemide (LASIX) injection 40 mg    • metoprolol succinate (TOPROL-XL) 24 hr tablet 50 mg    • apixaban (ELIQUIS) tablet 2 5 mg        Kain Fuentes, DO      This progress note was produced in part using a dictation device which may document imprecise wording from author's original intent

## 2023-06-14 NOTE — ASSESSMENT & PLAN NOTE
· I appreciate Pulmonology's input  · Underwent a thoracentesis of the right lung, which yielded 1300 mL of pleural fluid  · The pleural fluid analysis is consistent with an exudative pleural effusion per Light's criteria, but this may be due to malnutrition    · Await fluid cytology results  · Outpatient follow-up with Pulmonology

## 2023-06-14 NOTE — ASSESSMENT & PLAN NOTE
· S/p nerve block on 06/07/2023 with Pain Management  · Continue cholecalciferol supplementation  · Needs an outpatient DEXA scan with PCP  · Outpatient Orthopedic Spine Surgery evaluation or Neurosurgery evaluation verbalization

## 2023-06-15 ENCOUNTER — PATIENT OUTREACH (OUTPATIENT)
Dept: CASE MANAGEMENT | Facility: OTHER | Age: 88
End: 2023-06-15

## 2023-06-15 LAB
BACTERIA SPEC BFLD CULT: NO GROWTH
GRAM STN SPEC: NORMAL

## 2023-06-15 NOTE — PROGRESS NOTES
Outpatient Care Management ROSA/SNF Pathway  Discharged 6/14/23 from [facility] to [facility] (5th floor)  This Admin Coordinator will continue to monitor via chart review

## 2023-06-16 ENCOUNTER — NURSING HOME VISIT (OUTPATIENT)
Dept: OTHER | Facility: HOSPITAL | Age: 88
End: 2023-06-16
Payer: MEDICARE

## 2023-06-16 DIAGNOSIS — M15.0 PRIMARY GENERALIZED (OSTEO)ARTHRITIS: ICD-10-CM

## 2023-06-16 DIAGNOSIS — I10 ESSENTIAL HYPERTENSION: ICD-10-CM

## 2023-06-16 DIAGNOSIS — E87.1 HYPONATREMIA: Primary | ICD-10-CM

## 2023-06-16 DIAGNOSIS — E22.2 SIADH (SYNDROME OF INAPPROPRIATE ADH PRODUCTION) (HCC): ICD-10-CM

## 2023-06-16 PROCEDURE — 99342 HOME/RES VST NEW LOW MDM 30: CPT | Performed by: INTERNAL MEDICINE

## 2023-06-16 NOTE — ASSESSMENT & PLAN NOTE
Follow-up basic metabolic panel on Monday, June 19  Continue with fluid striction 1 L and low-sodium diet  Please refer below  Continue to avoid medications that can potentially acutely worsen serum sodium levels such as thiazide or thiazide like diuretics, and SSRIs if possible  Previously during this admission, patient hyponatremia may have been acutely worsening with initiation of an SSRI  My hope was that as the SSRI washed out patient's serum sodium level will definitely improve and allow easier water excretion

## 2023-06-16 NOTE — PROGRESS NOTES
[facility] [place], [place]    Name: Nate Cochran  YOB: 1935      Assessment/Plan:    Hyponatremia  Follow-up basic metabolic panel on Monday, June 19  Continue with fluid striction 1 L and low-sodium diet  Please refer below  Continue to avoid medications that can potentially acutely worsen serum sodium levels such as thiazide or thiazide like diuretics, and SSRIs if possible  Previously during this admission, patient hyponatremia may have been acutely worsening with initiation of an SSRI  My hope was that as the SSRI washed out patient's serum sodium level will definitely improve and allow easier water excretion  SIADH (syndrome of inappropriate 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HCC)  Continue fluid restriction, low-sodium diet  Avoid medications that are known to potentially worsen this specific disorder via different mechanisms  Continue to optimize care in general     Essential hypertension  Blood pressure well controlled at this time, continue current medications  Continue low-sodium diet  Primary generalized (osteo)arthritis  Pain is controlled, continue to encourage use of pain control management in order to assist with hyponatremia  Problem List Items Addressed This Visit        Endocrine    SIADH (syndrome of inappropriate 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[facility] [address] )     Continue fluid restriction, low-sodium diet  Avoid medications that are known to potentially worsen this specific disorder via different mechanisms  Continue to optimize care in general             Cardiovascular and Mediastinum    Essential hypertension     Blood pressure well controlled at this time, continue current medications  Continue low-sodium diet  Musculoskeletal and Integument    Primary generalized (osteo)arthritis     Pain is controlled, continue to encourage use of pain control management in order to assist with hyponatremia              Other    Hyponatremia - Primary     Follow-up basic metabolic panel on Monday, June 19  Continue with fluid striction 1 L and low-sodium diet  Please refer below  Continue to avoid medications that can potentially acutely worsen serum sodium levels such as thiazide or thiazide like diuretics, and SSRIs if possible  Previously during this admission, patient hyponatremia may have been acutely worsening with initiation of an SSRI  My hope was that as the SSRI washed out patient's serum sodium level will definitely improve and allow easier water excretion  Follow blood work in a few days to confirm serum sodium level continues to slowly improve  Will relax fluid restriction once serum sodium levels approach a safer value, at or above 130 mmol/liter  Subjective:      Patient ID: Ya Garcia is a 80 y o  female  Patient presents for follow-up/initial evaluation  Patient just discharged from the hospital, currently having rehabilitation at the fifth floor at [facility]  She continues to have a poor appetite but is eating and drinking as much as she can  Patient remains on a 1 L fluid restriction  She is taking all medications as prescribed with no specific side effects at this time  She is on an Ensure compact 4 ounce supplement  Hypertension  This is a chronic problem  The current episode started more than 1 year ago  The problem is unchanged  The problem is controlled  Pertinent negatives include no chest pain, orthopnea or peripheral edema  There are no associated agents to hypertension  Risk factors for coronary artery disease include post-menopausal state and sedentary lifestyle  Past treatments include beta blockers and diuretics  There are no compliance problems  There is no history of kidney disease  There is no history of chronic renal disease         The following portions of the patient's history were reviewed and updated as appropriate: allergies, current medications, past family history, past medical history, past social history, past surgical history and problem list     Review of Systems   Constitutional: Positive for fatigue  Respiratory: Negative  Cardiovascular: Negative for chest pain and orthopnea  Gastrointestinal:        Persistent anorexia   All other systems reviewed and are negative  Social History     Socioeconomic History   • Marital status: /Civil Union     Spouse name: Not on file   • Number of children: 2   • Years of education: Not on file   • Highest education level: Not on file   Occupational History   • Not on file   Tobacco Use   • Smoking status: Never   • Smokeless tobacco: Never   Vaping Use   • Vaping Use: Never used   Substance and Sexual Activity   • Alcohol use: Never   • Drug use: Never   • Sexual activity: Not Currently     Partners: Male   Other Topics Concern   • Not on file   Social History Narrative   • Not on file     Social Determinants of Health     Financial Resource Strain: Low Risk  (4/25/2023)    Overall Financial Resource Strain (CARDIA)    • Difficulty of Paying Living Expenses: Not hard at all   Food Insecurity: No Food Insecurity (6/12/2023)    Hunger Vital Sign    • Worried About Running Out of Food in the Last Year: Never true    • Ran Out of Food in the Last Year: Never true   Transportation Needs: No Transportation Needs (6/12/2023)    PRAPARE - Transportation    • Lack of Transportation (Medical): No    • Lack of Transportation (Non-Medical): No   Physical Activity: Not on file   Stress: No Stress Concern Present (1/21/2021)    [address]    • Feeling of Stress : Only a little   Social Connections: Moderately Isolated (1/21/2021)    Social Connection and Isolation Panel [NHANES]    • Frequency of Communication with Friends and Family: Twice a week    • Frequency of Social Gatherings with Friends and Family:  Three times a week    • Attends Sabianist Services: Never    • Active Member of Clubs or Organizations: No    • Attends Club or Organization Meetings: Never    • Marital Status:    Intimate Partner Violence: Not At Risk (1/21/2021)    Humiliation, Afraid, Rape, and Kick questionnaire    • Fear of Current or Ex-Partner: No    • Emotionally Abused: No    • Physically Abused: No    • Sexually Abused: No   Housing Stability: Low Risk  (6/12/2023)    Housing Stability Vital Sign    • Unable to Pay for Housing in the Last Year: No    • Number of Places Lived in the Last Year: 1    • Unstable Housing in the Last Year: No     Past Medical History:   Diagnosis Date   • Acute on chronic diastolic CHF (congestive heart failure) ([facility] [address] ) 1/30/2023   • Acute respiratory failure with hypoxia [facility] ) 12/22/2022   • Aortic stenosis    • Atrial flutter (HCC)    • CAD (coronary artery disease) 01/11/2023    CAD/PCI/ELODIA   • CKD (chronic kidney disease), stage III ([facility] [address] )    • Community acquired pneumonia of left upper lobe of lung 05/17/2019   • Fatigue    • Full dentures    • Generalized anxiety disorder    • Hyperlipidemia    • Hypertension    • Impaired fasting glucose    • Mitral regurgitation    • Pneumonia    • RBBB    • 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    TRANSFEMORAL W/ 23MM CALVERT SHAVONNE S3 ULTRA VALVE(ACCESS ON LEFT)   • SVT (supraventricular tachycardia)    • Tricuspid regurgitation      Past Surgical History:   Procedure Laterality Date   • CARDIAC CATHETERIZATION N/A 1/11/2023    Procedure: LHC-Cardiac catheterization;  Surgeon: Collin Velazquez MD;  [facility]; Service: Cardiology   • CARDIAC CATHETERIZATION N/A 1/11/2023    Procedure: Cardiac pci;  Surgeon: Collin Velazquez MD;  [facility]: [facility] CARD[facility];   Service: Cardiology   • CARDIAC CATHETERIZATION N/A 1/17/2023    Procedure: CARDIAC TAVR;  Surgeon: Yair Nathan DO;  Location: [facility] MAIN OR;  Service: Cardiology   • DENTAL SURGERY Bilateral     ALL TEETH REMOVED   • FL GUIDED NEEDLE PLAC BX/ASP/INJ  5/26/2021   • IR THORACENTESIS  12/20/2022   • JOINT REPLACEMENT Left    • KNEE SURGERY      left knee replacement   • OR INJECT SI JOINT ARTHRGRPHY&/ANES/STEROID W/JOHNATHON Bilateral 5/26/2021    Procedure: SACROILIAC JOINT INJECTION;  Surgeon: Rossy Rosario MD;  Location: [facility] MAIN OR;  Service: Pain Management    • OR REPLACE AORTIC VALVE OPENFEMORAL ARTERY APPROACH N/A 1/17/2023    Procedure: REPLACEMENT AORTIC VALVE TRANSCATHETER (TAVR) TRANSFEMORAL W/ 23MM CALVERT SHAVONNE S3 ULTRA VALVE(ACCESS ON LEFT) ALENA;  Surgeon: Rohini Chauhan DO;  Location:  MAIN OR;  Service: Cardiac Surgery   • SACROILIAC JOINT INJECTION Bilateral 6/7/2023    Procedure: Bilateral sacroiliac joint steroid injection;  Surgeon: Ama Ford MD;  Location: [facility] MA[facility];  Service: Pain Management        Current Outpatient Medications:   •  alendronate (Fosamax) 70 mg tablet, Take 1 tablet (70 mg total) by mouth every 7 days, Disp: 12 tablet, Rfl: 3  •  aluminum-magnesium hydroxide-simethicone (MYLANTA) 5799-3962-640 mg/30 mL suspension, Take 30 mL by mouth every 4 (four) hours as needed for indigestion or heartburn, Disp: , Rfl: 0  •  ammonium lactate (LAC-HYDRIN) 12 % lotion, Apply topically daily Do not start before Jacqueline 15, 2023 , Disp: , Rfl: 0  •  apixaban (ELIQUIS) 2 5 mg, Take 1 tablet (2 5 mg total) by mouth 2 (two) times a day, Disp: 60 tablet, Rfl: 2  •  atorvastatin (LIPITOR) 20 mg tablet, Take 1 tablet (20 mg total) by mouth daily with dinner, Disp: 30 tablet, Rfl: 4  •  Cholecalciferol (Vitamin D3) 50 MCG (2000 UT) TABS, Take 2,000 Units by mouth in the morning   Indications: Osteoporosis, Disp: , Rfl:   •  clopidogrel (PLAVIX) 75 mg tablet, Take 1 tablet (75 mg total) by mouth daily, Disp: 90 tablet, Rfl: 2  •  diltiazem (CARDIZEM) 30 mg tablet, Take 1 tablet (30 mg total) by mouth 3 (three) times a day Hold for HR<60 bpm or SBP<110 mmHg, Disp: , Rfl: 0  •  metoprolol succinate (TOPROL-XL) 50 mg "24 hr tablet, Take 1 tablet (50 mg total) by mouth daily Hold for HR<60 or SBP<110 mmHg, Disp: 90 tablet, Rfl: 0  •  Multiple Vitamin (Multivitamin Adult) TABS, Take 1 tablet by mouth in the morning  Indications: Nutritional Support, Disp: , Rfl:   •  Nutritional Supplement LIQD, Take 1 Can by mouth 2 (two) times a day Ensure pudding BID, Disp: 237 mL, Rfl: 0  •  pantoprazole (PROTONIX) 40 mg tablet, TAKE 1 TABLET BY MOUTH ONCE DAILY IN THE MORNING, Disp: 30 tablet, Rfl: 0  •  Psyllium (METAMUCIL FIBER) 51 7 % PACK, Take 1 Package by mouth daily, Disp: , Rfl: 0    Lab Results   Component Value Date    SODIUM 122 (L) 06/14/2023    K 4 3 06/14/2023    CL 90 (L) 06/14/2023    CO2 25 06/14/2023    AGAP 7 06/14/2023    BUN 25 06/14/2023    CREATININE 0 83 06/14/2023    GLUC 103 06/14/2023    GLUF 98 03/24/2023    CALCIUM 8 3 (L) 06/14/2023    AST 17 06/14/2023    ALT 20 06/14/2023    ALKPHOS 71 06/14/2023    TP 5 8 (L) 06/14/2023    TBILI 0 72 06/14/2023    EGFR 63 06/14/2023     Lab Results   Component Value Date    WBC 7 44 06/14/2023    HGB 12 1 06/14/2023    HCT 35 9 06/14/2023    MCV 91 06/14/2023     06/14/2023     Lab Results   Component Value Date    CHOLESTEROL 164 12/21/2022    CHOLESTEROL 145 07/05/2022    CHOLESTEROL 173 06/17/2020     Lab Results   Component Value Date    HDL 94 12/21/2022    HDL 74 07/05/2022    HDL 68 06/17/2020     Lab Results   Component Value Date    LDLCALC 60 12/21/2022    LDLCALC 58 07/05/2022    LDLCALC 86 06/17/2020     Lab Results   Component Value Date    TRIG 50 12/21/2022    TRIG 67 07/05/2022    TRIG 97 06/17/2020     No results found for: \"CHOLHDL\"  Lab Results   Component Value Date    [id number] 1 451 06/11/2023     Lab Results   Component Value Date    CALCIUM 8 3 (L) 06/14/2023    PHOS 2 9 06/14/2023     No results found for: \"SPEP\", \"UPEP\"  No results found for: \"MICROALBUR\", \"[id number]\"        Objective: There were no vitals taken for this visit           " Physical Exam  Vitals reviewed  Constitutional:       General: She is not in acute distress  Appearance: She is well-developed  HENT:      Head: Normocephalic and atraumatic  Eyes:      Conjunctiva/sclera: Conjunctivae normal    Cardiovascular:      Rate and Rhythm: Normal rate and regular rhythm  Pulmonary:      Effort: Pulmonary effort is normal       Breath sounds: Normal breath sounds  Abdominal:      Palpations: Abdomen is soft  Musculoskeletal:      Cervical back: Neck supple  Skin:     General: Skin is warm  Findings: No rash  Neurological:      Mental Status: She is alert and oriented to person, place, and time  Cranial Nerves: No cranial nerve deficit     Psychiatric:         Behavior: Behavior normal

## 2023-06-16 NOTE — ASSESSMENT & PLAN NOTE
Pain is controlled, continue to encourage use of pain control management in order to assist with hyponatremia  Number Of Epidermal Sutures (Optional): 18

## 2023-06-16 NOTE — ASSESSMENT & PLAN NOTE
Blood pressure well controlled at this time, continue current medications  Continue low-sodium diet

## 2023-06-16 NOTE — ASSESSMENT & PLAN NOTE
Continue fluid restriction, low-sodium diet  Avoid medications that are known to potentially worsen this specific disorder via different mechanisms    Continue to optimize care in general

## 2023-06-19 ENCOUNTER — LAB REQUISITION (OUTPATIENT)
Dept: LAB | Facility: HOSPITAL | Age: 88
End: 2023-06-19

## 2023-06-19 DIAGNOSIS — D64.9 ANEMIA, UNSPECIFIED: ICD-10-CM

## 2023-06-19 LAB
ANION GAP SERPL CALCULATED.3IONS-SCNC: 8 MMOL/L (ref 4–13)
BUN SERPL-MCNC: 38 MG/DL (ref 5–25)
CALCIUM SERPL-MCNC: 9.4 MG/DL (ref 8.4–10.2)
CHLORIDE SERPL-SCNC: 97 MMOL/L (ref 96–108)
CO2 SERPL-SCNC: 28 MMOL/L (ref 21–32)
CREAT SERPL-MCNC: 0.81 MG/DL (ref 0.6–1.3)
GFR SERPL CREATININE-BSD FRML MDRD: 65 ML/MIN/1.73SQ M
GLUCOSE SERPL-MCNC: 83 MG/DL (ref 65–140)
POTASSIUM SERPL-SCNC: 5.1 MMOL/L (ref 3.5–5.3)
SODIUM SERPL-SCNC: 133 MMOL/L (ref 135–147)

## 2023-06-19 PROCEDURE — 80048 BASIC METABOLIC PNL TOTAL CA: CPT | Performed by: INTERNAL MEDICINE

## 2023-06-21 ENCOUNTER — HOSPITAL ENCOUNTER (OUTPATIENT)
Dept: RADIOLOGY | Facility: HOSPITAL | Age: 88
Discharge: HOME | End: 2023-06-21

## 2023-06-21 ENCOUNTER — LAB REQUISITION (OUTPATIENT)
Dept: LAB | Facility: HOSPITAL | Age: 88
End: 2023-06-21

## 2023-06-21 DIAGNOSIS — D64.9 ANEMIA, UNSPECIFIED: ICD-10-CM

## 2023-06-21 DIAGNOSIS — E87.6 HYPOKALEMIA: ICD-10-CM

## 2023-06-21 DIAGNOSIS — J90 PLEURAL EFFUSION: ICD-10-CM

## 2023-06-21 LAB
ANION GAP SERPL CALCULATED.3IONS-SCNC: 10 MMOL/L
BUN SERPL-MCNC: 41 MG/DL (ref 5–25)
CALCIUM SERPL-MCNC: 9.3 MG/DL (ref 8.4–10.2)
CHLORIDE SERPL-SCNC: 96 MMOL/L (ref 96–108)
CO2 SERPL-SCNC: 25 MMOL/L (ref 21–32)
CREAT SERPL-MCNC: 0.83 MG/DL (ref 0.6–1.3)
ERYTHROCYTE [DISTWIDTH] IN BLOOD BY AUTOMATED COUNT: 15.6 % (ref 11.6–15.1)
GFR SERPL CREATININE-BSD FRML MDRD: 63 ML/MIN/1.73SQ M
GLUCOSE SERPL-MCNC: 101 MG/DL (ref 65–140)
HCT VFR BLD AUTO: 38.5 % (ref 34.8–46.1)
HGB BLD-MCNC: 12.5 G/DL (ref 11.5–15.4)
MCH RBC QN AUTO: 31.3 PG (ref 26.8–34.3)
MCHC RBC AUTO-ENTMCNC: 32.5 G/DL (ref 31.4–37.4)
MCV RBC AUTO: 97 FL (ref 82–98)
PLATELET # BLD AUTO: 182 THOUSANDS/UL (ref 149–390)
PMV BLD AUTO: 9.1 FL (ref 8.9–12.7)
POTASSIUM SERPL-SCNC: 5.1 MMOL/L (ref 3.5–5.3)
RBC # BLD AUTO: 3.99 MILLION/UL (ref 3.81–5.12)
SODIUM SERPL-SCNC: 131 MMOL/L (ref 135–147)
WBC # BLD AUTO: 10.04 THOUSAND/UL (ref 4.31–10.16)

## 2023-06-21 PROCEDURE — 80048 BASIC METABOLIC PNL TOTAL CA: CPT | Performed by: INTERNAL MEDICINE

## 2023-06-21 PROCEDURE — 85027 COMPLETE CBC AUTOMATED: CPT | Performed by: INTERNAL MEDICINE

## 2023-06-21 PROCEDURE — 71045 X-RAY EXAM CHEST 1 VIEW: CPT

## 2023-06-22 ENCOUNTER — PATIENT OUTREACH (OUTPATIENT)
Dept: CASE MANAGEMENT | Facility: OTHER | Age: 88
End: 2023-06-22

## 2023-06-22 NOTE — PROGRESS NOTES
Chart review complete the patient currently admitted to [facility] (5th Floor) receiving skilled care  No LCD at this time  This Admin Coordinator will continue to monitor via chart review

## 2023-06-23 ENCOUNTER — TELEPHONE (OUTPATIENT)
Dept: PULMONOLOGY | Facility: CLINIC | Age: 88
End: 2023-06-23

## 2023-06-23 NOTE — TELEPHONE ENCOUNTER
5th floor called to ask for advise and next steps following CXR done on patient 2 days ago  Spoke with charge nurse Ricky Rivero and she reports that Carola Fischer had increased BARTHOLOMEW two days ago and also reports her hands were dusky  She required nasal cannula briefly that day but has not since and also reports that her SOB has resolved  The CXR does reveal increasing right sided effusion compared to 6/12 (post thora) but not as large as 6/11(pre thora)  Ricky Rivero states that Carola Fischer is busy with an activity right now  I attempted to call Yuliya Tierney, patient's spouse, but there was no answer  I left message letting him know I got the results of the CXR and wanted to speak about next steps  Would recommend sooner appointment with cardiology to discuss diuretic management but if Carola Fischer is still symptomatic/hypoxic we can consider outpatient thoracentesis  Can you please let me know when he calls back?

## 2023-06-24 PROBLEM — Z00.00 MEDICARE ANNUAL WELLNESS VISIT, SUBSEQUENT: Status: RESOLVED | Noted: 2023-04-25 | Resolved: 2023-06-24

## 2023-06-26 ENCOUNTER — LAB REQUISITION (OUTPATIENT)
Dept: LAB | Facility: HOSPITAL | Age: 88
End: 2023-06-26

## 2023-06-26 DIAGNOSIS — E87.1 HYPO-OSMOLALITY AND HYPONATREMIA: ICD-10-CM

## 2023-06-26 LAB
ANION GAP SERPL CALCULATED.3IONS-SCNC: 10 MMOL/L
BUN SERPL-MCNC: 36 MG/DL (ref 5–25)
CALCIUM SERPL-MCNC: 9 MG/DL (ref 8.4–10.2)
CHLORIDE SERPL-SCNC: 101 MMOL/L (ref 96–108)
CO2 SERPL-SCNC: 23 MMOL/L (ref 21–32)
CREAT SERPL-MCNC: 0.72 MG/DL (ref 0.6–1.3)
GFR SERPL CREATININE-BSD FRML MDRD: 75 ML/MIN/1.73SQ M
GLUCOSE SERPL-MCNC: 91 MG/DL (ref 65–140)
POTASSIUM SERPL-SCNC: 3.8 MMOL/L (ref 3.5–5.3)
SODIUM SERPL-SCNC: 134 MMOL/L (ref 135–147)

## 2023-06-26 PROCEDURE — 80048 BASIC METABOLIC PNL TOTAL CA: CPT | Performed by: NURSE PRACTITIONER

## 2023-06-27 ENCOUNTER — HOME HEALTH ADMISSION (OUTPATIENT)
Dept: HOME HEALTH SERVICES | Facility: HOME HEALTHCARE | Age: 88
End: 2023-06-27

## 2023-06-29 ENCOUNTER — PATIENT OUTREACH (OUTPATIENT)
Dept: CASE MANAGEMENT | Facility: OTHER | Age: 88
End: 2023-06-29

## 2023-06-29 NOTE — PROGRESS NOTES
Chart review complete per [facility] the patient is currently admitted to [facility] receiving skilled care  This Admin Coordinator will continue to monitor via chart review

## 2023-07-03 ENCOUNTER — LAB REQUISITION (OUTPATIENT)
Dept: LAB | Facility: HOSPITAL | Age: 88
End: 2023-07-03

## 2023-07-03 DIAGNOSIS — N18.30 CHRONIC KIDNEY DISEASE, STAGE 3 UNSPECIFIED (HCC): ICD-10-CM

## 2023-07-03 DIAGNOSIS — E87.1 HYPO-OSMOLALITY AND HYPONATREMIA: ICD-10-CM

## 2023-07-03 LAB
ANION GAP SERPL CALCULATED.3IONS-SCNC: 11 MMOL/L
BUN SERPL-MCNC: 33 MG/DL (ref 5–25)
CALCIUM SERPL-MCNC: 8.8 MG/DL (ref 8.4–10.2)
CHLORIDE SERPL-SCNC: 101 MMOL/L (ref 96–108)
CO2 SERPL-SCNC: 24 MMOL/L (ref 21–32)
CREAT SERPL-MCNC: 0.85 MG/DL (ref 0.6–1.3)
GFR SERPL CREATININE-BSD FRML MDRD: 61 ML/MIN/1.73SQ M
GLUCOSE SERPL-MCNC: 85 MG/DL (ref 65–140)
POTASSIUM SERPL-SCNC: 3.4 MMOL/L (ref 3.5–5.3)
SODIUM SERPL-SCNC: 136 MMOL/L (ref 135–147)

## 2023-07-03 PROCEDURE — 80048 BASIC METABOLIC PNL TOTAL CA: CPT | Performed by: NURSE PRACTITIONER

## 2023-07-06 ENCOUNTER — PATIENT OUTREACH (OUTPATIENT)
Dept: CASE MANAGEMENT | Facility: OTHER | Age: 88
End: 2023-07-06

## 2023-07-06 NOTE — PROGRESS NOTES
Chart review complete update obtained from [facility] the patient is currently at [facility] receiving skilled care No LCD at this time. This Admin Coordinator will continue to monitor via chart review.

## 2023-07-10 ENCOUNTER — OFFICE VISIT (OUTPATIENT)
Dept: PAIN MEDICINE | Facility: CLINIC | Age: 88
End: 2023-07-10
Payer: MEDICARE

## 2023-07-10 ENCOUNTER — HOSPITAL ENCOUNTER (OUTPATIENT)
Dept: RADIOLOGY | Facility: HOSPITAL | Age: 88
Discharge: HOME | End: 2023-07-10
Payer: MEDICARE

## 2023-07-10 VITALS
BODY MASS INDEX: 22.57 KG/M2 | HEART RATE: 80 BPM | HEIGHT: 58 IN | SYSTOLIC BLOOD PRESSURE: 138 MMHG | DIASTOLIC BLOOD PRESSURE: 81 MMHG

## 2023-07-10 DIAGNOSIS — G89.4 CHRONIC PAIN SYNDROME: Primary | ICD-10-CM

## 2023-07-10 DIAGNOSIS — J90 PLEURAL EFFUSION: ICD-10-CM

## 2023-07-10 DIAGNOSIS — M46.1 SACROILIITIS (HCC): ICD-10-CM

## 2023-07-10 DIAGNOSIS — G89.29 CHRONIC BILATERAL LOW BACK PAIN WITHOUT SCIATICA: ICD-10-CM

## 2023-07-10 DIAGNOSIS — M54.50 CHRONIC BILATERAL LOW BACK PAIN WITHOUT SCIATICA: ICD-10-CM

## 2023-07-10 PROCEDURE — 71046 X-RAY EXAM CHEST 2 VIEWS: CPT

## 2023-07-10 PROCEDURE — 99213 OFFICE O/P EST LOW 20 MIN: CPT | Performed by: NURSE PRACTITIONER

## 2023-07-10 RX ORDER — LORAZEPAM 0.5 MG/1
TABLET ORAL EVERY 8 HOURS PRN
COMMUNITY

## 2023-07-10 NOTE — PROGRESS NOTES
Assessment:  1. Chronic pain syndrome    2. Chronic bilateral low back pain without sciatica    3. Sacroiliitis ([address])        Plan:  While the patient was in the office today, I did have a thorough conversation regarding their chronic pain syndrome, medication management, and treatment plan options. Patient is being seen for follow-up visit. She underwent bilateral SI joint injections on 6/7/2023. She reports that her pain was improved for several weeks after the injection. Continues with low back pain. She is currently a resident in a skilled nursing facility. She complains of increased low back pain due to prolonged sitting in a wheelchair. I advised her that she needs to ask to lie in bed intermittently if her pain is increased. Also, patient has a as needed order for Tylenol. Her  tells me that she does not really ask to use it. I reminded her that she has an order for Tylenol which can be used every 6 hours if needed for pain and encouraged her to ask for it if she needs it. Will repeat bilateral SI joint injections as needed. Follow-up in 2 months. History of Present Illness: The patient is a 80 y.o. female who presents for a follow up office visit in regards to Follow-up. The patient’s current symptoms include complaints of low back pain, nonradiating. Current pain level is an 8/10. Quality pain is described as cramping. Current pain medications includes: Tylenol as needed. I have personally reviewed and/or updated the patient's past medical history, past surgical history, family history, social history, current medications, allergies, and vital signs today. Review of Systems  Review of Systems   Respiratory: Positive for shortness of breath. Musculoskeletal: Positive for gait problem. Decrease ROM  Joint stiffness   Neurological: Positive for weakness.            Past Medical History:   Diagnosis Date   • Acute on chronic diastolic CHF (congestive heart failure) ([address]) 1/30/2023   • Acute respiratory failure with hypoxia ([address]) 12/22/2022   • Aortic stenosis    • Atrial flutter (HCC)    • CAD (coronary artery disease) 01/11/2023    CAD/PCI/ELODIA   • CKD (chronic kidney disease), stage III (HCC)    • Community acquired pneumonia of left upper lobe of lung 05/17/2019   • Fatigue    • Full dentures    • Generalized anxiety disorder    • Hyperlipidemia    • Hypertension    • Impaired fasting glucose    • Mitral regurgitation    • Pneumonia    • RBBB    • 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    TRANSFEMORAL W/ 23MM CALVERT SHAVONNE S3 ULTRA VALVE(ACCESS ON LEFT)   • SVT (supraventricular tachycardia)    • Tricuspid regurgitation        Past Surgical History:   Procedure Laterality Date   • CARDIAC CATHETERIZATION N/A 1/11/2023    Procedure: LHC-Cardiac catheterization;  Surgeon: Hailey Sales MD;  [facility]; Service: Cardiology   • CARDIAC CATHETERIZATION N/A 1/11/2023    Procedure: Cardiac pci;  Surgeon: Hailey Sales MD;  [facility]: [facility];   Service: Cardiology   • CARDIAC CATHETERIZATION N/A 1/17/2023    Procedure: CARDIAC TAVR;  Surgeon: Teja Coppola DO;  Location:  MAIN OR;  Service: Cardiology   • DENTAL SURGERY Bilateral     ALL TEETH REMOVED   • FL GUIDED NEEDLE PLAC BX/ASP/INJ  5/26/2021   • IR THORACENTESIS  12/20/2022   • JOINT REPLACEMENT Left    • KNEE SURGERY      left knee replacement   • HI INJECT SI JOINT ARTHRGRPHY&/ANES/STEROID W/JOHNATHON Bilateral 5/26/2021    Procedure: SACROILIAC JOINT INJECTION;  Surgeon: Marta Wilde MD;  Location: [facility];  Service: Pain Management    • HI REPLACE AORTIC VALVE OPENFEMORAL ARTERY APPROACH N/A 1/17/2023    Procedure: REPLACEMENT AORTIC VALVE TRANSCATHETER (TAVR) TRANSFEMORAL W/ 23MM CALVERT SHAVONNE S3 ULTRA VALVE(ACCESS ON LEFT) ALENA;  Surgeon: Ranjit Aguayo DO;  Location:  MAIN OR;  Service: Cardiac Surgery   • SACROILIAC JOINT INJECTION Bilateral 6/7/2023    Procedure: Bilateral sacroiliac joint steroid injection;  Surgeon: Priti Curiel MD;  [facility]: [facility];  Service: Pain Management        Family History   Problem Relation Age of Onset   • Hypertension Mother    • Asthma Father         [place]'s asthma   • Alzheimer's disease Sister    • Rectal cancer Son    • Uterine cancer Daughter    • Heart disease Sister    • Heart disease Brother        Social History     Occupational History   • Not on file   Tobacco Use   • Smoking status: Never   • Smokeless tobacco: Never   Vaping Use   • Vaping Use: Never used   Substance and Sexual Activity   • Alcohol use: Never   • Drug use: Never   • Sexual activity: Not Currently     Partners: Male         Current Outpatient Medications:   •  alendronate (Fosamax) 70 mg tablet, Take 1 tablet (70 mg total) by mouth every 7 days, Disp: 12 tablet, Rfl: 3  •  aluminum-magnesium hydroxide-simethicone (MYLANTA) 5176-5392-923 mg/30 mL suspension, Take 30 mL by mouth every 4 (four) hours as needed for indigestion or heartburn, Disp: , Rfl: 0  •  ammonium lactate (LAC-HYDRIN) 12 % lotion, Apply topically daily Do not start before Jacqueline 15, 2023., Disp: , Rfl: 0  •  apixaban (ELIQUIS) 2.5 mg, Take 1 tablet (2.5 mg total) by mouth 2 (two) times a day, Disp: 60 tablet, Rfl: 2  •  atorvastatin (LIPITOR) 20 mg tablet, Take 1 tablet (20 mg total) by mouth daily with dinner, Disp: 30 tablet, Rfl: 4  •  Cholecalciferol (Vitamin D3) 50 MCG (2000 UT) TABS, Take 2,000 Units by mouth in the morning.  Indications: Osteoporosis, Disp: , Rfl:   •  clopidogrel (PLAVIX) 75 mg tablet, Take 1 tablet (75 mg total) by mouth daily, Disp: 90 tablet, Rfl: 2  •  diltiazem (CARDIZEM) 30 mg tablet, Take 1 tablet (30 mg total) by mouth 3 (three) times a day Hold for HR<60 bpm or SBP<110 mmHg, Disp: , Rfl: 0  •  LORazepam (ATIVAN) 0.5 mg tablet, Take by mouth every 8 (eight) hours as needed for anxiety, Disp: , Rfl:   •  metoprolol succinate (TOPROL-XL) 50 mg 24 hr tablet, Take 1 tablet (50 mg total) by mouth daily Hold for HR<60 or SBP<110 mmHg, Disp: 90 tablet, Rfl: 0  •  Multiple Vitamin (Multivitamin Adult) TABS, Take 1 tablet by mouth in the morning. Indications: Nutritional Support, Disp: , Rfl:   •  Nutritional Supplement LIQD, Take 1 Can by mouth 2 (two) times a day Ensure pudding BID, Disp: 237 mL, Rfl: 0  •  pantoprazole (PROTONIX) 40 mg tablet, TAKE 1 TABLET BY MOUTH ONCE DAILY IN THE MORNING, Disp: 30 tablet, Rfl: 0  •  Psyllium (METAMUCIL FIBER) 51.7 % PACK, Take 1 Package by mouth daily, Disp: , Rfl: 0    No Known Allergies    Physical Exam:    /81 (BP Location: Left arm, Patient Position: Sitting, Cuff Size: Adult)   Pulse 80   Ht 4' 10" (1.473 m)   BMI 22.57 kg/m²     Constitutional:normal, well developed, well nourished, alert, in no distress and non-toxic and no overt pain behavior. Eyes:anicteric  HEENT:grossly intact  Neck:supple, symmetric, trachea midline and no masses   Pulmonary:even and unlabored  Cardiovascular:No edema or pitting edema present  Skin:Normal without rashes or lesions and well hydrated  Psychiatric:Mood and affect appropriate  Neurologic:Cranial Nerves II-XII grossly intact  Musculoskeletal:in wheelchair tenderness over bilateral SI joints. Imaging  No orders to display       No orders of the defined types were placed in this encounter.

## 2023-07-11 ENCOUNTER — TELEPHONE (OUTPATIENT)
Dept: NEUROSURGERY | Facility: CLINIC | Age: 88
End: 2023-07-11

## 2023-07-11 NOTE — TELEPHONE ENCOUNTER
Patients son called left Vm at call center to reschedule cancelled appointment for his mother called him back left Vm and office number to reschedule

## 2023-07-12 ENCOUNTER — OFFICE VISIT (OUTPATIENT)
Dept: PULMONOLOGY | Facility: CLINIC | Age: 88
End: 2023-07-12
Payer: MEDICARE

## 2023-07-12 ENCOUNTER — TELEPHONE (OUTPATIENT)
Dept: NEPHROLOGY | Facility: CLINIC | Age: 88
End: 2023-07-12

## 2023-07-12 VITALS
DIASTOLIC BLOOD PRESSURE: 86 MMHG | OXYGEN SATURATION: 90 % | SYSTOLIC BLOOD PRESSURE: 138 MMHG | WEIGHT: 108 LBS | HEART RATE: 94 BPM | HEIGHT: 58 IN | BODY MASS INDEX: 22.67 KG/M2

## 2023-07-12 DIAGNOSIS — I50.32 CHRONIC DIASTOLIC (CONGESTIVE) HEART FAILURE (HCC): Primary | ICD-10-CM

## 2023-07-12 DIAGNOSIS — J90 PLEURAL EFFUSION ON RIGHT: ICD-10-CM

## 2023-07-12 DIAGNOSIS — E87.1 HYPONATREMIA: Primary | ICD-10-CM

## 2023-07-12 DIAGNOSIS — I48.21 PERMANENT ATRIAL FIBRILLATION (HCC): ICD-10-CM

## 2023-07-12 DIAGNOSIS — E87.1 HYPONATREMIA: ICD-10-CM

## 2023-07-12 PROCEDURE — 99214 OFFICE O/P EST MOD 30 MIN: CPT | Performed by: PHYSICIAN ASSISTANT

## 2023-07-12 RX ORDER — FUROSEMIDE 20 MG/1
20 TABLET ORAL DAILY
Qty: 30 TABLET | Refills: 0 | Status: SHIPPED | OUTPATIENT
Start: 2023-07-12

## 2023-07-12 NOTE — ASSESSMENT & PLAN NOTE
Wt Readings from Last 3 Encounters:   07/12/23 49 kg (108 lb)   06/14/23 49 kg (108 lb)   06/07/23 46.7 kg (103 lb)     Is up as is her lower extremity edema worse than her baseline according to patient and her . Recommended restarting Lasix at a low dose and repeating BMP next week.

## 2023-07-12 NOTE — ASSESSMENT & PLAN NOTE
Ongoing management per primary team.  Hold Eliquis for upcoming procedure later this week. May resume Eliquis postprocedure.

## 2023-07-12 NOTE — ASSESSMENT & PLAN NOTE
I reviewed repeat chest x-ray done 7/10/2023. It appears that patient's pleural effusion has recurred since previous thoracentesis now approximately moderate on chest x-ray. I recommend therapeutic and diagnostic thoracentesis this Friday. Order placed. I also recommended restarting diuretic with Lasix 20 mg p.o. daily. We will plan to repeat BMP next week will monitor sodium and potassium levels closely moving forward. Also recommend she follow-up with cardiology and nephrology for which she has appointments for both later this month.

## 2023-07-12 NOTE — PROGRESS NOTES
Pulmonary Follow Up Note   Doc Antis 80 y.o. female MRN: [id number]  7/12/2023      Assessment:    Pleural effusion on right  I reviewed repeat chest x-ray done 7/10/2023. It appears that patient's pleural effusion has recurred since previous thoracentesis now approximately moderate on chest x-ray. I recommend therapeutic and diagnostic thoracentesis this Friday. Order placed. I also recommended restarting diuretic with Lasix 20 mg p.o. daily. We will plan to repeat BMP next week will monitor sodium and potassium levels closely moving forward. Also recommend she follow-up with cardiology and nephrology for which she has appointments for both later this month. Chronic diastolic (congestive) heart failure (HCC)  Wt Readings from Last 3 Encounters:   07/12/23 49 kg (108 lb)   06/14/23 49 kg (108 lb)   06/07/23 46.7 kg (103 lb)     Is up as is her lower extremity edema worse than her baseline according to patient and her . Recommended restarting Lasix at a low dose and repeating BMP next week. Hyponatremia  Resolved on recent blood work. Permanent atrial fibrillation (HCC)  Ongoing management per primary team.  Hold Eliquis for upcoming procedure later this week. May resume Eliquis postprocedure. Plan:    Diagnoses and all orders for this visit:    Chronic diastolic (congestive) heart failure (HCC)    Pleural effusion on right  -     Ambulatory Referral to Pulmonology  -     furosemide (LASIX) 20 mg tablet; Take 1 tablet (20 mg total) by mouth daily  -     IR OUT-Patient Thoracentesis; Future  -     Basic metabolic panel;  Future    Hyponatremia    Permanent atrial fibrillation (HCC)    Other orders  -     Body fluid white cell count with differential; Standing  -     Body fluid culture (Pleural Fluid Culture) and Gram stain; Standing  -     Glucose, body fluid; Standing  -     LD (LDH), Body Fluid; Standing  -     Non-gynecologic cytology; Standing  -     pH, body fluid; Standing  -     Total Protein, body fluid; Standing        No follow-ups on file. History of Present Illness   HPI:  Kierra Whatley is a 80 y.o. female who presents the office today for hospital follow-up. Patient has a past medical hist positive for CAD status post RCA stenting on Plavix, A-fib flutter on Eliquis, aortic stenosis status post TAVR in January 2023, compression fracture of T11, CKD and recent admission last month for hyponatremia and recurrent right-sided pleural effusion. Underwent thoracentesis 6/12/2023 which yielded 1300 cc clear yellow fluid exudative lights criteria with negative cytology. Placed on sodium restriction and hyponatremia did improve. Patient was discharged up to the fifth floor for rehab and remains there currently. She states that she is doing relatively well however her  who is present with her at the office visit today states that he has noticed increased shortness of breath/dyspnea on exertion especially today. Denies cough, sputum production, hemoptysis or wheeze. She has increased lower extremity edema worse since stopping her diuretic after the hospital stay. Review of Systems   All other systems reviewed and are negative.       Historical Information   Past Medical History:   Diagnosis Date   • Acute on chronic diastolic CHF (congestive heart failure) ([address]) 1/30/2023   • Acute respiratory failure with hypoxia (HCC) 12/22/2022   • Aortic stenosis    • Atrial flutter (HCC)    • CAD (coronary artery disease) 01/11/2023    CAD/PCI/ELODIA   • CKD (chronic kidney disease), stage III (HCC)    • Community acquired pneumonia of left upper lobe of lung 05/17/2019   • Fatigue    • Full dentures    • Generalized anxiety disorder    • Hyperlipidemia    • Hypertension    • Impaired fasting glucose    • Mitral regurgitation    • Pneumonia    • RBBB    • 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    TRANSFEMORAL W/ 23MM CALVERT SHAVONNE S3 ULTRA VALVE(ACCESS ON LEFT)   • SVT (supraventricular tachycardia)    • Tricuspid regurgitation      Past Surgical History:   Procedure Laterality Date   • CARDIAC CATHETERIZATION N/A 1/11/2023    Procedure: LHC-Cardiac catheterization;  Surgeon: Marquis Yun MD;  Location: [facility]; Service: Cardiology   • CARDIAC CATHETERIZATION N/A 1/11/2023    Procedure: Cardiac pci;  Surgeon: Marquis Yun MD;  [facility];   Service: Cardiology   • CARDIAC CATHETERIZATION N/A 1/17/2023    Procedure: CARDIAC TAVR;  Surgeon: Nii Sparks DO;  Location: [facility];  Service: Cardiology   • DENTAL SURGERY Bilateral     ALL TEETH REMOVED   • FL GUIDED NEEDLE PLAC BX/ASP/INJ  5/26/2021   • IR THORACENTESIS  12/20/2022   • JOINT REPLACEMENT Left    • KNEE SURGERY      left knee replacement   • WY INJECT SI JOINT ARTHRGRPHY&/ANES/STEROID W/JOHNTAHON Bilateral 5/26/2021    Procedure: SACROILIAC JOINT INJECTION;  Surgeon: Angelita Bazan MD;  Location: [facility];  Service: Pain Management    • WY REPLACE AORTIC VALVE OPENFEMORAL ARTERY APPROACH N/A 1/17/2023    Procedure: REPLACEMENT AORTIC VALVE TRANSCATHETER (TAVR) TRANSFEMORAL W/ 23MM CALVERT SHAVONNE S3 ULTRA VALVE(ACCESS ON LEFT) ALENA;  Surgeon: Jessica Wilson DO;  Location: [facility];  Service: Cardiac Surgery   • SACROILIAC JOINT INJECTION Bilateral 6/7/2023    Procedure: Bilateral sacroiliac joint steroid injection;  Surgeon: David Hubbard MD;  Location: [facility];  Service: Pain Management      Family History   Problem Relation Age of Onset   • Hypertension Mother    • Asthma Father         [place]'s asthma   • Alzheimer's disease Sister    • Rectal cancer Son    • Uterine cancer Daughter    • Heart disease Sister    • Heart disease Brother        Social History     Tobacco Use   Smoking Status Never   Smokeless Tobacco Never         Meds/Allergies     Current Outpatient Medications:   •  furosemide (LASIX) 20 mg tablet, Take 1 tablet (20 mg total) by mouth daily, Disp: 30 tablet, Rfl: 0  •  alendronate (Fosamax) 70 mg tablet, Take 1 tablet (70 mg total) by mouth every 7 days, Disp: 12 tablet, Rfl: 3  •  aluminum-magnesium hydroxide-simethicone (MYLANTA) 1171-1944-801 mg/30 mL suspension, Take 30 mL by mouth every 4 (four) hours as needed for indigestion or heartburn, Disp: , Rfl: 0  •  ammonium lactate (LAC-HYDRIN) 12 % lotion, Apply topically daily Do not start before Jacqueline 15, 2023., Disp: , Rfl: 0  •  apixaban (ELIQUIS) 2.5 mg, Take 1 tablet (2.5 mg total) by mouth 2 (two) times a day, Disp: 60 tablet, Rfl: 2  •  atorvastatin (LIPITOR) 20 mg tablet, Take 1 tablet (20 mg total) by mouth daily with dinner, Disp: 30 tablet, Rfl: 4  •  Cholecalciferol (Vitamin D3) 50 MCG (2000 UT) TABS, Take 2,000 Units by mouth in the morning. Indications: Osteoporosis, Disp: , Rfl:   •  clopidogrel (PLAVIX) 75 mg tablet, Take 1 tablet (75 mg total) by mouth daily, Disp: 90 tablet, Rfl: 2  •  diltiazem (CARDIZEM) 30 mg tablet, Take 1 tablet (30 mg total) by mouth 3 (three) times a day Hold for HR<60 bpm or SBP<110 mmHg, Disp: , Rfl: 0  •  LORazepam (ATIVAN) 0.5 mg tablet, Take by mouth every 8 (eight) hours as needed for anxiety, Disp: , Rfl:   •  metoprolol succinate (TOPROL-XL) 50 mg 24 hr tablet, Take 1 tablet (50 mg total) by mouth daily Hold for HR<60 or SBP<110 mmHg, Disp: 90 tablet, Rfl: 0  •  Multiple Vitamin (Multivitamin Adult) TABS, Take 1 tablet by mouth in the morning. Indications: Nutritional Support, Disp: , Rfl:   •  Nutritional Supplement LIQD, Take 1 Can by mouth 2 (two) times a day Ensure pudding BID, Disp: 237 mL, Rfl: 0  •  pantoprazole (PROTONIX) 40 mg tablet, TAKE 1 TABLET BY MOUTH ONCE DAILY IN THE MORNING, Disp: 30 tablet, Rfl: 0  •  Psyllium (METAMUCIL FIBER) 51.7 % PACK, Take 1 Package by mouth daily, Disp: , Rfl: 0  No Known Allergies    Vitals: Blood pressure 138/86, pulse 94, height 4' 10" (1.473 m), weight 49 kg (108 lb), SpO2 90 %.  Body mass index is 22.57 kg/m². Oxygen Therapy  SpO2: 90 %    Physical Exam  Physical Exam  Vitals reviewed. Constitutional:       Appearance: Normal appearance. She is well-developed. HENT:      Head: Normocephalic and atraumatic. Nose: Nose normal.      Mouth/Throat:      Mouth: Mucous membranes are moist.   Eyes:      Extraocular Movements: Extraocular movements intact. Cardiovascular:      Rate and Rhythm: Normal rate and regular rhythm. Heart sounds: Normal heart sounds. Pulmonary:      Effort: Pulmonary effort is normal. No respiratory distress. Breath sounds: Rales (2/3 up on the right side) present. No wheezing or rhonchi. Comments: Decreased in right base  Musculoskeletal:         General: No swelling. Skin:     General: Skin is warm and dry. Neurological:      Mental Status: She is alert. Mental status is at baseline. Psychiatric:         Mood and Affect: Mood normal.         Behavior: Behavior normal.         Labs: I have personally reviewed pertinent lab results. , ABG: No results found for: "PHART", "VPS3BKX", "PO2ART", "BKL3YCZ", "H2ICKCUK", "BEART", "SOURCE", BNP: No results found for: "BNP", CBC: No results found for: "WBC", "HGB", "HCT", "MCV", "PLT", "ADJUSTEDWBC", "RBC", "MCH", "MCHC", "RDW", "MPV", "NRBC", CMP: No results found for: "SODIUM", "K", "CL", "CO2", "ANIONGAP", "BUN", "CREATININE", "GLUCOSE", "CALCIUM", "AST", "ALT", "ALKPHOS", "PROT", "BILITOT", "EGFR", PT/INR: No results found for: "PT", "INR", Troponin: No results found for: "TROPONINI"  Lab Results   Component Value Date    WBC 10.04 06/21/2023    HGB 12.5 06/21/2023    HCT 38.5 06/21/2023    MCV 97 06/21/2023     06/21/2023     Lab Results   Component Value Date    GLUCOSE 107 01/17/2023    CALCIUM 8.8 07/03/2023    K 3.4 (L) 07/03/2023    CO2 24 07/03/2023     07/03/2023    BUN 33 (H) 07/03/2023    CREATININE 0.85 07/03/2023     No results found for: "IGE"  Lab Results   Component Value Date    ALT 20 06/14/2023    AST 17 06/14/2023    ALKPHOS 71 06/14/2023       Imaging and other studies: I have personally reviewed pertinent reports. and I have personally reviewed pertinent films in PACS     Chest x-ray PA and lateral 7/10/2023  Moderate right pleural effusion and right basilar atelectasis without pneumothorax. Upper abdomen normal.  Bone normal for age. Other Studies: I have personally reviewed pertinent reports. Echocardiogram 2/15/2023  EF 60%. Systolic function normal.  Right ventricular cavitary size mildly dilated. Right systolic function normal.  Left atrium severely dilated. Prosthetic valve well-seated and appears to be functioning normally. Mild to moderate regurgitation.

## 2023-07-13 ENCOUNTER — LAB REQUISITION (OUTPATIENT)
Dept: LAB | Facility: HOSPITAL | Age: 88
End: 2023-07-13

## 2023-07-13 ENCOUNTER — TELEPHONE (OUTPATIENT)
Dept: PULMONOLOGY | Facility: CLINIC | Age: 88
End: 2023-07-13

## 2023-07-13 ENCOUNTER — PATIENT OUTREACH (OUTPATIENT)
Dept: CASE MANAGEMENT | Facility: OTHER | Age: 88
End: 2023-07-13

## 2023-07-13 ENCOUNTER — LAB REQUISITION (OUTPATIENT)
Dept: LAB | Facility: HOSPITAL | Age: 88
End: 2023-07-13
Payer: MEDICARE

## 2023-07-13 DIAGNOSIS — E87.1 HYPO-OSMOLALITY AND HYPONATREMIA: ICD-10-CM

## 2023-07-13 LAB
ANION GAP SERPL CALCULATED.3IONS-SCNC: 12 MMOL/L
BUN SERPL-MCNC: 35 MG/DL (ref 5–25)
CALCIUM SERPL-MCNC: 8.9 MG/DL (ref 8.4–10.2)
CHLORIDE SERPL-SCNC: 104 MMOL/L (ref 96–108)
CO2 SERPL-SCNC: 21 MMOL/L (ref 21–32)
CREAT SERPL-MCNC: 0.78 MG/DL (ref 0.6–1.3)
CREAT UR-MCNC: 104.3 MG/DL
GFR SERPL CREATININE-BSD FRML MDRD: 68 ML/MIN/1.73SQ M
GLUCOSE SERPL-MCNC: 87 MG/DL (ref 65–140)
POTASSIUM SERPL-SCNC: 3.8 MMOL/L (ref 3.5–5.3)
PROT UR-MCNC: 29 MG/DL
PROT/CREAT UR: 0.28 MG/G{CREAT} (ref 0–0.1)
SODIUM SERPL-SCNC: 137 MMOL/L (ref 135–147)

## 2023-07-13 PROCEDURE — 82570 ASSAY OF URINE CREATININE: CPT | Performed by: INTERNAL MEDICINE

## 2023-07-13 PROCEDURE — 82043 UR ALBUMIN QUANTITATIVE: CPT | Performed by: INTERNAL MEDICINE

## 2023-07-13 PROCEDURE — 84156 ASSAY OF PROTEIN URINE: CPT | Performed by: INTERNAL MEDICINE

## 2023-07-13 PROCEDURE — 80048 BASIC METABOLIC PNL TOTAL CA: CPT | Performed by: NURSE PRACTITIONER

## 2023-07-13 NOTE — PROGRESS NOTES
Chart review complete update obtained from realtime the patient is currently admitted to [facility]. This Admin Coordinator will continue to monitor via chart review.

## 2023-07-13 NOTE — TELEPHONE ENCOUNTER
Please let the 5th floor rehab know that she should continue to hold Plavix through tomorrow and may resume Saturday morning.

## 2023-07-13 NOTE — TELEPHONE ENCOUNTER
Christian Vo the Charge RN at [facility] was calling requesting to speak with edgar. She stated edgar ordered a  thoro from IR tomorrow for the pt and advised to  hold pt eliquis which they have been. However the  3-11 nurse noticed the pt is on plavix for a  prosthetic heart valve she is on 75 mg once a day they called the on call provider  Dr. Evon Herrera who said to hold plavix for today but advised to speak with edgar regarding holding the plavix until after procedure. Please advise.        CB #: [phone]

## 2023-07-14 ENCOUNTER — HOSPITAL ENCOUNTER (OUTPATIENT)
Dept: INTERVENTIONAL RADIOLOGY/VASCULAR | Facility: HOSPITAL | Age: 88
Discharge: HOME | End: 2023-07-14
Payer: MEDICARE

## 2023-07-14 VITALS
HEART RATE: 67 BPM | OXYGEN SATURATION: 98 % | DIASTOLIC BLOOD PRESSURE: 54 MMHG | SYSTOLIC BLOOD PRESSURE: 95 MMHG | RESPIRATION RATE: 18 BRPM

## 2023-07-14 DIAGNOSIS — J90 PLEURAL EFFUSION ON RIGHT: ICD-10-CM

## 2023-07-14 LAB
APPEARANCE FLD: ABNORMAL
COLOR FLD: ABNORMAL
CREAT UR-MCNC: 115 MG/DL
EOSINOPHIL NFR FLD MANUAL: 1 %
GLUCOSE FLD-MCNC: 125 MG/DL
HISTIOCYTES NFR FLD: 33 %
LDH FLD L TO P-CCNC: 124 U/L
LYMPHOCYTES NFR BLD AUTO: 50 %
MICROALBUMIN UR-MCNC: 77.9 MG/L (ref 0–20)
MICROALBUMIN/CREAT 24H UR: 68 MG/G CREATININE (ref 0–30)
MONOCYTES NFR BLD AUTO: 9 %
NEUTS SEG NFR BLD AUTO: 7 %
PH BODY FLUID: 7.6
PROT FLD-MCNC: 3.2 G/DL
SITE: ABNORMAL
TOTAL CELLS COUNTED SPEC: 100
WBC # FLD MANUAL: 138 /UL

## 2023-07-14 PROCEDURE — 83986 ASSAY PH BODY FLUID NOS: CPT | Performed by: PHYSICIAN ASSISTANT

## 2023-07-14 PROCEDURE — 87205 SMEAR GRAM STAIN: CPT | Performed by: PHYSICIAN ASSISTANT

## 2023-07-14 PROCEDURE — 87070 CULTURE OTHR SPECIMN AEROBIC: CPT | Performed by: PHYSICIAN ASSISTANT

## 2023-07-14 PROCEDURE — 88112 CYTOPATH CELL ENHANCE TECH: CPT | Performed by: PATHOLOGY

## 2023-07-14 PROCEDURE — 82945 GLUCOSE OTHER FLUID: CPT | Performed by: PHYSICIAN ASSISTANT

## 2023-07-14 PROCEDURE — 83615 LACTATE (LD) (LDH) ENZYME: CPT | Performed by: PHYSICIAN ASSISTANT

## 2023-07-14 PROCEDURE — 32555 ASPIRATE PLEURA W/ IMAGING: CPT | Performed by: RADIOLOGY

## 2023-07-14 PROCEDURE — 89051 BODY FLUID CELL COUNT: CPT | Performed by: PHYSICIAN ASSISTANT

## 2023-07-14 PROCEDURE — 32555 ASPIRATE PLEURA W/ IMAGING: CPT

## 2023-07-14 PROCEDURE — 84157 ASSAY OF PROTEIN OTHER: CPT | Performed by: PHYSICIAN ASSISTANT

## 2023-07-14 PROCEDURE — 88305 TISSUE EXAM BY PATHOLOGIST: CPT | Performed by: PATHOLOGY

## 2023-07-14 RX ORDER — LIDOCAINE WITH 8.4% SOD BICARB 0.9%(10ML)
SYRINGE (ML) INJECTION AS NEEDED
Status: COMPLETED | OUTPATIENT
Start: 2023-07-14 | End: 2023-07-14

## 2023-07-14 RX ADMIN — Medication 10 ML: at 10:40

## 2023-07-14 NOTE — DISCHARGE INSTRUCTIONS
[address]  Interventional Radiology  [phone]     Thoracentesis   WHAT YOU NEED TO KNOW:   A thoracentesis is a procedure to remove extra fluid or air from between your lungs and your inner chest wall. Air or fluid buildup may make it hard for you to breathe. A thoracentesis allows your lungs to expand fully so you can breathe more easily. DISCHARGE INSTRUCTIONS:   Medicines:   Pain medicine: You may be given a prescription medicine to decrease pain. Do not wait until the pain is severe before you take your medicine. Antibiotics: This medicine helps fight or prevent an infection. Take your medicine as directed. Call your healthcare provider if you think your medicine is not helping or if you have side effects. Tell him if you are allergic to any medicine. Keep a list of the medicines, vitamins, and herbs you take. Include the amounts, and when and why you take them. Bring the list or the pill bottles to follow-up visits. Carry your medicine list with you in case of an emergency. Follow up with your healthcare provider as directed:  Write down your questions so you remember to ask them during your visits. Rest:  Rest when you feel it is needed. Slowly start to do more each day. Return to your daily activities as directed. Do not smoke: If you smoke, it is never too late to quit. Ask for information about how to stop smoking if you need help. Contact your healthcare provider if:   You have a fever. Your puncture site is red, warm, swollen, or draining pus. You have questions or concerns about your procedure, medicine, or care. If you have any questions regarding, call the IR department @ [phone]. Seek care immediately or call 911 if:   Blood soaks through your bandage. There is blood in your spit.

## 2023-07-14 NOTE — SEDATION DOCUMENTATION
Right sided thoracentesis complete. 970ml of clear serosanguineous fluid removed. Patient offering no complaints. Bandage applied. Labs sent.

## 2023-07-17 ENCOUNTER — LAB REQUISITION (OUTPATIENT)
Dept: LAB | Facility: HOSPITAL | Age: 88
End: 2023-07-17
Payer: MEDICARE

## 2023-07-17 DIAGNOSIS — E87.1 HYPO-OSMOLALITY AND HYPONATREMIA: ICD-10-CM

## 2023-07-17 LAB
BACTERIA SPEC BFLD CULT: NO GROWTH
CREAT UR-MCNC: 22.3 MG/DL
CREAT UR-MCNC: 26 MG/DL
GRAM STN SPEC: NORMAL
MICROALBUMIN UR-MCNC: 11.3 MG/L (ref 0–20)
MICROALBUMIN/CREAT 24H UR: 51 MG/G CREATININE (ref 0–30)
PROT UR-MCNC: 5 MG/DL
PROT/CREAT UR: 0.19 MG/G{CREAT} (ref 0–0.1)

## 2023-07-17 PROCEDURE — 82570 ASSAY OF URINE CREATININE: CPT | Performed by: INTERNAL MEDICINE

## 2023-07-17 PROCEDURE — 84156 ASSAY OF PROTEIN URINE: CPT | Performed by: INTERNAL MEDICINE

## 2023-07-17 PROCEDURE — 82043 UR ALBUMIN QUANTITATIVE: CPT | Performed by: INTERNAL MEDICINE

## 2023-07-18 ENCOUNTER — TELEPHONE (OUTPATIENT)
Dept: OTHER | Facility: HOSPITAL | Age: 88
End: 2023-07-18

## 2023-07-18 ENCOUNTER — PATIENT OUTREACH (OUTPATIENT)
Dept: CASE MANAGEMENT | Facility: OTHER | Age: 88
End: 2023-07-18

## 2023-07-18 PROCEDURE — 88112 CYTOPATH CELL ENHANCE TECH: CPT | Performed by: PATHOLOGY

## 2023-07-18 PROCEDURE — 88305 TISSUE EXAM BY PATHOLOGIST: CPT | Performed by: PATHOLOGY

## 2023-07-18 NOTE — TELEPHONE ENCOUNTER
Called patient and charge nurse on 5th floor to review results of thoracentesis. Plan for BMP tomorrow.

## 2023-07-18 NOTE — PROGRESS NOTES
Chart review complete the patient has been at [facility] (5th Floor) since 6/14/23. I have removed myself from the care team, updated the Care Coordination note, and closed the episode.

## 2023-07-19 ENCOUNTER — LAB REQUISITION (OUTPATIENT)
Dept: LAB | Facility: HOSPITAL | Age: 88
End: 2023-07-19
Payer: MEDICARE

## 2023-07-19 ENCOUNTER — CONSULT (OUTPATIENT)
Dept: NEPHROLOGY | Facility: CLINIC | Age: 88
End: 2023-07-19
Payer: MEDICARE

## 2023-07-19 VITALS
HEART RATE: 60 BPM | DIASTOLIC BLOOD PRESSURE: 70 MMHG | BODY MASS INDEX: 21.99 KG/M2 | SYSTOLIC BLOOD PRESSURE: 120 MMHG | OXYGEN SATURATION: 93 % | WEIGHT: 105.2 LBS

## 2023-07-19 DIAGNOSIS — I50.32 CHRONIC DIASTOLIC (CONGESTIVE) HEART FAILURE (HCC): ICD-10-CM

## 2023-07-19 DIAGNOSIS — E87.1 HYPO-OSMOLALITY AND HYPONATREMIA: ICD-10-CM

## 2023-07-19 DIAGNOSIS — J90 PLEURAL EFFUSION, NOT ELSEWHERE CLASSIFIED: ICD-10-CM

## 2023-07-19 DIAGNOSIS — J90 PLEURAL EFFUSION ON RIGHT: ICD-10-CM

## 2023-07-19 DIAGNOSIS — I48.21 PERMANENT ATRIAL FIBRILLATION (HCC): ICD-10-CM

## 2023-07-19 DIAGNOSIS — E87.1 HYPONATREMIA: ICD-10-CM

## 2023-07-19 DIAGNOSIS — E87.6 HYPOKALEMIA: Primary | ICD-10-CM

## 2023-07-19 DIAGNOSIS — I35.0 SEVERE AORTIC STENOSIS: ICD-10-CM

## 2023-07-19 DIAGNOSIS — E22.2 SIADH (SYNDROME OF INAPPROPRIATE ADH PRODUCTION) (HCC): ICD-10-CM

## 2023-07-19 LAB
ANION GAP SERPL CALCULATED.3IONS-SCNC: 11 MMOL/L
BUN SERPL-MCNC: 35 MG/DL (ref 5–25)
CALCIUM SERPL-MCNC: 9 MG/DL (ref 8.4–10.2)
CHLORIDE SERPL-SCNC: 103 MMOL/L (ref 96–108)
CO2 SERPL-SCNC: 23 MMOL/L (ref 21–32)
CREAT SERPL-MCNC: 0.85 MG/DL (ref 0.6–1.3)
GFR SERPL CREATININE-BSD FRML MDRD: 61 ML/MIN/1.73SQ M
GLUCOSE SERPL-MCNC: 88 MG/DL (ref 65–140)
POTASSIUM SERPL-SCNC: 3.4 MMOL/L (ref 3.5–5.3)
SODIUM SERPL-SCNC: 137 MMOL/L (ref 135–147)

## 2023-07-19 PROCEDURE — 99214 OFFICE O/P EST MOD 30 MIN: CPT | Performed by: INTERNAL MEDICINE

## 2023-07-19 PROCEDURE — 80048 BASIC METABOLIC PNL TOTAL CA: CPT | Performed by: NURSE PRACTITIONER

## 2023-07-19 RX ORDER — POTASSIUM CHLORIDE 750 MG/1
10 TABLET, EXTENDED RELEASE ORAL DAILY
Qty: 90 TABLET | Refills: 2 | Status: SHIPPED | OUTPATIENT
Start: 2023-07-19

## 2023-07-19 RX ORDER — OMEPRAZOLE 20 MG/1
20 CAPSULE, DELAYED RELEASE ORAL DAILY
COMMUNITY

## 2023-07-19 RX ORDER — BISACODYL 10 MG
10 SUPPOSITORY, RECTAL RECTAL DAILY
COMMUNITY

## 2023-07-19 NOTE — PROGRESS NOTES
[facility] [place], [place]    Name: Cat Flores  YOB: 1935      Assessment/Plan:           Problem List Items Addressed This Visit        Endocrine    SIADH (syndrome of inappropriate 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[address])     Markedly improved. She is able to concentrate her urine adequately with fluid restriction and cessation of an SSRI  We will liberalize her fluid by 8 ounces  She is currently taking furosemide 20 mg daily. Check labs in 1 month            Respiratory    Pleural effusion on right     Status post right thoracentesis this past week            Cardiovascular and Mediastinum    Severe aortic stenosis     Status post TAVR in January. No systolic murmur         Chronic diastolic (congestive) heart failure (HCC)     Wt Readings from Last 3 Encounters:   07/19/23 47.7 kg (105 lb 3.2 oz)   07/12/23 49 kg (108 lb)   06/14/23 49 kg (108 lb)     Has mild lower extremity edema but essentially examines euvolemic             Permanent atrial fibrillation (HCC)     Rate control with metoprolol and diltiazem. AC with apixaban            Other    Hypokalemia - Primary    Relevant Medications    potassium chloride (K-DUR,KLOR-CON) 10 mEq tablet    Hyponatremia         Subjective:      Patient ID: Cat Flores is a 80 y.o. female. referred by Dr Fidelina Worrell and post hospital by Dr Lenore Sandoval    HPI has a history of hyponatremia treated with discontinuation of sertraline and marked fluid restriction. The patient has been on the fluid restriction and her beverage history demonstrates 36 ounces of liquid a day. Last serum sodium was 137 mmol/L  She has a long history of atrial fibrillation. Status post TAVR in January 2023, she had a thoracentesis in June and then this past week for a right pleural effusion which was recurrent. She has a history of coronary artery disease status post stenting.   She is currently on the fifth floor of the [facility] for rehabilitation and doing well    The following portions of the patient's history were reviewed and updated as appropriate: allergies, current medications, past family history, past medical history, past social history, past surgical history and problem list.    Review of Systems   Constitutional: Positive for fatigue. HENT: Positive for hearing loss. Eyes: Negative for visual disturbance. Respiratory: Negative for cough and shortness of breath. Cardiovascular: Positive for leg swelling. Negative for chest pain and palpitations. Gastrointestinal: Negative for abdominal pain, blood in stool, constipation and diarrhea. Genitourinary: Negative for difficulty urinating, dysuria and hematuria. Musculoskeletal: Positive for arthralgias and gait problem. Seated in wheelchair   Neurological: Positive for weakness. Negative for dizziness and headaches. Hematological: Bruises/bleeds easily. Psychiatric/Behavioral: Negative for dysphoric mood.          Social History     Socioeconomic History   • Marital status: /Civil Union     Spouse name: None   • Number of children: 2   • Years of education: None   • Highest education level: None   Occupational History   • None   Tobacco Use   • Smoking status: Never   • Smokeless tobacco: Never   Vaping Use   • Vaping Use: Never used   Substance and Sexual Activity   • Alcohol use: Never   • Drug use: Never   • Sexual activity: Not Currently     Partners: Male   Other Topics Concern   • None   Social History Narrative   • None     Social Determinants of Health     Financial Resource Strain: Low Risk  (4/25/2023)    Overall Financial Resource Strain (CARDIA)    • Difficulty of Paying Living Expenses: Not hard at all   Food Insecurity: No Food Insecurity (6/12/2023)    Hunger Vital Sign    • Worried About Running Out of Food in the Last Year: Never true    • Ran Out of Food in the Last Year: Never true   Transportation Needs: No Transportation Needs (6/12/2023)    PRAPARE - Transportation    • Lack of Transportation (Medical): No    • Lack of Transportation (Non-Medical): No   Physical Activity: Not on file   Stress: No Stress Concern Present (1/21/2021)    [address]    • Feeling of Stress : Only a little   Social Connections: Moderately Isolated (1/21/2021)    Social Connection and Isolation Panel [NHANES]    • Frequency of Communication with Friends and Family: Twice a week    • Frequency of Social Gatherings with Friends and Family:  Three times a week    • Attends [employer] Services: Never    • Active Member of Clubs or Organizations: No    • Attends Club or Organization Meetings: Never    • Marital Status:    Intimate Partner Violence: Not At Risk (1/21/2021)    Humiliation, Afraid, Rape, and Kick questionnaire    • Fear of Current or Ex-Partner: No    • Emotionally Abused: No    • Physically Abused: No    • Sexually Abused: No   Housing Stability: Low Risk  (6/12/2023)    Housing Stability Vital Sign    • Unable to Pay for Housing in the Last Year: No    • Number of [address] in the Last Year: 1    • Unstable Housing in the Last Year: No     Past Medical History:   Diagnosis Date   • Acute on chronic diastolic CHF (congestive heart failure) ([address]) 1/30/2023   • Acute respiratory failure with hypoxia ([address]) 12/22/2022   • Aortic stenosis    • Atrial flutter (HCC)    • CAD (coronary artery disease) 01/11/2023    CAD/PCI/ELODIA   • CKD (chronic kidney disease), stage III ([address])    • Community acquired pneumonia of left upper lobe of lung 05/17/2019   • Fatigue    • Full dentures    • Generalized anxiety disorder    • Hyperlipidemia    • Hypertension    • Impaired fasting glucose    • Mitral regurgitation    • Pneumonia    • RBBB    • 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    TRANSFEMORAL W/ 23MM CALVERT SHAVONNE S3 ULTRA VALVE(ACCESS ON LEFT)   • SVT (supraventricular tachycardia)    • Tricuspid regurgitation      Past Surgical History:   Procedure Laterality Date   • CARDIAC CATHETERIZATION N/A 1/11/2023    Procedure: LHC-Cardiac catheterization;  Surgeon: Hair Ruiz MD;  Location: [facility]; Service: Cardiology   • CARDIAC CATHETERIZATION N/A 1/11/2023    Procedure: Cardiac pci;  Surgeon: Hair Ruiz MD;  [facility]: [facility];   Service: Cardiology   • CARDIAC CATHETERIZATION N/A 1/17/2023    Procedure: CARDIAC TAVR;  Surgeon: Suhas Love DO;  Location: [facility];  Service: Cardiology   • DENTAL SURGERY Bilateral     ALL TEETH REMOVED   • FL GUIDED NEEDLE PLAC BX/ASP/INJ  5/26/2021   • IR THORACENTESIS  12/20/2022   • IR THORACENTESIS  7/14/2023   • JOINT REPLACEMENT Left    • KNEE SURGERY      left knee replacement   • SD INJECT SI JOINT ARTHRGRPHY&/ANES/STEROID W/JOHNATHON Bilateral 5/26/2021    Procedure: SACROILIAC JOINT INJECTION;  Surgeon: Fauzia Almanza MD;  Location: [facility];  Service: Pain Management    • SD REPLACE AORTIC VALVE OPENFEMORAL ARTERY APPROACH N/A 1/17/2023    Procedure: REPLACEMENT AORTIC VALVE TRANSCATHETER (TAVR) TRANSFEMORAL W/ 23MM CALVERT SHAVONNE S3 ULTRA VALVE(ACCESS ON LEFT) ALENA;  Surgeon: Kale Olivares DO;  Location: [facility];  Service: Cardiac Surgery   • SACROILIAC JOINT INJECTION Bilateral 6/7/2023    Procedure: Bilateral sacroiliac joint steroid injection;  Surgeon: Jonathan Roman MD;  Location: [facility];  Service: Pain Management        Current Outpatient Medications:   •  alendronate (Fosamax) 70 mg tablet, Take 1 tablet (70 mg total) by mouth every 7 days, Disp: 12 tablet, Rfl: 3  •  aluminum-magnesium hydroxide-simethicone (MYLANTA) 2928-3561-979 mg/30 mL suspension, Take 30 mL by mouth every 4 (four) hours as needed for indigestion or heartburn, Disp: , Rfl: 0  •  ammonium lactate (LAC-HYDRIN) 12 % lotion, Apply topically daily Do not start before Jacqueline 15, 2023., Disp: , Rfl: 0  •  apixaban (ELIQUIS) 2.5 mg, Take 1 tablet (2.5 mg total) by mouth 2 (two) times a day, Disp: 60 tablet, Rfl: 2  •  atorvastatin (LIPITOR) 20 mg tablet, Take 1 tablet (20 mg total) by mouth daily with dinner, Disp: 30 tablet, Rfl: 4  •  bisacodyl (DULCOLAX) 10 mg suppository, Insert 10 mg into the rectum daily, Disp: , Rfl:   •  Cholecalciferol (Vitamin D3) 50 MCG (2000 UT) TABS, Take 2,000 Units by mouth in the morning. Indications: Osteoporosis, Disp: , Rfl:   •  clopidogrel (PLAVIX) 75 mg tablet, Take 1 tablet (75 mg total) by mouth daily, Disp: 90 tablet, Rfl: 2  •  diltiazem (CARDIZEM) 30 mg tablet, Take 1 tablet (30 mg total) by mouth 3 (three) times a day Hold for HR<60 bpm or SBP<110 mmHg, Disp: , Rfl: 0  •  furosemide (LASIX) 20 mg tablet, Take 1 tablet (20 mg total) by mouth daily, Disp: 30 tablet, Rfl: 0  •  LORazepam (ATIVAN) 0.5 mg tablet, Take by mouth every 8 (eight) hours as needed for anxiety, Disp: , Rfl:   •  metoprolol succinate (TOPROL-XL) 50 mg 24 hr tablet, Take 1 tablet (50 mg total) by mouth daily Hold for HR<60 or SBP<110 mmHg, Disp: 90 tablet, Rfl: 0  •  Multiple Vitamin (Multivitamin Adult) TABS, Take 1 tablet by mouth in the morning.  Indications: Nutritional Support, Disp: , Rfl:   •  Nutritional Supplement LIQD, Take 1 Can by mouth 2 (two) times a day Ensure pudding BID, Disp: 237 mL, Rfl: 0  •  omeprazole (PriLOSEC) 20 mg delayed release capsule, Take 20 mg by mouth daily, Disp: , Rfl:   •  potassium chloride (K-DUR,KLOR-CON) 10 mEq tablet, Take 1 tablet (10 mEq total) by mouth daily, Disp: 90 tablet, Rfl: 2  •  Psyllium (METAMUCIL FIBER) 51.7 % PACK, Take 1 Package by mouth daily, Disp: , Rfl: 0  •  pantoprazole (PROTONIX) 40 mg tablet, TAKE 1 TABLET BY MOUTH ONCE DAILY IN THE MORNING (Patient not taking: Reported on 7/19/2023), Disp: 30 tablet, Rfl: 0    Lab Results   Component Value Date    SODIUM 137 07/19/2023    K 3.4 (L) 07/19/2023     07/19/2023    CO2 23 07/19/2023    AGAP 11 07/19/2023    BUN 35 (H) 07/19/2023    CREATININE 0.85 07/19/2023    GLUC 88 07/19/2023    GLUF 98 03/24/2023    CALCIUM 9.0 07/19/2023    AST 17 06/14/2023    ALT 20 06/14/2023    ALKPHOS 71 06/14/2023    TP 5.8 (L) 06/14/2023    TBILI 0.72 06/14/2023    EGFR 61 07/19/2023     Lab Results   Component Value Date    WBC 10.04 06/21/2023    HGB 12.5 06/21/2023    HCT 38.5 06/21/2023    MCV 97 06/21/2023     06/21/2023     Lab Results   Component Value Date    CHOLESTEROL 164 12/21/2022    CHOLESTEROL 145 07/05/2022    CHOLESTEROL 173 06/17/2020     Lab Results   Component Value Date    HDL 94 12/21/2022    HDL 74 07/05/2022    HDL 68 06/17/2020     Lab Results   Component Value Date    LDLCALC 60 12/21/2022    LDLCALC 58 07/05/2022    LDLCALC 86 06/17/2020     Lab Results   Component Value Date    TRIG 50 12/21/2022    TRIG 67 07/05/2022    TRIG 97 06/17/2020     No results found for: "CHOLHDL"  Lab Results   Component Value Date    ARF0UHQVVITF 1.451 06/11/2023     Lab Results   Component Value Date    CALCIUM 9.0 07/19/2023    PHOS 2.9 06/14/2023     No results found for: "SPEP", "UPEP"  No results found for: "Sarah Decent", "[id number]"        Objective:      /70 (BP Location: Left arm, Patient Position: Sitting, Cuff Size: Standard)   Pulse 60   Wt 47.7 kg (105 lb 3.2 oz)   SpO2 93%   BMI 21.99 kg/m²          Physical Exam  Vitals reviewed. Constitutional:       Appearance: Normal appearance. Comments: Frail   HENT:      Right Ear: External ear normal.      Left Ear: External ear normal.   Eyes:      Extraocular Movements: Extraocular movements intact. Pupils: Pupils are equal, round, and reactive to light. Cardiovascular:      Rate and Rhythm: Rhythm irregular. Pulmonary:      Effort: No respiratory distress. Breath sounds: Normal breath sounds. Abdominal:      General: Bowel sounds are normal.      Palpations: Abdomen is soft. Tenderness: There is no abdominal tenderness.    Musculoskeletal: Right lower leg: Edema present. Left lower leg: Edema present. Skin:     General: Skin is warm and dry. Neurological:      Mental Status: She is alert. Motor: Weakness present.       Gait: Gait abnormal.   Psychiatric:         Mood and Affect: Mood normal.         Behavior: Behavior normal.

## 2023-07-19 NOTE — ASSESSMENT & PLAN NOTE
Markedly improved. She is able to concentrate her urine adequately with fluid restriction and cessation of an SSRI  We will liberalize her fluid by 8 ounces  She is currently taking furosemide 20 mg daily.   Check labs in 1 month

## 2023-07-19 NOTE — ASSESSMENT & PLAN NOTE
Wt Readings from Last 3 Encounters:   07/19/23 47.7 kg (105 lb 3.2 oz)   07/12/23 49 kg (108 lb)   06/14/23 49 kg (108 lb)     Has mild lower extremity edema but essentially examines euvolemic

## 2023-07-20 NOTE — PROGRESS NOTES
Established Patient Progress Note       Chief Complaint   Patient presents with   • Hyperthyroidism   • Graves' Disease        Impression & Plan:    Problem List Items Addressed This Visit        Endocrine    Graves disease - Primary     Patient remains in remission. Reviewed s/s of hyperthyroidism. Recommend continued surveillance twice yearly of if patient becomes symptomatic. Offered to discharge her back for PCP management. Patient would prefer to continue to follow with Endocrinology twice yearly. Relevant Orders    TSH, 3rd generation with Free T4 reflex    SIADH (syndrome of inappropriate 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[address])     Following with nephrology. Cardiovascular and Mediastinum    Chronic diastolic (congestive) heart failure (HCC)     Wt Readings from Last 3 Encounters:   07/19/23 47.7 kg (105 lb 3.2 oz)   07/12/23 49 kg (108 lb)   06/14/23 49 kg (108 lb)     Appears euvolemic on physical exam. Continue beta blocker and diuretic. Musculoskeletal and Integument    Age-related osteoporosis with current pathological fracture of vertebra [facility])     Managed by PCP. Continue once weekly Fosamax, calcium and Vit D supplements. Orders Placed This Encounter   Procedures   • TSH, 3rd generation with Free T4 reflex     Standing Status:   Future     Standing Expiration Date:   7/26/2024     Order Specific Question: This patient has an active home care or hospice episode. Should this order be COMPLETED by [facility] [employer]? Answer:   No       History of Present Illness:     Teresa Cordoba is a 80 y.o. female with Graves' disease presenting to the office today for routine follow-up. To review:    Graves' disease was diagnosed in summer 2022. TSH was persistently low with elevated free T4 and high titers of TSH receptor antibodies. Patient required methimazole therapy until December 2022 when TSH was noted to be significantly elevated.   Methimazole was held and ultimately discontinued. Patient is currently not taking any methimazole. TSH as of 6/11/2023 was stable at 1.451. On 6/11/2023, she was admitted to the hospital for hyponatremia, pleural effusion and generalized weakness. She is now following with nephrology and electrolytes are improving. Patient denies unexplained weight loss, heat intolerance, palpitations, and hyper defecation.     Patient Active Problem List   Diagnosis   • Hypomagnesemia   • Hypokalemia   • Hyponatremia   • Essential hypertension   • Lymphadenopathy   • Hiatal hernia   • Thrombocytopenia ([facility])   • Severe aortic stenosis   • Mitral valve insufficiency   • Atrial tachycardia ([facility])   • Pulmonary hypertension ([facility])   • Diverticulosis   • Non-rheumatic tricuspid valve insufficiency   • Generalized anxiety disorder   • Mid back pain   • Encounter for Medicare annual wellness exam   • Encounter for long-term (current) use of medications   • Immunization due   • Acute bursitis of right shoulder   • Impaired fasting glucose   • Osteopenia of neck of femur   • Chronic bilateral low back pain without sciatica   • Gait abnormality   • Chronic pain syndrome   • Sacroiliitis ([facility])   • Bilateral leg edema   • Ganglion cyst   • Adhesive capsulitis of right shoulder   • Generalized weakness   • Dyslipidemia   • Post-menopause   • Primary generalized (osteo)arthritis   • Chronic diastolic (congestive) heart failure ([facility])   • Pleural effusion   • Constipation   • Status post insertion of drug eluting coronary artery stent   • S/P TAVR (transcatheter aortic valve replacement)   • Permanent atrial fibrillation ([facility])   • Incomplete right bundle branch block   • Coronary artery disease involving native coronary artery of native heart without angina pectoris   • Anemia   • Graves disease   • Closed wedge compression fracture of T11 vertebra ([facility])   • Age-related osteoporosis with current pathological fracture of vertebra ([facility])   • Moderate protein-calorie malnutrition [facility])   • Acute cystitis with hematuria   • Pleural effusion on right   • Tricuspid valve insufficiency   • Prolonged QT interval   • SIADH (syndrome of inappropriate 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[facility])      Past Medical History:   Diagnosis Date   • Acute on chronic diastolic CHF (congestive heart failure) ([address]) 1/30/2023   • Acute respiratory failure with hypoxia ([facility]) 12/22/2022   • Aortic stenosis    • Atrial flutter ([facility])    • CAD (coronary artery disease) 01/11/2023    CAD/PCI/ELODIA   • CKD (chronic kidney disease), stage III ([facility])    • Community acquired pneumonia of left upper lobe of lung 05/17/2019   • Fatigue    • Full dentures    • Generalized anxiety disorder    • Hyperlipidemia    • Hypertension    • Impaired fasting glucose    • Mitral regurgitation    • Pneumonia    • RBBB    • 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    TRANSFEMORAL W/ 23MM CALVERT SHAVONNE S3 ULTRA VALVE(ACCESS ON LEFT)   • SVT (supraventricular tachycardia)    • Tricuspid regurgitation       Past Surgical History:   Procedure Laterality Date   • CARDIAC CATHETERIZATION N/A 1/11/2023    Procedure: LHC-Cardiac catheterization;  Surgeon: Luisana Aldana MD;  [facility] CARD[facility]; Service: Cardiology   • CARDIAC CATHETERIZATION N/A 1/11/2023    Procedure: Cardiac pci;  Surgeon: Luisana Aldana MD;  Location: [facility] CARDIAC CATH LAB;   Service: Cardiology   • CARDIAC CATHETERIZATION N/A 1/17/2023    Procedure: CARDIAC TAVR;  Surgeon: Lito Hooker DO;  Location:  MAIN OR;  Service: Cardiology   • DENTAL SURGERY Bilateral     ALL TEETH REMOVED   • FL GUIDED NEEDLE PLAC BX/ASP/INJ  5/26/2021   • IR THORACENTESIS  12/20/2022   • IR THORACENTESIS  7/14/2023   • JOINT REPLACEMENT Left    • KNEE SURGERY      left knee replacement   • WI INJECT SI JOINT ARTHRGRPHY&/ANES/STEROID W/JOHNATHON Bilateral 5/26/2021    Procedure: SACROILIAC JOINT INJECTION;  Surgeon: Chaparrita Uriarte MD;  Location: [facility] MAIN OR;  Service: Pain Management    • WI REPLACE AORTIC VALVE OPENFEMORAL ARTERY APPROACH N/A 1/17/2023    Procedure: REPLACEMENT AORTIC VALVE TRANSCATHETER (TAVR) TRANSFEMORAL W/ 23MM CALVERT SHAVONNE S3 ULTRA VALVE(ACCESS ON LEFT) ALENA;  Surgeon: Debby Montano DO;  Location:  MAIN OR;  Service: Cardiac Surgery   • SACROILIAC JOINT INJECTION Bilateral 6/7/2023    Procedure: Bilateral sacroiliac joint steroid injection;  Surgeon: Elvis Galan MD;  Location: [facility] MAIN [facility];  Service: Pain Management       Family History   Problem Relation Age of Onset   • Hypertension Mother    • Asthma Father         's asthma   • Alzheimer's disease Sister    • Rectal cancer Son    • Uterine cancer Daughter    • Heart disease Sister    • Heart disease Brother      Social History     Tobacco Use   • Smoking status: Never   • Smokeless tobacco: Never   Substance Use Topics   • Alcohol use: Never     No Known Allergies    Current Outpatient Medications:   •  alendronate (Fosamax) 70 mg tablet, Take 1 tablet (70 mg total) by mouth every 7 days, Disp: 12 tablet, Rfl: 3  •  aluminum-magnesium hydroxide-simethicone (MYLANTA) 2614-4237-848 mg/30 mL suspension, Take 30 mL by mouth every 4 (four) hours as needed for indigestion or heartburn, Disp: , Rfl: 0  •  ammonium lactate (LAC-HYDRIN) 12 % lotion, Apply topically daily Do not start before Jacqueline 15, 2023., Disp: , Rfl: 0  •  apixaban (ELIQUIS) 2.5 mg, Take 1 tablet (2.5 mg total) by mouth 2 (two) times a day, Disp: 60 tablet, Rfl: 2  •  atorvastatin (LIPITOR) 20 mg tablet, Take 1 tablet (20 mg total) by mouth daily with dinner, Disp: 30 tablet, Rfl: 4  •  bisacodyl (DULCOLAX) 10 mg suppository, Insert 10 mg into the rectum daily, Disp: , Rfl:   •  Cholecalciferol (Vitamin D3) 50 MCG (2000 UT) TABS, Take 2,000 Units by mouth in the morning.  Indications: Osteoporosis, Disp: , Rfl:   •  clopidogrel (PLAVIX) 75 mg tablet, Take 1 tablet (75 mg total) by mouth daily, Disp: 90 tablet, Rfl: 2  •  diltiazem (CARDIZEM) 30 mg tablet, Take 1 tablet (30 mg total) by mouth 3 (three) times a day Hold for HR<60 bpm or SBP<110 mmHg, Disp: , Rfl: 0  •  furosemide (LASIX) 20 mg tablet, Take 1 tablet (20 mg total) by mouth daily, Disp: 30 tablet, Rfl: 0  •  LORazepam (ATIVAN) 0.5 mg tablet, Take by mouth every 8 (eight) hours as needed for anxiety, Disp: , Rfl:   •  metoprolol succinate (TOPROL-XL) 50 mg 24 hr tablet, Take 1 tablet (50 mg total) by mouth daily Hold for HR<60 or SBP<110 mmHg, Disp: 90 tablet, Rfl: 0  •  Multiple Vitamin (Multivitamin Adult) TABS, Take 1 tablet by mouth in the morning. Indications: Nutritional Support, Disp: , Rfl:   •  Nutritional Supplement LIQD, Take 1 Can by mouth 2 (two) times a day Ensure pudding BID, Disp: 237 mL, Rfl: 0  •  omeprazole (PriLOSEC) 20 mg delayed release capsule, Take 20 mg by mouth daily, Disp: , Rfl:   •  potassium chloride (K-DUR,KLOR-CON) 10 mEq tablet, Take 1 tablet (10 mEq total) by mouth daily, Disp: 90 tablet, Rfl: 2  •  potassium chloride (Klor-Con) 10 mEq tablet, , Disp: , Rfl:   •  Psyllium (METAMUCIL FIBER) 51.7 % PACK, Take 1 Package by mouth daily, Disp: , Rfl: 0  •  pantoprazole (PROTONIX) 40 mg tablet, TAKE 1 TABLET BY MOUTH ONCE DAILY IN THE MORNING (Patient not taking: Reported on 7/19/2023), Disp: 30 tablet, Rfl: 0    Review of Systems   Constitutional: Negative for activity change, appetite change, fatigue and unexpected weight change. HENT: Positive for hearing loss. Negative for dental problem, sore throat, trouble swallowing and voice change. Eyes: Negative for visual disturbance. Respiratory: Negative for cough, chest tightness and shortness of breath. Cardiovascular: Negative for chest pain, palpitations and leg swelling. Gastrointestinal: Negative for constipation, diarrhea, nausea and vomiting. Endocrine: Negative for cold intolerance, heat intolerance, polydipsia, polyphagia and polyuria. Genitourinary: Negative for frequency.    Musculoskeletal: Positive for arthralgias and gait problem. Negative for back pain and myalgias. Skin: Negative for wound. Allergic/Immunologic: Negative for environmental allergies and food allergies. Neurological: Negative for dizziness, weakness, light-headedness, numbness and headaches. Psychiatric/Behavioral: Positive for decreased concentration. Negative for dysphoric mood and sleep disturbance. The patient is not nervous/anxious. Physical Exam:  Body mass index is 21.99 kg/m². BP 95/56   Pulse 60   Temp 98 °F (36.7 °C)   Resp 16   Ht 4' 10" (1.473 m)   SpO2 93%   BMI 21.99 kg/m²    Wt Readings from Last 3 Encounters:   07/19/23 47.7 kg (105 lb 3.2 oz)   07/12/23 49 kg (108 lb)   06/14/23 49 kg (108 lb)       Physical Exam  Vitals reviewed. Constitutional:       General: She is not in acute distress. Appearance: She is well-developed. She is not ill-appearing. HENT:      Head: Normocephalic and atraumatic. Eyes:      Pupils: Pupils are equal, round, and reactive to light. Neck:      Thyroid: No thyromegaly. Cardiovascular:      Rate and Rhythm: Normal rate. Rhythm irregular. Pulses: Normal pulses. Heart sounds: Normal heart sounds. Pulmonary:      Effort: Pulmonary effort is normal.      Breath sounds: Normal breath sounds. Abdominal:      General: Bowel sounds are normal. There is no distension. Palpations: Abdomen is soft. Tenderness: There is no abdominal tenderness. Musculoskeletal:      Cervical back: Normal range of motion and neck supple. Right lower leg: Edema (Trace) present. Left lower leg: Edema (Trace) present. Lymphadenopathy:      Cervical: No cervical adenopathy. Skin:     General: Skin is warm and dry. Capillary Refill: Capillary refill takes less than 2 seconds. Neurological:      Mental Status: She is alert and oriented to person, place, and time.       Gait: Gait (In wheelchair) normal.   Psychiatric:         Mood and Affect: Mood normal.         Behavior: Behavior normal.         Labs:     Lab Results   Component Value Date    CREATININE 0.85 07/19/2023    CREATININE 0.78 07/13/2023    CREATININE 0.85 07/03/2023    BUN 35 (H) 07/19/2023    K 3.4 (L) 07/19/2023     07/19/2023    CO2 23 07/19/2023     eGFR   Date Value Ref Range Status   07/19/2023 61 ml/min/1.73sq m Final       Lab Results   Component Value Date    HDL 94 12/21/2022    TRIG 50 12/21/2022       Lab Results   Component Value Date    ALT 20 06/14/2023    AST 17 06/14/2023    ALKPHOS 71 06/14/2023       Lab Results   Component Value Date    FREET4 1.32 (H) 05/25/2023       There are no Patient Instructions on file for this visit. Discussed with the patient and all questioned fully answered. She will call me if any problems arise. Follow-up appointment in 6 months.      Counseled patient on diagnostic results, prognosis, risk and benefit of treatment options, instruction for management, importance of treatment compliance, Risk  factor reduction and impressions    Regan Cooper, [address] [place] [address]

## 2023-07-24 ENCOUNTER — HOME HEALTH ADMISSION (OUTPATIENT)
Dept: HOME HEALTH SERVICES | Facility: HOME HEALTHCARE | Age: 88
End: 2023-07-24
Payer: MEDICARE

## 2023-07-24 ENCOUNTER — TELEPHONE (OUTPATIENT)
Dept: NEUROSURGERY | Facility: CLINIC | Age: 88
End: 2023-07-24

## 2023-07-24 NOTE — TELEPHONE ENCOUNTER
Per patients spouse roselyn patient will not be able to make it to her appointment on Thursday 07/27/23. He said he would call to reschedule in a few days.     Appointment for 07/27/23 cancelled

## 2023-07-26 ENCOUNTER — OFFICE VISIT (OUTPATIENT)
Dept: ENDOCRINOLOGY | Facility: CLINIC | Age: 88
End: 2023-07-26
Payer: MEDICARE

## 2023-07-26 VITALS
HEIGHT: 58 IN | RESPIRATION RATE: 16 BRPM | DIASTOLIC BLOOD PRESSURE: 56 MMHG | TEMPERATURE: 98 F | HEART RATE: 60 BPM | OXYGEN SATURATION: 93 % | SYSTOLIC BLOOD PRESSURE: 95 MMHG | BODY MASS INDEX: 21.99 KG/M2

## 2023-07-26 DIAGNOSIS — E05.00 GRAVES DISEASE: Primary | ICD-10-CM

## 2023-07-26 DIAGNOSIS — E22.2 SIADH (SYNDROME OF INAPPROPRIATE ADH PRODUCTION) (HCC): ICD-10-CM

## 2023-07-26 DIAGNOSIS — I50.32 CHRONIC DIASTOLIC (CONGESTIVE) HEART FAILURE (HCC): ICD-10-CM

## 2023-07-26 DIAGNOSIS — M80.08XD AGE-RELATED OSTEOPOROSIS WITH CURRENT PATHOLOGICAL FRACTURE OF VERTEBRA WITH ROUTINE HEALING, SUBSEQUENT ENCOUNTER: ICD-10-CM

## 2023-07-26 PROCEDURE — 99213 OFFICE O/P EST LOW 20 MIN: CPT | Performed by: NURSE PRACTITIONER

## 2023-07-26 RX ORDER — POTASSIUM CHLORIDE 750 MG/1
TABLET, FILM COATED, EXTENDED RELEASE ORAL
COMMUNITY
Start: 2023-07-20

## 2023-07-26 NOTE — ASSESSMENT & PLAN NOTE
Wt Readings from Last 3 Encounters:   07/19/23 47.7 kg (105 lb 3.2 oz)   07/12/23 49 kg (108 lb)   06/14/23 49 kg (108 lb)     Appears euvolemic on physical exam. Continue beta blocker and diuretic.

## 2023-07-26 NOTE — ASSESSMENT & PLAN NOTE
Patient remains in remission. Reviewed s/s of hyperthyroidism. Recommend continued surveillance twice yearly of if patient becomes symptomatic. Offered to discharge her back for PCP management. Patient would prefer to continue to follow with Endocrinology twice yearly.

## 2023-07-30 ENCOUNTER — HOME CARE VISIT (OUTPATIENT)
Dept: HOME HEALTH SERVICES | Facility: HOME HEALTHCARE | Age: 88
End: 2023-07-30
Payer: MEDICARE

## 2023-07-30 PROCEDURE — G0299 HHS/HOSPICE OF RN EA 15 MIN: HCPCS

## 2023-07-30 PROCEDURE — 10330081 VN NO-PAY CLAIM PROCEDURE

## 2023-07-30 PROCEDURE — 400013 VN SOC

## 2023-07-31 ENCOUNTER — HOME CARE VISIT (OUTPATIENT)
Dept: HOME HEALTH SERVICES | Facility: HOME HEALTHCARE | Age: 88
End: 2023-07-31
Payer: MEDICARE

## 2023-07-31 VITALS — DIASTOLIC BLOOD PRESSURE: 80 MMHG | HEART RATE: 68 BPM | SYSTOLIC BLOOD PRESSURE: 118 MMHG | OXYGEN SATURATION: 89 %

## 2023-07-31 VITALS
SYSTOLIC BLOOD PRESSURE: 120 MMHG | HEART RATE: 77 BPM | DIASTOLIC BLOOD PRESSURE: 68 MMHG | TEMPERATURE: 97.6 F | RESPIRATION RATE: 18 BRPM | OXYGEN SATURATION: 95 %

## 2023-07-31 PROCEDURE — G0151 HHCP-SERV OF PT,EA 15 MIN: HCPCS

## 2023-07-31 NOTE — CASE COMMUNICATION
[facility] has admitted your patient to [facility] service with the following disciplines:   SN, PT and OT   This report is informational only, no responses is needed   Primary focus of home health care. hyponatremia  Patient stated goals of care. remain at home   Anticipated visit pattern and next visit date. 3w1. 2w2. 1w3   next visit weds   See medication list - meds in home differ from AVS. all meds at home   Significant clinic al findings. weakness, fatigue, decrease endurance, vergara   Potential barriers to goal achievement. na   Other pertinent information. na  Thank you for allowing us to participate in the care of your patient.

## 2023-07-31 NOTE — Clinical Note
I did discuss García Cueto today with patient and . System requires initial investment of just over $500. We discussed other incontinence options as well.  ----- Message -----  From: Rohini Castillo, PT  Sent: 7/31/2023   7:45 PM EDT  To: Mina Beard RN       asking about purewick; pt up multiple times during the night with urination.

## 2023-07-31 NOTE — CASE COMMUNICATION
Can you please order 3-in-1 commode for pt to [facility]. Need demographics, insurance, medical notes. Patient's bathroom on 2nd floor; increased urination with water pill, impaired mobility/gait. [facility] fax number [phone].      Thank you, Himanshu Patel PT

## 2023-07-31 NOTE — CASE COMMUNICATION
PT robert on 7/31/23- PT POC for 2wk3 for gait/transfers/balance training, LE ther ex, edu, fall prevention. Pt's pulse ox 89% then 86% with gait- hands are cold with up to 92% at rst. At end of session, pulse ox 90% then switched finger and 95%. Pt D.O.E. which resolved with rest/deep breathing.      Thank you, Jacqueline Garduno PT

## 2023-08-02 ENCOUNTER — HOME CARE VISIT (OUTPATIENT)
Dept: HOME HEALTH SERVICES | Facility: HOME HEALTHCARE | Age: 88
End: 2023-08-02
Payer: MEDICARE

## 2023-08-02 VITALS
DIASTOLIC BLOOD PRESSURE: 62 MMHG | HEART RATE: 66 BPM | TEMPERATURE: 96.9 F | RESPIRATION RATE: 18 BRPM | OXYGEN SATURATION: 95 % | SYSTOLIC BLOOD PRESSURE: 108 MMHG

## 2023-08-02 PROCEDURE — G0299 HHS/HOSPICE OF RN EA 15 MIN: HCPCS

## 2023-08-03 ENCOUNTER — HOME CARE VISIT (OUTPATIENT)
Dept: HOME HEALTH SERVICES | Facility: HOME HEALTHCARE | Age: 88
End: 2023-08-03
Payer: MEDICARE

## 2023-08-03 VITALS — OXYGEN SATURATION: 91 % | DIASTOLIC BLOOD PRESSURE: 56 MMHG | SYSTOLIC BLOOD PRESSURE: 118 MMHG | HEART RATE: 75 BPM

## 2023-08-03 VITALS — HEART RATE: 60 BPM | SYSTOLIC BLOOD PRESSURE: 130 MMHG | DIASTOLIC BLOOD PRESSURE: 84 MMHG | OXYGEN SATURATION: 90 %

## 2023-08-03 LAB
DME PARACHUTE DELIVERY DATE ACTUAL: NORMAL
DME PARACHUTE DELIVERY DATE REQUESTED: NORMAL
DME PARACHUTE ITEM DESCRIPTION: NORMAL
DME PARACHUTE ORDER STATUS: NORMAL
DME PARACHUTE SUPPLIER NAME: NORMAL
DME PARACHUTE SUPPLIER PHONE: NORMAL

## 2023-08-03 PROCEDURE — G0152 HHCP-SERV OF OT,EA 15 MIN: HCPCS

## 2023-08-03 PROCEDURE — G0151 HHCP-SERV OF PT,EA 15 MIN: HCPCS

## 2023-08-04 ENCOUNTER — HOME CARE VISIT (OUTPATIENT)
Dept: HOME HEALTH SERVICES | Facility: HOME HEALTHCARE | Age: 88
End: 2023-08-04
Payer: MEDICARE

## 2023-08-04 NOTE — CASE COMMUNICATION
Continue Occupational Therapy 1w1 2w1 1w2 starting 8.3.23.  OT to include ADL training for Bathing and for ADL Tub transfer training to increase safety and independence to include caregiver training during self care tasks. Instructed patient and caregiver on OT plan of care and frequency with good understanding demonstrated by patient and caregiver.

## 2023-08-07 ENCOUNTER — HOME CARE VISIT (OUTPATIENT)
Dept: HOME HEALTH SERVICES | Facility: HOME HEALTHCARE | Age: 88
End: 2023-08-07
Payer: MEDICARE

## 2023-08-07 VITALS
OXYGEN SATURATION: 93 % | TEMPERATURE: 97.1 F | RESPIRATION RATE: 20 BRPM | DIASTOLIC BLOOD PRESSURE: 66 MMHG | SYSTOLIC BLOOD PRESSURE: 134 MMHG | HEART RATE: 78 BPM

## 2023-08-07 PROCEDURE — G0152 HHCP-SERV OF OT,EA 15 MIN: HCPCS

## 2023-08-07 PROCEDURE — G0151 HHCP-SERV OF PT,EA 15 MIN: HCPCS

## 2023-08-07 PROCEDURE — G0299 HHS/HOSPICE OF RN EA 15 MIN: HCPCS

## 2023-08-07 NOTE — CASE COMMUNICATION
Pt D.O.E. with gait/activity/mobility. After ambulation, 84% on RA and with rest/deep breathing 90%. difficulty with pulse ox due to colder hands.     Thank you, Lonza Wendy Simpson PT

## 2023-08-08 ENCOUNTER — OFFICE VISIT (OUTPATIENT)
Dept: CARDIOLOGY CLINIC | Facility: CLINIC | Age: 88
End: 2023-08-08
Payer: MEDICARE

## 2023-08-08 VITALS
HEART RATE: 60 BPM | HEIGHT: 58 IN | SYSTOLIC BLOOD PRESSURE: 134 MMHG | BODY MASS INDEX: 21.32 KG/M2 | WEIGHT: 101.6 LBS | DIASTOLIC BLOOD PRESSURE: 90 MMHG

## 2023-08-08 DIAGNOSIS — I25.10 CORONARY ARTERY DISEASE INVOLVING NATIVE CORONARY ARTERY OF NATIVE HEART WITHOUT ANGINA PECTORIS: ICD-10-CM

## 2023-08-08 DIAGNOSIS — I48.21 PERMANENT ATRIAL FIBRILLATION (HCC): ICD-10-CM

## 2023-08-08 DIAGNOSIS — Z95.820 S/P ANGIOPLASTY WITH STENT: ICD-10-CM

## 2023-08-08 DIAGNOSIS — Z95.2 S/P TAVR (TRANSCATHETER AORTIC VALVE REPLACEMENT): Primary | Chronic | ICD-10-CM

## 2023-08-08 PROBLEM — I35.0 SEVERE AORTIC STENOSIS: Status: RESOLVED | Noted: 2019-05-21 | Resolved: 2023-08-08

## 2023-08-08 PROCEDURE — 99214 OFFICE O/P EST MOD 30 MIN: CPT | Performed by: INTERNAL MEDICINE

## 2023-08-08 RX ORDER — CLOPIDOGREL BISULFATE 75 MG/1
75 TABLET ORAL DAILY
Qty: 90 TABLET | Refills: 2
Start: 2023-08-08 | End: 2024-08-01

## 2023-08-08 NOTE — PROGRESS NOTES
Patient ID: Titus Walls is a 80 y.o. female. Plan:      Coronary artery disease involving native coronary artery of native heart without angina pectoris  RCA stenting 1/11/2023. We will continue Plavix until January 2024. Permanent atrial fibrillation (HCC)  Heart rate well-controlled. On Eliquis for stroke prevention. S/P TAVR (transcatheter aortic valve replacement)  Functioning well by exam and echo. Follow up Plan/Other summary comments:  Return in about 1 year (around 8/8/2024). HPI: Patient is seen in follow-up today regarding the above issues. Since the last visit she has felt reasonably well. Ambulation is limited but she has had no chest pain. Most recent echo shows terrific valve function. Patient had TAVR on 1/17/2023 with a #23 valve. Preprocedure coronary angiography on 1/11/2023 revealed 75% ostial right coronary artery stenosis and this was stented. Most recent or relevant cardiac/vascular testing:       TTE 2/15/2023: Normal LVEF. Normal TAVR. Significant left atrial enlargement. TTE 6/14/2023: No change. Past Surgical History:   Procedure Laterality Date   • CARDIAC CATHETERIZATION N/A 1/11/2023    Procedure: LHC-Cardiac catheterization;  Surgeon: Yonathan Raza MD;  [facility]: [facility] CARDIAC [facility]ATH [facility]; Service: Cardiology   • CARDIAC CATHETERIZATION N/A 1/11/2023    Procedure: Cardiac pci;  Surgeon: Yonathan Raza MD;  Location: [facility] CARDIAC CATH LAB;   Service: Cardiology   • CARDIAC CATHETERIZATION N/A 1/17/2023    Procedure: CARDIAC TAVR;  Surgeon: Gale Quintero DO;  Location: [facility] MAIN OR;  Service: Cardiology   • DENTAL SURGERY Bilateral     ALL TEETH REMOVED   • FL GUIDED NEEDLE PLAC BX/ASP/INJ  5/26/2021   • IR THORACENTESIS  12/20/2022   • IR THORACENTESIS  7/14/2023   • JOINT REPLACEMENT Left    • KNEE SURGERY      left knee replacement   • WA INJECT SI JOINT ARTHRGRPHY&/ANES/STEROID W/JOHNATHON Bilateral 5/26/2021    Procedure: SACROILIAC JOINT INJECTION;  Surgeon: Markell Stallworth MD;  Location: [facility];  Service: Pain Management    • UT REPLACE AORTIC VALVE OPENFEMORAL ARTERY APPROACH N/A 1/17/2023    Procedure: REPLACEMENT AORTIC VALVE TRANSCATHETER (TAVR) TRANSFEMORAL W/ 23MM CALVERT SHAVONNE S3 ULTRA VALVE(ACCESS ON LEFT) ALENA;  Surgeon: Karoline Cruz DO;  Location:  MAIN OR;  Service: Cardiac Surgery   • SACROILIAC JOINT INJECTION Bilateral 6/7/2023    Procedure: Bilateral sacroiliac joint steroid injection;  Surgeon: Adelfo Cruz MD;  Location: [facility];  Service: Pain Management        Lipid Profile:   Lab Results   Component Value Date    TRIG 50 12/21/2022    HDL 94 12/21/2022         Review of Systems   10  point ROS  was otherwise non pertinent or negative except as per HPI or as below. Gait: Normal.        Objective:     /90   Pulse 60   Ht 4' 10" (1.473 m)   Wt 46.1 kg (101 lb 9.6 oz)   BMI 21.23 kg/m²     PHYSICAL EXAM:    General:  Normal appearance in no distress. Eyes:  Anicteric. Oral mucosa:  Moist.  Neck:  No JVD. Carotid upstrokes are brisk without bruits. No masses. Chest:  Clear to auscultation. Cardiac: Irregularly irregular. No palpable PMI. Normal S1 and S2.  Grade 1 systolic murmur at the base. No gallop or rub. Abdomen:  Soft and nontender. No palpable organomegaly or aortic enlargement. Extremities: Trace peripheral edema. Musculoskeletal:  Symmetric. Vascular:  Femoral pulses are brisk without bruits. Popliteal pulses are intact bilaterally. Pedal pulses are intact. Neuro:  Grossly symmetric. Psych:  Alert and oriented x3.           Current Outpatient Medications:   •  alendronate (Fosamax) 70 mg tablet, Take 1 tablet (70 mg total) by mouth every 7 days, Disp: 12 tablet, Rfl: 3  •  aluminum-magnesium hydroxide-simethicone (MYLANTA) 5429-2449-743 mg/30 mL suspension, Take 30 mL by mouth every 4 (four) hours as needed for indigestion or heartburn, Disp: , Rfl: 0  •  ammonium lactate (LAC-HYDRIN) 12 % lotion, Apply topically daily Do not start before Jacqueline 15, 2023., Disp: , Rfl: 0  •  apixaban (ELIQUIS) 2.5 mg, Take 1 tablet (2.5 mg total) by mouth 2 (two) times a day, Disp: 60 tablet, Rfl: 2  •  atorvastatin (LIPITOR) 20 mg tablet, Take 1 tablet (20 mg total) by mouth daily with dinner, Disp: 30 tablet, Rfl: 4  •  bisacodyl (DULCOLAX) 10 mg suppository, Insert 10 mg into the rectum daily, Disp: , Rfl:   •  Cholecalciferol (Vitamin D3) 50 MCG (2000 UT) TABS, Take 2,000 Units by mouth in the morning. Indications: Osteoporosis, Disp: , Rfl:   •  clopidogrel (PLAVIX) 75 mg tablet, Take 1 tablet (75 mg total) by mouth daily, Disp: 90 tablet, Rfl: 2  •  diltiazem (CARDIZEM) 30 mg tablet, Take 1 tablet (30 mg total) by mouth 3 (three) times a day Hold for HR<60 bpm or SBP<110 mmHg, Disp: , Rfl: 0  •  furosemide (LASIX) 20 mg tablet, Take 1 tablet (20 mg total) by mouth daily, Disp: 30 tablet, Rfl: 0  •  LORazepam (ATIVAN) 0.5 mg tablet, Take by mouth every 8 (eight) hours as needed for anxiety, Disp: , Rfl:   •  metoprolol succinate (TOPROL-XL) 50 mg 24 hr tablet, Take 1 tablet (50 mg total) by mouth daily Hold for HR<60 or SBP<110 mmHg, Disp: 90 tablet, Rfl: 0  •  Multiple Vitamin (Multivitamin Adult) TABS, Take 1 tablet by mouth in the morning.  Indications: Nutritional Support, Disp: , Rfl:   •  Nutritional Supplement LIQD, Take 1 Can by mouth 2 (two) times a day Ensure pudding BID, Disp: 237 mL, Rfl: 0  •  omeprazole (PriLOSEC) 20 mg delayed release capsule, Take 20 mg by mouth daily, Disp: , Rfl:   •  potassium chloride (K-DUR,KLOR-CON) 10 mEq tablet, Take 1 tablet (10 mEq total) by mouth daily, Disp: 90 tablet, Rfl: 2  •  Psyllium (METAMUCIL FIBER) 51.7 % PACK, Take 1 Package by mouth daily, Disp: , Rfl: 0  •  pantoprazole (PROTONIX) 40 mg tablet, TAKE 1 TABLET BY MOUTH ONCE DAILY IN THE MORNING (Patient not taking: Reported on 7/19/2023), Disp: 30 tablet, Rfl: 0  •  potassium chloride (Klor-Con) 10 mEq tablet, , Disp: , Rfl:   No Known Allergies  Past Medical History:   Diagnosis Date   • Acute on chronic diastolic CHF (congestive heart failure) ([address]) 01/30/2023   • Acute respiratory failure with hypoxia (HCC) 12/22/2022   • Aortic stenosis    • Atrial fibrillation (HCC)    • Atrial flutter (HCC)    • CAD (coronary artery disease) 01/11/2023    CAD/PCI/ELODIA   • CKD (chronic kidney disease), stage III (HCC)    • Community acquired pneumonia of left upper lobe of lung 05/17/2019   • Fatigue    • Full dentures    • Generalized anxiety disorder    • Hyperlipidemia    • Hypertension    • Impaired fasting glucose    • Mitral regurgitation    • Pneumonia    • RBBB    • 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    TRANSFEMORAL W/ 23MM CALVERT SHAVONNE S3 ULTRA VALVE(ACCESS ON LEFT)   • SVT (supraventricular tachycardia)    • Tricuspid regurgitation            Social History     Tobacco Use   Smoking Status Never   Smokeless Tobacco Never

## 2023-08-09 ENCOUNTER — HOME CARE VISIT (OUTPATIENT)
Dept: HOME HEALTH SERVICES | Facility: HOME HEALTHCARE | Age: 88
End: 2023-08-09
Payer: MEDICARE

## 2023-08-09 VITALS — SYSTOLIC BLOOD PRESSURE: 110 MMHG | HEART RATE: 85 BPM | DIASTOLIC BLOOD PRESSURE: 58 MMHG | OXYGEN SATURATION: 98 %

## 2023-08-09 PROCEDURE — G0152 HHCP-SERV OF OT,EA 15 MIN: HCPCS

## 2023-08-10 ENCOUNTER — HOME CARE VISIT (OUTPATIENT)
Dept: HOME HEALTH SERVICES | Facility: HOME HEALTHCARE | Age: 88
End: 2023-08-10
Payer: MEDICARE

## 2023-08-10 VITALS — SYSTOLIC BLOOD PRESSURE: 104 MMHG | OXYGEN SATURATION: 93 % | DIASTOLIC BLOOD PRESSURE: 80 MMHG | HEART RATE: 67 BPM

## 2023-08-10 PROCEDURE — G0151 HHCP-SERV OF PT,EA 15 MIN: HCPCS

## 2023-08-11 ENCOUNTER — HOME CARE VISIT (OUTPATIENT)
Dept: HOME HEALTH SERVICES | Facility: HOME HEALTHCARE | Age: 88
End: 2023-08-11
Payer: MEDICARE

## 2023-08-11 VITALS
RESPIRATION RATE: 16 BRPM | SYSTOLIC BLOOD PRESSURE: 112 MMHG | TEMPERATURE: 97.9 F | HEART RATE: 62 BPM | DIASTOLIC BLOOD PRESSURE: 66 MMHG | OXYGEN SATURATION: 92 %

## 2023-08-11 PROCEDURE — G0299 HHS/HOSPICE OF RN EA 15 MIN: HCPCS

## 2023-08-12 PROBLEM — N30.01 ACUTE CYSTITIS WITH HEMATURIA: Status: RESOLVED | Noted: 2023-06-13 | Resolved: 2023-08-12

## 2023-08-14 ENCOUNTER — HOME CARE VISIT (OUTPATIENT)
Dept: HOME HEALTH SERVICES | Facility: HOME HEALTHCARE | Age: 88
End: 2023-08-14
Payer: MEDICARE

## 2023-08-14 PROCEDURE — G0299 HHS/HOSPICE OF RN EA 15 MIN: HCPCS

## 2023-08-14 PROCEDURE — G0151 HHCP-SERV OF PT,EA 15 MIN: HCPCS

## 2023-08-15 ENCOUNTER — OFFICE VISIT (OUTPATIENT)
Dept: FAMILY MEDICINE CLINIC | Facility: CLINIC | Age: 88
End: 2023-08-15
Payer: MEDICARE

## 2023-08-15 VITALS
OXYGEN SATURATION: 95 % | HEIGHT: 58 IN | DIASTOLIC BLOOD PRESSURE: 67 MMHG | WEIGHT: 103.4 LBS | HEART RATE: 86 BPM | SYSTOLIC BLOOD PRESSURE: 141 MMHG | BODY MASS INDEX: 21.7 KG/M2 | TEMPERATURE: 97 F

## 2023-08-15 DIAGNOSIS — I48.92 ATRIAL FLUTTER (HCC): ICD-10-CM

## 2023-08-15 DIAGNOSIS — E87.6 HYPOKALEMIA: ICD-10-CM

## 2023-08-15 DIAGNOSIS — M80.08XD AGE-RELATED OSTEOPOROSIS WITH CURRENT PATHOLOGICAL FRACTURE OF VERTEBRA WITH ROUTINE HEALING, SUBSEQUENT ENCOUNTER: ICD-10-CM

## 2023-08-15 DIAGNOSIS — E55.9 VITAMIN D DEFICIENCY: ICD-10-CM

## 2023-08-15 DIAGNOSIS — E22.2 SIADH (SYNDROME OF INAPPROPRIATE ADH PRODUCTION) (HCC): ICD-10-CM

## 2023-08-15 DIAGNOSIS — J90 PLEURAL EFFUSION ON RIGHT: Primary | ICD-10-CM

## 2023-08-15 DIAGNOSIS — K21.9 GASTROESOPHAGEAL REFLUX DISEASE WITHOUT ESOPHAGITIS: ICD-10-CM

## 2023-08-15 DIAGNOSIS — I48.21 PERMANENT ATRIAL FIBRILLATION (HCC): ICD-10-CM

## 2023-08-15 PROCEDURE — 99214 OFFICE O/P EST MOD 30 MIN: CPT | Performed by: FAMILY MEDICINE

## 2023-08-15 RX ORDER — ALPRAZOLAM 0.25 MG/1
1 TABLET ORAL 2 TIMES DAILY
COMMUNITY

## 2023-08-15 RX ORDER — PANTOPRAZOLE SODIUM 20 MG/1
20 TABLET, DELAYED RELEASE ORAL
Qty: 90 TABLET | Refills: 3 | Status: SHIPPED | OUTPATIENT
Start: 2023-08-15 | End: 2024-08-09

## 2023-08-15 RX ORDER — FUROSEMIDE 20 MG/1
20 TABLET ORAL DAILY
Qty: 90 TABLET | Refills: 2 | Status: SHIPPED | OUTPATIENT
Start: 2023-08-15

## 2023-08-15 NOTE — ASSESSMENT & PLAN NOTE
Patient has osteoporosis with a compression fracture of T11. She received a nerve block on June 7. Is gradually improving. She is now back on alendronate 70 mg weekly.

## 2023-08-15 NOTE — ASSESSMENT & PLAN NOTE
We will continue fluid restriction as per nephrology. I asked home health care to obtain a CMP. We will continue to follow the patient's sodium.

## 2023-08-15 NOTE — PROGRESS NOTES
Name: Kierra Whatley      : 1935      MRN: [id number]  Encounter Provider: Stephania Miller DO  Encounter Date: 8/15/2023   Encounter department: [facility] PRIMARY CARE    Assessment & Plan     1. Pleural effusion on right  Assessment & Plan:  Exam patient does have a right pleural effusion. She has an upcoming appointment to see pulmonary. Pulse ox in the office today was 95% on room air patient appeared to be asymptomatic. Await further recommendations by pulmonary. I stressed the patient importance of keeping this appointment. Orders:  -     furosemide (LASIX) 20 mg tablet; Take 1 tablet (20 mg total) by mouth daily  -     Comprehensive metabolic panel; Future    2. Atrial flutter (HCC)  -     diltiazem (CARDIZEM) 30 mg tablet; Take 1 tablet (30 mg total) by mouth 3 (three) times a day Hold for HR<60 bpm or SBP<110 mmHg    3. Vitamin D deficiency  -     Vitamin D 25 hydroxy; Future    4. SIADH (syndrome of inappropriate 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[address])  Assessment & Plan: We will continue fluid restriction as per nephrology. I asked home health care to obtain a CMP. We will continue to follow the patient's sodium. Orders:  -     Comprehensive metabolic panel; Future    5. Gastroesophageal reflux disease without esophagitis  Assessment & Plan:  Significant other requested a refill for her omeprazole. Patient is taking Plavix. Due to potential interaction between omeprazole and Plavix, I switched the patient to pantoprazole 20 mg daily. I will reassess her reflux when she returns. In the past she had taken 40 mg daily. However, she is asymptomatic on 20 mg of omeprazole. As such, I am going to try a lower dose of the pantoprazole. Orders:  -     pantoprazole (PROTONIX) 20 mg tablet; Take 1 tablet (20 mg total) by mouth daily before breakfast    6. Hypokalemia  Assessment & Plan:  Was started on potassium chloride 10 mEq daily by Dr. Rasheed Manjarrez.   I will assess the patient's potassium again via labs by home health care. Her significant other tells me in the past she had taken 20 mEq of potassium chloride daily. She is on low-dose Lasix and not taking it every day. She tells me she is dosing it based upon weight and last took a dose of Lasix 2 days ago. 10 mEq may be sufficient for the patient. 7. Age-related osteoporosis with current pathological fracture of vertebra with routine healing, subsequent encounter  Assessment & Plan:  Patient has osteoporosis with a compression fracture of T11. She received a nerve block on June 7. Is gradually improving. She is now back on alendronate 70 mg weekly. 8. Permanent atrial fibrillation [facility])  Assessment & Plan:  Patient was noted to be in atrial fibrillation with a controlled ventricular response on exam today. I refilled her diltiazem. She will continue Eliquis 2.5 mg twice daily. Subjective      This is an 80-year-old white female who presents to the office today for her routine checkup. After I last saw her, she was admitted to the hospital with hyponatremia. It was felt that she had SIADH and she was placed on a fluid restriction. She also had a right pleural effusion. Patient had 2 thoracentesis's. Pleural fluid was exudative. Cytology was negative. Coronary did see the patient in consultation and suggested this may have been secondary to malnutrition. After discharge from the hospital, she went to the floor of [address] [place] [address] for rehab. Patient is currently getting home health care. She finds that she is ambulating better with use of a cane as well as with use of a walker. Tells me her appetite is somewhat improved although when I reviewed which she ate and what her significant other tells me, her appetite is still decreased. Review of Systems   Constitutional: Negative for chills, fever and unexpected weight change. Respiratory: Negative for cough, shortness of breath and wheezing.     Cardiovascular: Negative for chest pain, palpitations and leg swelling. Denies orthopnea and denies paroxysmal nocturnal dyspnea   Gastrointestinal: Negative for blood in stool, diarrhea and nausea. States more frequent bowel movements but not diarrhea   Genitourinary: Negative for dysuria, frequency and urgency. Musculoskeletal: Positive for gait problem. Current Outpatient Medications on File Prior to Visit   Medication Sig   • alendronate (Fosamax) 70 mg tablet Take 1 tablet (70 mg total) by mouth every 7 days   • ALPRAZolam (XANAX) 0.25 mg tablet Take 1 tablet by mouth 2 (two) times a day   • ammonium lactate (LAC-HYDRIN) 12 % lotion Apply topically daily Do not start before Jacqueline 15, 2023. • apixaban (ELIQUIS) 2.5 mg Take 1 tablet (2.5 mg total) by mouth 2 (two) times a day   • atorvastatin (LIPITOR) 20 mg tablet Take 1 tablet (20 mg total) by mouth daily with dinner   • bisacodyl (DULCOLAX) 10 mg suppository Insert 10 mg into the rectum daily as needed for constipation   • Cholecalciferol (Vitamin D3) 50 MCG (2000 UT) TABS Take 2,000 Units by mouth in the morning. Indications: Osteoporosis   • clopidogrel (PLAVIX) 75 mg tablet Take 1 tablet (75 mg total) by mouth daily   • metoprolol succinate (TOPROL-XL) 50 mg 24 hr tablet Take 1 tablet (50 mg total) by mouth daily Hold for HR<60 or SBP<110 mmHg   • Multiple Vitamin (Multivitamin Adult) TABS Take 1 tablet by mouth in the morning.  Indications: Nutritional Support   • Nutritional Supplement LIQD Take 1 Can by mouth 2 (two) times a day Ensure pudding BID   • potassium chloride (K-DUR,KLOR-CON) 10 mEq tablet Take 1 tablet (10 mEq total) by mouth daily   • [DISCONTINUED] aluminum-magnesium hydroxide-simethicone (MYLANTA) 3263-0302-711 mg/30 mL suspension Take 30 mL by mouth every 4 (four) hours as needed for indigestion or heartburn   • [DISCONTINUED] diltiazem (CARDIZEM) 30 mg tablet Take 1 tablet (30 mg total) by mouth 3 (three) times a day Hold for HR<60 bpm or SBP<110 mmHg   • [DISCONTINUED] furosemide (LASIX) 20 mg tablet Take 1 tablet (20 mg total) by mouth daily   • [DISCONTINUED] LORazepam (ATIVAN) 0.5 mg tablet Take by mouth every 8 (eight) hours as needed for anxiety   • [DISCONTINUED] omeprazole (PriLOSEC) 20 mg delayed release capsule Take 20 mg by mouth daily   • potassium chloride (Klor-Con) 10 mEq tablet  (Patient not taking: Reported on 8/8/2023)   • Psyllium (METAMUCIL FIBER) 51.7 % PACK Take 1 Package by mouth daily (Patient taking differently: Take 1 Package by mouth daily as needed (Constipation). Indications: Constipation)   • [DISCONTINUED] oxygen gas Inhale 2 L/min daily as needed (low oxygen levels). Indications: Oxygen Desaturation (Patient not taking: Reported on 1/30/2023)   • [DISCONTINUED] pantoprazole (PROTONIX) 40 mg tablet TAKE 1 TABLET BY MOUTH ONCE DAILY IN THE MORNING (Patient not taking: Reported on 7/19/2023)       Objective     /67   Pulse 86   Temp (!) 97 °F (36.1 °C) (Temporal)   Ht 4' 10" (1.473 m)   Wt 46.9 kg (103 lb 6.4 oz)   SpO2 95%   BMI 21.61 kg/m²     Physical Exam  Vitals reviewed. Constitutional:       Comments: This is a frail-appearing 80-year-old white female who appears her stated age. She is in no acute distress   HENT:      Head: Normocephalic and atraumatic. Right Ear: Tympanic membrane, ear canal and external ear normal. There is no impacted cerumen. Left Ear: Tympanic membrane, ear canal and external ear normal. There is no impacted cerumen. Mouth/Throat:      Mouth: Mucous membranes are moist.      Pharynx: Oropharynx is clear. No oropharyngeal exudate or posterior oropharyngeal erythema. Eyes:      General: No scleral icterus. Right eye: No discharge. Left eye: No discharge. Conjunctiva/sclera: Conjunctivae normal.      Pupils: Pupils are equal, round, and reactive to light. Cardiovascular:      Rate and Rhythm: Normal rate. Rhythm irregular.       Heart sounds: Normal heart sounds. No murmur heard. No friction rub. No gallop. Comments: Heart was irregularly irregular. Ventricular rate is well controlled  Pulmonary:      Effort: Pulmonary effort is normal. No respiratory distress. Breath sounds: No stridor. No wheezing, rhonchi or rales. Comments: There were decreased breath sounds present in the right base. The right base was dull to percussion. There is also decreased auditory fremitus in the right base  Abdominal:      General: Bowel sounds are normal. There is no distension. Palpations: Abdomen is soft. There is no mass. Tenderness: There is no abdominal tenderness. There is no guarding. Musculoskeletal:      Cervical back: Neck supple. Lymphadenopathy:      Cervical: No cervical adenopathy. Psychiatric:         Mood and Affect: Mood normal.         Behavior: Behavior normal.         Thought Content: Thought content normal.         Judgment: Judgment normal.     Extremities: Without cyanosis or clubbing.   Trace lower extremity edema present  Amanda Priest DO

## 2023-08-15 NOTE — ASSESSMENT & PLAN NOTE
Significant other requested a refill for her omeprazole. Patient is taking Plavix. Due to potential interaction between omeprazole and Plavix, I switched the patient to pantoprazole 20 mg daily. I will reassess her reflux when she returns. In the past she had taken 40 mg daily. However, she is asymptomatic on 20 mg of omeprazole. As such, I am going to try a lower dose of the pantoprazole.

## 2023-08-15 NOTE — ASSESSMENT & PLAN NOTE
Was started on potassium chloride 10 mEq daily by Dr. Shahram Valencia. I will assess the patient's potassium again via labs by home health care. Her significant other tells me in the past she had taken 20 mEq of potassium chloride daily. She is on low-dose Lasix and not taking it every day. She tells me she is dosing it based upon weight and last took a dose of Lasix 2 days ago. 10 mEq may be sufficient for the patient.

## 2023-08-15 NOTE — ASSESSMENT & PLAN NOTE
Patient was noted to be in atrial fibrillation with a controlled ventricular response on exam today. I refilled her diltiazem. She will continue Eliquis 2.5 mg twice daily.

## 2023-08-15 NOTE — ASSESSMENT & PLAN NOTE
Exam patient does have a right pleural effusion. She has an upcoming appointment to see pulmonary. Pulse ox in the office today was 95% on room air patient appeared to be asymptomatic. Await further recommendations by pulmonary. I stressed the patient importance of keeping this appointment.

## 2023-08-16 ENCOUNTER — HOME CARE VISIT (OUTPATIENT)
Dept: HOME HEALTH SERVICES | Facility: HOME HEALTHCARE | Age: 88
End: 2023-08-16
Payer: MEDICARE

## 2023-08-16 VITALS — OXYGEN SATURATION: 93 % | SYSTOLIC BLOOD PRESSURE: 112 MMHG | DIASTOLIC BLOOD PRESSURE: 58 MMHG | HEART RATE: 52 BPM

## 2023-08-16 PROCEDURE — G0152 HHCP-SERV OF OT,EA 15 MIN: HCPCS

## 2023-08-17 ENCOUNTER — HOME CARE VISIT (OUTPATIENT)
Dept: HOME HEALTH SERVICES | Facility: HOME HEALTHCARE | Age: 88
End: 2023-08-17
Payer: MEDICARE

## 2023-08-17 PROCEDURE — G0151 HHCP-SERV OF PT,EA 15 MIN: HCPCS

## 2023-08-18 VITALS — HEART RATE: 79 BPM | DIASTOLIC BLOOD PRESSURE: 80 MMHG | OXYGEN SATURATION: 93 % | SYSTOLIC BLOOD PRESSURE: 130 MMHG

## 2023-08-21 ENCOUNTER — HOSPITAL ENCOUNTER (OUTPATIENT)
Dept: RADIOLOGY | Facility: HOSPITAL | Age: 88
Discharge: HOME | End: 2023-08-21
Payer: MEDICARE

## 2023-08-21 ENCOUNTER — APPOINTMENT (OUTPATIENT)
Dept: LAB | Facility: HOSPITAL | Age: 88
End: 2023-08-21
Payer: MEDICARE

## 2023-08-21 ENCOUNTER — OFFICE VISIT (OUTPATIENT)
Dept: PULMONOLOGY | Facility: CLINIC | Age: 88
End: 2023-08-21
Payer: MEDICARE

## 2023-08-21 VITALS
BODY MASS INDEX: 21.79 KG/M2 | HEART RATE: 61 BPM | WEIGHT: 103.8 LBS | HEIGHT: 58 IN | DIASTOLIC BLOOD PRESSURE: 82 MMHG | OXYGEN SATURATION: 91 % | TEMPERATURE: 95.8 F | SYSTOLIC BLOOD PRESSURE: 138 MMHG

## 2023-08-21 DIAGNOSIS — J90 PLEURAL EFFUSION ON RIGHT: ICD-10-CM

## 2023-08-21 DIAGNOSIS — J90 PLEURAL EFFUSION ON RIGHT: Primary | ICD-10-CM

## 2023-08-21 PROCEDURE — 71046 X-RAY EXAM CHEST 2 VIEWS: CPT

## 2023-08-21 PROCEDURE — 99213 OFFICE O/P EST LOW 20 MIN: CPT | Performed by: PHYSICIAN ASSISTANT

## 2023-08-23 ENCOUNTER — LAB REQUISITION (OUTPATIENT)
Dept: LAB | Facility: HOSPITAL | Age: 88
End: 2023-08-23
Payer: MEDICARE

## 2023-08-23 ENCOUNTER — HOME CARE VISIT (OUTPATIENT)
Dept: HOME HEALTH SERVICES | Facility: HOME HEALTHCARE | Age: 88
End: 2023-08-23
Payer: MEDICARE

## 2023-08-23 VITALS
RESPIRATION RATE: 20 BRPM | TEMPERATURE: 97.5 F | DIASTOLIC BLOOD PRESSURE: 70 MMHG | SYSTOLIC BLOOD PRESSURE: 124 MMHG | OXYGEN SATURATION: 97 % | HEART RATE: 56 BPM

## 2023-08-23 DIAGNOSIS — E22.2 SYNDROME OF INAPPROPRIATE SECRETION OF ANTIDIURETIC HORMONE (HCC): ICD-10-CM

## 2023-08-23 DIAGNOSIS — E55.9 VITAMIN D DEFICIENCY, UNSPECIFIED: ICD-10-CM

## 2023-08-23 LAB
25(OH)D3 SERPL-MCNC: 78.4 NG/ML (ref 30–100)
ALBUMIN SERPL BCP-MCNC: 3.8 G/DL (ref 3.5–5)
ALP SERPL-CCNC: 72 U/L (ref 34–104)
ALT SERPL W P-5'-P-CCNC: 17 U/L (ref 7–52)
ANION GAP SERPL CALCULATED.3IONS-SCNC: 12 MMOL/L
AST SERPL W P-5'-P-CCNC: 28 U/L (ref 13–39)
BILIRUB SERPL-MCNC: 0.87 MG/DL (ref 0.2–1)
BUN SERPL-MCNC: 29 MG/DL (ref 5–25)
CALCIUM SERPL-MCNC: 9.1 MG/DL (ref 8.4–10.2)
CHLORIDE SERPL-SCNC: 100 MMOL/L (ref 96–108)
CO2 SERPL-SCNC: 22 MMOL/L (ref 21–32)
CREAT SERPL-MCNC: 0.98 MG/DL (ref 0.6–1.3)
GFR SERPL CREATININE-BSD FRML MDRD: 51 ML/MIN/1.73SQ M
GLUCOSE SERPL-MCNC: 86 MG/DL (ref 65–140)
POTASSIUM SERPL-SCNC: 4.7 MMOL/L (ref 3.5–5.3)
PROT SERPL-MCNC: 5.8 G/DL (ref 6.4–8.4)
SODIUM SERPL-SCNC: 134 MMOL/L (ref 135–147)

## 2023-08-23 PROCEDURE — 80053 COMPREHEN METABOLIC PANEL: CPT | Performed by: FAMILY MEDICINE

## 2023-08-23 PROCEDURE — 82306 VITAMIN D 25 HYDROXY: CPT | Performed by: FAMILY MEDICINE

## 2023-08-23 PROCEDURE — G0299 HHS/HOSPICE OF RN EA 15 MIN: HCPCS

## 2023-08-23 NOTE — PROGRESS NOTES
Pulmonary Follow Up Note   Anna Cherry 80 y.o. female MRN: [id number]  8/23/2023      Assessment:    Pleural effusion on right  I agree with her PCP. She has physical exam findings suggestive of right-sided pleural effusion again. We will plan for chest x-ray PA and lateral.  Depending on results may consider repeat thoracentesis however it should be noted that patient is asymptomatic and it is likely that this effusion is a pseudo exudate due to diuretic use in the setting of congestive heart failure. Ideally, thoracentesis is not first-line treatment for effusions of this nature. Instead would recommend optimizing diuretic therapy. We will reach out to her care team including nephrology, cardiology and PCP to formulate concrete plan. Plan:    Diagnoses and all orders for this visit:    Pleural effusion on right  -     XR chest pa & lateral; Future        No follow-ups on file. History of Present Illness   HPI:  Anna Cherry is a 80 y.o. female who presents the office today for routine follow-up to go over chest x-ray. She has a past medical response for CAD status post RCA stenting on Plavix, A-fib on Eliquis, aortic stenosis status post TAVR in January 2023, compression fracture of T11, CKD. Recent hospitalization in June 2023 for hyponatremia and was found to have recurrent right-sided pleural effusion undergoing thoracentesis 6/12/2023 which yielded 1200 cc clear yellow exudative fluid. She then followed up in the pulmonary office 7/12/2023 where she was noted to be dyspneic and with signs of increased volume overload i.e. lower extremity edema. Started on a low-dose diuretic Lasix 20 mg p.o. daily and arranged for repeat thoracentesis 7/14/2023 which again favored an exudative pattern after removing 970 cc of fluid from the right pleural cavity. Patient presents today accompanied by her .   She is in a wheelchair but overall states that she is doing a lot better since leaving rehab.  She is able to ambulate around her home without issues. She has a chairlift that helps her get up and down the stairs without difficulty. Patient denies shortness of breath, cough, sputum production, hemoptysis or wheeze. She does not like that she has to pee so much while being on the water pill. Review of Systems   All other systems reviewed and are negative. Historical Information   Past Medical History:   Diagnosis Date   • Acute on chronic diastolic CHF (congestive heart failure) ([address]) 01/30/2023   • Acute respiratory failure with hypoxia (HCC) 12/22/2022   • Aortic stenosis    • Atrial fibrillation (HCC)    • Atrial flutter (HCC)    • CAD (coronary artery disease) 01/11/2023    CAD/PCI/ELODIA   • CKD (chronic kidney disease), stage III ([facility])    • Community acquired pneumonia of left upper lobe of lung 05/17/2019   • Fatigue    • Full dentures    • Generalized anxiety disorder    • Hyperlipidemia    • Hypertension    • Impaired fasting glucose    • Mitral regurgitation    • Pneumonia    • RBBB    • 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    TRANSFEMORAL W/ 23MM CALVERT SHAVONNE S3 ULTRA VALVE(ACCESS ON LEFT)   • SVT (supraventricular tachycardia)    • Tricuspid regurgitation      Past Surgical History:   Procedure Laterality Date   • CARDIAC CATHETERIZATION N/A 1/11/2023    Procedure: LHC-Cardiac catheterization;  Surgeon: Mitchel Cornejo MD;  Location: [facility] CARDIAC [facility]; Service: Cardiology   • CARDIAC CATHETERIZATION N/A 1/11/2023    Procedure: Cardiac pci;  Surgeon: Mitchel Cornejo MD;  Location: [facility] CARD[facility]AC [facility];   Service: Cardiology   • CARDIAC CATHETERIZATION N/A 1/17/2023    Procedure: CARDIAC TAVR;  Surgeon: Rozina Enrique DO;  Location: [facility] MAIN OR;  Service: Cardiology   • DENTAL SURGERY Bilateral     ALL TEETH REMOVED   • FL GUIDED NEEDLE PLAC BX/ASP/INJ  5/26/2021   • IR THORACENTESIS  12/20/2022   • IR THORACENTESIS  7/14/2023   • JOINT REPLACEMENT Left    • KNEE SURGERY      left knee replacement   • CA INJECT SI JOINT ARTHRGRPHY&/ANES/STEROID W/JOHNATHON Bilateral 5/26/2021    Procedure: SACROILIAC JOINT INJECTION;  Surgeon: Lynne Haines MD;  Location: [facility] MAIN OR;  Service: Pain Management    • CA REPLACE AORTIC VALVE OPENFEMORAL ARTERY APPROACH N/A 1/17/2023    Procedure: REPLACEMENT AORTIC VALVE TRANSCATHETER (TAVR) TRANSFEMORAL W/ 23MM CALVERT SHAVONNE S3 ULTRA VALVE(ACCESS ON LEFT) ALENA;  Surgeon: Dallin Howell DO;  Location:  MAIN OR;  Service: Cardiac Surgery   • SACROILIAC JOINT INJECTION Bilateral 6/7/2023    Procedure: Bilateral sacroiliac joint steroid injection;  Surgeon: Nolan Bardales MD;  Location: [facility] MAIN OR;  Service: Pain Management      Family History   Problem Relation Age of Onset   • Hypertension Mother    • Asthma Father         [place]'s asthma   • Alzheimer's disease Sister    • Rectal cancer Son    • Uterine cancer Daughter    • Heart disease Sister    • Heart disease Brother        Social History     Tobacco Use   Smoking Status Never   Smokeless Tobacco Never         Meds/Allergies     Current Outpatient Medications:   •  alendronate (Fosamax) 70 mg tablet, Take 1 tablet (70 mg total) by mouth every 7 days, Disp: 12 tablet, Rfl: 3  •  ALPRAZolam (XANAX) 0.25 mg tablet, Take 1 tablet by mouth 2 (two) times a day, Disp: , Rfl:   •  ammonium lactate (LAC-HYDRIN) 12 % lotion, Apply topically daily Do not start before Jacqueline 15, 2023., Disp: , Rfl: 0  •  apixaban (ELIQUIS) 2.5 mg, Take 1 tablet (2.5 mg total) by mouth 2 (two) times a day, Disp: 60 tablet, Rfl: 2  •  atorvastatin (LIPITOR) 20 mg tablet, Take 1 tablet (20 mg total) by mouth daily with dinner, Disp: 30 tablet, Rfl: 4  •  bisacodyl (DULCOLAX) 10 mg suppository, Insert 10 mg into the rectum daily as needed for constipation, Disp: , Rfl:   •  Cholecalciferol (Vitamin D3) 50 MCG (2000 UT) TABS, Take 2,000 Units by mouth in the morning.  Indications: Osteoporosis, Disp: , Rfl:   • clopidogrel (PLAVIX) 75 mg tablet, Take 1 tablet (75 mg total) by mouth daily, Disp: 90 tablet, Rfl: 2  •  diltiazem (CARDIZEM) 30 mg tablet, Take 1 tablet (30 mg total) by mouth 3 (three) times a day Hold for HR<60 bpm or SBP<110 mmHg, Disp: 270 tablet, Rfl: 2  •  furosemide (LASIX) 20 mg tablet, Take 1 tablet (20 mg total) by mouth daily, Disp: 90 tablet, Rfl: 2  •  metoprolol succinate (TOPROL-XL) 50 mg 24 hr tablet, Take 1 tablet (50 mg total) by mouth daily Hold for HR<60 or SBP<110 mmHg, Disp: 90 tablet, Rfl: 0  •  Multiple Vitamin (Multivitamin Adult) TABS, Take 1 tablet by mouth in the morning. Indications: Nutritional Support, Disp: , Rfl:   •  Nutritional Supplement LIQD, Take 1 Can by mouth 2 (two) times a day Ensure pudding BID, Disp: 237 mL, Rfl: 0  •  pantoprazole (PROTONIX) 20 mg tablet, Take 1 tablet (20 mg total) by mouth daily before breakfast, Disp: 90 tablet, Rfl: 3  •  potassium chloride (K-DUR,KLOR-CON) 10 mEq tablet, Take 1 tablet (10 mEq total) by mouth daily, Disp: 90 tablet, Rfl: 2  •  potassium chloride (Klor-Con) 10 mEq tablet, , Disp: , Rfl:   •  Psyllium (METAMUCIL FIBER) 51.7 % PACK, Take 1 Package by mouth daily (Patient not taking: Reported on 8/21/2023), Disp: , Rfl: 0  No Known Allergies    Vitals: Blood pressure 138/82, pulse 61, temperature (!) 95.8 °F (35.4 °C), temperature source Tympanic, height 4' 10" (1.473 m), weight 47.1 kg (103 lb 12.8 oz), SpO2 91 %. Body mass index is 21.69 kg/m². Oxygen Therapy  SpO2: 91 %  Oxygen Therapy: None (Room air) (88-91)    Physical Exam  Physical Exam  Vitals reviewed. Constitutional:       Appearance: Normal appearance. She is well-developed. HENT:      Head: Normocephalic and atraumatic. Nose: Nose normal.      Mouth/Throat:      Mouth: Mucous membranes are moist.   Eyes:      Extraocular Movements: Extraocular movements intact. Cardiovascular:      Rate and Rhythm: Normal rate and regular rhythm.       Heart sounds: Normal heart sounds. Pulmonary:      Effort: Pulmonary effort is normal. No respiratory distress. Breath sounds: Rales (right lower lung field) present. No wheezing or rhonchi. Comments: Dullness to percussion lower half of right lung field  Musculoskeletal:         General: No swelling. Skin:     General: Skin is warm and dry. Neurological:      Mental Status: She is alert. Mental status is at baseline. Psychiatric:         Mood and Affect: Mood normal.         Behavior: Behavior normal.         Labs: I have personally reviewed pertinent lab results. , ABG: No results found for: "PHART", "IGP5XES", "PO2ART", "BZD1SFA", "M4YSBDHP", "BEART", "SOURCE", BNP: No results found for: "BNP", CBC: No results found for: "WBC", "HGB", "HCT", "MCV", "PLT", "ADJUSTEDWBC", "RBC", "MCH", "MCHC", "RDW", "MPV", "NRBC", CMP:   Lab Results   Component Value Date    SODIUM 134 (L) 08/23/2023    K 4.7 08/23/2023     08/23/2023    CO2 22 08/23/2023    BUN 29 (H) 08/23/2023    CREATININE 0.98 08/23/2023    CALCIUM 9.1 08/23/2023    AST 28 08/23/2023    ALT 17 08/23/2023    ALKPHOS 72 08/23/2023    EGFR 51 08/23/2023   , PT/INR: No results found for: "PT", "INR", Troponin: No results found for: "TROPONINI"  Lab Results   Component Value Date    WBC 10.04 06/21/2023    HGB 12.5 06/21/2023    HCT 38.5 06/21/2023    MCV 97 06/21/2023     06/21/2023     Lab Results   Component Value Date    GLUCOSE 107 01/17/2023    CALCIUM 9.1 08/23/2023    K 4.7 08/23/2023    CO2 22 08/23/2023     08/23/2023    BUN 29 (H) 08/23/2023    CREATININE 0.98 08/23/2023     No results found for: "IGE"  Lab Results   Component Value Date    ALT 17 08/23/2023    AST 28 08/23/2023    ALKPHOS 72 08/23/2023       Imaging and other studies: I have personally reviewed pertinent reports. and I have personally reviewed pertinent films in PACS     Chest x-ray 8/21/2023 per my interpretation shows loculated right pleural effusion.   Waiting official radiology read.     Pulmonary function testing:  None to review

## 2023-08-23 NOTE — ASSESSMENT & PLAN NOTE
I agree with her PCP. She has physical exam findings suggestive of right-sided pleural effusion again. We will plan for chest x-ray PA and lateral.  Depending on results may consider repeat thoracentesis however it should be noted that patient is asymptomatic and it is likely that this effusion is a pseudo exudate due to diuretic use in the setting of congestive heart failure. Ideally, thoracentesis is not first-line treatment for effusions of this nature. Instead would recommend optimizing diuretic therapy. We will reach out to her care team including nephrology, cardiology and PCP to formulate concrete plan.

## 2023-08-24 ENCOUNTER — HOME CARE VISIT (OUTPATIENT)
Dept: HOME HEALTH SERVICES | Facility: HOME HEALTHCARE | Age: 88
End: 2023-08-24
Payer: MEDICARE

## 2023-08-24 PROCEDURE — G0152 HHCP-SERV OF OT,EA 15 MIN: HCPCS

## 2023-08-24 NOTE — CASE COMMUNICATION
Discharge Occupational Therapy 8.24.23.  OT included ADL training for [employer] and [employer] transfer training with UB ther ex. OT goals achieved. Instructed patient on OT discharge with good understanding demonstrated by patient.

## 2023-08-25 ENCOUNTER — PREP FOR PROCEDURE (OUTPATIENT)
Dept: OTHER | Facility: HOSPITAL | Age: 88
End: 2023-08-25

## 2023-08-25 ENCOUNTER — TELEPHONE (OUTPATIENT)
Dept: PULMONOLOGY | Facility: CLINIC | Age: 88
End: 2023-08-25

## 2023-08-25 DIAGNOSIS — J90 RECURRENT RIGHT PLEURAL EFFUSION: Primary | ICD-10-CM

## 2023-08-25 DIAGNOSIS — J90 PLEURAL EFFUSION ON RIGHT: Primary | ICD-10-CM

## 2023-08-25 NOTE — TELEPHONE ENCOUNTER
Ashley Carranza called in regards to [address] E results from 8/21 that she was sent to get done after her appointment.  Please advise

## 2023-08-25 NOTE — TELEPHONE ENCOUNTER
Called patient to review with patient's . Right pleural effusion appears moderate to large and similar to last CXR.  states that her SpO2 is dropping compared to her baseline but not below 88%. Recommend repeat IR thora. Also recommend check RF, CCP, and DONYA. Awaiting responses from other providers on her care team regarding diuretic management. He verbalized understanding and agrees to the plan.

## 2023-08-28 ENCOUNTER — TELEPHONE (OUTPATIENT)
Dept: FAMILY MEDICINE CLINIC | Facility: CLINIC | Age: 88
End: 2023-08-28

## 2023-08-28 DIAGNOSIS — E87.6 HYPOKALEMIA: ICD-10-CM

## 2023-08-28 RX ORDER — POTASSIUM CHLORIDE 750 MG/1
10 TABLET, EXTENDED RELEASE ORAL DAILY
Qty: 90 TABLET | Refills: 2 | Status: SHIPPED | OUTPATIENT
Start: 2023-08-28

## 2023-08-28 NOTE — TELEPHONE ENCOUNTER
Have her take the 10 meq. Last blood work was last week and the potassium was 4.7. Her kidney doctor had reduced the dose.

## 2023-08-28 NOTE — TELEPHONE ENCOUNTER
Patients  called stating they have a question about her potassium medication. He states that she was on 20 mg of this medication and when she went to the hospital they switched it to 10 mg daily. He is not sure which one she is supposed to be taking.     Please advise

## 2023-08-29 ENCOUNTER — HOME CARE VISIT (OUTPATIENT)
Dept: HOME HEALTH SERVICES | Facility: HOME HEALTHCARE | Age: 88
End: 2023-08-29
Payer: MEDICARE

## 2023-08-29 VITALS
RESPIRATION RATE: 16 BRPM | SYSTOLIC BLOOD PRESSURE: 122 MMHG | DIASTOLIC BLOOD PRESSURE: 60 MMHG | TEMPERATURE: 97.5 F | OXYGEN SATURATION: 90 % | HEART RATE: 56 BPM

## 2023-08-29 PROCEDURE — G0299 HHS/HOSPICE OF RN EA 15 MIN: HCPCS

## 2023-09-01 ENCOUNTER — APPOINTMENT (OUTPATIENT)
Dept: LAB | Facility: HOSPITAL | Age: 88
End: 2023-09-01
Payer: MEDICARE

## 2023-09-01 ENCOUNTER — HOSPITAL ENCOUNTER (OUTPATIENT)
Dept: INTERVENTIONAL RADIOLOGY/VASCULAR | Facility: HOSPITAL | Age: 88
End: 2023-09-01
Payer: MEDICARE

## 2023-09-01 VITALS
RESPIRATION RATE: 18 BRPM | OXYGEN SATURATION: 97 % | DIASTOLIC BLOOD PRESSURE: 59 MMHG | SYSTOLIC BLOOD PRESSURE: 124 MMHG | HEART RATE: 60 BPM

## 2023-09-01 DIAGNOSIS — J90 RECURRENT RIGHT PLEURAL EFFUSION: ICD-10-CM

## 2023-09-01 DIAGNOSIS — J90 PLEURAL EFFUSION ON RIGHT: ICD-10-CM

## 2023-09-01 LAB
APPEARANCE FLD: ABNORMAL
COLOR FLD: YELLOW
HISTIOCYTES NFR FLD: 50 %
LDH FLD L TO P-CCNC: 80 U/L
LYMPHOCYTES NFR BLD AUTO: 40 %
MONOCYTES NFR BLD AUTO: 7 %
NEUTS SEG NFR BLD AUTO: 3 %
PH BODY FLUID: 7.6
PROT FLD-MCNC: 3.5 G/DL
SITE: ABNORMAL
TOTAL CELLS COUNTED SPEC: 100
WBC # FLD MANUAL: 115 /UL

## 2023-09-01 PROCEDURE — 36415 COLL VENOUS BLD VENIPUNCTURE: CPT

## 2023-09-01 PROCEDURE — 86038 ANTINUCLEAR ANTIBODIES: CPT

## 2023-09-01 PROCEDURE — 84157 ASSAY OF PROTEIN OTHER: CPT | Performed by: PHYSICIAN ASSISTANT

## 2023-09-01 PROCEDURE — 87070 CULTURE OTHR SPECIMN AEROBIC: CPT | Performed by: PHYSICIAN ASSISTANT

## 2023-09-01 PROCEDURE — 83615 LACTATE (LD) (LDH) ENZYME: CPT | Performed by: PHYSICIAN ASSISTANT

## 2023-09-01 PROCEDURE — 32555 ASPIRATE PLEURA W/ IMAGING: CPT | Performed by: RADIOLOGY

## 2023-09-01 PROCEDURE — 88112 CYTOPATH CELL ENHANCE TECH: CPT | Performed by: PATHOLOGY

## 2023-09-01 PROCEDURE — 86225 DNA ANTIBODY NATIVE: CPT

## 2023-09-01 PROCEDURE — 86235 NUCLEAR ANTIGEN ANTIBODY: CPT

## 2023-09-01 PROCEDURE — 83986 ASSAY PH BODY FLUID NOS: CPT | Performed by: PHYSICIAN ASSISTANT

## 2023-09-01 PROCEDURE — 87205 SMEAR GRAM STAIN: CPT | Performed by: PHYSICIAN ASSISTANT

## 2023-09-01 PROCEDURE — 88305 TISSUE EXAM BY PATHOLOGIST: CPT | Performed by: PATHOLOGY

## 2023-09-01 PROCEDURE — 32555 ASPIRATE PLEURA W/ IMAGING: CPT

## 2023-09-01 PROCEDURE — 86430 RHEUMATOID FACTOR TEST QUAL: CPT

## 2023-09-01 PROCEDURE — 86039 ANTINUCLEAR ANTIBODIES (ANA): CPT

## 2023-09-01 PROCEDURE — 86200 CCP ANTIBODY: CPT

## 2023-09-01 PROCEDURE — 89051 BODY FLUID CELL COUNT: CPT | Performed by: PHYSICIAN ASSISTANT

## 2023-09-01 RX ORDER — LIDOCAINE HYDROCHLORIDE 10 MG/ML
INJECTION, SOLUTION EPIDURAL; INFILTRATION; INTRACAUDAL; PERINEURAL AS NEEDED
Status: COMPLETED | OUTPATIENT
Start: 2023-09-01 | End: 2023-09-01

## 2023-09-01 RX ADMIN — LIDOCAINE HYDROCHLORIDE 10 ML: 10 INJECTION, SOLUTION EPIDURAL; INFILTRATION; INTRACAUDAL; PERINEURAL at 10:40

## 2023-09-01 NOTE — SEDATION DOCUMENTATION
Right sided thoracentesis complete. 1140ml of clear yellow fluid removed. Patient offering no complaints. Bandage applied. Labs sent.

## 2023-09-01 NOTE — DISCHARGE INSTRUCTIONS
[address] [facility]  Interventional Radiology  [phone]     Thoracentesis   WHAT YOU NEED TO KNOW:   A thoracentesis is a procedure to remove extra fluid or air from between your lungs and your inner chest wall. Air or fluid buildup may make it hard for you to breathe. A thoracentesis allows your lungs to expand fully so you can breathe more easily. DISCHARGE INSTRUCTIONS:   Medicines:   Pain medicine: You may be given a prescription medicine to decrease pain. Do not wait until the pain is severe before you take your medicine. Antibiotics: This medicine helps fight or prevent an infection. Take your medicine as directed. Call your healthcare provider if you think your medicine is not helping or if you have side effects. Tell him if you are allergic to any medicine. Keep a list of the medicines, vitamins, and herbs you take. Include the amounts, and when and why you take them. Bring the list or the pill bottles to follow-up visits. Carry your medicine list with you in case of an emergency. Follow up with your healthcare provider as directed:  Write down your questions so you remember to ask them during your visits. Rest:  Rest when you feel it is needed. Slowly start to do more each day. Return to your daily activities as directed. Do not smoke: If you smoke, it is never too late to quit. Ask for information about how to stop smoking if you need help. Contact your healthcare provider if:   You have a fever. Your puncture site is red, warm, swollen, or draining pus. You have questions or concerns about your procedure, medicine, or care. If you have any questions regarding, call the IR department @ [phone]. Seek care immediately or call 911 if:   Blood soaks through your bandage. There is blood in your spit.

## 2023-09-02 LAB
ANA SER QL IA: POSITIVE
RHEUMATOID FACT SER QL LA: NEGATIVE

## 2023-09-03 LAB
BACTERIA SPEC BFLD CULT: NO GROWTH
GRAM STN SPEC: NORMAL
GRAM STN SPEC: NORMAL

## 2023-09-04 LAB
BACTERIA SPEC BFLD CULT: NO GROWTH
GRAM STN SPEC: NORMAL
GRAM STN SPEC: NORMAL

## 2023-09-05 ENCOUNTER — HOME CARE VISIT (OUTPATIENT)
Dept: HOME HEALTH SERVICES | Facility: HOME HEALTHCARE | Age: 88
End: 2023-09-05
Payer: MEDICARE

## 2023-09-05 VITALS
DIASTOLIC BLOOD PRESSURE: 64 MMHG | SYSTOLIC BLOOD PRESSURE: 112 MMHG | HEART RATE: 58 BPM | RESPIRATION RATE: 20 BRPM | OXYGEN SATURATION: 91 % | TEMPERATURE: 97.4 F

## 2023-09-05 DIAGNOSIS — Z95.820 S/P ANGIOPLASTY WITH STENT: ICD-10-CM

## 2023-09-05 PROCEDURE — G0299 HHS/HOSPICE OF RN EA 15 MIN: HCPCS

## 2023-09-05 RX ORDER — ATORVASTATIN CALCIUM 20 MG/1
20 TABLET, FILM COATED ORAL
Qty: 30 TABLET | Refills: 0 | Status: SHIPPED | OUTPATIENT
Start: 2023-09-05

## 2023-09-06 LAB
ANA HOMOGEN SER QL IF: NORMAL
ANA HOMOGEN TITR SER: NORMAL {TITER}
CENTROMERE B IGG SER QL LINE BLOT: POSITIVE
DSDNA AB SER-ACNC: <4 IU/ML
ENA SS-A AB SER IA-ACNC: NEGATIVE
ENA SS-B AB SER IA-ACNC: NEGATIVE

## 2023-09-06 PROCEDURE — 88112 CYTOPATH CELL ENHANCE TECH: CPT | Performed by: PATHOLOGY

## 2023-09-06 PROCEDURE — 88305 TISSUE EXAM BY PATHOLOGIST: CPT | Performed by: PATHOLOGY

## 2023-09-07 ENCOUNTER — TELEPHONE (OUTPATIENT)
Dept: OTHER | Facility: HOSPITAL | Age: 88
End: 2023-09-07

## 2023-09-07 NOTE — TELEPHONE ENCOUNTER
Called patient to review results of fluid analysis from recent thoracentesis and recent blood work. No answer. Left VM asking for her or her  to call the office back to discuss.

## 2023-09-09 LAB — CCP AB SER IA-ACNC: 1.1

## 2023-09-11 ENCOUNTER — OFFICE VISIT (OUTPATIENT)
Dept: PAIN MEDICINE | Facility: CLINIC | Age: 88
End: 2023-09-11
Payer: MEDICARE

## 2023-09-11 VITALS — SYSTOLIC BLOOD PRESSURE: 115 MMHG | DIASTOLIC BLOOD PRESSURE: 71 MMHG | HEART RATE: 62 BPM

## 2023-09-11 DIAGNOSIS — M54.50 CHRONIC BILATERAL LOW BACK PAIN WITHOUT SCIATICA: ICD-10-CM

## 2023-09-11 DIAGNOSIS — S22.080S CLOSED WEDGE COMPRESSION FRACTURE OF T11 VERTEBRA, SEQUELA: ICD-10-CM

## 2023-09-11 DIAGNOSIS — G89.29 CHRONIC BILATERAL LOW BACK PAIN WITHOUT SCIATICA: ICD-10-CM

## 2023-09-11 DIAGNOSIS — G89.4 CHRONIC PAIN SYNDROME: Primary | ICD-10-CM

## 2023-09-11 PROCEDURE — 99213 OFFICE O/P EST LOW 20 MIN: CPT | Performed by: NURSE PRACTITIONER

## 2023-09-11 NOTE — PROGRESS NOTES
Assessment:  1. Chronic pain syndrome    2. Chronic bilateral low back pain without sciatica    3. Closed wedge compression fracture of T11 vertebra, sequela        Plan:  While the patient was in the office today, I did have a thorough conversation regarding their chronic pain syndrome, medication management, and treatment plan options. Patient is being seen for a follow-up visit. She tells me that her low back pain has become pretty tolerable. She previously underwent sacroiliac joint injections which provided her with relief for several weeks. She uses Tylenol on an as-needed basis. We will repeat SI joint injections as needed. I discussed with the patient that at this point time she can follow up with our office on an as-needed basis. I did review the patient that if her pain symptoms should change, worsen, and/or if she would experience any new symptoms as she would like to be evaluated for, she should give our office a call. The patient was agreeable and verbalized an understanding. History of Present Illness: The patient is a 80 y.o. female who presents for a follow up office visit in regards to Back Pain. The patient’s current symptoms include complaints of low back pain. Current pain level is a 5/10. Quality pain is described as dull and aching. Current pain medications includes: Tylenol if needed. I have personally reviewed and/or updated the patient's past medical history, past surgical history, family history, social history, current medications, allergies, and vital signs today. Review of Systems  Review of Systems   Respiratory: Positive for shortness of breath. Musculoskeletal: Positive for back pain and gait problem. Joint stiffness   All other systems reviewed and are negative.           Past Medical History:   Diagnosis Date   • Acute on chronic diastolic CHF (congestive heart failure) ([address]) 01/30/2023   • Acute respiratory failure with hypoxia ([address]) 12/22/2022   • Aortic stenosis    • Atrial fibrillation (HCC)    • Atrial flutter (HCC)    • CAD (coronary artery disease) 01/11/2023    CAD/PCI/ELODIA   • CKD (chronic kidney disease), stage III (HCC)    • Community acquired pneumonia of left upper lobe of lung 05/17/2019   • Fatigue    • Full dentures    • Generalized anxiety disorder    • Hyperlipidemia    • Hypertension    • Impaired fasting glucose    • Mitral regurgitation    • Pneumonia    • RBBB    • 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    TRANSFEMORAL W/ 23MM CALVERT SHAVONNE S3 ULTRA VALVE(ACCESS ON LEFT)   • SVT (supraventricular tachycardia)    • Tricuspid regurgitation        Past Surgical History:   Procedure Laterality Date   • CARDIAC CATHETERIZATION N/A 1/11/2023    Procedure: LHC-Cardiac catheterization;  Surgeon: Nancy Lees MD;  Location:  CARDIAC CATH LAB; Service: Cardiology   • CARDIAC CATHETERIZATION N/A 1/11/2023    Procedure: Cardiac pci;  Surgeon: Nancy Lees MD;  [facility] C[facility];   Service: Cardiology   • CARDIAC CATHETERIZATION N/A 1/17/2023    Procedure: CARDIAC TAVR;  Surgeon: Rimma Burris DO;  Location: [facility] MAIN OR;  Service: Cardiology   • DENTAL SURGERY Bilateral     ALL TEETH REMOVED   • FL GUIDED NEEDLE PLAC BX/ASP/INJ  5/26/2021   • IR THORACENTESIS  12/20/2022   • IR THORACENTESIS  7/14/2023   • IR THORACENTESIS  9/1/2023   • JOINT REPLACEMENT Left    • KNEE SURGERY      left knee replacement   • AL INJECT SI JOINT ARTHRGRPHY&/ANES/STEROID W/JOHNATHON Bilateral 5/26/2021    Procedure: SACROILIAC JOINT INJECTION;  Surgeon: Jennifer Georges MD;  Location: [facility];  Service: Pain Management    • AL REPLACE AORTIC VALVE OPENFEMORAL ARTERY APPROACH N/A 1/17/2023    Procedure: REPLACEMENT AORTIC VALVE TRANSCATHETER (TAVR) TRANSFEMORAL W/ 23MM CALVERT SHAVONNE S3 ULTRA VALVE(ACCESS ON LEFT) ALENA;  Surgeon: Jailene Merino DO;  Location:  MAIN OR;  Service: Cardiac Surgery   • SACROILIAC JOINT INJECTION Bilateral 6/7/2023    Procedure: Bilateral sacroiliac joint steroid injection;  Surgeon: Selam Gilbert MD;  Location: [facility];  Service: Pain Management        Family History   Problem Relation Age of Onset   • Hypertension Mother    • Asthma Father         King's asthma   • Alzheimer's disease Sister    • Rectal cancer Son    • Uterine cancer Daughter    • Heart disease Sister    • Heart disease Brother        Social History     Occupational History   • Not on file   Tobacco Use   • Smoking status: Never   • Smokeless tobacco: Never   Vaping Use   • Vaping Use: Never used   Substance and Sexual Activity   • Alcohol use: Never   • Drug use: Never   • Sexual activity: Not Currently     Partners: Male         Current Outpatient Medications:   •  alendronate (Fosamax) 70 mg tablet, Take 1 tablet (70 mg total) by mouth every 7 days, Disp: 12 tablet, Rfl: 3  •  ALPRAZolam (XANAX) 0.25 mg tablet, Take 1 tablet by mouth 2 (two) times a day, Disp: , Rfl:   •  ammonium lactate (LAC-HYDRIN) 12 % lotion, Apply topically daily Do not start before Jacqueline 15, 2023., Disp: , Rfl: 0  •  apixaban (ELIQUIS) 2.5 mg, Take 1 tablet (2.5 mg total) by mouth 2 (two) times a day, Disp: 60 tablet, Rfl: 2  •  atorvastatin (LIPITOR) 20 mg tablet, TAKE 1 TABLET BY MOUTH ONCE DAILY WITH  DINNER, Disp: 30 tablet, Rfl: 0  •  bisacodyl (DULCOLAX) 10 mg suppository, Insert 10 mg into the rectum daily as needed for constipation, Disp: , Rfl:   •  Cholecalciferol (Vitamin D3) 50 MCG (2000 UT) TABS, Take 2,000 Units by mouth in the morning.  Indications: Osteoporosis, Disp: , Rfl:   •  clopidogrel (PLAVIX) 75 mg tablet, Take 1 tablet (75 mg total) by mouth daily, Disp: 90 tablet, Rfl: 2  •  diltiazem (CARDIZEM) 30 mg tablet, Take 1 tablet (30 mg total) by mouth 3 (three) times a day Hold for HR<60 bpm or SBP<110 mmHg, Disp: 270 tablet, Rfl: 2  •  furosemide (LASIX) 20 mg tablet, Take 1 tablet (20 mg total) by mouth daily, Disp: 90 tablet, Rfl: 2  •  metoprolol succinate (TOPROL-XL) 50 mg 24 hr tablet, Take 1 tablet (50 mg total) by mouth daily Hold for HR<60 or SBP<110 mmHg, Disp: 90 tablet, Rfl: 0  •  Multiple Vitamin (Multivitamin Adult) TABS, Take 1 tablet by mouth in the morning. Indications: Nutritional Support, Disp: , Rfl:   •  Nutritional Supplement LIQD, Take 1 Can by mouth 2 (two) times a day Ensure pudding BID, Disp: 237 mL, Rfl: 0  •  pantoprazole (PROTONIX) 20 mg tablet, Take 1 tablet (20 mg total) by mouth daily before breakfast, Disp: 90 tablet, Rfl: 3  •  potassium chloride (K-DUR,KLOR-CON) 10 mEq tablet, Take 1 tablet (10 mEq total) by mouth daily, Disp: 90 tablet, Rfl: 2  •  potassium chloride (Klor-Con) 10 mEq tablet, , Disp: , Rfl:   •  Psyllium (METAMUCIL FIBER) 51.7 % PACK, Take 1 Package by mouth daily (Patient not taking: Reported on 8/21/2023), Disp: , Rfl: 0    No Known Allergies    Physical Exam:    /71   Pulse 62     Constitutional:normal, well developed, well nourished, alert, in no distress and non-toxic and no overt pain behavior. Eyes:anicteric  HEENT:grossly intact  Neck:supple, symmetric, trachea midline and no masses   Pulmonary:even and unlabored  Cardiovascular:No edema or pitting edema present  Skin:Normal without rashes or lesions and well hydrated  Psychiatric:Mood and affect appropriate  Neurologic:Cranial Nerves II-XII grossly intact  Musculoskeletal:in wheelchair    Imaging    Study Result    Narrative & Impression   LUMBAR SPINE     INDICATION:   M54.50: Low back pain, unspecified.       COMPARISON:  CT chest abdomen pelvis 12/23/2022     VIEWS:  XR SPINE LUMBAR MINIMUM 4 VIEWS NON INJURY  Images: 4     FINDINGS:     There are 5 non rib bearing lumbar vertebral bodies.      Mild superior endplate compression deformity of T11.     Grade 1 retrolisthesis of L1 on L2 and L2 on L3.   Grade 1 anterolisthesis of L4 on L5.      Intervertebral disc space narrowing at L1-L3 and L4-S1 with endplate osteophytes and facet arthropathy.     The pedicles appear intact.      Vascular calcifications.     IMPRESSION:     Mild superior endplate compression deformity of T11 of unknown chronicity.     Multilevel degenerative changes of lumbar spine.              No orders to display       No orders of the defined types were placed in this encounter.

## 2023-09-13 ENCOUNTER — HOSPITAL ENCOUNTER (OUTPATIENT)
Dept: RADIOLOGY | Facility: HOSPITAL | Age: 88
Discharge: HOME | End: 2023-09-13
Payer: MEDICARE

## 2023-09-13 ENCOUNTER — OFFICE VISIT (OUTPATIENT)
Dept: PULMONOLOGY | Facility: CLINIC | Age: 88
End: 2023-09-13
Payer: MEDICARE

## 2023-09-13 VITALS
HEIGHT: 58 IN | WEIGHT: 105.8 LBS | OXYGEN SATURATION: 97 % | DIASTOLIC BLOOD PRESSURE: 64 MMHG | RESPIRATION RATE: 16 BRPM | SYSTOLIC BLOOD PRESSURE: 118 MMHG | TEMPERATURE: 96.6 F | BODY MASS INDEX: 22.21 KG/M2 | HEART RATE: 72 BPM

## 2023-09-13 DIAGNOSIS — J90 PLEURAL EFFUSION ON RIGHT: Primary | ICD-10-CM

## 2023-09-13 DIAGNOSIS — J90 PLEURAL EFFUSION ON RIGHT: ICD-10-CM

## 2023-09-13 DIAGNOSIS — I50.32 CHRONIC DIASTOLIC (CONGESTIVE) HEART FAILURE (HCC): ICD-10-CM

## 2023-09-13 DIAGNOSIS — R76.8 CENTROMERE ANTIBODY POSITIVE: ICD-10-CM

## 2023-09-13 PROCEDURE — 99214 OFFICE O/P EST MOD 30 MIN: CPT | Performed by: PHYSICIAN ASSISTANT

## 2023-09-13 PROCEDURE — 71046 X-RAY EXAM CHEST 2 VIEWS: CPT

## 2023-09-13 NOTE — ASSESSMENT & PLAN NOTE
Status post thoracentesis on right side 9/1/2023 which yielded 1140 cc exudative histocytes predominant effusion. Cytology has been repeatedly negative. Plan to keep chest x-ray PA and lateral today. And have additional chest x-ray in the chart in case of worsening symptoms. In light of  positive anticentromere antibody testing will refer to rheumatology for further work-up.

## 2023-09-13 NOTE — PROGRESS NOTES
Pulmonary Follow Up Note   Lucy To 80 y.o. female MRN: [id number]  9/13/2023      Assessment:    Pleural effusion on right  Status post thoracentesis on right side 9/1/2023 which yielded 1140 cc exudative histocytes predominant effusion. Cytology has been repeatedly negative. Plan to keep chest x-ray PA and lateral today. And have additional chest x-ray in the chart in case of worsening symptoms. In light of  positive anticentromere antibody testing will refer to rheumatology for further work-up. Chronic diastolic (congestive) heart failure (HCC)  Wt Readings from Last 3 Encounters:   09/13/23 48 kg (105 lb 12.8 oz)   08/21/23 47.1 kg (103 lb 12.8 oz)   08/15/23 46.9 kg (103 lb 6.4 oz)     Weights are stable and aside from exam findings suggesting pleural effusion on the right side and remainder of exam favors euvolemia. Continue medications per cardiology including Lasix 20 mg p.o. daily and potassium 10 mEq p.o. daily. Centromere antibody positive  Refer to rheumatology. Plan:    Diagnoses and all orders for this visit:    Pleural effusion on right  -     Ambulatory Referral to Rheumatology; Future  -     XR chest pa & lateral; Future    Centromere antibody positive  -     Ambulatory Referral to Rheumatology; Future    Chronic diastolic (congestive) heart failure ([address])        Return in about 4 months (around 1/13/2024). History of Present Illness   HPI:  Lucy To is a 80 y.o. female who presents the office today for routine follow-up. Patient has history of right pleural effusion. Also has history of CAD status post RCA stenting on Plavix, A-fib on Eliquis, aortic stenosis status post TAVR in January 2023, compression fracture T11, CKD. Patient has had cumulatively 5 thoracenteses on the right side, each exudative and negative cytology and culture data. Most recent thoracentesis was 9/1/2023.   At that time was also sent for autoimmune work-up and found to have positive DONYA and anticentromere B antibody. Patient does endorse L4 of shows that she attributes to old age. Denies skin changes either rashes or patches of thickness. Denies GERD. She does have very cold hands all of the time. Presently, patient denies SOB. Her  notes a mild cough occasionally. No wheezing. No chest pain. Her , present the office visit, also states that he checks her SpO2 daily and it is usually always in the high 90s. Review of Systems   All other systems reviewed and are negative. Historical Information   Past Medical History:   Diagnosis Date   • Acute on chronic diastolic CHF (congestive heart failure) ([address]) 01/30/2023   • Acute respiratory failure with hypoxia (HCC) 12/22/2022   • Aortic stenosis    • Atrial fibrillation ([facility])    • Atrial flutter ([facility])    • CAD (coronary artery disease) 01/11/2023    CAD/PCI/ELODIA   • CKD (chronic kidney disease), stage III ([facility])    • Community acquired pneumonia of left upper lobe of lung 05/17/2019   • Fatigue    • Full dentures    • Generalized anxiety disorder    • Hyperlipidemia    • Hypertension    • Impaired fasting glucose    • Mitral regurgitation    • Pneumonia    • RBBB    • 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    TRANSFEMORAL W/ 23MM CALVERT SHAVONNE S3 ULTRA VALVE(ACCESS ON LEFT)   • SVT (supraventricular tachycardia)    • Tricuspid regurgitation      Past Surgical History:   Procedure Laterality Date   • CARDIAC CATHETERIZATION N/A 1/11/2023    Procedure: LHC-Cardiac catheterization;  Surgeon: Raffaele Webb MD;  [facility]; Service: Cardiology   • CARDIAC CATHETERIZATION N/A 1/11/2023    Procedure: Cardiac pci;  Surgeon: Raffaele Webb MD;  [facility] CARD[facility];   Service: Cardiology   • CARDIAC CATHETERIZATION N/A 1/17/2023    Procedure: CARDIAC TAVR;  Surgeon: Leah Galan DO;  [facility];  Service: Cardiology   • DENTAL SURGERY Bilateral     ALL TEETH REMOVED   • FL GUIDED NEEDLE PLAC BX/ASP/INJ  5/26/2021   • IR THORACENTESIS  12/20/2022   • IR THORACENTESIS  7/14/2023   • IR THORACENTESIS  9/1/2023   • JOINT REPLACEMENT Left    • KNEE SURGERY      left knee replacement   • IN INJECT SI JOINT ARTHRGRPHY&/ANES/STEROID W/JOHNATHON Bilateral 5/26/2021    Procedure: SACROILIAC JOINT INJECTION;  Surgeon: Jae Middleton MD;  [facility];  Service: Pain Management    • IN REPLACE AORTIC VALVE OPENFEMORAL ARTERY APPROACH N/A 1/17/2023    Procedure: REPLACEMENT AORTIC VALVE TRANSCATHETER (TAVR) TRANSFEMORAL W/ 23MM CALVERT SHAVONNE S3 ULTRA VALVE(ACCESS ON LEFT) LAENA;  Surgeon: Faith Olsen DO;  [facility];  Service: Cardiac Surgery   • SACROILIAC JOINT INJECTION Bilateral 6/7/2023    Procedure: Bilateral sacroiliac joint steroid injection;  Surgeon: Nathalie Ball MD;  [facility];  Service: Pain Management      Family History   Problem Relation Age of Onset   • Hypertension Mother    • Asthma Father         [place]'s asthma   • Alzheimer's disease Sister    • Rectal cancer Son    • Uterine cancer Daughter    • Heart disease Sister    • Heart disease Brother        Social History     Tobacco Use   Smoking Status Never   Smokeless Tobacco Never         Meds/Allergies     Current Outpatient Medications:   •  alendronate (Fosamax) 70 mg tablet, Take 1 tablet (70 mg total) by mouth every 7 days, Disp: 12 tablet, Rfl: 3  •  ALPRAZolam (XANAX) 0.25 mg tablet, Take 1 tablet by mouth 2 (two) times a day, Disp: , Rfl:   •  ammonium lactate (LAC-HYDRIN) 12 % lotion, Apply topically daily Do not start before Jacqueline 15, 2023., Disp: , Rfl: 0  •  apixaban (ELIQUIS) 2.5 mg, Take 1 tablet (2.5 mg total) by mouth 2 (two) times a day, Disp: 60 tablet, Rfl: 2  •  atorvastatin (LIPITOR) 20 mg tablet, TAKE 1 TABLET BY MOUTH ONCE DAILY WITH  DINNER, Disp: 30 tablet, Rfl: 0  •  bisacodyl (DULCOLAX) 10 mg suppository, Insert 10 mg into the rectum daily as needed for constipation, Disp: , Rfl:   •  Cholecalciferol (Vitamin D3) 50 MCG (2000 UT) TABS, Take 2,000 Units by mouth in the morning. Indications: Osteoporosis, Disp: , Rfl:   •  clopidogrel (PLAVIX) 75 mg tablet, Take 1 tablet (75 mg total) by mouth daily, Disp: 90 tablet, Rfl: 2  •  diltiazem (CARDIZEM) 30 mg tablet, Take 1 tablet (30 mg total) by mouth 3 (three) times a day Hold for HR<60 bpm or SBP<110 mmHg, Disp: 270 tablet, Rfl: 2  •  furosemide (LASIX) 20 mg tablet, Take 1 tablet (20 mg total) by mouth daily, Disp: 90 tablet, Rfl: 2  •  metoprolol succinate (TOPROL-XL) 50 mg 24 hr tablet, Take 1 tablet (50 mg total) by mouth daily Hold for HR<60 or SBP<110 mmHg, Disp: 90 tablet, Rfl: 0  •  Multiple Vitamin (Multivitamin Adult) TABS, Take 1 tablet by mouth in the morning. Indications: Nutritional Support, Disp: , Rfl:   •  Nutritional Supplement LIQD, Take 1 Can by mouth 2 (two) times a day Ensure pudding BID, Disp: 237 mL, Rfl: 0  •  pantoprazole (PROTONIX) 20 mg tablet, Take 1 tablet (20 mg total) by mouth daily before breakfast, Disp: 90 tablet, Rfl: 3  •  potassium chloride (K-DUR,KLOR-CON) 10 mEq tablet, Take 1 tablet (10 mEq total) by mouth daily, Disp: 90 tablet, Rfl: 2  •  potassium chloride (Klor-Con) 10 mEq tablet, , Disp: , Rfl:   •  Psyllium (METAMUCIL FIBER) 51.7 % PACK, Take 1 Package by mouth daily (Patient not taking: Reported on 8/21/2023), Disp: , Rfl: 0  No Known Allergies    Vitals: Blood pressure 118/64, pulse 72, temperature (!) 96.6 °F (35.9 °C), temperature source Tympanic, resp. rate 16, height 4' 10" (1.473 m), weight 48 kg (105 lb 12.8 oz), SpO2 97 %. Body mass index is 22.11 kg/m². Oxygen Therapy  SpO2: 97 %    Physical Exam  Physical Exam  Vitals reviewed. Constitutional:       Appearance: Normal appearance. She is well-developed. HENT:      Head: Normocephalic and atraumatic.       Nose: Nose normal.      Mouth/Throat:      Mouth: Mucous membranes are moist.   Eyes:      Extraocular Movements: Extraocular movements intact. Cardiovascular:      Rate and Rhythm: Normal rate and regular rhythm. Heart sounds: Normal heart sounds. Pulmonary:      Effort: Pulmonary effort is normal. No respiratory distress. Breath sounds: No wheezing, rhonchi or rales. Comments: Good aeration throughout lungs but there is dullness to percussion in right base  Musculoskeletal:         General: No swelling. Skin:     General: Skin is warm and dry. Neurological:      Mental Status: She is alert. Mental status is at baseline. Psychiatric:         Mood and Affect: Mood normal.         Behavior: Behavior normal.         Labs: I have personally reviewed pertinent lab results. , ABG: No results found for: "PHART", "WCE5NTY", "PO2ART", "YHM0GUE", "G3ANBEWQ", "BEART", "SOURCE", BNP: No results found for: "BNP", CBC: No results found for: "WBC", "HGB", "HCT", "MCV", "PLT", "ADJUSTEDWBC", "RBC", "MCH", "MCHC", "RDW", "MPV", "NRBC", CMP: No results found for: "SODIUM", "K", "CL", "CO2", "ANIONGAP", "BUN", "CREATININE", "GLUCOSE", "CALCIUM", "AST", "ALT", "ALKPHOS", "PROT", "BILITOT", "EGFR", PT/INR: No results found for: "PT", "INR", Troponin: No results found for: "TROPONINI"  Lab Results   Component Value Date    WBC 10.04 06/21/2023    HGB 12.5 06/21/2023    HCT 38.5 06/21/2023    MCV 97 06/21/2023     06/21/2023     Lab Results   Component Value Date    GLUCOSE 107 01/17/2023    CALCIUM 9.1 08/23/2023    K 4.7 08/23/2023    CO2 22 08/23/2023     08/23/2023    BUN 29 (H) 08/23/2023    CREATININE 0.98 08/23/2023     No results found for: "IGE"  Lab Results   Component Value Date    ALT 17 08/23/2023    AST 28 08/23/2023    ALKPHOS 72 08/23/2023     Positive DONYA  Positive anticentromere B antibody    Imaging and other studies: I have personally reviewed pertinent reports.    and I have personally reviewed pertinent films in PACS     Chest x-ray 8/21/2023  Moderate to large right pleural effusion not significantly changed since July 10, 2023

## 2023-09-13 NOTE — ASSESSMENT & PLAN NOTE
Wt Readings from Last 3 Encounters:   09/13/23 48 kg (105 lb 12.8 oz)   08/21/23 47.1 kg (103 lb 12.8 oz)   08/15/23 46.9 kg (103 lb 6.4 oz)     Weights are stable and aside from exam findings suggesting pleural effusion on the right side and remainder of exam favors euvolemia. Continue medications per cardiology including Lasix 20 mg p.o. daily and potassium 10 mEq p.o. daily.

## 2023-09-25 ENCOUNTER — HOSPITAL ENCOUNTER (OUTPATIENT)
Dept: INTERVENTIONAL RADIOLOGY/VASCULAR | Facility: HOSPITAL | Age: 88
Discharge: HOME | End: 2023-09-25
Admitting: RADIOLOGY
Payer: MEDICARE

## 2023-09-25 VITALS
DIASTOLIC BLOOD PRESSURE: 59 MMHG | OXYGEN SATURATION: 98 % | HEART RATE: 51 BPM | RESPIRATION RATE: 18 BRPM | SYSTOLIC BLOOD PRESSURE: 126 MMHG

## 2023-09-25 DIAGNOSIS — J90 PLEURAL EFFUSION ON RIGHT: ICD-10-CM

## 2023-09-25 LAB
APPEARANCE FLD: ABNORMAL
COLOR FLD: ABNORMAL
GLUCOSE FLD-MCNC: 101 MG/DL
HISTIOCYTES NFR FLD: 22 %
LDH FLD L TO P-CCNC: 94 U/L
LYMPHOCYTES NFR BLD AUTO: 76 %
MONOCYTES NFR BLD AUTO: 2 %
PH BODY FLUID: 7.6
PROT FLD-MCNC: 3.1 G/DL
SITE: ABNORMAL
TOTAL CELLS COUNTED SPEC: 100
TRIGL FLD-MCNC: 18 MG/DL
WBC # FLD MANUAL: 841 /UL

## 2023-09-25 PROCEDURE — 82945 GLUCOSE OTHER FLUID: CPT | Performed by: PHYSICIAN ASSISTANT

## 2023-09-25 PROCEDURE — 88305 TISSUE EXAM BY PATHOLOGIST: CPT | Performed by: PATHOLOGY

## 2023-09-25 PROCEDURE — 84157 ASSAY OF PROTEIN OTHER: CPT | Performed by: PHYSICIAN ASSISTANT

## 2023-09-25 PROCEDURE — 84478 ASSAY OF TRIGLYCERIDES: CPT | Performed by: PHYSICIAN ASSISTANT

## 2023-09-25 PROCEDURE — 88112 CYTOPATH CELL ENHANCE TECH: CPT | Performed by: PATHOLOGY

## 2023-09-25 PROCEDURE — 83986 ASSAY PH BODY FLUID NOS: CPT | Performed by: PHYSICIAN ASSISTANT

## 2023-09-25 PROCEDURE — 32555 ASPIRATE PLEURA W/ IMAGING: CPT | Performed by: RADIOLOGY

## 2023-09-25 PROCEDURE — 89051 BODY FLUID CELL COUNT: CPT | Performed by: PHYSICIAN ASSISTANT

## 2023-09-25 PROCEDURE — 87070 CULTURE OTHR SPECIMN AEROBIC: CPT | Performed by: PHYSICIAN ASSISTANT

## 2023-09-25 PROCEDURE — 88185 FLOWCYTOMETRY/TC ADD-ON: CPT

## 2023-09-25 PROCEDURE — 32555 ASPIRATE PLEURA W/ IMAGING: CPT

## 2023-09-25 PROCEDURE — 87205 SMEAR GRAM STAIN: CPT | Performed by: PHYSICIAN ASSISTANT

## 2023-09-25 PROCEDURE — 83615 LACTATE (LD) (LDH) ENZYME: CPT | Performed by: PHYSICIAN ASSISTANT

## 2023-09-25 PROCEDURE — 86039 ANTINUCLEAR ANTIBODIES (ANA): CPT | Performed by: PHYSICIAN ASSISTANT

## 2023-09-25 PROCEDURE — 88184 FLOWCYTOMETRY/ TC 1 MARKER: CPT | Performed by: PHYSICIAN ASSISTANT

## 2023-09-25 RX ORDER — LIDOCAINE HYDROCHLORIDE 10 MG/ML
INJECTION, SOLUTION EPIDURAL; INFILTRATION; INTRACAUDAL; PERINEURAL AS NEEDED
Status: COMPLETED | OUTPATIENT
Start: 2023-09-25 | End: 2023-09-25

## 2023-09-25 RX ADMIN — LIDOCAINE HYDROCHLORIDE 20 ML: 10 INJECTION, SOLUTION EPIDURAL; INFILTRATION; INTRACAUDAL; PERINEURAL at 10:09

## 2023-09-25 NOTE — DISCHARGE INSTRUCTIONS
[address] [facility]  Interventional Radiology  [phone]     Thoracentesis   WHAT YOU NEED TO KNOW:   A thoracentesis is a procedure to remove extra fluid or air from between your lungs and your inner chest wall. Air or fluid buildup may make it hard for you to breathe. A thoracentesis allows your lungs to expand fully so you can breathe more easily. DISCHARGE INSTRUCTIONS:   Medicines:   Pain medicine: You may be given a prescription medicine to decrease pain. Do not wait until the pain is severe before you take your medicine. Antibiotics: This medicine helps fight or prevent an infection. Take your medicine as directed. Call your healthcare provider if you think your medicine is not helping or if you have side effects. Tell him if you are allergic to any medicine. Keep a list of the medicines, vitamins, and herbs you take. Include the amounts, and when and why you take them. Bring the list or the pill bottles to follow-up visits. Carry your medicine list with you in case of an emergency. Follow up with your healthcare provider as directed:  Write down your questions so you remember to ask them during your visits. Rest:  Rest when you feel it is needed. Slowly start to do more each day. Return to your daily activities as directed. Do not smoke: If you smoke, it is never too late to quit. Ask for information about how to stop smoking if you need help. Contact your healthcare provider if:   You have a fever. Your puncture site is red, warm, swollen, or draining pus. You have questions or concerns about your procedure, medicine, or care. If you have any questions regarding, call the IR department @ [phone]. Seek care immediately or call 911 if:   Blood soaks through your bandage. There is blood in your spit.

## 2023-09-25 NOTE — SEDATION DOCUMENTATION
RIght sided thoracentesis complete. 760ml of clear minnie fluid removed. Patient offering no complaints. Labs sent. Bandage applied.

## 2023-09-26 LAB — SCAN RESULT: NORMAL

## 2023-09-27 PROCEDURE — 88305 TISSUE EXAM BY PATHOLOGIST: CPT | Performed by: PATHOLOGY

## 2023-09-27 PROCEDURE — 88112 CYTOPATH CELL ENHANCE TECH: CPT | Performed by: PATHOLOGY

## 2023-09-28 LAB
BACTERIA SPEC BFLD CULT: NO GROWTH
GRAM STN SPEC: NORMAL
GRAM STN SPEC: NORMAL

## 2023-10-03 ENCOUNTER — OFFICE VISIT (OUTPATIENT)
Dept: FAMILY MEDICINE CLINIC | Facility: CLINIC | Age: 88
End: 2023-10-03
Payer: MEDICARE

## 2023-10-03 VITALS
BODY MASS INDEX: 21.79 KG/M2 | WEIGHT: 103.8 LBS | DIASTOLIC BLOOD PRESSURE: 63 MMHG | SYSTOLIC BLOOD PRESSURE: 143 MMHG | TEMPERATURE: 97 F | OXYGEN SATURATION: 97 % | HEIGHT: 58 IN | HEART RATE: 68 BPM

## 2023-10-03 DIAGNOSIS — E22.2 SIADH (SYNDROME OF INAPPROPRIATE ADH PRODUCTION) (HCC): ICD-10-CM

## 2023-10-03 DIAGNOSIS — E78.5 DYSLIPIDEMIA: ICD-10-CM

## 2023-10-03 DIAGNOSIS — D64.9 ANEMIA, UNSPECIFIED TYPE: ICD-10-CM

## 2023-10-03 DIAGNOSIS — E87.6 HYPOKALEMIA: ICD-10-CM

## 2023-10-03 DIAGNOSIS — J90 PLEURAL EFFUSION ON RIGHT: Primary | ICD-10-CM

## 2023-10-03 DIAGNOSIS — Z95.820 S/P ANGIOPLASTY WITH STENT: ICD-10-CM

## 2023-10-03 DIAGNOSIS — Z23 ENCOUNTER FOR IMMUNIZATION: ICD-10-CM

## 2023-10-03 PROCEDURE — 99214 OFFICE O/P EST MOD 30 MIN: CPT | Performed by: FAMILY MEDICINE

## 2023-10-03 PROCEDURE — 90662 IIV NO PRSV INCREASED AG IM: CPT

## 2023-10-03 PROCEDURE — G0008 ADMIN INFLUENZA VIRUS VAC: HCPCS

## 2023-10-03 RX ORDER — ATORVASTATIN CALCIUM 20 MG/1
20 TABLET, FILM COATED ORAL
Qty: 90 TABLET | Refills: 3 | Status: SHIPPED | OUTPATIENT
Start: 2023-10-03

## 2023-10-03 NOTE — PROGRESS NOTES
Name: Patrice Dexter      : 1935      MRN: [id number]  Encounter Provider: Kindra Roman DO  Encounter Date: 10/3/2023   Encounter department: [facility] PRIMARY CARE    Assessment & Plan     1. Pleural effusion on right  Assessment & Plan:  Patient currently feels well. She notes that her pulse oximetry's have been running in the mid 90s at home. However, on exam she still seems to have a pleural effusion. I note that patient is status postthoracentesis on . Unfortunately, she did not have a serum LDH and serum protein drawn that day. This would be essential to calculate her serum to pleural fluid LDH/protein ratio was to determine if this was transudate or exudate. The fact that this is only on the right is very suggestive that this is not from congestive heart failure. Patient will need to continue to be monitored closely. I plan to follow-up visit in 1 month. Patient should call if she develops any increased shortness of breath. 2. S/P angioplasty with stent  -     atorvastatin (LIPITOR) 20 mg tablet; Take 1 tablet (20 mg total) by mouth daily with dinner    3. Hypokalemia  Assessment & Plan:  Patient has hypokalemia. Patient will continue Lasix 20 mg daily. I ordered a CMP. Continue potassium chloride supplementation. Orders:  -     Comprehensive metabolic panel; Future    4. SIADH (syndrome of inappropriate 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[address])  Assessment & Plan:  As noted, CMP was ordered. Patient should continue follow-up with nephrology. Orders:  -     Comprehensive metabolic panel; Future    5. Anemia, unspecified type  Assessment & Plan:  Recommend a repeat CBC. Orders:  -     CBC and differential; Future    6. Dyslipidemia  Assessment & Plan:  I ordered an direct LDL cholesterol. The patient's goal LDL cholesterol is less than 70. Continue atorvastatin 20 mg daily pending results of labs. Orders:  -     LDL cholesterol, direct; Future    7.  Encounter for immunization  -     influenza vaccine, high-dose, PF 0.7 mL (FLUZONE HIGH-DOSE)         Subjective      Is an 80-year-old white female who presents to the office today for her follow-up visit. The patient is accompanied to the office today by her significant other. She is doing well. She has not been experiencing any shortness of breath. She denies orthopnea. She reports feeling much better since her last thoracentesis. She states she has been compliant in taking her medication. Review of Systems   Constitutional: Negative for activity change and appetite change. Respiratory: Negative for cough, shortness of breath and wheezing. Cardiovascular: Negative for chest pain, palpitations and leg swelling. Denies orthopnea and paroxysmal nocturnal dyspnea   Gastrointestinal: Negative for abdominal distention, abdominal pain, blood in stool, constipation, diarrhea and nausea. Musculoskeletal: Positive for gait problem. Psychiatric/Behavioral: Negative for sleep disturbance. Current Outpatient Medications on File Prior to Visit   Medication Sig   • alendronate (Fosamax) 70 mg tablet Take 1 tablet (70 mg total) by mouth every 7 days   • ALPRAZolam (XANAX) 0.25 mg tablet TAKE 1 TABLET BY MOUTH THREE TIMES DAILY AS NEEDED FOR ANXIETY   • ammonium lactate (LAC-HYDRIN) 12 % lotion Apply topically daily Do not start before Jacqueline 15, 2023. • apixaban (ELIQUIS) 2.5 mg Take 1 tablet (2.5 mg total) by mouth 2 (two) times a day   • bisacodyl (DULCOLAX) 10 mg suppository Insert 10 mg into the rectum daily as needed for constipation   • Cholecalciferol (Vitamin D3) 50 MCG (2000 UT) TABS Take 2,000 Units by mouth in the morning.  Indications: Osteoporosis   • clopidogrel (PLAVIX) 75 mg tablet Take 1 tablet (75 mg total) by mouth daily   • diltiazem (CARDIZEM) 30 mg tablet Take 1 tablet (30 mg total) by mouth 3 (three) times a day Hold for HR<60 bpm or SBP<110 mmHg   • furosemide (LASIX) 20 mg tablet Take 1 tablet (20 mg total) by mouth daily   • metoprolol succinate (TOPROL-XL) 50 mg 24 hr tablet Take 1 tablet (50 mg total) by mouth daily Hold for HR<60 or SBP<110 mmHg   • Multiple Vitamin (Multivitamin Adult) TABS Take 1 tablet by mouth in the morning. Indications: Nutritional Support   • Nutritional Supplement LIQD Take 1 Can by mouth 2 (two) times a day Ensure pudding BID   • pantoprazole (PROTONIX) 20 mg tablet Take 1 tablet (20 mg total) by mouth daily before breakfast   • potassium chloride (K-DUR,KLOR-CON) 10 mEq tablet Take 1 tablet (10 mEq total) by mouth daily   • [DISCONTINUED] atorvastatin (LIPITOR) 20 mg tablet TAKE 1 TABLET BY MOUTH ONCE DAILY WITH  DINNER   • potassium chloride (Klor-Con) 10 mEq tablet  (Patient not taking: Reported on 8/8/2023)   • Psyllium (METAMUCIL FIBER) 51.7 % PACK Take 1 Package by mouth daily (Patient not taking: Reported on 8/21/2023)   • [DISCONTINUED] oxygen gas Inhale 2 L/min daily as needed (low oxygen levels). Indications: Oxygen Desaturation (Patient not taking: Reported on 1/30/2023)       Objective     /63   Pulse 68   Temp (!) 97 °F (36.1 °C) (Tympanic)   Ht 4' 10" (1.473 m)   Wt 47.1 kg (103 lb 12.8 oz)   SpO2 97%   BMI 21.69 kg/m²     Physical Exam  Vitals reviewed. Constitutional:       Comments: Patient is an 80-year-old white female who appears her stated age. She is pleasant, cooperative, and in no distress   HENT:      Head: Normocephalic and atraumatic. Right Ear: Tympanic membrane, ear canal and external ear normal. There is no impacted cerumen. Left Ear: Tympanic membrane, ear canal and external ear normal. There is no impacted cerumen. Mouth/Throat:      Mouth: Mucous membranes are moist.      Pharynx: Oropharynx is clear. No oropharyngeal exudate or posterior oropharyngeal erythema. Eyes:      General: No scleral icterus. Right eye: No discharge. Left eye: No discharge.       Conjunctiva/sclera: Conjunctivae normal.      Pupils: Pupils are equal, round, and reactive to light. Cardiovascular:      Rate and Rhythm: Normal rate. Rhythm irregular. Heart sounds: Normal heart sounds. No murmur heard. No friction rub. No gallop. Comments: Heart is irregularly irregular but the ventricular rate is well controlled  Pulmonary:      Effort: Pulmonary effort is normal. No respiratory distress. Breath sounds: No stridor. No wheezing, rhonchi or rales. Comments: There were decreased breath sounds again noted in the right lung base. The right lung base was noted to be dull to percussion. There is also decreased auditory fremitus in the right lung base. Abdominal:      General: Bowel sounds are normal. There is no distension. Palpations: Abdomen is soft. There is no mass. Tenderness: There is no abdominal tenderness. There is no guarding. Musculoskeletal:      Cervical back: Neck supple. Lymphadenopathy:      Cervical: No cervical adenopathy. Psychiatric:         Mood and Affect: Mood normal.         Behavior: Behavior normal.         Thought Content:  Thought content normal.         Judgment: Judgment normal.       Ariadna Hinojosa DO

## 2023-10-04 NOTE — ASSESSMENT & PLAN NOTE
As noted, CMP was ordered. Patient should continue follow-up with nephrology.
I ordered an direct LDL cholesterol. The patient's goal LDL cholesterol is less than 70. Continue atorvastatin 20 mg daily pending results of labs.
Patient currently feels well. She notes that her pulse oximetry's have been running in the mid 90s at home. However, on exam she still seems to have a pleural effusion. I note that patient is status postthoracentesis on September 25. Unfortunately, she did not have a serum LDH and serum protein drawn that day. This would be essential to calculate her serum to pleural fluid LDH/protein ratio was to determine if this was transudate or exudate. The fact that this is only on the right is very suggestive that this is not from congestive heart failure. Patient will need to continue to be monitored closely. I plan to follow-up visit in 1 month. Patient should call if she develops any increased shortness of breath.
Patient has hypokalemia. Patient will continue Lasix 20 mg daily. I ordered a CMP. Continue potassium chloride supplementation.
Recommend a repeat CBC.
right arm pain

## 2023-10-05 ENCOUNTER — APPOINTMENT (OUTPATIENT)
Dept: LAB | Facility: HOSPITAL | Age: 88
End: 2023-10-05
Payer: MEDICARE

## 2023-10-05 DIAGNOSIS — E55.9 VITAMIN D DEFICIENCY: ICD-10-CM

## 2023-10-05 DIAGNOSIS — D64.9 ANEMIA, UNSPECIFIED TYPE: ICD-10-CM

## 2023-10-05 DIAGNOSIS — E22.2 SIADH (SYNDROME OF INAPPROPRIATE ADH PRODUCTION) (HCC): ICD-10-CM

## 2023-10-05 DIAGNOSIS — E87.6 HYPOKALEMIA: ICD-10-CM

## 2023-10-05 DIAGNOSIS — E05.00 GRAVES DISEASE: ICD-10-CM

## 2023-10-05 DIAGNOSIS — E78.5 DYSLIPIDEMIA: ICD-10-CM

## 2023-10-05 LAB
25(OH)D3 SERPL-MCNC: 81.6 NG/ML (ref 30–100)
ALBUMIN SERPL BCP-MCNC: 4 G/DL (ref 3.5–5)
ALP SERPL-CCNC: 99 U/L (ref 34–104)
ALT SERPL W P-5'-P-CCNC: 12 U/L (ref 7–52)
ANION GAP SERPL CALCULATED.3IONS-SCNC: 10 MMOL/L
AST SERPL W P-5'-P-CCNC: 16 U/L (ref 13–39)
BASOPHILS # BLD AUTO: 0.01 THOUSANDS/ÂΜL (ref 0–0.1)
BASOPHILS NFR BLD AUTO: 0 % (ref 0–1)
BILIRUB SERPL-MCNC: 0.69 MG/DL (ref 0.2–1)
BUN SERPL-MCNC: 25 MG/DL (ref 5–25)
CALCIUM SERPL-MCNC: 9.2 MG/DL (ref 8.4–10.2)
CHLORIDE SERPL-SCNC: 96 MMOL/L (ref 96–108)
CO2 SERPL-SCNC: 25 MMOL/L (ref 21–32)
CREAT SERPL-MCNC: 1.15 MG/DL (ref 0.6–1.3)
EOSINOPHIL # BLD AUTO: 0.08 THOUSAND/ÂΜL (ref 0–0.61)
EOSINOPHIL NFR BLD AUTO: 2 % (ref 0–6)
ERYTHROCYTE [DISTWIDTH] IN BLOOD BY AUTOMATED COUNT: 14.9 % (ref 11.6–15.1)
GFR SERPL CREATININE-BSD FRML MDRD: 42 ML/MIN/1.73SQ M
GLUCOSE P FAST SERPL-MCNC: 97 MG/DL (ref 65–99)
HCT VFR BLD AUTO: 36.6 % (ref 34.8–46.1)
HGB BLD-MCNC: 11.6 G/DL (ref 11.5–15.4)
IMM GRANULOCYTES # BLD AUTO: 0.02 THOUSAND/UL (ref 0–0.2)
IMM GRANULOCYTES NFR BLD AUTO: 1 % (ref 0–2)
LDLC SERPL DIRECT ASSAY-MCNC: 41 MG/DL (ref 0–100)
LYMPHOCYTES # BLD AUTO: 0.73 THOUSANDS/ÂΜL (ref 0.6–4.47)
LYMPHOCYTES NFR BLD AUTO: 18 % (ref 14–44)
MCH RBC QN AUTO: 28.6 PG (ref 26.8–34.3)
MCHC RBC AUTO-ENTMCNC: 31.7 G/DL (ref 31.4–37.4)
MCV RBC AUTO: 90 FL (ref 82–98)
MONOCYTES # BLD AUTO: 0.54 THOUSAND/ÂΜL (ref 0.17–1.22)
MONOCYTES NFR BLD AUTO: 14 % (ref 4–12)
NEUTROPHILS # BLD AUTO: 2.62 THOUSANDS/ÂΜL (ref 1.85–7.62)
NEUTS SEG NFR BLD AUTO: 65 % (ref 43–75)
NRBC BLD AUTO-RTO: 0 /100 WBCS
PLATELET # BLD AUTO: 175 THOUSANDS/UL (ref 149–390)
PMV BLD AUTO: 8.8 FL (ref 8.9–12.7)
POTASSIUM SERPL-SCNC: 3.7 MMOL/L (ref 3.5–5.3)
PROT SERPL-MCNC: 6.3 G/DL (ref 6.4–8.4)
RBC # BLD AUTO: 4.05 MILLION/UL (ref 3.81–5.12)
SODIUM SERPL-SCNC: 131 MMOL/L (ref 135–147)
TSH SERPL DL<=0.05 MIU/L-ACNC: 2.18 UIU/ML (ref 0.45–4.5)
WBC # BLD AUTO: 4 THOUSAND/UL (ref 4.31–10.16)

## 2023-10-05 PROCEDURE — 36415 COLL VENOUS BLD VENIPUNCTURE: CPT

## 2023-10-05 PROCEDURE — 82306 VITAMIN D 25 HYDROXY: CPT

## 2023-10-05 PROCEDURE — 80053 COMPREHEN METABOLIC PANEL: CPT

## 2023-10-05 PROCEDURE — 84443 ASSAY THYROID STIM HORMONE: CPT

## 2023-10-05 PROCEDURE — 83721 ASSAY OF BLOOD LIPOPROTEIN: CPT

## 2023-10-05 PROCEDURE — 85025 COMPLETE CBC W/AUTO DIFF WBC: CPT

## 2023-10-09 LAB
ANA ELISA RESULT: >=1
ANTINUCLEAR ANTIBODY BY ELISA: POSITIVE
Lab: NORMAL
RATIO: 3

## 2023-10-12 DIAGNOSIS — Z95.2 S/P TAVR (TRANSCATHETER AORTIC VALVE REPLACEMENT): ICD-10-CM

## 2023-10-12 DIAGNOSIS — I48.92 ATRIAL FLUTTER, UNSPECIFIED TYPE (HCC): ICD-10-CM

## 2023-10-12 DIAGNOSIS — I35.0 SEVERE AORTIC STENOSIS: ICD-10-CM

## 2023-10-12 DIAGNOSIS — I35.9 NONRHEUMATIC AORTIC VALVE DISORDER: ICD-10-CM

## 2023-10-12 RX ORDER — APIXABAN 2.5 MG/1
2.5 TABLET, FILM COATED ORAL 2 TIMES DAILY
Qty: 60 TABLET | Refills: 5 | Status: SHIPPED | OUTPATIENT
Start: 2023-10-12

## 2023-11-16 ENCOUNTER — APPOINTMENT (EMERGENCY)
Dept: CT IMAGING | Facility: HOSPITAL | Age: 88
DRG: 871 | End: 2023-11-16
Payer: MEDICARE

## 2023-11-16 ENCOUNTER — HOSPITAL ENCOUNTER (INPATIENT)
Facility: HOSPITAL | Age: 88
LOS: 6 days | Discharge: NURSING, REHAB OR LONG-TERM CARE | DRG: 871 | End: 2023-11-22
Attending: EMERGENCY MEDICINE | Admitting: FAMILY MEDICINE
Payer: MEDICARE

## 2023-11-16 ENCOUNTER — APPOINTMENT (EMERGENCY)
Dept: RADIOLOGY | Facility: HOSPITAL | Age: 88
DRG: 871 | End: 2023-11-16
Payer: MEDICARE

## 2023-11-16 DIAGNOSIS — E87.1 HYPONATREMIA: ICD-10-CM

## 2023-11-16 DIAGNOSIS — I48.91 ATRIAL FIBRILLATION (HCC): ICD-10-CM

## 2023-11-16 DIAGNOSIS — J96.01 ACUTE HYPOXIC RESPIRATORY FAILURE (HCC): ICD-10-CM

## 2023-11-16 DIAGNOSIS — R09.02 HYPOXIA: ICD-10-CM

## 2023-11-16 DIAGNOSIS — K59.00 CONSTIPATION, UNSPECIFIED CONSTIPATION TYPE: ICD-10-CM

## 2023-11-16 DIAGNOSIS — I48.21 PERMANENT ATRIAL FIBRILLATION (HCC): ICD-10-CM

## 2023-11-16 DIAGNOSIS — M15.0 PRIMARY GENERALIZED (OSTEO)ARTHRITIS: ICD-10-CM

## 2023-11-16 DIAGNOSIS — R53.1 GENERALIZED WEAKNESS: Chronic | ICD-10-CM

## 2023-11-16 DIAGNOSIS — M80.08XD AGE-RELATED OSTEOPOROSIS WITH CURRENT PATHOLOGICAL FRACTURE OF VERTEBRA WITH ROUTINE HEALING, SUBSEQUENT ENCOUNTER: ICD-10-CM

## 2023-11-16 DIAGNOSIS — I10 ESSENTIAL HYPERTENSION: ICD-10-CM

## 2023-11-16 DIAGNOSIS — J18.9 PNEUMONIA: Primary | ICD-10-CM

## 2023-11-16 DIAGNOSIS — J90 PLEURAL EFFUSION ON RIGHT: ICD-10-CM

## 2023-11-16 DIAGNOSIS — I48.3 TYPICAL ATRIAL FLUTTER (HCC): ICD-10-CM

## 2023-11-16 DIAGNOSIS — N17.9 AKI (ACUTE KIDNEY INJURY) (HCC): ICD-10-CM

## 2023-11-16 PROBLEM — A41.9 SEPSIS DUE TO PNEUMONIA (HCC): Status: ACTIVE | Noted: 2019-05-17

## 2023-11-16 LAB
2HR DELTA HS TROPONIN: 27 NG/L
ALBUMIN SERPL BCP-MCNC: 4.6 G/DL (ref 3.5–5)
ALP SERPL-CCNC: 93 U/L (ref 34–104)
ALT SERPL W P-5'-P-CCNC: 19 U/L (ref 7–52)
ANION GAP SERPL CALCULATED.3IONS-SCNC: 12 MMOL/L
APTT PPP: 33 SECONDS (ref 23–37)
AST SERPL W P-5'-P-CCNC: 44 U/L (ref 13–39)
BACTERIA UR QL AUTO: NORMAL /HPF
BASE EX.OXY STD BLDV CALC-SCNC: 49.5 % (ref 60–80)
BASE EXCESS BLDV CALC-SCNC: -4.3 MMOL/L
BASOPHILS # BLD AUTO: 0.02 THOUSANDS/ÂΜL (ref 0–0.1)
BASOPHILS NFR BLD AUTO: 0 % (ref 0–1)
BILIRUB SERPL-MCNC: 1.2 MG/DL (ref 0.2–1)
BILIRUB UR QL STRIP: NEGATIVE
BNP SERPL-MCNC: 647 PG/ML (ref 0–100)
BUN SERPL-MCNC: 28 MG/DL (ref 5–25)
CALCIUM SERPL-MCNC: 10 MG/DL (ref 8.4–10.2)
CARDIAC TROPONIN I PNL SERPL HS: 22 NG/L
CARDIAC TROPONIN I PNL SERPL HS: 49 NG/L
CHLORIDE SERPL-SCNC: 98 MMOL/L (ref 96–108)
CLARITY UR: ABNORMAL
CO2 SERPL-SCNC: 24 MMOL/L (ref 21–32)
COLOR UR: YELLOW
CREAT SERPL-MCNC: 0.98 MG/DL (ref 0.6–1.3)
EOSINOPHIL # BLD AUTO: 0 THOUSAND/ÂΜL (ref 0–0.61)
EOSINOPHIL NFR BLD AUTO: 0 % (ref 0–6)
ERYTHROCYTE [DISTWIDTH] IN BLOOD BY AUTOMATED COUNT: 18 % (ref 11.6–15.1)
FLUAV RNA RESP QL NAA+PROBE: NEGATIVE
FLUBV RNA RESP QL NAA+PROBE: NEGATIVE
GFR SERPL CREATININE-BSD FRML MDRD: 51 ML/MIN/1.73SQ M
GLUCOSE SERPL-MCNC: 138 MG/DL (ref 65–140)
GLUCOSE SERPL-MCNC: 160 MG/DL (ref 65–140)
GLUCOSE UR STRIP-MCNC: NEGATIVE MG/DL
HCO3 BLDV-SCNC: 20 MMOL/L (ref 24–30)
HCT VFR BLD AUTO: 39.9 % (ref 34.8–46.1)
HGB BLD-MCNC: 12.4 G/DL (ref 11.5–15.4)
HGB UR QL STRIP.AUTO: NEGATIVE
IMM GRANULOCYTES # BLD AUTO: 0.03 THOUSAND/UL (ref 0–0.2)
IMM GRANULOCYTES NFR BLD AUTO: 0 % (ref 0–2)
INR PPP: 1.34 (ref 0.84–1.19)
KETONES UR STRIP-MCNC: NEGATIVE MG/DL
LACTATE SERPL-SCNC: 1.5 MMOL/L (ref 0.5–2)
LACTATE SERPL-SCNC: 2.6 MMOL/L (ref 0.5–2)
LEUKOCYTE ESTERASE UR QL STRIP: ABNORMAL
LYMPHOCYTES # BLD AUTO: 0.44 THOUSANDS/ÂΜL (ref 0.6–4.47)
LYMPHOCYTES NFR BLD AUTO: 4 % (ref 14–44)
MCH RBC QN AUTO: 28.6 PG (ref 26.8–34.3)
MCHC RBC AUTO-ENTMCNC: 31.1 G/DL (ref 31.4–37.4)
MCV RBC AUTO: 92 FL (ref 82–98)
MONOCYTES # BLD AUTO: 0.77 THOUSAND/ÂΜL (ref 0.17–1.22)
MONOCYTES NFR BLD AUTO: 7 % (ref 4–12)
NEUTROPHILS # BLD AUTO: 10.57 THOUSANDS/ÂΜL (ref 1.85–7.62)
NEUTS SEG NFR BLD AUTO: 89 % (ref 43–75)
NITRITE UR QL STRIP: NEGATIVE
NON-SQ EPI CELLS URNS QL MICRO: NORMAL /HPF
NRBC BLD AUTO-RTO: 0 /100 WBCS
O2 CT BLDV-SCNC: 9.3 ML/DL
PCO2 BLDV: 34.7 MM HG (ref 42–50)
PH BLDV: 7.38 [PH] (ref 7.3–7.4)
PH UR STRIP.AUTO: 6.5 [PH]
PLATELET # BLD AUTO: 187 THOUSANDS/UL (ref 149–390)
PMV BLD AUTO: 9.5 FL (ref 8.9–12.7)
PO2 BLDV: 30.9 MM HG (ref 35–45)
POTASSIUM SERPL-SCNC: 4.5 MMOL/L (ref 3.5–5.3)
PROCALCITONIN SERPL-MCNC: 0.18 NG/ML
PROT SERPL-MCNC: 7 G/DL (ref 6.4–8.4)
PROT UR STRIP-MCNC: ABNORMAL MG/DL
PROTHROMBIN TIME: 16.4 SECONDS (ref 11.6–14.5)
RBC # BLD AUTO: 4.34 MILLION/UL (ref 3.81–5.12)
RBC #/AREA URNS AUTO: NORMAL /HPF
RSV RNA RESP QL NAA+PROBE: NEGATIVE
SARS-COV-2 RNA RESP QL NAA+PROBE: NEGATIVE
SODIUM SERPL-SCNC: 134 MMOL/L (ref 135–147)
SP GR UR STRIP.AUTO: 1.01 (ref 1–1.03)
UROBILINOGEN UR QL STRIP.AUTO: 0.2 E.U./DL
WBC # BLD AUTO: 11.83 THOUSAND/UL (ref 4.31–10.16)
WBC #/AREA URNS AUTO: NORMAL /HPF

## 2023-11-16 PROCEDURE — 84145 PROCALCITONIN (PCT): CPT | Performed by: EMERGENCY MEDICINE

## 2023-11-16 PROCEDURE — 83880 ASSAY OF NATRIURETIC PEPTIDE: CPT | Performed by: EMERGENCY MEDICINE

## 2023-11-16 PROCEDURE — 94760 N-INVAS EAR/PLS OXIMETRY 1: CPT

## 2023-11-16 PROCEDURE — 0241U HB NFCT DS VIR RESP RNA 4 TRGT: CPT | Performed by: EMERGENCY MEDICINE

## 2023-11-16 PROCEDURE — 92610 EVALUATE SWALLOWING FUNCTION: CPT

## 2023-11-16 PROCEDURE — 99223 1ST HOSP IP/OBS HIGH 75: CPT | Performed by: FAMILY MEDICINE

## 2023-11-16 PROCEDURE — 94640 AIRWAY INHALATION TREATMENT: CPT

## 2023-11-16 PROCEDURE — 84484 ASSAY OF TROPONIN QUANT: CPT | Performed by: EMERGENCY MEDICINE

## 2023-11-16 PROCEDURE — 96375 TX/PRO/DX INJ NEW DRUG ADDON: CPT

## 2023-11-16 PROCEDURE — 82805 BLOOD GASES W/O2 SATURATION: CPT | Performed by: EMERGENCY MEDICINE

## 2023-11-16 PROCEDURE — 80053 COMPREHEN METABOLIC PANEL: CPT | Performed by: EMERGENCY MEDICINE

## 2023-11-16 PROCEDURE — 87040 BLOOD CULTURE FOR BACTERIA: CPT | Performed by: EMERGENCY MEDICINE

## 2023-11-16 PROCEDURE — 94664 DEMO&/EVAL PT USE INHALER: CPT

## 2023-11-16 PROCEDURE — 94668 MNPJ CHEST WALL SBSQ: CPT

## 2023-11-16 PROCEDURE — 36415 COLL VENOUS BLD VENIPUNCTURE: CPT | Performed by: EMERGENCY MEDICINE

## 2023-11-16 PROCEDURE — 85025 COMPLETE CBC W/AUTO DIFF WBC: CPT | Performed by: EMERGENCY MEDICINE

## 2023-11-16 PROCEDURE — 71250 CT THORAX DX C-: CPT

## 2023-11-16 PROCEDURE — 99223 1ST HOSP IP/OBS HIGH 75: CPT | Performed by: PHYSICIAN ASSISTANT

## 2023-11-16 PROCEDURE — 99285 EMERGENCY DEPT VISIT HI MDM: CPT

## 2023-11-16 PROCEDURE — 81001 URINALYSIS AUTO W/SCOPE: CPT

## 2023-11-16 PROCEDURE — 85610 PROTHROMBIN TIME: CPT | Performed by: EMERGENCY MEDICINE

## 2023-11-16 PROCEDURE — 82948 REAGENT STRIP/BLOOD GLUCOSE: CPT

## 2023-11-16 PROCEDURE — 93005 ELECTROCARDIOGRAM TRACING: CPT

## 2023-11-16 PROCEDURE — 99285 EMERGENCY DEPT VISIT HI MDM: CPT | Performed by: EMERGENCY MEDICINE

## 2023-11-16 PROCEDURE — 96367 TX/PROPH/DG ADDL SEQ IV INF: CPT

## 2023-11-16 PROCEDURE — 96365 THER/PROPH/DIAG IV INF INIT: CPT

## 2023-11-16 PROCEDURE — 85730 THROMBOPLASTIN TIME PARTIAL: CPT | Performed by: EMERGENCY MEDICINE

## 2023-11-16 PROCEDURE — 71045 X-RAY EXAM CHEST 1 VIEW: CPT

## 2023-11-16 PROCEDURE — 83605 ASSAY OF LACTIC ACID: CPT | Performed by: EMERGENCY MEDICINE

## 2023-11-16 RX ORDER — SODIUM CHLORIDE 9 MG/ML
50 INJECTION, SOLUTION INTRAVENOUS CONTINUOUS
Status: CANCELLED | OUTPATIENT
Start: 2023-11-16

## 2023-11-16 RX ORDER — METOPROLOL SUCCINATE 50 MG/1
50 TABLET, EXTENDED RELEASE ORAL DAILY
Status: DISCONTINUED | OUTPATIENT
Start: 2023-11-16 | End: 2023-11-17

## 2023-11-16 RX ORDER — POLYETHYLENE GLYCOL 3350 17 G/17G
17 POWDER, FOR SOLUTION ORAL DAILY PRN
Status: DISCONTINUED | OUTPATIENT
Start: 2023-11-16 | End: 2023-11-20

## 2023-11-16 RX ORDER — ACETAMINOPHEN 325 MG/1
650 TABLET ORAL EVERY 6 HOURS PRN
Status: DISCONTINUED | OUTPATIENT
Start: 2023-11-16 | End: 2023-11-17

## 2023-11-16 RX ORDER — POTASSIUM CHLORIDE 750 MG/1
10 TABLET, EXTENDED RELEASE ORAL DAILY
Status: DISCONTINUED | OUTPATIENT
Start: 2023-11-16 | End: 2023-11-20

## 2023-11-16 RX ORDER — ZINC OXIDE 216 MG/ML
1 LOTION TOPICAL 2 TIMES DAILY
Status: DISCONTINUED | OUTPATIENT
Start: 2023-11-16 | End: 2023-11-16

## 2023-11-16 RX ORDER — IPRATROPIUM BROMIDE AND ALBUTEROL SULFATE 2.5; .5 MG/3ML; MG/3ML
3 SOLUTION RESPIRATORY (INHALATION) ONCE
Status: COMPLETED | OUTPATIENT
Start: 2023-11-16 | End: 2023-11-16

## 2023-11-16 RX ORDER — CLOPIDOGREL BISULFATE 75 MG/1
75 TABLET ORAL DAILY
Status: DISCONTINUED | OUTPATIENT
Start: 2023-11-16 | End: 2023-11-20

## 2023-11-16 RX ORDER — DILTIAZEM HYDROCHLORIDE 5 MG/ML
10 INJECTION INTRAVENOUS ONCE
Status: COMPLETED | OUTPATIENT
Start: 2023-11-16 | End: 2023-11-16

## 2023-11-16 RX ORDER — CEFTRIAXONE 1 G/50ML
1000 INJECTION, SOLUTION INTRAVENOUS ONCE
Status: COMPLETED | OUTPATIENT
Start: 2023-11-16 | End: 2023-11-16

## 2023-11-16 RX ORDER — ACETAMINOPHEN 325 MG/1
650 TABLET ORAL ONCE
Status: COMPLETED | OUTPATIENT
Start: 2023-11-16 | End: 2023-11-16

## 2023-11-16 RX ORDER — ALPRAZOLAM 0.25 MG/1
0.25 TABLET ORAL 3 TIMES DAILY PRN
Status: DISCONTINUED | OUTPATIENT
Start: 2023-11-16 | End: 2023-11-20

## 2023-11-16 RX ORDER — LEVALBUTEROL INHALATION SOLUTION 1.25 MG/3ML
1.25 SOLUTION RESPIRATORY (INHALATION)
Status: DISCONTINUED | OUTPATIENT
Start: 2023-11-16 | End: 2023-11-20

## 2023-11-16 RX ORDER — PANTOPRAZOLE SODIUM 20 MG/1
20 TABLET, DELAYED RELEASE ORAL
Status: DISCONTINUED | OUTPATIENT
Start: 2023-11-17 | End: 2023-11-20

## 2023-11-16 RX ORDER — ATORVASTATIN CALCIUM 10 MG/1
20 TABLET, FILM COATED ORAL
Status: DISCONTINUED | OUTPATIENT
Start: 2023-11-16 | End: 2023-11-20

## 2023-11-16 RX ORDER — GUAIFENESIN 600 MG/1
600 TABLET, EXTENDED RELEASE ORAL EVERY 12 HOURS SCHEDULED
Status: DISCONTINUED | OUTPATIENT
Start: 2023-11-16 | End: 2023-11-20

## 2023-11-16 RX ORDER — FUROSEMIDE 20 MG/1
20 TABLET ORAL DAILY
Status: DISCONTINUED | OUTPATIENT
Start: 2023-11-16 | End: 2023-11-18

## 2023-11-16 RX ORDER — CEFEPIME HYDROCHLORIDE 1 G/50ML
1000 INJECTION, SOLUTION INTRAVENOUS EVERY 24 HOURS
Status: DISCONTINUED | OUTPATIENT
Start: 2023-11-17 | End: 2023-11-20

## 2023-11-16 RX ORDER — METRONIDAZOLE 500 MG/100ML
500 INJECTION, SOLUTION INTRAVENOUS EVERY 8 HOURS
Status: DISCONTINUED | OUTPATIENT
Start: 2023-11-16 | End: 2023-11-16

## 2023-11-16 RX ORDER — MELATONIN
2000 DAILY
Status: DISCONTINUED | OUTPATIENT
Start: 2023-11-16 | End: 2023-11-20

## 2023-11-16 RX ADMIN — GUAIFENESIN 600 MG: 600 TABLET ORAL at 20:38

## 2023-11-16 RX ADMIN — IPRATROPIUM BROMIDE AND ALBUTEROL SULFATE 3 ML: 2.5; .5 SOLUTION RESPIRATORY (INHALATION) at 09:21

## 2023-11-16 RX ADMIN — APIXABAN 2.5 MG: 2.5 TABLET, FILM COATED ORAL at 12:55

## 2023-11-16 RX ADMIN — DILTIAZEM HYDROCHLORIDE 30 MG: 30 TABLET ORAL at 16:26

## 2023-11-16 RX ADMIN — METRONIDAZOLE 500 MG: 500 INJECTION, SOLUTION INTRAVENOUS at 13:04

## 2023-11-16 RX ADMIN — Medication 2000 UNITS: at 12:55

## 2023-11-16 RX ADMIN — ACETAMINOPHEN 650 MG: 325 TABLET, FILM COATED ORAL at 08:52

## 2023-11-16 RX ADMIN — POTASSIUM CHLORIDE 10 MEQ: 750 TABLET, EXTENDED RELEASE ORAL at 12:55

## 2023-11-16 RX ADMIN — DILTIAZEM HYDROCHLORIDE 30 MG: 30 TABLET ORAL at 20:38

## 2023-11-16 RX ADMIN — FUROSEMIDE 20 MG: 20 TABLET ORAL at 12:55

## 2023-11-16 RX ADMIN — LEVALBUTEROL HYDROCHLORIDE 1.25 MG: 1.25 SOLUTION RESPIRATORY (INHALATION) at 19:44

## 2023-11-16 RX ADMIN — ACETAMINOPHEN 650 MG: 325 TABLET, FILM COATED ORAL at 20:38

## 2023-11-16 RX ADMIN — LEVALBUTEROL HYDROCHLORIDE 1.25 MG: 1.25 SOLUTION RESPIRATORY (INHALATION) at 16:34

## 2023-11-16 RX ADMIN — DILTIAZEM HYDROCHLORIDE 10 MG: 5 INJECTION, SOLUTION INTRAVENOUS at 09:21

## 2023-11-16 RX ADMIN — METOPROLOL SUCCINATE 50 MG: 50 TABLET, FILM COATED, EXTENDED RELEASE ORAL at 12:55

## 2023-11-16 RX ADMIN — AZITHROMYCIN MONOHYDRATE 500 MG: 500 INJECTION, POWDER, LYOPHILIZED, FOR SOLUTION INTRAVENOUS at 09:36

## 2023-11-16 RX ADMIN — ATORVASTATIN CALCIUM 20 MG: 10 TABLET, FILM COATED ORAL at 16:26

## 2023-11-16 RX ADMIN — SODIUM CHLORIDE 250 ML: 0.9 INJECTION, SOLUTION INTRAVENOUS at 09:36

## 2023-11-16 RX ADMIN — CLOPIDOGREL 75 MG: 75 TABLET ORAL at 12:55

## 2023-11-16 RX ADMIN — GUAIFENESIN 600 MG: 600 TABLET ORAL at 12:55

## 2023-11-16 RX ADMIN — CEFTRIAXONE 1000 MG: 1 INJECTION, SOLUTION INTRAVENOUS at 09:21

## 2023-11-16 RX ADMIN — APIXABAN 2.5 MG: 2.5 TABLET, FILM COATED ORAL at 17:25

## 2023-11-16 NOTE — PLAN OF CARE
Problem: CARDIOVASCULAR - ADULT  Goal: Maintains optimal cardiac output and hemodynamic stability  Description: INTERVENTIONS:  - Monitor I/O, vital signs and rhythm  - Monitor for S/S and trends of decreased cardiac output  - Administer and titrate ordered vasoactive medications to optimize hemodynamic stability  - Assess quality of pulses, skin color and temperature  - Assess for signs of decreased coronary artery perfusion  - Instruct patient to report change in severity of symptoms  Outcome: Progressing  Goal: Absence of cardiac dysrhythmias or at baseline rhythm  Description: INTERVENTIONS:  - Continuous cardiac monitoring, vital signs, obtain 12 lead EKG if ordered  - Administer antiarrhythmic and heart rate control medications as ordered  - Monitor electrolytes and administer replacement therapy as ordered  Outcome: Progressing     Problem: RESPIRATORY - ADULT  Goal: Achieves optimal ventilation and oxygenation  Description: INTERVENTIONS:  - Assess for changes in respiratory status  - Assess for changes in mentation and behavior  - Position to facilitate oxygenation and minimize respiratory effort  - Oxygen administered by appropriate delivery if ordered  - Initiate smoking cessation education as indicated  - Encourage broncho-pulmonary hygiene including cough, deep breathe, Incentive Spirometry  - Assess the need for suctioning and aspirate as needed  - Assess and instruct to report SOB or any respiratory difficulty  - Respiratory Therapy support as indicated  Outcome: Progressing     Problem: METABOLIC, FLUID AND ELECTROLYTES - ADULT  Goal: Electrolytes maintained within normal limits  Description: INTERVENTIONS:  - Monitor labs and assess patient for signs and symptoms of electrolyte imbalances  - Administer electrolyte replacement as ordered  - Monitor response to electrolyte replacements, including repeat lab results as appropriate  - Instruct patient on fluid and nutrition as appropriate  Outcome: Progressing  Goal: Fluid balance maintained  Description: INTERVENTIONS:  - Monitor labs   - Monitor I/O and WT  - Instruct patient on fluid and nutrition as appropriate  - Assess for signs & symptoms of volume excess or deficit  Outcome: Progressing     Problem: MUSCULOSKELETAL - ADULT  Goal: Maintain or return mobility to safest level of function  Description: INTERVENTIONS:  - Assess patient's ability to carry out ADLs; assess patient's baseline for ADL function and identify physical deficits which impact ability to perform ADLs (bathing, care of mouth/teeth, toileting, grooming, dressing, etc.)  - Assess/evaluate cause of self-care deficits   - Assess range of motion  - Assess patient's mobility  - Assess patient's need for assistive devices and provide as appropriate  - Encourage maximum independence but intervene and supervise when necessary  - Involve family in performance of ADLs  - Assess for home care needs following discharge   - Consider OT consult to assist with ADL evaluation and planning for discharge  - Provide patient education as appropriate  Outcome: Progressing

## 2023-11-16 NOTE — RESPIRATORY THERAPY NOTE
RT Protocol Note  Ludwin Duncan 80 y.o. female MRN: [id number]  Unit/Bed#:  Encounter: [id number]    Assessment    Principal Problem:    Acute hypoxic respiratory failure ([address])  Active Problems:    Sepsis due to pneumonia ([address])    Hyponatremia    Generalized anxiety disorder    Chronic diastolic (congestive) heart failure (HCC)    Atrial flutter (HCC)      Home Pulmonary Medications:  none  Home Devices/Therapy: Other (Comment)    Past Medical History:   Diagnosis Date    Acute on chronic diastolic CHF (congestive heart failure) ([address]) 01/30/2023    Acute respiratory failure with hypoxia ([address]) 12/22/2022    Aortic stenosis     Atrial fibrillation (HCC)     Atrial flutter (HCC)     CAD (coronary artery disease) 01/11/2023    CAD/PCI/ELODIA    CKD (chronic kidney disease), stage III (HCC)     Community acquired pneumonia of left upper lobe of lung 05/17/2019    Fatigue     Full dentures     Generalized anxiety disorder     Hyperlipidemia     Hypertension     Impaired fasting glucose     Mitral regurgitation     Pneumonia     RBBB     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    TRANSFEMORAL W/ 23MM CALVERT SHAVONNE S3 ULTRA VALVE(ACCESS ON LEFT)    SVT (supraventricular tachycardia)     Tricuspid regurgitation      Social History     Socioeconomic History    Marital status: /Civil Union     Spouse name: None    Number of children: 2    Years of education: None    Highest education level: None   Occupational History    None   Tobacco Use    Smoking status: Never    Smokeless tobacco: Never   Vaping Use    Vaping Use: Never used   Substance and Sexual Activity    Alcohol use: Never    Drug use: Never    Sexual activity: Not Currently     Partners: Male   Other Topics Concern    None   Social History Narrative    None     Social Determinants of Health     Financial Resource Strain: Low Risk  (4/25/2023)    Overall Financial Resource Strain (CARDIA)     Difficulty of Paying Living Expenses: Not hard at all   Food Insecurity: No Food Insecurity (6/12/2023)    Hunger Vital Sign     Worried About Running Out of Food in the Last Year: Never true     Ran Out of Food in the Last Year: Never true   Transportation Needs: No Transportation Needs (6/12/2023)    PRAPARE - Transportation     Lack of Transportation (Medical): No     Lack of Transportation (Non-Medical): No   Physical Activity: Not on file   Stress: No Stress Concern Present (1/21/2021)    [facility]     Feeling of Stress : Only a little   Social Connections: Moderately Isolated (1/21/2021)    Social Connection and Isolation Panel [NHANES]     Frequency of Communication with Friends and Family: Twice a week     Frequency of Social Gatherings with Friends and Family: Three times a week     Attends Protestant Services: Never     Active Member of Clubs or Organizations: No     Attends Club or Organization Meetings: Never     Marital Status:    Intimate Partner Violence: Not At Risk (1/21/2021)    Humiliation, Afraid, Rape, and Kick questionnaire     Fear of Current or Ex-Partner: No     Emotionally Abused: No     Physically Abused: No     Sexually Abused: No   Housing Stability: Low Risk  (6/12/2023)    Housing Stability Vital Sign     Unable to Pay for Housing in the Last Year: No     Number of Places Lived in the Last Year: 1     Unstable Housing in the Last Year: No       Subjective         Objective    Physical Exam:   Assessment Type: Pre-treatment  General Appearance: Alert, Awake  Respiratory Pattern: Symmetrical, Spontaneous  Chest Assessment: Chest expansion symmetrical, Trachea midline  Bilateral Breath Sounds: Diminished  R Breath Sounds: Crackles  Cough: Non-productive, Strong, Dry  O2 Device: 3 l/m    Vitals:  Blood pressure 134/60, pulse 95, temperature 98.4 °F (36.9 °C), temperature source Tympanic, resp.  rate 18, height 4' 10" (1.473 m), weight 48 kg (105 lb 13.1 oz), SpO2 96 %. Imaging and other studies: I have personally reviewed pertinent reports.       O2 Device: 3 l/m     Plan  Bronchodilator therapy with xopenex 1.25mg TID  per ordered by pulmonology, airway clearance  protocl with flutter valve  for mobilization of secretions, oxygen therapy with 3 l/m nc  titrated to RA as tolerated           Resp Comments: pt doesn not have any resp meds or oxygen at home

## 2023-11-16 NOTE — SPEECH THERAPY NOTE
Speech Language/Pathology  Speech-Language Pathology Bedside Swallow Evaluation    Patient Name: Maci Amaya  KBHKR'Y Date: 11/16/2023     Problem List  Principal Problem:    Acute hypoxic respiratory failure ([address])  Active Problems:    Sepsis due to pneumonia [facility])    Past Medical History  Past Medical History:   Diagnosis Date    Acute on chronic diastolic CHF (congestive heart failure) ([address]) 01/30/2023    Acute respiratory failure with hypoxia (HCC) 12/22/2022    Aortic stenosis     Atrial fibrillation (HCC)     Atrial flutter (HCC)     CAD (coronary artery disease) 01/11/2023    CAD/PCI/ELODIA    CKD (chronic kidney disease), stage III ([address])     Community acquired pneumonia of left upper lobe of lung 05/17/2019    Fatigue     Full dentures     Generalized anxiety disorder     Hyperlipidemia     Hypertension     Impaired fasting glucose     Mitral regurgitation     Pneumonia     RBBB     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    TRANSFEMORAL W/ 23MM CALVERT SHAVONNE S3 ULTRA VALVE(ACCESS ON LEFT)    SVT (supraventricular tachycardia)     Tricuspid regurgitation      Past Surgical History  Past Surgical History:   Procedure Laterality Date    CARDIAC CATHETERIZATION N/A 1/11/2023    Procedure: LHC-Cardiac catheterization;  Surgeon: Cat Bond MD;  Location: [facility]; Service: Cardiology    CARDIAC CATHETERIZATION N/A 1/11/2023    Procedure: Cardiac pci;  Surgeon: Cat Bond MD;  Location: [facility]ARD[facility]AC [facility];   Service: Cardiology    CARDIAC CATHETERIZATION N/A 1/17/2023    Procedure: CARDIAC TAVR;  Surgeon: Leonardo Peng DO;  Location: [facility] MAIN [facility];  Service: Cardiology    DENTAL SURGERY Bilateral     ALL TEETH REMOVED    FL GUIDED NEEDLE PLAC BX/ASP/INJ  5/26/2021    IR THORACENTESIS  12/20/2022    IR THORACENTESIS  7/14/2023    IR THORACENTESIS  9/1/2023    IR THORACENTESIS  9/25/2023    JOINT REPLACEMENT Left     KNEE SURGERY      left knee replacement    NJ INJECT SI JOINT ARTHRGRPHY&/ANES/STEROID W/JOHNATHON Bilateral 5/26/2021    Procedure: SACROILIAC JOINT INJECTION;  Surgeon: Tona Khalil MD;  Location: [facility] MAIN OR;  Service: Pain Management     OK REPLACE AORTIC VALVE OPENFEMORAL ARTERY APPROACH N/A 1/17/2023    Procedure: REPLACEMENT AORTIC VALVE TRANSCATHETER (TAVR) TRANSFEMORAL W/ 23MM CALVERT SHAVONNE S3 ULTRA VALVE(ACCESS ON LEFT) ALENA;  Surgeon: Dyan Maciel DO;  Location:  MAIN OR;  Service: Cardiac Surgery    SACROILIAC JOINT INJECTION Bilateral 6/7/2023    Procedure: Bilateral sacroiliac joint steroid injection;  Surgeon: Kimberlyn Morales MD;  Location: [facility] MAIN [facility];  Service: Pain Management      Summary   Pt presented with functional appearing oral and pharyngeal stage swallowing skills with materials administered today. Patient demonstrated use of small and consecutive sips of thin liquid and alternation of solids and liquids independently. Patient also reports no recent episodes of s/s aspiration/penetration. Tx follow up x1 recommended to ensure adequate diet tolerance. Risk/s for Aspiration: min     Recommended Diet: regular diet and thin liquids   Recommended Form of Meds: whole with liquid   Aspiration precautions and swallowing strategies: small bites/sips  Other Recommendations: Continue frequent oral care    Current Medical Status  Per 11/16/23 H&P - Pt is a 80 y.o. female  with PMH of CHF, atrial fibrillation/flutter on Eliquis, CKD, aortic stenosis with prior TAVR, presenting with shortness of breath and cough. Last night, patient developed a persistent cough and shortness of breath which continues today. Due to shortness of breath and increased work of breathing, patient presents to the emergency department with her son. She denies chest pain. She was not aware she is running a fever. No nausea or vomiting. No abdominal pain. No diarrhea.   Patient has a cough and shortness of breath and is currently requiring supplemental O2 via nasal cannula which is not her baseline. Current Precautions:  Fall      Allergies:  No known food allergies    Special Studies:  11/16/23 Chest XR impression - Development of large infiltrate in the right lung with interval decrease in size of right effusion. Postobstructive pneumonia cannot be excluded. Consider short interval follow-up with chest x-ray or evaluation with CT chest with contrast.     11/16/23 CT chest impression - 1. Multifocal pneumonia in the right lung with reactive adenopathy. Follow-up to ensure complete resolution recommended. 2. Moderate right pleural effusion with associated compressive atelectasis, mildly improved since 6/11/2023. Trace left pleural effusion. 3. Mild groundglass opacities in the left upper lobe. Social/Education/Vocational Hx:  Pt lives with family    Swallow Information   Current Risks for Dysphagia & Aspiration:  acute hypoxic respiratory failure and multifocal pneumonia of right lung  Current Symptoms/Concerns: change in respiratory status  Current Diet: regular diet and thin liquids   Baseline Diet: regular diet and thin liquids    Baseline Assessment   Behavior/Cognition: alert and oriented x3  Speech/Language Status: able to participate in conversation and able to follow commands  Patient Positioning: upright in bed  Pain Status/Interventions/Response to Interventions: No report of or nonverbal indications of pain. Swallow Mechanism Exam  Facial: symmetrical  Labial: WFL  Lingual: WFL  Velum: symmetrical  Mandible: adequate ROM  Dentition: full dentures  Vocal quality:clear/adequate   Volitional Cough: strong/productive   Respiratory Status: on 3L O2    Tracheostomy: n/a    Consistencies Assessed and Performance   Consistencies Administered: thin liquids, puree, and hard solids  Materials administered included: water via cup and straw, jello and corey cracker    Oral Stage: WFL  Mastication was adequate with the materials administered today.   Bolus formation and transfer were functional with no significant oral residue noted. No overt s/s reduced oral control. Pharyngeal Stage: WFL  Swallow Mechanics:  Swallowing initiation appeared prompt. Laryngeal rise was palpated and judged to be within functional limits. No coughing, throat clearing, change in vocal quality or respiratory status noted today. Esophageal Concerns: none reported    Strategies and Efficacy: small bolus sizes and alternation of solids and liquids utilized independently and effectively    Summary and Recommendations (see above)    Results Reviewed with: patient and RN     Treatment Recommended: yes     Frequency of treatment: x1 follow up to ensure adequate tolerance of current consistency    Patient Stated Goal: Patient did not state goal    Dysphagia LTG  -Patient will demonstrate safe and effective oral intake (without overt s/s significant oral/pharyngeal dysphagia including s/s penetration or aspiration) for the highest appropriate diet level. Short Term Goals:  -Pt will tolerate regular diet and thin liquid with no significant s/s oral or pharyngeal dysphagia across 1-3 diagnostic session/s.     Speech Therapy Prognosis   Prognosis: good    Prognosis Considerations: age, medical status, cognitive status, and respiratory status

## 2023-11-16 NOTE — ASSESSMENT & PLAN NOTE
Wt Readings from Last 3 Encounters:   11/16/23 48 kg (105 lb 13.1 oz)   11/01/23 48.1 kg (106 lb)   10/03/23 47.1 kg (103 lb 12.8 oz)     Most recent echo w/LVEF 50% and abnormal diastolic function  Has TAVR  Caution with volume resuscitation  C/w PTA meds

## 2023-11-16 NOTE — ASSESSMENT & PLAN NOTE
DIAGNOSIS: fever, tachycardia, leukocytosis, large multifocal R-sided pneumonia noted on CXR and CT chest wo contrast. Was noted to have lactic acidosis which resolved with fluid administration.   Speech/swallow evaluated, no evidence of dysphagia with which to cause aspiration  MANAGEMENT: blood cultures, sputum cultures ordered, respiratory and airway clearance protocols, guaifenesin, UA + legionella & strep pneumo urine antigens, light 250mL fluid bolus given Hx CHF  CTX, azithro  Pulm as above

## 2023-11-16 NOTE — ED PROVIDER NOTES
EMERGENCY DEPARTMENT ENCOUNTER NOTE    This note has been generated using a voice recognition software. There may be typographic, grammatic, or word substitution errors that have escaped editorial review. Emergency Department Note- Reyna Garcia 80 y.o. female MRN: [id number]    Unit/Bed#: [facility] Encounter: [id number]  ? CHIEF COMPLAINT  Chief Complaint   Patient presents with    Shortness of Breath     Pt states she started having SOB last night before bed. Reports having a strong cough. Denies any chest pain        HPI  Reyna Garcia is a 80 y.o. female with PMH of CHF, atrial fibrillation/flutter on Eliquis, CKD, aortic stenosis with prior TAVR, presenting with shortness of breath and cough. Last night, patient developed a persistent cough and shortness of breath which continues today. Due to shortness of breath and increased work of breathing, patient presents to the emergency department with her son. She denies chest pain. She was not aware she is running a fever. No nausea or vomiting. No abdominal pain. No diarrhea. Patient has a cough and shortness of breath and is currently requiring supplemental O2 via nasal cannula which is not her baseline.     REVIEW OF SYSTEMS    Constitutional: No fevers  Cardiac: no chest pain  Respiratory: Cough, shortness of breath, as above  GI: no abdominal pain  Endocrine: no diabetes  Neuro: no new focal weakness or numbness    PAST MEDICAL HISTORY  Past Medical History:   Diagnosis Date    Acute on chronic diastolic CHF (congestive heart failure) ([address]) 01/30/2023    Acute respiratory failure with hypoxia (HCC) 12/22/2022    Aortic stenosis     Atrial fibrillation (HCC)     Atrial flutter (HCC)     CAD (coronary artery disease) 01/11/2023    CAD/PCI/ELODIA    CKD (chronic kidney disease), stage III ([address])     Community acquired pneumonia of left upper lobe of lung 05/17/2019    Fatigue     Full dentures     Generalized anxiety disorder     Hyperlipidemia Hypertension     Impaired fasting glucose     Mitral regurgitation     Pneumonia     RBBB     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    TRANSFEMORAL W/ 23MM CALVERT SHAVONNE S3 ULTRA VALVE(ACCESS ON LEFT)    SVT (supraventricular tachycardia)     Tricuspid regurgitation        SURGICAL HISTORY  Past Surgical History:   Procedure Laterality Date    CARDIAC CATHETERIZATION N/A 1/11/2023    Procedure: LHC-Cardiac catheterization;  Surgeon: Lizeth Griffin MD;  [facility]; Service: Cardiology    CARDIAC CATHETERIZATION N/A 1/11/2023    Procedure: Cardiac pci;  Surgeon: Lizeth Griffin MD;  [facility];   Service: Cardiology    CARDIAC CATHETERIZATION N/A 1/17/2023    Procedure: CARDIAC TAVR;  Surgeon: Karma Arzola DO;  Location: [facility];  Service: Cardiology    DENTAL SURGERY Bilateral     ALL TEETH REMOVED    FL GUIDED NEEDLE PLAC BX/ASP/INJ  5/26/2021    IR THORACENTESIS  12/20/2022    IR THORACENTESIS  7/14/2023    IR THORACENTESIS  9/1/2023    IR THORACENTESIS  9/25/2023    JOINT REPLACEMENT Left     KNEE SURGERY      left knee replacement    OK INJECT SI JOINT ARTHRGRPHY&/ANES/STEROID W/JOHNATHON Bilateral 5/26/2021    Procedure: SACROILIAC JOINT INJECTION;  Surgeon: Ankita Sidhu MD;  Location: [facility];  Service: Pain Management     OK REPLACE AORTIC VALVE OPENFEMORAL ARTERY APPROACH N/A 1/17/2023    Procedure: REPLACEMENT AORTIC VALVE TRANSCATHETER (TAVR) TRANSFEMORAL W/ 23MM CALVERT SHAVONNE S3 ULTRA VALVE(ACCESS ON LEFT) ALENA;  Surgeon: Liam Lennon DO;  Location: [facility];  Service: Cardiac Surgery    SACROILIAC JOINT INJECTION Bilateral 6/7/2023    Procedure: Bilateral sacroiliac joint steroid injection;  Surgeon: Lizabeth Herrera MD;  Location: [facility];  Service: Pain Management        FAMILY HISTORY  Family History   Problem Relation Age of Onset    Hypertension Mother     Asthma Father         's asthma    Alzheimer's disease Sister     Rectal cancer Son     Uterine cancer Daughter     Heart disease Sister     Heart disease Brother         CURRENT MEDICATIONS  No current facility-administered medications on file prior to encounter. Current Outpatient Medications on File Prior to Encounter   Medication Sig    alendronate (Fosamax) 70 mg tablet Take 1 tablet (70 mg total) by mouth every 7 days    ALPRAZolam (XANAX) 0.25 mg tablet TAKE 1 TABLET BY MOUTH THREE TIMES DAILY AS NEEDED FOR ANXIETY    ammonium lactate (LAC-HYDRIN) 12 % lotion Apply topically daily Do not start before Jacqueline 15, 2023. apixaban (Eliquis) 2.5 mg Take 1 tablet by mouth twice daily    atorvastatin (LIPITOR) 20 mg tablet Take 1 tablet (20 mg total) by mouth daily with dinner    bisacodyl (DULCOLAX) 10 mg suppository Insert 10 mg into the rectum daily as needed for constipation    Cholecalciferol (Vitamin D3) 50 MCG (2000 UT) TABS Take 2,000 Units by mouth in the morning. Indications: Osteoporosis    clopidogrel (PLAVIX) 75 mg tablet Take 1 tablet (75 mg total) by mouth daily    diltiazem (CARDIZEM) 30 mg tablet Take 1 tablet (30 mg total) by mouth 3 (three) times a day Hold for HR<60 bpm or SBP<110 mmHg    furosemide (LASIX) 20 mg tablet Take 1 tablet (20 mg total) by mouth daily    metoprolol succinate (TOPROL-XL) 50 mg 24 hr tablet Take 1 tablet (50 mg total) by mouth daily Hold for HR<60 or SBP<110 mmHg    Multiple Vitamin (Multivitamin Adult) TABS Take 1 tablet by mouth in the morning.  Indications: Nutritional Support    Nutritional Supplement LIQD Take 1 Can by mouth 2 (two) times a day Ensure pudding BID    pantoprazole (PROTONIX) 20 mg tablet Take 1 tablet (20 mg total) by mouth daily before breakfast    potassium chloride (K-DUR,KLOR-CON) 10 mEq tablet Take 1 tablet (10 mEq total) by mouth daily    potassium chloride (Klor-Con) 10 mEq tablet  (Patient not taking: Reported on 8/8/2023)    Psyllium (METAMUCIL FIBER) 51.7 % PACK Take 1 Package by mouth daily (Patient not taking: Reported on 8/21/2023)    [DISCONTINUED] oxygen gas Inhale 2 L/min daily as needed (low oxygen levels). Indications: Oxygen Desaturation (Patient not taking: Reported on 1/30/2023)       ALLERGIES  No Known Allergies    SOCIAL HISTORY  Social History     Socioeconomic History    Marital status: /Civil Union     Spouse name: None    Number of children: 2    Years of education: None    Highest education level: None   Occupational History    None   Tobacco Use    Smoking status: Never    Smokeless tobacco: Never   Vaping Use    Vaping Use: Never used   Substance and Sexual Activity    Alcohol use: Never    Drug use: Never    Sexual activity: Not Currently     Partners: Male   Other Topics Concern    None   Social History Narrative    None     Social Determinants of Health     Financial Resource Strain: Low Risk  (4/25/2023)    Overall Financial Resource Strain (CARDIA)     Difficulty of Paying Living Expenses: Not hard at all   Food Insecurity: No Food Insecurity (6/12/2023)    Hunger Vital Sign     Worried About Running Out of Food in the Last Year: Never true     Ran Out of Food in the Last Year: Never true   Transportation Needs: No Transportation Needs (6/12/2023)    PRAPARE - Transportation     Lack of Transportation (Medical): No     Lack of Transportation (Non-Medical): No   Physical Activity: Not on file   Stress: No Stress Concern Present (1/21/2021)    [facility]     Feeling of Stress : Only a little   Social Connections: Moderately Isolated (1/21/2021)    Social Connection and Isolation Panel [NHANES]     Frequency of Communication with Friends and Family: Twice a week     Frequency of Social Gatherings with Friends and Family:  Three times a week     Attends [employer] Services: Never     Active Member of Clubs or Organizations: No     Attends Club or Organization Meetings: Never     Marital Status:  Intimate Partner Violence: Not At Risk (1/21/2021)    Humiliation, Afraid, Rape, and Kick questionnaire     Fear of Current or Ex-Partner: No     Emotionally Abused: No     Physically Abused: No     Sexually Abused: No   Housing Stability: Low Risk  (6/12/2023)    Housing Stability Vital Sign     Unable to Pay for Housing in the Last Year: No     Number of [address] in the Last Year: 1     Unstable Housing in the Last Year: No       PHYSICAL EXAM    /79 (BP Location: Left arm)   Pulse (!) 107   Temp (!) 102.4 °F (39.1 °C) (Temporal)   Resp 22   SpO2 90%   Vital signs and nursing notes reviewed    CONSTITUTIONAL: female appearing stated age resting in bed, tachypneic, increased work of breathing, ill-appearing  HEENT: atraumatic, normocephalic. Sclera anicteric, conjunctiva are not injected. Moist oral mucosa  CARDIOVASCULAR/CHEST: Tachycardic, irregularly irregular, no appreciable murmur. . 2+ radial pulses  PULMONARY: Increased work of breathing, rhonchorous breath sounds throughout lung fields anteriorly and posteriorly bilaterally. ABDOMEN: non-distended. BS present, normoactive. Non-tender  MSK: moves all extremities, no deformities, no peripheral edema, no calf asymmetry  NEURO: Awake, alert, and oriented x 3. Face symmetric. Moves all extremities spontaneously. No focal neurologic deficits  SKIN: Warm, appears well-perfused  MENTAL STATUS: Normal affect      ?   LABS AND TESTS    Results Reviewed       Procedure Component Value Units Date/Time    HS Troponin I 4hr [[id number]]     Lab Status: No result Specimen: Blood     FLU/RSV/COVID - if FLU/RSV clinically relevant [[id number]]  (Normal) Collected: 11/16/23 0840    Lab Status: Final result Specimen: Nares from Nose Updated: 11/16/23 0932     SARS-CoV-2 Negative     INFLUENZA A PCR Negative     INFLUENZA B PCR Negative     RSV PCR Negative    Narrative:      FOR PEDIATRIC PATIENTS - copy/paste COVID Guidelines URL to browser: [email or web address]    SARS-CoV-2 assay is a Nucleic Acid Amplification assay intended for the  qualitative detection of nucleic acid from SARS-CoV-2 in nasopharyngeal  swabs. Results are for the presumptive identification of SARS-CoV-2 RNA. Positive results are indicative of infection with SARS-CoV-2, the virus  causing COVID-19, but do not rule out bacterial infection or co-infection  with other viruses. Laboratories within the [facility] and its  territories are required to report all positive results to the appropriate  public health authorities. Negative results do not preclude SARS-CoV-2  infection and should not be used as the sole basis for treatment or other  patient management decisions. Negative results must be combined with  clinical observations, patient history, and epidemiological information. This test has not been FDA cleared or approved. This test has been authorized by FDA under an Emergency Use Authorization  (EUA). This test is only authorized for the duration of time the  declaration that circumstances exist justifying the authorization of the  emergency use of an in vitro diagnostic tests for detection of SARS-CoV-2  virus and/or diagnosis of COVID-19 infection under section 564(b)(1) of  the Act, 21 U. S.C. 433DVQ-6(M)(4), unless the authorization is terminated  or revoked sooner. The test has been validated but independent review by FDA  and CLIA is pending. Test performed using [employer] GeneXpert: This RT-PCR assay targets N2,  a region unique to SARS-CoV-2. A conserved region in the E-gene was chosen  for pan-Sarbecovirus detection which includes SARS-CoV-2. According to CMS-2020-01-R, this platform meets the definition of high-throughput technology.     Procalcitonin [[id number]]  (Normal) Collected: 11/16/23 0840    Lab Status: Final result Specimen: Blood from Arm, Right Updated: 11/16/23 0918     Procalcitonin 0.18 ng/ml     B-Type Natriuretic Peptide(BNP) [[id number]]  (Abnormal) Collected: 11/16/23 0840    Lab Status: Final result Specimen: Blood from Arm, Right Updated: 11/16/23 0915      pg/mL     HS Troponin 0hr (reflex protocol) [[id number]]  (Normal) Collected: 11/16/23 0840    Lab Status: Final result Specimen: Blood from Arm, Right Updated: 11/16/23 0915     hs TnI 0hr 22 ng/L     HS Troponin I 2hr [[id number]]     Lab Status: No result Specimen: Blood     Lactic acid [[id number]]  (Abnormal) Collected: 11/16/23 0840    Lab Status: Final result Specimen: Blood from Arm, Left Updated: 11/16/23 0912     LACTIC ACID 2.6 mmol/L     Narrative:      Result may be elevated if tourniquet was used during collection.     Lactic acid 2 Hours [[id number]]     Lab Status: No result Specimen: Blood     Comprehensive metabolic panel [[id number]]  (Abnormal) Collected: 11/16/23 0840    Lab Status: Final result Specimen: Blood from Arm, Right Updated: 11/16/23 0909     Sodium 134 mmol/L      Potassium 4.5 mmol/L      Chloride 98 mmol/L      CO2 24 mmol/L      ANION GAP 12 mmol/L      BUN 28 mg/dL      Creatinine 0.98 mg/dL      Glucose 138 mg/dL      Calcium 10.0 mg/dL      AST 44 U/L      ALT 19 U/L      Alkaline Phosphatase 93 U/L      Total Protein 7.0 g/dL      Albumin 4.6 g/dL      Total Bilirubin 1.20 mg/dL      eGFR 51 ml/min/1.73sq m     Narrative:      [place] guidelines for Chronic Kidney Disease (CKD):     Stage 1 with normal or high GFR (GFR > 90 mL/min/1.73 square meters)    Stage 2 Mild CKD (GFR = 60-89 mL/min/1.73 square meters)    Stage 3A Moderate CKD (GFR = 45-59 mL/min/1.73 square meters)    Stage 3B Moderate CKD (GFR = 30-44 mL/min/1.73 square meters)    Stage 4 Severe CKD (GFR = 15-29 mL/min/1.73 square meters)    Stage 5 End Stage CKD (GFR <15 mL/min/1.73 square meters)  Note: GFR calculation is accurate only with a steady state creatinine    Protime-INR [[id number]]  (Abnormal) Collected: 11/16/23 0840    Lab Status: Final result Specimen: Blood from Arm, Right Updated: 11/16/23 0903     Protime 16.4 seconds      INR 1.34    APTT [[id number]]  (Normal) Collected: 11/16/23 0840    Lab Status: Final result Specimen: Blood from Arm, Right Updated: 11/16/23 0903     PTT 33 seconds     Blood gas, Venous [[id number]]  (Abnormal) Collected: 11/16/23 0840    Lab Status: Final result Specimen: Blood from Arm, Right Updated: 11/16/23 0854     pH, Rhett 7.379     pCO2, Rhett 34.7 mm Hg      pO2, Rhett 30.9 mm Hg      HCO3, Rhett 20.0 mmol/L      Base Excess, Rhett -4.3 mmol/L      O2 Content, Rhett 9.3 ml/dL      O2 HGB, VENOUS 49.5 %     CBC and differential [[id number]]  (Abnormal) Collected: 11/16/23 0840    Lab Status: Final result Specimen: Blood from Arm, Right Updated: 11/16/23 0852     WBC 11.83 Thousand/uL      RBC 4.34 Million/uL      Hemoglobin 12.4 g/dL      Hematocrit 39.9 %      MCV 92 fL      MCH 28.6 pg      MCHC 31.1 g/dL      RDW 18.0 %      MPV 9.5 fL      Platelets 217 Thousands/uL      nRBC 0 /100 WBCs      Neutrophils Relative 89 %      Immat GRANS % 0 %      Lymphocytes Relative 4 %      Monocytes Relative 7 %      Eosinophils Relative 0 %      Basophils Relative 0 %      Neutrophils Absolute 10.57 Thousands/µL      Immature Grans Absolute 0.03 Thousand/uL      Lymphocytes Absolute 0.44 Thousands/µL      Monocytes Absolute 0.77 Thousand/µL      Eosinophils Absolute 0.00 Thousand/µL      Basophils Absolute 0.02 Thousands/µL     Blood culture #2 [[id number]] Collected: 11/16/23 0844    Lab Status: In process Specimen: Blood from Arm, Left Updated: 11/16/23 0852    Blood culture #1 [[id number]] Collected: 11/16/23 0847    Lab Status:  In process Specimen: Blood from Hand, Right Updated: 11/16/23 0851    UA w Reflex to Microscopic w Reflex to Culture [[id number]]     Lab Status: No result Specimen: Urine             XR chest portable   ED Interpretation by Heri Arellano MD (01/96 0179)   Dense infiltrate in right lung, concerning for pneumonia. Formal read pending. Final Result by Harmony Chaney MD (11/16 1005)      Development of large infiltrate in the right lung with interval decrease in size of right effusion. Postobstructive pneumonia cannot be excluded. Consider short interval follow-up with chest x-ray or evaluation with CT chest with contrast.   Study was marked in Epic for follow-up notification. Resident: Nina Emanuel, the attending radiologist, have reviewed the images and agree with the final report above. Workstation performed: [id number]         CT chest without contrast    (Results Pending)       ED COURSE & MEDICAL DECISION MAKING  ECG 12 Lead Documentation Only    Date/Time: 11/16/2023 8:16 AM    Performed by: Jovany Rivera MD  Authorized by: Jovany Rivera MD    Comments:      Atrial flutter with variable AV block, ventricular rate 105, , QTc 452, Q waves in lead V2 suggestive of age-indeterminate septal infarct, no ST/T wave changes to suggest ischemia, no STEMI. Medications   acetaminophen (TYLENOL) tablet 650 mg (650 mg Oral Given 11/16/23 0852)   cefTRIAXone (ROCEPHIN) IVPB (premix in dextrose) 1,000 mg 50 mL (1,000 mg Intravenous New Bag 11/16/23 0921)   azithromycin (ZITHROMAX) 500 mg in sodium chloride 0.9% 250mL IVPB 500 mg (500 mg Intravenous New Bag 11/16/23 0936)   ipratropium-albuterol (DUO-NEB) 0.5-2.5 mg/3 mL inhalation solution 3 mL (3 mL Nebulization Given 11/16/23 0921)   diltiazem (CARDIZEM) injection 10 mg (10 mg Intravenous Given 11/16/23 0921)   sodium chloride 0.9 % bolus 250 mL (250 mL Intravenous New Bag 11/16/23 266)     63-year-old female presenting with cough and shortness of breath. Vital signs reviewed, febrile, tachycardic, hypoxic.   Differential diagnosis includes pneumonia, including due to bacterial versus viral etiologies, pleural effusion, sepsis, versus another etiology of symptoms. SPO2 improved after supplemental O2. VBG pH is 7.37 without hypercarbia. CMP is with baseline renal function, mild hyponatremia of 134 that is improved from prior 1 month ago. BNP is elevated, 647, though clinically, patient is not significantly volume overloaded. Procalcitonin is normal, 0.18. Lactate is elevated, 2.6, more likely due to respiratory distress than due to endorgan perfusion issues. CBC is with mild leukocytosis of 11.83 with neutrophil predominance, no bandemia. INR is mildly prolonged, in keeping with patient being on Eliquis. COVID-19, influenza, and RSV PCR is negative. Chest x-ray to my interpretation is with a large dense right lung infiltrate. In discussion with internal medicine, will obtain CT of the chest to further assess possibility of a postobstructive pneumonia and to further characterize a small pleural effusion. Patient will be admitted to stepdown for further evaluation and care. Broad-spectrum antibiotic coverage for community-acquired pneumonia with ceftriaxone and azithromycin administered. I only administered 250 mL of normal saline to the patient for resuscitation due to known CHF and concern that excessive volume resuscitation will worsen her respiratory function. Patient's heart rate is improved after IV fluids, defervescence, as well as a single bolus dose of diltiazem 10 mg IV. Medical Decision Making  Problems Addressed:  Atrial fibrillation [facility]): chronic illness or injury  Hypoxia: complicated acute illness or injury  Pneumonia: acute illness or injury with systemic symptoms    Amount and/or Complexity of Data Reviewed  Independent Historian:      Details: Patient's son assists with HPI. External Data Reviewed: radiology and notes. Details: Most recent hospitalization DC notes and most recent CXR reviewed. Labs: ordered. Decision-making details documented in ED Course. Radiology: ordered and independent interpretation performed. Decision-making details documented in ED Course. ECG/medicine tests: ordered and independent interpretation performed. Decision-making details documented in ED Course. Risk  OTC drugs. Prescription drug management. Decision regarding hospitalization. CLINICAL IMPRESSION  Final diagnoses:   Pneumonia   Hypoxia   Atrial fibrillation ([address])       DISPOSITION  Time reflects when diagnosis was documented in both MDM as applicable and the Disposition within this note       Time User Action Codes Description Comment    11/16/2023 10:30 AM Marilin Mayer [J18.9] Pneumonia     11/16/2023 10:30 AM Marilin Mayer [R09.02] Hypoxia           ED Disposition       ED Disposition   Admit    Condition   Fair    Date/Time   Thu Nov 16, 2023 10:30 AM    Comment   Case was discussed with Dr. Marsha Jeffrey and the patient's admission status was agreed to be Admission Status: inpatient status to the service of Dr. Marsha Jeffrey .              Follow-up Information    None          Marilin Alvarez MD  11/16/23 7568

## 2023-11-16 NOTE — QUICK NOTE
Discussed with son Momo Husbands at bedside about plan of care and need for admission + antibiotics.  He expressed understanding and appreciation.  -Timbo Young MD

## 2023-11-16 NOTE — CONSULTS
Consultation - Pulmonary Medicine   Mata Jalloh 80 y.o. female MRN: [id number]  Unit/Bed#:  Encounter: [id number]      Assessment/Plan:    Acute hypoxic respiratory failure  Multifocal pneumonia  Severe sepsis secondary to community-acquired pneumonia  Chronic right pleural effusion  Atrial flutter  Chronic diastolic CHF    Pulmonary recommendations:    Currently requiring 5 L nasal cannula. No oxygen requirement at baseline. Titrate oxygen to maintain SPO2 greater than or equal to 88%. Pulmonary toilet:  Increase activity as tolerated, out of bed as tolerated, cough and deep breathing exercises encourage, incentive spirometry q.1 hour  Continue cefepime and azithromycin for now. Would discontinue Flagyl given low suspicion for aspiration based off of speech evaluation. Check urine antigens and follow blood cultures. Trend WBC, procalcitonin, temps  Add Xopenex nebs 3 times daily  Add flutter valve. Pleural effusion is stable, no indication for thoracentesis at this time. History of Present Illness   Physician Requesting Consult: Xin Davis MD  Reason for Consult / Principal Problem: pneumonia  Hx and PE limited by: n/a  Chief Complaint: shortness of breath   HPI: Mata Jalloh is a 80 y.o.  female who presented to [address] [place] [address] with complaints of shortness of breath and cough for the last 2 days. Patient has a past medical hist positive for recurrent right pleural effusion, CAD status post RCA stenting on Plavix, A-fib on Eliquis, aortic stenosis status post TAVR in January 23, compression fracture T11, CKD. She reports to the ED this morning with approximately 48 hours of worsening shortness of breath and strong cough along with subjective fever/chills. Reports that her  is sick with respiratory symptoms as well. On arrival in the ED was noted to be febrile at 102.4 °F, tachypneic, tachycardic and hypoxic at 83% requiring 8 L nasal cannula.   Pulmonary was consulted after CT imaging showed multifocal right-sided infiltrates right pleural effusion and reactive mediastinal adenopathy. Laboratory work-up revealed leukocytosis and lactic acidosis. Seen on 5 L nasal cannula stating that she feels okay and is in good spirits. Son is accompanying her at bedside. Inpatient consult to Pulmonology  Consult performed by: Tyler Turner PA-C  Consult ordered by: Camella Boas, MD          Review of Systems   All other systems reviewed and are negative. Historical Information   Past Medical History:   Diagnosis Date    Acute on chronic diastolic CHF (congestive heart failure) ([address]) 01/30/2023    Acute respiratory failure with hypoxia (HCC) 12/22/2022    Aortic stenosis     Atrial fibrillation (HCC)     Atrial flutter (HCC)     CAD (coronary artery disease) 01/11/2023    CAD/PCI/ELODIA    CKD (chronic kidney disease), stage III ([address])     Community acquired pneumonia of left upper lobe of lung 05/17/2019    Fatigue     Full dentures     Generalized anxiety disorder     Hyperlipidemia     Hypertension     Impaired fasting glucose     Mitral regurgitation     Pneumonia     RBBB     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    TRANSFEMORAL W/ 23MM CALVERT SHAVONNE S3 ULTRA VALVE(ACCESS ON LEFT)    SVT (supraventricular tachycardia)     Tricuspid regurgitation      Past Surgical History:   Procedure Laterality Date    CARDIAC CATHETERIZATION N/A 1/11/2023    Procedure: LHC-Cardiac catheterization;  Surgeon: Desiree Ascencio MD;  Location: [facility]; Service: Cardiology    CARDIAC CATHETERIZATION N/A 1/11/2023    Procedure: Cardiac pci;  Surgeon: Desiree Ascencio MD;  Location: [facility];   Service: Cardiology    CARDIAC CATHETERIZATION N/A 1/17/2023    Procedure: CARDIAC TAVR;  Surgeon: Mirta Lin DO;  Location: [facility];  Service: Cardiology    DENTAL SURGERY Bilateral     ALL TEETH REMOVED    FL GUIDED NEEDLE PLAC BX/ASP/INJ 5/26/2021    IR THORACENTESIS  12/20/2022    IR THORACENTESIS  7/14/2023    IR THORACENTESIS  9/1/2023    IR THORACENTESIS  9/25/2023    JOINT REPLACEMENT Left     KNEE SURGERY      left knee replacement    ME INJECT SI JOINT ARTHRGRPHY&/ANES/STEROID W/JOHNATHON Bilateral 5/26/2021    Procedure: SACROILIAC JOINT INJECTION;  Surgeon: Rubia Obrien MD;  Location: [facility];  Service: Pain Management     ME REPLACE AORTIC VALVE OPENFEMORAL ARTERY APPROACH N/A 1/17/2023    Procedure: REPLACEMENT AORTIC VALVE TRANSCATHETER (TAVR) TRANSFEMORAL W/ 23MM CALVERT SHAVONNE S3 ULTRA VALVE(ACCESS ON LEFT) ALENA;  Surgeon: Blake Lopez DO;  Location:  MAIN OR;  Service: Cardiac Surgery    SACROILIAC JOINT INJECTION Bilateral 6/7/2023    Procedure: Bilateral sacroiliac joint steroid injection;  Surgeon: Sagar Toscano MD;  Location: [facility];  Service: Pain Management      Social History   Social History     Substance and Sexual Activity   Alcohol Use Never     Social History     Substance and Sexual Activity   Drug Use Never     Social History     Tobacco Use   Smoking Status Never   Smokeless Tobacco Never     E-Cigarette/Vaping    E-Cigarette Use Never User      E-Cigarette/Vaping Substances    Nicotine No     THC No     CBD No     Flavoring No     Other No     Unknown No      Occupational History: stayed at home    Family History:   Family History   Problem Relation Age of Onset    Hypertension Mother     Asthma Father         's asthma    Alzheimer's disease Sister     Rectal cancer Son     Uterine cancer Daughter     Heart disease Sister     Heart disease Brother        Meds/Allergies   all current active meds have been reviewed, pertinent pulmonary meds have been reviewed, and current meds:   Current Facility-Administered Medications   Medication Dose Route Frequency    acetaminophen (TYLENOL) tablet 650 mg  650 mg Oral Q6H PRN    ALPRAZolam (XANAX) tablet 0.25 mg  0.25 mg Oral TID PRN    apixaban (ELIQUIS) tablet 2.5 mg  2.5 mg Oral BID    atorvastatin (LIPITOR) tablet 20 mg  20 mg Oral Daily With Dinner    [START ON 11/17/2023] azithromycin (ZITHROMAX) 500 mg in sodium chloride 0.9 % 250 mL IVPB  500 mg Intravenous Q24H    [START ON 11/17/2023] cefepime (MAXIPIME) IVPB (premix in dextrose) 1,000 mg 50 mL  1,000 mg Intravenous Q24H    cholecalciferol (VITAMIN D3) tablet 2,000 Units  2,000 Units Oral Daily    clopidogrel (PLAVIX) tablet 75 mg  75 mg Oral Daily    diltiazem (CARDIZEM) tablet 30 mg  30 mg Oral TID    furosemide (LASIX) tablet 20 mg  20 mg Oral Daily    guaiFENesin (MUCINEX) 12 hr tablet 600 mg  600 mg Oral Q12H NII    levalbuterol (XOPENEX) inhalation solution 1.25 mg  1.25 mg Nebulization TID    metoprolol succinate (TOPROL-XL) 24 hr tablet 50 mg  50 mg Oral Daily    [START ON 11/17/2023] pantoprazole (PROTONIX) EC tablet 20 mg  20 mg Oral Daily Before Breakfast    polyethylene glycol (MIRALAX) packet 17 g  17 g Oral Daily PRN    potassium chloride (K-DUR,KLOR-CON) CR tablet 10 mEq  10 mEq Oral Daily       No Known Allergies    Objective   Vitals: Blood pressure 108/56, pulse 76, temperature 98.4 °F (36.9 °C), temperature source Tympanic, resp. rate (!) 24, height 4' 10" (1.473 m), weight 48 kg (105 lb 13.1 oz), SpO2 100 %. 5L NC,Body mass index is 22.12 kg/m². Intake/Output Summary (Last 24 hours) at 11/16/2023 1553  Last data filed at 11/16/2023 1546  Gross per 24 hour   Intake 1080 ml   Output 50 ml   Net 1030 ml     Invasive Devices       Peripheral Intravenous Line  Duration             Peripheral IV 11/16/23 Right;Ventral (anterior) Forearm <1 day              Drain  Duration             External Urinary Catheter <1 day                    Physical Exam  Vitals and nursing note reviewed. Constitutional:       General: She is not in acute distress. Appearance: Normal appearance. HENT:      Head: Normocephalic and atraumatic.       Mouth/Throat:      Mouth: Mucous membranes are moist. Pharynx: Oropharynx is clear. Cardiovascular:      Rate and Rhythm: Normal rate and regular rhythm. Heart sounds: No murmur heard. Pulmonary:      Breath sounds: Rhonchi present. Musculoskeletal:      Cervical back: Normal range of motion. Skin:     General: Skin is warm and dry. Neurological:      General: No focal deficit present. Mental Status: She is alert. Mental status is at baseline. Psychiatric:         Mood and Affect: Mood normal.         Behavior: Behavior normal.         Lab Results: I have personally reviewed pertinent lab results. , ABG: No results found for: "PHART", "YWL1HSV", "PO2ART", "EQP7NOR", "P9BGGBSX", "BEART", "SOURCE", BNP:   Lab Results   Component Value Date     (H) 11/16/2023   , CBC:   Lab Results   Component Value Date    WBC 11.83 (H) 11/16/2023    HGB 12.4 11/16/2023    HCT 39.9 11/16/2023    MCV 92 11/16/2023     11/16/2023    RBC 4.34 11/16/2023    MCH 28.6 11/16/2023    MCHC 31.1 (L) 11/16/2023    RDW 18.0 (H) 11/16/2023    MPV 9.5 11/16/2023    NRBC 0 11/16/2023   , CMP:   Lab Results   Component Value Date    SODIUM 134 (L) 11/16/2023    K 4.5 11/16/2023    CL 98 11/16/2023    CO2 24 11/16/2023    BUN 28 (H) 11/16/2023    CREATININE 0.98 11/16/2023    CALCIUM 10.0 11/16/2023    AST 44 (H) 11/16/2023    ALT 19 11/16/2023    ALKPHOS 93 11/16/2023    EGFR 51 11/16/2023   , PT/INR:   Lab Results   Component Value Date    INR 1.34 (H) 11/16/2023   , Troponin: No results found for: "TROPONINI"    Imaging Studies: I have personally reviewed pertinent reports. and I have personally reviewed pertinent films in PACS     CT chest without contrast 11/16/2023  Multifocal pneumonia in the right lung with reactive adenopathy, moderate right pleural effusion, mild groundglass opacity in the left upper lobe. EKG, Pathology, and Other Studies: I have personally reviewed pertinent reports. Echo 6/14/2023  EF 50%. Grade 1 diastolic dysfunction.   Moderate mitral regurgitation. Moderate tricuspid regurgitation. Estimated right ventricular systolic pressure 52 mmHg. Code Status: Level 3 - DNAR and DNI    Portions of the record may have been created with voice recognition software. Occasional wrong word or "sound a like" substitutions may have occurred due to the inherent limitations of voice recognition software. Read the chart carefully and recognize, using context, where substitutions have occurred.

## 2023-11-16 NOTE — ASSESSMENT & PLAN NOTE
Presented w/SOB on room air, requiring up to 8L and now currently 5L O2 NC to maintain adequate saturations. No Hx COPD, does have Hx CHF.   Symptoms likely 2/2 large R-sided PNA noted on CT chest w/o contrast  Also has stable chronic pleural effusions noted on CT  Wean O2 as tolerated  Pulmonology consulted, will appreciate rec's  Respiratory & airway clearance protocol

## 2023-11-16 NOTE — H&P
[address]  H&P  Name: Ludwin Duncan 80 y.o. female I MRN: [id number]  Unit/Bed#:  I Date of Admission: 11/16/2023   Date of Service: 11/16/2023 I Hospital Day: 0      Assessment/Plan   * Acute hypoxic respiratory failure [facility])  Assessment & Plan  Presented w/SOB on room air, requiring up to 8L and now currently 5L O2 NC to maintain adequate saturations. No Hx COPD, does have Hx CHF. Symptoms likely 2/2 large R-sided PNA noted on CT chest w/o contrast  Also has stable chronic pleural effusions noted on CT  Wean O2 as tolerated  Pulmonology consulted, will appreciate rec's  Respiratory & airway clearance protocol    Sepsis due to pneumonia [facility])  Assessment & Plan  DIAGNOSIS: fever, tachycardia, leukocytosis, large multifocal R-sided pneumonia noted on CXR and CT chest wo contrast. Was noted to have lactic acidosis which resolved with fluid administration.   Speech/swallow evaluated, no evidence of dysphagia with which to cause aspiration  MANAGEMENT: blood cultures, sputum cultures ordered, respiratory and airway clearance protocols, guaifenesin, UA + legionella & strep pneumo urine antigens, light 250mL fluid bolus given Hx CHF  CTX, azithro  Pulm as above    Atrial flutter (HCC)  Assessment & Plan  C/w PTA rate control meds & anticoagulation    Chronic diastolic (congestive) heart failure (HCC)  Assessment & Plan  Wt Readings from Last 3 Encounters:   11/16/23 48 kg (105 lb 13.1 oz)   11/01/23 48.1 kg (106 lb)   10/03/23 47.1 kg (103 lb 12.8 oz)     Most recent echo w/LVEF 50% and abnormal diastolic function  Has TAVR  Caution with volume resuscitation  C/w PTA meds          Generalized anxiety disorder  Assessment & Plan  C/w PTA meds    Hyponatremia  Assessment & Plan  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 per chart review  Monitor, replete prn           VTE Prophylaxis: Apixaban (Eliquis)  / sequential compression device   Code Status: Level 3 - DNR/DNI  POLST: There is no POLST form on file for this patient (pre-hospital)  Discussion with family: yes    Anticipated Length of Stay:  Patient will be admitted on an Inpatient basis with an anticipated length of stay of  >2 midnights. Justification for Hospital Stay: Respiratory failure management, IV antibiotics for sepsis 2/2 pneumonia    Total Time for Visit, including Counseling / Coordination of Care: 60 minutes. Greater than 50% of this total time spent on direct patient counseling and coordination of care. Chief Complaint:   Shortness of breath    History of Present Illness:    Reyna Garcia is a 80 y.o. female with history of CHF, SIADH, anemia, A-fib, aortic stenosis status post TAVR, status post drug-eluting stent in the past year, CKD who presents with x2 days shortness of breath and cough, fever, chills. She did not elicit any precipitating events to me, she has no COPD history. She is having no pain, denies nausea vomiting constipation diarrhea, peripheral edema worse than normal.  The only medicine she took today was Xanax, her heart rate was elevated as she had not taken most of her medications, thus diltiazem was given in the emergency department. Diagnosed with sepsis on arrival due to febrile, tachypnea, tachycardia, was started on IV antibiotics ceftriaxone and azithromycin. X-ray chest and CT chest without contrast showed large multifocal right-sided pneumonia. She was hypoxic to 83% on room air, was put to 5 and then 8L O2 NC in the ED. Pulmonology was consulted. Review of Systems:    Review of Systems   Constitutional:  Positive for chills and fever. HENT:  Negative for ear pain and sore throat. Eyes:  Negative for pain and visual disturbance. Respiratory:  Positive for cough and shortness of breath. Negative for apnea and wheezing. Cardiovascular:  Negative for chest pain and palpitations. Gastrointestinal:  Negative for abdominal pain, nausea and vomiting.    Genitourinary:  Negative for dysuria and hematuria. Musculoskeletal:  Negative for arthralgias and back pain. Skin:  Negative for color change and rash. Neurological:  Negative for seizures and syncope. Psychiatric/Behavioral:  Negative for agitation and confusion. All other systems reviewed and are negative. Past Medical and Surgical History:     Past Medical History:   Diagnosis Date    Acute on chronic diastolic CHF (congestive heart failure) ([address]) 01/30/2023    Acute respiratory failure with hypoxia (HCC) 12/22/2022    Aortic stenosis     Atrial fibrillation (HCC)     Atrial flutter (HCC)     CAD (coronary artery disease) 01/11/2023    CAD/PCI/ELODIA    CKD (chronic kidney disease), stage III ([address])     Community acquired pneumonia of left upper lobe of lung 05/17/2019    Fatigue     Full dentures     Generalized anxiety disorder     Hyperlipidemia     Hypertension     Impaired fasting glucose     Mitral regurgitation     Pneumonia     RBBB     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    TRANSFEMORAL W/ 23MM CALVERT SHAVONNE S3 ULTRA VALVE(ACCESS ON LEFT)    SVT (supraventricular tachycardia)     Tricuspid regurgitation        Past Surgical History:   Procedure Laterality Date    CARDIAC CATHETERIZATION N/A 1/11/2023    Procedure: LHC-Cardiac catheterization;  Surgeon: Waldemar Rodríguez MD;  Location: [facility]; Service: Cardiology    CARDIAC CATHETERIZATION N/A 1/11/2023    Procedure: Cardiac pci;  Surgeon: Waldemar Rodríguez MD;  Location[facility];   Service: Cardiology    CARDIAC CATHETERIZATION N/A 1/17/2023    Procedure: CARDIAC TAVR;  Surgeon: Kit Villegas DO;  Location: [facility] MAIN [facility];  Service: Cardiology    DENTAL SURGERY Bilateral     ALL TEETH REMOVED    FL GUIDED NEEDLE PLAC BX/ASP/INJ  5/26/2021    IR THORACENTESIS  12/20/2022    IR THORACENTESIS  7/14/2023    IR THORACENTESIS  9/1/2023    IR THORACENTESIS  9/25/2023    JOINT REPLACEMENT Left     KNEE SURGERY      left knee replacement    MO INJECT SI JOINT ARTHRGRPHY&/ANES/STEROID W/JOHNATHON Bilateral 5/26/2021    Procedure: SACROILIAC JOINT INJECTION;  Surgeon: Manjit Jamison MD;  Location: [facility];  Service: Pain Management     MO REPLACE AORTIC VALVE OPENFEMORAL ARTERY APPROACH N/A 1/17/2023    Procedure: REPLACEMENT AORTIC VALVE TRANSCATHETER (TAVR) TRANSFEMORAL W/ 23MM CALVERT SHAVONNE S3 ULTRA VALVE(ACCESS ON LEFT) ALENA;  Surgeon: Blade Novak DO;  Location:  MAIN OR;  Service: Cardiac Surgery    SACROILIAC JOINT INJECTION Bilateral 6/7/2023    Procedure: Bilateral sacroiliac joint steroid injection;  Surgeon: Diane Santiago MD;  Location: [facility];  Service: Pain Management        Meds/Allergies:    Prior to Admission medications    Medication Sig Start Date End Date Taking? Authorizing Provider   alendronate (Fosamax) 70 mg tablet Take 1 tablet (70 mg total) by mouth every 7 days 4/25/23  Yes Sara Hammond DO   ALPRAZolam Cecy Quill) 0.25 mg tablet TAKE 1 TABLET BY MOUTH THREE TIMES DAILY AS NEEDED FOR ANXIETY 9/28/23  Yes Sara Hammond,    ammonium lactate (LAC-HYDRIN) 12 % lotion Apply topically daily Do not start before Jacqueline 15, 2023. 6/15/23  Yes Erin Pena,    apixaban (Eliquis) 2.5 mg Take 1 tablet by mouth twice daily 10/12/23  Yes Papa Zabala DO   atorvastatin (LIPITOR) 20 mg tablet Take 1 tablet (20 mg total) by mouth daily with dinner 10/3/23  Yes Papa Zabala DO   Cholecalciferol (Vitamin D3) 50 MCG (2000 UT) TABS Take 2,000 Units by mouth in the morning.  Indications: Osteoporosis   Yes Historical Provider, MD   clopidogrel (PLAVIX) 75 mg tablet Take 1 tablet (75 mg total) by mouth daily 8/8/23 8/1/24 Yes Francisco Alvarez MD   diltiazem (CARDIZEM) 30 mg tablet Take 1 tablet (30 mg total) by mouth 3 (three) times a day Hold for HR<60 bpm or SBP<110 mmHg  Patient taking differently: Take 30 mg by mouth 2 (two) times a day Hold for HR<60 bpm or SBP<110 mmHg 8/15/23  Yes Papa Zabala, DO   furosemide (LASIX) 20 mg tablet Take 1 tablet (20 mg total) by mouth daily 8/15/23  Yes Papa Zabala, DO   metoprolol succinate (TOPROL-XL) 50 mg 24 hr tablet Take 1 tablet (50 mg total) by mouth daily Hold for HR<60 or SBP<110 mmHg 6/14/23  Yes [place] Saleem Sherwood, DO   Multiple Vitamin (Multivitamin Adult) TABS Take 1 tablet by mouth in the morning. Indications: Nutritional Support   Yes Historical Provider, MD   Nutritional Supplement LIQD Take 1 Can by mouth 2 (two) times a day Ensure pudding BID 6/14/23  Yes Andrewpha Saleem Sherwood, DO   pantoprazole (PROTONIX) 20 mg tablet Take 1 tablet (20 mg total) by mouth daily before breakfast 8/15/23 8/9/24 Yes Madi Zurita, DO   potassium chloride (K-DUR,KLOR-CON) 10 mEq tablet Take 1 tablet (10 mEq total) by mouth daily 8/28/23  Yes Papa Zabala DO   bisacodyl (DULCOLAX) 10 mg suppository Insert 10 mg into the rectum daily as needed for constipation    Historical Provider, MD   potassium chloride (Klor-Con) 10 mEq tablet  7/20/23   Historical Provider, MD   Psyllium (METAMUCIL FIBER) 51.7 % PACK Take 1 Package by mouth daily  Patient not taking: Reported on 8/21/2023 5/21/19   Andrewpha Saleem Sherwood DO   oxygen gas Inhale 2 L/min daily as needed (low oxygen levels). Indications: Oxygen Desaturation  Patient not taking: Reported on 1/30/2023 2/7/23  Esthela Marie MD     I have reviewed home medications with patient personally.     Allergies: No Known Allergies    Social History:     Marital Status: /Civil Union   Occupation: unknown  Patient Pre-hospital Living Situation: home  Patient Pre-hospital Level of Mobility: unknown  Patient Pre-hospital Diet Restrictions: unknown  Substance Use History:   Social History     Substance and Sexual Activity   Alcohol Use Never     Social History     Tobacco Use   Smoking Status Never   Smokeless Tobacco Never     Social History     Substance and Sexual Activity   Drug Use Never       Family History:    Family History   Problem Relation Age of Onset    Hypertension Mother     Asthma Father         [place]'s asthma    Alzheimer's disease Sister     Rectal cancer Son     Uterine cancer Daughter     Heart disease Sister     Heart disease Brother        Physical Exam:     Vitals:   Blood Pressure: 115/53 (11/16/23 1800)  Pulse: 80 (11/16/23 1800)  Temperature: 99.4 °F (37.4 °C) (11/16/23 1626)  Temp Source: Tympanic (11/16/23 1626)  Respirations: (!) 28 (11/16/23 1800)  Height: 4' 10" (147.3 cm) (11/16/23 1139)  Weight - Scale: 48 kg (105 lb 13.1 oz) (11/16/23 1139)  SpO2: 96 % (11/16/23 1800)    Physical Exam  Vitals and nursing note reviewed. Constitutional:       General: She is not in acute distress. Appearance: She is well-developed. She is not ill-appearing. Interventions: She is not intubated. HENT:      Head: Normocephalic and atraumatic. Mouth/Throat:      Mouth: Mucous membranes are moist.      Pharynx: Oropharynx is clear. No pharyngeal swelling or oropharyngeal exudate. Eyes:      Conjunctiva/sclera: Conjunctivae normal.   Cardiovascular:      Rate and Rhythm: Normal rate. Rhythm irregular. No extrasystoles are present. Pulses: Normal pulses. No decreased pulses. Heart sounds: Normal heart sounds. No murmur heard. No friction rub. No gallop. Pulmonary:      Effort: Pulmonary effort is normal. Tachypnea present. No bradypnea, accessory muscle usage or respiratory distress. She is not intubated. Breath sounds: Normal breath sounds. No stridor. No decreased breath sounds, wheezing, rhonchi or rales. Comments: Diffusely coarse breath sounds with good air entry bilaterally. Right lung with positive egophony and slight decrease in resonance to percussion  Chest:      Chest wall: There is dullness to percussion. Abdominal:      Palpations: Abdomen is soft. Tenderness: There is no abdominal tenderness. Musculoskeletal:         General: No swelling. Cervical back: Neck supple.    Skin:     General: Skin is warm and dry. Capillary Refill: Capillary refill takes less than 2 seconds. Coloration: Skin is not cyanotic or pale. Neurological:      Mental Status: She is alert. Psychiatric:         Mood and Affect: Mood normal.           Additional Data:     Lab Results: I have personally reviewed pertinent reports. Results from last 7 days   Lab Units 11/16/23  0840   WBC Thousand/uL 11.83*   HEMOGLOBIN g/dL 12.4   HEMATOCRIT % 39.9   PLATELETS Thousands/uL 187   NEUTROS PCT % 89*   LYMPHS PCT % 4*   MONOS PCT % 7   EOS PCT % 0     Results from last 7 days   Lab Units 11/16/23  0840   SODIUM mmol/L 134*   POTASSIUM mmol/L 4.5   CHLORIDE mmol/L 98   CO2 mmol/L 24   BUN mg/dL 28*   CREATININE mg/dL 0.98   ANION GAP mmol/L 12   CALCIUM mg/dL 10.0   ALBUMIN g/dL 4.6   TOTAL BILIRUBIN mg/dL 1.20*   ALK PHOS U/L 93   ALT U/L 19   AST U/L 44*   GLUCOSE RANDOM mg/dL 138     Results from last 7 days   Lab Units 11/16/23  0840   INR  1.34*             Results from last 7 days   Lab Units 11/16/23  1118 11/16/23  0840   LACTIC ACID mmol/L 1.5 2.6*   PROCALCITONIN ng/ml  --  0.18       Imaging: I have personally reviewed pertinent reports. CT chest without contrast   Final Result by Lloyd Seth MD (11/16 1133)         1. Multifocal pneumonia in the right lung with reactive adenopathy. Follow-up to ensure complete resolution recommended. 2. Moderate right pleural effusion with associated compressive atelectasis, mildly improved since 6/11/2023. Trace left pleural effusion. 3. Mild groundglass opacities in the left upper lobe. The study was marked in EPIC for significant notification. Elizabeth Mcgraw Resident: Opal Guzman, the attending radiologist, have reviewed the images and agree with the final report above.       Workstation performed: [id number]         XR chest portable   ED Interpretation by Ángel Canela MD (91/16 9484)   Dense infiltrate in right lung, concerning for pneumonia. Formal read pending. Final Result by Jamie Kruger MD (11/16 1002)      Development of large infiltrate in the right lung with interval decrease in size of right effusion. Postobstructive pneumonia cannot be excluded. Consider short interval follow-up with chest x-ray or evaluation with CT chest with contrast.   Study was marked in Epic for follow-up notification. Resident: Kris Hobson, the attending radiologist, have reviewed the images and agree with the final report above. Workstation performed: [id number]             EKG, Pathology, and Other Studies Reviewed on Admission:   EKG: aflutter w/AV block, mild tachycardia, mild prolonged Qtc, age-indeterminate septal infarct by Q waves in lead V2, no STEMI/NSTEMI      ** Please Note: This note has been constructed using a voice recognition system.  **

## 2023-11-16 NOTE — CASE MANAGEMENT
Case Management Assessment & Discharge Planning Note    Patient name Deedee [facility]  Location / MRN [id number]  : 1935 Date 2023       Current Admission Date: 2023  Current Admission Diagnosis:Acute hypoxic respiratory failure [facility])   Patient Active Problem List    Diagnosis Date Noted    Encounter for immunization 10/03/2023    S/P angioplasty with stent 10/03/2023    Centromere antibody positive 2023    Vitamin D deficiency 08/15/2023    Atrial flutter ([address]) 08/15/2023    Gastroesophageal reflux disease without esophagitis 08/15/2023    SIADH (syndrome of inappropriate 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[address]) 2023    Tricuspid valve insufficiency 2023    Prolonged QT interval 2023    Pleural effusion on right 2023    Moderate protein-calorie malnutrition ([address]) 2023    Closed wedge compression fracture of T11 vertebra ([address]) 2023    Age-related osteoporosis with current pathological fracture of vertebra ([address]) 2023    Graves disease 2023    Anemia 2023    Coronary artery disease involving native coronary artery of native heart without angina pectoris 2023    S/P TAVR (transcatheter aortic valve replacement) 2023    Permanent atrial fibrillation ([address]) 2023    Incomplete right bundle branch block 2023    Constipation 2023    Status post insertion of drug eluting coronary artery stent 2023    Acute hypoxic respiratory failure ([address]) 2022    Chronic diastolic (congestive) heart failure ([address]) 2022    Primary generalized (osteo)arthritis 2022    Dyslipidemia 2022    Post-menopause 2022    Generalized weakness 2021    Ganglion cyst 2021    Adhesive capsulitis of right shoulder 2021    Bilateral leg edema 2021    Chronic pain syndrome 2021    Sacroiliitis ([address]) 2021    Gait abnormality 2021    Chronic bilateral low back pain without sciatica 04/30/2021    Osteopenia of neck of femur 01/21/2021    Impaired fasting glucose     Encounter for long-term (current) use of medications 10/31/2020    Immunization due 10/31/2020    Acute bursitis of right shoulder 10/31/2020    Encounter for Medicare annual wellness exam 01/02/2020    Mid back pain 09/30/2019    Generalized anxiety disorder 09/05/2019    Non-rheumatic tricuspid valve insufficiency 06/04/2019    Lymphadenopathy 05/21/2019    Hiatal hernia 05/21/2019    Thrombocytopenia ([address]) 05/21/2019    Mitral valve insufficiency 05/21/2019    Atrial tachycardia 05/21/2019    Pulmonary hypertension ([address]) 05/21/2019    Diverticulosis 05/21/2019    Essential hypertension 05/18/2019    Hypomagnesemia 05/17/2019    Hypokalemia 05/17/2019    Sepsis due to pneumonia ([address]) 05/17/2019    Hyponatremia 05/17/2019      LOS (days): 0  Geometric Mean LOS (GMLOS) (days): 2.90  Days to GMLOS:2.7     OBJECTIVE:    Risk of Unplanned Readmission Score: 24.69         Current admission status: Inpatient  Referral Reason:  (discharge planning)    Preferred Pharmacy:   [facility] [address] DON[address] SOTELO [address] [place] [address]  [address] [address]  Phone: [phone] Fax: [address] [facility] [address] [facility], [address] [facility] [id number] [place] [id number]  Phone: [phone] Fax: [phone]    Primary Care Provider: Katharina Chaves DO    Primary Insurance: MEDICARE  Secondary Insurance: BLUE CROSS    ASSESSMENT:    CM met with patient at the bedside,baseline information  was obtained. CM discussed the role of CM in helping the patient develop a discharge plan and assist the patient in carry out their plan. Patient lives with spouse 2 story home , bedroom second floor and bath /bsc on first floor. Patient stated she uses walker at home for safety. Patient stated she has aides 2x week from county not sure of agency name.     Patient stated she wants pur wick info for at home. Active Health Care Proxies    There are no active Health Care Proxies on file. Advance Directives  Does patient have a [address]?: Yes  Does patient have Advance Directives?: Yes  Advance Directives: Power of  for health care, Living will  Primary Contact: patient unsure grandchildren and gillian         Readmission Root Cause  30 Day Readmission: No    Patient Information  Admitted from[de-identified] Home  Mental Status: Alert  During Assessment patient was accompanied by: Not accompanied during assessment  Assessment information provided by[de-identified] Patient  Primary Caregiver: Spouse  Caregiver's Name[de-identified] Flor York  spouse  Caregiver's Relationship to Patient[de-identified] Significant Other  Caregiver's Telephone Number[de-identified] [phone]  Support Systems: Spouse/significant other  [place] of Residence: [facility] do you live in?: [address] [facility] entry access options. Select all that apply.: Stairs  Number of steps to enter home. : 1  Do the steps have railings?: Yes  Type of Current Residence: 2 story home  Upon entering residence, is there a bedroom on the main floor (no further steps)?: No  A bedroom is located on the following floor levels of residence (select all that apply):: 2nd Floor  Upon entering residence, is there a bathroom on the main floor (no further steps)?: Yes  Living Arrangements: Lives w/ Spouse/significant other  Is patient a ?: No    Activities of Daily Living Prior to Admission  Functional Status: Independent  Completes ADLs independently?: Yes  Ambulates independently?: Yes  Does patient use assisted devices?: Yes  Assisted Devices (DME) used: Johana Herndon CommImelda conley Cane  Does patient currently own DME?: Yes  What DME does the patient currently own?: Bedside Commyael, Imelda Mehta  Does patient have a history of Outpatient Therapy (PT/OT)?: Yes  Does the patient have a history of Short-Term Rehab?: Yes ([facility] 5th floor)  Does patient have a history of HHC?: Yes  Does patient currently have [address] [place] [address] [place]?: No    Current [address] [facility]  Type of Current Home Care Services: Home health aide (pt stated 2x week aide from county not sure of agency name)    Patient Information Continued  Income Source: Pension/jail  Does patient have prescription coverage?: Yes  Does patient receive dialysis treatments?: No  Does patient have a history of substance abuse?: No  Does patient have a history of Mental Health Diagnosis?: No         Means of Transportation  Means of Transport to Appts[de-identified] Family transport      Housing Stability: Low Risk  (6/12/2023)    Housing Stability Vital Sign     Unable to Pay for Housing in the Last Year: No     Number of [address] in the Last Year: 1     Unstable Housing in the Last Year: No   Food Insecurity: No Food Insecurity (6/12/2023)    Hunger Vital Sign     Worried About Running Out of Food in the Last Year: Never true     Ran Out of Food in the Last Year: Never true   Transportation Needs: No Transportation Needs (6/12/2023)    PRAPARE - Transportation     Lack of Transportation (Medical): No     Lack of Transportation (Non-Medical):  No   Utilities: Not on file       DISCHARGE DETAILS:    Discharge planning discussed with[de-identified] patient        CM contacted family/caregiver?: No- see comments (not at this time)  Were Treatment Team discharge recommendations reviewed with patient/caregiver?: Yes  Did patient/caregiver verbalize understanding of patient care needs?: Yes  Were patient/caregiver advised of the risks associated with not following Treatment Team discharge recommendations?: Yes    Contacts  Patient Contacts: Jelena Morris spouse  Relationship to Patient[de-identified] Family  Reason/Outcome: Discharge Planning               CM to follow for discharge planning

## 2023-11-17 PROBLEM — I95.9 HYPOTENSION: Status: ACTIVE | Noted: 2023-11-17

## 2023-11-17 PROBLEM — N18.9 ACUTE-ON-CHRONIC KIDNEY INJURY: Status: ACTIVE | Noted: 2023-11-17

## 2023-11-17 PROBLEM — N17.9 ACUTE-ON-CHRONIC KIDNEY INJURY: Status: ACTIVE | Noted: 2023-11-17

## 2023-11-17 LAB
ALBUMIN SERPL BCP-MCNC: 3.7 G/DL (ref 3.5–5)
ALP SERPL-CCNC: 60 U/L (ref 34–104)
ALT SERPL W P-5'-P-CCNC: 17 U/L (ref 7–52)
ANION GAP SERPL CALCULATED.3IONS-SCNC: 12 MMOL/L
ANISOCYTOSIS BLD QL SMEAR: PRESENT
AST SERPL W P-5'-P-CCNC: 23 U/L (ref 13–39)
ATRIAL RATE: 93 BPM
BASOPHILS # BLD MANUAL: 0 THOUSAND/UL (ref 0–0.1)
BASOPHILS NFR MAR MANUAL: 0 % (ref 0–1)
BILIRUB SERPL-MCNC: 1.46 MG/DL (ref 0.2–1)
BUN SERPL-MCNC: 33 MG/DL (ref 5–25)
CALCIUM SERPL-MCNC: 9.1 MG/DL (ref 8.4–10.2)
CHLORIDE SERPL-SCNC: 101 MMOL/L (ref 96–108)
CO2 SERPL-SCNC: 23 MMOL/L (ref 21–32)
CREAT SERPL-MCNC: 1.4 MG/DL (ref 0.6–1.3)
EOSINOPHIL # BLD MANUAL: 0 THOUSAND/UL (ref 0–0.4)
EOSINOPHIL NFR BLD MANUAL: 0 % (ref 0–6)
ERYTHROCYTE [DISTWIDTH] IN BLOOD BY AUTOMATED COUNT: 18.5 % (ref 11.6–15.1)
GFR SERPL CREATININE-BSD FRML MDRD: 33 ML/MIN/1.73SQ M
GLUCOSE SERPL-MCNC: 89 MG/DL (ref 65–140)
HCT VFR BLD AUTO: 33.7 % (ref 34.8–46.1)
HGB BLD-MCNC: 10.6 G/DL (ref 11.5–15.4)
L PNEUMO1 AG UR QL IA.RAPID: NEGATIVE
LYMPHOCYTES # BLD AUTO: 0.2 THOUSAND/UL (ref 0.6–4.47)
LYMPHOCYTES # BLD AUTO: 2 % (ref 14–44)
MAGNESIUM SERPL-MCNC: 1.4 MG/DL (ref 1.9–2.7)
MCH RBC QN AUTO: 28.5 PG (ref 26.8–34.3)
MCHC RBC AUTO-ENTMCNC: 31.5 G/DL (ref 31.4–37.4)
MCV RBC AUTO: 91 FL (ref 82–98)
METAMYELOCYTES NFR BLD MANUAL: 2 % (ref 0–1)
MONOCYTES # BLD AUTO: 0.4 THOUSAND/UL (ref 0–1.22)
MONOCYTES NFR BLD: 4 % (ref 4–12)
NEUTROPHILS # BLD MANUAL: 9.3 THOUSAND/UL (ref 1.85–7.62)
NEUTS BAND NFR BLD MANUAL: 17 % (ref 0–8)
NEUTS SEG NFR BLD AUTO: 75 % (ref 43–75)
OVALOCYTES BLD QL SMEAR: PRESENT
PLATELET # BLD AUTO: 124 THOUSANDS/UL (ref 149–390)
PLATELET BLD QL SMEAR: ABNORMAL
PMV BLD AUTO: 9.7 FL (ref 8.9–12.7)
POTASSIUM SERPL-SCNC: 3.7 MMOL/L (ref 3.5–5.3)
PROT SERPL-MCNC: 5.6 G/DL (ref 6.4–8.4)
QRS AXIS: 242 DEGREES
QRSD INTERVAL: 104 MS
QT INTERVAL: 342 MS
QTC INTERVAL: 452 MS
RBC # BLD AUTO: 3.72 MILLION/UL (ref 3.81–5.12)
RBC MORPH BLD: PRESENT
S PNEUM AG UR QL: NEGATIVE
SODIUM SERPL-SCNC: 136 MMOL/L (ref 135–147)
T WAVE AXIS: 82 DEGREES
VENTRICULAR RATE: 105 BPM
WBC # BLD AUTO: 10.11 THOUSAND/UL (ref 4.31–10.16)

## 2023-11-17 PROCEDURE — 93010 ELECTROCARDIOGRAM REPORT: CPT | Performed by: INTERNAL MEDICINE

## 2023-11-17 PROCEDURE — 99232 SBSQ HOSP IP/OBS MODERATE 35: CPT | Performed by: FAMILY MEDICINE

## 2023-11-17 PROCEDURE — 87449 NOS EACH ORGANISM AG IA: CPT | Performed by: PHYSICIAN ASSISTANT

## 2023-11-17 PROCEDURE — 94760 N-INVAS EAR/PLS OXIMETRY 1: CPT

## 2023-11-17 PROCEDURE — 83735 ASSAY OF MAGNESIUM: CPT

## 2023-11-17 PROCEDURE — 80053 COMPREHEN METABOLIC PANEL: CPT

## 2023-11-17 PROCEDURE — 94668 MNPJ CHEST WALL SBSQ: CPT

## 2023-11-17 PROCEDURE — 85027 COMPLETE CBC AUTOMATED: CPT

## 2023-11-17 PROCEDURE — 85007 BL SMEAR W/DIFF WBC COUNT: CPT

## 2023-11-17 PROCEDURE — 99232 SBSQ HOSP IP/OBS MODERATE 35: CPT | Performed by: INTERNAL MEDICINE

## 2023-11-17 PROCEDURE — 94640 AIRWAY INHALATION TREATMENT: CPT

## 2023-11-17 RX ORDER — MAGNESIUM SULFATE HEPTAHYDRATE 40 MG/ML
2 INJECTION, SOLUTION INTRAVENOUS ONCE
Status: COMPLETED | OUTPATIENT
Start: 2023-11-17 | End: 2023-11-17

## 2023-11-17 RX ORDER — POTASSIUM CHLORIDE 20 MEQ/1
40 TABLET, EXTENDED RELEASE ORAL ONCE
Status: COMPLETED | OUTPATIENT
Start: 2023-11-17 | End: 2023-11-17

## 2023-11-17 RX ORDER — METOPROLOL SUCCINATE 50 MG/1
50 TABLET, EXTENDED RELEASE ORAL DAILY
Status: DISCONTINUED | OUTPATIENT
Start: 2023-11-17 | End: 2023-11-17

## 2023-11-17 RX ORDER — ACETAMINOPHEN 325 MG/1
975 TABLET ORAL EVERY 6 HOURS PRN
Status: DISCONTINUED | OUTPATIENT
Start: 2023-11-17 | End: 2023-11-20

## 2023-11-17 RX ADMIN — LEVALBUTEROL HYDROCHLORIDE 1.25 MG: 1.25 SOLUTION RESPIRATORY (INHALATION) at 15:11

## 2023-11-17 RX ADMIN — MAGNESIUM SULFATE HEPTAHYDRATE 2 G: 40 INJECTION, SOLUTION INTRAVENOUS at 14:09

## 2023-11-17 RX ADMIN — FUROSEMIDE 20 MG: 20 TABLET ORAL at 08:42

## 2023-11-17 RX ADMIN — GUAIFENESIN 600 MG: 600 TABLET ORAL at 08:42

## 2023-11-17 RX ADMIN — APIXABAN 2.5 MG: 2.5 TABLET, FILM COATED ORAL at 17:05

## 2023-11-17 RX ADMIN — POTASSIUM CHLORIDE 10 MEQ: 750 TABLET, EXTENDED RELEASE ORAL at 08:42

## 2023-11-17 RX ADMIN — CLOPIDOGREL 75 MG: 75 TABLET ORAL at 08:42

## 2023-11-17 RX ADMIN — SODIUM CHLORIDE 250 ML: 0.9 INJECTION, SOLUTION INTRAVENOUS at 16:27

## 2023-11-17 RX ADMIN — ACETAMINOPHEN 975 MG: 325 TABLET, FILM COATED ORAL at 12:23

## 2023-11-17 RX ADMIN — Medication 2000 UNITS: at 08:42

## 2023-11-17 RX ADMIN — DILTIAZEM HYDROCHLORIDE 30 MG: 30 TABLET ORAL at 14:10

## 2023-11-17 RX ADMIN — APIXABAN 2.5 MG: 2.5 TABLET, FILM COATED ORAL at 08:42

## 2023-11-17 RX ADMIN — SODIUM CHLORIDE 250 ML: 0.9 INJECTION, SOLUTION INTRAVENOUS at 00:07

## 2023-11-17 RX ADMIN — LEVALBUTEROL HYDROCHLORIDE 1.25 MG: 1.25 SOLUTION RESPIRATORY (INHALATION) at 08:56

## 2023-11-17 RX ADMIN — ATORVASTATIN CALCIUM 20 MG: 10 TABLET, FILM COATED ORAL at 16:26

## 2023-11-17 RX ADMIN — SODIUM CHLORIDE 500 ML: 0.9 INJECTION, SOLUTION INTRAVENOUS at 19:33

## 2023-11-17 RX ADMIN — CEFEPIME HYDROCHLORIDE 1000 MG: 1 INJECTION, SOLUTION INTRAVENOUS at 08:43

## 2023-11-17 RX ADMIN — GUAIFENESIN 600 MG: 600 TABLET ORAL at 21:15

## 2023-11-17 RX ADMIN — METOPROLOL SUCCINATE 50 MG: 50 TABLET, FILM COATED, EXTENDED RELEASE ORAL at 09:34

## 2023-11-17 RX ADMIN — SODIUM CHLORIDE 500 ML: 0.9 INJECTION, SOLUTION INTRAVENOUS at 07:56

## 2023-11-17 RX ADMIN — PANTOPRAZOLE SODIUM 20 MG: 20 TABLET, DELAYED RELEASE ORAL at 06:19

## 2023-11-17 RX ADMIN — AZITHROMYCIN MONOHYDRATE 500 MG: 500 INJECTION, POWDER, LYOPHILIZED, FOR SOLUTION INTRAVENOUS at 09:42

## 2023-11-17 RX ADMIN — POTASSIUM CHLORIDE 40 MEQ: 1500 TABLET, EXTENDED RELEASE ORAL at 16:26

## 2023-11-17 NOTE — ASSESSMENT & PLAN NOTE
Presented w/SOB on room air, requiring up to 8L and now currently 4L O2 NC to maintain adequate saturations. No Hx COPD, does have Hx CHF.   Symptoms likely 2/2 large R-sided PNA noted on CT chest w/o contrast  Also has stable chronic pleural effusions noted on CT  Wean O2 as tolerated  Pulmonology consulted, will appreciate rec's  Respiratory & airway clearance protocol

## 2023-11-17 NOTE — PLAN OF CARE
Problem: CARDIOVASCULAR - ADULT  Goal: Maintains optimal cardiac output and hemodynamic stability  Description: INTERVENTIONS:  - Monitor I/O, vital signs and rhythm  - Monitor for S/S and trends of decreased cardiac output  - Administer and titrate ordered vasoactive medications to optimize hemodynamic stability  - Assess quality of pulses, skin color and temperature  - Assess for signs of decreased coronary artery perfusion  - Instruct patient to report change in severity of symptoms  Outcome: Progressing  Goal: Absence of cardiac dysrhythmias or at baseline rhythm  Description: INTERVENTIONS:  - Continuous cardiac monitoring, vital signs, obtain 12 lead EKG if ordered  - Administer antiarrhythmic and heart rate control medications as ordered  - Monitor electrolytes and administer replacement therapy as ordered  Outcome: Progressing     Problem: RESPIRATORY - ADULT  Goal: Achieves optimal ventilation and oxygenation  Description: INTERVENTIONS:  - Assess for changes in respiratory status  - Assess for changes in mentation and behavior  - Position to facilitate oxygenation and minimize respiratory effort  - Oxygen administered by appropriate delivery if ordered  - Initiate smoking cessation education as indicated  - Encourage broncho-pulmonary hygiene including cough, deep breathe, Incentive Spirometry  - Assess the need for suctioning and aspirate as needed  - Assess and instruct to report SOB or any respiratory difficulty  - Respiratory Therapy support as indicated  Outcome: Progressing     Problem: METABOLIC, FLUID AND ELECTROLYTES - ADULT  Goal: Electrolytes maintained within normal limits  Description: INTERVENTIONS:  - Monitor labs and assess patient for signs and symptoms of electrolyte imbalances  - Administer electrolyte replacement as ordered  - Monitor response to electrolyte replacements, including repeat lab results as appropriate  - Instruct patient on fluid and nutrition as appropriate  Outcome: Progressing  Goal: Fluid balance maintained  Description: INTERVENTIONS:  - Monitor labs   - Monitor I/O and WT  - Instruct patient on fluid and nutrition as appropriate  - Assess for signs & symptoms of volume excess or deficit  Outcome: Progressing     Problem: MUSCULOSKELETAL - ADULT  Goal: Maintain or return mobility to safest level of function  Description: INTERVENTIONS:  - Assess patient's ability to carry out ADLs; assess patient's baseline for ADL function and identify physical deficits which impact ability to perform ADLs (bathing, care of mouth/teeth, toileting, grooming, dressing, etc.)  - Assess/evaluate cause of self-care deficits   - Assess range of motion  - Assess patient's mobility  - Assess patient's need for assistive devices and provide as appropriate  - Encourage maximum independence but intervene and supervise when necessary  - Involve family in performance of ADLs  - Assess for home care needs following discharge   - Consider OT consult to assist with ADL evaluation and planning for discharge  - Provide patient education as appropriate  Outcome: Progressing     Problem: PAIN - ADULT  Goal: Verbalizes/displays adequate comfort level or baseline comfort level  Description: Interventions:  - Encourage patient to monitor pain and request assistance  - Assess pain using appropriate pain scale  - Administer analgesics based on type and severity of pain and evaluate response  - Implement non-pharmacological measures as appropriate and evaluate response  - Consider cultural and social influences on pain and pain management  - Notify physician/advanced practitioner if interventions unsuccessful or patient reports new pain  Outcome: Progressing     Problem: INFECTION - ADULT  Goal: Absence or prevention of progression during hospitalization  Description: INTERVENTIONS:  - Assess and monitor for signs and symptoms of infection  - Monitor lab/diagnostic results  - Monitor all insertion sites, i.e. indwelling lines, tubes, and drains  - Monitor endotracheal if appropriate and nasal secretions for changes in amount and color  - [place] appropriate cooling/warming therapies per order  - Administer medications as ordered  - Instruct and encourage patient and family to use good hand hygiene technique  - Identify and instruct in appropriate isolation precautions for identified infection/condition  Outcome: Progressing  Goal: Absence of fever/infection during neutropenic period  Description: INTERVENTIONS:  - Monitor WBC    Outcome: Progressing     Problem: Knowledge Deficit  Goal: Patient/family/caregiver demonstrates understanding of disease process, treatment plan, medications, and discharge instructions  Description: Complete learning assessment and assess knowledge base.   Interventions:  - Provide teaching at level of understanding  - Provide teaching via preferred learning methods  Outcome: Progressing

## 2023-11-17 NOTE — PLAN OF CARE
Problem: CARDIOVASCULAR - ADULT  Goal: Maintains optimal cardiac output and hemodynamic stability  Description: INTERVENTIONS:  - Monitor I/O, vital signs and rhythm  - Monitor for S/S and trends of decreased cardiac output  - Administer and titrate ordered vasoactive medications to optimize hemodynamic stability  - Assess quality of pulses, skin color and temperature  - Assess for signs of decreased coronary artery perfusion  - Instruct patient to report change in severity of symptoms  Outcome: Progressing  Goal: Absence of cardiac dysrhythmias or at baseline rhythm  Description: INTERVENTIONS:  - Continuous cardiac monitoring, vital signs, obtain 12 lead EKG if ordered  - Administer antiarrhythmic and heart rate control medications as ordered  - Monitor electrolytes and administer replacement therapy as ordered  Outcome: Progressing     Problem: RESPIRATORY - ADULT  Goal: Achieves optimal ventilation and oxygenation  Description: INTERVENTIONS:  - Assess for changes in respiratory status  - Assess for changes in mentation and behavior  - Position to facilitate oxygenation and minimize respiratory effort  - Oxygen administered by appropriate delivery if ordered  - Initiate smoking cessation education as indicated  - Encourage broncho-pulmonary hygiene including cough, deep breathe, Incentive Spirometry  - Assess the need for suctioning and aspirate as needed  - Assess and instruct to report SOB or any respiratory difficulty  - Respiratory Therapy support as indicated  Outcome: Progressing     Problem: METABOLIC, FLUID AND ELECTROLYTES - ADULT  Goal: Electrolytes maintained within normal limits  Description: INTERVENTIONS:  - Monitor labs and assess patient for signs and symptoms of electrolyte imbalances  - Administer electrolyte replacement as ordered  - Monitor response to electrolyte replacements, including repeat lab results as appropriate  - Instruct patient on fluid and nutrition as appropriate  Outcome: Progressing  Goal: Fluid balance maintained  Description: INTERVENTIONS:  - Monitor labs   - Monitor I/O and WT  - Instruct patient on fluid and nutrition as appropriate  - Assess for signs & symptoms of volume excess or deficit  Outcome: Progressing     Problem: MUSCULOSKELETAL - ADULT  Goal: Maintain or return mobility to safest level of function  Description: INTERVENTIONS:  - Assess patient's ability to carry out ADLs; assess patient's baseline for ADL function and identify physical deficits which impact ability to perform ADLs (bathing, care of mouth/teeth, toileting, grooming, dressing, etc.)  - Assess/evaluate cause of self-care deficits   - Assess range of motion  - Assess patient's mobility  - Assess patient's need for assistive devices and provide as appropriate  - Encourage maximum independence but intervene and supervise when necessary  - Involve family in performance of ADLs  - Assess for home care needs following discharge   - Consider OT consult to assist with ADL evaluation and planning for discharge  - Provide patient education as appropriate  Outcome: Progressing     Problem: Prexisting or High Potential for Compromised Skin Integrity  Goal: Skin integrity is maintained or improved  Description: INTERVENTIONS:  - Identify patients at risk for skin breakdown  - Assess and monitor skin integrity  - Assess and monitor nutrition and hydration status  - Monitor labs   - Assess for incontinence   - Turn and reposition patient  - Assist with mobility/ambulation  - Relieve pressure over bony prominences  - Avoid friction and shearing  - Provide appropriate hygiene as needed including keeping skin clean and dry  - Evaluate need for skin moisturizer/barrier cream  - Collaborate with interdisciplinary team   - Patient/family teaching  - Consider wound care consult   Outcome: Progressing

## 2023-11-17 NOTE — ASSESSMENT & PLAN NOTE
Lab Results   Component Value Date    EGFR 33 11/17/2023    EGFR 51 11/16/2023    EGFR 42 10/05/2023    CREATININE 1.40 (H) 11/17/2023    CREATININE 0.98 11/16/2023    CREATININE 1.15 10/05/2023     Creatinine elevated from baseline significantly from admission, likely in the setting of hypotensive episodes overnight/hypoperfusion  Hold PTA blood pressure medications and trend renal function.   MAP goal >65  Avoid nephrotoxins  Avoid aggressive fluid resuscitation for volume depletion to avoid further ROSA through cardiorenal syndrome or dehydration

## 2023-11-17 NOTE — ASSESSMENT & PLAN NOTE
DIAGNOSIS: fever, tachycardia, leukocytosis, large multifocal R-sided pneumonia noted on CXR and CT chest wo contrast. Was noted to have lactic acidosis which resolved with fluid administration. Hospital day #1-2 overnight had hypotension indicating likely worsening of sepsis.   Speech/swallow evaluated, no evidence of dysphagia with which to cause aspiration  MANAGEMENT: blood cultures, sputum cultures ordered, respiratory and airway clearance protocols, guaifenesin, UA + legionella & strep pneumo urine antigens, light 250mL fluid bolus given Hx CHF  CTX, azithro & metronidazole given on arrival, metronidazole was d/c'd due to low suspicion of aspiration, CTX changed to cefepime given worsening BP's/clinical picture  Pulm as above

## 2023-11-17 NOTE — PHYSICAL THERAPY NOTE
PHYSICAL THERAPY        Patient Name: Sophia SINGHTeteTRAM Date: 11/17/2023 11/17/23 1520   PT Last Visit   PT Visit Date 11/17/23   Note Type   Note type Cancelled Session   Cancel Reasons Medical status       Kenyatta Jesus

## 2023-11-17 NOTE — PROGRESS NOTES
Progress Note - Pulmonary   Esperanza Vaz 80 y.o. female MRN: [id number]  Unit/Bed#:  Encounter: [id number]  Assessment/Plan:    Acute hypoxic respiratory failure  Multifocal pneumonia  Severe sepsis secondary to community-acquired pneumonia  Chronic right-sided pleural effusion  Atrial flutter  Chronic diastolic CHF    Pulmonary recommendations/plan:    Currently requiring 4 L nasal cannula. No oxygen requirement at baseline. Titrate oxygen maintain SPO2 greater than or equal to 88%. Pulmonary toilet:  Increase activity as tolerated, out of bed as tolerated, cough and deep breathing exercises encourage, incentive spirometry q.1 hour  Continue cefepime and azithromycin for now given persistent fevers. Follow culture data  Trend WBC, procalcitonin, temps  Continue Xopenex nebs 3 times daily  Continue flutter valve  Pleural effusion stable without any need for thoracentesis at this time    Chief Complaint:    "I'm okay."    Subjective:    Patient was seen and examined today. No overnight events reported. Patient states that she feels like she is making improvements however did have a fever this morning. She feels that her breathing is improved but continues to have cough. Denies wheezing. Denies chest pain or palpitations. Objective:    Vitals: Blood pressure 112/53, pulse 88, temperature (!) 101.1 °F (38.4 °C), temperature source Tympanic, resp. rate (!) 23, height 4' 10" (1.473 m), weight 49.8 kg (109 lb 12.6 oz), SpO2 99 %. 4L NC,Body mass index is 22.95 kg/m².       Intake/Output Summary (Last 24 hours) at 11/17/2023 1252  Last data filed at 11/17/2023 0500  Gross per 24 hour   Intake 790 ml   Output 150 ml   Net 640 ml       Invasive Devices       Peripheral Intravenous Line  Duration             Peripheral IV 11/16/23 Right;Ventral (anterior) Forearm 1 day              Drain  Duration             External Urinary Catheter 1 day                    Physical Exam:     Physical Exam  Vitals and nursing note reviewed. Constitutional:       General: She is not in acute distress. Appearance: Normal appearance. She is ill-appearing. HENT:      Head: Normocephalic and atraumatic. Mouth/Throat:      Mouth: Mucous membranes are moist.      Pharynx: Oropharynx is clear. Cardiovascular:      Rate and Rhythm: Regular rhythm. Tachycardia present. Heart sounds: No murmur heard. Pulmonary:      Breath sounds: Rales present. Skin:     General: Skin is warm and dry. Neurological:      General: No focal deficit present. Mental Status: She is alert. Mental status is at baseline. Psychiatric:         Mood and Affect: Mood normal.         Behavior: Behavior normal.         Labs: I have personally reviewed pertinent lab results. , ABG: No results found for: "PHART", "JWB5RYK", "PO2ART", "MAV6POI", "V7LSZYXN", "BEART", "SOURCE", BNP: No results found for: "BNP", CBC:   Lab Results   Component Value Date    WBC 10.11 11/17/2023    HGB 10.6 (L) 11/17/2023    HCT 33.7 (L) 11/17/2023    MCV 91 11/17/2023     (L) 11/17/2023    RBC 3.72 (L) 11/17/2023    MCH 28.5 11/17/2023    MCHC 31.5 11/17/2023    RDW 18.5 (H) 11/17/2023    MPV 9.7 11/17/2023   , CMP:   Lab Results   Component Value Date    SODIUM 136 11/17/2023    K 3.7 11/17/2023     11/17/2023    CO2 23 11/17/2023    BUN 33 (H) 11/17/2023    CREATININE 1.40 (H) 11/17/2023    CALCIUM 9.1 11/17/2023    AST 23 11/17/2023    ALT 17 11/17/2023    ALKPHOS 60 11/17/2023    EGFR 33 11/17/2023   , PT/INR: No results found for: "PT", "INR", Troponin: No results found for: "TROPONINI"    Imaging and other studies: I have personally reviewed pertinent reports.    and I have personally reviewed pertinent films in PACS

## 2023-11-17 NOTE — QUICK NOTE
ICU nursing staff reported the patient was having runs of tachycardia up into the 160s to me. Went to bedside, increased oxygen to 5 L as she was speaking with friend at bedside and not saturating adequately plus was breathing heavily while doing it. Even at rest she would break into tachycardia and then break out a few seconds later repeatedly. Per my exam and per cardiac monitor it was supraventricular tachycardia.   Will give metoprolol and diltiazem, also noted the patient's magnesium came back low and will give 2 g IV magnesium.  -Tyrone Mortensen MD

## 2023-11-17 NOTE — OCCUPATIONAL THERAPY NOTE
Occupational Therapy         Patient Name: Kristina BOURGEOISMARKELL Date: 11/17/2023 11/17/23 1434   OT Last Visit   OT Visit Date 11/17/23   Note Type   Note type Cancelled Session   Cancel Reasons Medical status       Katie Enriquez

## 2023-11-17 NOTE — PROGRESS NOTES
[address]  Progress Note  Name: Faheem Sam  MRN: [id number]  Unit/Bed#:  I Date of Admission: 11/16/2023   Date of Service: 11/17/2023 I Hospital Day: 1    Assessment/Plan   * Acute hypoxic respiratory failure [facility])  Assessment & Plan  Presented w/SOB on room air, requiring up to 8L and now currently 4L O2 NC to maintain adequate saturations. No Hx COPD, does have Hx CHF. Symptoms likely 2/2 large R-sided PNA noted on CT chest w/o contrast  Also has stable chronic pleural effusions noted on CT  Wean O2 as tolerated  Pulmonology consulted, will appreciate rec's  Respiratory & airway clearance protocol    Sepsis due to pneumonia [facility])  Assessment & Plan  DIAGNOSIS: fever, tachycardia, leukocytosis, large multifocal R-sided pneumonia noted on CXR and CT chest wo contrast. Was noted to have lactic acidosis which resolved with fluid administration. Hospital day #1-2 overnight had hypotension indicating likely worsening of sepsis. Speech/swallow evaluated, no evidence of dysphagia with which to cause aspiration  MANAGEMENT: blood cultures, sputum cultures ordered, respiratory and airway clearance protocols, guaifenesin, UA + legionella & strep pneumo urine antigens, light 250mL fluid bolus given Hx CHF  CTX, azithro & metronidazole given on arrival, metronidazole was d/c'd due to low suspicion of aspiration, CTX changed to cefepime given worsening BP's/clinical picture  Pulm as above    Acute-on-chronic kidney injury   Assessment & Plan  Lab Results   Component Value Date    EGFR 33 11/17/2023    EGFR 51 11/16/2023    EGFR 42 10/05/2023    CREATININE 1.40 (H) 11/17/2023    CREATININE 0.98 11/16/2023    CREATININE 1.15 10/05/2023     Creatinine elevated from baseline significantly from admission, likely in the setting of hypotensive episodes overnight/hypoperfusion  Hold PTA blood pressure medications and trend renal function.   MAP goal >65  Avoid nephrotoxins  Avoid aggressive fluid resuscitation for volume depletion to avoid further ROSA through cardiorenal syndrome or dehydration    Hypotension  Assessment & Plan  Overnight hospital day #1-2 patient had hypotension with minimum MAP of 49 in the setting of sepsis and diltiazem plus metoprolol plus furosemide use  Holding diltiazem and metoprolol for preservation of end organ perfusion pressure and preservation of tachycardia response to hypotension metoprolol may suppress. Will continue furosemide as we have administered an additional 750 mL bolus overnight and do not wish to precipitate CHF exacerbation on top of her pneumonia  Strict BP monitoring  No pressor requirements thus far    Atrial flutter (HCC)  Assessment & Plan  C/w PTA rate control meds & anticoagulation    Anemia  Assessment & Plan  Chronic condition given CKD, hemoglobin 12.4->10.6 likely in the setting of volume expansion as there is no clinical evidence of acute bleed at this time. Transfuse for hemoglobin <7    Chronic diastolic (congestive) heart failure (HCC)  Assessment & Plan  Wt Readings from Last 3 Encounters:   11/17/23 49.8 kg (109 lb 12.6 oz)   11/01/23 48.1 kg (106 lb)   10/03/23 47.1 kg (103 lb 12.8 oz)     Most recent echo w/LVEF 50% and abnormal diastolic function  Has TAVR  Caution with volume resuscitation  C/w PTA lasix          Generalized anxiety disorder  Assessment & Plan  C/w PTA meds    Thrombocytopenia ([address])  Assessment & Plan  Currently mild, likely dilutional or result of platelet clumping due to uremia    Hyponatremia  Assessment & Plan  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     Discussions with Specialists or Other Care Team Provider: yes    Education and Discussions with Family / Patient: will reach out    Time Spent for Care: 45 minutes. More than 50% of total time spent on counseling and coordination of care as described above. Current Length of Stay: 1 day(s)    Current Patient Status: Inpatient   Certification Statement: The patient will continue to require additional inpatient hospital stay due to severe sepsis with endorgan dysfunction    Discharge Plan: >2 days    Code Status: Level 3 - DNAR and DNI      Subjective:   Overnight patient had blood pressure decreases with MAP minimum = 49, home blood pressure medications have been held and 750 mL fluid given overnight leading to increase in blood pressure without the use of vasopressors. ROSA noted. Today the patient has no new or worsening complaints, she was still having SOB however. Objective:     Vitals:   Temp (24hrs), Av °F (37.8 °C), Min:98.4 °F (36.9 °C), Max:101.3 °F (38.5 °C)    Temp:  [98.4 °F (36.9 °C)-101.3 °F (38.5 °C)] 100.5 °F (38.1 °C)  HR:  [75-95] 86  Resp:  [18-39] 25  BP: ()/(36-89) 105/56  SpO2:  [91 %-100 %] 93 %  Body mass index is 22.95 kg/m². Input and Output Summary (last 24 hours): Intake/Output Summary (Last 24 hours) at 2023 1002  Last data filed at 2023 0500  Gross per 24 hour   Intake 1300 ml   Output 150 ml   Net 1150 ml       Physical Exam:     Physical Exam  Vitals reviewed. Constitutional:       General: She is not in acute distress. Appearance: Normal appearance. She is not ill-appearing. HENT:      Nose: No rhinorrhea. Eyes:      General: No scleral icterus. Right eye: No discharge. Left eye: No discharge. Conjunctiva/sclera: Conjunctivae normal.   Cardiovascular:      Rate and Rhythm: Normal rate and regular rhythm. Pulses: Normal pulses. Heart sounds: Normal heart sounds. No murmur heard. No friction rub. No gallop.       Comments: Briefly becomes tachycardic while speaking, when stops talking pulse goes back into low 90s  Pulmonary: Effort: Respiratory distress (when conversing noted desats and tachypnea, increased WOB without accessory muscle use. At rest, no distress) present. Breath sounds: No wheezing, rhonchi or rales. Comments: Coarse breath sounds bilaterally  Abdominal:      General: Bowel sounds are normal. There is no distension. Palpations: Abdomen is soft. There is no mass. Tenderness: There is no abdominal tenderness. There is no guarding or rebound. Musculoskeletal:         General: No swelling. Skin:     General: Skin is warm and dry. Coloration: Skin is not jaundiced or pale. Neurological:      Mental Status: She is alert. Comments: No facial droop, slurred speech or gross focal deficits noted   Psychiatric:         Mood and Affect: Mood normal.         Behavior: Behavior normal.         Additional Data:     Labs:    Results from last 7 days   Lab Units 11/17/23  0503 11/16/23  0840   WBC Thousand/uL 10.11 11.83*   HEMOGLOBIN g/dL 10.6* 12.4   HEMATOCRIT % 33.7* 39.9   PLATELETS Thousands/uL 124* 187   BANDS PCT % 17*  --    NEUTROS PCT %  --  89*   LYMPHS PCT %  --  4*   LYMPHO PCT % 2*  --    MONOS PCT %  --  7   MONO PCT % 4  --    EOS PCT % 0 0     Results from last 7 days   Lab Units 11/17/23  0503   SODIUM mmol/L 136   POTASSIUM mmol/L 3.7   CHLORIDE mmol/L 101   CO2 mmol/L 23   BUN mg/dL 33*   CREATININE mg/dL 1.40*   ANION GAP mmol/L 12   CALCIUM mg/dL 9.1   ALBUMIN g/dL 3.7   TOTAL BILIRUBIN mg/dL 1.46*   ALK PHOS U/L 60   ALT U/L 17   AST U/L 23   GLUCOSE RANDOM mg/dL 89     Results from last 7 days   Lab Units 11/16/23  0840   INR  1.34*     Results from last 7 days   Lab Units 11/16/23  2041   POC GLUCOSE mg/dl 160*         Results from last 7 days   Lab Units 11/16/23  1118 11/16/23  0840   LACTIC ACID mmol/L 1.5 2.6*   PROCALCITONIN ng/ml  --  0.18           * I Have Reviewed All Lab Data Listed Above. * Additional Pertinent Lab Tests Reviewed:  All Labs Within Last 24 Hours Reviewed    Imaging:    Imaging Reports Reviewed Today Include: no new  Imaging Personally Reviewed by Myself Includes:  no new    Recent Cultures (last 7 days):     Results from last 7 days   Lab Units 11/17/23  0006 11/16/23  0847 11/16/23  0844   BLOOD CULTURE   --  Received in Microbiology Lab. Culture in Progress. Received in Microbiology Lab. Culture in Progress. LE[facility]IONE[facility]A URINARY ANTIGEN  Negative  --   --        Last 24 Hours Medication List:   Current Facility-Administered Medications   Medication Dose Route Frequency Provider Last Rate    acetaminophen  650 mg Oral Q6H PRN Carter Wilburn MD      ALPRAZolam  0.25 mg Oral TID PRN Carter Wilburn MD      apixaban  2.5 mg Oral BID Carter Wilburn MD      atorvastatin  20 mg Oral Daily With Gilberto Cabrera MD      azithromycin  500 mg Intravenous Q24H Carter Wilburn  mg (11/17/23 9808)    cefepime  1,000 mg Intravenous Q24H Carter Wilburn MD 1,000 mg (11/17/23 7891)    cholecalciferol  2,000 Units Oral Daily Carter Wilburn MD      clopidogrel  75 mg Oral Daily Carter Wilburn MD      furosemide  20 mg Oral Daily Carter Wilburn MD      guaiFENesin  600 mg Oral Q12H [address] [facility] Paul Gordillo MD      levalbuterol  1.25 mg Nebulization TID Yobany Dangelo PA-C      pantoprazole  20 mg Oral Daily Before Breakfast Carter Wilburn MD      polyethylene glycol  17 g Oral Daily PRN Carter Wilburn MD      potassium chloride  10 mEq Oral Daily Carter Wilburn MD          Today, Patient Was Seen By: Carter Wilburn MD    ** Please Note: Dictation voice to text software may have been used in the creation of this document.  **

## 2023-11-17 NOTE — QUICK NOTE
Spoke with patient's son at bedside, informed him and patient about plan of care and need for further treatment.  -Jass Gilmore MD

## 2023-11-17 NOTE — ASSESSMENT & PLAN NOTE
Wt Readings from Last 3 Encounters:   11/17/23 49.8 kg (109 lb 12.6 oz)   11/01/23 48.1 kg (106 lb)   10/03/23 47.1 kg (103 lb 12.8 oz)     Most recent echo w/LVEF 50% and abnormal diastolic function  Has TAVR  Caution with volume resuscitation  C/w PTA lasix

## 2023-11-17 NOTE — OB LABOR/OXYTOCIN SAFETY PROGRESS
Progress Note - Pulmonary   Patrice Dexter 80 y.o. female MRN: [id number]  Unit/Bed#:  Encounter: [id number]    Assessment/Plan:    Acute hypoxic respiratory failure  Multifocal pneumonia  Severe sepsis secondary to community-acquired pneumonia  Chronic right-sided pleural effusion  Atrial flutter  Chronic diastolic CHF    Pulmonary recommendations/plan:    Currently requiring 4 L nasal cannula. No oxygen requirement at baseline. Titrate oxygen maintain SPO2 greater than or equal to 88%. Pulmonary toilet:  Increase activity as tolerated, out of bed as tolerated, cough and deep breathing exercises encourage, incentive spirometry q.1 hour  Continue cefepime and azithromycin for now given persistent fevers. Follow culture data  Trend WBC, procalcitonin, temps  Continue Xopenex nebs 3 times daily  Continue flutter valve  Pleural effusion stable without any need for thoracentesis at this time    Chief Complaint:    "I'm okay."    Subjective:    Patient was seen and examined today. No overnight events reported. Patient states that she feels like she is making improvements however did have a fever this morning. She feels that her breathing is improved but continues to have cough. Denies wheezing. Denies chest pain or palpitations. Objective:    Vitals: Blood pressure 112/53, pulse 88, temperature (!) 101.1 °F (38.4 °C), temperature source Tympanic, resp. rate (!) 23, height 4' 10" (1.473 m), weight 49.8 kg (109 lb 12.6 oz), SpO2 99 %. 4L NC,Body mass index is 22.95 kg/m².       Intake/Output Summary (Last 24 hours) at 11/17/2023 1247  Last data filed at 11/17/2023 0500  Gross per 24 hour   Intake 790 ml   Output 150 ml   Net 640 ml       Invasive Devices       Peripheral Intravenous Line  Duration             Peripheral IV 11/16/23 Right;Ventral (anterior) Forearm 1 day              Drain  Duration             External Urinary Catheter 1 day                    Physical Exam:     Physical Exam  Vitals and nursing note reviewed. Constitutional:       General: She is not in acute distress. Appearance: Normal appearance. She is ill-appearing. HENT:      Head: Normocephalic and atraumatic. Mouth/Throat:      Mouth: Mucous membranes are moist.      Pharynx: Oropharynx is clear. Cardiovascular:      Rate and Rhythm: Regular rhythm. Tachycardia present. Heart sounds: No murmur heard. Pulmonary:      Effort: Pulmonary effort is normal.      Breath sounds: Rales present. No wheezing. Skin:     General: Skin is warm and dry. Neurological:      General: No focal deficit present. Mental Status: She is alert. Mental status is at baseline. Psychiatric:         Mood and Affect: Mood normal.         Behavior: Behavior normal.         Labs: I have personally reviewed pertinent lab results. , ABG: No results found for: "PHART", "IGP1FQX", "PO2ART", "ASW3OTI", "I0NLDEVM", "BEART", "SOURCE", BNP: No results found for: "BNP", CBC:   Lab Results   Component Value Date    WBC 10.11 11/17/2023    HGB 10.6 (L) 11/17/2023    HCT 33.7 (L) 11/17/2023    MCV 91 11/17/2023     (L) 11/17/2023    RBC 3.72 (L) 11/17/2023    MCH 28.5 11/17/2023    MCHC 31.5 11/17/2023    RDW 18.5 (H) 11/17/2023    MPV 9.7 11/17/2023   , CMP:   Lab Results   Component Value Date    SODIUM 136 11/17/2023    K 3.7 11/17/2023     11/17/2023    CO2 23 11/17/2023    BUN 33 (H) 11/17/2023    CREATININE 1.40 (H) 11/17/2023    CALCIUM 9.1 11/17/2023    AST 23 11/17/2023    ALT 17 11/17/2023    ALKPHOS 60 11/17/2023    EGFR 33 11/17/2023   , PT/INR: No results found for: "PT", "INR", Troponin: No results found for: "TROPONINI"    Imaging and other studies: I have personally reviewed pertinent reports.    and I have personally reviewed pertinent films in PACS

## 2023-11-17 NOTE — ASSESSMENT & PLAN NOTE
Chronic condition given CKD, hemoglobin 12.4->10.6 likely in the setting of volume expansion as there is no clinical evidence of acute bleed at this time.   Transfuse for hemoglobin <7

## 2023-11-17 NOTE — ASSESSMENT & PLAN NOTE
Overnight hospital day #1-2 patient had hypotension with minimum MAP of 49 in the setting of sepsis and diltiazem plus metoprolol plus furosemide use  Holding diltiazem and metoprolol for preservation of end organ perfusion pressure and preservation of tachycardia response to hypotension metoprolol may suppress.   Will continue furosemide as we have administered an additional 750 mL bolus overnight and do not wish to precipitate CHF exacerbation on top of her pneumonia  Strict BP monitoring  No pressor requirements thus far

## 2023-11-18 LAB
ANION GAP SERPL CALCULATED.3IONS-SCNC: 11 MMOL/L
ANISOCYTOSIS BLD QL SMEAR: PRESENT
BASOPHILS # BLD MANUAL: 0 THOUSAND/UL (ref 0–0.1)
BASOPHILS NFR MAR MANUAL: 0 % (ref 0–1)
BUN SERPL-MCNC: 45 MG/DL (ref 5–25)
CALCIUM SERPL-MCNC: 8.3 MG/DL (ref 8.4–10.2)
CHLORIDE SERPL-SCNC: 103 MMOL/L (ref 96–108)
CO2 SERPL-SCNC: 18 MMOL/L (ref 21–32)
CREAT SERPL-MCNC: 1.51 MG/DL (ref 0.6–1.3)
EOSINOPHIL # BLD MANUAL: 0.27 THOUSAND/UL (ref 0–0.4)
EOSINOPHIL NFR BLD MANUAL: 3 % (ref 0–6)
ERYTHROCYTE [DISTWIDTH] IN BLOOD BY AUTOMATED COUNT: 18.6 % (ref 11.6–15.1)
GFR SERPL CREATININE-BSD FRML MDRD: 30 ML/MIN/1.73SQ M
GLUCOSE SERPL-MCNC: 106 MG/DL (ref 65–140)
HCT VFR BLD AUTO: 29.5 % (ref 34.8–46.1)
HGB BLD-MCNC: 9.4 G/DL (ref 11.5–15.4)
LYMPHOCYTES # BLD AUTO: 0.09 THOUSAND/UL (ref 0.6–4.47)
LYMPHOCYTES # BLD AUTO: 1 % (ref 14–44)
MAGNESIUM SERPL-MCNC: 2.1 MG/DL (ref 1.9–2.7)
MCH RBC QN AUTO: 29.1 PG (ref 26.8–34.3)
MCHC RBC AUTO-ENTMCNC: 31.9 G/DL (ref 31.4–37.4)
MCV RBC AUTO: 91 FL (ref 82–98)
MONOCYTES # BLD AUTO: 0.27 THOUSAND/UL (ref 0–1.22)
MONOCYTES NFR BLD: 3 % (ref 4–12)
NEUTROPHILS # BLD MANUAL: 8.42 THOUSAND/UL (ref 1.85–7.62)
NEUTS BAND NFR BLD MANUAL: 16 % (ref 0–8)
NEUTS SEG NFR BLD AUTO: 77 % (ref 43–75)
OVALOCYTES BLD QL SMEAR: PRESENT
PLATELET # BLD AUTO: 115 THOUSANDS/UL (ref 149–390)
PLATELET BLD QL SMEAR: ABNORMAL
PMV BLD AUTO: 9.8 FL (ref 8.9–12.7)
POTASSIUM SERPL-SCNC: 4.6 MMOL/L (ref 3.5–5.3)
PROCALCITONIN SERPL-MCNC: 18.85 NG/ML
RBC # BLD AUTO: 3.23 MILLION/UL (ref 3.81–5.12)
RBC MORPH BLD: PRESENT
SODIUM SERPL-SCNC: 132 MMOL/L (ref 135–147)
WBC # BLD AUTO: 9.05 THOUSAND/UL (ref 4.31–10.16)

## 2023-11-18 PROCEDURE — 85027 COMPLETE CBC AUTOMATED: CPT | Performed by: FAMILY MEDICINE

## 2023-11-18 PROCEDURE — 97167 OT EVAL HIGH COMPLEX 60 MIN: CPT

## 2023-11-18 PROCEDURE — 80048 BASIC METABOLIC PNL TOTAL CA: CPT | Performed by: FAMILY MEDICINE

## 2023-11-18 PROCEDURE — 85007 BL SMEAR W/DIFF WBC COUNT: CPT | Performed by: FAMILY MEDICINE

## 2023-11-18 PROCEDURE — 83735 ASSAY OF MAGNESIUM: CPT | Performed by: FAMILY MEDICINE

## 2023-11-18 PROCEDURE — 94664 DEMO&/EVAL PT USE INHALER: CPT

## 2023-11-18 PROCEDURE — 84145 PROCALCITONIN (PCT): CPT | Performed by: FAMILY MEDICINE

## 2023-11-18 PROCEDURE — 99223 1ST HOSP IP/OBS HIGH 75: CPT | Performed by: INTERNAL MEDICINE

## 2023-11-18 PROCEDURE — 97535 SELF CARE MNGMENT TRAINING: CPT

## 2023-11-18 PROCEDURE — 94760 N-INVAS EAR/PLS OXIMETRY 1: CPT

## 2023-11-18 PROCEDURE — 97116 GAIT TRAINING THERAPY: CPT | Performed by: PHYSICAL THERAPIST

## 2023-11-18 PROCEDURE — 94640 AIRWAY INHALATION TREATMENT: CPT

## 2023-11-18 PROCEDURE — 99232 SBSQ HOSP IP/OBS MODERATE 35: CPT | Performed by: FAMILY MEDICINE

## 2023-11-18 PROCEDURE — 94668 MNPJ CHEST WALL SBSQ: CPT

## 2023-11-18 PROCEDURE — 97163 PT EVAL HIGH COMPLEX 45 MIN: CPT | Performed by: PHYSICAL THERAPIST

## 2023-11-18 RX ADMIN — METOPROLOL TARTRATE 25 MG: 25 TABLET, FILM COATED ORAL at 21:50

## 2023-11-18 RX ADMIN — METOPROLOL TARTRATE 25 MG: 25 TABLET, FILM COATED ORAL at 08:54

## 2023-11-18 RX ADMIN — APIXABAN 2.5 MG: 2.5 TABLET, FILM COATED ORAL at 08:54

## 2023-11-18 RX ADMIN — CLOPIDOGREL 75 MG: 75 TABLET ORAL at 08:53

## 2023-11-18 RX ADMIN — LEVALBUTEROL HYDROCHLORIDE 1.25 MG: 1.25 SOLUTION RESPIRATORY (INHALATION) at 07:30

## 2023-11-18 RX ADMIN — GUAIFENESIN 600 MG: 600 TABLET ORAL at 08:53

## 2023-11-18 RX ADMIN — AZITHROMYCIN MONOHYDRATE 500 MG: 500 INJECTION, POWDER, LYOPHILIZED, FOR SOLUTION INTRAVENOUS at 10:45

## 2023-11-18 RX ADMIN — Medication 2000 UNITS: at 08:53

## 2023-11-18 RX ADMIN — LEVALBUTEROL HYDROCHLORIDE 1.25 MG: 1.25 SOLUTION RESPIRATORY (INHALATION) at 13:20

## 2023-11-18 RX ADMIN — ALPRAZOLAM 0.25 MG: 0.25 TABLET ORAL at 01:40

## 2023-11-18 RX ADMIN — ATORVASTATIN CALCIUM 20 MG: 10 TABLET, FILM COATED ORAL at 16:15

## 2023-11-18 RX ADMIN — CEFEPIME HYDROCHLORIDE 1000 MG: 1 INJECTION, SOLUTION INTRAVENOUS at 08:54

## 2023-11-18 RX ADMIN — FUROSEMIDE 20 MG: 20 TABLET ORAL at 08:53

## 2023-11-18 RX ADMIN — LEVALBUTEROL HYDROCHLORIDE 1.25 MG: 1.25 SOLUTION RESPIRATORY (INHALATION) at 20:25

## 2023-11-18 RX ADMIN — POTASSIUM CHLORIDE 10 MEQ: 750 TABLET, EXTENDED RELEASE ORAL at 08:53

## 2023-11-18 RX ADMIN — GUAIFENESIN 600 MG: 600 TABLET ORAL at 21:50

## 2023-11-18 RX ADMIN — PANTOPRAZOLE SODIUM 20 MG: 20 TABLET, DELAYED RELEASE ORAL at 06:41

## 2023-11-18 NOTE — CONSULTS
Consultation - Nephrology   Amber Romero 80 y.o. female MRN: [id number]  Unit/Bed#:  Encounter: [id number]      A/P:  1. Acute kidney injury POA   - baseline creatinine 0.9mg./dl --> 1.51 mg/d   - UA 1.010 trace protein   - etiology ATN due to sepsis, volume depletion. - check urine electrolyte   - agree with modest FR   - If urine studies suggest prerenal indices would give gentle saline with oral fluid restriction   - check kidney ultrasound   - daily studies, avoid contrast and nephrotoxins     2. Hyponatremia   - serum sodium is at 132 mmol/liter which is dec but safe   - continue fluid restriction   - has a history of SIADH   - currently has pneumonia / nonosmotic manner   - check PVR   - received normal saline bolus with iinitial improvement to 136     3. Sepsis   - contributing to above   - had tachycardia, fever and leukocytosis   - elev procal and lactic acidosis    - CXR RLL and RUL pneumonia    - receiving ceftriaxone and azithromycin   - blood cultures neg    4. Right pleural effusion   - has been stable    5. Acute hypoxic respiratory failure   - comfortable at 92% on 2 lpm    6. Chronic diastolic HF   - Examines euvolemic /dry side    7. Anemia   - hemoglobin currently 9.4 g/dl          Thank you for allowing us to participate in the care of your patient. Please feel free to contact us regarding the care of this patient, or any other questions/concerns that may be applicable.     Patient Active Problem List   Diagnosis    Hypomagnesemia    Hypokalemia    Sepsis due to pneumonia ([address])    Hyponatremia    Essential hypertension    Lymphadenopathy    Hiatal hernia    Thrombocytopenia (HCC)    Mitral valve insufficiency    Atrial tachycardia    Pulmonary hypertension (HCC)    Diverticulosis    Non-rheumatic tricuspid valve insufficiency    Generalized anxiety disorder    Mid back pain    Encounter for Medicare annual wellness exam    Encounter for long-term (current) use of medications Immunization due    Acute bursitis of right shoulder    Impaired fasting glucose    Osteopenia of neck of femur    Chronic bilateral low back pain without sciatica    Gait abnormality    Chronic pain syndrome    Sacroiliitis (HCC)    Bilateral leg edema    Ganglion cyst    Adhesive capsulitis of right shoulder    Generalized weakness    Dyslipidemia    Post-menopause    Primary generalized (osteo)arthritis    Chronic diastolic (congestive) heart failure (HCC)    Acute hypoxic respiratory failure (HCC)    Constipation    Status post insertion of drug eluting coronary artery stent    S/P TAVR (transcatheter aortic valve replacement)    Permanent atrial fibrillation (HCC)    Incomplete right bundle branch block    Coronary artery disease involving native coronary artery of native heart without angina pectoris    Anemia    Graves disease    Closed wedge compression fracture of T11 vertebra (HCC)    Age-related osteoporosis with current pathological fracture of vertebra (HCC)    Moderate protein-calorie malnutrition (HCC)    Pleural effusion on right    Tricuspid valve insufficiency    Prolonged QT interval    SIADH (syndrome of inappropriate 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[address])    Vitamin D deficiency    Atrial flutter ([address])    Gastroesophageal reflux disease without esophagitis    Centromere antibody positive    Encounter for immunization    S/P angioplasty with stent    Hypotension    Acute-on-chronic kidney injury        History of Present Illness   Physician Requesting Consult: Fabiana Zuleta MD  Reason for Consult / Principal Problem: acute kidney injury and hyponatremia  Hx and PE limited by:   HPI: Yesenia Barron is a 80y.o. year old female who presents with a creatinine of 1.51 mg/dl which is an increase from a baseline of 0.9 mg/dl and hyponatremia. Her son says that she lives with her 80year old  and was eating regularly and drinking 40 ounces of water as per fluid restriction.  He states that she wasn't feeling well Wed night and then had chills Thursday morning with shaking and dyspnea. She was diagnosed with pneumonia and admitted to the ICU. She has a history of chronic diastolic HF on furosemide 20mg daily and chronic mild hyponatremia on fluid restriction. SSRI had been discontinued. Current sodium is 132 mmol/litr  Has a history of atrial flutter, aortic stenosis s/p TAVR,   MR, TI, chronic diastolic HF    History obtained from chart review, the patient, and son    Constitutional Has fatigue, chills, weakness, dyspnea, Denies abdominal pain, NVD or dysuria, has abnormal gait. No stroke or TIA symptoms. .    Historical Information   Past Medical History:   Diagnosis Date    Acute on chronic diastolic CHF (congestive heart failure) ([address]) 01/30/2023    Acute respiratory failure with hypoxia (HCC) 12/22/2022    Aortic stenosis     Atrial fibrillation (HCC)     Atrial flutter (HCC)     CAD (coronary artery disease) 01/11/2023    CAD/PCI/ELODIA    CKD (chronic kidney disease), stage III ([address])     Community acquired pneumonia of left upper lobe of lung 05/17/2019    Fatigue     Full dentures     Generalized anxiety disorder     Hyperlipidemia     Hypertension     Impaired fasting glucose     Mitral regurgitation     Pneumonia     RBBB     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    TRANSFEMORAL W/ 23MM CALVERT SHAVONNE S3 ULTRA VALVE(ACCESS ON LEFT)    SVT (supraventricular tachycardia)     Tricuspid regurgitation      Past Surgical History:   Procedure Laterality Date    CARDIAC CATHETERIZATION N/A 1/11/2023    Procedure: LHC-Cardiac catheterization;  Surgeon: Desiree Ascencio MD;  Location: [facility]; Service: Cardiology    CARDIAC CATHETERIZATION N/A 1/11/2023    Procedure: Cardiac pci;  Surgeon: Desiree Ascencio MD;  Location: [facility];   Service: Cardiology    CARDIAC CATHETERIZATION N/A 1/17/2023    Procedure: CARDIAC TAVR;  Surgeon: Mirta Lin DO;  Location: [facility]; Service: Cardiology    DENTAL SURGERY Bilateral     ALL TEETH REMOVED    FL GUIDED NEEDLE PLAC BX/ASP/INJ  5/26/2021    IR THORACENTESIS  12/20/2022    IR THORACENTESIS  7/14/2023    IR THORACENTESIS  9/1/2023    IR THORACENTESIS  9/25/2023    JOINT REPLACEMENT Left     KNEE SURGERY      left knee replacement    WY INJECT SI JOINT ARTHRGRPHY&/ANES/STEROID W/JOHNATHON Bilateral 5/26/2021    Procedure: SACROILIAC JOINT INJECTION;  Surgeon: Joanne Rodriguez MD;  Location: [facility];  Service: Pain Management     WY REPLACE AORTIC VALVE OPENFEMORAL ARTERY APPROACH N/A 1/17/2023    Procedure: REPLACEMENT AORTIC VALVE TRANSCATHETER (TAVR) TRANSFEMORAL W/ 23MM CALVERT SHAVONNE S3 ULTRA VALVE(ACCESS ON LEFT) ALENA;  Surgeon: Elmer Barr DO;  Location:  MAIN OR;  Service: Cardiac Surgery    SACROILIAC JOINT INJECTION Bilateral 6/7/2023    Procedure: Bilateral sacroiliac joint steroid injection;  Surgeon: Raymundo Banuelos MD;  Location: [facility];  Service: Pain Management      Social History   Social History     Substance and Sexual Activity   Alcohol Use Never     Social History     Substance and Sexual Activity   Drug Use Never     Social History     Tobacco Use   Smoking Status Never   Smokeless Tobacco Never     Family History   Problem Relation Age of Onset    Hypertension Mother     Asthma Father         [place]'s asthma    Alzheimer's disease Sister     Rectal cancer Son     Uterine cancer Daughter     Heart disease Sister     Heart disease Brother        Meds/Allergies   all current active meds have been reviewed, current meds:   Current Facility-Administered Medications   Medication Dose Route Frequency    acetaminophen (TYLENOL) tablet 975 mg  975 mg Oral Q6H PRN    ALPRAZolam (XANAX) tablet 0.25 mg  0.25 mg Oral TID PRN    atorvastatin (LIPITOR) tablet 20 mg  20 mg Oral Daily With Dinner    azithromycin (ZITHROMAX) 500 mg in sodium chloride 0.9 % 250 mL IVPB  500 mg Intravenous Q24H    cefepime (MAXIPIME) IVPB (premix in dextrose) 1,000 mg 50 mL  1,000 mg Intravenous Q24H    cholecalciferol (VITAMIN D3) tablet 2,000 Units  2,000 Units Oral Daily    clopidogrel (PLAVIX) tablet 75 mg  75 mg Oral Daily    guaiFENesin (MUCINEX) 12 hr tablet 600 mg  600 mg Oral Q12H NII    levalbuterol (XOPENEX) inhalation solution 1.25 mg  1.25 mg Nebulization TID    metoprolol tartrate (LOPRESSOR) tablet 25 mg  25 mg Oral Q12H NII    pantoprazole (PROTONIX) EC tablet 20 mg  20 mg Oral Daily Before Breakfast    polyethylene glycol (MIRALAX) packet 17 g  17 g Oral Daily PRN    potassium chloride (K-DUR,KLOR-CON) CR tablet 10 mEq  10 mEq Oral Daily    and PTA meds:    Medications Prior to Admission   Medication    alendronate (Fosamax) 70 mg tablet    ALPRAZolam (XANAX) 0.25 mg tablet    ammonium lactate (LAC-HYDRIN) 12 % lotion    apixaban (Eliquis) 2.5 mg    atorvastatin (LIPITOR) 20 mg tablet    Cholecalciferol (Vitamin D3) 50 MCG (2000 UT) TABS    clopidogrel (PLAVIX) 75 mg tablet    diltiazem (CARDIZEM) 30 mg tablet    furosemide (LASIX) 20 mg tablet    metoprolol succinate (TOPROL-XL) 50 mg 24 hr tablet    Multiple Vitamin (Multivitamin Adult) TABS    Nutritional Supplement LIQD    pantoprazole (PROTONIX) 20 mg tablet    potassium chloride (K-DUR,KLOR-CON) 10 mEq tablet    bisacodyl (DULCOLAX) 10 mg suppository    potassium chloride (Klor-Con) 10 mEq tablet    Psyllium (METAMUCIL FIBER) 51.7 % PACK         No Known Allergies    Objective     Intake/Output Summary (Last 24 hours) at 11/18/2023 1117  Last data filed at 11/18/2023 1049  Gross per 24 hour   Intake 1450 ml   Output --   Net 1450 ml       Invasive Devices:        Physical Exam  Constitutional:       General: She is in acute distress. Appearance: She is well-developed. She is ill-appearing. She is not diaphoretic. HENT:      Head: Normocephalic and atraumatic.       Right Ear: External ear normal.      Left Ear: External ear normal.      Nose: Nose normal.   Eyes: Conjunctiva/sclera: Conjunctivae normal.      Pupils: Pupils are equal, round, and reactive to light. Neck:      Vascular: No JVD. Cardiovascular:      Rate and Rhythm: Regular rhythm. Tachycardia present. Heart sounds: Normal heart sounds. No murmur heard. Pulmonary:      Effort: Pulmonary effort is normal. No respiratory distress. Breath sounds: Decreased breath sounds present. No wheezing or rales. Comments: Dec BS  Abdominal:      General: Bowel sounds are normal. There is no distension. Palpations: Abdomen is soft. Tenderness: There is no abdominal tenderness. There is no guarding or rebound. Musculoskeletal:         General: Normal range of motion. Right lower leg: No edema. Left lower leg: No edema. Skin:     General: Skin is warm and dry. Capillary Refill: Capillary refill takes less than 2 seconds. Coloration: Skin is pale. Neurological:      General: No focal deficit present. Mental Status: She is alert and oriented to person, place, and time. Psychiatric:         Mood and Affect: Mood normal.         Behavior: Behavior normal.           I/O last 3 completed shifts: In: 4234 [P.O.:570; IV Piggyback:1050]  Out: 100 [Urine:100]    Vitals:    11/18/23 0920   BP:    Pulse:    Resp:    Temp: 98.7 °F (37.1 °C)   SpO2:              Current Weight: Weight - Scale: 50.2 kg (110 lb 10.7 oz)  First Weight: Weight - Scale: 48 kg (105 lb 13.1 oz)    Lab Results:  I have personally reviewed pertinent labs.     CBC:   Lab Results   Component Value Date    WBC 9.05 11/18/2023    HGB 9.4 (L) 11/18/2023    HCT 29.5 (L) 11/18/2023    MCV 91 11/18/2023     (L) 11/18/2023    RBC 3.23 (L) 11/18/2023    MCH 29.1 11/18/2023    MCHC 31.9 11/18/2023    RDW 18.6 (H) 11/18/2023    MPV 9.8 11/18/2023     CMP:   Lab Results   Component Value Date    K 4.6 11/18/2023     11/18/2023    CO2 18 (L) 11/18/2023    BUN 45 (H) 11/18/2023    CREATININE 1.51 (H) 11/18/2023    CALCIUM 8.3 (L) 11/18/2023    EGFR 30 11/18/2023     Phosphorus: No results found for: "PHOS"  Magnesium:   Lab Results   Component Value Date    MG 2.1 11/18/2023     Urinalysis: No results found for: "COLORU", "CLARITYU", "Andre Heritage", "PHUR", "LEUKOCYTESUR", "NITRITE", "Lucretia Ochoa", "GLUCOSEU", "Milon Dellen", "Ike Sagu[place]", "BLOODU"  Ionized Calcium: No results found for: "CAION"  Coagulation: No results found for: "PT", "INR", "APTT"  Troponin: No results found for: "TROPONINI"  ABG: No results found for: "PHART", "MRY0ACA", "PO2ART", "PCW9LOZ", "N9WLEWZZ", "BEART", "SOURCE"    Results from last 7 days   Lab Units 11/18/23  0643 11/17/23  0503 11/16/23  0840   POTASSIUM mmol/L 4.6 3.7 4.5   CHLORIDE mmol/L 103 101 98   CO2 mmol/L 18* 23 24   BUN mg/dL 45* 33* 28*   CREATININE mg/dL 1.51* 1.40* 0.98   CALCIUM mg/dL 8.3* 9.1 10.0   ALK PHOS U/L  --  60 93   ALT U/L  --  17 19   AST U/L  --  23 44*       Radiology review:  Procedure: CT chest without contrast    Result Date: 11/16/2023  Narrative: CT CHEST WITHOUT IV CONTRAST INDICATION:   pneumonia. COMPARISON: CT chest 6/11/2023. Same day chest radiograph. TECHNIQUE: CT examination of the chest was performed without intravenous contrast. Multiplanar 2D reformatted images were created from the source data. This examination, like all CT scans performed in the [facility], was performed utilizing techniques to minimize radiation dose exposure, including the use of iterative reconstruction and automated exposure control. Radiation dose length product (DLP) for this visit:  191 mGy-cm FINDINGS: LUNGS: Multifocal consolidation with air bronchogram involving all lobes of the right lung. This is most severe at the level of the right upper lobe and superior segment of the right lower lobe. Mild infiltrates are seen in the right middle lobe.  Subtle groundglass opacities are demonstrated at the level of the left upper lobe with some areas of sparing which could reflect air trapping. Assessment of lingula and left lower lobe is limited due to motion. Compressive atelectasis at the right lower lobe. Bilateral dependent atelectasis. There is no tracheal or endobronchial lesion. PLEURA: Moderate right pleural effusion, decreased since 6/11/2023. There could be some loculation of the right effusion. Trace left pleural effusion. HEART/GREAT VESSELS: Heart is enlarged. Moderate coronary artery calcification. Atherosclerotic changes of the ascending and descending aorta as well as the visualized portions of the abdominal aorta. TAVR. No thoracic aortic aneurysm. MEDIASTINUM AND KEIRY: There is enlargement of paratracheal nodes compared to the previous exam which in the setting of pneumonia most likely reflects reactive adenopathy. Short-axis of lymph nodes is 1.2 cm series 2 image 31. The presence of hilar adenopathy cannot be assessed without contrast. Calcified left hilar nodes. CHEST WALL AND LOWER NECK:  Unremarkable. VISUALIZED STRUCTURES IN THE UPPER ABDOMEN: Thickening of the adrenal glands. OSSEOUS STRUCTURES: Degenerative changes of the spine. Relatively stable superior endplate compression deformity of T11 with approximately 50% decrease in vertebral body height. No acute fracture or destructive osseous lesion. Impression: 1. Multifocal pneumonia in the right lung with reactive adenopathy. Follow-up to ensure complete resolution recommended. 2. Moderate right pleural effusion with associated compressive atelectasis, mildly improved since 6/11/2023. Trace left pleural effusion. 3. Mild groundglass opacities in the left upper lobe. The study was marked in EPIC for significant notification. [facility] Resident: Kris Hobson, the attending radiologist, have reviewed the images and agree with the final report above. Workstation performed: [id number]     Procedure: XR chest portable    Result Date: 11/16/2023  Narrative: CHEST INDICATION:   hypoxia, cough.  COMPARISON: Multiple chest radiographs, most recent dated 9/13/2023, CT thorax June 11, 2023 EXAM PERFORMED/VIEWS:  XR CHEST PORTABLE FINDINGS: Heart shadow is enlarged but unchanged from prior exam. TAVR. Large infiltrate is demonstrated involving the right upper and midlung field in a perihilar distribution. Findings suggest development of pneumonia. Moderate right-sided pleural effusion, improved from prior. No pneumothorax. Osseous structures appear within normal limits for patient age. Impression: Development of large infiltrate in the right lung with interval decrease in size of right effusion. Postobstructive pneumonia cannot be excluded. Consider short interval follow-up with chest x-ray or evaluation with CT chest with contrast. Study was marked in Epic for follow-up notification. Resident: Sandeep Montero, the attending radiologist, have reviewed the images and agree with the final report above. Workstation performed: [id number]         EKG, Pathology, and Other Studies: I personally reviewed the CXR demon. Pleural effusion on right and RUL infiltrate      Counseling / Coordination of Care  Total ADDITIONAL floor / unit time spent today 40 minutes. Greater than 50% of total time was spent with the patient and / or family counseling and / or coordination of care. A description of the counseling / coordination of care: discussed with Son. Will discuss with hospitalist    Rosa Zhang MD      This consultation note was produced in part using a dictation device which may document imprecise wording from author's original intent.

## 2023-11-18 NOTE — PLAN OF CARE
Problem: PHYSICAL THERAPY ADULT  Goal: Performs mobility at highest level of function for planned discharge setting. See evaluation for individualized goals. Description: Treatment/Interventions: ADL retraining, Functional transfer training, Bed mobility, Gait training, LE strengthening/ROM, Elevations, Therapeutic exercise          See flowsheet documentation for full assessment, interventions and recommendations. Note: Prognosis: Fair  Problem List: Decreased strength, Decreased range of motion, Decreased endurance, Impaired balance, Decreased mobility  Assessment: Pt is 80 y.o. female seen for PT evaluation s/p admit to [address] [place] [address] on 11/16/2023 w/ Acute hypoxic respiratory failure ([address] PT consulted to assess pt's functional mobility and d/c needs. Order placed for PT eval and tx, w/ up and OOB as tolerated order. Comorbidities affecting pt's physical performance at time of assessment include,  Acute hypoxic respiratory failure ([address]), see above for additional past medical history PTA, pt was independent w/ all functional mobility w/ RW . Personal factors affecting pt at time of IE include: ambulating w/ assistive device, inability to ambulate household distances, inability to navigate community distances, inability to navigate level surfaces w/o external assistance, unable to perform dynamic tasks in community, decreased cognition, limited home support, limited insight into impairments, inability to perform IADLs, inability to perform ADLs, and inability to live alone. Please find objective findings from PT assessment regarding body systems outlined above with impairments and limitations including weakness, decreased ROM, decreased endurance, gait deviations, pain, decreased activity tolerance, decreased functional mobility tolerance, decreased safety awareness, and fall risk.  Pt to benefit from continued PT tx to address deficits as defined above and maximize level of functional independent mobility and consistency. From PT/mobility standpoint, recommendation at time of d/c would be Level II moderate resource intensity, pending progress in order to facilitate return to PLOF. Rehab Resource Intensity Level, PT: II (Moderate Resource Intensity)    See flowsheet documentation for full assessment.

## 2023-11-18 NOTE — ASSESSMENT & PLAN NOTE
Lab Results   Component Value Date    EGFR 30 11/18/2023    EGFR 33 11/17/2023    EGFR 51 11/16/2023    CREATININE 1.51 (H) 11/18/2023    CREATININE 1.40 (H) 11/17/2023    CREATININE 0.98 11/16/2023     Renal function continues to worsen  Check Urine electrolytes  Initiate retention protocol  Nephro consult

## 2023-11-18 NOTE — ASSESSMENT & PLAN NOTE
DIAGNOSIS: fever, tachycardia, leukocytosis, large multifocal R-sided pneumonia noted on CXR and CT chest wo contrast. Was noted to have lactic acidosis which resolved with fluid administration. Hospital day #1-2 overnight had hypotension indicating likely worsening of sepsis.   Speech/swallow evaluated, no evidence of dysphagia with which to cause aspiration  Continue ceftriaxone and azithromycin  Urine Alex antigens negative  Blood cultures negative  Pulmonary input appreciated

## 2023-11-18 NOTE — PLAN OF CARE
Problem: CARDIOVASCULAR - ADULT  Goal: Maintains optimal cardiac output and hemodynamic stability  Description: INTERVENTIONS:  - Monitor I/O, vital signs and rhythm  - Monitor for S/S and trends of decreased cardiac output  - Administer and titrate ordered vasoactive medications to optimize hemodynamic stability  - Assess quality of pulses, skin color and temperature  - Assess for signs of decreased coronary artery perfusion  - Instruct patient to report change in severity of symptoms  Outcome: Progressing  Goal: Absence of cardiac dysrhythmias or at baseline rhythm  Description: INTERVENTIONS:  - Continuous cardiac monitoring, vital signs, obtain 12 lead EKG if ordered  - Administer antiarrhythmic and heart rate control medications as ordered  - Monitor electrolytes and administer replacement therapy as ordered  Outcome: Progressing     Problem: RESPIRATORY - ADULT  Goal: Achieves optimal ventilation and oxygenation  Description: INTERVENTIONS:  - Assess for changes in respiratory status  - Assess for changes in mentation and behavior  - Position to facilitate oxygenation and minimize respiratory effort  - Oxygen administered by appropriate delivery if ordered  - Initiate smoking cessation education as indicated  - Encourage broncho-pulmonary hygiene including cough, deep breathe, Incentive Spirometry  - Assess the need for suctioning and aspirate as needed  - Assess and instruct to report SOB or any respiratory difficulty  - Respiratory Therapy support as indicated  Outcome: Progressing     Problem: METABOLIC, FLUID AND ELECTROLYTES - ADULT  Goal: Electrolytes maintained within normal limits  Description: INTERVENTIONS:  - Monitor labs and assess patient for signs and symptoms of electrolyte imbalances  - Administer electrolyte replacement as ordered  - Monitor response to electrolyte replacements, including repeat lab results as appropriate  - Instruct patient on fluid and nutrition as appropriate  Outcome: Progressing  Goal: Fluid balance maintained  Description: INTERVENTIONS:  - Monitor labs   - Monitor I/O and WT  - Instruct patient on fluid and nutrition as appropriate  - Assess for signs & symptoms of volume excess or deficit  Outcome: Progressing     Problem: MUSCULOSKELETAL - ADULT  Goal: Maintain or return mobility to safest level of function  Description: INTERVENTIONS:  - Assess patient's ability to carry out ADLs; assess patient's baseline for ADL function and identify physical deficits which impact ability to perform ADLs (bathing, care of mouth/teeth, toileting, grooming, dressing, etc.)  - Assess/evaluate cause of self-care deficits   - Assess range of motion  - Assess patient's mobility  - Assess patient's need for assistive devices and provide as appropriate  - Encourage maximum independence but intervene and supervise when necessary  - Involve family in performance of ADLs  - Assess for home care needs following discharge   - Consider OT consult to assist with ADL evaluation and planning for discharge  - Provide patient education as appropriate  Outcome: Progressing     Problem: Prexisting or High Potential for Compromised Skin Integrity  Goal: Skin integrity is maintained or improved  Description: INTERVENTIONS:  - Identify patients at risk for skin breakdown  - Assess and monitor skin integrity  - Assess and monitor nutrition and hydration status  - Monitor labs   - Assess for incontinence   - Turn and reposition patient  - Assist with mobility/ambulation  - Relieve pressure over bony prominences  - Avoid friction and shearing  - Provide appropriate hygiene as needed including keeping skin clean and dry  - Evaluate need for skin moisturizer/barrier cream  - Collaborate with interdisciplinary team   - Patient/family teaching  - Consider wound care consult   Outcome: Progressing     Problem: PAIN - ADULT  Goal: Verbalizes/displays adequate comfort level or baseline comfort level  Description: Interventions:  - Encourage patient to monitor pain and request assistance  - Assess pain using appropriate pain scale  - Administer analgesics based on type and severity of pain and evaluate response  - Implement non-pharmacological measures as appropriate and evaluate response  - Consider cultural and social influences on pain and pain management  - Notify physician/advanced practitioner if interventions unsuccessful or patient reports new pain  Outcome: Progressing     Problem: INFECTION - ADULT  Goal: Absence or prevention of progression during hospitalization  Description: INTERVENTIONS:  - Assess and monitor for signs and symptoms of infection  - Monitor lab/diagnostic results  - Monitor all insertion sites, i.e. indwelling lines, tubes, and drains  - Monitor endotracheal if appropriate and nasal secretions for changes in amount and color  - [place] appropriate cooling/warming therapies per order  - Administer medications as ordered  - Instruct and encourage patient and family to use good hand hygiene technique  - Identify and instruct in appropriate isolation precautions for identified infection/condition  Outcome: Progressing  Goal: Absence of fever/infection during neutropenic period  Description: INTERVENTIONS:  - Monitor WBC    Outcome: Progressing     Problem: DISCHARGE PLANNING  Goal: Discharge to home or other facility with appropriate resources  Description: INTERVENTIONS:  - Identify barriers to discharge w/patient and caregiver  - Arrange for needed discharge resources and transportation as appropriate  - Identify discharge learning needs (meds, wound care, etc.)  - Arrange for interpretive services to assist at discharge as needed  - Refer to Case Management Department for coordinating discharge planning if the patient needs post-hospital services based on physician/advanced practitioner order or complex needs related to functional status, cognitive ability, or social support system  Outcome: Progressing     Problem: Knowledge Deficit  Goal: Patient/family/caregiver demonstrates understanding of disease process, treatment plan, medications, and discharge instructions  Description: Complete learning assessment and assess knowledge base. Interventions:  - Provide teaching at level of understanding  - Provide teaching via preferred learning methods  Outcome: Progressing     Problem: Nutrition/Hydration-ADULT  Goal: Nutrient/Hydration intake appropriate for improving, restoring or maintaining nutritional needs  Description: Monitor and assess patient's nutrition/hydration status for malnutrition. Collaborate with interdisciplinary team and initiate plan and interventions as ordered. Monitor patient's weight and dietary intake as ordered or per policy. Utilize nutrition screening tool and intervene as necessary. Determine patient's food preferences and provide high-protein, high-caloric foods as appropriate.      INTERVENTIONS:  - Monitor oral intake, urinary output, labs, and treatment plans  - Assess nutrition and hydration status and recommend course of action  - Evaluate amount of meals eaten  - Assist patient with eating if necessary   - Allow adequate time for meals  - Recommend/ encourage appropriate diets, oral nutritional supplements, and vitamin/mineral supplements  - Order, calculate, and assess calorie counts as needed  - Recommend, monitor, and adjust tube feedings and TPN/PPN based on assessed needs  - Assess need for intravenous fluids  - Provide specific nutrition/hydration education as appropriate  - Include patient/family/caregiver in decisions related to nutrition  Outcome: Progressing

## 2023-11-18 NOTE — PROGRESS NOTES
[address]  Progress Note  Name: Indra Maroi  MRN: [id number]  Unit/Bed#:  I Date of Admission: 11/16/2023   Date of Service: 11/18/2023 I Hospital Day: 2    Assessment/Plan   Acute-on-chronic kidney injury   Assessment & Plan  Lab Results   Component Value Date    EGFR 30 11/18/2023    EGFR 33 11/17/2023    EGFR 51 11/16/2023    CREATININE 1.51 (H) 11/18/2023    CREATININE 1.40 (H) 11/17/2023    CREATININE 0.98 11/16/2023     Renal function continues to worsen  Check Urine electrolytes  Initiate retention protocol  Nephro consult     Atrial flutter (HCC)  Assessment & Plan  Resume metoprolol and Cardizem  AC with Eliquis    Anemia  Assessment & Plan  Hemoglobin declined again,  Will hold Eliquis  Check iron panel and stool for occult blood    Thrombocytopenia (HCC)  Assessment & Plan  Platelets decreased, likely in the setting of acute infection  Continue to monitor    Sepsis due to pneumonia [facility])  Assessment & Plan  DIAGNOSIS: fever, tachycardia, leukocytosis, large multifocal R-sided pneumonia noted on CXR and CT chest wo contrast. Was noted to have lactic acidosis which resolved with fluid administration. Hospital day #1-2 overnight had hypotension indicating likely worsening of sepsis. Speech/swallow evaluated, no evidence of dysphagia with which to cause aspiration  Continue ceftriaxone and azithromycin  Urine Alex antigens negative  Blood cultures negative  Pulmonary input appreciated    * Acute hypoxic respiratory failure ([address])  Assessment & Plan  Presented w/SOB on room air, requiring up to 8L and now currently 4L O2 NC to maintain adequate saturations. No Hx COPD, does have Hx CHF.   Improving now requiring 2 L nasal cannula 11/18/2023             Progress Note - Margarita Morillo 80 y.o. female MRN: [id number]    Unit/Bed#:  Encounter: [id number]        Subjective:   Patient seen and examined, feels well without acute complaints   Breathing improved Objective:     Vitals:   Vitals:    11/18/23 0920   BP:    Pulse:    Resp:    Temp: 98.7 °F (37.1 °C)   SpO2:      Body mass index is 23.13 kg/m².     Intake/Output Summary (Last 24 hours) at 11/18/2023 1016  Last data filed at 11/18/2023 0851  Gross per 24 hour   Intake 1270 ml   Output --   Net 1270 ml       Physical Exam:   /74 (BP Location: Left arm)   Pulse 101   Temp 98.7 °F (37.1 °C) (Tympanic)   Resp (!) 26   Ht 4' 10" (1.473 m)   Wt 50.2 kg (110 lb 10.7 oz)   SpO2 92%   BMI 23.13 kg/m²   General appearance: alert and oriented, in no acute distress  Head: NACT  Lungs: diminished, slight ronchi, no rales/wheeze  Heart: regular rate and rhythm, S1, S2 normal, no murmur, click, rub or gallop  Abdomen: soft, non-tender; bowel sounds normal; no masses,  no organomegaly  Extremities: extremities normal, warm and well-perfused; no cyanosis, clubbing, or edema  Pulses: 2+ and symmetric  Neurologic: Grossly normal     Invasive Devices       Peripheral Intravenous Line  Duration             Peripheral IV 11/16/23 Right;Ventral (anterior) Forearm 2 days              Drain  Duration             External Urinary Catheter 2 days                    Results from last 7 days   Lab Units 11/18/23  0643 11/17/23  0503 11/16/23  0840   WBC Thousand/uL 9.05 10.11 11.83*   HEMOGLOBIN g/dL 9.4* 10.6* 12.4   HEMATOCRIT % 29.5* 33.7* 39.9   PLATELETS Thousands/uL 115* 124* 187       Results from last 7 days   Lab Units 11/18/23  0643 11/17/23  0503 11/16/23  0840   POTASSIUM mmol/L 4.6 3.7 4.5   CHLORIDE mmol/L 103 101 98   CO2 mmol/L 18* 23 24   BUN mg/dL 45* 33* 28*   CREATININE mg/dL 1.51* 1.40* 0.98   CALCIUM mg/dL 8.3* 9.1 10.0   ALK PHOS U/L  --  60 93   ALT U/L  --  17 19   AST U/L  --  23 44*       Medication Administration - last 24 hours from 11/17/2023 1016 to 11/18/2023 1016         Date/Time Order Dose Route Action Action by     11/18/2023 0140 EST ALPRAZolam (XANAX) tablet 0.25 mg 0.25 mg Oral Given Tony Wolff, ANDREE     11/18/2023 5894 EST apixaban (ELIQUIS) tablet 2.5 mg 2.5 mg Oral Given Shaun [facility], ANDREE     11/17/2023 1705 EST apixaban (ELIQUIS) tablet 2.5 mg 2.5 mg Oral Given Jacqueline Pita, RN     11/17/2023 1626 EST atorvastatin (LIPITOR) tablet 20 mg 20 mg Oral Given Jacqueline Pita, RN     11/18/2023 0331 EST cholecalciferol (VITAMIN D3) tablet 2,000 Units 2,000 Units Oral Given [facility], RN     11/18/2023 1326 EST clopidogrel (PLAVIX) tablet 75 mg 75 mg Oral Given [facility], ANDREE     11/18/2023 2800 EST furosemide (LASIX) tablet 20 mg 20 mg Oral Given [facility], RN     11/18/2023 4051 EST pantoprazole (PROTONIX) EC tablet 20 mg 20 mg Oral Given Tony Wolff, ANDREE     11/18/2023 2578 EST potassium chloride (K-DUR,KLOR-CON) CR tablet 10 mEq 10 mEq Oral Given [facility], RN     11/18/2023 0853 EST guaiFENesin (MUCINEX) 12 hr tablet 600 mg 600 [facility]     11/17/2023 2115 EST guaiFENesin (MUCINEX) 12 hr tablet 600 mg 600 mg Oral Given Tony Wolff, RN     11/18/2023 0854 EST cefepime (MAXIPIME) IVPB (premix in dextrose) 1,000 mg 50 mL 1,000 mg Intravenous [address] [facility] ANDREE     11/18/2023 0730 EST levalbuterol (Tally Clause) inhalation solution 1.25 mg 1.25 mg Nebulization Given Mata Conley, RT     11/17/2023 2026 EST levalbuterol (XOPENEX) inhalation solution 1.25 mg 1.25 mg Nebulization Not Given Stuart Monreal, RT     11/17/2023 1511 EST levalbuterol (XOPENEX) inhalation solution 1.25 mg 1.25 mg Nebulization Given Olga Hicks     11/17/2023 1223 EST acetaminophen (TYLENOL) tablet 975 mg 975 mg Oral Given Jacqueline Pita, RN     11/17/2023 1409 EST magnesium sulfate 2 g/50 mL IVPB (premix) 2 g 2 g Intravenous [address], RN     11/17/2023 1626 EST potassium chloride (K-DUR,KLOR-CON) CR tablet 40 mEq 40 mEq Oral Given Jacqueline Lmea, ANDREE     11/17/2023 1410 EST diltiazem (CARDIZEM) tablet 30 mg 30 mg Oral Given Burton Juarez RN     11/18/2023 0248 EST metoprolol tartrate (LOPRESSOR) tablet 25 mg 25 mg Oral Given Nora Hastings RN     11/17/2023 1627 EST sodium chloride 0.9 % bolus 250 mL 250 mL Intravenous New Bag Burton Juarez RN     11/17/2023 1933 EST sodium chloride 0.9 % bolus 500 mL 500 mL Intravenous New Bag Raina Samayoa RN              Lab, Imaging and other studies: I have personally reviewed pertinent reports.     VTE Pharmacologic Prophylaxis: holdin eliquis  VTE Mechanical Prophylaxis: sequential compression device     Dashawn Cain MD  11/18/2023,10:16 AM

## 2023-11-18 NOTE — PHYSICAL THERAPY NOTE
Physical Therapy Evaluation     Patient Name: April Anderson ROJASBROB Date: 11/18/2023     Problem List  Principal Problem:    Acute hypoxic respiratory failure ([address])  Active Problems:    Sepsis due to pneumonia ([address])    Hyponatremia    Thrombocytopenia (HCC)    Generalized anxiety disorder    Chronic diastolic (congestive) heart failure (HCC)    Anemia    Atrial flutter (HCC)    Hypotension    Acute-on-chronic kidney injury        Past Medical History  Past Medical History:   Diagnosis Date    Acute on chronic diastolic CHF (congestive heart failure) ([address]) 01/30/2023    Acute respiratory failure with hypoxia (HCC) 12/22/2022    Aortic stenosis     Atrial fibrillation (HCC)     Atrial flutter (HCC)     CAD (coronary artery disease) 01/11/2023    CAD/PCI/ELODIA    CKD (chronic kidney disease), stage III (HCC)     Community acquired pneumonia of left upper lobe of lung 05/17/2019    Fatigue     Full dentures     Generalized anxiety disorder     Hyperlipidemia     Hypertension     Impaired fasting glucose     Mitral regurgitation     Pneumonia     RBBB     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    TRANSFEMORAL W/ 23MM CALVERT SHAVONNE S3 ULTRA VALVE(ACCESS ON LEFT)    SVT (supraventricular tachycardia)     Tricuspid regurgitation         Past Surgical History  Past Surgical History:   Procedure Laterality Date    CARDIAC CATHETERIZATION N/A 1/11/2023    Procedure: LHC-Cardiac catheterization;  Surgeon: Sue De Guzman MD;  [facility]: [facility]; Service: Cardiology    CARDIAC CATHETERIZATION N/A 1/11/2023    Procedure: Cardiac pci;  Surgeon: Sue De Guzman MD;  [facility];   Service: Cardiology    CARDIAC CATHETERIZATION N/A 1/17/2023    Procedure: CARDIAC TAVR;  Surgeon: Oscar Gary DO;  Location: [facility] MA[facility];  Service: Cardiology    DENTAL SURGERY Bilateral     ALL TEETH REMOVED    FL GUIDED NEEDLE PLAC BX/ASP/INJ  5/26/2021    IR THORACENTESIS  12/20/2022    IR THORACENTESIS  7/14/2023    IR THORACENTESIS  9/1/2023    IR THORACENTESIS  9/25/2023    JOINT REPLACEMENT Left     KNEE SURGERY      left knee replacement    ND INJECT SI JOINT ARTHRGRPHY&/ANES/STEROID W/JOHNATHON Bilateral 5/26/2021    Procedure: SACROILIAC JOINT INJECTION;  Surgeon: Manjit Jamison MD;  Location: [facility];  Service: Pain Management     ND REPLACE AORTIC VALVE OPENFEMORAL ARTERY APPROACH N/A 1/17/2023    Procedure: REPLACEMENT AORTIC VALVE TRANSCATHETER (TAVR) TRANSFEMORAL W/ 23MM CALVERT SHAVONNE S3 ULTRA VALVE(ACCESS ON LEFT) ALENA;  Surgeon: Blade Novak DO;  Location:  MAIN OR;  Service: Cardiac Surgery    SACROILIAC JOINT INJECTION Bilateral 6/7/2023    Procedure: Bilateral sacroiliac joint steroid injection;  Surgeon: Diane Santiago MD;  Location: [facility];  Service: Pain Management          11/18/23 1011   Note Type   Note type Evaluation   Pain Assessment   Pain Score No Pain   Restrictions/Precautions   Weight Bearing Precautions Per Order No   Other Precautions Multiple lines;Telemetry;O2;Fall Risk;Hard of hearing   Home Living   Type of [address] Two level;1/2 bath on main level   Bathroom Shower/Tub Tub/shower unit   Bathroom Toilet Standard   Bathroom Equipment Grab bars in shower   [address] Walker;Cane   Additional Comments Uses RW at baseline   Prior Function   Level of [place] Needs assistance with ADLs; Needs assistance with IADLS   Lives With Spouse; Family   Receives Help From Family   IADLs Family/Friend/Other provides transportation; Family/Friend/Other provides meals; Family/Friend/Other provides medication management   Falls in the last 6 months 1 to 4   Vocational Retired   [place]   Overall Cognitive Status Impaired   Arousal/Participation Alert   Attention Attends with cues to redirect   Orientation Level Disoriented to time;Disoriented to situation;Oriented to person;Oriented to place   Memory Decreased long term memory;Decreased short term memory   Following Commands Follows one step commands with increased time or repetition   RLE Assessment   RLE Assessment WFL   LLE Assessment   LLE Assessment WFL   Bed Mobility   Additional Comments seated in chair at the start of the session   Transfers   Sit to Stand 4  Minimal assistance   Additional items Assist x 2; Increased time required;Verbal cues   Stand to Sit 4  Minimal assistance   Additional items Assist x 2; Increased time required   Toilet transfer 4  Minimal assistance   Additional items Assist x 1; Increased time required;Verbal cues   Additional Comments RW. Ambulation/Elevation   Gait pattern Excessively slow;Knees flexed;Decreased hip extension;Decreased heel strike;Decreased toe off;Decreased foot clearance;Narrow DAVID; Improper Weight shift;L Knee Lisy;R Knee Lisy; Poor UE support   Gait Assistance 4  Minimal assist   Additional items Assist x 1   Assistive Device Rolling walker   Distance 50 feet   Balance   Static Sitting Fair +   Dynamic Sitting Fair +   Static Standing Fair   Dynamic Standing Fair -   Ambulatory Poor +   Activity Tolerance   Activity Tolerance Patient limited by fatigue   Assessment   Prognosis Fair   Problem List Decreased strength;Decreased range of motion;Decreased endurance; Impaired balance;Decreased mobility   Assessment Pt is 80 y.o. female seen for PT evaluation s/p admit to [address] [place] [address] on 11/16/2023 w/ Acute hypoxic respiratory failure ([address] PT consulted to assess pt's functional mobility and d/c needs. Order placed for PT eval and tx, w/ up and OOB as tolerated order. Comorbidities affecting pt's physical performance at time of assessment include,  Acute hypoxic respiratory failure ([address]), see above for additional past medical history PTA, pt was independent w/ all functional mobility w/ RW .  Personal factors affecting pt at time of IE include: ambulating w/ assistive device, inability to ambulate household distances, inability to navigate community distances, inability to navigate level surfaces w/o external assistance, unable to perform dynamic tasks in community, decreased cognition, limited home support, limited insight into impairments, inability to perform IADLs, inability to perform ADLs, and inability to live alone. Please find objective findings from PT assessment regarding body systems outlined above with impairments and limitations including weakness, decreased ROM, decreased endurance, gait deviations, pain, decreased activity tolerance, decreased functional mobility tolerance, decreased safety awareness, and fall risk. Pt to benefit from continued PT tx to address deficits as defined above and maximize level of functional independent mobility and consistency. From PT/mobility standpoint, recommendation at time of d/c would be Level II moderate resource intensity, pending progress in order to facilitate return to [facility]. Goals   Patient Goals to go home   LTG Expiration Date 12/02/23   Long Term Goal #1 Patient will perform all transfers and bed mobiltiy with (S)   Long Term Goal #2 Patient will ambulate 250 feet with (S) to help facilitate return to [facility]   Plan   Treatment/Interventions ADL retraining;Functional transfer training;Bed mobility;Gait training;LE strengthening/ROM; Elevations; Therapeutic exercise   PT Frequency 3-5x/wk   Discharge Recommendation   Rehab Resource Intensity Level, PT II (Moderate Resource Intensity)   AM-PAC Basic Mobility Inpatient   Turning in Flat Bed Without Bedrails 3   Lying on Back to Sitting on Edge of Flat Bed Without Bedrails 3   Moving Bed to Chair 3   Standing Up From Chair Using Arms 3   Walk in Room 3   Climb 3-5 Stairs With Railing 3   Basic Mobility Inpatient Raw Score 18   Basic Mobility Standardized Score 41.05   Highest Level Of Mobility   -HLM Goal 6: Walk 10 steps or more   JH-HLM Achieved 7: Walk 25 feet or more     Patient seated in chair at the end of the session, all lines intact, all needs within reach. Patients raw score on the AM-PAC Basic Mobility inpatient short form is 18, standardized score is 41.05, (less than) 42. 9. patient's at this level are likely to benefit from post acute rehab services, however, please refer to therapist recommendation for safe D/C Plan. Pt benefited from co-evaluation of skilled OT and PT therapists in order to most appropriately address functional deficits d/t extensive assistance required for safe functional mobility, decreased activity tolerance, and regression from functioning level prior to admission and/or onset of present illness. OT/PT objectives were addressed separately; please see OT note for specific goal areas targeted.

## 2023-11-18 NOTE — ASSESSMENT & PLAN NOTE
Presented w/SOB on room air, requiring up to 8L and now currently 4L O2 NC to maintain adequate saturations. No Hx COPD, does have Hx CHF.   Improving now requiring 2 L nasal cannula 11/18/2023

## 2023-11-18 NOTE — PLAN OF CARE
Problem: CARDIOVASCULAR - ADULT  Goal: Maintains optimal cardiac output and hemodynamic stability  Description: INTERVENTIONS:  - Monitor I/O, vital signs and rhythm  - Monitor for S/S and trends of decreased cardiac output  - Administer and titrate ordered vasoactive medications to optimize hemodynamic stability  - Assess quality of pulses, skin color and temperature  - Assess for signs of decreased coronary artery perfusion  - Instruct patient to report change in severity of symptoms  Outcome: Progressing  Goal: Absence of cardiac dysrhythmias or at baseline rhythm  Description: INTERVENTIONS:  - Continuous cardiac monitoring, vital signs, obtain 12 lead EKG if ordered  - Administer antiarrhythmic and heart rate control medications as ordered  - Monitor electrolytes and administer replacement therapy as ordered  Outcome: Progressing     Problem: RESPIRATORY - ADULT  Goal: Achieves optimal ventilation and oxygenation  Description: INTERVENTIONS:  - Assess for changes in respiratory status  - Assess for changes in mentation and behavior  - Position to facilitate oxygenation and minimize respiratory effort  - Oxygen administered by appropriate delivery if ordered  - Initiate smoking cessation education as indicated  - Encourage broncho-pulmonary hygiene including cough, deep breathe, Incentive Spirometry  - Assess the need for suctioning and aspirate as needed  - Assess and instruct to report SOB or any respiratory difficulty  - Respiratory Therapy support as indicated  Outcome: Progressing     Problem: METABOLIC, FLUID AND ELECTROLYTES - ADULT  Goal: Electrolytes maintained within normal limits  Description: INTERVENTIONS:  - Monitor labs and assess patient for signs and symptoms of electrolyte imbalances  - Administer electrolyte replacement as ordered  - Monitor response to electrolyte replacements, including repeat lab results as appropriate  - Instruct patient on fluid and nutrition as appropriate  Outcome: Progressing  Goal: Fluid balance maintained  Description: INTERVENTIONS:  - Monitor labs   - Monitor I/O and WT  - Instruct patient on fluid and nutrition as appropriate  - Assess for signs & symptoms of volume excess or deficit  Outcome: Progressing     Problem: MUSCULOSKELETAL - ADULT  Goal: Maintain or return mobility to safest level of function  Description: INTERVENTIONS:  - Assess patient's ability to carry out ADLs; assess patient's baseline for ADL function and identify physical deficits which impact ability to perform ADLs (bathing, care of mouth/teeth, toileting, grooming, dressing, etc.)  - Assess/evaluate cause of self-care deficits   - Assess range of motion  - Assess patient's mobility  - Assess patient's need for assistive devices and provide as appropriate  - Encourage maximum independence but intervene and supervise when necessary  - Involve family in performance of ADLs  - Assess for home care needs following discharge   - Consider OT consult to assist with ADL evaluation and planning for discharge  - Provide patient education as appropriate  Outcome: Progressing     Problem: Prexisting or High Potential for Compromised Skin Integrity  Goal: Skin integrity is maintained or improved  Description: INTERVENTIONS:  - Identify patients at risk for skin breakdown  - Assess and monitor skin integrity  - Assess and monitor nutrition and hydration status  - Monitor labs   - Assess for incontinence   - Turn and reposition patient  - Assist with mobility/ambulation  - Relieve pressure over bony prominences  - Avoid friction and shearing  - Provide appropriate hygiene as needed including keeping skin clean and dry  - Evaluate need for skin moisturizer/barrier cream  - Collaborate with interdisciplinary team   - Patient/family teaching  - Consider wound care consult   Outcome: Progressing     Problem: PAIN - ADULT  Goal: Verbalizes/displays adequate comfort level or baseline comfort level  Description: Interventions:  - Encourage patient to monitor pain and request assistance  - Assess pain using appropriate pain scale  - Administer analgesics based on type and severity of pain and evaluate response  - Implement non-pharmacological measures as appropriate and evaluate response  - Consider cultural and social influences on pain and pain management  - Notify physician/advanced practitioner if interventions unsuccessful or patient reports new pain  Outcome: Progressing     Problem: INFECTION - ADULT  Goal: Absence or prevention of progression during hospitalization  Description: INTERVENTIONS:  - Assess and monitor for signs and symptoms of infection  - Monitor lab/diagnostic results  - Monitor all insertion sites, i.e. indwelling lines, tubes, and drains  - Monitor endotracheal if appropriate and nasal secretions for changes in amount and color  - Willet appropriate cooling/warming therapies per order  - Administer medications as ordered  - Instruct and encourage patient and family to use good hand hygiene technique  - Identify and instruct in appropriate isolation precautions for identified infection/condition  Outcome: Progressing  Goal: Absence of fever/infection during neutropenic period  Description: INTERVENTIONS:  - Monitor WBC    Outcome: Progressing     Problem: DISCHARGE PLANNING  Goal: Discharge to home or other facility with appropriate resources  Description: INTERVENTIONS:  - Identify barriers to discharge w/patient and caregiver  - Arrange for needed discharge resources and transportation as appropriate  - Identify discharge learning needs (meds, wound care, etc.)  - Arrange for interpretive services to assist at discharge as needed  - Refer to Case Management Department for coordinating discharge planning if the patient needs post-hospital services based on physician/advanced practitioner order or complex needs related to functional status, cognitive ability, or social support system  Outcome: Progressing     Problem: Knowledge Deficit  Goal: Patient/family/caregiver demonstrates understanding of disease process, treatment plan, medications, and discharge instructions  Description: Complete learning assessment and assess knowledge base. Interventions:  - Provide teaching at level of understanding  - Provide teaching via preferred learning methods  Outcome: Progressing     Problem: Nutrition/Hydration-ADULT  Goal: Nutrient/Hydration intake appropriate for improving, restoring or maintaining nutritional needs  Description: Monitor and assess patient's nutrition/hydration status for malnutrition. Collaborate with interdisciplinary team and initiate plan and interventions as ordered. Monitor patient's weight and dietary intake as ordered or per policy. Utilize nutrition screening tool and intervene as necessary. Determine patient's food preferences and provide high-protein, high-caloric foods as appropriate.      INTERVENTIONS:  - Monitor oral intake, urinary output, labs, and treatment plans  - Assess nutrition and hydration status and recommend course of action  - Evaluate amount of meals eaten  - Assist patient with eating if necessary   - Allow adequate time for meals  - Recommend/ encourage appropriate diets, oral nutritional supplements, and vitamin/mineral supplements  - Order, calculate, and assess calorie counts as needed  - Recommend, monitor, and adjust tube feedings and TPN/PPN based on assessed needs  - Assess need for intravenous fluids  - Provide specific nutrition/hydration education as appropriate  - Include patient/family/caregiver in decisions related to nutrition  Outcome: Progressing

## 2023-11-18 NOTE — RESPIRATORY THERAPY NOTE
11/18/23 0732   Inhalation Therapy Tx   $ Inhalation Therapy Performed Yes   SpO2 92 %   Pre-Treatment Pulse 87   Pre-Treatment Respirations 20   Duration 20   Breath Sounds Pre-Treatment Bilateral Diminished; Expiratory wheeze;Rales   Delivery Source Air;UDN   Position Up in chair   Treatment Tolerance Tolerated well   Resp Comments patient was sleeping, she is on 2 lpm nasal cannula, she performed the flutter valve well, dry, npc on command

## 2023-11-18 NOTE — OCCUPATIONAL THERAPY NOTE
Occupational Therapy Evaluation     Patient Name: Lauri Virk  [id number] Date: 11/18/2023  Problem List  Principal Problem:    Acute hypoxic respiratory failure ([address])  Active Problems:    Sepsis due to pneumonia ([address])    Hyponatremia    Thrombocytopenia (HCC)    Generalized anxiety disorder    Chronic diastolic (congestive) heart failure (HCC)    Anemia    Atrial flutter (HCC)    Hypotension    Acute-on-chronic kidney injury     Past Medical History  Past Medical History:   Diagnosis Date    Acute on chronic diastolic CHF (congestive heart failure) ([address]) 01/30/2023    Acute respiratory failure with hypoxia (HCC) 12/22/2022    Aortic stenosis     Atrial fibrillation (HCC)     Atrial flutter (HCC)     CAD (coronary artery disease) 01/11/2023    CAD/PCI/ELODIA    CKD (chronic kidney disease), stage III (HCC)     Community acquired pneumonia of left upper lobe of lung 05/17/2019    Fatigue     Full dentures     Generalized anxiety disorder     Hyperlipidemia     Hypertension     Impaired fasting glucose     Mitral regurgitation     Pneumonia     RBBB     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    TRANSFEMORAL W/ 23MM CALVERT SHAVONNE S3 ULTRA VALVE(ACCESS ON LEFT)    SVT (supraventricular tachycardia)     Tricuspid regurgitation      Past Surgical History  Past Surgical History:   Procedure Laterality Date    CARDIAC CATHETERIZATION N/A 1/11/2023    Procedure: LHC-Cardiac catheterization;  Surgeon: Francisco Dawn MD;  [facility]: [facility]; Service: Cardiology    CARDIAC CATHETERIZATION N/A 1/11/2023    Procedure: Cardiac pci;  Surgeon: Francisco Dawn MD;  [facility];   Service: Cardiology    CARDIAC CATHETERIZATION N/A 1/17/2023    Procedure: CARDIAC TAVR;  Surgeon: Raj Ivey DO;  Location: [facility];  Service: Cardiology    DENTAL SURGERY Bilateral     ALL TEETH REMOVED    FL GUIDED NEEDLE PLAC BX/ASP/INJ  5/26/2021    IR THORACENTESIS  12/20/2022    IR THORACENTESIS  7/14/2023    IR THORACENTESIS  9/1/2023    IR THORACENTESIS  9/25/2023    JOINT REPLACEMENT Left     KNEE SURGERY      left knee replacement    ND INJECT SI JOINT ARTHRGRPHY&/ANES/STEROID W/JOHNATHON Bilateral 5/26/2021    Procedure: SACROILIAC JOINT INJECTION;  Surgeon: Orvis Romberg, MD;  Location: [facility];  Service: Pain Management     ND REPLACE AORTIC VALVE OPENFEMORAL ARTERY APPROACH N/A 1/17/2023    Procedure: REPLACEMENT AORTIC VALVE TRANSCATHETER (TAVR) TRANSFEMORAL W/ 23MM CALVERT SHAVONNE S3 ULTRA VALVE(ACCESS ON LEFT) ALENA;  Surgeon: Ying Bolanos DO;  Location: [facility];  Service: Cardiac Surgery    SACROILIAC JOINT INJECTION Bilateral 6/7/2023    Procedure: Bilateral sacroiliac joint steroid injection;  Surgeon: Bruna Arnold MD;  Location: [facility];  Service: Pain Management          11/18/23 1010   OT Last Visit   OT Visit Date 11/18/23   Note Type   Note type Evaluation   Pain Assessment   Pain Score No Pain   Restrictions/Precautions   Weight Bearing Precautions Per Order No   Other Precautions Chair Alarm;Telemetry;Multiple lines;O2;Fall Risk;Hard of hearing   Home Living   Type of [address] [place] [address] Two level;1/2 bath on main level;Bed/bath upstairs;Stairs to enter without rails; Other (Comment)  (1 CASANDRA; stairglide to 2nd)   Bathroom Shower/Tub Tub/shower unit   Bathroom Toilet Standard   Bathroom Equipment Grab bars in shower   [address] Walker;Cane;Grab bars   Additional Comments pt reports use of RW at baseline during functional mobility   Prior Function   Level of [place] Needs assistance with ADLs; Needs assistance with functional mobility; Needs assistance with IADLS   Lives With Spouse   Receives Help From Family   IADLs Family/Friend/Other provides transportation; Family/Friend/Other provides meals; Family/Friend/Other provides medication management   Falls in the last 6 months 1 to 4   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   Subjective   Subjective "I think I have to pee"   ADL   Where Assessed Other (Comment)  ([facility] [place])   LB Dressing Assistance 2  Maximal Assistance   LB Dressing Deficit Don/doff R sock; Don/doff L sock; Thread RLE into underwear; Thread LLE into underwear;Pull up over hips   Toileting Assistance  2  Maximal Assistance   Toileting Deficit Clothing management up;Clothing management down;Perineal hygiene; Increased time to complete; Bedside commode   Additional Comments pt with minimal effort to perform tasks   Bed Mobility   Additional Comments pt seated in chair at start and end of session; SpO2 drops to 88% on 2L O2 during session with moderate SOB   Transfers   Sit to Stand 4  Minimal assistance   Additional items Assist x 2; Increased time required;Verbal cues  (RW)   Stand to Sit 4  Minimal assistance   Additional items Assist x 2; Increased time required;Verbal cues  (RW)   Toilet transfer 4  Minimal assistance   Additional items Assist x 1; Increased time required;Verbal cues; Commode  (RW)   Additional Comments pt performs functional transfers with min (A) x1-2 and use of RW; no significant LOB, mild instability with device   Functional Mobility   Functional Mobility 4  Minimal assistance   Additional Comments x1-2 with use of RW; no significant LOB, however mild instability during session; performs ~60ft   Additional items Rolling walker   Balance   Static Sitting Fair +   Dynamic Sitting Fair +   Static Standing Fair   Dynamic Standing Fair -   Ambulatory Poor +   Activity Tolerance   Activity Tolerance Patient limited by fatigue   RUE Assessment   RUE Assessment WFL   LUE Assessment   LUE Assessment WFL   Hand Function   Gross Motor Coordination Functional   Fine Motor Coordination Functional   Sensation   Light Touch No apparent deficits   Sharp/Dull No apparent deficits   Psychosocial   Psychosocial (WDL) X   Patient Behaviors/Mood Anxious   Cognition   Overall Cognitive Status Impaired   Arousal/Participation Alert   Attention Attends with cues to redirect   Orientation Level Oriented to person;Oriented to place; Disoriented to time;Disoriented to situation   Memory Decreased long term memory;Decreased short term memory   Following Commands Follows one step commands without difficulty   Assessment   Limitation Decreased ADL status; Decreased UE strength;Decreased Safe judgement during ADL;Decreased endurance;Decreased cognition;Decreased self-care trans;Decreased high-level ADLs   Assessment Pt is a 80 y.o. female seen for OT evaluation s/p admit [facility]/16[facility] w/ Acute hypoxic respiratory failure ([address] Comorbidities affecting pt's functional performance at time of assessment include:  sepsis, hyponatremia, throbocytopenia, MINNIE, CHF, anemia, hypotension, CKD, hypomagnesemia, hypokalemia, HTN, hiatal hernia, tachycardia . Personal factors affecting pt at time of IE include:steps to enter environment, behavioral pattern, difficulty performing ADLS, difficulty performing IADLS , limited insight into deficits, decreased initiation and engagement , and health management . Prior to admission, pt was (A) with ADLs and IADLs with use of RW during mobility. Upon evaluation: Pt requires min-max (A) x1-2 with use of RW during mobility 2* the following deficits impacting occupational performance: weakness, decreased strength, decreased balance, decreased tolerance, impaired initiation, impaired memory, impaired sequencing, impaired problem solving, decreased safety awareness, and impaired interpersonal skills. Pt to benefit from continued skilled OT tx while in the hospital to address deficits as defined above and maximize level of functional independence w ADL's and functional mobility. Occupational Performance areas to address include: grooming, bathing/shower, toilet hygiene, dressing, functional mobility, community mobility, and clothing management.  The patient's raw score on the -PAC Daily Activity Inpatient Short Form is 17. A raw score of less than 19 suggests the patient may benefit from discharge to post-acute rehabilitation services. Discharge recommendation at this time is level II moderate resource intensity. Pt benefited from co-evaluation of skilled OT and PT therapists in order to most appropriately address functional deficits d/t extensive assistance required for safe functional mobility, decreased activity tolerance, and regression from functioning level prior to admission and/or onset of present illness. OT/PT objectives were addressed separately; please see PT note for specific goal areas targeted. Goals   Patient Goals to go home   Short Term Goal  pt will perform UE strengthening exercises   Long Term Goal #1 pt will perform UB/LB bathing and grooming tasks at min (A) level   Long Term Goal #2 pt will demonstrate functional mobility with RW at mod (I) level   Long Term Goal pt will perform toilet transfers and hygiene at min (A) level   Plan   Treatment Interventions ADL retraining;Functional transfer training;UE strengthening/ROM; Endurance training;Patient/family training;Cognitive reorientation;Equipment evaluation/education; Activityengagement   Goal Expiration Date 12/02/23   OT Frequency 3-5x/wk   Discharge Recommendation   Rehab Resource Intensity Level, OT II (Moderate Resource Intensity)   AM-PAC Daily Activity Inpatient   Lower Body Dressing 2   Bathing 2   Toileting 2   Upper Body Dressing 3   Grooming 4   Eating 4   Daily Activity Raw Score 17   Daily Activity Standardized Score (Calc for Raw Score >=11) 37.26   AM-PAC Applied Cognition Inpatient   Following a Speech/Presentation 4   Understanding Ordinary Conversation 4   Taking Medications 3   Remembering Where Things Are Placed or Put Away 2   Remembering List of 4-5 Errands 2   Taking Care of Complicated Tasks 2   Applied Cognition Raw Score 17   Applied Cognition Standardized Score 36.52

## 2023-11-19 LAB
ANION GAP SERPL CALCULATED.3IONS-SCNC: 10 MMOL/L
BASOPHILS # BLD AUTO: 0.01 THOUSANDS/ÂΜL (ref 0–0.1)
BASOPHILS NFR BLD AUTO: 0 % (ref 0–1)
BUN SERPL-MCNC: 48 MG/DL (ref 5–25)
CALCIUM SERPL-MCNC: 8.9 MG/DL (ref 8.4–10.2)
CHLORIDE SERPL-SCNC: 104 MMOL/L (ref 96–108)
CO2 SERPL-SCNC: 20 MMOL/L (ref 21–32)
CREAT SERPL-MCNC: 1.31 MG/DL (ref 0.6–1.3)
CREAT UR-MCNC: 66.7 MG/DL
EOSINOPHIL # BLD AUTO: 0.45 THOUSAND/ÂΜL (ref 0–0.61)
EOSINOPHIL NFR BLD AUTO: 5 % (ref 0–6)
ERYTHROCYTE [DISTWIDTH] IN BLOOD BY AUTOMATED COUNT: 18.4 % (ref 11.6–15.1)
FERRITIN SERPL-MCNC: 50 NG/ML (ref 11–307)
GFR SERPL CREATININE-BSD FRML MDRD: 36 ML/MIN/1.73SQ M
GLUCOSE SERPL-MCNC: 97 MG/DL (ref 65–140)
HCT VFR BLD AUTO: 29.7 % (ref 34.8–46.1)
HGB BLD-MCNC: 9.5 G/DL (ref 11.5–15.4)
IMM GRANULOCYTES # BLD AUTO: 0.04 THOUSAND/UL (ref 0–0.2)
IMM GRANULOCYTES NFR BLD AUTO: 1 % (ref 0–2)
IRON SATN MFR SERPL: 7 % (ref 15–50)
IRON SERPL-MCNC: 18 UG/DL (ref 50–212)
LYMPHOCYTES # BLD AUTO: 0.26 THOUSANDS/ÂΜL (ref 0.6–4.47)
LYMPHOCYTES NFR BLD AUTO: 3 % (ref 14–44)
MCH RBC QN AUTO: 28.7 PG (ref 26.8–34.3)
MCHC RBC AUTO-ENTMCNC: 32 G/DL (ref 31.4–37.4)
MCV RBC AUTO: 90 FL (ref 82–98)
MONOCYTES # BLD AUTO: 0.39 THOUSAND/ÂΜL (ref 0.17–1.22)
MONOCYTES NFR BLD AUTO: 5 % (ref 4–12)
NEUTROPHILS # BLD AUTO: 7.25 THOUSANDS/ÂΜL (ref 1.85–7.62)
NEUTS SEG NFR BLD AUTO: 86 % (ref 43–75)
NRBC BLD AUTO-RTO: 0 /100 WBCS
OSMOLALITY UR: 494 MMOL/KG
PLATELET # BLD AUTO: 116 THOUSANDS/UL (ref 149–390)
PMV BLD AUTO: 10.7 FL (ref 8.9–12.7)
POTASSIUM SERPL-SCNC: 4.2 MMOL/L (ref 3.5–5.3)
RBC # BLD AUTO: 3.31 MILLION/UL (ref 3.81–5.12)
SODIUM 24H UR-SCNC: 28 MOL/L
SODIUM SERPL-SCNC: 134 MMOL/L (ref 135–147)
TIBC SERPL-MCNC: 254 UG/DL (ref 250–450)
UIBC SERPL-MCNC: 236 UG/DL (ref 155–355)
WBC # BLD AUTO: 8.4 THOUSAND/UL (ref 4.31–10.16)

## 2023-11-19 PROCEDURE — 83540 ASSAY OF IRON: CPT | Performed by: FAMILY MEDICINE

## 2023-11-19 PROCEDURE — 83550 IRON BINDING TEST: CPT | Performed by: FAMILY MEDICINE

## 2023-11-19 PROCEDURE — 99232 SBSQ HOSP IP/OBS MODERATE 35: CPT | Performed by: FAMILY MEDICINE

## 2023-11-19 PROCEDURE — 94664 DEMO&/EVAL PT USE INHALER: CPT

## 2023-11-19 PROCEDURE — 99232 SBSQ HOSP IP/OBS MODERATE 35: CPT | Performed by: INTERNAL MEDICINE

## 2023-11-19 PROCEDURE — 85025 COMPLETE CBC W/AUTO DIFF WBC: CPT | Performed by: FAMILY MEDICINE

## 2023-11-19 PROCEDURE — 94668 MNPJ CHEST WALL SBSQ: CPT

## 2023-11-19 PROCEDURE — 82570 ASSAY OF URINE CREATININE: CPT | Performed by: FAMILY MEDICINE

## 2023-11-19 PROCEDURE — 84300 ASSAY OF URINE SODIUM: CPT | Performed by: FAMILY MEDICINE

## 2023-11-19 PROCEDURE — 94760 N-INVAS EAR/PLS OXIMETRY 1: CPT

## 2023-11-19 PROCEDURE — 80048 BASIC METABOLIC PNL TOTAL CA: CPT | Performed by: FAMILY MEDICINE

## 2023-11-19 PROCEDURE — 94640 AIRWAY INHALATION TREATMENT: CPT

## 2023-11-19 PROCEDURE — 82728 ASSAY OF FERRITIN: CPT | Performed by: FAMILY MEDICINE

## 2023-11-19 PROCEDURE — 83935 ASSAY OF URINE OSMOLALITY: CPT | Performed by: INTERNAL MEDICINE

## 2023-11-19 RX ORDER — SACCHAROMYCES BOULARDII 250 MG
250 CAPSULE ORAL 2 TIMES DAILY
Status: DISCONTINUED | OUTPATIENT
Start: 2023-11-19 | End: 2023-11-20

## 2023-11-19 RX ORDER — SODIUM CHLORIDE 9 MG/ML
75 INJECTION, SOLUTION INTRAVENOUS CONTINUOUS
Status: DISCONTINUED | OUTPATIENT
Start: 2023-11-19 | End: 2023-11-20

## 2023-11-19 RX ADMIN — AZITHROMYCIN MONOHYDRATE 500 MG: 500 INJECTION, POWDER, LYOPHILIZED, FOR SOLUTION INTRAVENOUS at 10:05

## 2023-11-19 RX ADMIN — METOPROLOL TARTRATE 25 MG: 25 TABLET, FILM COATED ORAL at 09:08

## 2023-11-19 RX ADMIN — Medication 250 MG: at 18:41

## 2023-11-19 RX ADMIN — SODIUM CHLORIDE 75 ML/HR: 0.9 INJECTION, SOLUTION INTRAVENOUS at 14:01

## 2023-11-19 RX ADMIN — METOPROLOL TARTRATE 25 MG: 25 TABLET, FILM COATED ORAL at 20:54

## 2023-11-19 RX ADMIN — LEVALBUTEROL HYDROCHLORIDE 1.25 MG: 1.25 SOLUTION RESPIRATORY (INHALATION) at 07:34

## 2023-11-19 RX ADMIN — LEVALBUTEROL HYDROCHLORIDE 1.25 MG: 1.25 SOLUTION RESPIRATORY (INHALATION) at 13:49

## 2023-11-19 RX ADMIN — CEFEPIME HYDROCHLORIDE 1000 MG: 1 INJECTION, SOLUTION INTRAVENOUS at 08:57

## 2023-11-19 RX ADMIN — PANTOPRAZOLE SODIUM 20 MG: 20 TABLET, DELAYED RELEASE ORAL at 06:46

## 2023-11-19 RX ADMIN — ATORVASTATIN CALCIUM 20 MG: 10 TABLET, FILM COATED ORAL at 17:04

## 2023-11-19 RX ADMIN — GUAIFENESIN 600 MG: 600 TABLET ORAL at 20:54

## 2023-11-19 RX ADMIN — CLOPIDOGREL 75 MG: 75 TABLET ORAL at 08:57

## 2023-11-19 RX ADMIN — Medication 2000 UNITS: at 08:57

## 2023-11-19 RX ADMIN — ACETAMINOPHEN 975 MG: 325 TABLET, FILM COATED ORAL at 07:53

## 2023-11-19 RX ADMIN — POTASSIUM CHLORIDE 10 MEQ: 750 TABLET, EXTENDED RELEASE ORAL at 08:57

## 2023-11-19 RX ADMIN — SODIUM CHLORIDE 500 ML: 0.9 INJECTION, SOLUTION INTRAVENOUS at 12:03

## 2023-11-19 RX ADMIN — GUAIFENESIN 600 MG: 600 TABLET ORAL at 08:57

## 2023-11-19 NOTE — PLAN OF CARE
Problem: CARDIOVASCULAR - ADULT  Goal: Maintains optimal cardiac output and hemodynamic stability  Description: INTERVENTIONS:  - Monitor I/O, vital signs and rhythm  - Monitor for S/S and trends of decreased cardiac output  - Administer and titrate ordered vasoactive medications to optimize hemodynamic stability  - Assess quality of pulses, skin color and temperature  - Assess for signs of decreased coronary artery perfusion  - Instruct patient to report change in severity of symptoms  Outcome: Progressing  Goal: Absence of cardiac dysrhythmias or at baseline rhythm  Description: INTERVENTIONS:  - Continuous cardiac monitoring, vital signs, obtain 12 lead EKG if ordered  - Administer antiarrhythmic and heart rate control medications as ordered  - Monitor electrolytes and administer replacement therapy as ordered  Outcome: Progressing     Problem: RESPIRATORY - ADULT  Goal: Achieves optimal ventilation and oxygenation  Description: INTERVENTIONS:  - Assess for changes in respiratory status  - Assess for changes in mentation and behavior  - Position to facilitate oxygenation and minimize respiratory effort  - Oxygen administered by appropriate delivery if ordered  - Initiate smoking cessation education as indicated  - Encourage broncho-pulmonary hygiene including cough, deep breathe, Incentive Spirometry  - Assess the need for suctioning and aspirate as needed  - Assess and instruct to report SOB or any respiratory difficulty  - Respiratory Therapy support as indicated  Outcome: Progressing     Problem: METABOLIC, FLUID AND ELECTROLYTES - ADULT  Goal: Electrolytes maintained within normal limits  Description: INTERVENTIONS:  - Monitor labs and assess patient for signs and symptoms of electrolyte imbalances  - Administer electrolyte replacement as ordered  - Monitor response to electrolyte replacements, including repeat lab results as appropriate  - Instruct patient on fluid and nutrition as appropriate  Outcome: Progressing  Goal: Fluid balance maintained  Description: INTERVENTIONS:  - Monitor labs   - Monitor I/O and WT  - Instruct patient on fluid and nutrition as appropriate  - Assess for signs & symptoms of volume excess or deficit  Outcome: Progressing     Problem: MUSCULOSKELETAL - ADULT  Goal: Maintain or return mobility to safest level of function  Description: INTERVENTIONS:  - Assess patient's ability to carry out ADLs; assess patient's baseline for ADL function and identify physical deficits which impact ability to perform ADLs (bathing, care of mouth/teeth, toileting, grooming, dressing, etc.)  - Assess/evaluate cause of self-care deficits   - Assess range of motion  - Assess patient's mobility  - Assess patient's need for assistive devices and provide as appropriate  - Encourage maximum independence but intervene and supervise when necessary  - Involve family in performance of ADLs  - Assess for home care needs following discharge   - Consider OT consult to assist with ADL evaluation and planning for discharge  - Provide patient education as appropriate  Outcome: Progressing     Problem: Prexisting or High Potential for Compromised Skin Integrity  Goal: Skin integrity is maintained or improved  Description: INTERVENTIONS:  - Identify patients at risk for skin breakdown  - Assess and monitor skin integrity  - Assess and monitor nutrition and hydration status  - Monitor labs   - Assess for incontinence   - Turn and reposition patient  - Assist with mobility/ambulation  - Relieve pressure over bony prominences  - Avoid friction and shearing  - Provide appropriate hygiene as needed including keeping skin clean and dry  - Evaluate need for skin moisturizer/barrier cream  - Collaborate with interdisciplinary team   - Patient/family teaching  - Consider wound care consult   Outcome: Progressing     Problem: PAIN - ADULT  Goal: Verbalizes/displays adequate comfort level or baseline comfort level  Description: Interventions:  - Encourage patient to monitor pain and request assistance  - Assess pain using appropriate pain scale  - Administer analgesics based on type and severity of pain and evaluate response  - Implement non-pharmacological measures as appropriate and evaluate response  - Consider cultural and social influences on pain and pain management  - Notify physician/advanced practitioner if interventions unsuccessful or patient reports new pain  Outcome: Progressing     Problem: INFECTION - ADULT  Goal: Absence or prevention of progression during hospitalization  Description: INTERVENTIONS:  - Assess and monitor for signs and symptoms of infection  - Monitor lab/diagnostic results  - Monitor all insertion sites, i.e. indwelling lines, tubes, and drains  - Monitor endotracheal if appropriate and nasal secretions for changes in amount and color  - [place] appropriate cooling/warming therapies per order  - Administer medications as ordered  - Instruct and encourage patient and family to use good hand hygiene technique  - Identify and instruct in appropriate isolation precautions for identified infection/condition  Outcome: Progressing  Goal: Absence of fever/infection during neutropenic period  Description: INTERVENTIONS:  - Monitor WBC    Outcome: Progressing     Problem: DISCHARGE PLANNING  Goal: Discharge to home or other facility with appropriate resources  Description: INTERVENTIONS:  - Identify barriers to discharge w/patient and caregiver  - Arrange for needed discharge resources and transportation as appropriate  - Identify discharge learning needs (meds, wound care, etc.)  - Arrange for interpretive services to assist at discharge as needed  - Refer to Case Management Department for coordinating discharge planning if the patient needs post-hospital services based on physician/advanced practitioner order or complex needs related to functional status, cognitive ability, or social support system  Outcome: Progressing     Problem: Knowledge Deficit  Goal: Patient/family/caregiver demonstrates understanding of disease process, treatment plan, medications, and discharge instructions  Description: Complete learning assessment and assess knowledge base. Interventions:  - Provide teaching at level of understanding  - Provide teaching via preferred learning methods  Outcome: Progressing     Problem: Nutrition/Hydration-ADULT  Goal: Nutrient/Hydration intake appropriate for improving, restoring or maintaining nutritional needs  Description: Monitor and assess patient's nutrition/hydration status for malnutrition. Collaborate with interdisciplinary team and initiate plan and interventions as ordered. Monitor patient's weight and dietary intake as ordered or per policy. Utilize nutrition screening tool and intervene as necessary. Determine patient's food preferences and provide high-protein, high-caloric foods as appropriate.      INTERVENTIONS:  - Monitor oral intake, urinary output, labs, and treatment plans  - Assess nutrition and hydration status and recommend course of action  - Evaluate amount of meals eaten  - Assist patient with eating if necessary   - Allow adequate time for meals  - Recommend/ encourage appropriate diets, oral nutritional supplements, and vitamin/mineral supplements  - Order, calculate, and assess calorie counts as needed  - Recommend, monitor, and adjust tube feedings and TPN/PPN based on assessed needs  - Assess need for intravenous fluids  - Provide specific nutrition/hydration education as appropriate  - Include patient/family/caregiver in decisions related to nutrition  Outcome: Progressing

## 2023-11-19 NOTE — PROGRESS NOTES
[address]  Progress Note  Name: Inder Packer  MRN: [id number]  Unit/Bed#:  I Date of Admission: 11/16/2023   Date of Service: 11/19/2023 I Hospital Day: 3    Assessment/Plan   Sepsis due to pneumonia [facility])  Assessment & Plan  DIAGNOSIS: fever, tachycardia, leukocytosis, large multifocal R-sided pneumonia noted on CXR and CT chest wo contrast. Was noted to have lactic acidosis which resolved with fluid administration. Hospital day #1-2 overnight had hypotension indicating likely worsening of sepsis. Speech/swallow evaluated, no evidence of dysphagia with which to cause aspiration  Continue ceftriaxone , completed 3 days of azithromycin  Procalcitonin normalized  Urine antigens negative  Blood cultures negative  Pulmonary input appreciated    * Acute hypoxic respiratory failure ([address])  Assessment & Plan  Presented w/SOB on room air, requiring up to 8L and now currently 4L O2 NC to maintain adequate saturations. No Hx COPD, does have Hx CHF.   Improving now requiring 1.5 L nasal cannula 11/18/2023    Acute-on-chronic kidney injury   Assessment & Plan  Lab Results   Component Value Date    EGFR 36 11/19/2023    EGFR 30 11/18/2023    EGFR 33 11/17/2023    CREATININE 1.31 (H) 11/19/2023    CREATININE 1.51 (H) 11/18/2023    CREATININE 1.40 (H) 11/17/2023     Renal function continues to worsen 11/18/2023  Improving, nephrology input appreciated    Atrial flutter [facility])  Assessment & Plan  Resumed metoprolol and Cardizem  AC with Eliquis  As intermittent episodes of rapid ventricular response, may require titration of metoprolol    Anemia  Assessment & Plan  Hemoglobin stabilized around 9  Continue to hold Eliquis  Follow-up iron panel and stool for occult blood    Chronic diastolic (congestive) heart failure (HCC)  Assessment & Plan  Wt Readings from Last 3 Encounters:   11/19/23 49.9 kg (110 lb 0.2 oz)   11/01/23 48.1 kg (106 lb)   10/03/23 47.1 kg (103 lb 12.8 oz)     Appears euvolemic on exam          Thrombocytopenia ([address])  Assessment & Plan  Platelets decreased, likely in the setting of acute infection  Platelets stabilized             Progress Note - Margarita Morillo 80 y.o. female MRN: [id number]    Unit/Bed#:  Encounter: [id number]        Subjective:   Patient seen and examined, complains of a headache today, has a new rash but not ittchy    Objective:     Vitals:   Vitals:    11/19/23 0908   BP: 93/56   Pulse: (!) 106   Resp:    Temp:    SpO2:      Body mass index is 22.99 kg/m².     Intake/Output Summary (Last 24 hours) at 11/19/2023 0925  Last data filed at 11/19/2023 0850  Gross per 24 hour   Intake 980 ml   Output 500 ml   Net 480 ml       Physical Exam:   BP 93/56   Pulse (!) 106   Temp 98.5 °F (36.9 °C) (Tympanic)   Resp 17   Ht 4' 10" (1.473 m)   Wt 49.9 kg (110 lb 0.2 oz)   SpO2 99%   BMI 22.99 kg/m²   General appearance: alert and oriented, in no acute distress  Head: Normocephalic, without obvious abnormality, atraumatic  Lungs: clear to auscultation bilaterally  Heart: regular rate and rhythm, S1, S2 normal, no murmur, click, rub or gallop  Abdomen: soft, non-tender; bowel sounds normal; no masses,  no organomegaly  Extremities: no edema  Pulses: 2+ and symmetric  Skin: lower extremitey petechial rash, more confluent on back   Neurologic: Grossly normal     Invasive Devices       Peripheral Intravenous Line  Duration             Peripheral IV 11/18/23 Left;Ventral (anterior) Forearm <1 day                    Results from last 7 days   Lab Units 11/19/23  0440 11/18/23  0643 11/17/23  0503   WBC Thousand/uL 8.40 9.05 10.11   HEMOGLOBIN g/dL 9.5* 9.4* 10.6*   HEMATOCRIT % 29.7* 29.5* 33.7*   PLATELETS Thousands/uL 116* 115* 124*       Results from last 7 days   Lab Units 11/19/23  0440 11/18/23  0643 11/17/23  0503 11/16/23  0840   POTASSIUM mmol/L 4.2 4.6 3.7 4.5   CHLORIDE mmol/L 104 103 101 98   CO2 mmol/L 20* 18* 23 24   BUN mg/dL 48* 45* 33* 28* CREATININE mg/dL 1.31* 1.51* 1.40* 0.98   CALCIUM mg/dL 8.9 8.3* 9.1 10.0   ALK PHOS U/L  --   --  60 93   ALT U/L  --   --  17 19   AST U/L  --   --  23 44*       Medication Administration - last 24 hours from 11/18/2023 0925 to 11/19/2023 5291         Date/Time Order Dose Route Action Action by     11/18/2023 1615 EST atorvastatin (LIPITOR) tablet 20 mg 20 mg Oral Given Nessa Solum, RN     11/19/2023 0857 EST cholecalciferol (VITAMIN D3) tablet 2,000 Units 2,000 Units Oral Given Nessa Solum, RN     11/19/2023 0857 EST clopidogrel (PLAVIX) tablet 75 mg 75 mg Oral Given Nessa Solum, RN     11/19/2023 0646 EST pantoprazole (PROTONIX) EC tablet 20 mg 20 mg Oral Given Tabby Ibrahim, RN     11/19/2023 0857 EST potassium chloride (K-DUR,KLOR-CON) CR tablet 10 mEq 10 mEq Oral Given Nessa Solum, RN     11/19/2023 0857 EST guaiFENesin (MUCINEX) 12 hr tablet 600 mg 600 mg Oral Given Nessa Solum, RN     11/18/2023 2150 EST guaiFENesin (MUCINEX) 12 hr tablet 600 mg 600 mg Oral Given Tabby Ibrahim, RN     11/19/2023 0857 EST cefepime (MAXIPIME) IVPB (premix in dextrose) 1,000 mg 50 mL 1,000 mg Intravenous [address] Nessa Jaya, RN     11/18/2023 1045 EST azithromycin (ZITHROMAX) 500 mg in sodium chloride 0.9 % 250 mL IVPB 500 mg Intravenous [address] Nessa Lake, RN     11/19/2023 0734 EST levalbuterol (XOPENEX) inhalation solution 1.25 mg 1.25 mg Nebulization Given Vale Sumner, RT     11/18/2023 2025 EST levalbuterol (XOPENEX) inhalation solution 1.25 mg 1.25 mg Nebulization Given Keagan Page, RT     11/18/2023 1320 EST levalbuterol (XOPENEX) inhalation solution 1.25 mg 1.25 mg Nebulization Given Vale Sumner, RT     11/19/2023 0753 EST acetaminophen (TYLENOL) tablet 975 mg 975 mg Oral Given Nessa Lake, RN     11/19/2023 0908 EST metoprolol tartrate (LOPRESSOR) tablet 25 mg 25 mg Oral Given Nessa Jaya, RN     11/18/2023 2150 EST metoprolol tartrate (LOPRESSOR) tablet 25 mg 25 mg Oral Given Aruna Soto RN              Lab, Imaging and other studies: I have personally reviewed pertinent reports.     VTE Pharmacologic Prophylaxis: hold eliquis  VTE Mechanical Prophylaxis: sequential compression device     Jaret Hinojosa MD  11/19/2023,9:25 AM

## 2023-11-19 NOTE — ASSESSMENT & PLAN NOTE
DIAGNOSIS: fever, tachycardia, leukocytosis, large multifocal R-sided pneumonia noted on CXR and CT chest wo contrast. Was noted to have lactic acidosis which resolved with fluid administration. Hospital day #1-2 overnight had hypotension indicating likely worsening of sepsis.   Speech/swallow evaluated, no evidence of dysphagia with which to cause aspiration  Continue ceftriaxone , completed 3 days of azithromycin  Procalcitonin normalized  Urine antigens negative  Blood cultures negative  Pulmonary input appreciated

## 2023-11-19 NOTE — ASSESSMENT & PLAN NOTE
Lab Results   Component Value Date    EGFR 36 11/19/2023    EGFR 30 11/18/2023    EGFR 33 11/17/2023    CREATININE 1.31 (H) 11/19/2023    CREATININE 1.51 (H) 11/18/2023    CREATININE 1.40 (H) 11/17/2023     Renal function continues to worsen 11/18/2023  Improving, nephrology input appreciated

## 2023-11-19 NOTE — PROGRESS NOTES
Progress Note - Nephrology   Samuel Castro 80 y.o. female MRN: [id number]  Unit/Bed#:  Encounter: [id number]    A/P:  1. Acute kidney injury POA   - baseline creatinine 0.9 mg/dl --> 1/51 mg/dl --> 1.31 mg/dl today   - etiology ATN with volume depletion and sepsis    - Fe Na 0.41%  (low)   - will give low dose saline at 75 ml/hr for 24 hours     2. Hyponatremia   - hyponatremic hypovolemia   - received a 500 ml NS bolus due to hypotension   - see above   - serum sodium corrected to `134 mmol/l    3. Sepsis    - RLL and RUL pneumonia on cXR   - continue abx as per primary management   - blood cultures neg    4. Acute hypoxic respiratory failure   - comfortable on room air    5. Hypotension   - received metoprolol for a.  Fib   - gave saline bolus with improvement   - start continuous infusion         Follow up reason for today's visit: ROSA, hyponatremia, SIADH/sepsis/chronic diastolic HF    Acute hypoxic respiratory failure [facility])    Patient Active Problem List   Diagnosis    Hypomagnesemia    Hypokalemia    Sepsis due to pneumonia ([address])    Hyponatremia    Essential hypertension    Lymphadenopathy    Hiatal hernia    Thrombocytopenia (HCC)    Mitral valve insufficiency    Atrial tachycardia    Pulmonary hypertension (HCC)    Diverticulosis    Non-rheumatic tricuspid valve insufficiency    Generalized anxiety disorder    Mid back pain    Encounter for Medicare annual wellness exam    Encounter for long-term (current) use of medications    Immunization due    Acute bursitis of right shoulder    Impaired fasting glucose    Osteopenia of neck of femur    Chronic bilateral low back pain without sciatica    Gait abnormality    Chronic pain syndrome    Sacroiliitis (HCC)    Bilateral leg edema    Ganglion cyst    Adhesive capsulitis of right shoulder    Generalized weakness    Dyslipidemia    Post-menopause    Primary generalized (osteo)arthritis    Chronic diastolic (congestive) heart failure (HCC)    Acute hypoxic respiratory failure (HCC)    Constipation    Status post insertion of drug eluting coronary artery stent    S/P TAVR (transcatheter aortic valve replacement)    Permanent atrial fibrillation (HCC)    Incomplete right bundle branch block    Coronary artery disease involving native coronary artery of native heart without angina pectoris    Anemia    Graves disease    Closed wedge compression fracture of T11 vertebra (HCC)    Age-related osteoporosis with current pathological fracture of vertebra (HCC)    Moderate protein-calorie malnutrition (HCC)    Pleural effusion on right    Tricuspid valve insufficiency    Prolonged QT interval    SIADH (syndrome of inappropriate 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[address])    Vitamin D deficiency    Atrial flutter ([address])    Gastroesophageal reflux disease without esophagitis    Centromere antibody positive    Encounter for immunization    S/P angioplasty with stent    Hypotension    Acute-on-chronic kidney injury          Subjective:   Complains of weakness, feeling ill. No dyspnea or chest pain. Has  back pain. No abdominal pain or dysuria    Objective:     Vitals: Blood pressure (!) 75/47, pulse (!) 106, temperature 98.5 °F (36.9 °C), temperature source Tympanic, resp. rate 17, height 4' 10" (1.473 m), weight 49.9 kg (110 lb 0.2 oz), SpO2 99 %. ,Body mass index is 22.99 kg/m². Weight (last 2 days)       Date/Time Weight    11/19/23 0543 49.9 (110.01)    11/18/23 0600 50.2 (110.67)    11/17/23 0547 49.8 (109.79)              Intake/Output Summary (Last 24 hours) at 11/19/2023 1203  Last data filed at 11/19/2023 0850  Gross per 24 hour   Intake 800 ml   Output 500 ml   Net 300 ml     I/O last 3 completed shifts:   In: 1100 [P.O.:1100]  Out: 500 [Urine:500]         Physical Exam: BP (!) 75/47 (BP Location: Right arm)   Pulse (!) 106   Temp 98.5 °F (36.9 °C) (Tympanic)   Resp 17   Ht 4' 10" (1.473 m)   Wt 49.9 kg (110 lb 0.2 oz)   SpO2 99%   BMI 22.99 kg/m²     General Appearance:    Alert,frail and weak  cooperative, no distress, appears stated age   Head:    Normocephalic, without obvious abnormality, atraumatic   Eyes:    Conjunctiva/corneas clear   Ears:    Normal external ears   Nose:   Nares normal, septum midline, mucosa normal, no drainage    or sinus tenderness   Throat:   Lips, mucosa, and tongue normal; teeth and gums normal   Neck:   Supple, symmetrical, trachea midline, no adenopathy;        thyroid:  No enlargement/tenderness/nodules; no carotid    bruit or JVD   Back:     Symmetric, no curvature, ROM normal, no CVA tenderness   Lungs:     Dcr to auscultation bilaterally, respirations unlabored   Chest wall:    No tenderness or deformity   Heart:    R Irregular rate and rhythm, S1 and S2 normal, no murmur, rub   or gallop   Abdomen:     Soft, non-tender, bowel sounds active   Extremities:   Extremities normal, atraumatic, no cyanosis or edema   Skin:   Skin color, texture, turgor normal, no rashes or lesions   Lymph nodes:   Cervical normal   Neurologic:   CNII-XII intact            Lab, Imaging and other studies: I have personally reviewed pertinent labs. CBC:   Lab Results   Component Value Date    WBC 8.40 11/19/2023    HGB 9.5 (L) 11/19/2023    HCT 29.7 (L) 11/19/2023    MCV 90 11/19/2023     (L) 11/19/2023    RBC 3.31 (L) 11/19/2023    MCH 28.7 11/19/2023    MCHC 32.0 11/19/2023    RDW 18.4 (H) 11/19/2023    MPV 10.7 11/19/2023    NRBC 0 11/19/2023     CMP:   Lab Results   Component Value Date    K 4.2 11/19/2023     11/19/2023    CO2 20 (L) 11/19/2023    BUN 48 (H) 11/19/2023    CREATININE 1.31 (H) 11/19/2023    CALCIUM 8.9 11/19/2023    EGFR 36 11/19/2023       .   Results from last 7 days   Lab Units 11/19/23  0440 11/18/23  0643 11/17/23  0503 11/16/23  0840   POTASSIUM mmol/L 4.2 4.6 3.7 4.5   CHLORIDE mmol/L 104 103 101 98   CO2 mmol/L 20* 18* 23 24   BUN mg/dL 48* 45* 33* 28*   CREATININE mg/dL 1.31* 1.51* 1.40* 0.98   CALCIUM mg/dL 8.9 8.3* 9.1 10.0   ALK PHOS U/L  -- --  60 93   ALT U/L  --   --  17 19   AST U/L  --   --  23 44*         Phosphorus: No results found for: "PHOS"  Magnesium: No results found for: "MG"  Urinalysis: No results found for: "COLORU", "CLARITYU", "SPECGRAV", "PHUR", "LEUKOCYTESUR", "NITRITE", "PROTEINUA", "GLUCOSEU", "[place]", "BILIRUBINUR", "BLOODU"  Ionized Calcium: No results found for: "CAION"  Coagulation: No results found for: "PT", "INR", "APTT"  Troponin: No results found for: "TROPONINI"  ABG: No results found for: "PHART", "LQO3LHL", "PO2ART", "SNH4YYO", "B0TMZGXX", "BEART", "SOURCE"  Radiology review:     IMAGING  No results found.     Current Facility-Administered Medications   Medication Dose Route Frequency    acetaminophen (TYLENOL) tablet 975 mg  975 mg Oral Q6H PRN    ALPRAZolam (XANAX) tablet 0.25 mg  0.25 mg Oral TID PRN    atorvastatin (LIPITOR) tablet 20 mg  20 mg Oral Daily With Dinner    azithromycin (ZITHROMAX) 500 mg in sodium chloride 0.9 % 250 mL IVPB  500 mg Intravenous Q24H    cefepime (MAXIPIME) IVPB (premix in dextrose) 1,000 mg 50 mL  1,000 mg Intravenous Q24H    cholecalciferol (VITAMIN D3) tablet 2,000 Units  2,000 Units Oral Daily    clopidogrel (PLAVIX) tablet 75 mg  75 mg Oral Daily    guaiFENesin (MUCINEX) 12 hr tablet 600 mg  600 mg Oral Q12H NII    levalbuterol (XOPENEX) inhalation solution 1.25 mg  1.25 mg Nebulization TID    metoprolol tartrate (LOPRESSOR) tablet 25 mg  25 mg Oral Q12H NII    pantoprazole (PROTONIX) EC tablet 20 mg  20 mg Oral Daily Before Breakfast    polyethylene glycol (MIRALAX) packet 17 g  17 g Oral Daily PRN    potassium chloride (K-DUR,KLOR-CON) CR tablet 10 mEq  10 mEq Oral Daily    sodium chloride 0.9 % bolus 500 mL  500 mL Intravenous Once     Medications Discontinued During This Encounter   Medication Reason    Nutritional Supplement LIQD 1 Can     metroNIDAZOLE (FLAGYL) IVPB (premix) 500 mg 100 mL     diltiazem (CARDIZEM) tablet 30 mg     metoprolol succinate (TOPROL-XL) 24 hr tablet 50 mg     acetaminophen (TYLENOL) tablet 650 mg     metoprolol succinate (TOPROL-XL) 24 hr tablet 50 mg     diltiazem (CARDIZEM) tablet 30 mg     metoprolol tartrate (LOPRESSOR) tablet 25 mg     diltiazem (CARDIZEM) tablet 30 mg     diltiazem (CARDIZEM) tablet 30 mg     metoprolol tartrate (LOPRESSOR) tablet 25 mg     apixaban (ELIQUIS) tablet 2.5 mg     furosemide (LASIX) tablet 20 mg        Nahomy Pleitez MD      This progress note was produced in part using a dictation device which may document imprecise wording from author's original intent.

## 2023-11-19 NOTE — ASSESSMENT & PLAN NOTE
Hemoglobin stabilized around 9  Continue to hold Eliquis  Follow-up iron panel and stool for occult blood

## 2023-11-19 NOTE — ASSESSMENT & PLAN NOTE
Resumed metoprolol and Cardizem  AC with Eliquis  As intermittent episodes of rapid ventricular response, may require titration of metoprolol

## 2023-11-19 NOTE — PLAN OF CARE
Problem: CARDIOVASCULAR - ADULT  Goal: Maintains optimal cardiac output and hemodynamic stability  Description: INTERVENTIONS:  - Monitor I/O, vital signs and rhythm  - Monitor for S/S and trends of decreased cardiac output  - Administer and titrate ordered vasoactive medications to optimize hemodynamic stability  - Assess quality of pulses, skin color and temperature  - Assess for signs of decreased coronary artery perfusion  - Instruct patient to report change in severity of symptoms  11/19/2023 1049 by Shreyas Hagen RN  Outcome: Progressing  11/19/2023 0755 by Shreyas Hagen RN  Outcome: Progressing  Goal: Absence of cardiac dysrhythmias or at baseline rhythm  Description: INTERVENTIONS:  - Continuous cardiac monitoring, vital signs, obtain 12 lead EKG if ordered  - Administer antiarrhythmic and heart rate control medications as ordered  - Monitor electrolytes and administer replacement therapy as ordered  11/19/2023 1049 by Shreyas Hagen RN  Outcome: Progressing  11/19/2023 0755 by Shreyas Hagen RN  Outcome: Progressing     Problem: RESPIRATORY - ADULT  Goal: Achieves optimal ventilation and oxygenation  Description: INTERVENTIONS:  - Assess for changes in respiratory status  - Assess for changes in mentation and behavior  - Position to facilitate oxygenation and minimize respiratory effort  - Oxygen administered by appropriate delivery if ordered  - Initiate smoking cessation education as indicated  - Encourage broncho-pulmonary hygiene including cough, deep breathe, Incentive Spirometry  - Assess the need for suctioning and aspirate as needed  - Assess and instruct to report SOB or any respiratory difficulty  - Respiratory Therapy support as indicated  11/19/2023 1049 by Shreyas Hagen RN  Outcome: Progressing  11/19/2023 0755 by Shreyas Hagen RN  Outcome: Progressing     Problem: METABOLIC, FLUID AND ELECTROLYTES - ADULT  Goal: Electrolytes maintained within normal limits  Description: INTERVENTIONS:  - Monitor labs and assess patient for signs and symptoms of electrolyte imbalances  - Administer electrolyte replacement as ordered  - Monitor response to electrolyte replacements, including repeat lab results as appropriate  - Instruct patient on fluid and nutrition as appropriate  11/19/2023 1049 by Ilene Rolle RN  Outcome: Progressing  11/19/2023 0755 by Ilene Rolle RN  Outcome: Progressing  Goal: Fluid balance maintained  Description: INTERVENTIONS:  - Monitor labs   - Monitor I/O and WT  - Instruct patient on fluid and nutrition as appropriate  - Assess for signs & symptoms of volume excess or deficit  11/19/2023 1049 by Ilene Rolle RN  Outcome: Progressing  11/19/2023 0755 by Ilene Rolle RN  Outcome: Progressing     Problem: MUSCULOSKELETAL - ADULT  Goal: Maintain or return mobility to safest level of function  Description: INTERVENTIONS:  - Assess patient's ability to carry out ADLs; assess patient's baseline for ADL function and identify physical deficits which impact ability to perform ADLs (bathing, care of mouth/teeth, toileting, grooming, dressing, etc.)  - Assess/evaluate cause of self-care deficits   - Assess range of motion  - Assess patient's mobility  - Assess patient's need for assistive devices and provide as appropriate  - Encourage maximum independence but intervene and supervise when necessary  - Involve family in performance of ADLs  - Assess for home care needs following discharge   - Consider OT consult to assist with ADL evaluation and planning for discharge  - Provide patient education as appropriate  11/19/2023 1049 by Ilene Rolle RN  Outcome: Progressing  11/19/2023 0755 by Ilene Rolle RN  Outcome: Progressing     Problem: Prexisting or High Potential for Compromised Skin Integrity  Goal: Skin integrity is maintained or improved  Description: INTERVENTIONS:  - Identify patients at risk for skin breakdown  - Assess and monitor skin integrity  - Assess and monitor nutrition and hydration status  - Monitor labs   - Assess for incontinence   - Turn and reposition patient  - Assist with mobility/ambulation  - Relieve pressure over bony prominences  - Avoid friction and shearing  - Provide appropriate hygiene as needed including keeping skin clean and dry  - Evaluate need for skin moisturizer/barrier cream  - Collaborate with interdisciplinary team   - Patient/family teaching  - Consider wound care consult   11/19/2023 1049 by Kusum eWi RN  Outcome: Progressing  11/19/2023 0755 by Kusum Wei RN  Outcome: Progressing     Problem: PAIN - ADULT  Goal: Verbalizes/displays adequate comfort level or baseline comfort level  Description: Interventions:  - Encourage patient to monitor pain and request assistance  - Assess pain using appropriate pain scale  - Administer analgesics based on type and severity of pain and evaluate response  - Implement non-pharmacological measures as appropriate and evaluate response  - Consider cultural and social influences on pain and pain management  - Notify physician/advanced practitioner if interventions unsuccessful or patient reports new pain  11/19/2023 1049 by Kusum Wei RN  Outcome: Progressing  11/19/2023 0755 by Kusum Wei RN  Outcome: Progressing     Problem: INFECTION - ADULT  Goal: Absence or prevention of progression during hospitalization  Description: INTERVENTIONS:  - Assess and monitor for signs and symptoms of infection  - Monitor lab/diagnostic results  - Monitor all insertion sites, i.e. indwelling lines, tubes, and drains  - Monitor endotracheal if appropriate and nasal secretions for changes in amount and color  - [place] appropriate cooling/warming therapies per order  - Administer medications as ordered  - Instruct and encourage patient and family to use good hand hygiene technique  - Identify and instruct in appropriate isolation precautions for identified infection/condition  11/19/2023 1049 by Gladys Vogt RN  Outcome: Progressing  11/19/2023 0755 by Gladys Vogt RN  Outcome: Progressing  Goal: Absence of fever/infection during neutropenic period  Description: INTERVENTIONS:  - Monitor WBC    11/19/2023 1049 by Gladys Vogt RN  Outcome: Progressing  11/19/2023 0755 by Glayds Vogt RN  Outcome: Progressing     Problem: DISCHARGE PLANNING  Goal: Discharge to home or other facility with appropriate resources  Description: INTERVENTIONS:  - Identify barriers to discharge w/patient and caregiver  - Arrange for needed discharge resources and transportation as appropriate  - Identify discharge learning needs (meds, wound care, etc.)  - Arrange for interpretive services to assist at discharge as needed  - Refer to Case Management Department for coordinating discharge planning if the patient needs post-hospital services based on physician/advanced practitioner order or complex needs related to functional status, cognitive ability, or social support system  11/19/2023 1049 by Gladys Vogt RN  Outcome: Progressing  11/19/2023 0755 by Gladys Vogt RN  Outcome: Progressing     Problem: Knowledge Deficit  Goal: Patient/family/caregiver demonstrates understanding of disease process, treatment plan, medications, and discharge instructions  Description: Complete learning assessment and assess knowledge base. Interventions:  - Provide teaching at level of understanding  - Provide teaching via preferred learning methods  11/19/2023 1049 by Gladys Vogt RN  Outcome: Progressing  11/19/2023 0755 by Gladys Vogt RN  Outcome: Progressing     Problem: Nutrition/Hydration-ADULT  Goal: Nutrient/Hydration intake appropriate for improving, restoring or maintaining nutritional needs  Description: Monitor and assess patient's nutrition/hydration status for malnutrition.  Collaborate with interdisciplinary team and initiate plan and interventions as ordered. Monitor patient's weight and dietary intake as ordered or per policy. Utilize nutrition screening tool and intervene as necessary. Determine patient's food preferences and provide high-protein, high-caloric foods as appropriate.      INTERVENTIONS:  - Monitor oral intake, urinary output, labs, and treatment plans  - Assess nutrition and hydration status and recommend course of action  - Evaluate amount of meals eaten  - Assist patient with eating if necessary   - Allow adequate time for meals  - Recommend/ encourage appropriate diets, oral nutritional supplements, and vitamin/mineral supplements  - Order, calculate, and assess calorie counts as needed  - Recommend, monitor, and adjust tube feedings and TPN/PPN based on assessed needs  - Assess need for intravenous fluids  - Provide specific nutrition/hydration education as appropriate  - Include patient/family/caregiver in decisions related to nutrition  11/19/2023 1049 by Michelle Gillette RN  Outcome: Progressing  11/19/2023 0755 by Michelle Gillette RN  Outcome: Progressing

## 2023-11-19 NOTE — ASSESSMENT & PLAN NOTE
Wt Readings from Last 3 Encounters:   11/19/23 49.9 kg (110 lb 0.2 oz)   11/01/23 48.1 kg (106 lb)   10/03/23 47.1 kg (103 lb 12.8 oz)     Appears euvolemic on exam

## 2023-11-19 NOTE — RESPIRATORY THERAPY NOTE
11/19/23 0735   Inhalation Therapy Tx   $ Inhalation Therapy Performed Yes   $ Pulse Oximetry Spot Check Charge Completed   Pre-Treatment Pulse 87   Pre-Treatment Respirations 20   Duration 20   Breath Sounds Pre-Treatment Bilateral Diminished   Delivery Source Oxygen;UDN   Position Up in chair   Treatment Tolerance Tolerated well   Resp Comments patient is on 1.5 lpm what I found her on, cough on command is dry and npc

## 2023-11-19 NOTE — ASSESSMENT & PLAN NOTE
Presented w/SOB on room air, requiring up to 8L and now currently 4L O2 NC to maintain adequate saturations. No Hx COPD, does have Hx CHF.   Improving now requiring 1.5 L nasal cannula 11/18/2023

## 2023-11-19 NOTE — PLAN OF CARE
Problem: CARDIOVASCULAR - ADULT  Goal: Maintains optimal cardiac output and hemodynamic stability  Description: INTERVENTIONS:  - Monitor I/O, vital signs and rhythm  - Monitor for S/S and trends of decreased cardiac output  - Administer and titrate ordered vasoactive medications to optimize hemodynamic stability  - Assess quality of pulses, skin color and temperature  - Assess for signs of decreased coronary artery perfusion  - Instruct patient to report change in severity of symptoms  Outcome: Progressing  Goal: Absence of cardiac dysrhythmias or at baseline rhythm  Description: INTERVENTIONS:  - Continuous cardiac monitoring, vital signs, obtain 12 lead EKG if ordered  - Administer antiarrhythmic and heart rate control medications as ordered  - Monitor electrolytes and administer replacement therapy as ordered  Outcome: Progressing     Problem: RESPIRATORY - ADULT  Goal: Achieves optimal ventilation and oxygenation  Description: INTERVENTIONS:  - Assess for changes in respiratory status  - Assess for changes in mentation and behavior  - Position to facilitate oxygenation and minimize respiratory effort  - Oxygen administered by appropriate delivery if ordered  - Initiate smoking cessation education as indicated  - Encourage broncho-pulmonary hygiene including cough, deep breathe, Incentive Spirometry  - Assess the need for suctioning and aspirate as needed  - Assess and instruct to report SOB or any respiratory difficulty  - Respiratory Therapy support as indicated  Outcome: Progressing     Problem: METABOLIC, FLUID AND ELECTROLYTES - ADULT  Goal: Electrolytes maintained within normal limits  Description: INTERVENTIONS:  - Monitor labs and assess patient for signs and symptoms of electrolyte imbalances  - Administer electrolyte replacement as ordered  - Monitor response to electrolyte replacements, including repeat lab results as appropriate  - Instruct patient on fluid and nutrition as appropriate  Outcome: Progressing  Goal: Fluid balance maintained  Description: INTERVENTIONS:  - Monitor labs   - Monitor I/O and WT  - Instruct patient on fluid and nutrition as appropriate  - Assess for signs & symptoms of volume excess or deficit  Outcome: Progressing     Problem: MUSCULOSKELETAL - ADULT  Goal: Maintain or return mobility to safest level of function  Description: INTERVENTIONS:  - Assess patient's ability to carry out ADLs; assess patient's baseline for ADL function and identify physical deficits which impact ability to perform ADLs (bathing, care of mouth/teeth, toileting, grooming, dressing, etc.)  - Assess/evaluate cause of self-care deficits   - Assess range of motion  - Assess patient's mobility  - Assess patient's need for assistive devices and provide as appropriate  - Encourage maximum independence but intervene and supervise when necessary  - Involve family in performance of ADLs  - Assess for home care needs following discharge   - Consider OT consult to assist with ADL evaluation and planning for discharge  - Provide patient education as appropriate  Outcome: Progressing     Problem: Prexisting or High Potential for Compromised Skin Integrity  Goal: Skin integrity is maintained or improved  Description: INTERVENTIONS:  - Identify patients at risk for skin breakdown  - Assess and monitor skin integrity  - Assess and monitor nutrition and hydration status  - Monitor labs   - Assess for incontinence   - Turn and reposition patient  - Assist with mobility/ambulation  - Relieve pressure over bony prominences  - Avoid friction and shearing  - Provide appropriate hygiene as needed including keeping skin clean and dry  - Evaluate need for skin moisturizer/barrier cream  - Collaborate with interdisciplinary team   - Patient/family teaching  - Consider wound care consult   Outcome: Progressing     Problem: PAIN - ADULT  Goal: Verbalizes/displays adequate comfort level or baseline comfort level  Description: Interventions:  - Encourage patient to monitor pain and request assistance  - Assess pain using appropriate pain scale  - Administer analgesics based on type and severity of pain and evaluate response  - Implement non-pharmacological measures as appropriate and evaluate response  - Consider cultural and social influences on pain and pain management  - Notify physician/advanced practitioner if interventions unsuccessful or patient reports new pain  Outcome: Progressing     Problem: INFECTION - ADULT  Goal: Absence or prevention of progression during hospitalization  Description: INTERVENTIONS:  - Assess and monitor for signs and symptoms of infection  - Monitor lab/diagnostic results  - Monitor all insertion sites, i.e. indwelling lines, tubes, and drains  - Monitor endotracheal if appropriate and nasal secretions for changes in amount and color  - [place] appropriate cooling/warming therapies per order  - Administer medications as ordered  - Instruct and encourage patient and family to use good hand hygiene technique  - Identify and instruct in appropriate isolation precautions for identified infection/condition  Outcome: Progressing  Goal: Absence of fever/infection during neutropenic period  Description: INTERVENTIONS:  - Monitor WBC    Outcome: Progressing     Problem: DISCHARGE PLANNING  Goal: Discharge to home or other facility with appropriate resources  Description: INTERVENTIONS:  - Identify barriers to discharge w/patient and caregiver  - Arrange for needed discharge resources and transportation as appropriate  - Identify discharge learning needs (meds, wound care, etc.)  - Arrange for interpretive services to assist at discharge as needed  - Refer to Case Management Department for coordinating discharge planning if the patient needs post-hospital services based on physician/advanced practitioner order or complex needs related to functional status, cognitive ability, or social support system  Outcome: Progressing     Problem: Knowledge Deficit  Goal: Patient/family/caregiver demonstrates understanding of disease process, treatment plan, medications, and discharge instructions  Description: Complete learning assessment and assess knowledge base. Interventions:  - Provide teaching at level of understanding  - Provide teaching via preferred learning methods  Outcome: Progressing     Problem: Nutrition/Hydration-ADULT  Goal: Nutrient/Hydration intake appropriate for improving, restoring or maintaining nutritional needs  Description: Monitor and assess patient's nutrition/hydration status for malnutrition. Collaborate with interdisciplinary team and initiate plan and interventions as ordered. Monitor patient's weight and dietary intake as ordered or per policy. Utilize nutrition screening tool and intervene as necessary. Determine patient's food preferences and provide high-protein, high-caloric foods as appropriate.      INTERVENTIONS:  - Monitor oral intake, urinary output, labs, and treatment plans  - Assess nutrition and hydration status and recommend course of action  - Evaluate amount of meals eaten  - Assist patient with eating if necessary   - Allow adequate time for meals  - Recommend/ encourage appropriate diets, oral nutritional supplements, and vitamin/mineral supplements  - Order, calculate, and assess calorie counts as needed  - Recommend, monitor, and adjust tube feedings and TPN/PPN based on assessed needs  - Assess need for intravenous fluids  - Provide specific nutrition/hydration education as appropriate  - Include patient/family/caregiver in decisions related to nutrition  Outcome: Progressing

## 2023-11-20 LAB
ALBUMIN SERPL BCP-MCNC: 3.2 G/DL (ref 3.5–5)
ALP SERPL-CCNC: 62 U/L (ref 34–104)
ALT SERPL W P-5'-P-CCNC: 24 U/L (ref 7–52)
ANION GAP SERPL CALCULATED.3IONS-SCNC: 10 MMOL/L
AST SERPL W P-5'-P-CCNC: 20 U/L (ref 13–39)
BASOPHILS # BLD AUTO: 0.01 THOUSANDS/ÂΜL (ref 0–0.1)
BASOPHILS NFR BLD AUTO: 0 % (ref 0–1)
BILIRUB SERPL-MCNC: 0.96 MG/DL (ref 0.2–1)
BUN SERPL-MCNC: 44 MG/DL (ref 5–25)
CALCIUM ALBUM COR SERPL-MCNC: 9.2 MG/DL (ref 8.3–10.1)
CALCIUM SERPL-MCNC: 8.6 MG/DL (ref 8.4–10.2)
CHLORIDE SERPL-SCNC: 108 MMOL/L (ref 96–108)
CO2 SERPL-SCNC: 17 MMOL/L (ref 21–32)
CREAT SERPL-MCNC: 1.22 MG/DL (ref 0.6–1.3)
EOSINOPHIL # BLD AUTO: 0.75 THOUSAND/ÂΜL (ref 0–0.61)
EOSINOPHIL NFR BLD AUTO: 11 % (ref 0–6)
ERYTHROCYTE [DISTWIDTH] IN BLOOD BY AUTOMATED COUNT: 18.2 % (ref 11.6–15.1)
GFR SERPL CREATININE-BSD FRML MDRD: 39 ML/MIN/1.73SQ M
GLUCOSE SERPL-MCNC: 102 MG/DL (ref 65–140)
HCT VFR BLD AUTO: 27.9 % (ref 34.8–46.1)
HGB BLD-MCNC: 8.9 G/DL (ref 11.5–15.4)
IMM GRANULOCYTES # BLD AUTO: 0.04 THOUSAND/UL (ref 0–0.2)
IMM GRANULOCYTES NFR BLD AUTO: 1 % (ref 0–2)
LYMPHOCYTES # BLD AUTO: 0.36 THOUSANDS/ÂΜL (ref 0.6–4.47)
LYMPHOCYTES NFR BLD AUTO: 5 % (ref 14–44)
MAGNESIUM SERPL-MCNC: 1.8 MG/DL (ref 1.9–2.7)
MCH RBC QN AUTO: 28.8 PG (ref 26.8–34.3)
MCHC RBC AUTO-ENTMCNC: 31.9 G/DL (ref 31.4–37.4)
MCV RBC AUTO: 90 FL (ref 82–98)
MONOCYTES # BLD AUTO: 0.4 THOUSAND/ÂΜL (ref 0.17–1.22)
MONOCYTES NFR BLD AUTO: 6 % (ref 4–12)
NEUTROPHILS # BLD AUTO: 5.55 THOUSANDS/ÂΜL (ref 1.85–7.62)
NEUTS SEG NFR BLD AUTO: 77 % (ref 43–75)
NRBC BLD AUTO-RTO: 0 /100 WBCS
OSMOLALITY UR/SERPL-RTO: 291 MMOL/KG (ref 282–298)
PHOSPHATE SERPL-MCNC: 2.3 MG/DL (ref 2.3–4.1)
PLATELET # BLD AUTO: 116 THOUSANDS/UL (ref 149–390)
PMV BLD AUTO: 9.6 FL (ref 8.9–12.7)
POTASSIUM SERPL-SCNC: 3.8 MMOL/L (ref 3.5–5.3)
PROCALCITONIN SERPL-MCNC: 6.3 NG/ML
PROT SERPL-MCNC: 5.1 G/DL (ref 6.4–8.4)
RBC # BLD AUTO: 3.09 MILLION/UL (ref 3.81–5.12)
SODIUM SERPL-SCNC: 135 MMOL/L (ref 135–147)
WBC # BLD AUTO: 7.11 THOUSAND/UL (ref 4.31–10.16)

## 2023-11-20 PROCEDURE — 99232 SBSQ HOSP IP/OBS MODERATE 35: CPT | Performed by: NURSE PRACTITIONER

## 2023-11-20 PROCEDURE — 99232 SBSQ HOSP IP/OBS MODERATE 35: CPT | Performed by: INTERNAL MEDICINE

## 2023-11-20 PROCEDURE — 92526 ORAL FUNCTION THERAPY: CPT

## 2023-11-20 PROCEDURE — 99233 SBSQ HOSP IP/OBS HIGH 50: CPT | Performed by: HOSPITALIST

## 2023-11-20 PROCEDURE — 94668 MNPJ CHEST WALL SBSQ: CPT

## 2023-11-20 PROCEDURE — 85025 COMPLETE CBC W/AUTO DIFF WBC: CPT | Performed by: FAMILY MEDICINE

## 2023-11-20 PROCEDURE — 84145 PROCALCITONIN (PCT): CPT | Performed by: INTERNAL MEDICINE

## 2023-11-20 PROCEDURE — 94664 DEMO&/EVAL PT USE INHALER: CPT

## 2023-11-20 PROCEDURE — 83930 ASSAY OF BLOOD OSMOLALITY: CPT | Performed by: INTERNAL MEDICINE

## 2023-11-20 PROCEDURE — 97116 GAIT TRAINING THERAPY: CPT

## 2023-11-20 PROCEDURE — 97530 THERAPEUTIC ACTIVITIES: CPT

## 2023-11-20 PROCEDURE — 94760 N-INVAS EAR/PLS OXIMETRY 1: CPT

## 2023-11-20 PROCEDURE — 83735 ASSAY OF MAGNESIUM: CPT | Performed by: INTERNAL MEDICINE

## 2023-11-20 PROCEDURE — 84100 ASSAY OF PHOSPHORUS: CPT | Performed by: INTERNAL MEDICINE

## 2023-11-20 PROCEDURE — 80053 COMPREHEN METABOLIC PANEL: CPT | Performed by: INTERNAL MEDICINE

## 2023-11-20 PROCEDURE — 94640 AIRWAY INHALATION TREATMENT: CPT

## 2023-11-20 RX ORDER — ALPRAZOLAM 0.25 MG/1
0.25 TABLET ORAL 3 TIMES DAILY PRN
Status: DISCONTINUED | OUTPATIENT
Start: 2023-11-20 | End: 2023-11-22 | Stop reason: HOSPADM

## 2023-11-20 RX ORDER — ACETAMINOPHEN 325 MG/1
650 TABLET ORAL EVERY 6 HOURS PRN
Status: DISCONTINUED | OUTPATIENT
Start: 2023-11-20 | End: 2023-11-22 | Stop reason: HOSPADM

## 2023-11-20 RX ORDER — LEVALBUTEROL INHALATION SOLUTION 1.25 MG/3ML
1.25 SOLUTION RESPIRATORY (INHALATION)
Status: DISCONTINUED | OUTPATIENT
Start: 2023-11-20 | End: 2023-11-22 | Stop reason: HOSPADM

## 2023-11-20 RX ORDER — MAGNESIUM SULFATE HEPTAHYDRATE 40 MG/ML
1 INJECTION, SOLUTION INTRAVENOUS CONTINUOUS
Status: DISCONTINUED | OUTPATIENT
Start: 2023-11-20 | End: 2023-11-20

## 2023-11-20 RX ORDER — POTASSIUM CHLORIDE 750 MG/1
10 TABLET, EXTENDED RELEASE ORAL DAILY
Status: DISCONTINUED | OUTPATIENT
Start: 2023-11-21 | End: 2023-11-22 | Stop reason: HOSPADM

## 2023-11-20 RX ORDER — POLYETHYLENE GLYCOL 3350 17 G/17G
17 POWDER, FOR SOLUTION ORAL DAILY PRN
Status: DISCONTINUED | OUTPATIENT
Start: 2023-11-20 | End: 2023-11-22 | Stop reason: HOSPADM

## 2023-11-20 RX ORDER — SACCHAROMYCES BOULARDII 250 MG
250 CAPSULE ORAL 2 TIMES DAILY
Status: DISCONTINUED | OUTPATIENT
Start: 2023-11-20 | End: 2023-11-22 | Stop reason: HOSPADM

## 2023-11-20 RX ORDER — GUAIFENESIN 600 MG/1
600 TABLET, EXTENDED RELEASE ORAL EVERY 12 HOURS SCHEDULED
Status: DISCONTINUED | OUTPATIENT
Start: 2023-11-20 | End: 2023-11-22 | Stop reason: HOSPADM

## 2023-11-20 RX ORDER — MAGNESIUM SULFATE HEPTAHYDRATE 40 MG/ML
2 INJECTION, SOLUTION INTRAVENOUS ONCE
Status: COMPLETED | OUTPATIENT
Start: 2023-11-20 | End: 2023-11-20

## 2023-11-20 RX ORDER — ACETAMINOPHEN 325 MG/1
975 TABLET ORAL EVERY 6 HOURS PRN
Status: DISCONTINUED | OUTPATIENT
Start: 2023-11-20 | End: 2023-11-20

## 2023-11-20 RX ORDER — CEFEPIME HYDROCHLORIDE 1 G/50ML
1000 INJECTION, SOLUTION INTRAVENOUS EVERY 24 HOURS
Status: DISCONTINUED | OUTPATIENT
Start: 2023-11-21 | End: 2023-11-22 | Stop reason: HOSPADM

## 2023-11-20 RX ORDER — PANTOPRAZOLE SODIUM 20 MG/1
20 TABLET, DELAYED RELEASE ORAL
Status: DISCONTINUED | OUTPATIENT
Start: 2023-11-21 | End: 2023-11-22 | Stop reason: HOSPADM

## 2023-11-20 RX ORDER — ATORVASTATIN CALCIUM 10 MG/1
20 TABLET, FILM COATED ORAL
Status: DISCONTINUED | OUTPATIENT
Start: 2023-11-21 | End: 2023-11-22 | Stop reason: HOSPADM

## 2023-11-20 RX ORDER — CLOPIDOGREL BISULFATE 75 MG/1
75 TABLET ORAL DAILY
Status: DISCONTINUED | OUTPATIENT
Start: 2023-11-21 | End: 2023-11-22 | Stop reason: HOSPADM

## 2023-11-20 RX ADMIN — GUAIFENESIN 600 MG: 600 TABLET ORAL at 09:11

## 2023-11-20 RX ADMIN — METOPROLOL TARTRATE 25 MG: 25 TABLET, FILM COATED ORAL at 20:23

## 2023-11-20 RX ADMIN — METOPROLOL TARTRATE 25 MG: 25 TABLET, FILM COATED ORAL at 09:11

## 2023-11-20 RX ADMIN — Medication 250 MG: at 09:11

## 2023-11-20 RX ADMIN — POTASSIUM CHLORIDE 10 MEQ: 750 TABLET, EXTENDED RELEASE ORAL at 09:11

## 2023-11-20 RX ADMIN — AZITHROMYCIN MONOHYDRATE 500 MG: 500 INJECTION, POWDER, LYOPHILIZED, FOR SOLUTION INTRAVENOUS at 10:15

## 2023-11-20 RX ADMIN — LEVALBUTEROL HYDROCHLORIDE 1.25 MG: 1.25 SOLUTION RESPIRATORY (INHALATION) at 19:25

## 2023-11-20 RX ADMIN — ATORVASTATIN CALCIUM 20 MG: 10 TABLET, FILM COATED ORAL at 17:03

## 2023-11-20 RX ADMIN — SODIUM CHLORIDE 75 ML/HR: 0.9 INJECTION, SOLUTION INTRAVENOUS at 02:56

## 2023-11-20 RX ADMIN — Medication 2000 UNITS: at 09:11

## 2023-11-20 RX ADMIN — CEFEPIME HYDROCHLORIDE 1000 MG: 1 INJECTION, SOLUTION INTRAVENOUS at 09:12

## 2023-11-20 RX ADMIN — PANTOPRAZOLE SODIUM 20 MG: 20 TABLET, DELAYED RELEASE ORAL at 07:13

## 2023-11-20 RX ADMIN — Medication 250 MG: at 17:03

## 2023-11-20 RX ADMIN — CLOPIDOGREL 75 MG: 75 TABLET ORAL at 09:11

## 2023-11-20 RX ADMIN — LEVALBUTEROL HYDROCHLORIDE 1.25 MG: 1.25 SOLUTION RESPIRATORY (INHALATION) at 07:56

## 2023-11-20 RX ADMIN — GUAIFENESIN 600 MG: 600 TABLET ORAL at 20:23

## 2023-11-20 RX ADMIN — MAGNESIUM SULFATE HEPTAHYDRATE 2 G: 40 INJECTION, SOLUTION INTRAVENOUS at 12:48

## 2023-11-20 NOTE — ASSESSMENT & PLAN NOTE
Lab Results   Component Value Date    EGFR 39 11/20/2023    EGFR 36 11/19/2023    EGFR 30 11/18/2023    CREATININE 1.22 11/20/2023    CREATININE 1.31 (H) 11/19/2023    CREATININE 1.51 (H) 11/18/2023     Likely ATN 2/2 volume depletion and sepsis   Fe Na 0.41%  (low)  Renal function continues to improve  Cont IVF NS 75 ml/hr today per nephro  Nephrology input appreciated

## 2023-11-20 NOTE — PROGRESS NOTES
[address]  Progress Note  Name: Ana Rosa Mayorga  MRN: [id number]  Unit/Bed#:  I Date of Admission: 11/16/2023   Date of Service: 11/20/2023 I Hospital Day: 4    Assessment/Plan   * Acute hypoxic respiratory failure [facility])  Assessment & Plan  Presented w/SOB on room air, requiring up to 8L   No Hx COPD, does have Hx CHF  Weaned to room air until 11/19, now requiring 1-2L NC to maintain O2 sat above 90%  Continue Resp therapy  Pulmonary input appreciated  Consider CXR if signs of pulmonary congestion/fluid retention    Sepsis due to pneumonia [facility])  Assessment & Plan  Fever, tachycardia, leukocytosis POA  Large multifocal R-sided pneumonia noted on CXR and CT chest wo contrast  Lactic acidosis resolved w IVF  Hospital day #1-2 OVN hypotension, likely worsening of sepsis  Speech/swallow evaluated, no evidence of dysphagia  Procal now normalized  Urine antigens negative  Blood cultures negative X2  Remains afebrile, tachypneic, requiring supplemental O2  Continue azithromycin, cefepime (Day 4)    Acute-on-chronic kidney injury   Assessment & Plan  Lab Results   Component Value Date    EGFR 39 11/20/2023    EGFR 36 11/19/2023    EGFR 30 11/18/2023    CREATININE 1.22 11/20/2023    CREATININE 1.31 (H) 11/19/2023    CREATININE 1.51 (H) 11/18/2023     Likely ATN 2/2 volume depletion and sepsis   Fe Na 0.41%  (low)  Renal function continues to improve  Cont IVF NS 75 ml/hr today per nephro  Nephrology input appreciated    Chronic diastolic (congestive) heart failure (HCC)  Assessment & Plan  Wt Readings from Last 3 Encounters:   11/20/23 50.9 kg (112 lb 3.4 oz)   11/01/23 48.1 kg (106 lb)   10/03/23 47.1 kg (103 lb 12.8 oz)     Increased need for supplemental O2  Wt stable, continue to monitor  Consider CXR if patient begins to show increased signs of pulmonary congestion/fluid retention    Anemia  Assessment & Plan  Hemoglobin stabilized around 9  Continue to hold Eliquis  Iron panel suggests likely anemia of chronic disease w some element of RAMIRO  Continue to trend, consider FOBT    Atrial flutter (HCC)  Assessment & Plan  Resumed metoprolol and Cardizem  Continue to hold Eliquis  As intermittent episodes of rapid ventricular response, may require titration of metoprolol    Thrombocytopenia (HCC)  Assessment & Plan  Platelets decreased, likely in the setting of acute infection  Platelets stabilized         VTE Pharmacologic Prophylaxis:   Pharmacologic:  Eliquis held for now  Mechanical VTE Prophylaxis in Place: Yes    Discussions with Specialists or Other Care Team Provider: Lelai Velazquez    Education and Discussions with Family / Patient: Yes    Time Spent for Care: 45 minutes. More than 50% of total time spent on counseling and coordination of care as described above. Current Length of Stay: 4 day(s)    Current Patient Status: Inpatient   Certification Statement: The patient will continue to require additional inpatient hospital stay due to Continued respiratory support, IVF, IV antibiotics    Discharge Plan: 48-72hrs if stable    Code Status: Level 3 - DNAR and DNI      Subjective:   Ms Apryl Ashley is doing well this morning. She had an episode of diarrhea, which she believes was caused by her antibiotics, but has no other acute concerns. She is tachypneic when speaking, but saturations remained in the low 90s on 2L NC. Objective:     Vitals:   Temp (24hrs), Av.8 °F (37.1 °C), Min:97.8 °F (36.6 °C), Max:99.4 °F (37.4 °C)    Temp:  [97.8 °F (36.6 °C)-99.4 °F (37.4 °C)] 98.5 °F (36.9 °C)  HR:  [] 106  Resp:  [12-39] 28  BP: ()/(45-82) 119/82  SpO2:  [83 %-100 %] 84 %  Body mass index is 23.45 kg/m². Input and Output Summary (last 24 hours):        Intake/Output Summary (Last 24 hours) at 2023 1106  Last data filed at 2023 0256  Gross per 24 hour   Intake 1830 ml   Output --   Net 1830 ml       Physical Exam:     Physical Exam  Vitals and nursing note reviewed. Constitutional:       General: She is not in acute distress. Appearance: She is well-developed. She is ill-appearing. HENT:      Head: Normocephalic and atraumatic. Right Ear: External ear normal.      Left Ear: External ear normal.      Nose: Nose normal.      Comments: NC in place     Mouth/Throat:      Mouth: Mucous membranes are moist.      Pharynx: Oropharynx is clear. Eyes:      Extraocular Movements: Extraocular movements intact. Conjunctiva/sclera: Conjunctivae normal.   Cardiovascular:      Rate and Rhythm: Regular rhythm. Tachycardia present. Pulses: Normal pulses. Heart sounds: Normal heart sounds. No murmur heard. Pulmonary:      Effort: No respiratory distress. Breath sounds: No wheezing. Comments: Tachypneic  Coarse breath sounds in lower lobes bilaterally  Abdominal:      General: Abdomen is flat. Bowel sounds are normal.      Palpations: Abdomen is soft. Tenderness: There is no abdominal tenderness. There is no guarding. Musculoskeletal:         General: No swelling. Normal range of motion. Cervical back: Normal range of motion and neck supple. Comments: Legs are baseline per patient, elevated on exam   Skin:     General: Skin is warm and dry. Capillary Refill: Capillary refill takes less than 2 seconds. Findings: No erythema or rash. Neurological:      General: No focal deficit present. Mental Status: She is alert and oriented to person, place, and time.    Psychiatric:         Mood and Affect: Mood normal.         Behavior: Behavior normal.         Additional Data:     Labs:    Results from last 7 days   Lab Units 11/20/23  0507 11/19/23  0440 11/18/23  0643   WBC Thousand/uL 7.11   < > 9.05   HEMOGLOBIN g/dL 8.9*   < > 9.4*   HEMATOCRIT % 27.9*   < > 29.5*   PLATELETS Thousands/uL 116*   < > 115*   BANDS PCT %  --   --  16*   NEUTROS PCT % 77*   < >  --    LYMPHS PCT % 5*   < >  --    LYMPHO PCT %  --   --  1* MONOS PCT % 6   < >  --    MONO PCT %  --   --  3*   EOS PCT % 11*   < > 3    < > = values in this interval not displayed. Results from last 7 days   Lab Units 11/20/23  0507   SODIUM mmol/L 135   POTASSIUM mmol/L 3.8   CHLORIDE mmol/L 108   CO2 mmol/L 17*   BUN mg/dL 44*   CREATININE mg/dL 1.22   ANION GAP mmol/L 10   CALCIUM mg/dL 8.6   ALBUMIN g/dL 3.2*   TOTAL BILIRUBIN mg/dL 0.96   ALK PHOS U/L 62   ALT U/L 24   AST U/L 20   GLUCOSE RANDOM mg/dL 102     Results from last 7 days   Lab Units 11/16/23  0840   INR  1.34*     Results from last 7 days   Lab Units 11/16/23  2041   POC GLUCOSE mg/dl 160*         Results from last 7 days   Lab Units 11/20/23  0507 11/18/23  0643 11/16/23  1118 11/16/23  0840   LACTIC ACID mmol/L  --   --  1.5 2.6*   PROCALCITONIN ng/ml 6.30* 18.85*  --  0.18     * I Have Reviewed All Lab Data Listed Above. * Additional Pertinent Lab Tests Reviewed: [facility] Admission Reviewed    Imaging:    CT chest without contrast   Final Result         1. Multifocal pneumonia in the right lung with reactive adenopathy. Follow-up to ensure complete resolution recommended. 2. Moderate right pleural effusion with associated compressive atelectasis, mildly improved since 6/11/2023. Trace left pleural effusion. 3. Mild groundglass opacities in the left upper lobe. The study was marked in EPIC for significant notification. Yesi Pelaez Resident: Miroslava Cha, the attending radiologist, have reviewed the images and agree with the final report above. Workstation performed: [id number]         XR chest portable   ED Interpretation   Dense infiltrate in right lung, concerning for pneumonia. Formal read pending. Final Result      Development of large infiltrate in the right lung with interval decrease in size of right effusion. Postobstructive pneumonia cannot be excluded.  Consider short interval follow-up with chest x-ray or evaluation with CT chest with contrast.   Study was marked in Epic for follow-up notification. Resident: Christi Henry, the attending radiologist, have reviewed the images and agree with the final report above. Workstation performed: [id number]           Imaging Personally Reviewed by Myself Includes:  No new imaging    Recent Cultures (last 7 days):     Results from last 7 days   Lab Units 11/17/23  0006 11/16/23  0847 11/16/23  0844   BLOOD CULTURE   --  No Growth at 72 hrs. No Growth at 72 hrs.    LEGIONELLA URINARY ANTIGEN  Negative  --   --        Last 24 Hours Medication List:   Current Facility-Administered Medications   Medication Dose Route Frequency Provider Last Rate    acetaminophen  975 mg Oral Q6H PRN Dashawn Eubanks MD      ALPRAZolam  0.25 mg Oral TID PRN Trino Soares MD      atorvastatin  20 mg Oral Daily With Kassidy Fritz MD      azithromycin  500 mg Intravenous Q24H Trino Soares  mg (11/20/23 1015)    cefepime  1,000 mg Intravenous Q24H Trino Soares MD 1,000 mg (11/20/23 0912)    cholecalciferol  2,000 Units Oral Daily Trino Soares MD      clopidogrel  75 mg Oral Daily Trino Soares MD      guaiFENesin  600 mg Oral Q12H [address] Dashawn Eubanks MD      levalbuterol  1.25 mg Nebulization TID Yobany Dangelo PA-C      magnesium sulfate  2 g Intravenous Once Fredrick Ackerman MD      metoprolol tartrate  25 mg Oral Q12H [address] Trino Soarse MD      pantoprazole  20 mg Oral Daily Before Breakfast Trino Soares MD      polyethylene glycol  17 g Oral Daily PRN Trino Soares MD      potassium chloride  10 mEq Oral Daily Trino Soares MD      saccharomyces boulardii  250 mg Oral BID María Sherwood DO      sodium chloride  75 mL/hr Intravenous Continuous Kelsey Garcia MD 75 mL/hr (11/20/23 0256)        Today, Patient Was Seen By: Fredrick Ackerman MD

## 2023-11-20 NOTE — ASSESSMENT & PLAN NOTE
Hemoglobin stabilized around 9  Continue to hold Eliquis  Iron panel suggests likely anemia of chronic disease w some element of RAMIRO  Continue to trend, consider FOBT

## 2023-11-20 NOTE — ASSESSMENT & PLAN NOTE
Fever, tachycardia, leukocytosis POA  Large multifocal R-sided pneumonia noted on CXR and CT chest wo contrast  Lactic acidosis resolved w IVF  Hospital day #1-2 OVN hypotension, likely worsening of sepsis  Speech/swallow evaluated, no evidence of dysphagia  Procal now normalized  Urine antigens negative  Blood cultures negative X2  Remains afebrile, tachypneic, requiring supplemental O2  Continue azithromycin, cefepime (Day 4)

## 2023-11-20 NOTE — ASSESSMENT & PLAN NOTE
Presented w/SOB on room air, requiring up to 8L   No Hx COPD, does have Hx CHF  Weaned to room air until 11/19, now requiring 1-2L NC to maintain O2 sat above 90%  Continue Resp therapy  Pulmonary input appreciated  Consider CXR if signs of pulmonary congestion/fluid retention

## 2023-11-20 NOTE — OCCUPATIONAL THERAPY NOTE
Occupational Therapy         Patient Name: Natalie Aguilar  EQLJZ'V Date: 11/20/2023 11/20/23 1139   OT Last Visit   OT Visit Date 11/20/23   Note Type   Note Type Treatment   Pain Assessment   Pain Assessment Tool 0-10   Pain Score No Pain   Restrictions/Precautions   Weight Bearing Precautions Per Order No   Other Precautions Telemetry;Multiple lines;O2;Fall Risk;Hard of hearing; Chair Alarm   ADL   Where Assessed Chair   LB Dressing Assistance 3  Moderate Assistance   LB Dressing Deficit Don/doff R sock; Don/doff L sock   Bed Mobility   Additional Comments Pt OOB in chair at start/end of session, all needs within reach. Transfers   Sit to Stand 4  Minimal assistance   Additional items Armrests; Increased time required;Verbal cues  (Min A x1-2)   Stand to Sit 4  Minimal assistance   Additional items Armrests; Increased time required;Verbal cues  (Min A x1-2)   Additional Comments RW used   Functional Mobility   Functional Mobility 4  Minimal assistance   Additional Comments x 1-2 with RW for support; no significant LOB noted; x household distances within her room - seated rest periods required due to fatigue; Pt utilizing 2L O2 with all vitals remaining WFL.    Additional items Rolling walker   Subjective   Subjective "I am never going to rehab again."   Cognition   Overall Cognitive Status Impaired   Arousal/Participation Alert   Attention Attends with cues to redirect   Orientation Level Oriented X4   Memory Decreased long term memory;Decreased short term memory   Following Commands Follows one step commands with increased time or repetition   Activity Tolerance   Activity Tolerance Patient limited by fatigue   Medical Staff Made Aware nurse aware   Assessment   Assessment Patient participated in Skilled OT session this date with interventions consisting of ADL re training with the use of correct body mechnaics, Energy Conservation techniques, Work simplification skills , safety awareness and fall prevention techniques, and  therapeutic activities to: increase activity tolerance . Co treatment with PT secondary to complex medical condition of pt, mod-max A of 2 required to achieve and maintain transitional movements, requiring the need of skilled therapeutic intervention of 2 therapists to achieve delivery of services. Patient agreeable to OT treatment session, upon arrival patient was found seated OOB to Chair. Patient requiring frequent re direction, verbal cues for safety, and frequent rest periods. Patient continues to be functioning below baseline level, occupational performance remains limited secondary to factors listed above and increased risk for falls and injury. Pt limited by fatigue, balance deficits, decreased endurance, decreased motivation to participate. Pt requires verbal encouragement and education on importance of participation. Pt on 2L O2 via NC during session with SPO2 remaining WFL. Plan   Treatment Interventions ADL retraining;Functional transfer training; Endurance training;Energy conservation; Activityengagement   Goal Expiration Date 12/02/23   OT Treatment Day 1   OT Frequency 3-5x/wk   Discharge Recommendation   Rehab Resource Intensity Level, OT II (Moderate Resource Intensity)   AM-PAC Daily Activity Inpatient   Lower Body Dressing 2   Bathing 2   Toileting 2   Upper Body Dressing 3   Grooming 3   Eating 4   Daily Activity Raw Score 16   Daily Activity Standardized Score (Calc for Raw Score >=11) 35.96   AM-PAC Applied Cognition Inpatient   Following a Speech/Presentation 4   Understanding Ordinary Conversation 4   Taking Medications 3   Remembering Where Things Are Placed or Put Away 2   Remembering List of 4-5 Errands 2   Taking Care of Complicated Tasks 2   Applied Cognition Raw Score 17   Applied Cognition Standardized Score 36.52       Pt will benefit from continued OT services in order to maximize (I) c ADL performance, FM c RW, and improve overall endurance/strength required to complete functional tasks in preparation for d/c. Pt benefited from co-treatment of skilled OT and PTA in order to most appropriately address functional deficits d/t extensive assistance required for safe functional mobility, decreased activity tolerance, and regression from functioning level prior to admission and/or onset of present illness. OT/PT objectives were addressed separately; please see PT note for specific goal areas targeted. Pt left seated in chair at end of session; all needs within reach; all lines intact.     Ronald Morales

## 2023-11-20 NOTE — PLAN OF CARE
Problem: CARDIOVASCULAR - ADULT  Goal: Maintains optimal cardiac output and hemodynamic stability  Description: INTERVENTIONS:  - Monitor I/O, vital signs and rhythm  - Monitor for S/S and trends of decreased cardiac output  - Administer and titrate ordered vasoactive medications to optimize hemodynamic stability  - Assess quality of pulses, skin color and temperature  - Assess for signs of decreased coronary artery perfusion  - Instruct patient to report change in severity of symptoms  Outcome: Progressing  Goal: Absence of cardiac dysrhythmias or at baseline rhythm  Description: INTERVENTIONS:  - Continuous cardiac monitoring, vital signs, obtain 12 lead EKG if ordered  - Administer antiarrhythmic and heart rate control medications as ordered  - Monitor electrolytes and administer replacement therapy as ordered  Outcome: Progressing     Problem: RESPIRATORY - ADULT  Goal: Achieves optimal ventilation and oxygenation  Description: INTERVENTIONS:  - Assess for changes in respiratory status  - Assess for changes in mentation and behavior  - Position to facilitate oxygenation and minimize respiratory effort  - Oxygen administered by appropriate delivery if ordered  - Initiate smoking cessation education as indicated  - Encourage broncho-pulmonary hygiene including cough, deep breathe, Incentive Spirometry  - Assess the need for suctioning and aspirate as needed  - Assess and instruct to report SOB or any respiratory difficulty  - Respiratory Therapy support as indicated  Outcome: Progressing     Problem: METABOLIC, FLUID AND ELECTROLYTES - ADULT  Goal: Electrolytes maintained within normal limits  Description: INTERVENTIONS:  - Monitor labs and assess patient for signs and symptoms of electrolyte imbalances  - Administer electrolyte replacement as ordered  - Monitor response to electrolyte replacements, including repeat lab results as appropriate  - Instruct patient on fluid and nutrition as appropriate  Outcome: Progressing  Goal: Fluid balance maintained  Description: INTERVENTIONS:  - Monitor labs   - Monitor I/O and WT  - Instruct patient on fluid and nutrition as appropriate  - Assess for signs & symptoms of volume excess or deficit  Outcome: Progressing     Problem: MUSCULOSKELETAL - ADULT  Goal: Maintain or return mobility to safest level of function  Description: INTERVENTIONS:  - Assess patient's ability to carry out ADLs; assess patient's baseline for ADL function and identify physical deficits which impact ability to perform ADLs (bathing, care of mouth/teeth, toileting, grooming, dressing, etc.)  - Assess/evaluate cause of self-care deficits   - Assess range of motion  - Assess patient's mobility  - Assess patient's need for assistive devices and provide as appropriate  - Encourage maximum independence but intervene and supervise when necessary  - Involve family in performance of ADLs  - Assess for home care needs following discharge   - Consider OT consult to assist with ADL evaluation and planning for discharge  - Provide patient education as appropriate  Outcome: Progressing     Problem: Prexisting or High Potential for Compromised Skin Integrity  Goal: Skin integrity is maintained or improved  Description: INTERVENTIONS:  - Identify patients at risk for skin breakdown  - Assess and monitor skin integrity  - Assess and monitor nutrition and hydration status  - Monitor labs   - Assess for incontinence   - Turn and reposition patient  - Assist with mobility/ambulation  - Relieve pressure over bony prominences  - Avoid friction and shearing  - Provide appropriate hygiene as needed including keeping skin clean and dry  - Evaluate need for skin moisturizer/barrier cream  - Collaborate with interdisciplinary team   - Patient/family teaching  - Consider wound care consult   Outcome: Progressing     Problem: PAIN - ADULT  Goal: Verbalizes/displays adequate comfort level or baseline comfort level  Description: Interventions:  - Encourage patient to monitor pain and request assistance  - Assess pain using appropriate pain scale  - Administer analgesics based on type and severity of pain and evaluate response  - Implement non-pharmacological measures as appropriate and evaluate response  - Consider cultural and social influences on pain and pain management  - Notify physician/advanced practitioner if interventions unsuccessful or patient reports new pain  Outcome: Progressing     Problem: INFECTION - ADULT  Goal: Absence or prevention of progression during hospitalization  Description: INTERVENTIONS:  - Assess and monitor for signs and symptoms of infection  - Monitor lab/diagnostic results  - Monitor all insertion sites, i.e. indwelling lines, tubes, and drains  - Monitor endotracheal if appropriate and nasal secretions for changes in amount and color  - [place] appropriate cooling/warming therapies per order  - Administer medications as ordered  - Instruct and encourage patient and family to use good hand hygiene technique  - Identify and instruct in appropriate isolation precautions for identified infection/condition  Outcome: Progressing  Goal: Absence of fever/infection during neutropenic period  Description: INTERVENTIONS:  - Monitor WBC    Outcome: Progressing     Problem: DISCHARGE PLANNING  Goal: Discharge to home or other facility with appropriate resources  Description: INTERVENTIONS:  - Identify barriers to discharge w/patient and caregiver  - Arrange for needed discharge resources and transportation as appropriate  - Identify discharge learning needs (meds, wound care, etc.)  - Arrange for interpretive services to assist at discharge as needed  - Refer to Case Management Department for coordinating discharge planning if the patient needs post-hospital services based on physician/advanced practitioner order or complex needs related to functional status, cognitive ability, or social support system  Outcome: Progressing     Problem: Knowledge Deficit  Goal: Patient/family/caregiver demonstrates understanding of disease process, treatment plan, medications, and discharge instructions  Description: Complete learning assessment and assess knowledge base. Interventions:  - Provide teaching at level of understanding  - Provide teaching via preferred learning methods  Outcome: Progressing     Problem: Nutrition/Hydration-ADULT  Goal: Nutrient/Hydration intake appropriate for improving, restoring or maintaining nutritional needs  Description: Monitor and assess patient's nutrition/hydration status for malnutrition. Collaborate with interdisciplinary team and initiate plan and interventions as ordered. Monitor patient's weight and dietary intake as ordered or per policy. Utilize nutrition screening tool and intervene as necessary. Determine patient's food preferences and provide high-protein, high-caloric foods as appropriate.      INTERVENTIONS:  - Monitor oral intake, urinary output, labs, and treatment plans  - Assess nutrition and hydration status and recommend course of action  - Evaluate amount of meals eaten  - Assist patient with eating if necessary   - Allow adequate time for meals  - Recommend/ encourage appropriate diets, oral nutritional supplements, and vitamin/mineral supplements  - Order, calculate, and assess calorie counts as needed  - Recommend, monitor, and adjust tube feedings and TPN/PPN based on assessed needs  - Assess need for intravenous fluids  - Provide specific nutrition/hydration education as appropriate  - Include patient/family/caregiver in decisions related to nutrition  Outcome: Progressing

## 2023-11-20 NOTE — ASSESSMENT & PLAN NOTE
Resumed metoprolol and Cardizem  Continue to hold Eliquis  As intermittent episodes of rapid ventricular response, may require titration of metoprolol

## 2023-11-20 NOTE — RESPIRATORY THERAPY NOTE
11/20/23 0756   Inhalation Therapy Tx   $ Inhalation Therapy Performed Yes   $ Pulse Oximetry Spot Check Charge Completed   SpO2 93 %   Pre-Treatment Pulse 87   Pre-Treatment Respirations 20   Duration 20   Breath Sounds Pre-Treatment Bilateral Diminished;Crackles;Coarse   Delivery Source Air;UDN   Position Up in chair   Treatment Tolerance Tolerated well   Resp Comments crackles heard, higher than yesterday for me, patient found on 2 lpm, she has dry npc on command, flutter valve in use

## 2023-11-20 NOTE — ASSESSMENT & PLAN NOTE
Wt Readings from Last 3 Encounters:   11/20/23 50.9 kg (112 lb 3.4 oz)   11/01/23 48.1 kg (106 lb)   10/03/23 47.1 kg (103 lb 12.8 oz)     Increased need for supplemental O2  Wt stable, continue to monitor  Consider CXR if patient begins to show increased signs of pulmonary congestion/fluid retention

## 2023-11-20 NOTE — PLAN OF CARE
Problem: OCCUPATIONAL THERAPY ADULT  Goal: Performs self-care activities at highest level of function for planned discharge setting. See evaluation for individualized goals. Description: Treatment Interventions: ADL retraining, Functional transfer training, UE strengthening/ROM, Endurance training, Patient/family training, Cognitive reorientation, Equipment evaluation/education, Activityengagement          See flowsheet documentation for full assessment, interventions and recommendations. Outcome: Progressing  Note: Limitation: Decreased ADL status, Decreased UE strength, Decreased Safe judgement during ADL, Decreased endurance, Decreased cognition, Decreased self-care trans, Decreased high-level ADLs     Assessment: Patient participated in Skilled OT session this date with interventions consisting of ADL re training with the use of correct body mechnaics, Energy Conservation techniques, Work simplification skills , safety awareness and fall prevention techniques, and  therapeutic activities to: increase activity tolerance . Co treatment with PT secondary to complex medical condition of pt, mod-max A of 2 required to achieve and maintain transitional movements, requiring the need of skilled therapeutic intervention of 2 therapists to achieve delivery of services. Patient agreeable to OT treatment session, upon arrival patient was found seated OOB to Chair. Patient requiring frequent re direction, verbal cues for safety, and frequent rest periods. Patient continues to be functioning below baseline level, occupational performance remains limited secondary to factors listed above and increased risk for falls and injury. Pt limited by fatigue, balance deficits, decreased endurance, decreased motivation to participate. Pt requires verbal encouragement and education on importance of participation. Pt on 2L O2 via NC during session with SPO2 remaining WFL.      Rehab Resource Intensity Level, OT: II (Moderate Resource Intensity)

## 2023-11-20 NOTE — PROGRESS NOTES
NEPHROLOGY PROGRESS NOTE   Natalie Aguilar 80 y.o. female MRN: [id number]  Unit/Bed#:  Encounter: [id number]    Assessment/Plan:    79 yo female with CKD 3A, HFpEF, a-flutter, history of hyponatremia presented 11/16 with shortness of breath, viral symptoms being treated in ICU level of care for sepsis and acute hypoxic respiratory failure secondary to multifocal pneumonia. Nephrology following along for acute kidney injury, acidosis, hyponatremia    1. Acute kidney injury atop chronic kidney disease  Peak creatinine 1.5 mg/dL on 11/18 improved to 1.2 mg/dL today. Etiology for renal azotemia in the setting of volume depletion and severe sepsis. She received volume expansion however has lower extremity edema and increasing oxygen requirements. Discontinue IV fluids. Will allow equilibration. If oxygen requirements worsen, primary team plans to check chest x-ray   2. Stage IIIa chronic kidney disease with baseline creatinine around 0.8-1.1 mg/dL  3. 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 Karina Kilpatrick. Previously on SSRI this was discontinued. More recently hyponatremia felt to be hypovolemic in nature status post isotonic saline. serum sodium today 135 mmol/L-if decreases again would consider fluid restriction  4. Acidosis  In setting of acute kidney injury, chloride load and diarrhea. Discontinue saline  5. Chronic diastolic congestive heart failure  Examines mildly hypervolemic. Discontinue saline as above  6. Acute hypoxic respiratory failure  Multifactorial in the setting of multifocal pneumonia, persistent moderate right pleural effusion, volume. 7.  Severe sepsis POA secondary to community-acquired pneumonia  Management per primary team  8. Hypomagnesemia  2 g IV magnesium    ROS  Examined sitting upright in chair. States she has had diarrhea 5 times this morning.   When discussed with nursing, per nursing patient is incontinent of stool x2 today and incontinent of urine. A complete 10 point review of systems have been performed and are otherwise negative. Historical Information   Past Medical History:   Diagnosis Date    Acute on chronic diastolic CHF (congestive heart failure) ([address]) 01/30/2023    Acute respiratory failure with hypoxia (HCC) 12/22/2022    Aortic stenosis     Atrial fibrillation (HCC)     Atrial flutter (HCC)     CAD (coronary artery disease) 01/11/2023    CAD/PCI/ELODIA    CKD (chronic kidney disease), stage III ([address])     Community acquired pneumonia of left upper lobe of lung 05/17/2019    Fatigue     Full dentures     Generalized anxiety disorder     Hyperlipidemia     Hypertension     Impaired fasting glucose     Mitral regurgitation     Pneumonia     RBBB     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    TRANSFEMORAL W/ 23MM CALVERT SHAVONNE S3 ULTRA VALVE(ACCESS ON LEFT)    SVT (supraventricular tachycardia)     Tricuspid regurgitation      Past Surgical History:   Procedure Laterality Date    CARDIAC CATHETERIZATION N/A 1/11/2023    Procedure: LHC-Cardiac catheterization;  Surgeon: Wojciech Swenson MD;  [facility]: [facility]; Service: Cardiology    CARDIAC CATHETERIZATION N/A 1/11/2023    Procedure: Cardiac pci;  Surgeon: Wojciech Swenson MD;  Location: [facility];   Service: Cardiology    CARDIAC CATHETERIZATION N/A 1/17/2023    Procedure: CARDIAC TAVR;  Surgeon: Steven Barton DO;  Location:  MAIN OR;  Service: Cardiology    DENTAL SURGERY Bilateral     ALL TEETH REMOVED    FL GUIDED NEEDLE PLAC BX/ASP/INJ  5/26/2021    IR THORACENTESIS  12/20/2022    IR THORACENTESIS  7/14/2023    IR THORACENTESIS  9/1/2023    IR THORACENTESIS  9/25/2023    JOINT REPLACEMENT Left     KNEE SURGERY      left knee replacement    OR INJECT SI JOINT ARTHRGRPHY&/ANES/STEROID W/JOHNATHON Bilateral 5/26/2021    Procedure: SACROILIAC JOINT INJECTION;  Surgeon: Patty Lal MD;  Location: [facility]; Service: Pain Management     NC REPLACE AORTIC VALVE OPENFEMORAL ARTERY APPROACH N/A 1/17/2023    Procedure: REPLACEMENT AORTIC VALVE TRANSCATHETER (TAVR) TRANSFEMORAL W/ 23MM CALVERT SHAVONNE S3 ULTRA VALVE(ACCESS ON LEFT) ALENA;  Surgeon: Jazmin Perea DO;  Location:  MAIN OR;  Service: Cardiac Surgery    SACROILIAC JOINT INJECTION Bilateral 6/7/2023    Procedure: Bilateral sacroiliac joint steroid injection;  Surgeon: Justin Estes MD;  Location: [facility];  Service: Pain Management      Social History   Social History     Substance and Sexual Activity   Alcohol Use Never     Social History     Substance and Sexual Activity   Drug Use Never     Social History     Tobacco Use   Smoking Status Never   Smokeless Tobacco Never       Family History:   Family History   Problem Relation Age of Onset    Hypertension Mother     Asthma Father         [place]'s asthma    Alzheimer's disease Sister     Rectal cancer Son     Uterine cancer Daughter     Heart disease Sister     Heart disease Brother        Medications:  Pertinent medications were reviewed  Current Facility-Administered Medications   Medication Dose Route Frequency Provider Last Rate    acetaminophen  975 mg Oral Q6H PRN Dashawn Haider MD      ALPRAZolam  0.25 mg Oral TID PRN Odin Huynh MD      atorvastatin  20 mg Oral Daily With Anastasia Mart MD      azithromycin  500 mg Intravenous Q24H Odin Huynh  mg (11/20/23 1015)    cefepime  1,000 mg Intravenous Q24H Odin Huynh MD 1,000 mg (11/20/23 0912)    cholecalciferol  2,000 Units Oral Daily Odin Huynh MD      clopidogrel  75 mg Oral Daily Odin Huynh MD      guaiFENesin  600 mg Oral Q12H [facility] Dashawn Haider MD      levalbuterol  1.25 mg Nebulization TID Yobany Dangelo PA-C      magnesium sulfate  2 g Intravenous Once Shelley Bass MD      metoprolol tartrate  25 mg Oral Q12H [facility] Odin Huynh MD      pantoprazole  20 mg Oral Daily Before Breakfast Ankita Rueda Shay Harmon MD      polyethylene glycol  17 g Oral Daily PRN Tyrone Mortensen MD      potassium chloride  10 mEq Oral Daily Tyrone Mortensen MD      saccharomyces boulardii  250 mg Oral BID Bertha Sherwood DO      sodium chloride  75 mL/hr Intravenous Continuous Anthony Poe MD 75 mL/hr (11/20/23 0256)         No Known Allergies      Vitals:   /82 (BP Location: Right arm)   Pulse (!) 106   Temp 98.5 °F (36.9 °C) (Tympanic)   Resp (!) 28   Ht 4' 10" (1.473 m)   Wt 50.9 kg (112 lb 3.4 oz)   SpO2 (!) 84%   BMI 23.45 kg/m²   Body mass index is 23.45 kg/m². SpO2: (!) 84 %,   SpO2 Activity: At Rest,   O2 Device: Nasal cannula      Intake/Output Summary (Last 24 hours) at 11/20/2023 1107  Last data filed at 11/20/2023 0256  Gross per 24 hour   Intake 1830 ml   Output --   Net 1830 ml     Invasive Devices       Peripheral Intravenous Line  Duration             Peripheral IV 11/19/23 Distal;Left;Ventral (anterior) Forearm <1 day                    Physical Exam  General: conscious, cooperative, in no acute distress  Eyes: conjunctivae pink, anicteric sclerae  ENT: lips and mucous membranes moist  Neck: supple, no JVD, no masses  Chest: clear breath sounds bilaterally, no crackles, ronchus or wheezings  CVS: S1 & S2, normal rate, regular rhythm  Abdomen: soft, non-tender, non-distended, normoactive bowel sounds  Extremities: moderate edema of both legs  Skin: no rash  Neuro: awake, alert, oriented      Diagnostic Data:  Lab: I have personally reviewed pertinent lab results. ,   CBC:  Results from last 7 days   Lab Units 11/20/23  0507   WBC Thousand/uL 7.11   HEMOGLOBIN g/dL 8.9*   HEMATOCRIT % 27.9*   PLATELETS Thousands/uL 116*      CMP:   Lab Results   Component Value Date    SODIUM 135 11/20/2023    K 3.8 11/20/2023     11/20/2023    CO2 17 (L) 11/20/2023    BUN 44 (H) 11/20/2023    CREATININE 1.22 11/20/2023    CALCIUM 8.6 11/20/2023    AST 20 11/20/2023    ALT 24 11/20/2023    ALKPHOS 62 11/20/2023 EGFR 39 11/20/2023   ,   PT/INR: No results found for: "PT", "INR",   Magnesium: No components found for: "MAG",  Phosphorous:   Lab Results   Component Value Date    PHOS 2.3 11/20/2023       Microbiology:  @[facility],(urinecx:7)@        [employer] Survey LYNN Fernandez    Portions of the record may have been created with voice recognition software. Occasional wrong word or "sound a like" substitutions may have occurred due to the inherent limitations of voice recognition software. Read the chart carefully and recognize, using context, where substitutions have occurred.

## 2023-11-20 NOTE — SPEECH THERAPY NOTE
Speech Language/Pathology  Speech-Language Pathology Progress Note    Patient Name: Yesenia Barron  HWTEJ'E Date: 11/20/2023    Subjective:  Pt was awake, alert, and agitated. She was sitting up in chair at bedside. Patient stated "I can't eat, I'm having stool after stool". Objective:  Pt was seen today for dysphagia therapy. Current diet is regular with thin liquids. Pt was on 2L O2 via nasal cannula. Oral care had already been completed. Focus of today's session was to maximize PO intake safety. Textures offered today included regular solid and thin liquid via cup. Swallow function:   Bolus retrieval, control, and manipulation were adequate. However, suspect reduced control of bolus with mixed consistency, resulting in overt coughing x2 with tsps of cereal. Pharyngeal swallow initiation was delayed at times with mixed consistency. Hyolaryngeal excursion was adequate. Cough occurred with mixed consistency and thin liquid via cup. SLP provided mod cueing/feedback throughout session. Pt was responsive to cueing and benefited from frequent reminders throughout session. Assessment:  Swallow function is stable with current diet. Diet texture and liquid consistency remains appropriate at this time. Patient appeared anxious and hyperfocused on various topics at times, impacting following of directions efficiently. Plan:  Continue regular and thin liquids. Continue ST follow up. Subsequent sessions to focus on maximizing PO intake safety and improving pt's self monitoring.

## 2023-11-20 NOTE — PLAN OF CARE
Problem: PHYSICAL THERAPY ADULT  Goal: Performs mobility at highest level of function for planned discharge setting. See evaluation for individualized goals. Description:   Outcome: Progressing  Note: Prognosis: Fair  Problem List:  (Decreased strength; Decreased range of motion; Decreased endurance; Impaired balance; Decreased mobility)  Assessment: Pt. seen for PT treatment session this date with interventions consisting of transfers and  gait training w/ emphasis on improving pt's ability to ambulate. Pt. Requiring cues for sequence and safety. In comparison to previous session, Pt. With decrease in activity tolerance. Limited by weakness and fatigue. Pt is in need of continued activity in PT to improve strength balance endurance mobility transfers and ambulation with return to maximize LOF. From PT/mobility standpoint, recommendation at time of d/c would be level II:Mod resource intensity  in order to promote return to PLOF and independence. The patient's AM-PAC Basic Mobility Inpatient Short Form Raw Score is 18. A Raw score of less than or equal to 16 suggests the patient may benefit from discharge to home. Please also refer to physical therapy recommendation for safe DC planning. Rehab Resource Intensity Level, PT: II (Moderate Resource Intensity)    See flowsheet documentation for full assessment.

## 2023-11-20 NOTE — PROGRESS NOTES
Progress Note - Pulmonary   Evi Frost 80 y.o. 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 No oxygen requirement at baseline. Titrate oxygen to maintain SPO2 greater than or equal to 88%. Pulmonary toilet:  Increase activity as tolerated, out of bed as tolerated, cough and deep breathing exercises encourage, incentive spirometry q.1 hour  Continue cefepime and azithromycin (day#5). Discontinue azithromycin after today's dose. Would recommend completing 7-day course of cephalosporin. Follow sputum culture. Trend WBC, procalcitonin, temps  Continue Xopenex nebs 3 times daily  Continue flutter valve  Pleural effusion stable without need for thoracentesis at this time  Outpatient referral to rheumatology placed previously. Chief Complaint:    "I feel okay I guess but I had a mess."    Subjective:    Patient was seen and examined today. No overnight events reported. Patient states that she is incontinent of stool and urine this morning. Patient states that her breathing is improving. Denies significant cough or sputum production. No hemoptysis. Denies wheezing. Denies chest pain. No fevers today. Objective:    Vitals: Blood pressure 119/82, pulse (!) 106, temperature 98.5 °F (36.9 °C), temperature source Tympanic, resp. rate (!) 28, height 4' 10" (1.473 m), weight 50.9 kg (112 lb 3.4 oz), SpO2 (!) 84 %. ,Body mass index is 23.45 kg/m².       Intake/Output Summary (Last 24 hours) at 11/20/2023 1313  Last data filed at 11/20/2023 0256  Gross per 24 hour   Intake 1710 ml   Output --   Net 1710 ml       Invasive Devices       Peripheral Intravenous Line  Duration Peripheral IV 11/19/23 Distal;Left;Ventral (anterior) Forearm 1 day                    Physical Exam:     Physical Exam  Vitals and nursing note reviewed. Constitutional:       General: She is not in acute distress. Appearance: Normal appearance. HENT:      Head: Normocephalic and atraumatic. Mouth/Throat:      Mouth: Mucous membranes are moist.      Pharynx: Oropharynx is clear. Cardiovascular:      Rate and Rhythm: Normal rate and regular rhythm. Heart sounds: No murmur heard. Pulmonary:      Breath sounds: Rales present. Musculoskeletal:      Cervical back: Normal range of motion. Skin:     General: Skin is warm and dry. Neurological:      General: No focal deficit present. Mental Status: She is alert. Mental status is at baseline. Psychiatric:         Mood and Affect: Mood normal.         Behavior: Behavior normal.         Labs: I have personally reviewed pertinent lab results. , ABG: No results found for: "PHART", "PCQ1LWT", "PO2ART", "TXH3BFO", "K5NLPMPP", "BEART", "SOURCE", BNP: No results found for: "BNP", CBC:   Lab Results   Component Value Date    WBC 7.11 11/20/2023    HGB 8.9 (L) 11/20/2023    HCT 27.9 (L) 11/20/2023    MCV 90 11/20/2023     (L) 11/20/2023    RBC 3.09 (L) 11/20/2023    MCH 28.8 11/20/2023    MCHC 31.9 11/20/2023    RDW 18.2 (H) 11/20/2023    MPV 9.6 11/20/2023    NRBC 0 11/20/2023   , CMP:   Lab Results   Component Value Date    SODIUM 135 11/20/2023    K 3.8 11/20/2023     11/20/2023    CO2 17 (L) 11/20/2023    BUN 44 (H) 11/20/2023    CREATININE 1.22 11/20/2023    CALCIUM 8.6 11/20/2023    AST 20 11/20/2023    ALT 24 11/20/2023    ALKPHOS 62 11/20/2023    EGFR 39 11/20/2023   , PT/INR: No results found for: "PT", "INR", Troponin: No results found for: "TROPONINI"    Imaging and other studies: I have personally reviewed pertinent reports.    and I have personally reviewed pertinent films in PACS

## 2023-11-20 NOTE — PLAN OF CARE
Problem: CARDIOVASCULAR - ADULT  Goal: Maintains optimal cardiac output and hemodynamic stability  Description: INTERVENTIONS:  - Monitor I/O, vital signs and rhythm  - Monitor for S/S and trends of decreased cardiac output  - Administer and titrate ordered vasoactive medications to optimize hemodynamic stability  - Assess quality of pulses, skin color and temperature  - Assess for signs of decreased coronary artery perfusion  - Instruct patient to report change in severity of symptoms  Outcome: Progressing  Goal: Absence of cardiac dysrhythmias or at baseline rhythm  Description: INTERVENTIONS:  - Continuous cardiac monitoring, vital signs, obtain 12 lead EKG if ordered  - Administer antiarrhythmic and heart rate control medications as ordered  - Monitor electrolytes and administer replacement therapy as ordered  Outcome: Progressing     Problem: RESPIRATORY - ADULT  Goal: Achieves optimal ventilation and oxygenation  Description: INTERVENTIONS:  - Assess for changes in respiratory status  - Assess for changes in mentation and behavior  - Position to facilitate oxygenation and minimize respiratory effort  - Oxygen administered by appropriate delivery if ordered  - Initiate smoking cessation education as indicated  - Encourage broncho-pulmonary hygiene including cough, deep breathe, Incentive Spirometry  - Assess the need for suctioning and aspirate as needed  - Assess and instruct to report SOB or any respiratory difficulty  - Respiratory Therapy support as indicated  Outcome: Progressing     Problem: METABOLIC, FLUID AND ELECTROLYTES - ADULT  Goal: Electrolytes maintained within normal limits  Description: INTERVENTIONS:  - Monitor labs and assess patient for signs and symptoms of electrolyte imbalances  - Administer electrolyte replacement as ordered  - Monitor response to electrolyte replacements, including repeat lab results as appropriate  - Instruct patient on fluid and nutrition as appropriate  Outcome: Progressing  Goal: Fluid balance maintained  Description: INTERVENTIONS:  - Monitor labs   - Monitor I/O and WT  - Instruct patient on fluid and nutrition as appropriate  - Assess for signs & symptoms of volume excess or deficit  Outcome: Progressing     Problem: MUSCULOSKELETAL - ADULT  Goal: Maintain or return mobility to safest level of function  Description: INTERVENTIONS:  - Assess patient's ability to carry out ADLs; assess patient's baseline for ADL function and identify physical deficits which impact ability to perform ADLs (bathing, care of mouth/teeth, toileting, grooming, dressing, etc.)  - Assess/evaluate cause of self-care deficits   - Assess range of motion  - Assess patient's mobility  - Assess patient's need for assistive devices and provide as appropriate  - Encourage maximum independence but intervene and supervise when necessary  - Involve family in performance of ADLs  - Assess for home care needs following discharge   - Consider OT consult to assist with ADL evaluation and planning for discharge  - Provide patient education as appropriate  Outcome: Progressing     Problem: Prexisting or High Potential for Compromised Skin Integrity  Goal: Skin integrity is maintained or improved  Description: INTERVENTIONS:  - Identify patients at risk for skin breakdown  - Assess and monitor skin integrity  - Assess and monitor nutrition and hydration status  - Monitor labs   - Assess for incontinence   - Turn and reposition patient  - Assist with mobility/ambulation  - Relieve pressure over bony prominences  - Avoid friction and shearing  - Provide appropriate hygiene as needed including keeping skin clean and dry  - Evaluate need for skin moisturizer/barrier cream  - Collaborate with interdisciplinary team   - Patient/family teaching  - Consider wound care consult   Outcome: Progressing     Problem: PAIN - ADULT  Goal: Verbalizes/displays adequate comfort level or baseline comfort level  Description: Interventions:  - Encourage patient to monitor pain and request assistance  - Assess pain using appropriate pain scale  - Administer analgesics based on type and severity of pain and evaluate response  - Implement non-pharmacological measures as appropriate and evaluate response  - Consider cultural and social influences on pain and pain management  - Notify physician/advanced practitioner if interventions unsuccessful or patient reports new pain  Outcome: Progressing     Problem: INFECTION - ADULT  Goal: Absence or prevention of progression during hospitalization  Description: INTERVENTIONS:  - Assess and monitor for signs and symptoms of infection  - Monitor lab/diagnostic results  - Monitor all insertion sites, i.e. indwelling lines, tubes, and drains  - Monitor endotracheal if appropriate and nasal secretions for changes in amount and color  - [place] appropriate cooling/warming therapies per order  - Administer medications as ordered  - Instruct and encourage patient and family to use good hand hygiene technique  - Identify and instruct in appropriate isolation precautions for identified infection/condition  Outcome: Progressing  Goal: Absence of fever/infection during neutropenic period  Description: INTERVENTIONS:  - Monitor WBC    Outcome: Progressing     Problem: DISCHARGE PLANNING  Goal: Discharge to home or other facility with appropriate resources  Description: INTERVENTIONS:  - Identify barriers to discharge w/patient and caregiver  - Arrange for needed discharge resources and transportation as appropriate  - Identify discharge learning needs (meds, wound care, etc.)  - Arrange for interpretive services to assist at discharge as needed  - Refer to Case Management Department for coordinating discharge planning if the patient needs post-hospital services based on physician/advanced practitioner order or complex needs related to functional status, cognitive ability, or social support system  Outcome: Progressing     Problem: Knowledge Deficit  Goal: Patient/family/caregiver demonstrates understanding of disease process, treatment plan, medications, and discharge instructions  Description: Complete learning assessment and assess knowledge base. Interventions:  - Provide teaching at level of understanding  - Provide teaching via preferred learning methods  Outcome: Progressing     Problem: Nutrition/Hydration-ADULT  Goal: Nutrient/Hydration intake appropriate for improving, restoring or maintaining nutritional needs  Description: Monitor and assess patient's nutrition/hydration status for malnutrition. Collaborate with interdisciplinary team and initiate plan and interventions as ordered. Monitor patient's weight and dietary intake as ordered or per policy. Utilize nutrition screening tool and intervene as necessary. Determine patient's food preferences and provide high-protein, high-caloric foods as appropriate.      INTERVENTIONS:  - Monitor oral intake, urinary output, labs, and treatment plans  - Assess nutrition and hydration status and recommend course of action  - Evaluate amount of meals eaten  - Assist patient with eating if necessary   - Allow adequate time for meals  - Recommend/ encourage appropriate diets, oral nutritional supplements, and vitamin/mineral supplements  - Order, calculate, and assess calorie counts as needed  - Recommend, monitor, and adjust tube feedings and TPN/PPN based on assessed needs  - Assess need for intravenous fluids  - Provide specific nutrition/hydration education as appropriate  - Include patient/family/caregiver in decisions related to nutrition  Outcome: Progressing

## 2023-11-20 NOTE — PHYSICAL THERAPY NOTE
PHYSICAL THERAPY NOTE          Patient Name: Puma KNAPPU Date: 11/20/2023 11/20/23 1140   PT Last Visit   PT Visit Date 11/20/23   Note Type   Note Type Treatment   Pain Assessment   Pain Assessment Tool 0-10   Pain Score No Pain   Restrictions/Precautions   Weight Bearing Precautions Per Order No   Other Precautions   (Telemetry; Multiple lines; O2; Fall Risk; Hard of hearing; Chair Alarm)   General   Chart Reviewed Yes   Family/Caregiver Present No   Cognition   Overall Cognitive Status Impaired   Arousal/Participation Alert   Following Commands Follows one step commands with increased time or repetition   Subjective   Subjective Reports she is tired and not a ambulating today  (agreeable after encouragment from nursing)   Bed Mobility   Additional Comments OOB in chair start/end PT session   Transfers   Sit to Stand 4  Minimal assistance   Additional items Armrests; Increased time required;Verbal cues   Stand to Sit 4  Minimal assistance   Additional items Armrests; Increased time required;Verbal cues   Stand pivot 4  Minimal assistance   Additional items Verbal cues   Additional Comments RW used   Ambulation/Elevation   Gait pattern Excessively slow; Short stride; Foward flexed;Decreased foot clearance; Improper Weight shift   Gait Assistance 4  Minimal assist   Additional items Assist x 1   Assistive Device Rolling walker   Distance 40' standing rest break   Balance   Static Sitting Fair +   Dynamic Sitting Fair +   Static Standing Fair   Dynamic Standing Fair -   Ambulatory Poor +  (RW)   Endurance Deficit   Endurance Deficit Yes   Activity Tolerance   Activity Tolerance Patient limited by fatigue   Assessment   Prognosis Fair   Problem List   (Decreased strength; Decreased range of motion; Decreased endurance;  Impaired balance; Decreased mobility)   Assessment Pt. seen for PT treatment session this date with interventions consisting of transfers and  gait training w/ emphasis on improving pt's ability to ambulate. Pt. Requiring cues for sequence and safety. In comparison to previous session, Pt. With decrease in activity tolerance. Limited by weakness and fatigue. Pt is in need of continued activity in PT to improve strength balance endurance mobility transfers and ambulation with return to maximize LOF. From PT/mobility standpoint, recommendation at time of d/c would be level II:Mod resource intensity  in order to promote return to PLOF and independence. The patient's AM-[facility] Basic Mobility Inpatient Short Form Raw Score is 18. A Raw score of less than or equal to 16 suggests the patient may benefit from discharge to home. Please also refer to physical therapy recommendation for safe DC planning. Goals   LTG Expiration Date 12/02/23   PT Treatment Day 1   Plan   Treatment/Interventions Functional transfer training;LE strengthening/ROM; Elevations; Therapeutic exercise; Endurance training;Bed mobility;Gait training   Progress Slow progress, decreased activity tolerance   PT Frequency 3-5x/wk   Discharge Recommendation   Rehab Resource Intensity Level, PT II (Moderate Resource Intensity)   AM-PAC Basic Mobility Inpatient   Turning in Flat Bed Without Bedrails 3   Lying on Back to Sitting on Edge of Flat Bed Without Bedrails 3   Moving Bed to Chair 3   Standing Up From Chair Using Arms 3   Walk in Room 3   Climb 3-5 Stairs With Railing 3   Basic Mobility Inpatient Raw Score 18   Basic Mobility Standardized Score 41.05   Highest Level Of Mobility   JH-HLM Goal 6: Walk 10 steps or more   JH-HLM Achieved 7: Walk 25 feet or more   End of Consult   Patient Position at End of Consult Bedside chair;Bed/Chair alarm activated; All needs within reach   End of Consult Comments discussed POC with Pt     Co-treat with OT this session due to complexity of pt and  may require A x 2 to provided skilled interventions.

## 2023-11-21 ENCOUNTER — HOME HEALTH ADMISSION (OUTPATIENT)
Dept: HOME HEALTH SERVICES | Facility: HOME HEALTHCARE | Age: 88
End: 2023-11-21

## 2023-11-21 LAB
ANION GAP SERPL CALCULATED.3IONS-SCNC: 9 MMOL/L
BACTERIA BLD CULT: NORMAL
BACTERIA BLD CULT: NORMAL
BASOPHILS # BLD AUTO: 0.02 THOUSANDS/ÂΜL (ref 0–0.1)
BASOPHILS NFR BLD AUTO: 0 % (ref 0–1)
BUN SERPL-MCNC: 38 MG/DL (ref 5–25)
CALCIUM SERPL-MCNC: 9 MG/DL (ref 8.4–10.2)
CHLORIDE SERPL-SCNC: 109 MMOL/L (ref 96–108)
CO2 SERPL-SCNC: 19 MMOL/L (ref 21–32)
CREAT SERPL-MCNC: 1.06 MG/DL (ref 0.6–1.3)
EOSINOPHIL # BLD AUTO: 0.89 THOUSAND/ÂΜL (ref 0–0.61)
EOSINOPHIL NFR BLD AUTO: 13 % (ref 0–6)
ERYTHROCYTE [DISTWIDTH] IN BLOOD BY AUTOMATED COUNT: 18.3 % (ref 11.6–15.1)
GFR SERPL CREATININE-BSD FRML MDRD: 46 ML/MIN/1.73SQ M
GLUCOSE SERPL-MCNC: 126 MG/DL (ref 65–140)
HCT VFR BLD AUTO: 30.8 % (ref 34.8–46.1)
HGB BLD-MCNC: 9.5 G/DL (ref 11.5–15.4)
IMM GRANULOCYTES # BLD AUTO: 0.05 THOUSAND/UL (ref 0–0.2)
IMM GRANULOCYTES NFR BLD AUTO: 1 % (ref 0–2)
LYMPHOCYTES # BLD AUTO: 0.87 THOUSANDS/ÂΜL (ref 0.6–4.47)
LYMPHOCYTES NFR BLD AUTO: 13 % (ref 14–44)
MAGNESIUM SERPL-MCNC: 2.3 MG/DL (ref 1.9–2.7)
MCH RBC QN AUTO: 28.5 PG (ref 26.8–34.3)
MCHC RBC AUTO-ENTMCNC: 30.8 G/DL (ref 31.4–37.4)
MCV RBC AUTO: 93 FL (ref 82–98)
MONOCYTES # BLD AUTO: 0.53 THOUSAND/ÂΜL (ref 0.17–1.22)
MONOCYTES NFR BLD AUTO: 8 % (ref 4–12)
NEUTROPHILS # BLD AUTO: 4.5 THOUSANDS/ÂΜL (ref 1.85–7.62)
NEUTS SEG NFR BLD AUTO: 65 % (ref 43–75)
NRBC BLD AUTO-RTO: 0 /100 WBCS
PHOSPHATE SERPL-MCNC: 2.4 MG/DL (ref 2.3–4.1)
PLATELET # BLD AUTO: 143 THOUSANDS/UL (ref 149–390)
PMV BLD AUTO: 10.1 FL (ref 8.9–12.7)
POTASSIUM SERPL-SCNC: 3.8 MMOL/L (ref 3.5–5.3)
RBC # BLD AUTO: 3.33 MILLION/UL (ref 3.81–5.12)
SODIUM SERPL-SCNC: 137 MMOL/L (ref 135–147)
WBC # BLD AUTO: 6.86 THOUSAND/UL (ref 4.31–10.16)

## 2023-11-21 PROCEDURE — 97535 SELF CARE MNGMENT TRAINING: CPT

## 2023-11-21 PROCEDURE — 99232 SBSQ HOSP IP/OBS MODERATE 35: CPT | Performed by: INTERNAL MEDICINE

## 2023-11-21 PROCEDURE — 97116 GAIT TRAINING THERAPY: CPT

## 2023-11-21 PROCEDURE — 97530 THERAPEUTIC ACTIVITIES: CPT

## 2023-11-21 PROCEDURE — 94664 DEMO&/EVAL PT USE INHALER: CPT

## 2023-11-21 PROCEDURE — 94668 MNPJ CHEST WALL SBSQ: CPT

## 2023-11-21 PROCEDURE — 94760 N-INVAS EAR/PLS OXIMETRY 1: CPT

## 2023-11-21 PROCEDURE — 80048 BASIC METABOLIC PNL TOTAL CA: CPT

## 2023-11-21 PROCEDURE — 83735 ASSAY OF MAGNESIUM: CPT

## 2023-11-21 PROCEDURE — 99232 SBSQ HOSP IP/OBS MODERATE 35: CPT | Performed by: PHYSICIAN ASSISTANT

## 2023-11-21 PROCEDURE — 94640 AIRWAY INHALATION TREATMENT: CPT

## 2023-11-21 PROCEDURE — 84100 ASSAY OF PHOSPHORUS: CPT

## 2023-11-21 PROCEDURE — 92526 ORAL FUNCTION THERAPY: CPT

## 2023-11-21 PROCEDURE — 97110 THERAPEUTIC EXERCISES: CPT

## 2023-11-21 PROCEDURE — 99233 SBSQ HOSP IP/OBS HIGH 50: CPT | Performed by: HOSPITALIST

## 2023-11-21 PROCEDURE — 85025 COMPLETE CBC W/AUTO DIFF WBC: CPT

## 2023-11-21 RX ORDER — FUROSEMIDE 10 MG/ML
40 INJECTION INTRAMUSCULAR; INTRAVENOUS ONCE
Status: DISCONTINUED | OUTPATIENT
Start: 2023-11-21 | End: 2023-11-21

## 2023-11-21 RX ORDER — FUROSEMIDE 20 MG/1
20 TABLET ORAL DAILY
Status: DISCONTINUED | OUTPATIENT
Start: 2023-11-21 | End: 2023-11-22 | Stop reason: HOSPADM

## 2023-11-21 RX ADMIN — PANTOPRAZOLE SODIUM 20 MG: 20 TABLET, DELAYED RELEASE ORAL at 05:02

## 2023-11-21 RX ADMIN — METOPROLOL TARTRATE 25 MG: 25 TABLET, FILM COATED ORAL at 20:12

## 2023-11-21 RX ADMIN — LEVALBUTEROL HYDROCHLORIDE 1.25 MG: 1.25 SOLUTION RESPIRATORY (INHALATION) at 14:12

## 2023-11-21 RX ADMIN — POTASSIUM CHLORIDE 10 MEQ: 750 TABLET, EXTENDED RELEASE ORAL at 09:05

## 2023-11-21 RX ADMIN — CEFEPIME HYDROCHLORIDE 1000 MG: 1 INJECTION, SOLUTION INTRAVENOUS at 09:05

## 2023-11-21 RX ADMIN — ATORVASTATIN CALCIUM 20 MG: 10 TABLET, FILM COATED ORAL at 17:17

## 2023-11-21 RX ADMIN — LEVALBUTEROL HYDROCHLORIDE 1.25 MG: 1.25 SOLUTION RESPIRATORY (INHALATION) at 20:23

## 2023-11-21 RX ADMIN — FUROSEMIDE 20 MG: 20 TABLET ORAL at 14:45

## 2023-11-21 RX ADMIN — CLOPIDOGREL 75 MG: 75 TABLET ORAL at 09:05

## 2023-11-21 RX ADMIN — LEVALBUTEROL HYDROCHLORIDE 1.25 MG: 1.25 SOLUTION RESPIRATORY (INHALATION) at 08:49

## 2023-11-21 RX ADMIN — GUAIFENESIN 600 MG: 600 TABLET, EXTENDED RELEASE ORAL at 20:12

## 2023-11-21 RX ADMIN — METOPROLOL TARTRATE 25 MG: 25 TABLET, FILM COATED ORAL at 09:05

## 2023-11-21 RX ADMIN — Medication 250 MG: at 17:17

## 2023-11-21 RX ADMIN — GUAIFENESIN 600 MG: 600 TABLET, EXTENDED RELEASE ORAL at 09:05

## 2023-11-21 RX ADMIN — Medication 250 MG: at 09:05

## 2023-11-21 NOTE — OCCUPATIONAL THERAPY NOTE
Occupational Therapy         Patient Name: Reyna Garcia  [id number] Date: 11/21/2023 11/21/23 1003   OT Last Visit   OT Visit Date 11/21/23   Note Type   Note Type Treatment   Pain Assessment   Pain Assessment Tool 0-10   Pain Score No Pain   Restrictions/Precautions   Weight Bearing Precautions Per Order No   Other Precautions Chair Alarm; Bed Alarm;Multiple lines;O2;Fall Risk;Hard of hearing   ADL   Where Assessed Other (Comment)  (BSC - toileting, bathing/dressing - chair)   UB Bathing Assistance 5  Supervision/Setup   UB Bathing Deficit Increased time to complete;Verbal cueing; Other (Comment)  (frequent rest periods due to SOB)   LB Bathing Assistance 3  Moderate Assistance   LB Bathing Deficit Buttocks;Steadying; Increased time to complete;Verbal cueing; Other (Comment)  (frequent rest periods due to SOB)   UB Dressing Assistance 4  Minimal Assistance   UB Dressing Deficit Fasteners; Increased time to complete;Verbal cueing; Other (Comment)  (to don hospital gown)   LB Dressing Assistance 3  Moderate Assistance   LB Dressing Deficit Don/doff R sock; Don/doff L sock; Thread RLE into underwear; Thread LLE into underwear;Pull up over hips;Verbal cueing; Increased time to complete;Steadying; Requires assistive device for steadying; Other (Comment)  (frequent rest periods due to SOB)   Toileting Assistance  3  Moderate Assistance   Toileting Deficit Perineal hygiene;Steadying;Clothing management down;Clothing management up; Bedside commode; Increased time to complete;Verbal cueing   Bed Mobility   Supine to Sit 4  Minimal assistance   Additional items Increased time required;Verbal cues; Bedrails;HOB elevated;Assist x 1   Additional Comments OOB in chair at end of session, all needs within reach. Pt on 2.5 L O2 during session with SPO2 dropping as low as 85% throughout. Frequent rest periods and PLB completed for maximizing recovery. Pt appears SOB throughout.    Transfers   Sit to Stand 4  Minimal assistance Additional items Armrests; Increased time required;Verbal cues  (Min A x 1-2)   Stand to Sit 4  Minimal assistance   Additional items Armrests; Increased time required;Verbal cues  (Min A x 1-2)   Stand pivot 4  Minimal assistance   Additional items Armrests; Increased time required;Verbal cues  (Min A x 1-2)   Toilet transfer 4  Minimal assistance   Additional items Armrests; Increased time required;Verbal cues; Commode  (Min A x 1-2)   Additional Comments RW used, pt appears SOB throughout session   Functional Mobility   Functional Mobility 4  Minimal assistance   Additional Comments x 1-2 with RW to ambulate short household distances. Pt limited by fatigue and SOB. Additional items Rolling walker   Subjective   Subjective "I think I'm wet."   Cognition   Overall Cognitive Status Impaired   Arousal/Participation Alert   Attention Attends with cues to redirect   Orientation Level Oriented X4   Memory Decreased long term memory   Following Commands Follows one step commands with increased time or repetition   Activity Tolerance   Activity Tolerance Patient limited by fatigue   Medical Staff Made Aware nurse awre   Assessment   Assessment Patient participated in Skilled OT session this date with interventions consisting of ADL re training with the use of correct body mechnaics, Energy Conservation techniques, Work simplification skills , safety awareness and fall prevention techniques, and  therapeutic activities to: increase activity tolerance . Co treatment with PT secondary to complex medical condition of pt, mod-max A of 2 required to achieve and maintain transitional movements, requiring the need of skilled therapeutic intervention of 2 therapists to achieve delivery of services. Patient agreeable to OT treatment session, upon arrival patient was found supine in bed. Patient requiring verbal cues for safety and frequent rest periods.  Patient continues to be functioning below baseline level, occupational performance remains limited secondary to factors listed above and increased risk for falls and injury. Current Discharge recommendation is level II (Moderate resource intensity). Plan   Treatment Interventions ADL retraining;Functional transfer training; Endurance training; Compensatory technique education; Energy conservation; Activityengagement   Goal Expiration Date 12/02/23   OT Treatment Day 2   OT Frequency 3-5x/wk   Discharge Recommendation   Rehab Resource Intensity Level, OT II (Moderate Resource Intensity)   AM-PAC Daily Activity Inpatient   Lower Body Dressing 2   Bathing 2   Toileting 2   Upper Body Dressing 3   Grooming 3   Eating 4   Daily Activity Raw Score 16   Daily Activity Standardized Score (Calc for Raw Score >=11) 35.96   AM-PAC Applied Cognition Inpatient   Following a Speech/Presentation 4   Understanding Ordinary Conversation 4   Taking Medications 3   Remembering Where Things Are Placed or Put Away 2   Remembering List of 4-5 Errands 2   Taking Care of Complicated Tasks 2   Applied Cognition Raw Score 17   Applied Cognition Standardized Score 36.52     Pt will benefit from continued OT services in order to maximize (I) c ADL performance, FM c RW, and improve overall endurance/strength required to complete functional tasks in preparation for d/c. Pt benefited from co-treatment of skilled OT and PTA in order to most appropriately address functional deficits d/t extensive assistance required for safe functional mobility, decreased activity tolerance, and regression from functioning level prior to admission and/or onset of present illness. OT/PT objectives were addressed separately; please see PT note for specific goal areas targeted. Pt left seated in chair at end of session; all needs within reach; all lines intact.     Sienna Griffin

## 2023-11-21 NOTE — ASSESSMENT & PLAN NOTE
Presented w/SOB on room air, requiring up to 8L   No Hx COPD, does have Hx CHF  Weaned to room air until 11/19, now requiring 1-2L NC to maintain O2 sat above 90%  Continue Resp therapy

## 2023-11-21 NOTE — ASSESSMENT & PLAN NOTE
Fever, tachycardia, leukocytosis POA  Large multifocal R-sided pneumonia noted on CXR and CT chest wo contrast  Lactic acidosis resolved w IVF  Procal now normalized  Urine antigens negative  Blood cultures negative X2  Speech eval: Change diet texture to dysphagia 3 dental soft  Remains afebrile, tachypneic, requiring supplemental O2 1-2L NC  Per Pulm 11/20:   Discontinue azithromycin  Continue cefepime (Day 5) for 7 days total  Follow sputum culture  Continue Xopenex nebs TID  Continue flutter valve

## 2023-11-21 NOTE — PLAN OF CARE
Problem: CARDIOVASCULAR - ADULT  Goal: Maintains optimal cardiac output and hemodynamic stability  Description: INTERVENTIONS:  - Monitor I/O, vital signs and rhythm  - Monitor for S/S and trends of decreased cardiac output  - Administer and titrate ordered vasoactive medications to optimize hemodynamic stability  - Assess quality of pulses, skin color and temperature  - Assess for signs of decreased coronary artery perfusion  - Instruct patient to report change in severity of symptoms  Outcome: Progressing  Goal: Absence of cardiac dysrhythmias or at baseline rhythm  Description: INTERVENTIONS:  - Continuous cardiac monitoring, vital signs, obtain 12 lead EKG if ordered  - Administer antiarrhythmic and heart rate control medications as ordered  - Monitor electrolytes and administer replacement therapy as ordered  Outcome: Progressing     Problem: RESPIRATORY - ADULT  Goal: Achieves optimal ventilation and oxygenation  Description: INTERVENTIONS:  - Assess for changes in respiratory status  - Assess for changes in mentation and behavior  - Position to facilitate oxygenation and minimize respiratory effort  - Oxygen administered by appropriate delivery if ordered  - Initiate smoking cessation education as indicated  - Encourage broncho-pulmonary hygiene including cough, deep breathe, Incentive Spirometry  - Assess the need for suctioning and aspirate as needed  - Assess and instruct to report SOB or any respiratory difficulty  - Respiratory Therapy support as indicated  Outcome: Progressing     Problem: METABOLIC, FLUID AND ELECTROLYTES - ADULT  Goal: Electrolytes maintained within normal limits  Description: INTERVENTIONS:  - Monitor labs and assess patient for signs and symptoms of electrolyte imbalances  - Administer electrolyte replacement as ordered  - Monitor response to electrolyte replacements, including repeat lab results as appropriate  - Instruct patient on fluid and nutrition as appropriate  Outcome: Progressing  Goal: Fluid balance maintained  Description: INTERVENTIONS:  - Monitor labs   - Monitor I/O and WT  - Instruct patient on fluid and nutrition as appropriate  - Assess for signs & symptoms of volume excess or deficit  Outcome: Progressing     Problem: MUSCULOSKELETAL - ADULT  Goal: Maintain or return mobility to safest level of function  Description: INTERVENTIONS:  - Assess patient's ability to carry out ADLs; assess patient's baseline for ADL function and identify physical deficits which impact ability to perform ADLs (bathing, care of mouth/teeth, toileting, grooming, dressing, etc.)  - Assess/evaluate cause of self-care deficits   - Assess range of motion  - Assess patient's mobility  - Assess patient's need for assistive devices and provide as appropriate  - Encourage maximum independence but intervene and supervise when necessary  - Involve family in performance of ADLs  - Assess for home care needs following discharge   - Consider OT consult to assist with ADL evaluation and planning for discharge  - Provide patient education as appropriate  Outcome: Progressing     Problem: Prexisting or High Potential for Compromised Skin Integrity  Goal: Skin integrity is maintained or improved  Description: INTERVENTIONS:  - Identify patients at risk for skin breakdown  - Assess and monitor skin integrity  - Assess and monitor nutrition and hydration status  - Monitor labs   - Assess for incontinence   - Turn and reposition patient  - Assist with mobility/ambulation  - Relieve pressure over bony prominences  - Avoid friction and shearing  - Provide appropriate hygiene as needed including keeping skin clean and dry  - Evaluate need for skin moisturizer/barrier cream  - Collaborate with interdisciplinary team   - Patient/family teaching  - Consider wound care consult   Outcome: Progressing     Problem: PAIN - ADULT  Goal: Verbalizes/displays adequate comfort level or baseline comfort level  Description: Interventions:  - Encourage patient to monitor pain and request assistance  - Assess pain using appropriate pain scale  - Administer analgesics based on type and severity of pain and evaluate response  - Implement non-pharmacological measures as appropriate and evaluate response  - Consider cultural and social influences on pain and pain management  - Notify physician/advanced practitioner if interventions unsuccessful or patient reports new pain  Outcome: Progressing     Problem: INFECTION - ADULT  Goal: Absence or prevention of progression during hospitalization  Description: INTERVENTIONS:  - Assess and monitor for signs and symptoms of infection  - Monitor lab/diagnostic results  - Monitor all insertion sites, i.e. indwelling lines, tubes, and drains  - Monitor endotracheal if appropriate and nasal secretions for changes in amount and color  - [place] appropriate cooling/warming therapies per order  - Administer medications as ordered  - Instruct and encourage patient and family to use good hand hygiene technique  - Identify and instruct in appropriate isolation precautions for identified infection/condition  Outcome: Progressing  Goal: Absence of fever/infection during neutropenic period  Description: INTERVENTIONS:  - Monitor WBC    Outcome: Progressing     Problem: DISCHARGE PLANNING  Goal: Discharge to home or other facility with appropriate resources  Description: INTERVENTIONS:  - Identify barriers to discharge w/patient and caregiver  - Arrange for needed discharge resources and transportation as appropriate  - Identify discharge learning needs (meds, wound care, etc.)  - Arrange for interpretive services to assist at discharge as needed  - Refer to Case Management Department for coordinating discharge planning if the patient needs post-hospital services based on physician/advanced practitioner order or complex needs related to functional status, cognitive ability, or social support system  Outcome: Progressing     Problem: Knowledge Deficit  Goal: Patient/family/caregiver demonstrates understanding of disease process, treatment plan, medications, and discharge instructions  Description: Complete learning assessment and assess knowledge base. Interventions:  - Provide teaching at level of understanding  - Provide teaching via preferred learning methods  Outcome: Progressing     Problem: Nutrition/Hydration-ADULT  Goal: Nutrient/Hydration intake appropriate for improving, restoring or maintaining nutritional needs  Description: Monitor and assess patient's nutrition/hydration status for malnutrition. Collaborate with interdisciplinary team and initiate plan and interventions as ordered. Monitor patient's weight and dietary intake as ordered or per policy. Utilize nutrition screening tool and intervene as necessary. Determine patient's food preferences and provide high-protein, high-caloric foods as appropriate.      INTERVENTIONS:  - Monitor oral intake, urinary output, labs, and treatment plans  - Assess nutrition and hydration status and recommend course of action  - Evaluate amount of meals eaten  - Assist patient with eating if necessary   - Allow adequate time for meals  - Recommend/ encourage appropriate diets, oral nutritional supplements, and vitamin/mineral supplements  - Order, calculate, and assess calorie counts as needed  - Recommend, monitor, and adjust tube feedings and TPN/PPN based on assessed needs  - Assess need for intravenous fluids  - Provide specific nutrition/hydration education as appropriate  - Include patient/family/caregiver in decisions related to nutrition  Outcome: Progressing

## 2023-11-21 NOTE — PROGRESS NOTES
Patient:  Uday Anderson    MRN:  [id number]    Aidin Request ID:  [id number]    Level of care reserved:  [address]    Partner Reserved:  [address] [facility], [facility], [address] [facility] [phone]    Clinical needs requested:  physical therapy, occupational therapy, skilled nursing, medication management, disease process education    Geography searched:  [phone]    Start of Service:    Request sent:  9:51am EST on 11/21/2023 by Jenni Childs    Partner reserved:  3:51pm EST on 11/21/2023 by Jenni Childs    Choice list shared:  2:45pm EST on 11/21/2023 by Jenni Childs

## 2023-11-21 NOTE — PROGRESS NOTES
Progress Note - Pulmonary   Cristal Atwood 80 y.o. female MRN: [id number]  Unit/Bed#: 382-39 Encounter: [id number]    Assessment/Plan:    Acute hypoxic respiratory failure  Multifocal pneumonia  Severe sepsis secondary to community-acquired pneumonia  Chronic right-sided pleural effusion  Atrial flutter  Chronic diastolic CHF  ROSA/CKD  Possible CREST    Pulmonary recommendations/plan:     Weaned to 1 L nasal cannula in my presence without desaturations. No oxygen requirement at baseline. Titrate oxygen to maintain SpO2 greater than or equal to 88%. Pulmonary toilet:  Increase activity as tolerated, out of bed as tolerated, cough and deep breathing exercises encourage, incentive spirometry q.1 hour  Continue cefepime to complete 7-day course  Completed azithromycin  Follow sputum culture  Trend WBC, procalcitonin, times  Continue Xopenex nebs 3 times daily  Continue flutter valve  Pleural effusion stable without need for thoracentesis at this time however agree with nephrology and would consider diuretics, for starting them to primary team.  Outpatient referral to rheumatology previously placed. Chief Complaint:    "I'm okay."    Subjective:    Patient was seen and examined today. No overnight events reported. Patient states that she is feeling good. Her breathing feels close to his baseline. She does endorse mild lower extremity swelling. Objective:    Vitals: Blood pressure 125/82, pulse (!) 136, temperature (!) 97.4 °F (36.3 °C), temperature source Oral, resp. rate (!) 25, height 4' 10" (1.473 m), weight 50.9 kg (112 lb 3.4 oz), SpO2 96 %. 1L NC,Body mass index is 23.45 kg/m².       Intake/Output Summary (Last 24 hours) at 11/21/2023 1218  Last data filed at 11/21/2023 0844  Gross per 24 hour   Intake 600 ml   Output --   Net 600 ml       Invasive Devices       Peripheral Intravenous Line  Duration             Peripheral IV 11/19/23 Distal;Left;Ventral (anterior) Forearm 2 days Physical Exam:     Physical Exam  Vitals and nursing note reviewed. Constitutional:       General: She is not in acute distress. Appearance: Normal appearance. HENT:      Head: Normocephalic and atraumatic. Mouth/Throat:      Mouth: Mucous membranes are moist.      Pharynx: Oropharynx is clear. Cardiovascular:      Rate and Rhythm: Normal rate and regular rhythm. Heart sounds: No murmur heard. Pulmonary:      Effort: Pulmonary effort is normal.      Breath sounds: Rales present. No wheezing or rhonchi. Musculoskeletal:      Cervical back: Normal range of motion. Right lower leg: Edema present. Left lower leg: Edema present. Skin:     General: Skin is warm and dry. Neurological:      General: No focal deficit present. Mental Status: She is alert. Mental status is at baseline. Psychiatric:         Mood and Affect: Mood normal.         Behavior: Behavior normal.         Labs: I have personally reviewed pertinent lab results. , ABG: No results found for: "PHART", "WFT3TTJ", "PO2ART", "ZUF4BRS", "I7TZUKGU", "BEART", "SOURCE", BNP: No results found for: "BNP", CBC:   Lab Results   Component Value Date    WBC 6.86 11/21/2023    HGB 9.5 (L) 11/21/2023    HCT 30.8 (L) 11/21/2023    MCV 93 11/21/2023     (L) 11/21/2023    RBC 3.33 (L) 11/21/2023    MCH 28.5 11/21/2023    MCHC 30.8 (L) 11/21/2023    RDW 18.3 (H) 11/21/2023    MPV 10.1 11/21/2023    NRBC 0 11/21/2023   , CMP:   Lab Results   Component Value Date    SODIUM 137 11/21/2023    K 3.8 11/21/2023     (H) 11/21/2023    CO2 19 (L) 11/21/2023    BUN 38 (H) 11/21/2023    CREATININE 1.06 11/21/2023    CALCIUM 9.0 11/21/2023    EGFR 46 11/21/2023   , PT/INR: No results found for: "PT", "INR", Troponin: No results found for: "TROPONINI"    Imaging and other studies: I have personally reviewed pertinent reports.    and I have personally reviewed pertinent films in PACS

## 2023-11-21 NOTE — PHYSICAL THERAPY NOTE
PHYSICAL THERAPY NOTE          Patient Name: Debbie Gusman  SJDANITZAAHARPER Date: 11/21/2023 11/21/23 0959   PT Last Visit   PT Visit Date 11/21/23   Note Type   Note Type Treatment   Pain Assessment   Pain Assessment Tool 0-10   Pain Score No Pain   Restrictions/Precautions   Weight Bearing Precautions Per Order No   Other Precautions   (Chair Alarm; Bed Alarm; Multiple lines; O2; Fall Risk; Hard of hearing)   General   Chart Reviewed Yes   Response to Previous Treatment Patient unable to report, no changes reported from family or staff   Family/Caregiver Present No   Cognition   Overall Cognitive Status Impaired   Arousal/Participation Alert   Following Commands Follows one step commands with increased time or repetition   Subjective   Subjective Agreeable to therapy. Bed Mobility   Supine to Sit 4  Minimal assistance   Additional items Assist x 1;HOB elevated; Bedrails; Increased time required;Verbal cues   Additional Comments Increased time to transition to EOB. Sat EOB for several min d/t SOB with fair sitting balance   Transfers   Sit to Stand 4  Minimal assistance   Additional items Assist x 1; Armrests; Increased time required;Verbal cues   Stand to Sit 4  Minimal assistance   Additional items Assist x 1; Armrests; Increased time required;Verbal cues   Stand pivot 4  Minimal assistance   Additional items Increased time required;Verbal cues   Toilet transfer 4  Minimal assistance   Additional items Increased time required;Verbal cues; Commode;Assist x 1;Assist x 2   Additional Comments RW used. Stood with fair- balance for clothing managment and hygiene   Ambulation/Elevation   Gait pattern Excessively slow; Short stride; Foward flexed;Decreased foot clearance; Improper Weight shift   Gait Assistance 4  Minimal assist   Additional items Assist x 1;Verbal cues   Assistive Device Rolling walker   Distance 20'   Balance   Static Sitting Fair +   Dynamic Sitting Fair +   Static Standing Fair -   Dynamic Standing Fair -   Ambulatory Poor +  (RW)   Endurance Deficit   Endurance Deficit Yes   Endurance Deficit Description SpO2 95-83% HR elevated 120-130's with acitivity   Activity Tolerance   Activity Tolerance Patient limited by fatigue   Exercises   Hip Flexion Sitting;10 reps;AROM; Bilateral   Hip Abduction Sitting;10 reps;AROM; Bilateral   Hip Adduction Sitting;10 reps;AROM; Bilateral   Knee AROM Long Arc Quad Sitting;10 reps;AROM; Bilateral   Ankle Pumps Sitting;10 reps;AROM; Bilateral   Assessment   Prognosis Fair   Problem List   (Decreased strength; Decreased range of motion; Decreased endurance; Impaired balance; Decreased mobility)   Assessment Pt. seen for PT treatment session this date with interventions consisting of  therapeutic exercises, bed mobility, transfers and  gait training w/ emphasis on improving pt's ability to ambulate. Pt. Requiring  cues for sequence and safety. In comparison to previous session, Pt. With no change  in activity tolerance. Limited by weakness and fatigue and elevated HR and low SpO2 with acitivity. Pt is in need of continued activity in PT to improve strength balance endurance mobility transfers and ambulation with return to maximize LOF. From PT/mobility standpoint, recommendation at time of d/c would be level II: Mod resource intensity in order to promote return to PLOF and independence. The patient's AM-PAC Basic Mobility Inpatient Short Form Raw Score is 18. A Raw score of greater than 16 suggests the patient may benefit from discharge to home. Please also refer to physical therapy recommendation for safe DC planning. Goals   LTG Expiration Date 12/02/23   PT Treatment Day 2   Plan   Treatment/Interventions Functional transfer training;LE strengthening/ROM; Elevations; Therapeutic exercise; Endurance training;Bed mobility;Gait training   Progress Slow progress, decreased activity tolerance   PT Frequency 3-5x/wk   Discharge Recommendation   Rehab Resource Intensity Level, PT II (Moderate Resource Intensity)   AM-PAC Basic Mobility Inpatient   Turning in Flat Bed Without Bedrails 3   Lying on Back to Sitting on Edge of Flat Bed Without Bedrails 3   Moving Bed to Chair 3   Standing Up From Chair Using Arms 3   Walk in Room 3   Climb 3-5 Stairs With Railing 3   Basic Mobility Inpatient Raw Score 18   Basic Mobility Standardized Score 41.05   Highest Level Of Mobility   JH-HLM Goal 6: Walk 10 steps or more   JH-HLM Achieved 6: Walk 10 steps or more   Education   Education Provided Mobility training   Patient Reinforcement needed   End of Consult   Patient Position at End of Consult Bedside chair;Bed/Chair alarm activated; All needs within reach   End of Consult Comments discussed POC with PT

## 2023-11-21 NOTE — PLAN OF CARE
Problem: OCCUPATIONAL THERAPY ADULT  Goal: Performs self-care activities at highest level of function for planned discharge setting. See evaluation for individualized goals. Description: Treatment Interventions: ADL retraining, Functional transfer training, UE strengthening/ROM, Endurance training, Patient/family training, Cognitive reorientation, Equipment evaluation/education, Activityengagement          See flowsheet documentation for full assessment, interventions and recommendations. Outcome: Progressing  Note: Limitation: Decreased ADL status, Decreased UE strength, Decreased Safe judgement during ADL, Decreased endurance, Decreased cognition, Decreased self-care trans, Decreased high-level ADLs     Assessment: Patient participated in Skilled OT session this date with interventions consisting of ADL re training with the use of correct body mechnaics, Energy Conservation techniques, Work simplification skills , safety awareness and fall prevention techniques, and  therapeutic activities to: increase activity tolerance . Co treatment with PT secondary to complex medical condition of pt, mod-max A of 2 required to achieve and maintain transitional movements, requiring the need of skilled therapeutic intervention of 2 therapists to achieve delivery of services. Patient agreeable to OT treatment session, upon arrival patient was found supine in bed. Patient requiring verbal cues for safety and frequent rest periods. Patient continues to be functioning below baseline level, occupational performance remains limited secondary to factors listed above and increased risk for falls and injury. Current Discharge recommendation is level II (Moderate resource intensity).      Rehab Resource Intensity Level, OT: II (Moderate Resource Intensity)

## 2023-11-21 NOTE — ASSESSMENT & PLAN NOTE
Wt Readings from Last 3 Encounters:   11/21/23 50.9 kg (112 lb 3.4 oz)   11/01/23 48.1 kg (106 lb)   10/03/23 47.1 kg (103 lb 12.8 oz)   Increased need for supplemental O2  Wt stable, continue to monitor  Consider CXR if patient begins to show increased signs of pulmonary congestion/fluid retention  Nephrology considering loop diuretic if deemed necessary

## 2023-11-21 NOTE — PROGRESS NOTES
Progress Note - Nephrology   Sophia Sin 80 y.o. female MRN: [id number]  Unit/Bed#: 515-08 Encounter: [id number]    A/P:  1. Acute kidney injury on top of chronic kidney disease   Patient appears to be at her baseline creatinine this morning, continue to encourage oral intake of food and fluids. Patient's IV fluids were discontinued yesterday. Follow-up labs and avoid potential for toxins. Please refer below regarding volume management. 2.  Chronic disease stage III with baseline creatinine between 0.8-1.1 mg/dL  3. Acidosis   Patient's bicarbonate improved to 19 mmol/L. Continue supportive care at this time, patient claims that loose bowel movements have improved. 4.  Chronic hyponatremia   Serum sodium level has improved to 137 mmol/L, continue supportive care, patient is on a mild fluid restriction of 1.8 L  as well as a low-sodium diet which should be continued. 5.  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 Continue to encourage high-protein intake. 6.  Hypomagnesemia   Continue to monitor and add magnesium supplementation as indicated. We should provide parenterally at this time due to loose bowel movements. 7.  Chronic diastolic congestive heart failure   Patient currently appears to be mildly volume overloaded, we can consider use of loop diuretic depending she progresses from a clinical standpoint. Patient has edema, currently requiring supplemental oxygen via nasal cannula. Will monitor clinically and then provide loop diuretic if appropriate. 8.  Severe sepsis in the setting of community-acquired pneumonia   Patient has significant improved since presentation, continue care according to hospitalist and pulmonary recommendations.     Follow up reason for today's visit: Acute kidney injury/chronic kidney disease/acidosis/electrolyte disorders    Acute hypoxic respiratory failure [facility])    Patient Active Problem List Diagnosis    Hypomagnesemia    Hypokalemia    Sepsis due to pneumonia (HCC)    Hyponatremia    Essential hypertension    Lymphadenopathy    Hiatal hernia    Thrombocytopenia (HCC)    Mitral valve insufficiency    Atrial tachycardia    Pulmonary hypertension (HCC)    Diverticulosis    Non-rheumatic tricuspid valve insufficiency    Generalized anxiety disorder    Mid back pain    Encounter for Medicare annual wellness exam    Encounter for long-term (current) use of medications    Immunization due    Acute bursitis of right shoulder    Impaired fasting glucose    Osteopenia of neck of femur    Chronic bilateral low back pain without sciatica    Gait abnormality    Chronic pain syndrome    Sacroiliitis (HCC)    Bilateral leg edema    Ganglion cyst    Adhesive capsulitis of right shoulder    Generalized weakness    Dyslipidemia    Post-menopause    Primary generalized (osteo)arthritis    Chronic diastolic (congestive) heart failure (HCC)    Acute hypoxic respiratory failure (HCC)    Constipation    Status post insertion of drug eluting coronary artery stent    S/P TAVR (transcatheter aortic valve replacement)    Permanent atrial fibrillation ([facility])    Incomplete right bundle branch block    Coronary artery disease involving native coronary artery of native heart without angina pectoris    Anemia    Graves disease    Closed wedge compression fracture of T11 vertebra (HCC)    Age-related osteoporosis with current pathological fracture of vertebra ([facility])    Moderate protein-calorie malnutrition (HCC)    Pleural effusion on right    Tricuspid valve insufficiency    Prolonged QT interval    SIADH (syndrome of inappropriate 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HCC)    Vitamin D deficiency    Atrial flutter (HCC)    Gastroesophageal reflux disease without esophagitis    Centromere antibody positive    Encounter for immunization    S/P angioplasty with stent    Hypotension    Acute-on-chronic kidney injury          Subjective:   Patient is hungry this morning, feels tired and weak in general.    Objective:     Vitals: Blood pressure 123/83, pulse 92, temperature (!) 97.4 °F (36.3 °C), resp. rate (!) 25, height 4' 10" (1.473 m), weight 50.9 kg (112 lb 3.4 oz), SpO2 96 %. ,Body mass index is 23.45 kg/m². Weight (last 2 days)       Date/Time Weight    11/21/23 0600 50.9 (112.21)    11/20/23 0600 50.9 (112.21)    11/19/23 0543 49.9 (110.01)              Intake/Output Summary (Last 24 hours) at 11/21/2023 0820  Last data filed at 11/20/2023 1611  Gross per 24 hour   Intake 600 ml   Output 100 ml   Net 500 ml     I/O last 3 completed shifts: In: 7040 [P.O.:720; I.V.:970]  Out: 100 [Urine:100]         Physical Exam: /83   Pulse 92   Temp (!) 97.4 °F (36.3 °C)   Resp (!) 25   Ht 4' 10" (1.473 m)   Wt 50.9 kg (112 lb 3.4 oz)   SpO2 96%   BMI 23.45 kg/m²     General Appearance:    Alert, cooperative, no distress, appears stated age   Head:    Normocephalic, without obvious abnormality, atraumatic   Eyes:    Conjunctiva/corneas clear   Ears:    Normal external ears   Nose:   Nares normal, septum midline, mucosa normal, no drainage    or sinus tenderness   Throat:   Lips, mucosa, and tongue normal; teeth and gums normal   Neck:   Supple   Back:     Symmetric, no curvature, ROM normal, no CVA tenderness   Lungs:     Clear to auscultation bilaterally, respirations unlabored   Chest wall:    No tenderness or deformity   Heart:    Regular rate and rhythm, S1 and S2 normal, no murmur, rub   or gallop   Abdomen:     Soft, non-tender, bowel sounds active   Extremities:   Extremities normal, atraumatic, no cyanosis, mild bilateral lower extremity edema   Skin:   Skin color, texture, turgor normal, no rashes or lesions   Lymph nodes:   Cervical normal   Neurologic:   CNII-XII intact            Lab, Imaging and other studies: I have personally reviewed pertinent labs.   CBC:   Lab Results   Component Value Date    WBC 6.86 11/21/2023    HGB 9.5 (L) 11/21/2023 HCT 30.8 (L) 11/21/2023    MCV 93 11/21/2023     (L) 11/21/2023    RBC 3.33 (L) 11/21/2023    MCH 28.5 11/21/2023    MCHC 30.8 (L) 11/21/2023    RDW 18.3 (H) 11/21/2023    MPV 10.1 11/21/2023    NRBC 0 11/21/2023     CMP:   Lab Results   Component Value Date    K 3.8 11/21/2023     (H) 11/21/2023    CO2 19 (L) 11/21/2023    BUN 38 (H) 11/21/2023    CREATININE 1.06 11/21/2023    CALCIUM 9.0 11/21/2023    EGFR 46 11/21/2023       . Results from last 7 days   Lab Units 11/21/23  0506 11/20/23  0507 11/19/23  0440 11/18/23  0643 11/17/23  0503 11/16/23  0840   POTASSIUM mmol/L 3.8 3.8 4.2   < > 3.7 4.5   CHLORIDE mmol/L 109* 108 104   < > 101 98   CO2 mmol/L 19* 17* 20*   < > 23 24   BUN mg/dL 38* 44* 48*   < > 33* 28*   CREATININE mg/dL 1.06 1.22 1.31*   < > 1.40* 0.98   CALCIUM mg/dL 9.0 8.6 8.9   < > 9.1 10.0   ALK PHOS U/L  --  62  --   --  60 93   ALT U/L  --  24  --   --  17 19   AST U/L  --  20  --   --  23 44*    < > = values in this interval not displayed. Phosphorus:   Lab Results   Component Value Date    PHOS 2.4 11/21/2023     Magnesium:   Lab Results   Component Value Date    MG 2.3 11/21/2023     Urinalysis: No results found for: "COLORU", "CLARITYU", "Adamaris Large", "PHUR", "LEUKOCYTESUR", "NITRITE", "PROTEINUA", "GLUCOSEU", "Rushie Abler", "BILIRUBINUR", "BLOODU"  Ionized Calcium: No results found for: "CAION"  Coagulation: No results found for: "PT", "INR", "APTT"  Troponin: No results found for: "TROPONINI"  ABG: No results found for: "PHART", "DEC1GLF", "PO2ART", "YKP5HYS", "Q8MGKGLB", "BEART", "SOURCE"  Radiology review:     IMAGING  No results found.     Current Facility-Administered Medications   Medication Dose Route Frequency    acetaminophen (TYLENOL) tablet 650 mg  650 mg Oral Q6H PRN    ALPRAZolam (XANAX) tablet 0.25 mg  0.25 mg Oral TID PRN    atorvastatin (LIPITOR) tablet 20 mg  20 mg Oral Daily With Dinner    cefepime (MAXIPIME) IVPB (premix in dextrose) 1,000 mg 50 mL  1,000 mg Intravenous Q24H    clopidogrel (PLAVIX) tablet 75 mg  75 mg Oral Daily    guaiFENesin (MUCINEX) 12 hr tablet 600 mg  600 mg Oral Q12H NII    levalbuterol (XOPENEX) inhalation solution 1.25 mg  1.25 mg Nebulization TID    metoprolol tartrate (LOPRESSOR) tablet 25 mg  25 mg Oral Q12H NII    pantoprazole (PROTONIX) EC tablet 20 mg  20 mg Oral Early Morning    polyethylene glycol (MIRALAX) packet 17 g  17 g Oral Daily PRN    potassium chloride (K-DUR,KLOR-CON) CR tablet 10 mEq  10 mEq Oral Daily    saccharomyces boulardii (FLORASTOR) capsule 250 mg  250 mg Oral BID     Medications Discontinued During This Encounter   Medication Reason    Nutritional Supplement LIQD 1 Can     metroNIDAZOLE (FLAGYL) IVPB (premix) 500 mg 100 mL     diltiazem (CARDIZEM) tablet 30 mg     metoprolol succinate (TOPROL-XL) 24 hr tablet 50 mg     acetaminophen (TYLENOL) tablet 650 mg     metoprolol succinate (TOPROL-XL) 24 hr tablet 50 mg     diltiazem (CARDIZEM) tablet 30 mg     metoprolol tartrate (LOPRESSOR) tablet 25 mg     diltiazem (CARDIZEM) tablet 30 mg     diltiazem (CARDIZEM) tablet 30 mg     metoprolol tartrate (LOPRESSOR) tablet 25 mg     apixaban (ELIQUIS) tablet 2.5 mg     furosemide (LASIX) tablet 20 mg     magnesium sulfate 40 g/1000 mL infusion (premix)     sodium chloride 0.9 % infusion     azithromycin (ZITHROMAX) 500 mg in sodium chloride 0.9 % 250 mL IVPB     atorvastatin (LIPITOR) tablet 20 mg     cholecalciferol (VITAMIN D3) tablet 2,000 Units     clopidogrel (PLAVIX) tablet 75 mg     pantoprazole (PROTONIX) EC tablet 20 mg     potassium chloride (K-DUR,KLOR-CON) CR tablet 10 mEq     guaiFENesin (MUCINEX) 12 hr tablet 600 mg     cefepime (MAXIPIME) IVPB (premix in dextrose) 1,000 mg 50 mL     levalbuterol (XOPENEX) inhalation solution 1.25 mg     metoprolol tartrate (LOPRESSOR) tablet 25 mg     saccharomyces boulardii (FLORASTOR) capsule 250 mg     acetaminophen (TYLENOL) tablet 975 mg     acetaminophen (TYLENOL) tablet 975 mg     ALPRAZolam (XANAX) tablet 0.25 mg     polyethylene glycol (MIRALAX) packet 17 g        Kenyatta Page, DO      This progress note was produced in part using a dictation device which may document imprecise wording from author's original intent.

## 2023-11-21 NOTE — SPEECH THERAPY NOTE
Speech Language/Pathology  Speech-Language Pathology Progress Note    Patient Name: Uday Anderson  VFJLW'[id number] Date: 11/21/2023    Subjective:  Pt was awake, alert, and anxious . She was sitting upright in bed. Patient stated "They broke my dentures, I might have some difficulty with some of the food". Objective:  Pt was seen today for dysphagia therapy. Current diet is regular with thin liquids. Pt was on 2L O2 via nasal cannula. Oral care had already been completed. Focus of today's session was to maximize PO intake safety. Textures offered today included hard solid, mixed consistency, and thin liquid via straw. Swallow function:   Bolus retrieval, control, and manipulation were adequate. Mastication and Breakdown were prolonged and reduced. Pharyngeal swallow initiation was [facility]. Hyolaryngeal excursion was [facility]. No s/s aspiration occurred during session today. SLP provided min cueing/feedback throughout session. Pt was responsive to cueing and was receptive and agreeable. Assessment:  Swallow function is declining with current diet. Diet texture downgrade is recommended at this time d/t dentition change. Plan:  Change diet texture to dysphagia 3 dental soft. Continue ST follow up. Subsequent sessions to focus on maximizing PO intake safety.

## 2023-11-21 NOTE — PROGRESS NOTES
[address]  Progress Note  Name: Juan Deras  MRN: [id number]  Unit/Bed#: 495-19 I Date of Admission: 11/16/2023   Date of Service: 11/21/2023 I Hospital Day: 5    Assessment/Plan   Sepsis due to pneumonia [facility])  Assessment & Plan  Fever, tachycardia, leukocytosis POA  Large multifocal R-sided pneumonia noted on CXR and CT chest wo contrast  Lactic acidosis resolved w IVF  Procal now normalized  Urine antigens negative  Blood cultures negative X2  Speech eval: Change diet texture to dysphagia 3 dental soft  Remains afebrile, tachypneic, requiring supplemental O2 1-2L NC  Per Pulm 11/20:   Discontinue azithromycin  Continue cefepime (Day 5) for 7 days total  Follow sputum culture  Continue Xopenex nebs TID  Continue flutter valve    * Acute hypoxic respiratory failure (HCC)  Assessment & Plan  Presented w/SOB on room air, requiring up to 8L   No Hx COPD, does have Hx CHF  Weaned to room air until 11/19, now requiring 1-2L NC to maintain O2 sat above 90%  Continue Resp therapy    Acute-on-chronic kidney injury   Assessment & Plan  Lab Results   Component Value Date    EGFR 46 11/21/2023    EGFR 39 11/20/2023    EGFR 36 11/19/2023    CREATININE 1.06 11/21/2023    CREATININE 1.22 11/20/2023    CREATININE 1.31 (H) 11/19/2023   Likely ATN 2/2 volume depletion and sepsis   Fe Na 0.41%  (low)  Renal function continues to improve  Per Nephrology 11/21:   Bicarb, Na, Mg improving, continue to supplement PRN  Fluid restriction 1.8L  Will provide loop diuretic if appropriate    Chronic diastolic (congestive) heart failure (HCC)  Assessment & Plan  Wt Readings from Last 3 Encounters:   11/21/23 50.9 kg (112 lb 3.4 oz)   11/01/23 48.1 kg (106 lb)   10/03/23 47.1 kg (103 lb 12.8 oz)   Increased need for supplemental O2  Wt stable, continue to monitor  Consider CXR if patient begins to show increased signs of pulmonary congestion/fluid retention  Nephrology considering loop diuretic if deemed necessary    Anemia  Assessment & Plan  Hemoglobin stabilized around 9  Continue to hold Eliquis  Iron panel suggests likely anemia of chronic disease w some element of RAMIRO  Continue to trend, consider FOBT    Atrial flutter (HCC)  Assessment & Plan  Resumed metoprolol and Cardizem  Continue to hold Eliquis  As intermittent episodes of rapid ventricular response, may require titration of metoprolol         VTE Pharmacologic Prophylaxis:   Pharmacologic:  Held for now in setting of low plates  Mechanical VTE Prophylaxis in Place: Yes    Discussions with Specialists or Other Care Team Provider: Shawnee Penny, Speech, PT/OT, Resp therapy    Education and Discussions with Family / Patient: Patient    Time Spent for Care: 45 minutes. More than 50% of total time spent on counseling and coordination of care as described above. Current Length of Stay: 5 day(s)    Current Patient Status: Inpatient   Certification Statement: The patient will continue to require additional inpatient hospital stay due to Continued oxygen supplementation, IV antibiotics    Discharge Plan: 48-72hrs if stable    Code Status: Level 3 - DNAR and DNI      Subjective:   Ms Oneal Escobedo is well this am, feeling unchanged from yesterday. States that her diarrhea has somewhat slowed down. Most concerned that her dentures broke yesterday. I assured her that I changed the diet orders (per speech eval recommendations) and she should let us know if there are further problems with eating. Otherwise no acute concerns. Objective:     Vitals:   Temp (24hrs), Av.6 °F (37 °C), Min:97.4 °F (36.3 °C), Max:99.8 °F (37.7 °C)    Temp:  [97.4 °F (36.3 °C)-99.8 °F (37.7 °C)] 97.4 °F (36.3 °C)  HR:  [] 136  Resp:  [21-38] 25  BP: (123-143)/(66-83) 125/82  SpO2:  [86 %-97 %] 96 %  Body mass index is 23.45 kg/m². Input and Output Summary (last 24 hours):        Intake/Output Summary (Last 24 hours) at 2023 1229  Last data filed at 2023 8956  Gross per 24 hour   Intake 600 ml   Output --   Net 600 ml       Physical Exam:     Physical Exam  Vitals and nursing note reviewed. Constitutional:       General: She is not in acute distress. Appearance: She is well-developed. She is ill-appearing. HENT:      Head: Normocephalic and atraumatic. Right Ear: External ear normal.      Left Ear: External ear normal.      Nose: Nose normal.      Comments: NC in place     Mouth/Throat:      Mouth: Mucous membranes are moist.      Pharynx: Oropharynx is clear. Eyes:      Extraocular Movements: Extraocular movements intact. Conjunctiva/sclera: Conjunctivae normal.   Cardiovascular:      Rate and Rhythm: Normal rate and regular rhythm. Pulses: Normal pulses. Heart sounds: Normal heart sounds. No murmur heard. Pulmonary:      Effort: No respiratory distress. Breath sounds: Rales present. Comments: Patient tachypneic on exam  Abdominal:      General: Abdomen is flat. Bowel sounds are normal.      Palpations: Abdomen is soft. Tenderness: There is no abdominal tenderness. Musculoskeletal:         General: No swelling. Cervical back: Normal range of motion and neck supple. No rigidity. Right lower leg: Edema present. Left lower leg: Edema present. Comments: Mild edema bilaterally, unchanged from prior   Skin:     General: Skin is warm and dry. Capillary Refill: Capillary refill takes less than 2 seconds. Findings: No erythema or rash. Neurological:      General: No focal deficit present. Mental Status: She is alert and oriented to person, place, and time.    Psychiatric:         Mood and Affect: Mood normal.         Behavior: Behavior normal.       Additional Data:     Labs:    Results from last 7 days   Lab Units 11/21/23  0506 11/19/23  0440 11/18/23  0643   WBC Thousand/uL 6.86   < > 9.05   HEMOGLOBIN g/dL 9.5*   < > 9.4*   HEMATOCRIT % 30.8*   < > 29.5*   PLATELETS Thousands/uL 143*   < > 115*   BANDS PCT %  --   --  16*   NEUTROS PCT % 65   < >  --    LYMPHS PCT % 13*   < >  --    LYMPHO PCT %  --   --  1*   MONOS PCT % 8   < >  --    MONO PCT %  --   --  3*   EOS PCT % 13*   < > 3    < > = values in this interval not displayed. Results from last 7 days   Lab Units 11/21/23  0506 11/20/23  0507   SODIUM mmol/L 137 135   POTASSIUM mmol/L 3.8 3.8   CHLORIDE mmol/L 109* 108   CO2 mmol/L 19* 17*   BUN mg/dL 38* 44*   CREATININE mg/dL 1.06 1.22   ANION GAP mmol/L 9 10   CALCIUM mg/dL 9.0 8.6   ALBUMIN g/dL  --  3.2*   TOTAL BILIRUBIN mg/dL  --  0.96   ALK PHOS U/L  --  62   ALT U/L  --  24   AST U/L  --  20   GLUCOSE RANDOM mg/dL 126 102     Results from last 7 days   Lab Units 11/16/23  0840   INR  1.34*     Results from last 7 days   Lab Units 11/16/23  2041   POC GLUCOSE mg/dl 160*         Results from last 7 days   Lab Units 11/20/23  0507 11/18/23  0643 11/16/23  1118 11/16/23  0840   LACTIC ACID mmol/L  --   --  1.5 2.6*   PROCALCITONIN ng/ml 6.30* 18.85*  --  0.18     * I Have Reviewed All Lab Data Listed Above. * Additional Pertinent Lab Tests Reviewed: [address] [facility] Admission Reviewed    Imaging:    CT chest without contrast   Final Result         1. Multifocal pneumonia in the right lung with reactive adenopathy. Follow-up to ensure complete resolution recommended. 2. Moderate right pleural effusion with associated compressive atelectasis, mildly improved since 6/11/2023. Trace left pleural effusion. 3. Mild groundglass opacities in the left upper lobe. The study was marked in EPIC for significant notification. Lauryn Dolan Resident: Rosetta Quispe, the attending radiologist, have reviewed the images and agree with the final report above. Workstation performed: [id number]         XR chest portable   ED Interpretation   Dense infiltrate in right lung, concerning for pneumonia. Formal read pending.       Final Result      Development of large infiltrate in the right lung with interval decrease in size of right effusion. Postobstructive pneumonia cannot be excluded. Consider short interval follow-up with chest x-ray or evaluation with CT chest with contrast.   Study was marked in Epic for follow-up notification. Resident: Tuyet Singh, the attending radiologist, have reviewed the images and agree with the final report above. Workstation performed: [id number]           Imaging Personally Reviewed by Myself Includes: No new imaging     Recent Cultures (last 7 days):     Results from last 7 days   Lab Units 11/17/23  0006 11/16/23  0847 11/16/23  0844   BLOOD CULTURE   --  No Growth After 4 Days. No Growth After 4 Days.    LEGIONELLA URINARY ANTIGEN  Negative  --   --        Last 24 Hours Medication List:   Current Facility-Administered Medications   Medication Dose Route Frequency Provider Last Rate    acetaminophen  650 mg Oral Q6H PRN Iver Nettle, DO      ALPRAZolam  0.25 mg Oral TID PRN Iver Nettle, DO      atorvastatin  20 mg Oral Daily With Dinner Iver Nettle, DO      cefepime  1,000 mg Intravenous Q24H Iver Nettle, DO 1,000 mg (11/21/23 0905)    clopidogrel  75 mg Oral Daily Iver Nettle, DO      guaiFENesin  600 mg Oral Q12H [address] Iver Nettle, DO      levalbuterol  1.25 mg Nebulization TID Iver Nettle, DO      metoprolol tartrate  25 mg Oral Q12H [address] Iver Nettle, DO      pantoprazole  20 mg Oral Early Morning Iver Nettle, DO      polyethylene glycol  17 g Oral Daily PRN Iver Nettle, DO      potassium chloride  10 mEq Oral Daily Iver Nettle, DO      saccharomyces boulardii  250 mg Oral BID Iver Nettle, DO          Today, Patient Was Seen By: Imani Kilpatrick MD

## 2023-11-21 NOTE — CASE MANAGEMENT
Case Management Discharge Planning Note    Patient name Natalie Aguilar  Location [address] [facility] 837/[id number] MRN [id number]  : 1935 Date 2023       Current Admission Date: 2023  Current Admission Diagnosis:Acute hypoxic respiratory failure [facility])   Patient Active Problem List    Diagnosis Date Noted    Hypotension 2023    Acute-on-chronic kidney injury  2023    Encounter for immunization 10/03/2023    S/P angioplasty with stent 10/03/2023    Centromere antibody positive 2023    Vitamin D deficiency 08/15/2023    Atrial flutter ([address]) 08/15/2023    Gastroesophageal reflux disease without esophagitis 08/15/2023    SIADH (syndrome of inappropriate 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[address]) 2023    Tricuspid valve insufficiency 2023    Prolonged QT interval 2023    Pleural effusion on right 2023    Moderate protein-calorie malnutrition ([address]) 2023    Closed wedge compression fracture of T11 vertebra ([address]) 2023    Age-related osteoporosis with current pathological fracture of vertebra ([address]) 2023    Graves disease 2023    Anemia 2023    Coronary artery disease involving native coronary artery of native heart without angina pectoris 2023    S/P TAVR (transcatheter aortic valve replacement) 2023    Permanent atrial fibrillation ([address]) 2023    Incomplete right bundle branch block 2023    Constipation 2023    Status post insertion of drug eluting coronary artery stent 2023    Acute hypoxic respiratory failure ([address]) 2022    Chronic diastolic (congestive) heart failure ([address]) 2022    Primary generalized (osteo)arthritis 2022    Dyslipidemia 2022    Post-menopause 2022    Generalized weakness 2021    Ganglion cyst 2021    Adhesive capsulitis of right shoulder 2021    Bilateral leg edema 2021    Chronic pain syndrome 2021    Sacroiliitis ([address]) 2021    Gait abnormality 06/01/2021    Chronic bilateral low back pain without sciatica 04/30/2021    Osteopenia of neck of femur 01/21/2021    Impaired fasting glucose     Encounter for long-term (current) use of medications 10/31/2020    Immunization due 10/31/2020    Acute bursitis of right shoulder 10/31/2020    Encounter for Medicare annual wellness exam 01/02/2020    Mid back pain 09/30/2019    Generalized anxiety disorder 09/05/2019    Non-rheumatic tricuspid valve insufficiency 06/04/2019    Lymphadenopathy 05/21/2019    Hiatal hernia 05/21/2019    Thrombocytopenia ([address]) 05/21/2019    Mitral valve insufficiency 05/21/2019    Atrial tachycardia 05/21/2019    Pulmonary hypertension ([address]) 05/21/2019    Diverticulosis 05/21/2019    Essential hypertension 05/18/2019    Hypomagnesemia 05/17/2019    Hypokalemia 05/17/2019    Sepsis due to pneumonia ([address]) 05/17/2019    Hyponatremia 05/17/2019      LOS (days): 5  Geometric Mean LOS (GMLOS) (days): 4.10  Days to GMLOS:-0.9     OBJECTIVE:  Risk of Unplanned Readmission Score: 27.12         Current admission status: Inpatient   Preferred Pharmacy:   [address] [place] [address] - [address]  [address] [address]  Phone: [phone] Fax: [address] [facility] [address] - [address] [address]  Phone: [phone] Fax: [phone]    Primary Care Provider: Kriss Bryan DO    Primary Insurance: MEDICARE  Secondary Insurance: BLUE CROSS    DISCHARGE DETAILS:    Discharge planning discussed with[de-identified] patient son Ottoniel Del Rosario of Choice: Yes  Comments - Freedom of Choice: CM spoke with patient son Ernesto Burch he is aware of recommendations for STR. He stated his mom will rapid decline if she goes to rehab again. he is in agreement with [facility] and the private aides he is calling company to increase aide days .   CM contacted family/caregiver?: Yes Ernesto Burch son)  Were Treatment Team discharge recommendations reviewed with patient/caregiver?: Yes  Did patient/caregiver verbalize understanding of patient care needs?: Yes  Were patient/caregiver advised of the risks associated with not following Treatment Team discharge recommendations?: Yes    Contacts  Patient Contacts: Sushila Armendariz spouse  Relationship to Patient[de-identified] Family  Contact Method: Phone  Phone Number: see face sheet  Reason/Outcome: Discharge [facility]         Is the patient interested in [facility] at discharge?: Yes  [facility] requested[de-identified] Nursing, Physical Therapy, Occupational [facility] Provider[de-identified] PCP  Home Health Services Needed[de-identified] Evaluate Functional Status and Safety, Heart Failure Management, Strengthening/Theraputic Exercises to Improve Function, Gait/ADL Training, Oxygen Via Nasal Cannula  Oxygen LPM Ordered (if applicable based on home health services needed):: 2 LPM  Homebound Criteria Met[de-identified] Requires the Assistance of Another Person for Safe Ambulation or to Leave the Home, Uses an Assist Device (i.e. cane, walker, etc)  Supporting Clincal Findings[de-identified] Dyspnea with Exertion, Limited Endurance, Fatigues Easliy in [employer], Requires Oxygen         Treatment Team Recommendation: Short Term Rehab  Discharge Destination Plan[de-identified] Home with [facility] at Discharge : Family                CM spoke with patient radha Carrillo and discussed recommendations for STR post hospital and patient is declining. Patient radha Carrillo stated patient will not go to rehab again , she will have rapid decline if he makes her. Discussed help at home and son and patient are in agreement with home health and increasing aides that are already active. Marshal declined  AAA referral for aides due to he stated he tried but his dad will not disclose his financial status. CM discussed  FOC with caretaker at phone.   caretaker agreeable with blanket referrals in [place] for local agencies to determine bed availability according to your medical needs and insurance coverage. Patient and caretaker has no preference       Referral placed in aidin.     CM to follow

## 2023-11-21 NOTE — ASSESSMENT & PLAN NOTE
Lab Results   Component Value Date    EGFR 46 11/21/2023    EGFR 39 11/20/2023    EGFR 36 11/19/2023    CREATININE 1.06 11/21/2023    CREATININE 1.22 11/20/2023    CREATININE 1.31 (H) 11/19/2023   Likely ATN 2/2 volume depletion and sepsis   Fe Na 0.41%  (low)  Renal function continues to improve  Per Nephrology 11/21:   Bicarb, Na, Mg improving, continue to supplement PRN  Fluid restriction 1.8L  Will provide loop diuretic if appropriate

## 2023-11-21 NOTE — RESPIRATORY THERAPY NOTE
11/21/23 0849   Inhalation Therapy Tx   $ Inhalation Therapy Performed Yes   $ Pulse Oximetry Spot Check Charge Completed   SpO2 93 %  (2.5)   Pre-Treatment Pulse 90   Pre-Treatment Respirations 22   Duration 15   Breath Sounds Pre-Treatment Bilateral Diminished   Breath Sounds Pre-Treatment Right Rales;Crackles  (RML insp rales, RLL crackles)   Breath Sounds Post-Treatment Bilateral Diminished   Breath Sounds Post-Treatment Right Rales;Crackles   Delivery Source Air;UDN   Position High Yang's   Treatment Tolerance Tolerated well

## 2023-11-21 NOTE — PLAN OF CARE
Problem: PHYSICAL THERAPY ADULT  Goal: Performs mobility at highest level of function for planned discharge setting. See evaluation for individualized goals. Description:   Outcome: Progressing  Note: Prognosis: Fair  Problem List:  (Decreased strength; Decreased range of motion; Decreased endurance; Impaired balance; Decreased mobility)  Assessment: Pt. seen for PT treatment session this date with interventions consisting of  therapeutic exercises, bed mobility, transfers and  gait training w/ emphasis on improving pt's ability to ambulate. Pt. Requiring  cues for sequence and safety. In comparison to previous session, Pt. With no change  in activity tolerance. Limited by weakness and fatigue and elevated HR and low SpO2 with acitivity. Pt is in need of continued activity in PT to improve strength balance endurance mobility transfers and ambulation with return to maximize LOF. From PT/mobility standpoint, recommendation at time of d/c would be level II: Mod resource intensity in order to promote return to PLOF and independence. The patient's AM-PAC Basic Mobility Inpatient Short Form Raw Score is 18. A Raw score of greater than 16 suggests the patient may benefit from discharge to home. Please also refer to physical therapy recommendation for safe DC planning. Rehab Resource Intensity Level, PT: II (Moderate Resource Intensity)    See flowsheet documentation for full assessment.

## 2023-11-21 NOTE — CASE MANAGEMENT
Case Management Discharge Planning Note    Patient name Puma Kerns  Location [address] [facility] [place] 322/[id number] MRN [id number]  : 1935 Date 2023       Current Admission Date: 2023  Current Admission Diagnosis:Acute hypoxic respiratory failure [facility])   Patient Active Problem List    Diagnosis Date Noted    Hypotension 2023    Acute-on-chronic kidney injury  2023    Encounter for immunization 10/03/2023    S/P angioplasty with stent 10/03/2023    Centromere antibody positive 2023    Vitamin D deficiency 08/15/2023    Atrial flutter ([address]) 08/15/2023    Gastroesophageal reflux disease without esophagitis 08/15/2023    SIADH (syndrome of inappropriate 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[address]) 2023    Tricuspid valve insufficiency 2023    Prolonged QT interval 2023    Pleural effusion on right 2023    Moderate protein-calorie malnutrition ([address]) 2023    Closed wedge compression fracture of T11 vertebra ([address]) 2023    Age-related osteoporosis with current pathological fracture of vertebra ([address]) 2023    Graves disease 2023    Anemia 2023    Coronary artery disease involving native coronary artery of native heart without angina pectoris 2023    S/P TAVR (transcatheter aortic valve replacement) 2023    Permanent atrial fibrillation ([address]) 2023    Incomplete right bundle branch block 2023    Constipation 2023    Status post insertion of drug eluting coronary artery stent 2023    Acute hypoxic respiratory failure ([address]) 2022    Chronic diastolic (congestive) heart failure ([address]) 2022    Primary generalized (osteo)arthritis 2022    Dyslipidemia 2022    Post-menopause 2022    Generalized weakness 2021    Ganglion cyst 2021    Adhesive capsulitis of right shoulder 2021    Bilateral leg edema 2021    Chronic pain syndrome 2021    Sacroiliitis ([address]) 2021    Gait abnormality 06/01/2021    Chronic bilateral low back pain without sciatica 04/30/2021    Osteopenia of neck of femur 01/21/2021    Impaired fasting glucose     Encounter for long-term (current) use of medications 10/31/2020    Immunization due 10/31/2020    Acute bursitis of right shoulder 10/31/2020    Encounter for Medicare annual wellness exam 01/02/2020    Mid back pain 09/30/2019    Generalized anxiety disorder 09/05/2019    Non-rheumatic tricuspid valve insufficiency 06/04/2019    Lymphadenopathy 05/21/2019    Hiatal hernia 05/21/2019    Thrombocytopenia ([address]) 05/21/2019    Mitral valve insufficiency 05/21/2019    Atrial tachycardia 05/21/2019    Pulmonary hypertension ([address]) 05/21/2019    Diverticulosis 05/21/2019    Essential hypertension 05/18/2019    Hypomagnesemia 05/17/2019    Hypokalemia 05/17/2019    Sepsis due to pneumonia ([address]) 05/17/2019    Hyponatremia 05/17/2019      LOS (days): 5  Geometric Mean LOS (GMLOS) (days): 4.10  Days to GMLOS:-1.1     OBJECTIVE:  Risk of Unplanned Readmission Score: 27.18         Current admission status: Inpatient   Preferred Pharmacy:   [place] [facility] [address] DONYA SOTELO [address] [place] [address] Road - [address]  [address] [address]  Phone: [phone] Fax: [address] [facility] [address] - [address] [address]  Phone: [phone] Fax: [phone]    Primary Care Provider: Antonio Sanders DO    Primary Insurance: MEDICARE  Secondary Insurance: BLUE CROSS    DISCHARGE DETAILS:            Patient spouse provided Home care options and [facility] [address] [facility] in aidin. DC IMM explained to patient spouse and he verbalized understanding of IMM. Declined number , copy placed ion bin to be scanned. Spouse will pick patient up at discharge.

## 2023-11-22 VITALS
HEIGHT: 58 IN | TEMPERATURE: 96.9 F | HEART RATE: 93 BPM | WEIGHT: 116.18 LBS | SYSTOLIC BLOOD PRESSURE: 128 MMHG | DIASTOLIC BLOOD PRESSURE: 74 MMHG | OXYGEN SATURATION: 90 % | BODY MASS INDEX: 24.39 KG/M2 | RESPIRATION RATE: 19 BRPM

## 2023-11-22 PROBLEM — I95.9 HYPOTENSION: Status: RESOLVED | Noted: 2023-11-17 | Resolved: 2023-11-22

## 2023-11-22 LAB
ANION GAP SERPL CALCULATED.3IONS-SCNC: 11 MMOL/L
ANISOCYTOSIS BLD QL SMEAR: PRESENT
BASOPHILS # BLD MANUAL: 0 THOUSAND/UL (ref 0–0.1)
BASOPHILS NFR MAR MANUAL: 0 % (ref 0–1)
BUN SERPL-MCNC: 35 MG/DL (ref 5–25)
CALCIUM SERPL-MCNC: 8.4 MG/DL (ref 8.4–10.2)
CHLORIDE SERPL-SCNC: 109 MMOL/L (ref 96–108)
CO2 SERPL-SCNC: 19 MMOL/L (ref 21–32)
CREAT SERPL-MCNC: 0.88 MG/DL (ref 0.6–1.3)
EOSINOPHIL # BLD MANUAL: 0.79 THOUSAND/UL (ref 0–0.4)
EOSINOPHIL NFR BLD MANUAL: 14 % (ref 0–6)
ERYTHROCYTE [DISTWIDTH] IN BLOOD BY AUTOMATED COUNT: 18.2 % (ref 11.6–15.1)
GFR SERPL CREATININE-BSD FRML MDRD: 58 ML/MIN/1.73SQ M
GLUCOSE SERPL-MCNC: 87 MG/DL (ref 65–140)
HCT VFR BLD AUTO: 27.3 % (ref 34.8–46.1)
HGB BLD-MCNC: 8.6 G/DL (ref 11.5–15.4)
LYMPHOCYTES # BLD AUTO: 1.02 THOUSAND/UL (ref 0.6–4.47)
LYMPHOCYTES # BLD AUTO: 18 % (ref 14–44)
MCH RBC QN AUTO: 28.2 PG (ref 26.8–34.3)
MCHC RBC AUTO-ENTMCNC: 31.5 G/DL (ref 31.4–37.4)
MCV RBC AUTO: 90 FL (ref 82–98)
MICROCYTES BLD QL AUTO: PRESENT
MONOCYTES # BLD AUTO: 0.4 THOUSAND/UL (ref 0–1.22)
MONOCYTES NFR BLD: 7 % (ref 4–12)
NEUTROPHILS # BLD MANUAL: 3.46 THOUSAND/UL (ref 1.85–7.62)
NEUTS SEG NFR BLD AUTO: 61 % (ref 43–75)
NRBC BLD AUTO-RTO: 1 /100 WBC (ref 0–2)
OVALOCYTES BLD QL SMEAR: PRESENT
PATHOLOGY REVIEW: NO
PLATELET # BLD AUTO: 132 THOUSANDS/UL (ref 149–390)
PLATELET BLD QL SMEAR: ABNORMAL
PMV BLD AUTO: 9.9 FL (ref 8.9–12.7)
POLYCHROMASIA BLD QL SMEAR: PRESENT
POTASSIUM SERPL-SCNC: 3.5 MMOL/L (ref 3.5–5.3)
RBC # BLD AUTO: 3.05 MILLION/UL (ref 3.81–5.12)
RBC MORPH BLD: PRESENT
SODIUM SERPL-SCNC: 139 MMOL/L (ref 135–147)
WBC # BLD AUTO: 5.67 THOUSAND/UL (ref 4.31–10.16)

## 2023-11-22 PROCEDURE — 97530 THERAPEUTIC ACTIVITIES: CPT

## 2023-11-22 PROCEDURE — 99232 SBSQ HOSP IP/OBS MODERATE 35: CPT | Performed by: INTERNAL MEDICINE

## 2023-11-22 PROCEDURE — 94760 N-INVAS EAR/PLS OXIMETRY 1: CPT

## 2023-11-22 PROCEDURE — 97116 GAIT TRAINING THERAPY: CPT

## 2023-11-22 PROCEDURE — 99239 HOSP IP/OBS DSCHRG MGMT >30: CPT | Performed by: HOSPITALIST

## 2023-11-22 PROCEDURE — 97535 SELF CARE MNGMENT TRAINING: CPT

## 2023-11-22 PROCEDURE — 94640 AIRWAY INHALATION TREATMENT: CPT

## 2023-11-22 PROCEDURE — 85027 COMPLETE CBC AUTOMATED: CPT

## 2023-11-22 PROCEDURE — 92526 ORAL FUNCTION THERAPY: CPT

## 2023-11-22 PROCEDURE — 85007 BL SMEAR W/DIFF WBC COUNT: CPT

## 2023-11-22 PROCEDURE — 94668 MNPJ CHEST WALL SBSQ: CPT

## 2023-11-22 PROCEDURE — 80048 BASIC METABOLIC PNL TOTAL CA: CPT

## 2023-11-22 PROCEDURE — 94664 DEMO&/EVAL PT USE INHALER: CPT

## 2023-11-22 RX ORDER — GUAIFENESIN 600 MG/1
600 TABLET, EXTENDED RELEASE ORAL EVERY 12 HOURS SCHEDULED
Qty: 10 TABLET | Refills: 0
Start: 2023-11-22 | End: 2023-11-27

## 2023-11-22 RX ORDER — METOPROLOL SUCCINATE 25 MG/1
25 TABLET, EXTENDED RELEASE ORAL ONCE
Status: COMPLETED | OUTPATIENT
Start: 2023-11-22 | End: 2023-11-22

## 2023-11-22 RX ORDER — CEFDINIR 300 MG/1
300 CAPSULE ORAL EVERY 12 HOURS SCHEDULED
Qty: 3 CAPSULE | Refills: 0
Start: 2023-11-22 | End: 2023-11-24

## 2023-11-22 RX ORDER — POLYETHYLENE GLYCOL 3350 17 G/17G
17 POWDER, FOR SOLUTION ORAL DAILY PRN
Refills: 0
Start: 2023-11-22 | End: 2023-12-06

## 2023-11-22 RX ADMIN — Medication 250 MG: at 08:08

## 2023-11-22 RX ADMIN — CEFEPIME HYDROCHLORIDE 1000 MG: 1 INJECTION, SOLUTION INTRAVENOUS at 08:12

## 2023-11-22 RX ADMIN — LEVALBUTEROL HYDROCHLORIDE 1.25 MG: 1.25 SOLUTION RESPIRATORY (INHALATION) at 08:27

## 2023-11-22 RX ADMIN — LEVALBUTEROL HYDROCHLORIDE 1.25 MG: 1.25 SOLUTION RESPIRATORY (INHALATION) at 14:46

## 2023-11-22 RX ADMIN — ATORVASTATIN CALCIUM 20 MG: 10 TABLET, FILM COATED ORAL at 15:46

## 2023-11-22 RX ADMIN — GUAIFENESIN 600 MG: 600 TABLET, EXTENDED RELEASE ORAL at 08:08

## 2023-11-22 RX ADMIN — METOPROLOL SUCCINATE 25 MG: 25 TABLET, EXTENDED RELEASE ORAL at 15:46

## 2023-11-22 RX ADMIN — METOPROLOL TARTRATE 25 MG: 25 TABLET, FILM COATED ORAL at 08:08

## 2023-11-22 RX ADMIN — FUROSEMIDE 20 MG: 20 TABLET ORAL at 08:08

## 2023-11-22 RX ADMIN — CLOPIDOGREL 75 MG: 75 TABLET ORAL at 08:08

## 2023-11-22 RX ADMIN — PANTOPRAZOLE SODIUM 20 MG: 20 TABLET, DELAYED RELEASE ORAL at 05:33

## 2023-11-22 RX ADMIN — POTASSIUM CHLORIDE 10 MEQ: 750 TABLET, EXTENDED RELEASE ORAL at 08:08

## 2023-11-22 NOTE — ASSESSMENT & PLAN NOTE
Wt Readings from Last 3 Encounters:   11/22/23 52.7 kg (116 lb 2.9 oz)   11/01/23 48.1 kg (106 lb)   10/03/23 47.1 kg (103 lb 12.8 oz)   Weaned to room air  Home dose lasix was restarted

## 2023-11-22 NOTE — CASE MANAGEMENT
Case Management Discharge Planning Note    Patient name Ludwin Duncan  Location [address] [facility] [place] 136/138-33 MRN [id number]  : 1935 Date 2023       Current Admission Date: 2023  Current Admission Diagnosis:Acute hypoxic respiratory failure [facility])   Patient Active Problem List    Diagnosis Date Noted    Hypotension 2023    Acute-on-chronic kidney injury  2023    Encounter for immunization 10/03/2023    S/P angioplasty with stent 10/03/2023    Centromere antibody positive 2023    Vitamin D deficiency 08/15/2023    Atrial flutter ([address]) 08/15/2023    Gastroesophageal reflux disease without esophagitis 08/15/2023    SIADH (syndrome of inappropriate 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[address]) 2023    Tricuspid valve insufficiency 2023    Prolonged QT interval 2023    Pleural effusion on right 2023    Moderate protein-calorie malnutrition ([address]) 2023    Closed wedge compression fracture of T11 vertebra ([address]) 2023    Age-related osteoporosis with current pathological fracture of vertebra ([address]) 2023    Graves disease 2023    Anemia 2023    Coronary artery disease involving native coronary artery of native heart without angina pectoris 2023    S/P TAVR (transcatheter aortic valve replacement) 2023    Permanent atrial fibrillation ([address]) 2023    Incomplete right bundle branch block 2023    Constipation 2023    Status post insertion of drug eluting coronary artery stent 2023    Acute hypoxic respiratory failure ([address]) 2022    Chronic diastolic (congestive) heart failure ([address]) 2022    Primary generalized (osteo)arthritis 2022    Dyslipidemia 2022    Post-menopause 2022    Generalized weakness 2021    Ganglion cyst 2021    Adhesive capsulitis of right shoulder 2021    Bilateral leg edema 2021    Chronic pain syndrome 2021    Sacroiliitis ([address]) 2021    Gait abnormality 06/01/2021    Chronic bilateral low back pain without sciatica 04/30/2021    Osteopenia of neck of femur 01/21/2021    Impaired fasting glucose     Encounter for long-term (current) use of medications 10/31/2020    Immunization due 10/31/2020    Acute bursitis of right shoulder 10/31/2020    Encounter for Medicare annual wellness exam 01/02/2020    Mid back pain 09/30/2019    Generalized anxiety disorder 09/05/2019    Non-rheumatic tricuspid valve insufficiency 06/04/2019    Lymphadenopathy 05/21/2019    Hiatal hernia 05/21/2019    Thrombocytopenia ([address]) 05/21/2019    Mitral valve insufficiency 05/21/2019    Atrial tachycardia 05/21/2019    Pulmonary hypertension ([address]) 05/21/2019    Diverticulosis 05/21/2019    Essential hypertension 05/18/2019    Hypomagnesemia 05/17/2019    Hypokalemia 05/17/2019    Sepsis due to pneumonia ([address]) 05/17/2019    Hyponatremia 05/17/2019      LOS (days): 6  Geometric Mean LOS (GMLOS) (days): 4.10  Days to GMLOS:-2     OBJECTIVE:  Risk of Unplanned Readmission Score: 27.52         Current admission status: Inpatient   Preferred Pharmacy:   [facility] [address] [place] [place], [place] - [address]  [address] [address]  Phone: [phone] Fax: [address] - [address]  [address] [address]  Phone: [phone] Fax: [phone]    Primary Care Provider: Madi Zurita DO    Primary Insurance: MEDICARE  Secondary Insurance: BLUE CROSS    DISCHARGE DETAILS:    Discharge planning discussed with[de-identified] patient and spouse at bedside  Freedom of Choice: Yes  Comments - Freedom of Choice: reviewed choice list, [facility]  CM contacted family/caregiver?: Yes  Were Treatment Team discharge recommendations reviewed with patient/caregiver?: Yes  Did patient/caregiver verbalize understanding of patient care needs?: Yes  Were patient/caregiver advised of the risks associated with not following Treatment Team discharge recommendations?: Yes    Contacts  Contact Method: In Person  Reason/Outcome: Discharge Planning  Reviewed choice list. Pt and spouse choose [facility]. Waiting to see if facility can accept today then set up transport. Pt is medically ready.

## 2023-11-22 NOTE — OCCUPATIONAL THERAPY NOTE
Occupational Therapy Progress Note     Patient Name: Darrell Fagan  HUZLV'O Date: 11/22/2023  Problem List  Principal Problem:    Acute hypoxic respiratory failure ([address])  Active Problems:    Sepsis due to pneumonia ([address])    Hyponatremia    Thrombocytopenia (HCC)    Generalized anxiety disorder    Chronic diastolic (congestive) heart failure (HCC)    Anemia    Atrial flutter (HCC)    Hypotension    Acute-on-chronic kidney injury           11/22/23 1234   OT Last Visit   OT Visit Date 11/22/23   Note Type   Note Type Treatment   Pain Assessment   Pain Assessment Tool 0-10   Pain Score No Pain   Restrictions/Precautions   Weight Bearing Precautions Per Order No   Other Precautions Cognitive; Chair Alarm; Bed Alarm;O2;Fall Risk;Multiple lines; Fluid restriction   ADL   Where Assessed Commode   Toileting Assistance  4  Minimal Assistance   Toileting Deficit Setup;Steadying;Clothing management up;Clothing management down;Perineal hygiene; Bedside commode   Toileting Comments Use of BSC with min A of 1 with RW, consistent cues and increased time. Washed hands in stance at SBA to CGA level while supported. SpO2 ranging from 80-93% during activity. Anxious throughout. Fluctuating and easily recovers to 90% when resting and focusing on breathing. Functional Standing Tolerance   Time 90 seconds   Activity washing hands following toileting   Transfers   Sit to Stand 4  Minimal assistance   Additional items Assist x 1; Increased time required;Verbal cues   Stand to Sit 4  Minimal assistance   Additional items Assist x 1; Increased time required;Verbal cues   Stand pivot 4  Minimal assistance   Additional items Assist x 1; Increased time required;Verbal cues   Toilet transfer 4  Minimal assistance   Additional items Assist x 1; Increased time required;Verbal cues; Commode   Functional Mobility   Functional Mobility 4  Minimal assistance   Additional Comments To BSC to sink back to recliner chair   Additional items Rolling walker Toilet Transfers   Toilet Transfer From [facility] To and from   Toilet Transfer to [place] Purification Transfers Minimal assistance   Subjective   Subjective "You're a nice girl"   Cognition   Overall Cognitive Status Impaired   Arousal/Participation Alert; Responsive; Cooperative;Arousable   Attention Attends with cues to redirect   Orientation Level Oriented X4   Memory Decreased short term memory   Following Commands Follows one step commands with increased time or repetition   Comments Cues throughout for safety and sequencing mostly due to baseline anxiety during movement and FOF. Pt's  present during session. Stating that this is her baseline but that her oxygen typically does not drop the way it has been the past couple days. Stating that he cannot take care of her at home this way. Activity Tolerance   Activity Tolerance Patient limited by fatigue   Assessment   Assessment Pt seen today for OT treatment to assess OOB tolerance, strength, transfer status and ADL participation. Pt was able to complete functional mobility to and from the [facility] and sink to perform toileting tasks with min A overall and consistent cues. SpO2 ranging from 80-93% during activity and often fluctuating. Anxious during movement but easily reassured when seated and resting. Pt's  present during session to provide further insight as far as PLOF and CG abilities. Pt presents with deficits in the following categories: limited home support, difficulty performing ADLS, and difficulty performing IADLS  activity tolerance, endurance, standing balance/tolerance, UE strength, and safety . These deficits, along with pt's  pain and strength limit the pt's ability to safely engage in areas of occupation such as: bathing/shower, toilet hygiene, dressing, functional mobility, cleaning, meal prep, and household maintenance.  Pt would benefit from continued skilled acute OT in order to maximize independence and safety in ADLs, mobility, and transfers. Pt's raw score on the AM-PAC Daily Activity inpatient short form is 18, standardized score is 38.66, less than 39.4. DC recommendation is level II (moderate resource intensity) however, please refer to therapist recommendation for safe DC planning. OT will work towards the established goals. Plan   Treatment Interventions ADL retraining;Functional transfer training;UE strengthening/ROM; Endurance training;Patient/family training;Equipment evaluation/education; Energy conservation; Activityengagement   Goal Expiration Date 12/02/23   OT Treatment Day 3   OT Frequency 3-5x/wk   Discharge Recommendation   Rehab Resource Intensity Level, OT II (Moderate Resource Intensity)   AM-PAC Daily Activity Inpatient   Lower Body Dressing 2   Bathing 3   Toileting 3   Upper Body Dressing 3   Grooming 3   Eating 4   Daily Activity Raw Score 18   Daily Activity Standardized Score (Calc for Raw Score >=11) 38.66   AM-PAC Applied Cognition Inpatient   Following a Speech/Presentation 3   Understanding Ordinary Conversation 3   Taking Medications 2   Remembering Where Things Are Placed or Put Away 2   Remembering List of 4-5 Errands 2   Taking Care of Complicated Tasks 2   Applied Cognition Raw Score 14   Applied Cognition Standardized Score 32.02         Jean Thurston OTR/L

## 2023-11-22 NOTE — ASSESSMENT & PLAN NOTE
Fever, tachycardia, leukocytosis POA  Large multifocal R-sided pneumonia noted on CXR and CT chest wo contrast  Lactic acidosis resolved w IVF  Procal now normalized  Urine antigens negative  Blood cultures negative X2  Speech eval: Change diet texture to dysphagia 3 dental soft  Remains afebrile, tachypneic  Weaned to room air  Complete antibiotic course x7 days, was on Cefepime and at discharge switch to Cefdinir to complete through 11/23

## 2023-11-22 NOTE — ASSESSMENT & PLAN NOTE
Presented w/SOB on room air, requiring up to 8L   No Hx COPD, does have Hx CHF  Weaned to room air until 11/19, has intermittently been placed back on 1-2L but again weaned to room air on 11/22 with spO2 96%

## 2023-11-22 NOTE — PROGRESS NOTES
Progress Note - Pulmonary   Lang  80 y.o. female MRN: [id number]  Unit/Bed#: 535-97 Encounter: [id number]    Assessment/Plan:    Acute hypoxic respiratory failure  Multifocal pneumonia  Severe sepsis secondary to community-acquired pneumonia  Chronic right-sided pleural effusion  Atrial flutter  Chronic diastolic CHF  ROSA/CKD  Possible CREST     Pulmonary recommendations/plan:     Seen on 2L NC. No oxygen requirement at baseline. Titrate oxygen to maintain SpO2 greater than or equal to 88%. Pulmonary toilet:  Increase activity as tolerated, out of bed as tolerated, cough and deep breathing exercises encourage, incentive spirometry q.1 hour, flutter valve q.1 hour  Continue cefepime to complete 7-day course day#6/7 today. Completed azithromycin  Follow sputum culture  Trend WBC, procalcitonin, times  Continue Xopenex nebs 3 times daily  Continue flutter valve  Pleural effusion stable without need for thoracentesis at this time however agree with nephrology and would consider diuretics, for starting them to primary team.  Outpatient referral to rheumatology previously placed. Will evaluate again on Friday if still inpatient. Chief Complaint:    "I feel terrible."    Subjective:    Patient was seen and examined today. No overnight events reported. Patient states that she feels terrible. She feels very tired and worn out after working with therapy today. She does feel more short of breath currently compared to previously. Denies significant cough or sputum production. No wheezing. No chest pain. Denies fevers or chills. Objective:    Vitals: Blood pressure 124/74, pulse 87, temperature (!) 97.1 °F (36.2 °C), resp. rate 22, height 4' 10" (1.473 m), weight 52.7 kg (116 lb 2.9 oz), SpO2 94 %. 2L NC,Body mass index is 24.28 kg/m².       Intake/Output Summary (Last 24 hours) at 11/22/2023 1220  Last data filed at 11/22/2023 0835  Gross per 24 hour   Intake 360 ml   Output 100 ml   Net 260 ml Invasive Devices       Peripheral Intravenous Line  Duration             Peripheral IV 11/19/23 Distal;Left;Ventral (anterior) Forearm 3 days              Drain  Duration             External Urinary Catheter <1 day                    Physical Exam:     Physical Exam  Vitals and nursing note reviewed. Constitutional:       General: She is not in acute distress. Appearance: Normal appearance. HENT:      Head: Normocephalic and atraumatic. Mouth/Throat:      Mouth: Mucous membranes are moist.      Pharynx: Oropharynx is clear. Cardiovascular:      Rate and Rhythm: Normal rate and regular rhythm. Heart sounds: No murmur heard. Pulmonary:      Effort: Pulmonary effort is normal. No respiratory distress. Breath sounds: Rales (right lung) present. No wheezing or rhonchi. Musculoskeletal:      Cervical back: Normal range of motion. Skin:     General: Skin is warm and dry. Neurological:      General: No focal deficit present. Mental Status: She is alert. Mental status is at baseline. Psychiatric:         Mood and Affect: Mood normal.         Behavior: Behavior normal.         Labs: I have personally reviewed pertinent lab results. , ABG: No results found for: "PHART", "IEQ8UXN", "PO2ART", "WGQ4QPM", "Z6LKPNVU", "BEART", "SOURCE", BNP: No results found for: "BNP", CBC:   Lab Results   Component Value Date    WBC 5.67 11/22/2023    HGB 8.6 (L) 11/22/2023    HCT 27.3 (L) 11/22/2023    MCV 90 11/22/2023     (L) 11/22/2023    RBC 3.05 (L) 11/22/2023    MCH 28.2 11/22/2023    MCHC 31.5 11/22/2023    RDW 18.2 (H) 11/22/2023    MPV 9.9 11/22/2023    NRBC 1 11/22/2023   , CMP:   Lab Results   Component Value Date    SODIUM 139 11/22/2023    K 3.5 11/22/2023     (H) 11/22/2023    CO2 19 (L) 11/22/2023    BUN 35 (H) 11/22/2023    CREATININE 0.88 11/22/2023    CALCIUM 8.4 11/22/2023    EGFR 58 11/22/2023   , PT/INR: No results found for: "PT", "INR", Troponin: No results found for: "TROPONINI"    Imaging and other studies: I have personally reviewed pertinent reports.    and I have personally reviewed pertinent films in PACS

## 2023-11-22 NOTE — CASE MANAGEMENT
Case Management Discharge Planning Note    Patient name Corie Meckel  Location [address] [facility] 958/[id number] MRN [id number]  : 1935 Date 2023       Current Admission Date: 2023  Current Admission Diagnosis:Acute hypoxic respiratory failure [facility])   Patient Active Problem List    Diagnosis Date Noted    Hypotension 2023    Acute-on-chronic kidney injury  2023    Encounter for immunization 10/03/2023    S/P angioplasty with stent 10/03/2023    Centromere antibody positive 2023    Vitamin D deficiency 08/15/2023    Atrial flutter ([address]) 08/15/2023    Gastroesophageal reflux disease without esophagitis 08/15/2023    SIADH (syndrome of inappropriate 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[address]) 2023    Tricuspid valve insufficiency 2023    Prolonged QT interval 2023    Pleural effusion on right 2023    Moderate protein-calorie malnutrition ([address]) 2023    Closed wedge compression fracture of T11 vertebra ([address]) 2023    Age-related osteoporosis with current pathological fracture of vertebra ([address]) 2023    Graves disease 2023    Anemia 2023    Coronary artery disease involving native coronary artery of native heart without angina pectoris 2023    S/P TAVR (transcatheter aortic valve replacement) 2023    Permanent atrial fibrillation ([address]) 2023    Incomplete right bundle branch block 2023    Constipation 2023    Status post insertion of drug eluting coronary artery stent 2023    Acute hypoxic respiratory failure ([address]) 2022    Chronic diastolic (congestive) heart failure ([address]) 2022    Primary generalized (osteo)arthritis 2022    Dyslipidemia 2022    Post-menopause 2022    Generalized weakness 2021    Ganglion cyst 2021    Adhesive capsulitis of right shoulder 2021    Bilateral leg edema 2021    Chronic pain syndrome 2021    Sacroiliitis ([address]) 2021    Gait abnormality 06/01/2021    Chronic bilateral low back pain without sciatica 04/30/2021    Osteopenia of neck of femur 01/21/2021    Impaired fasting glucose     Encounter for long-term (current) use of medications 10/31/2020    Immunization due 10/31/2020    Acute bursitis of right shoulder 10/31/2020    Encounter for Medicare annual wellness exam 01/02/2020    Mid back pain 09/30/2019    Generalized anxiety disorder 09/05/2019    Non-rheumatic tricuspid valve insufficiency 06/04/2019    Lymphadenopathy 05/21/2019    Hiatal hernia 05/21/2019    Thrombocytopenia ([address]) 05/21/2019    Mitral valve insufficiency 05/21/2019    Atrial tachycardia 05/21/2019    Pulmonary hypertension ([address]) 05/21/2019    Diverticulosis 05/21/2019    Essential hypertension 05/18/2019    Hypomagnesemia 05/17/2019    Hypokalemia 05/17/2019    Sepsis due to pneumonia ([address]) 05/17/2019    Hyponatremia 05/17/2019      LOS (days): 6  Geometric Mean LOS (GMLOS) (days): 4.10  Days to GMLOS:-1.9     OBJECTIVE:  Risk of Unplanned Readmission Score: 27.46         Current admission status: Inpatient   Preferred Pharmacy:   [facility] [address] DONYA SOTELO [address] [place] [address] - [address]  [address] [address]  Phone: [phone] Fax: [address] [place], [place] - [address]  [address] [address]  Phone: [phone] Fax: [phone]    Primary Care Provider: Kriss Bryan DO    Primary Insurance: MEDICARE  Secondary Insurance: BLUE CROSS    DISCHARGE DETAILS:    Discharge planning discussed with[de-identified] patient, spouse and son  Freedom of Choice: Yes  Comments - Freedom of Choice: agreeable to blanket referrals but does not want 5th floor  CM contacted family/caregiver?: Yes  Were Treatment Team discharge recommendations reviewed with patient/caregiver?: Yes  Did patient/caregiver verbalize understanding of patient care needs?: Yes  Were patient/caregiver advised of the risks associated with not following Treatment Team discharge recommendations?: Yes    Contacts  Contact Method: In Person  Reason/Outcome: Discharge Planning              Other Referral/Resources/Interventions Provided:  Interventions: Short Term Rehab    Would you like to participate in our [id number] service program?  : No - Declined    Treatment Team Recommendation: Short Term Rehab  Discharge Destination Plan[de-identified] Short Term Rehab   Spouse called with concerns about caring for his wife because he is currently sick and states he is unable to assist her with her care to include making meals. Spoke with pt at bedside and she feels she also needs to go to [facility]. She does not want 5th floor. [place] referrals placed. Will review list with pt when availability is listed. Son also updated via phone.

## 2023-11-22 NOTE — ASSESSMENT & PLAN NOTE
Hemoglobin stabilized around 9  Resume Eliquis  Iron panel suggests likely anemia of chronic disease w some element of RAMIRO

## 2023-11-22 NOTE — CASE MANAGEMENT
Case Management Progress Note    Patient name Gurwinder Mario  Location /537-09 MRN [id number]  : 1935 Date 2023       LOS (days): 6  Geometric Mean LOS (GMLOS) (days): 4.10  Days to GMLOS:-2.1        OBJECTIVE:        Current admission status: Inpatient  Preferred Pharmacy:   [address] [place] [address] - [address]  [address] [address]  Phone: [phone] Fax: [address] [place], [place] - [address]  [address] [facility] [address]  Phone: [phone] Fax: [phone]    Primary Care Provider: Jairo Wagner DO    Primary Insurance: MEDICARE  Secondary Insurance: [facility]. faxed to [phone] as requested by Pooja Sidhu, they are aware of [facility] 7964  time.

## 2023-11-22 NOTE — ASSESSMENT & PLAN NOTE
Lab Results   Component Value Date    EGFR 58 11/22/2023    EGFR 46 11/21/2023    EGFR 39 11/20/2023    CREATININE 0.88 11/22/2023    CREATININE 1.06 11/21/2023    CREATININE 1.22 11/20/2023   Likely ATN 2/2 volume depletion and sepsis   Resolved  Fluid Restriction 1800ml  BMP at day x3 and day x9 from discharge for ROSA pathway

## 2023-11-22 NOTE — PLAN OF CARE
Problem: CARDIOVASCULAR - ADULT  Goal: Maintains optimal cardiac output and hemodynamic stability  Description: INTERVENTIONS:  - Monitor I/O, vital signs and rhythm  - Monitor for S/S and trends of decreased cardiac output  - Administer and titrate ordered vasoactive medications to optimize hemodynamic stability  - Assess quality of pulses, skin color and temperature  - Assess for signs of decreased coronary artery perfusion  - Instruct patient to report change in severity of symptoms  Outcome: Progressing  Goal: Absence of cardiac dysrhythmias or at baseline rhythm  Description: INTERVENTIONS:  - Continuous cardiac monitoring, vital signs, obtain 12 lead EKG if ordered  - Administer antiarrhythmic and heart rate control medications as ordered  - Monitor electrolytes and administer replacement therapy as ordered  Outcome: Progressing     Problem: RESPIRATORY - ADULT  Goal: Achieves optimal ventilation and oxygenation  Description: INTERVENTIONS:  - Assess for changes in respiratory status  - Assess for changes in mentation and behavior  - Position to facilitate oxygenation and minimize respiratory effort  - Oxygen administered by appropriate delivery if ordered  - Initiate smoking cessation education as indicated  - Encourage broncho-pulmonary hygiene including cough, deep breathe, Incentive Spirometry  - Assess the need for suctioning and aspirate as needed  - Assess and instruct to report SOB or any respiratory difficulty  - Respiratory Therapy support as indicated  Outcome: Progressing     Problem: METABOLIC, FLUID AND ELECTROLYTES - ADULT  Goal: Electrolytes maintained within normal limits  Description: INTERVENTIONS:  - Monitor labs and assess patient for signs and symptoms of electrolyte imbalances  - Administer electrolyte replacement as ordered  - Monitor response to electrolyte replacements, including repeat lab results as appropriate  - Instruct patient on fluid and nutrition as appropriate  Outcome: Progressing  Goal: Fluid balance maintained  Description: INTERVENTIONS:  - Monitor labs   - Monitor I/O and WT  - Instruct patient on fluid and nutrition as appropriate  - Assess for signs & symptoms of volume excess or deficit  Outcome: Progressing     Problem: MUSCULOSKELETAL - ADULT  Goal: Maintain or return mobility to safest level of function  Description: INTERVENTIONS:  - Assess patient's ability to carry out ADLs; assess patient's baseline for ADL function and identify physical deficits which impact ability to perform ADLs (bathing, care of mouth/teeth, toileting, grooming, dressing, etc.)  - Assess/evaluate cause of self-care deficits   - Assess range of motion  - Assess patient's mobility  - Assess patient's need for assistive devices and provide as appropriate  - Encourage maximum independence but intervene and supervise when necessary  - Involve family in performance of ADLs  - Assess for home care needs following discharge   - Consider OT consult to assist with ADL evaluation and planning for discharge  - Provide patient education as appropriate  Outcome: Progressing     Problem: Prexisting or High Potential for Compromised Skin Integrity  Goal: Skin integrity is maintained or improved  Description: INTERVENTIONS:  - Identify patients at risk for skin breakdown  - Assess and monitor skin integrity  - Assess and monitor nutrition and hydration status  - Monitor labs   - Assess for incontinence   - Turn and reposition patient  - Assist with mobility/ambulation  - Relieve pressure over bony prominences  - Avoid friction and shearing  - Provide appropriate hygiene as needed including keeping skin clean and dry  - Evaluate need for skin moisturizer/barrier cream  - Collaborate with interdisciplinary team   - Patient/family teaching  - Consider wound care consult   Outcome: Progressing     Problem: PAIN - ADULT  Goal: Verbalizes/displays adequate comfort level or baseline comfort level  Description: Interventions:  - Encourage patient to monitor pain and request assistance  - Assess pain using appropriate pain scale  - Administer analgesics based on type and severity of pain and evaluate response  - Implement non-pharmacological measures as appropriate and evaluate response  - Consider cultural and social influences on pain and pain management  - Notify physician/advanced practitioner if interventions unsuccessful or patient reports new pain  Outcome: Progressing     Problem: INFECTION - ADULT  Goal: Absence or prevention of progression during hospitalization  Description: INTERVENTIONS:  - Assess and monitor for signs and symptoms of infection  - Monitor lab/diagnostic results  - Monitor all insertion sites, i.e. indwelling lines, tubes, and drains  - Monitor endotracheal if appropriate and nasal secretions for changes in amount and color  - [place] appropriate cooling/warming therapies per order  - Administer medications as ordered  - Instruct and encourage patient and family to use good hand hygiene technique  - Identify and instruct in appropriate isolation precautions for identified infection/condition  Outcome: Progressing  Goal: Absence of fever/infection during neutropenic period  Description: INTERVENTIONS:  - Monitor WBC    Outcome: Progressing     Problem: DISCHARGE PLANNING  Goal: Discharge to home or other facility with appropriate resources  Description: INTERVENTIONS:  - Identify barriers to discharge w/patient and caregiver  - Arrange for needed discharge resources and transportation as appropriate  - Identify discharge learning needs (meds, wound care, etc.)  - Arrange for interpretive services to assist at discharge as needed  - Refer to Case Management Department for coordinating discharge planning if the patient needs post-hospital services based on physician/advanced practitioner order or complex needs related to functional status, cognitive ability, or social support system  Outcome: Progressing     Problem: Knowledge Deficit  Goal: Patient/family/caregiver demonstrates understanding of disease process, treatment plan, medications, and discharge instructions  Description: Complete learning assessment and assess knowledge base. Interventions:  - Provide teaching at level of understanding  - Provide teaching via preferred learning methods  Outcome: Progressing     Problem: Nutrition/Hydration-ADULT  Goal: Nutrient/Hydration intake appropriate for improving, restoring or maintaining nutritional needs  Description: Monitor and assess patient's nutrition/hydration status for malnutrition. Collaborate with interdisciplinary team and initiate plan and interventions as ordered. Monitor patient's weight and dietary intake as ordered or per policy. Utilize nutrition screening tool and intervene as necessary. Determine patient's food preferences and provide high-protein, high-caloric foods as appropriate.      INTERVENTIONS:  - Monitor oral intake, urinary output, labs, and treatment plans  - Assess nutrition and hydration status and recommend course of action  - Evaluate amount of meals eaten  - Assist patient with eating if necessary   - Allow adequate time for meals  - Recommend/ encourage appropriate diets, oral nutritional supplements, and vitamin/mineral supplements  - Order, calculate, and assess calorie counts as needed  - Recommend, monitor, and adjust tube feedings and TPN/PPN based on assessed needs  - Assess need for intravenous fluids  - Provide specific nutrition/hydration education as appropriate  - Include patient/family/caregiver in decisions related to nutrition  Outcome: Progressing

## 2023-11-22 NOTE — PLAN OF CARE
Problem: PHYSICAL THERAPY ADULT  Goal: Performs mobility at highest level of function for planned discharge setting. See evaluation for individualized goals. Description:  Outcome: Progressing  Note: Prognosis: Fair  Problem List:  (Decreased strength; Decreased range of motion; Decreased endurance; Impaired balance; Decreased mobility)  Assessment: Pt. seen for PT treatment session this date with interventions consisting of  toilet transfers, transfers and  gait training w/ emphasis on improving pt's ability to ambulate. Pt. Requiring cues for sequence and safety. In comparison to previous session, Pt. With  min improvements in activity tolerance. Pt is in need of continued activity in PT to improve strength balance endurance mobility transfers and ambulation with return to maximize LOF. From PT/mobility standpoint, recommendation at time of d/c would be level II: Mod resource intensity in order to promote return to PLOF and independence. The patient's AM-PAC Basic Mobility Inpatient Short Form Raw Score is 16. A Raw score of less than or equal to 16 suggests the patient may benefit from discharge to post-acute rehabilitation services. Please also refer to physical therapy recommendation for safe DC planning. Rehab Resource Intensity Level, PT: II (Moderate Resource Intensity)    See flowsheet documentation for full assessment.

## 2023-11-22 NOTE — DISCHARGE SUMMARY
[address]  Discharge- Cristal Atwood 1935, 80 y.o. female MRN: [id number]  Unit/Bed#: 459-84 Encounter: [id number]  Primary Care Provider: Tyree Chambers DO   Date and time admitted to hospital: 11/16/2023  8:05 AM    * Acute hypoxic respiratory failure [facility])  Assessment & Plan  Presented w/SOB on room air, requiring up to 8L   No Hx COPD, does have Hx CHF  Weaned to room air until 11/19, has intermittently been placed back on 1-2L but again weaned to room air on 11/22 with spO2 96%    Sepsis due to pneumonia [facility])  Assessment & Plan  Fever, tachycardia, leukocytosis POA  Large multifocal R-sided pneumonia noted on CXR and CT chest wo contrast  Lactic acidosis resolved w IVF  Procal now normalized  Urine antigens negative  Blood cultures negative X2  Speech eval: Change diet texture to dysphagia 3 dental soft  Remains afebrile, tachypneic  Weaned to room air  Complete antibiotic course x7 days, was on Cefepime and at discharge switch to Cefdinir to complete through 11/23      Acute-on-chronic kidney injury   Assessment & Plan  Lab Results   Component Value Date    EGFR 58 11/22/2023    EGFR 46 11/21/2023    EGFR 39 11/20/2023    CREATININE 0.88 11/22/2023    CREATININE 1.06 11/21/2023    CREATININE 1.22 11/20/2023   Likely ATN 2/2 volume depletion and sepsis   Resolved  Fluid Restriction 1800ml  BMP at day x3 and day x9 from discharge for ROSA pathway    Atrial flutter (HCC)  Assessment & Plan  Resumed metoprolol and stopped prior Cardizem, which she was taking 30mg short acting BID - HR reasonably controlled on BB alone here and question benefit of BID dosed short acing Cardizem  Resume Eliquis    Anemia  Assessment & Plan  Hemoglobin stabilized around 9  Resume Eliquis  Iron panel suggests likely anemia of chronic disease w some element of RAMIRO    Chronic diastolic (congestive) heart failure (HCC)  Assessment & Plan  Wt Readings from Last 3 Encounters:   11/22/23 52.7 kg (116 lb 2.9 oz) 11/01/23 48.1 kg (106 lb)   10/03/23 47.1 kg (103 lb 12.8 oz)   Weaned to room air  Home dose lasix was restarted    Generalized anxiety disorder  Assessment & Plan  C/w PTA meds    Thrombocytopenia (HCC)  Assessment & Plan  Platelets decreased, likely in the setting of acute infection  Platelets stabilized    Hyponatremia  Assessment & Plan  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 per chart review  Monitor, replete prn    Hypotension-resolved as of 11/22/2023  Assessment & Plan  resolved        Medical Problems       Resolved Problems  Date Reviewed: 11/22/2023            Resolved    Hypotension 11/22/2023     Resolved by  Aleyda Wilde DO        Discharging Physician / Practitioner: Aleyda Wilde DO  PCP: Miriam Sanchez DO  Admission Date:   Admission Orders (From admission, onward)       Ordered        11/16/23 1030  INPATIENT ADMISSION  Once                          Discharge Date: 11/22/23    Consultations During Hospital Stay:  Nephrology, Pulmonology    Procedures Performed:   none    Significant Findings / Test Results:   CT chest without contrast   Final Result by Tk Olivares MD (11/16 0394)         1. Multifocal pneumonia in the right lung with reactive adenopathy. Follow-up to ensure complete resolution recommended. 2. Moderate right pleural effusion with associated compressive atelectasis, mildly improved since 6/11/2023. Trace left pleural effusion. 3. Mild groundglass opacities in the left upper lobe. The study was marked in EPIC for significant notification. [facility] [place] Resident: Joycelyn Johns, the attending radiologist, have reviewed the images and agree with the final report above. Workstation performed: [id number]         XR chest portable   ED Interpretation by Shelton Aguilar MD (15/16 0385)   Dense infiltrate in right lung, concerning for pneumonia. Formal read pending.       Final Result by Tk Olivares MD (11/16 0146)      Development of large infiltrate in the right lung with interval decrease in size of right effusion. Postobstructive pneumonia cannot be excluded. Consider short interval follow-up with chest x-ray or evaluation with CT chest with contrast.   Study was marked in Epic for follow-up notification. Resident: Sweetie Ellis, the attending radiologist, have reviewed the images and agree with the final report above. Workstation performed: [id number]           Lab Results   Component Value Date    WBC 5.67 11/22/2023    HGB 8.6 (L) 11/22/2023    HCT 27.3 (L) 11/22/2023    MCV 90 11/22/2023     (L) 11/22/2023     Lab Results   Component Value Date    SODIUM 139 11/22/2023    K 3.5 11/22/2023     (H) 11/22/2023    CO2 19 (L) 11/22/2023    BUN 35 (H) 11/22/2023    CREATININE 0.88 11/22/2023    GLUC 87 11/22/2023    CALCIUM 8.4 11/22/2023         Incidental Findings:   See above   I reviewed the above mentioned incidental findings with the patient and/or family and they expressed understanding. Test Results Pending at Discharge (will require follow up):   none     Outpatient Tests Requested:  BMP at x3 and x9 days from discharge    Complications:  none    Reason for Admission: Multifocal pneumonia and hypoxic respiratory failure    Hospital Course:   Lauri Virk is a 80 y.o. female patient who originally presented to the hospital on 11/16/2023 due to multifocal pneumonia and hypoxic respiratory failure. Patient was started on empiric antibiotics as well as respiratory support with airway clearance. Patient was evaluated by speech therapy, no dysphagia however eventually was adjusted to a dental soft diet based on loss of her dentures which unfortunately broke. She clinically improved with empiric antibiotics and eventually was weaned off of supplemental oxygen, she did intermittently need 1 to 2 L but was stable on room air prior to discharge.   Patient was evaluated by physical therapy and recommended for rehab, patient initially improved, however after further discussion with her family she often decided to agree to rehab and patient was medically stable for discharge to rehab. Please see above list of diagnoses and related plan for additional information. Condition at Discharge: good    Discharge Day Visit / Exam:   Subjective: Patient feels generally weak, she also seems very down about not being able to go home, and missing Thanksgiving but otherwise has no focal complaints other than just not feeling well  Vitals: Blood Pressure: 128/74 (11/22/23 1545)  Pulse: 93 (11/22/23 1545)  Temperature: (!) 96.9 °F (36.1 °C) (11/22/23 1407)  Temp Source: Oral (11/21/23 0650)  Respirations: 19 (11/22/23 1407)  Height: 4' 10" (147.3 cm) (11/16/23 1139)  Weight - Scale: 52.7 kg (116 lb 2.9 oz) (11/22/23 0544)  SpO2: 90 % (11/22/23 1545)  Exam:   Physical Exam  Vitals and nursing note reviewed. Constitutional:       General: She is not in acute distress. Appearance: She is well-developed. HENT:      Head: Normocephalic and atraumatic. Eyes:      Conjunctiva/sclera: Conjunctivae normal.   Cardiovascular:      Rate and Rhythm: Normal rate and regular rhythm. Heart sounds: No murmur heard. Pulmonary:      Effort: Pulmonary effort is normal. No respiratory distress. Breath sounds: Normal breath sounds. Comments: Diminished breath sounds in the right lower lobe  Abdominal:      Palpations: Abdomen is soft. Tenderness: There is no abdominal tenderness. Musculoskeletal:      Cervical back: Neck supple. Comments: Trace lower extremity edema bilaterally   Skin:     General: Skin is warm and dry. Capillary Refill: Capillary refill takes less than 2 seconds. Neurological:      Mental Status: She is alert.    Psychiatric:         Mood and Affect: Mood normal.          Discharge instructions/Information to patient and family:   See after visit summary for information provided to patient and family. Provisions for Follow-Up Care:  See after visit summary for information related to follow-up care and any pertinent home health orders. Mobility at time of Discharge:   Basic Mobility Inpatient Raw Score: 18  JH-HLM Goal: 6: Walk 10 steps or more  JH-HLM Achieved: 7: Walk 25 feet or more  HLM Goal achieved. Continue to encourage appropriate mobility. Disposition:   Acute Rehab at \[facility]\"" Readmission: no     Discharge Statement:  I spent 42 minutes discharging the patient. This time was spent on the day of discharge. I had direct contact with the patient on the day of discharge. Greater than 50% of the total time was spent examining patient, answering all patient questions, arranging and discussing plan of care with patient as well as directly providing post-discharge instructions. Additional time then spent on discharge activities. Discharge Medications:  See after visit summary for reconciled discharge medications provided to patient and/or family.       **Please Note: This note may have been constructed using a voice recognition system**

## 2023-11-22 NOTE — SPEECH THERAPY NOTE
Speech Language/Pathology  Speech-Language Pathology Progress Note    Patient Name: Lucy To  ANQBM'G Date: 11/22/2023    Subjective:  Pt was awake and alert. She was sitting up in chair at bedside. Patient stated "I have no appetite". Objective:  Pt was seen today for dysphagia therapy. Current diet is dysphagia 3 dental soft with thin liquids. Pt was on 1L O2 via nasal cannula. Oral care had already been completed. Focus of today's session was to maximize PO intake safety. Textures offered today included puree, mech soft soft solid, regular solid, and thin liquid via straw. Swallow function:   Bolus retrieval and control were adequate. Mastication, Breakdown, Formation, and AP transfer were prolonged and delayed. Pharyngeal swallow initiation was [facility]. Hyolaryngeal excursion was [facility]. No s/s aspiration occurred during session today. SLP provided mod cueing/feedback throughout session to become aware of difference in consistencies through trials for determining least restrictive diet. Pt was responsive to cueing and benefited from frequent reminders throughout session. Assessment:  Swallow function is declining with current diet. Diet texture downgrade is recommended at this time. Secondary to broken lower dentures, downgrade in diet consistency may improve p.o. intake. Plan:  Continue dysphagia 3 dental soft with added ground meat. Continue ST follow up. Subsequent sessions to focus on maximizing PO intake safety.

## 2023-11-22 NOTE — PHYSICAL THERAPY NOTE
PHYSICAL THERAPY NOTE          Patient Name: Papito Pak  VVFJI'G Date: 11/22/2023 11/22/23 1105   PT Last Visit   PT Visit Date 11/22/23   Note Type   Note Type Treatment   Pain Assessment   Pain Assessment Tool 0-10   Pain Score No Pain   Restrictions/Precautions   Weight Bearing Precautions Per Order No   Other Precautions   (Chair Alarm; Telemetry; Multiple lines; O2; Fall Risk; Hard of hearing)   General   Chart Reviewed Yes   Response to Previous Treatment Patient reporting fatigue but able to participate. Family/Caregiver Present No   Cognition   Overall Cognitive Status Impaired   Arousal/Participation Alert   Following Commands Follows one step commands without difficulty   Subjective   Subjective c/o fatigue and weakness. Needs to go to the bathroom. Bed Mobility   Additional Comments OOB in chair start/end PT session   Transfers   Sit to Stand 4  Minimal assistance   Additional items Assist x 2;Armrests; Increased time required;Verbal cues   Stand to Sit 4  Minimal assistance   Additional items Assist x 2;Armrests; Increased time required;Verbal cues   Stand pivot 4  Minimal assistance   Additional items Assist x 1;Assist x 2; Increased time required;Verbal cues   Toilet transfer 4  Minimal assistance   Additional items Assist x 2; Increased time required;Commode   Additional Comments RW used. Stood with fair- balance for cbrief change and hygiene   Ambulation/Elevation   Gait pattern Excessively slow; Short stride; Foward flexed;Decreased foot clearance; Improper Weight shift   Gait Assistance 4  Minimal assist   Additional items Assist x 1;Verbal cues   Assistive Device Rolling walker   Distance 3' x 1, 35' x 1   Balance   Static Sitting Fair +   Dynamic Sitting Fair   Static Standing Fair -   Dynamic Standing Fair -   Ambulatory Fair -  (RW)   Endurance Deficit   Endurance Deficit Yes   Activity Tolerance   Activity Tolerance Patient limited by fatigue   Assessment   Prognosis Fair   Problem List   (Decreased strength; Decreased range of motion; Decreased endurance; Impaired balance; Decreased mobility)   Assessment Pt. seen for PT treatment session this date with interventions consisting of  toilet transfers, transfers and  gait training w/ emphasis on improving pt's ability to ambulate. Pt. Requiring cues for sequence and safety. In comparison to previous session, Pt. With  min improvements in activity tolerance. Pt is in need of continued activity in PT to improve strength balance endurance mobility transfers and ambulation with return to maximize LOF. From PT/mobility standpoint, recommendation at time of d/c would be level II: Mod resource intensity in order to promote return to PLOF and independence. The patient's AM-PAC Basic Mobility Inpatient Short Form Raw Score is 16. A Raw score of less than or equal to 16 suggests the patient may benefit from discharge to post-acute rehabilitation services. Please also refer to physical therapy recommendation for safe DC planning. Goals   LTG Expiration Date 12/02/23   PT Treatment Day 3   Plan   Treatment/Interventions Functional transfer training;LE strengthening/ROM; Elevations; Therapeutic exercise; Endurance training;Bed mobility;Gait training   Progress Slow progress, decreased activity tolerance   PT Frequency 3-5x/wk   Discharge Recommendation   Rehab Resource Intensity Level, PT II (Moderate Resource Intensity)   AM-PAC Basic Mobility Inpatient   Turning in Flat Bed Without Bedrails 3   Lying on Back to Sitting on Edge of Flat Bed Without Bedrails 2   Moving Bed to Chair 3   Standing Up From Chair Using Arms 3   Walk in Room 3   Climb 3-5 Stairs With Railing 2   Basic Mobility Inpatient Raw Score 16   Basic Mobility Standardized Score 38.32   Highest Level Of Mobility   JH-HLM Goal 5: Stand one or more mins   JH-HLM Achieved 7: Walk 25 feet or more   Education Education Provided Mobility training   Patient Demonstrates verbal understanding;Reinforcement needed   End of Consult   Patient Position at End of Consult Bedside chair;Bed/Chair alarm activated; All needs within reach   End of Consult Comments discussed POC with PT

## 2023-11-22 NOTE — CASE MANAGEMENT
Case Management Discharge Planning Note    Patient name Jose G Palmer  Location [address] [facility] [address] [id number] MRN [id number]  : 1935 Date 2023       Current Admission Date: 2023  Current Admission Diagnosis:Acute hypoxic respiratory failure [facility])   Patient Active Problem List    Diagnosis Date Noted    Hypotension 2023    Acute-on-chronic kidney injury  2023    Encounter for immunization 10/03/2023    S/P angioplasty with stent 10/03/2023    Centromere antibody positive 2023    Vitamin D deficiency 08/15/2023    Atrial flutter ([address]) 08/15/2023    Gastroesophageal reflux disease without esophagitis 08/15/2023    SIADH (syndrome of inappropriate 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[address]) 2023    Tricuspid valve insufficiency 2023    Prolonged QT interval 2023    Pleural effusion on right 2023    Moderate protein-calorie malnutrition ([address]) 2023    Closed wedge compression fracture of T11 vertebra ([address]) 2023    Age-related osteoporosis with current pathological fracture of vertebra ([address]) 2023    Graves disease 2023    Anemia 2023    Coronary artery disease involving native coronary artery of native heart without angina pectoris 2023    S/P TAVR (transcatheter aortic valve replacement) 2023    Permanent atrial fibrillation ([address]) 2023    Incomplete right bundle branch block 2023    Constipation 2023    Status post insertion of drug eluting coronary artery stent 2023    Acute hypoxic respiratory failure ([address]) 2022    Chronic diastolic (congestive) heart failure ([address]) 2022    Primary generalized (osteo)arthritis 2022    Dyslipidemia 2022    Post-menopause 2022    Generalized weakness 2021    Ganglion cyst 2021    Adhesive capsulitis of right shoulder 2021    Bilateral leg edema 2021    Chronic pain syndrome 2021    Sacroiliitis ([address]) 2021    Gait abnormality 06/01/2021    Chronic bilateral low back pain without sciatica 04/30/2021    Osteopenia of neck of femur 01/21/2021    Impaired fasting glucose     Encounter for long-term (current) use of medications 10/31/2020    Immunization due 10/31/2020    Acute bursitis of right shoulder 10/31/2020    Encounter for Medicare annual wellness exam 01/02/2020    Mid back pain 09/30/2019    Generalized anxiety disorder 09/05/2019    Non-rheumatic tricuspid valve insufficiency 06/04/2019    Lymphadenopathy 05/21/2019    Hiatal hernia 05/21/2019    Thrombocytopenia ([address]) 05/21/2019    Mitral valve insufficiency 05/21/2019    Atrial tachycardia 05/21/2019    Pulmonary hypertension ([address]) 05/21/2019    Diverticulosis 05/21/2019    Essential hypertension 05/18/2019    Hypomagnesemia 05/17/2019    Hypokalemia 05/17/2019    Sepsis due to pneumonia ([address]) 05/17/2019    Hyponatremia 05/17/2019      LOS (days): 6  Geometric Mean LOS (GMLOS) (days): 4.10  Days to GMLOS:-2     OBJECTIVE:  Risk of Unplanned Readmission Score: 27.52         Current admission status: Inpatient   Preferred Pharmacy:   [facility] [address] DONYA SOTELO [address] [place], [place] - [address]  [address] [address]  Phone: [phone] Fax: [address] - [address]  [address] [address]  Phone: [phone] Fax: [phone]    Primary Care Provider: Ariadan Hinojosa DO    Primary Insurance: MEDICARE  Secondary Insurance: BLUE CROSS    DISCHARGE DETAILS:    Discharge planning discussed with[de-identified] patient and spouse at bedside  Freedom of Choice: Yes  Comments - Freedom of Choice: reviewed choice list, [facility]  CM contacted family/caregiver?: Yes  Were Treatment Team discharge recommendations reviewed with patient/caregiver?: Yes  Did patient/caregiver verbalize understanding of patient care needs?: Yes  Were patient/caregiver advised of the risks associated with not following Treatment Team discharge recommendations?: Yes    Contacts  Contact Method: In Person  Reason/Outcome: Discharge Planning       Transport at Discharge : [facility]  Dispatcher Contacted: Yes  Number/Name of Dispatcher: via roundtrip  Transported by [employer] and Unit #): pal [facility]  ETA of Transport (Date): 11/22/23  ETA of Transport (Time): 5221   Pt discharging to [facility] and rehab today. Son, pt and spouse updated on same. Nurse provided with report number and medical necessity form.

## 2023-11-22 NOTE — ASSESSMENT & PLAN NOTE
Resumed metoprolol and stopped prior Cardizem, which she was taking 30mg short acting BID - HR reasonably controlled on BB alone here and question benefit of BID dosed short acing Cardizem  Resume Eliquis

## 2023-11-22 NOTE — PROGRESS NOTES
Progress Note - Nephrology   Maci Amaya 80 y.o. female MRN: [id number]  Unit/Bed#: 408-48 Encounter: [id number]    A/P:  1. Acute kidney injury on top of chronic kidney disease              Patient is at baseline creatinine, continue to optimize care. 2.  Chronic disease stage III with baseline creatinine between 0.8-1.1 mg/dL  3. Acidosis              Remains stable at 19 mmol/L, continue to monitor for now  4. Chronic hyponatremia              Serum sodium level stable, simply monitor for now. 5.  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 6.  Hypomagnesemia              Magnesium levels have been appropriate, continue to monitor and supplement as indicated. 7.  Chronic diastolic congestive heart failure              Patient is currently volume overloaded, now in the setting post volume expansion with sepsis. Continue diuresis, more aggressive diuresis as indicated needed. Supplemental potassium will be indicated, 10 mEq potassium chloride was initiated. 8.  Severe sepsis in the setting of community-acquired pneumonia              Significantly improved at this time, continue with care according to our hospitalist and pulmonary colleagues. Patient was discussed with hospitalist, I agree that she is appropriate from the renal standpoint for discharge. May continue to diurese in the outpatient setting, follow labs from time to time as well as overall clinical status. Adjustments to diuretics and potassium supplementation as indicated.     Follow up reason for today's visit: Acute kidney injury/chronic kidney disease/electrolyte disorders    Acute hypoxic respiratory failure [facility])    Patient Active Problem List   Diagnosis    Hypomagnesemia    Hypokalemia    Sepsis due to pneumonia ([address])    Hyponatremia    Essential hypertension    Lymphadenopathy    Hiatal hernia    Thrombocytopenia (HCC)    Mitral valve insufficiency Atrial tachycardia    Pulmonary hypertension (HCC)    Diverticulosis    Non-rheumatic tricuspid valve insufficiency    Generalized anxiety disorder    Mid back pain    Encounter for Medicare annual wellness exam    Encounter for long-term (current) use of medications    Immunization due    Acute bursitis of right shoulder    Impaired fasting glucose    Osteopenia of neck of femur    Chronic bilateral low back pain without sciatica    Gait abnormality    Chronic pain syndrome    Sacroiliitis (HCC)    Bilateral leg edema    Ganglion cyst    Adhesive capsulitis of right shoulder    Generalized weakness    Dyslipidemia    Post-menopause    Primary generalized (osteo)arthritis    Chronic diastolic (congestive) heart failure (HCC)    Acute hypoxic respiratory failure (HCC)    Constipation    Status post insertion of drug eluting coronary artery stent    S/P TAVR (transcatheter aortic valve replacement)    Permanent atrial fibrillation (HCC)    Incomplete right bundle branch block    Coronary artery disease involving native coronary artery of native heart without angina pectoris    Anemia    Graves disease    Closed wedge compression fracture of T11 vertebra (HCC)    Age-related osteoporosis with current pathological fracture of vertebra (HCC)    Moderate protein-calorie malnutrition (HCC)    Pleural effusion on right    Tricuspid valve insufficiency    Prolonged QT interval    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  rate 22, height 4' 10" (1.473 m), weight 52.7 kg (116 lb 2.9 oz), SpO2 95 %. ,Body mass index is 24.28 kg/m². Weight (last 2 days)       Date/Time Weight    11/22/23 0544 52.7 (116.18)    11/21/23 0600 50.9 (112.21)    11/20/23 0600 50.9 (112.21)              Intake/Output Summary (Last 24 hours) at 11/22/2023 0829  Last data filed at 11/21/2023 1711  Gross per 24 hour   Intake 240 ml   Output 100 ml   Net 140 ml     I/O last 3 completed shifts: In: 240 [P.O.:240]  Out: 100 [Urine:100]         Physical Exam: /74   Pulse 91   Temp (!) 97.1 °F (36.2 °C)   Resp 22   Ht 4' 10" (1.473 m)   Wt 52.7 kg (116 lb 2.9 oz)   SpO2 95%   BMI 24.28 kg/m²     General Appearance:    Alert, cooperative, no distress, appears stated age   Head:    Normocephalic, without obvious abnormality, atraumatic   Eyes:    Conjunctiva/corneas clear   Ears:    Normal external ears   Nose:   Nares normal, septum midline, mucosa normal, no drainage    or sinus tenderness   Throat:   Lips, mucosa, and tongue normal; teeth and gums normal   Neck:   Supple   Back:     Symmetric, no curvature, ROM normal, no CVA tenderness   Lungs:     Clear to auscultation bilaterally, respirations unlabored   Chest wall:    No tenderness or deformity   Heart:    Regular rate and rhythm, S1 and S2 normal, no murmur, rub   or gallop   Abdomen:     Soft, non-tender, bowel sounds active   Extremities:   Extremities normal, atraumatic, no cyanosis, +2 bilateral lower extremity edema up to the presacral region   Skin:   Skin color, texture, turgor normal, no rashes or lesions   Lymph nodes:   Cervical normal   Neurologic:   CNII-XII intact            Lab, Imaging and other studies: I have personally reviewed pertinent labs.   CBC:   Lab Results   Component Value Date    WBC 5.67 11/22/2023    HGB 8.6 (L) 11/22/2023    HCT 27.3 (L) 11/22/2023    MCV 90 11/22/2023     (L) 11/22/2023    RBC 3.05 (L) 11/22/2023    MCH 28.2 11/22/2023    MCHC 31.5 11/22/2023    RDW 18.2 (H) 11/22/2023    MPV 9.9 11/22/2023 CMP:   Lab Results   Component Value Date    K 3.5 11/22/2023     (H) 11/22/2023    CO2 19 (L) 11/22/2023    BUN 35 (H) 11/22/2023    CREATININE 0.88 11/22/2023    CALCIUM 8.4 11/22/2023    EGFR 58 11/22/2023       . Results from last 7 days   Lab Units 11/22/23  0444 11/21/23  0506 11/20/23  0507 11/18/23  0643 11/17/23  0503 11/16/23  0840   POTASSIUM mmol/L 3.5 3.8 3.8   < > 3.7 4.5   CHLORIDE mmol/L 109* 109* 108   < > 101 98   CO2 mmol/L 19* 19* 17*   < > 23 24   BUN mg/dL 35* 38* 44*   < > 33* 28*   CREATININE mg/dL 0.88 1.06 1.22   < > 1.40* 0.98   CALCIUM mg/dL 8.4 9.0 8.6   < > 9.1 10.0   ALK PHOS U/L  --   --  62  --  60 93   ALT U/L  --   --  24  --  17 19   AST U/L  --   --  20  --  23 44*    < > = values in this interval not displayed. Phosphorus: No results found for: "PHOS"  Magnesium: No results found for: "MG"  Urinalysis: No results found for: "COLORU", "CLARITYU", "Milbert Marybeth", "PHUR", "LEUKOCYTESUR", "NITRITE", "[place]", "GLUCOSEU", "[place]", "[place]", "BLOODU"  Ionized Calcium: No results found for: "CAION"  Coagulation: No results found for: "PT", "INR", "APTT"  Troponin: No results found for: "TROPONINI"  ABG: No results found for: "PHART", "FEB7LIP", "PO2ART", "LET1MEF", "M1XVNEHH", "BEART", "SOURCE"  Radiology review:     IMAGING  No results found.     Current Facility-Administered Medications   Medication Dose Route Frequency    acetaminophen (TYLENOL) tablet 650 mg  650 mg Oral Q6H PRN    ALPRAZolam (XANAX) tablet 0.25 mg  0.25 mg Oral TID PRN    atorvastatin (LIPITOR) tablet 20 mg  20 mg Oral Daily With Dinner    cefepime (MAXIPIME) IVPB (premix in dextrose) 1,000 mg 50 mL  1,000 mg Intravenous Q24H    clopidogrel (PLAVIX) tablet 75 mg  75 mg Oral Daily    furosemide (LASIX) tablet 20 mg  20 mg Oral Daily    guaiFENesin (MUCINEX) 12 hr tablet 600 mg  600 mg Oral Q12H NII    levalbuterol (XOPENEX) inhalation solution 1.25 mg  1.25 mg Nebulization TID    metoprolol tartrate (LOPRESSOR) tablet 25 mg  25 mg Oral Q12H NII    pantoprazole (PROTONIX) EC tablet 20 mg  20 mg Oral Early Morning    polyethylene glycol (MIRALAX) packet 17 g  17 g Oral Daily PRN    potassium chloride (K-DUR,KLOR-CON) CR tablet 10 mEq  10 mEq Oral Daily    saccharomyces boulardii (FLORASTOR) capsule 250 mg  250 mg Oral BID     Medications Discontinued During This Encounter   Medication Reason    Nutritional Supplement LIQD 1 Can     metroNIDAZOLE (FLAGYL) IVPB (premix) 500 mg 100 mL     diltiazem (CARDIZEM) tablet 30 mg     metoprolol succinate (TOPROL-XL) 24 hr tablet 50 mg     acetaminophen (TYLENOL) tablet 650 mg     metoprolol succinate (TOPROL-XL) 24 hr tablet 50 mg     diltiazem (CARDIZEM) tablet 30 mg     metoprolol tartrate (LOPRESSOR) tablet 25 mg     diltiazem (CARDIZEM) tablet 30 mg     diltiazem (CARDIZEM) tablet 30 mg     metoprolol tartrate (LOPRESSOR) tablet 25 mg     apixaban (ELIQUIS) tablet 2.5 mg     furosemide (LASIX) tablet 20 mg     magnesium sulfate 40 g/1000 mL infusion (premix)     sodium chloride 0.9 % infusion     azithromycin (ZITHROMAX) 500 mg in sodium chloride 0.9 % 250 mL IVPB     atorvastatin (LIPITOR) tablet 20 mg     cholecalciferol (VITAMIN D3) tablet 2,000 Units     clopidogrel (PLAVIX) tablet 75 mg     pantoprazole (PROTONIX) EC tablet 20 mg     potassium chloride (K-DUR,KLOR-CON) CR tablet 10 mEq     guaiFENesin (MUCINEX) 12 hr tablet 600 mg     cefepime (MAXIPIME) IVPB (premix in dextrose) 1,000 mg 50 mL     levalbuterol (XOPENEX) inhalation solution 1.25 mg     metoprolol tartrate (LOPRESSOR) tablet 25 mg     saccharomyces boulardii (FLORASTOR) capsule 250 mg     acetaminophen (TYLENOL) tablet 975 mg     acetaminophen (TYLENOL) tablet 975 mg     ALPRAZolam (XANAX) tablet 0.25 mg     polyethylene glycol (MIRALAX) packet 17 g     furosemide (LASIX) injection 40 mg        Luis Carlos Hallman, DO      This progress note was produced in part using a dictation device which may document imprecise wording from author's original intent.

## 2023-11-22 NOTE — PROGRESS NOTES
Patient:  Maci Amaya    MRN:  [id number]    Aidin Request ID:  [id number]    Level of care reserved:  [address]    Partner Reserved:  [address] [facility], [address] [place] [address] Road [phone]    Clinical needs requested:    Geography searched:  20 miles around [phone]    Start of Service:    Request sent:  11:14am EST on 11/22/2023 by Raymond Marks    Partner reserved:  12:42pm EST on 11/22/2023 by Raymond Marks    Choice list shared:  12:24pm EST on 11/22/2023 by Raymond Marks

## 2023-11-22 NOTE — PLAN OF CARE
Problem: OCCUPATIONAL THERAPY ADULT  Goal: Performs self-care activities at highest level of function for planned discharge setting. See evaluation for individualized goals. Description: Treatment Interventions: ADL retraining, Functional transfer training, UE strengthening/ROM, Endurance training, Patient/family training, Cognitive reorientation, Equipment evaluation/education, Activityengagement          See flowsheet documentation for full assessment, interventions and recommendations. Outcome: Progressing  Note: Limitation: Decreased ADL status, Decreased UE strength, Decreased Safe judgement during ADL, Decreased endurance, Decreased cognition, Decreased self-care trans, Decreased high-level ADLs     Assessment: Pt seen today for OT treatment to assess OOB tolerance, strength, transfer status and ADL participation. Pt was able to complete functional mobility to and from the [facility] and sink to perform toileting tasks with min A overall and consistent cues. SpO2 ranging from 80-93% during activity and often fluctuating. Anxious during movement but easily reassured when seated and resting. Pt's  present during session to provide further insight as far as PLOF and CG abilities. Pt presents with deficits in the following categories: limited home support, difficulty performing ADLS, and difficulty performing IADLS  activity tolerance, endurance, standing balance/tolerance, UE strength, and safety . These deficits, along with pt's  pain and strength limit the pt's ability to safely engage in areas of occupation such as: bathing/shower, toilet hygiene, dressing, functional mobility, cleaning, meal prep, and household maintenance. Pt would benefit from continued skilled acute OT in order to maximize independence and safety in ADLs, mobility, and transfers. Pt's raw score on the AM-PAC Daily Activity inpatient short form is 18, standardized score is 38.66, less than 39.4.  DC recommendation is level II (moderate resource intensity) however, please refer to therapist recommendation for safe DC planning. OT will work towards the established goals.      Rehab Resource Intensity Level, OT: II (Moderate Resource Intensity)

## 2023-11-24 ENCOUNTER — PATIENT OUTREACH (OUTPATIENT)
Dept: CASE MANAGEMENT | Facility: OTHER | Age: 88
End: 2023-11-24

## 2023-11-24 ENCOUNTER — TRANSITIONAL CARE MANAGEMENT (OUTPATIENT)
Dept: FAMILY MEDICINE CLINIC | Facility: CLINIC | Age: 88
End: 2023-11-24

## 2023-11-24 NOTE — PROGRESS NOTES
Outpatient Care Management ROSA/SNF Pathway. Discharged 11/22/23 to [facility] . SNF/STR Surveillance 11/24/23. Confirmed with [facility] patient is currently admitted. Admin Coordinator will continue to monitor via chart review.

## 2023-11-28 ENCOUNTER — PATIENT OUTREACH (OUTPATIENT)
Dept: CASE MANAGEMENT | Facility: OTHER | Age: 88
End: 2023-11-28

## 2023-11-28 NOTE — PROGRESS NOTES
Chart review complete Update obtained from [facility]). The patient is currently admitted to the SNF and is receiving skilled therapies No LCD at this time. This Admin Coordinator will continue to monitor via chart review.

## 2023-12-05 ENCOUNTER — PATIENT OUTREACH (OUTPATIENT)
Dept: CASE MANAGEMENT | Facility: OTHER | Age: 88
End: 2023-12-05

## 2023-12-05 ENCOUNTER — RA CDI HCC (OUTPATIENT)
Dept: OTHER | Facility: HOSPITAL | Age: 88
End: 2023-12-05

## 2023-12-05 NOTE — PROGRESS NOTES
Chart review complete Update obtained from [facility]). The patient is currently admitted to the SNF and is receiving skilled therapies No LCD at this time. This Admin Coordinator will continue to monitor via chart review.

## 2023-12-12 ENCOUNTER — PATIENT OUTREACH (OUTPATIENT)
Dept: CASE MANAGEMENT | Facility: OTHER | Age: 88
End: 2023-12-12

## 2023-12-12 NOTE — PROGRESS NOTES
Chart review complete Update obtained from [facility]). The patient is currently admitted to the SNF and is receiving skilled therapies No LCD at this time. This Admin Coordinator will continue to monitor via chart review.

## 2023-12-15 ENCOUNTER — PATIENT OUTREACH (OUTPATIENT)
Dept: CASE MANAGEMENT | Facility: OTHER | Age: 88
End: 2023-12-15

## 2023-12-15 DIAGNOSIS — N18.9 ACUTE RENAL FAILURE SUPERIMPOSED ON CHRONIC KIDNEY DISEASE, UNSPECIFIED ACUTE RENAL FAILURE TYPE, UNSPECIFIED CKD STAGE: Primary | ICD-10-CM

## 2023-12-15 DIAGNOSIS — N17.9 ACUTE RENAL FAILURE SUPERIMPOSED ON CHRONIC KIDNEY DISEASE, UNSPECIFIED ACUTE RENAL FAILURE TYPE, UNSPECIFIED CKD STAGE: Primary | ICD-10-CM

## 2023-12-15 NOTE — PROGRESS NOTES
[facility] update received the patient discharged today 12/15/23 to home with [facility] at home [address] . I have removed myself off of the care team, added the CM to the care team who will follow the patient through the episode, sent the care manager an inbasket notifying them of the ROSA/SNF Pathway. Ambulatory referral placed for complex care management. A email was sent to the facility requesting discharge instructions. When Harshil Blair has received the Discharge paperwork  Admin Coordinator will attach to this encounter.

## 2023-12-18 ENCOUNTER — PATIENT OUTREACH (OUTPATIENT)
Dept: CASE MANAGEMENT | Facility: OTHER | Age: 88
End: 2023-12-18

## 2023-12-18 DIAGNOSIS — N17.9 ACUTE RENAL FAILURE SUPERIMPOSED ON CHRONIC KIDNEY DISEASE, UNSPECIFIED ACUTE RENAL FAILURE TYPE, UNSPECIFIED CKD STAGE: Primary | ICD-10-CM

## 2023-12-18 DIAGNOSIS — N18.9 ACUTE RENAL FAILURE SUPERIMPOSED ON CHRONIC KIDNEY DISEASE, UNSPECIFIED ACUTE RENAL FAILURE TYPE, UNSPECIFIED CKD STAGE: Primary | ICD-10-CM

## 2023-12-18 NOTE — PROGRESS NOTES
Outpatient Care Manager note:  patient identified for SNF/ROSA Pathway.  Chart reviewed.  Patient discharged from [facility] on 12/15/2023.    ROSA pathway interventions reviewed with patient and spouse:    PCP visit in one week (scheduled for 12/21/2023)    Avoid NSAIDS.  Mariela states she only uses tylenol.    Adequate nutrition and hydration.  She also drinks ENSURE once a day.    Keeping BP normal for patient (118/59/68 today, O2 sat on room air is 94%)    Temperature remains normal.    Discharge weight from [facility] was 106.1, today's weight is 107.2.  They are not sure whether her edema has increased and I advised them to have the home health nurse show them how to check.      Medication review done with Sloan (spouse) who handles medications.      BMP ordered and PCP notified.      Sloan  gave me the correct phone number for the [facility] nurse that will be coming to see Mariela today. I spoke with April and she said that the BMP could be drawn on Friday as ordered and I will fax information.      They also have an aide come in to help with housework and meals.

## 2023-12-18 NOTE — PROGRESS NOTES
Call to [facility], [facility] and [facility] to ascertain which agency will be doing home visits.  Neither agency has patient scheduled.

## 2023-12-21 ENCOUNTER — OFFICE VISIT (OUTPATIENT)
Dept: FAMILY MEDICINE CLINIC | Facility: CLINIC | Age: 88
End: 2023-12-21
Payer: MEDICARE

## 2023-12-21 VITALS
HEART RATE: 69 BPM | DIASTOLIC BLOOD PRESSURE: 72 MMHG | WEIGHT: 99.4 LBS | OXYGEN SATURATION: 96 % | HEIGHT: 58 IN | SYSTOLIC BLOOD PRESSURE: 120 MMHG | TEMPERATURE: 96.8 F | BODY MASS INDEX: 20.87 KG/M2

## 2023-12-21 DIAGNOSIS — F41.1 GENERALIZED ANXIETY DISORDER: ICD-10-CM

## 2023-12-21 DIAGNOSIS — N39.0 URINARY TRACT INFECTION WITHOUT HEMATURIA, SITE UNSPECIFIED: ICD-10-CM

## 2023-12-21 DIAGNOSIS — J96.01 ACUTE HYPOXIC RESPIRATORY FAILURE (HCC): Primary | ICD-10-CM

## 2023-12-21 DIAGNOSIS — I50.32 CHRONIC DIASTOLIC (CONGESTIVE) HEART FAILURE (HCC): ICD-10-CM

## 2023-12-21 DIAGNOSIS — J18.9 SEPSIS DUE TO PNEUMONIA (HCC): ICD-10-CM

## 2023-12-21 DIAGNOSIS — J18.9 MULTIFOCAL PNEUMONIA: ICD-10-CM

## 2023-12-21 DIAGNOSIS — I10 ESSENTIAL HYPERTENSION: ICD-10-CM

## 2023-12-21 DIAGNOSIS — I48.21 PERMANENT ATRIAL FIBRILLATION (HCC): ICD-10-CM

## 2023-12-21 DIAGNOSIS — E22.2 SIADH (SYNDROME OF INAPPROPRIATE ADH PRODUCTION) (HCC): ICD-10-CM

## 2023-12-21 DIAGNOSIS — R26.9 GAIT ABNORMALITY: ICD-10-CM

## 2023-12-21 DIAGNOSIS — L89.612 PRESSURE INJURY OF RIGHT HEEL, STAGE 2 (HCC): ICD-10-CM

## 2023-12-21 DIAGNOSIS — A41.9 SEPSIS DUE TO PNEUMONIA (HCC): ICD-10-CM

## 2023-12-21 PROCEDURE — 99496 TRANSJ CARE MGMT HIGH F2F 7D: CPT | Performed by: FAMILY MEDICINE

## 2023-12-21 NOTE — ASSESSMENT & PLAN NOTE
Patient apparently made good gains at [facility].  She will continue physical therapy within the home through home health care.

## 2023-12-21 NOTE — ASSESSMENT & PLAN NOTE
Patient now off diltiazem.  Heart rate is adequately controlled on metoprolol succinate ER 50 mg daily.  Patient will also continue Eliquis 2.5 mg twice a day.

## 2023-12-21 NOTE — ASSESSMENT & PLAN NOTE
Patient was admitted to the hospital with acute hypoxic respiratory failure.  I was able to review the patient's discharge summary as well as labs and diagnostic studies.  The patient is now saturating normally on room air.

## 2023-12-21 NOTE — ASSESSMENT & PLAN NOTE
Patient had a multifocal pneumonia.  Radiology did recommend follow-up x-ray or CT scan.  At the time of her discharge it was felt that this could be done by her primary care provider.  As such, I ordered a follow-up chest x-ray.  I do not have any of her records from the [facility].  I will make further recommendations to the patient when I get the results of her x-rays.

## 2023-12-21 NOTE — PROGRESS NOTES
Assessment & Plan     1. Acute hypoxic respiratory failure (HCC)  Assessment & Plan:  Patient was admitted to the hospital with acute hypoxic respiratory failure.  I was able to review the patient's discharge summary as well as labs and diagnostic studies.  The patient is now saturating normally on room air.      2. Multifocal pneumonia  Assessment & Plan:  Patient had a multifocal pneumonia.  Radiology did recommend follow-up x-ray or CT scan.  At the time of her discharge it was felt that this could be done by her primary care provider.  As such, I ordered a follow-up chest x-ray.  I do not have any of her records from the [facility].  I will make further recommendations to the patient when I get the results of her x-rays.    Orders:  -     XR chest pa & lateral; Future; Expected date: 12/21/2023    3. Urinary tract infection without hematuria, site unspecified  -     UA (URINE) with reflex to Scope  -     Urine culture; Future    4. Sepsis due to pneumonia (HCC)  Assessment & Plan:  Patient had sepsis secondary to pneumonia.  She did well on IV antibiotics.  Sepsis has resolved.  She did have an acute kidney injury secondary to the sepsis.  That is also now resolved.      5. Essential hypertension  Assessment & Plan:  Blood pressure is currently well-controlled.  I recommend no change with her current med occasion.      6. SIADH (syndrome of inappropriate 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[facility])  Assessment & Plan:  Patient has known SIADH and hyponatremia.  She has a BMP scheduled for tomorrow by home health care.      7. Permanent atrial fibrillation (HCC)  Assessment & Plan:  Patient now off diltiazem.  Heart rate is adequately controlled on metoprolol succinate ER 50 mg daily.  Patient will also continue Eliquis 2.5 mg twice a day.      8. Chronic diastolic (congestive) heart failure (HCC)  Assessment & Plan:  Wt Readings from Last 3 Encounters:   12/21/23 45.1 kg (99 lb 6.4 oz)   11/22/23 52.7 kg (116 lb 2.9 oz)    11/01/23 48.1 kg (106 lb)       On exam, patient is euvolemic.  Patient has lost weight with her ordeal.          9. Generalized anxiety disorder  Assessment & Plan:  Patient has generalized anxiety disorder which is stable.  She will continue alprazolam 0.25 mg 3 times per day as needed      10. Gait abnormality  Assessment & Plan:  Patient apparently made good gains at [facility].  She will continue physical therapy within the home through home health care.      11. Pressure injury of right heel, stage 2 (HCC)  Assessment & Plan:  Per examining the patient's wound, I redressed the wound with Adaptic, 4 x 4 gauze, and Tami wrap.  I explained that this is a bedsore.  I started her on vitamin C, zinc, and a multivitamin daily.  I also recommend 1 can of Ensure daily.  I am going to change her treatment.  I want her on extra thin DuoDERM.  This should be applied to the heel every 3 days and as needed until healed.           Subjective     Transitional Care Management Review:   Mariela GREGORY Giron is a 88 y.o. female here for TCM follow up.     During the TCM phone call patient stated:  TCM Call       Date and time call was made  11/24/2023  7:48 AM    Hospital care reviewed  Records reviewed    Patient was hospitialized at  [facility]    Date of Admission  11/16/23    Date of discharge  11/22/23    Diagnosis  Acute hypoxic respiratory failure    Disposition  Rehabilitation center  [facility] d/c 12/15/2023    Were the patients medications reviewed and updated  No    Current Symptoms  Weakness    Weakness severity  Moderate          TCM Call       Post hospital issues  None    Should patient be enrolled in antico monitoring?  No    Scheduled for follow up?  Yes  follow up 12/21/2023    Patients specialists  Cardiologist    Did you obtain your prescribed medications  Yes    Do you need help managing your prescriptions or medications  Yes    Why type of assitance do you need    manages her medications    Is transportation to your appointment needed  Yes    Specify why  Patient does not drive    I have advised the patient to call PCP with any new or worsening symptoms  Selma Mahan M.A.          This patient is a 88-year-old white female who presents to the office today as a transition of care management evaluation.  Patient had presented to the emergency department on November 16 where she was admitted for a multifocal pneumonia.  Initially, the patient was found to have acute hypoxic respiratory failure and required oxygen up to 8 L/min via nasal cannula.  She met criteria for sepsis.  She was started on empiric IV antibiotics.  Due to the sepsis is, the patient developed an acute kidney injury.  She was seen in consultation by nephrology.  Renal function improved to baseline.  Patient improved with her treatment.  She was evaluated by speech therapy.  There was no evidence of dysphagia.  Oxygen requirements improved and prior to discharge she was able to be weaned off oxygen.  Patient was evaluated by speech therapy.  Due to her gait dysfunction, it was recommended she have rehab prior to discharge home.  The patient was subsequently discharged to [facility] on November 22.  She received physical therapy and was discharged to home on December 15.  She complains of right foot pain.      Review of Systems   Constitutional:  Negative for appetite change, chills, fever and unexpected weight change.   Respiratory:  Negative for cough, shortness of breath and wheezing.         Denies sputum production   Cardiovascular:  Negative for chest pain, palpitations and leg swelling.   Gastrointestinal:  Negative for abdominal distention, abdominal pain, blood in stool, constipation, diarrhea and nausea.   Genitourinary:  Positive for frequency and urgency. Negative for dysuria.   Musculoskeletal:  Positive for gait problem.        Positive for right foot pain   Psychiatric/Behavioral:   "Positive for sleep disturbance.        Objective     /72 (BP Location: Left arm, Patient Position: Sitting, Cuff Size: Standard)   Pulse 69   Temp (!) 96.8 °F (36 °C) (Tympanic)   Ht 4' 10\" (1.473 m)   Wt 45.1 kg (99 lb 6.4 oz)   SpO2 96%   BMI 20.77 kg/m²      Physical Exam  Vitals reviewed.   Constitutional:       Comments: Patient is a an 88-year-old white female who appears her stated age.  The patient is nonseptic in appearance and in no apparent distress.   HENT:      Head: Normocephalic and atraumatic.      Right Ear: Tympanic membrane, ear canal and external ear normal. There is no impacted cerumen.      Left Ear: Tympanic membrane, ear canal and external ear normal. There is no impacted cerumen.      Mouth/Throat:      Mouth: Mucous membranes are moist.      Pharynx: Oropharynx is clear. No oropharyngeal exudate or posterior oropharyngeal erythema.   Eyes:      General: No scleral icterus.        Right eye: No discharge.         Left eye: No discharge.      Conjunctiva/sclera: Conjunctivae normal.      Pupils: Pupils are equal, round, and reactive to light.   Cardiovascular:      Rate and Rhythm: Normal rate and regular rhythm.      Heart sounds: Normal heart sounds. No murmur heard.     No friction rub. No gallop.   Pulmonary:      Effort: Pulmonary effort is normal. No respiratory distress.      Breath sounds: Normal breath sounds. No stridor. No wheezing, rhonchi or rales.      Comments: Auditory fremitus was normal bilaterally.  Lungs were noted to be resonant to percussion throughout  Abdominal:      General: Bowel sounds are normal. There is no distension.      Palpations: Abdomen is soft. There is no mass.      Tenderness: There is no abdominal tenderness. There is no guarding.   Musculoskeletal:      Cervical back: Neck supple.      Comments: There is an increased thoracic kyphosis present   Lymphadenopathy:      Cervical: No cervical adenopathy.   Skin:     Comments: There is a stage II " decubitus ulcer present on the patient's right heel.  No erythema.  There is no pus or drainage.  No foul odor.   Psychiatric:         Mood and Affect: Mood normal.         Behavior: Behavior normal.         Thought Content: Thought content normal.         Judgment: Judgment normal.     Extremities: Without cyanosis, clubbing, or edema    Medications have been reviewed by provider in current encounter    Papa Zabala DO

## 2023-12-21 NOTE — ASSESSMENT & PLAN NOTE
Patient has generalized anxiety disorder which is stable.  She will continue alprazolam 0.25 mg 3 times per day as needed

## 2023-12-21 NOTE — ASSESSMENT & PLAN NOTE
Patient had sepsis secondary to pneumonia.  She did well on IV antibiotics.  Sepsis has resolved.  She did have an acute kidney injury secondary to the sepsis.  That is also now resolved.

## 2023-12-21 NOTE — ASSESSMENT & PLAN NOTE
Wt Readings from Last 3 Encounters:   12/21/23 45.1 kg (99 lb 6.4 oz)   11/22/23 52.7 kg (116 lb 2.9 oz)   11/01/23 48.1 kg (106 lb)       On exam, patient is euvolemic.  Patient has lost weight with her ordeal.

## 2023-12-21 NOTE — ASSESSMENT & PLAN NOTE
Patient has known SIADH and hyponatremia.  She has a BMP scheduled for tomorrow by home health care.

## 2023-12-22 NOTE — ASSESSMENT & PLAN NOTE
Per examining the patient's wound, I redressed the wound with Adaptic, 4 x 4 gauze, and Tami wrap.  I explained that this is a bedsore.  I started her on vitamin C, zinc, and a multivitamin daily.  I also recommend 1 can of Ensure daily.  I am going to change her treatment.  I want her on extra thin DuoDERM.  This should be applied to the heel every 3 days and as needed until healed.

## 2023-12-26 ENCOUNTER — PATIENT OUTREACH (OUTPATIENT)
Dept: CASE MANAGEMENT | Facility: OTHER | Age: 88
End: 2023-12-26

## 2023-12-26 DIAGNOSIS — N17.9 ACUTE RENAL FAILURE SUPERIMPOSED ON CHRONIC KIDNEY DISEASE, UNSPECIFIED ACUTE RENAL FAILURE TYPE, UNSPECIFIED CKD STAGE: Primary | ICD-10-CM

## 2023-12-26 DIAGNOSIS — N18.9 ACUTE RENAL FAILURE SUPERIMPOSED ON CHRONIC KIDNEY DISEASE, UNSPECIFIED ACUTE RENAL FAILURE TYPE, UNSPECIFIED CKD STAGE: Primary | ICD-10-CM

## 2023-12-26 NOTE — PROGRESS NOTES
Call back to [facility] to verify that BMP request was received.  It was confirmed by Kay and will be drawn on Friday, 12/29/23.

## 2023-12-26 NOTE — PROGRESS NOTES
"Outreach follow up.  Mariela is doing pretty good\".      Home Health nurse did show Sloan and Mariela how to check for edema which she doesn't have at this time.  She does  have a heel ulcer that is being monitored.      BMP request not received by [facility] so I will refax request now.    "

## 2023-12-28 DIAGNOSIS — N30.00 ACUTE CYSTITIS WITHOUT HEMATURIA: Primary | ICD-10-CM

## 2023-12-28 RX ORDER — AMOXICILLIN 500 MG/1
500 CAPSULE ORAL EVERY 8 HOURS SCHEDULED
Qty: 30 CAPSULE | Refills: 0 | Status: SHIPPED | OUTPATIENT
Start: 2023-12-28 | End: 2024-01-07

## 2024-01-03 ENCOUNTER — OFFICE VISIT (OUTPATIENT)
Dept: PULMONOLOGY | Facility: CLINIC | Age: 89
End: 2024-01-03
Payer: MEDICARE

## 2024-01-03 ENCOUNTER — HOSPITAL ENCOUNTER (OUTPATIENT)
Dept: RADIOLOGY | Facility: HOSPITAL | Age: 89
Discharge: HOME | End: 2024-01-03
Payer: MEDICARE

## 2024-01-03 ENCOUNTER — PATIENT OUTREACH (OUTPATIENT)
Dept: CASE MANAGEMENT | Facility: OTHER | Age: 89
End: 2024-01-03

## 2024-01-03 VITALS
DIASTOLIC BLOOD PRESSURE: 83 MMHG | TEMPERATURE: 96.4 F | HEART RATE: 54 BPM | SYSTOLIC BLOOD PRESSURE: 149 MMHG | RESPIRATION RATE: 16 BRPM | BODY MASS INDEX: 21.07 KG/M2 | WEIGHT: 100.4 LBS | OXYGEN SATURATION: 98 % | HEIGHT: 58 IN

## 2024-01-03 DIAGNOSIS — N17.9 ACUTE RENAL FAILURE SUPERIMPOSED ON CHRONIC KIDNEY DISEASE, UNSPECIFIED ACUTE RENAL FAILURE TYPE, UNSPECIFIED CKD STAGE: Primary | ICD-10-CM

## 2024-01-03 DIAGNOSIS — J18.9 MULTIFOCAL PNEUMONIA: ICD-10-CM

## 2024-01-03 DIAGNOSIS — J90 PLEURAL EFFUSION ON RIGHT: Primary | ICD-10-CM

## 2024-01-03 DIAGNOSIS — N18.9 ACUTE RENAL FAILURE SUPERIMPOSED ON CHRONIC KIDNEY DISEASE, UNSPECIFIED ACUTE RENAL FAILURE TYPE, UNSPECIFIED CKD STAGE: Primary | ICD-10-CM

## 2024-01-03 PROCEDURE — 71046 X-RAY EXAM CHEST 2 VIEWS: CPT

## 2024-01-03 PROCEDURE — 99214 OFFICE O/P EST MOD 30 MIN: CPT | Performed by: PHYSICIAN ASSISTANT

## 2024-01-03 NOTE — PROGRESS NOTES
Spoke with Sloan PERAZA.  Mariela is getting a follow up chest xray and will see the pulmonologist . He states she is doing better.      [facility] nurse said she did not have script for follow up BMP.  Will refax and call to verify.    Spoke with Halie at [facility] and BMP scheduled to be drawn today.    Call back to Sloan to let him know.  Spoke with Susana Pyle, who gave Caleb the information and I provided the phone number for the mobile lab for future lab draws.

## 2024-01-07 NOTE — PROGRESS NOTES
Pulmonary Follow Up Note   Mariela Giron 88 y.o. female MRN: [id number]  1/6/2024      Assessment:    Multifocal pneumonia  Resolved with residual scar in RUL on CXR per my interpretation. I will call Mariela if the radiology reports different. No need for additional ABX at this time.      Pleural effusion on right  Mariela has chronic right pleural effusion persistently exudative on previous taps. Etiology remains unclear but Ddx includes pseudoexudate volume overload in the setting of diuretics, inflammatory given previously positive DONYA and anticentromere B antibody (possible CREST).  I had previously referred Mariela to rheumatology for further evaluation before subjecting her to systemic steroids.  She unfortunately missed this appointment she was hospitalized and at rehab.  Given the fact that her effusion is small, asymptomatic and does not seem to be reaccumulating the last 3 to 4 months we will continue to monitor.  I did encourage them to reschedule rheumatology appointment at their convenience.  I ordered a chest x-ray to have done prior to her next appointment in 6 months.  Her and her daughter-in-law know to get chest x-ray done sooner if symptoms worsen.      Plan:    Diagnoses and all orders for this visit:    Pleural effusion on right  -     XR chest pa & lateral; Future    Multifocal pneumonia        Return in about 6 months (around 7/3/2024).    History of Present Illness   HPI:  Mariela Giron is a 88 y.o. female who presents to the office today for hospital follow-up. mid November patient presented to [facility] with shortness of breath and was admitted for acute hypoxic respiratory failure secondary to right upper lobe community-acquired pneumonia.  Initially presented with severe sepsis.  Was treated cefepime while inpatient then discharged on cefdinir to complete 7 day course. She was weaned to room air by the time of discharge. She went to rehab where she did really well and is  now back home. She is accompanied by her daughter in law who provides additional info during today's visit. They both state that her breathing has improved as has her energy levels. Denies cough, sputum production, wheeze. No chest pain, palpitations, leg swelling. No fevers or chills.     Review of Systems   All other systems reviewed and are negative.      Historical Information   Past Medical History:   Diagnosis Date    Acute on chronic diastolic CHF (congestive heart failure) ([facility]) 01/30/2023    Acute respiratory failure with hypoxia ([facility]) 12/22/2022    Aortic stenosis     Atrial fibrillation (HCC)     Atrial flutter ([facility])     CAD (coronary artery disease) 01/11/2023    CAD/PCI/ELODIA    CKD (chronic kidney disease), stage III ([facility])     Community acquired pneumonia of left upper lobe of lung 05/17/2019    Fatigue     Full dentures     Generalized anxiety disorder     Hyperlipidemia     Hypertension     Impaired fasting glucose     Mitral regurgitation     Pneumonia     RBBB     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    TRANSFEMORAL W/ 23MM CALVERT SHAVONNE S3 ULTRA VALVE(ACCESS ON LEFT)    SVT (supraventricular tachycardia)     Tricuspid regurgitation      Past Surgical History:   Procedure Laterality Date    CARDIAC CATHETERIZATION N/A 1/11/2023    Procedure: LHC-Cardiac catheterization;  Surgeon: Sj Camacho MD;  Location: [facility] CARD[facility];  Service: Cardiology    CARDIAC CATHETERIZATION N/A 1/11/2023    Procedure: Cardiac pci;  Surgeon: Sj Camacho MD;  Location: [facility] CARDIAC CATH [facility];  Service: Cardiology    CARDIAC CATHETERIZATION N/A 1/17/2023    Procedure: CARDIAC TAVR;  Surgeon: Harrison Magdaleno DO;  Location: [facility] MAIN OR;  Service: Cardiology    DENTAL SURGERY Bilateral     ALL TEETH REMOVED    FL GUIDED NEEDLE PLAC BX/ASP/INJ  5/26/2021    IR THORACENTESIS  12/20/2022    IR THORACENTESIS  7/14/2023    IR THORACENTESIS  9/1/2023    IR THORACENTESIS  9/25/2023    JOINT  REPLACEMENT Left     KNEE SURGERY      left knee replacement    MD INJECT SI JOINT ARTHRGRPHY&/ANES/STEROID W/JOHNATHON Bilateral 5/26/2021    Procedure: SACROILIAC JOINT INJECTION;  Surgeon: Cj Langston MD;  Location: [facility] MAIN OR;  Service: Pain Management     MD REPLACE AORTIC VALVE OPENFEMORAL ARTERY APPROACH N/A 1/17/2023    Procedure: REPLACEMENT AORTIC VALVE TRANSCATHETER (TAVR) TRANSFEMORAL W/ 23MM CALVERT SHAVONNE S3 ULTRA VALVE(ACCESS ON LEFT) ALENA;  Surgeon: Sundar Ayala DO;  Location:  MAIN OR;  Service: Cardiac Surgery    SACROILIAC JOINT INJECTION Bilateral 6/7/2023    Procedure: Bilateral sacroiliac joint steroid injection;  Surgeon: Blanca Holm MD;  Location: [facility] MAIN OR;  Service: Pain Management      Family History   Problem Relation Age of Onset    Hypertension Mother     Asthma Father         [place]'s asthma    Alzheimer's disease Sister     Rectal cancer Son     Uterine cancer Daughter     Heart disease Sister     Heart disease Brother        Social History     Tobacco Use   Smoking Status Never   Smokeless Tobacco Never         Meds/Allergies     Current Outpatient Medications:     alendronate (Fosamax) 70 mg tablet, Take 1 tablet (70 mg total) by mouth every 7 days, Disp: 12 tablet, Rfl: 3    ALPRAZolam (XANAX) 0.25 mg tablet, TAKE 1 TABLET BY MOUTH THREE TIMES DAILY AS NEEDED FOR ANXIETY, Disp: 90 tablet, Rfl: 5    ammonium lactate (LAC-HYDRIN) 12 % lotion, Apply topically daily Do not start before Jacqueline 15, 2023., Disp: , Rfl: 0    amoxicillin (AMOXIL) 500 mg capsule, Take 1 capsule (500 mg total) by mouth every 8 (eight) hours for 10 days, Disp: 30 capsule, Rfl: 0    apixaban (Eliquis) 2.5 mg, Take 1 tablet by mouth twice daily, Disp: 60 tablet, Rfl: 5    atorvastatin (LIPITOR) 20 mg tablet, Take 1 tablet (20 mg total) by mouth daily with dinner, Disp: 90 tablet, Rfl: 3    bisacodyl (DULCOLAX) 10 mg suppository, Insert 10 mg into the rectum daily as needed for constipation, Disp: ,  "Rfl:     Cholecalciferol (Vitamin D3) 50 MCG (2000 UT) TABS, Take 2,000 Units by mouth in the morning. Indications: Osteoporosis, Disp: , Rfl:     clopidogrel (PLAVIX) 75 mg tablet, Take 1 tablet (75 mg total) by mouth daily, Disp: 90 tablet, Rfl: 2    furosemide (LASIX) 20 mg tablet, Take 1 tablet (20 mg total) by mouth daily, Disp: 90 tablet, Rfl: 2    metoprolol succinate (TOPROL-XL) 50 mg 24 hr tablet, Take 1 tablet (50 mg total) by mouth daily Hold for HR<60 or SBP<110 mmHg, Disp: 90 tablet, Rfl: 0    Multiple Vitamin (Multivitamin Adult) TABS, Take 1 tablet by mouth in the morning. Indications: Nutritional Support, Disp: , Rfl:     Nutritional Supplement LIQD, Take 1 Can by mouth 2 (two) times a day Ensure pudding BID, Disp: 237 mL, Rfl: 0    pantoprazole (PROTONIX) 20 mg tablet, Take 1 tablet (20 mg total) by mouth daily before breakfast, Disp: 90 tablet, Rfl: 3    potassium chloride (K-DUR,KLOR-CON) 10 mEq tablet, Take 1 tablet (10 mEq total) by mouth daily, Disp: 90 tablet, Rfl: 2    polyethylene glycol (MIRALAX) 17 g packet, Take 17 g by mouth daily as needed (constipation) for up to 14 days, Disp: , Rfl: 0    Psyllium (METAMUCIL FIBER) 51.7 % PACK, Take 1 Package by mouth daily (Patient not taking: Reported on 8/21/2023), Disp: , Rfl: 0  No Known Allergies    Vitals: Blood pressure 149/83, pulse (!) 54, temperature (!) 96.4 °F (35.8 °C), temperature source Tympanic, resp. rate 16, height 4' 10\" (1.473 m), weight 45.5 kg (100 lb 6.4 oz), SpO2 98%. Body mass index is 20.98 kg/m². Oxygen Therapy  SpO2: 98 % (RA)    Physical Exam  Physical Exam  Vitals reviewed.   Constitutional:       Appearance: Normal appearance. She is well-developed.   HENT:      Head: Normocephalic and atraumatic.      Nose: Nose normal.      Mouth/Throat:      Mouth: Mucous membranes are moist.   Eyes:      Extraocular Movements: Extraocular movements intact.   Cardiovascular:      Rate and Rhythm: Normal rate and regular rhythm.      " "Heart sounds: Normal heart sounds.   Pulmonary:      Effort: Pulmonary effort is normal. No respiratory distress.      Breath sounds: No wheezing, rhonchi or rales.   Musculoskeletal:         General: No swelling.   Skin:     General: Skin is warm and dry.   Neurological:      Mental Status: She is alert. Mental status is at baseline.   Psychiatric:         Mood and Affect: Mood normal.         Behavior: Behavior normal.         Labs: I have personally reviewed pertinent lab results., ABG: No results found for: \"PHART\", \"SMV5BWB\", \"PO2ART\", \"XOR2YSW\", \"O4TVWLVA\", \"BEART\", \"SOURCE\", BNP: No results found for: \"BNP\", CBC: No results found for: \"WBC\", \"HGB\", \"HCT\", \"MCV\", \"PLT\", \"ADJUSTEDWBC\", \"RBC\", \"MCH\", \"MCHC\", \"RDW\", \"MPV\", \"NRBC\", CMP: No results found for: \"SODIUM\", \"K\", \"CL\", \"CO2\", \"ANIONGAP\", \"BUN\", \"CREATININE\", \"GLUCOSE\", \"CALCIUM\", \"AST\", \"ALT\", \"ALKPHOS\", \"PROT\", \"BILITOT\", \"EGFR\", PT/INR: No results found for: \"PT\", \"INR\", Troponin: No results found for: \"TROPONINI\"  Lab Results   Component Value Date    WBC 5.67 11/22/2023    HGB 8.6 (L) 11/22/2023    HCT 27.3 (L) 11/22/2023    MCV 90 11/22/2023     (L) 11/22/2023     Lab Results   Component Value Date    GLUCOSE 107 01/17/2023    CALCIUM 8.4 11/22/2023    K 3.5 11/22/2023    CO2 19 (L) 11/22/2023     (H) 11/22/2023    BUN 35 (H) 11/22/2023    CREATININE 0.88 11/22/2023     No results found for: \"IGE\"  Lab Results   Component Value Date    ALT 24 11/20/2023    AST 20 11/20/2023    ALKPHOS 62 11/20/2023       Imaging and other studies: I have personally reviewed pertinent reports.   and I have personally reviewed pertinent films in PACS     Chest x-ray 1/3/2024  Previous right upper lobe infiltrate improved with residual scarring left.  Chronic right  hemidiaphragm elevation and what likely represents chronic partially loculated right pleural effusion.  "

## 2024-01-07 NOTE — ASSESSMENT & PLAN NOTE
Resolved with residual scar in RUL on CXR per my interpretation. I will call Mariela if the radiology reports different. No need for additional ABX at this time.

## 2024-01-07 NOTE — ASSESSMENT & PLAN NOTE
Mariela has chronic right pleural effusion persistently exudative on previous taps. Etiology remains unclear but Ddx includes pseudoexudate volume overload in the setting of diuretics, inflammatory given previously positive DONYA and anticentromere B antibody (possible CREST).  I had previously referred Mariela to rheumatology for further evaluation before subjecting her to systemic steroids.  She unfortunately missed this appointment she was hospitalized and at rehab.  Given the fact that her effusion is small, asymptomatic and does not seem to be reaccumulating the last 3 to 4 months we will continue to monitor.  I did encourage them to reschedule rheumatology appointment at their convenience.  I ordered a chest x-ray to have done prior to her next appointment in 6 months.  Her and her daughter-in-law know to get chest x-ray done sooner if symptoms worsen.

## 2024-01-10 ENCOUNTER — PATIENT OUTREACH (OUTPATIENT)
Dept: CASE MANAGEMENT | Facility: OTHER | Age: 89
End: 2024-01-10

## 2024-01-10 NOTE — PROGRESS NOTES
"Spoke with Mariela who states she is feeling \"pretty good\".  Her heel ulcer is not \"not all together better, but getting there\".  [facility] and [facility] aide, Lashanda, on days when the home health nurse is not there.  Lashanda is there 3 times a week, 4 hours at a time.  Mariela is waiting for Lashanda now to help with her shower.      Mariela's , Caleb, is in the hospital at this time for CHF.  He's doing better since his diuretic was changed according to Mariela.      Mariela has supportive family, son, daughter-in-law and her brother to help her.    She said that the nurses were unable to draw the repeat BMP that was ordered.  "

## 2024-01-15 ENCOUNTER — PATIENT OUTREACH (OUTPATIENT)
Dept: CASE MANAGEMENT | Facility: OTHER | Age: 89
End: 2024-01-15

## 2024-01-15 PROBLEM — A41.9 SEPSIS DUE TO PNEUMONIA (HCC): Status: RESOLVED | Noted: 2019-05-17 | Resolved: 2024-01-15

## 2024-01-15 PROBLEM — J18.9 SEPSIS DUE TO PNEUMONIA (HCC): Status: RESOLVED | Noted: 2019-05-17 | Resolved: 2024-01-15

## 2024-01-15 NOTE — PROGRESS NOTES
"ROSA pathway completed.  Per Dr. Zabala, Mariela is at baseline renal function.  Nephrology visit scheduled for 2/5/2024.    She states she is doing \"pretty good\".  Family is supportive and belen Pyle Is there.   "

## 2024-01-25 ENCOUNTER — HOSPITAL ENCOUNTER (INPATIENT)
Facility: HOSPITAL | Age: 89
LOS: 8 days | Discharge: NURSING, REHAB OR LONG-TERM CARE | End: 2024-02-02
Attending: EMERGENCY MEDICINE | Admitting: INTERNAL MEDICINE
Payer: MEDICARE

## 2024-01-25 ENCOUNTER — APPOINTMENT (EMERGENCY)
Dept: CT IMAGING | Facility: HOSPITAL | Age: 89
End: 2024-01-25
Payer: MEDICARE

## 2024-01-25 DIAGNOSIS — J96.01 ACUTE RESPIRATORY FAILURE WITH HYPOXIA (HCC): ICD-10-CM

## 2024-01-25 DIAGNOSIS — J10.1 INFLUENZA A: Primary | ICD-10-CM

## 2024-01-25 DIAGNOSIS — G47.00 INSOMNIA: ICD-10-CM

## 2024-01-25 DIAGNOSIS — R09.02 HYPOXIA: ICD-10-CM

## 2024-01-25 DIAGNOSIS — A41.9 SEPSIS, DUE TO UNSPECIFIED ORGANISM, UNSPECIFIED WHETHER ACUTE ORGAN DYSFUNCTION PRESENT (HCC): ICD-10-CM

## 2024-01-25 DIAGNOSIS — J18.9 PNEUMONIA: ICD-10-CM

## 2024-01-25 LAB
2HR DELTA HS TROPONIN: -9 NG/L
4HR DELTA HS TROPONIN: -8 NG/L
ALBUMIN SERPL BCP-MCNC: 3.9 G/DL (ref 3.5–5)
ALP SERPL-CCNC: 75 U/L (ref 34–104)
ALT SERPL W P-5'-P-CCNC: 15 U/L (ref 7–52)
ANION GAP SERPL CALCULATED.3IONS-SCNC: 10 MMOL/L
AST SERPL W P-5'-P-CCNC: 22 U/L (ref 13–39)
BASOPHILS # BLD AUTO: 0 THOUSANDS/ÂΜL (ref 0–0.1)
BASOPHILS NFR BLD AUTO: 0 % (ref 0–1)
BILIRUB SERPL-MCNC: 0.91 MG/DL (ref 0.2–1)
BNP SERPL-MCNC: 1063 PG/ML (ref 0–100)
BUN SERPL-MCNC: 28 MG/DL (ref 5–25)
CALCIUM SERPL-MCNC: 9 MG/DL (ref 8.4–10.2)
CARDIAC TROPONIN I PNL SERPL HS: 34 NG/L
CARDIAC TROPONIN I PNL SERPL HS: 35 NG/L
CARDIAC TROPONIN I PNL SERPL HS: 43 NG/L
CHLORIDE SERPL-SCNC: 100 MMOL/L (ref 96–108)
CO2 SERPL-SCNC: 24 MMOL/L (ref 21–32)
CREAT SERPL-MCNC: 1.04 MG/DL (ref 0.6–1.3)
D DIMER PPP FEU-MCNC: 0.97 UG/ML FEU
EOSINOPHIL # BLD AUTO: 0 THOUSAND/ÂΜL (ref 0–0.61)
EOSINOPHIL NFR BLD AUTO: 0 % (ref 0–6)
ERYTHROCYTE [DISTWIDTH] IN BLOOD BY AUTOMATED COUNT: 15.9 % (ref 11.6–15.1)
FLUAV RNA RESP QL NAA+PROBE: POSITIVE
FLUBV RNA RESP QL NAA+PROBE: NEGATIVE
GFR SERPL CREATININE-BSD FRML MDRD: 48 ML/MIN/1.73SQ M
GLUCOSE SERPL-MCNC: 121 MG/DL (ref 65–140)
HCT VFR BLD AUTO: 30.2 % (ref 34.8–46.1)
HGB BLD-MCNC: 9.6 G/DL (ref 11.5–15.4)
IMM GRANULOCYTES # BLD AUTO: 0.01 THOUSAND/UL (ref 0–0.2)
IMM GRANULOCYTES NFR BLD AUTO: 0 % (ref 0–2)
LACTATE SERPL-SCNC: 1.6 MMOL/L (ref 0.5–2)
LYMPHOCYTES # BLD AUTO: 0.35 THOUSANDS/ÂΜL (ref 0.6–4.47)
LYMPHOCYTES NFR BLD AUTO: 10 % (ref 14–44)
MCH RBC QN AUTO: 29.4 PG (ref 26.8–34.3)
MCHC RBC AUTO-ENTMCNC: 31.8 G/DL (ref 31.4–37.4)
MCV RBC AUTO: 93 FL (ref 82–98)
MONOCYTES # BLD AUTO: 0.35 THOUSAND/ÂΜL (ref 0.17–1.22)
MONOCYTES NFR BLD AUTO: 10 % (ref 4–12)
NEUTROPHILS # BLD AUTO: 2.69 THOUSANDS/ÂΜL (ref 1.85–7.62)
NEUTS SEG NFR BLD AUTO: 80 % (ref 43–75)
NRBC BLD AUTO-RTO: 0 /100 WBCS
PLATELET # BLD AUTO: 99 THOUSANDS/UL (ref 149–390)
PMV BLD AUTO: 8.6 FL (ref 8.9–12.7)
POTASSIUM SERPL-SCNC: 3.5 MMOL/L (ref 3.5–5.3)
PROCALCITONIN SERPL-MCNC: 0.19 NG/ML
PROT SERPL-MCNC: 6.1 G/DL (ref 6.4–8.4)
RBC # BLD AUTO: 3.26 MILLION/UL (ref 3.81–5.12)
RSV RNA RESP QL NAA+PROBE: NEGATIVE
SARS-COV-2 RNA RESP QL NAA+PROBE: NEGATIVE
SODIUM SERPL-SCNC: 134 MMOL/L (ref 135–147)
WBC # BLD AUTO: 3.4 THOUSAND/UL (ref 4.31–10.16)

## 2024-01-25 PROCEDURE — 94664 DEMO&/EVAL PT USE INHALER: CPT

## 2024-01-25 PROCEDURE — 84145 PROCALCITONIN (PCT): CPT | Performed by: PHYSICIAN ASSISTANT

## 2024-01-25 PROCEDURE — 99285 EMERGENCY DEPT VISIT HI MDM: CPT | Performed by: PHYSICIAN ASSISTANT

## 2024-01-25 PROCEDURE — 71275 CT ANGIOGRAPHY CHEST: CPT

## 2024-01-25 PROCEDURE — 93005 ELECTROCARDIOGRAM TRACING: CPT

## 2024-01-25 PROCEDURE — 85379 FIBRIN DEGRADATION QUANT: CPT | Performed by: PHYSICIAN ASSISTANT

## 2024-01-25 PROCEDURE — 99285 EMERGENCY DEPT VISIT HI MDM: CPT

## 2024-01-25 PROCEDURE — 85025 COMPLETE CBC W/AUTO DIFF WBC: CPT | Performed by: PHYSICIAN ASSISTANT

## 2024-01-25 PROCEDURE — 36415 COLL VENOUS BLD VENIPUNCTURE: CPT | Performed by: PHYSICIAN ASSISTANT

## 2024-01-25 PROCEDURE — 84484 ASSAY OF TROPONIN QUANT: CPT | Performed by: PHYSICIAN ASSISTANT

## 2024-01-25 PROCEDURE — 83605 ASSAY OF LACTIC ACID: CPT | Performed by: PHYSICIAN ASSISTANT

## 2024-01-25 PROCEDURE — G1004 CDSM NDSC: HCPCS

## 2024-01-25 PROCEDURE — 94760 N-INVAS EAR/PLS OXIMETRY 1: CPT

## 2024-01-25 PROCEDURE — 87040 BLOOD CULTURE FOR BACTERIA: CPT | Performed by: PHYSICIAN ASSISTANT

## 2024-01-25 PROCEDURE — 83880 ASSAY OF NATRIURETIC PEPTIDE: CPT | Performed by: PHYSICIAN ASSISTANT

## 2024-01-25 PROCEDURE — 99222 1ST HOSP IP/OBS MODERATE 55: CPT | Performed by: PHYSICIAN ASSISTANT

## 2024-01-25 PROCEDURE — 94640 AIRWAY INHALATION TREATMENT: CPT

## 2024-01-25 PROCEDURE — 0241U HB NFCT DS VIR RESP RNA 4 TRGT: CPT | Performed by: PHYSICIAN ASSISTANT

## 2024-01-25 PROCEDURE — 80053 COMPREHEN METABOLIC PANEL: CPT | Performed by: PHYSICIAN ASSISTANT

## 2024-01-25 RX ORDER — ACETAMINOPHEN 160 MG/5ML
1 SUSPENSION, ORAL (FINAL DOSE FORM) ORAL ONCE
Status: COMPLETED | OUTPATIENT
Start: 2024-01-25 | End: 2024-01-25

## 2024-01-25 RX ORDER — POTASSIUM CHLORIDE 750 MG/1
10 TABLET, EXTENDED RELEASE ORAL DAILY
Status: DISCONTINUED | OUTPATIENT
Start: 2024-01-26 | End: 2024-01-26

## 2024-01-25 RX ORDER — MELATONIN
2000 DAILY
Status: DISCONTINUED | OUTPATIENT
Start: 2024-01-26 | End: 2024-02-02 | Stop reason: HOSPADM

## 2024-01-25 RX ORDER — PANTOPRAZOLE SODIUM 20 MG/1
20 TABLET, DELAYED RELEASE ORAL
Status: DISCONTINUED | OUTPATIENT
Start: 2024-01-26 | End: 2024-02-02 | Stop reason: HOSPADM

## 2024-01-25 RX ORDER — METOPROLOL SUCCINATE 50 MG/1
50 TABLET, EXTENDED RELEASE ORAL DAILY
Status: DISCONTINUED | OUTPATIENT
Start: 2024-01-26 | End: 2024-02-02 | Stop reason: HOSPADM

## 2024-01-25 RX ORDER — IPRATROPIUM BROMIDE AND ALBUTEROL SULFATE 2.5; .5 MG/3ML; MG/3ML
3 SOLUTION RESPIRATORY (INHALATION) ONCE
Status: COMPLETED | OUTPATIENT
Start: 2024-01-25 | End: 2024-01-25

## 2024-01-25 RX ORDER — ACETAMINOPHEN 325 MG/1
650 TABLET ORAL EVERY 6 HOURS PRN
Status: DISCONTINUED | OUTPATIENT
Start: 2024-01-25 | End: 2024-02-02 | Stop reason: HOSPADM

## 2024-01-25 RX ORDER — FUROSEMIDE 20 MG/1
20 TABLET ORAL DAILY
Status: DISCONTINUED | OUTPATIENT
Start: 2024-01-26 | End: 2024-01-26

## 2024-01-25 RX ORDER — ATORVASTATIN CALCIUM 10 MG/1
20 TABLET, FILM COATED ORAL
Status: DISCONTINUED | OUTPATIENT
Start: 2024-01-26 | End: 2024-02-02 | Stop reason: HOSPADM

## 2024-01-25 RX ORDER — ALPRAZOLAM 0.25 MG/1
0.25 TABLET ORAL 3 TIMES DAILY PRN
Status: DISCONTINUED | OUTPATIENT
Start: 2024-01-25 | End: 2024-02-02 | Stop reason: HOSPADM

## 2024-01-25 RX ORDER — ZINC OXIDE 216 MG/ML
1 LOTION TOPICAL 2 TIMES DAILY
Status: DISCONTINUED | OUTPATIENT
Start: 2024-01-25 | End: 2024-01-25

## 2024-01-25 RX ORDER — ALBUTEROL SULFATE 2.5 MG/3ML
2.5 SOLUTION RESPIRATORY (INHALATION) EVERY 4 HOURS PRN
Status: DISCONTINUED | OUTPATIENT
Start: 2024-01-25 | End: 2024-02-02 | Stop reason: HOSPADM

## 2024-01-25 RX ORDER — CLOPIDOGREL BISULFATE 75 MG/1
75 TABLET ORAL DAILY
Status: DISCONTINUED | OUTPATIENT
Start: 2024-01-26 | End: 2024-02-02 | Stop reason: HOSPADM

## 2024-01-25 RX ORDER — BISACODYL 10 MG
10 SUPPOSITORY, RECTAL RECTAL DAILY PRN
Status: DISCONTINUED | OUTPATIENT
Start: 2024-01-25 | End: 2024-02-02 | Stop reason: HOSPADM

## 2024-01-25 RX ORDER — ONDANSETRON 2 MG/ML
4 INJECTION INTRAMUSCULAR; INTRAVENOUS EVERY 6 HOURS PRN
Status: DISCONTINUED | OUTPATIENT
Start: 2024-01-25 | End: 2024-02-02 | Stop reason: HOSPADM

## 2024-01-25 RX ORDER — OSELTAMIVIR PHOSPHATE 30 MG/1
30 CAPSULE ORAL EVERY 24 HOURS
Status: DISCONTINUED | OUTPATIENT
Start: 2024-01-25 | End: 2024-01-30

## 2024-01-25 RX ADMIN — IPRATROPIUM BROMIDE AND ALBUTEROL SULFATE 3 ML: 2.5; .5 SOLUTION RESPIRATORY (INHALATION) at 18:42

## 2024-01-25 RX ADMIN — OSELTAMAVIR PHOSPHATE 30 MG: 30 CAPSULE ORAL at 22:30

## 2024-01-25 RX ADMIN — APIXABAN 2.5 MG: 2.5 TABLET, FILM COATED ORAL at 22:30

## 2024-01-25 RX ADMIN — IOHEXOL 75 ML: 350 INJECTION, SOLUTION INTRAVENOUS at 20:32

## 2024-01-25 NOTE — ED PROVIDER NOTES
History  Chief Complaint   Patient presents with    Shortness of Breath     Per EMS, pt c/o increasing SOB and urinary frequency.     Patient presents to the emergency department today for evaluation of shortness of breath urinary frequency fever.  She presents via [facility] life support emergency medical services they were dispatched to her private dwelling she lives in with her son.  She has been feeling short of breath for the last few days and has noted urinary frequency without burning.  EMS noted that she was febrile and gave 650 of Tylenol.  She has a history of pneumonia hospitalized in November 2023 for an outpatient x-ray earlier this month which showed some improvement from prior consolidative process.  She does appear to have mild tachypnea here she was noted to be hypoxic by EMS and placed on low flow nasal cannula.  Unfortunately she states she did just lose her  this week.  He was fighting breathing difficulties prior to his passing.  She denies leg pain or edema.  Denies chest pain.    EMS as well as family noted  Febrile at the house son and stated 103 temperature EMS noted greater than 101 axillary.        Prior to Admission Medications   Prescriptions Last Dose Informant Patient Reported? Taking?   ALPRAZolam (XANAX) 0.25 mg tablet  Family Member No No   Sig: TAKE 1 TABLET BY MOUTH THREE TIMES DAILY AS NEEDED FOR ANXIETY   Cholecalciferol (Vitamin D3) 50 MCG (2000 UT) TABS  Family Member Yes No   Sig: Take 2,000 Units by mouth in the morning. Indications: Osteoporosis   Multiple Vitamin (Multivitamin Adult) TABS  Family Member Yes No   Sig: Take 1 tablet by mouth in the morning. Indications: Nutritional Support   Nutritional Supplement LIQD  Family Member No No   Sig: Take 1 Can by mouth 2 (two) times a day Ensure pudding BID   Psyllium (METAMUCIL FIBER) 51.7 % PACK  Family Member No No   Sig: Take 1 Package by mouth daily   Patient not taking: Reported on 8/21/2023   alendronate (Fosamax)  70 mg tablet  Family Member No No   Sig: Take 1 tablet (70 mg total) by mouth every 7 days   ammonium lactate (LAC-HYDRIN) 12 % lotion  Family Member No No   Sig: Apply topically daily Do not start before Jacqueline 15, 2023.   apixaban (Eliquis) 2.5 mg  Family Member No No   Sig: Take 1 tablet by mouth twice daily   atorvastatin (LIPITOR) 20 mg tablet  Family Member No No   Sig: Take 1 tablet (20 mg total) by mouth daily with dinner   bisacodyl (DULCOLAX) 10 mg suppository  Family Member Yes No   Sig: Insert 10 mg into the rectum daily as needed for constipation   clopidogrel (PLAVIX) 75 mg tablet  Family Member No No   Sig: Take 1 tablet (75 mg total) by mouth daily   furosemide (LASIX) 20 mg tablet  Family Member No No   Sig: Take 1 tablet (20 mg total) by mouth daily   metoprolol succinate (TOPROL-XL) 50 mg 24 hr tablet  Family Member No No   Sig: Take 1 tablet (50 mg total) by mouth daily Hold for HR<60 or SBP<110 mmHg   pantoprazole (PROTONIX) 20 mg tablet  Family Member No No   Sig: Take 1 tablet (20 mg total) by mouth daily before breakfast   polyethylene glycol (MIRALAX) 17 g packet   No No   Sig: Take 17 g by mouth daily as needed (constipation) for up to 14 days   potassium chloride (K-DUR,KLOR-CON) 10 mEq tablet  Family Member No No   Sig: Take 1 tablet (10 mEq total) by mouth daily      Facility-Administered Medications: None       Past Medical History:   Diagnosis Date    Acute on chronic diastolic CHF (congestive heart failure) ([facility]) 01/30/2023    Acute respiratory failure with hypoxia ([facility]) 12/22/2022    Aortic stenosis     Atrial fibrillation (HCC)     Atrial flutter (HCC)     CAD (coronary artery disease) 01/11/2023    CAD/PCI/ELODIA    CKD (chronic kidney disease), stage III (HCC)     Community acquired pneumonia of left upper lobe of lung 05/17/2019    Fatigue     Full dentures     Generalized anxiety disorder     Hyperlipidemia     Hypertension     Impaired fasting glucose     Mitral regurgitation      Pneumonia     RBBB     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    TRANSFEMORAL W/ 23MM CALVERT SHAVONNE S3 ULTRA VALVE(ACCESS ON LEFT)    SVT (supraventricular tachycardia)     Tricuspid regurgitation        Past Surgical History:   Procedure Laterality Date    CARDIAC CATHETERIZATION N/A 1/11/2023    Procedure: LHC-Cardiac catheterization;  Surgeon: Sj Camacho MD;  Location: [facility];  Service: Cardiology    CARDIAC CATHETERIZATION N/A 1/11/2023    Procedure: Cardiac pci;  Surgeon: Sj Camacho MD;  Location: [facility];  Service: Cardiology    CARDIAC CATHETERIZATION N/A 1/17/2023    Procedure: CARDIAC TAVR;  Surgeon: Harrison Magdaleno DO;  Location: [facility];  Service: Cardiology    DENTAL SURGERY Bilateral     ALL TEETH REMOVED    FL GUIDED NEEDLE PLAC BX/ASP/INJ  5/26/2021    IR THORACENTESIS  12/20/2022    IR THORACENTESIS  7/14/2023    IR THORACENTESIS  9/1/2023    IR THORACENTESIS  9/25/2023    JOINT REPLACEMENT Left     KNEE SURGERY      left knee replacement    CT INJECT SI JOINT ARTHRGRPHY&/ANES/STEROID W/JOHNATHON Bilateral 5/26/2021    Procedure: SACROILIAC JOINT INJECTION;  Surgeon: Cj Langston MD;  Location: [facility];  Service: Pain Management     CT REPLACE AORTIC VALVE OPENFEMORAL ARTERY APPROACH N/A 1/17/2023    Procedure: REPLACEMENT AORTIC VALVE TRANSCATHETER (TAVR) TRANSFEMORAL W/ 23MM CALVERT SHAVONNE S3 ULTRA VALVE(ACCESS ON LEFT) ALENA;  Surgeon: Sundar Ayala DO;  Location: [facility];  Service: Cardiac Surgery    SACROILIAC JOINT INJECTION Bilateral 6/7/2023    Procedure: Bilateral sacroiliac joint steroid injection;  Surgeon: Blanca Holm MD;  Location: [facility];  Service: Pain Management        Family History   Problem Relation Age of Onset    Hypertension Mother     Asthma Father         Coryell's asthma    Alzheimer's disease Sister     Rectal cancer Son     Uterine cancer Daughter     Heart disease Sister     Heart disease  Brother      I have reviewed and agree with the history as documented.    E-Cigarette/Vaping    E-Cigarette Use Never User      E-Cigarette/Vaping Substances    Nicotine No     THC No     CBD No     Flavoring No     Other No     Unknown No      Social History     Tobacco Use    Smoking status: Never    Smokeless tobacco: Never   Vaping Use    Vaping status: Never Used   Substance Use Topics    Alcohol use: Never    Drug use: Never       Review of Systems   Constitutional:  Positive for fever. Negative for chills.   HENT:  Negative for ear pain and sore throat.    Eyes:  Negative for pain and visual disturbance.   Respiratory:  Positive for cough and shortness of breath.    Cardiovascular:  Negative for chest pain and palpitations.   Gastrointestinal:  Negative for abdominal pain and vomiting.   Genitourinary:  Positive for frequency. Negative for dysuria and hematuria.   Musculoskeletal:  Negative for arthralgias and back pain.   Skin:  Negative for color change and rash.   Neurological:  Negative for seizures and syncope.   All other systems reviewed and are negative.      Physical Exam  Physical Exam  Vitals reviewed.   Constitutional:       General: She is not in acute distress.     Appearance: She is well-developed. She is not ill-appearing or diaphoretic.   HENT:      Right Ear: External ear normal. No swelling. Tympanic membrane is not bulging.      Left Ear: External ear normal. No swelling. Tympanic membrane is not bulging.      Nose: Nose normal.      Mouth/Throat:      Pharynx: No oropharyngeal exudate.   Eyes:      General: Lids are normal.      Conjunctiva/sclera: Conjunctivae normal.      Pupils: Pupils are equal, round, and reactive to light.   Neck:      Thyroid: No thyromegaly.      Vascular: No JVD.      Trachea: No tracheal deviation.   Cardiovascular:      Rate and Rhythm: Normal rate and regular rhythm.      Pulses: Normal pulses.      Heart sounds: Normal heart sounds. No murmur heard.     No  friction rub. No gallop.   Pulmonary:      Effort: Pulmonary effort is normal. No respiratory distress.      Breath sounds: No stridor. Examination of the right-lower field reveals rales. Decreased breath sounds and rales present. No wheezing or rhonchi.   Chest:      Chest wall: No tenderness.   Abdominal:      General: Bowel sounds are normal. There is no distension.      Palpations: Abdomen is soft. There is no mass.      Tenderness: There is no abdominal tenderness. There is no guarding or rebound. Negative signs include Osborne's sign.      Hernia: No hernia is present.   Musculoskeletal:         General: No tenderness. Normal range of motion.      Cervical back: Normal range of motion and neck supple. No edema. Normal range of motion.      Right lower leg: No tenderness. No edema.      Left lower leg: No tenderness. No edema.   Lymphadenopathy:      Cervical: No cervical adenopathy.   Skin:     General: Skin is warm and dry.      Capillary Refill: Capillary refill takes less than 2 seconds.      Coloration: Skin is not pale.      Findings: No erythema or rash.      Comments: Right heel wound not open   Neurological:      Mental Status: She is alert and oriented to person, place, and time.      GCS: GCS eye subscore is 4. GCS verbal subscore is 5. GCS motor subscore is 6.      Cranial Nerves: No cranial nerve deficit.      Sensory: No sensory deficit.      Deep Tendon Reflexes: Reflexes are normal and symmetric.   Psychiatric:         Speech: Speech normal.         Behavior: Behavior normal.         Vital Signs  ED Triage Vitals [01/25/24 1814]   Temperature Pulse Respirations Blood Pressure SpO2   100 °F (37.8 °C) 76 20 102/58 (!) 87 %      Temp Source Heart Rate Source Patient Position - Orthostatic VS BP Location FiO2 (%)   Tympanic Monitor -- -- --      Pain Score       Med Not Given for Pain - for MAR use only           Vitals:    01/25/24 1814 01/25/24 1819 01/25/24 1858 01/25/24 2050   BP: 102/58  111/53  110/53   Pulse: 76 72 68 72         Visual Acuity      ED Medications  Medications   ipratropium-albuterol (DUO-NEB) 0.5-2.5 mg/3 mL inhalation solution 3 mL (3 mL Nebulization Given 1/25/24 1842)   acetaminophen (FOR EMS ONLY) (TYLENOL) oral suspension 650 mg (0 mg Does not apply Given to EMS 1/25/24 1814)   iohexol (OMNIPAQUE) 350 MG/ML injection (MULTI-DOSE) 75 mL (75 mL Intravenous Given 1/25/24 2032)       Diagnostic Studies  Results Reviewed       Procedure Component Value Units Date/Time    HS Troponin I 2hr [[id number]] Collected: 01/25/24 2050    Lab Status: In process Specimen: Blood from Arm, Right Updated: 01/25/24 2103    HS Troponin I 4hr [[id number]]     Lab Status: No result Specimen: Blood     FLU/RSV/COVID - if FLU/RSV clinically relevant [[id number]]  (Abnormal) Collected: 01/25/24 1840    Lab Status: Final result Specimen: Nares from Nose Updated: 01/25/24 1929     SARS-CoV-2 Negative     INFLUENZA A PCR Positive     INFLUENZA B PCR Negative     RSV PCR Negative    Narrative:      FOR PEDIATRIC PATIENTS - copy/paste COVID Guidelines URL to browser: [email or web address]    SARS-CoV-2 assay is a Nucleic Acid Amplification assay intended for the  qualitative detection of nucleic acid from SARS-CoV-2 in nasopharyngeal  swabs. Results are for the presumptive identification of SARS-CoV-2 RNA.    Positive results are indicative of infection with SARS-CoV-2, the virus  causing COVID-19, but do not rule out bacterial infection or co-infection  with other viruses. Laboratories within the [place] and its  territories are required to report all positive results to the appropriate  public health authorities. Negative results do not preclude SARS-CoV-2  infection and should not be used as the sole basis for treatment or other  patient management decisions. Negative results must be combined with  clinical observations, patient history, and epidemiological  information.  This test has not been FDA cleared or approved.    This test has been authorized by FDA under an Emergency Use Authorization  (EUA). This test is only authorized for the duration of time the  declaration that circumstances exist justifying the authorization of the  emergency use of an in vitro diagnostic tests for detection of SARS-CoV-2  virus and/or diagnosis of COVID-19 infection under section 564(b)(1) of  the Act, 21 U.S.C. 360bbb-3(b)(1), unless the authorization is terminated  or revoked sooner. The test has been validated but independent review by FDA  and CLIA is pending.    Test performed using [employer] Gene[employer]pert: This RT-PCR assay targets N2,  a region unique to SARS-CoV-2. A conserved region in the E-gene was chosen  for pan-Sarbecovirus detection which includes SARS-CoV-2.    According to CMS-2020-01-R, this platform meets the definition of high-throughput technology.    Procalcitonin [[id number]]  (Normal) Collected: 01/25/24 1840    Lab Status: Final result Specimen: Blood from Arm, Left Updated: 01/25/24 1917     Procalcitonin 0.19 ng/ml     HS Troponin 0hr (reflex protocol) [[id number]]  (Normal) Collected: 01/25/24 1840    Lab Status: Final result Specimen: Blood from Arm, Left Updated: 01/25/24 1917     hs TnI 0hr 43 ng/L     B-Type Natriuretic Peptide(BNP) [[id number]]  (Abnormal) Collected: 01/25/24 1840    Lab Status: Final result Specimen: Blood from Arm, Left Updated: 01/25/24 1913     BNP 1,063 pg/mL     Comprehensive metabolic panel [[id number]]  (Abnormal) Collected: 01/25/24 1840    Lab Status: Final result Specimen: Blood from Arm, Left Updated: 01/25/24 1907     Sodium 134 mmol/L      Potassium 3.5 mmol/L      Chloride 100 mmol/L      CO2 24 mmol/L      ANION GAP 10 mmol/L      BUN 28 mg/dL      Creatinine 1.04 mg/dL      Glucose 121 mg/dL      Calcium 9.0 mg/dL      AST 22 U/L      ALT 15 U/L      Alkaline Phosphatase 75 U/L      Total Protein 6.1 g/dL      Albumin 3.9 g/dL       Total Bilirubin 0.91 mg/dL      eGFR 48 ml/min/1.73sq m     Narrative:      National Kidney Disease Foundation guidelines for Chronic Kidney Disease (CKD):     Stage 1 with normal or high GFR (GFR > 90 mL/min/1.73 square meters)    Stage 2 Mild CKD (GFR = 60-89 mL/min/1.73 square meters)    Stage 3A Moderate CKD (GFR = 45-59 mL/min/1.73 square meters)    Stage 3B Moderate CKD (GFR = 30-44 mL/min/1.73 square meters)    Stage 4 Severe CKD (GFR = 15-29 mL/min/1.73 square meters)    Stage 5 End Stage CKD (GFR <15 mL/min/1.73 square meters)  Note: GFR calculation is accurate only with a steady state creatinine    Lactic acid, plasma (w/reflex if result > 2.0) [[id number]]  (Normal) Collected: 01/25/24 1840    Lab Status: Final result Specimen: Blood from Arm, Left Updated: 01/25/24 1905     LACTIC ACID 1.6 mmol/L     Narrative:      Result may be elevated if tourniquet was used during collection.    D-Dimer [[id number]]  (Abnormal) Collected: 01/25/24 1840    Lab Status: Final result Specimen: Blood from Arm, Left Updated: 01/25/24 1905     D-Dimer, Quant 0.97 ug/ml FEU     Narrative:      In the evaluation for possible pulmonary embolism, in the appropriate (Well's Score of 4 or less) patient, the age adjusted d-dimer cutoff for this patient can be calculated as:    Age x 0.01 (in ug/mL) for Age-adjusted D-dimer exclusion threshold for a patient over 50 years.    CBC and differential [[id number]]  (Abnormal) Collected: 01/25/24 1840    Lab Status: Final result Specimen: Blood from Arm, Left Updated: 01/25/24 1905     WBC 3.40 Thousand/uL      RBC 3.26 Million/uL      Hemoglobin 9.6 g/dL      Hematocrit 30.2 %      MCV 93 fL      MCH 29.4 pg      MCHC 31.8 g/dL      RDW 15.9 %      MPV 8.6 fL      Platelets 99 Thousands/uL      nRBC 0 /100 WBCs      Neutrophils Relative 80 %      Immat GRANS % 0 %      Lymphocytes Relative 10 %      Monocytes Relative 10 %      Eosinophils Relative 0 %      Basophils Relative 0 %       Neutrophils Absolute 2.69 Thousands/µL      Immature Grans Absolute 0.01 Thousand/uL      Lymphocytes Absolute 0.35 Thousands/µL      Monocytes Absolute 0.35 Thousand/µL      Eosinophils Absolute 0.00 Thousand/µL      Basophils Absolute 0.00 Thousands/µL     Blood culture #1 [[id number]] Collected: 01/25/24 1840    Lab Status: In process Specimen: Blood from Arm, Left Updated: 01/25/24 1846    Blood culture #2 [[id number]] Collected: 01/25/24 1840    Lab Status: In process Specimen: Blood from Arm, Right Updated: 01/25/24 1846    UA w Reflex to Microscopic w Reflex to Culture [[id number]]     Lab Status: No result Specimen: Urine                    CTA ED chest PE Study   Final Result by Sammy Iniguez MD (01/25 2114)      No pulmonary embolism.      Right lower lobe consolidation consistent with pneumonia with probably reactive subcarinal lymph node.      Cardiomegaly with diffuse groundglass opacities in the mid to lower lung zones along with bilateral effusions consistent with pulmonary edema/CHF.                        Workstation performed: [id number]                    Procedures  Procedures         ED Course  ED Course as of 01/25/24 2119   Thu Jan 25, 2024   1827 Blood Pressure: 102/58   1827 Temperature: 100 °F (37.8 °C)   1827 Pulse: 76   1827 Respirations: 20   1827 SpO2(!): 87 %  Vital signs reviewed there is some mild hypoxia and fever   1933 INFLU A PCR(!): Positive   1959 Pt at CT   2056 I spoke with the patient's son named Caleb directly on the phone at 2050 hrs. he confirms that his mother does not wish to have any resuscitative efforts in the event of cardiac arrest.  She would not want any mechanical ventilation this is consistent with documentation from her last visit.             HEART Risk Score      Flowsheet Row Most Recent Value   Heart Score Risk Calculator    History 0 Filed at: 01/25/2024 1915   ECG 0 Filed at: 01/25/2024 1915   Age --   Risk Factors 1 Filed at: 01/25/2024 1915   Troponin 0  Filed at: 01/25/2024 1915   HEART Score --                          SBIRT 20yo+      Flowsheet Row Most Recent Value   Initial Alcohol Screen: US AUDIT-C     1. How often do you have a drink containing alcohol? 0 Filed at: 01/25/2024 1816   2. How many drinks containing alcohol do you have on a typical day you are drinking?  0 Filed at: 01/25/2024 1816   3b. FEMALE Any Age, or MALE 65+: How often do you have 4 or more drinks on one occassion? 0 Filed at: 01/25/2024 1816   Audit-C Score 0 Filed at: 01/25/2024 1816   KAILEY: How many times in the past year have you...    Used an illegal drug or used a prescription medication for non-medical reasons? Never Filed at: 01/25/2024 1816            Wells' Criteria for PE      Flowsheet Row Most Recent Value   Wells' Criteria for PE    Clinical signs and symptoms of DVT 0 Filed at: 01/25/2024 1926   PE is primary diagnosis or equally likely 3 Filed at: 01/25/2024 1926   HR >100 0 Filed at: 01/25/2024 1926   Immobilization at least 3 days or Surgery in the previous 4 weeks 0 Filed at: 01/25/2024 1926   Previous, objectively diagnosed PE or DVT 0 Filed at: 01/25/2024 1926   Hemoptysis 0 Filed at: 01/25/2024 1926   Malignancy with treatment within 6 months or palliative 0 Filed at: 01/25/2024 1926   Wells' Criteria Total 3 Filed at: 01/25/2024 1926                  Medical Decision Making  88-year-old female history of multifocal pneumonia inpatient admission 2 months ago here for evaluation of shortness of breath urinary frequency fever ongoing for the last few days she unfortunately recently lost her  this week.  She was given 650 of Tylenol from EMS I did speak with him face-to-face lives with the son at home.  Mildly tachypneic here some hypoxia.  Differential diagnosis includes COVID influenza RSV pneumonia pleural effusions congestive heart failure sepsis urinary tract infection pyelonephritis.    Workup is significant for influenza A mild hypoxia requiring nasal  cannula administration.  Discussed case with internal medicine  Recommending admission for hypoxia and confirmed DNR/DNI status with son.    Amount and/or Complexity of Data Reviewed  Labs: ordered. Decision-making details documented in ED Course.  Radiology: ordered.    Risk  Prescription drug management.  Decision regarding hospitalization.             Disposition  Final diagnoses:   Influenza A   Hypoxia     Time reflects when diagnosis was documented in both MDM as applicable and the Disposition within this note       Time User Action Codes Description Comment    1/25/2024  7:33 PM Glenn Bartholomew Add [J10.1] Influenza A     1/25/2024  9:01 PM Glenn Bartholomew Add [R09.02] Hypoxia           ED Disposition       ED Disposition   Admit    Condition   Stable    Date/Time   Thu Jan 25, 2024 2101    Comment   Case was discussed with NATALIYA Francois PA-C and the patient's admission status was agreed to be Admission Status: inpatient status to the service of Dr. Sherwood .               Follow-up Information    None         Patient's Medications   Discharge Prescriptions    No medications on file       No discharge procedures on file.    PDMP Review         Value Time User    PDMP Reviewed  Yes 9/28/2023  9:54 AM Papa Zabala DO            ED Provider  Electronically Signed by             Glenn Bartholomew PA-C  01/25/24 2519

## 2024-01-26 ENCOUNTER — RA CDI HCC (OUTPATIENT)
Dept: OTHER | Facility: HOSPITAL | Age: 89
End: 2024-01-26

## 2024-01-26 PROBLEM — J18.9 RIGHT LOWER LOBE PNEUMONIA: Status: ACTIVE | Noted: 2024-01-26

## 2024-01-26 PROBLEM — R79.89 ELEVATED D-DIMER: Status: ACTIVE | Noted: 2024-01-26

## 2024-01-26 PROBLEM — R09.02 HYPOXIA: Status: ACTIVE | Noted: 2024-01-25

## 2024-01-26 PROBLEM — A41.9 SEPSIS (HCC): Status: ACTIVE | Noted: 2024-01-26

## 2024-01-26 PROBLEM — D61.818 PANCYTOPENIA (HCC): Status: ACTIVE | Noted: 2024-01-26

## 2024-01-26 LAB
ALBUMIN SERPL BCP-MCNC: 3.7 G/DL (ref 3.5–5)
ALP SERPL-CCNC: 75 U/L (ref 34–104)
ALT SERPL W P-5'-P-CCNC: 16 U/L (ref 7–52)
ANION GAP SERPL CALCULATED.3IONS-SCNC: 10 MMOL/L
AST SERPL W P-5'-P-CCNC: 24 U/L (ref 13–39)
ATRIAL RATE: 66 BPM
BACTERIA UR QL AUTO: NORMAL /HPF
BASOPHILS # BLD AUTO: 0.01 THOUSANDS/ÂΜL (ref 0–0.1)
BASOPHILS NFR BLD AUTO: 0 % (ref 0–1)
BILIRUB SERPL-MCNC: 0.78 MG/DL (ref 0.2–1)
BILIRUB UR QL STRIP: NEGATIVE
BUN SERPL-MCNC: 27 MG/DL (ref 5–25)
CALCIUM SERPL-MCNC: 8.5 MG/DL (ref 8.4–10.2)
CHLORIDE SERPL-SCNC: 99 MMOL/L (ref 96–108)
CLARITY UR: CLEAR
CO2 SERPL-SCNC: 23 MMOL/L (ref 21–32)
COLOR UR: YELLOW
CREAT SERPL-MCNC: 1.06 MG/DL (ref 0.6–1.3)
EOSINOPHIL # BLD AUTO: 0 THOUSAND/ÂΜL (ref 0–0.61)
EOSINOPHIL NFR BLD AUTO: 0 % (ref 0–6)
ERYTHROCYTE [DISTWIDTH] IN BLOOD BY AUTOMATED COUNT: 15.8 % (ref 11.6–15.1)
GFR SERPL CREATININE-BSD FRML MDRD: 46 ML/MIN/1.73SQ M
GLUCOSE SERPL-MCNC: 122 MG/DL (ref 65–140)
GLUCOSE UR STRIP-MCNC: NEGATIVE MG/DL
HCT VFR BLD AUTO: 30.8 % (ref 34.8–46.1)
HGB BLD-MCNC: 9.5 G/DL (ref 11.5–15.4)
HGB UR QL STRIP.AUTO: NEGATIVE
IMM GRANULOCYTES # BLD AUTO: 0.01 THOUSAND/UL (ref 0–0.2)
IMM GRANULOCYTES NFR BLD AUTO: 0 % (ref 0–2)
KETONES UR STRIP-MCNC: NEGATIVE MG/DL
L PNEUMO1 AG UR QL IA.RAPID: NEGATIVE
LEUKOCYTE ESTERASE UR QL STRIP: ABNORMAL
LYMPHOCYTES # BLD AUTO: 0.37 THOUSANDS/ÂΜL (ref 0.6–4.47)
LYMPHOCYTES NFR BLD AUTO: 11 % (ref 14–44)
MAGNESIUM SERPL-MCNC: 1.7 MG/DL (ref 1.9–2.7)
MCH RBC QN AUTO: 28.7 PG (ref 26.8–34.3)
MCHC RBC AUTO-ENTMCNC: 30.8 G/DL (ref 31.4–37.4)
MCV RBC AUTO: 93 FL (ref 82–98)
MONOCYTES # BLD AUTO: 0.46 THOUSAND/ÂΜL (ref 0.17–1.22)
MONOCYTES NFR BLD AUTO: 14 % (ref 4–12)
NEUTROPHILS # BLD AUTO: 2.54 THOUSANDS/ÂΜL (ref 1.85–7.62)
NEUTS SEG NFR BLD AUTO: 75 % (ref 43–75)
NITRITE UR QL STRIP: NEGATIVE
NON-SQ EPI CELLS URNS QL MICRO: NORMAL /HPF
NRBC BLD AUTO-RTO: 0 /100 WBCS
PH UR STRIP.AUTO: 6.5 [PH]
PHOSPHATE SERPL-MCNC: 3.5 MG/DL (ref 2.3–4.1)
PLATELET # BLD AUTO: 95 THOUSANDS/UL (ref 149–390)
PMV BLD AUTO: 9.4 FL (ref 8.9–12.7)
POTASSIUM SERPL-SCNC: 3.8 MMOL/L (ref 3.5–5.3)
PROT SERPL-MCNC: 5.8 G/DL (ref 6.4–8.4)
PROT UR STRIP-MCNC: NEGATIVE MG/DL
QRS AXIS: -68 DEGREES
QRSD INTERVAL: 110 MS
QT INTERVAL: 452 MS
QTC INTERVAL: 473 MS
RBC # BLD AUTO: 3.31 MILLION/UL (ref 3.81–5.12)
RBC #/AREA URNS AUTO: NORMAL /HPF
S PNEUM AG UR QL: NEGATIVE
SODIUM SERPL-SCNC: 132 MMOL/L (ref 135–147)
SP GR UR STRIP.AUTO: <=1.005 (ref 1–1.03)
T WAVE AXIS: 108 DEGREES
UROBILINOGEN UR QL STRIP.AUTO: 0.2 E.U./DL
VENTRICULAR RATE: 66 BPM
WBC # BLD AUTO: 3.39 THOUSAND/UL (ref 4.31–10.16)
WBC #/AREA URNS AUTO: NORMAL /HPF

## 2024-01-26 PROCEDURE — 87040 BLOOD CULTURE FOR BACTERIA: CPT | Performed by: INTERNAL MEDICINE

## 2024-01-26 PROCEDURE — 97167 OT EVAL HIGH COMPLEX 60 MIN: CPT

## 2024-01-26 PROCEDURE — 97163 PT EVAL HIGH COMPLEX 45 MIN: CPT

## 2024-01-26 PROCEDURE — 87449 NOS EACH ORGANISM AG IA: CPT | Performed by: PHYSICIAN ASSISTANT

## 2024-01-26 PROCEDURE — 87081 CULTURE SCREEN ONLY: CPT | Performed by: INTERNAL MEDICINE

## 2024-01-26 PROCEDURE — 80053 COMPREHEN METABOLIC PANEL: CPT | Performed by: PHYSICIAN ASSISTANT

## 2024-01-26 PROCEDURE — 99223 1ST HOSP IP/OBS HIGH 75: CPT

## 2024-01-26 PROCEDURE — 87147 CULTURE TYPE IMMUNOLOGIC: CPT | Performed by: INTERNAL MEDICINE

## 2024-01-26 PROCEDURE — 83735 ASSAY OF MAGNESIUM: CPT | Performed by: PHYSICIAN ASSISTANT

## 2024-01-26 PROCEDURE — 93010 ELECTROCARDIOGRAM REPORT: CPT | Performed by: INTERNAL MEDICINE

## 2024-01-26 PROCEDURE — 94760 N-INVAS EAR/PLS OXIMETRY 1: CPT

## 2024-01-26 PROCEDURE — 99232 SBSQ HOSP IP/OBS MODERATE 35: CPT | Performed by: INTERNAL MEDICINE

## 2024-01-26 PROCEDURE — 81001 URINALYSIS AUTO W/SCOPE: CPT | Performed by: PHYSICIAN ASSISTANT

## 2024-01-26 PROCEDURE — 97116 GAIT TRAINING THERAPY: CPT

## 2024-01-26 PROCEDURE — 85025 COMPLETE CBC W/AUTO DIFF WBC: CPT | Performed by: PHYSICIAN ASSISTANT

## 2024-01-26 PROCEDURE — 84100 ASSAY OF PHOSPHORUS: CPT | Performed by: PHYSICIAN ASSISTANT

## 2024-01-26 RX ORDER — CEFTRIAXONE 1 G/50ML
1000 INJECTION, SOLUTION INTRAVENOUS EVERY 24 HOURS
Status: DISCONTINUED | OUTPATIENT
Start: 2024-01-26 | End: 2024-01-27

## 2024-01-26 RX ORDER — ZINC GLUCONATE 50 MG
50 TABLET ORAL DAILY
COMMUNITY

## 2024-01-26 RX ORDER — SODIUM CHLORIDE 9 MG/ML
50 INJECTION, SOLUTION INTRAVENOUS CONTINUOUS
Status: DISCONTINUED | OUTPATIENT
Start: 2024-01-26 | End: 2024-01-26

## 2024-01-26 RX ORDER — RIBOFLAVIN (VITAMIN B2) 100 MG
100 TABLET ORAL DAILY
COMMUNITY

## 2024-01-26 RX ORDER — IBUPROFEN 400 MG/1
400 TABLET ORAL ONCE
Status: COMPLETED | OUTPATIENT
Start: 2024-01-26 | End: 2024-01-26

## 2024-01-26 RX ORDER — MAGNESIUM SULFATE HEPTAHYDRATE 40 MG/ML
2 INJECTION, SOLUTION INTRAVENOUS ONCE
Qty: 50 ML | Refills: 0 | Status: COMPLETED | OUTPATIENT
Start: 2024-01-26 | End: 2024-01-26

## 2024-01-26 RX ADMIN — CLOPIDOGREL 75 MG: 75 TABLET ORAL at 09:15

## 2024-01-26 RX ADMIN — IBUPROFEN 400 MG: 400 TABLET, FILM COATED ORAL at 16:26

## 2024-01-26 RX ADMIN — ACETAMINOPHEN 650 MG: 325 TABLET, FILM COATED ORAL at 13:46

## 2024-01-26 RX ADMIN — SODIUM CHLORIDE 50 ML/HR: 0.9 INJECTION, SOLUTION INTRAVENOUS at 09:15

## 2024-01-26 RX ADMIN — PANTOPRAZOLE SODIUM 20 MG: 20 TABLET, DELAYED RELEASE ORAL at 05:01

## 2024-01-26 RX ADMIN — CEFTRIAXONE 1000 MG: 1 INJECTION, SOLUTION INTRAVENOUS at 09:14

## 2024-01-26 RX ADMIN — ATORVASTATIN CALCIUM 20 MG: 10 TABLET, FILM COATED ORAL at 16:26

## 2024-01-26 RX ADMIN — Medication 2000 UNITS: at 09:15

## 2024-01-26 RX ADMIN — APIXABAN 2.5 MG: 2.5 TABLET, FILM COATED ORAL at 17:47

## 2024-01-26 RX ADMIN — ACETAMINOPHEN 650 MG: 325 TABLET, FILM COATED ORAL at 03:55

## 2024-01-26 RX ADMIN — OSELTAMAVIR PHOSPHATE 30 MG: 30 CAPSULE ORAL at 21:27

## 2024-01-26 RX ADMIN — POTASSIUM CHLORIDE 10 MEQ: 750 TABLET, EXTENDED RELEASE ORAL at 09:15

## 2024-01-26 RX ADMIN — SODIUM CHLORIDE 250 ML: 0.9 INJECTION, SOLUTION INTRAVENOUS at 23:17

## 2024-01-26 RX ADMIN — MAGNESIUM SULFATE HEPTAHYDRATE 2 G: 40 INJECTION, SOLUTION INTRAVENOUS at 11:47

## 2024-01-26 RX ADMIN — APIXABAN 2.5 MG: 2.5 TABLET, FILM COATED ORAL at 09:15

## 2024-01-26 RX ADMIN — METOPROLOL SUCCINATE 50 MG: 50 TABLET, EXTENDED RELEASE ORAL at 09:15

## 2024-01-26 NOTE — PLAN OF CARE
Problem: Prexisting or High Potential for Compromised Skin Integrity  Goal: Skin integrity is maintained or improved  Description: INTERVENTIONS:  - Identify patients at risk for skin breakdown  - Assess and monitor skin integrity  - Assess and monitor nutrition and hydration status  - Monitor labs   - Assess for incontinence   - Turn and reposition patient  - Assist with mobility/ambulation  - Relieve pressure over bony prominences  - Avoid friction and shearing  - Provide appropriate hygiene as needed including keeping skin clean and dry  - Evaluate need for skin moisturizer/barrier cream  - Collaborate with interdisciplinary team   - Patient/family teaching  - Consider wound care consult   Outcome: Progressing     Problem: PAIN - ADULT  Goal: Verbalizes/displays adequate comfort level or baseline comfort level  Description: Interventions:  - Encourage patient to monitor pain and request assistance  - Assess pain using appropriate pain scale  - Administer analgesics based on type and severity of pain and evaluate response  - Implement non-pharmacological measures as appropriate and evaluate response  - Consider cultural and social influences on pain and pain management  - Notify physician/advanced practitioner if interventions unsuccessful or patient reports new pain  Outcome: Progressing     Problem: INFECTION - ADULT  Goal: Absence or prevention of progression during hospitalization  Description: INTERVENTIONS:  - Assess and monitor for signs and symptoms of infection  - Monitor lab/diagnostic results  - Monitor all insertion sites, i.e. indwelling lines, tubes, and drains  - Monitor endotracheal if appropriate and nasal secretions for changes in amount and color  - [place] appropriate cooling/warming therapies per order  - Administer medications as ordered  - Instruct and encourage patient and family to use good hand hygiene technique  - Identify and instruct in appropriate isolation precautions for  identified infection/condition  Outcome: Progressing  Goal: Absence of fever/infection during neutropenic period  Description: INTERVENTIONS:  - Monitor WBC    Outcome: Progressing     Problem: SAFETY ADULT  Goal: Patient will remain free of falls  Description: INTERVENTIONS:  - Educate patient/family on patient safety including physical limitations  - Instruct patient to call for assistance with activity   - Consult OT/PT to assist with strengthening/mobility   - Keep Call bell within reach  - Keep bed low and locked with side rails adjusted as appropriate  - Keep care items and personal belongings within reach  - Initiate and maintain comfort rounds  - Make Fall Risk Sign visible to staff  - Offer Toileting every 2 Hours, in advance of need  - Initiate/Maintain bed alarm  - Obtain necessary fall risk management equipment:   - Apply yellow socks and bracelet for high fall risk patients  - Consider moving patient to room near nurses station  Outcome: Progressing  Goal: Maintain or return to baseline ADL function  Description: INTERVENTIONS:  -  Assess patient's ability to carry out ADLs; assess patient's baseline for ADL function and identify physical deficits which impact ability to perform ADLs (bathing, care of mouth/teeth, toileting, grooming, dressing, etc.)  - Assess/evaluate cause of self-care deficits   - Assess range of motion  - Assess patient's mobility; develop plan if impaired  - Assess patient's need for assistive devices and provide as appropriate  - Encourage maximum independence but intervene and supervise when necessary  - Involve family in performance of ADLs  - Assess for home care needs following discharge   - Consider OT consult to assist with ADL evaluation and planning for discharge  - Provide patient education as appropriate  Outcome: Progressing  Goal: Maintains/Returns to pre admission functional level  Description: INTERVENTIONS:  - Perform AM-PAC 6 Click Basic Mobility/ Daily Activity  assessment daily.  - Set and communicate daily mobility goal to care team and patient/family/caregiver.   - Collaborate with rehabilitation services on mobility goals if consulted  - Perform Range of Motion 3 times a day.  - Reposition patient every 2 hours.  - Dangle patient 3 times a day  - Stand patient 3 times a day  - Ambulate patient 3 times a day  - Out of bed to chair 3 times a day   - Out of bed for meals 3 times a day  - Out of bed for toileting  - Record patient progress and toleration of activity level   Outcome: Progressing     Problem: DISCHARGE PLANNING  Goal: Discharge to home or other facility with appropriate resources  Description: INTERVENTIONS:  - Identify barriers to discharge w/patient and caregiver  - Arrange for needed discharge resources and transportation as appropriate  - Identify discharge learning needs (meds, wound care, etc.)  - Arrange for interpretive services to assist at discharge as needed  - Refer to Case Management Department for coordinating discharge planning if the patient needs post-hospital services based on physician/advanced practitioner order or complex needs related to functional status, cognitive ability, or social support system  Outcome: Progressing     Problem: Knowledge Deficit  Goal: Patient/family/caregiver demonstrates understanding of disease process, treatment plan, medications, and discharge instructions  Description: Complete learning assessment and assess knowledge base.  Interventions:  - Provide teaching at level of understanding  - Provide teaching via preferred learning methods  Outcome: Progressing     Problem: RESPIRATORY - ADULT  Goal: Achieves optimal ventilation and oxygenation  Description: INTERVENTIONS:  - Assess for changes in respiratory status  - Assess for changes in mentation and behavior  - Position to facilitate oxygenation and minimize respiratory effort  - Oxygen administered by appropriate delivery if ordered  - Initiate smoking  cessation education as indicated  - Encourage broncho-pulmonary hygiene including cough, deep breathe, Incentive Spirometry  - Assess the need for suctioning and aspirate as needed  - Assess and instruct to report SOB or any respiratory difficulty  - Respiratory Therapy support as indicated  Outcome: Progressing

## 2024-01-26 NOTE — ASSESSMENT & PLAN NOTE
Presented to the ER for evaluation of SOB  SPO2 87% upon arrival to the ER  CTA negative for acute pulmonary embolism  Most likely secondary  to pneumonia, influenza A  Respiratory protocol  Incentive spirometry  As needed nebulized treatment  Continue oxygen at 2 L/min  Monitor respiratory status, SpO2  A.m. labs  Pulmonary consult

## 2024-01-26 NOTE — PROGRESS NOTES
"[facility]  Progress Note  Name: Mariela Giron I  MRN: [id number]  Unit/Bed#: 411-01 I Date of Admission: 1/25/2024   Date of Service: 1/26/2024 I Hospital Day: 1    Assessment/Plan   * Sepsis (HCC)  Assessment & Plan  Sepsis was present on admission and due to Influenza A with a right lower lobe pneumonia  SIRS criteria was met with fevers, leukopenia, and tachypnea.  The lactic acid level was within normal limits.  Check blood cultures x 2 sets and a urine culture  Check a MRSA nasal culture  Treat with Tamiflu 30 mg PO every 24 hours (renally-dosed)  Utilize ceftriaxone 1000 mg IV every 24 hours to treat a possible super-imposed bacterial pneumonia  Follow the procalcitonin level    Influenza A  Assessment & Plan  Please see the assessment and plan for \"Sepsis\"    Hypoxia  Assessment & Plan  Due to Influenza A with pneumonia  Currently requiring 2 Lpm of continuous supplemental oxygen to maintain oxygen saturation levels at 90% and above  Not on home supplemental oxygen therapy  I appreciate Pulmonology's input.  Incentive spirometry  Respiratory protocol  The patient will need a home supplemental oxygen evaluation on the day of discharge.        Hyponatremia  Assessment & Plan  Treated with NSS IV fluids at 50 mL/hr, which I will discontinue due to the concern for volume overload  Follow the sodium level    Pancytopenia (HCC)  Assessment & Plan  Likely due to acute illness with Influenza A and sepsis  Follow the CBC  Outpatient Hematology evaluation if the pancytopenia persists    Coronary artery disease involving native coronary artery of native heart without angina pectoris  Assessment & Plan  Continue Plavix, statin therapy, and Toprol-XL  Outpatient follow-up with Cardiology    Permanent atrial fibrillation (HCC)  Assessment & Plan  Continue Toprol-XL 50 mg PO Qdaily  Continue anticoagulation with Eliquis 2.5 mg PO BID    Chronic diastolic (congestive) heart failure " ([facility])  Assessment & Plan  Wt Readings from Last 3 Encounters:   01/26/24 50.1 kg (110 lb 7.2 oz)   01/03/24 45.5 kg (100 lb 6.4 oz)   12/21/23 45.1 kg (99 lb 6.4 oz)     Hold PO diuretic treatment in the setting of sepsis  Monitor her volume status closely  Daily weights          Dyslipidemia  Assessment & Plan  Continue statin therapy    Hypomagnesemia  Assessment & Plan  Give magnesium sulfate 2 grams IV x 1 dose on 01/26/2024  Follow the magnesium level    Results from last 7 days   Lab Units 01/26/24  0408   MAGNESIUM mg/dL 1.7*             VTE Pharmacologic Prophylaxis: VTE Score: 10 High Risk (Score >/= 5) - Pharmacological DVT Prophylaxis Ordered: apixaban (Eliquis). Sequential Compression Devices Ordered.    Mobility:   Basic Mobility Inpatient Raw Score: 17  JH-HLM Goal: 5: Stand one or more mins  JH-HLM Achieved: 7: Walk 25 feet or more  HLM Goal achieved. Continue to encourage appropriate mobility.    Patient Centered Rounds: I performed bedside rounds with nursing staff today.   Discussions with Specialists or Other Care Team Provider: I discussed the case with Pulmonology.    Total Time Spent on Date of Encounter in care of patient: 35 mins. This time was spent on one or more of the following: performing physical exam; counseling and coordination of care; obtaining or reviewing history; documenting in the medical record; reviewing/ordering tests, medications or procedures; communicating with other healthcare professionals and discussing with patient's family/caregivers.    Current Length of Stay: 1 day(s)  Current Patient Status: Inpatient   Certification Statement: The patient will continue to require additional inpatient hospital stay due to the need for IV antibiotic treatment and with the patient requiring continuous supplemental oxygen to maintain oxygen saturation levels at 90% and above.  Discharge Plan: Anticipate discharge in 48-72 hrs to home with home services.    Code Status: Level 3 - DNAR  and DNI    Subjective:   The patient was seen and examined.  The patient is experiencing fevers and shortness of breath.      Objective:     Vitals:   Temp (24hrs), Av.5 °F (37.5 °C), Min:97.5 °F (36.4 °C), Max:101.5 °F (38.6 °C)    Temp:  [97.5 °F (36.4 °C)-101.5 °F (38.6 °C)] 99.4 °F (37.4 °C)  HR:  [58-86] 86  Resp:  [18-24] 18  BP: (100-113)/(53-58) 100/56  SpO2:  [87 %-100 %] 91 %  Body mass index is 20.87 kg/m².     Input and Output Summary (last 24 hours):     Intake/Output Summary (Last 24 hours) at 2024 1344  Last data filed at 2024 1237  Gross per 24 hour   Intake 417 ml   Output 400 ml   Net 17 ml       Physical Exam:   Physical Exam  General:  NAD, follows commands  HEENT:  NC/AT, mucous membranes dry  Neck:  Supple, No JVP elevation  CV:  + S1, + S2, RRR  Pulm:  Crackles at the right base  Abd:  Soft, Non-tender, Non-distended  Ext:  No clubbing/cyanosis/edema  Skin:  No rashes  Neuro:  Awake, alert, oriented  Psych:  Normal mood and affect      Additional Data:    Labs:  Results from last 7 days   Lab Units 24  0408   WBC Thousand/uL 3.39*   HEMOGLOBIN g/dL 9.5*   HEMATOCRIT % 30.8*   PLATELETS Thousands/uL 95*   NEUTROS PCT % 75   LYMPHS PCT % 11*   MONOS PCT % 14*   EOS PCT % 0     Results from last 7 days   Lab Units 24  0408   SODIUM mmol/L 132*   POTASSIUM mmol/L 3.8   CHLORIDE mmol/L 99   CO2 mmol/L 23   BUN mg/dL 27*   CREATININE mg/dL 1.06   ANION GAP mmol/L 10   CALCIUM mg/dL 8.5   ALBUMIN g/dL 3.7   TOTAL BILIRUBIN mg/dL 0.78   ALK PHOS U/L 75   ALT U/L 16   AST U/L 24   GLUCOSE RANDOM mg/dL 122                 Results from last 7 days   Lab Units 24  1840   LACTIC ACID mmol/L 1.6   PROCALCITONIN ng/ml 0.19       Lines/Drains:  Invasive Devices       Peripheral Intravenous Line  Duration             Peripheral IV 24 Dorsal (posterior);Left Hand <1 day    Peripheral IV 24 Proximal;Right;Ventral (anterior) Forearm <1 day              Drain  Duration              External Urinary Catheter Medium <1 day                          Imaging: Reviewed radiology reports from this admission including: chest CT scan    Recent Cultures (last 7 days):   Results from last 7 days   Lab Units 01/26/24  0245 01/25/24  1840   BLOOD CULTURE   --  Received in Microbiology Lab. Culture in Progress.  Received in Microbiology Lab. Culture in Progress.   LEGIONELLA URINARY ANTIGEN  Negative  --        Last 24 Hours Medication List:   Current Facility-Administered Medications   Medication Dose Route Frequency Provider Last Rate    acetaminophen  650 mg Oral Q6H PRN Rogerio Francois PA-C      albuterol  2.5 mg Nebulization Q4H PRN Fazal Sherwood DO      ALPRAZolam  0.25 mg Oral TID PRN Rogerio Francois PA-C      apixaban  2.5 mg Oral BID Roegrio Francois PA-C      atorvastatin  20 mg Oral Daily With Dinner Rogerio Francois PA-C      bisacodyl  10 mg Rectal Daily PRN Rogerio Francois PA-C      cefTRIAXone  1,000 mg Intravenous Q24H Fazal Sherwood DO 1,000 mg (01/26/24 0914)    cholecalciferol  2,000 Units Oral Daily Rogerio Francois PA-C      clopidogrel  75 mg Oral Daily Rogerio Francois PA-C      magnesium sulfate  2 g Intravenous Once Fazal Sherwood DO 2 g (01/26/24 1147)    metoprolol succinate  50 mg Oral Daily Rogerio Francois PA-C      ondansetron  4 mg Intravenous Q6H PRN Rogerio Francois PA-C      oseltamivir  30 mg Oral Q24H Rogerio Francois PA-C      pantoprazole  20 mg Oral Early Morning Rogerio Francois PA-C          Today, Patient Was Seen By: Fazal Sherwood DO    **Please Note: This note may have been constructed using a voice recognition system.**

## 2024-01-26 NOTE — ASSESSMENT & PLAN NOTE
Blood pressure is stable  Continue PTA blood pressure medications  Monitor blood pressure while admitted

## 2024-01-26 NOTE — PLAN OF CARE
Problem: Prexisting or High Potential for Compromised Skin Integrity  Goal: Skin integrity is maintained or improved  Description: INTERVENTIONS:  - Identify patients at risk for skin breakdown  - Assess and monitor skin integrity  - Assess and monitor nutrition and hydration status  - Monitor labs   - Assess for incontinence   - Turn and reposition patient  - Assist with mobility/ambulation  - Relieve pressure over bony prominences  - Avoid friction and shearing  - Provide appropriate hygiene as needed including keeping skin clean and dry  - Evaluate need for skin moisturizer/barrier cream  - Collaborate with interdisciplinary team   - Patient/family teaching  - Consider wound care consult   Outcome: Progressing     Problem: PAIN - ADULT  Goal: Verbalizes/displays adequate comfort level or baseline comfort level  Description: Interventions:  - Encourage patient to monitor pain and request assistance  - Assess pain using appropriate pain scale  - Administer analgesics based on type and severity of pain and evaluate response  - Implement non-pharmacological measures as appropriate and evaluate response  - Consider cultural and social influences on pain and pain management  - Notify physician/advanced practitioner if interventions unsuccessful or patient reports new pain  Outcome: Progressing     Problem: INFECTION - ADULT  Goal: Absence or prevention of progression during hospitalization  Description: INTERVENTIONS:  - Assess and monitor for signs and symptoms of infection  - Monitor lab/diagnostic results  - Monitor all insertion sites, i.e. indwelling lines, tubes, and drains  - Monitor endotracheal if appropriate and nasal secretions for changes in amount and color  - [place] appropriate cooling/warming therapies per order  - Administer medications as ordered  - Instruct and encourage patient and family to use good hand hygiene technique  - Identify and instruct in appropriate isolation precautions for  identified infection/condition  Outcome: Progressing  Goal: Absence of fever/infection during neutropenic period  Description: INTERVENTIONS:  - Monitor WBC    Outcome: Progressing     Problem: SAFETY ADULT  Goal: Patient will remain free of falls  Description: INTERVENTIONS:  - Educate patient/family on patient safety including physical limitations  - Instruct patient to call for assistance with activity   - Consult OT/PT to assist with strengthening/mobility   - Keep Call bell within reach  - Keep bed low and locked with side rails adjusted as appropriate  - Keep care items and personal belongings within reach  - Initiate and maintain comfort rounds  - Make Fall Risk Sign visible to staff  - Offer Toileting every 2 Hours, in advance of need  - Initiate/Maintain bed alarm  - Obtain necessary fall risk management equipment:   - Apply yellow socks and bracelet for high fall risk patients  - Consider moving patient to room near nurses station  Outcome: Progressing  Goal: Maintain or return to baseline ADL function  Description: INTERVENTIONS:  -  Assess patient's ability to carry out ADLs; assess patient's baseline for ADL function and identify physical deficits which impact ability to perform ADLs (bathing, care of mouth/teeth, toileting, grooming, dressing, etc.)  - Assess/evaluate cause of self-care deficits   - Assess range of motion  - Assess patient's mobility; develop plan if impaired  - Assess patient's need for assistive devices and provide as appropriate  - Encourage maximum independence but intervene and supervise when necessary  - Involve family in performance of ADLs  - Assess for home care needs following discharge   - Consider OT consult to assist with ADL evaluation and planning for discharge  - Provide patient education as appropriate  Outcome: Progressing  Goal: Maintains/Returns to pre admission functional level  Description: INTERVENTIONS:  - Perform AM-PAC 6 Click Basic Mobility/ Daily Activity  assessment daily.  - Set and communicate daily mobility goal to care team and patient/family/caregiver.   - Collaborate with rehabilitation services on mobility goals if consulted  - Perform Range of Motion 3 times a day.  - Reposition patient every 2 hours.  - Dangle patient 3 times a day  - Stand patient 3 times a day  - Ambulate patient 3 times a day  - Out of bed to chair 3 times a day   - Out of bed for meals 3 times a day  - Out of bed for toileting  - Record patient progress and toleration of activity level   Outcome: Progressing     Problem: DISCHARGE PLANNING  Goal: Discharge to home or other facility with appropriate resources  Description: INTERVENTIONS:  - Identify barriers to discharge w/patient and caregiver  - Arrange for needed discharge resources and transportation as appropriate  - Identify discharge learning needs (meds, wound care, etc.)  - Arrange for interpretive services to assist at discharge as needed  - Refer to Case Management Department for coordinating discharge planning if the patient needs post-hospital services based on physician/advanced practitioner order or complex needs related to functional status, cognitive ability, or social support system  Outcome: Progressing     Problem: Knowledge Deficit  Goal: Patient/family/caregiver demonstrates understanding of disease process, treatment plan, medications, and discharge instructions  Description: Complete learning assessment and assess knowledge base.  Interventions:  - Provide teaching at level of understanding  - Provide teaching via preferred learning methods  Outcome: Progressing     Problem: RESPIRATORY - ADULT  Goal: Achieves optimal ventilation and oxygenation  Description: INTERVENTIONS:  - Assess for changes in respiratory status  - Assess for changes in mentation and behavior  - Position to facilitate oxygenation and minimize respiratory effort  - Oxygen administered by appropriate delivery if ordered  - Initiate smoking  cessation education as indicated  - Encourage broncho-pulmonary hygiene including cough, deep breathe, Incentive Spirometry  - Assess the need for suctioning and aspirate as needed  - Assess and instruct to report SOB or any respiratory difficulty  - Respiratory Therapy support as indicated  Outcome: Progressing     Problem: Nutrition/Hydration-ADULT  Goal: Nutrient/Hydration intake appropriate for improving, restoring or maintaining nutritional needs  Description: Monitor and assess patient's nutrition/hydration status for malnutrition. Collaborate with interdisciplinary team and initiate plan and interventions as ordered.  Monitor patient's weight and dietary intake as ordered or per policy. Utilize nutrition screening tool and intervene as necessary. Determine patient's food preferences and provide high-protein, high-caloric foods as appropriate.     INTERVENTIONS:  - Monitor oral intake, urinary output, labs, and treatment plans  - Assess nutrition and hydration status and recommend course of action  - Evaluate amount of meals eaten  - Assist patient with eating if necessary   - Allow adequate time for meals  - Recommend/ encourage appropriate diets, oral nutritional supplements, and vitamin/mineral supplements  - Order, calculate, and assess calorie counts as needed  - Recommend, monitor, and adjust tube feedings and TPN/PPN based on assessed needs  - Assess need for intravenous fluids  - Provide specific nutrition/hydration education as appropriate  - Include patient/family/caregiver in decisions related to nutrition  Outcome: Progressing

## 2024-01-26 NOTE — ASSESSMENT & PLAN NOTE
Likely due to acute illness with Influenza A and sepsis  Follow the CBC  Outpatient Hematology evaluation if the pancytopenia persists

## 2024-01-26 NOTE — ASSESSMENT & PLAN NOTE
Due to Influenza A with pneumonia  Currently requiring 2 Lpm of continuous supplemental oxygen to maintain oxygen saturation levels at 90% and above  Not on home supplemental oxygen therapy  I appreciate Pulmonology's input.  Incentive spirometry  Respiratory protocol  The patient will need a home supplemental oxygen evaluation on the day of discharge.

## 2024-01-26 NOTE — CASE MANAGEMENT
Case Management Progress Note    Patient name Mariela Giron  Location /411-01 MRN [id number]  : 1935 Date 2024       LOS (days): 1  Geometric Mean LOS (GMLOS) (days): 3.6  Days to GMLOS:2.9        OBJECTIVE:        Current admission status: Inpatient  Preferred Pharmacy:   [facility] [address] [place] [address] [address]  [address] [place] [address]  Phone: [phone] Fax: [phone]    [id number] - [facility] [address] [address]  Phone: [phone] Fax: [phone]    Primary Care Provider: Papa Zabala DO    Primary Insurance: MEDICARE  Secondary Insurance: BLUE CROSS    PROGRESS NOTE: spoke with pt via phone due to +flu. Pt verified information I obtained from son. Pt does not want  STR  but to continue with home care. Cm to follow.

## 2024-01-26 NOTE — ASSESSMENT & PLAN NOTE
Wt Readings from Last 3 Encounters:   01/03/24 45.5 kg (100 lb 6.4 oz)   12/21/23 45.1 kg (99 lb 6.4 oz)   11/22/23 52.7 kg (116 lb 2.9 oz)   No evidence of acute exacerbation  Last echo on 6/14/2023 reviewed EF at 50%  BNP at 1036  Continue PTA medication  Continue outpatient cardiology follow up

## 2024-01-26 NOTE — ASSESSMENT & PLAN NOTE
History of A-fib  Anticoagulated on Eliquis  Rate currently controlled  Continue metoprolol, Eliquis  Continue outpatient cardiology follow-up

## 2024-01-26 NOTE — PHYSICAL THERAPY NOTE
Physical Therapy Evaluation    Patient Name: Mariela Giron    Today's Date: 1/26/2024     Problem List  Principal Problem:    Acute respiratory failure with hypoxia (HCC)  Active Problems:    Hypomagnesemia    Hyponatremia    Essential hypertension    Dyslipidemia    Chronic diastolic (congestive) heart failure (HCC)    Permanent atrial fibrillation (HCC)    Coronary artery disease involving native coronary artery of native heart without angina pectoris    Influenza A    Right lower lobe pneumonia    Pancytopenia (HCC)       Past Medical History  Past Medical History:   Diagnosis Date    Acute on chronic diastolic CHF (congestive heart failure) (HCC) 01/30/2023    Acute respiratory failure with hypoxia (HCC) 12/22/2022    Aortic stenosis     Atrial fibrillation (HCC)     Atrial flutter (HCC)     CAD (coronary artery disease) 01/11/2023    CAD/PCI/ELODIA    CKD (chronic kidney disease), stage III (HCC)     Community acquired pneumonia of left upper lobe of lung 05/17/2019    Fatigue     Full dentures     Generalized anxiety disorder     Hyperlipidemia     Hypertension     Impaired fasting glucose     Mitral regurgitation     Pneumonia     RBBB     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    TRANSFEMORAL W/ 23MM CALVERT SHAVONNE S3 ULTRA VALVE(ACCESS ON LEFT)    SVT (supraventricular tachycardia)     Tricuspid regurgitation         Past Surgical History  Past Surgical History:   Procedure Laterality Date    CARDIAC CATHETERIZATION N/A 1/11/2023    Procedure: LHC-Cardiac catheterization;  Surgeon: Sj Camacho MD;  [facility]: [facility];  Service: Cardiology    CARDIAC CATHETERIZATION N/A 1/11/2023    Procedure: Cardiac pci;  Surgeon: Sj Camacho MD;  [facility]: [facility];  Service: Cardiology    CARDIAC CATHETERIZATION N/A 1/17/2023    Procedure: CARDIAC TAVR;  Surgeon: Harrison Magdaleno DO;  Location: [facility];  Service:  Cardiology    DENTAL SURGERY Bilateral     ALL TEETH REMOVED    FL GUIDED NEEDLE PLAC BX/ASP/INJ  5/26/2021    IR THORACENTESIS  12/20/2022    IR THORACENTESIS  7/14/2023    IR THORACENTESIS  9/1/2023    IR THORACENTESIS  9/25/2023    JOINT REPLACEMENT Left     KNEE SURGERY      left knee replacement    NE INJECT SI JOINT ARTHRGRPHY&/ANES/STEROID W/JOHNATHON Bilateral 5/26/2021    Procedure: SACROILIAC JOINT INJECTION;  Surgeon: Cj Langston MD;  Location: [facility];  Service: Pain Management     NE REPLACE AORTIC VALVE OPENFEMORAL ARTERY APPROACH N/A 1/17/2023    Procedure: REPLACEMENT AORTIC VALVE TRANSCATHETER (TAVR) TRANSFEMORAL W/ 23MM CALVERT SHAVONNE S3 ULTRA VALVE(ACCESS ON LEFT) ALENA;  Surgeon: Sundar Ayala DO;  Location:  MAIN OR;  Service: Cardiac Surgery    SACROILIAC JOINT INJECTION Bilateral 6/7/2023    Procedure: Bilateral sacroiliac joint steroid injection;  Surgeon: Blanca Holm MD;  Location: [facility];  Service: Pain Management            01/26/24 0915   PT Last Visit   PT Visit Date 01/26/24   Note Type   Note type Evaluation   Pain Assessment   Pain Assessment Tool 0-10   Pain Score No Pain   Restrictions/Precautions   Weight Bearing Precautions Per Order No   Other Precautions Contact/isolation;Droplet precautions;Chair Alarm;Bed Alarm;Fall Risk;Multiple lines;O2   Home Living   Type of Home House   Home Layout Two level;Bed/bath upstairs;Stairs to enter without rails  (1 CASANDRA; stairglide to 2nd floor)   Bathroom Shower/Tub Tub/shower unit   Bathroom Toilet Standard   Bathroom Equipment Grab bars in shower;Shower chair   Bathroom Accessibility Accessible   Home Equipment Walker;Cane;Stair glide;Grab bars   Additional Comments pt reports use of RW at baseline   Prior Function   Level of [place] Independent with functional mobility;Needs assistance with ADLs;Needs assistance with IADLS   Lives With Alone   Receives Help From Family;Home health   IADLs Family/Friend/Other  "provides transportation   Falls in the last 6 months 0   Vocational Retired   General   Family/Caregiver Present No   Cognition   Overall Cognitive Status Impaired   Arousal/Participation Cooperative   Orientation Level Oriented to person;Oriented to place;Disoriented to time;Disoriented to situation   Following Commands Follows one step commands without difficulty   Subjective   Subjective \"My  just passed away\"   Bed Mobility   Supine to Sit 5  Supervision   Additional items HOB elevated;Bedrails;Increased time required;Verbal cues   Sit to Supine   (pt OOB at end of session)   Transfers   Sit to Stand 5  Supervision   Additional items Increased time required;Verbal cues   Stand to Sit 5  Supervision   Additional items Increased time required;Verbal cues   Additional Comments performed with and without RW   Ambulation/Elevation   Gait pattern Excessively slow;Short stride;Foward flexed;Decreased foot clearance;Narrow DAVID   Gait Assistance 4  Minimal assist   Additional items Verbal cues;Assist x 1   Assistive Device Rolling walker   Distance 100'   Balance   Static Sitting Good   Dynamic Sitting Fair +   Static Standing Fair   Dynamic Standing Fair -   Ambulatory Fair -  (with RW)   Endurance Deficit   Endurance Deficit Yes   Endurance Deficit Description pt requiring supplemental O2 to maintain WFL SpO2   Activity Tolerance   Activity Tolerance Patient limited by fatigue   Assessment   Prognosis Good   Problem List Decreased strength;Decreased endurance;Impaired balance;Decreased mobility;Impaired judgement;Decreased safety awareness   Assessment Patient is a 88 y.o. female evaluated by Physical Therapy s/p admit to [facility] on 1/25/2024 with admitting diagnosis of: SOB (shortness of breath), Influenza A, Hypoxia, and principal problem of: Acute respiratory failure with hypoxia. PT was consulted to assess patient's functional mobility and discharge needs. Ordered are PT Evaluation and " treatment with activity level of: up and OOB as tolerated. Comorbidities affecting patient's physical performance at time of assessment include:  HTN, dyslipidemia, CHF, a-fib, CAD, hiatal hernia, pancytopenia, osteopenia, chronic pain syndrome, s/p TAVR, Graves disease, osteoporosis, GERD, pressure injury of R heel stage 2 . Personal factors affecting the patient at time of IE include: lives in 2 story home, step(s) to enter home, impaired safety awareness, anxiety, inability/difficulty performing IADLs, and inability/difficulty performing ADLs. Please locate objective findings from PT assessment regarding body systems outlined above. Upon evaluation, pt able to perform all functional mobility with SUP-Janice x 1, RW, and increased time. Occasional verbal cuing provided for safety awareness and sequencing. Seated rest break taken following 100' of ambulation d/t fatigue and SOB. SpO2 and HR remained WFL on 2L O2 throughout. Moderate postural sway demonstrated with mobility but no true LOB experienced. The patient's AM-PAC Basic Mobility Inpatient Short Form Raw Score is 17. A Raw score of greater than 16 suggests the patient may benefit from discharge to home. Please also refer to the recommendation of the Physical Therapist for safe discharge planning. Co treatment with OT secondary to complex medical condition of pt, possible A of 2 required to achieve and maintain transitional movements, requiring the need of skilled therapeutic intervention of 2 therapists to achieve delivery of services. Pt will benefit from continued PT intervention during LOS to address current deficits, increase LOF, and facilitate safe d/c to next level of care when medically appropriate. D/c recommendation at this time is rehabilitation resource intensity level II.   Goals   Patient Goals to go home   LTG Expiration Date 02/09/24   Long Term Goal #1 Pt will participate in B LE strengthening exercises to facilitate improved functional  activity tolerance. Pt will perform all functional transfers and bed mobility mod(I) with good safety awareness. Pt will ambulate 250' mod(I) with LRAD while maintaining good functional dynamic balance.   Plan   Treatment/Interventions Functional transfer training;LE strengthening/ROM;Elevations;Therapeutic exercise;Endurance training;Bed mobility;Gait training   PT Frequency 3-5x/wk   Discharge Recommendation   Rehab Resource Intensity Level, PT II (Moderate Resource Intensity)   AM-PAC Basic Mobility Inpatient   Turning in Flat Bed Without Bedrails 3   Lying on Back to Sitting on Edge of Flat Bed Without Bedrails 3   Moving Bed to Chair 3   Standing Up From Chair Using Arms 3   Walk in Room 3   Climb 3-5 Stairs With Railing 2   Basic Mobility Inpatient Raw Score 17   Basic Mobility Standardized Score 39.67   Highest Level Of Mobility   JH-HLM Goal 5: Stand one or more mins   JH-HLM Achieved 7: Walk 25 feet or more   End of Consult   Patient Position at End of Consult Bedside chair;Bed/Chair alarm activated;All needs within reach

## 2024-01-26 NOTE — RESPIRATORY THERAPY NOTE
RT Protocol Note  Mariela Giron 88 y.o. female MRN: [id number]  Unit/Bed#: 411-01 Encounter: [id number]    Assessment    Principal Problem:    Acute respiratory failure with hypoxia (HCC)  Active Problems:    Essential hypertension    Dyslipidemia    Chronic diastolic (congestive) heart failure (HCC)    Permanent atrial fibrillation (HCC)    Coronary artery disease involving native coronary artery of native heart without angina pectoris    Influenza A      Home Pulmonary Medications: None       Past Medical History:   Diagnosis Date    Acute on chronic diastolic CHF (congestive heart failure) (HCC) 01/30/2023    Acute respiratory failure with hypoxia (HCC) 12/22/2022    Aortic stenosis     Atrial fibrillation (HCC)     Atrial flutter (HCC)     CAD (coronary artery disease) 01/11/2023    CAD/PCI/ELODIA    CKD (chronic kidney disease), stage III (HCC)     Community acquired pneumonia of left upper lobe of lung 05/17/2019    Fatigue     Full dentures     Generalized anxiety disorder     Hyperlipidemia     Hypertension     Impaired fasting glucose     Mitral regurgitation     Pneumonia     RBBB     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    TRANSFEMORAL W/ 23MM CALVERT SHAVONNE S3 ULTRA VALVE(ACCESS ON LEFT)    SVT (supraventricular tachycardia)     Tricuspid regurgitation      Social History     Socioeconomic History    Marital status: /Civil Union     Spouse name: None    Number of children: 2    Years of education: None    Highest education level: None   Occupational History    None   Tobacco Use    Smoking status: Never    Smokeless tobacco: Never   Vaping Use    Vaping status: Never Used   Substance and Sexual Activity    Alcohol use: Never    Drug use: Never    Sexual activity: Not Currently     Partners: Male   Other Topics Concern    None   Social History Narrative    None     Social Determinants of Health     Financial Resource Strain: Low Risk  (4/25/2023)    Overall Financial  "Resource Strain (CARDIA)     Difficulty of Paying Living Expenses: Not hard at all   Food Insecurity: No Food Insecurity (6/12/2023)    Hunger Vital Sign     Worried About Running Out of Food in the Last Year: Never true     Ran Out of Food in the Last Year: Never true   Transportation Needs: No Transportation Needs (6/12/2023)    PRAPARE - Transportation     Lack of Transportation (Medical): No     Lack of Transportation (Non-Medical): No   Physical Activity: Not on file   Stress: No Stress Concern Present (1/21/2021)    [place] [place] [employer] - Occupational Stress Questionnaire     Feeling of Stress : Only a little   Social Connections: Moderately Isolated (1/21/2021)    Social Connection and Isolation Panel [NHANES]     Frequency of Communication with Friends and Family: Twice a week     Frequency of Social Gatherings with Friends and Family: Three times a week     Attends Mandaen Services: Never     Active Member of Clubs or Organizations: No     Attends Club or Organization Meetings: Never     Marital Status:    Intimate Partner Violence: Not At Risk (1/21/2021)    Humiliation, Afraid, Rape, and Kick questionnaire     Fear of Current or Ex-Partner: No     Emotionally Abused: No     Physically Abused: No     Sexually Abused: No   Housing Stability: Low Risk  (6/12/2023)    Housing Stability Vital Sign     Unable to Pay for Housing in the Last Year: No     Number of Places Lived in the Last Year: 1     Unstable Housing in the Last Year: No       Subjective         Objective    Physical Exam:   Assessment Type: Assess only  General Appearance: Alert, Awake  Respiratory Pattern: Normal, Dyspnea with exertion  Chest Assessment: Chest expansion symmetrical  Bilateral Breath Sounds: Diminished  Cough: None  O2 Device: 2LNC    Vitals:  Blood pressure 113/56, pulse 58, temperature 97.5 °F (36.4 °C), temperature source Tympanic, resp. rate 20, height 5' 1\" (1.549 m), weight 47.8 kg (105 lb 6.1 " oz), SpO2 96%.          Imaging and other studies: I have personally reviewed pertinent reports.      O2 Device: 2LNC     Plan    Respiratory Plan: Mild Distress pathway      Plan to order PRN Albuterol.  Resp Comments: Pt stated they do not wear O2 at home, take any breathing treatments, or wear any PAP therapy. Pt stated they were SOB earlier but no longer SOB.

## 2024-01-26 NOTE — ASSESSMENT & PLAN NOTE
Wt Readings from Last 3 Encounters:   01/26/24 50.1 kg (110 lb 7.2 oz)   01/03/24 45.5 kg (100 lb 6.4 oz)   12/21/23 45.1 kg (99 lb 6.4 oz)     Hold PO diuretic treatment in the setting of sepsis  Monitor her volume status closely  Daily weights

## 2024-01-26 NOTE — ASSESSMENT & PLAN NOTE
Presented to the ER with complaints of SOB  Influenza A positive  Initiate Tamiflu  Monitor respiratory status, SpO2  Contact and droplet precaution  A.m. labs  Supportive care

## 2024-01-26 NOTE — CASE MANAGEMENT
Case Management Assessment & Discharge Planning Note    Patient name Mariela Giron  Location /411-01 MRN [id number]  : 1935 Date 2024       Current Admission Date: 2024  Current Admission Diagnosis:Acute respiratory failure with hypoxia ([facility])   Patient Active Problem List    Diagnosis Date Noted    Right lower lobe pneumonia 2024    Pancytopenia (HCC) 2024    Acute respiratory failure with hypoxia ([facility]) 2024    Influenza A 2024    Multifocal pneumonia 2023    Pressure injury of right heel, stage 2 ([facility]) 2023    Acute-on-chronic kidney injury  2023    Encounter for immunization 10/03/2023    S/P angioplasty with stent 10/03/2023    Centromere antibody positive 2023    Vitamin D deficiency 08/15/2023    Atrial flutter ([facility]) 08/15/2023    Gastroesophageal reflux disease without esophagitis 08/15/2023    SIADH (syndrome of inappropriate 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[facility]) 2023    Tricuspid valve insufficiency 2023    Prolonged QT interval 2023    Pleural effusion on right 2023    Moderate protein-calorie malnutrition ([facility]) 2023    Closed wedge compression fracture of T11 vertebra ([facility]) 2023    Age-related osteoporosis with current pathological fracture of vertebra ([facility]) 2023    Graves disease 2023    Anemia 2023    Coronary artery disease involving native coronary artery of native heart without angina pectoris 2023    S/P TAVR (transcatheter aortic valve replacement) 2023    Permanent atrial fibrillation (HCC) 2023    Incomplete right bundle branch block 2023    Constipation 2023    Status post insertion of drug eluting coronary artery stent 2023    Acute hypoxic respiratory failure (HCC) 2022    Chronic diastolic (congestive) heart failure ([facility]) 2022    Primary generalized (osteo)arthritis 2022    Dyslipidemia 2022    Post-menopause  03/28/2022    Generalized weakness 12/23/2021    Ganglion cyst 09/14/2021    Adhesive capsulitis of right shoulder 09/14/2021    Bilateral leg edema 07/20/2021    Chronic pain syndrome 06/22/2021    Sacroiliitis (HCC) 06/22/2021    Gait abnormality 06/01/2021    Chronic bilateral low back pain without sciatica 04/30/2021    Osteopenia of neck of femur 01/21/2021    Impaired fasting glucose     Encounter for long-term (current) use of medications 10/31/2020    Immunization due 10/31/2020    Acute bursitis of right shoulder 10/31/2020    Encounter for Medicare annual wellness exam 01/02/2020    Mid back pain 09/30/2019    Generalized anxiety disorder 09/05/2019    Non-rheumatic tricuspid valve insufficiency 06/04/2019    Lymphadenopathy 05/21/2019    Hiatal hernia 05/21/2019    Thrombocytopenia (HCC) 05/21/2019    Mitral valve insufficiency 05/21/2019    Atrial tachycardia 05/21/2019    Pulmonary hypertension ([facility]) 05/21/2019    Diverticulosis 05/21/2019    Essential hypertension 05/18/2019    Hypomagnesemia 05/17/2019    Hypokalemia 05/17/2019    Hyponatremia 05/17/2019      LOS (days): 1  Geometric Mean LOS (GMLOS) (days): 3.6  Days to GMLOS:3     OBJECTIVE:    Risk of Unplanned Readmission Score: 23.73         Current admission status: Inpatient       Preferred Pharmacy:   [facility] [address]  [address] [address]  Phone: [phone] Fax: [phone]    [id number] [place] [address] [address]  Phone: [phone] Fax: [phone]    Primary Care Provider: Papa Zabala DO    Primary Insurance: MEDICARE  Secondary Insurance: BLUE CROSS    ASSESSMENT:  Active Health Care Proxies       Sloan Giron [facility] Care Representative - Son   Primary Phone: [phone] (Mobile)  Home Phone: [phone]                 Advance Directives  Does patient have a  Health Care POA?: No  Was patient offered paperwork?: Yes (declined)  Does patient currently have a Health Care decision maker?: Yes, please see Health Care Proxy section  Does patient have Advance Directives?: Yes  Advance Directives: Living will  Primary Contact: Sloan Giron-son         Readmission Root Cause  30 Day Readmission: No    Patient Information  Admitted from:: Home  Mental Status: Alert  During Assessment patient was accompanied by: Not accompanied during assessment  Assessment information provided by:: Son (spoke with son via phone. waiting for pt to answer in room. +flu)  Primary Caregiver: Self  Support Systems: Self, Son, Private Caregivers  County of Residence: [place]  What city do you live in?: [place]  Home entry access options. Select all that apply.: Stairs  Number of steps to enter home.: 1  Do the steps have railings?: No  Type of Current Residence: 2 story home  Upon entering residence, is there a bedroom on the main floor (no further steps)?: No  A bedroom is located on the following floor levels of residence (select all that apply):: 2nd Floor  Upon entering residence, is there a bathroom on the main floor (no further steps)?: No  Indicate which floors of current residence have a bathroom (select all the apply):: 2nd Floor  Number of steps to 2nd floor from main floor: One Flight (stair glide)  Living Arrangements: Lives Alone  Is patient a ?: No    Activities of Daily Living Prior to Admission  Functional Status: Assistance  Completes ADLs independently?: No  Level of ADL dependence: Assistance  Ambulates independently?: Yes  Does patient use assisted devices?: Yes  Assisted Devices (DME) used: Walker, Bedside Commode, Stair Chair/Glide, Shower Chair  Does patient currently own DME?: Yes  What DME does the patient currently own?: Bedside Commode, Shower Chair, Stair Chair/Glide, Walker  Does patient have a history of Outpatient Therapy (PT/OT)?: No  Does the patient have  a history of Short-Term Rehab?: Yes ([place], 5th floor)  Does patient have a history of HHC?: Yes  Does patient currently have HHC?: Yes (pt also has private caregiver through independent living 4 hours day 4xwk.)    Current Home Health Care  Type of Current Home Care Services: Nurse visit  [facility]  Current Home Health Follow-Up Provider:: PCP    Patient Information Continued  Income Source: SSI/SSD  Does patient have prescription coverage?: Yes  Does patient receive dialysis treatments?: No  Does patient have a history of substance abuse?: No  Does patient have a history of Mental Health Diagnosis?: No         Means of Transportation  Means of Transport to Appts:: Family transport      Housing Stability: Low Risk  (1/26/2024)    Housing Stability Vital Sign     Unable to Pay for Housing in the Last Year: No     Number of Places Lived in the Last Year: 1     Unstable Housing in the Last Year: No   Food Insecurity: No Food Insecurity (1/26/2024)    Hunger Vital Sign     Worried About Running Out of Food in the Last Year: Never true     Ran Out of Food in the Last Year: Never true   Transportation Needs: No Transportation Needs (1/26/2024)    PRAPARE - Transportation     Lack of Transportation (Medical): No     Lack of Transportation (Non-Medical): No   Utilities: Not At Risk (1/26/2024)    [facility] Utilities     Threatened with loss of utilities: No       DISCHARGE DETAILS:    Discharge planning discussed with:: son and pt  Freedom of Choice: Yes  Comments - Freedom of Choice: pt currently active with [facility]  CM contacted family/caregiver?: Yes  Were Treatment Team discharge recommendations reviewed with patient/caregiver?: Yes  Did patient/caregiver verbalize understanding of patient care needs?: Yes  Were patient/caregiver advised of the risks associated with not following Treatment Team discharge recommendations?: Yes    Contacts  Contact Method:  Phone  Reason/Outcome: Discharge Planning    Requested Home Health Care         Is the patient interested in HHC at discharge?: Yes  Home Health Discipline requested:: Occupational Therapy, Physical Therapy, Nursing  Home Health Agency Name:: [facility]  HHA External Referral Reason (only applicable if external HHA name selected): Patient has established relationship with provider  Home Health Follow-Up Provider:: PCP  Home Health Services Needed:: Strengthening/Theraputic Exercises to Improve Function, Evaluate Functional Status and Safety, Wound/Ostomy Care  Homebound Criteria Met:: Requires the Assistance of Another Person for Safe Ambulation or to Leave the Home, Uses an Assist Device (i.e. cane, walker, etc)  Supporting Clincal Findings:: Limited Endurance         Other Referral/Resources/Interventions Provided:  Interventions: HHC         Treatment Team Recommendation: Short Term Rehab, Home with Home Health Care (STR vs HHC)  Discharge Destination Plan:: Home with Home Health Care   Patient admitted with +flu and pneumonia. Cm to follow for all discharge needs.

## 2024-01-26 NOTE — ASSESSMENT & PLAN NOTE
Sepsis was present on admission and due to Influenza A with a right lower lobe pneumonia  SIRS criteria was met with fevers, leukopenia, and tachypnea.  The lactic acid level was within normal limits.  Check blood cultures x 2 sets and a urine culture  Check a MRSA nasal culture  Treat with Tamiflu 30 mg PO every 24 hours (renally-dosed)  Utilize ceftriaxone 1000 mg IV every 24 hours to treat a possible super-imposed bacterial pneumonia  Follow the procalcitonin level

## 2024-01-26 NOTE — CONSULTS
Consultation - Pulmonary Medicine   Mariela Giron 88 y.o. female MRN: [id number]  Unit/Bed#: 411-01 Encounter: [id number]      Assessment/Plan:    Acute hypoxic respiratory failure  Sepsis secondary to viral PNA vs CAP  Influenza A   Abnormal CT chest   Chronic diastolic CHF  Atrial flutter    Pulmonary recommendations:     Seen on 1 L nasal cannula with SpO2 ranging from 88 to 95% predicted. No oxygen requirement at baseline.   Titrate oxygen to maintain SpO2 greater than or equal to 88%  Pulmonary toilet:  Increase activity as tolerated, out of bed as tolerated, cough and deep breathing exercises encourage, incentive spirometry q.1 hour  No indication for systemic steroids and nebulizers at this time   Continue Tamiflu 30 mg po q24hrs, renally dosed  Agree with CTX given RLL infiltrate and concern for possible superimposed bacterial pneumonia  Trend WBC, PCT, temps  Tylenol prn for fever   Hold diuresis today in the setting of sepsis/acute infection. Would however monitor fluid status closely for change.     History of Present Illness   Physician Requesting Consult: Fazal Sherwood DO  Reason for Consult / Principal Problem: flu  Hx and PE limited by: n/a  Chief Complaint: fever  HPI: Mariela Giron is a 88 y.o.  female who presented to [facility] with complaints of fever, cough and shortness of breath.  Patient has past medical asbestos for CHF, CAD status post RCA stenting on Plavix, atrial flutter/fib on Eliquis, aortic stenosis status post TAVR January 23, compression fracture T11, CKD, hypertension, hyperlipidemia, chronic pleural effusion.  Unfortunately, patient's  passed away Monday.  She reports feeling under the weather since then with increased shortness of breath, cough and fevers worsening yesterday Tmax at home noted to be 104 °F per daughter-in-law.  EMS was called yesterday patient was taken to the ED.  She was noted to be febrile, tachypneic and hypoxic at  87% requiring 2 L nasal cannula.  Laboratory workup revealed normal lactate, normal procalcitonin, leukopenia, positive influenza A PCR, D-dimer 0.97, elevated BNP 5063.  CTA chest PE study was negative for PE but did show right lower lobe acute infiltrate with right middle and upper lobe scarring, reactive subcarinal lymph node, right greater than left pleural effusion.  Admitted for influenza and pneumonia with acute hypoxic respiratory failure prompting pulmonary consult.  History of this obtained via chart review and discussion with patient's family and patient herself.    Inpatient consult to Pulmonology  Consult performed by: Yobany Dangelo PA-C  Consult ordered by: Rogerio Francois PA-C          Review of Systems   All other systems reviewed and are negative.      Historical Information   Past Medical History:   Diagnosis Date    Acute on chronic diastolic CHF (congestive heart failure) ([facility]) 01/30/2023    Acute respiratory failure with hypoxia ([facility]) 12/22/2022    Aortic stenosis     Atrial fibrillation ([facility])     Atrial flutter ([facility])     CAD (coronary artery disease) 01/11/2023    CAD/PCI/ELODIA    CKD (chronic kidney disease), stage III ([facility])     Community acquired pneumonia of left upper lobe of lung 05/17/2019    Fatigue     Full dentures     Generalized anxiety disorder     Hyperlipidemia     Hypertension     Impaired fasting glucose     Mitral regurgitation     Pneumonia     RBBB     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    TRANSFEMORAL W/ 23MM CALVERT SHAVONNE S3 ULTRA VALVE(ACCESS ON LEFT)    SVT (supraventricular tachycardia)     Tricuspid regurgitation      Past Surgical History:   Procedure Laterality Date    CARDIAC CATHETERIZATION N/A 1/11/2023    Procedure: LHC-Cardiac catheterization;  Surgeon: Sj Camacho MD;  Location: [facility];  Service: Cardiology    CARDIAC CATHETERIZATION N/A 1/11/2023    Procedure: Cardiac pci;  Surgeon: Sj Camacho MD;  Location: [facility] CARDIAC  CATH LAB;  Service: Cardiology    CARDIAC CATHETERIZATION N/A 1/17/2023    Procedure: CARDIAC TAVR;  Surgeon: Harrison Magdaleno DO;  Location: [facility] MA[facility];  Service: Cardiology    DENTAL SURGERY Bilateral     ALL TEETH REMOVED    FL GUIDED NEEDLE PLAC BX/ASP/INJ  5/26/2021    IR THORACENTESIS  12/20/2022    IR THORACENTESIS  7/14/2023    IR THORACENTESIS  9/1/2023    IR THORACENTESIS  9/25/2023    JOINT REPLACEMENT Left     KNEE SURGERY      left knee replacement    HI INJECT SI JOINT ARTHRGRPHY&/ANES/STEROID W/JOHNATHON Bilateral 5/26/2021    Procedure: SACROILIAC JOINT INJECTION;  Surgeon: Cj Langston MD;  Location: [facility];  Service: Pain Management     HI REPLACE AORTIC VALVE OPENFEMORAL ARTERY APPROACH N/A 1/17/2023    Procedure: REPLACEMENT AORTIC VALVE TRANSCATHETER (TAVR) TRANSFEMORAL W/ 23MM CALVERT SHAVONNE S3 ULTRA VALVE(ACCESS ON LEFT) ALENA;  Surgeon: Sundar Ayala DO;  Location: [facility];  Service: Cardiac Surgery    SACROILIAC JOINT INJECTION Bilateral 6/7/2023    Procedure: Bilateral sacroiliac joint steroid injection;  Surgeon: Blanca Holm MD;  Location: [facility];  Service: Pain Management      Social History   Social History     Substance and Sexual Activity   Alcohol Use Never     Social History     Substance and Sexual Activity   Drug Use Never     Social History     Tobacco Use   Smoking Status Never   Smokeless Tobacco Never     E-Cigarette/Vaping    E-Cigarette Use Never User      E-Cigarette/Vaping Substances    Nicotine No     THC No     CBD No     Flavoring No     Other No     Unknown No      Occupational History: noncontributory    Family History:   Family History   Problem Relation Age of Onset    Hypertension Mother     Asthma Father         Hatteras's asthma    Alzheimer's disease Sister     Rectal cancer Son     Uterine cancer Daughter     Heart disease Sister     Heart disease Brother        Meds/Allergies   all current active meds have been reviewed, pertinent  "pulmonary meds have been reviewed, and current meds:   Current Facility-Administered Medications   Medication Dose Route Frequency    acetaminophen (TYLENOL) tablet 650 mg  650 mg Oral Q6H PRN    albuterol inhalation solution 2.5 mg  2.5 mg Nebulization Q4H PRN    ALPRAZolam (XANAX) tablet 0.25 mg  0.25 mg Oral TID PRN    apixaban (ELIQUIS) tablet 2.5 mg  2.5 mg Oral BID    atorvastatin (LIPITOR) tablet 20 mg  20 mg Oral Daily With Dinner    bisacodyl (DULCOLAX) rectal suppository 10 mg  10 mg Rectal Daily PRN    cefTRIAXone (ROCEPHIN) IVPB (premix in dextrose) 1,000 mg 50 mL  1,000 mg Intravenous Q24H    cholecalciferol (VITAMIN D3) tablet 2,000 Units  2,000 Units Oral Daily    clopidogrel (PLAVIX) tablet 75 mg  75 mg Oral Daily    metoprolol succinate (TOPROL-XL) 24 hr tablet 50 mg  50 mg Oral Daily    ondansetron (ZOFRAN) injection 4 mg  4 mg Intravenous Q6H PRN    oseltamivir (TAMIFLU) capsule 30 mg  30 mg Oral Q24H    pantoprazole (PROTONIX) EC tablet 20 mg  20 mg Oral Early Morning       No Known Allergies    Objective   Vitals: Blood pressure 136/64, pulse 97, temperature (!) 103.4 °F (39.7 °C), temperature source Tympanic, resp. rate 22, height 5' 1\" (1.549 m), weight 50.1 kg (110 lb 7.2 oz), SpO2 93%.1L NC,Body mass index is 20.87 kg/m².    Intake/Output Summary (Last 24 hours) at 1/26/2024 1522  Last data filed at 1/26/2024 1513  Gross per 24 hour   Intake 897 ml   Output 400 ml   Net 497 ml     Invasive Devices       Peripheral Intravenous Line  Duration             Peripheral IV 01/25/24 Dorsal (posterior);Left Hand <1 day    Peripheral IV 01/25/24 Proximal;Right;Ventral (anterior) Forearm <1 day              Drain  Duration             External Urinary Catheter Medium <1 day                    Physical Exam  Vitals and nursing note reviewed.   Constitutional:       General: She is not in acute distress.     Appearance: Normal appearance. She is ill-appearing.      Comments: rigors   HENT:      Head: " "Normocephalic and atraumatic.      Mouth/Throat:      Mouth: Mucous membranes are moist.      Pharynx: Oropharynx is clear.   Cardiovascular:      Rate and Rhythm: Normal rate and regular rhythm.      Heart sounds: No murmur heard.  Pulmonary:      Breath sounds: Rhonchi present.   Musculoskeletal:      Cervical back: Normal range of motion.   Skin:     General: Skin is warm and dry.   Neurological:      General: No focal deficit present.      Mental Status: She is alert. Mental status is at baseline.   Psychiatric:         Mood and Affect: Mood normal.         Behavior: Behavior normal.         Lab Results: I have personally reviewed pertinent lab results., ABG: No results found for: \"PHART\", \"REK1HXG\", \"PO2ART\", \"WLY4WCO\", \"P3QWSWWV\", \"BEART\", \"SOURCE\", BNP:   Lab Results   Component Value Date    BNP 1,063 (H) 01/25/2024   , CBC:   Lab Results   Component Value Date    WBC 3.39 (L) 01/26/2024    HGB 9.5 (L) 01/26/2024    HCT 30.8 (L) 01/26/2024    MCV 93 01/26/2024    PLT 95 (L) 01/26/2024    RBC 3.31 (L) 01/26/2024    MCH 28.7 01/26/2024    MCHC 30.8 (L) 01/26/2024    RDW 15.8 (H) 01/26/2024    MPV 9.4 01/26/2024    NRBC 0 01/26/2024   , CMP:   Lab Results   Component Value Date    SODIUM 132 (L) 01/26/2024    K 3.8 01/26/2024    CL 99 01/26/2024    CO2 23 01/26/2024    BUN 27 (H) 01/26/2024    CREATININE 1.06 01/26/2024    CALCIUM 8.5 01/26/2024    AST 24 01/26/2024    ALT 16 01/26/2024    ALKPHOS 75 01/26/2024    EGFR 46 01/26/2024   , PT/INR: No results found for: \"PT\", \"INR\", Troponin: No results found for: \"TROPONINI\"    Lactic Acid: 1.6    Procalcitonin: 0.19    Flu/COVID/RSV PCR: Influenza A    Culture Data: BC x2 pending    Urinary antigens: strep pending, legionella negative    D-Dimer: 0.97    BNP: 1063    Imaging Studies: I have personally reviewed pertinent reports.   and I have personally reviewed pertinent films in PACS     CTA chest PE study 1/25/2024  No PE.  Right lower lobe consolidation with " "probable reactive subcarinal lymph nodes.  Evidence of scarring in the right middle lobe right upper lobe.  Moderate right and small left pleural effusion similar to prior.    EKG, Pathology, and Other Studies: I have personally reviewed pertinent reports.       Echo 6/14/2023  EF 50%.  Grade 1 diastolic dysfunction.  Moderate mitral regurgitation.  Moderate tricuspid regurgitation.  Estimated right ventricular systolic pressure 52 mmHg.    Pulmonary Results (PFTs, PSG): none to review      Code Status: Level 3 - DNAR and DNI    Portions of the record may have been created with voice recognition software.  Occasional wrong word or \"sound a like\" substitutions may have occurred due to the inherent limitations of voice recognition software.  Read the chart carefully and recognize, using context, where substitutions have occurred.    "

## 2024-01-26 NOTE — ASSESSMENT & PLAN NOTE
Treated with NSS IV fluids at 50 mL/hr, which I will discontinue due to the concern for volume overload  Follow the sodium level

## 2024-01-26 NOTE — PLAN OF CARE
Problem: OCCUPATIONAL THERAPY ADULT  Goal: Performs self-care activities at highest level of function for planned discharge setting.  See evaluation for individualized goals.  Description: Treatment Interventions: ADL retraining, Functional transfer training, UE strengthening/ROM, Endurance training, Patient/family training, Compensatory technique education, Energy conservation, Activityengagement       See flowsheet documentation for full assessment, interventions and recommendations.   Note: Limitation: Decreased ADL status, Decreased UE strength, Decreased Safe judgement during ADL, Decreased endurance, Decreased self-care trans, Decreased high-level ADLs  Prognosis: Good  Assessment: Pt is a 88 y.o. female seen for OT evaluation s/p admit to [facility] on 1/25/2024 w/ Acute respiratory failure with hypoxia (HCC).  Comorbidities affecting pt's functional performance at time of assessment include: HTN, CHF, a-fib, right lower lobe pneumonia, pancytopenia, CAD, hypokalemia, lymphadenopathy, generalized weakness, bursitis of right shoulder, osteopenia of neck of femur. Personal factors affecting pt at time of IE include:steps to enter environment, difficulty performing ADLS, difficulty performing IADLS , limited insight into deficits, decreased initiation and engagement , and health management . Prior to admission, pt was I with ADLs and required A with IADLs. Upon evaluation: the following deficits impact occupational performance: weakness, decreased strength, decreased balance, decreased tolerance, impaired problem solving, and decreased safety awareness. Pt to benefit from continued skilled OT tx while in the hospital to address deficits as defined above and maximize level of functional independence w ADL's and functional mobility. Occupational Performance areas to address include: grooming, bathing/shower, toilet hygiene, dressing, functional mobility, community mobility, and clothing management. From OT  standpoint, recommendation at time of d/c would with moderate intensity OT resources.     Rehab Resource Intensity Level, OT: II (Moderate Resource Intensity)     Trudi Nolasoc MS, OTR/L

## 2024-01-26 NOTE — ASSESSMENT & PLAN NOTE
Presented  to the emergency room for evaluation of shortness of breath  CTA shows-Right lower lobe consolidation consistent with pneumonia with probably reactive subcarinal lymph node.   Most likely viral pneumonia given positive influenza A  Will defer antibiotic  coverage given normal procalcitonin  Will check sputum culture and Gram stain, Legionella antigen urine, strep pneumonia urine  Respiratory protocol  Incentive spirometry  Aspiration precautions  Continue with Tamiflu  Am labs  Pulmonary consult  Supportive care

## 2024-01-26 NOTE — ASSESSMENT & PLAN NOTE
History of CAD  No associated complaints  Continue PTA medications  Continue PCP, outpatient cardiology follow-up

## 2024-01-26 NOTE — H&P
[facility]  H&P  Name: Mariela Giron 88 y.o. female I MRN: [id number]  Unit/Bed#: 411-01 I Date of Admission: 1/25/2024   Date of Service: 1/26/2024 I Hospital Day: 1      Assessment/Plan   * Acute respiratory failure with hypoxia (HCC)  Assessment & Plan  Presented to the ER for evaluation of SOB  SPO2 87% upon arrival to the ER  CTA negative for acute pulmonary embolism  Most likely secondary  to pneumonia, influenza A  Respiratory protocol  Incentive spirometry  As needed nebulized treatment  Continue oxygen at 2 L/min  Monitor respiratory status, SpO2  A.m. labs  Pulmonary consult        Right lower lobe pneumonia  Assessment & Plan  Presented  to the emergency room for evaluation of shortness of breath  CTA shows-Right lower lobe consolidation consistent with pneumonia with probably reactive subcarinal lymph node.   Most likely viral pneumonia given positive influenza A  Will defer antibiotic  coverage given normal procalcitonin  Will check sputum culture and Gram stain, Legionella antigen urine, strep pneumonia urine  Respiratory protocol  Incentive spirometry  Aspiration precautions  Continue with Tamiflu  Am labs  Pulmonary consult  Supportive care         Influenza A  Assessment & Plan  Presented to the ER with complaints of SOB  Influenza A positive  Initiate Tamiflu  Monitor respiratory status, SpO2  Contact and droplet precaution  A.m. labs  Supportive care    Coronary artery disease involving native coronary artery of native heart without angina pectoris  Assessment & Plan  History of CAD  No associated complaints  Continue PTA medications  Continue PCP, outpatient cardiology follow-up    Permanent atrial fibrillation (HCC)  Assessment & Plan  History of A-fib  Anticoagulated on Eliquis  Rate currently controlled  Continue metoprolol, Eliquis  Continue outpatient cardiology follow-up    Chronic diastolic (congestive) heart failure (HCC)  Assessment & Plan  Wt Readings from  Last 3 Encounters:   01/03/24 45.5 kg (100 lb 6.4 oz)   12/21/23 45.1 kg (99 lb 6.4 oz)   11/22/23 52.7 kg (116 lb 2.9 oz)   No evidence of acute exacerbation  Last echo on 6/14/2023 reviewed EF at 50%  BNP at 1036  Continue PTA medication  Continue outpatient cardiology follow up          Dyslipidemia  Assessment & Plan  Stable  Continue statin therapy    Essential hypertension  Assessment & Plan  Blood pressure is stable  Continue PTA blood pressure medications  Monitor blood pressure while admitted           VTE Pharmacologic Prophylaxis:   Moderate Risk (Score 3-4) - Pharmacological DVT Prophylaxis Ordered: apixaban (Eliquis).  Code Status: Level 3 - DNAR and DNI   Discussion with family: Patient declined call to .     Anticipated Length of Stay: Patient will be admitted on an inpatient basis with an anticipated length of stay of greater than 2 midnights secondary to acute respiratory failure with hypoxia, right lower lobe pneumonia, influenza A positive.    Total Time Spent on Date of Encounter in care of patient: 40 mins. This time was spent on one or more of the following: performing physical exam; counseling and coordination of care; obtaining or reviewing history; documenting in the medical record; reviewing/ordering tests, medications or procedures; communicating with other healthcare professionals and discussing with patient's family/caregivers.    Chief Complaint: Shortness of breath    History of Present Illness:  Mariela Giron is a 88 y.o. female with a PMH of diastolic CHF, CAD, atrial fibrillation, hypertension, hyperlipidemia who presents to the emergency room via EMS for evaluation of complaint of shortness of breath.  Patient reports that her shortness of breath has been ongoing for the last few days getting progressively worse.  Shortness of breath is associated with cough and subjective fever.  Patient denies having any chest pain, nausea, vomiting, diarrhea, abdominal pain.   Patient does admit to having some urinary frequency.    Workup in the emergency room included labs difficult for potassium of 134, troponins negative, BNP of 1063, lactic  acid of 1.6, procalcitonin of 0.19, WBC of 3.40, RBC of 3.26, hemoglobin of 9.6, platelet of 99, influenza A positive, D-dimer of 0.97.  CTA chest completed with results as shown below.  EKG shows atrial fibrillation at a rate of 66 bpm.  Patient received Tylenol 650 mg p.o. from EMS.  Patient received DuoNeb upon arrival to the ER.  Noted to be hypoxic SpO2 at 87% placed on oxygen at 2 L/min with improvement.     Patient has been admitted on inpatient status [facility] for further workup and management of acute respiratory failure with hypoxia, right lower lobe pneumonia, influenza A positive.    Review of Systems:  Review of Systems   Constitutional:  Positive for activity change and fever. Negative for fatigue.   Eyes:  Negative for photophobia and visual disturbance.   Respiratory:  Positive for cough and shortness of breath. Negative for chest tightness and wheezing.    Cardiovascular:  Negative for chest pain, palpitations and leg swelling.   Gastrointestinal:  Negative for abdominal pain, diarrhea, nausea and vomiting.   Genitourinary:  Negative for difficulty urinating and dysuria.   Musculoskeletal:  Positive for gait problem and myalgias. Negative for arthralgias and back pain.   Skin:  Positive for wound. Negative for rash.   Neurological:  Negative for dizziness, tremors, syncope, weakness and headaches.   Psychiatric/Behavioral:  Negative for agitation and confusion. The patient is not nervous/anxious.    All other systems reviewed and are negative.      Past Medical and Surgical History:   Past Medical History:   Diagnosis Date    Acute on chronic diastolic CHF (congestive heart failure) ([facility]) 01/30/2023    Acute respiratory failure with hypoxia ([facility]) 12/22/2022    Aortic stenosis     Atrial fibrillation ([facility])     Atrial  flutter (HCC)     CAD (coronary artery disease) 01/11/2023    CAD/PCI/ELODIA    CKD (chronic kidney disease), stage III (HCC)     Community acquired pneumonia of left upper lobe of lung 05/17/2019    Fatigue     Full dentures     Generalized anxiety disorder     Hyperlipidemia     Hypertension     Impaired fasting glucose     Mitral regurgitation     Pneumonia     RBBB     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    TRANSFEMORAL W/ 23MM CALVERT SHAVONNE S3 ULTRA VALVE(ACCESS ON LEFT)    SVT (supraventricular tachycardia)     Tricuspid regurgitation        Past Surgical History:   Procedure Laterality Date    CARDIAC CATHETERIZATION N/A 1/11/2023    Procedure: LHC-Cardiac catheterization;  Surgeon: Sj Camacho MD;  Location: [facility];  Service: Cardiology    CARDIAC CATHETERIZATION N/A 1/11/2023    Procedure: Cardiac pci;  Surgeon: Sj Camacho MD;  Location: [facility];  Service: Cardiology    CARDIAC CATHETERIZATION N/A 1/17/2023    Procedure: CARDIAC TAVR;  Surgeon: Harrison Magdaleno DO;  Location:  MAIN OR;  Service: Cardiology    DENTAL SURGERY Bilateral     ALL TEETH REMOVED    FL GUIDED NEEDLE PLAC BX/ASP/INJ  5/26/2021    IR THORACENTESIS  12/20/2022    IR THORACENTESIS  7/14/2023    IR THORACENTESIS  9/1/2023    IR THORACENTESIS  9/25/2023    JOINT REPLACEMENT Left     KNEE SURGERY      left knee replacement    WI INJECT SI JOINT ARTHRGRPHY&/ANES/STEROID W/JOHNATHON Bilateral 5/26/2021    Procedure: SACROILIAC JOINT INJECTION;  Surgeon: Cj Langston MD;  Location: [facility] MAIN [facility];  Service: Pain Management     WI REPLACE AORTIC VALVE OPENFEMORAL ARTERY APPROACH N/A 1/17/2023    Procedure: REPLACEMENT AORTIC VALVE TRANSCATHETER (TAVR) TRANSFEMORAL W/ 23MM CALVERT SHAVONNE S3 ULTRA VALVE(ACCESS ON LEFT) ALENA;  Surgeon: Sundar Ayala DO;  Location:  MAIN OR;  Service: Cardiac Surgery    SACROILIAC JOINT INJECTION Bilateral 6/7/2023    Procedure: Bilateral sacroiliac joint  steroid injection;  Surgeon: Blanca Holm MD;  Location: [facility] MA[facility];  Service: Pain Management        Meds/Allergies:  Prior to Admission medications    Medication Sig Start Date End Date Taking? Authorizing Provider   alendronate (Fosamax) 70 mg tablet Take 1 tablet (70 mg total) by mouth every 7 days 4/25/23   Papa Zabala DO   ALPRAZolam (XANAX) 0.25 mg tablet TAKE 1 TABLET BY MOUTH THREE TIMES DAILY AS NEEDED FOR ANXIETY 9/28/23   Papa Zabala DO   ammonium lactate (LAC-HYDRIN) 12 % lotion Apply topically daily Do not start before Jacqueline 15, 2023. 6/15/23   Fazal Sherwood DO   apixaban (Eliquis) 2.5 mg Take 1 tablet by mouth twice daily 10/12/23   Papa Zabala DO   atorvastatin (LIPITOR) 20 mg tablet Take 1 tablet (20 mg total) by mouth daily with dinner 10/3/23   Papa Zabala DO   bisacodyl (DULCOLAX) 10 mg suppository Insert 10 mg into the rectum daily as needed for constipation    Historical Provider, MD   Cholecalciferol (Vitamin D3) 50 MCG (2000 UT) TABS Take 2,000 Units by mouth in the morning. Indications: Osteoporosis    Historical Provider, MD   clopidogrel (PLAVIX) 75 mg tablet Take 1 tablet (75 mg total) by mouth daily 8/8/23 8/1/24  Brian Appiah MD   furosemide (LASIX) 20 mg tablet Take 1 tablet (20 mg total) by mouth daily 8/15/23   Papa Zabala DO   metoprolol succinate (TOPROL-XL) 50 mg 24 hr tablet Take 1 tablet (50 mg total) by mouth daily Hold for HR<60 or SBP<110 mmHg 6/14/23   Fazal Sherwood DO   Multiple Vitamin (Multivitamin Adult) TABS Take 1 tablet by mouth in the morning. Indications: Nutritional Support    Historical Provider, MD   Nutritional Supplement LIQD Take 1 Can by mouth 2 (two) times a day Ensure pudding BID 6/14/23   Fazal Sherwood DO   pantoprazole (PROTONIX) 20 mg tablet Take 1 tablet (20 mg total) by mouth daily before breakfast 8/15/23 8/9/24  Papa Zabala DO   polyethylene glycol (MIRALAX) 17 g packet Take 17 g by mouth daily as needed (constipation) for  "up to 14 days 11/22/23 12/6/23  Raheem Wilde, DO   potassium chloride (K-DUR,KLOR-CON) 10 mEq tablet Take 1 tablet (10 mEq total) by mouth daily 8/28/23   Papa Zabala, DO   Psyllium (METAMUCIL FIBER) 51.7 % PACK Take 1 Package by mouth daily  Patient not taking: Reported on 8/21/2023 5/21/19   Fazal Sherwood, DO   oxygen gas Inhale 2 L/min daily as needed (low oxygen levels). Indications: Oxygen Desaturation  Patient not taking: Reported on 1/30/2023 2/7/23  Lilly Mcdermott MD     I have reviewed home medications using recent Epic encounter.    Allergies: No Known Allergies    Social History:  Marital Status: /Civil Union   Occupation: Retired   Patient Pre-hospital Living Situation: Home  Patient Pre-hospital Level of Mobility: walks with walker  Patient Pre-hospital Diet Restrictions: None reported   Substance Use History:   Social History     Substance and Sexual Activity   Alcohol Use Never     Social History     Tobacco Use   Smoking Status Never   Smokeless Tobacco Never     Social History     Substance and Sexual Activity   Drug Use Never       Family History:  Family History   Problem Relation Age of Onset    Hypertension Mother     Asthma Father         's asthma    Alzheimer's disease Sister     Rectal cancer Son     Uterine cancer Daughter     Heart disease Sister     Heart disease Brother        Physical Exam:     Vitals:   Blood Pressure: 113/56 (01/25/24 2140)  Pulse: 70 (01/26/24 0348)  Temperature: (!) 100.9 °F (38.3 °C) (01/26/24 0348)  Temp Source: Oral (01/26/24 0348)  Respirations: 20 (01/25/24 2140)  Height: 5' 1\" (154.9 cm) (01/25/24 2140)  Weight - Scale: 47.8 kg (105 lb 6.1 oz) (01/25/24 2140)  SpO2: 100 % (01/26/24 0348)    Physical Exam  Constitutional:       General: She is not in acute distress.     Appearance: She is not ill-appearing.   HENT:      Head: Normocephalic and atraumatic.      Nose: No congestion or rhinorrhea.      Mouth/Throat:      Mouth: " Mucous membranes are moist.      Pharynx: Oropharynx is clear.   Eyes:      Pupils: Pupils are equal, round, and reactive to light.   Cardiovascular:      Rate and Rhythm: Normal rate. Rhythm irregular.      Pulses: Normal pulses.   Pulmonary:      Breath sounds: No wheezing, rhonchi or rales.      Comments: Tachypnea  Abdominal:      General: There is no distension.      Tenderness: There is no abdominal tenderness.   Musculoskeletal:      Cervical back: Neck supple.      Right lower leg: No edema.      Left lower leg: No edema.   Skin:     General: Skin is warm and dry.      Capillary Refill: Capillary refill takes less than 2 seconds.      Coloration: Skin is not jaundiced or pale.      Findings: Erythema present.      Comments: Erythema to plantar aspect of both feet.   Neurological:      Mental Status: She is oriented to person, place, and time.   Psychiatric:         Mood and Affect: Mood normal.          Additional Data:     Lab Results:  Results from last 7 days   Lab Units 01/25/24  1840   WBC Thousand/uL 3.40*   HEMOGLOBIN g/dL 9.6*   HEMATOCRIT % 30.2*   PLATELETS Thousands/uL 99*   NEUTROS PCT % 80*   LYMPHS PCT % 10*   MONOS PCT % 10   EOS PCT % 0     Results from last 7 days   Lab Units 01/25/24  1840   SODIUM mmol/L 134*   POTASSIUM mmol/L 3.5   CHLORIDE mmol/L 100   CO2 mmol/L 24   BUN mg/dL 28*   CREATININE mg/dL 1.04   ANION GAP mmol/L 10   CALCIUM mg/dL 9.0   ALBUMIN g/dL 3.9   TOTAL BILIRUBIN mg/dL 0.91   ALK PHOS U/L 75   ALT U/L 15   AST U/L 22   GLUCOSE RANDOM mg/dL 121                 Results from last 7 days   Lab Units 01/25/24  1840   LACTIC ACID mmol/L 1.6   PROCALCITONIN ng/ml 0.19       Lines/Drains:  Invasive Devices       Peripheral Intravenous Line  Duration             Peripheral IV 01/25/24 Dorsal (posterior);Left Hand <1 day    Peripheral IV 01/25/24 Proximal;Right;Ventral (anterior) Forearm <1 day              Drain  Duration             External Urinary Catheter Medium <1 day                         Imaging: Reviewed radiology reports from this admission including: CTA chest PE study  CTA ED chest PE Study   Final Result by Sammy Iniguez MD (01/25 2114)      No pulmonary embolism.      Right lower lobe consolidation consistent with pneumonia with probably reactive subcarinal lymph node.      Cardiomegaly with diffuse groundglass opacities in the mid to lower lung zones along with bilateral effusions consistent with pulmonary edema/CHF.                        Workstation performed: [id number]             EKG and Other Studies Reviewed on Admission:   EKG: Atrial fibrillation. HR 66  bpm.    ** Please Note: This note has been constructed using a voice recognition system. **

## 2024-01-26 NOTE — PLAN OF CARE
Problem: PHYSICAL THERAPY ADULT  Goal: Performs mobility at highest level of function for planned discharge setting.  See evaluation for individualized goals.  Description: Treatment/Interventions: Functional transfer training, LE strengthening/ROM, Elevations, Therapeutic exercise, Endurance training, Bed mobility, Gait training          See flowsheet documentation for full assessment, interventions and recommendations.  Note: Prognosis: Good  Problem List: Decreased strength, Decreased endurance, Impaired balance, Decreased mobility, Impaired judgement, Decreased safety awareness  Assessment: Patient is a 88 y.o. female evaluated by Physical Therapy s/p admit to [facility] on 1/25/2024 with admitting diagnosis of: SOB (shortness of breath), Influenza A, Hypoxia, and principal problem of: Acute respiratory failure with hypoxia. PT was consulted to assess patient's functional mobility and discharge needs. Ordered are PT Evaluation and treatment with activity level of: up and OOB as tolerated. Comorbidities affecting patient's physical performance at time of assessment include:  HTN, dyslipidemia, CHF, a-fib, CAD, hiatal hernia, pancytopenia, osteopenia, chronic pain syndrome, s/p TAVR, Graves disease, osteoporosis, GERD, pressure injury of R heel stage 2 . Personal factors affecting the patient at time of IE include: lives in 2 story home, step(s) to enter home, impaired safety awareness, anxiety, inability/difficulty performing IADLs, and inability/difficulty performing ADLs. Please locate objective findings from PT assessment regarding body systems outlined above. Upon evaluation, pt able to perform all functional mobility with SUP-Janice x 1, RW, and increased time. Occasional verbal cuing provided for safety awareness and sequencing. Seated rest break taken following 100' of ambulation d/t fatigue and SOB. SpO2 and HR remained WFL on 2L O2 throughout. Moderate postural sway demonstrated with  mobility but no true LOB experienced. The patient's AM-PAC Basic Mobility Inpatient Short Form Raw Score is 17. A Raw score of greater than 16 suggests the patient may benefit from discharge to home. Please also refer to the recommendation of the Physical Therapist for safe discharge planning. Co treatment with OT secondary to complex medical condition of pt, possible A of 2 required to achieve and maintain transitional movements, requiring the need of skilled therapeutic intervention of 2 therapists to achieve delivery of services. Pt will benefit from continued PT intervention during LOS to address current deficits, increase LOF, and facilitate safe d/c to next level of care when medically appropriate. D/c recommendation at this time is rehabilitation resource intensity level II.        Rehab Resource Intensity Level, PT: II (Moderate Resource Intensity)    See flowsheet documentation for full assessment.

## 2024-01-26 NOTE — ASSESSMENT & PLAN NOTE
Give magnesium sulfate 2 grams IV x 1 dose on 01/26/2024  Follow the magnesium level    Results from last 7 days   Lab Units 01/26/24  0408   MAGNESIUM mg/dL 1.7*

## 2024-01-26 NOTE — OCCUPATIONAL THERAPY NOTE
Occupational Therapy Evaluation     Patient Name: Mariela Giron  Today's Date: 1/26/2024  Problem List  Principal Problem:    Acute respiratory failure with hypoxia (HCC)  Active Problems:    Hypomagnesemia    Hyponatremia    Essential hypertension    Dyslipidemia    Chronic diastolic (congestive) heart failure (HCC)    Permanent atrial fibrillation (HCC)    Coronary artery disease involving native coronary artery of native heart without angina pectoris    Influenza A    Right lower lobe pneumonia    Pancytopenia (HCC)    Past Medical History  Past Medical History:   Diagnosis Date    Acute on chronic diastolic CHF (congestive heart failure) (HCC) 01/30/2023    Acute respiratory failure with hypoxia (HCC) 12/22/2022    Aortic stenosis     Atrial fibrillation (HCC)     Atrial flutter (HCC)     CAD (coronary artery disease) 01/11/2023    CAD/PCI/ELODIA    CKD (chronic kidney disease), stage III (HCC)     Community acquired pneumonia of left upper lobe of lung 05/17/2019    Fatigue     Full dentures     Generalized anxiety disorder     Hyperlipidemia     Hypertension     Impaired fasting glucose     Mitral regurgitation     Pneumonia     RBBB     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    TRANSFEMORAL W/ 23MM CALVERT SHAVONNE S3 ULTRA VALVE(ACCESS ON LEFT)    SVT (supraventricular tachycardia)     Tricuspid regurgitation      Past Surgical History  Past Surgical History:   Procedure Laterality Date    CARDIAC CATHETERIZATION N/A 1/11/2023    Procedure: LHC-Cardiac catheterization;  Surgeon: Sj Camacho MD;  [facility]: [facility];  Service: Cardiology    CARDIAC CATHETERIZATION N/A 1/11/2023    Procedure: Cardiac pci;  Surgeon: Sj Camacho MD;  [facility]: [facility];  Service: Cardiology    CARDIAC CATHETERIZATION N/A 1/17/2023    Procedure: CARDIAC TAVR;  Surgeon: Harrison Magdaleno DO;  Location: [facility];  Service: Cardiology    DENTAL SURGERY Bilateral     ALL TEETH  REMOVED    FL GUIDED NEEDLE PLAC BX/ASP/INJ  5/26/2021    IR THORACENTESIS  12/20/2022    IR THORACENTESIS  7/14/2023    IR THORACENTESIS  9/1/2023    IR THORACENTESIS  9/25/2023    JOINT REPLACEMENT Left     KNEE SURGERY      left knee replacement    OH INJECT SI JOINT ARTHRGRPHY&/ANES/STEROID W/JOHNATHON Bilateral 5/26/2021    Procedure: SACROILIAC JOINT INJECTION;  Surgeon: Cj Langston MD;  Location: [facility];  Service: Pain Management     OH REPLACE AORTIC VALVE OPENFEMORAL ARTERY APPROACH N/A 1/17/2023    Procedure: REPLACEMENT AORTIC VALVE TRANSCATHETER (TAVR) TRANSFEMORAL W/ 23MM CALVERT SHAVONNE S3 ULTRA VALVE(ACCESS ON LEFT) ALENA;  Surgeon: Sundar Ayala DO;  Location:  MAIN OR;  Service: Cardiac Surgery    SACROILIAC JOINT INJECTION Bilateral 6/7/2023    Procedure: Bilateral sacroiliac joint steroid injection;  Surgeon: Blanca Holm MD;  Location: [facility];  Service: Pain Management         01/26/24 0849   OT Last Visit   OT Visit Date 01/26/24   Note Type   Note type Evaluation   Additional Comments Pt seen as a co-eval with PT due to the patient's co-morbidities, clinically unstable presentation, and present impairments which are a regression from the patient's baseline.   Pain Assessment   Pain Assessment Tool 0-10   Pain Score No Pain   Restrictions/Precautions   Weight Bearing Precautions Per Order No   Braces or Orthoses   (none reported)   Other Precautions Contact/isolation;Droplet precautions;Chair Alarm;Bed Alarm;O2;Fall Risk;Pain  (2L O2 via NC)   Home Living   Type of Home House   Home Layout Two level;Stairs to enter with rails;Bed/bath upstairs;1/2 bath on main level  (1 CASANDRA; stairglide to 2nd floor)   Bathroom Shower/Tub Tub/shower unit   Bathroom Toilet Standard   Bathroom Equipment Grab bars in shower;Shower chair   Bathroom Accessibility Accessible   Home Equipment Walker  (Pt reports RW use)   Prior Function   Level of [place] Independent with ADLs;Independent with  "functional mobility;Needs assistance with IADLS  (Aide assists with showering)   Lives With Alone   Receives Help From Family  (& aide)   IADLs Family/Friend/Other provides transportation;Family/Friend/Other provides meals;Family/Friend/Other provides medication management   Falls in the last 6 months   (pt denies)   Vocational Retired   Comments Per chart review, pt receives assistance from aide 3x/week for 4 hours; aide assists with showering and IADLs   Subjective   Subjective \"I have no pain, just really cold, they have ice packs all over me\"   ADL   Where Assessed Edge of bed   Grooming Assistance 5  Supervision/Setup   UB Bathing Assistance 5  Supervision/Setup   LB Bathing Assistance 3  Moderate Assistance   UB Dressing Assistance 5  Supervision/Setup   LB Dressing Assistance 4  Minimal Assistance   LB Dressing Deficit Don/doff R sock;Don/doff L sock;Thread RLE into underwear;Thread LLE into underwear;Pull up over hips  (min A d/t balance concerns and impulsivity when completing LB dressing while seated EOB and in stance)   Bed Mobility   Supine to Sit 5  Supervision   Additional items Assist x 1;HOB elevated;Bedrails;Increased time required;Verbal cues   Sit to Supine   (DNT; pt seated in recliner upon conclusion of session)   Additional Comments VC for bedrail utilization and proper body mechanics to achieve EOB sitting to complete LB dressing and self-care tasks   Transfers   Sit to Stand 5  Supervision   Additional items Assist x 1;Bedrails;Increased time required;Verbal cues   Stand to Sit 5  Supervision   Additional items Assist x 1;Increased time required;Verbal cues;Armrests   Additional Comments RW used; VC for safe hand placement and RW management   Functional Mobility   Functional Mobility 4  Minimal assistance   Additional Comments min A x1 for balance and safety when completing functional mobility around hallway w/ RW. No significant LOB observed, moderate instability. SpO2 >90% during mobility "   Additional items Rolling walker   Balance   Static Sitting Good   Dynamic Sitting Fair +   Static Standing Fair   Dynamic Standing Fair -   Ambulatory Poor +   Activity Tolerance   Activity Tolerance Patient limited by fatigue;Patient limited by pain   Medical Staff Made Aware Yes, CM made aware of d/c recs   Nurse Made Aware Yes, nursing staff made aware of session outcomes   RUE Assessment   RUE Assessment WFL   RUE Strength   RUE Overall Strength   (3/5)   LUE Assessment   LUE Assessment WFL   LUE Strength   LUE Overall Strength   (3/5)   Hand Function   Gross Motor Coordination Functional   Fine Motor Coordination Functional   Psychosocial   Psychosocial (WDL) WDL   Cognition   Overall Cognitive Status Impaired   Arousal/Participation Alert;Responsive   Attention Attends with cues to redirect   Orientation Level Oriented to person;Oriented to place;Disoriented to situation;Disoriented to time   Memory Decreased recall of precautions;Decreased recall of recent events   Following Commands Follows one step commands with increased time or repetition   Comments Pt agreeable to OT evaluation, pleasant   Assessment   Limitation Decreased ADL status;Decreased UE strength;Decreased Safe judgement during ADL;Decreased endurance;Decreased self-care trans;Decreased high-level ADLs   Prognosis Good   Assessment Pt is a 88 y.o. female seen for OT evaluation s/p admit to [facility] on 1/25/2024 w/ Acute respiratory failure with hypoxia (HCC).  Comorbidities affecting pt's functional performance at time of assessment include: HTN, CHF, a-fib, right lower lobe pneumonia, pancytopenia, CAD, hypokalemia, lymphadenopathy, generalized weakness, bursitis of right shoulder, osteopenia of neck of femur. Personal factors affecting pt at time of IE include:steps to enter environment, difficulty performing ADLS, difficulty performing IADLS , limited insight into deficits, decreased initiation and engagement , and health management .  Prior to admission, pt was I with ADLs and required A with IADLs. Upon evaluation: the following deficits impact occupational performance: weakness, decreased strength, decreased balance, decreased tolerance, impaired problem solving, and decreased safety awareness. Pt to benefit from continued skilled OT tx while in the hospital to address deficits as defined above and maximize level of functional independence w ADL's and functional mobility. Occupational Performance areas to address include: grooming, bathing/shower, toilet hygiene, dressing, functional mobility, community mobility, and clothing management. From OT standpoint, recommendation at time of d/c would with moderate intensity OT resources.   Goals   Patient Goals to feel better   Plan   Treatment Interventions ADL retraining;Functional transfer training;UE strengthening/ROM;Endurance training;Patient/family training;Compensatory technique education;Energy conservation;Activityengagement   Goal Expiration Date 02/09/24   OT Treatment Day 0   OT Frequency 3-5x/wk   Discharge Recommendation   Rehab Resource Intensity Level, OT II (Moderate Resource Intensity)   Additional Comments  The patient's raw score on the AM-PAC Daily Activity inpatient short form is 18, standardized score is 38.66, less than 39.4. Patients at this level are likely to benefit from discharge with moderate intensity OT resources. Please refer to the recommendation of the Occupational Therapist for safe discharge planning.   AM-PAC Daily Activity Inpatient   Lower Body Dressing 2   Bathing 3   Toileting 3   Upper Body Dressing 3   Grooming 3   Eating 4   Daily Activity Raw Score 18   Daily Activity Standardized Score (Calc for Raw Score >=11) 38.66   AM-PAC Applied Cognition Inpatient   Following a Speech/Presentation 3   Understanding Ordinary Conversation 4   Taking Medications 3   Remembering Where Things Are Placed or Put Away 3   Remembering List of 4-5 Errands 2   Taking Care of  Complicated Tasks 2   Applied Cognition Raw Score 17   Applied Cognition Standardized Score 36.52     GOALS:    Pt will achieve the following within specified time frame: LTG  Pt will achieve the following goals within 14 days    *ADL transfers with (S) for inc'd independence with ADLs/purposeful tasks    *UB ADL with (I) for inc'd independence with self cares    *LB ADL with (S) using AE prn for inc'd independence with self cares    *Toileting with (S) for clothing management and hygiene for return to PLOF with personal care    *Increase static stand balance and dyn stand balance to G for inc'd safety with standing purposeful tasks    *Increase stand tolerance x7 m for inc'd tolerance with standing purposeful tasks    *Bed mobility- (I) for inc'd independence to manage own comfort and initiate EOB & OOB purposeful tasks    *Participate in 10-15m UE therex to increase overall stamina/activity tolerance for purposeful tasks      Trudi Nolasco MS, OTR/L

## 2024-01-27 ENCOUNTER — APPOINTMENT (INPATIENT)
Dept: RADIOLOGY | Facility: HOSPITAL | Age: 89
End: 2024-01-27
Payer: MEDICARE

## 2024-01-27 PROBLEM — R76.8 POSITIVE ANA (ANTINUCLEAR ANTIBODY): Status: ACTIVE | Noted: 2024-01-27

## 2024-01-27 PROBLEM — J98.11 ATELECTASIS: Status: ACTIVE | Noted: 2024-01-27

## 2024-01-27 LAB
ALBUMIN SERPL BCP-MCNC: 3.1 G/DL (ref 3.5–5)
ALP SERPL-CCNC: 61 U/L (ref 34–104)
ALT SERPL W P-5'-P-CCNC: 14 U/L (ref 7–52)
ANION GAP SERPL CALCULATED.3IONS-SCNC: 10 MMOL/L
ANISOCYTOSIS BLD QL SMEAR: PRESENT
AST SERPL W P-5'-P-CCNC: 24 U/L (ref 13–39)
BASOPHILS # BLD MANUAL: 0 THOUSAND/UL (ref 0–0.1)
BASOPHILS NFR MAR MANUAL: 0 % (ref 0–1)
BILIRUB SERPL-MCNC: 0.67 MG/DL (ref 0.2–1)
BUN SERPL-MCNC: 40 MG/DL (ref 5–25)
CALCIUM ALBUM COR SERPL-MCNC: 8.9 MG/DL (ref 8.3–10.1)
CALCIUM SERPL-MCNC: 8.2 MG/DL (ref 8.4–10.2)
CHLORIDE SERPL-SCNC: 99 MMOL/L (ref 96–108)
CO2 SERPL-SCNC: 21 MMOL/L (ref 21–32)
CREAT SERPL-MCNC: 1.37 MG/DL (ref 0.6–1.3)
EOSINOPHIL # BLD MANUAL: 0 THOUSAND/UL (ref 0–0.4)
EOSINOPHIL NFR BLD MANUAL: 0 % (ref 0–6)
ERYTHROCYTE [DISTWIDTH] IN BLOOD BY AUTOMATED COUNT: 15.8 % (ref 11.6–15.1)
GFR SERPL CREATININE-BSD FRML MDRD: 34 ML/MIN/1.73SQ M
GLUCOSE SERPL-MCNC: 83 MG/DL (ref 65–140)
HCT VFR BLD AUTO: 27.9 % (ref 34.8–46.1)
HGB BLD-MCNC: 9 G/DL (ref 11.5–15.4)
LACTATE SERPL-SCNC: 1.3 MMOL/L (ref 0.5–2)
LYMPHOCYTES # BLD AUTO: 0 % (ref 14–44)
LYMPHOCYTES # BLD AUTO: 0 THOUSAND/UL (ref 0.6–4.47)
MAGNESIUM SERPL-MCNC: 2.3 MG/DL (ref 1.9–2.7)
MCH RBC QN AUTO: 29.3 PG (ref 26.8–34.3)
MCHC RBC AUTO-ENTMCNC: 32.3 G/DL (ref 31.4–37.4)
MCV RBC AUTO: 91 FL (ref 82–98)
MONOCYTES # BLD AUTO: 0.05 THOUSAND/UL (ref 0–1.22)
MONOCYTES NFR BLD: 1 % (ref 4–12)
NEUTROPHILS # BLD MANUAL: 4.62 THOUSAND/UL (ref 1.85–7.62)
NEUTS BAND NFR BLD MANUAL: 17 % (ref 0–8)
NEUTS SEG NFR BLD AUTO: 82 % (ref 43–75)
OVALOCYTES BLD QL SMEAR: PRESENT
PATHOLOGY REVIEW: YES
PHOSPHATE SERPL-MCNC: 3.7 MG/DL (ref 2.3–4.1)
PLATELET # BLD AUTO: 92 THOUSANDS/UL (ref 149–390)
PLATELET BLD QL SMEAR: ABNORMAL
PMV BLD AUTO: 10 FL (ref 8.9–12.7)
POTASSIUM SERPL-SCNC: 3.5 MMOL/L (ref 3.5–5.3)
PROCALCITONIN SERPL-MCNC: 13.81 NG/ML
PROT SERPL-MCNC: 5.1 G/DL (ref 6.4–8.4)
RBC # BLD AUTO: 3.07 MILLION/UL (ref 3.81–5.12)
RBC MORPH BLD: PRESENT
SCHISTOCYTES BLD QL SMEAR: PRESENT
SODIUM SERPL-SCNC: 130 MMOL/L (ref 135–147)
WBC # BLD AUTO: 4.67 THOUSAND/UL (ref 4.31–10.16)

## 2024-01-27 PROCEDURE — 85007 BL SMEAR W/DIFF WBC COUNT: CPT | Performed by: INTERNAL MEDICINE

## 2024-01-27 PROCEDURE — 84100 ASSAY OF PHOSPHORUS: CPT | Performed by: INTERNAL MEDICINE

## 2024-01-27 PROCEDURE — 94760 N-INVAS EAR/PLS OXIMETRY 1: CPT

## 2024-01-27 PROCEDURE — 84145 PROCALCITONIN (PCT): CPT | Performed by: INTERNAL MEDICINE

## 2024-01-27 PROCEDURE — 80053 COMPREHEN METABOLIC PANEL: CPT | Performed by: INTERNAL MEDICINE

## 2024-01-27 PROCEDURE — 83605 ASSAY OF LACTIC ACID: CPT | Performed by: INTERNAL MEDICINE

## 2024-01-27 PROCEDURE — 71045 X-RAY EXAM CHEST 1 VIEW: CPT

## 2024-01-27 PROCEDURE — 83735 ASSAY OF MAGNESIUM: CPT | Performed by: INTERNAL MEDICINE

## 2024-01-27 PROCEDURE — 99232 SBSQ HOSP IP/OBS MODERATE 35: CPT | Performed by: INTERNAL MEDICINE

## 2024-01-27 PROCEDURE — 85027 COMPLETE CBC AUTOMATED: CPT | Performed by: INTERNAL MEDICINE

## 2024-01-27 RX ORDER — VANCOMYCIN HYDROCHLORIDE 500 MG/100ML
12.5 INJECTION, SOLUTION INTRAVENOUS EVERY 24 HOURS
Status: DISCONTINUED | OUTPATIENT
Start: 2024-01-28 | End: 2024-01-28

## 2024-01-27 RX ORDER — CEFEPIME HYDROCHLORIDE 1 G/50ML
1000 INJECTION, SOLUTION INTRAVENOUS EVERY 12 HOURS
Status: DISCONTINUED | OUTPATIENT
Start: 2024-01-27 | End: 2024-02-02 | Stop reason: HOSPADM

## 2024-01-27 RX ORDER — SODIUM CHLORIDE 9 MG/ML
50 INJECTION, SOLUTION INTRAVENOUS CONTINUOUS
Status: DISCONTINUED | OUTPATIENT
Start: 2024-01-27 | End: 2024-01-28

## 2024-01-27 RX ORDER — POTASSIUM CHLORIDE 20MEQ/15ML
40 LIQUID (ML) ORAL ONCE
Status: COMPLETED | OUTPATIENT
Start: 2024-01-27 | End: 2024-01-27

## 2024-01-27 RX ADMIN — SODIUM CHLORIDE 50 ML/HR: 0.9 INJECTION, SOLUTION INTRAVENOUS at 08:34

## 2024-01-27 RX ADMIN — APIXABAN 2.5 MG: 2.5 TABLET, FILM COATED ORAL at 17:22

## 2024-01-27 RX ADMIN — PANTOPRAZOLE SODIUM 20 MG: 20 TABLET, DELAYED RELEASE ORAL at 05:08

## 2024-01-27 RX ADMIN — ATORVASTATIN CALCIUM 20 MG: 10 TABLET, FILM COATED ORAL at 17:22

## 2024-01-27 RX ADMIN — SODIUM CHLORIDE 500 ML: 0.9 INJECTION, SOLUTION INTRAVENOUS at 07:45

## 2024-01-27 RX ADMIN — CLOPIDOGREL 75 MG: 75 TABLET ORAL at 08:25

## 2024-01-27 RX ADMIN — CEFEPIME HYDROCHLORIDE 1000 MG: 1 INJECTION, SOLUTION INTRAVENOUS at 21:02

## 2024-01-27 RX ADMIN — VANCOMYCIN HYDROCHLORIDE 1000 MG: 5 INJECTION, POWDER, LYOPHILIZED, FOR SOLUTION INTRAVENOUS at 11:40

## 2024-01-27 RX ADMIN — POTASSIUM CHLORIDE 40 MEQ: 1.5 SOLUTION ORAL at 08:25

## 2024-01-27 RX ADMIN — ACETAMINOPHEN 650 MG: 325 TABLET, FILM COATED ORAL at 14:39

## 2024-01-27 RX ADMIN — Medication 2000 UNITS: at 08:25

## 2024-01-27 RX ADMIN — APIXABAN 2.5 MG: 2.5 TABLET, FILM COATED ORAL at 08:25

## 2024-01-27 RX ADMIN — OSELTAMAVIR PHOSPHATE 30 MG: 30 CAPSULE ORAL at 21:06

## 2024-01-27 RX ADMIN — CEFEPIME HYDROCHLORIDE 1000 MG: 1 INJECTION, SOLUTION INTRAVENOUS at 10:18

## 2024-01-27 NOTE — PLAN OF CARE
Problem: Prexisting or High Potential for Compromised Skin Integrity  Goal: Skin integrity is maintained or improved  Description: INTERVENTIONS:  - Identify patients at risk for skin breakdown  - Assess and monitor skin integrity  - Assess and monitor nutrition and hydration status  - Monitor labs   - Assess for incontinence   - Turn and reposition patient  - Assist with mobility/ambulation  - Relieve pressure over bony prominences  - Avoid friction and shearing  - Provide appropriate hygiene as needed including keeping skin clean and dry  - Evaluate need for skin moisturizer/barrier cream  - Collaborate with interdisciplinary team   - Patient/family teaching  - Consider wound care consult   Outcome: Progressing     Problem: PAIN - ADULT  Goal: Verbalizes/displays adequate comfort level or baseline comfort level  Description: Interventions:  - Encourage patient to monitor pain and request assistance  - Assess pain using appropriate pain scale  - Administer analgesics based on type and severity of pain and evaluate response  - Implement non-pharmacological measures as appropriate and evaluate response  - Consider cultural and social influences on pain and pain management  - Notify physician/advanced practitioner if interventions unsuccessful or patient reports new pain  Outcome: Progressing     Problem: INFECTION - ADULT  Goal: Absence or prevention of progression during hospitalization  Description: INTERVENTIONS:  - Assess and monitor for signs and symptoms of infection  - Monitor lab/diagnostic results  - Monitor all insertion sites, i.e. indwelling lines, tubes, and drains  - Monitor endotracheal if appropriate and nasal secretions for changes in amount and color  - [place] appropriate cooling/warming therapies per order  - Administer medications as ordered  - Instruct and encourage patient and family to use good hand hygiene technique  - Identify and instruct in appropriate isolation precautions for  identified infection/condition  Outcome: Progressing  Goal: Absence of fever/infection during neutropenic period  Description: INTERVENTIONS:  - Monitor WBC    Outcome: Progressing     Problem: SAFETY ADULT  Goal: Patient will remain free of falls  Description: INTERVENTIONS:  - Educate patient/family on patient safety including physical limitations  - Instruct patient to call for assistance with activity   - Consult OT/PT to assist with strengthening/mobility   - Keep Call bell within reach  - Keep bed low and locked with side rails adjusted as appropriate  - Keep care items and personal belongings within reach  - Initiate and maintain comfort rounds  - Make Fall Risk Sign visible to staff  - Offer Toileting every 2 Hours, in advance of need  - Initiate/Maintain bed alarm  - Obtain necessary fall risk management equipment:   - Apply yellow socks and bracelet for high fall risk patients  - Consider moving patient to room near nurses station  Outcome: Progressing  Goal: Maintain or return to baseline ADL function  Description: INTERVENTIONS:  -  Assess patient's ability to carry out ADLs; assess patient's baseline for ADL function and identify physical deficits which impact ability to perform ADLs (bathing, care of mouth/teeth, toileting, grooming, dressing, etc.)  - Assess/evaluate cause of self-care deficits   - Assess range of motion  - Assess patient's mobility; develop plan if impaired  - Assess patient's need for assistive devices and provide as appropriate  - Encourage maximum independence but intervene and supervise when necessary  - Involve family in performance of ADLs  - Assess for home care needs following discharge   - Consider OT consult to assist with ADL evaluation and planning for discharge  - Provide patient education as appropriate  Outcome: Progressing  Goal: Maintains/Returns to pre admission functional level  Description: INTERVENTIONS:  - Perform AM-PAC 6 Click Basic Mobility/ Daily Activity  assessment daily.  - Set and communicate daily mobility goal to care team and patient/family/caregiver.   - Collaborate with rehabilitation services on mobility goals if consulted  - Perform Range of Motion 3 times a day.  - Reposition patient every 2 hours.  - Dangle patient 3 times a day  - Stand patient 3 times a day  - Ambulate patient 3 times a day  - Out of bed to chair 3 times a day   - Out of bed for meals 3 times a day  - Out of bed for toileting  - Record patient progress and toleration of activity level   Outcome: Progressing     Problem: DISCHARGE PLANNING  Goal: Discharge to home or other facility with appropriate resources  Description: INTERVENTIONS:  - Identify barriers to discharge w/patient and caregiver  - Arrange for needed discharge resources and transportation as appropriate  - Identify discharge learning needs (meds, wound care, etc.)  - Arrange for interpretive services to assist at discharge as needed  - Refer to Case Management Department for coordinating discharge planning if the patient needs post-hospital services based on physician/advanced practitioner order or complex needs related to functional status, cognitive ability, or social support system  Outcome: Progressing     Problem: Knowledge Deficit  Goal: Patient/family/caregiver demonstrates understanding of disease process, treatment plan, medications, and discharge instructions  Description: Complete learning assessment and assess knowledge base.  Interventions:  - Provide teaching at level of understanding  - Provide teaching via preferred learning methods  Outcome: Progressing     Problem: RESPIRATORY - ADULT  Goal: Achieves optimal ventilation and oxygenation  Description: INTERVENTIONS:  - Assess for changes in respiratory status  - Assess for changes in mentation and behavior  - Position to facilitate oxygenation and minimize respiratory effort  - Oxygen administered by appropriate delivery if ordered  - Initiate smoking  cessation education as indicated  - Encourage broncho-pulmonary hygiene including cough, deep breathe, Incentive Spirometry  - Assess the need for suctioning and aspirate as needed  - Assess and instruct to report SOB or any respiratory difficulty  - Respiratory Therapy support as indicated  Outcome: Progressing     Problem: Nutrition/Hydration-ADULT  Goal: Nutrient/Hydration intake appropriate for improving, restoring or maintaining nutritional needs  Description: Monitor and assess patient's nutrition/hydration status for malnutrition. Collaborate with interdisciplinary team and initiate plan and interventions as ordered.  Monitor patient's weight and dietary intake as ordered or per policy. Utilize nutrition screening tool and intervene as necessary. Determine patient's food preferences and provide high-protein, high-caloric foods as appropriate.     INTERVENTIONS:  - Monitor oral intake, urinary output, labs, and treatment plans  - Assess nutrition and hydration status and recommend course of action  - Evaluate amount of meals eaten  - Assist patient with eating if necessary   - Allow adequate time for meals  - Recommend/ encourage appropriate diets, oral nutritional supplements, and vitamin/mineral supplements  - Order, calculate, and assess calorie counts as needed  - Recommend, monitor, and adjust tube feedings and TPN/PPN based on assessed needs  - Assess need for intravenous fluids  - Provide specific nutrition/hydration education as appropriate  - Include patient/family/caregiver in decisions related to nutrition  Outcome: Progressing

## 2024-01-27 NOTE — PLAN OF CARE
Problem: Prexisting or High Potential for Compromised Skin Integrity  Goal: Skin integrity is maintained or improved  Description: INTERVENTIONS:  - Identify patients at risk for skin breakdown  - Assess and monitor skin integrity  - Assess and monitor nutrition and hydration status  - Monitor labs   - Assess for incontinence   - Turn and reposition patient  - Assist with mobility/ambulation  - Relieve pressure over bony prominences  - Avoid friction and shearing  - Provide appropriate hygiene as needed including keeping skin clean and dry  - Evaluate need for skin moisturizer/barrier cream  - Collaborate with interdisciplinary team   - Patient/family teaching  - Consider wound care consult   Outcome: Progressing     Problem: PAIN - ADULT  Goal: Verbalizes/displays adequate comfort level or baseline comfort level  Description: Interventions:  - Encourage patient to monitor pain and request assistance  - Assess pain using appropriate pain scale  - Administer analgesics based on type and severity of pain and evaluate response  - Implement non-pharmacological measures as appropriate and evaluate response  - Consider cultural and social influences on pain and pain management  - Notify physician/advanced practitioner if interventions unsuccessful or patient reports new pain  Outcome: Progressing     Problem: INFECTION - ADULT  Goal: Absence or prevention of progression during hospitalization  Description: INTERVENTIONS:  - Assess and monitor for signs and symptoms of infection  - Monitor lab/diagnostic results  - Monitor all insertion sites, i.e. indwelling lines, tubes, and drains  - Monitor endotracheal if appropriate and nasal secretions for changes in amount and color  - [place] appropriate cooling/warming therapies per order  - Administer medications as ordered  - Instruct and encourage patient and family to use good hand hygiene technique  - Identify and instruct in appropriate isolation precautions for  identified infection/condition  Outcome: Progressing  Goal: Absence of fever/infection during neutropenic period  Description: INTERVENTIONS:  - Monitor WBC    Outcome: Progressing     Problem: SAFETY ADULT  Goal: Patient will remain free of falls  Description: INTERVENTIONS:  - Educate patient/family on patient safety including physical limitations  - Instruct patient to call for assistance with activity   - Consult OT/PT to assist with strengthening/mobility   - Keep Call bell within reach  - Keep bed low and locked with side rails adjusted as appropriate  - Keep care items and personal belongings within reach  - Initiate and maintain comfort rounds  - Make Fall Risk Sign visible to staff  - Offer Toileting every 2 Hours, in advance of need  - Initiate/Maintain bed alarm  - Obtain necessary fall risk management equipment:   - Apply yellow socks and bracelet for high fall risk patients  - Consider moving patient to room near nurses station  Outcome: Progressing  Goal: Maintain or return to baseline ADL function  Description: INTERVENTIONS:  -  Assess patient's ability to carry out ADLs; assess patient's baseline for ADL function and identify physical deficits which impact ability to perform ADLs (bathing, care of mouth/teeth, toileting, grooming, dressing, etc.)  - Assess/evaluate cause of self-care deficits   - Assess range of motion  - Assess patient's mobility; develop plan if impaired  - Assess patient's need for assistive devices and provide as appropriate  - Encourage maximum independence but intervene and supervise when necessary  - Involve family in performance of ADLs  - Assess for home care needs following discharge   - Consider OT consult to assist with ADL evaluation and planning for discharge  - Provide patient education as appropriate  Outcome: Progressing  Goal: Maintains/Returns to pre admission functional level  Description: INTERVENTIONS:  - Perform AM-PAC 6 Click Basic Mobility/ Daily Activity  assessment daily.  - Set and communicate daily mobility goal to care team and patient/family/caregiver.   - Collaborate with rehabilitation services on mobility goals if consulted  - Perform Range of Motion 3 times a day.  - Reposition patient every 2 hours.  - Dangle patient 3 times a day  - Stand patient 3 times a day  - Ambulate patient 3 times a day  - Out of bed to chair 3 times a day   - Out of bed for meals 3 times a day  - Out of bed for toileting  - Record patient progress and toleration of activity level   Outcome: Progressing     Problem: DISCHARGE PLANNING  Goal: Discharge to home or other facility with appropriate resources  Description: INTERVENTIONS:  - Identify barriers to discharge w/patient and caregiver  - Arrange for needed discharge resources and transportation as appropriate  - Identify discharge learning needs (meds, wound care, etc.)  - Arrange for interpretive services to assist at discharge as needed  - Refer to Case Management Department for coordinating discharge planning if the patient needs post-hospital services based on physician/advanced practitioner order or complex needs related to functional status, cognitive ability, or social support system  Outcome: Progressing     Problem: Knowledge Deficit  Goal: Patient/family/caregiver demonstrates understanding of disease process, treatment plan, medications, and discharge instructions  Description: Complete learning assessment and assess knowledge base.  Interventions:  - Provide teaching at level of understanding  - Provide teaching via preferred learning methods  Outcome: Progressing     Problem: RESPIRATORY - ADULT  Goal: Achieves optimal ventilation and oxygenation  Description: INTERVENTIONS:  - Assess for changes in respiratory status  - Assess for changes in mentation and behavior  - Position to facilitate oxygenation and minimize respiratory effort  - Oxygen administered by appropriate delivery if ordered  - Initiate smoking  cessation education as indicated  - Encourage broncho-pulmonary hygiene including cough, deep breathe, Incentive Spirometry  - Assess the need for suctioning and aspirate as needed  - Assess and instruct to report SOB or any respiratory difficulty  - Respiratory Therapy support as indicated  Outcome: Progressing     Problem: Nutrition/Hydration-ADULT  Goal: Nutrient/Hydration intake appropriate for improving, restoring or maintaining nutritional needs  Description: Monitor and assess patient's nutrition/hydration status for malnutrition. Collaborate with interdisciplinary team and initiate plan and interventions as ordered.  Monitor patient's weight and dietary intake as ordered or per policy. Utilize nutrition screening tool and intervene as necessary. Determine patient's food preferences and provide high-protein, high-caloric foods as appropriate.     INTERVENTIONS:  - Monitor oral intake, urinary output, labs, and treatment plans  - Assess nutrition and hydration status and recommend course of action  - Evaluate amount of meals eaten  - Assist patient with eating if necessary   - Allow adequate time for meals  - Recommend/ encourage appropriate diets, oral nutritional supplements, and vitamin/mineral supplements  - Order, calculate, and assess calorie counts as needed  - Recommend, monitor, and adjust tube feedings and TPN/PPN based on assessed needs  - Assess need for intravenous fluids  - Provide specific nutrition/hydration education as appropriate  - Include patient/family/caregiver in decisions related to nutrition  Outcome: Progressing

## 2024-01-27 NOTE — RESPIRATORY THERAPY NOTE
01/27/24 0903   Respiratory Assessment   Assessment Type Assess only   General Appearance Awake;Alert   Respiratory Pattern Normal   Chest Assessment Chest expansion symmetrical;Trachea midline   Bilateral Breath Sounds Diminished   Resp Comments patient is sitting up, she was on 2 lpm nasal oxygen, I have dialed her down to 1 lpm, she denies sob at this time, she does have a prn Albuteral tx if needed   Oxygen Therapy/Pulse Ox   O2 Device Nasal cannula   O2 Therapy Oxygen   Nasal Cannula O2 Flow Rate (L/min) 1 L/min   Calculated FIO2 (%) - Nasal Cannula 24   SpO2 98 %   SpO2 Activity At Rest   $ Pulse Oximetry Spot Check Charge Completed

## 2024-01-27 NOTE — ASSESSMENT & PLAN NOTE
Resolved with magnesium sulfate 2 grams IV x 1 dose on 01/26/2024  Follow the magnesium level    Results from last 7 days   Lab Units 01/27/24  0433 01/26/24  0408   MAGNESIUM mg/dL 2.3 1.7*

## 2024-01-27 NOTE — PROGRESS NOTES
"[facility]  Progress Note  Name: Mariela Giron I  MRN: [id number]  Unit/Bed#: 411-01 I Date of Admission: 1/25/2024   Date of Service: 1/27/2024 I Hospital Day: 2    Assessment/Plan   * Sepsis (HCC)  Assessment & Plan  Sepsis was present on admission and due to Influenza A with a right lower lobe pneumonia  Probable super-imposed bacterial pneumonia  SIRS criteria was met with fevers, leukopenia, and tachypnea.  The lactic acid level was within normal limits.  Checked blood cultures x 2 sets on 01/25/2024, which are no growth for 24 hours x 2 sets  Check a urine culture  Checked a MRSA nasal culture, which is pending at this time  Treat with Tamiflu 30 mg PO every 24 hours (renally-dosed)  Changed IV antibiotic treatment to IV cefepime and IV vancomycin on 01/27/2024 with persistent fevers and a markedly elevated procalcitonin level  Follow the procalcitonin level     Latest Reference Range & Units 01/27/24 04:33   Procalcitonin <=0.25 ng/ml 13.81 (H)   (H): Data is abnormally high    Influenza A  Assessment & Plan  Please see the assessment and plan for \"Sepsis\"    Hypoxia  Assessment & Plan  Due to Influenza A with pneumonia  Required up to 2 Lpm of continuous supplemental oxygen to maintain oxygen saturation levels at 90% and above  Not on home supplemental oxygen therapy  I appreciate Pulmonology's input.  Incentive spirometry  Respiratory protocol  The patient will need a home supplemental oxygen evaluation on the day of discharge.        Hyponatremia  Assessment & Plan  Unclear volume status at this time  Follow the sodium level    Atelectasis  Assessment & Plan  Incentive spirometry 10 times per hour while awake    Elevated d-dimer  Assessment & Plan  No evidence of pulmonary embolism on CTA of the chest/PE protocol (01/25/2024)     Latest Reference Range & Units Most Recent   D-Dimer, Quant <0.50 ug/ml FEU 0.97 (H)  1/25/24 18:40   (H): Data is abnormally high    Right lower lobe " "pneumonia  Assessment & Plan  Please see the assessment and plan for \"Sepsis\"        Permanent atrial fibrillation (HCC)  Assessment & Plan  Continue Toprol-XL 50 mg PO Qdaily  Continue anticoagulation with Eliquis 2.5 mg PO BID    Hypomagnesemia  Assessment & Plan  Resolved with magnesium sulfate 2 grams IV x 1 dose on 01/26/2024  Follow the magnesium level    Results from last 7 days   Lab Units 01/27/24 0433 01/26/24  0408   MAGNESIUM mg/dL 2.3 1.7*               Subjective/Objective     Subjective:   The patient was seen and examined.  The patient is doing better.  The shortness of breath is improving.  No chest pain.    Objective:  Vitals: Blood pressure 100/52, pulse 84, temperature 98.8 °F (37.1 °C), temperature source Tympanic, resp. rate 18, height 5' 1\" (1.549 m), weight 46.9 kg (103 lb 6.3 oz), SpO2 95%.,Body mass index is 19.54 kg/m².      Intake/Output Summary (Last 24 hours) at 1/27/2024 1405  Last data filed at 1/27/2024 1140  Gross per 24 hour   Intake 1210 ml   Output --   Net 1210 ml       Invasive Devices       Peripheral Intravenous Line  Duration             Peripheral IV 01/25/24 Dorsal (posterior);Left Hand 1 day    Peripheral IV 01/25/24 Proximal;Right;Ventral (anterior) Forearm 1 day              Drain  Duration             External Urinary Catheter Medium 1 day                    Physical Exam:   General:  NAD, follows commands  HEENT:  NC/AT, mucous membranes dry  Neck:  Supple, No JVP elevation  CV:  + S1, + S2, Irregularly irregular rhythm, Regular rate  Pulm:  Crackles at the right base, Scattered rhonchi bilaterally  Abd:  Soft, Non-tender, Non-distended  Ext:  No clubbing/cyanosis/edema  Skin:  No rashes  Neuro:  Awake, alert, oriented  Psych:  Normal mood and affect      Results from last 7 days   Lab Units 01/27/24  0433 01/26/24  0408 01/25/24  1840   WBC Thousand/uL 4.67 3.39* 3.40*   HEMOGLOBIN g/dL 9.0* 9.5* 9.6*   HEMATOCRIT % 27.9* 30.8* 30.2*   PLATELETS Thousands/uL 92* 95* " 99*     Results from last 7 days   Lab Units 01/27/24  0433 01/26/24  0408 01/25/24  1840   SODIUM mmol/L 130* 132* 134*   POTASSIUM mmol/L 3.5 3.8 3.5   CHLORIDE mmol/L 99 99 100   CO2 mmol/L 21 23 24   BUN mg/dL 40* 27* 28*   CREATININE mg/dL 1.37* 1.06 1.04   CALCIUM mg/dL 8.2* 8.5 9.0     Results from last 7 days   Lab Units 01/27/24  0433 01/26/24  0408   MAGNESIUM mg/dL 2.3 1.7*     Results from last 7 days   Lab Units 01/27/24  0433 01/26/24  0408 01/25/24  1840   ALK PHOS U/L 61 75 75   ALT U/L 14 16 15   AST U/L 24 24 22       Lab, Imaging and other studies: I have personally reviewed pertinent reports.    VTE Pharmacologic Prophylaxis: Eliquis 2.5 mg PO BID  VTE Mechanical Prophylaxis: sequential compression device

## 2024-01-27 NOTE — ASSESSMENT & PLAN NOTE
Sepsis was present on admission and due to Influenza A with a right lower lobe pneumonia  Probable super-imposed bacterial pneumonia  SIRS criteria was met with fevers, leukopenia, and tachypnea.  The lactic acid level was within normal limits.  Checked blood cultures x 2 sets on 01/25/2024, which are no growth for 24 hours x 2 sets  Check a urine culture  Checked a MRSA nasal culture, which is pending at this time  Treat with Tamiflu 30 mg PO every 24 hours (renally-dosed)  Changed IV antibiotic treatment to IV cefepime and IV vancomycin on 01/27/2024 with persistent fevers and a markedly elevatd procalcitonin level  Follow the procalcitonin level     Latest Reference Range & Units 01/27/24 04:33   Procalcitonin <=0.25 ng/ml 13.81 (H)   (H): Data is abnormally high

## 2024-01-27 NOTE — PROGRESS NOTES
Mariela Giron is a 88 y.o. female who is currently ordered Vancomycin IV with management by the Pharmacy Consult service.  Relevant clinical data and objective / subjective history reviewed.  Vancomycin Assessment:  Indication and Goal AUC/Trough: Pneumonia (goal -600, trough >10)  Clinical Status:  New  Micro:     Renal Function:  SCr: 1.37 mg/dL  CrCl: 25 mL/min  Renal replacement: Not on dialysis  Days of Therapy: 1  Current Dose: 500mg IV q24h  Vancomycin Plan:  New Dosinmg IV loading dose, then 500mg IV q24h  Estimated AUC: 425 mcg*hr/mL  Estimated Trough: 14.3 mcg/mL  Next Level: 24  Renal Function Monitoring: Daily BMP and UOP  Pharmacy will continue to follow closely for s/sx of nephrotoxicity, infusion reactions and appropriateness of therapy.  BMP and CBC will be ordered per protocol. We will continue to follow the patient’s culture results and clinical progress daily.    Jian Sinclair, Pharmacist

## 2024-01-27 NOTE — ASSESSMENT & PLAN NOTE
No evidence of pulmonary embolism on CTA of the chest/PE protocol (01/25/2024)     Latest Reference Range & Units Most Recent   D-Dimer, Quant <0.50 ug/ml FEU 0.97 (H)  1/25/24 18:40   (H): Data is abnormally high

## 2024-01-27 NOTE — ASSESSMENT & PLAN NOTE
Due to Influenza A with pneumonia  Required up to 2 Lpm of continuous supplemental oxygen to maintain oxygen saturation levels at 90% and above  Not on home supplemental oxygen therapy  I appreciate Pulmonology's input.  Incentive spirometry  Respiratory protocol  The patient will need a home supplemental oxygen evaluation on the day of discharge.

## 2024-01-28 PROBLEM — R19.7 DIARRHEA: Status: ACTIVE | Noted: 2024-01-28

## 2024-01-28 PROBLEM — Z22.322 MRSA COLONIZATION: Status: ACTIVE | Noted: 2024-01-28

## 2024-01-28 LAB
ALBUMIN SERPL BCP-MCNC: 3.1 G/DL (ref 3.5–5)
ALP SERPL-CCNC: 56 U/L (ref 34–104)
ALT SERPL W P-5'-P-CCNC: 14 U/L (ref 7–52)
ANION GAP SERPL CALCULATED.3IONS-SCNC: 9 MMOL/L
AST SERPL W P-5'-P-CCNC: 23 U/L (ref 13–39)
BASOPHILS # BLD AUTO: 0.01 THOUSANDS/ÂΜL (ref 0–0.1)
BASOPHILS NFR BLD AUTO: 0 % (ref 0–1)
BILIRUB SERPL-MCNC: 0.63 MG/DL (ref 0.2–1)
BUN SERPL-MCNC: 39 MG/DL (ref 5–25)
CALCIUM ALBUM COR SERPL-MCNC: 8.7 MG/DL (ref 8.3–10.1)
CALCIUM SERPL-MCNC: 8 MG/DL (ref 8.4–10.2)
CHLORIDE SERPL-SCNC: 103 MMOL/L (ref 96–108)
CO2 SERPL-SCNC: 19 MMOL/L (ref 21–32)
CREAT SERPL-MCNC: 1.17 MG/DL (ref 0.6–1.3)
EOSINOPHIL # BLD AUTO: 0.37 THOUSAND/ÂΜL (ref 0–0.61)
EOSINOPHIL NFR BLD AUTO: 6 % (ref 0–6)
ERYTHROCYTE [DISTWIDTH] IN BLOOD BY AUTOMATED COUNT: 15.7 % (ref 11.6–15.1)
GFR SERPL CREATININE-BSD FRML MDRD: 41 ML/MIN/1.73SQ M
GLUCOSE SERPL-MCNC: 85 MG/DL (ref 65–140)
HCT VFR BLD AUTO: 26.9 % (ref 34.8–46.1)
HGB BLD-MCNC: 8.5 G/DL (ref 11.5–15.4)
IMM GRANULOCYTES # BLD AUTO: 0.06 THOUSAND/UL (ref 0–0.2)
IMM GRANULOCYTES NFR BLD AUTO: 1 % (ref 0–2)
LYMPHOCYTES # BLD AUTO: 0.36 THOUSANDS/ÂΜL (ref 0.6–4.47)
LYMPHOCYTES NFR BLD AUTO: 6 % (ref 14–44)
MAGNESIUM SERPL-MCNC: 2.2 MG/DL (ref 1.9–2.7)
MCH RBC QN AUTO: 28.7 PG (ref 26.8–34.3)
MCHC RBC AUTO-ENTMCNC: 31.6 G/DL (ref 31.4–37.4)
MCV RBC AUTO: 91 FL (ref 82–98)
MONOCYTES # BLD AUTO: 0.3 THOUSAND/ÂΜL (ref 0.17–1.22)
MONOCYTES NFR BLD AUTO: 5 % (ref 4–12)
MRSA NOSE QL CULT: ABNORMAL
MRSA NOSE QL CULT: ABNORMAL
NEUTROPHILS # BLD AUTO: 5.04 THOUSANDS/ÂΜL (ref 1.85–7.62)
NEUTS SEG NFR BLD AUTO: 82 % (ref 43–75)
NRBC BLD AUTO-RTO: 0 /100 WBCS
PHOSPHATE SERPL-MCNC: 2.9 MG/DL (ref 2.3–4.1)
PLATELET # BLD AUTO: 94 THOUSANDS/UL (ref 149–390)
PMV BLD AUTO: 9.5 FL (ref 8.9–12.7)
POTASSIUM SERPL-SCNC: 3.8 MMOL/L (ref 3.5–5.3)
PROCALCITONIN SERPL-MCNC: 9.75 NG/ML
PROT SERPL-MCNC: 5.1 G/DL (ref 6.4–8.4)
RBC # BLD AUTO: 2.96 MILLION/UL (ref 3.81–5.12)
SODIUM SERPL-SCNC: 131 MMOL/L (ref 135–147)
WBC # BLD AUTO: 6.14 THOUSAND/UL (ref 4.31–10.16)

## 2024-01-28 PROCEDURE — 84145 PROCALCITONIN (PCT): CPT | Performed by: INTERNAL MEDICINE

## 2024-01-28 PROCEDURE — 83735 ASSAY OF MAGNESIUM: CPT | Performed by: INTERNAL MEDICINE

## 2024-01-28 PROCEDURE — 84100 ASSAY OF PHOSPHORUS: CPT | Performed by: INTERNAL MEDICINE

## 2024-01-28 PROCEDURE — 80053 COMPREHEN METABOLIC PANEL: CPT | Performed by: INTERNAL MEDICINE

## 2024-01-28 PROCEDURE — 99232 SBSQ HOSP IP/OBS MODERATE 35: CPT | Performed by: INTERNAL MEDICINE

## 2024-01-28 PROCEDURE — 85025 COMPLETE CBC W/AUTO DIFF WBC: CPT | Performed by: INTERNAL MEDICINE

## 2024-01-28 PROCEDURE — 94760 N-INVAS EAR/PLS OXIMETRY 1: CPT

## 2024-01-28 RX ORDER — DIPHENOXYLATE HYDROCHLORIDE AND ATROPINE SULFATE 2.5; .025 MG/1; MG/1
1 TABLET ORAL 4 TIMES DAILY PRN
Status: DISCONTINUED | OUTPATIENT
Start: 2024-01-28 | End: 2024-02-02 | Stop reason: HOSPADM

## 2024-01-28 RX ORDER — POTASSIUM CHLORIDE 20 MEQ/1
20 TABLET, EXTENDED RELEASE ORAL ONCE
Status: COMPLETED | OUTPATIENT
Start: 2024-01-28 | End: 2024-01-28

## 2024-01-28 RX ORDER — GUAIFENESIN/DEXTROMETHORPHAN 100-10MG/5
10 SYRUP ORAL 3 TIMES DAILY
Status: DISCONTINUED | OUTPATIENT
Start: 2024-01-28 | End: 2024-02-02 | Stop reason: HOSPADM

## 2024-01-28 RX ADMIN — ALPRAZOLAM 0.25 MG: 0.25 TABLET ORAL at 19:42

## 2024-01-28 RX ADMIN — CLOPIDOGREL 75 MG: 75 TABLET ORAL at 09:44

## 2024-01-28 RX ADMIN — GUAIFENESIN AND DEXTROMETHORPHAN 10 ML: 100; 10 SYRUP ORAL at 17:19

## 2024-01-28 RX ADMIN — APIXABAN 2.5 MG: 2.5 TABLET, FILM COATED ORAL at 09:44

## 2024-01-28 RX ADMIN — CEFEPIME HYDROCHLORIDE 1000 MG: 1 INJECTION, SOLUTION INTRAVENOUS at 09:44

## 2024-01-28 RX ADMIN — ATORVASTATIN CALCIUM 20 MG: 10 TABLET, FILM COATED ORAL at 17:19

## 2024-01-28 RX ADMIN — SODIUM CHLORIDE 50 ML/HR: 0.9 INJECTION, SOLUTION INTRAVENOUS at 05:28

## 2024-01-28 RX ADMIN — OSELTAMAVIR PHOSPHATE 30 MG: 30 CAPSULE ORAL at 21:18

## 2024-01-28 RX ADMIN — APIXABAN 2.5 MG: 2.5 TABLET, FILM COATED ORAL at 17:20

## 2024-01-28 RX ADMIN — GUAIFENESIN AND DEXTROMETHORPHAN 10 ML: 100; 10 SYRUP ORAL at 09:44

## 2024-01-28 RX ADMIN — Medication 2000 UNITS: at 09:44

## 2024-01-28 RX ADMIN — VANCOMYCIN HYDROCHLORIDE 750 MG: 5 INJECTION, POWDER, LYOPHILIZED, FOR SOLUTION INTRAVENOUS at 10:55

## 2024-01-28 RX ADMIN — POTASSIUM CHLORIDE 20 MEQ: 1500 TABLET, EXTENDED RELEASE ORAL at 09:44

## 2024-01-28 RX ADMIN — METOPROLOL SUCCINATE 50 MG: 50 TABLET, EXTENDED RELEASE ORAL at 09:44

## 2024-01-28 RX ADMIN — PANTOPRAZOLE SODIUM 20 MG: 20 TABLET, DELAYED RELEASE ORAL at 06:08

## 2024-01-28 RX ADMIN — CEFEPIME HYDROCHLORIDE 1000 MG: 1 INJECTION, SOLUTION INTRAVENOUS at 20:24

## 2024-01-28 RX ADMIN — GUAIFENESIN AND DEXTROMETHORPHAN 10 ML: 100; 10 SYRUP ORAL at 21:18

## 2024-01-28 NOTE — ASSESSMENT & PLAN NOTE
No evidence of pulmonary embolism on CTA of the chest/PE protocol (01/25/2024)  Check a venous duplex of the bilateral lower extremities     Latest Reference Range & Units Most Recent   D-Dimer, Quant <0.50 ug/ml FEU 0.97 (H)  1/25/24 18:40   (H): Data is abnormally high

## 2024-01-28 NOTE — ASSESSMENT & PLAN NOTE
Wt Readings from Last 3 Encounters:   01/28/24 47.8 kg (105 lb 6.4 oz)   01/03/24 45.5 kg (100 lb 6.4 oz)   12/21/23 45.1 kg (99 lb 6.4 oz)     Hold PO diuretic treatment in the setting of sepsis  Monitor her volume status closely  Daily weights

## 2024-01-28 NOTE — ASSESSMENT & PLAN NOTE
Resolved with magnesium sulfate 2 grams IV x 1 dose on 01/26/2024  Follow the magnesium level    Results from last 7 days   Lab Units 01/28/24  0518 01/27/24  0433 01/26/24  0408   MAGNESIUM mg/dL 2.2 2.3 1.7*

## 2024-01-28 NOTE — RESPIRATORY THERAPY NOTE
01/28/24 0777   Respiratory Assessment   Resp Comments patient is sleeping in no respiratory distress, she is on room air   Oxygen Therapy/Pulse Ox   O2 Device None (Room air)   O2 Therapy Room air   SpO2 96 %   SpO2 Activity At Rest   $ Pulse Oximetry Spot Check Charge Completed

## 2024-01-28 NOTE — PROGRESS NOTES
"[facility]  Progress Note  Name: Mariela Giron I  MRN: [id number]  Unit/Bed#: 411-01 I Date of Admission: 1/25/2024   Date of Service: 1/28/2024 I Hospital Day: 3    Assessment/Plan   * Sepsis (HCC)  Assessment & Plan  Sepsis was present on admission and due to Influenza A with a right lower lobe pneumonia  Probable super-imposed bacterial pneumonia  SIRS criteria was met with fevers, leukopenia, and tachypnea.  The lactic acid level was within normal limits.  Checked blood cultures x 2 sets on 01/25/2024, which are no growth for 48 hours x 2 sets  Check a urine culture  Positive MRSA nasal culture  Treat with Tamiflu 30 mg PO every 24 hours (renally-dosed)  Changed IV antibiotic treatment to IV cefepime and IV vancomycin on 01/27/2024 with persistent fevers and a markedly elevated procalcitonin level  Follow the procalcitonin level     Latest Reference Range & Units 01/27/24 04:33   Procalcitonin <=0.25 ng/ml 13.81 (H)   (H): Data is abnormally high    Influenza A  Assessment & Plan  Please see the assessment and plan for \"Sepsis\"    Diarrhea  Assessment & Plan  The patient 3 episodes of watery diarrhea in the last 24 hours.  She has not been on any laxatives or stool softeners in the last 72 hours.  Check a Clostridium difficile culture    Right lower lobe pneumonia  Assessment & Plan  Please see the assessment and plan for \"Sepsis\"        Hypoxia  Assessment & Plan  Due to Influenza A with pneumonia  Required up to 2 Lpm of continuous supplemental oxygen to maintain oxygen saturation levels at 90% and above  Not on home supplemental oxygen therapy  I appreciate Pulmonology's input.  Incentive spirometry  Respiratory protocol  The patient will need a home supplemental oxygen evaluation on the day of discharge.        Hyponatremia  Assessment & Plan  Unclear volume status at this time  Follow the sodium level    MRSA colonization  Assessment & Plan  Utilize the MRSA decolonization " protocol, which should be continued upon discharge    Atelectasis  Assessment & Plan  Incentive spirometry 10 times per hour while awake    Positive DONYA (antinuclear antibody)  Assessment & Plan  Outpatient Rheumatology evaluation    Elevated d-dimer  Assessment & Plan  No evidence of pulmonary embolism on CTA of the chest/PE protocol (01/25/2024)  Check a venous duplex of the bilateral lower extremities     Latest Reference Range & Units Most Recent   D-Dimer, Quant <0.50 ug/ml FEU 0.97 (H)  1/25/24 18:40   (H): Data is abnormally high    Pancytopenia (HCC)  Assessment & Plan  Likely due to acute illness with Influenza A and sepsis  Follow the CBC  Outpatient Hematology evaluation if the pancytopenia persists    Coronary artery disease involving native coronary artery of native heart without angina pectoris  Assessment & Plan  Continue Plavix, statin therapy, and Toprol-XL  Outpatient follow-up with Cardiology    Permanent atrial fibrillation (HCC)  Assessment & Plan  Continue Toprol-XL 50 mg PO Qdaily  Continue anticoagulation with Eliquis 2.5 mg PO BID    Chronic diastolic (congestive) heart failure (HCC)  Assessment & Plan  Wt Readings from Last 3 Encounters:   01/28/24 47.8 kg (105 lb 6.4 oz)   01/03/24 45.5 kg (100 lb 6.4 oz)   12/21/23 45.1 kg (99 lb 6.4 oz)     Hold PO diuretic treatment in the setting of sepsis  Monitor her volume status closely  Daily weights          Dyslipidemia  Assessment & Plan  Continue statin therapy    Hypomagnesemia  Assessment & Plan  Resolved with magnesium sulfate 2 grams IV x 1 dose on 01/26/2024  Follow the magnesium level    Results from last 7 days   Lab Units 01/28/24  0518 01/27/24  0433 01/26/24  0408   MAGNESIUM mg/dL 2.2 2.3 1.7*               VTE Pharmacologic Prophylaxis: VTE Score: 10 High Risk (Score >/= 5) - Pharmacological DVT Prophylaxis Ordered: apixaban (Eliquis). Sequential Compression Devices Ordered.    Mobility:   Basic Mobility Inpatient Raw Score:  17  JH-HLM Goal: 5: Stand one or more mins  JH-HLM Achieved: 6: Walk 10 steps or more  HLM Goal achieved. Continue to encourage appropriate mobility.    Patient Centered Rounds: I performed bedside rounds with nursing staff today.     Education and Discussions with Family / Patient: Updated  (son) at bedside. (Sloan)    Total Time Spent on Date of Encounter in care of patient: 35 mins. This time was spent on one or more of the following: performing physical exam; counseling and coordination of care; obtaining or reviewing history; documenting in the medical record; reviewing/ordering tests, medications or procedures; communicating with other healthcare professionals and discussing with patient's family/caregivers.    Current Length of Stay: 3 day(s)  Current Patient Status: Inpatient   Certification Statement: The patient will continue to require additional inpatient hospital stay due to the need for IV antibiotic treatment and the need for continuous supplemental oxygen to maintain adequate oxygen saturation levels.  Discharge Plan: Anticipate discharge in 48-72 hrs to rehab facility.    Code Status: Level 3 - DNAR and DNI    Subjective:   The patient was seen and examined.  The patient has been experiencing watery diarrhea overnight and this morning.  No chest pain.  No shortness of breath.  No abdominal pain.  No nausea or vomiting.      Objective:     Vitals:   Temp (24hrs), Av.2 °F (37.3 °C), Min:98.2 °F (36.8 °C), Max:100.3 °F (37.9 °C)    Temp:  [98.2 °F (36.8 °C)-100.3 °F (37.9 °C)] 98.2 °F (36.8 °C)  HR:  [] 100  Resp:  [18-28] 18  BP: ()/(44-56) 124/56  SpO2:  [95 %-100 %] 96 %  Body mass index is 19.92 kg/m².     Input and Output Summary (last 24 hours):     Intake/Output Summary (Last 24 hours) at 2024 1401  Last data filed at 2024 0528  Gross per 24 hour   Intake 1045 ml   Output --   Net 1045 ml       Physical Exam:   Physical Exam  General:  NAD, follows  commands  HEENT:  NC/AT, mucous membranes moist  Neck:  Supple, No JVP elevation  CV:  + S1, + S2, Irregularly irregular rhythm, Intermittent tachycardia  Pulm:  Bibasilar crackles, Scattered rhonchi bilaterally  Abd:  Soft, Non-tender, Non-distended  Ext:  No clubbing/cyanosis/edema  Skin:  No rashes  Neuro:  Awake, alert, oriented  Psych:  Normal mood and affect      Additional Data:    Labs:  Results from last 7 days   Lab Units 01/28/24  0518 01/27/24  0433   WBC Thousand/uL 6.14 4.67   HEMOGLOBIN g/dL 8.5* 9.0*   HEMATOCRIT % 26.9* 27.9*   PLATELETS Thousands/uL 94* 92*   BANDS PCT %  --  17*   NEUTROS PCT % 82*  --    LYMPHS PCT % 6*  --    LYMPHO PCT %  --  0*   MONOS PCT % 5  --    MONO PCT %  --  1*   EOS PCT % 6 0     Results from last 7 days   Lab Units 01/28/24  0518   SODIUM mmol/L 131*   POTASSIUM mmol/L 3.8   CHLORIDE mmol/L 103   CO2 mmol/L 19*   BUN mg/dL 39*   CREATININE mg/dL 1.17   ANION GAP mmol/L 9   CALCIUM mg/dL 8.0*   ALBUMIN g/dL 3.1*   TOTAL BILIRUBIN mg/dL 0.63   ALK PHOS U/L 56   ALT U/L 14   AST U/L 23   GLUCOSE RANDOM mg/dL 85                 Results from last 7 days   Lab Units 01/28/24  0526 01/27/24  0433 01/25/24  1840   LACTIC ACID mmol/L  --  1.3 1.6   PROCALCITONIN ng/ml 9.75* 13.81* 0.19       Lines/Drains:  Invasive Devices       Peripheral Intravenous Line  Duration             Peripheral IV 01/25/24 Proximal;Right;Ventral (anterior) Forearm 2 days              Drain  Duration             External Urinary Catheter Medium 2 days                          Imaging: No pertinent imaging reviewed.    Recent Cultures (last 7 days):   Results from last 7 days   Lab Units 01/26/24  1415 01/26/24  0245 01/25/24  1840   BLOOD CULTURE  No Growth at 24 hrs.  No Growth at 24 hrs.  --  No Growth at 48 hrs.  No Growth at 48 hrs.   LEGIONELLA URINARY ANTIGEN   --  Negative  --        Last 24 Hours Medication List:   Current Facility-Administered Medications   Medication Dose Route Frequency  Provider Last Rate    acetaminophen  650 mg Oral Q6H PRN Rogerio Francois PA-C      albuterol  2.5 mg Nebulization Q4H PRN Fazal Sherwood DO      ALPRAZolam  0.25 mg Oral TID PRN Rogerio Francois PA-C      apixaban  2.5 mg Oral BID Rogerio Francois PA-C      atorvastatin  20 mg Oral Daily With Dinner Rogerio Francois PA-C      bisacodyl  10 mg Rectal Daily PRN Rogerio Francois PA-C      cefepime  1,000 mg Intravenous Q12H Fazal Sherwood, DO 1,000 mg (01/28/24 0944)    cholecalciferol  2,000 Units Oral Daily Rogerio Francois PA-C      clopidogrel  75 mg Oral Daily Rogerio Francois PA-C      dextromethorphan-guaiFENesin  10 mL Oral TID Fazal Sherwood DO      diphenoxylate-atropine  1 tablet Oral 4x Daily PRN Fazal Sherwood DO      metoprolol succinate  50 mg Oral Daily Rogerio Francois PA-C      ondansetron  4 mg Intravenous Q6H PRN Rogerio Francois PA-C      oseltamivir  30 mg Oral Q24H Rogerio Francois PA-C      pantoprazole  20 mg Oral Early Morning Rogerio Francois PA-C      vancomycin  750 mg Intravenous Q24H Fazal Sherwood,  mg (01/28/24 1055)        Today, Patient Was Seen By: Fazal Sherwood DO    **Please Note: This note may have been constructed using a voice recognition system.**

## 2024-01-28 NOTE — PROGRESS NOTES
Mariela Giron is a 88 y.o. female who is currently ordered Vancomycin IV with management by the Pharmacy Consult service.  Relevant clinical data and objective / subjective history reviewed.  Vancomycin Assessment:  Indication and Goal AUC/Trough: Pneumonia (goal -600, trough >10)  Clinical Status: stable  Micro:     Renal Function:  SCr: 1.17 mg/dL  CrCl: 30 mL/min  Renal replacement: Not on dialysis  Days of Therapy: 2  Current Dose: 500mg IV q24h  Vancomycin Plan:  New Dosinmg IV q24h  Estimated AUC: 540 mcg*hr/mL  Estimated Trough: 17.1 mcg/mL  Next Level: 24 with AM labs  Renal Function Monitoring: Daily BMP and UOP  Pharmacy will continue to follow closely for s/sx of nephrotoxicity, infusion reactions and appropriateness of therapy.  BMP and CBC will be ordered per protocol. We will continue to follow the patient’s culture results and clinical progress daily.    Jian Sinclair, Pharmacist

## 2024-01-28 NOTE — ASSESSMENT & PLAN NOTE
The patient 3 episodes of watery diarrhea in the last 24 hours.  She has not been on any laxatives or stool softeners in the last 72 hours.  Check a Clostridium difficile culture

## 2024-01-28 NOTE — ASSESSMENT & PLAN NOTE
Due to Influenza A with pneumonia  Required up to 2 Lpm of continuous supplemental oxygen to maintain oxygen saturation levels at 90% and above  Not on home supplemental oxygen therapy  I appreciate Pulmonology's input.  Incentive spirometry  Respiratory protocol  The patient will need a home supplemental oxygen evaluation on the day of discharge.       100

## 2024-01-28 NOTE — ASSESSMENT & PLAN NOTE
Sepsis was present on admission and due to Influenza A with a right lower lobe pneumonia  Probable super-imposed bacterial pneumonia  SIRS criteria was met with fevers, leukopenia, and tachypnea.  The lactic acid level was within normal limits.  Checked blood cultures x 2 sets on 01/25/2024, which are no growth for 48 hours x 2 sets  Check a urine culture  Positive MRSA nasal culture  Treat with Tamiflu 30 mg PO every 24 hours (renally-dosed)  Changed IV antibiotic treatment to IV cefepime and IV vancomycin on 01/27/2024 with persistent fevers and a markedly elevated procalcitonin level  Follow the procalcitonin level     Latest Reference Range & Units 01/27/24 04:33   Procalcitonin <=0.25 ng/ml 13.81 (H)   (H): Data is abnormally high

## 2024-01-29 ENCOUNTER — APPOINTMENT (INPATIENT)
Dept: NON INVASIVE DIAGNOSTICS | Facility: HOSPITAL | Age: 89
End: 2024-01-29
Payer: MEDICARE

## 2024-01-29 PROBLEM — E83.42 HYPOMAGNESEMIA: Status: RESOLVED | Noted: 2019-05-17 | Resolved: 2024-01-29

## 2024-01-29 PROBLEM — D61.818 PANCYTOPENIA (HCC): Status: RESOLVED | Noted: 2024-01-26 | Resolved: 2024-01-29

## 2024-01-29 LAB
ALBUMIN SERPL BCP-MCNC: 3.2 G/DL (ref 3.5–5)
ALP SERPL-CCNC: 72 U/L (ref 34–104)
ALT SERPL W P-5'-P-CCNC: 17 U/L (ref 7–52)
ANION GAP SERPL CALCULATED.3IONS-SCNC: 8 MMOL/L
AST SERPL W P-5'-P-CCNC: 25 U/L (ref 13–39)
BASOPHILS # BLD AUTO: 0 THOUSANDS/ÂΜL (ref 0–0.1)
BASOPHILS NFR BLD AUTO: 0 % (ref 0–1)
BILIRUB SERPL-MCNC: 0.66 MG/DL (ref 0.2–1)
BUN SERPL-MCNC: 34 MG/DL (ref 5–25)
CALCIUM ALBUM COR SERPL-MCNC: 8.7 MG/DL (ref 8.3–10.1)
CALCIUM SERPL-MCNC: 8.1 MG/DL (ref 8.4–10.2)
CHLORIDE SERPL-SCNC: 107 MMOL/L (ref 96–108)
CO2 SERPL-SCNC: 19 MMOL/L (ref 21–32)
CREAT SERPL-MCNC: 0.94 MG/DL (ref 0.6–1.3)
EOSINOPHIL # BLD AUTO: 0.61 THOUSAND/ÂΜL (ref 0–0.61)
EOSINOPHIL NFR BLD AUTO: 12 % (ref 0–6)
ERYTHROCYTE [DISTWIDTH] IN BLOOD BY AUTOMATED COUNT: 15.6 % (ref 11.6–15.1)
GFR SERPL CREATININE-BSD FRML MDRD: 54 ML/MIN/1.73SQ M
GLUCOSE SERPL-MCNC: 81 MG/DL (ref 65–140)
HCT VFR BLD AUTO: 28.4 % (ref 34.8–46.1)
HGB BLD-MCNC: 8.7 G/DL (ref 11.5–15.4)
IMM GRANULOCYTES # BLD AUTO: 0.02 THOUSAND/UL (ref 0–0.2)
IMM GRANULOCYTES NFR BLD AUTO: 0 % (ref 0–2)
LYMPHOCYTES # BLD AUTO: 0.49 THOUSANDS/ÂΜL (ref 0.6–4.47)
LYMPHOCYTES NFR BLD AUTO: 10 % (ref 14–44)
MAGNESIUM SERPL-MCNC: 2.1 MG/DL (ref 1.9–2.7)
MCH RBC QN AUTO: 28.5 PG (ref 26.8–34.3)
MCHC RBC AUTO-ENTMCNC: 30.6 G/DL (ref 31.4–37.4)
MCV RBC AUTO: 93 FL (ref 82–98)
MONOCYTES # BLD AUTO: 0.3 THOUSAND/ÂΜL (ref 0.17–1.22)
MONOCYTES NFR BLD AUTO: 6 % (ref 4–12)
NEUTROPHILS # BLD AUTO: 3.75 THOUSANDS/ÂΜL (ref 1.85–7.62)
NEUTS SEG NFR BLD AUTO: 72 % (ref 43–75)
NRBC BLD AUTO-RTO: 0 /100 WBCS
PHOSPHATE SERPL-MCNC: 2.9 MG/DL (ref 2.3–4.1)
PLATELET # BLD AUTO: 107 THOUSANDS/UL (ref 149–390)
PMV BLD AUTO: 10.5 FL (ref 8.9–12.7)
POTASSIUM SERPL-SCNC: 3.8 MMOL/L (ref 3.5–5.3)
PROCALCITONIN SERPL-MCNC: 5.77 NG/ML
PROT SERPL-MCNC: 5.3 G/DL (ref 6.4–8.4)
RBC # BLD AUTO: 3.05 MILLION/UL (ref 3.81–5.12)
SODIUM SERPL-SCNC: 134 MMOL/L (ref 135–147)
VANCOMYCIN SERPL-MCNC: 11.3 UG/ML (ref 10–20)
WBC # BLD AUTO: 5.17 THOUSAND/UL (ref 4.31–10.16)

## 2024-01-29 PROCEDURE — 86039 ANTINUCLEAR ANTIBODIES (ANA): CPT | Performed by: INTERNAL MEDICINE

## 2024-01-29 PROCEDURE — 83735 ASSAY OF MAGNESIUM: CPT | Performed by: INTERNAL MEDICINE

## 2024-01-29 PROCEDURE — 84100 ASSAY OF PHOSPHORUS: CPT | Performed by: INTERNAL MEDICINE

## 2024-01-29 PROCEDURE — 86235 NUCLEAR ANTIGEN ANTIBODY: CPT | Performed by: INTERNAL MEDICINE

## 2024-01-29 PROCEDURE — 99232 SBSQ HOSP IP/OBS MODERATE 35: CPT | Performed by: INTERNAL MEDICINE

## 2024-01-29 PROCEDURE — 85060 BLOOD SMEAR INTERPRETATION: CPT | Performed by: PATHOLOGY

## 2024-01-29 PROCEDURE — 84145 PROCALCITONIN (PCT): CPT | Performed by: INTERNAL MEDICINE

## 2024-01-29 PROCEDURE — 99233 SBSQ HOSP IP/OBS HIGH 50: CPT | Performed by: INTERNAL MEDICINE

## 2024-01-29 PROCEDURE — 97530 THERAPEUTIC ACTIVITIES: CPT

## 2024-01-29 PROCEDURE — 80202 ASSAY OF VANCOMYCIN: CPT | Performed by: INTERNAL MEDICINE

## 2024-01-29 PROCEDURE — 93970 EXTREMITY STUDY: CPT | Performed by: SURGERY

## 2024-01-29 PROCEDURE — 97116 GAIT TRAINING THERAPY: CPT

## 2024-01-29 PROCEDURE — 80053 COMPREHEN METABOLIC PANEL: CPT | Performed by: INTERNAL MEDICINE

## 2024-01-29 PROCEDURE — 86225 DNA ANTIBODY NATIVE: CPT | Performed by: INTERNAL MEDICINE

## 2024-01-29 PROCEDURE — 85025 COMPLETE CBC W/AUTO DIFF WBC: CPT | Performed by: INTERNAL MEDICINE

## 2024-01-29 PROCEDURE — 93970 EXTREMITY STUDY: CPT

## 2024-01-29 PROCEDURE — 86038 ANTINUCLEAR ANTIBODIES: CPT | Performed by: INTERNAL MEDICINE

## 2024-01-29 RX ORDER — LANOLIN ALCOHOL/MO/W.PET/CERES
3 CREAM (GRAM) TOPICAL
Status: DISCONTINUED | OUTPATIENT
Start: 2024-01-29 | End: 2024-02-02 | Stop reason: HOSPADM

## 2024-01-29 RX ORDER — RISPERIDONE 0.25 MG/1
0.25 TABLET ORAL EVERY 4 HOURS PRN
Status: DISCONTINUED | OUTPATIENT
Start: 2024-01-29 | End: 2024-02-02 | Stop reason: HOSPADM

## 2024-01-29 RX ORDER — FUROSEMIDE 20 MG/1
20 TABLET ORAL DAILY
Status: DISCONTINUED | OUTPATIENT
Start: 2024-01-29 | End: 2024-01-30

## 2024-01-29 RX ADMIN — GUAIFENESIN AND DEXTROMETHORPHAN 10 ML: 100; 10 SYRUP ORAL at 17:56

## 2024-01-29 RX ADMIN — VANCOMYCIN HYDROCHLORIDE 750 MG: 5 INJECTION, POWDER, LYOPHILIZED, FOR SOLUTION INTRAVENOUS at 10:45

## 2024-01-29 RX ADMIN — OSELTAMAVIR PHOSPHATE 30 MG: 30 CAPSULE ORAL at 22:25

## 2024-01-29 RX ADMIN — METOPROLOL SUCCINATE 50 MG: 50 TABLET, EXTENDED RELEASE ORAL at 08:33

## 2024-01-29 RX ADMIN — PANTOPRAZOLE SODIUM 20 MG: 20 TABLET, DELAYED RELEASE ORAL at 05:27

## 2024-01-29 RX ADMIN — FUROSEMIDE 20 MG: 20 TABLET ORAL at 11:50

## 2024-01-29 RX ADMIN — CEFEPIME HYDROCHLORIDE 1000 MG: 1 INJECTION, SOLUTION INTRAVENOUS at 22:23

## 2024-01-29 RX ADMIN — APIXABAN 2.5 MG: 2.5 TABLET, FILM COATED ORAL at 17:56

## 2024-01-29 RX ADMIN — MELATONIN 3 MG: 3 TAB ORAL at 22:25

## 2024-01-29 RX ADMIN — Medication 2000 UNITS: at 08:33

## 2024-01-29 RX ADMIN — CEFEPIME HYDROCHLORIDE 1000 MG: 1 INJECTION, SOLUTION INTRAVENOUS at 08:33

## 2024-01-29 RX ADMIN — GUAIFENESIN AND DEXTROMETHORPHAN 10 ML: 100; 10 SYRUP ORAL at 22:25

## 2024-01-29 RX ADMIN — ATORVASTATIN CALCIUM 20 MG: 10 TABLET, FILM COATED ORAL at 17:56

## 2024-01-29 RX ADMIN — APIXABAN 2.5 MG: 2.5 TABLET, FILM COATED ORAL at 08:32

## 2024-01-29 RX ADMIN — GUAIFENESIN AND DEXTROMETHORPHAN 10 ML: 100; 10 SYRUP ORAL at 08:32

## 2024-01-29 RX ADMIN — CLOPIDOGREL 75 MG: 75 TABLET ORAL at 08:33

## 2024-01-29 NOTE — OCCUPATIONAL THERAPY NOTE
"          Occupational Therapy         Patient Name: Mariela Giron  Today's Date: 1/29/2024 01/29/24 1055   OT Last Visit   OT Visit Date 01/29/24   Note Type   Note Type Treatment   Pain Assessment   Pain Assessment Tool 0-10   Pain Score No Pain   Restrictions/Precautions   Weight Bearing Precautions Per Order No   Other Precautions Contact/isolation;Droplet precautions;Chair Alarm;Fall Risk;O2;Hard of hearing;Multiple lines   ADL   Where Assessed Chair   UB Dressing Assistance 4  Minimal Assistance   UB Dressing Deficit Increased time to complete;Thread RUE;Thread LUE;Fasteners   UB Dressing Comments to don hospital gown   LB Dressing Assistance 4  Minimal Assistance   LB Dressing Deficit Don/doff R sock;Don/doff L sock   Bed Mobility   Additional Comments OOB in chair at start/end of session.   Transfers   Sit to Stand 5  Supervision   Additional items Armrests;Increased time required;Verbal cues   Stand to Sit 5  Supervision   Additional items Armrests;Increased time required;Verbal cues   Additional Comments RW used   Functional Mobility   Functional Mobility 4  Minimal assistance   Additional Comments Pt ambulates x 120 ft total with RW, Min A x 1-2 for safety, mgmt of lines. Pt demo slight instability, no significant LOB. Standing rest periods required due to SOB and fatigue. Pt on 1L O2 during session, SPO2 drops to 85% with prolonged mobility; recovers to >90%with rest and PLB.   Additional items Rolling walker   Subjective   Subjective \"Can I turn around?\"   Cognition   Overall Cognitive Status Impaired   Arousal/Participation Cooperative;Lethargic   Attention Attends with cues to redirect   Orientation Level Oriented to person;Oriented to place;Oriented to time;Disoriented to situation   Memory Decreased recall of recent events   Following Commands Follows one step commands with increased time or repetition   Activity Tolerance   Activity Tolerance Patient limited by fatigue   Medical Staff " Made Aware nurse aware   Assessment   Assessment Patient participated in Skilled OT session this date with interventions consisting of ADL re training with the use of correct body mechnaics, Energy Conservation techniques, Work simplification skills , safety awareness and fall prevention techniques,  therapeutic activities to: increase activity tolerance, increase cardiovascular endurance , and increase dynamic sit/ stand balance during functional activity  . Co treatment with PTA secondary to complex medical condition of pt,  A of 2 required to achieve and maintain transitional movements, requiring the need of skilled therapeutic intervention of 2 therapists to achieve delivery of services. Patient agreeable to OT treatment session, upon arrival patient was found seated OOB to Chair. Patient requiring verbal cues for safety, verbal cues for pacing thru activity steps, and frequent rest periods. Patient continues to be functioning below baseline level, occupational performance remains limited secondary to factors listed above and increased risk for falls and injury. The patient's raw score on the AM-PAC Daily Activity Inpatient Short Form is 18. A raw score of less than 19 suggests the patient may benefit from discharge to post-acute rehabilitation services. Please refer to the recommendation of the Occupational Therapist for safe discharge planning. From OT standpoint, recommendation at time of d/c would be level 2, moderate resource intensity.   Plan   Treatment Interventions ADL retraining;Functional transfer training;Endurance training;Compensatory technique education;Energy conservation;Activityengagement   Goal Expiration Date 02/09/24   OT Treatment Day 1   OT Frequency 3-5x/wk   Discharge Recommendation   Rehab Resource Intensity Level, OT II (Moderate Resource Intensity)   AM-PAC Daily Activity Inpatient   Lower Body Dressing 2   Bathing 3   Toileting 3   Upper Body Dressing 3   Grooming 3   Eating 4    Daily Activity Raw Score 18   Daily Activity Standardized Score (Calc for Raw Score >=11) 38.66   AM-PAC Applied Cognition Inpatient   Following a Speech/Presentation 3   Understanding Ordinary Conversation 4   Taking Medications 3   Remembering Where Things Are Placed or Put Away 3   Remembering List of 4-5 Errands 2   Taking Care of Complicated Tasks 2   Applied Cognition Raw Score 17   Applied Cognition Standardized Score 36.52       Pt will benefit from continued OT services in order to maximize (I) c ADL performance, FM c RW, and improve overall endurance/strength required to complete functional tasks in preparation for d/c.    Pt benefited from co-treatment of skilled OT and PTA in order to most appropriately address functional deficits d/t extensive assistance required for safe functional mobility, decreased activity tolerance, and regression from functioning level prior to admission and/or onset of present illness. OT/PT objectives were addressed separately; please see PT note for specific goal areas targeted.    Pt left seated in chair at end of session; all needs within reach; all lines intact.    Carisa Walters

## 2024-01-29 NOTE — ASSESSMENT & PLAN NOTE
No evidence of pulmonary embolism on CTA of the chest/PE protocol (01/25/2024)  Check a venous duplex of the bilateral lower extremities, study pending     Latest Reference Range & Units Most Recent   D-Dimer, Quant <0.50 ug/ml FEU 0.97 (H)  1/25/24 18:40   (H): Data is abnormally high

## 2024-01-29 NOTE — ASSESSMENT & PLAN NOTE
Wt Readings from Last 3 Encounters:   01/29/24 47 kg (103 lb 9.9 oz)   01/03/24 45.5 kg (100 lb 6.4 oz)   12/21/23 45.1 kg (99 lb 6.4 oz)     Patient eating normally, resume diuretics.

## 2024-01-29 NOTE — PLAN OF CARE
Problem: Prexisting or High Potential for Compromised Skin Integrity  Goal: Skin integrity is maintained or improved  Description: INTERVENTIONS:  - Identify patients at risk for skin breakdown  - Assess and monitor skin integrity  - Assess and monitor nutrition and hydration status  - Monitor labs   - Assess for incontinence   - Turn and reposition patient  - Assist with mobility/ambulation  - Relieve pressure over bony prominences  - Avoid friction and shearing  - Provide appropriate hygiene as needed including keeping skin clean and dry  - Evaluate need for skin moisturizer/barrier cream  - Collaborate with interdisciplinary team   - Patient/family teaching  - Consider wound care consult   Outcome: Progressing     Problem: PAIN - ADULT  Goal: Verbalizes/displays adequate comfort level or baseline comfort level  Description: Interventions:  - Encourage patient to monitor pain and request assistance  - Assess pain using appropriate pain scale  - Administer analgesics based on type and severity of pain and evaluate response  - Implement non-pharmacological measures as appropriate and evaluate response  - Consider cultural and social influences on pain and pain management  - Notify physician/advanced practitioner if interventions unsuccessful or patient reports new pain  Outcome: Progressing     Problem: INFECTION - ADULT  Goal: Absence or prevention of progression during hospitalization  Description: INTERVENTIONS:  - Assess and monitor for signs and symptoms of infection  - Monitor lab/diagnostic results  - Monitor all insertion sites, i.e. indwelling lines, tubes, and drains  - Monitor endotracheal if appropriate and nasal secretions for changes in amount and color  - Doyle appropriate cooling/warming therapies per order  - Administer medications as ordered  - Instruct and encourage patient and family to use good hand hygiene technique  - Identify and instruct in appropriate isolation precautions for  identified infection/condition  Outcome: Progressing  Goal: Absence of fever/infection during neutropenic period  Description: INTERVENTIONS:  - Monitor WBC    Outcome: Progressing     Problem: SAFETY ADULT  Goal: Patient will remain free of falls  Description: INTERVENTIONS:  - Educate patient/family on patient safety including physical limitations  - Instruct patient to call for assistance with activity   - Consult OT/PT to assist with strengthening/mobility   - Keep Call bell within reach  - Keep bed low and locked with side rails adjusted as appropriate  - Keep care items and personal belongings within reach  - Initiate and maintain comfort rounds  - Make Fall Risk Sign visible to staff  - Offer Toileting every 2 Hours, in advance of need  - Initiate/Maintain bed alarm  - Obtain necessary fall risk management equipment:   - Apply yellow socks and bracelet for high fall risk patients  - Consider moving patient to room near nurses station  Outcome: Progressing  Goal: Maintain or return to baseline ADL function  Description: INTERVENTIONS:  -  Assess patient's ability to carry out ADLs; assess patient's baseline for ADL function and identify physical deficits which impact ability to perform ADLs (bathing, care of mouth/teeth, toileting, grooming, dressing, etc.)  - Assess/evaluate cause of self-care deficits   - Assess range of motion  - Assess patient's mobility; develop plan if impaired  - Assess patient's need for assistive devices and provide as appropriate  - Encourage maximum independence but intervene and supervise when necessary  - Involve family in performance of ADLs  - Assess for home care needs following discharge   - Consider OT consult to assist with ADL evaluation and planning for discharge  - Provide patient education as appropriate  Outcome: Progressing  Goal: Maintains/Returns to pre admission functional level  Description: INTERVENTIONS:  - Perform AM-PAC 6 Click Basic Mobility/ Daily Activity  assessment daily.  - Set and communicate daily mobility goal to care team and patient/family/caregiver.   - Collaborate with rehabilitation services on mobility goals if consulted  - Perform Range of Motion 3 times a day.  - Reposition patient every 2 hours.  - Dangle patient 3 times a day  - Stand patient 3 times a day  - Ambulate patient 3 times a day  - Out of bed to chair 3 times a day   - Out of bed for meals 3 times a day  - Out of bed for toileting  - Record patient progress and toleration of activity level   Outcome: Progressing     Problem: DISCHARGE PLANNING  Goal: Discharge to home or other facility with appropriate resources  Description: INTERVENTIONS:  - Identify barriers to discharge w/patient and caregiver  - Arrange for needed discharge resources and transportation as appropriate  - Identify discharge learning needs (meds, wound care, etc.)  - Arrange for interpretive services to assist at discharge as needed  - Refer to Case Management Department for coordinating discharge planning if the patient needs post-hospital services based on physician/advanced practitioner order or complex needs related to functional status, cognitive ability, or social support system  Outcome: Progressing     Problem: Knowledge Deficit  Goal: Patient/family/caregiver demonstrates understanding of disease process, treatment plan, medications, and discharge instructions  Description: Complete learning assessment and assess knowledge base.  Interventions:  - Provide teaching at level of understanding  - Provide teaching via preferred learning methods  Outcome: Progressing     Problem: RESPIRATORY - ADULT  Goal: Achieves optimal ventilation and oxygenation  Description: INTERVENTIONS:  - Assess for changes in respiratory status  - Assess for changes in mentation and behavior  - Position to facilitate oxygenation and minimize respiratory effort  - Oxygen administered by appropriate delivery if ordered  - Initiate smoking  cessation education as indicated  - Encourage broncho-pulmonary hygiene including cough, deep breathe, Incentive Spirometry  - Assess the need for suctioning and aspirate as needed  - Assess and instruct to report SOB or any respiratory difficulty  - Respiratory Therapy support as indicated  Outcome: Progressing     Problem: Nutrition/Hydration-ADULT  Goal: Nutrient/Hydration intake appropriate for improving, restoring or maintaining nutritional needs  Description: Monitor and assess patient's nutrition/hydration status for malnutrition. Collaborate with interdisciplinary team and initiate plan and interventions as ordered.  Monitor patient's weight and dietary intake as ordered or per policy. Utilize nutrition screening tool and intervene as necessary. Determine patient's food preferences and provide high-protein, high-caloric foods as appropriate.     INTERVENTIONS:  - Monitor oral intake, urinary output, labs, and treatment plans  - Assess nutrition and hydration status and recommend course of action  - Evaluate amount of meals eaten  - Assist patient with eating if necessary   - Allow adequate time for meals  - Recommend/ encourage appropriate diets, oral nutritional supplements, and vitamin/mineral supplements  - Order, calculate, and assess calorie counts as needed  - Recommend, monitor, and adjust tube feedings and TPN/PPN based on assessed needs  - Assess need for intravenous fluids  - Provide specific nutrition/hydration education as appropriate  - Include patient/family/caregiver in decisions related to nutrition  Outcome: Progressing

## 2024-01-29 NOTE — PROGRESS NOTES
"Progress Note - Pulmonary   Mariela Giron 88 y.o. female MRN: [id number]  Unit/Bed#: 411-01 Encounter: [id number]    Assessment/Plan:    Acute hypoxic respiratory failure   Sepsis secondary to viral PNA vs CAP   Influenza A   Abnormal CT chest   Chronic diastolic CHF  Atrial flutter    Pulmonary recommendations:     Seen on 1L NC with SpO2 ranging in low 90s. No oxygen requirement at baseline.   Titrate oxygen to maintain SpO2 greater than or equal to 88%.   Pulmonary toilet:  Increase activity as tolerated, out of bed as tolerated, cough and deep breathing exercises encourage, incentive spirometry q.1 hour  No indication for systemic steroids and nebulizers at this time.  Continue Tamiflu day#5/5 today  Given MRSA positive and persistent fevers, ABX were changed to vancomycin/cefepime 1/27- will continue   Trend WBC, PCT, temps   Monitor I/Os and daily weights, noted elevated BNP on admission- continue to hold diuresis     Chief Complaint:    \"I feel okay.\"    Subjective:    Patient was seen and examined today. No overnight events reported. Patient states that her breathing is fine. She does have a cough with some phlegm. Denies pain. No fevers or chills.    Objective:    Vitals: Blood pressure 137/74, pulse 60, temperature (!) 96.1 °F (35.6 °C), resp. rate 22, height 5' 1\" (1.549 m), weight 47 kg (103 lb 9.9 oz), SpO2 93%.1L NC,Body mass index is 19.58 kg/m².      Intake/Output Summary (Last 24 hours) at 1/29/2024 1031  Last data filed at 1/28/2024 1834  Gross per 24 hour   Intake 360 ml   Output --   Net 360 ml       Invasive Devices       Peripheral Intravenous Line  Duration             Peripheral IV 01/25/24 Proximal;Right;Ventral (anterior) Forearm 3 days              Drain  Duration             External Urinary Catheter Medium 3 days                    Physical Exam:     Physical Exam  Vitals and nursing note reviewed.   Constitutional:       General: She is not in acute distress.     Appearance: Normal " "appearance.   HENT:      Head: Normocephalic and atraumatic.      Mouth/Throat:      Mouth: Mucous membranes are moist.      Pharynx: Oropharynx is clear.   Cardiovascular:      Rate and Rhythm: Normal rate and regular rhythm.      Heart sounds: No murmur heard.  Pulmonary:      Effort: Pulmonary effort is normal.      Breath sounds: Rhonchi and rales present. No wheezing.   Musculoskeletal:      Cervical back: Normal range of motion.   Skin:     General: Skin is warm and dry.   Neurological:      General: No focal deficit present.      Mental Status: She is alert. Mental status is at baseline.   Psychiatric:         Mood and Affect: Mood normal.         Behavior: Behavior normal.         Labs: I have personally reviewed pertinent lab results., ABG: No results found for: \"PHART\", \"QHX5PDI\", \"PO2ART\", \"FVO8IHO\", \"V2IYAZMF\", \"BEART\", \"SOURCE\", BNP: No results found for: \"BNP\", CBC:   Lab Results   Component Value Date    WBC 5.17 01/29/2024    HGB 8.7 (L) 01/29/2024    HCT 28.4 (L) 01/29/2024    MCV 93 01/29/2024     (L) 01/29/2024    RBC 3.05 (L) 01/29/2024    MCH 28.5 01/29/2024    MCHC 30.6 (L) 01/29/2024    RDW 15.6 (H) 01/29/2024    MPV 10.5 01/29/2024    NRBC 0 01/29/2024   , CMP:   Lab Results   Component Value Date    SODIUM 134 (L) 01/29/2024    K 3.8 01/29/2024     01/29/2024    CO2 19 (L) 01/29/2024    BUN 34 (H) 01/29/2024    CREATININE 0.94 01/29/2024    CALCIUM 8.1 (L) 01/29/2024    AST 25 01/29/2024    ALT 17 01/29/2024    ALKPHOS 72 01/29/2024    EGFR 54 01/29/2024   , PT/INR: No results found for: \"PT\", \"INR\", Troponin: No results found for: \"TROPONINI\"    Imaging and other studies: I have personally reviewed pertinent reports.   and I have personally reviewed pertinent films in PACS    "

## 2024-01-29 NOTE — PHYSICAL THERAPY NOTE
PHYSICAL THERAPY NOTE          Patient Name: Mariela Giron  Today's Date: 1/29/2024 01/29/24 1045   PT Last Visit   PT Visit Date 01/29/24   Note Type   Note Type Treatment   Pain Assessment   Pain Assessment Tool 0-10   Pain Score No Pain   Restrictions/Precautions   Weight Bearing Precautions Per Order No   Other Precautions   (Contact/isolation; Droplet precautions; Chair Alarm; Fall Risk; O2; Hard of hearing; Multiple lines)   General   Chart Reviewed Yes   Family/Caregiver Present No   Cognition   Overall Cognitive Status Impaired   Arousal/Participation Cooperative;Responsive   Following Commands Follows one step commands with increased time or repetition   Subjective   Subjective Reports she is very tired but agreeable to therapy.   Bed Mobility   Additional Comments OOB in chair start/end PT session   Transfers   Sit to Stand 5  Supervision   Additional items Armrests;Increased time required;Verbal cues   Stand to Sit 5  Supervision   Additional items Armrests;Increased time required;Verbal cues   Additional Comments RW used   Ambulation/Elevation   Gait pattern   (Excessively slow; Short stride; Foward flexed; Decreased foot clearance; Narrow DAVID)   Gait Assistance 4  Minimal assist   Additional items Assist x 1;Verbal cues  (+1 lines)   Assistive Device Rolling walker   Distance 120'   Balance   Static Sitting Fair +   Dynamic Sitting Fair +   Static Standing Fair   Dynamic Standing Fair -   Ambulatory Fair -  (RW)   Endurance Deficit   Endurance Deficit Yes   Endurance Deficit Description SpO2 97-85% with 1 L O2.   Activity Tolerance   Activity Tolerance Patient limited by fatigue;Patient limited by pain   Nurse Made Aware Yes, nursing staff made aware of session outcomes   Assessment   Prognosis Good   Problem List   (Decreased strength; Decreased endurance; Impaired balance; Decreased mobility; Impaired judgement;  Decreased safety awareness)   Assessment Pt. seen for PT treatment session this date with interventions consisting of  transfers and  gait training w/ emphasis on improving pt's ability to ambulate. Pt. Requiring cues for sequence and safety. In comparison to previous session, Pt. With decrease  in activity tolerance. Limited by fatigue.   Pt is in need of continued activity in PT to improve strength balance endurance mobility transfers and ambulation with return to maximize LOF. From PT/mobility standpoint, recommendation at time of d/c would be level II: moderate resource intensity  in order to promote return to PLOF and independence.   The patient's AM-[facility] Basic Mobility Inpatient Short Form Raw Score is 17. A Raw score of greater than 16 suggests the patient may benefit from discharge to home.  Please also refer to physical therapy recommendation for safe DC planning.   Goals   LTG Expiration Date 02/09/24   PT Treatment Day 1   Plan   Treatment/Interventions Functional transfer training;LE strengthening/ROM;Elevations;Therapeutic exercise;Endurance training;Bed mobility;Gait training;Spoke to nursing   Progress Progressing toward goals   PT Frequency 3-5x/wk   Discharge Recommendation   Rehab Resource Intensity Level, PT II (Moderate Resource Intensity)   AM-PAC Basic Mobility Inpatient   Turning in Flat Bed Without Bedrails 3   Lying on Back to Sitting on Edge of Flat Bed Without Bedrails 3   Moving Bed to Chair 3   Standing Up From Chair Using Arms 3   Walk in Room 3   Climb 3-5 Stairs With Railing 2   Basic Mobility Inpatient Raw Score 17   Basic Mobility Standardized Score 39.67   Highest Level Of Mobility   JH-HLM Goal 5: Stand one or more mins   JH-HLM Achieved 7: Walk 25 feet or more   Education   Education Provided Mobility training   Patient Demonstrates verbal understanding;Reinforcement needed   End of Consult   Patient Position at End of Consult Bedside chair;Bed/Chair alarm activated;All needs  within reach   End of Consult Comments discussed POC with PT     Co-treat with OT this session due to complexity of pt and requires A x 2 to provided skilled interventions.

## 2024-01-29 NOTE — ASSESSMENT & PLAN NOTE
Patient has had no diarrhea since stool cultures were ordered greater than 24 hours ago, discontinue stool studies, suspected antibiotic associated diarrhea.  Clinically improved, by several measures including decreased white blood cell count, patient has been afebrile, no further significant diarrhea.

## 2024-01-29 NOTE — PLAN OF CARE
Problem: Prexisting or High Potential for Compromised Skin Integrity  Goal: Skin integrity is maintained or improved  Description: INTERVENTIONS:  - Identify patients at risk for skin breakdown  - Assess and monitor skin integrity  - Assess and monitor nutrition and hydration status  - Monitor labs   - Assess for incontinence   - Turn and reposition patient  - Assist with mobility/ambulation  - Relieve pressure over bony prominences  - Avoid friction and shearing  - Provide appropriate hygiene as needed including keeping skin clean and dry  - Evaluate need for skin moisturizer/barrier cream  - Collaborate with interdisciplinary team   - Patient/family teaching  - Consider wound care consult   Outcome: Progressing     Problem: PAIN - ADULT  Goal: Verbalizes/displays adequate comfort level or baseline comfort level  Description: Interventions:  - Encourage patient to monitor pain and request assistance  - Assess pain using appropriate pain scale  - Administer analgesics based on type and severity of pain and evaluate response  - Implement non-pharmacological measures as appropriate and evaluate response  - Consider cultural and social influences on pain and pain management  - Notify physician/advanced practitioner if interventions unsuccessful or patient reports new pain  Outcome: Progressing     Problem: INFECTION - ADULT  Goal: Absence or prevention of progression during hospitalization  Description: INTERVENTIONS:  - Assess and monitor for signs and symptoms of infection  - Monitor lab/diagnostic results  - Monitor all insertion sites, i.e. indwelling lines, tubes, and drains  - Monitor endotracheal if appropriate and nasal secretions for changes in amount and color  - Lindsay appropriate cooling/warming therapies per order  - Administer medications as ordered  - Instruct and encourage patient and family to use good hand hygiene technique  - Identify and instruct in appropriate isolation precautions for  identified infection/condition  Outcome: Progressing  Goal: Absence of fever/infection during neutropenic period  Description: INTERVENTIONS:  - Monitor WBC    Outcome: Progressing     Problem: SAFETY ADULT  Goal: Patient will remain free of falls  Description: INTERVENTIONS:  - Educate patient/family on patient safety including physical limitations  - Instruct patient to call for assistance with activity   - Consult OT/PT to assist with strengthening/mobility   - Keep Call bell within reach  - Keep bed low and locked with side rails adjusted as appropriate  - Keep care items and personal belongings within reach  - Initiate and maintain comfort rounds  - Make Fall Risk Sign visible to staff  - Offer Toileting every 2 Hours, in advance of need  - Initiate/Maintain bed alarm  - Obtain necessary fall risk management equipment:   - Apply yellow socks and bracelet for high fall risk patients  - Consider moving patient to room near nurses station  Outcome: Progressing  Goal: Maintain or return to baseline ADL function  Description: INTERVENTIONS:  -  Assess patient's ability to carry out ADLs; assess patient's baseline for ADL function and identify physical deficits which impact ability to perform ADLs (bathing, care of mouth/teeth, toileting, grooming, dressing, etc.)  - Assess/evaluate cause of self-care deficits   - Assess range of motion  - Assess patient's mobility; develop plan if impaired  - Assess patient's need for assistive devices and provide as appropriate  - Encourage maximum independence but intervene and supervise when necessary  - Involve family in performance of ADLs  - Assess for home care needs following discharge   - Consider OT consult to assist with ADL evaluation and planning for discharge  - Provide patient education as appropriate  Outcome: Progressing  Goal: Maintains/Returns to pre admission functional level  Description: INTERVENTIONS:  - Perform AM-PAC 6 Click Basic Mobility/ Daily Activity  assessment daily.  - Set and communicate daily mobility goal to care team and patient/family/caregiver.   - Collaborate with rehabilitation services on mobility goals if consulted  - Perform Range of Motion 3 times a day.  - Reposition patient every 2 hours.  - Dangle patient 3 times a day  - Stand patient 3 times a day  - Ambulate patient 3 times a day  - Out of bed to chair 3 times a day   - Out of bed for meals 3 times a day  - Out of bed for toileting  - Record patient progress and toleration of activity level   Outcome: Progressing     Problem: DISCHARGE PLANNING  Goal: Discharge to home or other facility with appropriate resources  Description: INTERVENTIONS:  - Identify barriers to discharge w/patient and caregiver  - Arrange for needed discharge resources and transportation as appropriate  - Identify discharge learning needs (meds, wound care, etc.)  - Arrange for interpretive services to assist at discharge as needed  - Refer to Case Management Department for coordinating discharge planning if the patient needs post-hospital services based on physician/advanced practitioner order or complex needs related to functional status, cognitive ability, or social support system  Outcome: Progressing     Problem: Knowledge Deficit  Goal: Patient/family/caregiver demonstrates understanding of disease process, treatment plan, medications, and discharge instructions  Description: Complete learning assessment and assess knowledge base.  Interventions:  - Provide teaching at level of understanding  - Provide teaching via preferred learning methods  Outcome: Progressing     Problem: RESPIRATORY - ADULT  Goal: Achieves optimal ventilation and oxygenation  Description: INTERVENTIONS:  - Assess for changes in respiratory status  - Assess for changes in mentation and behavior  - Position to facilitate oxygenation and minimize respiratory effort  - Oxygen administered by appropriate delivery if ordered  - Initiate smoking  cessation education as indicated  - Encourage broncho-pulmonary hygiene including cough, deep breathe, Incentive Spirometry  - Assess the need for suctioning and aspirate as needed  - Assess and instruct to report SOB or any respiratory difficulty  - Respiratory Therapy support as indicated  Outcome: Progressing     Problem: Nutrition/Hydration-ADULT  Goal: Nutrient/Hydration intake appropriate for improving, restoring or maintaining nutritional needs  Description: Monitor and assess patient's nutrition/hydration status for malnutrition. Collaborate with interdisciplinary team and initiate plan and interventions as ordered.  Monitor patient's weight and dietary intake as ordered or per policy. Utilize nutrition screening tool and intervene as necessary. Determine patient's food preferences and provide high-protein, high-caloric foods as appropriate.     INTERVENTIONS:  - Monitor oral intake, urinary output, labs, and treatment plans  - Assess nutrition and hydration status and recommend course of action  - Evaluate amount of meals eaten  - Assist patient with eating if necessary   - Allow adequate time for meals  - Recommend/ encourage appropriate diets, oral nutritional supplements, and vitamin/mineral supplements  - Order, calculate, and assess calorie counts as needed  - Recommend, monitor, and adjust tube feedings and TPN/PPN based on assessed needs  - Assess need for intravenous fluids  - Provide specific nutrition/hydration education as appropriate  - Include patient/family/caregiver in decisions related to nutrition  Outcome: Progressing

## 2024-01-29 NOTE — PROGRESS NOTES
[facility]  Progress Note  Name: Mariela Giron I  MRN: [id number]  Unit/Bed#: 411-01 I Date of Admission: 1/25/2024   Date of Service: 1/29/2024 I Hospital Day: 4    Assessment/Plan   * Sepsis (HCC)  Assessment & Plan  Sepsis was present on admission and due to Influenza A with a right lower lobe pneumonia, superimposed bacterial pneumonia suspected  Suspected super-imposed bacterial pneumonia  Positive MRSA nasal culture  Treat with Tamiflu 30 mg PO every 24 hours (renally-dosed), today is day 5 of 5    Changed IV antibiotic treatment to IV cefepime and IV vancomycin on 01/27/2024 with persistent fevers and a markedly elevated procalcitonin level    Procalcitonin level significantly trending down    Hypoxia  Assessment & Plan  Due to Influenza A with pneumonia  Required up to 2 Lpm of continuous supplemental oxygen to maintain oxygen saturation levels at 90% and above  Not on home supplemental oxygen therapy  Currently on 1 L nasal cannula, wean as tolerated,  Incentive spirometry  Respiratory protocol  The patient will need a home supplemental oxygen evaluation on the day of discharge.        Influenza A  Assessment & Plan  Continue Tamiflu    Elevated d-dimer  Assessment & Plan  No evidence of pulmonary embolism on CTA of the chest/PE protocol (01/25/2024)  Check a venous duplex of the bilateral lower extremities, study pending     Latest Reference Range & Units Most Recent   D-Dimer, Quant <0.50 ug/ml FEU 0.97 (H)  1/25/24 18:40   (H): Data is abnormally high    Chronic diastolic (congestive) heart failure (HCC)  Assessment & Plan  Wt Readings from Last 3 Encounters:   01/29/24 47 kg (103 lb 9.9 oz)   01/03/24 45.5 kg (100 lb 6.4 oz)   12/21/23 45.1 kg (99 lb 6.4 oz)     Patient eating normally, resume diuretics.        Permanent atrial fibrillation (HCC)  Assessment & Plan  Continue Toprol-XL 50 mg PO Qdaily  Continue anticoagulation with Eliquis 2.5 mg PO  BID    Hyponatremia  Assessment & Plan  Sodium level is improved, resume Lasix, trend daily labs.    Coronary artery disease involving native coronary artery of native heart without angina pectoris  Assessment & Plan  Continue Plavix, statin therapy, and Toprol-XL  Outpatient follow-up with Cardiology    Diarrhea  Assessment & Plan  Patient has had no diarrhea since stool cultures were ordered greater than 24 hours ago, discontinue stool studies, suspected antibiotic associated diarrhea.  Clinically improved, by several measures including decreased white blood cell count, patient has been afebrile, no further significant diarrhea.      MRSA colonization  Assessment & Plan  Utilize the MRSA decolonization protocol, which should be continued upon discharge    Right lower lobe pneumonia  Assessment & Plan  Suspected bacterial pneumonia, continue broad-spectrum antibiotics                 VTE Pharmacologic Prophylaxis: VTE Score: 10 High Risk (Score >/= 5) - Pharmacological DVT Prophylaxis Ordered: apixaban (Eliquis). Sequential Compression Devices Ordered.    Mobility:   Basic Mobility Inpatient Raw Score: 17  JH-HLM Goal: 5: Stand one or more mins  JH-HLM Achieved: 6: Walk 10 steps or more  HLM Goal achieved. Continue to encourage appropriate mobility.    Patient Centered Rounds: I performed bedside rounds with nursing staff today.   Discussions with Specialists or Other Care Team Provider: Case reviewed with case management team, case discussed with pulmonary medicine    Spoke to patients DIL, via phone.    Current Length of Stay: 4 day(s)  Current Patient Status: Inpatient   Certification Statement: The patient will continue to require additional inpatient hospital stay due to still requires IV antibiotics, continue to wean oxygen  Discharge Plan:  Discharge destination to be determined based on mobility    Code Status: Level 3 - DNAR and DNI    Subjective:   Patient is having severe fatigue, also having difficulty  sleeping but eating very well.    Objective:     Vitals:   Temp (24hrs), Av °F (36.1 °C), Min:96.1 °F (35.6 °C), Max:97.8 °F (36.6 °C)    Temp:  [96.1 °F (35.6 °C)-97.8 °F (36.6 °C)] 96.1 °F (35.6 °C)  HR:  [60-81] 60  Resp:  [22-28] 22  BP: (137-142)/(72-74) 137/74  SpO2:  [93 %-96 %] 93 %  Body mass index is 19.58 kg/m².     Input and Output Summary (last 24 hours):     Intake/Output Summary (Last 24 hours) at 2024 1049  Last data filed at 2024 1834  Gross per 24 hour   Intake 360 ml   Output --   Net 360 ml       Physical Exam:   Physical Exam  Vitals and nursing note reviewed.   Constitutional:       General: She is not in acute distress.     Appearance: She is ill-appearing. She is not toxic-appearing or diaphoretic.   HENT:      Head: Normocephalic and atraumatic.   Cardiovascular:      Pulses: Normal pulses.   Pulmonary:      Effort: Pulmonary effort is normal. No respiratory distress.      Breath sounds: No stridor. Wheezing and rhonchi present.   Skin:     General: Skin is warm.   Neurological:      Mental Status: She is oriented to person, place, and time. Mental status is at baseline.      Cranial Nerves: No cranial nerve deficit.      Motor: No weakness.   Psychiatric:         Mood and Affect: Mood normal.         Behavior: Behavior normal.         Thought Content: Thought content normal.         Judgment: Judgment normal.          Additional Data:     Labs:  Results from last 7 days   Lab Units 24  0433 24  0518 24  0433   WBC Thousand/uL 5.17   < > 4.67   HEMOGLOBIN g/dL 8.7*   < > 9.0*   HEMATOCRIT % 28.4*   < > 27.9*   PLATELETS Thousands/uL 107*   < > 92*   BANDS PCT %  --   --  17*   NEUTROS PCT % 72   < >  --    LYMPHS PCT % 10*   < >  --    LYMPHO PCT %  --   --  0*   MONOS PCT % 6   < >  --    MONO PCT %  --   --  1*   EOS PCT % 12*   < > 0    < > = values in this interval not displayed.     Results from last 7 days   Lab Units 24  0433   SODIUM mmol/L 134*    POTASSIUM mmol/L 3.8   CHLORIDE mmol/L 107   CO2 mmol/L 19*   BUN mg/dL 34*   CREATININE mg/dL 0.94   ANION GAP mmol/L 8   CALCIUM mg/dL 8.1*   ALBUMIN g/dL 3.2*   TOTAL BILIRUBIN mg/dL 0.66   ALK PHOS U/L 72   ALT U/L 17   AST U/L 25   GLUCOSE RANDOM mg/dL 81                 Results from last 7 days   Lab Units 01/29/24  0433 01/28/24  0526 01/27/24  0433 01/25/24  1840   LACTIC ACID mmol/L  --   --  1.3 1.6   PROCALCITONIN ng/ml 5.77* 9.75* 13.81* 0.19       Lines/Drains:  Invasive Devices       Peripheral Intravenous Line  Duration             Peripheral IV 01/25/24 Proximal;Right;Ventral (anterior) Forearm 3 days              Drain  Duration             External Urinary Catheter Medium 3 days                          Imaging: No pertinent imaging reviewed.    Recent Cultures (last 7 days):   Results from last 7 days   Lab Units 01/26/24  1415 01/26/24  0245 01/25/24  1840   BLOOD CULTURE  No Growth at 48 hrs.  No Growth at 48 hrs.  --  No Growth at 72 hrs.  No Growth at 72 hrs.   LEGIONELLA URINARY ANTIGEN   --  Negative  --        Last 24 Hours Medication List:   Current Facility-Administered Medications   Medication Dose Route Frequency Provider Last Rate    acetaminophen  650 mg Oral Q6H PRN Rogerio Francois PA-C      albuterol  2.5 mg Nebulization Q4H PRN Fazal Sherwood DO      ALPRAZolam  0.25 mg Oral TID PRN Rogerio Francois PA-C      apixaban  2.5 mg Oral BID Rogerio Francois PA-C      atorvastatin  20 mg Oral Daily With Dinner Rogerio Francois PA-C      bisacodyl  10 mg Rectal Daily PRN Rogerio Francois PA-C      cefepime  1,000 mg Intravenous Q12H Fazal Sherwood DO 1,000 mg (01/29/24 0833)    cholecalciferol  2,000 Units Oral Daily Rogerio Francois PA-C      clopidogrel  75 mg Oral Daily Rogerio Francois PA-C      dextromethorphan-guaiFENesin  10 mL Oral TID Fazal Sherwood DO      diphenoxylate-atropine  1 tablet Oral 4x Daily PRN Fazal M Kaela, DO      furosemide  20 mg Oral Daily Liam Carmen, DO       metoprolol succinate  50 mg Oral Daily Rogerio Francois PA-C      ondansetron  4 mg Intravenous Q6H PRN Rogerio Francois PA-C      oseltamivir  30 mg Oral Q24H Roegrio Francois PA-C      pantoprazole  20 mg Oral Early Morning Rogerio Francois PA-C      vancomycin  750 mg Intravenous Q24H Fazal Sherwood  mg (01/29/24 1045)        Today, Patient Was Seen By: Liam Carmen DO    **Please Note: This note may have been constructed using a voice recognition system.**

## 2024-01-29 NOTE — ASSESSMENT & PLAN NOTE
Due to Influenza A with pneumonia  Required up to 2 Lpm of continuous supplemental oxygen to maintain oxygen saturation levels at 90% and above  Not on home supplemental oxygen therapy  Currently on 1 L nasal cannula, wean as tolerated,  Incentive spirometry  Respiratory protocol  The patient will need a home supplemental oxygen evaluation on the day of discharge.

## 2024-01-29 NOTE — PROGRESS NOTES
Mariela Giron is a 88 y.o. female who is currently ordered Vancomycin IV with management by the Pharmacy Consult service.  Relevant clinical data and objective / subjective history reviewed.  Vancomycin Assessment:  Indication and Goal AUC/Trough: Pneumonia (goal -600, trough >10), -600, trough >10  Clinical Status: stable  Micro:     Renal Function:  SCr: 0.94 mg/dL  CrCl: 30.7 mL/min  Renal replacement: Not on dialysis  Days of Therapy: 3  Current Dose: 750mg q24h  Vancomycin Plan:  New Dosing: same  Estimated AUC: 452 mcg*hr/mL  Estimated Trough: 14.3 mcg/mL  Next Level: 0600 on 2/1/24  Renal Function Monitoring: Daily BMP and UOP  Pharmacy will continue to follow closely for s/sx of nephrotoxicity, infusion reactions and appropriateness of therapy.  BMP and CBC will be ordered per protocol. We will continue to follow the patient’s culture results and clinical progress daily.    Davy Hill, Pharmacist

## 2024-01-29 NOTE — PLAN OF CARE
Problem: PHYSICAL THERAPY ADULT  Goal: Performs mobility at highest level of function for planned discharge setting.  See evaluation for individualized goals.  Description: Treatment/Interventions: Functional transfer training, LE strengthening/ROM, Elevations, Therapeutic exercise, Endurance training, Bed mobility, Gait training          See flowsheet documentation for full assessment, interventions and recommendations.  Outcome: Progressing  Note: Prognosis: Good  Problem List:  (Decreased strength; Decreased endurance; Impaired balance; Decreased mobility; Impaired judgement; Decreased safety awareness)  Assessment: Pt. seen for PT treatment session this date with interventions consisting of  transfers and  gait training w/ emphasis on improving pt's ability to ambulate. Pt. Requiring cues for sequence and safety. In comparison to previous session, Pt. With decrease  in activity tolerance. Limited by fatigue.   Pt is in need of continued activity in PT to improve strength balance endurance mobility transfers and ambulation with return to maximize LOF. From PT/mobility standpoint, recommendation at time of d/c would be level II: moderate resource intensity  in order to promote return to PLOF and independence.   The patient's AM-PAC Basic Mobility Inpatient Short Form Raw Score is 17. A Raw score of greater than 16 suggests the patient may benefit from discharge to home.  Please also refer to physical therapy recommendation for safe DC planning.        Rehab Resource Intensity Level, PT: II (Moderate Resource Intensity)    See flowsheet documentation for full assessment.

## 2024-01-29 NOTE — ASSESSMENT & PLAN NOTE
Sepsis was present on admission and due to Influenza A with a right lower lobe pneumonia, superimposed bacterial pneumonia suspected  Suspected super-imposed bacterial pneumonia  Positive MRSA nasal culture  Treat with Tamiflu 30 mg PO every 24 hours (renally-dosed), today is day 5 of 5    Changed IV antibiotic treatment to IV cefepime and IV vancomycin on 01/27/2024 with persistent fevers and a markedly elevated procalcitonin level    Procalcitonin level significantly trending down

## 2024-01-29 NOTE — CASE MANAGEMENT
Case Management Progress Note    Patient name Mariela Giron  Location /411-01 MRN [id number]  : 1935 Date 2024       LOS (days): 4  Geometric Mean LOS (GMLOS) (days): 3.6  Days to GMLOS:-0.1        OBJECTIVE:        Current admission status: Inpatient  Preferred Pharmacy:   [facility] [address]  [address] [address]  Phone: [phone] Fax: [phone]    [id number] - [place], [place] - [address] [address]  Phone: [phone] Fax: [phone]    Primary Care Provider: Papa Zabala DO    Primary Insurance: MEDICARE  Secondary Insurance: BLUE CROSS    PROGRESS NOTE:call placed to son Caleb to update him on pt still needing STR but pt refusing. He would like to have update from provider then he will discuss further what their discharge plan will be. Provider updated on same.

## 2024-01-29 NOTE — PLAN OF CARE
Problem: OCCUPATIONAL THERAPY ADULT  Goal: Performs self-care activities at highest level of function for planned discharge setting.  See evaluation for individualized goals.  Description: Treatment Interventions: ADL retraining, Functional transfer training, UE strengthening/ROM, Endurance training, Patient/family training, Compensatory technique education, Energy conservation, Activityengagement          See flowsheet documentation for full assessment, interventions and recommendations.   Outcome: Progressing  Note: Limitation: Decreased ADL status, Decreased UE strength, Decreased Safe judgement during ADL, Decreased endurance, Decreased self-care trans, Decreased high-level ADLs  Prognosis: Good  Assessment: Patient participated in Skilled OT session this date with interventions consisting of ADL re training with the use of correct body mechnaics, Energy Conservation techniques, Work simplification skills , safety awareness and fall prevention techniques,  therapeutic activities to: increase activity tolerance, increase cardiovascular endurance , and increase dynamic sit/ stand balance during functional activity  . Co treatment with PTA secondary to complex medical condition of pt,  A of 2 required to achieve and maintain transitional movements, requiring the need of skilled therapeutic intervention of 2 therapists to achieve delivery of services. Patient agreeable to OT treatment session, upon arrival patient was found seated OOB to Chair. Patient requiring verbal cues for safety, verbal cues for pacing thru activity steps, and frequent rest periods. Patient continues to be functioning below baseline level, occupational performance remains limited secondary to factors listed above and increased risk for falls and injury. The patient's raw score on the AM-PAC Daily Activity Inpatient Short Form is 18. A raw score of less than 19 suggests the patient may benefit from discharge to post-acute rehabilitation  services. Please refer to the recommendation of the Occupational Therapist for safe discharge planning. From OT standpoint, recommendation at time of d/c would be level 2, moderate resource intensity.     Rehab Resource Intensity Level, OT: II (Moderate Resource Intensity)

## 2024-01-30 ENCOUNTER — APPOINTMENT (INPATIENT)
Dept: RADIOLOGY | Facility: HOSPITAL | Age: 89
End: 2024-01-30
Payer: MEDICARE

## 2024-01-30 PROBLEM — R19.7 DIARRHEA: Status: RESOLVED | Noted: 2024-01-28 | Resolved: 2024-01-30

## 2024-01-30 LAB
ALBUMIN SERPL BCP-MCNC: 3.4 G/DL (ref 3.5–5)
ALP SERPL-CCNC: 82 U/L (ref 34–104)
ALT SERPL W P-5'-P-CCNC: 20 U/L (ref 7–52)
ANA HOMOGEN SER QL IF: NORMAL
ANA HOMOGEN TITR SER: NORMAL {TITER}
ANA SER QL IA: POSITIVE
ANION GAP SERPL CALCULATED.3IONS-SCNC: 11 MMOL/L
ANISOCYTOSIS BLD QL SMEAR: PRESENT
AST SERPL W P-5'-P-CCNC: 24 U/L (ref 13–39)
ATRIAL RATE: 300 BPM
BASOPHILS # BLD MANUAL: 0 THOUSAND/UL (ref 0–0.1)
BASOPHILS NFR MAR MANUAL: 0 % (ref 0–1)
BILIRUB SERPL-MCNC: 0.62 MG/DL (ref 0.2–1)
BUN SERPL-MCNC: 30 MG/DL (ref 5–25)
CALCIUM ALBUM COR SERPL-MCNC: 8.9 MG/DL (ref 8.3–10.1)
CALCIUM SERPL-MCNC: 8.4 MG/DL (ref 8.4–10.2)
CENTROMERE B IGG SER QL LINE BLOT: POSITIVE
CHLORIDE SERPL-SCNC: 106 MMOL/L (ref 96–108)
CO2 SERPL-SCNC: 20 MMOL/L (ref 21–32)
CREAT SERPL-MCNC: 0.89 MG/DL (ref 0.6–1.3)
DSDNA AB SER-ACNC: <4 IU/ML
ENA SS-A AB SER IA-ACNC: NEGATIVE
ENA SS-B AB SER IA-ACNC: NEGATIVE
EOSINOPHIL # BLD MANUAL: 0.22 THOUSAND/UL (ref 0–0.4)
EOSINOPHIL NFR BLD MANUAL: 4 % (ref 0–6)
ERYTHROCYTE [DISTWIDTH] IN BLOOD BY AUTOMATED COUNT: 15.5 % (ref 11.6–15.1)
GFR SERPL CREATININE-BSD FRML MDRD: 58 ML/MIN/1.73SQ M
GLUCOSE SERPL-MCNC: 99 MG/DL (ref 65–140)
HCT VFR BLD AUTO: 31.3 % (ref 34.8–46.1)
HGB BLD-MCNC: 9.7 G/DL (ref 11.5–15.4)
INR PPP: 1.29 (ref 0.84–1.19)
LYMPHOCYTES # BLD AUTO: 0.38 THOUSAND/UL (ref 0.6–4.47)
LYMPHOCYTES # BLD AUTO: 7 % (ref 14–44)
MAGNESIUM SERPL-MCNC: 1.7 MG/DL (ref 1.9–2.7)
MCH RBC QN AUTO: 28.6 PG (ref 26.8–34.3)
MCHC RBC AUTO-ENTMCNC: 31 G/DL (ref 31.4–37.4)
MCV RBC AUTO: 92 FL (ref 82–98)
MONOCYTES # BLD AUTO: 0.33 THOUSAND/UL (ref 0–1.22)
MONOCYTES NFR BLD: 6 % (ref 4–12)
NEUTROPHILS # BLD MANUAL: 4.56 THOUSAND/UL (ref 1.85–7.62)
NEUTS BAND NFR BLD MANUAL: 2 % (ref 0–8)
NEUTS SEG NFR BLD AUTO: 81 % (ref 43–75)
OVALOCYTES BLD QL SMEAR: PRESENT
PHOSPHATE SERPL-MCNC: 2.9 MG/DL (ref 2.3–4.1)
PLATELET # BLD AUTO: 117 THOUSANDS/UL (ref 149–390)
PLATELET BLD QL SMEAR: ABNORMAL
PMV BLD AUTO: 10.3 FL (ref 8.9–12.7)
POTASSIUM SERPL-SCNC: 3.6 MMOL/L (ref 3.5–5.3)
PROCALCITONIN SERPL-MCNC: 2.59 NG/ML
PROT SERPL-MCNC: 5.7 G/DL (ref 6.4–8.4)
PROTHROMBIN TIME: 15.9 SECONDS (ref 11.6–14.5)
QRS AXIS: -58 DEGREES
QRSD INTERVAL: 118 MS
QT INTERVAL: 408 MS
QTC INTERVAL: 467 MS
RBC # BLD AUTO: 3.39 MILLION/UL (ref 3.81–5.12)
RBC MORPH BLD: PRESENT
SODIUM SERPL-SCNC: 137 MMOL/L (ref 135–147)
T WAVE AXIS: 109 DEGREES
VENTRICULAR RATE: 79 BPM
WBC # BLD AUTO: 5.49 THOUSAND/UL (ref 4.31–10.16)

## 2024-01-30 PROCEDURE — 97530 THERAPEUTIC ACTIVITIES: CPT

## 2024-01-30 PROCEDURE — 80053 COMPREHEN METABOLIC PANEL: CPT | Performed by: INTERNAL MEDICINE

## 2024-01-30 PROCEDURE — 71045 X-RAY EXAM CHEST 1 VIEW: CPT

## 2024-01-30 PROCEDURE — 94640 AIRWAY INHALATION TREATMENT: CPT

## 2024-01-30 PROCEDURE — 85027 COMPLETE CBC AUTOMATED: CPT | Performed by: INTERNAL MEDICINE

## 2024-01-30 PROCEDURE — 84100 ASSAY OF PHOSPHORUS: CPT | Performed by: INTERNAL MEDICINE

## 2024-01-30 PROCEDURE — 85007 BL SMEAR W/DIFF WBC COUNT: CPT | Performed by: INTERNAL MEDICINE

## 2024-01-30 PROCEDURE — 85610 PROTHROMBIN TIME: CPT | Performed by: INTERNAL MEDICINE

## 2024-01-30 PROCEDURE — 83735 ASSAY OF MAGNESIUM: CPT | Performed by: INTERNAL MEDICINE

## 2024-01-30 PROCEDURE — 97116 GAIT TRAINING THERAPY: CPT

## 2024-01-30 PROCEDURE — 97535 SELF CARE MNGMENT TRAINING: CPT

## 2024-01-30 PROCEDURE — 99232 SBSQ HOSP IP/OBS MODERATE 35: CPT | Performed by: PHYSICIAN ASSISTANT

## 2024-01-30 PROCEDURE — 84145 PROCALCITONIN (PCT): CPT | Performed by: INTERNAL MEDICINE

## 2024-01-30 PROCEDURE — 99232 SBSQ HOSP IP/OBS MODERATE 35: CPT

## 2024-01-30 PROCEDURE — 93005 ELECTROCARDIOGRAM TRACING: CPT

## 2024-01-30 PROCEDURE — 94760 N-INVAS EAR/PLS OXIMETRY 1: CPT

## 2024-01-30 RX ORDER — MAGNESIUM SULFATE HEPTAHYDRATE 40 MG/ML
2 INJECTION, SOLUTION INTRAVENOUS ONCE
Qty: 50 ML | Refills: 0 | Status: COMPLETED | OUTPATIENT
Start: 2024-01-30 | End: 2024-01-30

## 2024-01-30 RX ORDER — FUROSEMIDE 10 MG/ML
20 INJECTION INTRAMUSCULAR; INTRAVENOUS ONCE
Status: COMPLETED | OUTPATIENT
Start: 2024-01-30 | End: 2024-01-30

## 2024-01-30 RX ADMIN — VANCOMYCIN HYDROCHLORIDE 750 MG: 5 INJECTION, POWDER, LYOPHILIZED, FOR SOLUTION INTRAVENOUS at 08:00

## 2024-01-30 RX ADMIN — APIXABAN 2.5 MG: 2.5 TABLET, FILM COATED ORAL at 17:21

## 2024-01-30 RX ADMIN — Medication 2000 UNITS: at 08:01

## 2024-01-30 RX ADMIN — FUROSEMIDE 20 MG: 10 INJECTION, SOLUTION INTRAMUSCULAR; INTRAVENOUS at 11:16

## 2024-01-30 RX ADMIN — CEFEPIME HYDROCHLORIDE 1000 MG: 1 INJECTION, SOLUTION INTRAVENOUS at 10:30

## 2024-01-30 RX ADMIN — ATORVASTATIN CALCIUM 20 MG: 10 TABLET, FILM COATED ORAL at 17:21

## 2024-01-30 RX ADMIN — CLOPIDOGREL 75 MG: 75 TABLET ORAL at 08:01

## 2024-01-30 RX ADMIN — FUROSEMIDE 20 MG: 20 TABLET ORAL at 08:01

## 2024-01-30 RX ADMIN — MAGNESIUM SULFATE HEPTAHYDRATE 2 G: 40 INJECTION, SOLUTION INTRAVENOUS at 11:21

## 2024-01-30 RX ADMIN — METOPROLOL SUCCINATE 50 MG: 50 TABLET, EXTENDED RELEASE ORAL at 08:01

## 2024-01-30 RX ADMIN — GUAIFENESIN AND DEXTROMETHORPHAN 10 ML: 100; 10 SYRUP ORAL at 21:54

## 2024-01-30 RX ADMIN — GUAIFENESIN AND DEXTROMETHORPHAN 10 ML: 100; 10 SYRUP ORAL at 17:21

## 2024-01-30 RX ADMIN — CEFEPIME HYDROCHLORIDE 1000 MG: 1 INJECTION, SOLUTION INTRAVENOUS at 21:54

## 2024-01-30 RX ADMIN — ALBUTEROL SULFATE 2.5 MG: 2.5 SOLUTION RESPIRATORY (INHALATION) at 00:10

## 2024-01-30 RX ADMIN — APIXABAN 2.5 MG: 2.5 TABLET, FILM COATED ORAL at 08:01

## 2024-01-30 RX ADMIN — MELATONIN 3 MG: 3 TAB ORAL at 21:54

## 2024-01-30 RX ADMIN — GUAIFENESIN AND DEXTROMETHORPHAN 10 ML: 100; 10 SYRUP ORAL at 08:00

## 2024-01-30 NOTE — PHYSICAL THERAPY NOTE
PHYSICAL THERAPY NOTE          Patient Name: Mariela Giron  Today's Date: 1/30/2024 01/30/24 0946   PT Last Visit   PT Visit Date 01/30/24   Note Type   Note Type Treatment   Pain Assessment   Pain Assessment Tool 0-10   Pain Score No Pain   Restrictions/Precautions   Weight Bearing Precautions Per Order No   Other Precautions   (Contact/isolation; Droplet precautions; Bed Alarm; Chair Alarm; O2; Fall Risk)   General   Chart Reviewed Yes   Response to Previous Treatment Patient unable to report, no changes reported from family or staff   Family/Caregiver Present No   Cognition   Overall Cognitive Status Impaired   Arousal/Participation Alert;Cooperative   Following Commands Follows one step commands with increased time or repetition   Subjective   Subjective c/o SOB with exertion.   Bed Mobility   Supine to Sit 4  Minimal assistance   Additional items Assist x 1;HOB elevated;Increased time required;Verbal cues   Additional Comments Increased time to transition to EOB.  No c/o dizziness. Moderate SOB with exertion.   Transfers   Sit to Stand 4  Minimal assistance   Additional items Assist x 1;Increased time required;Verbal cues   Stand to Sit 4  Minimal assistance   Additional items Assist x 1;Armrests;Increased time required;Verbal cues   Additional Comments RW used. Stood at EOB with RW for clothing mangement and hygiene due to incontinence. Moderate SOB requiring increased O2. Pt dropped to low 80's with exertion. Increased to 4 L to maintain above 90%. Several rest breaks provided.   Ambulation/Elevation   Gait pattern   (Excessively slow; Short stride; Foward flexed; Decreased foot clearance; Narrow DAVID)   Gait Assistance 4  Minimal assist   Additional items Assist x 1;Verbal cues   Assistive Device Rolling walker   Distance 12'  (limited distance due to SOB)   Balance   Static Sitting Fair +   Dynamic Sitting Fair +   Static  Standing Fair -   Dynamic Standing Fair -   Ambulatory Fair -  (RW)   Endurance Deficit   Endurance Deficit Yes   Endurance Deficit Description SpO2 92-82% 2-4L O2. Returned to 2 L at rest.   Activity Tolerance   Activity Tolerance Patient limited by fatigue   Nurse Made Aware Yes, nursing staff made aware of session outcomes   Assessment   Prognosis Good   Problem List   (Decreased strength; Decreased endurance; Impaired balance; Decreased mobility; Impaired judgement; Decreased safety awareness)   Assessment Pt. seen for PT treatment session this date with interventions consisting of  bed mobility, transfers and  gait training w/ emphasis on improving pt's ability to ambulate. Pt. Requiring cues for sequence and safety. In comparison to previous session, Pt. With decreased in activity tolerance. Increased O2 requirements with activity  due to desaturation.    Pt is in need of continued activity in PT to improve strength balance endurance mobility transfers and ambulation with return to maximize LOF. From PT/mobility standpoint, recommendation at time of d/c would be level II: moderate resource intensity  in order to promote return to PLOF and independence.   The patient's AM-PAC Basic Mobility Inpatient Short Form Raw Score is 16. A Raw score of less than or equal to 16 suggests the patient may benefit from discharge to post-acute rehabilitation services.  Please also refer to physical therapy recommendation for safe DC planning.   Goals   LTG Expiration Date 02/09/24   PT Treatment Day 2   Plan   Treatment/Interventions Functional transfer training;LE strengthening/ROM;Elevations;Therapeutic exercise;Endurance training;Gait training;Bed mobility;Spoke to nursing   Progress Slow progress, decreased activity tolerance   PT Frequency 3-5x/wk   Discharge Recommendation   Rehab Resource Intensity Level, PT II (Moderate Resource Intensity)   AM-PAC Basic Mobility Inpatient   Turning in Flat Bed Without Bedrails 3    Lying on Back to Sitting on Edge of Flat Bed Without Bedrails 2   Moving Bed to Chair 3   Standing Up From Chair Using Arms 3   Walk in Room 3   Climb 3-5 Stairs With Railing 2   Basic Mobility Inpatient Raw Score 16   Basic Mobility Standardized Score 38.32   Highest Level Of Mobility   -HLM Goal 5: Stand one or more mins   JH-HLM Achieved 6: Walk 10 steps or more   Education   Education Provided Mobility training   Patient Demonstrates verbal understanding;Reinforcement needed   End of Consult   Patient Position at End of Consult Bedside chair;Bed/Chair alarm activated;All needs within reach   End of Consult Comments discussed POC with PT     Co-treat with LEMUEL this session due to complexity of pt and requires A x 2 to provided skilled interventions.

## 2024-01-30 NOTE — ASSESSMENT & PLAN NOTE
Thought to be secondary to Influenza A with pneumonia  Required up to 2 Lpm of continuous supplemental oxygen to maintain oxygen saturation levels at 90% and above  Not on home supplemental oxygen therapy  1/30 received text from nurse that patient requiring increased supplemental O2.  CXR with evidence of pulmonary congestion.  She received her p.o. 20mg Lasix this morning.  Giving additional 20 mg IV Lasix now - will continue to monitor and diuresis as needed  Incentive spirometry  Respiratory protocol  The patient will need a home supplemental oxygen evaluation on the day of discharge.

## 2024-01-30 NOTE — OCCUPATIONAL THERAPY NOTE
Occupational Therapy Progress Note     Patient Name: Mariela Giron  Today's Date: 1/30/2024  Problem List  Principal Problem:    Sepsis (HCC)  Active Problems:    Hyponatremia    Chronic diastolic (congestive) heart failure (HCC)    Permanent atrial fibrillation (HCC)    Coronary artery disease involving native coronary artery of native heart without angina pectoris    Hypoxia    Influenza A    Right lower lobe pneumonia    Elevated d-dimer    MRSA colonization    Diarrhea          01/30/24 0943   OT Last Visit   OT Visit Date 01/30/24   Note Type   Note Type Treatment   Pain Assessment   Pain Assessment Tool 0-10   Pain Score No Pain   Restrictions/Precautions   Weight Bearing Precautions Per Order No   Other Precautions Contact/isolation;Droplet precautions;Bed Alarm;Chair Alarm;O2;Fall Risk   ADL   Where Assessed Edge of bed   LB Dressing Assistance 3  Moderate Assistance   LB Dressing Deficit Setup;Thread RLE into underwear;Thread LLE into underwear;Verbal cueing;Supervision/safety;Increased time to complete   LB Dressing Comments Pt requiring assistance to string B UEs in underwear due to pt reporting feeling SOB   Bed Mobility   Supine to Sit 4  Minimal assistance   Additional items Assist x 1;Increased time required;Verbal cues;Bedrails   Transfers   Sit to Stand 4  Minimal assistance   Additional items Assist x 1;Increased time required;Verbal cues   Stand to Sit 4  Minimal assistance   Additional items Assist x 1;Increased time required;Verbal cues;Armrests   Additional Comments Use of RW   Functional Mobility   Functional Mobility 4  Minimal assistance   Additional Comments Pt completed standing balance/functional mobility around room with use of RW and Min Ax1 with cues for safety. Pts O2 on 2L at start with pt SOB decreased to 82% and , patients O2 increased to 4L and O2 status increased to 92% with rest. Pts O2 decreased to 2L at end of treatment 92% at rest. Nursing notified of Pts HR and O2  during treatment.   Additional items Rolling walker   Cognition   Overall Cognitive Status Impaired   Arousal/Participation Alert;Cooperative   Attention Attends with cues to redirect   Orientation Level Oriented to person;Oriented to place;Oriented to time;Disoriented to situation   Memory Decreased recall of recent events   Following Commands Follows one step commands with increased time or repetition   Activity Tolerance   Activity Tolerance Patient limited by fatigue   Medical Staff Made Aware Nursing made aware   Assessment   Assessment Patient participated in Skilled OT session this date with interventions consisting of ADL re training with the use of correct body mechnaics and  therapeutic activities to: increase activity tolerance . Patient agreeable to OT treatment session, upon arrival patient was found supine in bed. Supine to sit txfrs Min Ax1, sit to stand txfrs from bed with Min Ax1, standing balance/functional mobility around room with use of RW and Min Ax1 to increase posture, dynamic standing balance, balance during self cares, use of BUE. Pt required Mod A for LB dressing tasks. Patient requiring verbal cues for safety and verbal cues for correct technique. Patient continues to be functioning below baseline level, occupational performance remains limited secondary to factors listed above and increased risk for falls and injury. The patient's raw score on the AM-PAC Daily Activity inpatient short form is 18, standardized score is 38.66, less than 39.4. Patients at this level are likely to benefit from discharge to Post acute rehabilitation services. Please refer to the recommendation of the Occupational Therapist for safe discharge planning, which is Moderate Resource Level 2.   Plan   Goal Expiration Date 02/09/24   OT Treatment Day 2   OT Frequency 3-5x/wk   Discharge Recommendation   Rehab Resource Intensity Level, OT II (Moderate Resource Intensity)   AM-PAC Daily Activity Inpatient   Lower Body  Dressing 2   Bathing 3   Toileting 3   Upper Body Dressing 3   Grooming 3   Eating 4   Daily Activity Raw Score 18   Daily Activity Standardized Score (Calc for Raw Score >=11) 38.66   AM-PAC Applied Cognition Inpatient   Following a Speech/Presentation 3   Understanding Ordinary Conversation 4   Taking Medications 3   Remembering Where Things Are Placed or Put Away 3   Remembering List of 4-5 Errands 2   Taking Care of Complicated Tasks 2   Applied Cognition Raw Score 17   Applied Cognition Standardized Score 36.52     Pt left seated in tona chair with chair alarm on and all needs in reach.

## 2024-01-30 NOTE — PROGRESS NOTES
Mariela Giron is a 88 y.o. female who is currently ordered Vancomycin IV with management by the Pharmacy Consult service.  Relevant clinical data and objective / subjective history reviewed.  Vancomycin Assessment:  Indication and Goal AUC/Trough: Pneumonia (goal -600, trough >10), -600, trough >10  Clinical Status: stable  Micro:     Renal Function:  SCr: 0.89 mg/dL  CrCl: 33 mL/min  Renal replacement: Not on dialysis  Days of Therapy: 4  Current Dose: 750mg Q24H  Vancomycin Plan:  New Dosing: same  Estimated AUC: 430 mcg*hr/mL  Estimated Trough: 13.5 mcg/mL  Next Level: 02/01/2024 in the AM  Renal Function Monitoring: Daily BMP and UOP  Pharmacy will continue to follow closely for s/sx of nephrotoxicity, infusion reactions and appropriateness of therapy.  BMP and CBC will be ordered per protocol. We will continue to follow the patient’s culture results and clinical progress daily.    Car Feliz, Pharmacist

## 2024-01-30 NOTE — PLAN OF CARE
Problem: Prexisting or High Potential for Compromised Skin Integrity  Goal: Skin integrity is maintained or improved  Description: INTERVENTIONS:  - Identify patients at risk for skin breakdown  - Assess and monitor skin integrity  - Assess and monitor nutrition and hydration status  - Monitor labs   - Assess for incontinence   - Turn and reposition patient  - Assist with mobility/ambulation  - Relieve pressure over bony prominences  - Avoid friction and shearing  - Provide appropriate hygiene as needed including keeping skin clean and dry  - Evaluate need for skin moisturizer/barrier cream  - Collaborate with interdisciplinary team   - Patient/family teaching  - Consider wound care consult   Outcome: Progressing     Problem: PAIN - ADULT  Goal: Verbalizes/displays adequate comfort level or baseline comfort level  Description: Interventions:  - Encourage patient to monitor pain and request assistance  - Assess pain using appropriate pain scale  - Administer analgesics based on type and severity of pain and evaluate response  - Implement non-pharmacological measures as appropriate and evaluate response  - Consider cultural and social influences on pain and pain management  - Notify physician/advanced practitioner if interventions unsuccessful or patient reports new pain  Outcome: Progressing     Problem: INFECTION - ADULT  Goal: Absence or prevention of progression during hospitalization  Description: INTERVENTIONS:  - Assess and monitor for signs and symptoms of infection  - Monitor lab/diagnostic results  - Monitor all insertion sites, i.e. indwelling lines, tubes, and drains  - Monitor endotracheal if appropriate and nasal secretions for changes in amount and color  - [place] appropriate cooling/warming therapies per order  - Administer medications as ordered  - Instruct and encourage patient and family to use good hand hygiene technique  - Identify and instruct in appropriate isolation precautions for  identified infection/condition  Outcome: Progressing  Goal: Absence of fever/infection during neutropenic period  Description: INTERVENTIONS:  - Monitor WBC    Outcome: Progressing     Problem: SAFETY ADULT  Goal: Patient will remain free of falls  Description: INTERVENTIONS:  - Educate patient/family on patient safety including physical limitations  - Instruct patient to call for assistance with activity   - Consult OT/PT to assist with strengthening/mobility   - Keep Call bell within reach  - Keep bed low and locked with side rails adjusted as appropriate  - Keep care items and personal belongings within reach  - Initiate and maintain comfort rounds  - Make Fall Risk Sign visible to staff  - Offer Toileting every 2 Hours, in advance of need  - Initiate/Maintain bed alarm  - Obtain necessary fall risk management equipment:   - Apply yellow socks and bracelet for high fall risk patients  - Consider moving patient to room near nurses station  Outcome: Progressing  Goal: Maintain or return to baseline ADL function  Description: INTERVENTIONS:  -  Assess patient's ability to carry out ADLs; assess patient's baseline for ADL function and identify physical deficits which impact ability to perform ADLs (bathing, care of mouth/teeth, toileting, grooming, dressing, etc.)  - Assess/evaluate cause of self-care deficits   - Assess range of motion  - Assess patient's mobility; develop plan if impaired  - Assess patient's need for assistive devices and provide as appropriate  - Encourage maximum independence but intervene and supervise when necessary  - Involve family in performance of ADLs  - Assess for home care needs following discharge   - Consider OT consult to assist with ADL evaluation and planning for discharge  - Provide patient education as appropriate  Outcome: Progressing  Goal: Maintains/Returns to pre admission functional level  Description: INTERVENTIONS:  - Perform AM-PAC 6 Click Basic Mobility/ Daily Activity  assessment daily.  - Set and communicate daily mobility goal to care team and patient/family/caregiver.   - Collaborate with rehabilitation services on mobility goals if consulted  - Perform Range of Motion 3 times a day.  - Reposition patient every 2 hours.  - Dangle patient 3 times a day  - Stand patient 3 times a day  - Ambulate patient 3 times a day  - Out of bed to chair 3 times a day   - Out of bed for meals 3 times a day  - Out of bed for toileting  - Record patient progress and toleration of activity level   Outcome: Progressing     Problem: DISCHARGE PLANNING  Goal: Discharge to home or other facility with appropriate resources  Description: INTERVENTIONS:  - Identify barriers to discharge w/patient and caregiver  - Arrange for needed discharge resources and transportation as appropriate  - Identify discharge learning needs (meds, wound care, etc.)  - Arrange for interpretive services to assist at discharge as needed  - Refer to Case Management Department for coordinating discharge planning if the patient needs post-hospital services based on physician/advanced practitioner order or complex needs related to functional status, cognitive ability, or social support system  Outcome: Progressing     Problem: Knowledge Deficit  Goal: Patient/family/caregiver demonstrates understanding of disease process, treatment plan, medications, and discharge instructions  Description: Complete learning assessment and assess knowledge base.  Interventions:  - Provide teaching at level of understanding  - Provide teaching via preferred learning methods  Outcome: Progressing     Problem: RESPIRATORY - ADULT  Goal: Achieves optimal ventilation and oxygenation  Description: INTERVENTIONS:  - Assess for changes in respiratory status  - Assess for changes in mentation and behavior  - Position to facilitate oxygenation and minimize respiratory effort  - Oxygen administered by appropriate delivery if ordered  - Initiate smoking  cessation education as indicated  - Encourage broncho-pulmonary hygiene including cough, deep breathe, Incentive Spirometry  - Assess the need for suctioning and aspirate as needed  - Assess and instruct to report SOB or any respiratory difficulty  - Respiratory Therapy support as indicated  Outcome: Progressing     Problem: Nutrition/Hydration-ADULT  Goal: Nutrient/Hydration intake appropriate for improving, restoring or maintaining nutritional needs  Description: Monitor and assess patient's nutrition/hydration status for malnutrition. Collaborate with interdisciplinary team and initiate plan and interventions as ordered.  Monitor patient's weight and dietary intake as ordered or per policy. Utilize nutrition screening tool and intervene as necessary. Determine patient's food preferences and provide high-protein, high-caloric foods as appropriate.     INTERVENTIONS:  - Monitor oral intake, urinary output, labs, and treatment plans  - Assess nutrition and hydration status and recommend course of action  - Evaluate amount of meals eaten  - Assist patient with eating if necessary   - Allow adequate time for meals  - Recommend/ encourage appropriate diets, oral nutritional supplements, and vitamin/mineral supplements  - Order, calculate, and assess calorie counts as needed  - Recommend, monitor, and adjust tube feedings and TPN/PPN based on assessed needs  - Assess need for intravenous fluids  - Provide specific nutrition/hydration education as appropriate  - Include patient/family/caregiver in decisions related to nutrition  Outcome: Progressing

## 2024-01-30 NOTE — PROGRESS NOTES
"Progress Note - Pulmonary   Mariela Giron 88 y.o. female MRN: [id number]  Unit/Bed#: 411-01 Encounter: [id number]    Assessment/Plan:    Acute hypoxic respiratory failure   Sepsis secondary to viral PNA vs CAP   Influenza A   Abnormal CT chest   Chronic diastolic CHF  Atrial flutter     Pulmonary recommendations:      Seen on 1L NC with adequate oxygen saturations greater than or equal to 88%. No oxygen requirement at baseline.   Titrate oxygen to maintain SpO2 greater than or equal to 88%.   Pulmonary toilet:  Increase activity as tolerated, out of bed as tolerated, cough and deep breathing exercises encourage, incentive spirometry q.1 hour  No indication for systemic steroids and nebulizers at this time.  Completed Tamiflu day#5/5 yesterday  Given MRSA positive and persistent fevers, ABX were changed to vancomycin/cefepime 1/27- will continue cefepime/vancomycin to complete 7 day course   Trend WBC, PCT, temps   CXR with persistent vascular congestion and small pleural effusions- give Lasix 20 mg IV once today. Monitor I/Os and daily weights.    Chief Complaint:    \"I feel winded.\"    Subjective:    Patient was seen and examined today. No overnight events reported. Patient reports some shortness of breath and cough. No phlegm. No wheeze. No fevers or chills.     Objective:    Vitals: Blood pressure 166/69, pulse 93, temperature (!) 97.1 °F (36.2 °C), temperature source Axillary, resp. rate 19, height 5' 1\" (1.549 m), weight 49.1 kg (108 lb 3.9 oz), SpO2 92%.1L NC,Body mass index is 20.45 kg/m².      Intake/Output Summary (Last 24 hours) at 1/30/2024 1206  Last data filed at 1/30/2024 0834  Gross per 24 hour   Intake 300 ml   Output 200 ml   Net 100 ml       Invasive Devices       Peripheral Intravenous Line  Duration             Peripheral IV 01/29/24 Dorsal (posterior);Right Forearm 1 day    Peripheral IV 01/30/24 Left Hand <1 day              Drain  Duration             External Urinary Catheter Medium 4 days " "                   Physical Exam:     Physical Exam  Vitals and nursing note reviewed.   Constitutional:       General: She is not in acute distress.     Appearance: Normal appearance.   HENT:      Head: Normocephalic and atraumatic.      Mouth/Throat:      Mouth: Mucous membranes are moist.      Pharynx: Oropharynx is clear.   Cardiovascular:      Rate and Rhythm: Normal rate and regular rhythm.      Heart sounds: No murmur heard.  Pulmonary:      Effort: Pulmonary effort is normal.      Breath sounds: Rales present. No wheezing.   Musculoskeletal:      Cervical back: Normal range of motion.   Skin:     General: Skin is warm and dry.   Neurological:      General: No focal deficit present.      Mental Status: She is alert. Mental status is at baseline.   Psychiatric:         Mood and Affect: Mood normal.         Behavior: Behavior normal.         Labs: I have personally reviewed pertinent lab results., ABG: No results found for: \"PHART\", \"DHB8JGO\", \"PO2ART\", \"XWF5FAI\", \"T3TYFYUK\", \"BEART\", \"SOURCE\", BNP: No results found for: \"BNP\", CBC:   Lab Results   Component Value Date    WBC 5.49 01/30/2024    HGB 9.7 (L) 01/30/2024    HCT 31.3 (L) 01/30/2024    MCV 92 01/30/2024     (L) 01/30/2024    RBC 3.39 (L) 01/30/2024    MCH 28.6 01/30/2024    MCHC 31.0 (L) 01/30/2024    RDW 15.5 (H) 01/30/2024    MPV 10.3 01/30/2024   , CMP:   Lab Results   Component Value Date    SODIUM 137 01/30/2024    K 3.6 01/30/2024     01/30/2024    CO2 20 (L) 01/30/2024    BUN 30 (H) 01/30/2024    CREATININE 0.89 01/30/2024    CALCIUM 8.4 01/30/2024    AST 24 01/30/2024    ALT 20 01/30/2024    ALKPHOS 82 01/30/2024    EGFR 58 01/30/2024   , PT/INR:   Lab Results   Component Value Date    INR 1.29 (H) 01/30/2024   , Troponin: No results found for: \"TROPONINI\"    Imaging and other studies: I have personally reviewed pertinent reports.      Chest x-ray today per my interpretation shows persistent pulmonary vascular congestion and " small bilateral pleural effusions

## 2024-01-30 NOTE — ASSESSMENT & PLAN NOTE
Wt Readings from Last 3 Encounters:   01/30/24 49.1 kg (108 lb 3.9 oz)   01/03/24 45.5 kg (100 lb 6.4 oz)   12/21/23 45.1 kg (99 lb 6.4 oz)     Received home lasix dose this a.m.  Notified by nursing that patient requiring 2 L nasal cannula with saturations 89 to 93%.  CXR ordered  Not much change from previous, however still appears to be volume overloaded  Awaiting final read  Titrate O2 as tolerated

## 2024-01-30 NOTE — CASE MANAGEMENT
Case Management Progress Note    Patient name Mariela Giron  Location /411-01 MRN [id number]  : 1935 Date 2024       LOS (days): 5  Geometric Mean LOS (GMLOS) (days): 3.6  Days to GMLOS:-1.2        OBJECTIVE:        Current admission status: Inpatient  Preferred Pharmacy:   [facility] [address]  [address] [address]  Phone: [phone] Fax: [phone]    [id number] - [place], [place] - [address] [address]  Phone: [phone] Fax: [phone]    Primary Care Provider: Papa Zabala DO    Primary Insurance: MEDICARE  Secondary Insurance: BLUE CROSS    PROGRESS NOTE:spoke with son via phone to discuss further discharge planning. Pt still declining STR but son unable to be with pt . Son upset that we continue to call him to discuss discharge planning when he feels his mother is not medically ready for discharge. I explained to son that discharge planning starts on day 1 and we update frequently with our recommendations. If pt continues to refuse STR will need to have meeting with son, pt and MD to discuss further due to safety concerns. CM to follow.

## 2024-01-30 NOTE — PLAN OF CARE
Problem: OCCUPATIONAL THERAPY ADULT  Goal: Performs self-care activities at highest level of function for planned discharge setting.  See evaluation for individualized goals.  Description: Treatment Interventions: ADL retraining, Functional transfer training, UE strengthening/ROM, Endurance training, Patient/family training, Compensatory technique education, Energy conservation, Activityengagement          See flowsheet documentation for full assessment, interventions and recommendations.   Outcome: Progressing  Note: Limitation: Decreased ADL status, Decreased UE strength, Decreased Safe judgement during ADL, Decreased endurance, Decreased self-care trans, Decreased high-level ADLs  Prognosis: Good  Assessment: Patient participated in Skilled OT session this date with interventions consisting of ADL re training with the use of correct body mechnaics and  therapeutic activities to: increase activity tolerance . Patient agreeable to OT treatment session, upon arrival patient was found supine in bed. Supine to sit txfrs Min Ax1, sit to stand txfrs from bed with Min Ax1, standing balance/functional mobility around room with use of RW and Min Ax1 to increase posture, dynamic standing balance, balance during self cares, use of BUE. Pt required Mod A for LB dressing tasks. Patient requiring verbal cues for safety and verbal cues for correct technique. Patient continues to be functioning below baseline level, occupational performance remains limited secondary to factors listed above and increased risk for falls and injury. The patient's raw score on the AM-PAC Daily Activity inpatient short form is 18, standardized score is 38.66, less than 39.4. Patients at this level are likely to benefit from discharge to Post acute rehabilitation services. Please refer to the recommendation of the Occupational Therapist for safe discharge planning, which is Moderate Resource Level 2.     Rehab Resource Intensity Level, OT: II  (Moderate Resource Intensity)

## 2024-01-30 NOTE — PLAN OF CARE
Problem: Prexisting or High Potential for Compromised Skin Integrity  Goal: Skin integrity is maintained or improved  Description: INTERVENTIONS:  - Identify patients at risk for skin breakdown  - Assess and monitor skin integrity  - Assess and monitor nutrition and hydration status  - Monitor labs   - Assess for incontinence   - Turn and reposition patient  - Assist with mobility/ambulation  - Relieve pressure over bony prominences  - Avoid friction and shearing  - Provide appropriate hygiene as needed including keeping skin clean and dry  - Evaluate need for skin moisturizer/barrier cream  - Collaborate with interdisciplinary team   - Patient/family teaching  - Consider wound care consult   Outcome: Progressing     Problem: PAIN - ADULT  Goal: Verbalizes/displays adequate comfort level or baseline comfort level  Description: Interventions:  - Encourage patient to monitor pain and request assistance  - Assess pain using appropriate pain scale  - Administer analgesics based on type and severity of pain and evaluate response  - Implement non-pharmacological measures as appropriate and evaluate response  - Consider cultural and social influences on pain and pain management  - Notify physician/advanced practitioner if interventions unsuccessful or patient reports new pain  Outcome: Progressing     Problem: INFECTION - ADULT  Goal: Absence or prevention of progression during hospitalization  Description: INTERVENTIONS:  - Assess and monitor for signs and symptoms of infection  - Monitor lab/diagnostic results  - Monitor all insertion sites, i.e. indwelling lines, tubes, and drains  - Monitor endotracheal if appropriate and nasal secretions for changes in amount and color  - [place] appropriate cooling/warming therapies per order  - Administer medications as ordered  - Instruct and encourage patient and family to use good hand hygiene technique  - Identify and instruct in appropriate isolation precautions for  identified infection/condition  Outcome: Progressing  Goal: Absence of fever/infection during neutropenic period  Description: INTERVENTIONS:  - Monitor WBC    Outcome: Progressing     Problem: SAFETY ADULT  Goal: Patient will remain free of falls  Description: INTERVENTIONS:  - Educate patient/family on patient safety including physical limitations  - Instruct patient to call for assistance with activity   - Consult OT/PT to assist with strengthening/mobility   - Keep Call bell within reach  - Keep bed low and locked with side rails adjusted as appropriate  - Keep care items and personal belongings within reach  - Initiate and maintain comfort rounds  - Make Fall Risk Sign visible to staff  - Offer Toileting every 2 Hours, in advance of need  - Initiate/Maintain bed alarm  - Obtain necessary fall risk management equipment:   - Apply yellow socks and bracelet for high fall risk patients  - Consider moving patient to room near nurses station  Outcome: Progressing  Goal: Maintain or return to baseline ADL function  Description: INTERVENTIONS:  -  Assess patient's ability to carry out ADLs; assess patient's baseline for ADL function and identify physical deficits which impact ability to perform ADLs (bathing, care of mouth/teeth, toileting, grooming, dressing, etc.)  - Assess/evaluate cause of self-care deficits   - Assess range of motion  - Assess patient's mobility; develop plan if impaired  - Assess patient's need for assistive devices and provide as appropriate  - Encourage maximum independence but intervene and supervise when necessary  - Involve family in performance of ADLs  - Assess for home care needs following discharge   - Consider OT consult to assist with ADL evaluation and planning for discharge  - Provide patient education as appropriate  Outcome: Progressing  Goal: Maintains/Returns to pre admission functional level  Description: INTERVENTIONS:  - Perform AM-PAC 6 Click Basic Mobility/ Daily Activity  assessment daily.  - Set and communicate daily mobility goal to care team and patient/family/caregiver.   - Collaborate with rehabilitation services on mobility goals if consulted  - Perform Range of Motion 3 times a day.  - Reposition patient every 2 hours.  - Dangle patient 3 times a day  - Stand patient 3 times a day  - Ambulate patient 3 times a day  - Out of bed to chair 3 times a day   - Out of bed for meals 3 times a day  - Out of bed for toileting  - Record patient progress and toleration of activity level   Outcome: Progressing     Problem: DISCHARGE PLANNING  Goal: Discharge to home or other facility with appropriate resources  Description: INTERVENTIONS:  - Identify barriers to discharge w/patient and caregiver  - Arrange for needed discharge resources and transportation as appropriate  - Identify discharge learning needs (meds, wound care, etc.)  - Arrange for interpretive services to assist at discharge as needed  - Refer to Case Management Department for coordinating discharge planning if the patient needs post-hospital services based on physician/advanced practitioner order or complex needs related to functional status, cognitive ability, or social support system  Outcome: Progressing     Problem: Knowledge Deficit  Goal: Patient/family/caregiver demonstrates understanding of disease process, treatment plan, medications, and discharge instructions  Description: Complete learning assessment and assess knowledge base.  Interventions:  - Provide teaching at level of understanding  - Provide teaching via preferred learning methods  Outcome: Progressing     Problem: RESPIRATORY - ADULT  Goal: Achieves optimal ventilation and oxygenation  Description: INTERVENTIONS:  - Assess for changes in respiratory status  - Assess for changes in mentation and behavior  - Position to facilitate oxygenation and minimize respiratory effort  - Oxygen administered by appropriate delivery if ordered  - Initiate smoking  cessation education as indicated  - Encourage broncho-pulmonary hygiene including cough, deep breathe, Incentive Spirometry  - Assess the need for suctioning and aspirate as needed  - Assess and instruct to report SOB or any respiratory difficulty  - Respiratory Therapy support as indicated  Outcome: Progressing     Problem: Nutrition/Hydration-ADULT  Goal: Nutrient/Hydration intake appropriate for improving, restoring or maintaining nutritional needs  Description: Monitor and assess patient's nutrition/hydration status for malnutrition. Collaborate with interdisciplinary team and initiate plan and interventions as ordered.  Monitor patient's weight and dietary intake as ordered or per policy. Utilize nutrition screening tool and intervene as necessary. Determine patient's food preferences and provide high-protein, high-caloric foods as appropriate.     INTERVENTIONS:  - Monitor oral intake, urinary output, labs, and treatment plans  - Assess nutrition and hydration status and recommend course of action  - Evaluate amount of meals eaten  - Assist patient with eating if necessary   - Allow adequate time for meals  - Recommend/ encourage appropriate diets, oral nutritional supplements, and vitamin/mineral supplements  - Order, calculate, and assess calorie counts as needed  - Recommend, monitor, and adjust tube feedings and TPN/PPN based on assessed needs  - Assess need for intravenous fluids  - Provide specific nutrition/hydration education as appropriate  - Include patient/family/caregiver in decisions related to nutrition  Outcome: Progressing

## 2024-01-30 NOTE — PLAN OF CARE
Problem: PHYSICAL THERAPY ADULT  Goal: Performs mobility at highest level of function for planned discharge setting.  See evaluation for individualized goals.  Description: Treatment/Interventions: Functional transfer training, LE strengthening/ROM, Elevations, Therapeutic exercise, Endurance training, Bed mobility, Gait training          See flowsheet documentation for full assessment, interventions and recommendations.  Outcome: Progressing  Note: Prognosis: Good  Problem List:  (Decreased strength; Decreased endurance; Impaired balance; Decreased mobility; Impaired judgement; Decreased safety awareness)  Assessment: Pt. seen for PT treatment session this date with interventions consisting of  bed mobility, transfers and  gait training w/ emphasis on improving pt's ability to ambulate. Pt. Requiring cues for sequence and safety. In comparison to previous session, Pt. With decreased in activity tolerance. Increased O2 requirements with activity  due to desaturation.    Pt is in need of continued activity in PT to improve strength balance endurance mobility transfers and ambulation with return to maximize LOF. From PT/mobility standpoint, recommendation at time of d/c would be level II: moderate resource intensity  in order to promote return to PLOF and independence.   The patient's AM-PAC Basic Mobility Inpatient Short Form Raw Score is 16. A Raw score of less than or equal to 16 suggests the patient may benefit from discharge to post-acute rehabilitation services.  Please also refer to physical therapy recommendation for safe DC planning.        Rehab Resource Intensity Level, PT: II (Moderate Resource Intensity)    See flowsheet documentation for full assessment.

## 2024-01-30 NOTE — ASSESSMENT & PLAN NOTE
Follow the magnesium level  Repleted 1/30   Monitor mg in the am and replete as needed    Results from last 7 days   Lab Units 01/30/24  0524 01/29/24  0433 01/28/24  0518   MAGNESIUM mg/dL 1.7* 2.1 2.2

## 2024-01-30 NOTE — ASSESSMENT & PLAN NOTE
Sepsis was present on admission and due to Influenza A with a right lower lobe pneumonia  Suspected super-imposed bacterial pneumonia  Positive MRSA nasal culture  Blood cultures: no growth at 72 hours  Treat with Tamiflu 30 mg PO every 24 hours (renally-dosed) - completed 5 days  IV antibiotic treatment changed to IV cefepime and IV vancomycin on 01/27/2024 with persistent fevers and a markedly elevated procalcitonin level.  Continue ABX for total of 7-day course  Procal downtrending, patient afebrile  Continue to monitor procal

## 2024-01-30 NOTE — PROGRESS NOTES
[facility]  Progress Note  Name: Mariela Giron I  MRN: [id number]  Unit/Bed#: 411-01 I Date of Admission: 1/25/2024   Date of Service: 1/30/2024 I Hospital Day: 5    Assessment/Plan   * Sepsis (HCC)  Assessment & Plan  Sepsis was present on admission and due to Influenza A with a right lower lobe pneumonia  Suspected super-imposed bacterial pneumonia  Positive MRSA nasal culture  Blood cultures: no growth at 72 hours  Treat with Tamiflu 30 mg PO every 24 hours (renally-dosed) - completed 5 days  IV antibiotic treatment changed to IV cefepime and IV vancomycin on 01/27/2024 with persistent fevers and a markedly elevated procalcitonin level.  Continue ABX for total of 7-day course  Procal downtrending, patient afebrile  Continue to monitor procal    MRSA colonization  Assessment & Plan  Utilize the MRSA decolonization protocol, which should be continued upon discharge    Elevated d-dimer  Assessment & Plan  No evidence of pulmonary embolism on CTA of the chest/PE protocol (01/25/2024)  Check a venous duplex of the bilateral lower extremitie  No evidence of DVT/thrombophlebitis     Latest Reference Range & Units Most Recent   D-Dimer, Quant <0.50 ug/ml FEU 0.97 (H)  1/25/24 18:40   (H): Data is abnormally high    Right lower lobe pneumonia  Assessment & Plan  Suspected superimposed bacterial pneumonia  Continue cefepime, and vancomycin        Influenza A  Assessment & Plan  Completed 5-day course of Tamiflu    Hypoxia  Assessment & Plan  Thought to be secondary to Influenza A with pneumonia  Required up to 2 Lpm of continuous supplemental oxygen to maintain oxygen saturation levels at 90% and above  Not on home supplemental oxygen therapy  1/30 received text from nurse that patient requiring increased supplemental O2.  CXR with evidence of pulmonary congestion.  She received her p.o. 20mg Lasix this morning.  Giving additional 20 mg IV Lasix now - will continue to monitor and diuresis as  needed  Incentive spirometry  Respiratory protocol  The patient will need a home supplemental oxygen evaluation on the day of discharge.        Coronary artery disease involving native coronary artery of native heart without angina pectoris  Assessment & Plan  Continue Plavix, statin therapy, and Toprol-XL  Outpatient follow-up with Cardiology    Permanent atrial fibrillation (HCC)  Assessment & Plan  Continue Toprol-XL 50 mg PO Qdaily  Continue anticoagulation with Eliquis 2.5 mg PO BID    Chronic diastolic (congestive) heart failure (HCC)  Assessment & Plan  Wt Readings from Last 3 Encounters:   01/30/24 49.1 kg (108 lb 3.9 oz)   01/03/24 45.5 kg (100 lb 6.4 oz)   12/21/23 45.1 kg (99 lb 6.4 oz)     Received home lasix dose this a.m.  Notified by nursing that patient requiring 2 L nasal cannula with saturations 89 to 93%.  CXR ordered  Not much change from previous, however still appears to be volume overloaded  Awaiting final read  Titrate O2 as tolerated         Hyponatremia  Assessment & Plan  Sodium level is improved   Continue home Lasix     Diarrhea-resolved as of 1/30/2024  Assessment & Plan  Patient has had no diarrhea since stool cultures were ordered greater than 24 hours ago, discontinue stool studies, suspected antibiotic associated diarrhea.    Clinically improved, by several measures including decreased white blood cell count, patient has been afebrile, no further significant diarrhea.      Pancytopenia (HCC)-resolved as of 1/29/2024  Assessment & Plan  Likely due to acute illness with Influenza A and sepsis  Follow the CBC  Outpatient Hematology evaluation if the pancytopenia persists    Hypomagnesemia-resolved as of 1/29/2024  Assessment & Plan  Follow the magnesium level  Repleted 1/30   Monitor mg in the am and replete as needed    Results from last 7 days   Lab Units 01/30/24  0524 01/29/24  0433 01/28/24  0518   MAGNESIUM mg/dL 1.7* 2.1 2.2              VTE Pharmacologic Prophylaxis: VTE Score:  10 High Risk (Score >/= 5) - Pharmacological DVT Prophylaxis Ordered: apixaban (Eliquis). Sequential Compression Devices Ordered.    Mobility:   Basic Mobility Inpatient Raw Score: 17  JH-HLM Goal: 5: Stand one or more mins  JH-HLM Achieved: 4: Move to chair/commode  HLM Goal NOT achieved. Continue with multidisciplinary rounding and encourage appropriate mobility to improve upon HLM goals.    Patient Centered Rounds: I performed bedside rounds with nursing staff today.   Discussions with Specialists or Other Care Team Provider: Pulm    Education and Discussions with Family / Patient:  Will update son.     Total Time Spent on Date of Encounter in care of patient: This time was spent on one or more of the following: performing physical exam; counseling and coordination of care; obtaining or reviewing history; documenting in the medical record; reviewing/ordering tests, medications or procedures; communicating with other healthcare professionals and discussing with patient's family/caregivers.    Current Length of Stay: 5 day(s)  Current Patient Status: Inpatient   Certification Statement: The patient will continue to require additional inpatient hospital stay due to continued medical management of hypoxia, and IV antibiotics for superimposed pneumonia  Discharge Plan: Anticipate discharge in 48 hrs to rehab facility.    Code Status: Level 3 - DNAR and DNI    Subjective:   Patient sitting in chair on exam this morning.  States that she is having more difficulty breathing this a.m.  Denying chest pain, fevers, chills, nausea, vomiting, diarrhea.  Per nursing staff, patient needing 2 L nasal cannula with saturations in the 90s at rest.  Patient is desaturates with ambulation.    Objective:     Vitals:   Temp (24hrs), Av.3 °F (36.3 °C), Min:97.1 °F (36.2 °C), Max:97.5 °F (36.4 °C)    Temp:  [97.1 °F (36.2 °C)-97.5 °F (36.4 °C)] 97.1 °F (36.2 °C)  HR:  [69-93] 93  Resp:  [19-22] 19  BP: (120-176)/(69-99) 166/69  SpO2:   [89 %-96 %] 92 %  Body mass index is 20.45 kg/m².     Input and Output Summary (last 24 hours):     Intake/Output Summary (Last 24 hours) at 1/30/2024 1252  Last data filed at 1/30/2024 1244  Gross per 24 hour   Intake 420 ml   Output 200 ml   Net 220 ml       Physical Exam:   Physical Exam  Constitutional:       General: She is in acute distress.      Appearance: She is ill-appearing. She is not toxic-appearing.   HENT:      Mouth/Throat:      Mouth: Mucous membranes are dry.   Eyes:      Conjunctiva/sclera: Conjunctivae normal.   Neck:      Vascular: JVD present.   Cardiovascular:      Rate and Rhythm: Normal rate and regular rhythm.      Heart sounds: No murmur heard.     No friction rub. No gallop.   Pulmonary:      Breath sounds: No wheezing, rhonchi or rales.   Abdominal:      Palpations: Abdomen is soft.   Musculoskeletal:      Right lower leg: Edema present.      Left lower leg: Edema present.      Comments: No pitting appreciated   Skin:     General: Skin is warm and dry.   Psychiatric:         Mood and Affect: Mood normal.          Additional Data:     Labs:  Results from last 7 days   Lab Units 01/30/24 0524 01/29/24 0433   WBC Thousand/uL 5.49 5.17   HEMOGLOBIN g/dL 9.7* 8.7*   HEMATOCRIT % 31.3* 28.4*   PLATELETS Thousands/uL 117* 107*   BANDS PCT % 2  --    NEUTROS PCT %  --  72   LYMPHS PCT %  --  10*   LYMPHO PCT % 7*  --    MONOS PCT %  --  6   MONO PCT % 6  --    EOS PCT % 4 12*     Results from last 7 days   Lab Units 01/30/24  0524   SODIUM mmol/L 137   POTASSIUM mmol/L 3.6   CHLORIDE mmol/L 106   CO2 mmol/L 20*   BUN mg/dL 30*   CREATININE mg/dL 0.89   ANION GAP mmol/L 11   CALCIUM mg/dL 8.4   ALBUMIN g/dL 3.4*   TOTAL BILIRUBIN mg/dL 0.62   ALK PHOS U/L 82   ALT U/L 20   AST U/L 24   GLUCOSE RANDOM mg/dL 99     Results from last 7 days   Lab Units 01/30/24  0524   INR  1.29*             Results from last 7 days   Lab Units 01/30/24 0524 01/29/24  0433 01/28/24  0526 01/27/24 0433  01/25/24  1840   LACTIC ACID mmol/L  --   --   --  1.3 1.6   PROCALCITONIN ng/ml 2.59* 5.77* 9.75* 13.81* 0.19       Lines/Drains:  Invasive Devices       Peripheral Intravenous Line  Duration             Peripheral IV 01/29/24 Dorsal (posterior);Right Forearm 1 day    Peripheral IV 01/30/24 Left Hand <1 day              Drain  Duration             External Urinary Catheter Medium 4 days                          Imaging: Personally reviewed the following imaging: chest xray    Recent Cultures (last 7 days):   Results from last 7 days   Lab Units 01/26/24  1415 01/26/24  0245 01/25/24  1840   BLOOD CULTURE  No Growth at 72 hrs.  No Growth at 72 hrs.  --  No Growth After 4 Days.  No Growth After 4 Days.   LEGIONELLA URINARY ANTIGEN   --  Negative  --        Last 24 Hours Medication List:   Current Facility-Administered Medications   Medication Dose Route Frequency Provider Last Rate    acetaminophen  650 mg Oral Q6H PRN Rogerio Francois PA-C      albuterol  2.5 mg Nebulization Q4H PRN Fazal Sherwood DO      ALPRAZolam  0.25 mg Oral TID PRN Rogerio Francois PA-C      apixaban  2.5 mg Oral BID Rogerio Francois PA-C      atorvastatin  20 mg Oral Daily With Dinner Rogerio Francois PA-C      bisacodyl  10 mg Rectal Daily PRN Rogerio Francois PA-C      cefepime  1,000 mg Intravenous Q12H Fazal Sherwood DO 1,000 mg (01/30/24 1030)    cholecalciferol  2,000 Units Oral Daily Rogerio Francois PA-C      clopidogrel  75 mg Oral Daily Rogeiro Francois PA-C      dextromethorphan-guaiFENesin  10 mL Oral TID Fazal Sherwood DO      diphenoxylate-atropine  1 tablet Oral 4x Daily PRN Fazal Sherwood DO      magnesium sulfate  2 g Intravenous Once Arely Leung PA-C 2 g (01/30/24 1121)    melatonin  3 mg Oral HS Liam Carmen,       metoprolol succinate  50 mg Oral Daily Rogerio Francois PA-C      ondansetron  4 mg Intravenous Q6H PRN Rogerio Francois PA-C      pantoprazole  20 mg Oral Early Morning Rogerio Francois PA-C      risperiDONE  0.25 mg  Oral Q4H PRN Liam Carmen, DO      vancomycin  750 mg Intravenous Q24H Fazal Sherwood,  mg (01/30/24 0800)        Today, Patient Was Seen By: Arely Leung PA-C    **Please Note: This note may have been constructed using a voice recognition system.**

## 2024-01-30 NOTE — ASSESSMENT & PLAN NOTE
No evidence of pulmonary embolism on CTA of the chest/PE protocol (01/25/2024)  Check a venous duplex of the bilateral lower extremitie  No evidence of DVT/thrombophlebitis     Latest Reference Range & Units Most Recent   D-Dimer, Quant <0.50 ug/ml FEU 0.97 (H)  1/25/24 18:40   (H): Data is abnormally high

## 2024-01-31 LAB
ANION GAP SERPL CALCULATED.3IONS-SCNC: 12 MMOL/L
BACTERIA BLD CULT: NORMAL
BUN SERPL-MCNC: 29 MG/DL (ref 5–25)
CALCIUM SERPL-MCNC: 8.4 MG/DL (ref 8.4–10.2)
CHLORIDE SERPL-SCNC: 100 MMOL/L (ref 96–108)
CO2 SERPL-SCNC: 23 MMOL/L (ref 21–32)
CREAT SERPL-MCNC: 0.85 MG/DL (ref 0.6–1.3)
ERYTHROCYTE [DISTWIDTH] IN BLOOD BY AUTOMATED COUNT: 15.7 % (ref 11.6–15.1)
GFR SERPL CREATININE-BSD FRML MDRD: 61 ML/MIN/1.73SQ M
GLUCOSE SERPL-MCNC: 107 MG/DL (ref 65–140)
HCT VFR BLD AUTO: 30 % (ref 34.8–46.1)
HGB BLD-MCNC: 9.2 G/DL (ref 11.5–15.4)
MAGNESIUM SERPL-MCNC: 1.9 MG/DL (ref 1.9–2.7)
MCH RBC QN AUTO: 28.3 PG (ref 26.8–34.3)
MCHC RBC AUTO-ENTMCNC: 30.7 G/DL (ref 31.4–37.4)
MCV RBC AUTO: 92 FL (ref 82–98)
PLATELET # BLD AUTO: 115 THOUSANDS/UL (ref 149–390)
PMV BLD AUTO: 10.4 FL (ref 8.9–12.7)
POTASSIUM SERPL-SCNC: 3.4 MMOL/L (ref 3.5–5.3)
PROCALCITONIN SERPL-MCNC: 1.44 NG/ML
RBC # BLD AUTO: 3.25 MILLION/UL (ref 3.81–5.12)
SODIUM SERPL-SCNC: 135 MMOL/L (ref 135–147)
WBC # BLD AUTO: 4.35 THOUSAND/UL (ref 4.31–10.16)

## 2024-01-31 PROCEDURE — 85027 COMPLETE CBC AUTOMATED: CPT

## 2024-01-31 PROCEDURE — 99232 SBSQ HOSP IP/OBS MODERATE 35: CPT

## 2024-01-31 PROCEDURE — 97116 GAIT TRAINING THERAPY: CPT

## 2024-01-31 PROCEDURE — 86235 NUCLEAR ANTIGEN ANTIBODY: CPT | Performed by: PHYSICIAN ASSISTANT

## 2024-01-31 PROCEDURE — 83735 ASSAY OF MAGNESIUM: CPT

## 2024-01-31 PROCEDURE — 97530 THERAPEUTIC ACTIVITIES: CPT

## 2024-01-31 PROCEDURE — 99232 SBSQ HOSP IP/OBS MODERATE 35: CPT | Performed by: INTERNAL MEDICINE

## 2024-01-31 PROCEDURE — 97110 THERAPEUTIC EXERCISES: CPT

## 2024-01-31 PROCEDURE — 80048 BASIC METABOLIC PNL TOTAL CA: CPT

## 2024-01-31 PROCEDURE — 94664 DEMO&/EVAL PT USE INHALER: CPT

## 2024-01-31 PROCEDURE — 84145 PROCALCITONIN (PCT): CPT

## 2024-01-31 PROCEDURE — 94760 N-INVAS EAR/PLS OXIMETRY 1: CPT

## 2024-01-31 RX ORDER — FUROSEMIDE 20 MG/1
20 TABLET ORAL DAILY
Status: DISCONTINUED | OUTPATIENT
Start: 2024-01-31 | End: 2024-02-02 | Stop reason: HOSPADM

## 2024-01-31 RX ORDER — POTASSIUM CHLORIDE 14.9 MG/ML
20 INJECTION INTRAVENOUS ONCE
Status: COMPLETED | OUTPATIENT
Start: 2024-01-31 | End: 2024-02-01

## 2024-01-31 RX ADMIN — APIXABAN 2.5 MG: 2.5 TABLET, FILM COATED ORAL at 08:30

## 2024-01-31 RX ADMIN — APIXABAN 2.5 MG: 2.5 TABLET, FILM COATED ORAL at 17:02

## 2024-01-31 RX ADMIN — METOPROLOL SUCCINATE 50 MG: 50 TABLET, EXTENDED RELEASE ORAL at 08:30

## 2024-01-31 RX ADMIN — CEFEPIME HYDROCHLORIDE 1000 MG: 1 INJECTION, SOLUTION INTRAVENOUS at 10:55

## 2024-01-31 RX ADMIN — MELATONIN 3 MG: 3 TAB ORAL at 21:30

## 2024-01-31 RX ADMIN — GUAIFENESIN AND DEXTROMETHORPHAN 10 ML: 100; 10 SYRUP ORAL at 21:30

## 2024-01-31 RX ADMIN — POTASSIUM CHLORIDE 20 MEQ: 14.9 INJECTION, SOLUTION INTRAVENOUS at 08:30

## 2024-01-31 RX ADMIN — ATORVASTATIN CALCIUM 20 MG: 10 TABLET, FILM COATED ORAL at 16:52

## 2024-01-31 RX ADMIN — PANTOPRAZOLE SODIUM 20 MG: 20 TABLET, DELAYED RELEASE ORAL at 05:42

## 2024-01-31 RX ADMIN — CEFEPIME HYDROCHLORIDE 1000 MG: 1 INJECTION, SOLUTION INTRAVENOUS at 21:30

## 2024-01-31 RX ADMIN — VANCOMYCIN HYDROCHLORIDE 750 MG: 5 INJECTION, POWDER, LYOPHILIZED, FOR SOLUTION INTRAVENOUS at 08:30

## 2024-01-31 RX ADMIN — Medication 2000 UNITS: at 08:30

## 2024-01-31 RX ADMIN — GUAIFENESIN AND DEXTROMETHORPHAN 10 ML: 100; 10 SYRUP ORAL at 16:52

## 2024-01-31 RX ADMIN — FUROSEMIDE 20 MG: 20 TABLET ORAL at 10:55

## 2024-01-31 RX ADMIN — CLOPIDOGREL 75 MG: 75 TABLET ORAL at 08:30

## 2024-01-31 RX ADMIN — GUAIFENESIN AND DEXTROMETHORPHAN 10 ML: 100; 10 SYRUP ORAL at 08:30

## 2024-01-31 NOTE — ASSESSMENT & PLAN NOTE
Wt Readings from Last 3 Encounters:   01/31/24 47.8 kg (105 lb 6.1 oz)   01/03/24 45.5 kg (100 lb 6.4 oz)   12/21/23 45.1 kg (99 lb 6.4 oz)     1/30 notified by patient requiring additional oxygenation  CXR   Not much change from previous, however still appears to be volume overloaded  Awaiting final read  Received home Lasix, and additional 20 mg IV Lasix  Patient utilizing 1 L nasal cannula at this time with saturations 94-95%  Appears less volume overloaded on exam this morning -no need for additional IV Lasix dose, Continue home Lasix 20 mg daily  Titrate O2 as tolerated

## 2024-01-31 NOTE — PLAN OF CARE
Problem: Prexisting or High Potential for Compromised Skin Integrity  Goal: Skin integrity is maintained or improved  Description: INTERVENTIONS:  - Identify patients at risk for skin breakdown  - Assess and monitor skin integrity  - Assess and monitor nutrition and hydration status  - Monitor labs   - Assess for incontinence   - Turn and reposition patient  - Assist with mobility/ambulation  - Relieve pressure over bony prominences  - Avoid friction and shearing  - Provide appropriate hygiene as needed including keeping skin clean and dry  - Evaluate need for skin moisturizer/barrier cream  - Collaborate with interdisciplinary team   - Patient/family teaching  - Consider wound care consult   Outcome: Progressing     Problem: PAIN - ADULT  Goal: Verbalizes/displays adequate comfort level or baseline comfort level  Description: Interventions:  - Encourage patient to monitor pain and request assistance  - Assess pain using appropriate pain scale  - Administer analgesics based on type and severity of pain and evaluate response  - Implement non-pharmacological measures as appropriate and evaluate response  - Consider cultural and social influences on pain and pain management  - Notify physician/advanced practitioner if interventions unsuccessful or patient reports new pain  Outcome: Progressing     Problem: INFECTION - ADULT  Goal: Absence or prevention of progression during hospitalization  Description: INTERVENTIONS:  - Assess and monitor for signs and symptoms of infection  - Monitor lab/diagnostic results  - Monitor all insertion sites, i.e. indwelling lines, tubes, and drains  - Monitor endotracheal if appropriate and nasal secretions for changes in amount and color  - [place] appropriate cooling/warming therapies per order  - Administer medications as ordered  - Instruct and encourage patient and family to use good hand hygiene technique  - Identify and instruct in appropriate isolation precautions for  identified infection/condition  Outcome: Progressing  Goal: Absence of fever/infection during neutropenic period  Description: INTERVENTIONS:  - Monitor WBC    Outcome: Progressing     Problem: SAFETY ADULT  Goal: Patient will remain free of falls  Description: INTERVENTIONS:  - Educate patient/family on patient safety including physical limitations  - Instruct patient to call for assistance with activity   - Consult OT/PT to assist with strengthening/mobility   - Keep Call bell within reach  - Keep bed low and locked with side rails adjusted as appropriate  - Keep care items and personal belongings within reach  - Initiate and maintain comfort rounds  - Make Fall Risk Sign visible to staff  - Offer Toileting every 2 Hours, in advance of need  - Initiate/Maintain bed alarm  - Obtain necessary fall risk management equipment:   - Apply yellow socks and bracelet for high fall risk patients  - Consider moving patient to room near nurses station  Outcome: Progressing  Goal: Maintain or return to baseline ADL function  Description: INTERVENTIONS:  -  Assess patient's ability to carry out ADLs; assess patient's baseline for ADL function and identify physical deficits which impact ability to perform ADLs (bathing, care of mouth/teeth, toileting, grooming, dressing, etc.)  - Assess/evaluate cause of self-care deficits   - Assess range of motion  - Assess patient's mobility; develop plan if impaired  - Assess patient's need for assistive devices and provide as appropriate  - Encourage maximum independence but intervene and supervise when necessary  - Involve family in performance of ADLs  - Assess for home care needs following discharge   - Consider OT consult to assist with ADL evaluation and planning for discharge  - Provide patient education as appropriate  Outcome: Progressing  Goal: Maintains/Returns to pre admission functional level  Description: INTERVENTIONS:  - Perform AM-PAC 6 Click Basic Mobility/ Daily Activity  assessment daily.  - Set and communicate daily mobility goal to care team and patient/family/caregiver.   - Collaborate with rehabilitation services on mobility goals if consulted  - Perform Range of Motion 3 times a day.  - Reposition patient every 2 hours.  - Dangle patient 3 times a day  - Stand patient 3 times a day  - Ambulate patient 3 times a day  - Out of bed to chair 3 times a day   - Out of bed for meals 3 times a day  - Out of bed for toileting  - Record patient progress and toleration of activity level   Outcome: Progressing     Problem: DISCHARGE PLANNING  Goal: Discharge to home or other facility with appropriate resources  Description: INTERVENTIONS:  - Identify barriers to discharge w/patient and caregiver  - Arrange for needed discharge resources and transportation as appropriate  - Identify discharge learning needs (meds, wound care, etc.)  - Arrange for interpretive services to assist at discharge as needed  - Refer to Case Management Department for coordinating discharge planning if the patient needs post-hospital services based on physician/advanced practitioner order or complex needs related to functional status, cognitive ability, or social support system  Outcome: Progressing     Problem: Knowledge Deficit  Goal: Patient/family/caregiver demonstrates understanding of disease process, treatment plan, medications, and discharge instructions  Description: Complete learning assessment and assess knowledge base.  Interventions:  - Provide teaching at level of understanding  - Provide teaching via preferred learning methods  Outcome: Progressing     Problem: RESPIRATORY - ADULT  Goal: Achieves optimal ventilation and oxygenation  Description: INTERVENTIONS:  - Assess for changes in respiratory status  - Assess for changes in mentation and behavior  - Position to facilitate oxygenation and minimize respiratory effort  - Oxygen administered by appropriate delivery if ordered  - Initiate smoking  cessation education as indicated  - Encourage broncho-pulmonary hygiene including cough, deep breathe, Incentive Spirometry  - Assess the need for suctioning and aspirate as needed  - Assess and instruct to report SOB or any respiratory difficulty  - Respiratory Therapy support as indicated  Outcome: Progressing     Problem: Nutrition/Hydration-ADULT  Goal: Nutrient/Hydration intake appropriate for improving, restoring or maintaining nutritional needs  Description: Monitor and assess patient's nutrition/hydration status for malnutrition. Collaborate with interdisciplinary team and initiate plan and interventions as ordered.  Monitor patient's weight and dietary intake as ordered or per policy. Utilize nutrition screening tool and intervene as necessary. Determine patient's food preferences and provide high-protein, high-caloric foods as appropriate.     INTERVENTIONS:  - Monitor oral intake, urinary output, labs, and treatment plans  - Assess nutrition and hydration status and recommend course of action  - Evaluate amount of meals eaten  - Assist patient with eating if necessary   - Allow adequate time for meals  - Recommend/ encourage appropriate diets, oral nutritional supplements, and vitamin/mineral supplements  - Order, calculate, and assess calorie counts as needed  - Recommend, monitor, and adjust tube feedings and TPN/PPN based on assessed needs  - Assess need for intravenous fluids  - Provide specific nutrition/hydration education as appropriate  - Include patient/family/caregiver in decisions related to nutrition  Outcome: Progressing

## 2024-01-31 NOTE — ASSESSMENT & PLAN NOTE
In the setting of Influenza A with superimposed bacterial pneumonia, and volume overload  Required up to 2 Lpm of continuous supplemental oxygen to maintain oxygen saturation levels at 90% and above  Not on home supplemental oxygen therapy  1/30 increasing O2 requirements   Given po lasix dose in the AM  Additional 20mg IV lasix given  Continue home Lasix 20 mg daily  Continue IV antibiotics  Continue to monitor and diuresis as needed  Incentive spirometry  Respiratory protocol  The patient will need a home supplemental oxygen evaluation on the day of discharge.

## 2024-01-31 NOTE — PROGRESS NOTES
Mariela Giron is a 88 y.o. female who is currently ordered Vancomycin IV with management by the Pharmacy Consult service.  Relevant clinical data and objective / subjective history reviewed.  Vancomycin Assessment:  Indication and Goal AUC/Trough: Pneumonia (goal -600, trough >10), -600, trough >10  Clinical Status: stable  Micro:     Renal Function:  SCr: 0.85 mg/dL  CrCl: 41 mL/min  Renal replacement: Not on dialysis  Days of Therapy: 5  Current Dose: 750mg IV q24h  Vancomycin Plan:  New Dosing: same  Estimated AUC: 414 mcg*hr/mL  Estimated Trough: 12.9 mcg/mL  Next Level:  2/1/24 with AM labs  Renal Function Monitoring: Daily BMP and UOP  Pharmacy will continue to follow closely for s/sx of nephrotoxicity, infusion reactions and appropriateness of therapy.  BMP and CBC will be ordered per protocol. We will continue to follow the patient’s culture results and clinical progress daily.    Jian Sinclair, Pharmacist

## 2024-01-31 NOTE — PHYSICAL THERAPY NOTE
PHYSICAL THERAPY NOTE          Patient Name: Mariela Giron  Today's Date: 1/31/2024 01/31/24 0918   PT Last Visit   PT Visit Date 01/31/24   Note Type   Note Type Treatment   Pain Assessment   Pain Assessment Tool 0-10   Pain Score No Pain   Restrictions/Precautions   Weight Bearing Precautions Per Order No   Other Precautions   (V)   General   Chart Reviewed Yes   Response to Previous Treatment Patient unable to report, no changes reported from family or staff   Family/Caregiver Present No   Cognition   Overall Cognitive Status Impaired   Arousal/Participation Alert;Cooperative   Following Commands Follows one step commands with increased time or repetition   Subjective   Subjective Needs to go to the bathroom.   Bed Mobility   Additional Comments OOB start session   Transfers   Sit to Stand 4  Minimal assistance   Additional items Assist x 1;Increased time required;Verbal cues   Stand to Sit 4  Minimal assistance   Additional items Assist x 1;Armrests;Increased time required;Verbal cues   Stand pivot 4  Minimal assistance   Additional items Increased time required;Verbal cues   Toilet transfer 4  Minimal assistance   Additional items Assist x 1;Increased time required;Verbal cues;Standard toilet   Additional Comments RW used. Stood with fair balance for clothing management and hygiene   Ambulation/Elevation   Gait pattern   (Excessively slow; Short stride; Foward flexed; Decreased foot clearance; Narrow DAVID)   Gait Assistance 4  Minimal assist   Additional items Assist x 1;Verbal cues   Assistive Device Rolling walker   Distance 15' x 1, 120' x 1   Balance   Static Sitting Fair +   Dynamic Sitting Fair +   Static Standing Fair   Dynamic Standing Fair -   Ambulatory Fair -  (RW)   Endurance Deficit   Endurance Deficit Yes   Endurance Deficit Description SpO2 95-84% with ambulation. Increased to 3-4 L to ambulte. Required 4 L to  ambulate to maintain SpO2 @ 90-91%, moderate SOB.   Activity Tolerance   Activity Tolerance Patient limited by fatigue   Nurse Made Aware Yes, nursing staff made aware of session outcomes   Exercises   Heelslides Sitting;15 reps;AROM;Bilateral   Hip Flexion Sitting;15 reps;AROM;Bilateral   Hip Abduction Sitting;15 reps;AROM;Bilateral   Hip Adduction Sitting;15 reps;AROM;Bilateral   Knee AROM Long Arc Quad Sitting;15 reps;AROM;Bilateral   Ankle Pumps Sitting;15 reps;AROM;Bilateral   Assessment   Prognosis Good   Problem List   (Decreased strength; Decreased endurance; Impaired balance; Decreased mobility; Impaired judgement; Decreased safety awareness)   Assessment Pt. seen for PT treatment session this date with interventions consisting of  therapeutic exercises, toilet transfers,  transfers and  gait training w/ emphasis on improving pt's ability to ambulate. Pt. Requiring cues for sequence and safety. In comparison to previous session, Pt. With improvements in activity tolerance, however, still requiring increased O2 to ambulate.   Pt is in need of continued activity in PT to improve strength balance endurance mobility transfers and ambulation with return to maximize LOF. From PT/mobility standpoint, recommendation at time of d/c would be level !!: moderate resource intensity in order to promote return to PLOF and independence.   The patient's AM-PAC Basic Mobility Inpatient Short Form Raw Score is 17. A Raw score of greater than 16 suggests the patient may benefit from discharge to home.  Please also refer to physical therapy recommendation for safe DC planning.   Goals   LTG Expiration Date 02/09/24   PT Treatment Day 3   Plan   Treatment/Interventions Functional transfer training;LE strengthening/ROM;Elevations;Therapeutic exercise;Endurance training;Bed mobility;Gait training;Spoke to nursing   Progress Slow progress, decreased activity tolerance   PT Frequency 3-5x/wk   Discharge Recommendation   Rehab  Resource Intensity Level, PT II (Moderate Resource Intensity)   AM-PAC Basic Mobility Inpatient   Turning in Flat Bed Without Bedrails 3   Lying on Back to Sitting on Edge of Flat Bed Without Bedrails 3   Moving Bed to Chair 3   Standing Up From Chair Using Arms 3   Walk in Room 3   Climb 3-5 Stairs With Railing 2   Basic Mobility Inpatient Raw Score 17   Basic Mobility Standardized Score 39.67   Highest Level Of Mobility   JH-HLM Goal 5: Stand one or more mins   JH-HLM Achieved 7: Walk 25 feet or more   Education   Education Provided Mobility training   Patient Demonstrates verbal understanding;Reinforcement needed   End of Consult   Patient Position at End of Consult Bedside chair;Bed/Chair alarm activated;All needs within reach   End of Consult Comments discussed POC with PT

## 2024-01-31 NOTE — PROGRESS NOTES
"Progress Note - Pulmonary   Mariela Giron 88 y.o. female MRN: [id number]  Unit/Bed#: 411-01 Encounter: [id number]    Assessment/Plan:  Patient is an 88-year-old female with past medical history significant for chronic diastolic heart failure who present with acute hypoxic respiratory failure secondary to viral pneumonia with superimposed bacterial infection.  Patient's respiratory status has mildly improved in setting of diuretics.  Patient's current problem is significant weakness.    Acute hypoxic respiratory failure   Sepsis secondary to viral PNA vs CAP   Influenza A   Abnormal CT chest   Chronic diastolic CHF  Atrial flutter     Pulmonary recommendations:      Continues to require 1L NC  Titrate oxygen to maintain SpO2 greater than or equal to 88%.   Pulmonary toilet:  Increase activity as tolerated, out of bed as tolerated, cough and deep breathing exercises encourage, incentive spirometry q.1 hour  No indication for systemic steroids and nebulizers at this time.  Completed Tamiflu   Given MRSA positive and persistent fevers, ABX were changed to vancomycin/cefepime 1/27- will continue cefepime/vancomycin to complete 7 day course   Trend WBC, PCT, temps   Slight improvement in volume overload  Lasix PRN    Plan of care discussed with LIVE.      Chief Complaint:   I'm weak    Subjective:   Patient sitting up in chair.  She notes that she is quite weak at this time.  Patient reports she has difficulty lifting her legs as hemorrhage.  She notes her breathing is slightly improved as compared to previous day.  Nurse notes that her breathing is slightly better as well.  She denies fevers, chills, nausea or vomiting.    Objective:     Vitals: Blood pressure 129/60, pulse 70, temperature (!) 97.4 °F (36.3 °C), temperature source Axillary, resp. rate 19, height 5' 1\" (1.549 m), weight 47.8 kg (105 lb 6.1 oz), SpO2 96%.,Body mass index is 19.91 kg/m².      Intake/Output Summary (Last 24 hours) at 1/31/2024 1608  Last " data filed at 1/31/2024 1212  Gross per 24 hour   Intake 360 ml   Output 900 ml   Net -540 ml       Invasive Devices       Peripheral Intravenous Line  Duration             Peripheral IV 01/30/24 Left Hand 1 day              Drain  Duration             External Urinary Catheter Medium 5 days                  Physical Exam  Constitutional:       General: She is not in acute distress.     Appearance: She is not ill-appearing, toxic-appearing or diaphoretic.      Interventions: She is not intubated.  HENT:      Head: Normocephalic and atraumatic.   Pulmonary:      Effort: Tachypnea present. She is not intubated.      Breath sounds: No stridor. Rhonchi present. No decreased breath sounds or wheezing.   Chest:      Chest wall: No mass or tenderness.   Musculoskeletal:      Comments: Diffuse weakness   Neurological:      General: No focal deficit present.      Mental Status: She is oriented to person, place, and time.   Psychiatric:         Mood and Affect: Mood normal.         Behavior: Behavior normal.       Labs: I have personally reviewed pertinent lab results.  Lab Results   Component Value Date    SODIUM 135 01/31/2024    K 3.4 (L) 01/31/2024     01/31/2024    CO2 23 01/31/2024    BUN 29 (H) 01/31/2024    CREATININE 0.85 01/31/2024    GLUC 107 01/31/2024    CALCIUM 8.4 01/31/2024     Lab Results   Component Value Date    WBC 4.35 01/31/2024    HGB 9.2 (L) 01/31/2024    HCT 30.0 (L) 01/31/2024    MCV 92 01/31/2024     (L) 01/31/2024       Imaging and other studies: I have personally reviewed pertinent reports.   and I have personally reviewed pertinent films in PACS

## 2024-01-31 NOTE — PROGRESS NOTES
[facility]  Progress Note  Name: Mariela Giron I  MRN: [id number]  Unit/Bed#: 411-01 I Date of Admission: 1/25/2024   Date of Service: 1/31/2024 I Hospital Day: 6    Assessment/Plan   * Sepsis (HCC)  Assessment & Plan  Sepsis was present on admission and due to Influenza A with superimposed right lower lobe bacterial pneumonia  Positive MRSA nasal culture  Blood cultures: no growth at 4 days   Treated with Tamiflu 30 mg PO every 24 hours (renally-dosed) - completed 5 days  IV antibiotic treatment changed to IV cefepime and IV vancomycin on 01/27/2024 due to persistent fevers and a markedly elevated procalcitonin level.  Continue ABX for total of 7-day course   Procal continues to downtrend, patient afebrile  Trend: 13.81 - 9.75 - 5.77 - 2.59 - 1.44    Acute respiratory failure with hypoxia (HCC)  Assessment & Plan  In the setting of Influenza A with superimposed bacterial pneumonia, and volume overload  Required up to 2 Lpm of continuous supplemental oxygen to maintain oxygen saturation levels at 90% and above  Not on home supplemental oxygen therapy  1/30 increasing O2 requirements   Given po lasix dose in the AM  Additional 20mg IV lasix given  Continue home Lasix 20 mg daily  Continue IV antibiotics  Continue to monitor and diuresis as needed  Incentive spirometry  Respiratory protocol  The patient will need a home supplemental oxygen evaluation on the day of discharge.        MRSA colonization  Assessment & Plan  Utilize the MRSA decolonization protocol, which should be continued upon discharge    Positive DONYA (antinuclear antibody)  Assessment & Plan  Outpatient Rheumatology evaluation    Elevated d-dimer  Assessment & Plan  No evidence of pulmonary embolism on CTA of the chest/PE protocol (01/25/2024)  Check a venous duplex of the bilateral lower extremitie  No evidence of DVT/thrombophlebitis     Latest Reference Range & Units Most Recent   D-Dimer, Quant <0.50 ug/ml FEU 0.97  (H)  1/25/24 18:40   (H): Data is abnormally high    Right lower lobe pneumonia  Assessment & Plan  Suspected superimposed bacterial pneumonia  Continue cefepime, and vancomycin day 5/7        Influenza A  Assessment & Plan  Completed 5-day course of Tamiflu    Coronary artery disease involving native coronary artery of native heart without angina pectoris  Assessment & Plan  Continue Plavix, statin therapy, and Toprol-XL  Outpatient follow-up with Cardiology    Permanent atrial fibrillation (HCC)  Assessment & Plan  Continue Toprol-XL 50 mg PO Qdaily  Continue anticoagulation with Eliquis 2.5 mg PO BID    Chronic diastolic (congestive) heart failure (HCC)  Assessment & Plan  Wt Readings from Last 3 Encounters:   01/31/24 47.8 kg (105 lb 6.1 oz)   01/03/24 45.5 kg (100 lb 6.4 oz)   12/21/23 45.1 kg (99 lb 6.4 oz)     1/30 notified by patient requiring additional oxygenation  CXR   Not much change from previous, however still appears to be volume overloaded  Awaiting final read  Received home Lasix, and additional 20 mg IV Lasix  Patient utilizing 1 L nasal cannula at this time with saturations 94-95%  Appears less volume overloaded on exam this morning -no need for additional IV Lasix dose, Continue home Lasix 20 mg daily  Titrate O2 as tolerated         Hyponatremia  Assessment & Plan  Sodium level is improved   Continue home Lasix     Diarrhea-resolved as of 1/30/2024  Assessment & Plan  Suspected antibiotic associated diarrhea  Clinically improved      Pancytopenia (HCC)-resolved as of 1/29/2024  Assessment & Plan  Likely due to acute illness with Influenza A and sepsis  Follow the CBC  Outpatient Hematology evaluation if the pancytopenia persists    Hypomagnesemia-resolved as of 1/29/2024  Assessment & Plan    Results from last 7 days   Lab Units 01/31/24  0612 01/30/24  0524 01/29/24  0433   MAGNESIUM mg/dL 1.9 1.7* 2.1       Monitor mg in the am and replete as needed         VTE Pharmacologic Prophylaxis: VTE  Score: 10 High Risk (Score >/= 5) - Pharmacological DVT Prophylaxis Ordered: apixaban (Eliquis). Sequential Compression Devices Ordered.    Mobility:   Basic Mobility Inpatient Raw Score: 17  JH-HLM Goal: 5: Stand one or more mins  JH-HLM Achieved: 7: Walk 25 feet or more  HLM Goal achieved. Continue to encourage appropriate mobility.    Patient Centered Rounds: I performed bedside rounds with nursing staff today.   Discussions with Specialists or Other Care Team Provider: SANDRO    Education and Discussions with Family / Patient: Attempted to update  (son) via phone. Unable to contact.    Total Time Spent on Date of Encounter in care of patient:  This time was spent on one or more of the following: performing physical exam; counseling and coordination of care; obtaining or reviewing history; documenting in the medical record; reviewing/ordering tests, medications or procedures; communicating with other healthcare professionals and discussing with patient's family/caregivers.    Current Length of Stay: 6 day(s)  Current Patient Status: Inpatient   Certification Statement: The patient will continue to require additional inpatient hospital stay due to continued medical management of superimposed PNA and IV abx  Discharge Plan: Anticipate discharge in 24-48 hrs to pending    Code Status: Level 3 - DNAR and DNI    Subjective:   Patient awake and alert on exam this morning.  States she feels better than she did yesterday, and her shortness of breath is improved.  Discussed in length with patient plans at discharge.  States that she would be amendable to rehab at this facility, however does not want to travel to another facility for rehab.  Patient denies chest pain, shortness of breath, nausea, vomiting, diarrhea.  She is urinating via pure wick.  She has no additional complaints at this time.  Objective:     Vitals:   Temp (24hrs), Av.6 °F (36.4 °C), Min:97.4 °F (36.3 °C), Max:97.7 °F (36.5 °C)    Temp:   [97.4 °F (36.3 °C)-97.7 °F (36.5 °C)] 97.4 °F (36.3 °C)  HR:  [] 68  Resp:  [16-20] 20  BP: (110-183)/(62-94) 137/62  SpO2:  [85 %-96 %] 96 %  Body mass index is 19.91 kg/m².     Input and Output Summary (last 24 hours):     Intake/Output Summary (Last 24 hours) at 1/31/2024 1243  Last data filed at 1/31/2024 1212  Gross per 24 hour   Intake 480 ml   Output 900 ml   Net -420 ml       Physical Exam:   Physical Exam  Constitutional:       General: She is not in acute distress.     Appearance: She is ill-appearing (chronically). She is not toxic-appearing.   HENT:      Mouth/Throat:      Mouth: Mucous membranes are moist.   Eyes:      Conjunctiva/sclera: Conjunctivae normal.   Cardiovascular:      Rate and Rhythm: Normal rate and regular rhythm.      Heart sounds: No murmur heard.     No friction rub. No gallop.   Pulmonary:      Breath sounds: Rhonchi present. No wheezing or rales.      Comments: Decreased breath sounds bilaterally  Abdominal:      Palpations: Abdomen is soft.   Musculoskeletal:      Right lower leg: Edema present.      Left lower leg: Edema present.      Comments: No pitting appreciated   Skin:     General: Skin is warm and dry.   Psychiatric:         Mood and Affect: Mood normal.          Additional Data:     Labs:  Results from last 7 days   Lab Units 01/31/24  0612 01/30/24  0524 01/29/24  0433   WBC Thousand/uL 4.35 5.49 5.17   HEMOGLOBIN g/dL 9.2* 9.7* 8.7*   HEMATOCRIT % 30.0* 31.3* 28.4*   PLATELETS Thousands/uL 115* 117* 107*   BANDS PCT %  --  2  --    NEUTROS PCT %  --   --  72   LYMPHS PCT %  --   --  10*   LYMPHO PCT %  --  7*  --    MONOS PCT %  --   --  6   MONO PCT %  --  6  --    EOS PCT %  --  4 12*     Results from last 7 days   Lab Units 01/31/24  0612 01/30/24  0524   SODIUM mmol/L 135 137   POTASSIUM mmol/L 3.4* 3.6   CHLORIDE mmol/L 100 106   CO2 mmol/L 23 20*   BUN mg/dL 29* 30*   CREATININE mg/dL 0.85 0.89   ANION GAP mmol/L 12 11   CALCIUM mg/dL 8.4 8.4   ALBUMIN g/dL   --  3.4*   TOTAL BILIRUBIN mg/dL  --  0.62   ALK PHOS U/L  --  82   ALT U/L  --  20   AST U/L  --  24   GLUCOSE RANDOM mg/dL 107 99     Results from last 7 days   Lab Units 01/30/24  0524   INR  1.29*             Results from last 7 days   Lab Units 01/31/24  0612 01/30/24  0524 01/29/24  0433 01/28/24  0526 01/27/24  0433 01/25/24  1840   LACTIC ACID mmol/L  --   --   --   --  1.3 1.6   PROCALCITONIN ng/ml 1.44* 2.59* 5.77* 9.75* 13.81* 0.19       Lines/Drains:  Invasive Devices       Peripheral Intravenous Line  Duration             Peripheral IV 01/30/24 Left Hand 1 day              Drain  Duration             External Urinary Catheter Medium 5 days                          Imaging: No pertinent imaging reviewed.    Recent Cultures (last 7 days):   Results from last 7 days   Lab Units 01/26/24  1415 01/26/24  0245 01/25/24  1840   BLOOD CULTURE  No Growth After 4 Days.  No Growth After 4 Days.  --  No Growth After 5 Days.  No Growth After 5 Days.   LEGIONELLA URINARY ANTIGEN   --  Negative  --        Last 24 Hours Medication List:   Current Facility-Administered Medications   Medication Dose Route Frequency Provider Last Rate    acetaminophen  650 mg Oral Q6H PRN Rogerio Francois PA-C      albuterol  2.5 mg Nebulization Q4H PRN Fazal Sherwood DO      ALPRAZolam  0.25 mg Oral TID PRN Rogerio Francois PA-C      apixaban  2.5 mg Oral BID Rogerio Francois PA-C      atorvastatin  20 mg Oral Daily With Dinner Rogerio Francois PA-C      bisacodyl  10 mg Rectal Daily PRN Rogerio Francois PA-C      cefepime  1,000 mg Intravenous Q12H Fazal Sherwood DO 1,000 mg (01/31/24 1055)    cholecalciferol  2,000 Units Oral Daily Rogerio Francois PA-C      clopidogrel  75 mg Oral Daily Rogerio Francois PA-C      dextromethorphan-guaiFENesin  10 mL Oral TID Fazal Sherwood DO      diphenoxylate-atropine  1 tablet Oral 4x Daily PRN Fazal Sherwood DO      furosemide  20 mg Oral Daily Arely Leung PA-C      melatonin  3 mg Oral HS Liam  Braxton Carmen, DO      metoprolol succinate  50 mg Oral Daily Rogerio Francois PA-C      ondansetron  4 mg Intravenous Q6H PRN Rogerio Francois PA-C      pantoprazole  20 mg Oral Early Morning Rogerio Francois PA-C      risperiDONE  0.25 mg Oral Q4H PRN Liam Carmen,       vancomycin  750 mg Intravenous Q24H Fazal Sherwood,  mg (01/31/24 0830)        Today, Patient Was Seen By: Arely Leung PA-C    **Please Note: This note may have been constructed using a voice recognition system.**

## 2024-01-31 NOTE — PLAN OF CARE
Problem: Prexisting or High Potential for Compromised Skin Integrity  Goal: Skin integrity is maintained or improved  Description: INTERVENTIONS:  - Identify patients at risk for skin breakdown  - Assess and monitor skin integrity  - Assess and monitor nutrition and hydration status  - Monitor labs   - Assess for incontinence   - Turn and reposition patient  - Assist with mobility/ambulation  - Relieve pressure over bony prominences  - Avoid friction and shearing  - Provide appropriate hygiene as needed including keeping skin clean and dry  - Evaluate need for skin moisturizer/barrier cream  - Collaborate with interdisciplinary team   - Patient/family teaching  - Consider wound care consult   Outcome: Progressing     Problem: PAIN - ADULT  Goal: Verbalizes/displays adequate comfort level or baseline comfort level  Description: Interventions:  - Encourage patient to monitor pain and request assistance  - Assess pain using appropriate pain scale  - Administer analgesics based on type and severity of pain and evaluate response  - Implement non-pharmacological measures as appropriate and evaluate response  - Consider cultural and social influences on pain and pain management  - Notify physician/advanced practitioner if interventions unsuccessful or patient reports new pain  Outcome: Progressing     Problem: INFECTION - ADULT  Goal: Absence or prevention of progression during hospitalization  Description: INTERVENTIONS:  - Assess and monitor for signs and symptoms of infection  - Monitor lab/diagnostic results  - Monitor all insertion sites, i.e. indwelling lines, tubes, and drains  - Monitor endotracheal if appropriate and nasal secretions for changes in amount and color  - [place] appropriate cooling/warming therapies per order  - Administer medications as ordered  - Instruct and encourage patient and family to use good hand hygiene technique  - Identify and instruct in appropriate isolation precautions for  identified infection/condition  Outcome: Progressing  Goal: Absence of fever/infection during neutropenic period  Description: INTERVENTIONS:  - Monitor WBC    Outcome: Progressing     Problem: SAFETY ADULT  Goal: Patient will remain free of falls  Description: INTERVENTIONS:  - Educate patient/family on patient safety including physical limitations  - Instruct patient to call for assistance with activity   - Consult OT/PT to assist with strengthening/mobility   - Keep Call bell within reach  - Keep bed low and locked with side rails adjusted as appropriate  - Keep care items and personal belongings within reach  - Initiate and maintain comfort rounds  - Make Fall Risk Sign visible to staff  - Offer Toileting every 2 Hours, in advance of need  - Initiate/Maintain bed alarm  - Obtain necessary fall risk management equipment:   - Apply yellow socks and bracelet for high fall risk patients  - Consider moving patient to room near nurses station  Outcome: Progressing  Goal: Maintain or return to baseline ADL function  Description: INTERVENTIONS:  -  Assess patient's ability to carry out ADLs; assess patient's baseline for ADL function and identify physical deficits which impact ability to perform ADLs (bathing, care of mouth/teeth, toileting, grooming, dressing, etc.)  - Assess/evaluate cause of self-care deficits   - Assess range of motion  - Assess patient's mobility; develop plan if impaired  - Assess patient's need for assistive devices and provide as appropriate  - Encourage maximum independence but intervene and supervise when necessary  - Involve family in performance of ADLs  - Assess for home care needs following discharge   - Consider OT consult to assist with ADL evaluation and planning for discharge  - Provide patient education as appropriate  Outcome: Progressing  Goal: Maintains/Returns to pre admission functional level  Description: INTERVENTIONS:  - Perform AM-PAC 6 Click Basic Mobility/ Daily Activity  assessment daily.  - Set and communicate daily mobility goal to care team and patient/family/caregiver.   - Collaborate with rehabilitation services on mobility goals if consulted  - Perform Range of Motion 3 times a day.  - Reposition patient every 2 hours.  - Dangle patient 3 times a day  - Stand patient 3 times a day  - Ambulate patient 3 times a day  - Out of bed to chair 3 times a day   - Out of bed for meals 3 times a day  - Out of bed for toileting  - Record patient progress and toleration of activity level   Outcome: Progressing     Problem: DISCHARGE PLANNING  Goal: Discharge to home or other facility with appropriate resources  Description: INTERVENTIONS:  - Identify barriers to discharge w/patient and caregiver  - Arrange for needed discharge resources and transportation as appropriate  - Identify discharge learning needs (meds, wound care, etc.)  - Arrange for interpretive services to assist at discharge as needed  - Refer to Case Management Department for coordinating discharge planning if the patient needs post-hospital services based on physician/advanced practitioner order or complex needs related to functional status, cognitive ability, or social support system  Outcome: Progressing     Problem: Knowledge Deficit  Goal: Patient/family/caregiver demonstrates understanding of disease process, treatment plan, medications, and discharge instructions  Description: Complete learning assessment and assess knowledge base.  Interventions:  - Provide teaching at level of understanding  - Provide teaching via preferred learning methods  Outcome: Progressing     Problem: RESPIRATORY - ADULT  Goal: Achieves optimal ventilation and oxygenation  Description: INTERVENTIONS:  - Assess for changes in respiratory status  - Assess for changes in mentation and behavior  - Position to facilitate oxygenation and minimize respiratory effort  - Oxygen administered by appropriate delivery if ordered  - Initiate smoking  cessation education as indicated  - Encourage broncho-pulmonary hygiene including cough, deep breathe, Incentive Spirometry  - Assess the need for suctioning and aspirate as needed  - Assess and instruct to report SOB or any respiratory difficulty  - Respiratory Therapy support as indicated  Outcome: Progressing     Problem: Nutrition/Hydration-ADULT  Goal: Nutrient/Hydration intake appropriate for improving, restoring or maintaining nutritional needs  Description: Monitor and assess patient's nutrition/hydration status for malnutrition. Collaborate with interdisciplinary team and initiate plan and interventions as ordered.  Monitor patient's weight and dietary intake as ordered or per policy. Utilize nutrition screening tool and intervene as necessary. Determine patient's food preferences and provide high-protein, high-caloric foods as appropriate.     INTERVENTIONS:  - Monitor oral intake, urinary output, labs, and treatment plans  - Assess nutrition and hydration status and recommend course of action  - Evaluate amount of meals eaten  - Assist patient with eating if necessary   - Allow adequate time for meals  - Recommend/ encourage appropriate diets, oral nutritional supplements, and vitamin/mineral supplements  - Order, calculate, and assess calorie counts as needed  - Recommend, monitor, and adjust tube feedings and TPN/PPN based on assessed needs  - Assess need for intravenous fluids  - Provide specific nutrition/hydration education as appropriate  - Include patient/family/caregiver in decisions related to nutrition  Outcome: Progressing

## 2024-01-31 NOTE — CASE MANAGEMENT
Case Management Discharge Planning Note    Patient name Mariela Giron  Location /411-01 MRN [id number]  : 1935 Date 2024       Current Admission Date: 2024  Current Admission Diagnosis:Sepsis ([facility])   Patient Active Problem List    Diagnosis Date Noted    MRSA colonization 2024    Positive DONYA (antinuclear antibody) 2024    Atelectasis 2024    Right lower lobe pneumonia 2024    Sepsis ([facility]) 2024    Elevated d-dimer 2024    Acute respiratory failure with hypoxia ([facility]) 2024    Influenza A 2024    Multifocal pneumonia 2023    Pressure injury of right heel, stage 2 ([facility]) 2023    Acute-on-chronic kidney injury  2023    Encounter for immunization 10/03/2023    S/P angioplasty with stent 10/03/2023    Centromere antibody positive 2023    Vitamin D deficiency 08/15/2023    Atrial flutter ([facility]) 08/15/2023    Gastroesophageal reflux disease without esophagitis 08/15/2023    SIADH (syndrome of inappropriate 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[facility]) 2023    Tricuspid valve insufficiency 2023    Prolonged QT interval 2023    Pleural effusion on right 2023    Moderate protein-calorie malnutrition ([facility]) 2023    Closed wedge compression fracture of T11 vertebra ([facility]) 2023    Age-related osteoporosis with current pathological fracture of vertebra ([facility]) 2023    Graves disease 2023    Anemia 2023    Coronary artery disease involving native coronary artery of native heart without angina pectoris 2023    S/P TAVR (transcatheter aortic valve replacement) 2023    Permanent atrial fibrillation ([facility]) 2023    Incomplete right bundle branch block 2023    Constipation 2023    Status post insertion of drug eluting coronary artery stent 2023    Acute hypoxic respiratory failure ([facility]) 2022    Chronic diastolic (congestive) heart failure ([facility]) 2022    Primary  generalized (osteo)arthritis 09/07/2022    Dyslipidemia 03/28/2022    Post-menopause 03/28/2022    Generalized weakness 12/23/2021    Ganglion cyst 09/14/2021    Adhesive capsulitis of right shoulder 09/14/2021    Bilateral leg edema 07/20/2021    Chronic pain syndrome 06/22/2021    Sacroiliitis (HCC) 06/22/2021    Gait abnormality 06/01/2021    Chronic bilateral low back pain without sciatica 04/30/2021    Osteopenia of neck of femur 01/21/2021    Impaired fasting glucose     Encounter for long-term (current) use of medications 10/31/2020    Immunization due 10/31/2020    Acute bursitis of right shoulder 10/31/2020    Encounter for Medicare annual wellness exam 01/02/2020    Mid back pain 09/30/2019    Generalized anxiety disorder 09/05/2019    Non-rheumatic tricuspid valve insufficiency 06/04/2019    Lymphadenopathy 05/21/2019    Hiatal hernia 05/21/2019    Thrombocytopenia ([facility]) 05/21/2019    Mitral valve insufficiency 05/21/2019    Atrial tachycardia 05/21/2019    Pulmonary hypertension ([facility]) 05/21/2019    Diverticulosis 05/21/2019    Hypokalemia 05/17/2019    Hyponatremia 05/17/2019      LOS (days): 6  Geometric Mean LOS (GMLOS) (days): 3.6  Days to GMLOS:-2.1     OBJECTIVE:  Risk of Unplanned Readmission Score: 32.37         Current admission status: Inpatient   Preferred Pharmacy:   [facility] [address]  [address] [address]  Phone: [phone] Fax: [phone]    [facility] #[id number] [place] - [address] [place] [id number]  Phone: [phone] Fax: [phone]    Primary Care Provider: Papa Zabala DO    Primary Insurance: MEDICARE  Secondary Insurance: BLUE CROSS    DISCHARGE DETAILS:            Patient in agreement with STR . Her preference is [facility].    CM discussed  FOC with patient  at bedside. Patient  agreeable with blanket referrals in Aidin for local facilities  to determine bed availability according to your medical needs and insurance coverage. Patient  preference is [facility].    Referral placed in aidin for [facility].    CM to follow patient needing IV antibiotics few days.

## 2024-01-31 NOTE — ASSESSMENT & PLAN NOTE
Sepsis was present on admission and due to Influenza A with superimposed right lower lobe bacterial pneumonia  Positive MRSA nasal culture  Blood cultures: no growth at 4 days   Treated with Tamiflu 30 mg PO every 24 hours (renally-dosed) - completed 5 days  IV antibiotic treatment changed to IV cefepime and IV vancomycin on 01/27/2024 due to persistent fevers and a markedly elevated procalcitonin level.  Continue ABX for total of 7-day course   Procal continues to downtrend, patient afebrile  Trend: 13.81 - 9.75 - 5.77 - 2.59 - 1.44

## 2024-01-31 NOTE — ASSESSMENT & PLAN NOTE
Results from last 7 days   Lab Units 01/31/24  0612 01/30/24  0524 01/29/24  0433   MAGNESIUM mg/dL 1.9 1.7* 2.1       Monitor mg in the am and replete as needed

## 2024-01-31 NOTE — PLAN OF CARE
Problem: PHYSICAL THERAPY ADULT  Goal: Performs mobility at highest level of function for planned discharge setting.  See evaluation for individualized goals.  Description: Treatment/Interventions: Functional transfer training, LE strengthening/ROM, Elevations, Therapeutic exercise, Endurance training, Bed mobility, Gait training          See flowsheet documentation for full assessment, interventions and recommendations.  Outcome: Progressing  Note: Prognosis: Good  Problem List:  (Decreased strength; Decreased endurance; Impaired balance; Decreased mobility; Impaired judgement; Decreased safety awareness)  Assessment: Pt. seen for PT treatment session this date with interventions consisting of  therapeutic exercises, toilet transfers,  transfers and  gait training w/ emphasis on improving pt's ability to ambulate. Pt. Requiring cues for sequence and safety. In comparison to previous session, Pt. With improvements in activity tolerance, however, still requiring increased O2 to ambulate.   Pt is in need of continued activity in PT to improve strength balance endurance mobility transfers and ambulation with return to maximize LOF. From PT/mobility standpoint, recommendation at time of d/c would be level !!: moderate resource intensity in order to promote return to PLOF and independence.   The patient's [facility] Basic Mobility Inpatient Short Form Raw Score is 17. A Raw score of greater than 16 suggests the patient may benefit from discharge to home.  Please also refer to physical therapy recommendation for safe DC planning.        Rehab Resource Intensity Level, PT: II (Moderate Resource Intensity)    See flowsheet documentation for full assessment.

## 2024-02-01 LAB
ANION GAP SERPL CALCULATED.3IONS-SCNC: 10 MMOL/L
BUN SERPL-MCNC: 36 MG/DL (ref 5–25)
CALCIUM SERPL-MCNC: 8.2 MG/DL (ref 8.4–10.2)
CHLORIDE SERPL-SCNC: 102 MMOL/L (ref 96–108)
CO2 SERPL-SCNC: 23 MMOL/L (ref 21–32)
CREAT SERPL-MCNC: 0.96 MG/DL (ref 0.6–1.3)
ERYTHROCYTE [DISTWIDTH] IN BLOOD BY AUTOMATED COUNT: 15.5 % (ref 11.6–15.1)
GFR SERPL CREATININE-BSD FRML MDRD: 52 ML/MIN/1.73SQ M
GLUCOSE SERPL-MCNC: 95 MG/DL (ref 65–140)
HCT VFR BLD AUTO: 30 % (ref 34.8–46.1)
HGB BLD-MCNC: 9.4 G/DL (ref 11.5–15.4)
MCH RBC QN AUTO: 28.3 PG (ref 26.8–34.3)
MCHC RBC AUTO-ENTMCNC: 31.3 G/DL (ref 31.4–37.4)
MCV RBC AUTO: 90 FL (ref 82–98)
PLATELET # BLD AUTO: 144 THOUSANDS/UL (ref 149–390)
PMV BLD AUTO: 10.1 FL (ref 8.9–12.7)
POTASSIUM SERPL-SCNC: 3.8 MMOL/L (ref 3.5–5.3)
RBC # BLD AUTO: 3.32 MILLION/UL (ref 3.81–5.12)
SODIUM SERPL-SCNC: 135 MMOL/L (ref 135–147)
VANCOMYCIN SERPL-MCNC: 13.4 UG/ML (ref 10–20)
WBC # BLD AUTO: 4.38 THOUSAND/UL (ref 4.31–10.16)

## 2024-02-01 PROCEDURE — 99232 SBSQ HOSP IP/OBS MODERATE 35: CPT

## 2024-02-01 PROCEDURE — 80202 ASSAY OF VANCOMYCIN: CPT | Performed by: INTERNAL MEDICINE

## 2024-02-01 PROCEDURE — 99232 SBSQ HOSP IP/OBS MODERATE 35: CPT | Performed by: PHYSICIAN ASSISTANT

## 2024-02-01 PROCEDURE — 80048 BASIC METABOLIC PNL TOTAL CA: CPT

## 2024-02-01 PROCEDURE — 94668 MNPJ CHEST WALL SBSQ: CPT

## 2024-02-01 PROCEDURE — 85027 COMPLETE CBC AUTOMATED: CPT

## 2024-02-01 PROCEDURE — 94760 N-INVAS EAR/PLS OXIMETRY 1: CPT

## 2024-02-01 RX ADMIN — CEFEPIME HYDROCHLORIDE 1000 MG: 1 INJECTION, SOLUTION INTRAVENOUS at 21:52

## 2024-02-01 RX ADMIN — ACETAMINOPHEN 650 MG: 325 TABLET, FILM COATED ORAL at 15:26

## 2024-02-01 RX ADMIN — GUAIFENESIN AND DEXTROMETHORPHAN 10 ML: 100; 10 SYRUP ORAL at 08:47

## 2024-02-01 RX ADMIN — PANTOPRAZOLE SODIUM 20 MG: 20 TABLET, DELAYED RELEASE ORAL at 05:58

## 2024-02-01 RX ADMIN — Medication 2000 UNITS: at 08:47

## 2024-02-01 RX ADMIN — ATORVASTATIN CALCIUM 20 MG: 10 TABLET, FILM COATED ORAL at 17:38

## 2024-02-01 RX ADMIN — CEFEPIME HYDROCHLORIDE 1000 MG: 1 INJECTION, SOLUTION INTRAVENOUS at 10:31

## 2024-02-01 RX ADMIN — MELATONIN 3 MG: 3 TAB ORAL at 21:52

## 2024-02-01 RX ADMIN — FUROSEMIDE 20 MG: 20 TABLET ORAL at 08:48

## 2024-02-01 RX ADMIN — METOPROLOL SUCCINATE 50 MG: 50 TABLET, EXTENDED RELEASE ORAL at 08:47

## 2024-02-01 RX ADMIN — APIXABAN 2.5 MG: 2.5 TABLET, FILM COATED ORAL at 17:38

## 2024-02-01 RX ADMIN — GUAIFENESIN AND DEXTROMETHORPHAN 10 ML: 100; 10 SYRUP ORAL at 21:52

## 2024-02-01 RX ADMIN — GUAIFENESIN AND DEXTROMETHORPHAN 10 ML: 100; 10 SYRUP ORAL at 17:38

## 2024-02-01 RX ADMIN — VANCOMYCIN HYDROCHLORIDE 750 MG: 5 INJECTION, POWDER, LYOPHILIZED, FOR SOLUTION INTRAVENOUS at 08:47

## 2024-02-01 RX ADMIN — CLOPIDOGREL 75 MG: 75 TABLET ORAL at 08:47

## 2024-02-01 RX ADMIN — APIXABAN 2.5 MG: 2.5 TABLET, FILM COATED ORAL at 08:47

## 2024-02-01 NOTE — PROGRESS NOTES
"Progress Note - Pulmonary   Mariela Giron 88 y.o. female MRN: [id number]  Unit/Bed#: 411-01 Encounter: [id number]    Assessment/Plan:    Acute hypoxic respiratory failure   Sepsis secondary to viral PNA vs CAP   Influenza A   Abnormal CT chest   Chronic diastolic CHF  Atrial flutter     Pulmonary recommendations:      Seen on 1L NC but weaned to room air in my presence without desaturations. No oxygen requirement at baseline.   Will need home O2 eval prior to discharge   Titrate oxygen to maintain SpO2 greater than or equal to 88%.   Pulmonary toilet:  Increase activity as tolerated, out of bed as tolerated, cough and deep breathing exercises encourage, incentive spirometry q.1 hour  No indication for systemic steroids and nebulizers at this time.  Completed Tamiflu course while inpatient   Given MRSA positive and persistent fevers, ABX were changed to vancomycin/cefepime 1/27- will continue cefepime/vancomycin to complete 7 day course, day 6#7  Trend WBC, PCT, temps   Continue lasix 20 mg po daily- IV diuretics as needed  Continue PT/OT  Will follow peripherally. Call with questions if needed.     Chief Complaint:    \"My breathing has been good.\"    Subjective:    Patient was seen and examined today.  No overnight events reported.  Patient states that she feels good from a breathing standpoint but states that she feels very weak.  Or chills.  Denies cough or sputum production.  Denies wheezing. She tells me that she does not want to go to rehab but understands that it is needed if she wants to go home and be able to care for herself.     Objective:    Vitals: Blood pressure 131/69, pulse 72, temperature (!) 97.4 °F (36.3 °C), temperature source Axillary, resp. rate 17, height 5' 1\" (1.549 m), weight 47.6 kg (104 lb 15 oz), SpO2 95%.room air ,Body mass index is 19.83 kg/m².      Intake/Output Summary (Last 24 hours) at 2/1/2024 1215  Last data filed at 2/1/2024 0953  Gross per 24 hour   Intake 540 ml   Output 150 " "ml   Net 390 ml       Invasive Devices       Peripheral Intravenous Line  Duration             Peripheral IV 01/30/24 Left Hand 2 days              Drain  Duration             External Urinary Catheter Medium 6 days                    Physical Exam:     Physical Exam  Vitals and nursing note reviewed.   Constitutional:       General: She is not in acute distress.     Appearance: Normal appearance.   HENT:      Head: Normocephalic and atraumatic.      Mouth/Throat:      Mouth: Mucous membranes are moist.      Pharynx: Oropharynx is clear.   Cardiovascular:      Rate and Rhythm: Normal rate and regular rhythm.      Heart sounds: No murmur heard.  Pulmonary:      Effort: Pulmonary effort is normal.      Breath sounds: Rales (right base) present. No wheezing.   Musculoskeletal:      Cervical back: Normal range of motion.   Skin:     General: Skin is warm and dry.   Neurological:      General: No focal deficit present.      Mental Status: She is alert. Mental status is at baseline.   Psychiatric:         Mood and Affect: Mood normal.         Behavior: Behavior normal.         Labs: I have personally reviewed pertinent lab results., ABG: No results found for: \"PHART\", \"WTV8EIB\", \"PO2ART\", \"CBJ5JPW\", \"Z8XICNKQ\", \"BEART\", \"SOURCE\", BNP: No results found for: \"BNP\", CBC:   Lab Results   Component Value Date    WBC 4.38 02/01/2024    HGB 9.4 (L) 02/01/2024    HCT 30.0 (L) 02/01/2024    MCV 90 02/01/2024     (L) 02/01/2024    RBC 3.32 (L) 02/01/2024    MCH 28.3 02/01/2024    MCHC 31.3 (L) 02/01/2024    RDW 15.5 (H) 02/01/2024    MPV 10.1 02/01/2024   , CMP:   Lab Results   Component Value Date    SODIUM 135 02/01/2024    K 3.8 02/01/2024     02/01/2024    CO2 23 02/01/2024    BUN 36 (H) 02/01/2024    CREATININE 0.96 02/01/2024    CALCIUM 8.2 (L) 02/01/2024    EGFR 52 02/01/2024   , PT/INR: No results found for: \"PT\", \"INR\", Troponin: No results found for: \"TROPONINI\"    Imaging and other studies: I have personally " reviewed pertinent reports.   and I have personally reviewed pertinent films in PACS

## 2024-02-01 NOTE — PROGRESS NOTES
[facility]  Progress Note  Name: Mariela Giron I  MRN: [id number]  Unit/Bed#: 411-01 I Date of Admission: 1/25/2024   Date of Service: 2/1/2024 I Hospital Day: 7    Assessment/Plan   * Sepsis (HCC)  Assessment & Plan  Sepsis was present on admission and due to Influenza A with superimposed right lower lobe bacterial pneumonia  Positive MRSA nasal culture  Blood cultures: no growth at 5 days   Treated with Tamiflu 30 mg PO every 24 hours (renally-dosed) - completed 5 days  IV antibiotic treatment changed to IV cefepime and IV vancomycin on 01/27/2024 due to persistent fevers and a markedly elevated procalcitonin level.  Continue ABX for total of 7-day course   Procal continues to downtrend, patient afebrile  Trend: 13.81 - 9.75 - 5.77 - 2.59 - 1.44    Acute respiratory failure with hypoxia (HCC)  Assessment & Plan  In the setting of Influenza A with superimposed bacterial pneumonia, and volume overload  Not on home supplemental oxygen therapy  Was taken off oxygen this morning with saturations of 88 to 90%  Continue home Lasix 20 mg daily  Continue IV antibiotics   Continue to monitor and diuresis as needed  Incentive spirometry  Respiratory protocol  The patient will need a home supplemental oxygen evaluation on the day of discharge.        MRSA colonization  Assessment & Plan  Utilize the MRSA decolonization protocol, which should be continued upon discharge    Positive DONYA (antinuclear antibody)  Assessment & Plan  Outpatient Rheumatology evaluation    Elevated d-dimer  Assessment & Plan  No evidence of pulmonary embolism on CTA of the chest/PE protocol (01/25/2024)  Check a venous duplex of the bilateral lower extremitie  No evidence of DVT/thrombophlebitis     Latest Reference Range & Units Most Recent   D-Dimer, Quant <0.50 ug/ml FEU 0.97 (H)  1/25/24 18:40   (H): Data is abnormally high    Right lower lobe pneumonia  Assessment & Plan  Suspected superimposed bacterial  pneumonia  Continue cefepime, and vancomycin day 6/7        Influenza A  Assessment & Plan  Completed 5-day course of Tamiflu    Coronary artery disease involving native coronary artery of native heart without angina pectoris  Assessment & Plan  Continue Plavix, statin therapy, and Toprol-XL  Outpatient follow-up with Cardiology    Permanent atrial fibrillation (HCC)  Assessment & Plan  Continue Toprol-XL 50 mg PO Qdaily  Continue anticoagulation with Eliquis 2.5 mg PO BID    Chronic diastolic (congestive) heart failure (HCC)  Assessment & Plan  Wt Readings from Last 3 Encounters:   02/01/24 47.6 kg (104 lb 15 oz)   01/03/24 45.5 kg (100 lb 6.4 oz)   12/21/23 45.1 kg (99 lb 6.4 oz)     1/30 notified by patient requiring additional oxygenation  CXR   Redemonstration of cardiomegaly with pulmonary vascular congestion.  Pleural effusion appear slightly worsened as compared to most recent radiograph. Multifocal opacities within the right midlung and base likely represent atelectasis, pneumonia may present a similar appearance in the proper clinical setting.  Trace left pleural effusion again suggested.  Received home Lasix, and additional 20 mg IV Lasix  Patient appears euvolemic on exam this morning, continue Lasix 20 mg daily      Hyponatremia  Assessment & Plan  Sodium level is improved   Continue home Lasix     Diarrhea-resolved as of 1/30/2024  Assessment & Plan  Suspected antibiotic associated diarrhea  Clinically improved      Pancytopenia (HCC)-resolved as of 1/29/2024  Assessment & Plan  Likely due to acute illness with Influenza A and sepsis  Follow the CBC  Outpatient Hematology evaluation if the pancytopenia persists    Hypomagnesemia-resolved as of 1/29/2024  Assessment & Plan    Results from last 7 days   Lab Units 01/31/24  0612 01/30/24  0524 01/29/24  0433   MAGNESIUM mg/dL 1.9 1.7* 2.1         Monitor mg in the am and replete as needed         VTE Pharmacologic Prophylaxis: VTE Score: 10 High Risk  (Score >/= 5) - Pharmacological DVT Prophylaxis Ordered: apixaban (Eliquis). Sequential Compression Devices Ordered.    Mobility:   Basic Mobility Inpatient Raw Score: 17  JH-HLM Goal: 5: Stand one or more mins  JH-HLM Achieved: 7: Walk 25 feet or more  HLM Goal achieved. Continue to encourage appropriate mobility.    Patient Centered Rounds: I performed bedside rounds with nursing staff today.   Discussions with Specialists or Other Care Team Provider: Pulm     Education and Discussions with Family / Patient:  Will update son.     Total Time Spent on Date of Encounter in care of patient: This time was spent on one or more of the following: performing physical exam; counseling and coordination of care; obtaining or reviewing history; documenting in the medical record; reviewing/ordering tests, medications or procedures; communicating with other healthcare professionals and discussing with patient's family/caregivers.    Current Length of Stay: 7 day(s)  Current Patient Status: Inpatient   Certification Statement: The patient will continue to require additional inpatient hospital stay due to continued need for IV abx   Discharge Plan: Anticipate discharge in 24-48 hrs to rehab facility.    Code Status: Level 3 - DNAR and DNI    Subjective:   Patient awake and alert on exam this morning.  She states that she is able to breathe better.  She states that she previously did not want to go to rehab, however patient states that she will go to rehab if it is the only way to get back home.  Objective:     Vitals:   Temp (24hrs), Av.4 °F (36.3 °C), Min:97.4 °F (36.3 °C), Max:97.4 °F (36.3 °C)    Temp:  [97.4 °F (36.3 °C)] 97.4 °F (36.3 °C)  HR:  [70-72] 72  Resp:  [17-19] 17  BP: (129-144)/(60-71) 131/69  SpO2:  [94 %-99 %] 95 %  Body mass index is 19.83 kg/m².     Input and Output Summary (last 24 hours):     Intake/Output Summary (Last 24 hours) at 2024 1127  Last data filed at 2024 0953  Gross per 24 hour    Intake 660 ml   Output 150 ml   Net 510 ml       Physical Exam:   Physical Exam  Constitutional:       General: She is not in acute distress.     Appearance: She is ill-appearing.   HENT:      Nose: No congestion.      Mouth/Throat:      Mouth: Mucous membranes are moist.   Cardiovascular:      Rate and Rhythm: Normal rate and regular rhythm.      Heart sounds: Murmur heard.      No friction rub. No gallop.   Pulmonary:      Breath sounds: Rhonchi (bilaterally throughout) present.      Comments: Conversational dyspnea   Abdominal:      Palpations: Abdomen is soft.   Musculoskeletal:      Right lower leg: Edema present.      Left lower leg: Edema present.      Comments: Markedly improved from yesterday      Skin:     General: Skin is warm and dry.   Neurological:      General: No focal deficit present.   Psychiatric:         Mood and Affect: Mood normal.        Additional Data:     Labs:  Results from last 7 days   Lab Units 02/01/24  0601 01/31/24  0612 01/30/24  0524 01/29/24  0433   WBC Thousand/uL 4.38   < > 5.49 5.17   HEMOGLOBIN g/dL 9.4*   < > 9.7* 8.7*   HEMATOCRIT % 30.0*   < > 31.3* 28.4*   PLATELETS Thousands/uL 144*   < > 117* 107*   BANDS PCT %  --   --  2  --    NEUTROS PCT %  --   --   --  72   LYMPHS PCT %  --   --   --  10*   LYMPHO PCT %  --   --  7*  --    MONOS PCT %  --   --   --  6   MONO PCT %  --   --  6  --    EOS PCT %  --   --  4 12*    < > = values in this interval not displayed.     Results from last 7 days   Lab Units 02/01/24  0601 01/31/24  0612 01/30/24  0524   SODIUM mmol/L 135   < > 137   POTASSIUM mmol/L 3.8   < > 3.6   CHLORIDE mmol/L 102   < > 106   CO2 mmol/L 23   < > 20*   BUN mg/dL 36*   < > 30*   CREATININE mg/dL 0.96   < > 0.89   ANION GAP mmol/L 10   < > 11   CALCIUM mg/dL 8.2*   < > 8.4   ALBUMIN g/dL  --   --  3.4*   TOTAL BILIRUBIN mg/dL  --   --  0.62   ALK PHOS U/L  --   --  82   ALT U/L  --   --  20   AST U/L  --   --  24   GLUCOSE RANDOM mg/dL 95   < > 99    < > =  values in this interval not displayed.     Results from last 7 days   Lab Units 01/30/24  0524   INR  1.29*             Results from last 7 days   Lab Units 01/31/24  0612 01/30/24  0524 01/29/24  0433 01/28/24  0526 01/27/24  0433 01/25/24  1840   LACTIC ACID mmol/L  --   --   --   --  1.3 1.6   PROCALCITONIN ng/ml 1.44* 2.59* 5.77* 9.75* 13.81* 0.19       Lines/Drains:  Invasive Devices       Peripheral Intravenous Line  Duration             Peripheral IV 01/30/24 Left Hand 2 days              Drain  Duration             External Urinary Catheter Medium 6 days                          Imaging: No pertinent imaging reviewed.    Recent Cultures (last 7 days):   Results from last 7 days   Lab Units 01/26/24  1415 01/26/24  0245 01/25/24  1840   BLOOD CULTURE  No Growth After 5 Days.  No Growth After 5 Days.  --  No Growth After 5 Days.  No Growth After 5 Days.   LEGIONELLA URINARY ANTIGEN   --  Negative  --        Last 24 Hours Medication List:   Current Facility-Administered Medications   Medication Dose Route Frequency Provider Last Rate    acetaminophen  650 mg Oral Q6H PRN Rogerio Francois PA-C      albuterol  2.5 mg Nebulization Q4H PRN Fazal Sherwood DO      ALPRAZolam  0.25 mg Oral TID PRN Rogerio Francois PA-C      apixaban  2.5 mg Oral BID Rogerio Francois PA-C      atorvastatin  20 mg Oral Daily With Dinner Rogerio Francois PA-C      bisacodyl  10 mg Rectal Daily PRN Rogerio Francois PA-C      cefepime  1,000 mg Intravenous Q12H Fazal Sherwood DO 1,000 mg (02/01/24 1031)    cholecalciferol  2,000 Units Oral Daily Rogerio Francois PA-C      clopidogrel  75 mg Oral Daily Rogerio Francois PA-C      dextromethorphan-guaiFENesin  10 mL Oral TID Fazal Sherwood DO      diphenoxylate-atropine  1 tablet Oral 4x Daily PRN Fazal Sherwood DO      furosemide  20 mg Oral Daily Arely Leung PA-C      melatonin  3 mg Oral HS Liam Carmen DO      metoprolol succinate  50 mg Oral Daily Rogerio Francois PA-C      ondansetron   4 mg Intravenous Q6H PRN Rogerio Francois PA-C      pantoprazole  20 mg Oral Early Morning Rogerio Francois PA-C      risperiDONE  0.25 mg Oral Q4H PRN Liam Carmen,       vancomycin  750 mg Intravenous Q24H Fazal Sherwood,  750 mg (02/01/24 0814)        Today, Patient Was Seen By: Arely Leung PA-C    **Please Note: This note may have been constructed using a voice recognition system.**

## 2024-02-01 NOTE — CASE MANAGEMENT
Case Management Discharge Planning Note    Patient name Mariela Giron  Location /411-01 MRN [id number]  : 1935 Date 2024       Current Admission Date: 2024  Current Admission Diagnosis:Sepsis ([facility])   Patient Active Problem List    Diagnosis Date Noted    MRSA colonization 2024    Positive DONYA (antinuclear antibody) 2024    Atelectasis 2024    Right lower lobe pneumonia 2024    Sepsis ([facility]) 2024    Elevated d-dimer 2024    Acute respiratory failure with hypoxia ([facility]) 2024    Influenza A 2024    Multifocal pneumonia 2023    Pressure injury of right heel, stage 2 ([facility]) 2023    Acute-on-chronic kidney injury  2023    Encounter for immunization 10/03/2023    S/P angioplasty with stent 10/03/2023    Centromere antibody positive 2023    Vitamin D deficiency 08/15/2023    Atrial flutter ([facility]) 08/15/2023    Gastroesophageal reflux disease without esophagitis 08/15/2023    SIADH (syndrome of inappropriate 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[facility]) 2023    Tricuspid valve insufficiency 2023    Prolonged QT interval 2023    Pleural effusion on right 2023    Moderate protein-calorie malnutrition ([facility]) 2023    Closed wedge compression fracture of T11 vertebra ([facility]) 2023    Age-related osteoporosis with current pathological fracture of vertebra ([facility]) 2023    Graves disease 2023    Anemia 2023    Coronary artery disease involving native coronary artery of native heart without angina pectoris 2023    S/P TAVR (transcatheter aortic valve replacement) 2023    Permanent atrial fibrillation ([facility]) 2023    Incomplete right bundle branch block 2023    Constipation 2023    Status post insertion of drug eluting coronary artery stent 2023    Acute hypoxic respiratory failure (HCC) 2022    Chronic diastolic (congestive) heart failure ([facility]) 2022    Primary  generalized (osteo)arthritis 09/07/2022    Dyslipidemia 03/28/2022    Post-menopause 03/28/2022    Generalized weakness 12/23/2021    Ganglion cyst 09/14/2021    Adhesive capsulitis of right shoulder 09/14/2021    Bilateral leg edema 07/20/2021    Chronic pain syndrome 06/22/2021    Sacroiliitis (HCC) 06/22/2021    Gait abnormality 06/01/2021    Chronic bilateral low back pain without sciatica 04/30/2021    Osteopenia of neck of femur 01/21/2021    Impaired fasting glucose     Encounter for long-term (current) use of medications 10/31/2020    Immunization due 10/31/2020    Acute bursitis of right shoulder 10/31/2020    Encounter for Medicare annual wellness exam 01/02/2020    Mid back pain 09/30/2019    Generalized anxiety disorder 09/05/2019    Non-rheumatic tricuspid valve insufficiency 06/04/2019    Lymphadenopathy 05/21/2019    Hiatal hernia 05/21/2019    Thrombocytopenia ([facility]) 05/21/2019    Mitral valve insufficiency 05/21/2019    Atrial tachycardia 05/21/2019    Pulmonary hypertension ([facility]) 05/21/2019    Diverticulosis 05/21/2019    Hypokalemia 05/17/2019    Hyponatremia 05/17/2019      LOS (days): 7  Geometric Mean LOS (GMLOS) (days): 5.1  Days to GMLOS:-1.6     OBJECTIVE:  Risk of Unplanned Readmission Score: 36.22         Current admission status: Inpatient   Preferred Pharmacy:   [facility] [place] - [address]  [address] [address]  Phone: [phone] Fax: [phone]    [id number] - [place] - [address] [place] [id number]  Phone: [phone] Fax: [phone]    Primary Care Provider: Papa Zabala DO    Primary Insurance: MEDICARE  Secondary Insurance: BLUE CROSS    DISCHARGE DETAILS:    Discharge planning discussed with:: patient  Freedom of Choice: Yes  Comments - Freedom of Choice: agreeable to 5th floor for STR     IMM Given (Date):: 02/01/24  IMM Given to:: Patient   Pt reviewed  in  team meeting. Tentative discharge to STR tomorrow pending medical clearance.

## 2024-02-01 NOTE — ASSESSMENT & PLAN NOTE
In the setting of Influenza A with superimposed bacterial pneumonia, and volume overload  Not on home supplemental oxygen therapy  Was taken off oxygen this morning with saturations of 88 to 90%  Continue home Lasix 20 mg daily  Continue IV antibiotics   Continue to monitor and diuresis as needed  Incentive spirometry  Respiratory protocol  The patient will need a home supplemental oxygen evaluation on the day of discharge.

## 2024-02-01 NOTE — PLAN OF CARE
Problem: Prexisting or High Potential for Compromised Skin Integrity  Goal: Skin integrity is maintained or improved  Description: INTERVENTIONS:  - Identify patients at risk for skin breakdown  - Assess and monitor skin integrity  - Assess and monitor nutrition and hydration status  - Monitor labs   - Assess for incontinence   - Turn and reposition patient  - Assist with mobility/ambulation  - Relieve pressure over bony prominences  - Avoid friction and shearing  - Provide appropriate hygiene as needed including keeping skin clean and dry  - Evaluate need for skin moisturizer/barrier cream  - Collaborate with interdisciplinary team   - Patient/family teaching  - Consider wound care consult   Outcome: Progressing     Problem: PAIN - ADULT  Goal: Verbalizes/displays adequate comfort level or baseline comfort level  Description: Interventions:  - Encourage patient to monitor pain and request assistance  - Assess pain using appropriate pain scale  - Administer analgesics based on type and severity of pain and evaluate response  - Implement non-pharmacological measures as appropriate and evaluate response  - Consider cultural and social influences on pain and pain management  - Notify physician/advanced practitioner if interventions unsuccessful or patient reports new pain  Outcome: Progressing     Problem: INFECTION - ADULT  Goal: Absence or prevention of progression during hospitalization  Description: INTERVENTIONS:  - Assess and monitor for signs and symptoms of infection  - Monitor lab/diagnostic results  - Monitor all insertion sites, i.e. indwelling lines, tubes, and drains  - Monitor endotracheal if appropriate and nasal secretions for changes in amount and color  - [place] appropriate cooling/warming therapies per order  - Administer medications as ordered  - Instruct and encourage patient and family to use good hand hygiene technique  - Identify and instruct in appropriate isolation precautions for  identified infection/condition  Outcome: Progressing  Goal: Absence of fever/infection during neutropenic period  Description: INTERVENTIONS:  - Monitor WBC    Outcome: Progressing     Problem: SAFETY ADULT  Goal: Patient will remain free of falls  Description: INTERVENTIONS:  - Educate patient/family on patient safety including physical limitations  - Instruct patient to call for assistance with activity   - Consult OT/PT to assist with strengthening/mobility   - Keep Call bell within reach  - Keep bed low and locked with side rails adjusted as appropriate  - Keep care items and personal belongings within reach  - Initiate and maintain comfort rounds  - Make Fall Risk Sign visible to staff  - Offer Toileting every 2 Hours, in advance of need  - Initiate/Maintain bed alarm  - Obtain necessary fall risk management equipment:   - Apply yellow socks and bracelet for high fall risk patients  - Consider moving patient to room near nurses station  Outcome: Progressing  Goal: Maintain or return to baseline ADL function  Description: INTERVENTIONS:  -  Assess patient's ability to carry out ADLs; assess patient's baseline for ADL function and identify physical deficits which impact ability to perform ADLs (bathing, care of mouth/teeth, toileting, grooming, dressing, etc.)  - Assess/evaluate cause of self-care deficits   - Assess range of motion  - Assess patient's mobility; develop plan if impaired  - Assess patient's need for assistive devices and provide as appropriate  - Encourage maximum independence but intervene and supervise when necessary  - Involve family in performance of ADLs  - Assess for home care needs following discharge   - Consider OT consult to assist with ADL evaluation and planning for discharge  - Provide patient education as appropriate  Outcome: Progressing  Goal: Maintains/Returns to pre admission functional level  Description: INTERVENTIONS:  - Perform AM-PAC 6 Click Basic Mobility/ Daily Activity  assessment daily.  - Set and communicate daily mobility goal to care team and patient/family/caregiver.   - Collaborate with rehabilitation services on mobility goals if consulted  - Perform Range of Motion 3 times a day.  - Reposition patient every 2 hours.  - Dangle patient 3 times a day  - Stand patient 3 times a day  - Ambulate patient 3 times a day  - Out of bed to chair 3 times a day   - Out of bed for meals 3 times a day  - Out of bed for toileting  - Record patient progress and toleration of activity level   Outcome: Progressing     Problem: DISCHARGE PLANNING  Goal: Discharge to home or other facility with appropriate resources  Description: INTERVENTIONS:  - Identify barriers to discharge w/patient and caregiver  - Arrange for needed discharge resources and transportation as appropriate  - Identify discharge learning needs (meds, wound care, etc.)  - Arrange for interpretive services to assist at discharge as needed  - Refer to Case Management Department for coordinating discharge planning if the patient needs post-hospital services based on physician/advanced practitioner order or complex needs related to functional status, cognitive ability, or social support system  Outcome: Progressing     Problem: Knowledge Deficit  Goal: Patient/family/caregiver demonstrates understanding of disease process, treatment plan, medications, and discharge instructions  Description: Complete learning assessment and assess knowledge base.  Interventions:  - Provide teaching at level of understanding  - Provide teaching via preferred learning methods  Outcome: Progressing     Problem: RESPIRATORY - ADULT  Goal: Achieves optimal ventilation and oxygenation  Description: INTERVENTIONS:  - Assess for changes in respiratory status  - Assess for changes in mentation and behavior  - Position to facilitate oxygenation and minimize respiratory effort  - Oxygen administered by appropriate delivery if ordered  - Initiate smoking  cessation education as indicated  - Encourage broncho-pulmonary hygiene including cough, deep breathe, Incentive Spirometry  - Assess the need for suctioning and aspirate as needed  - Assess and instruct to report SOB or any respiratory difficulty  - Respiratory Therapy support as indicated  Outcome: Progressing     Problem: Nutrition/Hydration-ADULT  Goal: Nutrient/Hydration intake appropriate for improving, restoring or maintaining nutritional needs  Description: Monitor and assess patient's nutrition/hydration status for malnutrition. Collaborate with interdisciplinary team and initiate plan and interventions as ordered.  Monitor patient's weight and dietary intake as ordered or per policy. Utilize nutrition screening tool and intervene as necessary. Determine patient's food preferences and provide high-protein, high-caloric foods as appropriate.     INTERVENTIONS:  - Monitor oral intake, urinary output, labs, and treatment plans  - Assess nutrition and hydration status and recommend course of action  - Evaluate amount of meals eaten  - Assist patient with eating if necessary   - Allow adequate time for meals  - Recommend/ encourage appropriate diets, oral nutritional supplements, and vitamin/mineral supplements  - Order, calculate, and assess calorie counts as needed  - Recommend, monitor, and adjust tube feedings and TPN/PPN based on assessed needs  - Assess need for intravenous fluids  - Provide specific nutrition/hydration education as appropriate  - Include patient/family/caregiver in decisions related to nutrition  Outcome: Progressing

## 2024-02-01 NOTE — CASE MANAGEMENT
Case Management Discharge Planning Note    Patient name Mariela Giron  Location /411-01 MRN [id number]  : 1935 Date 2024       Current Admission Date: 2024  Current Admission Diagnosis:Sepsis ([facility])   Patient Active Problem List    Diagnosis Date Noted    MRSA colonization 2024    Positive DONYA (antinuclear antibody) 2024    Atelectasis 2024    Right lower lobe pneumonia 2024    Sepsis ([facility]) 2024    Elevated d-dimer 2024    Acute respiratory failure with hypoxia ([facility]) 2024    Influenza A 2024    Multifocal pneumonia 2023    Pressure injury of right heel, stage 2 ([facility]) 2023    Acute-on-chronic kidney injury  2023    Encounter for immunization 10/03/2023    S/P angioplasty with stent 10/03/2023    Centromere antibody positive 2023    Vitamin D deficiency 08/15/2023    Atrial flutter ([facility]) 08/15/2023    Gastroesophageal reflux disease without esophagitis 08/15/2023    SIADH (syndrome of inappropriate 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[facility]) 2023    Tricuspid valve insufficiency 2023    Prolonged QT interval 2023    Pleural effusion on right 2023    Moderate protein-calorie malnutrition ([facility]) 2023    Closed wedge compression fracture of T11 vertebra ([facility]) 2023    Age-related osteoporosis with current pathological fracture of vertebra ([facility]) 2023    Graves disease 2023    Anemia 2023    Coronary artery disease involving native coronary artery of native heart without angina pectoris 2023    S/P TAVR (transcatheter aortic valve replacement) 2023    Permanent atrial fibrillation ([facility]) 2023    Incomplete right bundle branch block 2023    Constipation 2023    Status post insertion of drug eluting coronary artery stent 2023    Acute hypoxic respiratory failure ([facility]) 2022    Chronic diastolic (congestive) heart failure ([facility]) 2022    Primary  generalized (osteo)arthritis 09/07/2022    Dyslipidemia 03/28/2022    Post-menopause 03/28/2022    Generalized weakness 12/23/2021    Ganglion cyst 09/14/2021    Adhesive capsulitis of right shoulder 09/14/2021    Bilateral leg edema 07/20/2021    Chronic pain syndrome 06/22/2021    Sacroiliitis ([facility]) 06/22/2021    Gait abnormality 06/01/2021    Chronic bilateral low back pain without sciatica 04/30/2021    Osteopenia of neck of femur 01/21/2021    Impaired fasting glucose     Encounter for long-term (current) use of medications 10/31/2020    Immunization due 10/31/2020    Acute bursitis of right shoulder 10/31/2020    Encounter for Medicare annual wellness exam 01/02/2020    Mid back pain 09/30/2019    Generalized anxiety disorder 09/05/2019    Non-rheumatic tricuspid valve insufficiency 06/04/2019    Lymphadenopathy 05/21/2019    Hiatal hernia 05/21/2019    Thrombocytopenia ([facility]) 05/21/2019    Mitral valve insufficiency 05/21/2019    Atrial tachycardia 05/21/2019    Pulmonary hypertension ([facility]) 05/21/2019    Diverticulosis 05/21/2019    Hypokalemia 05/17/2019    Hyponatremia 05/17/2019      LOS (days): 7  Geometric Mean LOS (GMLOS) (days): 3.6  Days to GMLOS:-2.9     OBJECTIVE:  Risk of Unplanned Readmission Score: 36.16         Current admission status: Inpatient   Preferred Pharmacy:   [facility] [place] - [address]  [address] [address]  Phone: [phone] Fax: [phone]    [id number] - [place] - [address] [place] [id number]  Phone: [phone] Fax: [phone]    Primary Care Provider: Papa Zabala DO    Primary Insurance: MEDICARE  Secondary Insurance: BLUE CROSS    DISCHARGE DETAILS:    Discharge planning discussed with:: patient and son via phone  Freedom of Choice: Yes  Comments - Freedom of Choice: spoke with pt at bedside extensively regarding need for STR. pt agreeable to 5th  floor, bed reserved.  CM contacted family/caregiver?: Yes  Were Treatment Team discharge recommendations reviewed with patient/caregiver?: Yes  Did patient/caregiver verbalize understanding of patient care needs?: Yes  Were patient/caregiver advised of the risks associated with not following Treatment Team discharge recommendations?: Yes    Other Referral/Resources/Interventions Provided:  Interventions: Short Term Rehab    Treatment Team Recommendation: Short Term Rehab  Discharge Destination Plan:: Short Term Rehab

## 2024-02-01 NOTE — PROGRESS NOTES
Mariela Giron is a 88 y.o. female who is currently ordered Vancomycin IV with management by the Pharmacy Consult service.  Relevant clinical data and objective / subjective history reviewed.  Vancomycin Assessment:  Indication and Goal AUC/Trough: Pneumonia (goal -600, trough >10), -600, trough >10  Clinical Status: stable  Micro:     Renal Function:  SCr: 0.96 mg/dL  CrCl: 36 mL/min  Renal replacement: Not on dialysis  Days of Therapy: 6  Current Dose: 750mg IV q24h  Vancomycin Plan:  New Dosing: same  Estimated AUC: 449 mcg*hr/mL  Estimated Trough: 14.1 mcg/mL  Next Level: 2/6/24 with AM labs  Renal Function Monitoring: Daily BMP and UOP  Pharmacy will continue to follow closely for s/sx of nephrotoxicity, infusion reactions and appropriateness of therapy.  BMP and CBC will be ordered per protocol. We will continue to follow the patient’s culture results and clinical progress daily.    Jian Sinclair, Pharmacist

## 2024-02-01 NOTE — PLAN OF CARE
Problem: Prexisting or High Potential for Compromised Skin Integrity  Goal: Skin integrity is maintained or improved  Description: INTERVENTIONS:  - Identify patients at risk for skin breakdown  - Assess and monitor skin integrity  - Assess and monitor nutrition and hydration status  - Monitor labs   - Assess for incontinence   - Turn and reposition patient  - Assist with mobility/ambulation  - Relieve pressure over bony prominences  - Avoid friction and shearing  - Provide appropriate hygiene as needed including keeping skin clean and dry  - Evaluate need for skin moisturizer/barrier cream  - Collaborate with interdisciplinary team   - Patient/family teaching  - Consider wound care consult   Outcome: Progressing     Problem: PAIN - ADULT  Goal: Verbalizes/displays adequate comfort level or baseline comfort level  Description: Interventions:  - Encourage patient to monitor pain and request assistance  - Assess pain using appropriate pain scale  - Administer analgesics based on type and severity of pain and evaluate response  - Implement non-pharmacological measures as appropriate and evaluate response  - Consider cultural and social influences on pain and pain management  - Notify physician/advanced practitioner if interventions unsuccessful or patient reports new pain  Outcome: Progressing     Problem: INFECTION - ADULT  Goal: Absence or prevention of progression during hospitalization  Description: INTERVENTIONS:  - Assess and monitor for signs and symptoms of infection  - Monitor lab/diagnostic results  - Monitor all insertion sites, i.e. indwelling lines, tubes, and drains  - Monitor endotracheal if appropriate and nasal secretions for changes in amount and color  - [place] appropriate cooling/warming therapies per order  - Administer medications as ordered  - Instruct and encourage patient and family to use good hand hygiene technique  - Identify and instruct in appropriate isolation precautions for  identified infection/condition  Outcome: Progressing  Goal: Absence of fever/infection during neutropenic period  Description: INTERVENTIONS:  - Monitor WBC    Outcome: Progressing     Problem: SAFETY ADULT  Goal: Patient will remain free of falls  Description: INTERVENTIONS:  - Educate patient/family on patient safety including physical limitations  - Instruct patient to call for assistance with activity   - Consult OT/PT to assist with strengthening/mobility   - Keep Call bell within reach  - Keep bed low and locked with side rails adjusted as appropriate  - Keep care items and personal belongings within reach  - Initiate and maintain comfort rounds  - Make Fall Risk Sign visible to staff  - Offer Toileting every 2 Hours, in advance of need  - Initiate/Maintain bed alarm  - Obtain necessary fall risk management equipment:   - Apply yellow socks and bracelet for high fall risk patients  - Consider moving patient to room near nurses station  Outcome: Progressing  Goal: Maintain or return to baseline ADL function  Description: INTERVENTIONS:  -  Assess patient's ability to carry out ADLs; assess patient's baseline for ADL function and identify physical deficits which impact ability to perform ADLs (bathing, care of mouth/teeth, toileting, grooming, dressing, etc.)  - Assess/evaluate cause of self-care deficits   - Assess range of motion  - Assess patient's mobility; develop plan if impaired  - Assess patient's need for assistive devices and provide as appropriate  - Encourage maximum independence but intervene and supervise when necessary  - Involve family in performance of ADLs  - Assess for home care needs following discharge   - Consider OT consult to assist with ADL evaluation and planning for discharge  - Provide patient education as appropriate  Outcome: Progressing  Goal: Maintains/Returns to pre admission functional level  Description: INTERVENTIONS:  - Perform AM-PAC 6 Click Basic Mobility/ Daily Activity  assessment daily.  - Set and communicate daily mobility goal to care team and patient/family/caregiver.   - Collaborate with rehabilitation services on mobility goals if consulted  - Perform Range of Motion 3 times a day.  - Reposition patient every 2 hours.  - Dangle patient 3 times a day  - Stand patient 3 times a day  - Ambulate patient 3 times a day  - Out of bed to chair 3 times a day   - Out of bed for meals 3 times a day  - Out of bed for toileting  - Record patient progress and toleration of activity level   Outcome: Progressing     Problem: DISCHARGE PLANNING  Goal: Discharge to home or other facility with appropriate resources  Description: INTERVENTIONS:  - Identify barriers to discharge w/patient and caregiver  - Arrange for needed discharge resources and transportation as appropriate  - Identify discharge learning needs (meds, wound care, etc.)  - Arrange for interpretive services to assist at discharge as needed  - Refer to Case Management Department for coordinating discharge planning if the patient needs post-hospital services based on physician/advanced practitioner order or complex needs related to functional status, cognitive ability, or social support system  Outcome: Progressing     Problem: Knowledge Deficit  Goal: Patient/family/caregiver demonstrates understanding of disease process, treatment plan, medications, and discharge instructions  Description: Complete learning assessment and assess knowledge base.  Interventions:  - Provide teaching at level of understanding  - Provide teaching via preferred learning methods  Outcome: Progressing     Problem: RESPIRATORY - ADULT  Goal: Achieves optimal ventilation and oxygenation  Description: INTERVENTIONS:  - Assess for changes in respiratory status  - Assess for changes in mentation and behavior  - Position to facilitate oxygenation and minimize respiratory effort  - Oxygen administered by appropriate delivery if ordered  - Initiate smoking  cessation education as indicated  - Encourage broncho-pulmonary hygiene including cough, deep breathe, Incentive Spirometry  - Assess the need for suctioning and aspirate as needed  - Assess and instruct to report SOB or any respiratory difficulty  - Respiratory Therapy support as indicated  Outcome: Progressing     Problem: Nutrition/Hydration-ADULT  Goal: Nutrient/Hydration intake appropriate for improving, restoring or maintaining nutritional needs  Description: Monitor and assess patient's nutrition/hydration status for malnutrition. Collaborate with interdisciplinary team and initiate plan and interventions as ordered.  Monitor patient's weight and dietary intake as ordered or per policy. Utilize nutrition screening tool and intervene as necessary. Determine patient's food preferences and provide high-protein, high-caloric foods as appropriate.     INTERVENTIONS:  - Monitor oral intake, urinary output, labs, and treatment plans  - Assess nutrition and hydration status and recommend course of action  - Evaluate amount of meals eaten  - Assist patient with eating if necessary   - Allow adequate time for meals  - Recommend/ encourage appropriate diets, oral nutritional supplements, and vitamin/mineral supplements  - Order, calculate, and assess calorie counts as needed  - Recommend, monitor, and adjust tube feedings and TPN/PPN based on assessed needs  - Assess need for intravenous fluids  - Provide specific nutrition/hydration education as appropriate  - Include patient/family/caregiver in decisions related to nutrition  Outcome: Progressing

## 2024-02-01 NOTE — ASSESSMENT & PLAN NOTE
Sepsis was present on admission and due to Influenza A with superimposed right lower lobe bacterial pneumonia  Positive MRSA nasal culture  Blood cultures: no growth at 5 days   Treated with Tamiflu 30 mg PO every 24 hours (renally-dosed) - completed 5 days  IV antibiotic treatment changed to IV cefepime and IV vancomycin on 01/27/2024 due to persistent fevers and a markedly elevated procalcitonin level.  Continue ABX for total of 7-day course   Procal continues to downtrend, patient afebrile  Trend: 13.81 - 9.75 - 5.77 - 2.59 - 1.44

## 2024-02-01 NOTE — RESPIRATORY THERAPY NOTE
02/01/24 1339   Respiratory Assessment   Resp Comments patient is on room air, she was resting. went over flutter valve with good technique, dry npc on command   Oxygen Therapy/Pulse Ox   O2 Device None (Room air)   O2 Therapy Room air   SpO2 (!) 89 %   SpO2 Activity At Rest   $ Pulse Oximetry Spot Check Charge Completed

## 2024-02-01 NOTE — ASSESSMENT & PLAN NOTE
Wt Readings from Last 3 Encounters:   02/01/24 47.6 kg (104 lb 15 oz)   01/03/24 45.5 kg (100 lb 6.4 oz)   12/21/23 45.1 kg (99 lb 6.4 oz)     1/30 notified by patient requiring additional oxygenation  CXR   Redemonstration of cardiomegaly with pulmonary vascular congestion.  Pleural effusion appear slightly worsened as compared to most recent radiograph. Multifocal opacities within the right midlung and base likely represent atelectasis, pneumonia may present a similar appearance in the proper clinical setting.  Trace left pleural effusion again suggested.  Received home Lasix, and additional 20 mg IV Lasix  Patient appears euvolemic on exam this morning, continue Lasix 20 mg daily

## 2024-02-02 VITALS
BODY MASS INDEX: 20.35 KG/M2 | RESPIRATION RATE: 18 BRPM | WEIGHT: 107.81 LBS | OXYGEN SATURATION: 92 % | DIASTOLIC BLOOD PRESSURE: 66 MMHG | SYSTOLIC BLOOD PRESSURE: 125 MMHG | HEART RATE: 73 BPM | TEMPERATURE: 97.1 F | HEIGHT: 61 IN

## 2024-02-02 PROBLEM — J10.1 INFLUENZA A: Status: RESOLVED | Noted: 2024-01-25 | Resolved: 2024-02-02

## 2024-02-02 PROBLEM — A41.9 SEPSIS (HCC): Status: RESOLVED | Noted: 2024-01-26 | Resolved: 2024-02-02

## 2024-02-02 PROBLEM — J96.01 ACUTE RESPIRATORY FAILURE WITH HYPOXIA (HCC): Status: RESOLVED | Noted: 2024-01-25 | Resolved: 2024-02-02

## 2024-02-02 PROBLEM — E87.1 HYPONATREMIA: Status: RESOLVED | Noted: 2019-05-17 | Resolved: 2024-02-02

## 2024-02-02 LAB
ANION GAP SERPL CALCULATED.3IONS-SCNC: 10 MMOL/L
BUN SERPL-MCNC: 33 MG/DL (ref 5–25)
CALCIUM SERPL-MCNC: 8.1 MG/DL (ref 8.4–10.2)
CHLORIDE SERPL-SCNC: 100 MMOL/L (ref 96–108)
CO2 SERPL-SCNC: 22 MMOL/L (ref 21–32)
CREAT SERPL-MCNC: 0.86 MG/DL (ref 0.6–1.3)
ERYTHROCYTE [DISTWIDTH] IN BLOOD BY AUTOMATED COUNT: 15.6 % (ref 11.6–15.1)
GFR SERPL CREATININE-BSD FRML MDRD: 60 ML/MIN/1.73SQ M
GLUCOSE SERPL-MCNC: 84 MG/DL (ref 65–140)
HCT VFR BLD AUTO: 28.8 % (ref 34.8–46.1)
HGB BLD-MCNC: 9.1 G/DL (ref 11.5–15.4)
MCH RBC QN AUTO: 28.2 PG (ref 26.8–34.3)
MCHC RBC AUTO-ENTMCNC: 31.6 G/DL (ref 31.4–37.4)
MCV RBC AUTO: 89 FL (ref 82–98)
PLATELET # BLD AUTO: 185 THOUSANDS/UL (ref 149–390)
PMV BLD AUTO: 10.7 FL (ref 8.9–12.7)
POTASSIUM SERPL-SCNC: 3.3 MMOL/L (ref 3.5–5.3)
RBC # BLD AUTO: 3.23 MILLION/UL (ref 3.81–5.12)
SARS-COV-2 RNA RESP QL NAA+PROBE: NEGATIVE
SODIUM SERPL-SCNC: 132 MMOL/L (ref 135–147)
WBC # BLD AUTO: 3.74 THOUSAND/UL (ref 4.31–10.16)

## 2024-02-02 PROCEDURE — 85027 COMPLETE CBC AUTOMATED: CPT

## 2024-02-02 PROCEDURE — 97530 THERAPEUTIC ACTIVITIES: CPT

## 2024-02-02 PROCEDURE — 97110 THERAPEUTIC EXERCISES: CPT

## 2024-02-02 PROCEDURE — 99239 HOSP IP/OBS DSCHRG MGMT >30: CPT

## 2024-02-02 PROCEDURE — 80048 BASIC METABOLIC PNL TOTAL CA: CPT

## 2024-02-02 PROCEDURE — 87635 SARS-COV-2 COVID-19 AMP PRB: CPT

## 2024-02-02 RX ORDER — LANOLIN ALCOHOL/MO/W.PET/CERES
3 CREAM (GRAM) TOPICAL
Start: 2024-02-02

## 2024-02-02 RX ORDER — GUAIFENESIN/DEXTROMETHORPHAN 100-10MG/5
10 SYRUP ORAL 3 TIMES DAILY
Start: 2024-02-02

## 2024-02-02 RX ORDER — ALBUTEROL SULFATE 2.5 MG/3ML
2.5 SOLUTION RESPIRATORY (INHALATION) EVERY 4 HOURS PRN
Start: 2024-02-02

## 2024-02-02 RX ORDER — DIPHENOXYLATE HYDROCHLORIDE AND ATROPINE SULFATE 2.5; .025 MG/1; MG/1
1 TABLET ORAL 4 TIMES DAILY PRN
Status: CANCELLED
Start: 2024-02-02 | End: 2024-02-12

## 2024-02-02 RX ORDER — POTASSIUM CHLORIDE 20 MEQ/1
40 TABLET, EXTENDED RELEASE ORAL ONCE
Status: COMPLETED | OUTPATIENT
Start: 2024-02-02 | End: 2024-02-02

## 2024-02-02 RX ADMIN — APIXABAN 2.5 MG: 2.5 TABLET, FILM COATED ORAL at 17:13

## 2024-02-02 RX ADMIN — FUROSEMIDE 20 MG: 20 TABLET ORAL at 08:29

## 2024-02-02 RX ADMIN — CEFEPIME HYDROCHLORIDE 1000 MG: 1 INJECTION, SOLUTION INTRAVENOUS at 10:28

## 2024-02-02 RX ADMIN — APIXABAN 2.5 MG: 2.5 TABLET, FILM COATED ORAL at 08:29

## 2024-02-02 RX ADMIN — ATORVASTATIN CALCIUM 20 MG: 10 TABLET, FILM COATED ORAL at 17:13

## 2024-02-02 RX ADMIN — METOPROLOL SUCCINATE 50 MG: 50 TABLET, EXTENDED RELEASE ORAL at 08:29

## 2024-02-02 RX ADMIN — VANCOMYCIN HYDROCHLORIDE 750 MG: 5 INJECTION, POWDER, LYOPHILIZED, FOR SOLUTION INTRAVENOUS at 08:29

## 2024-02-02 RX ADMIN — GUAIFENESIN AND DEXTROMETHORPHAN 10 ML: 100; 10 SYRUP ORAL at 08:29

## 2024-02-02 RX ADMIN — CLOPIDOGREL 75 MG: 75 TABLET ORAL at 08:29

## 2024-02-02 RX ADMIN — Medication 2000 UNITS: at 08:29

## 2024-02-02 RX ADMIN — PANTOPRAZOLE SODIUM 20 MG: 20 TABLET, DELAYED RELEASE ORAL at 06:12

## 2024-02-02 RX ADMIN — POTASSIUM CHLORIDE 40 MEQ: 1500 TABLET, EXTENDED RELEASE ORAL at 14:53

## 2024-02-02 RX ADMIN — GUAIFENESIN AND DEXTROMETHORPHAN 10 ML: 100; 10 SYRUP ORAL at 17:13

## 2024-02-02 NOTE — NURSING NOTE
Pt was discharged in stable condition. Pt was escorted upstairs to SNF by this nurse and PCA. Called and gave report to receiving facility.

## 2024-02-02 NOTE — CASE MANAGEMENT
Case Management Discharge Planning Note    Patient name Mariela Giron  Location /411-01 MRN [id number]  : 1935 Date 2024       Current Admission Date: 2024  Current Admission Diagnosis:Coronary artery disease involving native coronary artery of native heart without angina pectoris   Patient Active Problem List    Diagnosis Date Noted    MRSA colonization 2024    Positive DONYA (antinuclear antibody) 2024    Atelectasis 2024    Right lower lobe pneumonia 2024    Elevated d-dimer 2024    Multifocal pneumonia 2023    Pressure injury of right heel, stage 2 (HCC) 2023    Acute-on-chronic kidney injury  2023    Encounter for immunization 10/03/2023    S/P angioplasty with stent 10/03/2023    Centromere antibody positive 2023    Vitamin D deficiency 08/15/2023    Atrial flutter (HCC) 08/15/2023    Gastroesophageal reflux disease without esophagitis 08/15/2023    SIADH (syndrome of inappropriate 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[facility]) 2023    Tricuspid valve insufficiency 2023    Prolonged QT interval 2023    Pleural effusion on right 2023    Moderate protein-calorie malnutrition (HCC) 2023    Closed wedge compression fracture of T11 vertebra ([facility]) 2023    Age-related osteoporosis with current pathological fracture of vertebra ([facility]) 2023    Graves disease 2023    Anemia 2023    Coronary artery disease involving native coronary artery of native heart without angina pectoris 2023    S/P TAVR (transcatheter aortic valve replacement) 2023    Permanent atrial fibrillation (HCC) 2023    Incomplete right bundle branch block 2023    Constipation 2023    Status post insertion of drug eluting coronary artery stent 2023    Acute hypoxic respiratory failure (HCC) 2022    Chronic diastolic (congestive) heart failure (HCC) 2022    Primary generalized (osteo)arthritis  09/07/2022    Dyslipidemia 03/28/2022    Post-menopause 03/28/2022    Generalized weakness 12/23/2021    Ganglion cyst 09/14/2021    Adhesive capsulitis of right shoulder 09/14/2021    Bilateral leg edema 07/20/2021    Chronic pain syndrome 06/22/2021    Sacroiliitis (HCC) 06/22/2021    Gait abnormality 06/01/2021    Chronic bilateral low back pain without sciatica 04/30/2021    Osteopenia of neck of femur 01/21/2021    Impaired fasting glucose     Encounter for long-term (current) use of medications 10/31/2020    Immunization due 10/31/2020    Acute bursitis of right shoulder 10/31/2020    Encounter for Medicare annual wellness exam 01/02/2020    Mid back pain 09/30/2019    Generalized anxiety disorder 09/05/2019    Non-rheumatic tricuspid valve insufficiency 06/04/2019    Lymphadenopathy 05/21/2019    Hiatal hernia 05/21/2019    Thrombocytopenia ([facility]) 05/21/2019    Mitral valve insufficiency 05/21/2019    Atrial tachycardia 05/21/2019    Pulmonary hypertension ([facility]) 05/21/2019    Diverticulosis 05/21/2019    Hypokalemia 05/17/2019      LOS (days): 8  Geometric Mean LOS (GMLOS) (days): 5.1  Days to GMLOS:-2.7     OBJECTIVE:  Risk of Unplanned Readmission Score: 30.98         Current admission status: Inpatient   Preferred Pharmacy:   [facility] [address]  [address] [address]  Phone: [phone] Fax: [phone]    [id number] [place] [facility] [place] [facility] [address] [address]  Phone: [phone] Fax: [phone]    Primary Care Provider: Papa Zabala DO    Primary Insurance: MEDICARE  Secondary Insurance: BLUE CROSS    DISCHARGE DETAILS:    Discharge planning discussed with:: patient and son was updated by provider  Freedom of Choice: Yes  Comments - Freedom of Choice: agreeable to 5th floor for STR  CM contacted family/caregiver?: Yes (provider spoke with son. limited calls due to a family   today)  Were Treatment Team discharge recommendations reviewed with patient/caregiver?: Yes  Did patient/caregiver verbalize understanding of patient care needs?: Yes  Were patient/caregiver advised of the risks associated with not following Treatment Team discharge recommendations?: Yes    Contacts  Reason/Outcome: Discharge Planning              Other Referral/Resources/Interventions Provided:  Interventions: Short Term Rehab    Would you like to participate in our [facility] service program?  : No - Declined    Treatment Team Recommendation: Short Term Rehab  Discharge Destination Plan:: Short Term Rehab   Patient discharging to 5th floor today. Son was updated by provider. Nurse to call report and staff to transport.

## 2024-02-02 NOTE — ASSESSMENT & PLAN NOTE
In the setting of Influenza A with superimposed bacterial pneumonia, and volume overload  Not on home supplemental oxygen therapy  Was taken off oxygen this morning with saturations of 88 to 90%  Continue home Lasix 20 mg daily   Continue to monitor and diuresis as needed  Incentive spirometry  Respiratory protocol  Completed 7 days cefepime and vancomycin

## 2024-02-02 NOTE — PROGRESS NOTES
[facility]  Progress Note  Name: Mariela Giron I  MRN: [id number]  Unit/Bed#: 411-01 I Date of Admission: 1/25/2024   Date of Service: 2/2/2024 I Hospital Day: 8    Assessment/Plan   * Sepsis (HCC)-resolved as of 2/2/2024  Assessment & Plan  Sepsis was present on admission and due to Influenza A with superimposed right lower lobe bacterial pneumonia  Positive MRSA nasal culture  Blood cultures: no growth at 5 days   Treated with Tamiflu 30 mg PO every 24 hours (renally-dosed) - completed 5 days  Procal continues to downtrend, patient afebrile  Trend: 13.81 - 9.75 - 5.77 - 2.59 - 1.44  Completed 7 day course of cefepime/vanco    Acute respiratory failure with hypoxia (HCC)-resolved as of 2/2/2024  Assessment & Plan  In the setting of Influenza A with superimposed bacterial pneumonia, and volume overload  Not on home supplemental oxygen therapy  Was taken off oxygen this morning with saturations of 88 to 90%  Continue home Lasix 20 mg daily   Continue to monitor and diuresis as needed  Incentive spirometry  Respiratory protocol  Completed 7 days cefepime and vancomycin         MRSA colonization  Assessment & Plan  Utilize the MRSA decolonization protocol, which should be continued upon discharge    Positive DONYA (antinuclear antibody)  Assessment & Plan  Outpatient Rheumatology evaluation    Elevated d-dimer  Assessment & Plan  No evidence of pulmonary embolism on CTA of the chest/PE protocol (01/25/2024)  Check a venous duplex of the bilateral lower extremitie  No evidence of DVT/thrombophlebitis     Latest Reference Range & Units Most Recent   D-Dimer, Quant <0.50 ug/ml FEU 0.97 (H)  1/25/24 18:40   (H): Data is abnormally high    Right lower lobe pneumonia  Assessment & Plan  Suspected superimposed bacterial pneumonia  Completed 7 day course cefepime and vanc        Coronary artery disease involving native coronary artery of native heart without angina pectoris  Assessment &  Plan  Continue Plavix, statin therapy, and Toprol-XL  Outpatient follow-up with Cardiology    Permanent atrial fibrillation (HCC)  Assessment & Plan  Continue Toprol-XL 50 mg PO Qdaily  Continue anticoagulation with Eliquis 2.5 mg PO BID    Chronic diastolic (congestive) heart failure (HCC)  Assessment & Plan  Wt Readings from Last 3 Encounters:   02/02/24 48.9 kg (107 lb 12.9 oz)   01/03/24 45.5 kg (100 lb 6.4 oz)   12/21/23 45.1 kg (99 lb 6.4 oz)     1/30 notified by patient requiring additional oxygenation  CXR   Redemonstration of cardiomegaly with pulmonary vascular congestion.  Pleural effusion appear slightly worsened as compared to most recent radiograph. Multifocal opacities within the right midlung and base likely represent atelectasis, pneumonia may present a similar appearance in the proper clinical setting.  Trace left pleural effusion again suggested.  Received home Lasix, and additional 20 mg IV Lasix  Euvolemic - continue lasix home dosing       Diarrhea-resolved as of 1/30/2024  Assessment & Plan  Suspected antibiotic associated diarrhea  Clinically improved      Pancytopenia (HCC)-resolved as of 1/29/2024  Assessment & Plan  Likely due to acute illness with Influenza A and sepsis    Influenza A-resolved as of 2/2/2024  Assessment & Plan  Completed 5-day course of Tamiflu    Hyponatremia-resolved as of 2/2/2024  Assessment & Plan  Sodium level 132 labs this morning. Likely secondary to poor   Continue home Lasix     Hypomagnesemia-resolved as of 1/29/2024  Assessment & Plan    Results from last 7 days   Lab Units 01/31/24  0612 01/30/24  0524 01/29/24  0433   MAGNESIUM mg/dL 1.9 1.7* 2.1         Monitor mg in the am and replete as needed             Medical Problems       Resolved Problems  Date Reviewed: 2/2/2024            Resolved    Hypomagnesemia 1/29/2024     Resolved by  Liam Carmen DO    Hyponatremia 2/2/2024     Resolved by  Arely Leung PA-C    Acute respiratory failure with  hypoxia (HCC) 2/2/2024     Resolved by  Arely Leung PA-C    Influenza A 2/2/2024     Resolved by  Arely Leung PA-C    Pancytopenia (HCC) 1/29/2024     Resolved by  Liam Carmen DO    * (Principal) Sepsis (HCC) 2/2/2024     Resolved by  Arely Leung PA-C    Diarrhea 1/30/2024     Resolved by  Arely Leung PA-C        Discharging Physician / Practitioner: Arely Leung PA-C  PCP: Papa Zabala DO  Admission Date:   Admission Orders (From admission, onward)       Ordered        01/25/24 2102  INPATIENT ADMISSION  Once                          Discharge Date: 02/02/24    Consultations During Hospital Stay:  Pulmonology    Procedures Performed:   None    Significant Findings / Test Results:   XR chest portable 1/31/2024  Impression: Redemonstration of cardiomegaly with pulmonary vascular congestion. Pleural effusion appear slightly worsened as compared to most recent radiograph. Multifocal opacities within the right midlung and base likely represent atelectasis, pneumonia may present a similar appearance in the proper clinical setting. Trace left pleural effusion again suggested. Resident: DYLLAN Jones I, the attending radiologist, have reviewed the images and agree with the final report above.      VAS VENOUS DUPLEX - LOWER LIMB BILATERAL 1/29/2024   CONCLUSION:  Impression: RIGHT LOWER LIMB: No evidence of acute or chronic deep vein thrombosis. No evidence of superficial thrombophlebitis noted. Doppler evaluation shows a normal response to augmentation maneuvers.. Popliteal, posterior tibial and anterior tibial arterial Doppler waveforms are triphasic/monophasic.  LEFT LOWER LIMB: No evidence of acute or chronic deep vein thrombosis. No evidence of superficial thrombophlebitis noted. Doppler evaluation shows a normal response to augmentation maneuvers. Popliteal, posterior tibial and anterior tibial arterial Doppler waveforms are triphasic/biphasic.  SIGNATURE: Electronically Signed by:  TRINA PERRY MD on 2024-01-29 06:12:11 PM    XR chest portable 1/27/2024  Impression: Mild pulmonary venous congestion with small effusions. Right greater than left atelectasis. Pneumonia not excluded in the appropriate clinical setting.     CTA ED chest PE Study 1/25/2024  Impression: No pulmonary embolism. Right lower lobe consolidation consistent with pneumonia with probably reactive subcarinal lymph node. Cardiomegaly with diffuse groundglass opacities in the mid to lower lung zones along with bilateral effusions consistent with pulmonary edema/CHF.     Incidental Findings:   None      Test Results Pending at Discharge (will require follow up):   None      Outpatient Tests Requested:  None     Complications:  None     Reason for Admission: Acute respiratory failure with hypoxia    Hospital Course:   Mariela Giron is a 88 y.o. female patient PMH chronic diastolic heart failure, atrial fibrillation, CAD, s/p TAVR, CKD who originally presented to the hospital on 1/25/2024 due to shortness of breath.  She met sepsis criteria with fevers, mild leukopenia, tachypnea, and evidence of right lower lobe pneumonia.  She required 2 L of oxygen for normal saturations on arrival.  She was started on Tamiflu and ceftriaxone.  Patient had MRSA nose swab which was positive.  During hospitalization, her procalcitonin continued to uptrend, and IV antibiotics were switched to IV cefepime and IV vancomycin.  On 1/27, patient had worsening shortness of breath and repeat CXR showed bilateral effusions and pulmonary venous congestion.  She was given IV Lasix dose with improvement.  Additionally after initiation of new antibiotics, patient's procalcitonin trended downward, and patient was able to be weaned off of supplemental oxygen.  Patient has completed a 7-day course of broad-spectrum antibiotics, and is ready for discharge.  She will be discharged to the fifth floor for rehab at this time.    Please see above list of diagnoses  "and related plan for additional information.     Condition at Discharge: stable    Discharge Day Visit / Exam:   Subjective: Patient awake and alert on exam this morning.  States she is feeling a little  better.  Noted to have conversational dyspnea with saturations 88 to 93%.  She states she did not want supplemental oxygen on as it was previously weaned off and she states her oxygen levels go up to 97% at rest.  She denies chest pain, shortness of breath, nausea, vomiting, diarrhea.  She is aware of plan to be discharged to rehab today.  Vitals: Blood Pressure: 125/66 (02/02/24 0708)  Pulse: 73 (02/02/24 0708)  Temperature: (!) 97.1 °F (36.2 °C) (02/02/24 0708)  Temp Source: Axillary (01/31/24 1516)  Respirations: 18 (02/02/24 0708)  Height: 5' 1\" (154.9 cm) (01/25/24 2140)  Weight - Scale: 48.9 kg (107 lb 12.9 oz) (02/02/24 1023)  SpO2: 92 % (02/02/24 0708)  Exam:   Physical Exam  Constitutional:       General: She is not in acute distress.     Appearance: She is ill-appearing (chronically). She is not toxic-appearing.   HENT:      Mouth/Throat:      Mouth: Mucous membranes are moist.   Eyes:      Conjunctiva/sclera: Conjunctivae normal.   Cardiovascular:      Rate and Rhythm: Normal rate and regular rhythm.      Heart sounds: No murmur heard.     No friction rub. No gallop.   Pulmonary:      Breath sounds: Rhonchi present. No wheezing or rales.   Abdominal:      Tenderness: There is no abdominal tenderness.   Musculoskeletal:      Right lower leg: Edema present.      Left lower leg: Edema present.      Comments: Much improved from previous physical exam     Skin:     General: Skin is warm and dry.   Neurological:      Mental Status: Mental status is at baseline.          Discussion with Family: Updated  (son) via phone.    Discharge instructions/Information to patient and family:   See after visit summary for information provided to patient and family.      Provisions for Follow-Up Care:  See after " visit summary for information related to follow-up care and any pertinent home health orders.      Mobility at time of Discharge:   Basic Mobility Inpatient Raw Score: 17  JH-HLM Goal: 5: Stand one or more mins  JH-HLM Achieved: 5: Stand (1 or more minutes)  HLM Goal achieved. Continue to encourage appropriate mobility.     Disposition:   [facility]: [facility].  .    Planned Readmission: None      Discharge Statement:  I spent 55 minutes discharging the patient. This time was spent on the day of discharge. I had direct contact with the patient on the day of discharge. Greater than 50% of the total time was spent examining patient, answering all patient questions, arranging and discussing plan of care with patient as well as directly providing post-discharge instructions.  Additional time then spent on discharge activities.    Discharge Medications:  See after visit summary for reconciled discharge medications provided to patient and/or family.      **Please Note: This note may have been constructed using a voice recognition system**

## 2024-02-02 NOTE — PLAN OF CARE
Problem: Prexisting or High Potential for Compromised Skin Integrity  Goal: Skin integrity is maintained or improved  Description: INTERVENTIONS:  - Identify patients at risk for skin breakdown  - Assess and monitor skin integrity  - Assess and monitor nutrition and hydration status  - Monitor labs   - Assess for incontinence   - Turn and reposition patient  - Assist with mobility/ambulation  - Relieve pressure over bony prominences  - Avoid friction and shearing  - Provide appropriate hygiene as needed including keeping skin clean and dry  - Evaluate need for skin moisturizer/barrier cream  - Collaborate with interdisciplinary team   - Patient/family teaching  - Consider wound care consult   Outcome: Progressing     Problem: PAIN - ADULT  Goal: Verbalizes/displays adequate comfort level or baseline comfort level  Description: Interventions:  - Encourage patient to monitor pain and request assistance  - Assess pain using appropriate pain scale  - Administer analgesics based on type and severity of pain and evaluate response  - Implement non-pharmacological measures as appropriate and evaluate response  - Consider cultural and social influences on pain and pain management  - Notify physician/advanced practitioner if interventions unsuccessful or patient reports new pain  Outcome: Progressing     Problem: INFECTION - ADULT  Goal: Absence or prevention of progression during hospitalization  Description: INTERVENTIONS:  - Assess and monitor for signs and symptoms of infection  - Monitor lab/diagnostic results  - Monitor all insertion sites, i.e. indwelling lines, tubes, and drains  - Monitor endotracheal if appropriate and nasal secretions for changes in amount and color  - [place] appropriate cooling/warming therapies per order  - Administer medications as ordered  - Instruct and encourage patient and family to use good hand hygiene technique  - Identify and instruct in appropriate isolation precautions for  identified infection/condition  Outcome: Progressing  Goal: Absence of fever/infection during neutropenic period  Description: INTERVENTIONS:  - Monitor WBC    Outcome: Progressing     Problem: SAFETY ADULT  Goal: Patient will remain free of falls  Description: INTERVENTIONS:  - Educate patient/family on patient safety including physical limitations  - Instruct patient to call for assistance with activity   - Consult OT/PT to assist with strengthening/mobility   - Keep Call bell within reach  - Keep bed low and locked with side rails adjusted as appropriate  - Keep care items and personal belongings within reach  - Initiate and maintain comfort rounds  - Make Fall Risk Sign visible to staff  - Offer Toileting every 2 Hours, in advance of need  - Initiate/Maintain bed alarm  - Obtain necessary fall risk management equipment:   - Apply yellow socks and bracelet for high fall risk patients  - Consider moving patient to room near nurses station  Outcome: Progressing  Goal: Maintain or return to baseline ADL function  Description: INTERVENTIONS:  -  Assess patient's ability to carry out ADLs; assess patient's baseline for ADL function and identify physical deficits which impact ability to perform ADLs (bathing, care of mouth/teeth, toileting, grooming, dressing, etc.)  - Assess/evaluate cause of self-care deficits   - Assess range of motion  - Assess patient's mobility; develop plan if impaired  - Assess patient's need for assistive devices and provide as appropriate  - Encourage maximum independence but intervene and supervise when necessary  - Involve family in performance of ADLs  - Assess for home care needs following discharge   - Consider OT consult to assist with ADL evaluation and planning for discharge  - Provide patient education as appropriate  Outcome: Progressing  Goal: Maintains/Returns to pre admission functional level  Description: INTERVENTIONS:  - Perform AM-PAC 6 Click Basic Mobility/ Daily Activity  assessment daily.  - Set and communicate daily mobility goal to care team and patient/family/caregiver.   - Collaborate with rehabilitation services on mobility goals if consulted  - Perform Range of Motion 3 times a day.  - Reposition patient every 2 hours.  - Dangle patient 3 times a day  - Stand patient 3 times a day  - Ambulate patient 3 times a day  - Out of bed to chair 3 times a day   - Out of bed for meals 3 times a day  - Out of bed for toileting  - Record patient progress and toleration of activity level   Outcome: Progressing     Problem: DISCHARGE PLANNING  Goal: Discharge to home or other facility with appropriate resources  Description: INTERVENTIONS:  - Identify barriers to discharge w/patient and caregiver  - Arrange for needed discharge resources and transportation as appropriate  - Identify discharge learning needs (meds, wound care, etc.)  - Arrange for interpretive services to assist at discharge as needed  - Refer to Case Management Department for coordinating discharge planning if the patient needs post-hospital services based on physician/advanced practitioner order or complex needs related to functional status, cognitive ability, or social support system  Outcome: Progressing     Problem: Knowledge Deficit  Goal: Patient/family/caregiver demonstrates understanding of disease process, treatment plan, medications, and discharge instructions  Description: Complete learning assessment and assess knowledge base.  Interventions:  - Provide teaching at level of understanding  - Provide teaching via preferred learning methods  Outcome: Progressing     Problem: RESPIRATORY - ADULT  Goal: Achieves optimal ventilation and oxygenation  Description: INTERVENTIONS:  - Assess for changes in respiratory status  - Assess for changes in mentation and behavior  - Position to facilitate oxygenation and minimize respiratory effort  - Oxygen administered by appropriate delivery if ordered  - Initiate smoking  cessation education as indicated  - Encourage broncho-pulmonary hygiene including cough, deep breathe, Incentive Spirometry  - Assess the need for suctioning and aspirate as needed  - Assess and instruct to report SOB or any respiratory difficulty  - Respiratory Therapy support as indicated  Outcome: Progressing     Problem: Nutrition/Hydration-ADULT  Goal: Nutrient/Hydration intake appropriate for improving, restoring or maintaining nutritional needs  Description: Monitor and assess patient's nutrition/hydration status for malnutrition. Collaborate with interdisciplinary team and initiate plan and interventions as ordered.  Monitor patient's weight and dietary intake as ordered or per policy. Utilize nutrition screening tool and intervene as necessary. Determine patient's food preferences and provide high-protein, high-caloric foods as appropriate.     INTERVENTIONS:  - Monitor oral intake, urinary output, labs, and treatment plans  - Assess nutrition and hydration status and recommend course of action  - Evaluate amount of meals eaten  - Assist patient with eating if necessary   - Allow adequate time for meals  - Recommend/ encourage appropriate diets, oral nutritional supplements, and vitamin/mineral supplements  - Order, calculate, and assess calorie counts as needed  - Recommend, monitor, and adjust tube feedings and TPN/PPN based on assessed needs  - Assess need for intravenous fluids  - Provide specific nutrition/hydration education as appropriate  - Include patient/family/caregiver in decisions related to nutrition  Outcome: Progressing

## 2024-02-02 NOTE — DISCHARGE SUMMARY
[facility]  Discharge- Mariela Giron 1935, 88 y.o. female MRN: [id number]  Unit/Bed#: 411-01 Encounter: [id number]  Primary Care Provider: Papa Zabala DO   Date and time admitted to hospital: 1/25/2024  5:58 PM    * Sepsis (HCC)-resolved as of 2/2/2024  Assessment & Plan  Sepsis was present on admission and due to Influenza A with superimposed right lower lobe bacterial pneumonia  Positive MRSA nasal culture  Blood cultures: no growth at 5 days   Treated with Tamiflu 30 mg PO every 24 hours (renally-dosed) - completed 5 days  Procal continues to downtrend, patient afebrile  Trend: 13.81 - 9.75 - 5.77 - 2.59 - 1.44  Completed 7 day course of cefepime/vanco    Acute respiratory failure with hypoxia (HCC)-resolved as of 2/2/2024  Assessment & Plan  In the setting of Influenza A with superimposed bacterial pneumonia, and volume overload  Not on home supplemental oxygen therapy  Was taken off oxygen this morning with saturations of 88 to 90%  Continue home Lasix 20 mg daily   Continue to monitor and diuresis as needed  Incentive spirometry  Respiratory protocol  Completed 7 days cefepime and vancomycin         MRSA colonization  Assessment & Plan  Utilize the MRSA decolonization protocol, which should be continued upon discharge    Positive DONYA (antinuclear antibody)  Assessment & Plan  Outpatient Rheumatology evaluation    Elevated d-dimer  Assessment & Plan  No evidence of pulmonary embolism on CTA of the chest/PE protocol (01/25/2024)  Check a venous duplex of the bilateral lower extremitie  No evidence of DVT/thrombophlebitis     Latest Reference Range & Units Most Recent   D-Dimer, Quant <0.50 ug/ml FEU 0.97 (H)  1/25/24 18:40   (H): Data is abnormally high    Right lower lobe pneumonia  Assessment & Plan  Suspected superimposed bacterial pneumonia  Completed 7 day course cefepime and vanc        Coronary artery disease involving native coronary artery of native heart without angina  pectoris  Assessment & Plan  Continue Plavix, statin therapy, and Toprol-XL  Outpatient follow-up with Cardiology    Permanent atrial fibrillation (HCC)  Assessment & Plan  Continue Toprol-XL 50 mg PO Qdaily  Continue anticoagulation with Eliquis 2.5 mg PO BID    Chronic diastolic (congestive) heart failure (HCC)  Assessment & Plan  Wt Readings from Last 3 Encounters:   02/02/24 48.9 kg (107 lb 12.9 oz)   01/03/24 45.5 kg (100 lb 6.4 oz)   12/21/23 45.1 kg (99 lb 6.4 oz)     1/30 notified by patient requiring additional oxygenation  CXR   Redemonstration of cardiomegaly with pulmonary vascular congestion.  Pleural effusion appear slightly worsened as compared to most recent radiograph. Multifocal opacities within the right midlung and base likely represent atelectasis, pneumonia may present a similar appearance in the proper clinical setting.  Trace left pleural effusion again suggested.  Received home Lasix, and additional 20 mg IV Lasix  Euvolemic - continue lasix home dosing       Diarrhea-resolved as of 1/30/2024  Assessment & Plan  Suspected antibiotic associated diarrhea  Clinically improved      Pancytopenia (HCC)-resolved as of 1/29/2024  Assessment & Plan  Likely due to acute illness with Influenza A and sepsis    Influenza A-resolved as of 2/2/2024  Assessment & Plan  Completed 5-day course of Tamiflu    Hyponatremia-resolved as of 2/2/2024  Assessment & Plan  Sodium level 132 labs this morning. Likely secondary to poor   Continue home Lasix     Hypomagnesemia-resolved as of 1/29/2024  Assessment & Plan    Results from last 7 days   Lab Units 01/31/24  0612 01/30/24  0524 01/29/24  0433   MAGNESIUM mg/dL 1.9 1.7* 2.1         Monitor mg in the am and replete as needed        Medical Problems       Resolved Problems  Date Reviewed: 2/2/2024            Resolved    Hypomagnesemia 1/29/2024     Resolved by  Liam Carmen DO    Hyponatremia 2/2/2024     Resolved by  Arely Leung PA-C    Acute  respiratory failure with hypoxia (HCC) 2/2/2024     Resolved by  Arely Leung PA-C    Influenza A 2/2/2024     Resolved by  Arely Leung PA-C    Pancytopenia (HCC) 1/29/2024     Resolved by  Liam Carmen DO    * (Principal) Sepsis (HCC) 2/2/2024     Resolved by  Arely Leung PA-C    Diarrhea 1/30/2024     Resolved by  Arely Leung PA-C        Discharging Physician / Practitioner: Arely Leung PA-C  PCP: Papa Zabala DO  Admission Date:   Admission Orders (From admission, onward)       Ordered        01/25/24 2102  INPATIENT ADMISSION  Once                          Discharge Date: 02/02/24    Consultations During Hospital Stay:  Pulmonology    Procedures Performed:   None    Significant Findings / Test Results:   XR chest portable 1/31/2024  Impression: Redemonstration of cardiomegaly with pulmonary vascular congestion. Pleural effusion appear slightly worsened as compared to most recent radiograph. Multifocal opacities within the right midlung and base likely represent atelectasis, pneumonia may present a similar appearance in the proper clinical setting. Trace left pleural effusion again suggested. Resident: DYLLAN Jones I, the attending radiologist, have reviewed the images and agree with the final report above.      VAS VENOUS DUPLEX - LOWER LIMB BILATERAL 1/29/2024   CONCLUSION:  Impression: RIGHT LOWER LIMB: No evidence of acute or chronic deep vein thrombosis. No evidence of superficial thrombophlebitis noted. Doppler evaluation shows a normal response to augmentation maneuvers.. Popliteal, posterior tibial and anterior tibial arterial Doppler waveforms are triphasic/monophasic.  LEFT LOWER LIMB: No evidence of acute or chronic deep vein thrombosis. No evidence of superficial thrombophlebitis noted. Doppler evaluation shows a normal response to augmentation maneuvers. Popliteal, posterior tibial and anterior tibial arterial Doppler waveforms are triphasic/biphasic.  SIGNATURE:  Electronically Signed by: TRINA PERRY MD on 2024-01-29 06:12:11 PM    XR chest portable 1/27/2024  Impression: Mild pulmonary venous congestion with small effusions. Right greater than left atelectasis. Pneumonia not excluded in the appropriate clinical setting.     CTA ED chest PE Study 1/25/2024  Impression: No pulmonary embolism. Right lower lobe consolidation consistent with pneumonia with probably reactive subcarinal lymph node. Cardiomegaly with diffuse groundglass opacities in the mid to lower lung zones along with bilateral effusions consistent with pulmonary edema/CHF.     Incidental Findings:   None      Test Results Pending at Discharge (will require follow up):   None      Outpatient Tests Requested:  None     Complications:  None     Reason for Admission: Acute respiratory failure with hypoxia    Hospital Course:   Mariela Giron is a 88 y.o. female patient PMH chronic diastolic heart failure, atrial fibrillation, CAD, s/p TAVR, CKD who originally presented to the hospital on 1/25/2024 due to shortness of breath.  She met sepsis criteria with fevers, mild leukopenia, tachypnea, and evidence of right lower lobe pneumonia.  She required 2 L of oxygen for normal saturations on arrival.  She was started on Tamiflu and ceftriaxone.  Patient had MRSA nose swab which was positive.  During hospitalization, her procalcitonin continued to uptrend, and IV antibiotics were switched to IV cefepime and IV vancomycin.  On 1/27, patient had worsening shortness of breath and repeat CXR showed bilateral effusions and pulmonary venous congestion.  She was given IV Lasix dose with improvement.  Additionally after initiation of new antibiotics, patient's procalcitonin trended downward, and patient was able to be weaned off of supplemental oxygen.  Patient has completed a 7-day course of broad-spectrum antibiotics, and is ready for discharge.  She will be discharged to the fifth floor for rehab at this time.    Please  "see above list of diagnoses and related plan for additional information.     Condition at Discharge: stable    Discharge Day Visit / Exam:   Subjective: Patient awake and alert on exam this morning.  States she is feeling a little  better.  Noted to have conversational dyspnea with saturations 88 to 93%.  She states she did not want supplemental oxygen on as it was previously weaned off and she states her oxygen levels go up to 97% at rest.  She denies chest pain, shortness of breath, nausea, vomiting, diarrhea.  She is aware of plan to be discharged to rehab today.  Vitals: Blood Pressure: 125/66 (02/02/24 0708)  Pulse: 73 (02/02/24 0708)  Temperature: (!) 97.1 °F (36.2 °C) (02/02/24 0708)  Temp Source: Axillary (01/31/24 1516)  Respirations: 18 (02/02/24 0708)  Height: 5' 1\" (154.9 cm) (01/25/24 2140)  Weight - Scale: 48.9 kg (107 lb 12.9 oz) (02/02/24 1023)  SpO2: 92 % (02/02/24 0708)  Exam:   Physical Exam  Constitutional:       General: She is not in acute distress.     Appearance: She is ill-appearing (chronically). She is not toxic-appearing.   HENT:      Mouth/Throat:      Mouth: Mucous membranes are moist.   Eyes:      Conjunctiva/sclera: Conjunctivae normal.   Cardiovascular:      Rate and Rhythm: Normal rate and regular rhythm.      Heart sounds: No murmur heard.     No friction rub. No gallop.   Pulmonary:      Breath sounds: Rhonchi present. No wheezing or rales.   Abdominal:      Tenderness: There is no abdominal tenderness.   Musculoskeletal:      Right lower leg: Edema present.      Left lower leg: Edema present.      Comments: Much improved from previous physical exam     Skin:     General: Skin is warm and dry.   Neurological:      Mental Status: Mental status is at baseline.          Discussion with Family: Updated  (son) via phone.    Discharge instructions/Information to patient and family:   See after visit summary for information provided to patient and family.      Provisions " for Follow-Up Care:  See after visit summary for information related to follow-up care and any pertinent home health orders.      Mobility at time of Discharge:   Basic Mobility Inpatient Raw Score: 17  JH-HLM Goal: 5: Stand one or more mins  JH-HLM Achieved: 5: Stand (1 or more minutes)  HLM Goal achieved. Continue to encourage appropriate mobility.     Disposition:   [facility]: [facility]. .    Planned Readmission: None      Discharge Statement:  I spent 55 minutes discharging the patient. This time was spent on the day of discharge. I had direct contact with the patient on the day of discharge. Greater than 50% of the total time was spent examining patient, answering all patient questions, arranging and discussing plan of care with patient as well as directly providing post-discharge instructions.  Additional time then spent on discharge activities.    Discharge Medications:  See after visit summary for reconciled discharge medications provided to patient and/or family.      **Please Note: This note may have been constructed using a voice recognition system**

## 2024-02-02 NOTE — PLAN OF CARE
Problem: Prexisting or High Potential for Compromised Skin Integrity  Goal: Skin integrity is maintained or improved  Description: INTERVENTIONS:  - Identify patients at risk for skin breakdown  - Assess and monitor skin integrity  - Assess and monitor nutrition and hydration status  - Monitor labs   - Assess for incontinence   - Turn and reposition patient  - Assist with mobility/ambulation  - Relieve pressure over bony prominences  - Avoid friction and shearing  - Provide appropriate hygiene as needed including keeping skin clean and dry  - Evaluate need for skin moisturizer/barrier cream  - Collaborate with interdisciplinary team   - Patient/family teaching  - Consider wound care consult   Outcome: Progressing     Problem: PAIN - ADULT  Goal: Verbalizes/displays adequate comfort level or baseline comfort level  Description: Interventions:  - Encourage patient to monitor pain and request assistance  - Assess pain using appropriate pain scale  - Administer analgesics based on type and severity of pain and evaluate response  - Implement non-pharmacological measures as appropriate and evaluate response  - Consider cultural and social influences on pain and pain management  - Notify physician/advanced practitioner if interventions unsuccessful or patient reports new pain  Outcome: Progressing     Problem: INFECTION - ADULT  Goal: Absence or prevention of progression during hospitalization  Description: INTERVENTIONS:  - Assess and monitor for signs and symptoms of infection  - Monitor lab/diagnostic results  - Monitor all insertion sites, i.e. indwelling lines, tubes, and drains  - Monitor endotracheal if appropriate and nasal secretions for changes in amount and color  - [place] appropriate cooling/warming therapies per order  - Administer medications as ordered  - Instruct and encourage patient and family to use good hand hygiene technique  - Identify and instruct in appropriate isolation precautions for  identified infection/condition  Outcome: Progressing  Goal: Absence of fever/infection during neutropenic period  Description: INTERVENTIONS:  - Monitor WBC    Outcome: Progressing     Problem: SAFETY ADULT  Goal: Patient will remain free of falls  Description: INTERVENTIONS:  - Educate patient/family on patient safety including physical limitations  - Instruct patient to call for assistance with activity   - Consult OT/PT to assist with strengthening/mobility   - Keep Call bell within reach  - Keep bed low and locked with side rails adjusted as appropriate  - Keep care items and personal belongings within reach  - Initiate and maintain comfort rounds  - Make Fall Risk Sign visible to staff  - Offer Toileting every 2 Hours, in advance of need  - Initiate/Maintain bed alarm  - Obtain necessary fall risk management equipment:   - Apply yellow socks and bracelet for high fall risk patients  - Consider moving patient to room near nurses station  Outcome: Progressing  Goal: Maintain or return to baseline ADL function  Description: INTERVENTIONS:  -  Assess patient's ability to carry out ADLs; assess patient's baseline for ADL function and identify physical deficits which impact ability to perform ADLs (bathing, care of mouth/teeth, toileting, grooming, dressing, etc.)  - Assess/evaluate cause of self-care deficits   - Assess range of motion  - Assess patient's mobility; develop plan if impaired  - Assess patient's need for assistive devices and provide as appropriate  - Encourage maximum independence but intervene and supervise when necessary  - Involve family in performance of ADLs  - Assess for home care needs following discharge   - Consider OT consult to assist with ADL evaluation and planning for discharge  - Provide patient education as appropriate  Outcome: Progressing  Goal: Maintains/Returns to pre admission functional level  Description: INTERVENTIONS:  - Perform AM-PAC 6 Click Basic Mobility/ Daily Activity  assessment daily.  - Set and communicate daily mobility goal to care team and patient/family/caregiver.   - Collaborate with rehabilitation services on mobility goals if consulted  - Perform Range of Motion 3 times a day.  - Reposition patient every 2 hours.  - Dangle patient 3 times a day  - Stand patient 3 times a day  - Ambulate patient 3 times a day  - Out of bed to chair 3 times a day   - Out of bed for meals 3 times a day  - Out of bed for toileting  - Record patient progress and toleration of activity level   Outcome: Progressing     Problem: DISCHARGE PLANNING  Goal: Discharge to home or other facility with appropriate resources  Description: INTERVENTIONS:  - Identify barriers to discharge w/patient and caregiver  - Arrange for needed discharge resources and transportation as appropriate  - Identify discharge learning needs (meds, wound care, etc.)  - Arrange for interpretive services to assist at discharge as needed  - Refer to Case Management Department for coordinating discharge planning if the patient needs post-hospital services based on physician/advanced practitioner order or complex needs related to functional status, cognitive ability, or social support system  Outcome: Progressing     Problem: Knowledge Deficit  Goal: Patient/family/caregiver demonstrates understanding of disease process, treatment plan, medications, and discharge instructions  Description: Complete learning assessment and assess knowledge base.  Interventions:  - Provide teaching at level of understanding  - Provide teaching via preferred learning methods  Outcome: Progressing     Problem: RESPIRATORY - ADULT  Goal: Achieves optimal ventilation and oxygenation  Description: INTERVENTIONS:  - Assess for changes in respiratory status  - Assess for changes in mentation and behavior  - Position to facilitate oxygenation and minimize respiratory effort  - Oxygen administered by appropriate delivery if ordered  - Initiate smoking  cessation education as indicated  - Encourage broncho-pulmonary hygiene including cough, deep breathe, Incentive Spirometry  - Assess the need for suctioning and aspirate as needed  - Assess and instruct to report SOB or any respiratory difficulty  - Respiratory Therapy support as indicated  Outcome: Progressing     Problem: Nutrition/Hydration-ADULT  Goal: Nutrient/Hydration intake appropriate for improving, restoring or maintaining nutritional needs  Description: Monitor and assess patient's nutrition/hydration status for malnutrition. Collaborate with interdisciplinary team and initiate plan and interventions as ordered.  Monitor patient's weight and dietary intake as ordered or per policy. Utilize nutrition screening tool and intervene as necessary. Determine patient's food preferences and provide high-protein, high-caloric foods as appropriate.     INTERVENTIONS:  - Monitor oral intake, urinary output, labs, and treatment plans  - Assess nutrition and hydration status and recommend course of action  - Evaluate amount of meals eaten  - Assist patient with eating if necessary   - Allow adequate time for meals  - Recommend/ encourage appropriate diets, oral nutritional supplements, and vitamin/mineral supplements  - Order, calculate, and assess calorie counts as needed  - Recommend, monitor, and adjust tube feedings and TPN/PPN based on assessed needs  - Assess need for intravenous fluids  - Provide specific nutrition/hydration education as appropriate  - Include patient/family/caregiver in decisions related to nutrition  Outcome: Progressing

## 2024-02-02 NOTE — PHYSICAL THERAPY NOTE
PHYSICAL THERAPY NOTE          Patient Name: Mariela Giron  Today's Date: 2/2/2024 02/02/24 0922   PT Last Visit   PT Visit Date 02/02/24   Note Type   Note Type Treatment   Pain Assessment   Pain Assessment Tool 0-10   Pain Score No Pain   Restrictions/Precautions   Weight Bearing Precautions Per Order Yes   General   Chart Reviewed Yes   Family/Caregiver Present No   Cognition   Overall Cognitive Status Impaired   Arousal/Participation Alert   Subjective   Subjective Reports sh doesn't  want to ambulate today. Sad that her  will be buried today and can't be there.   Feels if she ambulates she would faint.   Bed Mobility   Additional Comments OOB in chair start/end PT session   Transfers   Sit to Stand 4  Minimal assistance   Additional items Assist x 1;Armrests;Increased time required;Verbal cues   Stand to Sit 4  Minimal assistance   Additional items Assist x 1;Armrests;Increased time required;Verbal cues   Additional Comments Stood with RW ~ 2 min. No c/o dizziness. Ambulation deferred per pt. request.   Ambulation/Elevation   Gait pattern Not tested   Balance   Static Sitting Fair +   Dynamic Sitting Fair +   Static Standing Fair   Dynamic Standing Fair -   Endurance Deficit   Endurance Deficit Yes   Endurance Deficit Description vitals WFL's.   Activity Tolerance   Activity Tolerance Patient limited by fatigue   Exercises   Hip Flexion Sitting;10 reps;AROM;Bilateral   Hip Abduction Sitting;10 reps;AROM;Bilateral   Hip Adduction Sitting;10 reps;AROM;Bilateral   Knee AROM Long Arc Quad Sitting;10 reps;AROM;Bilateral   Ankle Pumps Sitting;10 reps;AROM;Bilateral   Assessment   Prognosis Good   Problem List   (Decreased strength; Decreased endurance; Impaired balance; Decreased mobility; Impaired judgement; Decreased safety awareness)   Assessment Pt. seen for PT treatment session this date with interventions consisting of   therapeutic exercises, and  transfers w/ emphasis on improving pt's mobility. Pt. Requiring cues for sequence and safety. In comparison to previous session, Pt. With no change  in activity tolerance.   Pt is in need of continued activity in PT to improve strength balance endurance mobility transfers and ambulation with return to maximize LOF. From PT/mobility standpoint, recommendation at time of d/c would be level II: moderate resource intensity in order to promote return to PLOF and independence.   The patient's AM-PAC Basic Mobility Inpatient Short Form Raw Score is 17. A Raw score of greater than 16 suggests the patient may benefit from discharge to home.  Please also refer to physical therapy recommendation for safe DC planning.   Goals   LTG Expiration Date 02/09/24   PT Treatment Day 4   Plan   Treatment/Interventions Functional transfer training;LE strengthening/ROM;Therapeutic exercise;Endurance training;Bed mobility;Gait training;Elevations;Spoke to nursing   Progress Slow progress, decreased activity tolerance   PT Frequency 3-5x/wk   Discharge Recommendation   Rehab Resource Intensity Level, PT II (Moderate Resource Intensity)   AM-PAC Basic Mobility Inpatient   Turning in Flat Bed Without Bedrails 3   Lying on Back to Sitting on Edge of Flat Bed Without Bedrails 3   Moving Bed to Chair 3   Standing Up From Chair Using Arms 3   Walk in Room 3   Climb 3-5 Stairs With Railing 2   Basic Mobility Inpatient Raw Score 17   Basic Mobility Standardized Score 39.67   Highest Level Of Mobility   JH-HLM Goal 5: Stand one or more mins   JH-HLM Achieved 5: Stand (1 or more minutes)   Education   Education Provided Mobility training   Patient Demonstrates verbal understanding;Reinforcement needed   End of Consult   Patient Position at End of Consult Bedside chair;Bed/Chair alarm activated;All needs within reach   End of Consult Comments discussed POC with PT

## 2024-02-02 NOTE — PROGRESS NOTES
Mariela Giron is a 88 y.o. female who is currently ordered Vancomycin IV with management by the Pharmacy Consult service.  Relevant clinical data and objective / subjective history reviewed.  Vancomycin Assessment:  Indication and Goal AUC/Trough: Pneumonia (goal -600, trough >10), -600, trough >10  Clinical Status: stable  Micro:     Renal Function:  SCr: 0.86 mg/dL  CrCl: 40 mL/min  Renal replacement: Not on dialysis  Days of Therapy: 7  Current Dose: 750mg IV q24h  Vancomycin Plan:  New Dosing: same  Estimated AUC: 415 mcg*hr/mL  Estimated Trough: 12.8 mcg/mL  Next Level: 2/6/24 with AM labs  Renal Function Monitoring: Daily BMP and UOP  Pharmacy will continue to follow closely for s/sx of nephrotoxicity, infusion reactions and appropriateness of therapy.  BMP and CBC will be ordered per protocol. We will continue to follow the patient’s culture results and clinical progress daily.    Jian Sinclair, Pharmacist

## 2024-02-02 NOTE — ASSESSMENT & PLAN NOTE
Sepsis was present on admission and due to Influenza A with superimposed right lower lobe bacterial pneumonia  Positive MRSA nasal culture  Blood cultures: no growth at 5 days   Treated with Tamiflu 30 mg PO every 24 hours (renally-dosed) - completed 5 days  Procal continues to downtrend, patient afebrile  Trend: 13.81 - 9.75 - 5.77 - 2.59 - 1.44  Completed 7 day course of cefepime/vanco

## 2024-02-02 NOTE — PLAN OF CARE
Problem: PHYSICAL THERAPY ADULT  Goal: Performs mobility at highest level of function for planned discharge setting.  See evaluation for individualized goals.  Description: Treatment/Interventions: Functional transfer training, LE strengthening/ROM, Elevations, Therapeutic exercise, Endurance training, Bed mobility, Gait training          See flowsheet documentation for full assessment, interventions and recommendations.  Outcome: Progressing  Note: Prognosis: Good  Problem List:  (Decreased strength; Decreased endurance; Impaired balance; Decreased mobility; Impaired judgement; Decreased safety awareness)  Assessment: Pt. seen for PT treatment session this date with interventions consisting of  therapeutic exercises, and  transfers w/ emphasis on improving pt's mobility. Pt. Requiring cues for sequence and safety. In comparison to previous session, Pt. With no change  in activity tolerance.   Pt is in need of continued activity in PT to improve strength balance endurance mobility transfers and ambulation with return to maximize LOF. From PT/mobility standpoint, recommendation at time of d/c would be level II: moderate resource intensity in order to promote return to PLOF and independence.   The patient's [facility] Basic Mobility Inpatient Short Form Raw Score is 17. A Raw score of greater than 16 suggests the patient may benefit from discharge to home.  Please also refer to physical therapy recommendation for safe DC planning.        Rehab Resource Intensity Level, PT: II (Moderate Resource Intensity)    See flowsheet documentation for full assessment.

## 2024-02-02 NOTE — ASSESSMENT & PLAN NOTE
Wt Readings from Last 3 Encounters:   02/02/24 48.9 kg (107 lb 12.9 oz)   01/03/24 45.5 kg (100 lb 6.4 oz)   12/21/23 45.1 kg (99 lb 6.4 oz)     1/30 notified by patient requiring additional oxygenation  CXR   Redemonstration of cardiomegaly with pulmonary vascular congestion.  Pleural effusion appear slightly worsened as compared to most recent radiograph. Multifocal opacities within the right midlung and base likely represent atelectasis, pneumonia may present a similar appearance in the proper clinical setting.  Trace left pleural effusion again suggested.  Received home Lasix, and additional 20 mg IV Lasix  Euvolemic - continue lasix home dosing

## 2024-02-05 ENCOUNTER — TRANSITIONAL CARE MANAGEMENT (OUTPATIENT)
Dept: FAMILY MEDICINE CLINIC | Facility: CLINIC | Age: 89
End: 2024-02-05

## 2024-02-05 ENCOUNTER — LAB REQUISITION (OUTPATIENT)
Dept: LAB | Facility: HOSPITAL | Age: 89
End: 2024-02-05

## 2024-02-05 ENCOUNTER — PATIENT OUTREACH (OUTPATIENT)
Dept: CASE MANAGEMENT | Facility: OTHER | Age: 89
End: 2024-02-05

## 2024-02-05 DIAGNOSIS — N18.30 CHRONIC KIDNEY DISEASE, STAGE 3 UNSPECIFIED (HCC): ICD-10-CM

## 2024-02-05 PROBLEM — S22.080S: Status: ACTIVE | Noted: 2023-04-25

## 2024-02-05 LAB
ANION GAP SERPL CALCULATED.3IONS-SCNC: 8 MMOL/L
BUN SERPL-MCNC: 29 MG/DL (ref 5–25)
CALCIUM SERPL-MCNC: 8.2 MG/DL (ref 8.4–10.2)
CHLORIDE SERPL-SCNC: 104 MMOL/L (ref 96–108)
CO2 SERPL-SCNC: 24 MMOL/L (ref 21–32)
CREAT SERPL-MCNC: 0.89 MG/DL (ref 0.6–1.3)
GFR SERPL CREATININE-BSD FRML MDRD: 58 ML/MIN/1.73SQ M
GLUCOSE SERPL-MCNC: 87 MG/DL (ref 65–140)
POTASSIUM SERPL-SCNC: 3.8 MMOL/L (ref 3.5–5.3)
SODIUM SERPL-SCNC: 136 MMOL/L (ref 135–147)

## 2024-02-05 PROCEDURE — 80048 BASIC METABOLIC PNL TOTAL CA: CPT | Performed by: NURSE PRACTITIONER

## 2024-02-05 NOTE — PROGRESS NOTES
Outpatient Care Management [facility]/SNF Pathway. Discharged 2/2/24 [facility]. This Admin Coordinator will continue to monitor via chart review.

## 2024-02-07 ENCOUNTER — PATIENT OUTREACH (OUTPATIENT)
Dept: CASE MANAGEMENT | Facility: OTHER | Age: 89
End: 2024-02-07

## 2024-02-14 ENCOUNTER — PATIENT OUTREACH (OUTPATIENT)
Dept: CASE MANAGEMENT | Facility: OTHER | Age: 89
End: 2024-02-14

## 2024-02-19 PROBLEM — J18.9 MULTIFOCAL PNEUMONIA: Status: RESOLVED | Noted: 2023-12-21 | Resolved: 2024-02-19

## 2024-02-19 LAB — ENA PM/SCL AB SER-ACNC: <20 UNITS

## 2024-02-20 ENCOUNTER — PATIENT OUTREACH (OUTPATIENT)
Dept: CASE MANAGEMENT | Facility: OTHER | Age: 89
End: 2024-02-20

## 2024-02-21 PROBLEM — J18.9 RIGHT LOWER LOBE PNEUMONIA: Status: RESOLVED | Noted: 2024-01-26 | Resolved: 2024-02-21

## 2024-02-21 PROBLEM — Z00.00 ENCOUNTER FOR MEDICARE ANNUAL WELLNESS EXAM: Status: RESOLVED | Noted: 2020-01-02 | Resolved: 2024-02-21

## 2024-02-22 ENCOUNTER — PATIENT OUTREACH (OUTPATIENT)
Dept: CASE MANAGEMENT | Facility: OTHER | Age: 89
End: 2024-02-22

## 2024-02-22 NOTE — PROGRESS NOTES
PCC alert received the patient discharged 2/22/24 to [facility]. I have removed myself from the care team, updated the Care Coordination note, and closed the episode.

## 2024-03-13 NOTE — PROGRESS NOTES
Daily Note     Today's date: 2021  Patient name: Omar Bingham  : 1935  MRN: [id number]  Referring provider: LYNN Julian  Dx:   Encounter Diagnosis     ICD-10-CM    1  Chronic pain syndrome  G89 4    2  Gait abnormality  R26 9               Subjective: Pt states " my R ankle is bothering me today "       Objective: See treatment diary below      Assessment: Tolerated treatment fairly well  Verbal cues and manual cues needed to perform exercises correctly and for upright posture with standing exercises  Progressed to foam with tandem walk, romberg stance  and side steps  Pt challenged with balance activities on foam  Fatigue noted with exercise  Patient would benefit from continued PT      Plan: Continue per plan of care        Re-eval Date: 21    Date 7/22 7/27/21 7/29/21 7-15-21 7/19/21   Visit Count 6/10 7/10 8/10 4/10 5/10   FOTO              Precautions: falls risk; chronic pain in L/S and R shoulder, difficulty hearing        Manuals 7/22 7/27/21 7/29/21 7-15-21 7/19/21                                   Neuro Re-Ed     7-15-21    Romberg   Firm EO  20" x 2  Foam EO    20" x 2   Firm EO  20" x 2 Firm EO  20" x 2   WBOS with cone reaching     1' x 2    Side stepping   Pbars <> x 3  Pbars <> x 2 foam  Pbars <> x 2 Pbars <> x 2    Big steps     NP    Tandem gait on t-band   Pbars <> x 3  Pbars <> x 3  Foam tandem walk Pbars <> x 3 Pbars <> x 3   Obstacle course   Cone fwd step overs <>x 3  Cone fwd step overs <> x 3  Cone fwd step overs <> x 3 Cone Fwd step overs <> x 3                    Ther Ex    7-15-21    NuStep  L1  10 min L1  10 min L1  10 min L 1  10' L1  10 min    LAQ 1 x 20 Sarbjit 1 x 20 Sarbjit 1 x 20  Sarbjit 1 x 15 Sarbjit 1 x 15 Sarbjit   Hip add seated  1 x 20 5" 1 x 20 5" 1 x 20 5" 1 x 15 5"   1 x 15 5"   Standing marches  Seated x30 Seated x 20  1x 20 Sarbjit  Seated  Sarbjit 1 x 15 Sarbjit 1 x 15      Standing hip flex/abd  1 x 10 ea Sarbjit 1 x 20 ea Sarbjit  1 x 10 ea Sarbjit  1 x 10 ea Sarbjit    Step up B  B 1 x 10 Sarbjit B 1 x 10 Sarbjit      QS        SAQ        SLR        Seated clamshells  RTB 5" x20 RTB 5" x 20  RTB 5" x 20      Seated HR/TR x20 ea X 20 ea  X 20 ea      Ther Activity                        Gait Training    7-15-21    Without use of AD         Amb w/ obstacle course    2 Round and 2 square foam  1/2/lap    CG A x 1 2 round and 2 square foam  1 1/2 lap   CG A x 1     Modalities Detail Level: Detailed Detail Level: Generalized

## 2024-06-28 ENCOUNTER — APPOINTMENT (INPATIENT)
Dept: CT IMAGING | Facility: HOSPITAL | Age: 89
DRG: 291 | End: 2024-06-28
Payer: MEDICARE

## 2024-06-28 ENCOUNTER — APPOINTMENT (EMERGENCY)
Dept: RADIOLOGY | Facility: HOSPITAL | Age: 89
DRG: 291 | End: 2024-06-28
Payer: MEDICARE

## 2024-06-28 ENCOUNTER — HOSPITAL ENCOUNTER (INPATIENT)
Facility: HOSPITAL | Age: 89
LOS: 1 days | Discharge: NURSING, REHAB OR LONG-TERM CARE | DRG: 291 | End: 2024-06-29
Attending: EMERGENCY MEDICINE | Admitting: INTERNAL MEDICINE
Payer: MEDICARE

## 2024-06-28 DIAGNOSIS — I50.9 CHF EXACERBATION (HCC): Primary | ICD-10-CM

## 2024-06-28 DIAGNOSIS — J90 PLEURAL EFFUSION ON RIGHT: ICD-10-CM

## 2024-06-28 DIAGNOSIS — R26.9 GAIT ABNORMALITY: ICD-10-CM

## 2024-06-28 PROBLEM — I44.4 LEFT ANTERIOR FASCICULAR BLOCK (LAFB): Status: ACTIVE | Noted: 2024-06-28

## 2024-06-28 PROBLEM — R09.02 HYPOXIA: Status: ACTIVE | Noted: 2024-06-28

## 2024-06-28 PROBLEM — D72.819 LEUKOPENIA: Status: ACTIVE | Noted: 2024-06-28

## 2024-06-28 LAB
2HR DELTA HS TROPONIN: 0 NG/L
4HR DELTA HS TROPONIN: 5 NG/L
ALBUMIN SERPL BCG-MCNC: 4.4 G/DL (ref 3.5–5)
ALP SERPL-CCNC: 117 U/L (ref 34–104)
ALT SERPL W P-5'-P-CCNC: 35 U/L (ref 7–52)
ANION GAP SERPL CALCULATED.3IONS-SCNC: 10 MMOL/L (ref 4–13)
AST SERPL W P-5'-P-CCNC: 30 U/L (ref 13–39)
ATRIAL RATE: 68 BPM
BASOPHILS # BLD AUTO: 0.01 THOUSANDS/ÂΜL (ref 0–0.1)
BASOPHILS NFR BLD AUTO: 0 % (ref 0–1)
BILIRUB SERPL-MCNC: 1.04 MG/DL (ref 0.2–1)
BILIRUB UR QL STRIP: NEGATIVE
BNP SERPL-MCNC: 1287 PG/ML (ref 0–100)
BUN SERPL-MCNC: 35 MG/DL (ref 5–25)
CALCIUM SERPL-MCNC: 9.6 MG/DL (ref 8.4–10.2)
CARDIAC TROPONIN I PNL SERPL HS: 26 NG/L
CARDIAC TROPONIN I PNL SERPL HS: 26 NG/L
CARDIAC TROPONIN I PNL SERPL HS: 31 NG/L
CHLORIDE SERPL-SCNC: 108 MMOL/L (ref 96–108)
CLARITY UR: CLEAR
CO2 SERPL-SCNC: 22 MMOL/L (ref 21–32)
COLOR UR: COLORLESS
CREAT SERPL-MCNC: 0.94 MG/DL (ref 0.6–1.3)
EOSINOPHIL # BLD AUTO: 0.05 THOUSAND/ÂΜL (ref 0–0.61)
EOSINOPHIL NFR BLD AUTO: 1 % (ref 0–6)
ERYTHROCYTE [DISTWIDTH] IN BLOOD BY AUTOMATED COUNT: 17.7 % (ref 11.6–15.1)
FLUAV RNA RESP QL NAA+PROBE: NEGATIVE
FLUBV RNA RESP QL NAA+PROBE: NEGATIVE
GFR SERPL CREATININE-BSD FRML MDRD: 54 ML/MIN/1.73SQ M
GLUCOSE SERPL-MCNC: 90 MG/DL (ref 65–140)
GLUCOSE UR STRIP-MCNC: NEGATIVE MG/DL
HCT VFR BLD AUTO: 35.4 % (ref 34.8–46.1)
HGB BLD-MCNC: 10.8 G/DL (ref 11.5–15.4)
HGB UR QL STRIP.AUTO: NEGATIVE
IMM GRANULOCYTES # BLD AUTO: 0.01 THOUSAND/UL (ref 0–0.2)
IMM GRANULOCYTES NFR BLD AUTO: 0 % (ref 0–2)
KETONES UR STRIP-MCNC: NEGATIVE MG/DL
LEUKOCYTE ESTERASE UR QL STRIP: NEGATIVE
LYMPHOCYTES # BLD AUTO: 0.95 THOUSANDS/ÂΜL (ref 0.6–4.47)
LYMPHOCYTES NFR BLD AUTO: 25 % (ref 14–44)
MCH RBC QN AUTO: 28 PG (ref 26.8–34.3)
MCHC RBC AUTO-ENTMCNC: 30.5 G/DL (ref 31.4–37.4)
MCV RBC AUTO: 92 FL (ref 82–98)
MONOCYTES # BLD AUTO: 0.55 THOUSAND/ÂΜL (ref 0.17–1.22)
MONOCYTES NFR BLD AUTO: 14 % (ref 4–12)
NEUTROPHILS # BLD AUTO: 2.28 THOUSANDS/ÂΜL (ref 1.85–7.62)
NEUTS SEG NFR BLD AUTO: 60 % (ref 43–75)
NITRITE UR QL STRIP: NEGATIVE
NRBC BLD AUTO-RTO: 0 /100 WBCS
PH UR STRIP.AUTO: 6.5 [PH]
PLATELET # BLD AUTO: 204 THOUSANDS/UL (ref 149–390)
PMV BLD AUTO: 9.5 FL (ref 8.9–12.7)
POTASSIUM SERPL-SCNC: 4.1 MMOL/L (ref 3.5–5.3)
PROT SERPL-MCNC: 6.8 G/DL (ref 6.4–8.4)
PROT UR STRIP-MCNC: NEGATIVE MG/DL
QRS AXIS: -72 DEGREES
QRSD INTERVAL: 108 MS
QT INTERVAL: 436 MS
QTC INTERVAL: 477 MS
RBC # BLD AUTO: 3.86 MILLION/UL (ref 3.81–5.12)
RSV RNA RESP QL NAA+PROBE: NEGATIVE
SARS-COV-2 RNA RESP QL NAA+PROBE: NEGATIVE
SODIUM SERPL-SCNC: 140 MMOL/L (ref 135–147)
SP GR UR STRIP.AUTO: 1.01 (ref 1–1.03)
T WAVE AXIS: 116 DEGREES
VENTRICULAR RATE: 72 BPM
WBC # BLD AUTO: 3.85 THOUSAND/UL (ref 4.31–10.16)

## 2024-06-28 PROCEDURE — 0241U HB NFCT DS VIR RESP RNA 4 TRGT: CPT | Performed by: INTERNAL MEDICINE

## 2024-06-28 PROCEDURE — 99223 1ST HOSP IP/OBS HIGH 75: CPT | Performed by: INTERNAL MEDICINE

## 2024-06-28 PROCEDURE — 85025 COMPLETE CBC W/AUTO DIFF WBC: CPT

## 2024-06-28 PROCEDURE — 84484 ASSAY OF TROPONIN QUANT: CPT | Performed by: INTERNAL MEDICINE

## 2024-06-28 PROCEDURE — 36415 COLL VENOUS BLD VENIPUNCTURE: CPT

## 2024-06-28 PROCEDURE — 71275 CT ANGIOGRAPHY CHEST: CPT

## 2024-06-28 PROCEDURE — 96374 THER/PROPH/DIAG INJ IV PUSH: CPT

## 2024-06-28 PROCEDURE — 93005 ELECTROCARDIOGRAM TRACING: CPT

## 2024-06-28 PROCEDURE — 99285 EMERGENCY DEPT VISIT HI MDM: CPT | Performed by: EMERGENCY MEDICINE

## 2024-06-28 PROCEDURE — 94760 N-INVAS EAR/PLS OXIMETRY 1: CPT

## 2024-06-28 PROCEDURE — 87086 URINE CULTURE/COLONY COUNT: CPT | Performed by: INTERNAL MEDICINE

## 2024-06-28 PROCEDURE — 83880 ASSAY OF NATRIURETIC PEPTIDE: CPT

## 2024-06-28 PROCEDURE — 71045 X-RAY EXAM CHEST 1 VIEW: CPT

## 2024-06-28 PROCEDURE — 81003 URINALYSIS AUTO W/O SCOPE: CPT | Performed by: INTERNAL MEDICINE

## 2024-06-28 PROCEDURE — 99285 EMERGENCY DEPT VISIT HI MDM: CPT

## 2024-06-28 PROCEDURE — 87081 CULTURE SCREEN ONLY: CPT | Performed by: INTERNAL MEDICINE

## 2024-06-28 PROCEDURE — 84484 ASSAY OF TROPONIN QUANT: CPT

## 2024-06-28 PROCEDURE — 93010 ELECTROCARDIOGRAM REPORT: CPT | Performed by: INTERNAL MEDICINE

## 2024-06-28 PROCEDURE — 80053 COMPREHEN METABOLIC PANEL: CPT

## 2024-06-28 RX ORDER — CLOPIDOGREL BISULFATE 75 MG/1
75 TABLET ORAL DAILY
Status: DISCONTINUED | OUTPATIENT
Start: 2024-06-29 | End: 2024-06-29 | Stop reason: HOSPADM

## 2024-06-28 RX ORDER — LANOLIN ALCOHOL/MO/W.PET/CERES
3 CREAM (GRAM) TOPICAL
Status: DISCONTINUED | OUTPATIENT
Start: 2024-06-28 | End: 2024-06-29 | Stop reason: HOSPADM

## 2024-06-28 RX ORDER — FUROSEMIDE 10 MG/ML
40 INJECTION INTRAMUSCULAR; INTRAVENOUS ONCE
Status: COMPLETED | OUTPATIENT
Start: 2024-06-28 | End: 2024-06-28

## 2024-06-28 RX ORDER — METOPROLOL SUCCINATE 50 MG/1
50 TABLET, EXTENDED RELEASE ORAL DAILY
Status: DISCONTINUED | OUTPATIENT
Start: 2024-06-29 | End: 2024-06-29 | Stop reason: HOSPADM

## 2024-06-28 RX ORDER — FUROSEMIDE 10 MG/ML
40 INJECTION INTRAMUSCULAR; INTRAVENOUS 2 TIMES DAILY
Status: DISCONTINUED | OUTPATIENT
Start: 2024-06-29 | End: 2024-06-29

## 2024-06-28 RX ORDER — ATORVASTATIN CALCIUM 10 MG/1
20 TABLET, FILM COATED ORAL
Status: DISCONTINUED | OUTPATIENT
Start: 2024-06-28 | End: 2024-06-29 | Stop reason: HOSPADM

## 2024-06-28 RX ORDER — ALPRAZOLAM 0.25 MG/1
0.25 TABLET ORAL 3 TIMES DAILY PRN
Status: DISCONTINUED | OUTPATIENT
Start: 2024-06-28 | End: 2024-06-29 | Stop reason: HOSPADM

## 2024-06-28 RX ORDER — ALBUTEROL SULFATE 2.5 MG/3ML
2.5 SOLUTION RESPIRATORY (INHALATION) EVERY 4 HOURS PRN
Status: DISCONTINUED | OUTPATIENT
Start: 2024-06-28 | End: 2024-06-29 | Stop reason: HOSPADM

## 2024-06-28 RX ORDER — ASCORBIC ACID 500 MG
250 TABLET ORAL DAILY
Status: DISCONTINUED | OUTPATIENT
Start: 2024-06-29 | End: 2024-06-29 | Stop reason: HOSPADM

## 2024-06-28 RX ADMIN — FUROSEMIDE 40 MG: 10 INJECTION, SOLUTION INTRAMUSCULAR; INTRAVENOUS at 16:37

## 2024-06-28 RX ADMIN — ATORVASTATIN CALCIUM 20 MG: 40 TABLET, FILM COATED ORAL at 17:55

## 2024-06-28 RX ADMIN — IOHEXOL 85 ML: 350 INJECTION, SOLUTION INTRAVENOUS at 17:18

## 2024-06-28 RX ADMIN — APIXABAN 2.5 MG: 2.5 TABLET, FILM COATED ORAL at 17:55

## 2024-06-28 RX ADMIN — Medication 3 MG: at 21:09

## 2024-06-28 NOTE — ED PROVIDER NOTES
History  Chief Complaint   Patient presents with    Shortness of Breath     Patient arrived from [facility] and complains of SOB that started recently. Patient was currently on Lasix and stopped taking it. When EMS arrived patient was 88% on RA. Patient was placed on 4L NC and is 98%.     89yo f w/ hx of hx of chf, afib, AS, CAD, HLD, HTN, CKD presents w/ SOB.   EMS states that patient is presenting from SNF w/ 2 day hx of progressively worsening SOB after discontinuing her lasix due to having to wake up at night to urinate frequently. Patient was noted to be satting 88% on RA while ambulating and conversational dyspnea and increase WOB.   No home O2 requirement at baseline.   Patient denies any complaints except frequent urination.   Currently on plavix and eliquis.       Shortness of Breath  Associated symptoms: no abdominal pain, no chest pain, no cough, no diaphoresis, no fever, no vomiting and no wheezing        Prior to Admission Medications   Prescriptions Last Dose Informant Patient Reported? Taking?   ALPRAZolam (XANAX) 0.25 mg tablet  Family Member No No   Sig: TAKE 1 TABLET BY MOUTH THREE TIMES DAILY AS NEEDED FOR ANXIETY   Ascorbic Acid (vitamin C) 100 MG tablet 6/28/2024  Yes Yes   Sig: Take 100 mg by mouth daily   Cholecalciferol (Vitamin D3) 50 MCG (2000 UT) TABS 6/28/2024 Family Member Yes Yes   Sig: Take 2,000 Units by mouth in the morning. Indications: Osteoporosis   Multiple Vitamin (Multivitamin Adult) TABS  Family Member Yes No   Sig: Take 1 tablet by mouth in the morning. Indications: Nutritional Support   Patient not taking: Reported on 1/26/2024   Nutritional Supplement LIQD  Family Member No No   Sig: Take 1 Can by mouth 2 (two) times a day Ensure pudding BID   Psyllium (METAMUCIL FIBER) 51.7 % PACK 6/28/2024 Family Member No Yes   Sig: Take 1 Package by mouth daily   albuterol (2.5 mg/3 mL) 0.083 % nebulizer solution   No No   Sig: Take 3 mL (2.5 mg total) by nebulization every 4 (four)  hours as needed for shortness of breath   alendronate (Fosamax) 70 mg tablet Past Week Family Member No Yes   Sig: Take 1 tablet (70 mg total) by mouth every 7 days   ammonium lactate (LAC-HYDRIN) 12 % lotion  Family Member No No   Sig: Apply topically daily Do not start before Jacqueline 15, 2023.   Patient not taking: Reported on 1/26/2024   apixaban (Eliquis) 2.5 mg 6/28/2024 Family Member No Yes   Sig: Take 1 tablet by mouth twice daily   atorvastatin (LIPITOR) 20 mg tablet 6/27/2024 Family Member No Yes   Sig: Take 1 tablet (20 mg total) by mouth daily with dinner   bisacodyl (DULCOLAX) 10 mg suppository  Family Member Yes No   Sig: Insert 10 mg into the rectum daily as needed for constipation   Patient not taking: Reported on 1/26/2024   clopidogrel (PLAVIX) 75 mg tablet 6/28/2024 Family Member No Yes   Sig: Take 1 tablet (75 mg total) by mouth daily   dextromethorphan-guaiFENesin (ROBITUSSIN DM)  mg/5 mL syrup   No No   Sig: Take 10 mL by mouth 3 (three) times a day   furosemide (LASIX) 20 mg tablet 6/28/2024 Family Member No Yes   Sig: Take 1 tablet (20 mg total) by mouth daily   melatonin 3 mg 6/27/2024  No Yes   Sig: Take 1 tablet (3 mg total) by mouth daily at bedtime   metoprolol succinate (TOPROL-XL) 50 mg 24 hr tablet 6/28/2024 Family Member No Yes   Sig: Take 1 tablet (50 mg total) by mouth daily Hold for HR<60 or SBP<110 mmHg   pantoprazole (PROTONIX) 20 mg tablet 6/28/2024 Family Member No Yes   Sig: Take 1 tablet (20 mg total) by mouth daily before breakfast   polyethylene glycol (MIRALAX) 17 g packet   No No   Sig: Take 17 g by mouth daily as needed (constipation) for up to 14 days   potassium chloride (K-DUR,KLOR-CON) 10 mEq tablet 6/28/2024 Family Member No Yes   Sig: Take 1 tablet (10 mEq total) by mouth daily   Patient taking differently: Take 10 mEq by mouth 2 (two) times a week Tuesday and Friday   zinc gluconate 50 mg tablet 6/28/2024  Yes Yes   Sig: Take 50 mg by mouth daily       Facility-Administered Medications: None       Past Medical History:   Diagnosis Date    Acute on chronic diastolic CHF (congestive heart failure) ([facility]) 01/30/2023    Acute respiratory failure with hypoxia ([facility]) 12/22/2022    Aortic stenosis     Atrial fibrillation (HCC)     Atrial flutter ([facility])     CAD (coronary artery disease) 01/11/2023    CAD/PCI/ELODIA    CKD (chronic kidney disease), stage III (HCC)     Community acquired pneumonia of left upper lobe of lung 05/17/2019    Fatigue     Full dentures     Generalized anxiety disorder     Hyperlipidemia     Hypertension     Impaired fasting glucose     Mitral regurgitation     Pneumonia     RBBB     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    TRANSFEMORAL W/ 23MM CALVERT SHAVONNE S3 ULTRA VALVE(ACCESS ON LEFT)    SVT (supraventricular tachycardia)     Tricuspid regurgitation        Past Surgical History:   Procedure Laterality Date    CARDIAC CATHETERIZATION N/A 1/11/2023    Procedure: LHC-Cardiac catheterization;  Surgeon: Sj Camacho MD;  Location: [facility];  Service: Cardiology    CARDIAC CATHETERIZATION N/A 1/11/2023    Procedure: Cardiac pci;  Surgeon: Sj Camacho MD;  [facility]: [facility];  Service: Cardiology    CARDIAC CATHETERIZATION N/A 1/17/2023    Procedure: CARDIAC TAVR;  Surgeon: Harrison Magdaleno DO;  Location:  MAIN OR;  Service: Cardiology    DENTAL SURGERY Bilateral     ALL TEETH REMOVED    FL GUIDED NEEDLE PLAC BX/ASP/INJ  5/26/2021    IR THORACENTESIS  12/20/2022    IR THORACENTESIS  7/14/2023    IR THORACENTESIS  9/1/2023    IR THORACENTESIS  9/25/2023    JOINT REPLACEMENT Left     KNEE SURGERY      left knee replacement    MO INJECT SI JOINT ARTHRGRPHY&/ANES/STEROID W/JOHNATHON Bilateral 5/26/2021    Procedure: SACROILIAC JOINT INJECTION;  Surgeon: Cj Langston MD;  Location: [facility];  Service: Pain Management     MO REPLACE AORTIC VALVE OPENFEMORAL ARTERY APPROACH N/A 1/17/2023    Procedure:  REPLACEMENT AORTIC VALVE TRANSCATHETER (TAVR) TRANSFEMORAL W/ 23MM CALVERT SHAVONNE S3 ULTRA VALVE(ACCESS ON LEFT) ALENA;  Surgeon: Sundar Ayala DO;  Location:  MAIN OR;  Service: Cardiac Surgery    SACROILIAC JOINT INJECTION Bilateral 6/7/2023    Procedure: Bilateral sacroiliac joint steroid injection;  Surgeon: Blanca Holm MD;  Location: [facility];  Service: Pain Management        Family History   Problem Relation Age of Onset    Hypertension Mother     Asthma Father         [place]'s asthma    Alzheimer's disease Sister     Rectal cancer Son     Uterine cancer Daughter     Heart disease Sister     Heart disease Brother      I have reviewed and agree with the history as documented.    E-Cigarette/Vaping    E-Cigarette Use Never User      E-Cigarette/Vaping Substances    Nicotine No     THC No     CBD No     Flavoring No     Other No     Unknown No      Social History     Tobacco Use    Smoking status: Never    Smokeless tobacco: Never   Vaping Use    Vaping status: Never Used   Substance Use Topics    Alcohol use: Never    Drug use: Never        Review of Systems   Constitutional:  Negative for activity change, chills, diaphoresis, fatigue and fever.   Respiratory:  Positive for shortness of breath. Negative for cough, chest tightness and wheezing.    Cardiovascular:  Negative for chest pain, palpitations and leg swelling.   Gastrointestinal:  Negative for abdominal pain, blood in stool, diarrhea, nausea and vomiting.   Genitourinary:  Positive for frequency. Negative for difficulty urinating and dysuria.   Skin:  Negative for color change.   Psychiatric/Behavioral:  Negative for confusion.    All other systems reviewed and are negative.      Physical Exam  ED Triage Vitals   Temperature Pulse Respirations Blood Pressure SpO2   06/28/24 1529 06/28/24 1529 06/28/24 1529 06/28/24 1529 06/28/24 1525   97.8 °F (36.6 °C) 80 22 (!) 196/113 (!) 88 %      Temp Source Heart Rate Source Patient Position -  Orthostatic VS BP Location FiO2 (%)   06/28/24 1529 06/28/24 1529 -- -- --   Temporal Monitor         Pain Score       06/28/24 1529       No Pain             Orthostatic Vital Signs  Vitals:    06/28/24 1529   BP: (!) 196/113   Pulse: 80       Physical Exam  Vitals and nursing note reviewed.   Constitutional:       General: She is in acute distress.      Appearance: Normal appearance. She is normal weight. She is ill-appearing. She is not toxic-appearing or diaphoretic.      Comments: Conversation dyspnea   HENT:      Head: Normocephalic and atraumatic.      Right Ear: External ear normal.      Left Ear: External ear normal.      Nose: Nose normal.      Mouth/Throat:      Mouth: Mucous membranes are moist.      Pharynx: Oropharynx is clear.   Eyes:      General: No scleral icterus.        Right eye: No discharge.         Left eye: No discharge.      Extraocular Movements: Extraocular movements intact.      Conjunctiva/sclera: Conjunctivae normal.   Cardiovascular:      Rate and Rhythm: Normal rate and regular rhythm.      Pulses: Normal pulses.      Heart sounds: Normal heart sounds. No murmur heard.  Pulmonary:      Effort: Tachypnea and respiratory distress present.      Breath sounds: No stridor. Examination of the right-lower field reveals rales. Examination of the left-lower field reveals rales. Rales present. No wheezing or rhonchi.   Chest:      Chest wall: No tenderness.   Abdominal:      General: There is no distension.      Palpations: Abdomen is soft. There is no mass.      Tenderness: There is no abdominal tenderness. There is no guarding or rebound.      Hernia: No hernia is present.   Musculoskeletal:         General: No swelling, tenderness, deformity or signs of injury. Normal range of motion.      Cervical back: Normal range of motion and neck supple. No rigidity or tenderness.      Right lower leg: Edema present.      Left lower leg: Edema present.      Comments: 1+ bilateral pitting edema    Skin:     General: Skin is warm and dry.      Capillary Refill: Capillary refill takes less than 2 seconds.      Coloration: Skin is not cyanotic, jaundiced or pale.      Findings: No bruising, erythema, lesion, petechiae or rash.   Neurological:      General: No focal deficit present.      Mental Status: She is alert and oriented to person, place, and time. Mental status is at baseline.         ED Medications  Medications   apixaban (ELIQUIS) tablet 2.5 mg (2.5 mg Oral Given 6/28/24 1755)   albuterol inhalation solution 2.5 mg (has no administration in time range)   ALPRAZolam (XANAX) tablet 0.25 mg (has no administration in time range)   ascorbic acid (VITAMIN C) tablet 250 mg (has no administration in time range)   atorvastatin (LIPITOR) tablet 20 mg (20 mg Oral Given 6/28/24 1755)   Cholecalciferol (VITAMIN D3) tablet 2,000 Units (has no administration in time range)   clopidogrel (PLAVIX) tablet 75 mg (has no administration in time range)   melatonin tablet 3 mg (has no administration in time range)   metoprolol succinate (TOPROL-XL) 24 hr tablet 50 mg (has no administration in time range)   furosemide (LASIX) injection 40 mg (has no administration in time range)   furosemide (LASIX) injection 40 mg (40 mg Intravenous Given 6/28/24 1637)   iohexol (OMNIPAQUE) 350 MG/ML injection (MULTI-DOSE) 85 mL (85 mL Intravenous Given 6/28/24 1718)       Diagnostic Studies  Results Reviewed       Procedure Component Value Units Date/Time    Occult blood x 3, stool [[id number]]     Lab Status: No result Specimen: Stool     MRSA culture [[id number]]     Lab Status: No result Specimen: Nares from Nose     COVID19, Influenza A/B, RSV PCR, [facility] [[id number]]     Lab Status: No result Specimen: Nares from Nasopharyngeal Swab     Urinalysis with microscopic [[id number]]     Lab Status: No result Specimen: Urine     Urine culture [[id number]]     Lab Status: No result Specimen: Urine, Clean Catch     B-Type Natriuretic Peptide(BNP)  [[id number]]  (Abnormal) Collected: 06/28/24 1542    Lab Status: Final result Specimen: Blood from Arm, Left Updated: 06/28/24 1609     BNP 1,287 pg/mL     HS Troponin 0hr (reflex protocol) [[id number]]  (Normal) Collected: 06/28/24 1542    Lab Status: Final result Specimen: Blood from Arm, Left Updated: 06/28/24 1609     hs TnI 0hr 26 ng/L     HS Troponin I 2hr [[id number]]     Lab Status: No result Specimen: Blood     HS Troponin I 4hr [[id number]]     Lab Status: No result Specimen: Blood     Comprehensive metabolic panel [[id number]]  (Abnormal) Collected: 06/28/24 1542    Lab Status: Final result Specimen: Blood from Arm, Left Updated: 06/28/24 1602     Sodium 140 mmol/L      Potassium 4.1 mmol/L      Chloride 108 mmol/L      CO2 22 mmol/L      ANION GAP 10 mmol/L      BUN 35 mg/dL      Creatinine 0.94 mg/dL      Glucose 90 mg/dL      Calcium 9.6 mg/dL      AST 30 U/L      ALT 35 U/L      Alkaline Phosphatase 117 U/L      Total Protein 6.8 g/dL      Albumin 4.4 g/dL      Total Bilirubin 1.04 mg/dL      eGFR 54 ml/min/1.73sq m     Narrative:      National Kidney Disease Foundation guidelines for Chronic Kidney Disease (CKD):     Stage 1 with normal or high GFR (GFR > 90 mL/min/1.73 square meters)    Stage 2 Mild CKD (GFR = 60-89 mL/min/1.73 square meters)    Stage 3A Moderate CKD (GFR = 45-59 mL/min/1.73 square meters)    Stage 3B Moderate CKD (GFR = 30-44 mL/min/1.73 square meters)    Stage 4 Severe CKD (GFR = 15-29 mL/min/1.73 square meters)    Stage 5 End Stage CKD (GFR <15 mL/min/1.73 square meters)  Note: GFR calculation is accurate only with a steady state creatinine    CBC and differential [[id number]]  (Abnormal) Collected: 06/28/24 1542    Lab Status: Final result Specimen: Blood from Arm, Left Updated: 06/28/24 1547     WBC 3.85 Thousand/uL      RBC 3.86 Million/uL      Hemoglobin 10.8 g/dL      Hematocrit 35.4 %      MCV 92 fL      MCH 28.0 pg      MCHC 30.5 g/dL      RDW 17.7 %      MPV 9.5 fL       Platelets 204 Thousands/uL      nRBC 0 /100 WBCs      Segmented % 60 %      Immature Grans % 0 %      Lymphocytes % 25 %      Monocytes % 14 %      Eosinophils Relative 1 %      Basophils Relative 0 %      Absolute Neutrophils 2.28 Thousands/µL      Absolute Immature Grans 0.01 Thousand/uL      Absolute Lymphocytes 0.95 Thousands/µL      Absolute Monocytes 0.55 Thousand/µL      Eosinophils Absolute 0.05 Thousand/µL      Basophils Absolute 0.01 Thousands/µL     UA w Reflex to Microscopic w Reflex to Culture [[id number]]     Lab Status: No result Specimen: Urine                    XR chest 1 view portable   ED Interpretation by Ted Wilson MD (06/28 1624)   Cardiomegaly w/ pulmonary vascular congestion. Trace pleural effusions.       CTA chest pe study    (Results Pending)         Procedures  ECG 12 Lead Documentation Only    Date/Time: 6/28/2024 3:55 PM    Performed by: Ted Wilson MD  Authorized by: Ted Wilson MD    Indications / Diagnosis:  Sob  ECG reviewed by me, the ED Provider: yes    Patient location:  ED  Previous ECG:     Previous ECG:  Unavailable    Comparison to cardiac monitor: Yes    Interpretation:     Interpretation: abnormal    Rate:     ECG rate:  72    ECG rate assessment: normal    Rhythm:     Rhythm: atrial fibrillation    Ectopy:     Ectopy: none    QRS:     QRS axis:  Left    QRS intervals:  Normal  Conduction:     Conduction: normal    ST segments:     ST segments:  Normal  T waves:     T waves: flattening and inverted      Flattening:  V6  Q waves:     Q waves:  I and aVL  Other findings:     Other findings: poor R wave progression    Comments:      No STEMI        ED Course  ED Course as of 06/28/24 1809 Fri Jun 28, 2024   1553 Hemoglobin(!): 10.8  Improved.    1617 BNP(!): 1,287             HEART Risk Score      Flowsheet Row Most Recent Value   Heart Score Risk Calculator    History 0 Filed at: 06/28/2024 1624   ECG 1 Filed at: 06/28/2024 1624   Age 2 Filed at:  06/28/2024 1624   Risk Factors 2 Filed at: 06/28/2024 1624   Troponin 1 Filed at: 06/28/2024 1624   HEART Score 6 Filed at: 06/28/2024 1624                        SBIRT 20yo+      Flowsheet Row Most Recent Value   Initial Alcohol Screen: US AUDIT-C     1. How often do you have a drink containing alcohol? 0 Filed at: 06/28/2024 1525   2. How many drinks containing alcohol do you have on a typical day you are drinking?  0 Filed at: 06/28/2024 1525   3a. Male UNDER 65: How often do you have five or more drinks on one occasion? 0 Filed at: 06/28/2024 1525   3b. FEMALE Any Age, or MALE 65+: How often do you have 4 or more drinks on one occassion? 0 Filed at: 06/28/2024 1525   Audit-C Score 0 Filed at: 06/28/2024 1525   KAILEY: How many times in the past year have you...    Used an illegal drug or used a prescription medication for non-medical reasons? Never Filed at: 06/28/2024 1525                  Medical Decision Making  88-year-old female presents with shortness of breath that has been progressive over the past couple days.  On Eliquis and Plavix due to previous atrial fibrillation and valve surgery, unlikely VTE at this time.  Low saturations at nursing home with new O2 requirement.  Differential includes but not limited to ACS, CHF exacerbation, renal failure, liver failure, fluid overload, pneumonia, anemia, cystitis    Physical exam notable for bibasilar Rales.  Chest x-ray shows cardiomegaly and pulmonary vascular congestion, trace pleural effusions, no significant change when compared to previous chest x-ray.  BNP 1.2 thousand in the setting of recently discontinuing her own diuretics.  Hemodynamically stable with elevated blood pressure, likely in the setting of acute CHF exacerbation with fluid retention.  Patient is afebrile and hemodynamically stable, normal white count, and denies systemic symptoms of of infection except for shortness of breath.  Low suspicion for infectious process at this  time.    Discussed case with internal medicine team and will admit for CHF exacerbation with new O2 requirement.  Given initial 40 mg of Lasix while in the ED.    Amount and/or Complexity of Data Reviewed  Labs: ordered. Decision-making details documented in ED Course.  Radiology: ordered and independent interpretation performed.    Risk  Prescription drug management.  Decision regarding hospitalization.          Disposition  Final diagnoses:   CHF exacerbation (HCC)     Time reflects when diagnosis was documented in both MDM as applicable and the Disposition within this note       Time User Action Codes Description Comment    6/28/2024  4:37 PM Ted Wilson [I50.9] CHF exacerbation (HCC)           ED Disposition       ED Disposition   Admit    Condition   Stable    Date/Time   Fri Jun 28, 2024 1637    Comment   Case was discussed with Hostestter and the patient's admission status was agreed to be Admission Status: inpatient status to the service of Dr. Sherwood .               Follow-up Information    None         Patient's Medications   Discharge Prescriptions    No medications on file     No discharge procedures on file.    PDMP Review         Value Time User    PDMP Reviewed  Yes 1/25/2024  9:43 PM Rogerio Francois PA-C             ED Provider  Attending physically available and evaluated Mariela Giron. I managed the patient along with the ED Attending.    Electronically Signed by           Ted Wilson MD  06/28/24 [id number]

## 2024-06-28 NOTE — ASSESSMENT & PLAN NOTE
Wt Readings from Last 3 Encounters:   06/28/24 51.4 kg (113 lb 5.1 oz)   02/02/24 48.9 kg (107 lb 12.9 oz)   01/03/24 45.5 kg (100 lb 6.4 oz)     Treat with Lasix 40 mg IV BID based on the acute CHF treatment protocol guidelines (On Lasix 20 mg PO Qdaily chronically)  Continue Toprol-XL 50 mg PO Qdaily  Monitor daily weights and strict intake/output measurements     Transthoracic echocardiogram (06/14/2023):  Interpretation Summary    Left Ventricle: Left ventricular cavity size is normal. Wall thickness is mildly increased. The left ventricular ejection fraction is 50%. Systolic function is low normal. Wall motion is normal. Diastolic function is abnormal.    Right Ventricle: Systolic function is normal.    Left Atrium: The atrium is severely dilated.    Right Atrium: The atrium is mildly dilated.    Aortic Valve: There is an Conteh SHAVONNE 3 Ultra 23 mm TAVR bioprosthetic valve. The aortic valve has no significant stenosis. The aortic valve peak velocity is 2.06 m/s. The aortic valve mean gradient is 8 mmHg. The dimensionless velocity index is 0.55. The aortic valve area is 1.69 cm2.    Mitral Valve: There is moderate regurgitation.    Tricuspid Valve: There is moderate regurgitation. The estimated right ventricular systolic pressure is 52.00 mmHg.

## 2024-06-28 NOTE — ASSESSMENT & PLAN NOTE
Presented with hypoxia-found to have an oxygen saturation level of 88% on room air  Currently requiring 2 Lpm of continuous supplemental oxygen to maintain oxygen saturation levels at 90% and above  Not on home supplemental oxygen therapy  Incentive spirometry  Respiratory protocol

## 2024-06-28 NOTE — H&P
[facility]  H&P  Name: Mariela Giron 88 y.o. female I MRN: [id number]  Unit/Bed#: RM11 I Date of Admission: 6/28/2024   Date of Service: 6/28/2024 I Hospital Day: 0      Assessment & Plan   * Acute on chronic diastolic CHF (congestive heart failure) (HCC)  Assessment & Plan  Wt Readings from Last 3 Encounters:   06/28/24 51.4 kg (113 lb 5.1 oz)   02/02/24 48.9 kg (107 lb 12.9 oz)   01/03/24 45.5 kg (100 lb 6.4 oz)     Treat with Lasix 40 mg IV BID based on the acute CHF treatment protocol guidelines (On Lasix 20 mg PO Qdaily chronically)  Continue Toprol-XL 50 mg PO Qdaily  Monitor daily weights and strict intake/output measurements     Transthoracic echocardiogram (06/14/2023):  Interpretation Summary    Left Ventricle: Left ventricular cavity size is normal. Wall thickness is mildly increased. The left ventricular ejection fraction is 50%. Systolic function is low normal. Wall motion is normal. Diastolic function is abnormal.    Right Ventricle: Systolic function is normal.    Left Atrium: The atrium is severely dilated.    Right Atrium: The atrium is mildly dilated.    Aortic Valve: There is an Conteh SHAVONNE 3 Ultra 23 mm TAVR bioprosthetic valve. The aortic valve has no significant stenosis. The aortic valve peak velocity is 2.06 m/s. The aortic valve mean gradient is 8 mmHg. The dimensionless velocity index is 0.55. The aortic valve area is 1.69 cm2.    Mitral Valve: There is moderate regurgitation.    Tricuspid Valve: There is moderate regurgitation. The estimated right ventricular systolic pressure is 52.00 mmHg.        Hypoxia  Assessment & Plan  Presented with hypoxia-found to have an oxygen saturation level of 88% on room air  Currently requiring 2 Lpm of continuous supplemental oxygen to maintain oxygen saturation levels at 90% and above  Not on home supplemental oxygen therapy  Incentive spirometry  Respiratory protocol    Permanent atrial fibrillation (HCC)  Assessment &  Plan  Continue anticoagulation with Eliquis 2.5 mg PO BID  Continue Toprol-XL 50 mg PO Qdaily    Leukopenia  Assessment & Plan  The absolute neutrophil count is within normal limits.  Follow the CBC with differential    Anemia  Assessment & Plan  Check an iron panel, vitamin B12 level, and folate level  Check the patient's stool for occult blood x 3 specimens  Follow the CBC  Transfuse for a hemoglobin less than 7 g/dl        VTE Pharmacologic Prophylaxis: VTE Score: 8 High Risk (Score >/= 5) - Pharmacological DVT Prophylaxis Ordered: apixaban (Eliquis). Sequential Compression Devices Ordered.  Code Status: Level 1 - Full Code    Anticipated Length of Stay: Patient will be admitted on an inpatient basis with an anticipated length of stay of greater than 2 midnights secondary to the need for IV Lasix treatment and the need for continuous supplemental oxygen to maintain oxygen saturation levesl at 90% and above.    Total Time Spent on Date of Encounter in care of patient: 75 mins. This time was spent on one or more of the following: performing physical exam; counseling and coordination of care; obtaining or reviewing history; documenting in the medical record; reviewing/ordering tests, medications or procedures; communicating with other healthcare professionals and discussing with patient's family/caregivers.    Chief Complaint:  Shortness of breath    History of Present Illness:  Mariela Giron is a 88 y.o. female who presented to the ED with shortness of breath.  Over the last 2 days, the patient has developed progressively worsening shortness of breath at rest, which is worsened with any type of exertion or physical activity.  The shortness of breath is severe in intensity and constant in nature.  Nothing seemed to improve the shortness of breath.  The patient does admit to missing multiple doses of her daily PO Lasix due to the medicine keeping her up most of the night having to urinate.  Associated symptoms  include bilateral lower extremity edema and fatigue.    Review of Systems:  Review of Systems:  Per HPI, all other systems have been reviewed and were negative.    Past Medical and Surgical History:   Past Medical History:   Diagnosis Date    Acute on chronic diastolic CHF (congestive heart failure) ([facility]) 01/30/2023    Acute respiratory failure with hypoxia ([facility]) 12/22/2022    Aortic stenosis     Atrial fibrillation (HCC)     Atrial flutter (HCC)     CAD (coronary artery disease) 01/11/2023    CAD/PCI/ELODIA    CKD (chronic kidney disease), stage III ([facility])     Community acquired pneumonia of left upper lobe of lung 05/17/2019    Fatigue     Full dentures     Generalized anxiety disorder     Hyperlipidemia     Hypertension     Impaired fasting glucose     Mitral regurgitation     Pneumonia     RBBB     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    TRANSFEMORAL W/ 23MM CALVERT SHAVONNE S3 ULTRA VALVE(ACCESS ON LEFT)    SVT (supraventricular tachycardia)     Tricuspid regurgitation        Past Surgical History:   Procedure Laterality Date    CARDIAC CATHETERIZATION N/A 1/11/2023    Procedure: LHC-Cardiac catheterization;  Surgeon: Sj Camacho MD;  Location: [facility];  Service: Cardiology    CARDIAC CATHETERIZATION N/A 1/11/2023    Procedure: Cardiac pci;  Surgeon: Sj Camacho MD;  Location: [facility]ARD[facility];  Service: Cardiology    CARDIAC CATHETERIZATION N/A 1/17/2023    Procedure: CARDIAC TAVR;  Surgeon: Harrison Magdaleno DO;  Location: [facility] MAIN OR;  Service: Cardiology    DENTAL SURGERY Bilateral     ALL TEETH REMOVED    FL GUIDED NEEDLE PLAC BX/ASP/INJ  5/26/2021    IR THORACENTESIS  12/20/2022    IR THORACENTESIS  7/14/2023    IR THORACENTESIS  9/1/2023    IR THORACENTESIS  9/25/2023    JOINT REPLACEMENT Left     KNEE SURGERY      left knee replacement    WV INJECT SI JOINT ARTHRGRPHY&/ANES/STEROID W/JOHNATHON Bilateral 5/26/2021    Procedure: SACROILIAC JOINT INJECTION;  Surgeon: Cj  MD Ivis;  Location: [facility] MA[facility];  Service: Pain Management     TX REPLACE AORTIC VALVE OPENFEMORAL ARTERY APPROACH N/A 1/17/2023    Procedure: REPLACEMENT AORTIC VALVE TRANSCATHETER (TAVR) TRANSFEMORAL W/ 23MM CALVERT SHAVONNE S3 ULTRA VALVE(ACCESS ON LEFT) ALENA;  Surgeon: Sundar Ayala DO;  Location:  MAIN OR;  Service: Cardiac Surgery    SACROILIAC JOINT INJECTION Bilateral 6/7/2023    Procedure: Bilateral sacroiliac joint steroid injection;  Surgeon: Blanca Holm MD;  Location: [facility] MA[facility];  Service: Pain Management        Meds/Allergies:  Prior to Admission medications    Medication Sig Start Date End Date Taking? Authorizing Provider   alendronate (Fosamax) 70 mg tablet Take 1 tablet (70 mg total) by mouth every 7 days 4/25/23  Yes Papa Zabala DO   apixaban (Eliquis) 2.5 mg Take 1 tablet by mouth twice daily 10/12/23  Yes Papa Zabala DO   Ascorbic Acid (vitamin C) 100 MG tablet Take 100 mg by mouth daily   Yes Historical Provider, MD   atorvastatin (LIPITOR) 20 mg tablet Take 1 tablet (20 mg total) by mouth daily with dinner 10/3/23  Yes Papa Zabala DO   Cholecalciferol (Vitamin D3) 50 MCG (2000 UT) TABS Take 2,000 Units by mouth in the morning. Indications: Osteoporosis   Yes Historical Provider, MD   clopidogrel (PLAVIX) 75 mg tablet Take 1 tablet (75 mg total) by mouth daily 8/8/23 8/1/24 Yes Brian Appiah MD   furosemide (LASIX) 20 mg tablet Take 1 tablet (20 mg total) by mouth daily 8/15/23  Yes Papa Zabala DO   melatonin 3 mg Take 1 tablet (3 mg total) by mouth daily at bedtime 2/2/24  Yes Arely Leung PA-C   metoprolol succinate (TOPROL-XL) 50 mg 24 hr tablet Take 1 tablet (50 mg total) by mouth daily Hold for HR<60 or SBP<110 mmHg 6/14/23  Yes Fazal Sherwood DO   pantoprazole (PROTONIX) 20 mg tablet Take 1 tablet (20 mg total) by mouth daily before breakfast 8/15/23 8/9/24 Yes Papa Zabala DO   potassium chloride (K-DUR,KLOR-CON) 10 mEq tablet Take 1 tablet (10 mEq total) by  mouth daily  Patient taking differently: Take 10 mEq by mouth 2 (two) times a week Tuesday and Friday 8/28/23  Yes Papa Zabala DO   Psyllium (METAMUCIL FIBER) 51.7 % PACK Take 1 Package by mouth daily 5/21/19  Yes Fazal Sherwood DO   zinc gluconate 50 mg tablet Take 50 mg by mouth daily   Yes Historical Provider, MD   albuterol (2.5 mg/3 mL) 0.083 % nebulizer solution Take 3 mL (2.5 mg total) by nebulization every 4 (four) hours as needed for shortness of breath 2/2/24   Arely Leung PA-C   ALPRAZolam (XANAX) 0.25 mg tablet TAKE 1 TABLET BY MOUTH THREE TIMES DAILY AS NEEDED FOR ANXIETY 9/28/23   Papa Zabala DO   ammonium lactate (LAC-HYDRIN) 12 % lotion Apply topically daily Do not start before Jacqueline 15, 2023.  Patient not taking: Reported on 1/26/2024 6/15/23   Fazal Sherwood DO   bisacodyl (DULCOLAX) 10 mg suppository Insert 10 mg into the rectum daily as needed for constipation  Patient not taking: Reported on 1/26/2024    Historical Provider, MD   dextromethorphan-guaiFENesin (ROBITUSSIN DM)  mg/5 mL syrup Take 10 mL by mouth 3 (three) times a day 2/2/24   Arely Leung PA-C   Multiple Vitamin (Multivitamin Adult) TABS Take 1 tablet by mouth in the morning. Indications: Nutritional Support  Patient not taking: Reported on 1/26/2024    Historical Provider, MD   Nutritional Supplement LIQD Take 1 Can by mouth 2 (two) times a day Ensure pudding BID 6/14/23   Fazal Sherwood DO   polyethylene glycol (MIRALAX) 17 g packet Take 17 g by mouth daily as needed (constipation) for up to 14 days 11/22/23 12/6/23  Raheem Wilde, DO   oxygen gas Inhale 2 L/min daily as needed (low oxygen levels). Indications: Oxygen Desaturation  Patient not taking: Reported on 1/30/2023 2/7/23  Lilly Mcdermott MD     I have reviewed home medications using recent Epic encounter.    Allergies: No Known Allergies    Social History:  Marital Status: /Civil Union     Substance Use History:  "  Social History     Substance and Sexual Activity   Alcohol Use Never     Social History     Tobacco Use   Smoking Status Never   Smokeless Tobacco Never     Social History     Substance and Sexual Activity   Drug Use Never       Family History:  Non-contributory    Physical Exam:     Vitals:   Blood Pressure: (!) 196/113 (06/28/24 1529)  Pulse: 80 (06/28/24 1529)  Temperature: 97.8 °F (36.6 °C) (06/28/24 1529)  Temp Source: Temporal (06/28/24 1529)  Respirations: 22 (06/28/24 1529)  Weight - Scale: 51.4 kg (113 lb 5.1 oz) (06/28/24 1529)  SpO2: 98 % (06/28/24 1529)    Physical Exam  General:  NAD, follows commands  HEENT:  NC/AT, mucous membranes moist  Neck:  Supple, No JVP elevation  CV:  + S1, + S2, Irregularly irregular rhythm, Regular rate  Pulm:  Bibasilar crackles  Abd:  Soft, Non-tender, Non-distended  Ext:  No clubbing/cyanosis, Edema of the bilateral lower extremities  Skin:  No rashes  Neuro:  Awake, alert, oriented  Psych:  Normal mood and affect      Additional Data:     Lab Results:  Results from last 7 days   Lab Units 06/28/24  1542   WBC Thousand/uL 3.85*   HEMOGLOBIN g/dL 10.8*   HEMATOCRIT % 35.4   PLATELETS Thousands/uL 204   SEGS PCT % 60   LYMPHO PCT % 25   MONO PCT % 14*   EOS PCT % 1     Results from last 7 days   Lab Units 06/28/24  1542   SODIUM mmol/L 140   POTASSIUM mmol/L 4.1   CHLORIDE mmol/L 108   CO2 mmol/L 22   BUN mg/dL 35*   CREATININE mg/dL 0.94   ANION GAP mmol/L 10   CALCIUM mg/dL 9.6   ALBUMIN g/dL 4.4   TOTAL BILIRUBIN mg/dL 1.04*   ALK PHOS U/L 117*   ALT U/L 35   AST U/L 30   GLUCOSE RANDOM mg/dL 90             No results found for: \"HGBA1C\"        Lines/Drains:  Invasive Devices       Peripheral Intravenous Line  Duration             Peripheral IV 06/28/24 Left Antecubital <1 day              Drain  Duration             External Urinary Catheter Medium 154 days    External Urinary Catheter <1 day                        Imaging: Personally reviewed the following imaging: " chest xray  XR chest 1 view portable   ED Interpretation by Ted Wilson MD (06/28 1624)   Cardiomegaly w/ pulmonary vascular congestion. Trace pleural effusions.       CTA chest pe study    (Results Pending)       EKG and Other Studies Reviewed on Admission:   EKG (06/28/2024):  Narrative & Impression    Atrial fibrillation  Left anterior fascicular block  Septal infarct (cited on or before 18-JAN-2023)  Lateral infarct (cited on or before 18-JAN-2023)  Abnormal ECG  Confirmed by Harley Dawn ([id number]) on 6/28/2024 4:10:48 PM      Specimen Collected: 06/28/24 15:34 Last Resulted: 06/28/24 16:10          ** Please Note: This note has been constructed using a voice recognition system. **

## 2024-06-29 VITALS
SYSTOLIC BLOOD PRESSURE: 142 MMHG | BODY MASS INDEX: 20.55 KG/M2 | TEMPERATURE: 98.2 F | HEART RATE: 73 BPM | WEIGHT: 97.88 LBS | DIASTOLIC BLOOD PRESSURE: 82 MMHG | RESPIRATION RATE: 19 BRPM | HEIGHT: 58 IN | OXYGEN SATURATION: 96 %

## 2024-06-29 LAB
ALBUMIN SERPL BCG-MCNC: 4.1 G/DL (ref 3.5–5)
ALP SERPL-CCNC: 114 U/L (ref 34–104)
ALT SERPL W P-5'-P-CCNC: 30 U/L (ref 7–52)
ANION GAP SERPL CALCULATED.3IONS-SCNC: 13 MMOL/L (ref 4–13)
AST SERPL W P-5'-P-CCNC: 26 U/L (ref 13–39)
BACTERIA UR CULT: NORMAL
BASOPHILS # BLD AUTO: 0.01 THOUSANDS/ÂΜL (ref 0–0.1)
BASOPHILS NFR BLD AUTO: 0 % (ref 0–1)
BILIRUB SERPL-MCNC: 1.5 MG/DL (ref 0.2–1)
BUN SERPL-MCNC: 31 MG/DL (ref 5–25)
CALCIUM SERPL-MCNC: 9.5 MG/DL (ref 8.4–10.2)
CHLORIDE SERPL-SCNC: 105 MMOL/L (ref 96–108)
CO2 SERPL-SCNC: 23 MMOL/L (ref 21–32)
CREAT SERPL-MCNC: 1.11 MG/DL (ref 0.6–1.3)
EOSINOPHIL # BLD AUTO: 0.08 THOUSAND/ÂΜL (ref 0–0.61)
EOSINOPHIL NFR BLD AUTO: 1 % (ref 0–6)
ERYTHROCYTE [DISTWIDTH] IN BLOOD BY AUTOMATED COUNT: 17.9 % (ref 11.6–15.1)
FERRITIN SERPL-MCNC: 25 NG/ML (ref 11–307)
FOLATE SERPL-MCNC: >22.3 NG/ML
GFR SERPL CREATININE-BSD FRML MDRD: 44 ML/MIN/1.73SQ M
GLUCOSE SERPL-MCNC: 105 MG/DL (ref 65–140)
HCT VFR BLD AUTO: 36 % (ref 34.8–46.1)
HGB BLD-MCNC: 11 G/DL (ref 11.5–15.4)
IMM GRANULOCYTES # BLD AUTO: 0.02 THOUSAND/UL (ref 0–0.2)
IMM GRANULOCYTES NFR BLD AUTO: 0 % (ref 0–2)
IRON SATN MFR SERPL: 9 % (ref 15–50)
IRON SERPL-MCNC: 36 UG/DL (ref 50–212)
LYMPHOCYTES # BLD AUTO: 0.35 THOUSANDS/ÂΜL (ref 0.6–4.47)
LYMPHOCYTES NFR BLD AUTO: 6 % (ref 14–44)
MAGNESIUM SERPL-MCNC: 1.8 MG/DL (ref 1.9–2.7)
MCH RBC QN AUTO: 27.6 PG (ref 26.8–34.3)
MCHC RBC AUTO-ENTMCNC: 30.6 G/DL (ref 31.4–37.4)
MCV RBC AUTO: 90 FL (ref 82–98)
MONOCYTES # BLD AUTO: 0.28 THOUSAND/ÂΜL (ref 0.17–1.22)
MONOCYTES NFR BLD AUTO: 5 % (ref 4–12)
NEUTROPHILS # BLD AUTO: 5.15 THOUSANDS/ÂΜL (ref 1.85–7.62)
NEUTS SEG NFR BLD AUTO: 88 % (ref 43–75)
NRBC BLD AUTO-RTO: 0 /100 WBCS
PHOSPHATE SERPL-MCNC: 4.2 MG/DL (ref 2.3–4.1)
PLATELET # BLD AUTO: 201 THOUSANDS/UL (ref 149–390)
PMV BLD AUTO: 9.2 FL (ref 8.9–12.7)
POTASSIUM SERPL-SCNC: 3.2 MMOL/L (ref 3.5–5.3)
PROCALCITONIN SERPL-MCNC: 0.26 NG/ML
PROT SERPL-MCNC: 6.5 G/DL (ref 6.4–8.4)
RBC # BLD AUTO: 3.99 MILLION/UL (ref 3.81–5.12)
SODIUM SERPL-SCNC: 141 MMOL/L (ref 135–147)
TIBC SERPL-MCNC: 384 UG/DL (ref 250–450)
UIBC SERPL-MCNC: 348 UG/DL (ref 155–355)
VIT B12 SERPL-MCNC: 406 PG/ML (ref 180–914)
WBC # BLD AUTO: 5.89 THOUSAND/UL (ref 4.31–10.16)

## 2024-06-29 PROCEDURE — 83540 ASSAY OF IRON: CPT | Performed by: INTERNAL MEDICINE

## 2024-06-29 PROCEDURE — 85025 COMPLETE CBC W/AUTO DIFF WBC: CPT | Performed by: INTERNAL MEDICINE

## 2024-06-29 PROCEDURE — 82607 VITAMIN B-12: CPT | Performed by: INTERNAL MEDICINE

## 2024-06-29 PROCEDURE — 94760 N-INVAS EAR/PLS OXIMETRY 1: CPT

## 2024-06-29 PROCEDURE — 87040 BLOOD CULTURE FOR BACTERIA: CPT | Performed by: INTERNAL MEDICINE

## 2024-06-29 PROCEDURE — 83735 ASSAY OF MAGNESIUM: CPT | Performed by: INTERNAL MEDICINE

## 2024-06-29 PROCEDURE — 80053 COMPREHEN METABOLIC PANEL: CPT | Performed by: INTERNAL MEDICINE

## 2024-06-29 PROCEDURE — 97163 PT EVAL HIGH COMPLEX 45 MIN: CPT

## 2024-06-29 PROCEDURE — 82728 ASSAY OF FERRITIN: CPT | Performed by: INTERNAL MEDICINE

## 2024-06-29 PROCEDURE — 82746 ASSAY OF FOLIC ACID SERUM: CPT | Performed by: INTERNAL MEDICINE

## 2024-06-29 PROCEDURE — 83550 IRON BINDING TEST: CPT | Performed by: INTERNAL MEDICINE

## 2024-06-29 PROCEDURE — 99239 HOSP IP/OBS DSCHRG MGMT >30: CPT | Performed by: PHYSICIAN ASSISTANT

## 2024-06-29 PROCEDURE — 84145 PROCALCITONIN (PCT): CPT | Performed by: INTERNAL MEDICINE

## 2024-06-29 PROCEDURE — 84100 ASSAY OF PHOSPHORUS: CPT | Performed by: INTERNAL MEDICINE

## 2024-06-29 PROCEDURE — 97167 OT EVAL HIGH COMPLEX 60 MIN: CPT

## 2024-06-29 RX ORDER — FUROSEMIDE 20 MG/1
20 TABLET ORAL DAILY
Start: 2024-06-29

## 2024-06-29 RX ORDER — LANOLIN ALCOHOL/MO/W.PET/CERES
800 CREAM (GRAM) TOPICAL ONCE
Status: COMPLETED | OUTPATIENT
Start: 2024-06-29 | End: 2024-06-29

## 2024-06-29 RX ORDER — POTASSIUM CHLORIDE 20 MEQ/1
40 TABLET, EXTENDED RELEASE ORAL ONCE
Status: COMPLETED | OUTPATIENT
Start: 2024-06-29 | End: 2024-06-29

## 2024-06-29 RX ADMIN — CLOPIDOGREL 75 MG: 75 TABLET ORAL at 08:02

## 2024-06-29 RX ADMIN — FUROSEMIDE 40 MG: 10 INJECTION, SOLUTION INTRAMUSCULAR; INTRAVENOUS at 08:02

## 2024-06-29 RX ADMIN — OXYCODONE HYDROCHLORIDE AND ACETAMINOPHEN 250 MG: 500 TABLET ORAL at 08:02

## 2024-06-29 RX ADMIN — Medication 800 MG: at 10:35

## 2024-06-29 RX ADMIN — METOPROLOL SUCCINATE 50 MG: 50 TABLET, EXTENDED RELEASE ORAL at 08:02

## 2024-06-29 RX ADMIN — Medication 2000 UNITS: at 08:02

## 2024-06-29 RX ADMIN — POTASSIUM CHLORIDE 40 MEQ: 1500 TABLET, EXTENDED RELEASE ORAL at 10:35

## 2024-06-29 RX ADMIN — APIXABAN 2.5 MG: 2.5 TABLET, FILM COATED ORAL at 08:02

## 2024-06-29 NOTE — ASSESSMENT & PLAN NOTE
The absolute neutrophil count is within normal limits.  Follow the CBC with differential  Now back to baseline

## 2024-06-29 NOTE — PLAN OF CARE
Problem: SAFETY ADULT  Goal: Patient will remain free of falls  Description: INTERVENTIONS:  - Educate patient/family on patient safety including physical limitations  - Instruct patient to call for assistance with activity   - Consult OT/PT to assist with strengthening/mobility   - Keep Call bell within reach  - Keep bed low and locked with side rails adjusted as appropriate  - Keep care items and personal belongings within reach  - Initiate and maintain comfort rounds  - Make Fall Risk Sign visible to staff  - Offer Toileting every 2 Hours, in advance of need  - Initiate/Maintain bed/chair alarm  - Obtain necessary fall risk management equipment: non skid socks  - Apply yellow socks and bracelet for high fall risk patients  - Consider moving patient to room near nurses station  Outcome: Progressing  Goal: Maintain or return to baseline ADL function  Description: INTERVENTIONS:  -  Assess patient's ability to carry out ADLs; assess patient's baseline for ADL function and identify physical deficits which impact ability to perform ADLs (bathing, care of mouth/teeth, toileting, grooming, dressing, etc.)  - Assess/evaluate cause of self-care deficits   - Assess range of motion  - Assess patient's mobility; develop plan if impaired  - Assess patient's need for assistive devices and provide as appropriate  - Encourage maximum independence but intervene and supervise when necessary  - Involve family in performance of ADLs  - Assess for home care needs following discharge   - Consider OT consult to assist with ADL evaluation and planning for discharge  - Provide patient education as appropriate  Outcome: Progressing  Goal: Maintains/Returns to pre admission functional level  Description: INTERVENTIONS:  - Perform AM-PAC 6 Click Basic Mobility/ Daily Activity assessment daily.  - Set and communicate daily mobility goal to care team and patient/family/caregiver.   - Collaborate with rehabilitation services on mobility  goals if consulted  - Perform Range of Motion 3 times a day.  - Reposition patient every 2 hours.  - Dangle patient 3 times a day  - Stand patient 3 times a day  - Ambulate patient 3 times a day  - Out of bed to chair 3 times a day   - Out of bed for meals 3 times a day  - Out of bed for toileting  - Record patient progress and toleration of activity level   Outcome: Progressing     Problem: DISCHARGE PLANNING  Goal: Discharge to home or other facility with appropriate resources  Description: INTERVENTIONS:  - Identify barriers to discharge w/patient and caregiver  - Arrange for needed discharge resources and transportation as appropriate  - Identify discharge learning needs (meds, wound care, etc.)  - Arrange for interpretive services to assist at discharge as needed  - Refer to Case Management Department for coordinating discharge planning if the patient needs post-hospital services based on physician/advanced practitioner order or complex needs related to functional status, cognitive ability, or social support system  Outcome: Progressing     Problem: PAIN - ADULT  Goal: Verbalizes/displays adequate comfort level or baseline comfort level  Description: Interventions:  - Encourage patient to monitor pain and request assistance  - Assess pain using appropriate pain scale  - Administer analgesics based on type and severity of pain and evaluate response  - Implement non-pharmacological measures as appropriate and evaluate response  - Consider cultural and social influences on pain and pain management  - Notify physician/advanced practitioner if interventions unsuccessful or patient reports new pain  Outcome: Progressing     Problem: METABOLIC, FLUID AND ELECTROLYTES - ADULT  Goal: Electrolytes maintained within normal limits  Description: INTERVENTIONS:  - Monitor labs and assess patient for signs and symptoms of electrolyte imbalances  - Administer electrolyte replacement as ordered  - Monitor response to  electrolyte replacements, including repeat lab results as appropriate  - Instruct patient on fluid and nutrition as appropriate  Outcome: Progressing  Goal: Fluid balance maintained  Description: INTERVENTIONS:  - Monitor labs   - Monitor I/O and WT  - Instruct patient on fluid and nutrition as appropriate  - Assess for signs & symptoms of volume excess or deficit  Outcome: Progressing

## 2024-06-29 NOTE — OCCUPATIONAL THERAPY NOTE
Occupational Therapy Evaluation     Patient Name: Mariela Giron  Today's Date: 6/29/2024  Problem List  Principal Problem:    Acute on chronic diastolic CHF (congestive heart failure) ([facility])  Active Problems:    Pulmonary hypertension (HCC)    Permanent atrial fibrillation (HCC)    Anemia    Leukopenia    Hypoxia    Left anterior fascicular block (LAFB)    Bilateral pleural effusion    Past Medical History  Past Medical History:   Diagnosis Date    Acute on chronic diastolic CHF (congestive heart failure) (HCC) 01/30/2023    Acute respiratory failure with hypoxia ([facility]) 12/22/2022    Aortic stenosis     Atrial fibrillation (HCC)     Atrial flutter (HCC)     CAD (coronary artery disease) 01/11/2023    CAD/PCI/ELODIA    CKD (chronic kidney disease), stage III (HCC)     Community acquired pneumonia of left upper lobe of lung 05/17/2019    Fatigue     Full dentures     Generalized anxiety disorder     Hyperlipidemia     Hypertension     Impaired fasting glucose     Mitral regurgitation     Pneumonia     RBBB     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    TRANSFEMORAL W/ 23MM CALVERT SHAVONNE S3 ULTRA VALVE(ACCESS ON LEFT)    SVT (supraventricular tachycardia)     Tricuspid regurgitation      Past Surgical History  Past Surgical History:   Procedure Laterality Date    CARDIAC CATHETERIZATION N/A 1/11/2023    Procedure: LHC-Cardiac catheterization;  Surgeon: Sj Camacho MD;  Location: [facility];  Service: Cardiology    CARDIAC CATHETERIZATION N/A 1/11/2023    Procedure: Cardiac pci;  Surgeon: Sj Camacho MD;  [facility]: [facility];  Service: Cardiology    CARDIAC CATHETERIZATION N/A 1/17/2023    Procedure: CARDIAC TAVR;  Surgeon: Harrison Magdaleno DO;  Location: [facility] MAIN [facility];  Service: Cardiology    DENTAL SURGERY Bilateral     ALL TEETH REMOVED    FL GUIDED NEEDLE PLAC BX/ASP/INJ  5/26/2021    IR THORACENTESIS  12/20/2022    IR THORACENTESIS  7/14/2023    IR THORACENTESIS   "9/1/2023    IR THORACENTESIS  9/25/2023    JOINT REPLACEMENT Left     KNEE SURGERY      left knee replacement    AR INJECT SI JOINT ARTHRGRPHY&/ANES/STEROID W/JOHNATHON Bilateral 5/26/2021    Procedure: SACROILIAC JOINT INJECTION;  Surgeon: Cj Langston MD;  Location: [facility];  Service: Pain Management     AR REPLACE AORTIC VALVE OPENFEMORAL ARTERY APPROACH N/A 1/17/2023    Procedure: REPLACEMENT AORTIC VALVE TRANSCATHETER (TAVR) TRANSFEMORAL W/ 23MM CALVERT SHAVONNE S3 ULTRA VALVE(ACCESS ON LEFT) ALENA;  Surgeon: Sundar Ayala DO;  Location:  MAIN OR;  Service: Cardiac Surgery    SACROILIAC JOINT INJECTION Bilateral 6/7/2023    Procedure: Bilateral sacroiliac joint steroid injection;  Surgeon: Blanca Holm MD;  Location: [facility];  Service: Pain Management            06/29/24 0855   OT Last Visit   OT Visit Date 06/29/24   Note Type   Note type Evaluation   Pain Assessment   Pain Assessment Tool 0-10   Pain Score No Pain   Restrictions/Precautions   Weight Bearing Precautions Per Order No   Other Precautions Chair Alarm;Fall Risk;Telemetry;Hard of hearing   Home Living   Type of Home Assisted living  ([place])   Home Layout One level   Home Equipment Walker  (rollator)   Additional Comments Patient reports residing at [facility] since end of February.   Prior Function   Level of [place] Independent with functional mobility;Needs assistance with ADLs;Needs assistance with IADLS   Lives With Facility staff   Receives Help From Personal care attendant   Falls in the last 6 months 0   Comments Reports being (I) c toileting and dressing; staff (A) c bathing. She is (I) c rollator for functional mobility and participates in facility games/activities.   Subjective   Subjective \"I was doing so good.\"   ADL   UB Dressing Assistance 3  Moderate Assistance   UB Dressing Deficit Thread RUE;Thread LUE;Increased time to complete;Verbal cueing;Supervision/safety   LB Dressing Assistance 2  Maximal " Assistance   Toileting Assistance  3  Moderate Assistance   Toileting Deficit Verbal cueing;Supervison/safety;Steadying;Increased time to complete;Perineal hygiene;Clothing management down   Additional Comments Patient incontinent of urine while seated in recliner. Agreeable to use of standard toilet for further toileting.   Bed Mobility   Additional Comments Patient OOB at start/end of session.   Transfers   Sit to Stand 4  Minimal assistance   Additional items Assist x 1;Increased time required;Verbal cues   Stand to Sit 4  Minimal assistance   Additional items Assist x 1;Increased time required;Verbal cues   Toilet transfer 4  Minimal assistance   Additional items Standard toilet;Increased time required;Assist x 2  (grab bar use)   Functional Mobility   Functional Mobility 4  Minimal assistance  (progressing to CGA)   Additional Comments Performs functional mobility within the room to/from bathroom with use of RW and Min A progressing to CGA. Cues provided for safety and obstacle navigation. Patient on RW with SpO2 remaining in low 90s throughout. Notably fatigued and short of breath with exertion.   Additional items Rolling walker   Balance   Static Sitting Fair +   Dynamic Sitting Fair +   Static Standing Fair   Dynamic Standing Fair -   Activity Tolerance   Activity Tolerance Patient limited by fatigue   Medical Staff Made Aware PT   Nurse Made Aware RN   RUE Assessment   RUE Assessment WFL   LUE Assessment   LUE Assessment WFL   Hand Function   Gross Motor Coordination Functional   Fine Motor Coordination Functional   Psychosocial   Psychosocial (WDL) WDL   Cognition   Arousal/Participation Cooperative   Attention Within functional limits   Orientation Level Oriented X4   Memory Decreased recall of recent events   Following Commands Follows one step commands without difficulty   Assessment   Limitation Decreased ADL status;Decreased UE strength;Decreased endurance;Decreased high-level ADLs   Assessment Pt is  a 88 y.o. female seen for OT evaluation s/p admit to [facility] on 6/28/2024 w/ Acute on chronic diastolic CHF (congestive heart failure) (HCC).  Comorbidities affecting pt's functional performance at time of assessment include:  anemia, hypoxia, a-fib, bilateral PE . Personal factors affecting pt at time of IE include:difficulty performing ADLS, difficulty performing IADLS , and health management . Prior to admission, pt resided at [facility]; received min A as needed for ADLs; (I) c transfers and mobility with rollator. Upon evaluation: Pt requires Mod A c ADL completion and Min A c functional transfers/mobility c RW  2* the following deficits impacting occupational performance: weakness, decreased ROM, decreased strength, decreased balance, decreased tolerance, and impaired memory. Pt to benefit from continued skilled OT tx while in the hospital to address deficits as defined above and maximize level of functional independence w ADL's and functional mobility. Occupational Performance areas to address include: grooming, bathing/shower, toilet hygiene, dressing, health maintenance, and functional mobility.   The patient's raw score on the -PAC Daily Activity Inpatient Short Form is 16. A raw score of less than 19 suggests the patient may benefit from discharge to post-acute rehabilitation services. Please refer to the recommendation of the Occupational Therapist for safe discharge planning. From OT standpoint, recommendation at time of d/c would be level II moderate resource intensity.  Pt benefited from co-evaluation of skilled OT and PT therapists in order to most appropriately address functional deficits d/t extensive assistance required for safe functional mobility, decreased activity tolerance, and regression from functioning level prior to admission and/or onset of present illness. OT/PT objectives were addressed separately; please see PT note for specific goal areas targeted.   Goals   Patient Goals To  return to [place]   Short Term Goal #1 See note for goals   Plan   Treatment Interventions ADL retraining;UE strengthening/ROM;Functional transfer training;Endurance training;Activityengagement;Energy conservation   Goal Expiration Date 07/13/24   OT Frequency 3-5x/wk   Discharge Recommendation   Rehab Resource Intensity Level, OT II (Moderate Resource Intensity)   AM-PAC Daily Activity Inpatient   Lower Body Dressing 2   Bathing 3   Toileting 2   Upper Body Dressing 3   Grooming 3   Eating 3   Daily Activity Raw Score 16   Daily Activity Standardized Score (Calc for Raw Score >=11) 35.96   AM-PAC Applied Cognition Inpatient   Following a Speech/Presentation 3   Understanding Ordinary Conversation 4   Taking Medications 3   Remembering Where Things Are Placed or Put Away 3   Remembering List of 4-5 Errands 3   Taking Care of Complicated Tasks 3   Applied Cognition Raw Score 19   Applied Cognition Standardized Score 39.77       Pt will complete UB/LB bathing at Mod I Level w/ G balance/safety using AE and AD as needed.  Pt will complete UB/LB dressing at Mod I level w/ G balance/safety using AE and AD as needed.  Pt will complete toileting tasks at Mod I level w/ good hygiene/thoroughness using DME as needed.  Pt will complete grooming/oral hygiene tasks at Mod I level in standing stance at sink w/ G balance/safety using AD as needed.   Pt will complete bed mobility at Mod I level w/ G balance/safety.   Pt will perform functional transfers w/ Mod I to/from all surfaces using least restrictive AD.  Pt will complete functional mobility w/ use of least restrictive AD w/ Mod I for participation in ADL/IADL tasks.  Pt will demonstrate good carryover of use of energy conservation/compensatory strategies during ADLs and functional activity in order to increase safety and decrease risk for falls.   Pt will improve dynamic sitting/standing balance to good to increase safety and independence during functional transfers and  ADL and decrease risk for falls.  Patient will increase BUE strength by 1/2MM grade via AROM/AAROM/PROM exercises to increase independence in ADLs and transfers  Pt will demonstrate increased tolerance for therapeutic activities >15 min with use of AD as needed in order to increase tolerance for functional activities.   Pt will demonstrate ability to tolerate 30 minute OT session for increased engagement in functional tasks  Pt will demonstrate improved standing tolerance to 3-5 minutes during functional tasks w/ fair dynamic standing balance to enhance ADL performance    Patient OOB to recliner with all lines intact and all needs within reach.     Nathalie Jensen

## 2024-06-29 NOTE — PLAN OF CARE
Problem: OCCUPATIONAL THERAPY ADULT  Goal: Performs self-care activities at highest level of function for planned discharge setting.  See evaluation for individualized goals.  Description: Treatment Interventions: ADL retraining, UE strengthening/ROM, Functional transfer training, Endurance training, Activityengagement, Energy conservation          See flowsheet documentation for full assessment, interventions and recommendations.   Note: Limitation: Decreased ADL status, Decreased UE strength, Decreased endurance, Decreased high-level ADLs     Assessment: Pt is a 88 y.o. female seen for OT evaluation s/p admit to [facility] on 6/28/2024 w/ Acute on chronic diastolic CHF (congestive heart failure) (HCC).  Comorbidities affecting pt's functional performance at time of assessment include:  anemia, hypoxia, a-fib, bilateral PE . Personal factors affecting pt at time of IE include:difficulty performing ADLS, difficulty performing IADLS , and health management . Prior to admission, pt resided at [facility]; received min A as needed for ADLs; (I) c transfers and mobility with rollator. Upon evaluation: Pt requires Mod A c ADL completion and Min A c functional transfers/mobility c RW  2* the following deficits impacting occupational performance: weakness, decreased ROM, decreased strength, decreased balance, decreased tolerance, and impaired memory. Pt to benefit from continued skilled OT tx while in the hospital to address deficits as defined above and maximize level of functional independence w ADL's and functional mobility. Occupational Performance areas to address include: grooming, bathing/shower, toilet hygiene, dressing, health maintenance, and functional mobility.   The patient's raw score on the AM-PAC Daily Activity Inpatient Short Form is 16. A raw score of less than 19 suggests the patient may benefit from discharge to post-acute rehabilitation services. Please refer to the recommendation of the Occupational  Therapist for safe discharge planning. From OT standpoint, recommendation at time of d/c would be level II moderate resource intensity.  Pt benefited from co-evaluation of skilled OT and PT therapists in order to most appropriately address functional deficits d/t extensive assistance required for safe functional mobility, decreased activity tolerance, and regression from functioning level prior to admission and/or onset of present illness. OT/PT objectives were addressed separately; please see PT note for specific goal areas targeted.     Rehab Resource Intensity Level, OT: II (Moderate Resource Intensity)

## 2024-06-29 NOTE — ED ATTENDING ATTESTATION
6/28/2024  I, Iman Jackson MD, saw and evaluated the patient. I have discussed the patient with the resident/non-physician practitioner and agree with the resident's/non-physician practitioner's findings, Plan of Care, and MDM as documented in the resident's/non-physician practitioner's note, except where noted. All available labs and Radiology studies were reviewed.  I was present for key portions of any procedure(s) performed by the resident/non-physician practitioner and I was immediately available to provide assistance.       At this point I agree with the current assessment done in the Emergency Department.  I have conducted an independent evaluation of this patient a history and physical is as follows:    88-year-old female with history of CHF who presents for evaluation of shortness of breath.  Patient has been having shortness of breath for the past few days.  She states shortness of breath is worse with exertion.  Denies chest pain.  Denies fevers or chills.  Denies cough.  Denies abdominal pain, nausea, vomiting, or diarrhea.  Patient states she is supposed be on Lasix daily but stopped taking her Lasix because she did not want to get up to go to the bathroom in the middle the night.    Physical exam:  Vital signs reviewed, patient is hypertensive, hypoxic to 88% in room air.  Patient is awake and alert, no acute distress, head normocephalic, atraumatic, mucous membranes moist, neck supple, heart regular rate and rhythm, no murmurs/rubs/gallops, lungs with crackles in bilateral bases, abdomen soft, non tender, non distended, no rebound or guarding, 1+ pitting edema in bilateral lower extremities, no skin rashes, no focal neurologic deficits.     Assessment/plan:  88-year-old female with history of CHF who presents for evaluation of shortness of breath.  Patient stopped taking Lasix, she does have bilateral lower extremity edema and crackles in her lungs.  Differential diagnosis includes: CHF  exacerbation, anemia, ACS, arrhythmia.  Will obtain CBC to evaluate for anemia, CMP to evaluate for metabolic abnormality, BNP to evaluate for fluid overload, EKG and troponin to evaluate for ACS or arrhythmia, chest x-ray to evaluate for fluid overload.  Patient is requiring oxygen which is not her baseline, anticipate admission for acute hypoxic respiratory failure.    ED Course     Reviewed labs, BNP elevated.  Reviewed interpreted chest x-ray, shows mild pulmonary edema.  Reviewed and interpreted EKG, shows normal sinus rhythm without acute ischemic changes.  Troponin negative.  Will give patient Lasix IV.  Will discuss with slim for admission.    Critical Care Time  CriticalCare Time    Date/Time: 6/28/2024 4:35 PM    Performed by: Iman Jackson MD  Authorized by: Iman Jackson MD    Critical care provider statement:     Critical care time (minutes):  33    Critical care start time:  6/28/2024 3:35 PM    Critical care end time:  6/28/2024 6:35 PM    Critical care time was exclusive of:  Separately billable procedures and treating other patients and teaching time    Critical care was necessary to treat or prevent imminent or life-threatening deterioration of the following conditions:  Cardiac failure    Critical care was time spent personally by me on the following activities:  Blood draw for specimens, obtaining history from patient or surrogate, development of treatment plan with patient or surrogate, evaluation of patient's response to treatment, discussions with consultants, examination of patient, review of old charts, re-evaluation of patient's condition, ordering and review of radiographic studies, ordering and review of laboratory studies and ordering and performing treatments and interventions  Comments:      Acute hypoxic respiratory failure necessitating supplemental oxygen via nasal cannula, CHF exacerbation necessitating IV Lasix, frequent reassessment, and admission.

## 2024-06-29 NOTE — ASSESSMENT & PLAN NOTE
Wt Readings from Last 3 Encounters:   06/29/24 44.4 kg (97 lb 14.2 oz)   02/02/24 48.9 kg (107 lb 12.9 oz)   01/03/24 45.5 kg (100 lb 6.4 oz)     Recently tried to decrease lasix dose due to complaints of frequent urination  Received IV lasix here, now off oxygen and back to her baseline weight  Resume prior dose of lasix 20 mg daily  Check BMP in one week    Transthoracic echocardiogram (06/14/2023):  Interpretation Summary    Left Ventricle: Left ventricular cavity size is normal. Wall thickness is mildly increased. The left ventricular ejection fraction is 50%. Systolic function is low normal. Wall motion is normal. Diastolic function is abnormal.    Right Ventricle: Systolic function is normal.    Left Atrium: The atrium is severely dilated.    Right Atrium: The atrium is mildly dilated.    Aortic Valve: There is an Conteh SHAVONNE 3 Ultra 23 mm TAVR bioprosthetic valve. The aortic valve has no significant stenosis. The aortic valve peak velocity is 2.06 m/s. The aortic valve mean gradient is 8 mmHg. The dimensionless velocity index is 0.55. The aortic valve area is 1.69 cm2.    Mitral Valve: There is moderate regurgitation.    Tricuspid Valve: There is moderate regurgitation. The estimated right ventricular systolic pressure is 52.00 mmHg.

## 2024-06-29 NOTE — RESPIRATORY THERAPY NOTE
06/29/24 0734   Respiratory Assessment   Resp Comments Patient received in her chair, she is on room air, she is sleeping in no respiratory distress   Oxygen Therapy/Pulse Ox   O2 Device None (Room air)   O2 Therapy Room air   SpO2 94 %   SpO2 Activity At Rest   $ Pulse Oximetry Spot Check Charge Completed

## 2024-06-29 NOTE — PLAN OF CARE
Problem: PHYSICAL THERAPY ADULT  Goal: Performs mobility at highest level of function for planned discharge setting.  See evaluation for individualized goals.  Description:            See flowsheet documentation for full assessment, interventions and recommendations.  6/29/2024 1042 by Shawnee Bowman PT  Note:    Problem List: Decreased strength, Decreased endurance, Impaired balance, Decreased mobility  Assessment: Patient is a 88 y.o. female evaluated by Physical Therapy s/p admit to [facility] on 6/28/2024 with admitting diagnosis of: SOB (shortness of breath), CHF exacerbation (HCC) and principal problem of: Acute on chronic diastolic CHF (congestive heart failure) (HCC). PT was consulted to assess patient's functional mobility and discharge needs. Ordered are PT Evaluation and treatment with activity level of: up as tolerated. Comorbidities affecting patient's physical performance at time of assessment include:  HTN, Afib, CHF, Anxiety . Personal factors affecting the patient at time of IE include: hearing impairments and anxiety. Please locate objective findings from PT assessment regarding body systems outlined above. Pt seen for co-evaluation with OT due to patient's complex medical history and need for assistance of 2 to fully assess patient's functional mobility. Patient performing transfers from recliner and toilet with min ax1-2. Pt demonstrates fairly steady balance for cares in BR. The patient's AM-PAC Basic Mobility Inpatient Short Form Raw Score is 16. A Raw score of less than or equal to 16 suggests the patient may benefit from discharge to post-acute rehabilitation services. Please also refer to the recommendation of the Physical Therapist for safe discharge planning. Pt will benefit from continued PT intervention during LOS to address current deficits, increase LOF, and facilitate safe d/c to next level of care when medically appropriate. D/c recommendation at this time is to  return to [place].        Rehab Resource Intensity Level, PT: II (Moderate Resource Intensity)    See flowsheet documentation for full assessment.     6/29/2024 1034 by Shawnee Bowman, PT  Note:                     See flowsheet documentation for full assessment.

## 2024-06-29 NOTE — RESPIRATORY THERAPY NOTE
RT Protocol Note  Mariela Giron 88 y.o. female MRN: [id number]  Unit/Bed#: 419-01 Encounter: [id number]    Assessment    Principal Problem:    Acute on chronic diastolic CHF (congestive heart failure) ([facility])  Active Problems:    Permanent atrial fibrillation (HCC)    Anemia    Leukopenia    Hypoxia    Left anterior fascicular block (LAFB)    Bilateral pleural effusion      Home Pulmonary Medications:    Home Devices/Therapy: Other (Comment) (Patient denies any home O2, denies PAP therapy for VIKA and denies being dependent on any respiratory medications.)    Past Medical History:   Diagnosis Date    Acute on chronic diastolic CHF (congestive heart failure) ([facility]) 01/30/2023    Acute respiratory failure with hypoxia ([facility]) 12/22/2022    Aortic stenosis     Atrial fibrillation ([facility])     Atrial flutter ([facility])     CAD (coronary artery disease) 01/11/2023    CAD/PCI/ELODIA    CKD (chronic kidney disease), stage III ([facility])     Community acquired pneumonia of left upper lobe of lung 05/17/2019    Fatigue     Full dentures     Generalized anxiety disorder     Hyperlipidemia     Hypertension     Impaired fasting glucose     Mitral regurgitation     Pneumonia     RBBB     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    TRANSFEMORAL W/ 23MM CALVERT SHAVONNE S3 ULTRA VALVE(ACCESS ON LEFT)    SVT (supraventricular tachycardia)     Tricuspid regurgitation      Social History     Socioeconomic History    Marital status: /Civil Union     Spouse name: None    Number of children: 2    Years of education: None    Highest education level: None   Occupational History    None   Tobacco Use    Smoking status: Never    Smokeless tobacco: Never   Vaping Use    Vaping status: Never Used   Substance and Sexual Activity    Alcohol use: Never    Drug use: Never    Sexual activity: Not Currently     Partners: Male   Other Topics Concern    None   Social History Narrative    None     Social Determinants of Health     Financial  Resource Strain: Low Risk  (4/25/2023)    Overall Financial Resource Strain (CARDIA)     Difficulty of Paying Living Expenses: Not hard at all   Food Insecurity: No Food Insecurity (1/26/2024)    Hunger Vital Sign     Worried About Running Out of Food in the Last Year: Never true     Ran Out of Food in the Last Year: Never true   Transportation Needs: No Transportation Needs (1/26/2024)    PRAPARE - Transportation     Lack of Transportation (Medical): No     Lack of Transportation (Non-Medical): No   Physical Activity: Not on file   Stress: No Stress Concern Present (1/21/2021)    [employer] - Occupational Stress Questionnaire     Feeling of Stress : Only a little   Social Connections: Moderately Isolated (1/21/2021)    Social Connection and Isolation Panel [NHANES]     Frequency of Communication with Friends and Family: Twice a week     Frequency of Social Gatherings with Friends and Family: Three times a week     Attends Protestant Services: Never     Active Member of Clubs or Organizations: No     Attends Club or Organization Meetings: Never     Marital Status:    Intimate Partner Violence: Not At Risk (1/21/2021)    Humiliation, Afraid, Rape, and Kick questionnaire     Fear of Current or Ex-Partner: No     Emotionally Abused: No     Physically Abused: No     Sexually Abused: No   Housing Stability: Low Risk  (1/26/2024)    Housing Stability Vital Sign     Unable to Pay for Housing in the Last Year: No     Number of Times Moved in the Last Year: 1     Homeless in the Last Year: No       Subjective     Patient assessed per RT protocol.  She is accompanied by her son.  Patient claimed that she originally presented with shortness of breath.  She did state that her breathing is much better after receiving lasix and frequent urination.  Upon my physical assessment, the patient's lung sounds are clear bilaterally with no wheezing at this time.  Patient denies ever smoking, denies PAP  "therapy for VIKA and denies wearing O2.  Patient also denies having COPD or Asthma to the best of her knowledge.  In physician's note, she was placed on 2 LPM NC for hypoxia, but during my assessment, she was on room air with an SPO2 of 94%.  Patient is also hard of hearing.  She does take PO Lasix at home as well.  Will move forward with PRN Albuterol as per ordered by physician, and incentive spirometry to encourage deep breathing.  No clinical indication for scheduled nebulizers at this time.  Also continue RT protocol to monitor for any changes in clinical condition.      Objective    Physical Exam:   Assessment Type: Assess only  General Appearance: Alert, Awake  Respiratory Pattern: Normal  Chest Assessment: Chest expansion symmetrical  Bilateral Breath Sounds: Clear, Diminished  Cough: None    Vitals:  Blood pressure (!) 196/113, pulse 66, temperature 97.8 °F (36.6 °C), temperature source Temporal, resp. rate 16, height 4' 10\" (1.473 m), weight 45.2 kg (99 lb 10.4 oz), SpO2 94%.          Imaging and other studies:           Plan    Respiratory Plan: No distress/Pulmonary history  Airway Clearance Plan: Incentive Spirometer     Resp Comments: Patient assessed per RT protocol   "

## 2024-06-29 NOTE — PHYSICAL THERAPY NOTE
Physical Therapy Evaluation    Patient Name: Mariela Giron    Today's Date: 6/29/2024     Problem List  Principal Problem:    Acute on chronic diastolic CHF (congestive heart failure) ([facility])  Active Problems:    Pulmonary hypertension (HCC)    Permanent atrial fibrillation (HCC)    Anemia    Leukopenia    Hypoxia    Left anterior fascicular block (LAFB)    Bilateral pleural effusion       Past Medical History  Past Medical History:   Diagnosis Date    Acute on chronic diastolic CHF (congestive heart failure) (HCC) 01/30/2023    Acute respiratory failure with hypoxia ([facility]) 12/22/2022    Aortic stenosis     Atrial fibrillation (HCC)     Atrial flutter (HCC)     CAD (coronary artery disease) 01/11/2023    CAD/PCI/ELODIA    CKD (chronic kidney disease), stage III (HCC)     Community acquired pneumonia of left upper lobe of lung 05/17/2019    Fatigue     Full dentures     Generalized anxiety disorder     Hyperlipidemia     Hypertension     Impaired fasting glucose     Mitral regurgitation     Pneumonia     RBBB     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    TRANSFEMORAL W/ 23MM CALVERT SHAVONNE S3 ULTRA VALVE(ACCESS ON LEFT)    SVT (supraventricular tachycardia)     Tricuspid regurgitation         Past Surgical History  Past Surgical History:   Procedure Laterality Date    CARDIAC CATHETERIZATION N/A 1/11/2023    Procedure: LHC-Cardiac catheterization;  Surgeon: Sj Camacho MD;  Location: [facility] CARDIAC C[facility] LAB;  Service: Cardiology    CARDIAC CATHETERIZATION N/A 1/11/2023    Procedure: Cardiac pci;  Surgeon: Sj Camacho MD;  Location: [facility] CARDIAC C[facility];  Service: Cardiology    CARDIAC CATHETERIZATION N/A 1/17/2023    Procedure: CARDIAC TAVR;  Surgeon: Harrison Magdaleno DO;  Location: [facility] MAIN OR;  Service: Cardiology    DENTAL SURGERY Bilateral     ALL TEETH REMOVED    FL GUIDED NEEDLE PLAC BX/ASP/INJ  5/26/2021    IR THORACENTESIS  12/20/2022     IR THORACENTESIS  7/14/2023    IR THORACENTESIS  9/1/2023    IR THORACENTESIS  9/25/2023    JOINT REPLACEMENT Left     KNEE SURGERY      left knee replacement    NE INJECT SI JOINT ARTHRGRPHY&/ANES/STEROID W/JOHNATHON Bilateral 5/26/2021    Procedure: SACROILIAC JOINT INJECTION;  Surgeon: Cj Langston MD;  Location: [facility] MAIN OR;  Service: Pain Management     NE REPLACE AORTIC VALVE OPENFEMORAL ARTERY APPROACH N/A 1/17/2023    Procedure: REPLACEMENT AORTIC VALVE TRANSCATHETER (TAVR) TRANSFEMORAL W/ 23MM CALVERT SHAVONNE S3 ULTRA VALVE(ACCESS ON LEFT) ALENA;  Surgeon: Sundar Ayala DO;  Location:  MAIN OR;  Service: Cardiac Surgery    SACROILIAC JOINT INJECTION Bilateral 6/7/2023    Procedure: Bilateral sacroiliac joint steroid injection;  Surgeon: Blanca Holm MD;  Location: [facility] MAIN OR;  Service: Pain Management            06/29/24 0828   PT Last Visit   PT Visit Date 06/29/24   Note Type   Note type Evaluation   Pain Assessment   Pain Assessment Tool 0-10   Pain Score No Pain   Restrictions/Precautions   Weight Bearing Precautions Per Order No   Other Precautions Chair Alarm;Fall Risk;Hard of hearing;Telemetry   Home Living   Type of Home   ([place])   Home Layout One level   Home Equipment Walker  (4WW)   Additional Comments Pt has been resident of [place] since February/2024, uses 4WW at baseline   Prior Function   Level of [place] Independent with ADLs;Independent with functional mobility   Lives With Facility staff   Receives Help From Personal care attendant   Comments I with functional mobility at facility   Cognition   Overall Cognitive Status WFL   Following Commands Follows one step commands with increased time or repetition   RLE Assessment   RLE Assessment WFL  (gross MMT 4-/5)   LLE Assessment   LLE Assessment WFL  (gross MMT 4-/5)   Bed Mobility   Additional Comments Pt seated OOB in recliner   Transfers   Sit to Stand 4  Minimal assistance   Additional items Assist x  1;Increased time required;Verbal cues   Stand to Sit 4  Minimal assistance   Additional items Assist x 1;Increased time required;Verbal cues   Toilet transfer 4  Minimal assistance   Additional items Standard toilet;Assist x 2   Ambulation/Elevation   Gait pattern Forward Flexion;Decreased foot clearance;Short stride;Excessively slow   Gait Assistance 4  Minimal assist   Additional items Assist x 1;Verbal cues;Tactile cues   Assistive Device Rolling walker   Distance 15'x2   Balance   Static Sitting Fair +   Dynamic Sitting Fair +   Static Standing Fair   Dynamic Standing Fair -   Activity Tolerance   Activity Tolerance Patient limited by fatigue   Assessment   Problem List Decreased strength;Decreased endurance;Impaired balance;Decreased mobility   Assessment Patient is a 88 y.o. female evaluated by Physical Therapy s/p admit to [facility] on 6/28/2024 with admitting diagnosis of: SOB (shortness of breath), CHF exacerbation (HCC) and principal problem of: Acute on chronic diastolic CHF (congestive heart failure) (HCC). PT was consulted to assess patient's functional mobility and discharge needs. Ordered are PT Evaluation and treatment with activity level of: up as tolerated. Comorbidities affecting patient's physical performance at time of assessment include:  HTN, Afib, CHF, Anxiety . Personal factors affecting the patient at time of IE include: hearing impairments and anxiety. Please locate objective findings from PT assessment regarding body systems outlined above. Pt seen for co-evaluation with OT due to patient's complex medical history and need for assistance of 2 to fully assess patient's functional mobility. Patient performing transfers from recliner and toilet with min ax1-2. Pt demonstrates fairly steady balance for cares in . The patient's AM-PAC Basic Mobility Inpatient Short Form Raw Score is 16. A Raw score of less than or equal to 16 suggests the patient may benefit from discharge to  post-acute rehabilitation services. Please also refer to the recommendation of the Physical Therapist for safe discharge planning. Pt will benefit from continued PT intervention during LOS to address current deficits, increase LOF, and facilitate safe d/c to next level of care when medically appropriate. D/c recommendation at this time is to return to [place].   Goals   Short Term Goal #1 Pt to participate in LE strengthening program in order to improve bed mobility and transfers when returning home   Short Term Goal #2 Pt to perform bed mobility and transfers MI  in order to maximize independence when returning home   LTG Expiration Date 07/13/24   Long Term Goal #1 Pt to ambulate with RW at least 200' without LOB  in order to safely ambulate around facility   Plan   Treatment/Interventions Functional transfer training;LE strengthening/ROM;Therapeutic exercise;Endurance training;Bed mobility;Gait training   PT Frequency 3-5x/wk   Discharge Recommendation   Rehab Resource Intensity Level, PT II (Moderate Resource Intensity)   AM-PAC Basic Mobility Inpatient   Turning in Flat Bed Without Bedrails 3   Lying on Back to Sitting on Edge of Flat Bed Without Bedrails 3   Moving Bed to Chair 3   Standing Up From Chair Using Arms 3   Walk in Room 3   Climb 3-5 Stairs With Railing 1   Basic Mobility Inpatient Raw Score 16   Basic Mobility Standardized Score 38.32   [facility] Highest Level Of Mobility   -HLM Goal 5: Stand one or more mins   -HLM Achieved 6: Walk 10 steps or more     Shawnee Bowman, PT

## 2024-06-29 NOTE — ASSESSMENT & PLAN NOTE
At baseline hgb  Check an iron panel, vitamin B12 level, and folate level  Check the patient's stool for occult blood x 3 specimens  Follow the CBC  Transfuse for a hemoglobin less than 7 g/dl

## 2024-06-29 NOTE — CASE MANAGEMENT
Case Management Assessment & Discharge Planning Note    Patient name Mariela Giron  Location /419-01 MRN [id number]  : 1935 Date 2024       Current Admission Date: 2024  Current Admission Diagnosis:Acute on chronic diastolic CHF (congestive heart failure) (HCC)   Patient Active Problem List    Diagnosis Date Noted Date Diagnosed    Leukopenia 2024     Hypoxia 2024     Left anterior fascicular block (LAFB) 2024     Bilateral pleural effusion 2024     MRSA colonization 2024     Positive DONYA (antinuclear antibody) 2024     Atelectasis 2024     Elevated d-dimer 2024     Pressure injury of right heel, stage 2 ([facility]) 2023     Acute-on-chronic kidney injury  ([facility]) 2023     Encounter for immunization 10/03/2023     S/P angioplasty with stent 10/03/2023     Centromere antibody positive 2023     Vitamin D deficiency 08/15/2023     Atrial flutter ([facility]) 08/15/2023     Gastroesophageal reflux disease without esophagitis 08/15/2023     SIADH (syndrome of inappropriate 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[facility]) 2023     Tricuspid valve insufficiency 2023     Prolonged QT interval 2023     Pleural effusion on right 2023     Moderate protein-calorie malnutrition ([facility]) 2023     Wedge compression fracture of t11-T12 vertebra, sequela 2023     Graves disease 2023     Acute on chronic diastolic CHF (congestive heart failure) ([facility]) 2023     Anemia 2023     Coronary artery disease involving native coronary artery of native heart without angina pectoris 2023     S/P TAVR (transcatheter aortic valve replacement) 2023     Permanent atrial fibrillation ([facility]) 2023     Incomplete right bundle branch block 2023     Constipation 2023     Status post insertion of drug eluting coronary artery stent 2023     Acute hypoxic respiratory failure ([facility]) 2022     Chronic diastolic  (congestive) heart failure (HCC) 12/20/2022     Primary generalized (osteo)arthritis 09/07/2022     Dyslipidemia 03/28/2022     Post-menopause 03/28/2022     Generalized weakness 12/23/2021     Ganglion cyst 09/14/2021     Adhesive capsulitis of right shoulder 09/14/2021     Bilateral leg edema 07/20/2021     Chronic pain syndrome 06/22/2021     Sacroiliitis (HCC) 06/22/2021     Gait abnormality 06/01/2021     Chronic bilateral low back pain without sciatica 04/30/2021     Osteopenia of neck of femur 01/21/2021     Impaired fasting glucose      Encounter for long-term (current) use of medications 10/31/2020     Immunization due 10/31/2020     Acute bursitis of right shoulder 10/31/2020     Mid back pain 09/30/2019     Generalized anxiety disorder 09/05/2019     Non-rheumatic tricuspid valve insufficiency 06/04/2019     Lymphadenopathy 05/21/2019     Hiatal hernia 05/21/2019     Thrombocytopenia ([facility]) 05/21/2019     Mitral valve insufficiency 05/21/2019     Atrial tachycardia 05/21/2019     Pulmonary hypertension ([facility]) 05/21/2019     Diverticulosis 05/21/2019     Hypokalemia 05/17/2019       LOS (days): 1  Geometric Mean LOS (GMLOS) (days):   Days to GMLOS:     OBJECTIVE:    Risk of Unplanned Readmission Score: 23.62         Current admission status: Inpatient  Referral Reason:  (discharge planning)    Preferred Pharmacy:   [facility] [address] [place] [address] [place] - [address]  [address] [facility] [address]  Phone: [phone] Fax: [phone]    [id number] [place], [place]  [address] [place] [id number]  Phone: [phone] Fax: [phone]    Primary Care Provider: Stiven Osborn MD    Primary Insurance: MEDICARE  Secondary Insurance: BLUE CROSS    ASSESSMENT:    CM met with patient at the bedside,baseline information  was obtained. CM discussed the role of CM in helping the patient develop a discharge plan and assist the  patient in carry out their plan.    Patient [facility] at [facility].    Baseline ambulate with walker, aides assist with adls's      Active Health Care Proxies       Sloan Giron JR [facility] Alternate Health Care Representative - Son   Primary Phone: [phone] (Mobile)  Home Phone: [phone]                 Advance Directives  Does patient have a Health Care POA?: Yes  Does patient have Advance Directives?: Yes  Advance Directives: Living will, Power of  for health care  Primary Contact: Sloan garcia [phone]         Readmission Root Cause  30 Day Readmission: No    Patient Information  Admitted from:: Home ([facility])  Mental Status: Alert  During Assessment patient was accompanied by: Son  Assessment information provided by:: Patient, Son  Primary Caregiver: Other (Comment) (staff at [facility])  Caregiver's Name:: Sloan Giron son and staff at [facility]  Caregiver's Relationship to Patient:: Family Member  Caregiver's Telephone Number:: [phone]  Support Systems: Son  County of Residence: [place]  [place] you [place]  Home entry access options. Select all that apply.: No steps to enter home  Type of Current Residence: Facility ([facility])  Upon entering residence, is there a bedroom on the main floor (no further steps)?: Yes  Upon entering residence, is there a bathroom on the main floor (no further steps)?: Yes  Living Arrangements: Other (Comment)  Is patient a ?: No    Activities of Daily Living Prior to Admission  Functional Status: Assistance  Completes ADLs independently?: No  Level of ADL dependence: Assistance  Ambulates independently?: No  Level of ambulatory dependence: Assistance  Does patient use assisted devices?: Yes  Assisted Devices (DME) used: Wheelchair, Walker  Does patient currently own DME?: Yes  What DME does the patient currently own?: Walker  Does patient have  a history of Outpatient Therapy (PT/OT)?: No  Does the patient have a history of Short-Term Rehab?: No  Does patient have a history of HHC?: No  Does patient currently have HHC?: No         Patient Information Continued  Income Source: Pension/USP  Does patient have prescription coverage?: Yes  Does patient receive dialysis treatments?: No  Does patient have a history of substance abuse?: No  Does patient have a history of Mental Health Diagnosis?: No         Means of Transportation  Means of Transport to [facility]:: Family transport      Social Determinants of Health (SDOH)      Flowsheet Row Most Recent Value   Housing Stability    In the last 12 months, was there a time when you were not able to pay the mortgage or rent on time? N   In the past 12 months, how many times have you moved where you were living? 1   At any time in the past 12 months, were you homeless or living in a shelter (including now)? N   Transportation Needs    In the past 12 months, has lack of transportation kept you from medical appointments or from getting medications? no   In the past 12 months, has lack of transportation kept you from meetings, work, or from getting things needed for daily living? No   Food Insecurity    Within the past 12 months, you worried that your food would run out before you got the money to buy more. Never true   Within the past 12 months, the food you bought just didn't last and you didn't have money to get more. Never true   Utilities    In the past 12 months has the electric, gas, oil, or water company threatened to shut off services in your home? No            DISCHARGE DETAILS:    Discharge planning discussed with:: patient and son at bedside  Freedom of Choice: Yes  Comments - Freedom of Choice: return to [facility]  CM contacted family/caregiver?: Yes (son at bedside)  Were Treatment Team discharge recommendations reviewed with patient/caregiver?: Yes  Did patient/caregiver verbalize understanding  of patient care needs?: Yes  Were patient/caregiver advised of the risks associated with not following Treatment Team discharge recommendations?: Yes    Contacts  Patient Contacts: Sloan Giron spouse  Relationship to Patient:: Family  Contact Method: In Person  Reason/Outcome: Discharge Planning    Requested Home Health Care         Is the patient interested in HHC at discharge?: No    DME Referral Provided  Referral made for DME?: No    Treatment Team Recommendation: Home  Discharge Destination Plan:: Home ([place] with out patient physical therapy)  Transport at Discharge : Wheelchair van       ETA of Transport (Date): 06/29/24  ETA of Transport (Time): 1330     Family notified:: done 6/28/24          Patient stable for discharge back to [facility] today with SN from facility and PT/OT services.    [facility] called spoke with  Cr they can accept Mariela back today. They do not supply transport.    CM spoke with patient son at bedside and he requested transport be set up for patient W/C van.  Discussed with Family there may be an out of pocket expense for transport.      Transport requested in round trip  time secured for 1330 via [employer] W/C van.  Face sheet provided to nursing for transport.     Nursing to send ambulatory PT.OT scripts with patient.

## 2024-06-29 NOTE — ASSESSMENT & PLAN NOTE
Presented with hypoxia-found to have an oxygen saturation level of 88% on room air  Currently requiring 2 Lpm of continuous supplemental oxygen to maintain oxygen saturation levels at 90% and above  Not on home supplemental oxygen therapy  Incentive spirometry  Respiratory protocol  Back on room air doing well

## 2024-06-29 NOTE — DISCHARGE SUMMARY
[facility]  Discharge- Mariela Giron 1935, 88 y.o. female MRN: [id number]  Unit/Bed#: 419-01 Encounter: [id number]  Primary Care Provider: Stiven Osborn MD   Date and time admitted to hospital: 6/28/2024  3:24 PM    * Acute on chronic diastolic CHF (congestive heart failure) (HCC)  Assessment & Plan  Wt Readings from Last 3 Encounters:   06/29/24 44.4 kg (97 lb 14.2 oz)   02/02/24 48.9 kg (107 lb 12.9 oz)   01/03/24 45.5 kg (100 lb 6.4 oz)     Recently tried to decrease lasix dose due to complaints of frequent urination  Received IV lasix here, now off oxygen and back to her baseline weight  Resume prior dose of lasix 20 mg daily  Check BMP in one week    Transthoracic echocardiogram (06/14/2023):  Interpretation Summary    Left Ventricle: Left ventricular cavity size is normal. Wall thickness is mildly increased. The left ventricular ejection fraction is 50%. Systolic function is low normal. Wall motion is normal. Diastolic function is abnormal.    Right Ventricle: Systolic function is normal.    Left Atrium: The atrium is severely dilated.    Right Atrium: The atrium is mildly dilated.    Aortic Valve: There is an Conteh SHAVONNE 3 Ultra 23 mm TAVR bioprosthetic valve. The aortic valve has no significant stenosis. The aortic valve peak velocity is 2.06 m/s. The aortic valve mean gradient is 8 mmHg. The dimensionless velocity index is 0.55. The aortic valve area is 1.69 cm2.    Mitral Valve: There is moderate regurgitation.    Tricuspid Valve: There is moderate regurgitation. The estimated right ventricular systolic pressure is 52.00 mmHg.        Bilateral pleural effusion  Assessment & Plan  Due to recent reduced dose of lasix, resume prior dose of lasix on discharge    Hypoxia  Assessment & Plan  Presented with hypoxia-found to have an oxygen saturation level of 88% on room air  Currently requiring 2 Lpm of continuous supplemental oxygen to maintain oxygen saturation levels at  90% and above  Not on home supplemental oxygen therapy  Incentive spirometry  Respiratory protocol  Back on room air doing well    Left anterior fascicular block (LAFB)  Assessment & Plan  Noted on EKG  Cardio follow up outpaitnet    Leukopenia  Assessment & Plan  The absolute neutrophil count is within normal limits.  Follow the CBC with differential  Now back to baseline    Anemia  Assessment & Plan  At baseline hgb  Check an iron panel, vitamin B12 level, and folate level  Check the patient's stool for occult blood x 3 specimens  Follow the CBC  Transfuse for a hemoglobin less than 7 g/dl      Permanent atrial fibrillation (HCC)  Assessment & Plan  Continue anticoagulation with Eliquis 2.5 mg PO BID  Continue Toprol-XL 50 mg PO Qdaily    Pulmonary hypertension (HCC)  Assessment & Plan  Pulm pressure 52 on ECHO from 2023        Medical Problems       Resolved Problems  Date Reviewed: 6/28/2024   None       Discharging Physician / Practitioner: Milagros Rodriguez PA-C  PCP: Stiven Osborn MD  Admission Date:   Admission Orders (From admission, onward)       Ordered        06/28/24 1638  INPATIENT ADMISSION  Once                          Discharge Date: 06/29/24    Consultations During Hospital Stay:  none    Procedures Performed:   none    Significant Findings / Test Results:     CTA chest pe study   Final Result      Negative for acute pulmonary thromboembolism.      Cardiomegaly, bilateral pleural effusions and interstitial pulmonary edema.               Workstation performed: [id number]         XR chest 1 view portable   ED Interpretation   Cardiomegaly w/ pulmonary vascular congestion. Trace pleural effusions.             Incidental Findings:   none    Test Results Pending at Discharge (will require follow up):   none     Outpatient Tests Requested:  BMP    Complications:  none    Reason for Admission: acute CHF    Hospital Course:   Mariela Giron is a 88 y.o. female patient who originally presented to the  "[facility] on 6/28/2024 due to shortness of breath.  Patient's son reports that the patient was complaining of frequent urination at [facility] and her primary care there decided to decrease her Lasix dosage to see if this would improve her urination.  Patient was doing well but did appear to becoming more short of breath and when she awoke on 6/28 she was more lethargic and significantly short of breath.  She was requiring up to 2 L of oxygen when she first arrived to the hospital and was found to have enlarging pleural effusions on imaging.  Patient was provided with IV Lasix and is now back on room air and doing well overall.  Patient's son agreeable to returning back on her prior dose of Lasix and getting her back to [facility] today as she does not do well mentally in the hospital.        The patient, initially admitted to the hospital as inpatient, was discharged earlier than expected given the following: resolution of symptoms and hypoxia.    Please see above list of diagnoses and related plan for additional information.     Condition at Discharge: good    Discharge Day Visit / Exam:   Subjective:  patient appears alert, comfortable. She has no cmplaints for me today including she denies SOB  Vitals: Blood Pressure: 142/82 (06/29/24 1100)  Pulse: 73 (06/29/24 1100)  Temperature: 98.2 °F (36.8 °C) (06/29/24 1100)  Temp Source: Oral (06/28/24 2043)  Respirations: 19 (06/29/24 1100)  Height: 4' 10\" (147.3 cm) (06/28/24 2032)  Weight - Scale: 44.4 kg (97 lb 14.2 oz) (06/29/24 0542)  SpO2: 96 % (06/29/24 1100)  Exam:   Physical Exam  Vitals and nursing note reviewed.   Constitutional:       General: She is not in acute distress.  Cardiovascular:      Rate and Rhythm: Normal rate. Rhythm irregular.      Pulses: Normal pulses.      Heart sounds: Normal heart sounds. No murmur heard.     No gallop.   Pulmonary:      Effort: Pulmonary effort is normal.      Breath sounds: No wheezing or rales.   Abdominal:      " General: Bowel sounds are normal.      Palpations: Abdomen is soft.   Musculoskeletal:      Right lower leg: No edema.      Left lower leg: No edema.   Skin:     General: Skin is warm and dry.   Neurological:      Mental Status: She is alert. Mental status is at baseline.   Psychiatric:         Mood and Affect: Mood normal.         Behavior: Behavior normal.          Discussion with Family: Updated  (son) at bedside.    Discharge instructions/Information to patient and family:   See after visit summary for information provided to patient and family.      Provisions for Follow-Up Care:  See after visit summary for information related to follow-up care and any pertinent home health orders.      Mobility at time of Discharge:   Basic Mobility Inpatient Raw Score: 16  JH-HLM Goal: 5: Stand one or more mins  JH-HLM Achieved: 6: Walk 10 steps or more  HLM Goal achieved. Continue to encourage appropriate mobility.     Disposition:   Assisted Living Facility at [place]    Planned Readmission: none     Discharge Statement:  I spent 60 minutes discharging the patient. This time was spent on the day of discharge. I had direct contact with the patient on the day of discharge. Greater than 50% of the total time was spent examining patient, answering all patient questions, arranging and discussing plan of care with patient as well as directly providing post-discharge instructions.  Additional time then spent on discharge activities.    Discharge Medications:  See after visit summary for reconciled discharge medications provided to patient and/or family.      **Please Note: This note may have been constructed using a voice recognition system**

## 2024-06-30 LAB — MRSA NOSE QL CULT: NORMAL

## 2024-07-04 LAB
BACTERIA BLD CULT: NORMAL
BACTERIA BLD CULT: NORMAL

## 2024-08-14 ENCOUNTER — TELEPHONE (OUTPATIENT)
Age: 89
End: 2024-08-14

## 2024-08-14 NOTE — TELEPHONE ENCOUNTER
Hello,    The following message was sent via e-mail to the leadership team:     Please advise if you can help facilitate the following overbook request:    Patient Name: Mariela Giron    Patient MRN: [id number]     Call back #: [phone] (Sloan, son)    Insurance: Medicare/[employer]    Department:Cardiology    Specialty: General Cardiology    Reason for overbook request: PATIENT REQUEST    Comments:  Patient's son was notified of his mother's appointment for 8/15/2024 with Dr. Appiah the day prior, 8/14/2024 (appt was made a year ago).  Patient's son needs to take off from work to accompany his mother to an appt.  This was not enough notice, so appointment was canceled.  Next available is not until November 14, 2024.  Son is requesting a sooner appointment.    Requested doctor and location: Dr. Appiah/Shawn    Date of current appointment: 11/14/204 @ 2:40 p.m.      Thank you.

## 2024-09-06 ENCOUNTER — OFFICE VISIT (OUTPATIENT)
Dept: CARDIOLOGY CLINIC | Facility: CLINIC | Age: 89
End: 2024-09-06
Payer: MEDICARE

## 2024-09-06 ENCOUNTER — APPOINTMENT (OUTPATIENT)
Dept: LAB | Facility: HOSPITAL | Age: 89
End: 2024-09-06
Payer: MEDICARE

## 2024-09-06 VITALS
HEIGHT: 58 IN | BODY MASS INDEX: 21.83 KG/M2 | HEART RATE: 78 BPM | WEIGHT: 104 LBS | DIASTOLIC BLOOD PRESSURE: 70 MMHG | SYSTOLIC BLOOD PRESSURE: 112 MMHG

## 2024-09-06 DIAGNOSIS — N18.9 ACUTE RENAL FAILURE SUPERIMPOSED ON CHRONIC KIDNEY DISEASE, UNSPECIFIED ACUTE RENAL FAILURE TYPE, UNSPECIFIED CKD STAGE  (HCC): ICD-10-CM

## 2024-09-06 DIAGNOSIS — N17.9 ACUTE RENAL FAILURE SUPERIMPOSED ON CHRONIC KIDNEY DISEASE, UNSPECIFIED ACUTE RENAL FAILURE TYPE, UNSPECIFIED CKD STAGE  (HCC): ICD-10-CM

## 2024-09-06 DIAGNOSIS — I50.32 CHRONIC DIASTOLIC (CONGESTIVE) HEART FAILURE (HCC): ICD-10-CM

## 2024-09-06 DIAGNOSIS — Z95.2 S/P TAVR (TRANSCATHETER AORTIC VALVE REPLACEMENT): Chronic | ICD-10-CM

## 2024-09-06 DIAGNOSIS — N18.31 CHRONIC KIDNEY DISEASE, STAGE 3A (HCC): ICD-10-CM

## 2024-09-06 DIAGNOSIS — I48.21 PERMANENT ATRIAL FIBRILLATION (HCC): Primary | ICD-10-CM

## 2024-09-06 DIAGNOSIS — I25.10 CORONARY ARTERY DISEASE INVOLVING NATIVE CORONARY ARTERY OF NATIVE HEART WITHOUT ANGINA PECTORIS: ICD-10-CM

## 2024-09-06 LAB
ANION GAP SERPL CALCULATED.3IONS-SCNC: 10 MMOL/L (ref 4–13)
BUN SERPL-MCNC: 29 MG/DL (ref 5–25)
CALCIUM SERPL-MCNC: 9.4 MG/DL (ref 8.4–10.2)
CHLORIDE SERPL-SCNC: 104 MMOL/L (ref 96–108)
CO2 SERPL-SCNC: 25 MMOL/L (ref 21–32)
CREAT SERPL-MCNC: 0.97 MG/DL (ref 0.6–1.3)
GFR SERPL CREATININE-BSD FRML MDRD: 51 ML/MIN/1.73SQ M
GLUCOSE SERPL-MCNC: 117 MG/DL (ref 65–140)
POTASSIUM SERPL-SCNC: 3.7 MMOL/L (ref 3.5–5.3)
SODIUM SERPL-SCNC: 139 MMOL/L (ref 135–147)

## 2024-09-06 PROCEDURE — 80048 BASIC METABOLIC PNL TOTAL CA: CPT

## 2024-09-06 PROCEDURE — 36415 COLL VENOUS BLD VENIPUNCTURE: CPT

## 2024-09-06 PROCEDURE — 99214 OFFICE O/P EST MOD 30 MIN: CPT | Performed by: NURSE PRACTITIONER

## 2024-09-06 NOTE — ASSESSMENT & PLAN NOTE
Wt Readings from Last 3 Encounters:   09/06/24 47.2 kg (104 lb)   06/29/24 44.4 kg (97 lb 14.2 oz)   02/02/24 48.9 kg (107 lb 12.9 oz)     Euvolemic on exam  Continue current medications

## 2024-09-06 NOTE — PROGRESS NOTES
Patient ID: Mariela Giron is a 89 y.o. female.        Plan:      Chronic diastolic (congestive) heart failure (HCC)  Wt Readings from Last 3 Encounters:   09/06/24 47.2 kg (104 lb)   06/29/24 44.4 kg (97 lb 14.2 oz)   02/02/24 48.9 kg (107 lb 12.9 oz)     Euvolemic on exam  Continue current medications          Permanent atrial fibrillation (HCC)  Rate controlled  Continue Toprol 50 mg daily  Eliquis 2.5 mg twice daily for stroke risk reduction    Coronary artery disease involving native coronary artery of native heart without angina pectoris  S/p ELODIA of RCA  Continue Plavix, atorvastatin and metoprolol    S/P TAVR (transcatheter aortic valve replacement)  #23 mm TAVR, 1/2023  Periodic echo surveillance       Follow up Plan/Summary Comments:  Mariela is doing well from a cardiac perspective.  I have not recommended any medication changes today.  She continues on daily diuretic.  Will check a BMP to monitor renal functions and electrolytes.    Follow-up in 1 year or sooner if needed.    HPI: Mariela is seen in the office today for routine visit.    Mariela is a pleasant 89-year-old female followed in our office for CAD with RCA stenting 1/20/2023, discovered on pre-TAVR cath.  Permanent A-fib maintained on Eliquis for stroke risk reduction, and AS status post TAVR #23 on 1/11/2023    In June, she was hospitalized for heart failure at the [facility].  According to her son, she was complaining about frequent urination and thus asked her doctor to reduce her diuretic dose.  Within a few days, she was admitted with volume overload.      Since that time, she has been taking lasix 40 mg daily without recurrent HF symptoms.  She occasionally has mild exertional dyspnea but attributes this more to anxiety.  She denies any chest discomfort or palpitations.    Mariela gets around well with a walker.  She denies LH/DZ or falls.  She does report a small amount of bright red blood when wiping after a bowel movement this morning.  She  "says this happens on occasion.  Recommended that she continue to monitor for now and report recurrence or change in severity.    Review of Systems   10  point ROS  was otherwise non pertinent or negative except as per HPI or as below.   Gait: Walker      Most recent or relevant cardiac/vascular testing:    Echo 6/14/2023  EF 50%  Severe left atrial enlargement  ES 3 number 23 mm TAVR bioprosthetic valve.  Moderate MR, TR  RVSP 52 mmHg    Cardiac catheterization 1/11/2023  Single-vessel CAD, with a 75% ostial RCA stenosis status post ELODIA      Objective:     /70   Pulse 78   Ht 4' 10\" (1.473 m)   Wt 47.2 kg (104 lb)   BMI 21.74 kg/m²     PHYSICAL EXAM:    General:  Normal appearance, no acute distress  Eyes:  Anicteric.  Oral mucosa:  Moist.  Neck:  No JVD. Carotid upstrokes are brisk without bruits.  No masses.  Chest:  Clear to auscultation   Cardiac:  No palpable PMI.  Normal S1 and S2.  No murmur gallop or rub.  Abdomen:  Soft and nontender. No palpable organomegaly or aortic enlargement.  Extremities:  No peripheral edema.  Musculoskeletal:  Symmetric.   Vascular:  Pedal pulses are intact.  Neuro:  Grossly symmetric.  Psych:  Alert and oriented x3.    No Known Allergies    Current Outpatient Medications:     albuterol (2.5 mg/3 mL) 0.083 % nebulizer solution, Take 3 mL (2.5 mg total) by nebulization every 4 (four) hours as needed for shortness of breath, Disp: , Rfl:     alendronate (Fosamax) 70 mg tablet, Take 1 tablet (70 mg total) by mouth every 7 days, Disp: 12 tablet, Rfl: 3    ALPRAZolam (XANAX) 0.25 mg tablet, TAKE 1 TABLET BY MOUTH THREE TIMES DAILY AS NEEDED FOR ANXIETY, Disp: 90 tablet, Rfl: 5    apixaban (Eliquis) 2.5 mg, Take 1 tablet by mouth twice daily, Disp: 60 tablet, Rfl: 5    Ascorbic Acid (vitamin C) 100 MG tablet, Take 100 mg by mouth daily, Disp: , Rfl:     atorvastatin (LIPITOR) 20 mg tablet, Take 1 tablet (20 mg total) by mouth daily with dinner, Disp: 90 tablet, Rfl: 3    " Cholecalciferol (Vitamin D3) 50 MCG (2000 UT) TABS, Take 2,000 Units by mouth in the morning. Indications: Osteoporosis, Disp: , Rfl:     clopidogrel (PLAVIX) 75 mg tablet, Take 1 tablet (75 mg total) by mouth daily, Disp: 90 tablet, Rfl: 2    furosemide (LASIX) 20 mg tablet, Take 1 tablet (20 mg total) by mouth daily, Disp: , Rfl:     melatonin 3 mg, Take 1 tablet (3 mg total) by mouth daily at bedtime, Disp: , Rfl:     metoprolol succinate (TOPROL-XL) 50 mg 24 hr tablet, Take 1 tablet (50 mg total) by mouth daily Hold for HR<60 or SBP<110 mmHg, Disp: 90 tablet, Rfl: 0    Nutritional Supplement LIQD, Take 1 Can by mouth 2 (two) times a day Ensure pudding BID, Disp: 237 mL, Rfl: 0    pantoprazole (PROTONIX) 20 mg tablet, Take 1 tablet (20 mg total) by mouth daily before breakfast, Disp: 90 tablet, Rfl: 3    potassium chloride (K-DUR,KLOR-CON) 10 mEq tablet, Take 1 tablet (10 mEq total) by mouth daily (Patient taking differently: Take 10 mEq by mouth 2 (two) times a week Tuesday and Friday), Disp: 90 tablet, Rfl: 2    Psyllium (METAMUCIL FIBER) 51.7 % PACK, Take 1 Package by mouth daily, Disp: , Rfl: 0    zinc gluconate 50 mg tablet, Take 50 mg by mouth daily, Disp: , Rfl:     dextromethorphan-guaiFENesin (ROBITUSSIN DM)  mg/5 mL syrup, Take 10 mL by mouth 3 (three) times a day (Patient not taking: Reported on 9/6/2024), Disp: , Rfl:     polyethylene glycol (MIRALAX) 17 g packet, Take 17 g by mouth daily as needed (constipation) for up to 14 days, Disp: , Rfl: 0  Past Medical History:   Diagnosis Date    Acute on chronic diastolic CHF (congestive heart failure) (HCC) 01/30/2023    Acute respiratory failure with hypoxia (HCC) 12/22/2022    Aortic stenosis     Atrial fibrillation (HCC)     Atrial flutter (HCC)     CAD (coronary artery disease) 01/11/2023    CAD/PCI/ELODIA    CKD (chronic kidney disease), stage III (HCC)     Community acquired pneumonia of left upper lobe of lung 05/17/2019    Fatigue     Full  dentures     Generalized anxiety disorder     Hyperlipidemia     Hypertension     Impaired fasting glucose     Left anterior fascicular block     Mitral regurgitation     Pneumonia     RBBB     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    TRANSFEMORAL W/ 23MM CALVERT SHAVONNE S3 ULTRA VALVE(ACCESS ON LEFT)    SVT (supraventricular tachycardia)     Tricuspid regurgitation      Past Surgical History:   Procedure Laterality Date    CARDIAC CATHETERIZATION N/A 1/11/2023    Procedure: LHC-Cardiac catheterization;  Surgeon: Sj Camacho MD;  Location: [facility];  Service: Cardiology    CARDIAC CATHETERIZATION N/A 1/11/2023    Procedure: Cardiac pci;  Surgeon: Sj Camacho MD;  [facility]: [facility];  Service: Cardiology    CARDIAC CATHETERIZATION N/A 1/17/2023    Procedure: CARDIAC TAVR;  Surgeon: Harrison Magdaleno DO;  Location: [facility];  Service: Cardiology    DENTAL SURGERY Bilateral     ALL TEETH REMOVED    FL GUIDED NEEDLE PLAC BX/ASP/INJ  5/26/2021    IR THORACENTESIS  12/20/2022    IR THORACENTESIS  7/14/2023    IR THORACENTESIS  9/1/2023    IR THORACENTESIS  9/25/2023    JOINT REPLACEMENT Left     KNEE SURGERY      left knee replacement    IL INJECT SI JOINT ARTHRGRPHY&/ANES/STEROID W/JOHNATHON Bilateral 5/26/2021    Procedure: SACROILIAC JOINT INJECTION;  Surgeon: Cj Langston MD;  Location: [facility];  Service: Pain Management     IL REPLACE AORTIC VALVE OPENFEMORAL ARTERY APPROACH N/A 1/17/2023    Procedure: REPLACEMENT AORTIC VALVE TRANSCATHETER (TAVR) TRANSFEMORAL W/ 23MM CALVERT SHAVONNE S3 ULTRA VALVE(ACCESS ON LEFT) ALENA;  Surgeon: Sundar Ayala DO;  Location: [facility];  Service: Cardiac Surgery    SACROILIAC JOINT INJECTION Bilateral 6/7/2023    Procedure: Bilateral sacroiliac joint steroid injection;  Surgeon: Blanca Holm MD;  Location: [facility];  Service: Pain Management        CMP:   Lab Results   Component Value Date    K 3.2 (L) 06/29/2024      06/29/2024    CO2 23 06/29/2024    CO2 25 01/17/2023    BUN 31 (H) 06/29/2024    CREATININE 1.11 06/29/2024    GLUCOSE 107 01/17/2023    EGFR 44 06/29/2024     Lipid Profile:    Lab Results   Component Value Date    TRIG 50 12/21/2022    HDL 94 12/21/2022         Social History     Tobacco Use   Smoking Status Never   Smokeless Tobacco Never

## 2024-09-06 NOTE — ASSESSMENT & PLAN NOTE
Rate controlled  Continue Toprol 50 mg daily  Eliquis 2.5 mg twice daily for stroke risk reduction

## 2025-01-22 NOTE — PROGRESS NOTES
VIRTUAL EMERGENCY DEPARTMENT encounter:      Upon reviewing triage cases, it was noted that patient elected to leave the emergency department.  They did not inform provider of their desire to leave and subsequently did not receive an in-person provider evaluation, treatment, follow up instructions, educational materials or treatment recommendations. Patient was advised of ED treatment plan during their tele-triage visit and was clearly advised to be evaluated in person. Patient appeared in NAD when visualized in triage barrett.      CHIEF COMPLAINT:    Chief Complaint   Patient presents with    Chest Pain       VIDEO MSE CONSENT:  Litzy Nixon's identity was established, date of birth confirmed.  Litzy Nixon's Location: [facility]   Clinician Location: Home Office  The patient consents to interaction with Mendoza Antonio PA-C for the purpose of triage assessment and virtual provider Medical Screening Exam? YES  Patient briefly evaluated in ED triage virtually by an emergency medicine APC via Epic-Zoom with live audio and video components.    HPI:    Litzy Nixon is a 37 year old female presenting for chest pain.  She explains that he is having left-sided chest pain that is nonstop.  She explains that she was seen here last week for the same thing.  She has history of breast cancer and double mastectomy.  She denies shortness of breath and cough.  She is concerned that she has broken heart syndrome.    PHYSICAL EXAM:   ED Triage Vitals [01/21/25 1049]   BP (!) 144/85   Heart Rate (!) 109   Resp 18   Temp 98.6 °F (37 °C)   SpO2 98 %      Vital signs reviewed. Nursing note reviewed.  Information acquired with patient assistance, demonstration and feedback via two-way video visit method.  General: Alert, no acute distress.  Respiratory:  Normal respiratory effort. Able to speak in full sentences.    RADIOLOGY AND LAB RESULTS:    Results for orders placed or  Wire in RIJV during central line placement performed during the hospital encounter of 01/21/25   Electrocardiogram 12-Lead   Result Value    Ventricular Rate EKG/Min (BPM) 107    Atrial Rate (BPM) 107    TN-Interval (MSEC) 148    QRS-Interval (MSEC) 66    QT-Interval (MSEC) 330    QTc 440    P Axis (Degrees) 82    R Axis (Degrees) 62    T Axis (Degrees) 66    REPORT TEXT       **Poor data quality, interpretation may be adversely affected**  Sinus tachycardia  Otherwise normal ECG  When compared with ECG of  14-JAN-2025 08:38,  T wave inversion now evident in  Inferior leads  Confirmed by SAVANNA SIMS MD ([id number]) on 1/21/2025 6:55:32 PM         XR CHEST AP OR PA   Final Result   IMPRESSION: No acute cardiopulmonary disease. No interval change.         Electronically Signed by: Mikel Austin MD   Signed on: 1/21/2025 11:30 AM   Created on Workstation ID: [id number]   Signed on Workstation ID: [id number]          ED MEDICATIONS:  Medications - No data to display     DIAGNOSIS:  1. Chest pain, unspecified type    2. Tachycardia        PLAN:  Patient consented to and was evaluated in ED triage virtually by an emergency medicine APC via Epic-Zoom with live audio and video components. The patient's chief complaint and history were briefly reviewed for the purpose of placing initial ED orders. Patient was informed they will receive a full evaluation by an emergency provider when a room becomes available, and that this televisit is not intended to be a comprehensive emergency department evaluation.    Plan to obtain labs, EKG and chest x-ray.    Patient elected to leave the emergency department prior to the completion of their evaluation and treatment.  Did not inform the emergency department staff with their intent to leave and subsequently did not receive any educational materials, follow-up instructions, or further treatment recommendations.     I have independently reviewed the chest x-ray.  Regular artery.  Stable cardiac silhouette, no pleural effusion,  no focal opacity.             Mendoza Antonio, JOE  01/22/25 1307

## 2025-06-07 ENCOUNTER — HOSPITAL ENCOUNTER (EMERGENCY)
Facility: HOSPITAL | Age: OVER 89
Discharge: HOME | End: 2025-06-08
Attending: EMERGENCY MEDICINE | Admitting: EMERGENCY MEDICINE
Payer: MEDICARE

## 2025-06-07 ENCOUNTER — APPOINTMENT (EMERGENCY)
Dept: RADIOLOGY | Facility: HOSPITAL | Age: OVER 89
End: 2025-06-07
Payer: MEDICARE

## 2025-06-07 VITALS
RESPIRATION RATE: 18 BRPM | TEMPERATURE: 97.2 F | HEART RATE: 65 BPM | DIASTOLIC BLOOD PRESSURE: 70 MMHG | OXYGEN SATURATION: 97 % | BODY MASS INDEX: 21.66 KG/M2 | SYSTOLIC BLOOD PRESSURE: 154 MMHG | WEIGHT: 103.62 LBS

## 2025-06-07 DIAGNOSIS — B35.3 TINEA PEDIS OF BOTH FEET: ICD-10-CM

## 2025-06-07 DIAGNOSIS — M79.674 PAIN IN TOES OF BOTH FEET: Primary | ICD-10-CM

## 2025-06-07 DIAGNOSIS — M79.675 PAIN IN TOES OF BOTH FEET: Primary | ICD-10-CM

## 2025-06-07 LAB
ALBUMIN SERPL BCG-MCNC: 4.8 G/DL (ref 3.5–5)
ALP SERPL-CCNC: 125 U/L (ref 34–104)
ALT SERPL W P-5'-P-CCNC: 12 U/L (ref 7–52)
ANION GAP SERPL CALCULATED.3IONS-SCNC: 12 MMOL/L (ref 4–13)
AST SERPL W P-5'-P-CCNC: 17 U/L (ref 13–39)
BASOPHILS # BLD AUTO: 0.02 THOUSANDS/ÂΜL (ref 0–0.1)
BASOPHILS NFR BLD AUTO: 0 % (ref 0–1)
BILIRUB SERPL-MCNC: 0.88 MG/DL (ref 0.2–1)
BUN SERPL-MCNC: 35 MG/DL (ref 5–25)
CALCIUM SERPL-MCNC: 9.9 MG/DL (ref 8.4–10.2)
CHLORIDE SERPL-SCNC: 105 MMOL/L (ref 96–108)
CK SERPL-CCNC: 49 U/L (ref 26–192)
CO2 SERPL-SCNC: 24 MMOL/L (ref 21–32)
CREAT SERPL-MCNC: 1.05 MG/DL (ref 0.6–1.3)
EOSINOPHIL # BLD AUTO: 0.11 THOUSAND/ÂΜL (ref 0–0.61)
EOSINOPHIL NFR BLD AUTO: 2 % (ref 0–6)
ERYTHROCYTE [DISTWIDTH] IN BLOOD BY AUTOMATED COUNT: 18.7 % (ref 11.6–15.1)
GFR SERPL CREATININE-BSD FRML MDRD: 47 ML/MIN/1.73SQ M
GLUCOSE SERPL-MCNC: 110 MG/DL (ref 65–140)
HCT VFR BLD AUTO: 41.5 % (ref 34.8–46.1)
HGB BLD-MCNC: 12.7 G/DL (ref 11.5–15.4)
IMM GRANULOCYTES # BLD AUTO: 0.01 THOUSAND/UL (ref 0–0.2)
IMM GRANULOCYTES NFR BLD AUTO: 0 % (ref 0–2)
LYMPHOCYTES # BLD AUTO: 0.84 THOUSANDS/ÂΜL (ref 0.6–4.47)
LYMPHOCYTES NFR BLD AUTO: 14 % (ref 14–44)
MCH RBC QN AUTO: 28.6 PG (ref 26.8–34.3)
MCHC RBC AUTO-ENTMCNC: 30.6 G/DL (ref 31.4–37.4)
MCV RBC AUTO: 94 FL (ref 82–98)
MONOCYTES # BLD AUTO: 0.74 THOUSAND/ÂΜL (ref 0.17–1.22)
MONOCYTES NFR BLD AUTO: 13 % (ref 4–12)
NEUTROPHILS # BLD AUTO: 4.2 THOUSANDS/ÂΜL (ref 1.85–7.62)
NEUTS SEG NFR BLD AUTO: 71 % (ref 43–75)
NRBC BLD AUTO-RTO: 0 /100 WBCS
PLATELET # BLD AUTO: 171 THOUSANDS/UL (ref 149–390)
PMV BLD AUTO: 9.5 FL (ref 8.9–12.7)
POTASSIUM SERPL-SCNC: 3.9 MMOL/L (ref 3.5–5.3)
PROT SERPL-MCNC: 7.7 G/DL (ref 6.4–8.4)
RBC # BLD AUTO: 4.44 MILLION/UL (ref 3.81–5.12)
SODIUM SERPL-SCNC: 141 MMOL/L (ref 135–147)
WBC # BLD AUTO: 5.92 THOUSAND/UL (ref 4.31–10.16)

## 2025-06-07 PROCEDURE — 73630 X-RAY EXAM OF FOOT: CPT

## 2025-06-07 PROCEDURE — 85025 COMPLETE CBC W/AUTO DIFF WBC: CPT | Performed by: EMERGENCY MEDICINE

## 2025-06-07 PROCEDURE — 99284 EMERGENCY DEPT VISIT MOD MDM: CPT | Performed by: EMERGENCY MEDICINE

## 2025-06-07 PROCEDURE — 36415 COLL VENOUS BLD VENIPUNCTURE: CPT | Performed by: EMERGENCY MEDICINE

## 2025-06-07 PROCEDURE — 99283 EMERGENCY DEPT VISIT LOW MDM: CPT

## 2025-06-07 PROCEDURE — 82550 ASSAY OF CK (CPK): CPT | Performed by: EMERGENCY MEDICINE

## 2025-06-07 PROCEDURE — 80053 COMPREHEN METABOLIC PANEL: CPT | Performed by: EMERGENCY MEDICINE

## 2025-06-07 RX ORDER — FLUCONAZOLE 150 MG/1
150 TABLET ORAL ONCE
Status: COMPLETED | OUTPATIENT
Start: 2025-06-07 | End: 2025-06-07

## 2025-06-07 RX ORDER — FLUCONAZOLE 150 MG/1
150 TABLET ORAL ONCE
Qty: 1 TABLET | Refills: 0 | Status: SHIPPED | OUTPATIENT
Start: 2025-06-14 | End: 2025-06-14

## 2025-06-07 RX ADMIN — FLUCONAZOLE 150 MG: 150 TABLET ORAL at 23:46

## 2025-06-08 NOTE — ED PROVIDER NOTES
Time reflects when diagnosis was documented in both MDM as applicable and the Disposition within this note       Time User Action Codes Description Comment    6/7/2025 10:26 PM Shannan Blackburn MAURICIO Add [M79.674,  M79.675,  G89.29] Chronic toe pain, bilateral     6/7/2025 10:26 PM Shannan Blackburn MAURICIO Remove [M79.674,  M79.675,  G89.29] Chronic toe pain, bilateral     6/7/2025 10:26 PM Shannan Blackburn MAURICIO Add [M79.674,  M79.675] Pain in toes of both feet     6/7/2025 10:26 PM ShaheensaltyShannan pierre MAURICIO Add [B35.3] Tinea pedis of both feet           ED Disposition       ED Disposition   Discharge    Condition   Stable    Date/Time   Sat Jun 7, 2025 10:25 PM    Comment   Mariela Giron discharge to home/self care.                   Assessment & Plan       Medical Decision Making  89-year-old female presents by ambulance for evaluation of foot pain sonwho accompanies her also provides history she has had a longstanding history of problems with her feet she has a pending podiatry evaluation she complains to me of toe pain increased redness there was some discoloration of her feet bilaterally he has no open wounds.  She denies any calf pain or leg swelling there is no burning sensation she had no breathing complaints and physical exam patient does have intact dopplerable triphasic pulses bilaterally of the posterior tibial anterior tibial and dorsalis pedis pulse therefore vascular occlusion is extremely unlikely.  Feet are equally warm.  Patient has no open wounds but does have an increased reddish hue to the toes and feet with peeling of skin on plantar aspect posteriorly  most consistent with possible tinea superinfection.  Will proceed with pain film radiographs to evaluate for less likely possibility of a stress fracture or any air in the soft tissues.  Assess CK patient is not experiencing pain out of proportion.  Not consistent with CHF or compartment syndrome.  Recommend close outpatient podiatry follow-up studies are  negative    Cardiology note 9/6/24 reviewed     Amount and/or Complexity of Data Reviewed  Labs: ordered.  Radiology: ordered and independent interpretation performed.    Risk  Prescription drug management.        ED Course as of 06/07/25 2258   Sat Jun 07, 2025 2237 Updated patient with labs and xray finding recommending outpt podiatry followup.  Secondary to being in assisted living location of topical antifungal and just not practical we will therefore initiate treatment with fluconazole x 1 here and a repeat in 1 week.       Medications   fluconazole (DIFLUCAN) tablet 150 mg (has no administration in time range)       ED Risk Strat Scores                    (ISAR) Identification of Seniors at Risk  Before the illness or injury that brought you to the Emergency, did you need someone to help you on a regular basis?: 0  In the last 24 hours, have you needed more help than usual?: 0  Have you been hospitalized for one or more nights during the past 6 months?: 0  In general, do you see well?: 0  In general, do you have serious problems with your memory?: 0  Do you take more than three different medications every day?: 0  ISAR Score: 0            SBIRT 22yo+      Flowsheet Row Most Recent Value   Initial Alcohol Screen: US AUDIT-C     1. How often do you have a drink containing alcohol? 0 Filed at: 06/07/2025 2028   2. How many drinks containing alcohol do you have on a typical day you are drinking?  0 Filed at: 06/07/2025 2028   3a. Male UNDER 65: How often do you have five or more drinks on one occasion? 0 Filed at: 06/07/2025 2028   3b. FEMALE Any Age, or MALE 65+: How often do you have 4 or more drinks on one occassion? 0 Filed at: 06/07/2025 2028   Audit-C Score 0 Filed at: 06/07/2025 2028                            History of Present Illness       Chief Complaint   Patient presents with    Foot Pain     [place]. Left foot pain and discoloration./ able to ambulate       Past Medical History[1]   Past  Surgical History[2]   Family History[3]   Social History[4]   E-Cigarette/Vaping    E-Cigarette Use Never User       E-Cigarette/Vaping Substances    Nicotine No     THC No     CBD No     Flavoring No     Other No     Unknown No       I have reviewed and agree with the history as documented.     89-year-old female presents from [facility] with complaints of bilateral toe pain she denies a burning sensation is not otherwise able to characterize the pain there is no history of trauma or fall fall additional history is provided by the son states she has had a long history of foot problems she is awaiting a podiatry evaluation at her assisted living center but they have had to cancel in the past.  There is no history of any open wounds is not a diabetic she is able to ambulate short distances at the assisted living center she has a preference for keeping her shoes on for prolonged periods of time.  There is no history of lower extremity edema she denies numbness or tingling her son noted that her feet were discolored earlier but no new medications patient is anticoagulated on Eliquis as well as Plavix for permanent atrial fibs as well as coronary artery disease there is no history of any calf pain no weakness no difficulty breathing  Past medical history atrial fib/SVT on Eliquis coronary artery disease history of stent placement to the right coronary artery on Plavix CHF aortic stenosis status post TAVR hypertension hyperlipidemia pulmonary hypertension  PSHx includes TAVR in 2023 left total knee arthroplasty        Review of Systems   Constitutional:  Negative for activity change, appetite change, chills and fever.   Respiratory:  Negative for cough and shortness of breath.    Cardiovascular:  Negative for chest pain and leg swelling.   Musculoskeletal:  Positive for arthralgias (foot pain).   Skin:  Negative for wound.   Neurological:  Negative for weakness and numbness.   All other systems reviewed and are  negative.          Objective       ED Triage Vitals [06/07/25 2028]   Temperature Pulse Blood Pressure Respirations SpO2 Patient Position - Orthostatic VS   98.3 °F (36.8 °C) 87 154/73 18 98 % --      Temp src Heart Rate Source BP Location FiO2 (%) Pain Score    -- Monitor -- -- --      Vitals      Date and Time Temp Pulse SpO2 Resp BP Pain Score FACES Pain Rating User   06/07/25 2028 98.3 °F (36.8 °C) 87 98 % 18 154/73 -- -- CP            Physical Exam  Vitals and nursing note reviewed.   Constitutional:       General: She is not in acute distress.     Appearance: She is not ill-appearing or toxic-appearing.   HENT:      Head: Normocephalic.      Right Ear: External ear normal.      Left Ear: External ear normal.      Nose: Nose normal.      Mouth/Throat:      Mouth: Mucous membranes are moist.     Eyes:      General:         Right eye: No discharge.         Left eye: No discharge.      Extraocular Movements: Extraocular movements intact.      Conjunctiva/sclera: Conjunctivae normal.      Pupils: Pupils are equal, round, and reactive to light.       Cardiovascular:      Rate and Rhythm: Normal rate and regular rhythm.   Pulmonary:      Effort: Pulmonary effort is normal. No respiratory distress.      Breath sounds: Normal breath sounds. No stridor. No wheezing or rhonchi.   Abdominal:      General: There is no distension.      Palpations: Abdomen is soft.      Tenderness: There is no abdominal tenderness. There is no right CVA tenderness, left CVA tenderness or guarding.     Musculoskeletal:      Cervical back: Normal range of motion and neck supple.      Right lower leg: Normal. No edema.      Left lower leg: Normal. No edema.      Right ankle: Normal.      Right Achilles Tendon: Normal.      Left ankle: Normal.      Left Achilles Tendon: Normal.      Right foot: Normal range of motion. No swelling, laceration or bony tenderness. Normal pulse.      Left foot: Normal range of motion. No swelling, laceration or bony  tenderness. Normal pulse.      Comments: See media tab to depict fee; No edema patient has no tenderness to the calves both ankles intact.  Patient has dopplerable triphasic pulses to the anterior tibial posterior tibial dorsalis pedis bilaterally.  Has no open wounds to the feet bilaterally she does have some reddish hue to the toes brisk capillary refill is intact she has a callus to the right fifth toe there is no evidence of an ascending lymphangitis no cellulitis motion of the toes is intact she no reproducible tenderness with palpation of the plantar aspect of her foot.     Skin:     Capillary Refill: Capillary refill takes less than 2 seconds.     Neurological:      General: No focal deficit present.      Mental Status: She is alert and oriented to person, place, and time.      Cranial Nerves: No cranial nerve deficit.      Sensory: No sensory deficit.      Motor: No weakness.      Coordination: Coordination normal.     Psychiatric:         Mood and Affect: Mood normal.         Results Reviewed       Procedure Component Value Units Date/Time    Comprehensive metabolic panel [[id number]]  (Abnormal) Collected: 06/07/25 2135    Lab Status: Final result Specimen: Blood from Arm, Left Updated: 06/07/25 2207     Sodium 141 mmol/L      Potassium 3.9 mmol/L      Chloride 105 mmol/L      CO2 24 mmol/L      ANION GAP 12 mmol/L      BUN 35 mg/dL      Creatinine 1.05 mg/dL      Glucose 110 mg/dL      Calcium 9.9 mg/dL      AST 17 U/L      ALT 12 U/L      Alkaline Phosphatase 125 U/L      Total Protein 7.7 g/dL      Albumin 4.8 g/dL      Total Bilirubin 0.88 mg/dL      eGFR 47 ml/min/1.73sq m     Narrative:      National Kid[employer] Disease Foundation guidelines for Chronic Kidney Disease (CKD):     Stage 1 with normal or high GFR (GFR > 90 mL/min/1.73 square meters)    Stage 2 Mild CKD (GFR = 60-89 mL/min/1.73 square meters)    Stage 3A Moderate CKD (GFR = 45-59 mL/min/1.73 square meters)    Stage 3B Moderate CKD (GFR = 30-44  mL/min/1.73 square meters)    Stage 4 Severe CKD (GFR = 15-29 mL/min/1.73 square meters)    Stage 5 End Stage CKD (GFR <15 mL/min/1.73 square meters)  Note: GFR calculation is accurate only with a steady state creatinine    CK [[id number]]  (Normal) Collected: 06/07/25 2135    Lab Status: Final result Specimen: Blood from Arm, Left Updated: 06/07/25 2207     Total CK 49 U/L     CBC and differential [[id number]]  (Abnormal) Collected: 06/07/25 2135    Lab Status: Final result Specimen: Blood from Arm, Left Updated: 06/07/25 2149     WBC 5.92 Thousand/uL      RBC 4.44 Million/uL      Hemoglobin 12.7 g/dL      Hematocrit 41.5 %      MCV 94 fL      MCH 28.6 pg      MCHC 30.6 g/dL      RDW 18.7 %      MPV 9.5 fL      Platelets 171 Thousands/uL      nRBC 0 /100 WBCs      Segmented % 71 %      Immature Grans % 0 %      Lymphocytes % 14 %      Monocytes % 13 %      Eosinophils Relative 2 %      Basophils Relative 0 %      Absolute Neutrophils 4.20 Thousands/µL      Absolute Immature Grans 0.01 Thousand/uL      Absolute Lymphocytes 0.84 Thousands/µL      Absolute Monocytes 0.74 Thousand/µL      Eosinophils Absolute 0.11 Thousand/µL      Basophils Absolute 0.02 Thousands/µL             XR foot 3+ views RIGHT   ED Interpretation by Shannan Blackburn MD (06/07 2150)   Per my independent interpretation. Radiologist to provide formal read. No fracture or free air        XR foot 3+ views LEFT   ED Interpretation by Shannan Blackburn MD (06/07 2150)   Per my independent interpretation. Radiologist to provide formal read. No fracture or free air          Procedures    ED Medication and Procedure Management   Prior to Admission Medications   Prescriptions Last Dose Informant Patient Reported? Taking?   ALPRAZolam (XANAX) 0.25 mg tablet  Family Member No No   Sig: TAKE 1 TABLET BY MOUTH THREE TIMES DAILY AS NEEDED FOR ANXIETY   Ascorbic Acid (vitamin C) 100 MG tablet   Yes No   Sig: Take 100 mg by mouth daily    Cholecalciferol (Vitamin D3) 50 MCG (2000 UT) TABS  Family Member Yes No   Sig: Take 2,000 Units by mouth in the morning. Indications: Osteoporosis   Nutritional Supplement LIQD  Family Member No No   Sig: Take 1 Can by mouth 2 (two) times a day Ensure pudding BID   Psyllium (METAMUCIL FIBER) 51.7 % PACK  Family Member No No   Sig: Take 1 Package by mouth daily   albuterol (2.5 mg/3 mL) 0.083 % nebulizer solution   No No   Sig: Take 3 mL (2.5 mg total) by nebulization every 4 (four) hours as needed for shortness of breath   alendronate (Fosamax) 70 mg tablet  Family Member No No   Sig: Take 1 tablet (70 mg total) by mouth every 7 days   apixaban (Eliquis) 2.5 mg  Family Member No No   Sig: Take 1 tablet by mouth twice daily   atorvastatin (LIPITOR) 20 mg tablet  Family Member No No   Sig: Take 1 tablet (20 mg total) by mouth daily with dinner   clopidogrel (PLAVIX) 75 mg tablet  Family Member No No   Sig: Take 1 tablet (75 mg total) by mouth daily   dextromethorphan-guaiFENesin (ROBITUSSIN DM)  mg/5 mL syrup   No No   Sig: Take 10 mL by mouth 3 (three) times a day   Patient not taking: Reported on 9/6/2024   furosemide (LASIX) 20 mg tablet   No No   Sig: Take 1 tablet (20 mg total) by mouth daily   melatonin 3 mg   No No   Sig: Take 1 tablet (3 mg total) by mouth daily at bedtime   metoprolol succinate (TOPROL-XL) 50 mg 24 hr tablet  Family Member No No   Sig: Take 1 tablet (50 mg total) by mouth daily Hold for HR<60 or SBP<110 mmHg   pantoprazole (PROTONIX) 20 mg tablet  Family Member No No   Sig: Take 1 tablet (20 mg total) by mouth daily before breakfast   polyethylene glycol (MIRALAX) 17 g packet   No No   Sig: Take 17 g by mouth daily as needed (constipation) for up to 14 days   potassium chloride (K-DUR,KLOR-CON) 10 mEq tablet  Family Member No No   Sig: Take 1 tablet (10 mEq total) by mouth daily   Patient taking differently: Take 10 mEq by mouth 2 (two) times a week Tuesday and Friday   zinc  gluconate 50 mg tablet   Yes No   Sig: Take 50 mg by mouth daily      Facility-Administered Medications: None     Patient's Medications   Discharge Prescriptions    FLUCONAZOLE (DIFLUCAN) 150 MG TABLET    Take 1 tablet (150 mg total) by mouth once for 1 dose Do not start before June 14, 2025.       Start Date: 6/14/2025 End Date: 6/14/2025       Order Dose: 150 mg       Quantity: 1 tablet    Refills: 0     No discharge procedures on file.  ED SEPSIS DOCUMENTATION   Time reflects when diagnosis was documented in both MDM as applicable and the Disposition within this note       Time User Action Codes Description Comment    6/7/2025 10:26 PM Shannan Blackburn Add [M79.674,  M79.675,  G89.29] Chronic toe pain, bilateral     6/7/2025 10:26 PM Shannan Blackburn Remove [M79.674,  M79.675,  G89.29] Chronic toe pain, bilateral     6/7/2025 10:26 PM Shannan Blackburn Add [M79.674,  M79.675] Pain in toes of both feet     6/7/2025 10:26 PM Shannan Blackburn [B35.3] Tinea pedis of both feet                      Shannan Blackburn MD  06/07/25 2229       [1]   Past Medical History:  Diagnosis Date    Acute on chronic diastolic CHF (congestive heart failure) (HCC) 01/30/2023    Acute respiratory failure with hypoxia ([facility]) 12/22/2022    Aortic stenosis     Atrial fibrillation (HCC)     Atrial flutter (HCC)     CAD (coronary artery disease) 01/11/2023    CAD/PCI/ELODIA    CKD (chronic kidney disease), stage III ([facility])     Community acquired pneumonia of left upper lobe of lung 05/17/2019    Fatigue     Full dentures     Generalized anxiety disorder     Hyperlipidemia     Hypertension     Impaired fasting glucose     Left anterior fascicular block     Mitral regurgitation     Pneumonia     RBBB     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    TRANSFEMORAL W/ 23MM CALVERT SHAVONNE S3 ULTRA VALVE(ACCESS ON LEFT)    SVT (supraventricular tachycardia) ([facility])     Tricuspid regurgitation    [2]   Past Surgical  History:  Procedure Laterality Date    CARDIAC CATHETERIZATION N/A 1/11/2023    Procedure: LHC-Cardiac catheterization;  Surgeon: Sj Camacho MD;  Location: [facility];  Service: Cardiology    CARDIAC CATHETERIZATION N/A 1/11/2023    Procedure: Cardiac pci;  Surgeon: Sj Camacho MD;  Location: [facility];  Service: Cardiology    CARDIAC CATHETERIZATION N/A 1/17/2023    Procedure: CARDIAC TAVR;  Surgeon: Harrison Magdaleno DO;  Location: [facility];  Service: Cardiology    DENTAL SURGERY Bilateral     ALL TEETH REMOVED    FL GUIDED NEEDLE PLAC BX/ASP/INJ  5/26/2021    IR THORACENTESIS  12/20/2022    IR THORACENTESIS  7/14/2023    IR THORACENTESIS  9/1/2023    IR THORACENTESIS  9/25/2023    JOINT REPLACEMENT Left     KNEE SURGERY      left knee replacement    IA INJECT SI JOINT ARTHRGRPHY&/ANES/STEROID W/JOHNATHON Bilateral 5/26/2021    Procedure: SACROILIAC JOINT INJECTION;  Surgeon: Cj Langston MD;  Location: [facility];  Service: Pain Management     IA REPLACE AORTIC VALVE OPENFEMORAL ARTERY APPROACH N/A 1/17/2023    Procedure: REPLACEMENT AORTIC VALVE TRANSCATHETER (TAVR) TRANSFEMORAL W/ 23MM CALVERT SHAVONNE S3 ULTRA VALVE(ACCESS ON LEFT) ALENA;  Surgeon: Sundar Ayala DO;  Location: [facility];  Service: Cardiac Surgery    SACROILIAC JOINT INJECTION Bilateral 6/7/2023    Procedure: Bilateral sacroiliac joint steroid injection;  Surgeon: Blanca Holm MD;  Location: [facility];  Service: Pain Management    [3]   Family History  Problem Relation Name Age of Onset    Hypertension Mother      Asthma Father          's asthma    Alzheimer's disease Sister      Rectal cancer Son Sloan     Uterine cancer Daughter Amanda     Heart disease Sister      Heart disease Brother     [4]   Social History  Tobacco Use    Smoking status: Never    Smokeless tobacco: Never   Vaping Use    Vaping status: Never Used   Substance Use Topics    Alcohol use: Never    Drug use: Never        Shannan Chahal  MD Alexey  06/07/25 2179

## 2025-06-08 NOTE — DISCHARGE INSTRUCTIONS
Repeat dose of diflucan 150mg on 6/14/25;   Follow up with podiatry as soon as possible  Return with increased pain swelling any open wounds or any new or worsening symptoms

## 2025-07-02 ENCOUNTER — HOSPICE ADMISSION (OUTPATIENT)
Dept: HOME HOSPICE | Facility: HOSPICE | Age: OVER 89
End: 2025-07-02
Payer: MEDICARE

## 2025-07-02 ENCOUNTER — HOME CARE VISIT (OUTPATIENT)
Dept: HOME HEALTH SERVICES | Facility: HOME HEALTHCARE | Age: OVER 89
End: 2025-07-02
Payer: MEDICARE

## 2025-07-03 ENCOUNTER — HOME CARE VISIT (OUTPATIENT)
Dept: HOME HOSPICE | Facility: HOSPICE | Age: OVER 89
End: 2025-07-03
Payer: MEDICARE

## 2025-07-03 ENCOUNTER — HOME CARE VISIT (OUTPATIENT)
Dept: HOME HEALTH SERVICES | Facility: HOME HEALTHCARE | Age: OVER 89
End: 2025-07-03
Attending: NURSE PRACTITIONER
Payer: MEDICARE

## 2025-07-03 VITALS
HEART RATE: 82 BPM | DIASTOLIC BLOOD PRESSURE: 70 MMHG | RESPIRATION RATE: 16 BRPM | SYSTOLIC BLOOD PRESSURE: 113 MMHG | HEIGHT: 58 IN | TEMPERATURE: 97.5 F | BODY MASS INDEX: 21.62 KG/M2 | WEIGHT: 103 LBS

## 2025-07-03 DIAGNOSIS — Z51.5 HOSPICE CARE: Primary | ICD-10-CM

## 2025-07-03 PROCEDURE — G0299 HHS/HOSPICE OF RN EA 15 MIN: HCPCS

## 2025-07-03 PROCEDURE — 10330057 MEDICATION, GENERAL

## 2025-07-03 RX ORDER — ACETAMINOPHEN 650 MG/1
650 SUPPOSITORY RECTAL EVERY 4 HOURS PRN
Qty: 12 SUPPOSITORY | Refills: 0 | Status: SHIPPED | OUTPATIENT
Start: 2025-07-03

## 2025-07-03 RX ORDER — MORPHINE SULFATE 20 MG/ML
5 SOLUTION ORAL EVERY 4 HOURS PRN
Qty: 30 ML | Refills: 0 | Status: SHIPPED | OUTPATIENT
Start: 2025-07-03

## 2025-07-03 RX ORDER — LORAZEPAM 0.5 MG/1
0.5 TABLET ORAL EVERY 4 HOURS PRN
Qty: 30 TABLET | Refills: 0 | Status: SHIPPED | OUTPATIENT
Start: 2025-07-03

## 2025-07-03 NOTE — PROGRESS NOTES
7/3/2025 12:42 PM  [facility] facility patient requests SOC medications.  Filled electronically via Epic as per [place] State Law.    Requested Prescriptions     Signed Prescriptions Disp Refills    acetaminophen (TYLENOL) 650 mg suppository 12 suppository 0     Sig: Insert 1 suppository (650 mg total) into the rectum every 4 (four) hours as needed for fever    LORazepam (Ativan) 0.5 mg tablet 30 tablet 0     Sig: Take 1 tablet (0.5 mg total) by mouth every 4 (four) hours as needed (anxiety/dyspnea)    morphine sulfate, concentrate, 100 mg/5mL concentrated solution 30 mL 0     Sig: Take 0.25 mL (5 mg total) by mouth every 4 (four) hours as needed (pain/dyspnea) Max Daily Amount: 30 mg       Zina Bonilla MD  [facility]  Hospice Answering Service: [phone]

## 2025-07-04 ENCOUNTER — HOME CARE VISIT (OUTPATIENT)
Dept: HOME HEALTH SERVICES | Facility: HOME HEALTHCARE | Age: OVER 89
End: 2025-07-04
Payer: MEDICARE

## 2025-07-04 PROCEDURE — G0156 HHCP-SVS OF AIDE,EA 15 MIN: HCPCS

## 2025-07-05 ENCOUNTER — HOME CARE VISIT (OUTPATIENT)
Dept: HOME HEALTH SERVICES | Facility: HOME HEALTHCARE | Age: OVER 89
End: 2025-07-05
Payer: MEDICARE

## 2025-07-05 PROCEDURE — G0156 HHCP-SVS OF AIDE,EA 15 MIN: HCPCS

## 2025-07-06 ENCOUNTER — HOME CARE VISIT (OUTPATIENT)
Dept: HOME HEALTH SERVICES | Facility: HOME HEALTHCARE | Age: OVER 89
End: 2025-07-06
Payer: MEDICARE

## 2025-07-06 PROCEDURE — G0156 HHCP-SVS OF AIDE,EA 15 MIN: HCPCS

## 2025-07-06 NOTE — HOSPICE
"visit was made with patient .is alert and oriented able to make needs known .reports increased weakness ...stated she no longer ambulates out of her bedroom  uses walker and ask for assistance more often in her bedroom .reports slight shortness of breath no longer sleeps in bed with 3 pillows now uses recliner chair to sleep in ..appetite has decreased wt was 106.4 is now 99.4 ..pt stated i just dont have an appetite .  increased incont .stated that she often needs her brief changed urinating when ambulating to the bathroom   patient bilateral feet are cool to touch and have a red discolration..  patient stated \"i just feel so very weak \" lw"

## 2025-07-07 ENCOUNTER — HOME CARE VISIT (OUTPATIENT)
Dept: HOME HOSPICE | Facility: HOSPICE | Age: OVER 89
End: 2025-07-07
Payer: MEDICARE

## 2025-07-07 ENCOUNTER — HOME CARE VISIT (OUTPATIENT)
Dept: HOME HEALTH SERVICES | Facility: HOME HEALTHCARE | Age: OVER 89
End: 2025-07-07
Payer: MEDICARE

## 2025-07-07 PROCEDURE — G0155 HHCP-SVS OF CSW,EA 15 MIN: HCPCS

## 2025-07-07 PROCEDURE — G0156 HHCP-SVS OF AIDE,EA 15 MIN: HCPCS

## 2025-07-08 ENCOUNTER — HOME CARE VISIT (OUTPATIENT)
Dept: HOME HEALTH SERVICES | Facility: HOME HEALTHCARE | Age: OVER 89
End: 2025-07-08
Payer: MEDICARE

## 2025-07-08 VITALS
HEART RATE: 72 BPM | TEMPERATURE: 97.7 F | RESPIRATION RATE: 16 BRPM | DIASTOLIC BLOOD PRESSURE: 87 MMHG | SYSTOLIC BLOOD PRESSURE: 154 MMHG

## 2025-07-08 PROCEDURE — G0299 HHS/HOSPICE OF RN EA 15 MIN: HCPCS

## 2025-07-09 ENCOUNTER — HOME CARE VISIT (OUTPATIENT)
Dept: HOME HEALTH SERVICES | Facility: HOME HEALTHCARE | Age: OVER 89
End: 2025-07-09
Payer: MEDICARE

## 2025-07-09 PROCEDURE — G0156 HHCP-SVS OF AIDE,EA 15 MIN: HCPCS

## 2025-07-10 ENCOUNTER — HOME CARE VISIT (OUTPATIENT)
Dept: HOME HEALTH SERVICES | Facility: HOME HEALTHCARE | Age: OVER 89
End: 2025-07-10
Payer: MEDICARE

## 2025-07-10 PROCEDURE — G0299 HHS/HOSPICE OF RN EA 15 MIN: HCPCS

## 2025-07-10 PROCEDURE — G0156 HHCP-SVS OF AIDE,EA 15 MIN: HCPCS

## 2025-07-12 ENCOUNTER — TELEPHONE (OUTPATIENT)
Dept: OTHER | Facility: OTHER | Age: OVER 89
End: 2025-07-12

## 2025-07-12 ENCOUNTER — HOME CARE VISIT (OUTPATIENT)
Dept: HOME HOSPICE | Facility: HOSPICE | Age: OVER 89
End: 2025-07-12
Payer: MEDICARE

## 2025-07-12 NOTE — TELEPHONE ENCOUNTER
Reason for Call: Pharmacy would like to authorize medication     Caller's Name: Yasmin    Caller's Relationship to Patient: [facility]     Caller's Callback Number: [phone] option 3    Home Health OR Hospice Patient:  hospice

## 2025-07-14 ENCOUNTER — HOME CARE VISIT (OUTPATIENT)
Dept: HOME HEALTH SERVICES | Facility: HOME HEALTHCARE | Age: OVER 89
End: 2025-07-14
Payer: MEDICARE

## 2025-07-14 VITALS
HEART RATE: 88 BPM | RESPIRATION RATE: 18 BRPM | TEMPERATURE: 97.2 F | DIASTOLIC BLOOD PRESSURE: 56 MMHG | SYSTOLIC BLOOD PRESSURE: 102 MMHG

## 2025-07-14 VITALS
TEMPERATURE: 98.2 F | DIASTOLIC BLOOD PRESSURE: 54 MMHG | RESPIRATION RATE: 16 BRPM | SYSTOLIC BLOOD PRESSURE: 114 MMHG | HEART RATE: 88 BPM

## 2025-07-14 PROCEDURE — G0156 HHCP-SVS OF AIDE,EA 15 MIN: HCPCS

## 2025-07-14 PROCEDURE — G0299 HHS/HOSPICE OF RN EA 15 MIN: HCPCS

## 2025-07-15 ENCOUNTER — HOME CARE VISIT (OUTPATIENT)
Dept: HOME HEALTH SERVICES | Facility: HOME HEALTHCARE | Age: OVER 89
End: 2025-07-15
Payer: MEDICARE

## 2025-07-15 PROCEDURE — G0156 HHCP-SVS OF AIDE,EA 15 MIN: HCPCS

## 2025-07-16 ENCOUNTER — HOME CARE VISIT (OUTPATIENT)
Dept: HOME HEALTH SERVICES | Facility: HOME HEALTHCARE | Age: OVER 89
End: 2025-07-16
Payer: MEDICARE

## 2025-07-16 PROCEDURE — G0299 HHS/HOSPICE OF RN EA 15 MIN: HCPCS

## 2025-07-16 PROCEDURE — G0156 HHCP-SVS OF AIDE,EA 15 MIN: HCPCS

## 2025-07-17 ENCOUNTER — HOME CARE VISIT (OUTPATIENT)
Dept: HOME HEALTH SERVICES | Facility: HOME HEALTHCARE | Age: OVER 89
End: 2025-07-17
Payer: MEDICARE

## 2025-07-17 PROCEDURE — G0156 HHCP-SVS OF AIDE,EA 15 MIN: HCPCS

## 2025-07-18 PROCEDURE — 10330057 MEDICATION, GENERAL

## 2025-07-20 VITALS — RESPIRATION RATE: 16 BRPM | TEMPERATURE: 97.5 F | HEART RATE: 68 BPM

## 2025-07-21 ENCOUNTER — HOME CARE VISIT (OUTPATIENT)
Dept: HOME HEALTH SERVICES | Facility: HOME HEALTHCARE | Age: OVER 89
End: 2025-07-21
Payer: MEDICARE

## 2025-07-21 PROCEDURE — G0156 HHCP-SVS OF AIDE,EA 15 MIN: HCPCS

## 2025-07-22 ENCOUNTER — HOME CARE VISIT (OUTPATIENT)
Dept: HOME HEALTH SERVICES | Facility: HOME HEALTHCARE | Age: OVER 89
End: 2025-07-22
Payer: MEDICARE

## 2025-07-22 PROCEDURE — G0156 HHCP-SVS OF AIDE,EA 15 MIN: HCPCS

## 2025-07-23 ENCOUNTER — HOME CARE VISIT (OUTPATIENT)
Dept: HOME HEALTH SERVICES | Facility: HOME HEALTHCARE | Age: OVER 89
End: 2025-07-23
Payer: MEDICARE

## 2025-07-23 PROCEDURE — G0299 HHS/HOSPICE OF RN EA 15 MIN: HCPCS

## 2025-07-23 PROCEDURE — G0156 HHCP-SVS OF AIDE,EA 15 MIN: HCPCS

## 2025-07-24 ENCOUNTER — HOME CARE VISIT (OUTPATIENT)
Dept: HOME HEALTH SERVICES | Facility: HOME HEALTHCARE | Age: OVER 89
End: 2025-07-24
Payer: MEDICARE

## 2025-07-24 VITALS
SYSTOLIC BLOOD PRESSURE: 108 MMHG | RESPIRATION RATE: 20 BRPM | DIASTOLIC BLOOD PRESSURE: 54 MMHG | TEMPERATURE: 98 F | HEART RATE: 80 BPM

## 2025-07-24 PROCEDURE — G0156 HHCP-SVS OF AIDE,EA 15 MIN: HCPCS

## 2025-07-28 ENCOUNTER — HOME CARE VISIT (OUTPATIENT)
Dept: HOME HEALTH SERVICES | Facility: HOME HEALTHCARE | Age: OVER 89
End: 2025-07-28
Payer: MEDICARE

## 2025-07-28 VITALS
RESPIRATION RATE: 16 BRPM | HEART RATE: 62 BPM | SYSTOLIC BLOOD PRESSURE: 138 MMHG | TEMPERATURE: 87.5 F | DIASTOLIC BLOOD PRESSURE: 78 MMHG

## 2025-07-28 PROCEDURE — G0156 HHCP-SVS OF AIDE,EA 15 MIN: HCPCS

## 2025-07-28 PROCEDURE — G0299 HHS/HOSPICE OF RN EA 15 MIN: HCPCS

## 2025-07-29 ENCOUNTER — HOME CARE VISIT (OUTPATIENT)
Dept: HOME HOSPICE | Facility: HOSPICE | Age: OVER 89
End: 2025-07-29
Payer: MEDICARE

## 2025-07-30 ENCOUNTER — HOME CARE VISIT (OUTPATIENT)
Dept: HOME HEALTH SERVICES | Facility: HOME HEALTHCARE | Age: OVER 89
End: 2025-07-30
Payer: MEDICARE

## 2025-07-30 PROCEDURE — G0156 HHCP-SVS OF AIDE,EA 15 MIN: HCPCS

## 2025-07-30 PROCEDURE — G0299 HHS/HOSPICE OF RN EA 15 MIN: HCPCS

## 2025-07-31 ENCOUNTER — HOME CARE VISIT (OUTPATIENT)
Dept: HOME HEALTH SERVICES | Facility: HOME HEALTHCARE | Age: OVER 89
End: 2025-07-31
Payer: MEDICARE

## 2025-07-31 PROCEDURE — G0156 HHCP-SVS OF AIDE,EA 15 MIN: HCPCS

## 2025-08-03 VITALS
TEMPERATURE: 98.1 F | HEART RATE: 72 BPM | DIASTOLIC BLOOD PRESSURE: 60 MMHG | RESPIRATION RATE: 16 BRPM | SYSTOLIC BLOOD PRESSURE: 116 MMHG

## 2025-08-04 ENCOUNTER — HOME CARE VISIT (OUTPATIENT)
Dept: HOME HEALTH SERVICES | Facility: HOME HEALTHCARE | Age: OVER 89
End: 2025-08-04
Payer: MEDICARE

## 2025-08-04 PROCEDURE — G0156 HHCP-SVS OF AIDE,EA 15 MIN: HCPCS

## 2025-08-05 ENCOUNTER — HOME CARE VISIT (OUTPATIENT)
Dept: HOME HEALTH SERVICES | Facility: HOME HEALTHCARE | Age: OVER 89
End: 2025-08-05
Payer: MEDICARE

## 2025-08-05 VITALS
TEMPERATURE: 97.5 F | SYSTOLIC BLOOD PRESSURE: 126 MMHG | HEART RATE: 92 BPM | RESPIRATION RATE: 18 BRPM | DIASTOLIC BLOOD PRESSURE: 78 MMHG

## 2025-08-05 PROCEDURE — G0299 HHS/HOSPICE OF RN EA 15 MIN: HCPCS

## 2025-08-05 PROCEDURE — G0156 HHCP-SVS OF AIDE,EA 15 MIN: HCPCS

## 2025-08-06 ENCOUNTER — HOME CARE VISIT (OUTPATIENT)
Dept: HOME HEALTH SERVICES | Facility: HOME HEALTHCARE | Age: OVER 89
End: 2025-08-06
Payer: MEDICARE

## 2025-08-06 PROCEDURE — G0156 HHCP-SVS OF AIDE,EA 15 MIN: HCPCS

## 2025-08-07 ENCOUNTER — HOME CARE VISIT (OUTPATIENT)
Dept: HOME HEALTH SERVICES | Facility: HOME HEALTHCARE | Age: OVER 89
End: 2025-08-07
Payer: MEDICARE

## 2025-08-07 PROCEDURE — G0156 HHCP-SVS OF AIDE,EA 15 MIN: HCPCS

## 2025-08-08 ENCOUNTER — HOME CARE VISIT (OUTPATIENT)
Dept: HOME HEALTH SERVICES | Facility: HOME HEALTHCARE | Age: OVER 89
End: 2025-08-08
Payer: MEDICARE

## 2025-08-08 VITALS
DIASTOLIC BLOOD PRESSURE: 68 MMHG | HEART RATE: 88 BPM | TEMPERATURE: 98.3 F | SYSTOLIC BLOOD PRESSURE: 116 MMHG | RESPIRATION RATE: 18 BRPM

## 2025-08-08 PROCEDURE — G0299 HHS/HOSPICE OF RN EA 15 MIN: HCPCS

## 2025-08-10 ENCOUNTER — HOME CARE VISIT (OUTPATIENT)
Dept: HOME HEALTH SERVICES | Facility: HOME HEALTHCARE | Age: OVER 89
End: 2025-08-10
Payer: MEDICARE

## 2025-08-11 ENCOUNTER — HOME CARE VISIT (OUTPATIENT)
Dept: HOME HEALTH SERVICES | Facility: HOME HEALTHCARE | Age: OVER 89
End: 2025-08-11
Payer: MEDICARE

## 2025-08-11 ENCOUNTER — HOME CARE VISIT (OUTPATIENT)
Dept: HOME HOSPICE | Facility: HOSPICE | Age: OVER 89
End: 2025-08-11
Payer: MEDICARE

## 2025-08-13 ENCOUNTER — HOME CARE VISIT (OUTPATIENT)
Dept: HOME HEALTH SERVICES | Facility: HOME HEALTHCARE | Age: OVER 89
End: 2025-08-13
Payer: MEDICARE

## 2025-08-13 PROCEDURE — G0299 HHS/HOSPICE OF RN EA 15 MIN: HCPCS

## 2025-08-15 ENCOUNTER — HOME CARE VISIT (OUTPATIENT)
Dept: HOME HEALTH SERVICES | Facility: HOME HEALTHCARE | Age: OVER 89
End: 2025-08-15
Payer: MEDICARE

## 2025-08-15 VITALS
RESPIRATION RATE: 20 BRPM | SYSTOLIC BLOOD PRESSURE: 110 MMHG | DIASTOLIC BLOOD PRESSURE: 58 MMHG | HEART RATE: 76 BPM | TEMPERATURE: 96.4 F

## 2025-08-15 VITALS — RESPIRATION RATE: 20 BRPM | HEART RATE: 80 BPM | TEMPERATURE: 97.4 F

## 2025-08-15 PROCEDURE — G0299 HHS/HOSPICE OF RN EA 15 MIN: HCPCS

## 2025-08-18 ENCOUNTER — HOME CARE VISIT (OUTPATIENT)
Dept: HOME HEALTH SERVICES | Facility: HOME HEALTHCARE | Age: OVER 89
End: 2025-08-18
Payer: MEDICARE

## 2025-08-18 PROCEDURE — G0156 HHCP-SVS OF AIDE,EA 15 MIN: HCPCS

## 2025-08-19 ENCOUNTER — HOME CARE VISIT (OUTPATIENT)
Dept: HOME HEALTH SERVICES | Facility: HOME HEALTHCARE | Age: OVER 89
End: 2025-08-19
Payer: MEDICARE

## 2025-08-19 PROCEDURE — G0156 HHCP-SVS OF AIDE,EA 15 MIN: HCPCS

## 2025-08-20 ENCOUNTER — HOME CARE VISIT (OUTPATIENT)
Dept: HOME HEALTH SERVICES | Facility: HOME HEALTHCARE | Age: OVER 89
End: 2025-08-20
Payer: MEDICARE

## 2025-08-20 PROCEDURE — G0156 HHCP-SVS OF AIDE,EA 15 MIN: HCPCS

## 2025-08-22 ENCOUNTER — HOME CARE VISIT (OUTPATIENT)
Dept: HOME HEALTH SERVICES | Facility: HOME HEALTHCARE | Age: OVER 89
End: 2025-08-22
Payer: MEDICARE

## (undated) DEVICE — GUIDEWIRE LUNDERQUIST TSCMG-35-300-LESDC

## (undated) DEVICE — GLIDESHEATH SLENDER STAINLESS STEEL KIT: Brand: GLIDESHEATH SLENDER

## (undated) DEVICE — GUIDEWIRE WHOLEY HI TORQUE INTERM MOD J .035 145CM

## (undated) DEVICE — NEEDLE SPINAL 22G X 3.5IN  QUINCKE

## (undated) DEVICE — TREK CORONARY DILATATION CATHETER 3.0 MM X 15 MM / RAPID-EXCHANGE: Brand: TREK

## (undated) DEVICE — SWAN-GANZ BIPOLAR PACING CATHETER: Brand: SWAN-GANZ

## (undated) DEVICE — SYRINGE 5ML LL

## (undated) DEVICE — DRESSING ALLEVYN LIFE SACRAL 6.75 X 6.5 IN

## (undated) DEVICE — CHLORAPREP HI-LITE 10.5ML ORANGE

## (undated) DEVICE — INTENDED FOR TISSUE SEPARATION, AND OTHER PROCEDURES THAT REQUIRE A SHARP SURGICAL BLADE TO PUNCTURE OR CUT.: Brand: BARD-PARKER ® CARBON RIB-BACK BLADES

## (undated) DEVICE — ADHESIVE SKIN HIGH VISCOSITY EXOFIN 1ML

## (undated) DEVICE — CORONARY IMAGING CATHETER: Brand: OPTICROSS™ 6 HD

## (undated) DEVICE — CATH DIAG 5FR IMPULSE 110CM PIG

## (undated) DEVICE — PINNACLE INTRODUCER SHEATH: Brand: PINNACLE

## (undated) DEVICE — GLOVE SRG BIOGEL ECLIPSE 7

## (undated) DEVICE — Device

## (undated) DEVICE — SPONGE 4 X 4 XRAY 16 PLY STRL LF RFD

## (undated) DEVICE — RADIFOCUS OPTITORQUE ANGIOGRAPHIC CATHETER: Brand: OPTITORQUE

## (undated) DEVICE — HI-TORQUE WHISPER MS GUIDE WIRE .014 STRAIGHT TIP 3.0 CM X 190 CM: Brand: HI-TORQUE WHISPER

## (undated) DEVICE — NEEDLE 18 G X 1 1/2

## (undated) DEVICE — BANDAID SHEER SPOT

## (undated) DEVICE — HEAVY DUTY TABLE COVER: Brand: CONVERTORS

## (undated) DEVICE — FLEXIBLE ADHESIVE BANDAGE,X-LARGE: Brand: CURITY

## (undated) DEVICE — TRAY FOLEY 16FR SURESTEP TEMP SENS URIMETER STAT LOK

## (undated) DEVICE — CATH GUIDE LAUNCHER 6FR JR4 110CM

## (undated) DEVICE — GUIDELINER CATHETERS ARE INTENDED TO BE USED IN CONJUNCTION WITH GUIDE CATHETERS TO ACCESS DISCRETE REGIONS OF THE CORONARY AND/OR PERIPHERAL VASCULATURE, AND TO FACILITATE PLACEMENT OF INTERVENTIONAL DEVICES.: Brand: GUIDELINER® V3 CATHETER

## (undated) DEVICE — NEEDLE 25G X 1 1/2

## (undated) DEVICE — IV SET EXT SM BORE CARESITE 8IN

## (undated) DEVICE — PAD GROUNDING ADULT

## (undated) DEVICE — TR BAND RADIAL ARTERY COMPRESSION DEVICE: Brand: TR BAND

## (undated) DEVICE — AMPLATZ EXTRA STIFF WIRE GUIDE: Brand: AMPLATZ

## (undated) DEVICE — SUT SILK 0 CT-1 30 IN 424H

## (undated) DEVICE — THERMOFLECT BLANKET, L, 25EA                               TS THERMOFLECT BLANKET, 48" X 84", SILVER, 5/BG, 5 BG/CS NW: Brand: THERMOFLECT

## (undated) DEVICE — 3000CC GUARDIAN II: Brand: GUARDIAN

## (undated) DEVICE — NC TREK NEO™ CORONARY DILATATION CATHETER 3.50 MM X 12 MM / RAPID-EXCHANGE: Brand: NC TREK NEO™

## (undated) DEVICE — BETHLEHEM MAJOR GENERAL PACK: Brand: CARDINAL HEALTH

## (undated) DEVICE — RUNTHROUGH NS EXTRA FLOPPY PTCA GUIDEWIRE: Brand: RUNTHROUGH

## (undated) DEVICE — CARDIO PERI-GROIN: Brand: CONVERTORS

## (undated) DEVICE — CATH DIAG 5FR IMPULSE 100CM FR4

## (undated) DEVICE — CARDIOVASCULAR SPLIT DRAPE: Brand: CONVERTORS

## (undated) DEVICE — DGW .035 FC STR 260CM TEF: Brand: EMERALD

## (undated) DEVICE — MICROPUNCTURE INTRODUCER SET SILHOUETTE TRANSITIONLESS PUSH-PLUS DESIGN - STIFFENED CANNULA WITH NITINOL WIRE GUIDE: Brand: MICROPUNCTURE

## (undated) DEVICE — SYRINGE 10ML LL

## (undated) DEVICE — TREK CORONARY DILATATION CATHETER 3.50 MM X 15 MM / RAPID-EXCHANGE: Brand: TREK

## (undated) DEVICE — SYRINGE 3ML LL

## (undated) DEVICE — DGW .035 FC J3MM 150CM TEF: Brand: EMERALD

## (undated) DEVICE — GLOVE INDICATOR PI UNDERGLOVE SZ 7 BLUE

## (undated) DEVICE — GAUZE SPONGES,USP TYPE VII GAUZE, 12 PLY: Brand: CURITY

## (undated) DEVICE — 3M™ TEGADERM™ TRANSPARENT FILM DRESSING FRAME STYLE, 1626W, 4 IN X 4-3/4 IN (10 CM X 12 CM), 50/CT 4CT/CASE: Brand: 3M™ TEGADERM™

## (undated) DEVICE — CATH GUIDE LAUNCHER 5FR JR 4.0

## (undated) DEVICE — GLOVE SRG BIOGEL 8